# Patient Record
Sex: FEMALE | Race: WHITE | Employment: OTHER | URBAN - NONMETROPOLITAN AREA
[De-identification: names, ages, dates, MRNs, and addresses within clinical notes are randomized per-mention and may not be internally consistent; named-entity substitution may affect disease eponyms.]

---

## 2017-09-08 ENCOUNTER — HOSPITAL ENCOUNTER (OUTPATIENT)
Dept: INTERVENTIONAL RADIOLOGY/VASCULAR | Age: 82
Discharge: HOME | End: 2017-09-08
Payer: MEDICARE

## 2017-09-08 DIAGNOSIS — I73.9 PVD (PERIPHERAL VASCULAR DISEASE) (HCC): ICD-10-CM

## 2017-09-08 PROCEDURE — 93923 UPR/LXTR ART STDY 3+ LVLS: CPT

## 2017-11-06 ENCOUNTER — HOSPITAL ENCOUNTER (OUTPATIENT)
Dept: WOMENS IMAGING | Age: 82
Discharge: HOME | End: 2017-11-06
Payer: MEDICARE

## 2017-11-06 DIAGNOSIS — Z12.39 BREAST SCREENING: ICD-10-CM

## 2017-11-06 PROCEDURE — 77063 BREAST TOMOSYNTHESIS BI: CPT

## 2017-11-27 ENCOUNTER — HOSPITAL ENCOUNTER (OUTPATIENT)
Age: 82
Setting detail: OUTPATIENT SURGERY
Discharge: HOME | End: 2017-11-27
Attending: INTERNAL MEDICINE | Admitting: INTERNAL MEDICINE
Payer: MEDICARE

## 2017-11-27 ENCOUNTER — ANESTHESIA (OUTPATIENT)
Dept: ENDOSCOPY | Age: 82
End: 2017-11-27
Payer: MEDICARE

## 2017-11-27 ENCOUNTER — ANESTHESIA EVENT (OUTPATIENT)
Dept: ENDOSCOPY | Age: 82
End: 2017-11-27
Payer: MEDICARE

## 2017-11-27 VITALS
TEMPERATURE: 96.8 F | DIASTOLIC BLOOD PRESSURE: 77 MMHG | WEIGHT: 188 LBS | OXYGEN SATURATION: 94 % | SYSTOLIC BLOOD PRESSURE: 134 MMHG | BODY MASS INDEX: 32.1 KG/M2 | RESPIRATION RATE: 18 BRPM | HEIGHT: 64 IN | HEART RATE: 81 BPM

## 2017-11-27 VITALS
OXYGEN SATURATION: 93 % | DIASTOLIC BLOOD PRESSURE: 61 MMHG | SYSTOLIC BLOOD PRESSURE: 129 MMHG | RESPIRATION RATE: 15 BRPM

## 2017-11-27 PROCEDURE — 2500000003 HC RX 250 WO HCPCS: Performed by: REGISTERED NURSE

## 2017-11-27 PROCEDURE — 3609012400 HC EGD TRANSORAL BIOPSY SINGLE/MULTIPLE: Performed by: INTERNAL MEDICINE

## 2017-11-27 PROCEDURE — 3700000001 HC ADD 15 MINUTES (ANESTHESIA): Performed by: INTERNAL MEDICINE

## 2017-11-27 PROCEDURE — 3609013800 HC EGD SUBMUCOSAL/BOTOX INJECTION: Performed by: INTERNAL MEDICINE

## 2017-11-27 PROCEDURE — 7100000000 HC PACU RECOVERY - FIRST 15 MIN: Performed by: INTERNAL MEDICINE

## 2017-11-27 PROCEDURE — 7100000001 HC PACU RECOVERY - ADDTL 15 MIN: Performed by: INTERNAL MEDICINE

## 2017-11-27 PROCEDURE — 2580000003 HC RX 258: Performed by: INTERNAL MEDICINE

## 2017-11-27 PROCEDURE — 88305 TISSUE EXAM BY PATHOLOGIST: CPT

## 2017-11-27 PROCEDURE — 6360000002 HC RX W HCPCS: Performed by: REGISTERED NURSE

## 2017-11-27 PROCEDURE — 3700000000 HC ANESTHESIA ATTENDED CARE: Performed by: INTERNAL MEDICINE

## 2017-11-27 RX ORDER — 0.9 % SODIUM CHLORIDE 0.9 %
10 VIAL (ML) INJECTION PRN
Status: CANCELLED | OUTPATIENT
Start: 2017-11-27

## 2017-11-27 RX ORDER — 0.9 % SODIUM CHLORIDE 0.9 %
10 VIAL (ML) INJECTION EVERY 12 HOURS SCHEDULED
Status: CANCELLED | OUTPATIENT
Start: 2017-11-27

## 2017-11-27 RX ORDER — SODIUM CHLORIDE 450 MG/100ML
INJECTION, SOLUTION INTRAVENOUS CONTINUOUS
Status: DISCONTINUED | OUTPATIENT
Start: 2017-11-27 | End: 2017-11-27 | Stop reason: HOSPADM

## 2017-11-27 RX ORDER — LIDOCAINE HYDROCHLORIDE 20 MG/ML
INJECTION, SOLUTION INFILTRATION; PERINEURAL PRN
Status: DISCONTINUED | OUTPATIENT
Start: 2017-11-27 | End: 2017-11-27 | Stop reason: SDUPTHER

## 2017-11-27 RX ORDER — GLYCOPYRROLATE 0.2 MG/ML
INJECTION INTRAMUSCULAR; INTRAVENOUS PRN
Status: DISCONTINUED | OUTPATIENT
Start: 2017-11-27 | End: 2017-11-27 | Stop reason: SDUPTHER

## 2017-11-27 RX ORDER — FENTANYL CITRATE 50 UG/ML
INJECTION, SOLUTION INTRAMUSCULAR; INTRAVENOUS PRN
Status: DISCONTINUED | OUTPATIENT
Start: 2017-11-27 | End: 2017-11-27 | Stop reason: SDUPTHER

## 2017-11-27 RX ADMIN — LIDOCAINE HYDROCHLORIDE 100 MG: 20 INJECTION, SOLUTION EPIDURAL; INFILTRATION; INTRACAUDAL; PERINEURAL at 10:42

## 2017-11-27 RX ADMIN — FENTANYL CITRATE 100 MCG: 50 INJECTION INTRAMUSCULAR; INTRAVENOUS at 10:42

## 2017-11-27 RX ADMIN — PROPOFOL 50 MG: 10 INJECTION, EMULSION INTRAVENOUS at 10:42

## 2017-11-27 RX ADMIN — GLYCOPYRROLATE 0.2 MG: 0.2 INJECTION, SOLUTION INTRAMUSCULAR; INTRAVENOUS at 10:35

## 2017-11-27 RX ADMIN — SODIUM CHLORIDE: 4.5 INJECTION, SOLUTION INTRAVENOUS at 09:40

## 2017-11-27 RX ADMIN — PROPOFOL 20 MG: 10 INJECTION, EMULSION INTRAVENOUS at 10:47

## 2017-11-27 ASSESSMENT — PAIN - FUNCTIONAL ASSESSMENT: PAIN_FUNCTIONAL_ASSESSMENT: 0-10

## 2017-11-27 NOTE — ANESTHESIA PRE PROCEDURE
Department of Anesthesiology  Preprocedure Note       Name:  Ernestina Graves   Age:  [de-identified] y.o.  :  1935                                          MRN:  [id number]         Date:  2017      Surgeon: Luz Elena Dye): Roxana Simpson MD    Procedure: Procedure(s):  EGD / BOTOX    Medications prior to admission:   Prior to Admission medications    Medication Sig Start Date End Date Taking? Authorizing Provider   meclizine (ANTIVERT) 25 MG tablet Take 25 mg by mouth 3 times daily as needed   Yes Historical Provider, MD   Pantoprazole Sodium (PROTONIX) 40 MG PACK packet Take 1 packet by mouth 2 times daily. 14  Yes Roxana Simpson MD   acetaminophen (TYLENOL ARTHRITIS PAIN) 650 MG CR tablet Take 1,300 mg by mouth every 12 hours as needed for Pain    Yes Historical Provider, MD   atenolol (TENORMIN) 50 MG tablet Take 50 mg by mouth daily. Yes Historical Provider, MD   timolol (TIMOPTIC) 0.5 % ophthalmic solution 1 drop 2 times daily. Yes Historical Provider, MD   raloxifene (EVISTA) 60 MG tablet Take 60 mg by mouth daily. Yes Historical Provider, MD   Misc. Devices [facility]) MISC by Does not apply route. Yes Historical Provider, MD   triamterene-hydrochlorothiazide (MAXZIDE-25) 37.5-25 MG per tablet Take 1 tablet by mouth daily. Yes Historical Provider, MD       Current medications:    Current Facility-Administered Medications   Medication Dose Route Frequency Provider Last Rate Last Dose    onabotulinumtoxin A (BOTOX) injection 100 Units  100 Units Intramuscular Once Roxana Simpson MD        0.45 % sodium chloride infusion   Intravenous Continuous Roxana Simpson  mL/hr at 17 0940         Allergies:     Allergies   Allergen Reactions    Lasix [Furosemide]     Lipitor [Atorvastatin]     Neurontin [Gabapentin]     Penicillins        Problem List:    Patient Active Problem List   Diagnosis Code    Vertigo R42    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

## 2017-11-27 NOTE — OP NOTE
Sodium (PROTONIX) 40 MG PACK packet Take 1 packet by mouth 2 times daily. 9/16/14  Yes Barbara Saldaña MD   acetaminophen (TYLENOL ARTHRITIS PAIN) 650 MG CR tablet Take 1,300 mg by mouth every 12 hours as needed for Pain    Yes Historical Provider, MD   atenolol (TENORMIN) 50 MG tablet Take 50 mg by mouth daily. Yes Historical Provider, MD   timolol (TIMOPTIC) 0.5 % ophthalmic solution 1 drop 2 times daily. Yes Historical Provider, MD   raloxifene (EVISTA) 60 MG tablet Take 60 mg by mouth daily. Yes Historical Provider, MD   Misc. Devices [facility]) MISC by Does not apply route. Yes Historical Provider, MD   triamterene-hydrochlorothiazide (MAXZIDE-25) 37.5-25 MG per tablet Take 1 tablet by mouth daily. Yes Historical Provider, MD     Additional information:       PHYSICAL:   Heart:  [x]Regular rate and rhythm  []Other:    Lungs:  [x]Clear    []Other:    Abdomen: [x]Soft    []Other:    Mental Status: [x]Alert & Oriented  []Other:      VITAL SIGNS   Patient Vitals for the past 24 hrs:   BP Temp Temp src Pulse Resp SpO2 Height Weight   11/27/17 0934 (!) 105/59 98.1 °F (36.7 °C) Temporal 93 18 94 % 5' 4\" (1.626 m) 188 lb (85.3 kg)       PLANNED PROCEDURE   [x]EGD  []Colonoscopy []Flex Sigmoid  []ERCP []EUS   []Cystoscopy  [] CATH [] BRONCH   Consent: I have discussed with the patient and/or the patient representative the indication, alternatives, and the possible risks and/or complications of the planned procedure and the anesthesia methods. The patient and/or patient representative appear to understand and agree to proceed.   SEDATION  Planned agent:[x]Midazolam []Meperidine [x]Sublimaze []Morphine  []Diazepam  []Other:     ASA Classification: Class 3 - A patient with severe systemic disease that limits activity but is not incapacitating    Airway Assessment: Mallampati Class II - (soft palate, fauces & uvula are visible)    Monitoring and Safety: The patient will be placed on a cardiac monitor and vital signs, pulse oximetry and level of consciousness will be continuously evaluated throughout the procedure. The patient will be closely monitored until recovery from the medications is complete and the patient has returned to baseline status. Respiratory therapy will be on standby during the procedure. [x]Pre-procedure diagnostic studies complete and results available. Comment:    [x]Previous sedation/anesthesia experiences assessed. Comment:    [x]The patient is an appropriate candidate to undergo the planned procedure sedation and anesthesia. (Refer to nursing sedation/analgesia documentation record)  [x]Formulation and discussion of sedation/procedure plan, risks, and expectations with patient and/or responsible adult completed. [x]Patient examined immediately prior to the procedure.  (Refer to nursing sedation/analgesia documentation record)    Erica Cowden, MD   Electronically signed 11/27/2017 at 10:27 AM

## 2017-11-27 NOTE — BRIEF OP NOTE
Brief Postoperative Note  ______________________________________________________________    Patient: Stacey Germain  YOB: 1935  MRN: [id number]  Date of Procedure: 11/27/2017    Pre-Op Diagnosis: DISTAL ESOPHAGUS, TIGHT ACHALASIA    Post-Op Diagnosis: Same       Procedure(s):  EGD / BOTOX  EGD BIOPSY    Anesthesia: [unfilled]    Surgeon(s):   Nani Wiley MD    Staff:  * No surgical staff found *     Estimated Blood Loss: * No values recorded between 11/27/2017 10:31 AM and 11/27/2017 10:56 AM * None    Complications: None    Specimens:   ID Type Source Tests Collected by Time Destination   A :  Tissue Esophagus SURGICAL PATHOLOGY Nani Wiley MD 11/27/2017 1049        Implants:  * No implants in log *      Drains:      Findings: above    Nani Wiley MD  Date: 11/27/2017  Time: 10:56 AM

## 2017-11-28 NOTE — OP NOTE
[address] [place] [address]                                 OPERATIVE REPORT    PATIENT NAME: Alice Cole                     :        1935  MED REC NO:   [id number]                           ROOM:  ACCOUNT NO:   [de-identified]                           ADMIT DATE: 2017  PROVIDER:     Snehal Arredondo M.D.    Daril Comes:  2017    CC MANUALLY PUT, PLEASE VERIFY    PROCEDURE:  Upper endoscopy and Botox injection. INDICATIONS:  Achalasia. ANESTHESIA:  IV Diprivan per Anesthesia. OPERATIVE PROCEDURE:  Prior to procedure, risks, benefits, complications  (including but not limited to the following; bleeding, infection,  perforation, emergency surgery, stroke, heart attack, death, possible  anesthetic risks, and the fact that the procedure is not 100% accurate or  successful), indications, and alternatives of upper endoscopy were  explained to the patient. The patient understood and agreed to the  procedure. All questions were answered. With the patient in left lateral decubitus position, GIF-180  forward-viewing endoscope introduced without difficulty. Esophagus viewed. GE junction 39 cm. The GE junction was tight due to the achalasia. Endoscope advanced into the proximal stomach, gastritis noted. The  endoscope was advanced along the greater curvature of the stomach and the  antrum. Mild gastritis was noted. The endoscope was advanced through the pylorus to first and second portion  of the duodenum. No abnormalities were noted. The endoscope was then  traced back into the stomach and retroflexed. Cardiac and fundic portions  of stomach were evaluated. No other abnormalities were noted. Endoscope  was then straightened and stomach deflated. Endoscope was traced back to  distal esophagus. Four quadrant biopsies done at 39 cm.   The endoscope was  then traced back into the distal

## 2018-08-06 ENCOUNTER — HOSPITAL ENCOUNTER (OUTPATIENT)
Dept: GENERAL RADIOLOGY | Age: 83
Discharge: HOME | End: 2018-08-06
Payer: MEDICARE

## 2018-08-06 DIAGNOSIS — K57.30 DIVERTICULOSIS OF LARGE INTESTINE WITHOUT DIVERTICULITIS: ICD-10-CM

## 2018-08-06 DIAGNOSIS — Z53.9 PROCEDURE NOT CARRIED OUT: ICD-10-CM

## 2018-08-06 PROCEDURE — 74270 X-RAY XM COLON 1CNTRST STD: CPT

## 2018-08-07 PROBLEM — R93.3 BARIUM ENEMA ABNORMAL: Status: ACTIVE | Noted: 2018-08-07

## 2018-08-07 PROBLEM — K57.30 DIVERTICULOSIS OF LARGE INTESTINE WITHOUT HEMORRHAGE: Status: ACTIVE | Noted: 2018-08-07

## 2018-08-07 PROBLEM — K56.699 STRICTURE OF COLON DETERMINED BY ENDOSCOPY (HCC): Status: ACTIVE | Noted: 2018-08-07

## 2018-08-07 NOTE — PROGRESS NOTES
extensive colonic diverticulosis in the descending and sigmoid colon masslike effect in the junction of the sigmoid descending colon suspicious for neoplasm. no biopsies were taken at her initial attempt at colonoscopy. Has lost 6-7 lbs recently. Past Medical History  Past Medical History:   Diagnosis Date    Arthritis     BPPV (benign paroxysmal positional vertigo) 6/6/16    GERD (gastroesophageal reflux disease)     ?  esophageal stricture    HTN (hypertension)     Hyperlipidemia     Obesity     Osteopenia     PAC (premature atrial contraction)     on betablocker    Paralysis (HCC)     baby    Pedal edema     denied any hx of hypertension    PVC (premature ventricular contraction)     PVD (peripheral vascular disease) (HCC)     Tinnitus     UTI (urinary tract infection)        Past Surgical History  Past Surgical History:   Procedure Laterality Date    APPENDECTOMY      BREAST SURGERY Right 1958    lumpectomy    CHOLECYSTECTOMY      30 years ago    COLONOSCOPY  08/06/2018    Dr Jacob Erp      eye, eyelids    EYE SURGERY      cataract    HYSTERECTOMY      LARYNGOSCOPY  07/15/2016    [place] EGD TRANSORAL BIOPSY SINGLE/MULTIPLE Left 11/27/2017    EGD BIOPSY performed by Jenny Nance MD at [address] [place] [address] ENDOSCOPY N/A 11/27/2017    EGD SUBMUCOSAL/BOTOX INJECTION performed by Jenny Nance MD at [facility] End[facility]       Medications  Current Outpatient Prescriptions on File Prior to Visit   Medication Sig Dispense Refill    metoprolol succinate (TOPROL XL) 50 MG extended release tablet Take 50 mg by mouth daily      pravastatin (PRAVACHOL) 20 MG tablet Take 20 mg by mouth daily      calcium carbonate 600 MG TABS tablet Take 1 tablet by mouth daily as needed      cilostazol (PLETAL) 100 MG tablet Take 100 mg by mouth 2 times daily      meclizine (ANTIVERT) 25 MG tablet Take 25 mg by mouth 3 times daily as needed      Pantoprazole Sodium (PROTONIX) 40 MG PACK packet Take 1 packet by mouth 2 times daily. 60 each 6    acetaminophen (TYLENOL ARTHRITIS PAIN) 650 MG CR tablet Take 1,300 mg by mouth every 12 hours as needed for Pain       atenolol (TENORMIN) 50 MG tablet Take 50 mg by mouth daily.  timolol (TIMOPTIC) 0.5 % ophthalmic solution 1 drop 2 times daily.  raloxifene (EVISTA) 60 MG tablet Take 60 mg by mouth daily.  Misc. Devices [facility]) MISC by Does not apply route.  triamterene-hydrochlorothiazide (MAXZIDE-25) 37.5-25 MG per tablet Take 1 tablet by mouth daily. No current facility-administered medications on file prior to visit. Allergies  Allergies   Allergen Reactions    Lasix [Furosemide] Other (See Comments)     PATIENT DOESN'T REMEMBER    Lipitor [Atorvastatin] Other (See Comments)     LEG PAIN    Neurontin [Gabapentin] Other (See Comments)     PATIENT DOESN'T REMEMBER    Penicillins Other (See Comments)     HYPERTENSION       Family History  Family History   Problem Relation Age of Onset    Heart Disease Mother     Stroke Mother     Cancer Father         lung    Emphysema Father     Other Sister         leukemia    Heart Disease Maternal Grandmother     Dementia Maternal Grandmother     Heart Disease Maternal Grandfather        Social History  Social History     Social History    Marital status:      Spouse name: N/A    Number of children: N/A    Years of education: N/A     Occupational History    Not on file.      Social History Main Topics    Smoking status: Current Every Day Smoker     Packs/day: 0.50     Years: 60.00     Start date: 1956    Smokeless tobacco: Never Used    Alcohol use 0.6 oz/week     1 Glasses of wine per week      Comment: social    Drug use: No    Sexual activity: Not on file     Other Topics Concern    Not on file     Social History Narrative    No narrative on file       [address] Maintenance   Topic Date Due    DTaP/Tdap/Td vaccine (1 - Tdap) 08/10/1956    Shingles Vaccine (1 of 2 - 2 Dose Series) 08/10/1987    Pneumococcal low/med risk (1 of 2 - PCV13) 08/10/2002    Potassium monitoring  06/06/2017    Creatinine monitoring  06/06/2017    Flu vaccine (1) 09/01/2018    DEXA (modify frequency per FRAX score)  Completed       Review of Systems  Constitutional: Positive for appetite change, fatigue and unexpected weight change. Negative for activity change, chills, diaphoresis and fever. HENT: Negative for congestion, dental problem, drooling, ear discharge, ear pain, facial swelling, hearing loss, mouth sores, nosebleeds, postnasal drip, rhinorrhea, sinus pain, sinus pressure, sneezing, sore throat, tinnitus, trouble swallowing and voice change. Eyes: Negative for photophobia, pain, discharge, redness, itching and visual disturbance. Respiratory: Positive for shortness of breath. Negative for apnea, cough, choking, chest tightness, wheezing and stridor. Cardiovascular: Positive for leg swelling. Negative for chest pain and palpitations. Gastrointestinal: Positive for abdominal distention, abdominal pain, constipation and diarrhea. Negative for anal bleeding, blood in stool, nausea, rectal pain and vomiting. Endocrine: Negative for cold intolerance, heat intolerance, polydipsia, polyphagia and polyuria. Genitourinary: Negative for decreased urine volume, difficulty urinating, dyspareunia, dysuria, enuresis, flank pain, frequency, genital sores, hematuria, menstrual problem, pelvic pain, urgency, vaginal bleeding, vaginal discharge and vaginal pain. Musculoskeletal: Negative for arthralgias, back pain, gait problem, joint swelling, myalgias, neck pain and neck stiffness. Skin: Negative for color change, pallor, rash and wound. Allergic/Immunologic: Negative for environmental allergies, food allergies and immunocompromised state. Neurological: Positive for weakness.  Negative for dizziness, tremors, seizures, syncope, facial asymmetry, speech difficulty, light-headedness, numbness and headaches. Hematological: Negative for adenopathy. Does not bruise/bleed easily. Psychiatric/Behavioral: Negative for agitation, behavioral problems, confusion, decreased concentration, dysphoric mood, hallucinations, self-injury, sleep disturbance and suicidal ideas. The patient is not nervous/anxious and is not hyperactive    OBJECTIVE    VITALS:  height is 5' 5\" (1.651 m) and weight is 194 lb 8 oz (88.2 kg). Her tympanic temperature is 98.2 °F (36.8 °C). Her blood pressure is 118/62 and her pulse is 118. Her respiration is 18 and oxygen saturation is 95%. CONSTITUTIONAL: Alert and oriented times 3, no acute distress and cooperative to examination with proper mood and affect. SKIN: Skin color, texture, turgor normal. No rashes or lesions. LYMPH: no cervical nodes, no inguinal nodes  HEENT: Head is normocephalic, atraumatic. EOMI, PERRLA. NECK: Supple, symmetrical, trachea midline, no adenopathy, thyroid symmetric, not enlarged and no tenderness, skin normal.  CHEST/LUNGS: chest symmetric with normal A/P diameter, normal respiratory rate and rhythm, lungs clear to auscultation without wheezes, rales or rhonchi. No accessory muscle use. Scars None   CARDIOVASCULAR: Heart sounds are normal.  Regular rate and rhythm without murmur, gallop or rub. Normal S1 and S2. . Carotid and femoral pulses 2+/4 and equal bilaterally. ABDOMEN: obese large right upper quadrant, incision, laparoscopic scars hysterectomy scar(s) present. Normal bowel sounds. No bruits. Theadore Anat \"soft, nontender, nondistended, no masses or organomegaly. no evidence of hernia. Percussion: Normal without hepatosplenomegally. Tenderness: absent. RECTAL: deferred, not clinically indicated  NEUROLOGIC: There are no focalizing motor or sensory deficits. CN II-XII are grossly intact. Theadore Anat EXTREMITIES: no cyanosis, no clubbing and no edema.   Musculoskeletal exam: no joint tenderness, deformity or swelling.                  Electronically signed by Min Topete MD on 8/8/2018 at 1:47 PM

## 2018-08-08 ENCOUNTER — OFFICE VISIT (OUTPATIENT)
Dept: SURGERY | Age: 83
End: 2018-08-08
Payer: MEDICARE

## 2018-08-08 ENCOUNTER — TELEPHONE (OUTPATIENT)
Dept: SURGERY | Age: 83
End: 2018-08-08

## 2018-08-08 ENCOUNTER — HOSPITAL ENCOUNTER (OUTPATIENT)
Dept: CT IMAGING | Age: 83
Discharge: HOME | End: 2018-08-08
Payer: MEDICARE

## 2018-08-08 ENCOUNTER — HOSPITAL ENCOUNTER (OUTPATIENT)
Age: 83
Discharge: HOME | End: 2018-08-08
Payer: MEDICARE

## 2018-08-08 VITALS
HEART RATE: 118 BPM | DIASTOLIC BLOOD PRESSURE: 62 MMHG | BODY MASS INDEX: 32.4 KG/M2 | RESPIRATION RATE: 18 BRPM | SYSTOLIC BLOOD PRESSURE: 118 MMHG | TEMPERATURE: 98.2 F | WEIGHT: 194.5 LBS | HEIGHT: 65 IN | OXYGEN SATURATION: 95 %

## 2018-08-08 DIAGNOSIS — R93.3: ICD-10-CM

## 2018-08-08 DIAGNOSIS — I10 ESSENTIAL HYPERTENSION: Primary | ICD-10-CM

## 2018-08-08 DIAGNOSIS — R93.5 ABNORMAL FINDINGS ON DIAGNOSTIC IMAGING OF ABDOMEN: ICD-10-CM

## 2018-08-08 DIAGNOSIS — K56.699 STRICTURE OF COLON DETERMINED BY ENDOSCOPY (HCC): ICD-10-CM

## 2018-08-08 DIAGNOSIS — K57.30 DIVERTICULOSIS OF LARGE INTESTINE WITHOUT HEMORRHAGE: ICD-10-CM

## 2018-08-08 DIAGNOSIS — Z01.818 PRE-OP TESTING: ICD-10-CM

## 2018-08-08 LAB
CEA: 5.5 NG/ML (ref 0–5)
POC CREATININE WHOLE BLOOD: 1 MG/DL (ref 0.5–1.2)

## 2018-08-08 PROCEDURE — 6360000004 HC RX CONTRAST MEDICATION: Performed by: INTERNAL MEDICINE

## 2018-08-08 PROCEDURE — 82378 CARCINOEMBRYONIC ANTIGEN: CPT

## 2018-08-08 PROCEDURE — 4040F PNEUMOC VAC/ADMIN/RCVD: CPT | Performed by: SURGERY

## 2018-08-08 PROCEDURE — 1101F PT FALLS ASSESS-DOCD LE1/YR: CPT | Performed by: SURGERY

## 2018-08-08 PROCEDURE — G8427 DOCREV CUR MEDS BY ELIG CLIN: HCPCS | Performed by: SURGERY

## 2018-08-08 PROCEDURE — 82565 ASSAY OF CREATININE: CPT

## 2018-08-08 PROCEDURE — 1123F ACP DISCUSS/DSCN MKR DOCD: CPT | Performed by: SURGERY

## 2018-08-08 PROCEDURE — G8417 CALC BMI ABV UP PARAM F/U: HCPCS | Performed by: SURGERY

## 2018-08-08 PROCEDURE — 1090F PRES/ABSN URINE INCON ASSESS: CPT | Performed by: SURGERY

## 2018-08-08 PROCEDURE — 4004F PT TOBACCO SCREEN RCVD TLK: CPT | Performed by: SURGERY

## 2018-08-08 PROCEDURE — 74177 CT ABD & PELVIS W/CONTRAST: CPT

## 2018-08-08 PROCEDURE — 36415 COLL VENOUS BLD VENIPUNCTURE: CPT

## 2018-08-08 PROCEDURE — 99205 OFFICE O/P NEW HI 60 MIN: CPT | Performed by: SURGERY

## 2018-08-08 PROCEDURE — G8400 PT W/DXA NO RESULTS DOC: HCPCS | Performed by: SURGERY

## 2018-08-08 RX ADMIN — IOHEXOL 50 ML: 240 INJECTION, SOLUTION INTRATHECAL; INTRAVASCULAR; INTRAVENOUS; ORAL at 10:40

## 2018-08-08 RX ADMIN — IOPAMIDOL 85 ML: 755 INJECTION, SOLUTION INTRAVENOUS at 10:40

## 2018-08-08 ASSESSMENT — ENCOUNTER SYMPTOMS
SHORTNESS OF BREATH: 1
APNEA: 0
STRIDOR: 0
VOICE CHANGE: 0
COUGH: 0
RHINORRHEA: 0
DIARRHEA: 1
COLOR CHANGE: 0
PHOTOPHOBIA: 0
EYE REDNESS: 0
VOMITING: 0
ANAL BLEEDING: 0
CHOKING: 0
NAUSEA: 0
EYE ITCHING: 0
CHEST TIGHTNESS: 0
SINUS PRESSURE: 0
BACK PAIN: 0
ABDOMINAL PAIN: 1
BLOOD IN STOOL: 0
EYE DISCHARGE: 0
ABDOMINAL DISTENTION: 1
SINUS PAIN: 0
WHEEZING: 0
TROUBLE SWALLOWING: 0
RECTAL PAIN: 0
EYE PAIN: 0
FACIAL SWELLING: 0
CONSTIPATION: 1
SORE THROAT: 0

## 2018-08-08 NOTE — TELEPHONE ENCOUNTER
Scheduled Josee Schroeder for a robotic assisted low anterior colon resection poss open on Mon 8/20 at 7:30am & she will arrive at 5:45am. She will be npo after midnight & the prep instructions were given. Lab orders were also given, consent was signed. Josee Schroeder will stop the pletal 5 days prior to surgery. Dr Blanca Peterson office was notified of the surgery & their office will fax a clearance form.

## 2018-08-13 LAB
ANION GAP SERPL CALCULATED.3IONS-SCNC: 9 MMOL/L (ref 4–12)
BUN BLDV-MCNC: 18 MG/DL (ref 7–20)
CALCIUM SERPL-MCNC: 8.7 MG/DL (ref 8.8–10.5)
CHLORIDE BLD-SCNC: 100 MEQ/L (ref 101–111)
CO2: 29 MEQ/L (ref 21–32)
CREAT SERPL-MCNC: 1.11 MG/DL (ref 0.6–1.3)
CREATININE CLEARANCE: 47
GLUCOSE: 103 MG/DL (ref 70–110)
HCT VFR BLD CALC: 48.4 % (ref 35–44)
HEMOGLOBIN: 16.3 GM/DL (ref 12–15)
POTASSIUM SERPL-SCNC: 3.4 MEQ/L (ref 3.6–5)
SODIUM BLD-SCNC: 138 MEQ/L (ref 135–145)

## 2018-08-14 ENCOUNTER — TELEPHONE (OUTPATIENT)
Dept: SURGERY | Age: 83
End: 2018-08-14

## 2018-08-14 NOTE — TELEPHONE ENCOUNTER
Per Angel Garica, nurse reviewer at [facility] the inpatient surgery was approved as medically necessary. It is a DRG but they are starting with 1 day 8/20-8/21/18. The auth # is [id number]. Varinder Slim # is [id number]. CPT codes given were: 58684,  & DX given was K56.699, K57.30.

## 2018-08-15 ENCOUNTER — TELEPHONE (OUTPATIENT)
Dept: SURGERY | Age: 83
End: 2018-08-15

## 2018-08-15 NOTE — TELEPHONE ENCOUNTER
Called pt to notify. Pt stopped eating bananas due to liquid diet. Pt does have potassium pills at home will call PCP for orders.

## 2018-08-19 ENCOUNTER — ANESTHESIA EVENT (OUTPATIENT)
Dept: OPERATING ROOM | Age: 83
DRG: 331 | End: 2018-08-19
Payer: MEDICARE

## 2018-08-20 ENCOUNTER — ANESTHESIA (OUTPATIENT)
Dept: OPERATING ROOM | Age: 83
DRG: 331 | End: 2018-08-20
Payer: MEDICARE

## 2018-08-20 ENCOUNTER — HOSPITAL ENCOUNTER (INPATIENT)
Age: 83
LOS: 3 days | Discharge: HOME | DRG: 331 | End: 2018-08-23
Attending: SURGERY | Admitting: SURGERY
Payer: MEDICARE

## 2018-08-20 VITALS
TEMPERATURE: 97.5 F | DIASTOLIC BLOOD PRESSURE: 100 MMHG | OXYGEN SATURATION: 96 % | RESPIRATION RATE: 10 BRPM | SYSTOLIC BLOOD PRESSURE: 181 MMHG

## 2018-08-20 DIAGNOSIS — Z90.49 S/P LAPAROSCOPIC COLECTOMY: Primary | ICD-10-CM

## 2018-08-20 PROBLEM — K56.699 COLON STRICTURE (HCC): Status: ACTIVE | Noted: 2018-08-20

## 2018-08-20 LAB — POTASSIUM SERPL-SCNC: 3.7 MEQ/L (ref 3.5–5.2)

## 2018-08-20 PROCEDURE — 3600000019 HC SURGERY ROBOT ADDTL 15MIN: Performed by: SURGERY

## 2018-08-20 PROCEDURE — 6370000000 HC RX 637 (ALT 250 FOR IP): Performed by: ANESTHESIOLOGY

## 2018-08-20 PROCEDURE — 7100000001 HC PACU RECOVERY - ADDTL 15 MIN: Performed by: SURGERY

## 2018-08-20 PROCEDURE — 0DJD8ZZ INSPECTION OF LOWER INTESTINAL TRACT, VIA NATURAL OR ARTIFICIAL OPENING ENDOSCOPIC: ICD-10-PCS | Performed by: SURGERY

## 2018-08-20 PROCEDURE — 6370000000 HC RX 637 (ALT 250 FOR IP): Performed by: SURGERY

## 2018-08-20 PROCEDURE — 6360000002 HC RX W HCPCS: Performed by: ANESTHESIOLOGY

## 2018-08-20 PROCEDURE — 2709999900 HC NON-CHARGEABLE SUPPLY: Performed by: SURGERY

## 2018-08-20 PROCEDURE — 1200000000 HC SEMI PRIVATE

## 2018-08-20 PROCEDURE — 45331 SIGMOIDOSCOPY AND BIOPSY: CPT | Performed by: SURGERY

## 2018-08-20 PROCEDURE — 2709999900 HC NON-CHARGEABLE SUPPLY

## 2018-08-20 PROCEDURE — 6360000002 HC RX W HCPCS: Performed by: SURGERY

## 2018-08-20 PROCEDURE — 7100000000 HC PACU RECOVERY - FIRST 15 MIN: Performed by: SURGERY

## 2018-08-20 PROCEDURE — 2720000010 HC SURG SUPPLY STERILE: Performed by: SURGERY

## 2018-08-20 PROCEDURE — 94761 N-INVAS EAR/PLS OXIMETRY MLT: CPT

## 2018-08-20 PROCEDURE — 84132 ASSAY OF SERUM POTASSIUM: CPT

## 2018-08-20 PROCEDURE — 51702 INSERT TEMP BLADDER CATH: CPT

## 2018-08-20 PROCEDURE — 2500000003 HC RX 250 WO HCPCS: Performed by: SURGERY

## 2018-08-20 PROCEDURE — 2580000003 HC RX 258: Performed by: SURGERY

## 2018-08-20 PROCEDURE — 2500000003 HC RX 250 WO HCPCS: Performed by: ANESTHESIOLOGY

## 2018-08-20 PROCEDURE — 3700000000 HC ANESTHESIA ATTENDED CARE: Performed by: SURGERY

## 2018-08-20 PROCEDURE — 3600000009 HC SURGERY ROBOT BASE: Performed by: SURGERY

## 2018-08-20 PROCEDURE — 3700000001 HC ADD 15 MINUTES (ANESTHESIA): Performed by: SURGERY

## 2018-08-20 PROCEDURE — 8E0W4CZ ROBOTIC ASSISTED PROCEDURE OF TRUNK REGION, PERCUTANEOUS ENDOSCOPIC APPROACH: ICD-10-PCS | Performed by: SURGERY

## 2018-08-20 PROCEDURE — S2900 ROBOTIC SURGICAL SYSTEM: HCPCS | Performed by: SURGERY

## 2018-08-20 PROCEDURE — 2580000003 HC RX 258: Performed by: ANESTHESIOLOGY

## 2018-08-20 PROCEDURE — 94762 N-INVAS EAR/PLS OXIMTRY CONT: CPT

## 2018-08-20 PROCEDURE — 44207 L COLECTOMY/COLOPROCTOSTOMY: CPT | Performed by: SURGERY

## 2018-08-20 PROCEDURE — 94640 AIRWAY INHALATION TREATMENT: CPT

## 2018-08-20 PROCEDURE — 0D1N4ZP BYPASS SIGMOID COLON TO RECTUM, PERCUTANEOUS ENDOSCOPIC APPROACH: ICD-10-PCS | Performed by: SURGERY

## 2018-08-20 PROCEDURE — 2780000010 HC IMPLANT OTHER: Performed by: SURGERY

## 2018-08-20 PROCEDURE — 36415 COLL VENOUS BLD VENIPUNCTURE: CPT

## 2018-08-20 PROCEDURE — 88307 TISSUE EXAM BY PATHOLOGIST: CPT

## 2018-08-20 DEVICE — SEALANT HEMSTAT 5ML HUM FIBRIN THROM 2 VI APPL DEV EVICEL: Type: IMPLANTABLE DEVICE | Status: FUNCTIONAL

## 2018-08-20 DEVICE — RELOAD STPL L75MM OPN STPL H4.5MM CLS STPL H2MM WIRE: Type: IMPLANTABLE DEVICE | Status: FUNCTIONAL

## 2018-08-20 DEVICE — RELOAD STPL H41XL60MM GRN THCK B FORM NAT ARTC ECHELON: Type: IMPLANTABLE DEVICE | Status: FUNCTIONAL

## 2018-08-20 RX ORDER — TRIAMTERENE AND HYDROCHLOROTHIAZIDE 37.5; 25 MG/1; MG/1
1 TABLET ORAL DAILY
Status: DISCONTINUED | OUTPATIENT
Start: 2018-08-21 | End: 2018-08-23 | Stop reason: HOSPADM

## 2018-08-20 RX ORDER — LABETALOL HYDROCHLORIDE 5 MG/ML
5 INJECTION, SOLUTION INTRAVENOUS EVERY 10 MIN PRN
Status: DISCONTINUED | OUTPATIENT
Start: 2018-08-20 | End: 2018-08-20 | Stop reason: HOSPADM

## 2018-08-20 RX ORDER — CIPROFLOXACIN 2 MG/ML
INJECTION, SOLUTION INTRAVENOUS PRN
Status: DISCONTINUED | OUTPATIENT
Start: 2018-08-20 | End: 2018-08-20 | Stop reason: SDUPTHER

## 2018-08-20 RX ORDER — ROCURONIUM BROMIDE 10 MG/ML
INJECTION, SOLUTION INTRAVENOUS PRN
Status: DISCONTINUED | OUTPATIENT
Start: 2018-08-20 | End: 2018-08-20 | Stop reason: SDUPTHER

## 2018-08-20 RX ORDER — OXYCODONE HYDROCHLORIDE AND ACETAMINOPHEN 5; 325 MG/1; MG/1
1 TABLET ORAL EVERY 4 HOURS PRN
Status: DISCONTINUED | OUTPATIENT
Start: 2018-08-20 | End: 2018-08-23 | Stop reason: HOSPADM

## 2018-08-20 RX ORDER — SODIUM CHLORIDE, SODIUM LACTATE, POTASSIUM CHLORIDE, CALCIUM CHLORIDE 600; 310; 30; 20 MG/100ML; MG/100ML; MG/100ML; MG/100ML
INJECTION, SOLUTION INTRAVENOUS CONTINUOUS
Status: DISCONTINUED | OUTPATIENT
Start: 2018-08-20 | End: 2018-08-20

## 2018-08-20 RX ORDER — MEPERIDINE HYDROCHLORIDE 25 MG/ML
12.5 INJECTION INTRAMUSCULAR; INTRAVENOUS; SUBCUTANEOUS EVERY 5 MIN PRN
Status: DISCONTINUED | OUTPATIENT
Start: 2018-08-20 | End: 2018-08-20 | Stop reason: HOSPADM

## 2018-08-20 RX ORDER — NEOSTIGMINE METHYLSULFATE 1 MG/ML
INJECTION, SOLUTION INTRAVENOUS PRN
Status: DISCONTINUED | OUTPATIENT
Start: 2018-08-20 | End: 2018-08-20 | Stop reason: SDUPTHER

## 2018-08-20 RX ORDER — METOPROLOL SUCCINATE 50 MG/1
50 TABLET, EXTENDED RELEASE ORAL DAILY
Status: DISCONTINUED | OUTPATIENT
Start: 2018-08-21 | End: 2018-08-23 | Stop reason: HOSPADM

## 2018-08-20 RX ORDER — KETOROLAC TROMETHAMINE 30 MG/ML
INJECTION, SOLUTION INTRAMUSCULAR; INTRAVENOUS
Status: DISPENSED
Start: 2018-08-20 | End: 2018-08-20

## 2018-08-20 RX ORDER — ATENOLOL 50 MG/1
50 TABLET ORAL DAILY
Status: DISCONTINUED | OUTPATIENT
Start: 2018-08-20 | End: 2018-08-20 | Stop reason: ALTCHOICE

## 2018-08-20 RX ORDER — OXYCODONE HYDROCHLORIDE AND ACETAMINOPHEN 5; 325 MG/1; MG/1
2 TABLET ORAL EVERY 4 HOURS PRN
Status: DISCONTINUED | OUTPATIENT
Start: 2018-08-20 | End: 2018-08-23 | Stop reason: HOSPADM

## 2018-08-20 RX ORDER — ACETAMINOPHEN 325 MG/1
650 TABLET ORAL EVERY 4 HOURS PRN
Status: DISCONTINUED | OUTPATIENT
Start: 2018-08-20 | End: 2018-08-23 | Stop reason: HOSPADM

## 2018-08-20 RX ORDER — PROPOFOL 10 MG/ML
INJECTION, EMULSION INTRAVENOUS PRN
Status: DISCONTINUED | OUTPATIENT
Start: 2018-08-20 | End: 2018-08-20 | Stop reason: SDUPTHER

## 2018-08-20 RX ORDER — IPRATROPIUM BROMIDE AND ALBUTEROL SULFATE 2.5; .5 MG/3ML; MG/3ML
1 SOLUTION RESPIRATORY (INHALATION) ONCE
Status: COMPLETED | OUTPATIENT
Start: 2018-08-20 | End: 2018-08-20

## 2018-08-20 RX ORDER — LIDOCAINE HYDROCHLORIDE 20 MG/ML
INJECTION, SOLUTION INFILTRATION; PERINEURAL PRN
Status: DISCONTINUED | OUTPATIENT
Start: 2018-08-20 | End: 2018-08-20 | Stop reason: SDUPTHER

## 2018-08-20 RX ORDER — ONDANSETRON 2 MG/ML
4 INJECTION INTRAMUSCULAR; INTRAVENOUS EVERY 6 HOURS PRN
Status: DISCONTINUED | OUTPATIENT
Start: 2018-08-20 | End: 2018-08-23 | Stop reason: HOSPADM

## 2018-08-20 RX ORDER — PRAVASTATIN SODIUM 20 MG
20 TABLET ORAL DAILY
Status: DISCONTINUED | OUTPATIENT
Start: 2018-08-20 | End: 2018-08-23 | Stop reason: HOSPADM

## 2018-08-20 RX ORDER — DEXAMETHASONE SODIUM PHOSPHATE 4 MG/ML
INJECTION, SOLUTION INTRA-ARTICULAR; INTRALESIONAL; INTRAMUSCULAR; INTRAVENOUS; SOFT TISSUE PRN
Status: DISCONTINUED | OUTPATIENT
Start: 2018-08-20 | End: 2018-08-20 | Stop reason: SDUPTHER

## 2018-08-20 RX ORDER — TIMOLOL MALEATE 5 MG/ML
1 SOLUTION/ DROPS OPHTHALMIC 2 TIMES DAILY
Status: DISCONTINUED | OUTPATIENT
Start: 2018-08-20 | End: 2018-08-23 | Stop reason: HOSPADM

## 2018-08-20 RX ORDER — KETOROLAC TROMETHAMINE 30 MG/ML
15 INJECTION, SOLUTION INTRAMUSCULAR; INTRAVENOUS EVERY 6 HOURS
Status: DISPENSED | OUTPATIENT
Start: 2018-08-20 | End: 2018-08-22

## 2018-08-20 RX ORDER — CIPROFLOXACIN 2 MG/ML
400 INJECTION, SOLUTION INTRAVENOUS
Status: DISCONTINUED | OUTPATIENT
Start: 2018-08-20 | End: 2018-08-20 | Stop reason: HOSPADM

## 2018-08-20 RX ORDER — POTASSIUM CHLORIDE 750 MG/1
10 TABLET, FILM COATED, EXTENDED RELEASE ORAL 2 TIMES DAILY
Status: DISCONTINUED | OUTPATIENT
Start: 2018-08-20 | End: 2018-08-23 | Stop reason: HOSPADM

## 2018-08-20 RX ORDER — SODIUM CHLORIDE 0.9 % (FLUSH) 0.9 %
10 SYRINGE (ML) INJECTION PRN
Status: DISCONTINUED | OUTPATIENT
Start: 2018-08-20 | End: 2018-08-23 | Stop reason: HOSPADM

## 2018-08-20 RX ORDER — SODIUM CHLORIDE 0.9 % (FLUSH) 0.9 %
10 SYRINGE (ML) INJECTION EVERY 12 HOURS SCHEDULED
Status: DISCONTINUED | OUTPATIENT
Start: 2018-08-20 | End: 2018-08-23 | Stop reason: HOSPADM

## 2018-08-20 RX ORDER — SODIUM CHLORIDE, SODIUM LACTATE, POTASSIUM CHLORIDE, CALCIUM CHLORIDE 600; 310; 30; 20 MG/100ML; MG/100ML; MG/100ML; MG/100ML
INJECTION, SOLUTION INTRAVENOUS CONTINUOUS
Status: DISCONTINUED | OUTPATIENT
Start: 2018-08-20 | End: 2018-08-22

## 2018-08-20 RX ORDER — METOPROLOL TARTRATE 5 MG/5ML
INJECTION INTRAVENOUS PRN
Status: DISCONTINUED | OUTPATIENT
Start: 2018-08-20 | End: 2018-08-20 | Stop reason: SDUPTHER

## 2018-08-20 RX ORDER — GLYCOPYRROLATE 1 MG/5 ML
SYRINGE (ML) INTRAVENOUS PRN
Status: DISCONTINUED | OUTPATIENT
Start: 2018-08-20 | End: 2018-08-20 | Stop reason: SDUPTHER

## 2018-08-20 RX ORDER — FENTANYL CITRATE 50 UG/ML
25 INJECTION, SOLUTION INTRAMUSCULAR; INTRAVENOUS EVERY 5 MIN PRN
Status: DISCONTINUED | OUTPATIENT
Start: 2018-08-20 | End: 2018-08-20 | Stop reason: HOSPADM

## 2018-08-20 RX ORDER — FENTANYL CITRATE 50 UG/ML
INJECTION, SOLUTION INTRAMUSCULAR; INTRAVENOUS PRN
Status: DISCONTINUED | OUTPATIENT
Start: 2018-08-20 | End: 2018-08-20 | Stop reason: SDUPTHER

## 2018-08-20 RX ORDER — MECLIZINE HCL 12.5 MG/1
25 TABLET ORAL 3 TIMES DAILY PRN
Status: DISCONTINUED | OUTPATIENT
Start: 2018-08-20 | End: 2018-08-23 | Stop reason: HOSPADM

## 2018-08-20 RX ORDER — POTASSIUM CHLORIDE 750 MG/1
10 TABLET, EXTENDED RELEASE ORAL 2 TIMES DAILY
Status: ON HOLD | COMMUNITY
End: 2018-10-03 | Stop reason: HOSPADM

## 2018-08-20 RX ORDER — PANTOPRAZOLE SODIUM 40 MG/1
40 TABLET, DELAYED RELEASE ORAL 2 TIMES DAILY
Status: DISCONTINUED | OUTPATIENT
Start: 2018-08-20 | End: 2018-08-23 | Stop reason: HOSPADM

## 2018-08-20 RX ORDER — SODIUM CHLORIDE 9 MG/ML
INJECTION, SOLUTION INTRAVENOUS CONTINUOUS PRN
Status: DISCONTINUED | OUTPATIENT
Start: 2018-08-20 | End: 2018-08-20 | Stop reason: SDUPTHER

## 2018-08-20 RX ORDER — PROMETHAZINE HYDROCHLORIDE 25 MG/ML
INJECTION, SOLUTION INTRAMUSCULAR; INTRAVENOUS PRN
Status: DISCONTINUED | OUTPATIENT
Start: 2018-08-20 | End: 2018-08-20 | Stop reason: SDUPTHER

## 2018-08-20 RX ORDER — INDOCYANINE GREEN AND WATER 25 MG
KIT INJECTION PRN
Status: DISCONTINUED | OUTPATIENT
Start: 2018-08-20 | End: 2018-08-20 | Stop reason: SDUPTHER

## 2018-08-20 RX ORDER — SODIUM CHLORIDE 0.9 % (FLUSH) 0.9 %
10 SYRINGE (ML) INJECTION EVERY 12 HOURS SCHEDULED
Status: DISCONTINUED | OUTPATIENT
Start: 2018-08-20 | End: 2018-08-20 | Stop reason: HOSPADM

## 2018-08-20 RX ORDER — CIPROFLOXACIN 2 MG/ML
400 INJECTION, SOLUTION INTRAVENOUS EVERY 12 HOURS
Status: COMPLETED | OUTPATIENT
Start: 2018-08-20 | End: 2018-08-20

## 2018-08-20 RX ORDER — FENTANYL CITRATE 50 UG/ML
50 INJECTION, SOLUTION INTRAMUSCULAR; INTRAVENOUS EVERY 5 MIN PRN
Status: DISCONTINUED | OUTPATIENT
Start: 2018-08-20 | End: 2018-08-20 | Stop reason: HOSPADM

## 2018-08-20 RX ORDER — SODIUM CHLORIDE 0.9 % (FLUSH) 0.9 %
10 SYRINGE (ML) INJECTION PRN
Status: DISCONTINUED | OUTPATIENT
Start: 2018-08-20 | End: 2018-08-20 | Stop reason: HOSPADM

## 2018-08-20 RX ORDER — PROMETHAZINE HYDROCHLORIDE 25 MG/ML
12.5 INJECTION, SOLUTION INTRAMUSCULAR; INTRAVENOUS
Status: DISCONTINUED | OUTPATIENT
Start: 2018-08-20 | End: 2018-08-20 | Stop reason: HOSPADM

## 2018-08-20 RX ADMIN — HYDROMORPHONE HYDROCHLORIDE 0.5 MG: 1 INJECTION, SOLUTION INTRAMUSCULAR; INTRAVENOUS; SUBCUTANEOUS at 13:58

## 2018-08-20 RX ADMIN — IPRATROPIUM BROMIDE AND ALBUTEROL SULFATE 1 AMPULE: .5; 3 SOLUTION RESPIRATORY (INHALATION) at 06:51

## 2018-08-20 RX ADMIN — NEOSTIGMINE METHYLSULFATE 3 MG: 1 INJECTION, SOLUTION INTRAVENOUS at 10:47

## 2018-08-20 RX ADMIN — KETOROLAC TROMETHAMINE 15 MG: 30 INJECTION, SOLUTION INTRAMUSCULAR at 11:15

## 2018-08-20 RX ADMIN — TIMOLOL MALEATE 1 DROP: 5 SOLUTION OPHTHALMIC at 21:03

## 2018-08-20 RX ADMIN — PHENYLEPHRINE HYDROCHLORIDE 100 MCG: 10 INJECTION INTRAVENOUS at 07:50

## 2018-08-20 RX ADMIN — ROCURONIUM BROMIDE 10 MG: 10 INJECTION INTRAVENOUS at 09:33

## 2018-08-20 RX ADMIN — ROCURONIUM BROMIDE 20 MG: 10 INJECTION INTRAVENOUS at 08:41

## 2018-08-20 RX ADMIN — HYDROMORPHONE HYDROCHLORIDE 0.5 MG: 1 INJECTION, SOLUTION INTRAMUSCULAR; INTRAVENOUS; SUBCUTANEOUS at 18:14

## 2018-08-20 RX ADMIN — CIPROFLOXACIN 400 MG: 2 INJECTION, SOLUTION INTRAVENOUS at 21:05

## 2018-08-20 RX ADMIN — METOPROLOL TARTRATE 2.5 MG: 1 INJECTION, SOLUTION INTRAVENOUS at 09:00

## 2018-08-20 RX ADMIN — METOPROLOL TARTRATE 2.5 MG: 1 INJECTION, SOLUTION INTRAVENOUS at 09:14

## 2018-08-20 RX ADMIN — LABETALOL HYDROCHLORIDE 5 MG: 5 INJECTION INTRAVENOUS at 11:30

## 2018-08-20 RX ADMIN — PHENYLEPHRINE HYDROCHLORIDE 100 MCG: 10 INJECTION INTRAVENOUS at 07:40

## 2018-08-20 RX ADMIN — SODIUM CHLORIDE, POTASSIUM CHLORIDE, SODIUM LACTATE AND CALCIUM CHLORIDE: 600; 310; 30; 20 INJECTION, SOLUTION INTRAVENOUS at 06:43

## 2018-08-20 RX ADMIN — PHENYLEPHRINE HYDROCHLORIDE 100 MCG: 10 INJECTION INTRAVENOUS at 07:35

## 2018-08-20 RX ADMIN — KETOROLAC TROMETHAMINE 15 MG: 30 INJECTION, SOLUTION INTRAMUSCULAR at 18:14

## 2018-08-20 RX ADMIN — LIDOCAINE HYDROCHLORIDE 60 MG: 20 INJECTION, SOLUTION INFILTRATION; PERINEURAL at 07:34

## 2018-08-20 RX ADMIN — POTASSIUM CHLORIDE 10 MEQ: 750 TABLET, EXTENDED RELEASE ORAL at 23:58

## 2018-08-20 RX ADMIN — FENTANYL CITRATE 50 MCG: 50 INJECTION INTRAMUSCULAR; INTRAVENOUS at 09:50

## 2018-08-20 RX ADMIN — FENTANYL CITRATE 50 MCG: 50 INJECTION INTRAMUSCULAR; INTRAVENOUS at 08:15

## 2018-08-20 RX ADMIN — PANTOPRAZOLE SODIUM 40 MG: 40 TABLET, DELAYED RELEASE ORAL at 21:15

## 2018-08-20 RX ADMIN — PROPOFOL 150 MG: 10 INJECTION, EMULSION INTRAVENOUS at 07:34

## 2018-08-20 RX ADMIN — Medication 0.6 MG: at 10:47

## 2018-08-20 RX ADMIN — FENTANYL CITRATE 50 MCG: 50 INJECTION INTRAMUSCULAR; INTRAVENOUS at 07:34

## 2018-08-20 RX ADMIN — PROMETHAZINE HYDROCHLORIDE 12.5 MG: 25 INJECTION INTRAMUSCULAR; INTRAVENOUS at 08:17

## 2018-08-20 RX ADMIN — PRAVASTATIN SODIUM 20 MG: 20 TABLET ORAL at 21:03

## 2018-08-20 RX ADMIN — SODIUM CHLORIDE: 9 INJECTION, SOLUTION INTRAVENOUS at 07:30

## 2018-08-20 RX ADMIN — DEXAMETHASONE SODIUM PHOSPHATE 4 MG: 4 INJECTION, SOLUTION INTRAMUSCULAR; INTRAVENOUS at 08:17

## 2018-08-20 RX ADMIN — CIPROFLOXACIN 400 MG: 2 INJECTION, SOLUTION INTRAVENOUS at 07:57

## 2018-08-20 RX ADMIN — FENTANYL CITRATE 50 MCG: 50 INJECTION INTRAMUSCULAR; INTRAVENOUS at 08:00

## 2018-08-20 RX ADMIN — ROCURONIUM BROMIDE 40 MG: 10 INJECTION INTRAVENOUS at 07:30

## 2018-08-20 RX ADMIN — METRONIDAZOLE 500 MG: 500 INJECTION, SOLUTION INTRAVENOUS at 14:38

## 2018-08-20 RX ADMIN — INDOCYANINE GREEN AND WATER 7.5 MG: KIT at 09:42

## 2018-08-20 RX ADMIN — ROCURONIUM BROMIDE 10 MG: 10 INJECTION INTRAVENOUS at 10:18

## 2018-08-20 RX ADMIN — KETOROLAC TROMETHAMINE 15 MG: 30 INJECTION, SOLUTION INTRAMUSCULAR at 23:53

## 2018-08-20 RX ADMIN — FENTANYL CITRATE 50 MCG: 50 INJECTION INTRAMUSCULAR; INTRAVENOUS at 09:06

## 2018-08-20 RX ADMIN — METRONIDAZOLE 500 MG: 500 INJECTION, SOLUTION INTRAVENOUS at 23:51

## 2018-08-20 RX ADMIN — METRONIDAZOLE 500 MG: 500 INJECTION, SOLUTION INTRAVENOUS at 07:31

## 2018-08-20 RX ADMIN — SODIUM CHLORIDE, POTASSIUM CHLORIDE, SODIUM LACTATE AND CALCIUM CHLORIDE: 600; 310; 30; 20 INJECTION, SOLUTION INTRAVENOUS at 09:14

## 2018-08-20 ASSESSMENT — PAIN DESCRIPTION - ORIENTATION: ORIENTATION: MID;RIGHT

## 2018-08-20 ASSESSMENT — PULMONARY FUNCTION TESTS
PIF_VALUE: 20
PIF_VALUE: 32
PIF_VALUE: 31
PIF_VALUE: 31
PIF_VALUE: 24
PIF_VALUE: 24
PIF_VALUE: 31
PIF_VALUE: 22
PIF_VALUE: 31
PIF_VALUE: 31
PIF_VALUE: 3
PIF_VALUE: 24
PIF_VALUE: 24
PIF_VALUE: 30
PIF_VALUE: 29
PIF_VALUE: 31
PIF_VALUE: 34
PIF_VALUE: 31
PIF_VALUE: 25
PIF_VALUE: 33
PIF_VALUE: 32
PIF_VALUE: 32
PIF_VALUE: 31
PIF_VALUE: 21
PIF_VALUE: 30
PIF_VALUE: 21
PIF_VALUE: 1
PIF_VALUE: 21
PIF_VALUE: 31
PIF_VALUE: 41
PIF_VALUE: 20
PIF_VALUE: 35
PIF_VALUE: 31
PIF_VALUE: 23
PIF_VALUE: 34
PIF_VALUE: 31
PIF_VALUE: 21
PIF_VALUE: 34
PIF_VALUE: 22
PIF_VALUE: 31
PIF_VALUE: 33
PIF_VALUE: 31
PIF_VALUE: 32
PIF_VALUE: 22
PIF_VALUE: 34
PIF_VALUE: 31
PIF_VALUE: 31
PIF_VALUE: 30
PIF_VALUE: 31
PIF_VALUE: 34
PIF_VALUE: 31
PIF_VALUE: 31
PIF_VALUE: 30
PIF_VALUE: 21
PIF_VALUE: 34
PIF_VALUE: 23
PIF_VALUE: 31
PIF_VALUE: 34
PIF_VALUE: 31
PIF_VALUE: 32
PIF_VALUE: 32
PIF_VALUE: 30
PIF_VALUE: 23
PIF_VALUE: 31
PIF_VALUE: 25
PIF_VALUE: 31
PIF_VALUE: 3
PIF_VALUE: 31
PIF_VALUE: 32
PIF_VALUE: 21
PIF_VALUE: 43
PIF_VALUE: 33
PIF_VALUE: 31
PIF_VALUE: 24
PIF_VALUE: 33
PIF_VALUE: 31
PIF_VALUE: 24
PIF_VALUE: 33
PIF_VALUE: 10
PIF_VALUE: 31
PIF_VALUE: 33
PIF_VALUE: 32
PIF_VALUE: 30
PIF_VALUE: 32
PIF_VALUE: 32
PIF_VALUE: 31
PIF_VALUE: 36
PIF_VALUE: 31
PIF_VALUE: 19
PIF_VALUE: 31
PIF_VALUE: 32
PIF_VALUE: 30
PIF_VALUE: 32
PIF_VALUE: 31
PIF_VALUE: 33
PIF_VALUE: 31
PIF_VALUE: 28
PIF_VALUE: 34
PIF_VALUE: 22
PIF_VALUE: 23
PIF_VALUE: 21
PIF_VALUE: 29
PIF_VALUE: 35
PIF_VALUE: 29
PIF_VALUE: 31
PIF_VALUE: 24
PIF_VALUE: 30
PIF_VALUE: 23
PIF_VALUE: 30
PIF_VALUE: 30
PIF_VALUE: 32
PIF_VALUE: 32
PIF_VALUE: 30
PIF_VALUE: 28
PIF_VALUE: 32
PIF_VALUE: 31
PIF_VALUE: 30
PIF_VALUE: 33
PIF_VALUE: 21
PIF_VALUE: 28
PIF_VALUE: 31
PIF_VALUE: 32
PIF_VALUE: 24
PIF_VALUE: 21
PIF_VALUE: 31
PIF_VALUE: 31
PIF_VALUE: 30
PIF_VALUE: 32
PIF_VALUE: 34
PIF_VALUE: 31
PIF_VALUE: 33
PIF_VALUE: 1
PIF_VALUE: 30
PIF_VALUE: 24
PIF_VALUE: 24
PIF_VALUE: 26
PIF_VALUE: 32
PIF_VALUE: 1
PIF_VALUE: 31
PIF_VALUE: 33
PIF_VALUE: 0
PIF_VALUE: 1
PIF_VALUE: 31
PIF_VALUE: 33
PIF_VALUE: 31
PIF_VALUE: 23
PIF_VALUE: 3
PIF_VALUE: 21
PIF_VALUE: 31
PIF_VALUE: 31
PIF_VALUE: 32
PIF_VALUE: 31
PIF_VALUE: 32
PIF_VALUE: 2
PIF_VALUE: 20
PIF_VALUE: 31
PIF_VALUE: 21
PIF_VALUE: 30
PIF_VALUE: 3
PIF_VALUE: 0
PIF_VALUE: 33
PIF_VALUE: 24
PIF_VALUE: 31
PIF_VALUE: 2
PIF_VALUE: 40
PIF_VALUE: 30
PIF_VALUE: 32
PIF_VALUE: 31
PIF_VALUE: 21
PIF_VALUE: 24
PIF_VALUE: 32
PIF_VALUE: 18
PIF_VALUE: 21
PIF_VALUE: 32
PIF_VALUE: 18
PIF_VALUE: 31
PIF_VALUE: 32
PIF_VALUE: 32
PIF_VALUE: 31
PIF_VALUE: 31
PIF_VALUE: 38
PIF_VALUE: 32
PIF_VALUE: 0
PIF_VALUE: 34
PIF_VALUE: 31
PIF_VALUE: 32
PIF_VALUE: 31
PIF_VALUE: 32
PIF_VALUE: 21
PIF_VALUE: 26
PIF_VALUE: 32
PIF_VALUE: 21
PIF_VALUE: 19

## 2018-08-20 ASSESSMENT — PAIN DESCRIPTION - PAIN TYPE
TYPE: ACUTE PAIN
TYPE: ACUTE PAIN
TYPE: SURGICAL PAIN

## 2018-08-20 ASSESSMENT — PAIN DESCRIPTION - DESCRIPTORS
DESCRIPTORS: ACHING;PRESSURE
DESCRIPTORS: ACHING
DESCRIPTORS: ACHING

## 2018-08-20 ASSESSMENT — PAIN DESCRIPTION - LOCATION
LOCATION: BACK
LOCATION: ABDOMEN
LOCATION: BACK

## 2018-08-20 ASSESSMENT — PAIN SCALES - GENERAL
PAINLEVEL_OUTOF10: 2
PAINLEVEL_OUTOF10: 6
PAINLEVEL_OUTOF10: 4
PAINLEVEL_OUTOF10: 6
PAINLEVEL_OUTOF10: 7
PAINLEVEL_OUTOF10: 6
PAINLEVEL_OUTOF10: 2

## 2018-08-20 ASSESSMENT — LIFESTYLE VARIABLES: SMOKING_STATUS: 1

## 2018-08-20 ASSESSMENT — PAIN DESCRIPTION - FREQUENCY: FREQUENCY: CONTINUOUS

## 2018-08-20 NOTE — PROGRESS NOTES
Pt from recovery sats 94% 4 L n/c with cont. Pulse ox.  On lungs clear abd soft no bowel sounds 4 surgical sites clean dry and intact, vika to bulb suction to right lower abd with blooy drainage razo to straight drain with clear yellow urine. scd's on bed alarm on , call light in reach family at the bedside pt sleepy arouses easily

## 2018-08-20 NOTE — ANESTHESIA PRE PROCEDURE
Department of Anesthesiology  Preprocedure Note       Name:  Abigail Moss   Age:  82 y.o.  :  1935                                          MRN:  [id number]         Date:  2018      Surgeon: Kamini Myles):  Chanelle Thomas MD    Procedure: Procedure(s):  ROBOT ASSISTED COLECTOMY (LOW ANTERIOR), POSSIBLE OPEN    Medications prior to admission:   Prior to Admission medications    Medication Sig Start Date End Date Taking? Authorizing Provider   potassium chloride (KLOR-CON M) 10 MEQ extended release tablet Take 10 mEq by mouth 2 times daily   Yes Historical Provider, MD   metoprolol succinate (TOPROL XL) 50 MG extended release tablet Take 50 mg by mouth daily   Yes Historical Provider, MD   calcium carbonate 600 MG TABS tablet Take 1 tablet by mouth daily as needed   Yes Historical Provider, MD   Pantoprazole Sodium (PROTONIX) 40 MG PACK packet Take 1 packet by mouth 2 times daily. 14  Yes Claudia Bobby MD   acetaminophen (TYLENOL ARTHRITIS PAIN) 650 MG CR tablet Take 1,300 mg by mouth every 12 hours as needed for Pain    Yes Historical Provider, MD   timolol (TIMOPTIC) 0.5 % ophthalmic solution 1 drop 2 times daily. Yes Historical Provider, MD   raloxifene (EVISTA) 60 MG tablet Take 60 mg by mouth daily. Yes Historical Provider, MD   triamterene-hydrochlorothiazide (MAXZIDE-25) 37.5-25 MG per tablet Take 1 tablet by mouth daily. Yes Historical Provider, MD   aspirin 81 MG tablet Take 81 mg by mouth daily    Historical Provider, MD   pravastatin (PRAVACHOL) 20 MG tablet Take 20 mg by mouth daily    Historical Provider, MD   cilostazol (PLETAL) 100 MG tablet Take 100 mg by mouth 2 times daily    Historical Provider, MD   meclizine (ANTIVERT) 25 MG tablet Take 25 mg by mouth 3 times daily as needed    Historical Provider, MD   atenolol (TENORMIN) 50 MG tablet Take 50 mg by mouth daily. Historical Provider, MD [employer] Devices [facility]) MISC by Does not apply route.     Historical Provider, MD       Current medications:    Current Facility-Administered Medications   Medication Dose Route Frequency Provider Last Rate Last Dose    lactated ringers infusion   Intravenous Continuous Zhou Pritchard  mL/hr at 08/20/18 9379      sodium chloride flush 0.9 % injection 10 mL  10 mL Intravenous 2 times per day Zhou Pritchard MD        sodium chloride flush 0.9 % injection 10 mL  10 mL Intravenous PRN Zhou Pritchard MD        metronidazole (FLAGYL) 500 mg in NaCl 100 mL IVPB premix  500 mg Intravenous On Call to MD Albino        And    ciprofloxacin (CIPRO) IVPB 400 mg  400 mg Intravenous On Call to MD Albino           Allergies: Allergies   Allergen Reactions    Lasix [Furosemide] Other (See Comments)     PATIENT DOESN'T REMEMBER    Lipitor [Atorvastatin] Other (See Comments)     LEG PAIN    Neurontin [Gabapentin] Other (See Comments)     PATIENT DOESN'T REMEMBER    Penicillins Other (See Comments)     HYPERTENSION       Problem List:    Patient Active Problem List   Diagnosis Code    Vertigo R42    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 left ear H92.02    Tinnitus H93.19    BPPV (benign paroxysmal positional vertigo) H81.10    Gait instability R26.81    PAC (premature atrial contraction) I49.1    Pedal edema R60.0    Stricture of colon determined by endoscopy ([facility] [address] K56.699    Barium enema abnormal R93.3    Diverticulosis of large intestine without hemorrhage K57.30    Colon stricture ([facility] [address] K56.699       Past Medical History:        Diagnosis Date    Arthritis     BPPV (benign paroxysmal positional vertigo) 6/6/16    GERD (gastroesophageal reflux disease)     ?  esophageal stricture    HTN (hypertension)     Hyperlipidemia     Obesity     Osteopenia     PAC (premature atrial contraction)     on betablocker    Paralysis (HCC)     baby    Pedal edema     denied any hx of hypertension    PVC (premature

## 2018-08-21 LAB
ANION GAP SERPL CALCULATED.3IONS-SCNC: 11 MEQ/L (ref 8–16)
BASOPHILS # BLD: 0.2 %
BASOPHILS ABSOLUTE: 0 THOU/MM3 (ref 0–0.1)
BUN BLDV-MCNC: 14 MG/DL (ref 7–22)
CALCIUM SERPL-MCNC: 7.8 MG/DL (ref 8.5–10.5)
CHLORIDE BLD-SCNC: 102 MEQ/L (ref 98–111)
CO2: 23 MEQ/L (ref 23–33)
CREAT SERPL-MCNC: 1 MG/DL (ref 0.4–1.2)
EOSINOPHIL # BLD: 0.2 %
EOSINOPHILS ABSOLUTE: 0 THOU/MM3 (ref 0–0.4)
ERYTHROCYTE [DISTWIDTH] IN BLOOD BY AUTOMATED COUNT: 13.8 % (ref 11.5–14.5)
ERYTHROCYTE [DISTWIDTH] IN BLOOD BY AUTOMATED COUNT: 45.6 FL (ref 35–45)
GFR SERPL CREATININE-BSD FRML MDRD: 53 ML/MIN/1.73M2
GLUCOSE BLD-MCNC: 102 MG/DL (ref 70–108)
HCT VFR BLD CALC: 41.4 % (ref 37–47)
HEMOGLOBIN: 13.7 GM/DL (ref 12–16)
IMMATURE GRANS (ABS): 0.06 THOU/MM3 (ref 0–0.07)
IMMATURE GRANULOCYTES: 0.6 %
LYMPHOCYTES # BLD: 6.4 %
LYMPHOCYTES ABSOLUTE: 0.7 THOU/MM3 (ref 1–4.8)
MCH RBC QN AUTO: 30 PG (ref 26–33)
MCHC RBC AUTO-ENTMCNC: 33.1 GM/DL (ref 32.2–35.5)
MCV RBC AUTO: 90.8 FL (ref 81–99)
MONOCYTES # BLD: 7.4 %
MONOCYTES ABSOLUTE: 0.8 THOU/MM3 (ref 0.4–1.3)
NUCLEATED RED BLOOD CELLS: 0 /100 WBC
PLATELET # BLD: 123 THOU/MM3 (ref 130–400)
PMV BLD AUTO: 11.8 FL (ref 9.4–12.4)
POTASSIUM REFLEX MAGNESIUM: 3.9 MEQ/L (ref 3.5–5.2)
RBC # BLD: 4.56 MILL/MM3 (ref 4.2–5.4)
SEG NEUTROPHILS: 85.2 %
SEGMENTED NEUTROPHILS ABSOLUTE COUNT: 8.9 THOU/MM3 (ref 1.8–7.7)
SODIUM BLD-SCNC: 136 MEQ/L (ref 135–145)
WBC # BLD: 10.4 THOU/MM3 (ref 4.8–10.8)

## 2018-08-21 PROCEDURE — 36415 COLL VENOUS BLD VENIPUNCTURE: CPT

## 2018-08-21 PROCEDURE — 2580000003 HC RX 258: Performed by: SURGERY

## 2018-08-21 PROCEDURE — 85025 COMPLETE CBC W/AUTO DIFF WBC: CPT

## 2018-08-21 PROCEDURE — 80048 BASIC METABOLIC PNL TOTAL CA: CPT

## 2018-08-21 PROCEDURE — 6360000002 HC RX W HCPCS: Performed by: SURGERY

## 2018-08-21 PROCEDURE — 2700000000 HC OXYGEN THERAPY PER DAY

## 2018-08-21 PROCEDURE — 99024 POSTOP FOLLOW-UP VISIT: CPT | Performed by: SURGERY

## 2018-08-21 PROCEDURE — 6370000000 HC RX 637 (ALT 250 FOR IP): Performed by: SURGERY

## 2018-08-21 PROCEDURE — 1200000000 HC SEMI PRIVATE

## 2018-08-21 PROCEDURE — 94761 N-INVAS EAR/PLS OXIMETRY MLT: CPT

## 2018-08-21 PROCEDURE — 2709999900 HC NON-CHARGEABLE SUPPLY

## 2018-08-21 RX ADMIN — ENOXAPARIN SODIUM 40 MG: 40 INJECTION SUBCUTANEOUS at 09:09

## 2018-08-21 RX ADMIN — SODIUM CHLORIDE, POTASSIUM CHLORIDE, SODIUM LACTATE AND CALCIUM CHLORIDE: 600; 310; 30; 20 INJECTION, SOLUTION INTRAVENOUS at 03:31

## 2018-08-21 RX ADMIN — SODIUM CHLORIDE, POTASSIUM CHLORIDE, SODIUM LACTATE AND CALCIUM CHLORIDE: 600; 310; 30; 20 INJECTION, SOLUTION INTRAVENOUS at 23:21

## 2018-08-21 RX ADMIN — KETOROLAC TROMETHAMINE 15 MG: 30 INJECTION, SOLUTION INTRAMUSCULAR at 18:33

## 2018-08-21 RX ADMIN — HYDROMORPHONE HYDROCHLORIDE 0.5 MG: 1 INJECTION, SOLUTION INTRAMUSCULAR; INTRAVENOUS; SUBCUTANEOUS at 19:14

## 2018-08-21 RX ADMIN — HYDROMORPHONE HYDROCHLORIDE 0.5 MG: 1 INJECTION, SOLUTION INTRAMUSCULAR; INTRAVENOUS; SUBCUTANEOUS at 23:27

## 2018-08-21 RX ADMIN — PANTOPRAZOLE SODIUM 40 MG: 40 TABLET, DELAYED RELEASE ORAL at 16:52

## 2018-08-21 RX ADMIN — HYDROMORPHONE HYDROCHLORIDE 0.5 MG: 1 INJECTION, SOLUTION INTRAMUSCULAR; INTRAVENOUS; SUBCUTANEOUS at 10:25

## 2018-08-21 RX ADMIN — SODIUM CHLORIDE, POTASSIUM CHLORIDE, SODIUM LACTATE AND CALCIUM CHLORIDE: 600; 310; 30; 20 INJECTION, SOLUTION INTRAVENOUS at 12:37

## 2018-08-21 RX ADMIN — PANTOPRAZOLE SODIUM 40 MG: 40 TABLET, DELAYED RELEASE ORAL at 09:09

## 2018-08-21 RX ADMIN — PRAVASTATIN SODIUM 20 MG: 20 TABLET ORAL at 21:33

## 2018-08-21 RX ADMIN — KETOROLAC TROMETHAMINE 15 MG: 30 INJECTION, SOLUTION INTRAMUSCULAR at 11:11

## 2018-08-21 RX ADMIN — TIMOLOL MALEATE 1 DROP: 5 SOLUTION OPHTHALMIC at 21:33

## 2018-08-21 RX ADMIN — POTASSIUM CHLORIDE 10 MEQ: 750 TABLET, EXTENDED RELEASE ORAL at 21:33

## 2018-08-21 RX ADMIN — KETOROLAC TROMETHAMINE 15 MG: 30 INJECTION, SOLUTION INTRAMUSCULAR at 23:21

## 2018-08-21 ASSESSMENT — PAIN DESCRIPTION - ONSET: ONSET: GRADUAL

## 2018-08-21 ASSESSMENT — PAIN DESCRIPTION - DESCRIPTORS
DESCRIPTORS: ACHING
DESCRIPTORS: ACHING;DISCOMFORT

## 2018-08-21 ASSESSMENT — PAIN DESCRIPTION - LOCATION
LOCATION: ABDOMEN
LOCATION: ABDOMEN

## 2018-08-21 ASSESSMENT — PAIN SCALES - GENERAL
PAINLEVEL_OUTOF10: 3
PAINLEVEL_OUTOF10: 5
PAINLEVEL_OUTOF10: 5
PAINLEVEL_OUTOF10: 7
PAINLEVEL_OUTOF10: 5
PAINLEVEL_OUTOF10: 7
PAINLEVEL_OUTOF10: 4
PAINLEVEL_OUTOF10: 3

## 2018-08-21 ASSESSMENT — PAIN DESCRIPTION - FREQUENCY: FREQUENCY: INTERMITTENT

## 2018-08-21 ASSESSMENT — PAIN DESCRIPTION - PAIN TYPE
TYPE: SURGICAL PAIN
TYPE: SURGICAL PAIN

## 2018-08-21 ASSESSMENT — PAIN DESCRIPTION - ORIENTATION: ORIENTATION: MID

## 2018-08-21 ASSESSMENT — PAIN DESCRIPTION - PROGRESSION: CLINICAL_PROGRESSION: GRADUALLY IMPROVING

## 2018-08-21 NOTE — PROGRESS NOTES
Page Douglas M.D. FACS  Daily Progress Note    Pt Name: Natasha Werner Record Number: [id number]  Date of Birth 1935   Today's Date: 8/21/2018    ASSESSMENT:     1. POD # 1  2. HD # 1  Active Hospital Problems    Diagnosis Date Noted    Colon stricture [facility]) [R37.889] 08/20/2018    Colonic mass [K63.9]    3. Chief Complaint:  No chief complaint on file. PLAN:     1. Start clears  2. Doing well post op  3. Decrease IVF  4. Await am labs      SUBJECTIVE:     Josee Schroeder is doing well. Pain is well controlled. She has no nausea and no vomiting. She has not passed flatus and has had 2 liquid bowel movement. She is tolerating Diet NPO Effective Now Exceptions are: Ice Chips, Sips with Meds.  Current activity is ambulating in halls      OBJECTIVE:     Patient Vitals for the past 24 hrs:   BP Temp Temp src Pulse Resp SpO2   08/21/18 0329 111/65 97.8 °F (36.6 °C) Oral 67 18 92 %   08/21/18 0101 - - - - - 94 %   08/20/18 2340 113/64 97.6 °F (36.4 °C) Oral 64 18 95 %   08/20/18 1950 123/64 97.9 °F (36.6 °C) Oral 74 20 94 %   08/20/18 1514 114/60 96.8 °F (36 °C) Oral 72 16 94 %   08/20/18 1246 120/62 - - 70 16 96 %   08/20/18 1241 - - - - - 91 %   08/20/18 1228 (!) 126/58 98.1 °F (36.7 °C) Oral 73 16 92 %   08/20/18 1215 127/61 - - 72 11 95 %   08/20/18 1205 128/62 - - 71 11 95 %   08/20/18 1200 124/61 - - 72 10 95 %   08/20/18 1155 (!) 137/59 - - 69 20 94 %   08/20/18 1150 121/62 - - 70 16 95 %   08/20/18 1145 130/65 - - 72 14 94 %   08/20/18 1140 (!) 123/55 - - 72 19 94 %   08/20/18 1135 (!) 146/67 - - 74 13 94 %   08/20/18 1130 (!) 184/80 - - 75 16 94 %   08/20/18 1125 (!) 166/82 - - 79 20 94 %   08/20/18 1120 (!) 160/74 - - 82 19 94 %   08/20/18 1115 (!) 158/73 - - 79 15 94 %   08/20/18 1110 (!) 147/84 - - 79 22 94 %   08/20/18 1105 (!) 155/78 - - 80 15 94 %   08/20/18 1100 (!) 152/89 - - 101 17 93 %   08/20/18 1055 (!) 140/87 98.5 °F (36.9 °C) Temporal 93 14 92 %         Intake/Output Summary (Last 24 hours) at 08/21/18 0631  Last data filed at 08/20/18 2200   Gross per 24 hour   Intake             3076 ml   Output             1125 ml   Net             1951 ml       In: 3076 [P.O.:150; I.V.:2926]  Out: 1125 [Urine:925; Drains:160]    I/O last 3 completed shifts: In: 3076 [P.O.:150; I.V.:2926]  Out: 1125 [Urine:925; Drains:160; Blood:40]          Wt Readings from Last 3 Encounters:   08/20/18 189 lb (85.7 kg)   08/08/18 194 lb 8 oz (88.2 kg)   12/18/17 191 lb 9.6 oz (86.9 kg)        Body mass index is 31.45 kg/m². Diet: Diet NPO Effective Now Exceptions are: Ice Chips, Sips with Meds        MEDS:     Scheduled Meds:   metoprolol succinate  50 mg Oral Daily    potassium chloride  10 mEq Oral BID    pantoprazole  40 mg Oral BID    pravastatin  20 mg Oral Daily    timolol  1 drop Both Eyes BID    triamterene-hydrochlorothiazide  1 tablet Oral Daily    sodium chloride flush  10 mL Intravenous 2 times per day    enoxaparin  40 mg Subcutaneous Daily    ketorolac  15 mg Intravenous Q6H     Continuous Infusions:   lactated ringers 125 mL/hr at 08/21/18 0331     PRN Meds:  meclizine 25 mg TID PRN   sodium chloride flush 10 mL PRN   acetaminophen 650 mg Q4H PRN   oxyCODONE-acetaminophen 1 tablet Q4H PRN   Or     oxyCODONE-acetaminophen 2 tablet Q4H PRN   HYDROmorphone 0.5 mg Q3H PRN   ondansetron 4 mg Q6H PRN         PHYSICAL EXAM:     CONSTITUTIONAL: Alert and oriented times 3, no acute distress and cooperative to examination. ABDOMEN: soft, nontender, nondistended, no masses or organomegaly, drain serosang  WOUND/INCISION:  healing well, no drainage, no erythema  EXTREMITY: no cyanosis, clubbing or edema      LABS:     CBC: No results for input(s): WBC, RBC, HGB, HCT, MCV, MCH, MCHC, RDW, PLT, MPV in the last 72 hours.    Last 3 CMP:   Recent Labs      08/20/18   0620   K  3.7      Troponin: No results for input(s): TROPONINI in the last 72 hours. Calcium:   Lab Results   Component Value Date    CALCIUM 8.7 08/13/2018    CALCIUM 8.1 06/06/2016    CALCIUM 8.6 06/05/2016      Ionized Calcium: No results found for: IONCA   Lipids: No results for input(s): CHOL, HDL in the last 72 hours. Invalid input(s): LDLCALCU  INR: No results for input(s): INR in the last 72 hours. Lactic Acid: No results found for: LACTA     CBC with Differential:    Lab Results   Component Value Date    WBC 8.7 06/06/2016    RBC 4.62 06/06/2016    HGB 16.3 08/13/2018    HCT 48.4 08/13/2018     06/06/2016    MCV 87.7 06/06/2016    MCH 28.6 06/06/2016    MCHC 32.6 06/06/2016    RDW 14.0 06/06/2016    NRBC 0 06/06/2016    SEGSPCT 75.2 06/06/2016    MONOPCT 6.7 06/06/2016    MONOSABS 0.6 06/06/2016    LYMPHSABS 1.5 06/06/2016    EOSABS 0.1 06/06/2016    BASOSABS 0.1 06/06/2016             DVT prophylaxis: [x] Lovenox                                 [x] SCDs                                 [] SQ Heparin                                 [] Encourage ambulation, low risk for DVT, no chemical or                                      mechanical prophylaxis necessary              [] Already on Anticoagulation      RADIOLOGY:      none today      Medina Veronica M.D.  FACS  Electronically signed 8/21/2018 at 6:31 AM

## 2018-08-21 NOTE — OP NOTE
the mesentery was  cleared between hemostats and ligated with 2-0 Vicryl sutures, and then,  the end of the colon was transected and then prepared for stapler. A 29 Stealth stapler was then used. The anvil was introduced. The end was  closed with a MARGE 75 green. We then brought the end of the anvil through  the staple line, secured it with a 2-0 silk pursestring suture, and at this  point dropped that back through the wound protector, twisted it several  times, and then we re-insufflated the abdomen, visualized the rectal stump,  and then I went below and then using serial dilators was able to advance  the end of the dilators up to the staple line. The stapler was a little  difficult to get up to the staple line. I did a rigid sigmoidoscopy, could get up to just short, and it just  appeared to be there was a slight turn near the end of the stapled off  rectum. With several tries, we were able to eventually advance the stapler  up to the end and a double staple technique was used. The end of the  stapler was opened through. The stapler head was joined, closed down under  direct vision, and then fired. It was then untwisted several times and  removed. They were two good donuts that were complete. At this point, a clamp was placed proximally. The pelvis was filled with  saline, and we then placed the rigid sigmoidoscope, advanced air under  pressure, held the buttock cheeks together for better seal, and no leak  could be seen. At this point, the pelvis was aspirated of all fluid. Evicel was then placed across the staple line and a 19 Arron was placed to  the lowest robotic port and placed down into pelvis anteriorly. It was  secured to the skin with 2-0 silk alvarenga pull suture. Then, the extraction  port site was closed with #1 Novafil and 30 mL of 0.5% Marcaine plain was  injected in the incisions. The 12-mm port site was closed with 2-0 Vicryl  deep.   Skin incisions were closed with 4-0

## 2018-08-21 NOTE — CARE COORDINATION
8/21/18, 7:41 AM      Addie Armas       Admitted from: Procedure 8/20/2018/ [facility] day: 1   Location: [address] [place] [address]- Reason for admit: Colon stricture [facility]) [K56.699] Status: Inpt  Admit order signed?: yes  PMH:  has a past medical history of Arthritis; BPPV (benign paroxysmal positional vertigo); GERD (gastroesophageal reflux disease); HTN (hypertension); Hyperlipidemia; Obesity; Osteopenia; PAC (premature atrial contraction); Paralysis ([address] Pedal edema; PVC (premature ventricular contraction); PVD (peripheral vascular disease) ([address] Tinnitus; and UTI (urinary tract infection). Procedure: 8-20-18 Per Dr. Marylou Reyes (LOW ANTERIOR), on table fluorescein exam, on table rigid sigmoidoscopy  Pertinent abnormal Imaging:No  Medications:  Scheduled Meds:   metoprolol succinate  50 mg Oral Daily    potassium chloride  10 mEq Oral BID    pantoprazole  40 mg Oral BID    pravastatin  20 mg Oral Daily    timolol  1 drop Both Eyes BID    triamterene-hydrochlorothiazide  1 tablet Oral Daily    sodium chloride flush  10 mL Intravenous 2 times per day    enoxaparin  40 mg Subcutaneous Daily    ketorolac  15 mg Intravenous Q6H     Continuous Infusions:   lactated ringers 125 mL/hr at 08/21/18 0331      Pertinent Info/Orders/Treatment Plan:  VS. I&O. IVF. Monitor surgical site and drain. Resp exercise. Manage pain. Diet: Diet NPO Effective Now Exceptions are: Ice Chips, Sips with Meds   DVT Prophylaxis: Lovenox with SCD's ordered  Smoking status:  reports that she has been smoking. She started smoking about 62 years ago. She has a 30.00 pack-year smoking history.  She has never used smokeless tobacco.   Influenza Vaccination Screening Completed: n/a  Pneumonia Vaccination Screening Completed: yes  PCP: [facility]  Readmission: No  Readmission Risk Score: 6%    Discharge Planning  Current Residence:     Living Arrangements:      Support Systems:     Current Services PTA:     Potential

## 2018-08-22 LAB
BASOPHILS # BLD: 0.2 %
BASOPHILS ABSOLUTE: 0 THOU/MM3 (ref 0–0.1)
EOSINOPHIL # BLD: 0.7 %
EOSINOPHILS ABSOLUTE: 0.1 THOU/MM3 (ref 0–0.4)
ERYTHROCYTE [DISTWIDTH] IN BLOOD BY AUTOMATED COUNT: 13.7 % (ref 11.5–14.5)
ERYTHROCYTE [DISTWIDTH] IN BLOOD BY AUTOMATED COUNT: 45.3 FL (ref 35–45)
HCT VFR BLD CALC: 39.8 % (ref 37–47)
HEMOGLOBIN: 13.4 GM/DL (ref 12–16)
IMMATURE GRANS (ABS): 0.05 THOU/MM3 (ref 0–0.07)
IMMATURE GRANULOCYTES: 0.6 %
LYMPHOCYTES # BLD: 10.7 %
LYMPHOCYTES ABSOLUTE: 0.9 THOU/MM3 (ref 1–4.8)
MCH RBC QN AUTO: 30.4 PG (ref 26–33)
MCHC RBC AUTO-ENTMCNC: 33.7 GM/DL (ref 32.2–35.5)
MCV RBC AUTO: 90.2 FL (ref 81–99)
MONOCYTES # BLD: 7.9 %
MONOCYTES ABSOLUTE: 0.7 THOU/MM3 (ref 0.4–1.3)
NUCLEATED RED BLOOD CELLS: 0 /100 WBC
PLATELET # BLD: 114 THOU/MM3 (ref 130–400)
PMV BLD AUTO: 11.5 FL (ref 9.4–12.4)
RBC # BLD: 4.41 MILL/MM3 (ref 4.2–5.4)
SEG NEUTROPHILS: 79.9 %
SEGMENTED NEUTROPHILS ABSOLUTE COUNT: 6.6 THOU/MM3 (ref 1.8–7.7)
WBC # BLD: 8.3 THOU/MM3 (ref 4.8–10.8)

## 2018-08-22 PROCEDURE — 6360000002 HC RX W HCPCS: Performed by: SURGERY

## 2018-08-22 PROCEDURE — 6370000000 HC RX 637 (ALT 250 FOR IP): Performed by: SURGERY

## 2018-08-22 PROCEDURE — 2709999900 HC NON-CHARGEABLE SUPPLY

## 2018-08-22 PROCEDURE — 1200000000 HC SEMI PRIVATE

## 2018-08-22 PROCEDURE — 36415 COLL VENOUS BLD VENIPUNCTURE: CPT

## 2018-08-22 PROCEDURE — 85025 COMPLETE CBC W/AUTO DIFF WBC: CPT

## 2018-08-22 PROCEDURE — 99024 POSTOP FOLLOW-UP VISIT: CPT | Performed by: SURGERY

## 2018-08-22 PROCEDURE — 2580000003 HC RX 258: Performed by: SURGERY

## 2018-08-22 RX ADMIN — POTASSIUM CHLORIDE 10 MEQ: 750 TABLET, EXTENDED RELEASE ORAL at 08:35

## 2018-08-22 RX ADMIN — TRIAMTERENE AND HYDROCHLOROTHIAZIDE 1 TABLET: 37.5; 25 TABLET ORAL at 08:33

## 2018-08-22 RX ADMIN — TIMOLOL MALEATE 1 DROP: 5 SOLUTION OPHTHALMIC at 20:09

## 2018-08-22 RX ADMIN — PRAVASTATIN SODIUM 20 MG: 20 TABLET ORAL at 20:09

## 2018-08-22 RX ADMIN — METOPROLOL SUCCINATE 50 MG: 50 TABLET, FILM COATED, EXTENDED RELEASE ORAL at 08:33

## 2018-08-22 RX ADMIN — OXYCODONE HYDROCHLORIDE AND ACETAMINOPHEN 1 TABLET: 5; 325 TABLET ORAL at 18:21

## 2018-08-22 RX ADMIN — KETOROLAC TROMETHAMINE 15 MG: 30 INJECTION, SOLUTION INTRAMUSCULAR at 05:55

## 2018-08-22 RX ADMIN — PANTOPRAZOLE SODIUM 40 MG: 40 TABLET, DELAYED RELEASE ORAL at 16:24

## 2018-08-22 RX ADMIN — POTASSIUM CHLORIDE 10 MEQ: 750 TABLET, EXTENDED RELEASE ORAL at 20:09

## 2018-08-22 RX ADMIN — PANTOPRAZOLE SODIUM 40 MG: 40 TABLET, DELAYED RELEASE ORAL at 08:35

## 2018-08-22 RX ADMIN — OXYCODONE HYDROCHLORIDE AND ACETAMINOPHEN 1 TABLET: 5; 325 TABLET ORAL at 23:54

## 2018-08-22 RX ADMIN — ENOXAPARIN SODIUM 40 MG: 40 INJECTION SUBCUTANEOUS at 08:35

## 2018-08-22 ASSESSMENT — PAIN DESCRIPTION - LOCATION
LOCATION: ABDOMEN
LOCATION: ABDOMEN

## 2018-08-22 ASSESSMENT — PAIN DESCRIPTION - ORIENTATION: ORIENTATION: MID

## 2018-08-22 ASSESSMENT — PAIN SCALES - GENERAL
PAINLEVEL_OUTOF10: 4
PAINLEVEL_OUTOF10: 4
PAINLEVEL_OUTOF10: 3
PAINLEVEL_OUTOF10: 5
PAINLEVEL_OUTOF10: 6
PAINLEVEL_OUTOF10: 5
PAINLEVEL_OUTOF10: 4

## 2018-08-22 ASSESSMENT — PAIN DESCRIPTION - PAIN TYPE
TYPE: SURGICAL PAIN
TYPE: SURGICAL PAIN

## 2018-08-22 NOTE — PLAN OF CARE
Problem: Pain:  Goal: Pain level will decrease  Pain level will decrease   Outcome: Met This Shift  Patient is not complaining of pain at this time     Problem: Respiratory  Goal: No pulmonary complications  Outcome: Met This Shift  Patient's lungs are clear and her oxygen saturations are between 93 and 98% on room air     Problem: GI  Goal: No bowel complications  Outcome: Met This Shift  Patient is having loose stools and has active bowel sounds     Problem:   Goal: Adequate urinary output  Outcome: Met This Shift  Patient is urinating adequately,  Has put out 375 ml so far this shift     Problem: Skin Integrity/Risk  Goal: No skin breakdown during hospitalization  Outcome: Ongoing  Patient's incisions are dry and intact,  No skin breakdown is noted     Comments: Care plan reviewed with patient. .  Patient verbalize understanding of the plan of care and contribute to goal setting.

## 2018-08-22 NOTE — PROGRESS NOTES
Merrick Harris M.D. FACS  Daily Progress Note    Pt Name: Tatyana Al Record Number: [id number]  Date of Birth 1935   Today's Date: 8/22/2018    ASSESSMENT:     1. POD # 2  2. HD # 2  Active Hospital Problems    Diagnosis Date Noted    Colon stricture [facility]) [C88.403] 08/20/2018    Colonic mass [K63.9]        Chief Complaint:  No chief complaint on file. PLAN:     1. Reg diet  2. IV to INT  3. Plan likely dc in am      SUBJECTIVE:     Oscar Phelps is doing well. Pain is well controlled. She has no nausea and no vomiting. She has passed flatus and has had 2 liquid bowel movement. She is tolerating DIET CLEAR LIQUID;. Current activity is ambulating in halls      OBJECTIVE:     Patient Vitals for the past 24 hrs:   BP Temp Temp src Pulse Resp SpO2   08/21/18 2327 122/66 97.5 °F (36.4 °C) Oral 101 16 -   08/21/18 1928 118/62 98.6 °F (37 °C) Oral 107 16 94 %   08/21/18 1623 131/88 98.7 °F (37.1 °C) Oral 86 16 97 %   08/21/18 1228 (!) 103/59 96 °F (35.6 °C) Oral 79 18 92 %   08/21/18 0912 - - - - - 90 %   08/21/18 0743 (!) 108/59 98.9 °F (37.2 °C) Oral 69 16 95 %         Intake/Output Summary (Last 24 hours) at 08/22/18 0725  Last data filed at 08/21/18 1727   Gross per 24 hour   Intake             1119 ml   Output              640 ml   Net              479 ml       In: -   Out: 20 [Drains:20]    I/O last 3 completed shifts: In: 1119 [P.O.:400; I.V.:719]  Out: 640 [Urine:600; Drains:40]          Wt Readings from Last 3 Encounters:   08/20/18 189 lb (85.7 kg)   08/08/18 194 lb 8 oz (88.2 kg)   12/18/17 191 lb 9.6 oz (86.9 kg)        Body mass index is 31.45 kg/m².      Diet: DIET CLEAR LIQUID;        MEDS:     Scheduled Meds:   metoprolol succinate  50 mg Oral Daily    potassium chloride  10 mEq Oral BID    pantoprazole  40 mg Oral BID    pravastatin  20 mg Oral Daily    timolol  1 drop Both Eyes BID    triamterene-hydrochlorothiazide  1 tablet Oral

## 2018-08-23 VITALS
OXYGEN SATURATION: 93 % | WEIGHT: 189 LBS | BODY MASS INDEX: 31.49 KG/M2 | RESPIRATION RATE: 18 BRPM | HEART RATE: 107 BPM | SYSTOLIC BLOOD PRESSURE: 105 MMHG | HEIGHT: 65 IN | DIASTOLIC BLOOD PRESSURE: 56 MMHG | TEMPERATURE: 96.9 F

## 2018-08-23 LAB
BASOPHILS # BLD: 0.2 %
BASOPHILS ABSOLUTE: 0 THOU/MM3 (ref 0–0.1)
EOSINOPHIL # BLD: 1.3 %
EOSINOPHILS ABSOLUTE: 0.1 THOU/MM3 (ref 0–0.4)
ERYTHROCYTE [DISTWIDTH] IN BLOOD BY AUTOMATED COUNT: 13.6 % (ref 11.5–14.5)
ERYTHROCYTE [DISTWIDTH] IN BLOOD BY AUTOMATED COUNT: 46.3 FL (ref 35–45)
HCT VFR BLD CALC: 39.3 % (ref 37–47)
HEMOGLOBIN: 12.8 GM/DL (ref 12–16)
IMMATURE GRANS (ABS): 0.04 THOU/MM3 (ref 0–0.07)
IMMATURE GRANULOCYTES: 0.5 %
LYMPHOCYTES # BLD: 11.3 %
LYMPHOCYTES ABSOLUTE: 1 THOU/MM3 (ref 1–4.8)
MCH RBC QN AUTO: 30 PG (ref 26–33)
MCHC RBC AUTO-ENTMCNC: 32.6 GM/DL (ref 32.2–35.5)
MCV RBC AUTO: 92.3 FL (ref 81–99)
MONOCYTES # BLD: 8.7 %
MONOCYTES ABSOLUTE: 0.7 THOU/MM3 (ref 0.4–1.3)
NUCLEATED RED BLOOD CELLS: 0 /100 WBC
PLATELET # BLD: 119 THOU/MM3 (ref 130–400)
PMV BLD AUTO: 11.7 FL (ref 9.4–12.4)
RBC # BLD: 4.26 MILL/MM3 (ref 4.2–5.4)
SEG NEUTROPHILS: 78 %
SEGMENTED NEUTROPHILS ABSOLUTE COUNT: 6.6 THOU/MM3 (ref 1.8–7.7)
WBC # BLD: 8.5 THOU/MM3 (ref 4.8–10.8)

## 2018-08-23 PROCEDURE — 99024 POSTOP FOLLOW-UP VISIT: CPT | Performed by: SURGERY

## 2018-08-23 PROCEDURE — 36415 COLL VENOUS BLD VENIPUNCTURE: CPT

## 2018-08-23 PROCEDURE — 2709999900 HC NON-CHARGEABLE SUPPLY

## 2018-08-23 PROCEDURE — 85025 COMPLETE CBC W/AUTO DIFF WBC: CPT

## 2018-08-23 RX ORDER — OXYCODONE HYDROCHLORIDE AND ACETAMINOPHEN 5; 325 MG/1; MG/1
1 TABLET ORAL EVERY 6 HOURS PRN
Qty: 28 TABLET | Refills: 0 | Status: SHIPPED | OUTPATIENT
Start: 2018-08-23 | End: 2018-08-30

## 2018-08-23 ASSESSMENT — PAIN DESCRIPTION - ORIENTATION: ORIENTATION: MID

## 2018-08-23 ASSESSMENT — PAIN SCALES - GENERAL
PAINLEVEL_OUTOF10: 1
PAINLEVEL_OUTOF10: 1

## 2018-08-23 ASSESSMENT — PAIN DESCRIPTION - LOCATION: LOCATION: ABDOMEN

## 2018-08-23 ASSESSMENT — PAIN DESCRIPTION - DESCRIPTORS: DESCRIPTORS: SORE

## 2018-08-23 ASSESSMENT — PAIN DESCRIPTION - PAIN TYPE: TYPE: SURGICAL PAIN

## 2018-08-23 NOTE — DISCHARGE SUMMARY
Chilo 3 weights: Wt Readings from Last 3 Encounters:   08/20/18 189 lb (85.7 kg)   08/08/18 194 lb 8 oz (88.2 kg)   12/18/17 191 lb 9.6 oz (86.9 kg)            Allergies:  Lasix [furosemide]; Lipitor [atorvastatin]; Neurontin [gabapentin]; and Penicillins         Discharge Medications:        Medication List      START taking these medications    oxyCODONE-acetaminophen 5-325 MG per tablet  Commonly known as:  PERCOCET  Take 1 tablet by mouth every 6 hours as needed for Pain for up to 7 days. Jose Newell taking these medications    aspirin 81 MG tablet     Bra/Pant Pouch Misc     calcium carbonate 600 MG Tabs tablet     cilostazol 100 MG tablet  Commonly known as:  PLETAL     meclizine 25 MG tablet  Commonly known as:  ANTIVERT     metoprolol succinate 50 MG extended release tablet  Commonly known as:  TOPROL XL     pantoprazole sodium 40 MG Pack packet  Commonly known as:  PROTONIX  Take 1 packet by mouth 2 times daily. potassium chloride 10 MEQ extended release tablet  Commonly known as:  KLOR-CON M     pravastatin 20 MG tablet  Commonly known as:  PRAVACHOL     raloxifene 60 MG tablet  Commonly known as:  EVISTA     timolol 0.5 % ophthalmic solution  Commonly known as:  TIMOPTIC     triamterene-hydrochlorothiazide 37.5-25 MG per tablet  Commonly known as:  MAXZIDE-25     TYLENOL ARTHRITIS PAIN 650 MG extended release tablet  Generic drug:  acetaminophen        STOP taking these medications    atenolol 50 MG tablet  Commonly known as:  TENORMIN           Where to Get Your Medications      These medications were sent to [facility] [place], [place] - [address] - P [phone] - F [phone]  . [address], [place] [address]    Hours:  24-hours Phone:  [phone]   · oxyCODONE-acetaminophen 5-325 MG per tablet              Diet:             Activity:  No lift push or pull over 25 pounds           LABS:    CBC:   No results for input(s): WBC, HGB, PLT in the last 72 hours.   BMP: No results for input(s): NA, K, CL, CO2, BUN, CREATININE, GLUCOSE in the last 72 hours. Calcium:  No results for input(s): CALCIUM in the last 72 hours. Ionized Calcium:No results for input(s): IONCA in the last 72 hours. Magnesium:No results for input(s): MG in the last 72 hours. Phosphorus:No results for input(s): PHOS in the last 72 hours. BNP:No results for input(s): BNP in the last 72 hours. Glucose:No results for input(s): POCGLU in the last 72 hours. HgbA1C: No results for input(s): LABA1C in the last 72 hours. INR: No results for input(s): INR in the last 72 hours. Hepatic: No results for input(s): ALKPHOS, ALT, AST, PROT, BILITOT, BILIDIR, LABALBU in the last 72 hours. Amylase and Lipase:No results for input(s): LACTA, AMYLASE in the last 72 hours. Lactic Acid: No results for input(s): LACTA in the last 72 hours. Troponin: No results for input(s): CKTOTAL, CKMB, TROPONINI in the last 72 hours. BNP: No results for input(s): BNP in the last 72 hours. Lipids: No results for input(s): CHOL, TRIG, HDL, LDLCALC in the last 72 hours.     Invalid input(s): LDL  ABGs: No results found for: PHART, PO2ART, VFC4CPM, [id number], BEART           Problem List:   Active Hospital Problems    Diagnosis Date Noted    Colon stricture [facility]) [J40.712] 08/20/2018    Colonic mass [K63.9]          Stability:  Stable      Wound Care: Daily              Follow-up: Follow up with Shira Diane  in 14 days  Follow-up: Follow up with Alexander Lazcano in prn days      Time Spent: 20 minutes      Shira Diane MD FACS 8/29/2018 7:49 AM

## 2018-08-23 NOTE — CARE COORDINATION
8/23/18, 1:09 PM    Discharge plan discussed by  and . Discharge plan reviewed with patient/ family. Patient/ family verbalize understanding of discharge plan and are in agreement with plan. Understanding was demonstrated using the teach back method. IMM Letter  IMM Letter date given[de-identified] 08/20/18     Discharged home with family and no new services.

## 2018-08-23 NOTE — PROGRESS NOTES
William Plata M.D. FACS  Daily Progress Note    Pt Name: Alix Chan Record Number: [id number]  Date of Birth 1935   Today's Date: 8/23/2018    ASSESSMENT:     1. POD # 3  2. HD # 3  Active Hospital Problems    Diagnosis Date Noted    Colon stricture [facility]) [K10.883] 08/20/2018    Colonic mass [K63.9]        Chief Complaint:  No chief complaint on file. PLAN:     1. james Reg diet  2. discharge      SUBJECTIVE:     Wendie is doing well. Pain is well controlled. She has no nausea and no vomiting. She has passed flatus and is having bowel movement. She is tolerating DIET GENERAL;. Current activity is ambulating in halls      OBJECTIVE:     Patient Vitals for the past 24 hrs:   BP Temp Temp src Pulse Resp SpO2   08/23/18 0545 (!) 115/56 97.7 °F (36.5 °C) Oral 68 18 95 %   08/22/18 2345 (!) 124/57 97.9 °F (36.6 °C) Oral 67 18 90 %   08/22/18 2000 108/66 97.4 °F (36.3 °C) Oral 72 16 93 %   08/22/18 1540 (!) 114/57 97.7 °F (36.5 °C) Oral 77 18 94 %   08/22/18 1202 (!) 104/54 97.2 °F (36.2 °C) Oral 81 16 93 %   08/22/18 0739 120/73 96.5 °F (35.8 °C) Axillary 88 18 98 %         Intake/Output Summary (Last 24 hours) at 08/23/18 0628  Last data filed at 08/23/18 0545   Gross per 24 hour   Intake              534 ml   Output              385 ml   Net              149 ml       In: 534 [P.O.:340; I.V.:194]  Out: 385 [Urine:375; Drains:10]    I/O last 3 completed shifts: In: 8838 [P.O.:520;  I.V.:1044]  Out: 940 [Urine:925; Drains:15]       Date 08/23/18 0000 - 08/23/18 2359   Shift [id number] 0965-9442 5978-2315 24 Hour Total   I  N  T  A  K  E   P.O.  (mL/kg/hr) 120   120    Shift Total  (mL/kg) 120  (1.4)   120  (1.4)   O  U  T  P  U  T   Shift Total  (mL/kg)       Weight (kg) 85.7 85.7 85.7 85.7       Wt Readings from Last 3 Encounters:   08/20/18 189 lb (85.7 kg)   08/08/18 194 lb 8 oz (88.2 kg)   12/18/17 191 lb 9.6 oz (86.9 kg)        Body mass index is 31.45 kg/m². Diet: DIET GENERAL;        MEDS:     Scheduled Meds:   metoprolol succinate  50 mg Oral Daily    potassium chloride  10 mEq Oral BID    pantoprazole  40 mg Oral BID    pravastatin  20 mg Oral Daily    timolol  1 drop Both Eyes BID    triamterene-hydrochlorothiazide  1 tablet Oral Daily    sodium chloride flush  10 mL Intravenous 2 times per day    enoxaparin  40 mg Subcutaneous Daily     Continuous Infusions:    PRN Meds:    meclizine 25 mg TID PRN   sodium chloride flush 10 mL PRN   acetaminophen 650 mg Q4H PRN   oxyCODONE-acetaminophen 1 tablet Q4H PRN   Or     oxyCODONE-acetaminophen 2 tablet Q4H PRN   HYDROmorphone 0.5 mg Q3H PRN   ondansetron 4 mg Q6H PRN         PHYSICAL EXAM:     CONSTITUTIONAL: Alert and oriented times 3, no acute distress and cooperative to examination. ABDOMEN: soft, nontender, nondistended, no masses or organomegaly, drain serosang  WOUND/INCISION:  healing well, no drainage, no erythema  EXTREMITY: no cyanosis, clubbing or edema      LABS:     CBC:   Recent Labs      08/21/18   0655  08/22/18   0623  08/23/18   0450   WBC  10.4  8.3  8.5   RBC  4.56  4.41  4.26   HGB  13.7  13.4  12.8   HCT  41.4  39.8  39.3   MCV  90.8  90.2  92.3   MCH  30.0  30.4  30.0   MCHC  33.1  33.7  32.6   PLT  123*  114*  119*   MPV  11.8  11.5  11.7      Last 3 CMP:   Recent Labs      08/21/18   0655   NA  136   K  3.9   CL  102   CO2  23   BUN  14   CREATININE  1.0   GLUCOSE  102   CALCIUM  7.8*      Troponin: No results for input(s): TROPONINI in the last 72 hours. Calcium:   Lab Results   Component Value Date    CALCIUM 7.8 08/21/2018    CALCIUM 8.7 08/13/2018    CALCIUM 8.1 06/06/2016      Ionized Calcium: No results found for: IONCA   Lipids: No results for input(s): CHOL, HDL in the last 72 hours. Invalid input(s): LDLCALCU  INR: No results for input(s): INR in the last 72 hours.   Lactic Acid: No results found for: LACTA     CBC with Differential:    Lab Results

## 2018-08-29 ENCOUNTER — TELEPHONE (OUTPATIENT)
Dept: SURGERY | Age: 83
End: 2018-08-29

## 2018-09-04 PROBLEM — Z90.49 S/P LAPAROSCOPIC COLECTOMY: Status: ACTIVE | Noted: 2018-09-04

## 2018-09-04 NOTE — PROGRESS NOTES
King Choco ALVA FACS  General Surgery  Postprocedure Evaluation in Office  Pt Name: Boby De La Cruz  Date of Birth 1935   Today's Date: 9/5/2018  Medical Record Number: [id number]  Referring Provider: No ref. provider found  Primary Care Provider: Haley Gabriel  Chief Complaint   Patient presents with   [facility] Post-Op Check     s/p Robotic-assisted low anterior resection with coloproctostomy,On-table fluorescein exam, On-table rigid sigmoidoscopy-8/20/18     ASSESSMENT      Problem List Items Addressed This Visit     S/P laparoscopic colectomy           PLAN       Pathology reviewed with the patient who understands. All questions were answered. FINAL DIAGNOSIS:  Sigmoid colon, resection:   Acute and chronic diverticulitis. There are no Patient Instructions on file for this visit. Follow up: Return in about 4 weeks (around 10/3/2018). Orders Placed This Encounter:  No orders of the defined types were placed in this encounter. Carolina Chaudhary is seen today for post-op follow-up. She is 2 week(s) status post robotic assisted sigmoid colectomy. She is tolerating a regular diet, having regular bowel movements. Symptoms and activity have gradually improved compared to preoperative. The surgical site is clean and has no drainage. Pain is controlled without any medications. . She has compliant with postoperative instructions. Past Medical History  Past Medical History:   Diagnosis Date    Arthritis     BPPV (benign paroxysmal positional vertigo) 6/6/16    GERD (gastroesophageal reflux disease)     ?  esophageal stricture    HTN (hypertension)     Hyperlipidemia     Obesity     Osteopenia     PAC (premature atrial contraction)     on betablocker    Paralysis (HCC)     baby    Pedal edema     denied any hx of hypertension    PVC (premature ventricular contraction)     PVD (peripheral vascular disease) ([facility])     Tinnitus     UTI (urinary tract infection)      Past Surgical History  Past Surgical History:   Procedure Laterality Date    APPENDECTOMY      BREAST SURGERY Right 1958    lumpectomy    CHOLECYSTECTOMY      30 years ago    COLONOSCOPY  08/06/2018    Dr Rosario Baron      eye, eyelids    EYE SURGERY      cataract    HYSTERECTOMY      LARYNGOSCOPY  07/15/2016    [facility] E[facility] TRANSORAL BIOPSY SINGLE/MULTIPLE Left 11/27/2017    EGD BIOPSY performed by Deysi Haney MD at [address] [place] [address], PARTIAL, Chris Seymour N/A 8/20/2018    ROBOT ASSISTED COLECTOMY (LOW ANTERIOR) performed by Shira Diane MD at [facility] 11/27/2017    EGD SUBMUCOSAL/BOTOX INJECTION performed by Deysi Haney MD at [facility]     Medications  Current Outpatient Prescriptions on File Prior to Visit   Medication Sig Dispense Refill    potassium chloride (KLOR-CON M) 10 MEQ extended release tablet Take 10 mEq by mouth 2 times daily      aspirin 81 MG tablet Take 81 mg by mouth daily      metoprolol succinate (TOPROL XL) 50 MG extended release tablet Take 50 mg by mouth daily      pravastatin (PRAVACHOL) 20 MG tablet Take 20 mg by mouth daily      calcium carbonate 600 MG TABS tablet Take 1 tablet by mouth daily as needed      cilostazol (PLETAL) 100 MG tablet Take 100 mg by mouth 2 times daily      meclizine (ANTIVERT) 25 MG tablet Take 25 mg by mouth 3 times daily as needed      Pantoprazole Sodium (PROTONIX) 40 MG PACK packet Take 1 packet by mouth 2 times daily. 60 each 6    acetaminophen (TYLENOL ARTHRITIS PAIN) 650 MG CR tablet Take 1,300 mg by mouth every 12 hours as needed for Pain       timolol (TIMOPTIC) 0.5 % ophthalmic solution 1 drop 2 times daily.  raloxifene (EVISTA) 60 MG tablet Take 60 mg by mouth daily.  Misc. Devices [facility]) MISC by Does not apply route.  triamterene-hydrochlorothiazide (MAXZIDE-25) 37.5-25 MG per tablet Take 1 tablet by mouth daily.        No current examination. SKIN: Skin color, texture, turgor normal. No rashes or lesions. INCISION: wound margins intact and healing well. No signs of infection. No drainage. ABDOMEN: soft, nontender, nondistended, no masses or organomegaly. Bowel sounds normal. Laparoscopic scar present with prominent healing ridge. No sign of recurrence, hematoma or seroma. Tenderness to palpation incisional only.    NEUROLOGIC: No sensory or motor nerve irritation               Electronically signed by Chet Lugo MD, FACS on 9/5/2018 at 7:44 AM

## 2018-09-05 ENCOUNTER — APPOINTMENT (OUTPATIENT)
Dept: GENERAL RADIOLOGY | Age: 83
DRG: 271 | End: 2018-09-05
Payer: MEDICARE

## 2018-09-05 ENCOUNTER — HOSPITAL ENCOUNTER (INPATIENT)
Age: 83
LOS: 12 days | Discharge: NURSING, REHAB OR LONG-TERM CARE | DRG: 271 | End: 2018-09-17
Attending: INTERNAL MEDICINE | Admitting: INTERNAL MEDICINE
Payer: MEDICARE

## 2018-09-05 ENCOUNTER — APPOINTMENT (OUTPATIENT)
Dept: INTERVENTIONAL RADIOLOGY/VASCULAR | Age: 83
DRG: 271 | End: 2018-09-05
Payer: MEDICARE

## 2018-09-05 ENCOUNTER — ANESTHESIA (OUTPATIENT)
Dept: OPERATING ROOM | Age: 83
DRG: 271 | End: 2018-09-05
Payer: MEDICARE

## 2018-09-05 ENCOUNTER — ANESTHESIA EVENT (OUTPATIENT)
Dept: OPERATING ROOM | Age: 83
DRG: 271 | End: 2018-09-05
Payer: MEDICARE

## 2018-09-05 ENCOUNTER — OFFICE VISIT (OUTPATIENT)
Dept: SURGERY | Age: 83
End: 2018-09-05

## 2018-09-05 VITALS — OXYGEN SATURATION: 96 % | DIASTOLIC BLOOD PRESSURE: 55 MMHG | SYSTOLIC BLOOD PRESSURE: 115 MMHG | TEMPERATURE: 96.6 F

## 2018-09-05 DIAGNOSIS — I73.9 PVD (PERIPHERAL VASCULAR DISEASE) (HCC): Primary | ICD-10-CM
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
## 2018-09-05 DIAGNOSIS — Z90.49 S/P LAPAROSCOPIC COLECTOMY: ICD-10-CM

## 2018-09-05 PROBLEM — I99.8 LOWER LIMB ISCHEMIA: Status: ACTIVE | Noted: 2018-09-05

## 2018-09-05 LAB
ABO: NORMAL
ALBUMIN SERPL-MCNC: 3.1 G/DL (ref 3.5–5.1)
ALP BLD-CCNC: 101 U/L (ref 38–126)
ALT SERPL-CCNC: 11 U/L (ref 11–66)
ANION GAP SERPL CALCULATED.3IONS-SCNC: 16 MEQ/L (ref 8–16)
ANION GAP SERPL CALCULATED.3IONS-SCNC: 21 MEQ/L (ref 8–16)
ANTIBODY SCREEN: NORMAL
APTT: 28.3 SECONDS (ref 22–38)
AST SERPL-CCNC: 15 U/L (ref 5–40)
BASE EXCESS (CALCULATED): -15.6 MMOL/L (ref -2.5–2.5)
BASE EXCESS (CALCULATED): -4.8 MMOL/L (ref -2.5–2.5)
BASOPHILS # BLD: 0.6 %
BASOPHILS ABSOLUTE: 0.1 THOU/MM3 (ref 0–0.1)
BILIRUB SERPL-MCNC: 0.7 MG/DL (ref 0.3–1.2)
BUN BLDV-MCNC: 25 MG/DL (ref 7–22)
BUN BLDV-MCNC: 34 MG/DL (ref 7–22)
CALCIUM IONIZED SERUM: 0.69 MMOL/L (ref 1.12–1.32)
CALCIUM IONIZED SERUM: 0.99 MMOL/L (ref 1.12–1.32)
CALCIUM SERPL-MCNC: 7.1 MG/DL (ref 8.5–10.5)
CALCIUM SERPL-MCNC: 8.5 MG/DL (ref 8.5–10.5)
CHLORIDE BLD-SCNC: 108 MEQ/L (ref 98–111)
CHLORIDE BLD-SCNC: 98 MEQ/L (ref 98–111)
CO2: 18 MEQ/L (ref 23–33)
CO2: 19 MEQ/L (ref 23–33)
COLLECTED BY:: ABNORMAL
COLLECTED BY:: ABNORMAL
CREAT SERPL-MCNC: 1 MG/DL (ref 0.4–1.2)
CREAT SERPL-MCNC: 1.3 MG/DL (ref 0.4–1.2)
EKG ATRIAL RATE: 104 BPM
EKG P AXIS: 46 DEGREES
EKG P-R INTERVAL: 154 MS
EKG Q-T INTERVAL: 356 MS
EKG QRS DURATION: 76 MS
EKG QTC CALCULATION (BAZETT): 468 MS
EKG R AXIS: -40 DEGREES
EKG T AXIS: 51 DEGREES
EKG VENTRICULAR RATE: 104 BPM
EOSINOPHIL # BLD: 0.3 %
EOSINOPHILS ABSOLUTE: 0 THOU/MM3 (ref 0–0.4)
ERYTHROCYTE [DISTWIDTH] IN BLOOD BY AUTOMATED COUNT: 13.2 % (ref 11.5–14.5)
ERYTHROCYTE [DISTWIDTH] IN BLOOD BY AUTOMATED COUNT: 13.2 % (ref 11.5–14.5)
ERYTHROCYTE [DISTWIDTH] IN BLOOD BY AUTOMATED COUNT: 42.1 FL (ref 35–45)
ERYTHROCYTE [DISTWIDTH] IN BLOOD BY AUTOMATED COUNT: 42.5 FL (ref 35–45)
GFR SERPL CREATININE-BSD FRML MDRD: 39 ML/MIN/1.73M2
GFR SERPL CREATININE-BSD FRML MDRD: 53 ML/MIN/1.73M2
GLUCOSE BLD-MCNC: 150 MG/DL (ref 70–108)
GLUCOSE BLD-MCNC: 172 MG/DL (ref 70–108)
GLUCOSE, WHOLE BLOOD: 116 MG/DL (ref 70–108)
GLUCOSE, WHOLE BLOOD: 49 MG/DL (ref 70–108)
HCO3: 10 MMOL/L (ref 23–28)
HCO3: 21 MMOL/L (ref 23–28)
HCT VFR BLD CALC: 32.9 % (ref 37–47)
HCT VFR BLD CALC: 40.7 % (ref 37–47)
HEMOGLOBIN FINGERSTICK, POC: 10.5 G/DL (ref 12–16)
HEMOGLOBIN FINGERSTICK, POC: 5.6 G/DL (ref 12–16)
HEMOGLOBIN: 11 GM/DL (ref 12–16)
HEMOGLOBIN: 13.8 GM/DL (ref 12–16)
IMMATURE GRANS (ABS): 0.34 THOU/MM3 (ref 0–0.07)
IMMATURE GRANULOCYTES: 2.4 %
INR BLD: 1.31 (ref 0.85–1.13)
LYMPHOCYTES # BLD: 5.1 %
LYMPHOCYTES ABSOLUTE: 0.7 THOU/MM3 (ref 1–4.8)
MCH RBC QN AUTO: 29.6 PG (ref 26–33)
MCH RBC QN AUTO: 29.7 PG (ref 26–33)
MCHC RBC AUTO-ENTMCNC: 33.4 GM/DL (ref 32.2–35.5)
MCHC RBC AUTO-ENTMCNC: 33.9 GM/DL (ref 32.2–35.5)
MCV RBC AUTO: 87.7 FL (ref 81–99)
MCV RBC AUTO: 88.4 FL (ref 81–99)
MONOCYTES # BLD: 5 %
MONOCYTES ABSOLUTE: 0.7 THOU/MM3 (ref 0.4–1.3)
NUCLEATED RED BLOOD CELLS: 0 /100 WBC
O2 SATURATION: 100 %
O2 SATURATION: 99 %
OSMOLALITY CALCULATION: 287.4 MOSMOL/KG (ref 275–300)
PCO2: 19 MMHG (ref 35–45)
PCO2: 42 MMHG (ref 35–45)
PH BLOOD GAS: 7.31 (ref 7.35–7.45)
PH BLOOD GAS: 7.31 (ref 7.35–7.45)
PLATELET # BLD: 218 THOU/MM3 (ref 130–400)
PLATELET # BLD: 318 THOU/MM3 (ref 130–400)
PMV BLD AUTO: 11.1 FL (ref 9.4–12.4)
PMV BLD AUTO: 11.5 FL (ref 9.4–12.4)
PO2: 162 MMHG (ref 71–104)
PO2: 184 MMHG (ref 71–104)
POTASSIUM SERPL-SCNC: 3.8 MEQ/L (ref 3.5–5.2)
POTASSIUM SERPL-SCNC: 3.9 MEQ/L (ref 3.5–5.2)
POTASSIUM, WHOLE BLOOD: 3.5 MEQ/L (ref 3.5–4.9)
POTASSIUM, WHOLE BLOOD: < 1.8 MEQ/L (ref 3.5–4.9)
RBC # BLD: 3.72 MILL/MM3 (ref 4.2–5.4)
RBC # BLD: 4.64 MILL/MM3 (ref 4.2–5.4)
RH FACTOR: NORMAL
SEG NEUTROPHILS: 86.6 %
SEGMENTED NEUTROPHILS ABSOLUTE COUNT: 12.3 THOU/MM3 (ref 1.8–7.7)
SODIUM BLD-SCNC: 138 MEQ/L (ref 135–145)
SODIUM BLD-SCNC: 142 MEQ/L (ref 135–145)
SODIUM, WHOLE BLOOD: 142 MEQ/L (ref 138–146)
SODIUM, WHOLE BLOOD: 152 MEQ/L (ref 138–146)
TOTAL PROTEIN: 6.2 G/DL (ref 6.1–8)
WBC # BLD: 14.2 THOU/MM3 (ref 4.8–10.8)
WBC # BLD: 15.1 THOU/MM3 (ref 4.8–10.8)

## 2018-09-05 PROCEDURE — 2720000010 HC SURG SUPPLY STERILE: Performed by: THORACIC SURGERY (CARDIOTHORACIC VASCULAR SURGERY)

## 2018-09-05 PROCEDURE — 7100000001 HC PACU RECOVERY - ADDTL 15 MIN: Performed by: THORACIC SURGERY (CARDIOTHORACIC VASCULAR SURGERY)

## 2018-09-05 PROCEDURE — C1760 CLOSURE DEV, VASC: HCPCS

## 2018-09-05 PROCEDURE — 96375 TX/PRO/DX INJ NEW DRUG ADDON: CPT

## 2018-09-05 PROCEDURE — 99285 EMERGENCY DEPT VISIT HI MDM: CPT

## 2018-09-05 PROCEDURE — 37224 HC PLASTY UNI FEMPOP: CPT | Performed by: RADIOLOGY

## 2018-09-05 PROCEDURE — 86923 COMPATIBILITY TEST ELECTRIC: CPT

## 2018-09-05 PROCEDURE — 2720000010 HC SURG SUPPLY STERILE

## 2018-09-05 PROCEDURE — 6360000002 HC RX W HCPCS: Performed by: THORACIC SURGERY (CARDIOTHORACIC VASCULAR SURGERY)

## 2018-09-05 PROCEDURE — 2000000000 HC ICU R&B

## 2018-09-05 PROCEDURE — 99223 1ST HOSP IP/OBS HIGH 75: CPT | Performed by: INTERNAL MEDICINE

## 2018-09-05 PROCEDURE — 2580000003 HC RX 258: Performed by: THORACIC SURGERY (CARDIOTHORACIC VASCULAR SURGERY)

## 2018-09-05 PROCEDURE — 2709999900 HC NON-CHARGEABLE SUPPLY

## 2018-09-05 PROCEDURE — 2500000003 HC RX 250 WO HCPCS

## 2018-09-05 PROCEDURE — 86900 BLOOD TYPING SEROLOGIC ABO: CPT

## 2018-09-05 PROCEDURE — P9045 ALBUMIN (HUMAN), 5%, 250 ML: HCPCS | Performed by: NURSE ANESTHETIST, CERTIFIED REGISTERED

## 2018-09-05 PROCEDURE — 93925 LOWER EXTREMITY STUDY: CPT

## 2018-09-05 PROCEDURE — 2720000001 HC MISC SURG SUPPLY STERILE $51-500: Performed by: THORACIC SURGERY (CARDIOTHORACIC VASCULAR SURGERY)

## 2018-09-05 PROCEDURE — 7100000000 HC PACU RECOVERY - FIRST 15 MIN: Performed by: THORACIC SURGERY (CARDIOTHORACIC VASCULAR SURGERY)

## 2018-09-05 PROCEDURE — 3700000001 HC ADD 15 MINUTES (ANESTHESIA): Performed by: THORACIC SURGERY (CARDIOTHORACIC VASCULAR SURGERY)

## 2018-09-05 PROCEDURE — 86901 BLOOD TYPING SEROLOGIC RH(D): CPT

## 2018-09-05 PROCEDURE — 6360000002 HC RX W HCPCS: Performed by: RADIOLOGY

## 2018-09-05 PROCEDURE — 37223 HC PLASTY ILIAC W STENT EA ADDL: CPT

## 2018-09-05 PROCEDURE — 6360000002 HC RX W HCPCS: Performed by: NURSE PRACTITIONER

## 2018-09-05 PROCEDURE — 36415 COLL VENOUS BLD VENIPUNCTURE: CPT

## 2018-09-05 PROCEDURE — 80048 BASIC METABOLIC PNL TOTAL CA: CPT

## 2018-09-05 PROCEDURE — 85025 COMPLETE CBC W/AUTO DIFF WBC: CPT

## 2018-09-05 PROCEDURE — 96376 TX/PRO/DX INJ SAME DRUG ADON: CPT

## 2018-09-05 PROCEDURE — 85018 HEMOGLOBIN: CPT

## 2018-09-05 PROCEDURE — 87086 URINE CULTURE/COLONY COUNT: CPT

## 2018-09-05 PROCEDURE — 75625 CONTRAST EXAM ABDOMINL AORTA: CPT | Performed by: RADIOLOGY

## 2018-09-05 PROCEDURE — 2780000010 HC IMPLANT OTHER: Performed by: THORACIC SURGERY (CARDIOTHORACIC VASCULAR SURGERY)

## 2018-09-05 PROCEDURE — 6360000004 HC RX CONTRAST MEDICATION: Performed by: RADIOLOGY

## 2018-09-05 PROCEDURE — 99024 POSTOP FOLLOW-UP VISIT: CPT | Performed by: SURGERY

## 2018-09-05 PROCEDURE — C1757 CATH, THROMBECTOMY/EMBOLECT: HCPCS

## 2018-09-05 PROCEDURE — 87641 MR-STAPH DNA AMP PROBE: CPT

## 2018-09-05 PROCEDURE — 2500000003 HC RX 250 WO HCPCS: Performed by: NURSE ANESTHETIST, CERTIFIED REGISTERED

## 2018-09-05 PROCEDURE — 2580000003 HC RX 258: Performed by: ANESTHESIOLOGY

## 2018-09-05 PROCEDURE — 3600000007 HC SURGERY HYBRID BASE: Performed by: THORACIC SURGERY (CARDIOTHORACIC VASCULAR SURGERY)

## 2018-09-05 PROCEDURE — 80053 COMPREHEN METABOLIC PANEL: CPT

## 2018-09-05 PROCEDURE — 82947 ASSAY GLUCOSE BLOOD QUANT: CPT

## 2018-09-05 PROCEDURE — 72100 X-RAY EXAM L-S SPINE 2/3 VWS: CPT

## 2018-09-05 PROCEDURE — C1874 STENT, COATED/COV W/DEL SYS: HCPCS | Performed by: THORACIC SURGERY (CARDIOTHORACIC VASCULAR SURGERY)

## 2018-09-05 PROCEDURE — 84132 ASSAY OF SERUM POTASSIUM: CPT

## 2018-09-05 PROCEDURE — C1760 CLOSURE DEV, VASC: HCPCS | Performed by: THORACIC SURGERY (CARDIOTHORACIC VASCULAR SURGERY)

## 2018-09-05 PROCEDURE — 2500000003 HC RX 250 WO HCPCS: Performed by: ANESTHESIOLOGY

## 2018-09-05 PROCEDURE — C1894 INTRO/SHEATH, NON-LASER: HCPCS

## 2018-09-05 PROCEDURE — 2500000003 HC RX 250 WO HCPCS: Performed by: THORACIC SURGERY (CARDIOTHORACIC VASCULAR SURGERY)

## 2018-09-05 PROCEDURE — 37220 HC PLASTY UNI ILIAC INITIAL: CPT | Performed by: RADIOLOGY

## 2018-09-05 PROCEDURE — 37221 HC PLASTY ILIAC W STENT: CPT | Performed by: RADIOLOGY

## 2018-09-05 PROCEDURE — 82330 ASSAY OF CALCIUM: CPT

## 2018-09-05 PROCEDURE — 3600000017 HC SURGERY HYBRID ADDL 15MIN: Performed by: THORACIC SURGERY (CARDIOTHORACIC VASCULAR SURGERY)

## 2018-09-05 PROCEDURE — 85730 THROMBOPLASTIN TIME PARTIAL: CPT

## 2018-09-05 PROCEDURE — 73502 X-RAY EXAM HIP UNI 2-3 VIEWS: CPT

## 2018-09-05 PROCEDURE — 93010 ELECTROCARDIOGRAM REPORT: CPT | Performed by: NUCLEAR MEDICINE

## 2018-09-05 PROCEDURE — 37221 HC PLASTY ILIAC W STENT: CPT

## 2018-09-05 PROCEDURE — 2580000003 HC RX 258: Performed by: RADIOLOGY

## 2018-09-05 PROCEDURE — 93005 ELECTROCARDIOGRAM TRACING: CPT | Performed by: INTERNAL MEDICINE

## 2018-09-05 PROCEDURE — 047D4ZZ DILATION OF LEFT COMMON ILIAC ARTERY, PERCUTANEOUS ENDOSCOPIC APPROACH: ICD-10-PCS | Performed by: RADIOLOGY

## 2018-09-05 PROCEDURE — 6360000002 HC RX W HCPCS: Performed by: ANESTHESIOLOGY

## 2018-09-05 PROCEDURE — 82803 BLOOD GASES ANY COMBINATION: CPT

## 2018-09-05 PROCEDURE — 6370000000 HC RX 637 (ALT 250 FOR IP): Performed by: THORACIC SURGERY (CARDIOTHORACIC VASCULAR SURGERY)

## 2018-09-05 PROCEDURE — 86850 RBC ANTIBODY SCREEN: CPT

## 2018-09-05 PROCEDURE — 87081 CULTURE SCREEN ONLY: CPT

## 2018-09-05 PROCEDURE — 2580000003 HC RX 258: Performed by: INTERNAL MEDICINE

## 2018-09-05 PROCEDURE — C1725 CATH, TRANSLUMIN NON-LASER: HCPCS

## 2018-09-05 PROCEDURE — 6370000000 HC RX 637 (ALT 250 FOR IP): Performed by: INTERNAL MEDICINE

## 2018-09-05 PROCEDURE — C1874 STENT, COATED/COV W/DEL SYS: HCPCS

## 2018-09-05 PROCEDURE — C1768 GRAFT, VASCULAR: HCPCS | Performed by: THORACIC SURGERY (CARDIOTHORACIC VASCULAR SURGERY)

## 2018-09-05 PROCEDURE — 75716 ARTERY X-RAYS ARMS/LEGS: CPT | Performed by: RADIOLOGY

## 2018-09-05 PROCEDURE — C1769 GUIDE WIRE: HCPCS

## 2018-09-05 PROCEDURE — 85610 PROTHROMBIN TIME: CPT

## 2018-09-05 PROCEDURE — C1887 CATHETER, GUIDING: HCPCS

## 2018-09-05 PROCEDURE — 2709999900 HC NON-CHARGEABLE SUPPLY: Performed by: THORACIC SURGERY (CARDIOTHORACIC VASCULAR SURGERY)

## 2018-09-05 PROCEDURE — 6360000002 HC RX W HCPCS

## 2018-09-05 PROCEDURE — 6360000002 HC RX W HCPCS: Performed by: NURSE ANESTHETIST, CERTIFIED REGISTERED

## 2018-09-05 PROCEDURE — C1751 CATH, INF, PER/CENT/MIDLINE: HCPCS | Performed by: THORACIC SURGERY (CARDIOTHORACIC VASCULAR SURGERY)

## 2018-09-05 PROCEDURE — 3700000000 HC ANESTHESIA ATTENDED CARE: Performed by: THORACIC SURGERY (CARDIOTHORACIC VASCULAR SURGERY)

## 2018-09-05 PROCEDURE — 88304 TISSUE EXAM BY PATHOLOGIST: CPT

## 2018-09-05 PROCEDURE — 96374 THER/PROPH/DIAG INJ IV PUSH: CPT

## 2018-09-05 PROCEDURE — 85027 COMPLETE CBC AUTOMATED: CPT

## 2018-09-05 PROCEDURE — 84295 ASSAY OF SERUM SODIUM: CPT

## 2018-09-05 PROCEDURE — C1894 INTRO/SHEATH, NON-LASER: HCPCS | Performed by: THORACIC SURGERY (CARDIOTHORACIC VASCULAR SURGERY)

## 2018-09-05 DEVICE — IMPLANTABLE DEVICE: Type: IMPLANTABLE DEVICE | Status: FUNCTIONAL

## 2018-09-05 DEVICE — ANGIO-SEAL VIP VASCULAR CLOSURE DEVICE
Type: IMPLANTABLE DEVICE | Status: FUNCTIONAL
Brand: ANGIO-SEAL

## 2018-09-05 DEVICE — GRAFT VASC L80CM DIA6MM PTFE CBAS HEP SURF THN WALLED REM: Type: IMPLANTABLE DEVICE | Status: FUNCTIONAL

## 2018-09-05 RX ORDER — CILOSTAZOL 100 MG/1
100 TABLET ORAL 2 TIMES DAILY
Status: DISCONTINUED | OUTPATIENT
Start: 2018-09-05 | End: 2018-09-17 | Stop reason: HOSPADM

## 2018-09-05 RX ORDER — FENTANYL CITRATE 50 UG/ML
25 INJECTION, SOLUTION INTRAMUSCULAR; INTRAVENOUS ONCE
Status: COMPLETED | OUTPATIENT
Start: 2018-09-05 | End: 2018-09-05

## 2018-09-05 RX ORDER — SENNOSIDES 8.6 MG
1300 CAPSULE ORAL EVERY 12 HOURS PRN
Status: DISCONTINUED | OUTPATIENT
Start: 2018-09-05 | End: 2018-09-05 | Stop reason: SDUPTHER

## 2018-09-05 RX ORDER — PANTOPRAZOLE SODIUM 40 MG/1
40 TABLET, DELAYED RELEASE ORAL 2 TIMES DAILY
Status: DISCONTINUED | OUTPATIENT
Start: 2018-09-06 | End: 2018-09-17 | Stop reason: HOSPADM

## 2018-09-05 RX ORDER — ROCURONIUM BROMIDE 10 MG/ML
INJECTION, SOLUTION INTRAVENOUS PRN
Status: DISCONTINUED | OUTPATIENT
Start: 2018-09-05 | End: 2018-09-05 | Stop reason: SDUPTHER

## 2018-09-05 RX ORDER — SODIUM CHLORIDE 9 MG/ML
INJECTION, SOLUTION INTRAVENOUS CONTINUOUS
Status: DISCONTINUED | OUTPATIENT
Start: 2018-09-05 | End: 2018-09-05

## 2018-09-05 RX ORDER — 0.9 % SODIUM CHLORIDE 0.9 %
500 INTRAVENOUS SOLUTION INTRAVENOUS ONCE
Status: DISCONTINUED | OUTPATIENT
Start: 2018-09-05 | End: 2018-09-05

## 2018-09-05 RX ORDER — ONDANSETRON 2 MG/ML
4 INJECTION INTRAMUSCULAR; INTRAVENOUS EVERY 6 HOURS PRN
Status: DISCONTINUED | OUTPATIENT
Start: 2018-09-05 | End: 2018-09-17 | Stop reason: HOSPADM

## 2018-09-05 RX ORDER — HEPARIN SODIUM 1000 [USP'U]/ML
2000 INJECTION, SOLUTION INTRAVENOUS; SUBCUTANEOUS ONCE
Status: COMPLETED | OUTPATIENT
Start: 2018-09-05 | End: 2018-09-05

## 2018-09-05 RX ORDER — HYDROCODONE BITARTRATE AND ACETAMINOPHEN 5; 325 MG/1; MG/1
1 TABLET ORAL EVERY 6 HOURS PRN
Status: DISCONTINUED | OUTPATIENT
Start: 2018-09-05 | End: 2018-09-16

## 2018-09-05 RX ORDER — GLYCOPYRROLATE 1 MG/5 ML
SYRINGE (ML) INTRAVENOUS PRN
Status: DISCONTINUED | OUTPATIENT
Start: 2018-09-05 | End: 2018-09-05 | Stop reason: SDUPTHER

## 2018-09-05 RX ORDER — SODIUM CHLORIDE 450 MG/100ML
INJECTION, SOLUTION INTRAVENOUS CONTINUOUS
Status: DISCONTINUED | OUTPATIENT
Start: 2018-09-05 | End: 2018-09-05

## 2018-09-05 RX ORDER — SODIUM CHLORIDE 0.9 % (FLUSH) 0.9 %
10 SYRINGE (ML) INJECTION PRN
Status: DISCONTINUED | OUTPATIENT
Start: 2018-09-05 | End: 2018-09-17 | Stop reason: HOSPADM

## 2018-09-05 RX ORDER — MIDAZOLAM HYDROCHLORIDE 1 MG/ML
0.5 INJECTION INTRAMUSCULAR; INTRAVENOUS ONCE
Status: COMPLETED | OUTPATIENT
Start: 2018-09-05 | End: 2018-09-05

## 2018-09-05 RX ORDER — FAMOTIDINE 20 MG/1
20 TABLET, FILM COATED ORAL DAILY
Status: DISCONTINUED | OUTPATIENT
Start: 2018-09-05 | End: 2018-09-06 | Stop reason: ALTCHOICE

## 2018-09-05 RX ORDER — ALBUMIN, HUMAN INJ 5% 5 %
SOLUTION INTRAVENOUS PRN
Status: DISCONTINUED | OUTPATIENT
Start: 2018-09-05 | End: 2018-09-05 | Stop reason: SDUPTHER

## 2018-09-05 RX ORDER — FENTANYL CITRATE 50 UG/ML
50 INJECTION, SOLUTION INTRAMUSCULAR; INTRAVENOUS ONCE
Status: COMPLETED | OUTPATIENT
Start: 2018-09-05 | End: 2018-09-05

## 2018-09-05 RX ORDER — DOCUSATE SODIUM 100 MG/1
100 CAPSULE, LIQUID FILLED ORAL 2 TIMES DAILY
Status: DISCONTINUED | OUTPATIENT
Start: 2018-09-05 | End: 2018-09-17 | Stop reason: HOSPADM

## 2018-09-05 RX ORDER — LIDOCAINE HYDROCHLORIDE 20 MG/ML
INJECTION, SOLUTION INFILTRATION; PERINEURAL PRN
Status: DISCONTINUED | OUTPATIENT
Start: 2018-09-05 | End: 2018-09-05 | Stop reason: SDUPTHER

## 2018-09-05 RX ORDER — HEPARIN SODIUM 1000 [USP'U]/ML
INJECTION, SOLUTION INTRAVENOUS; SUBCUTANEOUS PRN
Status: DISCONTINUED | OUTPATIENT
Start: 2018-09-05 | End: 2018-09-05 | Stop reason: SDUPTHER

## 2018-09-05 RX ORDER — SODIUM CHLORIDE 0.9 % (FLUSH) 0.9 %
10 SYRINGE (ML) INJECTION EVERY 12 HOURS SCHEDULED
Status: DISCONTINUED | OUTPATIENT
Start: 2018-09-05 | End: 2018-09-05 | Stop reason: SDUPTHER

## 2018-09-05 RX ORDER — SUCCINYLCHOLINE CHLORIDE 20 MG/ML
INJECTION INTRAMUSCULAR; INTRAVENOUS PRN
Status: DISCONTINUED | OUTPATIENT
Start: 2018-09-05 | End: 2018-09-05 | Stop reason: SDUPTHER

## 2018-09-05 RX ORDER — OXYCODONE HYDROCHLORIDE 5 MG/1
5 TABLET ORAL EVERY 4 HOURS PRN
Status: DISCONTINUED | OUTPATIENT
Start: 2018-09-05 | End: 2018-09-17 | Stop reason: HOSPADM

## 2018-09-05 RX ORDER — FENTANYL CITRATE 50 UG/ML
INJECTION, SOLUTION INTRAMUSCULAR; INTRAVENOUS PRN
Status: DISCONTINUED | OUTPATIENT
Start: 2018-09-05 | End: 2018-09-05 | Stop reason: SDUPTHER

## 2018-09-05 RX ORDER — PRAVASTATIN SODIUM 20 MG
20 TABLET ORAL DAILY
Status: DISCONTINUED | OUTPATIENT
Start: 2018-09-06 | End: 2018-09-17 | Stop reason: HOSPADM

## 2018-09-05 RX ORDER — ONDANSETRON 2 MG/ML
4 INJECTION INTRAMUSCULAR; INTRAVENOUS EVERY 6 HOURS PRN
Status: DISCONTINUED | OUTPATIENT
Start: 2018-09-05 | End: 2018-09-05 | Stop reason: SDUPTHER

## 2018-09-05 RX ORDER — ACETAMINOPHEN 325 MG/1
650 TABLET ORAL EVERY 4 HOURS PRN
Status: DISCONTINUED | OUTPATIENT
Start: 2018-09-05 | End: 2018-09-17 | Stop reason: HOSPADM

## 2018-09-05 RX ORDER — MORPHINE SULFATE 2 MG/ML
2 INJECTION, SOLUTION INTRAMUSCULAR; INTRAVENOUS EVERY 4 HOURS PRN
Status: DISCONTINUED | OUTPATIENT
Start: 2018-09-05 | End: 2018-09-17 | Stop reason: HOSPADM

## 2018-09-05 RX ORDER — SODIUM CHLORIDE 450 MG/100ML
INJECTION, SOLUTION INTRAVENOUS CONTINUOUS
Status: DISCONTINUED | OUTPATIENT
Start: 2018-09-05 | End: 2018-09-09

## 2018-09-05 RX ORDER — METOPROLOL SUCCINATE 50 MG/1
50 TABLET, EXTENDED RELEASE ORAL DAILY
Status: DISCONTINUED | OUTPATIENT
Start: 2018-09-06 | End: 2018-09-09

## 2018-09-05 RX ORDER — RALOXIFENE HYDROCHLORIDE 60 MG/1
60 TABLET, FILM COATED ORAL DAILY
Status: DISCONTINUED | OUTPATIENT
Start: 2018-09-06 | End: 2018-09-11

## 2018-09-05 RX ORDER — NEOSTIGMINE METHYLSULFATE 5 MG/5 ML
SYRINGE (ML) INTRAVENOUS PRN
Status: DISCONTINUED | OUTPATIENT
Start: 2018-09-05 | End: 2018-09-05 | Stop reason: SDUPTHER

## 2018-09-05 RX ORDER — MECLIZINE HCL 12.5 MG/1
25 TABLET ORAL 3 TIMES DAILY PRN
Status: DISCONTINUED | OUTPATIENT
Start: 2018-09-05 | End: 2018-09-17 | Stop reason: HOSPADM

## 2018-09-05 RX ORDER — SODIUM CHLORIDE 0.9 % (FLUSH) 0.9 %
10 SYRINGE (ML) INJECTION EVERY 12 HOURS SCHEDULED
Status: DISCONTINUED | OUTPATIENT
Start: 2018-09-05 | End: 2018-09-17 | Stop reason: HOSPADM

## 2018-09-05 RX ORDER — SODIUM CHLORIDE 9 MG/ML
INJECTION, SOLUTION INTRAVENOUS CONTINUOUS PRN
Status: DISCONTINUED | OUTPATIENT
Start: 2018-09-05 | End: 2018-09-05 | Stop reason: SDUPTHER

## 2018-09-05 RX ORDER — SODIUM CHLORIDE 0.9 % (FLUSH) 0.9 %
10 SYRINGE (ML) INJECTION PRN
Status: DISCONTINUED | OUTPATIENT
Start: 2018-09-05 | End: 2018-09-05 | Stop reason: SDUPTHER

## 2018-09-05 RX ORDER — POTASSIUM CHLORIDE 750 MG/1
10 TABLET, FILM COATED, EXTENDED RELEASE ORAL 2 TIMES DAILY
Status: DISCONTINUED | OUTPATIENT
Start: 2018-09-05 | End: 2018-09-16

## 2018-09-05 RX ORDER — CALCIUM CARBONATE 500(1250)
500 TABLET ORAL DAILY PRN
Status: DISCONTINUED | OUTPATIENT
Start: 2018-09-05 | End: 2018-09-17 | Stop reason: HOSPADM

## 2018-09-05 RX ORDER — PROPOFOL 10 MG/ML
INJECTION, EMULSION INTRAVENOUS PRN
Status: DISCONTINUED | OUTPATIENT
Start: 2018-09-05 | End: 2018-09-05 | Stop reason: SDUPTHER

## 2018-09-05 RX ORDER — TIMOLOL MALEATE 5 MG/ML
1 SOLUTION/ DROPS OPHTHALMIC 2 TIMES DAILY
Status: DISCONTINUED | OUTPATIENT
Start: 2018-09-05 | End: 2018-09-07

## 2018-09-05 RX ORDER — OXYCODONE HYDROCHLORIDE 5 MG/1
10 TABLET ORAL EVERY 4 HOURS PRN
Status: DISCONTINUED | OUTPATIENT
Start: 2018-09-05 | End: 2018-09-17 | Stop reason: HOSPADM

## 2018-09-05 RX ORDER — DEXAMETHASONE SODIUM PHOSPHATE 4 MG/ML
INJECTION, SOLUTION INTRA-ARTICULAR; INTRALESIONAL; INTRAMUSCULAR; INTRAVENOUS; SOFT TISSUE PRN
Status: DISCONTINUED | OUTPATIENT
Start: 2018-09-05 | End: 2018-09-05 | Stop reason: SDUPTHER

## 2018-09-05 RX ADMIN — ROCURONIUM BROMIDE 10 MG: 10 INJECTION INTRAVENOUS at 16:10

## 2018-09-05 RX ADMIN — HEPARIN SODIUM 2000 UNITS: 1000 INJECTION INTRAVENOUS; SUBCUTANEOUS at 10:38

## 2018-09-05 RX ADMIN — SODIUM CHLORIDE: 4.5 INJECTION, SOLUTION INTRAVENOUS at 22:15

## 2018-09-05 RX ADMIN — ROCURONIUM BROMIDE 10 MG: 10 INJECTION INTRAVENOUS at 18:13

## 2018-09-05 RX ADMIN — FENTANYL CITRATE 100 MCG: 50 INJECTION INTRAMUSCULAR; INTRAVENOUS at 13:36

## 2018-09-05 RX ADMIN — SODIUM CHLORIDE: 9 INJECTION, SOLUTION INTRAVENOUS at 19:00

## 2018-09-05 RX ADMIN — FENTANYL CITRATE 25 MCG: 50 INJECTION INTRAMUSCULAR; INTRAVENOUS at 10:43

## 2018-09-05 RX ADMIN — SODIUM CHLORIDE: 4.5 INJECTION, SOLUTION INTRAVENOUS at 10:00

## 2018-09-05 RX ADMIN — MIDAZOLAM 0.5 MG: 1 INJECTION INTRAMUSCULAR; INTRAVENOUS at 10:28

## 2018-09-05 RX ADMIN — PHENYLEPHRINE HYDROCHLORIDE 100 MCG: 10 INJECTION INTRAVENOUS at 18:20

## 2018-09-05 RX ADMIN — Medication 10 ML: at 23:05

## 2018-09-05 RX ADMIN — ROCURONIUM BROMIDE 10 MG: 10 INJECTION INTRAVENOUS at 16:34

## 2018-09-05 RX ADMIN — FENTANYL CITRATE 25 MCG: 50 INJECTION INTRAMUSCULAR; INTRAVENOUS at 10:15

## 2018-09-05 RX ADMIN — MIDAZOLAM 0.5 MG: 1 INJECTION INTRAMUSCULAR; INTRAVENOUS at 10:43

## 2018-09-05 RX ADMIN — Medication 3 MG: at 19:20

## 2018-09-05 RX ADMIN — VANCOMYCIN HYDROCHLORIDE 1000 MG: 1 INJECTION, POWDER, LYOPHILIZED, FOR SOLUTION INTRAVENOUS at 13:15

## 2018-09-05 RX ADMIN — PHENYLEPHRINE HYDROCHLORIDE 200 MCG: 10 INJECTION INTRAVENOUS at 13:36

## 2018-09-05 RX ADMIN — ALBUMIN (HUMAN) 12.5 G: 12.5 INJECTION, SOLUTION INTRAVENOUS at 18:21

## 2018-09-05 RX ADMIN — HEPARIN SODIUM 4000 UNITS: 1000 INJECTION, SOLUTION INTRAVENOUS; SUBCUTANEOUS at 17:50

## 2018-09-05 RX ADMIN — Medication 120 MG: at 13:36

## 2018-09-05 RX ADMIN — POTASSIUM CHLORIDE 10 MEQ: 750 TABLET, EXTENDED RELEASE ORAL at 23:04

## 2018-09-05 RX ADMIN — PHENYLEPHRINE HYDROCHLORIDE 100 MCG: 10 INJECTION INTRAVENOUS at 18:46

## 2018-09-05 RX ADMIN — SODIUM CHLORIDE: 9 INJECTION, SOLUTION INTRAVENOUS at 13:46

## 2018-09-05 RX ADMIN — MIDAZOLAM 0.5 MG: 1 INJECTION INTRAMUSCULAR; INTRAVENOUS at 10:15

## 2018-09-05 RX ADMIN — PHENYLEPHRINE HYDROCHLORIDE 100 MCG: 10 INJECTION INTRAVENOUS at 18:27

## 2018-09-05 RX ADMIN — MIDAZOLAM 0.5 MG: 1 INJECTION INTRAMUSCULAR; INTRAVENOUS at 11:08

## 2018-09-05 RX ADMIN — PHENYLEPHRINE HYDROCHLORIDE 100 MCG: 10 INJECTION INTRAVENOUS at 17:50

## 2018-09-05 RX ADMIN — FENTANYL CITRATE 50 MCG: 50 INJECTION INTRAMUSCULAR; INTRAVENOUS at 14:30

## 2018-09-05 RX ADMIN — FENTANYL CITRATE 25 MCG: 50 INJECTION INTRAMUSCULAR; INTRAVENOUS at 11:08

## 2018-09-05 RX ADMIN — FENTANYL CITRATE 50 MCG: 50 INJECTION INTRAMUSCULAR; INTRAVENOUS at 07:47

## 2018-09-05 RX ADMIN — ROCURONIUM BROMIDE 50 MG: 10 INJECTION INTRAVENOUS at 14:00

## 2018-09-05 RX ADMIN — DEXAMETHASONE SODIUM PHOSPHATE 8 MG: 4 INJECTION, SOLUTION INTRAMUSCULAR; INTRAVENOUS at 13:39

## 2018-09-05 RX ADMIN — LIDOCAINE HYDROCHLORIDE 100 MG: 20 INJECTION, SOLUTION INFILTRATION; PERINEURAL at 13:36

## 2018-09-05 RX ADMIN — PHENYLEPHRINE HYDROCHLORIDE 100 MCG: 10 INJECTION INTRAVENOUS at 18:32

## 2018-09-05 RX ADMIN — FENTANYL CITRATE 25 MCG: 50 INJECTION INTRAMUSCULAR; INTRAVENOUS at 10:28

## 2018-09-05 RX ADMIN — SODIUM CHLORIDE: 9 INJECTION, SOLUTION INTRAVENOUS at 13:35

## 2018-09-05 RX ADMIN — Medication 0.4 MG: at 19:20

## 2018-09-05 RX ADMIN — PROPOFOL 150 MG: 10 INJECTION, EMULSION INTRAVENOUS at 13:36

## 2018-09-05 RX ADMIN — ALBUMIN (HUMAN) 12.5 G: 12.5 INJECTION, SOLUTION INTRAVENOUS at 18:05

## 2018-09-05 RX ADMIN — PHENYLEPHRINE HYDROCHLORIDE 100 MCG: 10 INJECTION INTRAVENOUS at 18:40

## 2018-09-05 RX ADMIN — ROCURONIUM BROMIDE 10 MG: 10 INJECTION INTRAVENOUS at 15:46

## 2018-09-05 RX ADMIN — OXYCODONE HYDROCHLORIDE 10 MG: 5 TABLET ORAL at 23:04

## 2018-09-05 RX ADMIN — HEPARIN SODIUM 4000 UNITS: 1000 INJECTION, SOLUTION INTRAVENOUS; SUBCUTANEOUS at 16:21

## 2018-09-05 RX ADMIN — HEPARIN SODIUM 8000 UNITS: 1000 INJECTION, SOLUTION INTRAVENOUS; SUBCUTANEOUS at 14:50

## 2018-09-05 RX ADMIN — IOPAMIDOL 125 ML: 612 INJECTION, SOLUTION INTRAVENOUS at 12:27

## 2018-09-05 RX ADMIN — FENTANYL CITRATE 25 MCG: 50 INJECTION INTRAMUSCULAR; INTRAVENOUS at 11:34

## 2018-09-05 RX ADMIN — Medication 2 G: at 23:30

## 2018-09-05 RX ADMIN — SODIUM CHLORIDE: 9 INJECTION, SOLUTION INTRAVENOUS at 16:10

## 2018-09-05 ASSESSMENT — PULMONARY FUNCTION TESTS
PIF_VALUE: 17
PIF_VALUE: 17
PIF_VALUE: 1
PIF_VALUE: 22
PIF_VALUE: 17
PIF_VALUE: 1
PIF_VALUE: 18
PIF_VALUE: 20
PIF_VALUE: 20
PIF_VALUE: 17
PIF_VALUE: 17
PIF_VALUE: 16
PIF_VALUE: 17
PIF_VALUE: 16
PIF_VALUE: 18
PIF_VALUE: 13
PIF_VALUE: 17
PIF_VALUE: 20
PIF_VALUE: 1
PIF_VALUE: 1
PIF_VALUE: 17
PIF_VALUE: 4
PIF_VALUE: 16
PIF_VALUE: 19
PIF_VALUE: 17
PIF_VALUE: 16
PIF_VALUE: 17
PIF_VALUE: 1
PIF_VALUE: 16
PIF_VALUE: 19
PIF_VALUE: 16
PIF_VALUE: 19
PIF_VALUE: 17
PIF_VALUE: 16
PIF_VALUE: 18
PIF_VALUE: 16
PIF_VALUE: 19
PIF_VALUE: 16
PIF_VALUE: 16
PIF_VALUE: 17
PIF_VALUE: 16
PIF_VALUE: 17
PIF_VALUE: 1
PIF_VALUE: 16
PIF_VALUE: 20
PIF_VALUE: 17
PIF_VALUE: 1
PIF_VALUE: 16
PIF_VALUE: 17
PIF_VALUE: 17
PIF_VALUE: 7
PIF_VALUE: 18
PIF_VALUE: 18
PIF_VALUE: 20
PIF_VALUE: 17
PIF_VALUE: 18
PIF_VALUE: 17
PIF_VALUE: 11
PIF_VALUE: 18
PIF_VALUE: 17
PIF_VALUE: 17
PIF_VALUE: 16
PIF_VALUE: 17
PIF_VALUE: 17
PIF_VALUE: 18
PIF_VALUE: 17
PIF_VALUE: 17
PIF_VALUE: 16
PIF_VALUE: 17
PIF_VALUE: 16
PIF_VALUE: 20
PIF_VALUE: 18
PIF_VALUE: 16
PIF_VALUE: 1
PIF_VALUE: 16
PIF_VALUE: 17
PIF_VALUE: 17
PIF_VALUE: 16
PIF_VALUE: 18
PIF_VALUE: 17
PIF_VALUE: 17
PIF_VALUE: 18
PIF_VALUE: 17
PIF_VALUE: 20
PIF_VALUE: 20
PIF_VALUE: 15
PIF_VALUE: 17
PIF_VALUE: 16
PIF_VALUE: 18
PIF_VALUE: 20
PIF_VALUE: 18
PIF_VALUE: 19
PIF_VALUE: 20
PIF_VALUE: 16
PIF_VALUE: 18
PIF_VALUE: 18
PIF_VALUE: 1
PIF_VALUE: 3
PIF_VALUE: 1
PIF_VALUE: 17
PIF_VALUE: 16
PIF_VALUE: 17
PIF_VALUE: 18
PIF_VALUE: 19
PIF_VALUE: 17
PIF_VALUE: 18
PIF_VALUE: 18
PIF_VALUE: 19
PIF_VALUE: 16
PIF_VALUE: 16
PIF_VALUE: 19
PIF_VALUE: 18
PIF_VALUE: 19
PIF_VALUE: 17
PIF_VALUE: 1
PIF_VALUE: 16
PIF_VALUE: 17
PIF_VALUE: 16
PIF_VALUE: 18
PIF_VALUE: 16
PIF_VALUE: 1
PIF_VALUE: 16
PIF_VALUE: 18
PIF_VALUE: 17
PIF_VALUE: 11
PIF_VALUE: 17
PIF_VALUE: 1
PIF_VALUE: 20
PIF_VALUE: 18
PIF_VALUE: 17
PIF_VALUE: 18
PIF_VALUE: 1
PIF_VALUE: 18
PIF_VALUE: 18
PIF_VALUE: 17
PIF_VALUE: 18
PIF_VALUE: 18
PIF_VALUE: 16
PIF_VALUE: 14
PIF_VALUE: 17
PIF_VALUE: 17
PIF_VALUE: 19
PIF_VALUE: 1
PIF_VALUE: 10
PIF_VALUE: 17
PIF_VALUE: 18
PIF_VALUE: 16
PIF_VALUE: 13
PIF_VALUE: 17
PIF_VALUE: 17
PIF_VALUE: 16
PIF_VALUE: 16
PIF_VALUE: 17
PIF_VALUE: 17
PIF_VALUE: 18
PIF_VALUE: 19
PIF_VALUE: 17
PIF_VALUE: 18
PIF_VALUE: 19
PIF_VALUE: 16
PIF_VALUE: 16
PIF_VALUE: 17
PIF_VALUE: 18
PIF_VALUE: 18
PIF_VALUE: 16
PIF_VALUE: 18
PIF_VALUE: 17
PIF_VALUE: 16
PIF_VALUE: 18
PIF_VALUE: 18
PIF_VALUE: 2
PIF_VALUE: 15
PIF_VALUE: 14
PIF_VALUE: 17
PIF_VALUE: 14
PIF_VALUE: 16
PIF_VALUE: 17
PIF_VALUE: 16
PIF_VALUE: 18
PIF_VALUE: 17
PIF_VALUE: 14
PIF_VALUE: 18
PIF_VALUE: 17
PIF_VALUE: 18
PIF_VALUE: 19
PIF_VALUE: 17
PIF_VALUE: 16
PIF_VALUE: 18
PIF_VALUE: 18
PIF_VALUE: 22
PIF_VALUE: 18
PIF_VALUE: 17
PIF_VALUE: 16
PIF_VALUE: 16
PIF_VALUE: 1
PIF_VALUE: 17
PIF_VALUE: 18
PIF_VALUE: 17
PIF_VALUE: 16
PIF_VALUE: 22
PIF_VALUE: 17
PIF_VALUE: 17
PIF_VALUE: 4
PIF_VALUE: 17
PIF_VALUE: 1
PIF_VALUE: 18
PIF_VALUE: 46
PIF_VALUE: 18
PIF_VALUE: 17
PIF_VALUE: 16
PIF_VALUE: 18
PIF_VALUE: 15
PIF_VALUE: 18
PIF_VALUE: 17
PIF_VALUE: 16
PIF_VALUE: 17
PIF_VALUE: 17
PIF_VALUE: 18
PIF_VALUE: 16
PIF_VALUE: 17
PIF_VALUE: 17
PIF_VALUE: 19
PIF_VALUE: 16
PIF_VALUE: 17
PIF_VALUE: 19
PIF_VALUE: 16
PIF_VALUE: 17
PIF_VALUE: 17
PIF_VALUE: 16
PIF_VALUE: 16
PIF_VALUE: 17
PIF_VALUE: 16
PIF_VALUE: 14
PIF_VALUE: 18
PIF_VALUE: 50
PIF_VALUE: 16
PIF_VALUE: 25
PIF_VALUE: 17
PIF_VALUE: 1
PIF_VALUE: 16
PIF_VALUE: 17
PIF_VALUE: 15
PIF_VALUE: 20
PIF_VALUE: 17
PIF_VALUE: 16
PIF_VALUE: 18
PIF_VALUE: 18
PIF_VALUE: 1
PIF_VALUE: 18
PIF_VALUE: 17
PIF_VALUE: 16
PIF_VALUE: 18
PIF_VALUE: 17
PIF_VALUE: 1
PIF_VALUE: 1
PIF_VALUE: 17
PIF_VALUE: 1
PIF_VALUE: 18
PIF_VALUE: 16
PIF_VALUE: 17
PIF_VALUE: 2
PIF_VALUE: 16
PIF_VALUE: 17
PIF_VALUE: 4
PIF_VALUE: 16
PIF_VALUE: 17
PIF_VALUE: 17
PIF_VALUE: 1
PIF_VALUE: 18
PIF_VALUE: 17
PIF_VALUE: 17
PIF_VALUE: 16
PIF_VALUE: 17
PIF_VALUE: 16
PIF_VALUE: 18
PIF_VALUE: 17
PIF_VALUE: 1
PIF_VALUE: 19
PIF_VALUE: 1
PIF_VALUE: 17
PIF_VALUE: 16
PIF_VALUE: 15
PIF_VALUE: 16
PIF_VALUE: 18
PIF_VALUE: 15
PIF_VALUE: 18
PIF_VALUE: 17
PIF_VALUE: 17
PIF_VALUE: 13
PIF_VALUE: 18
PIF_VALUE: 16
PIF_VALUE: 18
PIF_VALUE: 16
PIF_VALUE: 17
PIF_VALUE: 17
PIF_VALUE: 16
PIF_VALUE: 20
PIF_VALUE: 18
PIF_VALUE: 16
PIF_VALUE: 17
PIF_VALUE: 17
PIF_VALUE: 40
PIF_VALUE: 16
PIF_VALUE: 20
PIF_VALUE: 18
PIF_VALUE: 17
PIF_VALUE: 18
PIF_VALUE: 17
PIF_VALUE: 18
PIF_VALUE: 17
PIF_VALUE: 17
PIF_VALUE: 1
PIF_VALUE: 23
PIF_VALUE: 18
PIF_VALUE: 17
PIF_VALUE: 16
PIF_VALUE: 17
PIF_VALUE: 18
PIF_VALUE: 14
PIF_VALUE: 16
PIF_VALUE: 16
PIF_VALUE: 17
PIF_VALUE: 1
PIF_VALUE: 16
PIF_VALUE: 17
PIF_VALUE: 16
PIF_VALUE: 16
PIF_VALUE: 17
PIF_VALUE: 22
PIF_VALUE: 17
PIF_VALUE: 1
PIF_VALUE: 16
PIF_VALUE: 17
PIF_VALUE: 17
PIF_VALUE: 1
PIF_VALUE: 17
PIF_VALUE: 19
PIF_VALUE: 17
PIF_VALUE: 15
PIF_VALUE: 18
PIF_VALUE: 17
PIF_VALUE: 18
PIF_VALUE: 16

## 2018-09-05 ASSESSMENT — ENCOUNTER SYMPTOMS
ABDOMINAL PAIN: 0
WHEEZING: 0
NAUSEA: 0
VOMITING: 0
EYE DISCHARGE: 0
COUGH: 0
SHORTNESS OF BREATH: 0
SORE THROAT: 0
RHINORRHEA: 0
EYE PAIN: 0
DIARRHEA: 0
BACK PAIN: 0

## 2018-09-05 ASSESSMENT — PAIN DESCRIPTION - FREQUENCY
FREQUENCY: INTERMITTENT
FREQUENCY: CONTINUOUS
FREQUENCY: CONTINUOUS

## 2018-09-05 ASSESSMENT — PAIN SCALES - GENERAL
PAINLEVEL_OUTOF10: 6
PAINLEVEL_OUTOF10: 8
PAINLEVEL_OUTOF10: 4
PAINLEVEL_OUTOF10: 4
PAINLEVEL_OUTOF10: 8
PAINLEVEL_OUTOF10: 4
PAINLEVEL_OUTOF10: 8
PAINLEVEL_OUTOF10: 5
PAINLEVEL_OUTOF10: 4
PAINLEVEL_OUTOF10: 8
PAINLEVEL_OUTOF10: 0
PAINLEVEL_OUTOF10: 6
PAINLEVEL_OUTOF10: 3
PAINLEVEL_OUTOF10: 0

## 2018-09-05 ASSESSMENT — PAIN DESCRIPTION - PAIN TYPE
TYPE: ACUTE PAIN

## 2018-09-05 ASSESSMENT — PAIN DESCRIPTION - LOCATION
LOCATION: HIP
LOCATION: HIP

## 2018-09-05 ASSESSMENT — PAIN DESCRIPTION - ORIENTATION
ORIENTATION: RIGHT
ORIENTATION: RIGHT

## 2018-09-05 ASSESSMENT — LIFESTYLE VARIABLES: SMOKING_STATUS: 1

## 2018-09-05 ASSESSMENT — PAIN DESCRIPTION - DESCRIPTORS: DESCRIPTORS: ACHING;SORE

## 2018-09-05 NOTE — ANESTHESIA PROCEDURE NOTES
Arterial Line:    An arterial line was placed in the OR for the following indication(s): continuous blood pressure monitoring and blood sampling needed. A 20 gauge (size), 1 and 3/4 inch (length), Arrow (type) catheter was placed, Seldinger technique not used, into the left radial artery, secured by suture, Tegaderm and tape. Anesthesia type: Local  Local infiltration: Injection    Events:  patient tolerated procedure well with no complications.   9/5/2018 1:10 PM9/5/2018 1:13 PM  Anesthesiologist: Jennifer Kirby  Performed: Anesthesiologist   Preanesthetic Checklist  Completed: patient identified, site marked, surgical consent, pre-op evaluation, timeout performed, IV checked, risks and benefits discussed, monitors and equipment checked, anesthesia consent given, oxygen available and patient being monitored

## 2018-09-05 NOTE — LETTER
[address] [facility] SURGICAL [facility]  Chet Lugo MD FACS  Phone- [phone]  Fax [phone]    Pt Name: Tamika Cortez  Medical Record Number: [id number]  Date of Birth 1935   Today's Date: 9/5/2018    Gregory Chino was evaluated in the office today. My assessment and plans are listed below. Assessment:     Terra Fuentes was seen today for post-op check. Diagnoses and all orders for this visit:    S/P laparoscopic colectomy         Plan:      Pathology reviewed with the patient who understands    FINAL DIAGNOSIS:  Sigmoid colon, resection:   Acute and chronic diverticulitis. If I can provide any additional assistance or you have any concerns, please feel free to contact me. Thank you for allowing to participate in the care of your patients. Sincerely,      Chet Lugo MD FACS  [address]  [place], [facility]  Office: [phone]  Fax: [phone]

## 2018-09-05 NOTE — ANESTHESIA PRE PROCEDURE
Prescriptions   Medication Sig Dispense Refill    potassium chloride (KLOR-CON M) 10 MEQ extended release tablet Take 10 mEq by mouth 2 times daily      aspirin 81 MG tablet Take 81 mg by mouth daily      metoprolol succinate (TOPROL XL) 50 MG extended release tablet Take 50 mg by mouth daily      pravastatin (PRAVACHOL) 20 MG tablet Take 20 mg by mouth daily      calcium carbonate 600 MG TABS tablet Take 1 tablet by mouth daily as needed      cilostazol (PLETAL) 100 MG tablet Take 100 mg by mouth 2 times daily      meclizine (ANTIVERT) 25 MG tablet Take 25 mg by mouth 3 times daily as needed      Pantoprazole Sodium (PROTONIX) 40 MG PACK packet Take 1 packet by mouth 2 times daily. 60 each 6    acetaminophen (TYLENOL ARTHRITIS PAIN) 650 MG CR tablet Take 1,300 mg by mouth every 12 hours as needed for Pain       timolol (TIMOPTIC) 0.5 % ophthalmic solution 1 drop 2 times daily.  raloxifene (EVISTA) 60 MG tablet Take 60 mg by mouth daily.  Misc. Devices [facility]) MISC by Does not apply route.  triamterene-hydrochlorothiazide (MAXZIDE-25) 37.5-25 MG per tablet Take 1 tablet by mouth daily.        Facility-Administered Medications Ordered in Other Encounters   Medication Dose Route Frequency Provider Last Rate Last Dose    fentaNYL (SUBLIMAZE) injection    PRSANTIAGO Vaz MD   100 mcg at 09/05/18 1336    lidocaine 2 % injection    PRSANTIAGO Vaz MD   100 mg at 09/05/18 1336    propofol injection    PRSANTIAGO Vaz MD   150 mg at 09/05/18 1336    succinylcholine (ANECTINE) injection    PRSANTIAGO Vaz MD   120 mg at 09/05/18 1336    phenylephrine (RAMESH-SYNEPHRINE) injection    PRSANTIAGO Vaz MD   200 mcg at 09/05/18 1336    Dexamethasone Sodium Phosphate injection    PRSANTIAGO Vaz MD   8 mg at 09/05/18 1339    0.9 % sodium chloride infusion    Continuous PRSANTIAGO Vaz MD        rocuronium [facility]) injection    KAREN Vaz MD   50 mg at 09/05/18 1400    vancomycin (VANCOCIN) injection   Intravenous PRN Amber Agustin MD   1,000 mg at 09/05/18 1315       Allergies: Allergies   Allergen Reactions    Lasix [Furosemide] Other (See Comments)     PATIENT DOESN'T REMEMBER    Lipitor [Atorvastatin] Other (See Comments)     LEG PAIN    Neurontin [Gabapentin] Other (See Comments)     PATIENT DOESN'T REMEMBER    Penicillins Other (See Comments)     HYPERTENSION       Problem List:    Patient Active Problem List   Diagnosis Code    Vertigo R42    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 left ear H92.02    Tinnitus H93.19    BPPV (benign paroxysmal positional vertigo) H81.10    Gait instability R26.81    PAC (premature atrial contraction) I49.1    Pedal edema R60.0    Stricture of colon determined by endoscopy ([facility] [address] K56.699    Barium enema abnormal R93.3    Diverticulosis of large intestine without hemorrhage K57.30    Colon stricture ([address] K56.699    Colonic mass K63.9    S/P laparoscopic colectomy Z90.49    Lower limb ischemia I99.8    PVD (peripheral vascular disease) ([address] I73.9    Fall on same level from slipping, tripping, or stumbling W01. 0XXA    Acute kidney injury ([address] N17.9    Leukocytosis D72.829    Gastroesophageal reflux disease without esophagitis K21.9    Other hyperlipidemia E78.4    Osteoarthritis of multiple joints M15.9    Diverticulosis of intestine without bleeding K57.90       Past Medical History:        Diagnosis Date    Arthritis     BPPV (benign paroxysmal positional vertigo) 6/6/16    GERD (gastroesophageal reflux disease)     ?  esophageal stricture    HTN (hypertension)     Hyperlipidemia     Obesity     Osteopenia     PAC (premature atrial contraction)     on betablocker    Paralysis (HCC)     baby    Pedal edema     denied any hx of hypertension    PVC (premature ventricular contraction)     PVD (peripheral vascular disease) (HCC)     Tinnitus     UTI (urinary tract infection) Past Surgical History:        Procedure Laterality Date    APPENDECTOMY      BREAST SURGERY Right 1958    lumpectomy    CHOLECYSTECTOMY      30 years ago    COLONOSCOPY  08/06/2018    Dr Daphnie Deluca      eye, eyelids    EYE SURGERY      cataract    HYSTERECTOMY      LARYNGOSCOPY  07/15/2016    SD EGD TRANSORAL BIOPSY SINGLE/MULTIPLE Left 11/27/2017    EGD BIOPSY performed by Alexandre Osman MD at [address] [place] [address], PARTIAL, [facility] N/A 8/20/2018    ROBOT ASSISTED COLECTOMY (LOW ANTERIOR) performed by Elizabeth Crocker MD at [address] [place] 11/27/2017    EGD SUBMUCOSAL/BOTOX INJECTION performed by Alexandre Osman MD at [address] [facility]       Social History:    Social History   Substance Use Topics    Smoking status: Current Every Day Smoker     Packs/day: 0.50     Years: 60.00     Start date: 1956    Smokeless tobacco: Never Used    Alcohol use 0.6 oz/week     1 Glasses of wine per week      Comment: social                                Ready to quit: Not Answered  Counseling given: Not Answered      Vital Signs (Current):   Vitals:    09/05/18 1142 09/05/18 1147 09/05/18 1157 09/05/18 1207   BP: 137/69 139/77 137/78 125/61   Pulse: 84 84 84 75   Resp: 21 15 15 16   Temp:       TempSrc:       SpO2: 98% 96% 98% 96%   Weight:       Height:                                                  BP Readings from Last 3 Encounters:   09/05/18 125/61   09/05/18 (!) 110/58   08/23/18 (!) 105/56       NPO Status:                                                                                 BMI:   Wt Readings from Last 3 Encounters:   09/05/18 180 lb (81.6 kg)   08/20/18 189 lb (85.7 kg)   08/08/18 194 lb 8 oz (88.2 kg)     Body mass index is 29.95 kg/m².     CBC:   Lab Results   Component Value Date    WBC 14.2 09/05/2018    RBC 4.64 09/05/2018    HGB 13.8 09/05/2018    HCT 40.7 09/05/2018    MCV 87.7 09/05/2018    RDW 14.0 06/06/2016  09/05/2018       CMP:   Lab Results   Component Value Date     09/05/2018    K 3.8 09/05/2018    K 3.9 08/21/2018    CL 98 09/05/2018    CO2 19 09/05/2018    BUN 34 09/05/2018    CREATININE 1.3 09/05/2018    LABGLOM 39 09/05/2018    GLUCOSE 172 09/05/2018    GLUCOSE 103 08/13/2018    PROT 6.2 09/05/2018    CALCIUM 8.5 09/05/2018    BILITOT 0.7 09/05/2018    ALKPHOS 101 09/05/2018    AST 15 09/05/2018    ALT 11 09/05/2018       POC Tests: No results for input(s): POCGLU, POCNA, POCK, POCCL, POCBUN, POCHEMO, POCHCT in the last 72 hours. Coags:   Lab Results   Component Value Date    INR 1.31 09/05/2018    APTT 28.3 09/05/2018       HCG (If Applicable): No results found for: PREGTESTUR, PREGSERUM, HCG, HCGQUANT     ABGs: No results found for: PHART, PO2ART, RSW5FFE, FBN6KYJ, BEART, W2OMKQMR     Type & Screen (If Applicable):  No results found for: LABABO, [address]    Anesthesia Evaluation  Patient summary reviewed  Airway: Mallampati: II  TM distance: >3 FB   Neck ROM: full  Mouth opening: > = 3 FB Dental:      Comment: Poor dentition      Pulmonary: breath sounds clear to auscultation  (+) COPD:  decreased breath sounds,  current smoker          Patient did not smoke on day of surgery. Cardiovascular:    (+) hypertension:, dysrhythmias (RBBB, PACs, PVCs):, hyperlipidemia      ECG reviewed  Rhythm: regular  Rate: normal  Echocardiogram reviewed                  Neuro/Psych:   (+) TIA,             GI/Hepatic/Renal:   (+) GERD:,           Endo/Other:                     Abdominal:           Vascular:                                        Anesthesia Plan      general     ASA 4 - emergent       Induction: intravenous. PA catheter, CVP, central line and arterial line  MIPS: Postoperative opioids intended, Prophylactic antiemetics administered and Postoperative trial extubation.   Anesthetic plan and risks discussed with patient, healthcare power of  and

## 2018-09-05 NOTE — PROGRESS NOTES
Formulation and discussion of sedation / procedure plans, risks, benefits, side effects and alternatives with patient and/or responsible adult completed.     Electronically signed by Sherrill Villeda MD on 9/5/2018 at 11:54 AM

## 2018-09-05 NOTE — PROGRESS NOTES
Department of Radiology  Post Procedure Progress Note      Pre-Procedure Diagnosis:  Absent flow right leg    Procedure Performed:    1. Arteriogram with left iliac artery stenting    Anesthesia: local / versed and fentanyl    Findings:   1. Occluded RIGHT common and external iliac arteries with clot extending into right CFA. 2. Good runoff in right leg distally with the exception of a short segment popliteal artery stenosis which is reconstituted by collaterals. 3. Left external iliac artery origin stenosis. Was stented with an 8 x 6 everflex self expanding stent. Pt going to OR for fem fem bypass possibly. Immediate Complications:  None    Estimated Blood Loss: minimal    SEE DICTATED PROCEDURE NOTE FOR COMPLETE DETAILS.     Ginger Mccloud   9/5/2018 11:56 AM

## 2018-09-05 NOTE — PROGRESS NOTES
[address] [address]  Sedation/Analgesia History & Physical    Pt Name: Elvin Tai  MRN: [id number]  YOB: 1935  Provider Performing Procedure: Trey Rivers MD  Primary Care Physician: Robert Bonner    PRE-PROCEDURE   DNR-CCA/DNR-CC []Yes [x]No  Brief History/Pre-Procedure Diagnosis: Absent flow in right leg          MEDICAL HISTORY  []CAD/Valve  []Liver Disease  []Lung Disease []Diabetes  []Hypertension []Renal Disease  []Additional information:       has a past medical history of Arthritis; BPPV (benign paroxysmal positional vertigo); GERD (gastroesophageal reflux disease); HTN (hypertension); Hyperlipidemia; Obesity; Osteopenia; PAC (premature atrial contraction); Paralysis ([address] Pedal edema; PVC (premature ventricular contraction); PVD (peripheral vascular disease) ([facility] [address] Tinnitus; and UTI (urinary tract infection). SURGICAL HISTORY   has a past surgical history that includes Cholecystectomy; Breast surgery (Right, 1958); Hysterectomy; Appendectomy; Cosmetic surgery; eye surgery; pr egd transoral biopsy single/multiple (Left, 11/27/2017); Upper gastrointestinal endoscopy (N/A, 11/27/2017); Colonoscopy (08/06/2018); laryngoscopy (07/15/2016); and pr lap,surg,colectomy, partial, w/anast (N/A, 8/20/2018).   Additional information:       ALLERGIES   Allergies as of 09/05/2018 - Review Complete 09/05/2018   Allergen Reaction Noted    Lasix [furosemide] Other (See Comments) 06/06/2016    Lipitor [atorvastatin] Other (See Comments) 06/09/2014    Neurontin [gabapentin] Other (See Comments) 06/06/2016    Penicillins Other (See Comments) 06/09/2014     Additional information:       MEDICATIONS   Coumadin Use Last 5 Days [x]No []Yes  Antiplatelet drug therapy use last 5 days  []No [x]Yes  Other anticoagulant use last 5 days  []No [x]Yes    Current Facility-Administered Medications:     0.9 % sodium chloride bolus, 500 mL, Intravenous, Once, Dwayne Counts, APRN - CNP    0.9 % sodium disorders that are already life threatening. Class 5: Moribund pt with little chances of survival, for more than 24 hours. Mallampati I Airway Classification:   []1 [x]2 []3 []4    [x]Pre-procedure diagnostic studies complete and results available. Comment:    [x]Previous sedation/anesthesia experiences assessed. Comment:    [x]The patient is an appropriate candidate to undergo the planned procedure sedation and anesthesia. (Refer to nursing sedation/analgesia documentation record)  [x]Formulation and discussion of sedation/procedure plan, risks, and expectations with patient and/or responsible adult completed. [x]Patient examined immediately prior to the procedure.  (Refer to nursing sedation/analgesia documentation record)    Osiris Goodman MD  Electronically signed 9/5/2018 at 11:54 AM

## 2018-09-05 NOTE — PROGRESS NOTES
easing a bit. 1205 Report given to [facility] in pre-op area. Patients sandrita Benavidez arrived and updated. Pt belongings including purse, cell phone and 2 rings given to daughter Janak Benavidez.

## 2018-09-05 NOTE — ED PROVIDER NOTES
Skin: Negative for pallor and rash. Neurological: Negative for dizziness, syncope, weakness, light-headedness, numbness and headaches. Hematological: Negative for adenopathy. Psychiatric/Behavioral: Negative for confusion and suicidal ideas. The patient is not nervous/anxious. PAST MEDICAL HISTORY    has a past medical history of Arthritis; BPPV (benign paroxysmal positional vertigo); GERD (gastroesophageal reflux disease); HTN (hypertension); Hyperlipidemia; Obesity; Osteopenia; PAC (premature atrial contraction); Paralysis ([address] Pedal edema; PVC (premature ventricular contraction); PVD (peripheral vascular disease) ([address] Tinnitus; and UTI (urinary tract infection). SURGICAL HISTORY      has a past surgical history that includes Cholecystectomy; Breast surgery (Right, 1958); Hysterectomy; Appendectomy; Cosmetic surgery; eye surgery; pr egd transoral biopsy single/multiple (Left, 11/27/2017); Upper gastrointestinal endoscopy (N/A, 11/27/2017); Colonoscopy (08/06/2018); laryngoscopy (07/15/2016); and pr lap,surg,colectomy, partial, w/anast (N/A, 8/20/2018). CURRENT MEDICATIONS       Previous Medications    ACETAMINOPHEN (TYLENOL ARTHRITIS PAIN) 650 MG CR TABLET    Take 1,300 mg by mouth every 12 hours as needed for Pain     ASPIRIN 81 MG TABLET    Take 81 mg by mouth daily    CALCIUM CARBONATE 600 MG TABS TABLET    Take 1 tablet by mouth daily as needed    CILOSTAZOL (PLETAL) 100 MG TABLET    Take 100 mg by mouth 2 times daily    MECLIZINE (ANTIVERT) 25 MG TABLET    Take 25 mg by mouth 3 times daily as needed    METOPROLOL SUCCINATE (TOPROL XL) 50 MG EXTENDED RELEASE TABLET    Take 50 mg by mouth daily    MISC. DEVICES (BRA/PANT POUCH) MISC    by Does not apply route. PANTOPRAZOLE SODIUM (PROTONIX) 40 MG PACK PACKET    Take 1 packet by mouth 2 times daily.     POTASSIUM CHLORIDE (KLOR-CON M) 10 MEQ EXTENDED RELEASE TABLET    Take 10 mEq by mouth 2 times daily    PRAVASTATIN (PRAVACHOL) 20 MG TABLET    Take 20 mg by mouth daily    RALOXIFENE (EVISTA) 60 MG TABLET    Take 60 mg by mouth daily. TIMOLOL (TIMOPTIC) 0.5 % OPHTHALMIC SOLUTION    1 drop 2 times daily. TRIAMTERENE-HYDROCHLOROTHIAZIDE (MAXZIDE-25) 37.5-25 MG PER TABLET    Take 1 tablet by mouth daily. ALLERGIES     is allergic to lasix [furosemide]; lipitor [atorvastatin]; neurontin [gabapentin]; and penicillins. FAMILY HISTORY     indicated that her mother is . She indicated that her father is . She indicated that her sister is . She indicated that her maternal grandmother is . She indicated that her maternal grandfather is . She indicated that her paternal grandmother is . She indicated that her paternal grandfather is . family history includes Cancer in her father; Dementia in her maternal grandmother; Emphysema in her father; Heart Disease in her maternal grandfather, maternal grandmother, and mother; Other in her sister; Stroke in her mother. SOCIAL HISTORY      reports that she has been smoking. She started smoking about 62 years ago. She has a 30.00 pack-year smoking history. She has never used smokeless tobacco. She reports that she drinks about 0.6 oz of alcohol per week . She reports that she does not use drugs. PHYSICAL EXAM     INITIAL VITALS:  height is 5' 5\" (1.651 m) and weight is 180 lb (81.6 kg). Her oral temperature is 98.3 °F (36.8 °C). Her blood pressure is 125/61 and her pulse is 75. Her respiration is 16 and oxygen saturation is 96%. Physical Exam   Constitutional: She is oriented to person, place, and time. She appears well-developed and well-nourished. HENT:   Head: Normocephalic. Right Ear: External ear normal.   Left Ear: External ear normal.   Mouth/Throat: Uvula is midline and oropharynx is clear and moist.   Eyes: Pupils are equal, round, and reactive to light. Conjunctivae and EOM are normal.   Neck: Normal range of motion.  Neck supple. Cardiovascular: Normal rate, regular rhythm, S1 normal, S2 normal, normal heart sounds and intact distal pulses. Pulses:       Dorsalis pedis pulses are 1+ on the right side, and 2+ on the left side. Posterior tibial pulses are 2+ on the right side, and 2+ on the left side. Pulmonary/Chest: Effort normal and breath sounds normal. No respiratory distress. She exhibits no tenderness. Abdominal: Soft. Normal appearance and bowel sounds are normal. She exhibits no distension. There is no tenderness. Musculoskeletal: Normal range of motion. Right hip: Normal.        Right knee: Normal.        Right ankle: Normal.   The patient has full range of motion of the right leg passively. The patient refused to complete active range of motion however would assist in movement. The right leg is noted to be cool, modeling and DP 1+. The patient reports chronic problems with the right leg. Lymphadenopathy:     She has no cervical adenopathy. Neurological: She is alert and oriented to person, place, and time. Skin: Skin is warm, dry and intact. Psychiatric: She has a normal mood and affect. Her speech is normal and behavior is normal. Thought content normal.   Nursing note and vitals reviewed. DIFFERENTIAL DIAGNOSIS:   Fracture, degenerative Disk disease, muscle spasm, muscle strain, disc herniation, limb ischemia     DIAGNOSTIC RESULTS     EKG: All EKG's are interpreted by the Emergency Department Physician who either signs or Co-signs this chart in the absence of a cardiologist.    EKG read and interpreted by myself with gives impression of sinus tachycardia with premature supraventricular complexes with heart rate of 104; interval 154; QRS 76;QTc 468; axis 46 -40 51.  No STEMI     RADIOLOGY: non-plain film images(s) such as CT, Ultrasound and MRI are read by the radiologist.  Plain radiographic images are visualized and preliminarily interpreted by the emergency physician unless otherwise stated below. VL DUP LOWER EXTREMITY ARTERIES BILATERAL   Final Result   1. Absent flow within the right lower extremity arterial vasculature from the right common femoral artery to the distal runoff vessels within the right ankle. The RAMOS on the right is unobtainable due to 2 absent flow. 2. No sonographic evidence of significant flow-limiting stenosis is demonstrated within the left lower extremity enteral vasculature by ultrasound. However, there is a moderately diminished RAMOS on the left measuring 0.68. 3. These findings were discussed with the referring ER clinician and the vascular surgeon at the time of dictation. **This report has been created using voice recognition software. It may contain minor errors which are inherent in voice recognition technology. **      Final report electronically signed by Dr. Irasema Harris on 9/5/2018 10:06 AM      XR HIP RIGHT (2-3 VIEWS)   Final Result   1. Unremarkable right hip series. **This report has been created using voice recognition software. It may contain minor errors which are inherent in voice recognition technology. **      Final report electronically signed by Dr. Vivienne Pepe on 9/5/2018 6:58 AM      XR LUMBAR SPINE (2-3 VIEWS)   Final Result   Mild T11-12 and moderate L5-S1 degenerative disc disease. No fracture or subluxation. **This report has been created using voice recognition software. It may contain minor errors which are inherent in voice recognition technology. **      Final report electronically signed by Dr. Alivia Calero on 9/5/2018 6:57 AM      IR ANGIOGRAM EXTREMITY RIGHT    (Results Pending)         LABS:   Labs Reviewed   PROTIME-INR - Abnormal; Notable for the following:        Result Value    INR 1.31 (*)     All other components within normal limits   CBC WITH AUTO DIFFERENTIAL - Abnormal; Notable for the following:     WBC 14.2 (*)     Segs Absolute 12.3 (*)     Lymphocytes # 0.7 (*)     Immature Grans (Abs) 0.34 (*)     All other components within normal limits   COMPREHENSIVE METABOLIC PANEL - Abnormal; Notable for the following:     Glucose 172 (*)     CREATININE 1.3 (*)     BUN 34 (*)     CO2 19 (*)     Alb 3.1 (*)     All other components within normal limits   ANION GAP - Abnormal; Notable for the following: Anion Gap 21.0 (*)     All other components within normal limits   GLOMERULAR FILTRATION RATE, ESTIMATED - Abnormal; Notable for the following:     Est, Glom Filt Rate 39 (*)     All other components within normal limits   URINE CULTURE   APTT   OSMOLALITY   SURGICAL PATHOLOGY   TYPE AND SCREEN   PREPARE RBC (CROSSMATCH)       EMERGENCY DEPARTMENT COURSE:   Vitals:    Vitals:    09/05/18 1142 09/05/18 1147 09/05/18 1157 09/05/18 1207   BP: 137/69 139/77 137/78 125/61   Pulse: 84 84 84 75   Resp: 21 15 15 16   Temp:       TempSrc:       SpO2: 98% 96% 98% 96%   Weight:       Height:           MDM    The patient was seen and evaluated in the ED today following a fall. Within the department I observed the patient's vital signs to show normal ranges. On exam, I appreciated The patient has full range of motion of the right leg passively. The patient refused to complete active range of motion however would assist in movement, able to role over in bed. . The right leg is noted to be cool, modeling and DP was absent, bedside doppler negative. The patient reports chronic problems with the right leg. Had seen vascular surgeon at outside hospital recently who had no new plans for intervention. Xray shows no sign of fracture, patient unable to bare weight. RAMOS obtained by nursing staff, Right leg RAMOS . 61 Discussed findings with Dr. Oleg Rebollar of vascular surgery, he requested IR to perform angiogram with possible intervention, called IR they thought they could fit her in around 11am, asked to complete bilateral lower extremity dopplers . I consulted Dr. Hayden Cui who graciously agreed to admit the patient.  Within the accurately records my words and actions.     Dwayne Armstrong, CNP 09/05/18 3:03 PM    Arina Armstrong, APRN - CNP         Image Socket, APRN - CNP  09/05/18 [id number]

## 2018-09-05 NOTE — BRIEF OP NOTE
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 Distal runoff to anterior tibial artery, segmental right behind knee popliteal chronic occlusion with collaterals. Occluded tibioperoneal trunk  5 cm AAA,   s/p sigmoid resection for cancer. Post-operative Diagnosis: Same    Procedure: Emergency right leg revascularization with right femoral Fabian embolectomy, right common and external iliac artery fluoroscopically guided right common and external iliac artery Fabian thrombectomy, Intraoperative angiogram, stenting right YING 8 mm x 5.9 cm balloon expandable ICAST covered stent and right external iliac 8 mm x 5.9 cm ICAST and 8 x 3.8 cm overlap and right common iliac extension to aneurysm ICAST. All to 10 LANDEN 30 secs. Completion angiogram. Exploration of right below knee popliteal and it was small and had a chronic organized thrombus no backflow. Right anterior tibial artery was soft and admitted a 3.0 mm probe very weak backflow. A small piece of GSV was harvested thru the popliteal incision and interposed between the distal 6 mm ringed goretex from the femoral artery to the anterior tibial artery. Anesthesia: General    Surgeons/Assistants: Oriana Hammond MD/ Jasper Gonzalez PA-C    Estimated Blood Loss: 674     Complications: None    Specimens: Was Obtained: thrombus and plaque. Findings: Large amounts of thrombus occluding right common and internal iliac artery, Right internal iliac not visualized. Left internal iliac wide open. Previous left external iliac artery stent placed by Dr Lindy Cosby in preparation for possible fem-fem bypass.  After opening leg inflow, still no distal signals, because of that the below knee popliteal explored, no bleeding noted when making skin incision. The below knee vessel was chronically thrombosed, small 3 mm vessel, The Anterior tibial artery was then exposed. The vessel was soft admitted a 3.0 mm probe and had a very weak dark backflow.  The artery had excellent runoff when injected with heparinized saline and upon completion of the Fem-AT bypass had excellent pulse and doppler signal.  Dictation Job ID [id number]  Electronically signed by Vicky Link MD on 9/5/2018 at 7:32 PM

## 2018-09-05 NOTE — H&P
History & Physical        Patient:  Isaac Angeles  YOB: 1935  MRN: [id number]   PCP: Lou Downs         Acct: [de-identified]    Date of Admission: 9/5/2018  Date of Service: Patient seen and examined on 9/5/2018      Chief Complaint     Right lower extremity pain    History of Present Illness     The patient is a 82 y.o.  female who presented to the hospital on 09/05/2018 following a fall. The patient states that she slipped out of bed and onto the floor today and subsequently had a right hip and lower extremity pain which she rates at 10 over 10 and which is improving now after the administration of morphine in the emergency room. The patient currently lives independently and states that she does not require the use of a cane or walker. She otherwise denied any fevers or chills or rashes itching headache coughing and shortness of breath problems urinating changes in her appetite and nausea constipation or vomiting or lightheadedness. The patient denies head trauma during her fall. She states that she's had some diarrhea but she recently had robotic-assisted low anterior resection with coloproctostomy on 8/20/18, performed by Dr. Scarlett Benavides and she believes that the diarrhea is related to the procedure. In the emergency room the patient was noted to have a cold lower extremity was decreased dorsalis pedis pulses. An IR angiogram with bilateral arterial Dopplers of the lower extremities have been scheduled. At the moment the patient is to be admitted to the hospital as an inpatient with a right lower limb ischemia. Past Medical History         Diagnosis Date    Arthritis     BPPV (benign paroxysmal positional vertigo) 6/6/16    GERD (gastroesophageal reflux disease)     ?  esophageal stricture    HTN (hypertension)     Hyperlipidemia     Obesity     Osteopenia     PAC (premature atrial contraction)     on betablocker    Paralysis ([facility])     baby    Pedal edema     denied 94%   BMI 29.95 kg/m²     General appearance: Patient is resting comfortably on entering the room. HEENT:  Normocephalic, Oral mucosa dry without exudate. Neck: Supple, with full range of motion. Trachea midline. No thyromegaly. No lymph node swelling. Cardiovascular:  Regular rate and rhythm with normal S1/S2 without murmurs, rubs or gallops. Respiratory:  Normal respiratory effort. Clear to auscultation, bilaterally without Rales/Wheezes/Rhonchi. Abdomen: Soft, non-tender, non-distended with normal bowel sounds. Musculoskeletal: Unable to perform voluntary movement of the right lower extremity secondary to pain. Range of motion preserved at the knee. Neurologic: No other focal sensory/motor deficits. Cranial nerves: II-XII intact, grossly non-focal.  Lymphatic: No cervical, lymphadenopathy  Psychiatric:  Alert and oriented. Normal judgement. Conversational.  Vascular: Decreased bilateral lower extremity dorsalis pedis pulses. Cold left foot with cyanotic appearing toes. Left foot is warm compared to the right foot. Genitourinary: Deferred. Skin:No visible rashes or lesions. Labs and imaging     Recent Labs      09/05/18   0814   WBC  14.2*   HGB  13.8   HCT  40.7   PLT  318     Recent Labs      09/05/18 0814   NA  138   K  3.8   CL  98   CO2  19*   BUN  34*   CREATININE  1.3*   CALCIUM  8.5     Recent Labs      09/05/18   0814   AST  15   ALT  11   BILITOT  0.7   ALKPHOS  101     Recent Labs      09/05/18 0814   INR  1.31*     No results for input(s): Le Watson in the last 72 hours.     Urinalysis:      Lab Results   Component Value Date    NITRU POSITIVE 06/06/2016    WBCUA 50-75 06/06/2016    BACTERIA MANY 06/06/2016    RBCUA 0-2 06/06/2016    BLOODU NEGATIVE 06/06/2016    SPECGRAV 1.012 06/06/2016       Radiology:   EKG:  I have reviewed the EKG with the following interpretation: sinus tachycardia with PVCs      Xr Lumbar Spine (2-3 Views)    Result Date: 9/5/2018  PROCEDURE: XR LUMBAR SPINE (2-3 VIEWS) CLINICAL INFORMATION: right leg numbness after fall, , COMPARISON: No prior study. TECHNIQUE: AP and lateral views of the lumbar spine. FINDINGS: There are 5 non-rib bearing lumbar type vertebra. There is no fracture. There is no subluxation. There is moderate L5-S1 disc space narrowing consistent with degenerative disc disease. There is mild T11-12 degenerative disc disease No additional significant degenerative changes are seen. SI joints are unremarkable. There are cholecystectomy clips in the right upper quadrant. Mild T11-12 and moderate L5-S1 degenerative disc disease. No fracture or subluxation. **This report has been created using voice recognition software. It may contain minor errors which are inherent in voice recognition technology. ** Final report electronically signed by Dr. Gil Worrell on 9/5/2018 6:57 AM    Xr Hip Right (2-3 Views)    Result Date: 9/5/2018  PROCEDURE: XR HIP RIGHT (2-3 VIEWS) CLINICAL INFORMATION: Pain; injury. COMPARISON: No prior study. TECHNIQUE: AP and crosstable lateral views of the right hip performed. FINDINGS: POST OPERATIVE: None. ALIGNMENT: Anatomic. MINERALIZATION: Normal. FRACTURE: None. RIGHT HIP JOINT: Normal. RIGHT SACROILIAC JOINT: Unremarkable. SYMPHYSIS: Unremarkable. OTHER: None. 1. Unremarkable right hip series. **This report has been created using voice recognition software. It may contain minor errors which are inherent in voice recognition technology. ** Final report electronically signed by Dr. Malcom Perez on 9/5/2018 6:58 AM    Ct Abdomen Pelvis W Iv Contrast Additional Contrast? Oral    Result Date: 8/8/2018  PROCEDURE: CT ABDOMEN PELVIS W IV CONTRAST CLINICAL INFORMATION: Abnormal findings on diagnostic imaging of abdomen . COMPARISON: No prior study.  TECHNIQUE: Images of the abdomen and pelvis at 5 mm intervals after Isovue-370 IV, and oral contrast. Multiplanar reconstructions All CT scans at this facility use dose modulation, iterative reconstruction, and/or weight-based dosing when appropriate to reduce radiation dose to as low as reasonably achievable. FINDINGS: Lung bases There is a hiatal hernia. There is thickening of the distal esophagus. There appears be thickening of the gastric cardia. No infiltrates or effusions. Undersurface the heart unremarkable. Abdomen pelvis Soft tissue thickening of the gastric cardia and wall thickening of the gastric antrum pylorus region. Prior cholecystectomy. Liver is unremarkable. Spleen contains benign granulomatous calcifications. Pancreas is normal. Adrenals are normal. Kidneys enhance symmetrically and are unremarkable. Abdominal aortic aneurysm. This is below the renal arteries this measures up to 5 cm transverse dimension 4 cm AP dimension. Mural thrombus narrows the blood caring lumen to 2.4 cm. No adenopathy is identified the central mesentery or the retroperitoneum No bowel obstruction, but there does appear to be a mass involving the sigmoid colon beginning at the junction of the descending colon and sigmoid colon. This extends to approximately 12 cm in length. There are a few barium-filled diverticula in the distal sigmoid colon. Urinary bladder is unremarkable. Dextroscoliosis. No suspicious bone lesions     1. Findings are strongly suggestive of a sigmoid colon mass likely neoplasm at the junction of the descending colon sigmoid colon extending for approximately 12 cm. 2. Probable diffuse gastritis. 3. 5 cm infrarenal abdominal aortic aneurysm. **This report has been created using voice recognition software. It may contain minor errors which are inherent in voice recognition technology. ** Final report electronically signed by Dr. Percy Perez on 8/8/2018 11:39 AM    Fl Barium Enema    Result Date: 8/6/2018  PROCEDURE: FL BARIUM ENEMA CLINICAL INFORMATION: Procedure not carried out, Diverticulosis of large intestine without diverticulitis .  COMPARISON: No prior study. TECHNIQUE: 2  images of the abdomen were obtained. Barium was then introduced into the rectum under fluoroscopic control. Overhead fluoroscopy was utilized. Fluoroscopic time: 2.9 minutes Images: 13 FINDINGS: Barium filled the sigmoid colon but cannot progressed proximal to the sigmoid colon there is a long stricture identified. Proximal stricture normal caliber left colon and proximal sigmoid colon is seen. Sufficient Barium could not be administered proximal to the stricture. This irregular stricture suggests an extrinsic compressing lesion likely a colonic mass. Other causes of stricture are not excluded. Incomplete barium enema secondary to presence of the stricture of the proximal sigmoid colon likely related to an extrinsic mass. This information was called to Dr. Marline Montenegro at the time of examination. **This report has been created using voice recognition software. It may contain minor errors which are inherent in voice recognition technology. ** Final report electronically signed by Dr. Carlos Merlos on 8/6/2018 5:24 PM       Assessment/plan     Active Hospital Problems    Diagnosis Date Noted    Lower limb ischemia [I99.8] 09/05/2018       ASSESSMENT:  1. Right lower limb ischemia following fall in the setting of a history of peripheral vascular disease. 2. Fall complicated by right lower extremity pain  3. Acute kidney injury  4. Unspecified leukocytosis, likely inflammatory reaction,   5. Chronic co-morbidities  1. Peripheral vascular disease  2. GERD  3. Hyperlipidemia  4. Osteoarthritis  5. Recent s/p Robotic-assisted low anterior resection with coloproctostomy for extensive diverticular disease    PLAN:  1. Admit as inpatient  2. IR arteriogram of right lower extremity  3. Inpatient consult to Cardiovascular Surgery  4. IV fluids  5. Continue to trend WBC  6. Pain control with Morphine and Vicodin with good bowel regimen  7.  PT/OT- explained to patient that she may need rehab

## 2018-09-05 NOTE — ED NOTES
Bed: 004A  Expected date: 9/5/18  Expected time:   Means of arrival: [facility]  Comments:      Mirta Severe, RN  09/05/18 [id number]

## 2018-09-06 ENCOUNTER — ANESTHESIA EVENT (OUTPATIENT)
Dept: OPERATING ROOM | Age: 83
DRG: 271 | End: 2018-09-06
Payer: MEDICARE

## 2018-09-06 ENCOUNTER — APPOINTMENT (OUTPATIENT)
Dept: GENERAL RADIOLOGY | Age: 83
DRG: 271 | End: 2018-09-06
Payer: MEDICARE

## 2018-09-06 ENCOUNTER — ANESTHESIA (OUTPATIENT)
Dept: OPERATING ROOM | Age: 83
DRG: 271 | End: 2018-09-06
Payer: MEDICARE

## 2018-09-06 VITALS
RESPIRATION RATE: 2 BRPM | OXYGEN SATURATION: 100 % | DIASTOLIC BLOOD PRESSURE: 51 MMHG | TEMPERATURE: 97.3 F | SYSTOLIC BLOOD PRESSURE: 109 MMHG

## 2018-09-06 LAB
APTT: 50.9 SECONDS (ref 22–38)
AVERAGE GLUCOSE: 105 MG/DL (ref 70–126)
EKG ATRIAL RATE: 90 BPM
EKG P AXIS: 45 DEGREES
EKG P-R INTERVAL: 156 MS
EKG Q-T INTERVAL: 324 MS
EKG QRS DURATION: 78 MS
EKG QTC CALCULATION (BAZETT): 396 MS
EKG R AXIS: -31 DEGREES
EKG T AXIS: 65 DEGREES
EKG VENTRICULAR RATE: 90 BPM
ERYTHROCYTE [DISTWIDTH] IN BLOOD BY AUTOMATED COUNT: 13.4 % (ref 11.5–14.5)
ERYTHROCYTE [DISTWIDTH] IN BLOOD BY AUTOMATED COUNT: 44.8 FL (ref 35–45)
HBA1C MFR BLD: 5.5 % (ref 4.4–6.4)
HCT VFR BLD CALC: 24.2 % (ref 37–47)
HCT VFR BLD CALC: 26 % (ref 37–47)
HCT VFR BLD CALC: 30.7 % (ref 37–47)
HEMOGLOBIN: 10.2 GM/DL (ref 12–16)
HEMOGLOBIN: 7.9 GM/DL (ref 12–16)
HEMOGLOBIN: 8.4 GM/DL (ref 12–16)
MCH RBC QN AUTO: 29.9 PG (ref 26–33)
MCHC RBC AUTO-ENTMCNC: 33.2 GM/DL (ref 32.2–35.5)
MCV RBC AUTO: 90 FL (ref 81–99)
MRSA SCREEN RT-PCR: NEGATIVE
ORGANISM: ABNORMAL
PLATELET # BLD: 203 THOU/MM3 (ref 130–400)
PMV BLD AUTO: 11.3 FL (ref 9.4–12.4)
PROCALCITONIN: 0.8 NG/ML (ref 0.01–0.09)
RBC # BLD: 3.41 MILL/MM3 (ref 4.2–5.4)
URINE CULTURE, ROUTINE: ABNORMAL
WBC # BLD: 26.1 THOU/MM3 (ref 4.8–10.8)

## 2018-09-06 PROCEDURE — 041K0JL BYPASS RIGHT FEMORAL ARTERY TO POPLITEAL ARTERY WITH SYNTHETIC SUBSTITUTE, OPEN APPROACH: ICD-10-PCS | Performed by: THORACIC SURGERY (CARDIOTHORACIC VASCULAR SURGERY)

## 2018-09-06 PROCEDURE — 3700000001 HC ADD 15 MINUTES (ANESTHESIA): Performed by: THORACIC SURGERY (CARDIOTHORACIC VASCULAR SURGERY)

## 2018-09-06 PROCEDURE — 34709 PLMT XTN PROSTH EVASC RPR: CPT | Performed by: THORACIC SURGERY (CARDIOTHORACIC VASCULAR SURGERY)

## 2018-09-06 PROCEDURE — 6370000000 HC RX 637 (ALT 250 FOR IP): Performed by: THORACIC SURGERY (CARDIOTHORACIC VASCULAR SURGERY)

## 2018-09-06 PROCEDURE — 2580000003 HC RX 258: Performed by: INTERNAL MEDICINE

## 2018-09-06 PROCEDURE — 2000000000 HC ICU R&B

## 2018-09-06 PROCEDURE — P9045 ALBUMIN (HUMAN), 5%, 250 ML: HCPCS | Performed by: NURSE PRACTITIONER

## 2018-09-06 PROCEDURE — 7100000001 HC PACU RECOVERY - ADDTL 15 MIN: Performed by: THORACIC SURGERY (CARDIOTHORACIC VASCULAR SURGERY)

## 2018-09-06 PROCEDURE — 85018 HEMOGLOBIN: CPT

## 2018-09-06 PROCEDURE — 2700000000 HC OXYGEN THERAPY PER DAY

## 2018-09-06 PROCEDURE — 6360000002 HC RX W HCPCS: Performed by: ANESTHESIOLOGY

## 2018-09-06 PROCEDURE — 2500000003 HC RX 250 WO HCPCS: Performed by: NURSE PRACTITIONER

## 2018-09-06 PROCEDURE — C1768 GRAFT, VASCULAR: HCPCS | Performed by: THORACIC SURGERY (CARDIOTHORACIC VASCULAR SURGERY)

## 2018-09-06 PROCEDURE — 6360000002 HC RX W HCPCS: Performed by: NURSE PRACTITIONER

## 2018-09-06 PROCEDURE — 2709999900 HC NON-CHARGEABLE SUPPLY

## 2018-09-06 PROCEDURE — 84145 PROCALCITONIN (PCT): CPT

## 2018-09-06 PROCEDURE — 3600000013 HC SURGERY LEVEL 3 ADDTL 15MIN: Performed by: THORACIC SURGERY (CARDIOTHORACIC VASCULAR SURGERY)

## 2018-09-06 PROCEDURE — 36415 COLL VENOUS BLD VENIPUNCTURE: CPT

## 2018-09-06 PROCEDURE — 2709999900 HC NON-CHARGEABLE SUPPLY: Performed by: THORACIC SURGERY (CARDIOTHORACIC VASCULAR SURGERY)

## 2018-09-06 PROCEDURE — 2780000010 HC IMPLANT OTHER: Performed by: THORACIC SURGERY (CARDIOTHORACIC VASCULAR SURGERY)

## 2018-09-06 PROCEDURE — 6360000002 HC RX W HCPCS: Performed by: INTERNAL MEDICINE

## 2018-09-06 PROCEDURE — 2580000003 HC RX 258: Performed by: THORACIC SURGERY (CARDIOTHORACIC VASCULAR SURGERY)

## 2018-09-06 PROCEDURE — 88304 TISSUE EXAM BY PATHOLOGIST: CPT

## 2018-09-06 PROCEDURE — 2500000003 HC RX 250 WO HCPCS: Performed by: ANESTHESIOLOGY

## 2018-09-06 PROCEDURE — 6360000002 HC RX W HCPCS: Performed by: THORACIC SURGERY (CARDIOTHORACIC VASCULAR SURGERY)

## 2018-09-06 PROCEDURE — 2500000003 HC RX 250 WO HCPCS: Performed by: THORACIC SURGERY (CARDIOTHORACIC VASCULAR SURGERY)

## 2018-09-06 PROCEDURE — 35566 ART BYP FEM-ANT-POST TIB/PRL: CPT | Performed by: THORACIC SURGERY (CARDIOTHORACIC VASCULAR SURGERY)

## 2018-09-06 PROCEDURE — 93010 ELECTROCARDIOGRAM REPORT: CPT | Performed by: NUCLEAR MEDICINE

## 2018-09-06 PROCEDURE — 04CC0ZZ EXTIRPATION OF MATTER FROM RIGHT COMMON ILIAC ARTERY, OPEN APPROACH: ICD-10-PCS | Performed by: THORACIC SURGERY (CARDIOTHORACIC VASCULAR SURGERY)

## 2018-09-06 PROCEDURE — 71045 X-RAY EXAM CHEST 1 VIEW: CPT

## 2018-09-06 PROCEDURE — C1751 CATH, INF, PER/CENT/MIDLINE: HCPCS

## 2018-09-06 PROCEDURE — 3700000000 HC ANESTHESIA ATTENDED CARE: Performed by: THORACIC SURGERY (CARDIOTHORACIC VASCULAR SURGERY)

## 2018-09-06 PROCEDURE — 34707 EVASC RPR ILIO-ILIAC NDGFT: CPT | Performed by: THORACIC SURGERY (CARDIOTHORACIC VASCULAR SURGERY)

## 2018-09-06 PROCEDURE — 85730 THROMBOPLASTIN TIME PARTIAL: CPT

## 2018-09-06 PROCEDURE — 83036 HEMOGLOBIN GLYCOSYLATED A1C: CPT

## 2018-09-06 PROCEDURE — 7100000000 HC PACU RECOVERY - FIRST 15 MIN: Performed by: THORACIC SURGERY (CARDIOTHORACIC VASCULAR SURGERY)

## 2018-09-06 PROCEDURE — 85014 HEMATOCRIT: CPT

## 2018-09-06 PROCEDURE — 93005 ELECTROCARDIOGRAM TRACING: CPT | Performed by: NURSE PRACTITIONER

## 2018-09-06 PROCEDURE — 34713 PERQ ACCESS & CLSR FEM ART: CPT | Performed by: THORACIC SURGERY (CARDIOTHORACIC VASCULAR SURGERY)

## 2018-09-06 PROCEDURE — 35371 RECHANNELING OF ARTERY: CPT | Performed by: THORACIC SURGERY (CARDIOTHORACIC VASCULAR SURGERY)

## 2018-09-06 PROCEDURE — 27600 DECOMPRESSION OF LOWER LEG: CPT | Performed by: THORACIC SURGERY (CARDIOTHORACIC VASCULAR SURGERY)

## 2018-09-06 PROCEDURE — 94761 N-INVAS EAR/PLS OXIMETRY MLT: CPT

## 2018-09-06 PROCEDURE — 04CH0ZZ EXTIRPATION OF MATTER FROM RIGHT EXTERNAL ILIAC ARTERY, OPEN APPROACH: ICD-10-PCS | Performed by: THORACIC SURGERY (CARDIOTHORACIC VASCULAR SURGERY)

## 2018-09-06 PROCEDURE — 85027 COMPLETE CBC AUTOMATED: CPT

## 2018-09-06 PROCEDURE — 6370000000 HC RX 637 (ALT 250 FOR IP): Performed by: INTERNAL MEDICINE

## 2018-09-06 PROCEDURE — 3600000003 HC SURGERY LEVEL 3 BASE: Performed by: THORACIC SURGERY (CARDIOTHORACIC VASCULAR SURGERY)

## 2018-09-06 PROCEDURE — 6360000002 HC RX W HCPCS

## 2018-09-06 PROCEDURE — 2720000003 HC MISC SUTURE/STAPLES/RELOADS/ETC: Performed by: THORACIC SURGERY (CARDIOTHORACIC VASCULAR SURGERY)

## 2018-09-06 DEVICE — GLUE TISS 10ML PLAS STD SPRD TIP SYR PLUNG PREFIL SKIN CLSR 5PK: Type: IMPLANTABLE DEVICE | Status: FUNCTIONAL

## 2018-09-06 DEVICE — XENOSURE BIOLOGIC PATCH, 0.8CM X 8CM, EIFU
Type: IMPLANTABLE DEVICE | Status: FUNCTIONAL
Brand: XENOSURE BIOLOGIC PATCH

## 2018-09-06 RX ORDER — GLYCOPYRROLATE 1 MG/5 ML
SYRINGE (ML) INTRAVENOUS PRN
Status: DISCONTINUED | OUTPATIENT
Start: 2018-09-06 | End: 2018-09-06 | Stop reason: SDUPTHER

## 2018-09-06 RX ORDER — PROPOFOL 10 MG/ML
INJECTION, EMULSION INTRAVENOUS PRN
Status: DISCONTINUED | OUTPATIENT
Start: 2018-09-06 | End: 2018-09-06 | Stop reason: SDUPTHER

## 2018-09-06 RX ORDER — HEPARIN SODIUM 5000 [USP'U]/ML
INJECTION, SOLUTION INTRAVENOUS; SUBCUTANEOUS PRN
Status: DISCONTINUED | OUTPATIENT
Start: 2018-09-06 | End: 2018-09-06 | Stop reason: HOSPADM

## 2018-09-06 RX ORDER — ALBUMIN, HUMAN INJ 5% 5 %
SOLUTION INTRAVENOUS
Status: DISPENSED
Start: 2018-09-06 | End: 2018-09-06

## 2018-09-06 RX ORDER — HEPARIN SODIUM 10000 [USP'U]/100ML
INJECTION, SOLUTION INTRAVENOUS
Status: COMPLETED
Start: 2018-09-06 | End: 2018-09-06

## 2018-09-06 RX ORDER — ROCURONIUM BROMIDE 10 MG/ML
INJECTION, SOLUTION INTRAVENOUS PRN
Status: DISCONTINUED | OUTPATIENT
Start: 2018-09-06 | End: 2018-09-06 | Stop reason: SDUPTHER

## 2018-09-06 RX ORDER — HEPARIN SODIUM AND DEXTROSE 10000; 5 [USP'U]/100ML; G/100ML
1000 INJECTION INTRAVENOUS CONTINUOUS
Status: DISCONTINUED | OUTPATIENT
Start: 2018-09-06 | End: 2018-09-06

## 2018-09-06 RX ORDER — ALBUMIN, HUMAN INJ 5% 5 %
SOLUTION INTRAVENOUS
Status: DISPENSED
Start: 2018-09-06 | End: 2018-09-07

## 2018-09-06 RX ORDER — MORPHINE SULFATE 2 MG/ML
2 INJECTION, SOLUTION INTRAMUSCULAR; INTRAVENOUS ONCE
Status: COMPLETED | OUTPATIENT
Start: 2018-09-06 | End: 2018-09-06

## 2018-09-06 RX ORDER — ALBUMIN, HUMAN INJ 5% 5 %
50 SOLUTION INTRAVENOUS ONCE
Status: COMPLETED | OUTPATIENT
Start: 2018-09-06 | End: 2018-09-06

## 2018-09-06 RX ORDER — HEPARIN SODIUM 1000 [USP'U]/ML
INJECTION, SOLUTION INTRAVENOUS; SUBCUTANEOUS PRN
Status: DISCONTINUED | OUTPATIENT
Start: 2018-09-06 | End: 2018-09-06 | Stop reason: SDUPTHER

## 2018-09-06 RX ORDER — NEOSTIGMINE METHYLSULFATE 1 MG/ML
INJECTION, SOLUTION INTRAVENOUS PRN
Status: DISCONTINUED | OUTPATIENT
Start: 2018-09-06 | End: 2018-09-06 | Stop reason: SDUPTHER

## 2018-09-06 RX ORDER — FENTANYL CITRATE 50 UG/ML
INJECTION, SOLUTION INTRAMUSCULAR; INTRAVENOUS PRN
Status: DISCONTINUED | OUTPATIENT
Start: 2018-09-06 | End: 2018-09-06 | Stop reason: SDUPTHER

## 2018-09-06 RX ORDER — BACITRACIN 50000 [USP'U]/1
INJECTION, POWDER, LYOPHILIZED, FOR SOLUTION INTRAMUSCULAR PRN
Status: DISCONTINUED | OUTPATIENT
Start: 2018-09-06 | End: 2018-09-06 | Stop reason: HOSPADM

## 2018-09-06 RX ADMIN — TIMOLOL MALEATE 1 DROP: 5 SOLUTION OPHTHALMIC at 20:34

## 2018-09-06 RX ADMIN — PRAVASTATIN SODIUM 20 MG: 20 TABLET ORAL at 20:34

## 2018-09-06 RX ADMIN — METOPROLOL SUCCINATE 50 MG: 50 TABLET, FILM COATED, EXTENDED RELEASE ORAL at 08:37

## 2018-09-06 RX ADMIN — CILOSTAZOL 100 MG: 100 TABLET ORAL at 00:07

## 2018-09-06 RX ADMIN — HEPARIN SODIUM AND DEXTROSE 1000 UNITS/HR: 10000; 5 INJECTION INTRAVENOUS at 06:34

## 2018-09-06 RX ADMIN — DOCUSATE SODIUM 100 MG: 100 CAPSULE, LIQUID FILLED ORAL at 20:39

## 2018-09-06 RX ADMIN — PANTOPRAZOLE SODIUM 40 MG: 40 TABLET, DELAYED RELEASE ORAL at 08:36

## 2018-09-06 RX ADMIN — TIMOLOL MALEATE 1 DROP: 5 SOLUTION OPHTHALMIC at 00:07

## 2018-09-06 RX ADMIN — FENTANYL CITRATE 50 MCG: 50 INJECTION INTRAMUSCULAR; INTRAVENOUS at 05:10

## 2018-09-06 RX ADMIN — FENTANYL CITRATE 50 MCG: 50 INJECTION INTRAMUSCULAR; INTRAVENOUS at 02:53

## 2018-09-06 RX ADMIN — MORPHINE SULFATE 2 MG: 2 INJECTION, SOLUTION INTRAMUSCULAR; INTRAVENOUS at 01:03

## 2018-09-06 RX ADMIN — HEPARIN SODIUM 4000 UNITS: 1000 INJECTION, SOLUTION INTRAVENOUS; SUBCUTANEOUS at 04:20

## 2018-09-06 RX ADMIN — PROPOFOL 100 MG: 10 INJECTION, EMULSION INTRAVENOUS at 02:53

## 2018-09-06 RX ADMIN — ASPIRIN 325 MG: 325 TABLET, DELAYED RELEASE ORAL at 08:36

## 2018-09-06 RX ADMIN — POTASSIUM CHLORIDE 10 MEQ: 750 TABLET, EXTENDED RELEASE ORAL at 08:36

## 2018-09-06 RX ADMIN — MORPHINE SULFATE 2 MG: 2 INJECTION, SOLUTION INTRAMUSCULAR; INTRAVENOUS at 02:18

## 2018-09-06 RX ADMIN — DOCUSATE SODIUM 100 MG: 100 CAPSULE, LIQUID FILLED ORAL at 08:36

## 2018-09-06 RX ADMIN — FAMOTIDINE 20 MG: 20 TABLET ORAL at 00:06

## 2018-09-06 RX ADMIN — Medication 10 ML: at 20:40

## 2018-09-06 RX ADMIN — CILOSTAZOL 100 MG: 100 TABLET ORAL at 08:37

## 2018-09-06 RX ADMIN — CILOSTAZOL 100 MG: 100 TABLET ORAL at 17:00

## 2018-09-06 RX ADMIN — Medication 4 MCG/MIN: at 13:33

## 2018-09-06 RX ADMIN — Medication 2 G: at 08:35

## 2018-09-06 RX ADMIN — DOCUSATE SODIUM 100 MG: 100 CAPSULE, LIQUID FILLED ORAL at 00:06

## 2018-09-06 RX ADMIN — HYDROCODONE BITARTRATE AND ACETAMINOPHEN 1 TABLET: 5; 325 TABLET ORAL at 21:25

## 2018-09-06 RX ADMIN — ALBUMIN (HUMAN) 50 G: 12.5 SOLUTION INTRAVENOUS at 12:17

## 2018-09-06 RX ADMIN — Medication 0.4 MG: at 04:59

## 2018-09-06 RX ADMIN — MORPHINE SULFATE 2 MG: 2 INJECTION, SOLUTION INTRAMUSCULAR; INTRAVENOUS at 08:47

## 2018-09-06 RX ADMIN — MECLIZINE 25 MG: 12.5 TABLET ORAL at 08:36

## 2018-09-06 RX ADMIN — PANTOPRAZOLE SODIUM 40 MG: 40 TABLET, DELAYED RELEASE ORAL at 17:07

## 2018-09-06 RX ADMIN — ROCURONIUM BROMIDE 40 MG: 10 INJECTION INTRAVENOUS at 02:53

## 2018-09-06 RX ADMIN — HYDROCODONE BITARTRATE AND ACETAMINOPHEN 1 TABLET: 5; 325 TABLET ORAL at 08:47

## 2018-09-06 RX ADMIN — RALOXIFENE HYDROCHLORIDE 60 MG: 60 TABLET, FILM COATED ORAL at 08:37

## 2018-09-06 RX ADMIN — ENOXAPARIN SODIUM 80 MG: 80 INJECTION SUBCUTANEOUS at 14:39

## 2018-09-06 RX ADMIN — HEPARIN SODIUM 8000 UNITS: 1000 INJECTION, SOLUTION INTRAVENOUS; SUBCUTANEOUS at 03:21

## 2018-09-06 RX ADMIN — NEOSTIGMINE METHYLSULFATE 3 MG: 1 INJECTION, SOLUTION INTRAVENOUS at 04:59

## 2018-09-06 RX ADMIN — POTASSIUM CHLORIDE 10 MEQ: 750 TABLET, EXTENDED RELEASE ORAL at 20:39

## 2018-09-06 RX ADMIN — Medication 2 G: at 17:56

## 2018-09-06 ASSESSMENT — PULMONARY FUNCTION TESTS
PIF_VALUE: 23
PIF_VALUE: 22
PIF_VALUE: 22
PIF_VALUE: 20
PIF_VALUE: 18
PIF_VALUE: 19
PIF_VALUE: 20
PIF_VALUE: 19
PIF_VALUE: 15
PIF_VALUE: 20
PIF_VALUE: 19
PIF_VALUE: 20
PIF_VALUE: 20
PIF_VALUE: 3
PIF_VALUE: 19
PIF_VALUE: 20
PIF_VALUE: 21
PIF_VALUE: 23
PIF_VALUE: 20
PIF_VALUE: 18
PIF_VALUE: 17
PIF_VALUE: 17
PIF_VALUE: 20
PIF_VALUE: 19
PIF_VALUE: 20
PIF_VALUE: 19
PIF_VALUE: 18
PIF_VALUE: 19
PIF_VALUE: 14
PIF_VALUE: 19
PIF_VALUE: 5
PIF_VALUE: 20
PIF_VALUE: 18
PIF_VALUE: 20
PIF_VALUE: 20
PIF_VALUE: 1
PIF_VALUE: 20
PIF_VALUE: 19
PIF_VALUE: 20
PIF_VALUE: 21
PIF_VALUE: 3
PIF_VALUE: 17
PIF_VALUE: 17
PIF_VALUE: 19
PIF_VALUE: 20
PIF_VALUE: 20
PIF_VALUE: 22
PIF_VALUE: 20
PIF_VALUE: 20
PIF_VALUE: 18
PIF_VALUE: 21
PIF_VALUE: 21
PIF_VALUE: 22
PIF_VALUE: 20
PIF_VALUE: 21
PIF_VALUE: 21
PIF_VALUE: 20
PIF_VALUE: 21
PIF_VALUE: 20
PIF_VALUE: 0
PIF_VALUE: 20
PIF_VALUE: 18
PIF_VALUE: 20
PIF_VALUE: 20
PIF_VALUE: 21
PIF_VALUE: 22
PIF_VALUE: 2
PIF_VALUE: 2
PIF_VALUE: 15
PIF_VALUE: 20
PIF_VALUE: 20
PIF_VALUE: 22
PIF_VALUE: 2
PIF_VALUE: 19
PIF_VALUE: 20
PIF_VALUE: 17
PIF_VALUE: 5
PIF_VALUE: 20
PIF_VALUE: 24
PIF_VALUE: 20
PIF_VALUE: 20
PIF_VALUE: 6
PIF_VALUE: 22
PIF_VALUE: 1
PIF_VALUE: 19
PIF_VALUE: 17
PIF_VALUE: 22
PIF_VALUE: 15
PIF_VALUE: 20
PIF_VALUE: 20
PIF_VALUE: 18
PIF_VALUE: 21
PIF_VALUE: 18
PIF_VALUE: 21
PIF_VALUE: 20
PIF_VALUE: 22
PIF_VALUE: 17
PIF_VALUE: 2
PIF_VALUE: 20
PIF_VALUE: 21
PIF_VALUE: 17
PIF_VALUE: 19
PIF_VALUE: 18
PIF_VALUE: 21
PIF_VALUE: 21
PIF_VALUE: 18
PIF_VALUE: 20
PIF_VALUE: 3
PIF_VALUE: 21
PIF_VALUE: 20
PIF_VALUE: 22
PIF_VALUE: 19
PIF_VALUE: 17
PIF_VALUE: 20
PIF_VALUE: 0
PIF_VALUE: 8
PIF_VALUE: 17
PIF_VALUE: 18
PIF_VALUE: 20
PIF_VALUE: 21
PIF_VALUE: 19
PIF_VALUE: 20
PIF_VALUE: 19
PIF_VALUE: 20
PIF_VALUE: 19
PIF_VALUE: 18
PIF_VALUE: 3
PIF_VALUE: 21
PIF_VALUE: 3
PIF_VALUE: 19
PIF_VALUE: 20
PIF_VALUE: 19
PIF_VALUE: 19
PIF_VALUE: 1
PIF_VALUE: 21
PIF_VALUE: 2

## 2018-09-06 ASSESSMENT — PAIN DESCRIPTION - FREQUENCY: FREQUENCY: INTERMITTENT

## 2018-09-06 ASSESSMENT — PAIN SCALES - GENERAL
PAINLEVEL_OUTOF10: 10
PAINLEVEL_OUTOF10: 0
PAINLEVEL_OUTOF10: 10
PAINLEVEL_OUTOF10: 8
PAINLEVEL_OUTOF10: 0
PAINLEVEL_OUTOF10: 4
PAINLEVEL_OUTOF10: 10
PAINLEVEL_OUTOF10: 10
PAINLEVEL_OUTOF10: 7
PAINLEVEL_OUTOF10: 10
PAINLEVEL_OUTOF10: 2
PAINLEVEL_OUTOF10: 10
PAINLEVEL_OUTOF10: 0

## 2018-09-06 ASSESSMENT — PAIN DESCRIPTION - ORIENTATION
ORIENTATION: RIGHT
ORIENTATION: RIGHT

## 2018-09-06 ASSESSMENT — PAIN DESCRIPTION - DIRECTION
RADIATING_TOWARDS: HIP
RADIATING_TOWARDS: HIP

## 2018-09-06 ASSESSMENT — PAIN DESCRIPTION - PAIN TYPE
TYPE: SURGICAL PAIN
TYPE: SURGICAL PAIN

## 2018-09-06 ASSESSMENT — PAIN DESCRIPTION - DESCRIPTORS: DESCRIPTORS: ACHING

## 2018-09-06 ASSESSMENT — PAIN DESCRIPTION - LOCATION
LOCATION: LEG
LOCATION: LEG

## 2018-09-06 NOTE — CARE COORDINATION
9/6/18, 1:29 PM      Addie Armas       Admitted from: ED 9/5/2018/ [facility] day: 1   Location: [address] [place] [address]-A Reason for admit: Lower limb ischemia [I99.8]  PVD (peripheral vascular disease) ([facility] [I73.9] Status: IP  Admit order signed?: yes  PMH:  has a past medical history of Arthritis; BPPV (benign paroxysmal positional vertigo); GERD (gastroesophageal reflux disease); HTN (hypertension); Hyperlipidemia; Obesity; Osteopenia; PAC (premature atrial contraction); Paralysis ([address] Pedal edema; PVC (premature ventricular contraction); PVD (peripheral vascular disease) ([facility] [address] Tinnitus; and UTI (urinary tract infection). Procedure:   9/5 Arteriogram with left iliac artery stenting in IR  9/5 RIGHT FEMORAL EMBOLECTOMY, INTRAOPERATIVE ARTERIOGRAM, RIGHT ILIAC EMBOLECTOMY, RIGHT COMMON AND EXTERNAL ILIAC STENTING, RIGHT FEMORAL TO ANTERIOR POPLITEAL BYPASS WITH 6MM GORTEX GRAFT  9/6 Emergent declotting right Fem Tib Bypass, Right common femoral and profunda femoral, endarterectomy bovine pericardial patch angioplasty  Medications:  Scheduled Meds:   albumin human        albumin human        timolol  1 drop Both Eyes BID    raloxifene  60 mg Oral Daily    pantoprazole  40 mg Oral BID    metoprolol succinate  50 mg Oral Daily    pravastatin  20 mg Oral Daily    cilostazol  100 mg Oral BID    potassium chloride  10 mEq Oral BID    sodium chloride flush  10 mL Intravenous 2 times per day    docusate sodium  100 mg Oral BID    aspirin  325 mg Oral Daily     Continuous Infusions:   heparin (porcine) 1,000 Units/hr (09/06/18 1805)    norepinephrine      sodium chloride 75 mL/hr at 09/05/18 1248      Pertinent Info/Orders/Treatment Plan: Presented with c/o right leg pain. POD #1. After IR for iliac stenting, no pulses and went to surgery for thrombectomy and fem/pop. Early on 9/6 had occluded graft and returned to OR for endarterectomy and angioplasty.  BLE with doppler DP & PT pulses, brisk cap refill, pink; LLE is warm, RLE is cool. Cynthia. SGC. Sats 94% on 5L O2. PT/OT. Telemetry, I&O, daily weight, wound care, SCDs, razo care, up with assist. IVF, heparin drip, levo @ 4 mcg/min, asa, pletal, prn norco, prn antivert, toprol xl, prn IV morphine, prn roxicodone, protonix, K+, evista, pravastatin. Received 50g 5% albumin x1. IV ATB completed. BUN 25, Creat 1.3 - now 1, Ical 0.99, alb 3.1, WBC 14.2 - now 26.1, Hgb 10.2. Urine sent for culture - mixed growth. Diet: DIET GENERAL; No Added Salt (3-4 GM)   DVT Prophylaxis: Heparin  Smoking status:  reports that she has been smoking. She started smoking about 62 years ago. She has a 30.00 pack-year smoking history. She has never used smokeless tobacco.   Influenza Vaccination Screening Completed: n/a  Pneumonia Vaccination Screening Completed: no, primary RN Charly Darby notified  PCP: Red Mott  Readmission: yes  Patient has been readmitted within 12 days s/p IP stay 8/20-8/23 for robotic colectomy. Patient went to f/u appointment? no  If yes, was it within 7 days? n/a  Patient was able to fill prescriptions? yes  Patient is taking medications as prescribed? yes  Cause for readmission? Lower limb ischemia   Readmission Risk Score: 19%    Discharge Planning  Current Residence:     Living Arrangements:      Support Systems:     Current Services PTA:     Potential Assistance Needed:     Potential Assistance Purchasing Medications:     Does patient want to participate in local refill/ meds to beds program?     Type of Home Care Services:     Patient expects to be discharged to:     Expected Discharge date: Follow Up Appointment: Best Day/ Time:      Discharge Plan: Spoke with Josee Schroeder; states she lives at home alone in a condo and did not use any DME PTA, but has a standard walker if she would need it. She states she did not have any HH services PTA. She cares for herself independently. She states she has close family and friend support.  Josee Schroeder states she plans to return

## 2018-09-06 NOTE — ANESTHESIA PRE PROCEDURE
Department of Anesthesiology  Preprocedure Note       Name:  Ry Moody   Age:  82 y.o.  :  1935                                          MRN:  [id number]         Date:  2018      Surgeon: Jonny Frias):  Trent Alexander MD    Procedure: Procedure(s):  WOUND CARE GRAFT PROCEDURE    Medications prior to admission:   Prior to Admission medications    Medication Sig Start Date End Date Taking? Authorizing Provider   potassium chloride (KLOR-CON M) 10 MEQ extended release tablet Take 10 mEq by mouth 2 times daily    Historical Provider, MD   aspirin 81 MG tablet Take 81 mg by mouth daily    Historical Provider, MD   metoprolol succinate (TOPROL XL) 50 MG extended release tablet Take 50 mg by mouth daily    Historical Provider, MD   pravastatin (PRAVACHOL) 20 MG tablet Take 20 mg by mouth daily    Historical Provider, MD   calcium carbonate 600 MG TABS tablet Take 1 tablet by mouth daily as needed    Historical Provider, MD   cilostazol (PLETAL) 100 MG tablet Take 100 mg by mouth 2 times daily    Historical Provider, MD   meclizine (ANTIVERT) 25 MG tablet Take 25 mg by mouth 3 times daily as needed    Historical Provider, MD   Pantoprazole Sodium (PROTONIX) 40 MG PACK packet Take 1 packet by mouth 2 times daily. 14   Yemi Wagoner MD   acetaminophen (TYLENOL ARTHRITIS PAIN) 650 MG CR tablet Take 1,300 mg by mouth every 12 hours as needed for Pain     Historical Provider, MD   timolol (TIMOPTIC) 0.5 % ophthalmic solution 1 drop 2 times daily. Historical Provider, MD   raloxifene (EVISTA) 60 MG tablet Take 60 mg by mouth daily. Historical Provider, MD   Misc. Devices [facility]) MISC by Does not apply route. Historical Provider, MD   triamterene-hydrochlorothiazide (MAXZIDE-25) 37.5-25 MG per tablet Take 1 tablet by mouth daily.     Historical Provider, MD       Current medications:    Current Facility-Administered Medications   Medication Dose Route Frequency Provider Last Rate Last 60. 00     Start date: 1956    Smokeless tobacco: Never Used    Alcohol use 0.6 oz/week     1 Glasses of wine per week      Comment: social                                Ready to quit: Not Answered  Counseling given: Not Answered      Vital Signs (Current):   Vitals:    09/06/18 0003 09/06/18 0040 09/06/18 0102 09/06/18 0203   BP: 121/63  127/61 (!) 146/73   Pulse: 65  63 69   Resp: 15 16 22 28   Temp: 98.6 °F (37 °C)   98.8 °F (37.1 °C)   TempSrc: Core      SpO2: 95% 95% 96% 97%   Weight:       Height:                                                  BP Readings from Last 3 Encounters:   09/06/18 (!) 146/73   09/06/18 (!) 173/77   09/05/18 (!) 115/55       NPO Status:                                                                                 BMI:   Wt Readings from Last 3 Encounters:   09/05/18 180 lb (81.6 kg)   08/20/18 189 lb (85.7 kg)   08/08/18 194 lb 8 oz (88.2 kg)     Body mass index is 29.95 kg/m². CBC:   Lab Results   Component Value Date    WBC 15.1 09/05/2018    RBC 3.72 09/05/2018    HGB 11.0 09/05/2018    HCT 32.9 09/05/2018    MCV 88.4 09/05/2018    RDW 14.0 06/06/2016     09/05/2018       CMP:   Lab Results   Component Value Date     09/05/2018    K 3.9 09/05/2018    K 3.9 08/21/2018     09/05/2018    CO2 18 09/05/2018    BUN 25 09/05/2018    CREATININE 1.0 09/05/2018    LABGLOM 53 09/05/2018    GLUCOSE 150 09/05/2018    GLUCOSE 103 08/13/2018    PROT 6.2 09/05/2018    CALCIUM 7.1 09/05/2018    BILITOT 0.7 09/05/2018    ALKPHOS 101 09/05/2018    AST 15 09/05/2018    ALT 11 09/05/2018       POC Tests: No results for input(s): POCGLU, POCNA, POCK, POCCL, POCBUN, POCHEMO, POCHCT in the last 72 hours.     Coags:   Lab Results   Component Value Date    INR 1.31 09/05/2018    APTT 28.3 09/05/2018       HCG (If Applicable): No results found for: PREGTESTUR, PREGSERUM, HCG, HCGQUANT     ABGs: No results found for: PHART, PO2ART, GQT2KQC, ZFI7KWG, BEART, C2YAUBYE     Type & Screen (If Applicable):  Lab Results   Component Value Date    [address] POS 09/05/2018       Anesthesia Evaluation  Patient summary reviewed and Nursing notes reviewed no history of anesthetic complications:   Airway: Mallampati: II  TM distance: >3 FB   Neck ROM: full  Mouth opening: > = 3 FB Dental:          Pulmonary: breath sounds clear to auscultation                             Cardiovascular:  Exercise tolerance: good (>4 METS),   (+) hypertension:,         Rhythm: regular  Rate: normal                    Neuro/Psych:               GI/Hepatic/Renal:   (+) GERD:,           Endo/Other:                     Abdominal:       Abdomen: soft. Vascular:   + PVD, aortic or cerebral, . Anesthesia Plan      general     ASA 4 - emergent       Induction: intravenous. MIPS: Postoperative opioids intended and Prophylactic antiemetics administered. Anesthetic plan and risks discussed with patient.       Plan discussed with attending and surgical team.                  Kunal Diaz DO   9/6/2018

## 2018-09-06 NOTE — ANESTHESIA POSTPROCEDURE EVALUATION
Department of Anesthesiology  Postprocedure Note    Patient: Gifty Bui  MRN: [id number]  YOB: 1935  Date of evaluation: 9/5/2018  Time:  9:02 PM     Procedure Summary     Date:  09/05/18 Room / Location:  [address] [place]    Anesthesia Start:  1257 Anesthesia Stop:  1940    Procedure:  RIGHT FEMORAL EMBOLECTOMY, INTRAOPERATIVE ARTERIOGRAM, RIGHT ILIAC EMBOLECTOMY, RIGHT COMMON AND EXTERNAL ILIAC STENTING, RIGHT FEMORAL TO ANTERIOR POPLITEAL BYPASS WITH 6MM GORTEX GRAFT (N/A ) Diagnosis:  (LOWER LIMB ISCHEMIA)    Surgeon:  Erica Christiansen MD Responsible Provider:  Majo Kimble DO    Anesthesia Type:  general ASA Status:  4 - Emergent          Anesthesia Type: No value filed. Christiane Phase I: Christiane Score: 8    Christiane Phase II:      Last vitals: Reviewed and per EMR flowsheets.        Anesthesia Post Evaluation    Patient location during evaluation: PACU  Patient participation: complete - patient participated  Level of consciousness: awake and alert  Airway patency: patent  Nausea & Vomiting: no nausea and no vomiting  Complications: no  Cardiovascular status: hemodynamically stable  Respiratory status: acceptable  Hydration status: euvolemic

## 2018-09-06 NOTE — ANESTHESIA POSTPROCEDURE EVALUATION
Department of Anesthesiology  Postprocedure Note    Patient: Ethan Eduardo  MRN: [id number]  YOB: 1935  Date of evaluation: 9/6/2018  Time:  6:56 AM     Procedure Summary     Date:  09/06/18 Room / Location:  [facility] OR 03 / [facility]    Anesthesia Start:  0243 Anesthesia Stop:  0522    Procedure:  RE-EXPLORATION RIGHT GROIN, DECLOTTING OF FEMORAL BYPASS GRAFT (N/A ) Diagnosis:  (POST-OP EXPLORE)    Surgeon:  Rich Mukherjee MD Responsible Provider:  Brendon Kellogg DO    Anesthesia Type:  general ASA Status:  4 - Emergent          Anesthesia Type: No value filed. Christiane Phase I: Christiane Score: 9    Christiane Phase II:      Last vitals: Reviewed and per EMR flowsheets.        Anesthesia Post Evaluation    Patient location during evaluation: PACU  Patient participation: complete - patient participated  Level of consciousness: awake and alert  Airway patency: patent  Nausea & Vomiting: no nausea and no vomiting  Complications: no  Cardiovascular status: hemodynamically stable  Respiratory status: acceptable  Hydration status: stable

## 2018-09-06 NOTE — PROGRESS NOTES
mEq Oral BID    sodium chloride flush  10 mL Intravenous 2 times per day    ceFAZolin (ANCEF) IVPB  2 g Intravenous Q8H    docusate sodium  100 mg Oral BID    famotidine  20 mg Oral Daily    aspirin  325 mg Oral Daily       ROS    Exam:   General Appearance: alert ,conversing, in no acute distress  Cardiovascular: a-fib, , no murmurs, rubs, clicks, or gallops  Pulmonary/Chest: clear to auscultation bilaterally- no wheezes, rales or rhonchi, normal air movement, no respiratory distress  Abdomen: soft, non-tender, non-distended, normal bowel sounds, no bruits,   Extremities: no cyanosis, clubbing or edema. Pulses: Radial, DP, and PT pulses intact. Right Leg: DP: doppler, PT: doppler                   Left Leg: DP: absent, PT: doppler   Skin: warm and dry, no rash or erythema.   Incisions to RLE intact, no drainage  Head: normocephalic and atraumatic  Eyes: pupils equal, round, and reactive to light  Neck: supple and non-tender without mass, no thyromegaly, no JVD   Musculoskeletal: normal range of motion  Neurological: alert, oriented, normal speech, no focal findings or movement disorder noted    Assessment:     Patient Active Problem List   Diagnosis    Vertigo    Vertebrobasilar artery insufficiency    Acute pain of left ear    Tinnitus    BPPV (benign paroxysmal positional vertigo)    Gait instability    PAC (premature atrial contraction)    Pedal edema    Stricture of colon determined by endoscopy ([address]    Barium enema abnormal    Diverticulosis of large intestine without hemorrhage    Colon stricture (HCC)    Colonic mass    S/P laparoscopic colectomy    Lower limb ischemia    PVD (peripheral vascular disease) ([address]    Fall on same level from slipping, tripping, or stumbling    Acute kidney injury ([address]    Leukocytosis    Gastroesophageal reflux disease without esophagitis    Other hyperlipidemia    Osteoarthritis of multiple joints    Diverticulosis of intestine without

## 2018-09-06 NOTE — PROGRESS NOTES
2119: patient to ICU, RLE pulses present with doppler    2215: no change     0040: Dr. Brissa Carson called and updated on patient. Patient now only has R femoral pulse by doppler and doppler pulse over incision site, leg is getting increasingly mottled, and patient is having worsening pain. 0120: Dr. Brissa Carson called and given update on worsening pain in the RLE and increased mottling since the last time he was notified. He stated he was coming in to see the patient. 5: Dr. Brissa Resendiz at bedside to assess, IR and surgery called to notify of taking patient back due to graft clotting off     0205: Patient's son Shad Elias called and given update, stated he was on his way to unit     0233: patient hooked up to transport cardiac monitor & 4L NC, and transported down to I.R. Via 2 RNs.     3309: patient returned to ICU from surgery, alert & oriented, RLE pulse audible on doppler.

## 2018-09-06 NOTE — PROGRESS NOTES
Assessment and Plan:        1. Right leg ischemia, History of PVD: 9/6/2018 Emergent Right Fem-Tibial Bypass, Right Common Femoral and Profunda Femoral Endarterectomy Bovine. Pericardial Patch Angioplasty per Dr. Eunice López. Gideon in-place, Heparin gtt will be stopped and switched to Lovenox 1/kg BID-will need Eliquis-long term anti-coagulation consider starting in the next day or two.   2. Leukocytosis, will monitor for ALI, noted elevated PCT, Ancef has been started  3. Infrarenal AAA: 5 cm will need surgical intervention, A-gram with bilateral run-off prior to discharge  4. ERIKA Low Anterior Resection with Sigmoid Coloproctostomy 8/20/2018 per Dr. Marisela Jensen: Pathology chronic diverticulitis, monitor  5. ALEJANDRO: Improved since admission, monitor  6. Acute Blood Loss Anemia: monitor  7. Essential HPTN: Toprol was restarted with parameters to hold  8. Dyslipidemia: Pravachol continues  9. GERD: Protonix continues  10. Impaired glucose tolerance: HgbA1c is pending, will monitor need for ACCU and SSI      CC:  Right leg ischemia  HPI: Boby De La Cruz is a 82 y.o. female who presents to [facility] for evaluation of right hip pain which happened prior to arrival. The patient reports she was getting out of bed in order to use the restroom, when her leg felt numb and gave out on her, she slid down the edge of the bed landing on her butt. The patient reports when she landed she felt a \"crunch\" and began having right hip pain. Pain is located in entire leg. The patient was taking pletal and aspirin prior to admission. Vascular Arterial Dopplers absent flow within the right lower leg. Patient was taken to emergent surgery. ROS: GENERAL:  no fever,chills, night sweats, Positive for fatigue. SKN: No lesions or rashes.   HEAD: No headaches or recent injury  EYES: No acute changes in vision, no diplopia or blurred vision  EARS: No hearing loss, no tinnitus  NOSE/THROAT: No rhinorrhea or pharyngitis, no nasal drainage  NECK: No lumps or unusual neck stiffness  PULMONARY: No dyspnea, orthopnea or paroxysmal nocturnal dyspnea  CARDIAC: No chest pain, pressure, heaviness, palipiations or lower leg edema. GI: No history melena or hematochezia, no diarrhea or constipation, Denies any abdominal pain  PERIPHERAL VASCULAR: Positive for right leg pain  MUSCULOSKELETAL: Occasional arthralgias, myalgias  NEUROLOGICAL: Denies any headache, dizziness, paresthesia, tremor, syncope or near syncope complaints  HEMATOLOGIC:  No unusual bruising or bleeding  PSYCH: Denies any homicidal or suicidal ideations. PMH:  Per HPI  SHX:  EGD, ERIKA Colectomy, Hysterctomy, Cholecystectomy, Appendectomy, Lumpectomy  FHX: Mother-CAD, CVA, Father-Lung Ca, COPD  Allergies: Lasix, Lipitor, Neurontin, PCN  Medications:  ASA, Pletal, Colace, Lovenox, Toprol XL, Protonix, Pravachol, Evista, Timoptic  Vital Signs: T max 98.2, HR 74, RR 18, B/P 128-140/60  General:   Pleasant, pale, diaphoretic  HEENT:  normocephalic and atraumatic. No scleral icterus. Neck: supple. No JVD. Lungs: clear to auscultation. No retractions  Cardiac: RRR  Abdomen: soft. Nontender. Puncture sites total 4 intact. No redness, swelling or drainage. Extremities:  No clubbing, cyanosis, or edema x 4. Right groin incisional line ecchymotic, no drainage. Right inner incisional line intact, no redness or drainage. Bypass graft pulse per doppler. Right outer aspect incisional line intact with no redness or drainage noted. Right Dorsalis Pedis and Anterior tibial pulse is present with doppler. Left Dorsalis Pedis and Posterior Tibial pulse is present with doppler. Bilateral feet are warm, movement and sensation is present, Capillary Refill 3-5 seconds  Vasculature: capillary refill < 3 seconds. Skin:  warm and dry. Psych:  Alert and oriented x3. Affect appropriate  Lymph:  No supraclavicular adenopathy. Neurologic:  No focal deficit.     Data: (All radiographs, tracings, PFTs, and imaging are personally viewed and interpreted unless otherwise noted).  Labs reviewed.  Chest Xray reviewed   EKG reviewed    ANURAG Magaña MSN, APRN-CNP, CCRN-CMC

## 2018-09-06 NOTE — OP NOTE
[address] [place] [address]                                 OPERATIVE REPORT    PATIENT NAME: Baltazar ABURTO                     :        1935  MED REC NO:   [id number]                           ROOM:       0014  ACCOUNT NO:   [de-identified]                           ADMIT DATE: 2018  PROVIDER:     Gaston Sosa MD    DATE OF PROCEDURE:  2018    PREOPERATIVE DIAGNOSES:  1. 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 Distal runoff to anterior tibial artery via  collaterals around the knee. Secondary to segmental right behind the knee  popliteal chronic occlusion with collaterals. 2.  Occluded tibioperoneal trunk. 3.  A 5 cm abdominal aortic aneurysm. 4.  Status post sigmoid resection for cancer a few weeks ago. POSTOPERATIVE DIAGNOSES:  1. 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 Distal runoff to anterior tibial artery via  collaterals around the knee. Secondary to segmental right behind the knee  popliteal chronic occlusion with collaterals. 2.  Occluded tibioperoneal trunk. 3.  A 5 cm abdominal aortic aneurysm. 4.  Status post sigmoid resection for cancer few weeks ago. OPERATION:  Emergency right leg revascularization with right femoral  Fabian embolectomy and right common and external iliac artery  fluoroscopically guided right common and external iliac artery Fabian  thrombectomy.   Intraoperative arteriogram and stenting of the right common  iliac artery critical stenoses with an 8 mm x 5.9 cm ICAST covered stent and a right external iliac 8 mm x 5.9 cm ICAST covered stent and extension to the right common  iliac artery stent with an 8 mm x 2.8 cm stent to overlap on the right  common iliac artery stent to a residual stenosis proximal.  Post angiogram  showed zero residual stenosis. All stents were expanded to 10 atmospheres  30 seconds. Completion angiogram showed zero residual stenosis, wide open  common and external iliac artery system. The right internal iliac artery was not  visualized on pre-angiogram.  Exploration of the right knee popliteal was  done, and the only vessel available was small and with a chronic organized  thrombus and no backflow. The right anterior tibial artery was explored,  and it was soft and admitted 3 mm probe with very weak back flow. A small  piece of greater saphenous vein was harvested through the popliteal  incision, and an interposition graft was done within the distal end of a  6-mm ringed [place]-Tam graft going from the proximal superficial femoral to the knee laterally into the anterior compartment and an end-to-side to the anterior tibial  artery. Immediately, there was a good pulsatile flow on the anterior  tibial and a strong Doppler signal.    ANESTHESIA:  General.    SURGEON:  Catarino Varma MD    FIRST ASSISTANT:  Trang Chisholm PA-C    ESTIMATED BLOOD LOSS:  300 mL. COMPLICATIONS:  None were noted. SPECIMENS:  Thrombus from the iliac and some plaque from the common femoral  artery. FINDINGS:  Large amounts of organized clot and thrombus occluded in the  right common and internal iliac artery. The right internal iliac artery  was never visualized. The left internal iliac is wide open. The left  iliac system is opened after stenting of the left external common iliac  artery stent placed by Dr. Smooth Lin in preparation for possible fem-fem  bypass. After opening the right leg inflow, still no distal signals.    After completion of the groin and because of no distal signals below the  knee, the popliteal artery was explored and the artery organized chronic  clot and no bleeding was noted while making skin incision. Below knee  vessel was chronically thrombus only a 3 mm vessel. Anterior tibial  artery, however, was then exposed and the vessel was soft and admitted a 2  mm probe without any difficulty and had a very weak dark blood backflow. The  artery did have an excellent runoff, and anastomosis of the vein graft to  the artery was done, and it was flushed with heparinized saline, and she  had excellent runoff into the anterior tibial.  The femoral arterial tibial  bypass with excellent pulse and Doppler signal.    OPERATIVE NOTE:  After adequate monitoring lines were placed, the patient  supine, general endotracheal anesthesia was accomplished. The patient was  prepped and draped in a standard fashion for limb salvage revascularization surgery of the right leg. Under strict sterile technique, using 5.5x magnification and  headlight, an incision was done on the right groin after the femoral artery  was localized using arterial Duplex. 8,000 units of intravenous heparin  were given. The common superficial femoral and profunda femoral arteries  were dissected, isolated, and controlled with Silastic loops. After this  was done, then the small transverse arteriotomy was done on the right  common femoral artery. First a #4 Fabian was tried to advance proximally,  and it would not go easy; therefore, a 7-American sheath was inserted under  fluoroscopic guidance with the aid of a Glidewire. The Glidewire went easy  into the aortic aneurysm to the distal aorta. This was followed by a long  sheath through which the #4 Fabian was advanced to the beginning of the  right common iliac artery.   The balloon of the #4 Fabian was then inflated,  and under fluoroscopic guidance, a pull back was done recovering large  amounts of clots to the right groin and pulsatile as well. This was  repeated one more time with excellent flow. After this was done, then a  transverse arteriotomy was closed, and a 5-0 Prolene pursestring was placed  on the common femoral artery and another arterial puncture was done Seldinger technique thru a 5-0 prolene pursestring, and a Glidewire advanced to the distal aorta, and a 7-[place] sheath was then advanced. After this was done, then the catheter was advanced and hand injections were done disclosing the high-grade stenosis in the takeoff of the right common iliac at least 80% stenosis proximally and two additional areas of 70% to 80% identified on the external iliac. An ICAST 8 mm x 5.9  cm ICAST balloon expandable covered stents were advanced into position and deployed  under fluoroscopic control. After this was done, an injection was done  showing excellent deployment but a residual lateral proximal stenosis; therefore, a  Short ICAST  was used to extend the previous graft, another ICAST 8 mm x 3.8  cm with overlap. That was deployed as well with 10 atmospheres at 30  seconds. Angiogram showed excellent deployment zero residual stenosis. Finally, the distal external iliac was imaged, and another ICAST balloon  Expandable covered stent was advanced into position to lay in the retroperitoneal  external iliac landing just proximal to the lateral femoral circumflex  and inferior epigastric artery take off, which are underneath the inguinal  ligament. The stent was deployed with 10 atmosphere at 30 seconds as well. Completion angiogram showed wide open iliac artery system with runoff  into common femoral artery where the sheath was located. The femoral  artery developed pulsatile flow and pulsatile flow in the profunda femoral  as well, and then after this was done, because the lower leg still did not  show any signs of perfusion.   The popliteal artery was explored  medially thru a medial incision, incision in the medial upper tibial area and  the artery was soft, but small arteriotomy was done, and chronic organized  clot was found and part of it was removed and it came out like a string,  but no backflow was noted. Artery closed 7-0 prolene. Because of that, attention was then given to the anterior compartment, a longitudinal fasciotomy was done in the  anterior compartment and the anterior tibial artery was exposed in the  upper middle third and the vessel appeared to be soft and a 6 mm Hem[place]ield  vein graft was then tunneled anterolateral to the knee and anastomosed to  the common femoral artery with continuous 6-0 Rice-Tam suture. BioGlue was  applied. After completion of these anastomoses, good flow was noted  through the graft. The graft was then tunneled, and after this was done,  then a piece of greater saphenous vein that was accessible right thru the  medial incision to the popliteal fossa was then harvested, and the  anterior tibial artery was controlled with metal Bulldogs. A longitudinal  arteriotomy was done on the distal end of the reversed saphenous vein, was  anastomosed in a cobra-head fashion with 3 interrupted 7-0 polypropylene  for the distal apex, 3 interrupted 7-0 polypropylene for the proximal apex  and continuous for the sides. Upon completion of the vein graft to the  anterior tibial artery anastomosis, heparin saline was injected thru the vein graft  and it had excellent flow to the anterior tibial vessel. After this was  done the Rice-Tam graft was controlled proximally. The  vascular clamp was released. The Fabian was passed on the Rice-Tam graft,  and good flow was obtained distally. No clots. Then after this was done,  then an end-to-end anastomosis was done between the distal end of the 6 mm  Rice-Tam graft and end of the vein graft going to the anterior tibial.   This was done with 7-0 polypropylene. The vessels were deaired, and the  bypass vessel had been through the anterior tibia.

## 2018-09-07 ENCOUNTER — APPOINTMENT (OUTPATIENT)
Dept: GENERAL RADIOLOGY | Age: 83
DRG: 271 | End: 2018-09-07
Payer: MEDICARE

## 2018-09-07 LAB
ANION GAP SERPL CALCULATED.3IONS-SCNC: 11 MEQ/L (ref 8–16)
ANION GAP SERPL CALCULATED.3IONS-SCNC: NORMAL MEQ/L (ref 8–16)
BUN BLDV-MCNC: 16 MG/DL (ref 7–22)
BUN BLDV-MCNC: NORMAL MG/DL (ref 7–22)
CALCIUM IONIZED: 0.89 MMOL/L (ref 1.12–1.32)
CALCIUM IONIZED: 0.96 MMOL/L (ref 1.12–1.32)
CALCIUM SERPL-MCNC: 6.1 MG/DL (ref 8.5–10.5)
CALCIUM SERPL-MCNC: NORMAL MG/DL (ref 8.5–10.5)
CHLORIDE BLD-SCNC: 109 MEQ/L (ref 98–111)
CHLORIDE BLD-SCNC: NORMAL MEQ/L (ref 98–111)
CO2: 20 MEQ/L (ref 23–33)
CO2: NORMAL MEQ/L (ref 23–33)
CREAT SERPL-MCNC: 1.1 MG/DL (ref 0.4–1.2)
CREAT SERPL-MCNC: NORMAL MG/DL (ref 0.4–1.2)
GFR SERPL CREATININE-BSD FRML MDRD: 48 ML/MIN/1.73M2
GFR SERPL CREATININE-BSD FRML MDRD: NORMAL ML/MIN/1.73M2
GLUCOSE BLD-MCNC: 124 MG/DL (ref 70–108)
GLUCOSE BLD-MCNC: NORMAL MG/DL (ref 70–108)
HCT VFR BLD CALC: 24.7 % (ref 37–47)
HEMOGLOBIN: 8.1 GM/DL (ref 12–16)
MRSA SCREEN: NORMAL
POTASSIUM SERPL-SCNC: 3 MEQ/L (ref 3.5–5.2)
POTASSIUM SERPL-SCNC: 4 MEQ/L (ref 3.5–5.2)
POTASSIUM SERPL-SCNC: NORMAL MEQ/L (ref 3.5–5.2)
SODIUM BLD-SCNC: 140 MEQ/L (ref 135–145)
SODIUM BLD-SCNC: NORMAL MEQ/L (ref 135–145)
VRE CULTURE: NORMAL

## 2018-09-07 PROCEDURE — 2000000000 HC ICU R&B

## 2018-09-07 PROCEDURE — 97530 THERAPEUTIC ACTIVITIES: CPT

## 2018-09-07 PROCEDURE — 2580000003 HC RX 258

## 2018-09-07 PROCEDURE — 36415 COLL VENOUS BLD VENIPUNCTURE: CPT

## 2018-09-07 PROCEDURE — 2709999900 HC NON-CHARGEABLE SUPPLY

## 2018-09-07 PROCEDURE — 85018 HEMOGLOBIN: CPT

## 2018-09-07 PROCEDURE — 82330 ASSAY OF CALCIUM: CPT

## 2018-09-07 PROCEDURE — 99233 SBSQ HOSP IP/OBS HIGH 50: CPT | Performed by: INTERNAL MEDICINE

## 2018-09-07 PROCEDURE — 2580000003 HC RX 258: Performed by: INTERNAL MEDICINE

## 2018-09-07 PROCEDURE — 6370000000 HC RX 637 (ALT 250 FOR IP): Performed by: THORACIC SURGERY (CARDIOTHORACIC VASCULAR SURGERY)

## 2018-09-07 PROCEDURE — G8978 MOBILITY CURRENT STATUS: HCPCS

## 2018-09-07 PROCEDURE — 85014 HEMATOCRIT: CPT

## 2018-09-07 PROCEDURE — 97166 OT EVAL MOD COMPLEX 45 MIN: CPT

## 2018-09-07 PROCEDURE — 71045 X-RAY EXAM CHEST 1 VIEW: CPT

## 2018-09-07 PROCEDURE — 6360000002 HC RX W HCPCS: Performed by: NURSE PRACTITIONER

## 2018-09-07 PROCEDURE — 6370000000 HC RX 637 (ALT 250 FOR IP): Performed by: INTERNAL MEDICINE

## 2018-09-07 PROCEDURE — 6360000002 HC RX W HCPCS

## 2018-09-07 PROCEDURE — 6360000002 HC RX W HCPCS: Performed by: THORACIC SURGERY (CARDIOTHORACIC VASCULAR SURGERY)

## 2018-09-07 PROCEDURE — 2700000000 HC OXYGEN THERAPY PER DAY

## 2018-09-07 PROCEDURE — G8988 SELF CARE GOAL STATUS: HCPCS

## 2018-09-07 PROCEDURE — 97110 THERAPEUTIC EXERCISES: CPT

## 2018-09-07 PROCEDURE — 97163 PT EVAL HIGH COMPLEX 45 MIN: CPT

## 2018-09-07 PROCEDURE — 2580000003 HC RX 258: Performed by: NURSE PRACTITIONER

## 2018-09-07 PROCEDURE — 2580000003 HC RX 258: Performed by: THORACIC SURGERY (CARDIOTHORACIC VASCULAR SURGERY)

## 2018-09-07 PROCEDURE — G8987 SELF CARE CURRENT STATUS: HCPCS

## 2018-09-07 PROCEDURE — 84132 ASSAY OF SERUM POTASSIUM: CPT

## 2018-09-07 PROCEDURE — 80048 BASIC METABOLIC PNL TOTAL CA: CPT

## 2018-09-07 PROCEDURE — A6250 SKIN SEAL PROTECT MOISTURIZR: HCPCS

## 2018-09-07 PROCEDURE — G8979 MOBILITY GOAL STATUS: HCPCS

## 2018-09-07 RX ORDER — TIMOLOL MALEATE 5 MG/ML
1 SOLUTION/ DROPS OPHTHALMIC NIGHTLY
Status: DISCONTINUED | OUTPATIENT
Start: 2018-09-08 | End: 2018-09-17 | Stop reason: HOSPADM

## 2018-09-07 RX ORDER — POTASSIUM CHLORIDE 7.45 MG/ML
10 INJECTION INTRAVENOUS
Status: COMPLETED | OUTPATIENT
Start: 2018-09-07 | End: 2018-09-07

## 2018-09-07 RX ORDER — DOPAMINE HYDROCHLORIDE 160 MG/100ML
2.5 INJECTION, SOLUTION INTRAVENOUS CONTINUOUS
Status: DISCONTINUED | OUTPATIENT
Start: 2018-09-07 | End: 2018-09-09

## 2018-09-07 RX ORDER — MIDODRINE HYDROCHLORIDE 10 MG/1
10 TABLET ORAL
Status: DISCONTINUED | OUTPATIENT
Start: 2018-09-07 | End: 2018-09-17 | Stop reason: HOSPADM

## 2018-09-07 RX ORDER — DOPAMINE HYDROCHLORIDE 160 MG/100ML
2.5 INJECTION, SOLUTION INTRAVENOUS CONTINUOUS
Status: DISCONTINUED | OUTPATIENT
Start: 2018-09-07 | End: 2018-09-07

## 2018-09-07 RX ORDER — MIDODRINE HYDROCHLORIDE 2.5 MG/1
2.5 TABLET ORAL
Status: DISCONTINUED | OUTPATIENT
Start: 2018-09-07 | End: 2018-09-07

## 2018-09-07 RX ADMIN — MIDODRINE HYDROCHLORIDE 10 MG: 10 TABLET ORAL at 12:00

## 2018-09-07 RX ADMIN — POTASSIUM CHLORIDE 10 MEQ: 10 INJECTION, SOLUTION INTRAVENOUS at 09:19

## 2018-09-07 RX ADMIN — CILOSTAZOL 100 MG: 100 TABLET ORAL at 17:06

## 2018-09-07 RX ADMIN — Medication 2 G: at 17:15

## 2018-09-07 RX ADMIN — CALCIUM 500 MG: 500 TABLET ORAL at 09:21

## 2018-09-07 RX ADMIN — SODIUM CHLORIDE: 4.5 INJECTION, SOLUTION INTRAVENOUS at 20:02

## 2018-09-07 RX ADMIN — RALOXIFENE HYDROCHLORIDE 60 MG: 60 TABLET, FILM COATED ORAL at 09:22

## 2018-09-07 RX ADMIN — DOCUSATE SODIUM 100 MG: 100 CAPSULE, LIQUID FILLED ORAL at 09:22

## 2018-09-07 RX ADMIN — PRAVASTATIN SODIUM 20 MG: 20 TABLET ORAL at 21:36

## 2018-09-07 RX ADMIN — DOPAMINE HYDROCHLORIDE IN DEXTROSE 2.5 MCG/KG/MIN: 1.6 INJECTION, SOLUTION INTRAVENOUS at 18:45

## 2018-09-07 RX ADMIN — CILOSTAZOL 100 MG: 100 TABLET ORAL at 05:44

## 2018-09-07 RX ADMIN — ENOXAPARIN SODIUM 80 MG: 80 INJECTION SUBCUTANEOUS at 17:06

## 2018-09-07 RX ADMIN — ASPIRIN 325 MG: 325 TABLET, DELAYED RELEASE ORAL at 09:22

## 2018-09-07 RX ADMIN — POTASSIUM CHLORIDE 10 MEQ: 750 TABLET, EXTENDED RELEASE ORAL at 21:36

## 2018-09-07 RX ADMIN — Medication 10 ML: at 09:22

## 2018-09-07 RX ADMIN — ENOXAPARIN SODIUM 80 MG: 80 INJECTION SUBCUTANEOUS at 04:19

## 2018-09-07 RX ADMIN — Medication 2 G: at 09:19

## 2018-09-07 RX ADMIN — POTASSIUM CHLORIDE 10 MEQ: 10 INJECTION, SOLUTION INTRAVENOUS at 05:44

## 2018-09-07 RX ADMIN — HYDROCODONE BITARTRATE AND ACETAMINOPHEN 1 TABLET: 5; 325 TABLET ORAL at 09:40

## 2018-09-07 RX ADMIN — POTASSIUM CHLORIDE 10 MEQ: 10 INJECTION, SOLUTION INTRAVENOUS at 12:40

## 2018-09-07 RX ADMIN — POTASSIUM CHLORIDE 10 MEQ: 10 INJECTION, SOLUTION INTRAVENOUS at 10:15

## 2018-09-07 RX ADMIN — POTASSIUM CHLORIDE 10 MEQ: 750 TABLET, EXTENDED RELEASE ORAL at 12:00

## 2018-09-07 RX ADMIN — MIDODRINE HYDROCHLORIDE 10 MG: 10 TABLET ORAL at 17:08

## 2018-09-07 RX ADMIN — PANTOPRAZOLE SODIUM 40 MG: 40 TABLET, DELAYED RELEASE ORAL at 05:44

## 2018-09-07 RX ADMIN — Medication 1.5 G: at 05:33

## 2018-09-07 RX ADMIN — HYDROCODONE BITARTRATE AND ACETAMINOPHEN 1 TABLET: 5; 325 TABLET ORAL at 18:43

## 2018-09-07 RX ADMIN — Medication 1.5 G: at 17:52

## 2018-09-07 RX ADMIN — Medication 2 G: at 02:04

## 2018-09-07 RX ADMIN — POTASSIUM CHLORIDE 10 MEQ: 10 INJECTION, SOLUTION INTRAVENOUS at 11:54

## 2018-09-07 RX ADMIN — SODIUM CHLORIDE: 4.5 INJECTION, SOLUTION INTRAVENOUS at 04:17

## 2018-09-07 RX ADMIN — Medication 10 ML: at 21:37

## 2018-09-07 RX ADMIN — PANTOPRAZOLE SODIUM 40 MG: 40 TABLET, DELAYED RELEASE ORAL at 17:06

## 2018-09-07 RX ADMIN — POTASSIUM CHLORIDE 10 MEQ: 10 INJECTION, SOLUTION INTRAVENOUS at 04:20

## 2018-09-07 ASSESSMENT — PAIN SCALES - GENERAL
PAINLEVEL_OUTOF10: 6
PAINLEVEL_OUTOF10: 6
PAINLEVEL_OUTOF10: 4
PAINLEVEL_OUTOF10: 0
PAINLEVEL_OUTOF10: 0
PAINLEVEL_OUTOF10: 8
PAINLEVEL_OUTOF10: 0
PAINLEVEL_OUTOF10: 5

## 2018-09-07 ASSESSMENT — PAIN DESCRIPTION - FREQUENCY: FREQUENCY: CONTINUOUS

## 2018-09-07 ASSESSMENT — PAIN DESCRIPTION - PAIN TYPE
TYPE: SURGICAL PAIN
TYPE: SURGICAL PAIN

## 2018-09-07 ASSESSMENT — PAIN DESCRIPTION - LOCATION
LOCATION: LEG
LOCATION: LEG

## 2018-09-07 ASSESSMENT — PAIN DESCRIPTION - ORIENTATION
ORIENTATION: RIGHT
ORIENTATION: RIGHT

## 2018-09-07 ASSESSMENT — PAIN DESCRIPTION - DESCRIPTORS: DESCRIPTORS: ACHING

## 2018-09-07 NOTE — PROGRESS NOTES
Assessment and Plan:        1. Severe peripheral vascular disease: Status post revascularization procedure 9/5/18 with subsequent acute thrombosis requiring emergent right fem-tib bypass with right common femoral and profunda femoral bovine endarterectomy. Patient initially treated postoperatively with Lovenox and subsequently converted to subcutaneous Lovenox. At discharge, she will need Eliquis long-term anticoagulation therapy. 2. 5 cm AAA: This is infrarenal.  She'll need aortogram with bilateral runoff prior to discharge to identify anatomy for repair of the AAA. She should follow up with Dr. Sukhjinder Abbott 2 weeks after discharge. 3. Diverticulosis: Required to go repair with anterior resection and sigmoid coloproctostomy 8/20/18. 4. Renal failure: Continue to monitor renal function. 5. Anemia: Transfuse if symptomatic or hemoglobin drops below 7.  6. Hyperlipidemia: Pravachol  7. Hypertension: Treat if systolic blood pressure greater than 160. Currently on midodrine. CC:  Severe peripheral vascular disease  HPI: Patient is an 73-year-old white female active smoker. She has severe peripheral vascular disease, long-standing hypertension and hyperlipidemia. Patient was admitted on 9/5/18 with primarily right hip discomfort. She noted that when she tried to ambulate, her right leg felt numb in the strength gave away and she fell. She did not hit her head or lose consciousness or have seizures. Pain extended all the way down her right leg. Patient had no palpable pulses on physical exam and underwent arteriogram and was found to have severe peripheral vascular disease to the right leg. She was diagnosed with acute right leg ischemia over 8 hours duration with anesthesia to the right foot and pulseless right leg. Total occlusion to the right common and external iliac arteries extending to the right common femoral artery. Patient underwent revascularization surgery on 9/5/18 with very good response.

## 2018-09-07 NOTE — PROGRESS NOTES
AREA/<100SCM /<1ST 25 SCM N/A 9/6/2018    RE-EXPLORATION RIGHT GROIN, DECLOTTING OF FEMORAL BYPASS GRAFT performed by Castillo Gasca MD at [address] [facility] EGD TRANSORAL BIOPSY SINGLE/MULTIPLE Left 11/27/2017    EGD BIOPSY performed by Gildardo Tovar MD at [address] [place] [address], PARTIAL, W/ANAST N/A 8/20/2018    ROBOT ASSISTED COLECTOMY (LOW ANTERIOR) performed by Elodia Gomez MD at [address] [place] [address] OFFICE/OUTPT [facility] N/A 9/5/2018    RIGHT FEMORAL EMBOLECTOMY, INTRAOPERATIVE ARTERIOGRAM, RIGHT ILIAC EMBOLECTOMY, RIGHT COMMON AND EXTERNAL ILIAC STENTING, RIGHT FEMORAL TO ANTERIOR POPLITEAL BYPASS WITH 6MM GORTEX GRAFT performed by Castillo Gasca MD at [address] [facility] N/A 11/27/2017    EGD SUBMUCOSAL/BOTOX INJECTION performed by Gildardo Tovar MD at [facility]       Restrictions/Precautions:  Fall Risk           Subjective:  Chart Reviewed: Yes  Patient assessed for rehabilitation services?: Yes  Family / Caregiver Present: No  Subjective: Pt in chair, agreeable to PT and back to bed    General:  Overall Orientation Status: Within Functional Limits  Follows Commands: Within Functional Limits    Vision: Within Functional Limits    Hearing: Within functional limits         Pain:  Yes. Pain Assessment  Pain Assessment: 0-10  Pain Level: 6  Pain Type: Surgical pain  Pain Location: Leg  Pain Orientation: Right       Social/Functional History:    Lives With: Alone  Type of Home: Apartment  Home Layout: One level  Home Access: Elevator  Home Equipment: Rolling walker     Bathroom Shower/Tub: Walk-in shower  Bathroom Toilet: Standard  Bathroom Equipment: Toilet raiser  Bathroom Accessibility: Accessible  IADL Comments: Pt reporting her children will help her as needed. Pt stating she wasn't doing much cleaning for a littel while d/t not feeling well.      Receives Help From: Friend(s)  ADL Assistance: Independent  Homemaking Assistance: Independent  Ambulation Assistance: Independent  Transfer Assistance: Independent    Active : Yes     Additional Comments: Pt reporting she was able to complete her own ADL tasks at home. No Ad used prior to admission      Objective:  RLE PROM: Exceptions  RLE General PROM: DF to neutral, knee flexion 95 degrees, knee extension -10 degrees     RLE AROM: Exceptions  RLE General AROM: DF -10 degrees from neutral, knee flexion 80 degrees, knee extension -10 degrees          LLE AROM : WNL          Strength RLE: Exception  Comment: grossly 3- to 3/5 due to swelling and pain and sx     Strength LLE: WFL       Sensation  Overall Sensation Status: WNL       Balance  Sitting - Static: Good  Sitting - Dynamic: Good, -  Standing - Static: Fair  Standing - Dynamic: Fair, -    Bridging: Minimal assistance, Moderate assistance  Supine to Sit: Moderate assistance (of 1, going onto left side using bedrail to lower and PT assisted with LEs)  Scooting: Moderate assistance (in chair)    Transfers  Sit to Stand: Moderate Assistance (of 1 from chair, slow with transition, cues prior to get feet under her more)  Stand to sit: Minimal Assistance (of 1 to bed, cues on hand plavement )       Ambulation 1  Surface: level tile  Device: Rolling Walker  Assistance: Minimal assistance (of 1)  Quality of Gait: forward flexed posture, slow shuffled steps , limited knee extension on right LE with stance ,unsteady  Distance: 5 feet            Exercises:  Comments: Pt completed AAROM on right X 10 for knee flexion seated, DF and short arc quads reclined and AROM on the left for same motions X 5. Quad sets , glut sets also perfomred with CGA on right ,tactile cues on technique. Ex performed to improve ROM and strengthto imporve function. Activity Tolerance:  Activity Tolerance: Patient limited by pain; Patient limited by endurance  Activity Tolerance: Pt tolerated ROM and mobility fair with pain in right LE     Treatment Initiated: see there ex   Assessment: Body structures, Functions, Activity limitations: Decreased functional mobility , Decreased ROM, Decreased strength, Decreased balance, Decreased endurance  Assessment: Pt with pain ,swelling and limited ROM and strength in right LE due to ischemia and 2 sx's on that LE. So pt requires assist with mobility and gait. Pt needs skilled PT to retunr ROm and strength to WFL's and improve safety wit gait   Prognosis: Good    Clinical Presentation: High - Unstable with Unpredictable Characteristics: Pt with pain ,swelling and limited ROM and strength in right LE due to ischemia and 2 sx's on that LE. So pt requires assist with mobility and gait. Pt needs skilled PT to retunr ROm and strength to WFL's and improve safety wit gait:    Decision Making:  Moderate Complexitybased on patient assessment and decision making process of determining plan of care and establishing reasonable expectations for measurable functional outcomes    REQUIRES PT FOLLOW UP: Yes  Discharge Recommendations: Continue to assess pending progress, Patient would benefit from continued therapy after discharge, Subacute/Skilled Nursing Facility, IP Rehab, ECF with PT    Patient Education:  Patient Education: POC, importance of ROM and strethening and role of PT    Equipment Recommendations:  Equipment Needed: No    Safety:  Type of devices: Left in bed, Call light within reach, Gait belt, Nurse notified, All fall risk precautions in place  Restraints  Initially in place: No    Plan:  Times per week: 5-6 X O  Times per day: Daily  Current Treatment Recommendations: Strengthening, Gait Training, Balance Training, ROM, Functional Mobility Training, Endurance Training, Transfer Training, Home Exercise Program    Goals:  Patient goals : Go home   Short term goals  Time Frame for Short term goals: 2 weeks   Short term goal 1: supine to sit and return with SBA to get in and out of bed   Short term goal 2: sit to stand with SBA to get on and off

## 2018-09-07 NOTE — PLAN OF CARE
Problem: Pain:  Goal: Pain level will decrease  Pain level will decrease   Outcome: Ongoing  Improving, tolerance improved    Goal: Control of acute pain  Control of acute pain   Outcome: Ongoing  Norco PRN, refuses morphine due to \"hallucinations\" per patient report    Problem: Falls - Risk of:  Goal: Will remain free from falls  Will remain free from falls   Outcome: Met This Shift  Bedrest. RLE painful to pressure and movement. Oriented X 4, call light in reach    Goal: Absence of physical injury  Absence of physical injury   Outcome: Met This Shift      Problem: Risk for Impaired Skin Integrity  Goal: Tissue integrity - skin and mucous membranes  Structural intactness and normal physiological function of skin and  mucous membranes. Outcome: Ongoing  LRE painful to movement, turn every 2 hours and PRN. Pillow support to offset weight. Comments: careplan reviewed with patient.  Verbalize understanding plan of care and contribute to goal setting

## 2018-09-07 NOTE — PROGRESS NOTES
[address] [address] THERAPY  [facility] ICU 4D  EVALUATION    Time:  Time In: 1015  Time Out: 1055  Timed Code Treatment Minutes: 25 Minutes  Minutes: 40          Date: 2018  Patient Name: Marybeth Jaramillo,   Gender: female      MRN: [id number]  : 1935  (82 y.o.)  Referring Practitioner: NERISSA Caal CNP  Diagnosis: lower limb ischemia  Additional Pertinent Hx: 82 y.o. female who presents to the ED for evaluation of right hip pain which happened prior to arrival. The patient reports she was getting out of bed in order to use the restroom, when her leg felt numb and gave out on her, she slid down the edge of the bed landing on her butt. Pt found to have PVD s/p  RIGHT FEMORAL EMBOLECTOMY, INTRAOPERATIVE ARTERIOGRAM, RIGHT ILIAC EMBOLECTOMY, RIGHT COMMON AND EXTERNAL ILIAC STENTING, RIGHT FEMORAL TO ANTERIOR POPLITEAL BYPASS WITH 6MM GORTEX GRAFT and  Emergent declotting right Fem Tib Bypass, Right common femoral and profunda femoral, endarterectomy bovine pericardial patch angioplasty. Restrictions/Precautions:  Fall Risk                            Past Medical History:   Diagnosis Date    Arthritis     BPPV (benign paroxysmal positional vertigo) 16    GERD (gastroesophageal reflux disease)     ?  esophageal stricture    HTN (hypertension)     Hyperlipidemia     Obesity     Osteopenia     PAC (premature atrial contraction)     on betablocker    Paralysis (HCC)     baby    Pedal edema     denied any hx of hypertension    PVC (premature ventricular contraction)     PVD (peripheral vascular disease) (HCC)     Tinnitus     UTI (urinary tract infection)      Past Surgical History:   Procedure Laterality Date    APPENDECTOMY      BREAST SURGERY Right 8    lumpectomy    CHOLECYSTECTOMY      30 years ago    COLONOSCOPY  2018    Dr Patten Levels      eye, eyelids    EYE SURGERY      cataract    HYSTERECTOMY      LARYNGOSCOPY  07/15/2016    Independent  Homemaking Assistance: Independent       Ambulation Assistance: Independent  Transfer Assistance: Independent    Active : Yes     Additional Comments: Pt reporting she was able to complete her own ADL tasks at home. Objective        Cognition Comment: Pt requiring verbal and tactile cues to seqeunce steps          Sensation  Overall Sensation Status: WNL                           LUE AROM (degrees)  LUE AROM : WNL          RUE AROM (degrees)  RUE AROM : WNL       LUE Strength  LUE Strength Comment: bilateral UE general weakness througout with pt dmeo diffiuclty pushing truk upright when coming into standing                                       RUE Tone: Normotonic  LUE Tone: Normotonic                    ADL  Grooming: Stand by assistance (sitting in chair )  LE Dressing: Maximum assistance (donning socks )     Bed mobility  Supine to Sit: Maximum assistance (x1)    Transfers  Sit to stand: Moderate assistance (x2 from EOB with left UE on walker. Pt with diffiuclty transitioning left hand up onto walker during d/t decreased ability toweight bear through right LE. )    Balance  Sitting Balance: Stand by assistance (initially requiring CGA d/t reports of being \"woozy\" RN present in room)  Standing Balance: Minimal assistance (x2 initially progressing to min A x1, CGA x1)     Time: x1 min   Activity: static standing with bilateral UE support on walker. Pt with diffiuclty pushing tunk up into full standing d/t diffiuclty weight bearing on right LE. Comment: with bilateral UE support at walker      Functional Mobility  Functional - Mobility Device: Rolling Walker  Assist Level: Minimal assistance (x1 to occasionally mod A x1, )  Functional Mobility Comments: from bed to chair with frequent verbal cues for sequencing of steps including weight shifting and pushign through UEs d/t decreased ability to weight shift onto right LE.

## 2018-09-07 NOTE — PROGRESS NOTES
Nutrition Assessment    Type and Reason for Visit: Initial, Consult (Verbal consult ICU rounds from nursing that pt. has wounds)    Nutrition Recommendations:   Consider MVI as tolerated. Suggest weigh pt. Consider swallow eval to insure swallowing safety as appropriate. Pt. Declines diet texture adjustments however mentions her esophagus \"spasms\" which affect her swallowing. Send ensure high protein twice/day. Malnutrition Assessment:  · Malnutrition Status: At risk for malnutrition  1. Nutrition Diagnosis:   · Problem: Increased nutrient needs  · Etiology: related to Increased demand for energy/nutrients due to     Signs and symptoms:  as evidenced by Presence of wounds    Nutrition Assessment:  · Subjective Assessment: Pt. admitted with Lower Limb Ischemia, Essential HPTN, Dyslipidemia, GERD, Impaired Glucose, Acute Blood Loss Anemia. Pt. mentions appetite not good. She complians that her meals have been arriving late here (~ 130 for lunch,  supper at 8:30pm last night). I called nutirtion manager Aura & left message. She denies chewing difficulty. She mentions her esophagius has \"spasms\" but declines any diet texture adjustment. Pt. s/p Lower Anterior Resection with Coloproctostomy on 8/20/18. . She mentions has been eating poorly at home such as 1/2 c oatmeal with 1/2 banana for breakfast, TV dinner for 1 meal/day. Has tried a protein bar/vague. Labs (9/7) include: K+ 3, Ca IOn 0.89, Ca 6.1, (9/6) A1C 5.5. · Wound Type:  (incision right groin, incision right leg, incision abdomen)  · Current Nutrition Therapies:  · Oral Diet Orders: General, No Added Salt (3-4gm)   · Oral Diet intake: 51-75%  · Oral Nutrition Supplement (ONS) Orders: Low Calorie, High Protein  · ONS intake:  (new order)  · Anthropometric Measures:  · Ht: 5' 5\" (165.1 cm)   · Current Body Wt: 179 lb 14.3 oz (81.6 kg) (no actual wt. )  · Admission Body Wt:  (not admit wt due to no actual wt.  Recent actual wt. 189#

## 2018-09-08 LAB
ANION GAP SERPL CALCULATED.3IONS-SCNC: 13 MEQ/L (ref 8–16)
BASOPHILS # BLD: 0.3 %
BASOPHILS ABSOLUTE: 0.1 THOU/MM3 (ref 0–0.1)
BUN BLDV-MCNC: 24 MG/DL (ref 7–22)
CALCIUM SERPL-MCNC: 7.3 MG/DL (ref 8.5–10.5)
CHLORIDE BLD-SCNC: 110 MEQ/L (ref 98–111)
CO2: 18 MEQ/L (ref 23–33)
CREAT SERPL-MCNC: 1 MG/DL (ref 0.4–1.2)
EOSINOPHIL # BLD: 0.3 %
EOSINOPHILS ABSOLUTE: 0.1 THOU/MM3 (ref 0–0.4)
ERYTHROCYTE [DISTWIDTH] IN BLOOD BY AUTOMATED COUNT: 13.3 % (ref 11.5–14.5)
ERYTHROCYTE [DISTWIDTH] IN BLOOD BY AUTOMATED COUNT: 43.1 FL (ref 35–45)
GFR SERPL CREATININE-BSD FRML MDRD: 53 ML/MIN/1.73M2
GLUCOSE BLD-MCNC: 148 MG/DL (ref 70–108)
HCT VFR BLD CALC: 27.8 % (ref 37–47)
HEMOGLOBIN: 9.3 GM/DL (ref 12–16)
IMMATURE GRANS (ABS): 0.43 THOU/MM3 (ref 0–0.07)
IMMATURE GRANULOCYTES: 2.5 %
LYMPHOCYTES # BLD: 4.5 %
LYMPHOCYTES ABSOLUTE: 0.8 THOU/MM3 (ref 1–4.8)
MCH RBC QN AUTO: 29.7 PG (ref 26–33)
MCHC RBC AUTO-ENTMCNC: 33.5 GM/DL (ref 32.2–35.5)
MCV RBC AUTO: 88.8 FL (ref 81–99)
MONOCYTES # BLD: 5.2 %
MONOCYTES ABSOLUTE: 0.9 THOU/MM3 (ref 0.4–1.3)
NUCLEATED RED BLOOD CELLS: 0 /100 WBC
PLATELET # BLD: 175 THOU/MM3 (ref 130–400)
PMV BLD AUTO: 11 FL (ref 9.4–12.4)
POTASSIUM SERPL-SCNC: 3.5 MEQ/L (ref 3.5–5.2)
RBC # BLD: 3.13 MILL/MM3 (ref 4.2–5.4)
SEG NEUTROPHILS: 87.2 %
SEGMENTED NEUTROPHILS ABSOLUTE COUNT: 15.1 THOU/MM3 (ref 1.8–7.7)
SODIUM BLD-SCNC: 141 MEQ/L (ref 135–145)
WBC # BLD: 17.3 THOU/MM3 (ref 4.8–10.8)

## 2018-09-08 PROCEDURE — 85025 COMPLETE CBC W/AUTO DIFF WBC: CPT

## 2018-09-08 PROCEDURE — 2580000003 HC RX 258: Performed by: INTERNAL MEDICINE

## 2018-09-08 PROCEDURE — 36415 COLL VENOUS BLD VENIPUNCTURE: CPT

## 2018-09-08 PROCEDURE — 6370000000 HC RX 637 (ALT 250 FOR IP): Performed by: INTERNAL MEDICINE

## 2018-09-08 PROCEDURE — 6370000000 HC RX 637 (ALT 250 FOR IP): Performed by: THORACIC SURGERY (CARDIOTHORACIC VASCULAR SURGERY)

## 2018-09-08 PROCEDURE — 2000000000 HC ICU R&B

## 2018-09-08 PROCEDURE — 80048 BASIC METABOLIC PNL TOTAL CA: CPT

## 2018-09-08 PROCEDURE — 6360000002 HC RX W HCPCS: Performed by: NURSE PRACTITIONER

## 2018-09-08 PROCEDURE — 99221 1ST HOSP IP/OBS SF/LOW 40: CPT | Performed by: SURGERY

## 2018-09-08 PROCEDURE — 2709999900 HC NON-CHARGEABLE SUPPLY

## 2018-09-08 PROCEDURE — 2500000003 HC RX 250 WO HCPCS: Performed by: NURSE PRACTITIONER

## 2018-09-08 PROCEDURE — 2580000003 HC RX 258: Performed by: THORACIC SURGERY (CARDIOTHORACIC VASCULAR SURGERY)

## 2018-09-08 PROCEDURE — 99024 POSTOP FOLLOW-UP VISIT: CPT | Performed by: THORACIC SURGERY (CARDIOTHORACIC VASCULAR SURGERY)

## 2018-09-08 PROCEDURE — 6360000002 HC RX W HCPCS: Performed by: INTERNAL MEDICINE

## 2018-09-08 RX ADMIN — Medication 2 G: at 08:52

## 2018-09-08 RX ADMIN — MIDODRINE HYDROCHLORIDE 10 MG: 10 TABLET ORAL at 12:14

## 2018-09-08 RX ADMIN — ENOXAPARIN SODIUM 80 MG: 80 INJECTION SUBCUTANEOUS at 19:34

## 2018-09-08 RX ADMIN — PANTOPRAZOLE SODIUM 40 MG: 40 TABLET, DELAYED RELEASE ORAL at 16:53

## 2018-09-08 RX ADMIN — ASPIRIN 325 MG: 325 TABLET, DELAYED RELEASE ORAL at 10:46

## 2018-09-08 RX ADMIN — RALOXIFENE HYDROCHLORIDE 60 MG: 60 TABLET, FILM COATED ORAL at 10:48

## 2018-09-08 RX ADMIN — HYDROCODONE BITARTRATE AND ACETAMINOPHEN 1 TABLET: 5; 325 TABLET ORAL at 16:53

## 2018-09-08 RX ADMIN — SODIUM CHLORIDE: 4.5 INJECTION, SOLUTION INTRAVENOUS at 09:30

## 2018-09-08 RX ADMIN — Medication 2 G: at 16:54

## 2018-09-08 RX ADMIN — ENOXAPARIN SODIUM 80 MG: 80 INJECTION SUBCUTANEOUS at 04:55

## 2018-09-08 RX ADMIN — CILOSTAZOL 100 MG: 100 TABLET ORAL at 16:53

## 2018-09-08 RX ADMIN — POTASSIUM CHLORIDE 10 MEQ: 750 TABLET, EXTENDED RELEASE ORAL at 20:42

## 2018-09-08 RX ADMIN — HYDROCODONE BITARTRATE AND ACETAMINOPHEN 1 TABLET: 5; 325 TABLET ORAL at 10:42

## 2018-09-08 RX ADMIN — PANTOPRAZOLE SODIUM 40 MG: 40 TABLET, DELAYED RELEASE ORAL at 08:52

## 2018-09-08 RX ADMIN — MIDODRINE HYDROCHLORIDE 10 MG: 10 TABLET ORAL at 08:52

## 2018-09-08 RX ADMIN — Medication 14 MCG/MIN: at 04:56

## 2018-09-08 RX ADMIN — TIMOLOL MALEATE 1 DROP: 5 SOLUTION/ DROPS OPHTHALMIC at 20:42

## 2018-09-08 RX ADMIN — POTASSIUM CHLORIDE 10 MEQ: 750 TABLET, EXTENDED RELEASE ORAL at 08:52

## 2018-09-08 RX ADMIN — Medication 2 G: at 01:54

## 2018-09-08 RX ADMIN — PRAVASTATIN SODIUM 20 MG: 20 TABLET ORAL at 20:42

## 2018-09-08 RX ADMIN — MIDODRINE HYDROCHLORIDE 10 MG: 10 TABLET ORAL at 16:53

## 2018-09-08 ASSESSMENT — PAIN DESCRIPTION - PAIN TYPE: TYPE: SURGICAL PAIN

## 2018-09-08 ASSESSMENT — PAIN SCALES - GENERAL
PAINLEVEL_OUTOF10: 3
PAINLEVEL_OUTOF10: 6
PAINLEVEL_OUTOF10: 0
PAINLEVEL_OUTOF10: 3
PAINLEVEL_OUTOF10: 5
PAINLEVEL_OUTOF10: 0
PAINLEVEL_OUTOF10: 0

## 2018-09-08 ASSESSMENT — PAIN DESCRIPTION - LOCATION: LOCATION: LEG

## 2018-09-08 ASSESSMENT — PAIN DESCRIPTION - DESCRIPTORS: DESCRIPTORS: ACHING

## 2018-09-08 NOTE — CONSULTS
[address] [place] [address]                                   CONSULTATION    PATIENT NAME: Rhina Sanches                     :        1935  MED REC NO:   [id number]                           ROOM:       0014  ACCOUNT NO:   [de-identified]                           ADMIT DATE: 2018  PROVIDER:     Haily Salazar. 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 HISTORY OF PRESENT ILLNESS:  The patient is an 61-year-old white female who  on  eighteen days ago underwent a low anterior resection for  diverticular disease per Dr. Kevyn Mclaughlin. This was performed robotically. The  patient apparently did reasonably well, was at home, but then had onset of  a numb right leg, presented back to the hospital on  three days ago  and was noted to have significant clotting in her right iliac and and leg  and underwent a declotting procedure per Dr. Casandra Gardiner. It took  approximately 9 hours. The patient has been in the intensive care unit. She does have a rectocele in place but has noted an onset of what appears  to be some stool in the [place]. I was called early this morning to evaluate  the patient. The patient states her only other abdominal surgery was a  hysterectomy. PAST MEDICAL HISTORY:  Positive for reflux disease, hyperlipidemia,  hypertension, history of PACs, PVCs, tinnitus. PAST SURGICAL HISTORY:  Includes a remote appendectomy. She has had a  cholecystectomy. She has had a breast lumpectomy. The hysterectomy as  mentioned above. The low anterior resection as mentioned above. She has  had cataract surgery and has had some cosmetic surgery on her eyes. MEDICATIONS AT HOME:  Included potassium, Toprol, Pravachol, Pletal,  Antivert, Protonix, Timoptic, Evista, hydrochlorothiazide. ALLERGIES:  LASIX, LIPITOR, and NEURONTIN.     FAMILY
negative  INTEGUMENT/BREAST:  negative  HEMATOLOGIC/LYMPHATIC:  negative  ALLERGIC/IMMUNOLOGIC:  negative  ENDOCRINE:  negative  MUSCULOSKELETAL:  negative  NEUROLOGICAL:  negative  Right foot numbness, able to move foot, states can't feel foot  BEHAVIOR/PSYCH:  negative    PHYSICAL EXAM:  VITALS:  /61   Pulse 75   Temp 98.3 °F (36.8 °C) (Oral)   Resp 16   Ht 5' 5\" (1.651 m)   Wt 180 lb (81.6 kg)   SpO2 96%   BMI 29.95 kg/m²   CONSTITUTIONAL:  awake, alert, cooperative, no apparent distress, and appears stated age  EYES:  Lids and lashes normal, pupils equal, round and reactive to light, extra ocular muscles intact, sclera clear, conjunctiva normal  ENT:  Normocephalic, without obvious abnormality, atraumatic, sinuses nontender on palpation, external ears without lesions, oral pharynx with moist mucus membranes, tonsils without erythema or exudates, gums normal and good dentition. NECK:  Supple, symmetrical, trachea midline, no adenopathy, thyroid symmetric, not enlarged and no tenderness, skin normal  HEMATOLOGIC/LYMPHATICS:  no cervical lymphadenopathy  BACK:  Symmetric, no curvature, spinous processes are non-tender on palpation, paraspinous muscles are non-tender on palpation, no costal vertebral tenderness  LUNGS:  No increased work of breathing, good air exchange, clear to auscultation bilaterally, no crackles or wheezing  CARDIOVASCULAR:  Normal apical impulse, regular rate and rhythm, normal S1 and S2, no S3 or S4, and no murmur noted  ABDOMEN:  Soft depressible palpable midline AAA  MUSCULOSKELETAL:  Right lower leg cooler than left. 6 seconds capillary refill right foot. States right foot is numb  NEUROLOGIC:  Awake, alert, oriented to name, place and time. Cranial nerves II-XII are grossly intact. Motor is 5 out of 5 left leg, 2-3 out 5 right lower leg. Cerebellar  intact.    SKIN:  no jaundice    DATA:  CBC with Differential:    Lab Results   Component Value Date    WBC 14.2 09/05/2018

## 2018-09-08 NOTE — PROGRESS NOTES
POD #3 S/P Right Fem - Ant Tibial Graft with Composite PTFE  - Native Vein Graft    Subjective:     Ms. Amanda Constantino  is an 82-year-old white female active smoker. She has severe peripheral vascular disease, long-standing hypertension and hyperlipidemia. Patient was admitted on 9/5/18 with primarily right hip discomfort. She noted that when she tried to ambulate, her right leg felt numb in the strength gave away and she fell. She did not hit her head or lose consciousness or have seizures. Pain extended all the way down her right leg. Patient had no palpable pulses on physical exam and underwent arteriogram and was found to have severe peripheral vascular disease to the right leg. She was diagnosed with acute right leg ischemia over 8 hours duration with anesthesia to the right foot and pulseless right leg. Total occlusion to the right common and external iliac arteries extending to the right common femoral artery. Patient underwent revascularization surgery on 9/5/18 with very good response. Unfortunately, she developed an acute thrombus to the right lower extremity with recurrent ischemia. She required declotting a right fem-tib bypass 9/6/18. She was transferred to the intensive care unit where pulmonary was consulted to assume her care on 9/6/18. Patient is to follow-up with  Dr. Tiffani Ivan in outpatient setting for follow up of 5cm AAA.       Scheduled Meds:   calcium replacement protocol   Other RX Placeholder    potassium (CARDIAC) replacement protocol   Other RX Placeholder    timolol  1 drop Both Eyes Nightly    midodrine  10 mg Oral TID WC    enoxaparin  1 mg/kg Subcutaneous Q12H    ceFAZolin  2 g Intravenous Q8H    raloxifene  60 mg Oral Daily    pantoprazole  40 mg Oral BID    metoprolol succinate  50 mg Oral Daily    pravastatin  20 mg Oral Daily    cilostazol  100 mg Oral BID    potassium chloride  10 mEq Oral BID    sodium chloride flush  10 mL Intravenous 2 times per day    docusate sodium  100 mg Oral BID    aspirin  325 mg Oral Daily     Continuous Infusions:   DOPamine Stopped (09/07/18 2000)    norepinephrine 14 mcg/min (09/08/18 0456)    sodium chloride 75 mL/hr at 09/08/18 0500     PRN Meds:meclizine, calcium elemental, sodium chloride flush, magnesium hydroxide, ondansetron, morphine, HYDROcodone 5 mg - acetaminophen, acetaminophen, oxyCODONE **OR** oxyCODONE      Objective:      Physical Exam:   /88   Pulse 110   Temp 98.5 °F (36.9 °C) (Oral)   Resp 21   Ht 5' 5\" (1.651 m)   Wt 180 lb (81.6 kg)   SpO2 94%   BMI 29.95 kg/m²     General Appearance:  Alert, cooperative, no distress, appears stated age   Head:  Normocephalic, without obvious abnormality, atraumatic   Eyes:  PERRL, conjunctiva/corneas clear, EOM's intact, fundi benign, both eyes   Ears:  Normal TM's and external ear canals, both ears   Nose: Nares normal, septum midline,mucosa normal, no drainage or sinus tenderness   Throat: Lips, mucosa, and tongue normal; teeth and gums normal   Neck: Supple, symmetrical, trachea midline, no adenopathy;  thyroid: not enlarged, symmetric, no tenderness/mass/nodules; no carotid bruit or JVD   Back:   Symmetric, no curvature, ROM normal, no CVA tenderness   Lungs:   Clear to auscultation bilaterally, respirations unlabored   Breasts:  No masses or tenderness   Heart:  Regular rate and rhythm, S1 and S2 normal, no murmur, rub, or gallop   Abdomen:   Soft, non-tender, bowel sounds active all four quadrants,  no masses, no organomegaly   Pelvic: Deferred   Extremities: Extremities normal, atraumatic, no cyanosis or edema   Pulses: 2+ and symmetric   Skin: Skin color, texture, turgor normal, no rashes or lesions   Lymph nodes: Cervical, supraclavicular, and axillary nodes normal   Neurologic: Normal         Cardiographics  ECG: normal sinus rhythm, no blocks or conduction defects, no ischemic changes .   Echocardiogram: not done    Imaging  Chest X-Ray: normal     Lab Review   Lab Results   Component Value Date     09/08/2018    K 3.5 09/08/2018    K 3.9 08/21/2018     09/08/2018    CO2 18 09/08/2018    BUN 24 09/08/2018    CREATININE 1.0 09/08/2018    GLUCOSE 148 09/08/2018    GLUCOSE 103 08/13/2018    CALCIUM 7.3 09/08/2018     Lab Results   Component Value Date    WBC 17.3 09/08/2018    HGB 9.3 09/08/2018    HCT 27.8 09/08/2018    MCV 88.8 09/08/2018     09/08/2018       Assessment:      none  Patient Active Problem List    Diagnosis Date Noted    Vertigo 06/05/2016     Priority: High     Class: Acute    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     Priority: High     Class: Acute    Acute pain of left ear 06/05/2016     Priority: High     Class: Acute    Tinnitus 06/05/2016     Priority: High     Class: Acute    Lower limb ischemia 09/05/2018    P[facility]peripheral vascular disease) [facility]     Fall on same level from slipping, tripping, or stumbling     Acute kidney injury ([address]     Leukocytosis     Gastroesophageal reflux disease without esophagitis     Other hyperlipidemia     Osteoarthritis of multiple joints     Diverticulosis of intestine without bleeding     S/P laparoscopic colectomy 09/04/2018    Colon stricture ([facility] [facility] 08/20/2018    Colonic mass     Stricture of colon determined by endoscopy ([facility] 08/07/2018    Barium enema abnormal 08/07/2018    Diverticulosis of large intestine without hemorrhage 08/07/2018    BPPV (benign paroxysmal positional vertigo)     Gait instability     PAC (premature atrial contraction)     Pedal edema        Plan:     1. S/P Salvage Limb Femoral - Anterior Tibial Bypass Grafting - Doing well with warm right foot    2. Continue with Midodrine and wean inotropes    3.  Transition to Eliquis

## 2018-09-08 NOTE — FLOWSHEET NOTE
Patient's HR jumped to 140's with the dopamine on. Dopamine turned off and Levophed increased. Patient's HR back down to NSR.   Will continue to monitor

## 2018-09-09 ENCOUNTER — APPOINTMENT (OUTPATIENT)
Dept: GENERAL RADIOLOGY | Age: 83
DRG: 271 | End: 2018-09-09
Payer: MEDICARE

## 2018-09-09 LAB
ANION GAP SERPL CALCULATED.3IONS-SCNC: 15 MEQ/L (ref 8–16)
BACTERIA: ABNORMAL
BILIRUBIN URINE: NEGATIVE
BLOOD, URINE: ABNORMAL
BUN BLDV-MCNC: 17 MG/DL (ref 7–22)
CALCIUM IONIZED: 0.99 MMOL/L (ref 1.12–1.32)
CALCIUM IONIZED: 1.05 MMOL/L (ref 1.12–1.32)
CALCIUM SERPL-MCNC: 7.4 MG/DL (ref 8.5–10.5)
CASTS: ABNORMAL /LPF
CASTS: ABNORMAL /LPF
CHARACTER, URINE: ABNORMAL
CHLORIDE BLD-SCNC: 106 MEQ/L (ref 98–111)
CO2: 19 MEQ/L (ref 23–33)
COLOR: ABNORMAL
CREAT SERPL-MCNC: 0.9 MG/DL (ref 0.4–1.2)
CRYSTALS: ABNORMAL
EPITHELIAL CELLS, UA: ABNORMAL /HPF
ERYTHROCYTE [DISTWIDTH] IN BLOOD BY AUTOMATED COUNT: 13.6 % (ref 11.5–14.5)
ERYTHROCYTE [DISTWIDTH] IN BLOOD BY AUTOMATED COUNT: 43.7 FL (ref 35–45)
GFR SERPL CREATININE-BSD FRML MDRD: 60 ML/MIN/1.73M2
GLUCOSE BLD-MCNC: 196 MG/DL (ref 70–108)
GLUCOSE, URINE: 100 MG/DL
HCT VFR BLD CALC: 30.6 % (ref 37–47)
HEMOGLOBIN: 10.2 GM/DL (ref 12–16)
KETONES, URINE: ABNORMAL
LACTIC ACID: 1.5 MMOL/L (ref 0.5–2.2)
LEUKOCYTE EST, POC: ABNORMAL
MAGNESIUM: 1 MG/DL (ref 1.6–2.4)
MCH RBC QN AUTO: 29.5 PG (ref 26–33)
MCHC RBC AUTO-ENTMCNC: 33.3 GM/DL (ref 32.2–35.5)
MCV RBC AUTO: 88.4 FL (ref 81–99)
MISCELLANEOUS LAB TEST RESULT: ABNORMAL
NITRITE, URINE: NEGATIVE
OSMOLALITY: 293 MOSMOL/KG (ref 275–295)
PH UA: 5.5
PLATELET # BLD: 199 THOU/MM3 (ref 130–400)
PMV BLD AUTO: 12.2 FL (ref 9.4–12.4)
POTASSIUM SERPL-SCNC: 3.1 MEQ/L (ref 3.5–5.2)
POTASSIUM SERPL-SCNC: 3.9 MEQ/L (ref 3.5–5.2)
POTASSIUM SERPL-SCNC: 4 MEQ/L (ref 3.5–5.2)
PROCALCITONIN: 0.44 NG/ML (ref 0.01–0.09)
PROTEIN UA: 100 MG/DL
RBC # BLD: 3.46 MILL/MM3 (ref 4.2–5.4)
RBC URINE: ABNORMAL /HPF
RENAL EPITHELIAL, UA: ABNORMAL
SODIUM BLD-SCNC: 140 MEQ/L (ref 135–145)
SPECIFIC GRAVITY UA: 1.02 (ref 1–1.03)
UROBILINOGEN, URINE: 0.2 EU/DL
WBC # BLD: 15 THOU/MM3 (ref 4.8–10.8)
WBC UA: > 200 /HPF
YEAST: ABNORMAL

## 2018-09-09 PROCEDURE — 2580000003 HC RX 258: Performed by: INTERNAL MEDICINE

## 2018-09-09 PROCEDURE — 6370000000 HC RX 637 (ALT 250 FOR IP): Performed by: THORACIC SURGERY (CARDIOTHORACIC VASCULAR SURGERY)

## 2018-09-09 PROCEDURE — 94761 N-INVAS EAR/PLS OXIMETRY MLT: CPT

## 2018-09-09 PROCEDURE — 2700000000 HC OXYGEN THERAPY PER DAY

## 2018-09-09 PROCEDURE — 36592 COLLECT BLOOD FROM PICC: CPT

## 2018-09-09 PROCEDURE — 99291 CRITICAL CARE FIRST HOUR: CPT | Performed by: ANESTHESIOLOGY

## 2018-09-09 PROCEDURE — 87040 BLOOD CULTURE FOR BACTERIA: CPT

## 2018-09-09 PROCEDURE — 83930 ASSAY OF BLOOD OSMOLALITY: CPT

## 2018-09-09 PROCEDURE — 83605 ASSAY OF LACTIC ACID: CPT

## 2018-09-09 PROCEDURE — P9045 ALBUMIN (HUMAN), 5%, 250 ML: HCPCS | Performed by: ANESTHESIOLOGY

## 2018-09-09 PROCEDURE — 6360000002 HC RX W HCPCS: Performed by: INTERNAL MEDICINE

## 2018-09-09 PROCEDURE — 2709999900 HC NON-CHARGEABLE SUPPLY

## 2018-09-09 PROCEDURE — 81001 URINALYSIS AUTO W/SCOPE: CPT

## 2018-09-09 PROCEDURE — 6370000000 HC RX 637 (ALT 250 FOR IP): Performed by: INTERNAL MEDICINE

## 2018-09-09 PROCEDURE — 80048 BASIC METABOLIC PNL TOTAL CA: CPT

## 2018-09-09 PROCEDURE — 71045 X-RAY EXAM CHEST 1 VIEW: CPT

## 2018-09-09 PROCEDURE — 99024 POSTOP FOLLOW-UP VISIT: CPT | Performed by: THORACIC SURGERY (CARDIOTHORACIC VASCULAR SURGERY)

## 2018-09-09 PROCEDURE — 85027 COMPLETE CBC AUTOMATED: CPT

## 2018-09-09 PROCEDURE — 6360000002 HC RX W HCPCS: Performed by: NURSE PRACTITIONER

## 2018-09-09 PROCEDURE — 6360000002 HC RX W HCPCS: Performed by: ANESTHESIOLOGY

## 2018-09-09 PROCEDURE — 84145 PROCALCITONIN (PCT): CPT

## 2018-09-09 PROCEDURE — 6360000002 HC RX W HCPCS: Performed by: THORACIC SURGERY (CARDIOTHORACIC VASCULAR SURGERY)

## 2018-09-09 PROCEDURE — 2500000003 HC RX 250 WO HCPCS: Performed by: ANESTHESIOLOGY

## 2018-09-09 PROCEDURE — 83735 ASSAY OF MAGNESIUM: CPT

## 2018-09-09 PROCEDURE — 36415 COLL VENOUS BLD VENIPUNCTURE: CPT

## 2018-09-09 PROCEDURE — 82330 ASSAY OF CALCIUM: CPT

## 2018-09-09 PROCEDURE — 81003 URINALYSIS AUTO W/O SCOPE: CPT

## 2018-09-09 PROCEDURE — 84132 ASSAY OF SERUM POTASSIUM: CPT

## 2018-09-09 PROCEDURE — 2580000003 HC RX 258: Performed by: THORACIC SURGERY (CARDIOTHORACIC VASCULAR SURGERY)

## 2018-09-09 PROCEDURE — 2000000000 HC ICU R&B

## 2018-09-09 PROCEDURE — 2500000003 HC RX 250 WO HCPCS: Performed by: NURSE PRACTITIONER

## 2018-09-09 PROCEDURE — 2580000003 HC RX 258: Performed by: ANESTHESIOLOGY

## 2018-09-09 RX ORDER — ALBUMIN, HUMAN INJ 5% 5 %
25 SOLUTION INTRAVENOUS ONCE
Status: COMPLETED | OUTPATIENT
Start: 2018-09-09 | End: 2018-09-09

## 2018-09-09 RX ORDER — POTASSIUM CHLORIDE 29.8 MG/ML
20 INJECTION INTRAVENOUS
Status: COMPLETED | OUTPATIENT
Start: 2018-09-09 | End: 2018-09-09

## 2018-09-09 RX ORDER — METOPROLOL TARTRATE 5 MG/5ML
2.5 INJECTION INTRAVENOUS ONCE
Status: COMPLETED | OUTPATIENT
Start: 2018-09-09 | End: 2018-09-09

## 2018-09-09 RX ORDER — 0.9 % SODIUM CHLORIDE 0.9 %
1000 INTRAVENOUS SOLUTION INTRAVENOUS ONCE
Status: COMPLETED | OUTPATIENT
Start: 2018-09-09 | End: 2018-09-09

## 2018-09-09 RX ORDER — POTASSIUM CHLORIDE 29.8 MG/ML
20 INJECTION INTRAVENOUS ONCE
Status: COMPLETED | OUTPATIENT
Start: 2018-09-09 | End: 2018-09-09

## 2018-09-09 RX ORDER — CIPROFLOXACIN 2 MG/ML
400 INJECTION, SOLUTION INTRAVENOUS EVERY 12 HOURS
Status: DISCONTINUED | OUTPATIENT
Start: 2018-09-09 | End: 2018-09-10

## 2018-09-09 RX ORDER — MAGNESIUM SULFATE IN WATER 40 MG/ML
2 INJECTION, SOLUTION INTRAVENOUS ONCE
Status: COMPLETED | OUTPATIENT
Start: 2018-09-09 | End: 2018-09-09

## 2018-09-09 RX ORDER — NICOTINE 21 MG/24HR
1 PATCH, TRANSDERMAL 24 HOURS TRANSDERMAL DAILY
Status: DISCONTINUED | OUTPATIENT
Start: 2018-09-09 | End: 2018-09-10

## 2018-09-09 RX ORDER — MAGNESIUM SULFATE IN WATER 40 MG/ML
4 INJECTION, SOLUTION INTRAVENOUS ONCE
Status: COMPLETED | OUTPATIENT
Start: 2018-09-09 | End: 2018-09-09

## 2018-09-09 RX ADMIN — METRONIDAZOLE 500 MG: 500 INJECTION, SOLUTION INTRAVENOUS at 20:12

## 2018-09-09 RX ADMIN — Medication 1.5 G: at 11:49

## 2018-09-09 RX ADMIN — SODIUM CHLORIDE: 4.5 INJECTION, SOLUTION INTRAVENOUS at 01:04

## 2018-09-09 RX ADMIN — POTASSIUM CHLORIDE 20 MEQ: 29.8 INJECTION, SOLUTION INTRAVENOUS at 17:27

## 2018-09-09 RX ADMIN — MIDODRINE HYDROCHLORIDE 10 MG: 10 TABLET ORAL at 15:36

## 2018-09-09 RX ADMIN — METOPROLOL TARTRATE 2.5 MG: 1 INJECTION, SOLUTION INTRAVENOUS at 08:56

## 2018-09-09 RX ADMIN — POTASSIUM CHLORIDE 10 MEQ: 750 TABLET, EXTENDED RELEASE ORAL at 20:10

## 2018-09-09 RX ADMIN — ASPIRIN 325 MG: 325 TABLET, DELAYED RELEASE ORAL at 08:38

## 2018-09-09 RX ADMIN — HYDROCODONE BITARTRATE AND ACETAMINOPHEN 1 TABLET: 5; 325 TABLET ORAL at 05:48

## 2018-09-09 RX ADMIN — MAGNESIUM SULFATE IN WATER 4 G: 40 INJECTION, SOLUTION INTRAVENOUS at 12:06

## 2018-09-09 RX ADMIN — CILOSTAZOL 100 MG: 100 TABLET ORAL at 15:36

## 2018-09-09 RX ADMIN — ENOXAPARIN SODIUM 80 MG: 80 INJECTION SUBCUTANEOUS at 08:37

## 2018-09-09 RX ADMIN — Medication 10 ML: at 08:38

## 2018-09-09 RX ADMIN — HYDROCODONE BITARTRATE AND ACETAMINOPHEN 1 TABLET: 5; 325 TABLET ORAL at 20:10

## 2018-09-09 RX ADMIN — RALOXIFENE HYDROCHLORIDE 60 MG: 60 TABLET, FILM COATED ORAL at 08:38

## 2018-09-09 RX ADMIN — Medication 2 G: at 00:20

## 2018-09-09 RX ADMIN — CILOSTAZOL 100 MG: 100 TABLET ORAL at 05:47

## 2018-09-09 RX ADMIN — Medication 14 MCG/MIN: at 00:20

## 2018-09-09 RX ADMIN — Medication 2 G: at 08:33

## 2018-09-09 RX ADMIN — Medication 2 G: at 16:36

## 2018-09-09 RX ADMIN — ALBUMIN (HUMAN) 25 G: 12.5 INJECTION, SOLUTION INTRAVENOUS at 15:15

## 2018-09-09 RX ADMIN — POTASSIUM CHLORIDE 20 MEQ: 400 INJECTION, SOLUTION INTRAVENOUS at 12:06

## 2018-09-09 RX ADMIN — MIDODRINE HYDROCHLORIDE 10 MG: 10 TABLET ORAL at 08:38

## 2018-09-09 RX ADMIN — MAGNESIUM SULFATE HEPTAHYDRATE 2 G: 40 INJECTION, SOLUTION INTRAVENOUS at 09:23

## 2018-09-09 RX ADMIN — HYDROCODONE BITARTRATE AND ACETAMINOPHEN 1 TABLET: 5; 325 TABLET ORAL at 11:49

## 2018-09-09 RX ADMIN — PANTOPRAZOLE SODIUM 40 MG: 40 TABLET, DELAYED RELEASE ORAL at 15:36

## 2018-09-09 RX ADMIN — Medication 18 MCG/MIN: at 16:40

## 2018-09-09 RX ADMIN — PRAVASTATIN SODIUM 20 MG: 20 TABLET ORAL at 20:10

## 2018-09-09 RX ADMIN — POTASSIUM CHLORIDE 10 MEQ: 750 TABLET, EXTENDED RELEASE ORAL at 08:38

## 2018-09-09 RX ADMIN — Medication 10 ML: at 20:11

## 2018-09-09 RX ADMIN — POTASSIUM CHLORIDE 20 MEQ: 400 INJECTION, SOLUTION INTRAVENOUS at 12:40

## 2018-09-09 RX ADMIN — SODIUM CHLORIDE 1000 ML: 9 INJECTION, SOLUTION INTRAVENOUS at 13:03

## 2018-09-09 RX ADMIN — MIDODRINE HYDROCHLORIDE 10 MG: 10 TABLET ORAL at 11:49

## 2018-09-09 RX ADMIN — CIPROFLOXACIN 400 MG: 2 INJECTION, SOLUTION INTRAVENOUS at 19:02

## 2018-09-09 RX ADMIN — ENOXAPARIN SODIUM 80 MG: 80 INJECTION SUBCUTANEOUS at 20:11

## 2018-09-09 RX ADMIN — PANTOPRAZOLE SODIUM 40 MG: 40 TABLET, DELAYED RELEASE ORAL at 05:48

## 2018-09-09 RX ADMIN — TIMOLOL MALEATE 1 DROP: 5 SOLUTION/ DROPS OPHTHALMIC at 20:11

## 2018-09-09 ASSESSMENT — PAIN SCALES - GENERAL
PAINLEVEL_OUTOF10: 0
PAINLEVEL_OUTOF10: 4
PAINLEVEL_OUTOF10: 5
PAINLEVEL_OUTOF10: 0
PAINLEVEL_OUTOF10: 6
PAINLEVEL_OUTOF10: 10

## 2018-09-09 ASSESSMENT — PAIN DESCRIPTION - ONSET: ONSET: GRADUAL

## 2018-09-09 ASSESSMENT — PAIN DESCRIPTION - PROGRESSION: CLINICAL_PROGRESSION: GRADUALLY WORSENING

## 2018-09-09 ASSESSMENT — PAIN DESCRIPTION - ORIENTATION: ORIENTATION: RIGHT

## 2018-09-09 ASSESSMENT — PAIN DESCRIPTION - FREQUENCY: FREQUENCY: CONTINUOUS

## 2018-09-09 ASSESSMENT — PAIN DESCRIPTION - DESCRIPTORS: DESCRIPTORS: ACHING

## 2018-09-09 ASSESSMENT — PAIN DESCRIPTION - LOCATION: LOCATION: LEG

## 2018-09-09 ASSESSMENT — PAIN DESCRIPTION - PAIN TYPE: TYPE: SURGICAL PAIN

## 2018-09-09 NOTE — PROGRESS NOTES
perfused    2. 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 Ms Teresita Grounds otherwise has no others signs of SIRS or Sepsis. Abdomen is soft and Non tender     3.  Start Cipro and Flagyl and obtain blood clutures

## 2018-09-09 NOTE — PROGRESS NOTES
Intensive Care Unit  Intensivist Progress Note    Assessment and Plan:        1. S/p Right femoral-tib bypass: well perfused limb. Liberalizing bp goals 9/9 to baseline . Normally on anti-htn including metoprolol but has required midodrine and levophed here to maintain a supra-normal bp for her leg perfusion. She will need Eliquis long-term anticoagulation therapy. 2. 5 cm infrarenal AAA: aortogram with bilateral runoff prior to discharge & follow up with Dr. Kelly Hopkins 2 weeks after discharge. 3. ?Septic shock - normally runs low but levophed up trending significantly on 9/9. LA WNL. Procal 0.44 however vascular requested abx be initiated over concern of graft infection. 4. SVT - HR 170s 9/9. Have been holding her home metoprolol for supra-normal bp goals. Responded well to Metoprolol 2.5mg IV for SVT. Replacing lytes.         -2400/+1L    CC time: 38 min. Discontinuous.   Does not include procedures      Patient Active Problem List   Diagnosis    Vertigo    Vertebrobasilar artery insufficiency    Acute pain of left ear    Tinnitus    BPPV (benign paroxysmal positional vertigo)    Gait instability    PAC (premature atrial contraction)    Pedal edema    Stricture of colon determined by endoscopy ([address]    Barium enema abnormal    Diverticulosis of large intestine without hemorrhage    Colon stricture ([facility])    Colonic mass    S/P laparoscopic colectomy    Lower limb ischemia    PVD (peripheral vascular disease) ([address]    Fall on same level from slipping, tripping, or stumbling    Acute kidney injury ([address]    Leukocytosis    Gastroesophageal reflux disease without esophagitis    Other hyperlipidemia    Osteoarthritis of multiple joints    Diverticulosis of intestine without bleeding       Scheduled Meds:   magnesium sulfate  2 g Intravenous Once    nicotine  1 patch Transdermal Daily    calcium replacement protocol   Other RX Placeholder    potassium (CARDIAC) replacement protocol   Other RX Placeholder    timolol  1 drop Both Eyes Nightly    midodrine  10 mg Oral TID WC    enoxaparin  1 mg/kg Subcutaneous Q12H    ceFAZolin  2 g Intravenous Q8H    raloxifene  60 mg Oral Daily    pantoprazole  40 mg Oral BID    metoprolol succinate  50 mg Oral Daily    pravastatin  20 mg Oral Daily    cilostazol  100 mg Oral BID    potassium chloride  10 mEq Oral BID    sodium chloride flush  10 mL Intravenous 2 times per day    docusate sodium  100 mg Oral BID    aspirin  325 mg Oral Daily     Continuous Infusions:   norepinephrine 12 mcg/min (09/09/18 0855)     PRN Meds:  meclizine 25 mg TID PRN   calcium elemental 500 mg Daily PRN   sodium chloride flush 10 mL PRN   magnesium hydroxide 30 mL Daily PRN   ondansetron 4 mg Q6H PRN   morphine 2 mg Q4H PRN   HYDROcodone 5 mg - acetaminophen 1 tablet Q6H PRN   acetaminophen 650 mg Q4H PRN   oxyCODONE 5 mg Q4H PRN   Or     oxyCODONE 10 mg Q4H PRN       PARENTERAL VASOACTIVE / INOTROPIC AGENTS:    SEDATION/ANALGESIA:      ICU PROPHYLAXIS/THERAPY:   Stress ulcer: [] PPI Agent  [] H2RA  [] Sucralfate [] Other:   VTE: [] Enoxaparin     [] Warfarin  [] NOAC     [] PCD Device:Bilat LE           [] Heparin: [] Subcut / [] IV    NUTRITION SUPPORT:    SUPPORT DEVICES:    Oxygen Delivery - O2 Flow Rate (L/min): 3 L/min  VENT SETTINGS (Comprehensive)     Additional Respiratory  Assessments  Pulse: 100  Resp: 19  SpO2: 96 %  Oral Care: Teeth brushed, Mouthwash      DATA:    CBC: Recent Labs      09/06/18   1115   09/06/18   2020  09/07/18   0238  09/08/18   0450   WBC  26.1*   --    --    --   17.3*   RBC  3.41*   --    --    --   3.13*   HGB  10.2*   < >  8.4*  8.1*  9.3*   HCT  30.7*   < >  26.0*  24.7*  27.8*   MCV  90.0   --    --    --   88.8   MCH  29.9   --    --    --   29.7   MCHC  33.2   --    --    --   33.5   PLT  203   --    --    --   175   MPV  11.3   --    --    --   11.0    < > = values in this interval not displayed.       BMP/CMP:

## 2018-09-10 ENCOUNTER — APPOINTMENT (OUTPATIENT)
Dept: CT IMAGING | Age: 83
DRG: 271 | End: 2018-09-10
Payer: MEDICARE

## 2018-09-10 PROBLEM — E87.20 METABOLIC ACIDOSIS: Status: ACTIVE | Noted: 2018-09-10

## 2018-09-10 PROBLEM — I95.9 HYPOTENSION: Status: ACTIVE | Noted: 2018-09-10

## 2018-09-10 PROBLEM — N39.0 URINARY TRACT INFECTION WITHOUT HEMATURIA: Status: ACTIVE | Noted: 2018-09-10

## 2018-09-10 PROBLEM — E83.51 HYPOCALCEMIA: Status: ACTIVE | Noted: 2018-09-10

## 2018-09-10 PROBLEM — R73.9 HYPERGLYCEMIA: Status: ACTIVE | Noted: 2018-09-10

## 2018-09-10 PROBLEM — D64.9 ANEMIA: Status: ACTIVE | Noted: 2018-09-10

## 2018-09-10 LAB
ANION GAP SERPL CALCULATED.3IONS-SCNC: 9 MEQ/L (ref 8–16)
BUN BLDV-MCNC: 20 MG/DL (ref 7–22)
CALCIUM IONIZED: 1.03 MMOL/L (ref 1.12–1.32)
CALCIUM IONIZED: 1.09 MMOL/L (ref 1.12–1.32)
CALCIUM SERPL-MCNC: 7.5 MG/DL (ref 8.5–10.5)
CHLORIDE BLD-SCNC: 110 MEQ/L (ref 98–111)
CO2: 20 MEQ/L (ref 23–33)
CREAT SERPL-MCNC: 0.9 MG/DL (ref 0.4–1.2)
GFR SERPL CREATININE-BSD FRML MDRD: 60 ML/MIN/1.73M2
GLUCOSE BLD-MCNC: 139 MG/DL (ref 70–108)
GLUCOSE BLD-MCNC: 141 MG/DL (ref 70–108)
GLUCOSE BLD-MCNC: 157 MG/DL (ref 70–108)
GLUCOSE BLD-MCNC: 192 MG/DL (ref 70–108)
MAGNESIUM: 2.3 MG/DL (ref 1.6–2.4)
POTASSIUM REFLEX MAGNESIUM: 4.2 MEQ/L (ref 3.5–5.2)
SODIUM BLD-SCNC: 139 MEQ/L (ref 135–145)

## 2018-09-10 PROCEDURE — 82330 ASSAY OF CALCIUM: CPT

## 2018-09-10 PROCEDURE — 2000000000 HC ICU R&B

## 2018-09-10 PROCEDURE — 6370000000 HC RX 637 (ALT 250 FOR IP): Performed by: INTERNAL MEDICINE

## 2018-09-10 PROCEDURE — 6360000002 HC RX W HCPCS

## 2018-09-10 PROCEDURE — 6360000002 HC RX W HCPCS: Performed by: ANESTHESIOLOGY

## 2018-09-10 PROCEDURE — 99233 SBSQ HOSP IP/OBS HIGH 50: CPT | Performed by: INTERNAL MEDICINE

## 2018-09-10 PROCEDURE — 2500000003 HC RX 250 WO HCPCS: Performed by: ANESTHESIOLOGY

## 2018-09-10 PROCEDURE — 80048 BASIC METABOLIC PNL TOTAL CA: CPT

## 2018-09-10 PROCEDURE — 6370000000 HC RX 637 (ALT 250 FOR IP): Performed by: THORACIC SURGERY (CARDIOTHORACIC VASCULAR SURGERY)

## 2018-09-10 PROCEDURE — 2709999900 HC NON-CHARGEABLE SUPPLY

## 2018-09-10 PROCEDURE — 6360000002 HC RX W HCPCS: Performed by: NURSE PRACTITIONER

## 2018-09-10 PROCEDURE — 82948 REAGENT STRIP/BLOOD GLUCOSE: CPT

## 2018-09-10 PROCEDURE — 36415 COLL VENOUS BLD VENIPUNCTURE: CPT

## 2018-09-10 PROCEDURE — 74177 CT ABD & PELVIS W/CONTRAST: CPT

## 2018-09-10 PROCEDURE — 2580000003 HC RX 258: Performed by: PHYSICIAN ASSISTANT

## 2018-09-10 PROCEDURE — 2580000003 HC RX 258: Performed by: THORACIC SURGERY (CARDIOTHORACIC VASCULAR SURGERY)

## 2018-09-10 PROCEDURE — 6360000004 HC RX CONTRAST MEDICATION: Performed by: SURGERY

## 2018-09-10 PROCEDURE — 83735 ASSAY OF MAGNESIUM: CPT

## 2018-09-10 PROCEDURE — 2580000003 HC RX 258

## 2018-09-10 PROCEDURE — 6370000000 HC RX 637 (ALT 250 FOR IP): Performed by: PHYSICIAN ASSISTANT

## 2018-09-10 PROCEDURE — 6360000002 HC RX W HCPCS: Performed by: THORACIC SURGERY (CARDIOTHORACIC VASCULAR SURGERY)

## 2018-09-10 PROCEDURE — 93307 TTE W/O DOPPLER COMPLETE: CPT

## 2018-09-10 PROCEDURE — 2580000003 HC RX 258: Performed by: INTERNAL MEDICINE

## 2018-09-10 PROCEDURE — 6370000000 HC RX 637 (ALT 250 FOR IP): Performed by: SURGERY

## 2018-09-10 PROCEDURE — 6360000002 HC RX W HCPCS: Performed by: PHYSICIAN ASSISTANT

## 2018-09-10 RX ORDER — ACTIVATED CHARCOAL 208 MG/ML
25 SUSPENSION ORAL ONCE
Status: COMPLETED | OUTPATIENT
Start: 2018-09-10 | End: 2018-09-10

## 2018-09-10 RX ORDER — NICOTINE POLACRILEX 4 MG
15 LOZENGE BUCCAL PRN
Status: DISCONTINUED | OUTPATIENT
Start: 2018-09-10 | End: 2018-09-17 | Stop reason: HOSPADM

## 2018-09-10 RX ORDER — COSYNTROPIN 0.25 MG/ML
0.25 INJECTION, POWDER, FOR SOLUTION INTRAMUSCULAR; INTRAVENOUS ONCE
Status: COMPLETED | OUTPATIENT
Start: 2018-09-11 | End: 2018-09-11

## 2018-09-10 RX ORDER — DEXTROSE MONOHYDRATE 25 G/50ML
12.5 INJECTION, SOLUTION INTRAVENOUS PRN
Status: DISCONTINUED | OUTPATIENT
Start: 2018-09-10 | End: 2018-09-17 | Stop reason: HOSPADM

## 2018-09-10 RX ORDER — DEXTROSE MONOHYDRATE 50 MG/ML
100 INJECTION, SOLUTION INTRAVENOUS PRN
Status: DISCONTINUED | OUTPATIENT
Start: 2018-09-10 | End: 2018-09-17 | Stop reason: HOSPADM

## 2018-09-10 RX ADMIN — METRONIDAZOLE 500 MG: 500 INJECTION, SOLUTION INTRAVENOUS at 03:31

## 2018-09-10 RX ADMIN — ENOXAPARIN SODIUM 80 MG: 80 INJECTION SUBCUTANEOUS at 11:49

## 2018-09-10 RX ADMIN — HYDROCODONE BITARTRATE AND ACETAMINOPHEN 1 TABLET: 5; 325 TABLET ORAL at 03:31

## 2018-09-10 RX ADMIN — MIDODRINE HYDROCHLORIDE 10 MG: 10 TABLET ORAL at 17:25

## 2018-09-10 RX ADMIN — CILOSTAZOL 100 MG: 100 TABLET ORAL at 11:56

## 2018-09-10 RX ADMIN — POTASSIUM CHLORIDE 10 MEQ: 750 TABLET, EXTENDED RELEASE ORAL at 12:11

## 2018-09-10 RX ADMIN — MIDODRINE HYDROCHLORIDE 10 MG: 10 TABLET ORAL at 12:02

## 2018-09-10 RX ADMIN — ASPIRIN 325 MG: 325 TABLET, DELAYED RELEASE ORAL at 11:55

## 2018-09-10 RX ADMIN — Medication 10 ML: at 20:15

## 2018-09-10 RX ADMIN — RALOXIFENE HYDROCHLORIDE 60 MG: 60 TABLET, FILM COATED ORAL at 12:02

## 2018-09-10 RX ADMIN — METRONIDAZOLE 500 MG: 500 INJECTION, SOLUTION INTRAVENOUS at 20:15

## 2018-09-10 RX ADMIN — CEFTRIAXONE SODIUM 1 G: 1 INJECTION, POWDER, FOR SOLUTION INTRAMUSCULAR; INTRAVENOUS at 14:22

## 2018-09-10 RX ADMIN — TIMOLOL MALEATE 1 DROP: 5 SOLUTION/ DROPS OPHTHALMIC at 20:15

## 2018-09-10 RX ADMIN — Medication 10 ML: at 12:01

## 2018-09-10 RX ADMIN — IOPAMIDOL 80 ML: 755 INJECTION, SOLUTION INTRAVENOUS at 11:18

## 2018-09-10 RX ADMIN — PANTOPRAZOLE SODIUM 40 MG: 40 TABLET, DELAYED RELEASE ORAL at 17:25

## 2018-09-10 RX ADMIN — ENOXAPARIN SODIUM 80 MG: 80 INJECTION SUBCUTANEOUS at 20:32

## 2018-09-10 RX ADMIN — PRAVASTATIN SODIUM 20 MG: 20 TABLET ORAL at 20:15

## 2018-09-10 RX ADMIN — POTASSIUM CHLORIDE 10 MEQ: 750 TABLET, EXTENDED RELEASE ORAL at 20:15

## 2018-09-10 RX ADMIN — DOCUSATE SODIUM 100 MG: 100 CAPSULE, LIQUID FILLED ORAL at 11:55

## 2018-09-10 RX ADMIN — Medication 8 MCG/MIN: at 18:12

## 2018-09-10 RX ADMIN — POISON ADSORBENT 25 G: 50 SUSPENSION ORAL at 12:12

## 2018-09-10 RX ADMIN — METRONIDAZOLE 500 MG: 500 INJECTION, SOLUTION INTRAVENOUS at 12:18

## 2018-09-10 RX ADMIN — CIPROFLOXACIN 400 MG: 2 INJECTION, SOLUTION INTRAVENOUS at 08:54

## 2018-09-10 RX ADMIN — CILOSTAZOL 100 MG: 100 TABLET ORAL at 17:25

## 2018-09-10 RX ADMIN — Medication 1.5 G: at 17:27

## 2018-09-10 RX ADMIN — INSULIN LISPRO 1 UNITS: 100 INJECTION, SOLUTION INTRAVENOUS; SUBCUTANEOUS at 20:16

## 2018-09-10 RX ADMIN — Medication 1.5 G: at 05:57

## 2018-09-10 ASSESSMENT — PAIN SCALES - GENERAL
PAINLEVEL_OUTOF10: 0
PAINLEVEL_OUTOF10: 3
PAINLEVEL_OUTOF10: 5
PAINLEVEL_OUTOF10: 0

## 2018-09-10 NOTE — FLOWSHEET NOTE
[facility] THERAPY MISSED TREATMENT NOTE  [facility] ICU 4D  4D-14/014-A      Date: 9/10/2018  Patient Name: Paulina Mendoza        CSN: [id number]   : 1935  (82 y.o.)  Gender: female   Referring Practitioner: NERISSA Sawant CNP  Diagnosis: lower limb ischemia         REASON FOR MISSED TREATMENT:  Missed Treat. OT attempted, although pt had just returned from [place] and was with nursing staff. Will re attempt as able.

## 2018-09-10 NOTE — FLOWSHEET NOTE
[facility]  PHYSICAL THERAPY MISSED TREATMENT NOTE  ACUTE CARE    Date: 9/10/2018  Patient Name: Yassine Augustine        MRN: [id number]   : 1935  (82 y.o.)  Gender: female   Referring Practitioner: NERISSA Barton CNP  Referring Practitioner: Kvng FRANKS  Diagnosis: lower limb ischemia  Diagnosis: Lower limb ischemia         REASON FOR MISSED TREATMENT:  Attempted pt earlier and she had just gotten back from [place], checked again in pm pt declined nursing reported pt tired and she would try to get her up later. Will check back tomorrow.

## 2018-09-10 NOTE — PLAN OF CARE
Problem: Pain:  Goal: Pain level will decrease  Pain level will decrease   Outcome: Ongoing  expresses satisfaction with pain control   Goal: Control of acute pain  Control of acute pain   Outcome: Ongoing  Denies pain at present   Goal: Control of chronic pain  Control of chronic pain   Outcome: Ongoing  denies pain at present     Problem: Falls - Risk of:  Goal: Will remain free from falls  Will remain free from falls   Outcome: Ongoing  None at present   Goal: Absence of physical injury  Absence of physical injury   Outcome: Ongoing  None at present     Problem: Risk for Impaired Skin Integrity  Goal: Tissue integrity - skin and mucous membranes  Structural intactness and normal physiological function of skin and  mucous membranes. Outcome: Ongoing  No new red or open areas     Problem: Nutrition  Goal: Optimal nutrition therapy  Outcome: Ongoing  Appetite improving     Comments: Care plan reviewed with patient and family . Patient and family  verbalize understanding of the plan of care and contribute to goal setting.

## 2018-09-10 NOTE — PROGRESS NOTES
Department of Cardiovascular & Thoracic Surgery  Involved Strictly for right leg revascularization Progress Note      SUBJECTIVE:  Alert no distress, states her right foot feels well. Has bounding right DP pulse, Incisions excellent coaptation. OBJECTIVE    Physical  CURRENT VITALS:  BP (!) 109/49   Pulse 100   Temp 98.2 °F (36.8 °C) (Core)   Resp 22   Ht 5' 5\" (1.651 m)   Wt 180 lb (81.6 kg)   SpO2 94%   BMI 29.95 kg/m²   Bounding right DP palpablepulse, warm pink right foot spontaneous capillary refill all toes. ASSESSMENT AND PLAN    Patent right fem AT bypass. Viable right leg. Plan: Will sign off for now, continue multidisciplinary care as per Dr. Dianna Nixon, please call if any issues with right leg revascularization. Otherwise will follow up in office 2 weeks after discharge.

## 2018-09-10 NOTE — PROGRESS NOTES
Resting quietly in bed- no distress. Pt awake,alert and oriented x 3 . Follows commands readily x 4 extremities. 1100 pt to radiology per bed with trained  RT . No distress .

## 2018-09-10 NOTE — PLAN OF CARE
Problem: Pain:  Goal: Pain level will decrease  Pain level will decrease   Outcome: Ongoing    Goal: Control of acute pain  Control of acute pain   Outcome: Ongoing    Goal: Control of chronic pain  Control of chronic pain   Outcome: Ongoing      Problem: Falls - Risk of:  Goal: Will remain free from falls  Will remain free from falls   Outcome: Ongoing    Goal: Absence of physical injury  Absence of physical injury   Outcome: Ongoing      Problem: Risk for Impaired Skin Integrity  Goal: Tissue integrity - skin and mucous membranes  Structural intactness and normal physiological function of skin and  mucous membranes. Outcome: Ongoing      Problem: Nutrition  Goal: Optimal nutrition therapy  Outcome: Ongoing      Comments: Care plan reviewed with patient and family. Patient and family   verbalize understanding of the plan of care and contribute to goal setting.

## 2018-09-10 NOTE — PROGRESS NOTES
patch Transdermal Daily    ciprofloxacin  400 mg Intravenous Q12H    metroNIDAZOLE  500 mg Intravenous Q8H    calcium replacement protocol   Other RX Placeholder    potassium (CARDIAC) replacement protocol   Other RX Placeholder    timolol  1 drop Both Eyes Nightly    midodrine  10 mg Oral TID WC    enoxaparin  1 mg/kg Subcutaneous Q12H    raloxifene  60 mg Oral Daily    pantoprazole  40 mg Oral BID    pravastatin  20 mg Oral Daily    cilostazol  100 mg Oral BID    potassium chloride  10 mEq Oral BID    sodium chloride flush  10 mL Intravenous 2 times per day    docusate sodium  100 mg Oral BID    aspirin  325 mg Oral Daily     PRN Meds: meclizine, calcium elemental, sodium chloride flush, magnesium hydroxide, ondansetron, morphine, HYDROcodone 5 mg - acetaminophen, acetaminophen, oxyCODONE **OR** oxyCODONE      Intake/Output Summary (Last 24 hours) at 09/10/18 0751  Last data filed at 09/10/18 0600   Gross per 24 hour   Intake             3076 ml   Output             1600 ml   Net             1476 ml       Diet:  DIET GENERAL; No Added Salt (3-4 GM)  Dietary Nutrition Supplements: Low Calorie High Protein Supplement    Exam:  BP (!) 109/49   Pulse 100   Temp 98.2 °F (36.8 °C) (Core)   Resp 22   Ht 5' 5\" (1.651 m)   Wt 180 lb (81.6 kg)   SpO2 94%   BMI 29.95 kg/m²     General appearance: No apparent distress, elderly and cooperative. HEENT: Pupils equal, round. Conjunctivae/corneas clear. Neck: Supple, with full range of motion. No jugular venous distention. Trachea midline. Respiratory:  Normal respiratory effort. Clear to auscultation, bilaterally without Rales/Wheezes/Rhonchi. Breath sounds soft. Cardiovascular: Regular rate and rhythm with normal S1/S2 without murmurs, rubs or gallops. Abdomen: Soft, non-tender, non-distended with normal bowel sounds x4. Musculoskeletal: passive and active ROM x 4 extremities. Surgery incisions on right leg.  Both lower extremities are warm, pink and the patient had recent major abdominal surgery for partial colectomy. 5. Significant flow-limiting stenosis at the origin of the left external iliac artery. This was stented with an 8 x 6 mm Ever-Flex self-expanding stent. **This report has been created using voice recognition software. It may contain minor errors which are inherent in voice recognition technology. **      Final report electronically signed by Dr. Ольга Hoover on 9/5/2018 3:04 PM      VL DUP LOWER EXTREMITY ARTERIES BILATERAL   Final Result   1. Absent flow within the right lower extremity arterial vasculature from the right common femoral artery to the distal runoff vessels within the right ankle. The RAMOS on the right is unobtainable due to 2 absent flow. 2. No sonographic evidence of significant flow-limiting stenosis is demonstrated within the left lower extremity enteral vasculature by ultrasound. However, there is a moderately diminished RAMOS on the left measuring 0.68. 3. These findings were discussed with the referring ER clinician and the vascular surgeon at the time of dictation. **This report has been created using voice recognition software. It may contain minor errors which are inherent in voice recognition technology. **      Final report electronically signed by Dr. Ольга Hoover on 9/5/2018 10:06 AM      XR HIP RIGHT (2-3 VIEWS)   Final Result   1. Unremarkable right hip series. **This report has been created using voice recognition software. It may contain minor errors which are inherent in voice recognition technology. **      Final report electronically signed by Dr. Zoey Seth on 9/5/2018 6:58 AM      XR LUMBAR SPINE (2-3 VIEWS)   Final Result   Mild T11-12 and moderate L5-S1 degenerative disc disease. No fracture or subluxation. **This report has been created using voice recognition software. It may contain minor errors which are inherent in voice recognition technology. **      Final report electronically signed by Dr. Alisha Hutchinson on 9/5/2018 6:57 AM          Diet: DIET GENERAL; No Added Salt (3-4 GM)  Dietary Nutrition Supplements: Low Calorie High Protein Supplement    DVT prophylaxis: [x] Lovenox                                 [] SCDs                                 [] SQ Heparin                                 [] Encourage ambulation           [] Already on Anticoagulation     Disposition:    [] Home       [] TCU       [x] Rehab       [] Psych       [] SNF       [] [place]       [] Other-    Code Status: Full Code     PT/OT Eval Status: Active and ongoing     Assessment/Plan:    Anticipated Discharge in : Pending CT results     Active Hospital Problems    Diagnosis Date Noted    Septic shock ([facility] [address] [A41.9, R65.21]     SVT (supraventricular tachycardia) ([facility] [address] [I47.1]     Lower limb ischemia [I99.8] 09/05/2018    PVD (peripheral vascular disease) ([facility] [address] [I73.9]        1. Severe Peripheral Vascular Disease S/P Revascularization Procedure and S/P Femoral to Anterior Tibial Bypass Thrombosis:   - Dr. Farrah Powers signed off of the case during this hospital stay. - POD5 & POD4. Pt's right lower extremity is warm and pink. States that she has feeling in her toes  and has a burning sensation in her thigh.     - Continue Lovenox & Pletal     2. Hypotension, due to medication or adrenal insufficiency    - Pt was given 2.5 mg IV Metoprolol for SVT on 9/9. 9/9 at midnight, the pt's BP fell to 76/28 where she  required 25mcg of Levophed to sustain the SBP of 100. The Levophed was weaned down to 12  mcg  currently. Since then, SBP goal of 100 for perfusion has been met on 12mcg of Levophed. Continue to  monitor and wean down. - Cosyntropin Stimulation Test ordered for Tomorrow AM to evaluate for Adrenal Insufficiency     3.   Diverticulosis S/P Anterior Resection and Sigmoid Coloproctostomy on 8/20/18    - Per Dr. Ruth Toledo, CT scan ordered for today (9/10) to evaluate for colovesical fistula or intra  abdominal abscess. - Continue Cipro 400mg IV     4. Leukocytosis    - WBC trending down from 17.3 on 9/8 to 15.0 on 9/9.    - UTI with mixed growth could be 2/2 fistula. - No growth in blood cultures x2    - Pro calcitonin  0.44 on 9/9.    - Will continue to monitor     5. Urine Tract Infection with Mixed Growth    - Could be 2/2 to fistula    - Was on Cipro, changed to Ceftriaxone    - Will continue to monitor. 6. Hyperglycemia   - Pt's glucose has been running high between 124 - 196. Pt denies any history of diabetes. Hgb A1C is  5.5    - Blood glucose goal is under 110.    - SSI ordered to control blood sugar. 7.   5 cm infrarenal AAA:    - This is being managed by Dr. Olena Damon. Pt will need aortogram with b/l runoff prior to discharge to  identify anatomy for repair of the AAA. She will follow up with Dr. Olena Damon 2 weeks after discharge. 8. Metabolic Acidosis:   - ABG on 9/5 showed pH 7.31, pO2 162, HCO3 21.    - Will continue to monitor     9. Acute Renal Failure, resolved   - Creatinine this AM 0.9    - Will continue to monitor     10. Normocytic, Normochromic Anemia    - Pt's Hgb is trending up from 9.3 on 9/8 to 10.2 on 9/9.    - Will continue to monitor. Will transfuse if Hgb drops below 7. 11. Dyslipidemia    - Continue home medication: Pravachol     12. HTN    - Will treat if SBP is greater than 160. Currently on 12mcg of Levophed. Electronically signed by MABLE Leigh on 9/10/2018 at 7:51 AM     Patient independently evaluated. Collaboration with NP. Still requiring pressors. Wean as tolerated. Agree with plan. Electronically signed by Lexy Tidwell MD

## 2018-09-11 LAB
ANION GAP SERPL CALCULATED.3IONS-SCNC: 12 MEQ/L (ref 8–16)
BUN BLDV-MCNC: 18 MG/DL (ref 7–22)
CALCIUM IONIZED: 1.1 MMOL/L (ref 1.12–1.32)
CALCIUM IONIZED: 1.13 MMOL/L (ref 1.12–1.32)
CALCIUM SERPL-MCNC: 7.8 MG/DL (ref 8.5–10.5)
CHLORIDE BLD-SCNC: 107 MEQ/L (ref 98–111)
CLOSTRIDIUM DIFFICILE, PCR: NEGATIVE
CO2: 21 MEQ/L (ref 23–33)
CORTISOL COLLECTION INFO: NORMAL
CORTISOL COLLECTION INFO: NORMAL
CORTISOL: 15.13 UG/DL
CORTISOL: 17.86 UG/DL
CORTISOL: 31.71 UG/DL
CREAT SERPL-MCNC: 0.8 MG/DL (ref 0.4–1.2)
EKG ATRIAL RATE: 123 BPM
EKG P AXIS: 30 DEGREES
EKG P-R INTERVAL: 142 MS
EKG Q-T INTERVAL: 330 MS
EKG QRS DURATION: 66 MS
EKG QTC CALCULATION (BAZETT): 472 MS
EKG R AXIS: -30 DEGREES
EKG T AXIS: 67 DEGREES
EKG VENTRICULAR RATE: 123 BPM
ERYTHROCYTE [DISTWIDTH] IN BLOOD BY AUTOMATED COUNT: 13.7 % (ref 11.5–14.5)
ERYTHROCYTE [DISTWIDTH] IN BLOOD BY AUTOMATED COUNT: 44.1 FL (ref 35–45)
FECAL LEUKOCYTES: NORMAL
GFR SERPL CREATININE-BSD FRML MDRD: 69 ML/MIN/1.73M2
GLUCOSE BLD-MCNC: 150 MG/DL (ref 70–108)
GLUCOSE BLD-MCNC: 178 MG/DL (ref 70–108)
GLUCOSE BLD-MCNC: 201 MG/DL (ref 70–108)
GLUCOSE BLD-MCNC: 217 MG/DL (ref 70–108)
GLUCOSE BLD-MCNC: 243 MG/DL (ref 70–108)
HCT VFR BLD CALC: 27 % (ref 37–47)
HEMOGLOBIN: 8.9 GM/DL (ref 12–16)
MAGNESIUM: 1.6 MG/DL (ref 1.6–2.4)
MCH RBC QN AUTO: 29.3 PG (ref 26–33)
MCHC RBC AUTO-ENTMCNC: 33 GM/DL (ref 32.2–35.5)
MCV RBC AUTO: 88.8 FL (ref 81–99)
PLATELET # BLD: 159 THOU/MM3 (ref 130–400)
PMV BLD AUTO: 11.3 FL (ref 9.4–12.4)
POTASSIUM REFLEX MAGNESIUM: 3.9 MEQ/L (ref 3.5–5.2)
RBC # BLD: 3.04 MILL/MM3 (ref 4.2–5.4)
SODIUM BLD-SCNC: 140 MEQ/L (ref 135–145)
TROPONIN T: 0.02 NG/ML
TROPONIN T: 0.04 NG/ML
WBC # BLD: 12.8 THOU/MM3 (ref 4.8–10.8)

## 2018-09-11 PROCEDURE — 2000000000 HC ICU R&B

## 2018-09-11 PROCEDURE — 93010 ELECTROCARDIOGRAM REPORT: CPT | Performed by: INTERNAL MEDICINE

## 2018-09-11 PROCEDURE — 82533 TOTAL CORTISOL: CPT

## 2018-09-11 PROCEDURE — 80048 BASIC METABOLIC PNL TOTAL CA: CPT

## 2018-09-11 PROCEDURE — 6360000002 HC RX W HCPCS: Performed by: NURSE PRACTITIONER

## 2018-09-11 PROCEDURE — 2500000003 HC RX 250 WO HCPCS: Performed by: ANESTHESIOLOGY

## 2018-09-11 PROCEDURE — 85027 COMPLETE CBC AUTOMATED: CPT

## 2018-09-11 PROCEDURE — 82330 ASSAY OF CALCIUM: CPT

## 2018-09-11 PROCEDURE — 36415 COLL VENOUS BLD VENIPUNCTURE: CPT

## 2018-09-11 PROCEDURE — 6360000002 HC RX W HCPCS: Performed by: INTERNAL MEDICINE

## 2018-09-11 PROCEDURE — 87077 CULTURE AEROBIC IDENTIFY: CPT

## 2018-09-11 PROCEDURE — 82024 ASSAY OF ACTH: CPT

## 2018-09-11 PROCEDURE — 99232 SBSQ HOSP IP/OBS MODERATE 35: CPT | Performed by: SURGERY

## 2018-09-11 PROCEDURE — 87184 SC STD DISK METHOD PER PLATE: CPT

## 2018-09-11 PROCEDURE — 87086 URINE CULTURE/COLONY COUNT: CPT

## 2018-09-11 PROCEDURE — 82948 REAGENT STRIP/BLOOD GLUCOSE: CPT

## 2018-09-11 PROCEDURE — 6370000000 HC RX 637 (ALT 250 FOR IP): Performed by: INTERNAL MEDICINE

## 2018-09-11 PROCEDURE — 99233 SBSQ HOSP IP/OBS HIGH 50: CPT | Performed by: INTERNAL MEDICINE

## 2018-09-11 PROCEDURE — 83735 ASSAY OF MAGNESIUM: CPT

## 2018-09-11 PROCEDURE — 84484 ASSAY OF TROPONIN QUANT: CPT

## 2018-09-11 PROCEDURE — 97530 THERAPEUTIC ACTIVITIES: CPT

## 2018-09-11 PROCEDURE — 89055 LEUKOCYTE ASSESSMENT FECAL: CPT

## 2018-09-11 PROCEDURE — 87493 C DIFF AMPLIFIED PROBE: CPT

## 2018-09-11 PROCEDURE — 2709999900 HC NON-CHARGEABLE SUPPLY

## 2018-09-11 PROCEDURE — 6370000000 HC RX 637 (ALT 250 FOR IP): Performed by: THORACIC SURGERY (CARDIOTHORACIC VASCULAR SURGERY)

## 2018-09-11 PROCEDURE — 87186 SC STD MICRODIL/AGAR DIL: CPT

## 2018-09-11 PROCEDURE — 93005 ELECTROCARDIOGRAM TRACING: CPT | Performed by: INTERNAL MEDICINE

## 2018-09-11 PROCEDURE — 2500000003 HC RX 250 WO HCPCS: Performed by: INTERNAL MEDICINE

## 2018-09-11 PROCEDURE — 2580000003 HC RX 258: Performed by: INTERNAL MEDICINE

## 2018-09-11 RX ORDER — UREA 10 %
3 LOTION (ML) TOPICAL NIGHTLY PRN
Status: DISCONTINUED | OUTPATIENT
Start: 2018-09-11 | End: 2018-09-11

## 2018-09-11 RX ORDER — MAGNESIUM SULFATE IN WATER 40 MG/ML
2 INJECTION, SOLUTION INTRAVENOUS ONCE
Status: COMPLETED | OUTPATIENT
Start: 2018-09-11 | End: 2018-09-11

## 2018-09-11 RX ORDER — UREA 10 %
4 LOTION (ML) TOPICAL NIGHTLY PRN
Status: DISCONTINUED | OUTPATIENT
Start: 2018-09-11 | End: 2018-09-16 | Stop reason: DRUGHIGH

## 2018-09-11 RX ORDER — 0.9 % SODIUM CHLORIDE 0.9 %
1000 INTRAVENOUS SOLUTION INTRAVENOUS ONCE
Status: DISCONTINUED | OUTPATIENT
Start: 2018-09-11 | End: 2018-09-17 | Stop reason: HOSPADM

## 2018-09-11 RX ADMIN — AMIODARONE HYDROCHLORIDE 150 MG: 1.5 INJECTION, SOLUTION INTRAVENOUS at 09:52

## 2018-09-11 RX ADMIN — PANTOPRAZOLE SODIUM 40 MG: 40 TABLET, DELAYED RELEASE ORAL at 17:51

## 2018-09-11 RX ADMIN — CILOSTAZOL 100 MG: 100 TABLET ORAL at 17:45

## 2018-09-11 RX ADMIN — POTASSIUM CHLORIDE 10 MEQ: 750 TABLET, EXTENDED RELEASE ORAL at 08:27

## 2018-09-11 RX ADMIN — DOCUSATE SODIUM 100 MG: 100 CAPSULE, LIQUID FILLED ORAL at 08:23

## 2018-09-11 RX ADMIN — POTASSIUM CHLORIDE 10 MEQ: 750 TABLET, EXTENDED RELEASE ORAL at 21:52

## 2018-09-11 RX ADMIN — PRAVASTATIN SODIUM 20 MG: 20 TABLET ORAL at 21:50

## 2018-09-11 RX ADMIN — DOCUSATE SODIUM 100 MG: 100 CAPSULE, LIQUID FILLED ORAL at 21:50

## 2018-09-11 RX ADMIN — Medication 10 ML: at 08:48

## 2018-09-11 RX ADMIN — Medication 4 MG: at 21:50

## 2018-09-11 RX ADMIN — TIMOLOL MALEATE 1 DROP: 5 SOLUTION/ DROPS OPHTHALMIC at 21:52

## 2018-09-11 RX ADMIN — ENOXAPARIN SODIUM 80 MG: 80 INJECTION SUBCUTANEOUS at 08:24

## 2018-09-11 RX ADMIN — AMIODARONE HYDROCHLORIDE 0.5 MG/MIN: 1.8 INJECTION, SOLUTION INTRAVENOUS at 15:54

## 2018-09-11 RX ADMIN — ACETAMINOPHEN 650 MG: 325 TABLET ORAL at 08:23

## 2018-09-11 RX ADMIN — ENOXAPARIN SODIUM 80 MG: 80 INJECTION SUBCUTANEOUS at 21:50

## 2018-09-11 RX ADMIN — MIDODRINE HYDROCHLORIDE 10 MG: 10 TABLET ORAL at 17:45

## 2018-09-11 RX ADMIN — COSYNTROPIN 0.25 MG: 0.25 INJECTION, POWDER, LYOPHILIZED, FOR SOLUTION INTRAMUSCULAR; INTRAVENOUS at 08:28

## 2018-09-11 RX ADMIN — CILOSTAZOL 100 MG: 100 TABLET ORAL at 06:27

## 2018-09-11 RX ADMIN — MAGNESIUM SULFATE HEPTAHYDRATE 2 G: 40 INJECTION, SOLUTION INTRAVENOUS at 13:05

## 2018-09-11 RX ADMIN — PANTOPRAZOLE SODIUM 40 MG: 40 TABLET, DELAYED RELEASE ORAL at 06:27

## 2018-09-11 RX ADMIN — Medication 2 UNITS: at 17:45

## 2018-09-11 RX ADMIN — Medication 10 ML: at 21:51

## 2018-09-11 RX ADMIN — Medication 1 UNITS: at 08:48

## 2018-09-11 RX ADMIN — METRONIDAZOLE 500 MG: 500 INJECTION, SOLUTION INTRAVENOUS at 04:18

## 2018-09-11 RX ADMIN — RALOXIFENE HYDROCHLORIDE 60 MG: 60 TABLET, FILM COATED ORAL at 08:27

## 2018-09-11 RX ADMIN — ASPIRIN 325 MG: 325 TABLET, DELAYED RELEASE ORAL at 08:23

## 2018-09-11 RX ADMIN — MIDODRINE HYDROCHLORIDE 10 MG: 10 TABLET ORAL at 08:27

## 2018-09-11 RX ADMIN — METRONIDAZOLE 500 MG: 500 INJECTION, SOLUTION INTRAVENOUS at 11:39

## 2018-09-11 RX ADMIN — HYDROCODONE BITARTRATE AND ACETAMINOPHEN 1 TABLET: 5; 325 TABLET ORAL at 17:56

## 2018-09-11 RX ADMIN — INSULIN LISPRO 1 UNITS: 100 INJECTION, SOLUTION INTRAVENOUS; SUBCUTANEOUS at 21:51

## 2018-09-11 RX ADMIN — Medication 2 UNITS: at 13:06

## 2018-09-11 RX ADMIN — Medication 8 MCG/MIN: at 23:20

## 2018-09-11 RX ADMIN — OXYCODONE HYDROCHLORIDE 5 MG: 5 TABLET ORAL at 21:50

## 2018-09-11 RX ADMIN — MIDODRINE HYDROCHLORIDE 10 MG: 10 TABLET ORAL at 11:42

## 2018-09-11 RX ADMIN — AMIODARONE HYDROCHLORIDE 1 MG/MIN: 1.8 INJECTION, SOLUTION INTRAVENOUS at 10:15

## 2018-09-11 ASSESSMENT — PAIN SCALES - GENERAL
PAINLEVEL_OUTOF10: 6
PAINLEVEL_OUTOF10: 6
PAINLEVEL_OUTOF10: 0
PAINLEVEL_OUTOF10: 6

## 2018-09-11 ASSESSMENT — PAIN DESCRIPTION - PROGRESSION: CLINICAL_PROGRESSION: GRADUALLY WORSENING

## 2018-09-11 NOTE — PLAN OF CARE
Problem: Pain:  Goal: Pain level will decrease  Pain level will decrease   Outcome: Ongoing  Pt shows no signs of pain. Will continue to monitor and treat prn. Goal: Control of acute pain  Control of acute pain   Outcome: Ongoing  Pt shows no signs of pain. Will continue to monitor and treat prn. Goal: Control of chronic pain  Control of chronic pain   Outcome: Ongoing  Pt shows no signs of pain. Will continue to monitor and treat prn. Problem: Falls - Risk of:  Goal: Will remain free from falls  Will remain free from falls   Outcome: Met This Shift  Patient continues to be free from falls this shift. Falling star program in place, fall band on pt, bed in lowest position, wheels locked, and side rails up x4      Goal: Absence of physical injury  Absence of physical injury   Outcome: Met This Shift  Call light in reach, bed lowest position, wheels locked, and correct ID band in place. Environment remains clear of fall hazards. R      Problem: Risk for Impaired Skin Integrity  Goal: Tissue integrity - skin and mucous membranes  Structural intactness and normal physiological function of skin and  mucous membranes. Outcome: Met This Shift  No signs of new skin breakdown with each assessment. Pt turned and repositioned q 2hr and prn with pillow support. Problem: Nutrition  Goal: Optimal nutrition therapy  Outcome: Ongoing  Pt tolerating tube feedings. Will continue to increase until at goal rate. Problem: DISCHARGE BARRIERS  Goal: Patient's continuum of care needs are met  Outcome: Met This Shift  Discharge planning in process and discussed with patient/family. Social work consulted for any additional needs. Care manager aware of discharge needs. Comments: Care plan reviewed with patient's family. Patient's family verbalizes understanding of the plan of care and contribute to goal setting.

## 2018-09-11 NOTE — PROGRESS NOTES
Critical Care Progress Note    Patient:  Bereket Ortega      Unit/Bed:4D-14/014-A    YOB: 1935    MRN: [id number]       Acct: [de-identified]     PCP: Reva Larson    Date of Admission: 9/5/2018    Chief Complaint: Severe Peripheral Artery Disease     Hospital Course:  History of severe peripheral vascular disease, long-standing hypertension and hyperlipidemia. Patient was admitted on 9/5/18 with primarily right hip discomfort. She was diagnosed with acute right leg ischemia over 8 hours duration with anesthesia to the right foot and pulseless right leg. Total occlusion to the right common and external iliac arteries extending to the right common femoral artery. Patient underwent revascularization surgery on 9/5/18 with very good response. Unfortunately, she developed an acute thrombus to the right lower extremity with recurrent ischemia. She required declotting a right fem-tib bypass 9/6/18. She was transferred to the intensive care unit where pulmonary was consulted to assume her care on 9/6/18. On 9/7, Pt's HR was in 140s with Dopamine and Levophed. Dopamine was discontinued and Levophed was increased. Patient has continued to require pressors. 9/10 questions for stool in urine. CT scan did not see a colovesical fistula, but could not be excluded. Activated charcoal was given, flecks seen in razo. ATB were changed to Rocephin from Cipro 9/10. Subjective: SVT this AM HR up to 190's. Up in chair. Denies chest pain, shortness of breath, palpations or dizziness.        Medications:  Reviewed    Infusion Medications    dextrose      norepinephrine 10 mcg/min (09/11/18 0000)     Scheduled Medications    cosyntropin  0.25 mg Intravenous Once    insulin lispro  0-6 Units Subcutaneous TID     insulin lispro  0-3 Units Subcutaneous Nightly    cefTRIAXone (ROCEPHIN) IV  1 g Intravenous Q24H    metroNIDAZOLE  500 mg Intravenous Q8H    calcium replacement protocol   Other RX Placeholder    potassium (CARDIAC) replacement protocol   Other RX Placeholder    timolol  1 drop Both Eyes Nightly    midodrine  10 mg Oral TID WC    enoxaparin  1 mg/kg Subcutaneous Q12H    raloxifene  60 mg Oral Daily    pantoprazole  40 mg Oral BID    pravastatin  20 mg Oral Daily    cilostazol  100 mg Oral BID    potassium chloride  10 mEq Oral BID    sodium chloride flush  10 mL Intravenous 2 times per day    docusate sodium  100 mg Oral BID    aspirin  325 mg Oral Daily     PRN Meds: glucose, dextrose, glucagon (rDNA), dextrose, meclizine, calcium elemental, sodium chloride flush, magnesium hydroxide, ondansetron, morphine, HYDROcodone 5 mg - acetaminophen, acetaminophen, oxyCODONE **OR** oxyCODONE      Intake/Output Summary (Last 24 hours) at 09/11/18 0826  Last data filed at 09/11/18 0600   Gross per 24 hour   Intake             1948 ml   Output             1675 ml   Net              273 ml       Diet:  DIET GENERAL; No Added Salt (3-4 GM)  Dietary Nutrition Supplements: Low Calorie High Protein Supplement    Exam:  BP 96/71   Pulse 99   Temp 98.1 °F (36.7 °C)   Resp 23   Ht 5' 5\" (1.651 m)   Wt 180 lb (81.6 kg)   SpO2 95%   BMI 29.95 kg/m²     General appearance: No apparent distress, elderly and cooperative. HEENT: Pupils equal, round. Conjunctivae/corneas clear. Neck: Supple, with full range of motion. No jugular venous distention. Trachea midline. Respiratory:  Normal respiratory effort. Clear to auscultation, bilaterally without Rales/Wheezes/Rhonchi. Breath sounds soft. Cardiovascular: tachy irregular rate and rhythm with normal S1/S2 without murmurs, rubs or gallops. Abdomen: Soft, non-tender, non-distended with normal bowel sounds x4. Musculoskeletal: passive and active ROM x 4 extremities. Surgery incisions on right leg. Both lower extremities are warm, pink and palpable pulses. Skin: Warm & Dry. Multiple surgery incisions on abdomen, right leg.    Neurologic: Neurovascularly intact without any focal sensory/motor deficits. Cranial nerves: II-XII intact, grossly non-focal.  Psychiatric: Alert and oriented, thought content appropriate, normal insight  Capillary Refill: Brisk,< 3 seconds   Peripheral Pulses: +2 palpable, equal bilaterally       Labs:   Recent Labs      09/09/18   0915  09/11/18   0413   WBC  15.0*  12.8*   HGB  10.2*  8.9*   HCT  30.6*  27.0*   PLT  199  159     Recent Labs      09/09/18   0910   09/09/18   2000  09/10/18   0340  09/11/18   0413   NA  140   --    --   139  140   K  3.1*   < >  4.0  4.2  3.9   CL  106   --    --   110  107   CO2  19*   --    --   20*  21*   BUN  17   --    --   20  18   CREATININE  0.9   --    --   0.9  0.8   CALCIUM  7.4*   --    --   7.5*  7.8*    < > = values in this interval not displayed. No results for input(s): AST, ALT, BILIDIR, BILITOT, ALKPHOS in the last 72 hours. No results for input(s): INR in the last 72 hours. No results for input(s): Mitcheal Buckle in the last 72 hours. Urinalysis:      Lab Results   Component Value Date    NITRU NEGATIVE 09/09/2018    WBCUA > 200 09/09/2018    BACTERIA FEW 09/09/2018    RBCUA 50-75 09/09/2018    BLOODU LARGE 09/09/2018    SPECGRAV 1.023 09/09/2018       Radiology:  CT ABDOMEN PELVIS W IV CONTRAST Additional Contrast? Oral   Final Result   Pelvis: A Meredith catheter is present in the urinary bladder and is a relatively large amount of air in the bladder. Moderate soft tissue stranding is seen in the pelvis following recent sigmoid colon surgery. There is suggestion of some mild soft tissue    thickening of the bladder wall along the posterior superior aspect the left of midline. This may simple be due to irritation from the Meredith catheter. There is no clear separation between the bladder and the adjacent bowel loop at this site.  I cannot    definitely exclude a colovesical fistula although I do not identify any definite tracking of air through the bladder wall to suggest such. Note that there is moderate presacral soft tissue stranding following recent surgery. No definite abscess or    loculated fluid collection is seen. . Mild soft tissue swelling in the right inguinal region with a few small air bubbles, related to recent surgery. IMPRESSION:    1. Constipation. Diverticulosis coli. Moderate size hiatus hernia. Infrarenal abdominal aortic aneurysm. 2. Mild atelectasis/pneumonia both posterior gross phrenic angles and right middle lobe. 11 mm nodule right middle lobe, possibly an inflammatory nodule. Follow-up CT thorax in several weeks would be helpful for further evaluation. 3. Postsurgical soft tissue stranding in the pelvis extending into the lower abdomen and perinephric space. No definite abscess or loculated fluid collection is seen. 4. Meredith catheter in urinary bladder. Relatively large amount of air in urinary bladder. Mild focal wall thickening of the bladder lungs posterior/superior aspect the left of midline and no clear separation between the bladder and adjacent bowel loop at    this site. I do not see any definite tracking of air through the bladder wall but a colovesical fistula cannot definitely be excluded. If further evaluation desired, a barium enema or water soluble contrast enema would be helpful. **This report has been created using voice recognition software. It may contain minor errors which are inherent in voice recognition technology. **      Final report electronically signed by Dr. Nik Hodgson on 9/10/2018 12:22 PM      XR CHEST PORTABLE   Final Result   1. Borderline heart size. Right jugular line, catheter tip in SVC. Moderate degenerative changes both shoulders. 2. Minimal residual atelectasis/pneumonia right midlung and retrocardiac region left lung base. No effusion is seen. Overall appearance improved from prior. **This report has been created using voice recognition software.   It may contain minor errors

## 2018-09-11 NOTE — PLAN OF CARE
Problem: DISCHARGE BARRIERS  Goal: Patient's continuum of care needs are met  Outcome: Ongoing  Patient from home alone, in ICU, discharge plan not determined at this time, support offered to patient and daughter. See SW note.

## 2018-09-11 NOTE — PROGRESS NOTES
Body mass index is 29.95 kg/m². Diet: DIET GENERAL; No Added Salt (3-4 GM)  Dietary Nutrition Supplements: Low Calorie High Protein Supplement        MEDS:     Scheduled Meds:   cosyntropin  0.25 mg Intravenous Once    insulin lispro  0-6 Units Subcutaneous TID WC    insulin lispro  0-3 Units Subcutaneous Nightly    cefTRIAXone (ROCEPHIN) IV  1 g Intravenous Q24H    metroNIDAZOLE  500 mg Intravenous Q8H    calcium replacement protocol   Other RX Placeholder    potassium (CARDIAC) replacement protocol   Other RX Placeholder    timolol  1 drop Both Eyes Nightly    midodrine  10 mg Oral TID WC    enoxaparin  1 mg/kg Subcutaneous Q12H    raloxifene  60 mg Oral Daily    pantoprazole  40 mg Oral BID    pravastatin  20 mg Oral Daily    cilostazol  100 mg Oral BID    potassium chloride  10 mEq Oral BID    sodium chloride flush  10 mL Intravenous 2 times per day    docusate sodium  100 mg Oral BID    aspirin  325 mg Oral Daily     Continuous Infusions:   dextrose      norepinephrine 10 mcg/min (09/11/18 0000)     PRN Meds:  glucose 15 g PRN   dextrose 12.5 g PRN   glucagon (rDNA) 1 mg PRN   dextrose 100 mL/hr PRN   meclizine 25 mg TID PRN   calcium elemental 500 mg Daily PRN   sodium chloride flush 10 mL PRN   magnesium hydroxide 30 mL Daily PRN   ondansetron 4 mg Q6H PRN   morphine 2 mg Q4H PRN   HYDROcodone 5 mg - acetaminophen 1 tablet Q6H PRN   acetaminophen 650 mg Q4H PRN   oxyCODONE 5 mg Q4H PRN   Or     oxyCODONE 10 mg Q4H PRN         PHYSICAL EXAM:     CONSTITUTIONAL: Alert and oriented times 3, no acute distress and cooperative to examination.     ABDOMEN: no rebound tenderness noted  bowel sounds normal  scars from previous incisions low midline and laparoscopic, healing well, no signs of infection  WOUND/INCISION:  healing well, no drainage, no erythema  }      LABS:     CBC: Recent Labs      09/09/18   0915  09/11/18   0413   WBC  15.0*  12.8*   RBC  3.46*  3.04*   HGB  10.2*  8.9*   HCT 30.6*  27.0*   MCV  88.4  88.8   MCH  29.5  29.3   MCHC  33.3  33.0   PLT  199  159   MPV  12.2  11.3      Last 3 CMP:   Recent Labs      09/09/18   0910   09/09/18   2000  09/10/18   0340  09/11/18   0413   NA  140   --    --   139  140   K  3.1*   < >  4.0  4.2  3.9   CL  106   --    --   110  107   CO2  19*   --    --   20*  21*   BUN  17   --    --   20  18   CREATININE  0.9   --    --   0.9  0.8   GLUCOSE  196*   --    --   141*  150*   CALCIUM  7.4*   --    --   7.5*  7.8*    < > = values in this interval not displayed. Troponin: No results for input(s): TROPONINI in the last 72 hours. Calcium:   Lab Results   Component Value Date    CALCIUM 7.8 09/11/2018    CALCIUM 7.5 09/10/2018    CALCIUM 7.4 09/09/2018      Ionized Calcium: No results found for: IONCA   Lipids: No results for input(s): CHOL, HDL in the last 72 hours. Invalid input(s): LDLCALCU  INR: No results for input(s): INR in the last 72 hours. Lactic Acid:   Lab Results   Component Value Date    LACTA 1.5 09/09/2018            Urine Culture, Routine  (Abnormal) 09/05/2018  2:00  [facility]      Mixed growth.  The mixture of organisms present represents   both organisms that may cause urinary tract infections and   organisms that are not a common cause of urinary tract   infections and are possibly distal urethral lisa           DVT prophylaxis: [] Lovenox                                 [x] SCDs                                 [] SQ Heparin                                 [] Encourage ambulation, low risk for DVT, no chemical or                                      mechanical prophylaxis necessary              [] Already on Anticoagulation      RADIOLOGY:      ct scan no abscess or free air      Yonis Blake M.D.  FACS  Electronically signed 9/11/2018 at 7:32 AM

## 2018-09-11 NOTE — CARE COORDINATION
DISCHARGE BARRIERS  9/11/18, 12:06 PM    Reason for Referral: Discharge planning, from home alone  Mental Status: patient alert and oriented  Decision Making: Patient makes her own decisions with the support of her daughters. Family/Social/Home Environment: Assessment completed with patient and daughter while in ICU. Patient is from home alone and lives on 4th floor of an apartment building with access to an elevator. Patient is independent in all ADL's, does not use assistive devices to ambulate around the house and still drives. Patient states her children are available to help in the home if needed. Daughter states it is too early to discuss discharge plan at this time, sw offered support. Current Services: No current services. Current Equipment: Rolling walker, walk in shower, and toilet raiser. Payment Source: [facility]  Concerns or Barriers to Discharge: No barriers mentioned at this time. Collabrative List of ECF/HH were provided: No, family not ready to discuss discharge planning options. Teach Back Method used with patient regarding care plan and discharge planning. Patient verbalized understanding of the plan of care and contribute to goal setting. Anticipated Needs/Discharge Plan: Patient from home alone and may need additional assistance when ready for discharge. SW spoke to patient and daughter, not ready to discuss options at this time, states patient is going to be here a long time. SW offered support and will continue to monitor patients medical progress, Orestes watching.      Electronically signed by GIRISH Torres on 9/11/2018 at 12:06 PM

## 2018-09-12 LAB
ANION GAP SERPL CALCULATED.3IONS-SCNC: 8 MEQ/L (ref 8–16)
BUN BLDV-MCNC: 21 MG/DL (ref 7–22)
CALCIUM IONIZED: 1.01 MMOL/L (ref 1.12–1.32)
CALCIUM SERPL-MCNC: 7.6 MG/DL (ref 8.5–10.5)
CHLORIDE BLD-SCNC: 108 MEQ/L (ref 98–111)
CO2: 23 MEQ/L (ref 23–33)
CREAT SERPL-MCNC: 0.9 MG/DL (ref 0.4–1.2)
ERYTHROCYTE [DISTWIDTH] IN BLOOD BY AUTOMATED COUNT: 14.1 % (ref 11.5–14.5)
ERYTHROCYTE [DISTWIDTH] IN BLOOD BY AUTOMATED COUNT: 45.1 FL (ref 35–45)
GFR SERPL CREATININE-BSD FRML MDRD: 60 ML/MIN/1.73M2
GLUCOSE BLD-MCNC: 123 MG/DL (ref 70–108)
GLUCOSE BLD-MCNC: 126 MG/DL (ref 70–108)
GLUCOSE BLD-MCNC: 139 MG/DL (ref 70–108)
GLUCOSE BLD-MCNC: 146 MG/DL (ref 70–108)
GLUCOSE BLD-MCNC: 229 MG/DL (ref 70–108)
HCT VFR BLD CALC: 24.3 % (ref 37–47)
HEMOGLOBIN: 8 GM/DL (ref 12–16)
MCH RBC QN AUTO: 29.7 PG (ref 26–33)
MCHC RBC AUTO-ENTMCNC: 32.9 GM/DL (ref 32.2–35.5)
MCV RBC AUTO: 90.3 FL (ref 81–99)
PLATELET # BLD: 138 THOU/MM3 (ref 130–400)
PMV BLD AUTO: 11.6 FL (ref 9.4–12.4)
POTASSIUM REFLEX MAGNESIUM: 3.9 MEQ/L (ref 3.5–5.2)
RBC # BLD: 2.69 MILL/MM3 (ref 4.2–5.4)
SODIUM BLD-SCNC: 139 MEQ/L (ref 135–145)
WBC # BLD: 8.6 THOU/MM3 (ref 4.8–10.8)

## 2018-09-12 PROCEDURE — 99232 SBSQ HOSP IP/OBS MODERATE 35: CPT | Performed by: SURGERY

## 2018-09-12 PROCEDURE — 82948 REAGENT STRIP/BLOOD GLUCOSE: CPT

## 2018-09-12 PROCEDURE — 97530 THERAPEUTIC ACTIVITIES: CPT

## 2018-09-12 PROCEDURE — 6370000000 HC RX 637 (ALT 250 FOR IP): Performed by: INTERNAL MEDICINE

## 2018-09-12 PROCEDURE — 6360000002 HC RX W HCPCS: Performed by: THORACIC SURGERY (CARDIOTHORACIC VASCULAR SURGERY)

## 2018-09-12 PROCEDURE — 85027 COMPLETE CBC AUTOMATED: CPT

## 2018-09-12 PROCEDURE — 99233 SBSQ HOSP IP/OBS HIGH 50: CPT | Performed by: INTERNAL MEDICINE

## 2018-09-12 PROCEDURE — 97116 GAIT TRAINING THERAPY: CPT

## 2018-09-12 PROCEDURE — 2709999900 HC NON-CHARGEABLE SUPPLY

## 2018-09-12 PROCEDURE — 97110 THERAPEUTIC EXERCISES: CPT

## 2018-09-12 PROCEDURE — 2580000003 HC RX 258: Performed by: THORACIC SURGERY (CARDIOTHORACIC VASCULAR SURGERY)

## 2018-09-12 PROCEDURE — 2500000003 HC RX 250 WO HCPCS: Performed by: INTERNAL MEDICINE

## 2018-09-12 PROCEDURE — 36591 DRAW BLOOD OFF VENOUS DEVICE: CPT

## 2018-09-12 PROCEDURE — 6370000000 HC RX 637 (ALT 250 FOR IP): Performed by: PHYSICIAN ASSISTANT

## 2018-09-12 PROCEDURE — 6370000000 HC RX 637 (ALT 250 FOR IP): Performed by: THORACIC SURGERY (CARDIOTHORACIC VASCULAR SURGERY)

## 2018-09-12 PROCEDURE — 6360000002 HC RX W HCPCS: Performed by: NURSE PRACTITIONER

## 2018-09-12 PROCEDURE — 82330 ASSAY OF CALCIUM: CPT

## 2018-09-12 PROCEDURE — 2700000000 HC OXYGEN THERAPY PER DAY

## 2018-09-12 PROCEDURE — 80048 BASIC METABOLIC PNL TOTAL CA: CPT

## 2018-09-12 PROCEDURE — 2580000003 HC RX 258: Performed by: INTERNAL MEDICINE

## 2018-09-12 PROCEDURE — 2000000000 HC ICU R&B

## 2018-09-12 RX ORDER — AMIODARONE HYDROCHLORIDE 200 MG/1
200 TABLET ORAL DAILY
Status: DISCONTINUED | OUTPATIENT
Start: 2018-09-12 | End: 2018-09-17 | Stop reason: HOSPADM

## 2018-09-12 RX ADMIN — AMIODARONE HYDROCHLORIDE 200 MG: 200 TABLET ORAL at 13:15

## 2018-09-12 RX ADMIN — Medication 4 MG: at 20:00

## 2018-09-12 RX ADMIN — Medication 10 ML: at 10:16

## 2018-09-12 RX ADMIN — PRAVASTATIN SODIUM 20 MG: 20 TABLET ORAL at 20:00

## 2018-09-12 RX ADMIN — ASPIRIN 325 MG: 325 TABLET, DELAYED RELEASE ORAL at 10:15

## 2018-09-12 RX ADMIN — INSULIN LISPRO 1 UNITS: 100 INJECTION, SOLUTION INTRAVENOUS; SUBCUTANEOUS at 20:00

## 2018-09-12 RX ADMIN — DOCUSATE SODIUM 100 MG: 100 CAPSULE, LIQUID FILLED ORAL at 19:59

## 2018-09-12 RX ADMIN — PANTOPRAZOLE SODIUM 40 MG: 40 TABLET, DELAYED RELEASE ORAL at 10:15

## 2018-09-12 RX ADMIN — Medication 1.5 G: at 11:42

## 2018-09-12 RX ADMIN — POTASSIUM CHLORIDE 10 MEQ: 750 TABLET, EXTENDED RELEASE ORAL at 20:00

## 2018-09-12 RX ADMIN — TIMOLOL MALEATE 1 DROP: 5 SOLUTION/ DROPS OPHTHALMIC at 20:00

## 2018-09-12 RX ADMIN — CILOSTAZOL 100 MG: 100 TABLET ORAL at 17:59

## 2018-09-12 RX ADMIN — PANTOPRAZOLE SODIUM 40 MG: 40 TABLET, DELAYED RELEASE ORAL at 17:59

## 2018-09-12 RX ADMIN — MIDODRINE HYDROCHLORIDE 10 MG: 10 TABLET ORAL at 10:15

## 2018-09-12 RX ADMIN — OXYCODONE HYDROCHLORIDE 5 MG: 5 TABLET ORAL at 19:59

## 2018-09-12 RX ADMIN — OXYCODONE HYDROCHLORIDE 5 MG: 5 TABLET ORAL at 08:58

## 2018-09-12 RX ADMIN — AMIODARONE HYDROCHLORIDE 0.5 MG/MIN: 1.8 INJECTION, SOLUTION INTRAVENOUS at 00:18

## 2018-09-12 RX ADMIN — Medication 1 UNITS: at 13:16

## 2018-09-12 RX ADMIN — ENOXAPARIN SODIUM 80 MG: 80 INJECTION SUBCUTANEOUS at 10:15

## 2018-09-12 RX ADMIN — POTASSIUM CHLORIDE 10 MEQ: 750 TABLET, EXTENDED RELEASE ORAL at 10:15

## 2018-09-12 RX ADMIN — MIDODRINE HYDROCHLORIDE 10 MG: 10 TABLET ORAL at 13:15

## 2018-09-12 RX ADMIN — CILOSTAZOL 100 MG: 100 TABLET ORAL at 10:21

## 2018-09-12 RX ADMIN — ENOXAPARIN SODIUM 80 MG: 80 INJECTION SUBCUTANEOUS at 20:00

## 2018-09-12 RX ADMIN — MIDODRINE HYDROCHLORIDE 10 MG: 10 TABLET ORAL at 17:59

## 2018-09-12 ASSESSMENT — PAIN DESCRIPTION - ONSET: ONSET: GRADUAL

## 2018-09-12 ASSESSMENT — PAIN DESCRIPTION - LOCATION
LOCATION: LEG

## 2018-09-12 ASSESSMENT — PAIN DESCRIPTION - PROGRESSION
CLINICAL_PROGRESSION: GRADUALLY WORSENING

## 2018-09-12 ASSESSMENT — PAIN DESCRIPTION - DESCRIPTORS
DESCRIPTORS: ACHING;CONSTANT;RADIATING;SHARP
DESCRIPTORS: ACHING;DISCOMFORT;HEAVINESS
DESCRIPTORS: ACHING

## 2018-09-12 ASSESSMENT — PAIN SCALES - GENERAL
PAINLEVEL_OUTOF10: 5

## 2018-09-12 ASSESSMENT — PAIN DESCRIPTION - PAIN TYPE
TYPE: SURGICAL PAIN
TYPE: OTHER (COMMENT)
TYPE: SURGICAL PAIN

## 2018-09-12 ASSESSMENT — PAIN DESCRIPTION - FREQUENCY
FREQUENCY: CONTINUOUS

## 2018-09-12 ASSESSMENT — PAIN DESCRIPTION - ORIENTATION
ORIENTATION: RIGHT

## 2018-09-12 NOTE — PROGRESS NOTES
Training, Endurance Training, Transfer Training, Home Exercise Program    Goals:  Patient goals : Go home     Short term goals  Time Frame for Short term goals: 2 weeks   Short term goal 1: supine to sit and return with SBA to get in and out of bed   Short term goal 2: sit to stand with SBA to get on and off various surfaces  Short term goal 3: ambulate with  feet with SBA to walk safely in the home    Long term goals  Time Frame for Long term goals : NA due to short ELOS            AM-PAC Inpatient Mobility without Stair Climbing Raw Score : 14  AM-PAC Inpatient without Stair Climbing T-Scale Score : 40.85  Mobility Inpatient CMS 0-100% Score: 53.33  Mobility Inpatient without Stair CMS G-Code Modifier : CK

## 2018-09-12 NOTE — FLOWSHEET NOTE
09/12/18 1256   Encounter Summary   Services provided to: Patient and family together   Referral/Consult From: [facility];Family members   Continue Visiting Yes  (9/12/18)   Complexity of Encounter Low   Length of Encounter 15 minutes   Spiritual/Restorationism   Type Spiritual support   Assessment Approachable   Intervention Nurtured hope;Prayer   Outcome Expressed gratitude   S: The patient was Unable/ Resting to respond but the pts. Family (pt's daughter's) was actively                       present. I wanted to be with the family and ask if there were any Spiritual   needs. O:   The patient (wasnt able to respond) and the family was actively present. The patients family stated that they would like prayer. A: I offered emotional support, nurtured hope, provided words of encouragement and a prayer of healing/comfort to the pt. The patients family stated that they were grateful for the support. P:           Continued support would be beneficial to the patient and their family.

## 2018-09-12 NOTE — PROGRESS NOTES
Critical CareProgress Note    Patient:  Ethan Eduardo      Unit/Bed:4D-14/014-A    YOB: 1935    MRN: [id number]       Acct: [de-identified]     PCP: Joan Hogan    Date of Admission: 9/5/2018    Chief Complaint: Severe Peripheral Artery Disease    Hospital Course: History of severe peripheral vascular disease, long-standing hypertension and hyperlipidemia.  Patient was admitted on 9/5/18 with primarily right hip discomfort. She was diagnosed with acute right leg ischemia over 8 hours duration with anesthesia to the right foot and pulseless right leg.  Total occlusion to the right common and external iliac arteries extending to the right common femoral artery.  Patient underwent revascularization surgery on 9/5/18 with very good response.  Unfortunately, she developed an acute thrombus to the right lower extremity with recurrent ischemia.  She required declotting a right fem-tib bypass 9/6/18.  She was transferred to the intensive care unit where pulmonary was consulted to assume her care on 9/6/18. On 9/7, Pt's HR was in 140s with Dopamine and Levophed. Dopamine was discontinued and Levophed was increased. Patient has continued to require pressors. 9/10 questions for stool in urine. CT scan did not see a colovesical fistula, but could not be excluded. Activated charcoal was given, flecks seen in razo. ATB were changed to Rocephin from Cipro 9/10. On 9/11 Pt was started on Amiodarone drip and ACTH stimulation test was done. Rocephin was stopped due to no abscess on CT.      Subjective: Pt's HR in the 70-80s this AM. Pt has no complaints today. Pt states that she slept well until about 4am when the floor became loud. Pt denies CP, SOB, HA, heart palpitations.        Medications:  Reviewed    Infusion Medications    amiodarone 0.5 mg/min (09/12/18 0018)    dextrose      norepinephrine 8 mcg/min (09/11/18 9790)     Scheduled Medications    calcium gluconate IVPB  1.5 g Intravenous Once    through the bladder wall to suggest such. Note that there is moderate presacral soft tissue stranding following recent surgery. No definite abscess or    loculated fluid collection is seen. . Mild soft tissue swelling in the right inguinal region with a few small air bubbles, related to recent surgery. IMPRESSION:    1. Constipation. Diverticulosis coli. Moderate size hiatus hernia. Infrarenal abdominal aortic aneurysm. 2. Mild atelectasis/pneumonia both posterior gross phrenic angles and right middle lobe. 11 mm nodule right middle lobe, possibly an inflammatory nodule. Follow-up CT thorax in several weeks would be helpful for further evaluation. 3. Postsurgical soft tissue stranding in the pelvis extending into the lower abdomen and perinephric space. No definite abscess or loculated fluid collection is seen. 4. Meredith catheter in urinary bladder. Relatively large amount of air in urinary bladder. Mild focal wall thickening of the bladder lungs posterior/superior aspect the left of midline and no clear separation between the bladder and adjacent bowel loop at    this site. I do not see any definite tracking of air through the bladder wall but a colovesical fistula cannot definitely be excluded. If further evaluation desired, a barium enema or water soluble contrast enema would be helpful. **This report has been created using voice recognition software. It may contain minor errors which are inherent in voice recognition technology. **      Final report electronically signed by Dr. Malvin Seth on 9/10/2018 12:22 PM      XR CHEST PORTABLE   Final Result   1. Borderline heart size. Right jugular line, catheter tip in SVC. Moderate degenerative changes both shoulders. 2. Minimal residual atelectasis/pneumonia right midlung and retrocardiac region left lung base. No effusion is seen. Overall appearance improved from prior. **This report has been created using voice recognition software.   It may contain minor errors which are inherent in voice recognition technology. **      Final report electronically signed by Dr. Terra Wright on 9/9/2018 9:55 AM      XR CHEST PORTABLE   Final Result      Improved infiltrates in the right upper lobe and right base with otherwise stable interstitial infiltrates in both lungs. **This report has been created using voice recognition software. It may contain minor errors which are inherent in voice recognition technology. **      Final report electronically signed by Dr. Megan Holloway on 9/7/2018 6:09 AM      XR CHEST PORTABLE   Final Result   1. There is a right jugular central venous War[place]-Eboni catheter with the distal tip looped and overlying the region of the pulmonary trunk. This catheter should be repositioned. 2. There is a skin fold along the lateral margin of the right mid/lower chest. There are interstitial infiltrates throughout both lung fields with the differential including however are not limited to pulmonary edema and pneumonia. There is no pleural    effusion. **This report has been created using voice recognition software. It may contain minor errors which are inherent in voice recognition technology. **      Final report electronically signed by Dr. Maik Crawford on 9/6/2018 7:11 AM      IR ANGIOGRAM EXTREMITY RIGHT   Final Result      IR ANGIOGRAM EXTREMITY RIGHT   Final Result   1. 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 2. Runoff of the right lower extremity was performed revealing a short segment occlusion of the right popliteal artery above the right knee joint which is reconstituted by collaterals. There is also moderate stenosis of the distal right SFA.  In addition,    there is chronic-appearing occlusion of the tibioperoneal trunk with reconstitution of the posterior tibial artery by collaterals which is seen crossing the right ankle. 3. There is chronic occlusion of the distal right anterior tibial artery which gives off collaterals crossing the right ankle to reconstitute the dorsalis pedis artery. 4. TPA was not performed due to the fact that the patient had recent major abdominal surgery for partial colectomy. 5. Significant flow-limiting stenosis at the origin of the left external iliac artery. This was stented with an 8 x 6 mm Ever-Flex self-expanding stent. **This report has been created using voice recognition software. It may contain minor errors which are inherent in voice recognition technology. **      Final report electronically signed by Dr. Sy Rousseau on 9/5/2018 3:04 PM      VL DUP LOWER EXTREMITY ARTERIES BILATERAL   Final Result   1. Absent flow within the right lower extremity arterial vasculature from the right common femoral artery to the distal runoff vessels within the right ankle. The RAMOS on the right is unobtainable due to 2 absent flow. 2. No sonographic evidence of significant flow-limiting stenosis is demonstrated within the left lower extremity enteral vasculature by ultrasound. However, there is a moderately diminished RAMOS on the left measuring 0.68. 3. These findings were discussed with the referring ER clinician and the vascular surgeon at the time of dictation. **This report has been created using voice recognition software. It may contain minor errors which are inherent in voice recognition technology. **      Final report electronically signed by Dr. Sy Rousseau on 9/5/2018 10:06 AM      XR HIP RIGHT (2-3 VIEWS)   Final Result   1. Unremarkable right hip series. **This report has been created using voice recognition software. It may contain minor errors which are inherent in voice recognition technology. **      Final report electronically signed by Dr. Ariella Plata on 9/5/2018 6:58 AM      XR LUMBAR SPINE (2-3 VIEWS)   Final Result   Mild has been stable with SBP ranging from     4. Diverticulosis S/P Anterior Resection and Sigmoid Coloproctostomy on 8/20/2018  5. Colorectal Fistula    - CT scan was not definitive on a fistula, no abscess. There were flecks of charcoal in her urine and increased sediment in the razo. Since, the fistula is draining, no antibiotics at this time since this is not a UTI. - Per General Surgery, watchful waiting     6. Hyperglycemia    - Baseline A1C 5.5%, this is stress induced. - Continue to monitor with accuchecks and SSI     7. Leukocytosis, resolved    - WBC trending down compared to yesterday. Today 8.6, yesterday 12.8    8. ALEJANDRO, resolved    - Creatinine today was 0.9    9. AAA, 5cm infrarenal    - Pt instructed to see cardiovascular surgeon in 2 weeks after discharge to discuss plan. 10. Metabolic Acidosis   11. Acute diarrhea    - C. Diff was negative, Moderate amounts of fecal leukocytes     12. Normocytic, Normochromic Anemia    - Hgb trending down from yesterday. 8.0 today, 8.9 yesterday. 13. DLD    Continue home medication: Pravachol      14. History of HTN    - Pt's home medications have been held since BP is being supported by Levophed. 15. Pulmonary nodule, 11mm    - Will need outpatient follow up. 16. Hiatus Hernia         Electronically signed by MABLE Hobbs on 9/12/2018 at 7:43 AM     Patient evaluated by me. Collaboration with PA. Still trying to wean Levophed. On PO midodrine. Transitioned to PO amiodarone. Referral to [facility]. Electronically signed by Hermelinda Maddox MD

## 2018-09-12 NOTE — PROGRESS NOTES
68 14 96 %   09/12/18 0233 117/63 - - 74 15 97 %   09/12/18 0203 (!) 109/54 - - 68 15 96 %   09/12/18 0133 (!) 110/55 - - 71 17 96 %   09/12/18 0103 (!) 115/56 - - 71 16 96 %   09/12/18 0033 (!) 104/54 - - 73 18 95 %   09/12/18 0003 (!) 111/53 98.2 °F (36.8 °C) Oral 72 17 96 %   09/11/18 2333 (!) 99/54 - - 96 23 96 %   09/11/18 2303 (!) 96/53 - - 97 18 96 %   09/11/18 2233 (!) 95/43 - - 91 21 96 %   09/11/18 2133 105/61 - - 100 19 94 %   09/11/18 2103 (!) 109/51 - - 93 - 95 %   09/11/18 2033 111/62 - - 97 23 95 %   09/11/18 2003 (!) 70/56 98.4 °F (36.9 °C) Oral 92 21 96 %   09/11/18 1933 112/61 - - 94 22 94 %   09/11/18 1902 117/60 - - 89 22 95 %   09/11/18 1802 119/61 - - 78 24 95 %   09/11/18 1703 91/72 - - 86 27 97 %   09/11/18 1603 114/70 98.2 °F (36.8 °C) CORE 83 21 96 %   09/11/18 1502 (!) 108/57 - - 81 20 94 %   09/11/18 1402 101/66 - - 96 24 98 %   09/11/18 1302 115/71 - - 99 19 95 %   09/11/18 1202 97/63 98.4 °F (36.9 °C) CORE 133 20 95 %   09/11/18 1200 - - - - - 95 %   09/11/18 1133 (!) 90/56 - - 105 20 94 %   09/11/18 1100 - - - - - 95 %   09/11/18 1002 (!) 89/52 - - 110 19 96 %   09/11/18 0902 (!) 70/57 - - 200 21 98 %   09/11/18 0802 100/63 98.1 °F (36.7 °C) CORE 112 20 94 %   09/11/18 0800 - 98.1 °F (36.7 °C) - - - -         Intake/Output Summary (Last 24 hours) at 09/12/18 0703  Last data filed at 09/12/18 0509   Gross per 24 hour   Intake             1836 ml   Output             1100 ml   Net              736 ml       In: 991 [P.O.:340; I.V.:651]  Out: 600 [Urine:600]    I/O last 3 completed shifts:   In: 1836 [P.O.:580; I.V.:1256]  Out: 1100 [Urine:1100]       Date 09/12/18 0000 - 09/12/18 2359   Shift 8032-0599 3003-9900 4801-5521 24 Hour Total   I  N  T  A  K  E   P.O.  (mL/kg/hr) 100   100    I.V.  (mL/kg) 651  (8)   651  (8)    Shift Total  (mL/kg) 751  (9.2)   751  (9.2)   O  U  T  P  U  T   Urine  (mL/kg/hr) 600   600    Shift Total  (mL/kg) 600  (7.3)   600  (7.3)   Weight (kg) 81.6 81.6 81.6

## 2018-09-12 NOTE — PROGRESS NOTES
/<1ST 25 SCM N/A 9/6/2018    RE-EXPLORATION RIGHT GROIN, DECLOTTING OF FEMORAL BYPASS GRAFT performed by Trent Alexander MD at [address] [place] [address] EGD TRANSORAL BIOPSY SINGLE/MULTIPLE Left 11/27/2017    EGD BIOPSY performed by Yemi Wagoner MD at [address], PARTIAL, W/ANAST N/A 8/20/2018    ROBOT ASSISTED COLECTOMY (LOW ANTERIOR) performed by Lou See MD at [address] [place] [address] OFFICE/OUTPT [address] N/A 9/5/2018    RIGHT FEMORAL EMBOLECTOMY, INTRAOPERATIVE ARTERIOGRAM, RIGHT ILIAC EMBOLECTOMY, RIGHT COMMON AND EXTERNAL ILIAC STENTING, RIGHT FEMORAL TO ANTERIOR POPLITEAL BYPASS WITH 6MM GORTEX GRAFT performed by Trent Alexander MD at [address] 11/27/2017    EGD SUBMUCOSAL/BOTOX INJECTION performed by Yemi Wagoner MD at [facility]       Restrictions/Precautions:  Fall Risk                            Prior Level of Function:  ADL Assistance: Independent  Homemaking Assistance: Independent  Ambulation Assistance: Independent  Transfer Assistance: Independent  Additional Comments: Pt reporting she was able to complete her own ADL tasks at home. No Ad used prior to admission    Subjective       Subjective: Pt supine in bed upon arrival, agreeable to OT session with minimal encouragement. Daughter present throughout and encouraging. Comments: RN ok'd session and present to assist peggy. Pain:  Pain Assessment  Patient Currently in Pain: Yes  Pain Assessment: 0-10  Pain Level:  (did not quantify)  Pain Type: Surgical pain  Pain Location: Leg  Pain Orientation: Right  Pain Descriptors: Aching;Constant;Radiating; Sharp (pt c/o increased pain with slight touch to RLE (wires from telemetry/pulse ox))  Pain Frequency: Continuous  Clinical Progression: Gradually worsening (with weight bearing/increased activity)  Pain Intervention(s): Repositioned; Emotional support;Rest;Elevation (RN reported pt had received pain meds ~1 hour prior to session starting)  Pre Treatment Pain Screening  Intervention List: Patient able to continue with treatment    Objective  Overall Cognitive Status: WFL    Perception  Overall Perceptual Status: WFL                                                              ADL  LE Dressing: Dependent/Total (donning socks)  Additional Comments: Pt declined any other ADLs at this time due to pain. Bed mobility  Supine to Sit: Moderate assistance (assist at RLE, trunk)  Scooting: Moderate assistance (advancing hips forward to EOB)    Transfers  Sit to stand: Minimal assistance (X2 from EOB; completed X2 sets)  Stand to sit: Contact guard assistance (X2; to EOB X1 set; to bedside chair X1 set; cues for proper alignment, backing up to chair, and reaching back for armrests)       Balance  Sitting Balance: Stand by assistance (at EOB)  Standing Balance: Contact guard assistance (X2)     Time: X30 seconds; X2 minutes  Activity: prep for mobility/dynamic standing (weight shifting/marching)  Comment: BUE support on RW. Initially demoed forward flexed posture although with cues was able to correct to more upright position. Functional Mobility  Functional - Mobility Device: Rolling Walker  Activity: Other (EOB>bedside chair)  Assist Level: Contact guard assistance (X2)  Functional Mobility Comments: Slow pace, decreased weightbearing through RLE. Moderate cues for sequencing of steps and putting weight through BUEs to compensate for pain in RLE.    Apparatus Needs: O2 (3L) Training, Safety Education & Training    Goals:  Patient goals : go home     Short term goals  Time Frame for Short term goals: 2 weeks   Short term goal 1: Pt to complete sit to stand transfers from various surfaces including toilet/BSC with mod A x1 and no vcs for safety to increse indep with toieltign tasks   Short term goal 2: Pt to complete functional mobility to bedside chair with CGA x2 an dno vcs for sequencing of tasks in prep for mobility to/from bathroom for self care tasks   Short term goal 3: Pt to complete UB ADL tasks with SBA and LB AL tasks with mod A   Short term goal 4: pt to complete bilateral UE resistance strengthening HEP to increase UB strength for ease of upright standing during ADL tasks   Long term goals  Time Frame for Long term goals : not est d/t RADHAOS

## 2018-09-13 ENCOUNTER — APPOINTMENT (OUTPATIENT)
Dept: GENERAL RADIOLOGY | Age: 83
DRG: 271 | End: 2018-09-13
Payer: MEDICARE

## 2018-09-13 LAB
ACTH: 8 PG/ML (ref 6–58)
ANION GAP SERPL CALCULATED.3IONS-SCNC: 8 MEQ/L (ref 8–16)
BUN BLDV-MCNC: 19 MG/DL (ref 7–22)
CALCIUM IONIZED: 1.14 MMOL/L (ref 1.12–1.32)
CALCIUM SERPL-MCNC: 7.9 MG/DL (ref 8.5–10.5)
CHLORIDE BLD-SCNC: 106 MEQ/L (ref 98–111)
CO2: 24 MEQ/L (ref 23–33)
CREAT SERPL-MCNC: 0.8 MG/DL (ref 0.4–1.2)
ERYTHROCYTE [DISTWIDTH] IN BLOOD BY AUTOMATED COUNT: 14.4 % (ref 11.5–14.5)
ERYTHROCYTE [DISTWIDTH] IN BLOOD BY AUTOMATED COUNT: 47.2 FL (ref 35–45)
GFR SERPL CREATININE-BSD FRML MDRD: 69 ML/MIN/1.73M2
GLUCOSE BLD-MCNC: 109 MG/DL (ref 70–108)
GLUCOSE BLD-MCNC: 113 MG/DL (ref 70–108)
GLUCOSE BLD-MCNC: 171 MG/DL (ref 70–108)
GLUCOSE BLD-MCNC: 173 MG/DL (ref 70–108)
HCT VFR BLD CALC: 25.1 % (ref 37–47)
HEMOGLOBIN: 7.9 GM/DL (ref 12–16)
MCH RBC QN AUTO: 29.3 PG (ref 26–33)
MCHC RBC AUTO-ENTMCNC: 31.5 GM/DL (ref 32.2–35.5)
MCV RBC AUTO: 93 FL (ref 81–99)
PLATELET # BLD: 157 THOU/MM3 (ref 130–400)
PMV BLD AUTO: 11.4 FL (ref 9.4–12.4)
POTASSIUM REFLEX MAGNESIUM: 4.4 MEQ/L (ref 3.5–5.2)
RBC # BLD: 2.7 MILL/MM3 (ref 4.2–5.4)
SODIUM BLD-SCNC: 138 MEQ/L (ref 135–145)
WBC # BLD: 10.5 THOU/MM3 (ref 4.8–10.8)

## 2018-09-13 PROCEDURE — 97535 SELF CARE MNGMENT TRAINING: CPT

## 2018-09-13 PROCEDURE — 36592 COLLECT BLOOD FROM PICC: CPT

## 2018-09-13 PROCEDURE — 97530 THERAPEUTIC ACTIVITIES: CPT

## 2018-09-13 PROCEDURE — 6370000000 HC RX 637 (ALT 250 FOR IP): Performed by: INTERNAL MEDICINE

## 2018-09-13 PROCEDURE — 2000000000 HC ICU R&B

## 2018-09-13 PROCEDURE — 6360000004 HC RX CONTRAST MEDICATION: Performed by: SURGERY

## 2018-09-13 PROCEDURE — 82948 REAGENT STRIP/BLOOD GLUCOSE: CPT

## 2018-09-13 PROCEDURE — 6370000000 HC RX 637 (ALT 250 FOR IP): Performed by: THORACIC SURGERY (CARDIOTHORACIC VASCULAR SURGERY)

## 2018-09-13 PROCEDURE — 80048 BASIC METABOLIC PNL TOTAL CA: CPT

## 2018-09-13 PROCEDURE — 97110 THERAPEUTIC EXERCISES: CPT

## 2018-09-13 PROCEDURE — 2709999900 HC NON-CHARGEABLE SUPPLY

## 2018-09-13 PROCEDURE — 99232 SBSQ HOSP IP/OBS MODERATE 35: CPT | Performed by: SURGERY

## 2018-09-13 PROCEDURE — 82330 ASSAY OF CALCIUM: CPT

## 2018-09-13 PROCEDURE — A4641 RADIOPHARM DX AGENT NOC: HCPCS | Performed by: SURGERY

## 2018-09-13 PROCEDURE — 2580000003 HC RX 258: Performed by: INTERNAL MEDICINE

## 2018-09-13 PROCEDURE — 99233 SBSQ HOSP IP/OBS HIGH 50: CPT | Performed by: INTERNAL MEDICINE

## 2018-09-13 PROCEDURE — 85027 COMPLETE CBC AUTOMATED: CPT

## 2018-09-13 PROCEDURE — 6360000002 HC RX W HCPCS: Performed by: NURSE PRACTITIONER

## 2018-09-13 PROCEDURE — 74270 X-RAY XM COLON 1CNTRST STD: CPT

## 2018-09-13 PROCEDURE — 6370000000 HC RX 637 (ALT 250 FOR IP): Performed by: PHYSICIAN ASSISTANT

## 2018-09-13 RX ORDER — CIPROFLOXACIN 500 MG/1
500 TABLET, FILM COATED ORAL EVERY 12 HOURS SCHEDULED
Status: DISCONTINUED | OUTPATIENT
Start: 2018-09-13 | End: 2018-09-17 | Stop reason: HOSPADM

## 2018-09-13 RX ADMIN — INSULIN LISPRO 1 UNITS: 100 INJECTION, SOLUTION INTRAVENOUS; SUBCUTANEOUS at 20:20

## 2018-09-13 RX ADMIN — AMIODARONE HYDROCHLORIDE 200 MG: 200 TABLET ORAL at 11:34

## 2018-09-13 RX ADMIN — PRAVASTATIN SODIUM 20 MG: 20 TABLET ORAL at 20:18

## 2018-09-13 RX ADMIN — CILOSTAZOL 100 MG: 100 TABLET ORAL at 16:11

## 2018-09-13 RX ADMIN — BARIUM SULFATE 600 ML: 1.05 SUSPENSION ORAL; RECTAL at 09:34

## 2018-09-13 RX ADMIN — HYDROCODONE BITARTRATE AND ACETAMINOPHEN 1 TABLET: 5; 325 TABLET ORAL at 18:51

## 2018-09-13 RX ADMIN — CIPROFLOXACIN 500 MG: 500 TABLET, FILM COATED ORAL at 11:34

## 2018-09-13 RX ADMIN — PANTOPRAZOLE SODIUM 40 MG: 40 TABLET, DELAYED RELEASE ORAL at 11:33

## 2018-09-13 RX ADMIN — OXYCODONE HYDROCHLORIDE 5 MG: 5 TABLET ORAL at 20:18

## 2018-09-13 RX ADMIN — Medication 1 UNITS: at 13:10

## 2018-09-13 RX ADMIN — CILOSTAZOL 100 MG: 100 TABLET ORAL at 11:34

## 2018-09-13 RX ADMIN — ASPIRIN 325 MG: 325 TABLET, DELAYED RELEASE ORAL at 11:33

## 2018-09-13 RX ADMIN — TIMOLOL MALEATE 1 DROP: 5 SOLUTION/ DROPS OPHTHALMIC at 20:18

## 2018-09-13 RX ADMIN — POTASSIUM CHLORIDE 10 MEQ: 750 TABLET, EXTENDED RELEASE ORAL at 11:33

## 2018-09-13 RX ADMIN — MIDODRINE HYDROCHLORIDE 10 MG: 10 TABLET ORAL at 11:34

## 2018-09-13 RX ADMIN — HYDROCODONE BITARTRATE AND ACETAMINOPHEN 1 TABLET: 5; 325 TABLET ORAL at 12:06

## 2018-09-13 RX ADMIN — POTASSIUM CHLORIDE 10 MEQ: 750 TABLET, EXTENDED RELEASE ORAL at 20:18

## 2018-09-13 RX ADMIN — CIPROFLOXACIN 500 MG: 500 TABLET, FILM COATED ORAL at 20:17

## 2018-09-13 RX ADMIN — MIDODRINE HYDROCHLORIDE 10 MG: 10 TABLET ORAL at 16:11

## 2018-09-13 RX ADMIN — ENOXAPARIN SODIUM 80 MG: 80 INJECTION SUBCUTANEOUS at 20:18

## 2018-09-13 RX ADMIN — HYDROCODONE BITARTRATE AND ACETAMINOPHEN 1 TABLET: 5; 325 TABLET ORAL at 00:39

## 2018-09-13 RX ADMIN — ENOXAPARIN SODIUM 80 MG: 80 INJECTION SUBCUTANEOUS at 11:34

## 2018-09-13 RX ADMIN — Medication 10 ML: at 11:35

## 2018-09-13 RX ADMIN — PANTOPRAZOLE SODIUM 40 MG: 40 TABLET, DELAYED RELEASE ORAL at 16:11

## 2018-09-13 ASSESSMENT — PAIN DESCRIPTION - LOCATION
LOCATION: LEG

## 2018-09-13 ASSESSMENT — PAIN DESCRIPTION - FREQUENCY
FREQUENCY: CONTINUOUS

## 2018-09-13 ASSESSMENT — PAIN SCALES - GENERAL
PAINLEVEL_OUTOF10: 0
PAINLEVEL_OUTOF10: 5
PAINLEVEL_OUTOF10: 0
PAINLEVEL_OUTOF10: 0
PAINLEVEL_OUTOF10: 5
PAINLEVEL_OUTOF10: 0
PAINLEVEL_OUTOF10: 5
PAINLEVEL_OUTOF10: 5
PAINLEVEL_OUTOF10: 0
PAINLEVEL_OUTOF10: 5
PAINLEVEL_OUTOF10: 6
PAINLEVEL_OUTOF10: 5
PAINLEVEL_OUTOF10: 0

## 2018-09-13 ASSESSMENT — PAIN DESCRIPTION - PAIN TYPE
TYPE: SURGICAL PAIN

## 2018-09-13 ASSESSMENT — PAIN DESCRIPTION - DESCRIPTORS
DESCRIPTORS: ACHING

## 2018-09-13 ASSESSMENT — PAIN DESCRIPTION - ONSET
ONSET: ON-GOING

## 2018-09-13 ASSESSMENT — PAIN DESCRIPTION - PROGRESSION
CLINICAL_PROGRESSION: GRADUALLY WORSENING
CLINICAL_PROGRESSION: GRADUALLY WORSENING

## 2018-09-13 ASSESSMENT — PAIN DESCRIPTION - ORIENTATION
ORIENTATION: RIGHT

## 2018-09-13 NOTE — PROGRESS NOTES
Discussed eventual Laura placement with patient and the patients son. Both patient and the patients son seem to be understanding that laura would help patient rehabilitate. They seem open to the idea of Laura. However, further discussion regarding laura is still needed with patient and patients family.

## 2018-09-13 NOTE — PROGRESS NOTES
Hospitalist Progress Note    Patient:  Tracy Padilla      Unit/Bed:4D-14/014-A    YOB: 1935    MRN: [id number]       Acct: [de-identified]     PCP: Gee Ryan    Date of Admission: 9/5/2018    Chief Complaint: Severe Peripheral Artery Disease     Hospital Course:  History of severe peripheral vascular disease, long-standing hypertension and hyperlipidemia.  Patient was admitted on 9/5/18 with primarily right hip discomfort. She was diagnosed with acute right leg ischemia over 8 hours duration with anesthesia to the right foot and pulseless right leg.  Total occlusion to the right common and external iliac arteries extending to the right common femoral artery.  Patient underwent revascularization surgery on 9/5/18 with very good response.  Unfortunately, she developed an acute thrombus to the right lower extremity with recurrent ischemia.  She required declotting a right fem-tib bypass 9/6/18.  She was transferred to the intensive care unit where pulmonary was consulted to assume her care on 9/6/18. On 9/7, Pt's HR was in 140s with Dopamine and Levophed. Dopamine was discontinued and Levophed was increased. Patient has continued to require pressors. 9/10 questions for stool in urine. CT scan did not see a colovesical fistula, but could not be excluded. Activated charcoal was given, flecks seen in razo. ATB were changed to Rocephin from Cipro 9/10. On 9/11 Pt was started on Amiodarone drip and ACTH stimulation test was done. Rocephin was stopped due to no abscess on CT. On 9/13, Pt has been weaned to 2mcg/min of Levophed and General Surgery ordered a Barium Enema to evaluate the possible fistula further. Subjective: No acute events overnight. Pt states that she slept well and has less pain then yesterday.         Medications:  Reviewed    Infusion Medications    dextrose      norepinephrine 2 mcg/min (09/13/18 8671)     Scheduled Medications    ciprofloxacin  500 mg Oral 2 times per day  amiodarone  200 mg Oral Daily    sodium chloride  1,000 mL Intravenous Once    magnesium replacement protocol   Other RX Placeholder    insulin lispro  0-6 Units Subcutaneous TID WC    insulin lispro  0-3 Units Subcutaneous Nightly    calcium replacement protocol   Other RX Placeholder    potassium (CARDIAC) replacement protocol   Other RX Placeholder    timolol  1 drop Both Eyes Nightly    midodrine  10 mg Oral TID WC    enoxaparin  1 mg/kg Subcutaneous Q12H    pantoprazole  40 mg Oral BID    pravastatin  20 mg Oral Daily    cilostazol  100 mg Oral BID    potassium chloride  10 mEq Oral BID    sodium chloride flush  10 mL Intravenous 2 times per day    docusate sodium  100 mg Oral BID    aspirin  325 mg Oral Daily     PRN Meds: melatonin, glucose, dextrose, glucagon (rDNA), dextrose, meclizine, calcium elemental, sodium chloride flush, magnesium hydroxide, ondansetron, morphine, HYDROcodone 5 mg - acetaminophen, acetaminophen, oxyCODONE **OR** oxyCODONE      Intake/Output Summary (Last 24 hours) at 09/13/18 07  Last data filed at 09/13/18 0548   Gross per 24 hour   Intake           728.25 ml   Output              850 ml   Net          -121.75 ml       Diet:  DIET GENERAL; No Added Salt (3-4 GM)  Dietary Nutrition Supplements: Low Calorie High Protein Supplement    Exam:  BP (!) 97/43   Pulse 86   Temp 98 °F (36.7 °C) (Oral)   Resp 17   Ht 5' 5\" (1.651 m)   Wt 180 lb (81.6 kg)   SpO2 91%   BMI 29.95 kg/m²     General appearance: No apparent distress, elderly and cooperative. HEENT: Pupils equal, round, and reactive to light. Conjunctivae/corneas clear. Neck: Supple, with full range of motion. Trachea midline. Respiratory:  Normal respiratory effort. Clear to auscultation, bilaterally without Rales/Wheezes/Rhonchi. Cardiovascular: Regular rate and rhythm with normal S1/S2 without murmurs, rubs or gallops.   Abdomen: Soft, non-tender, non-distended with normal bowel sounds. Musculoskeletal: passive and active ROM x 4 extremities. Skin: Warm & Dry   Neurologic:  Neurovascularly intact without any focal sensory/motor deficits. Cranial nerves: II-XII intact, grossly non-focal.  Psychiatric: Alert and oriented, thought content appropriate, normal insight  Capillary Refill: Brisk,< 3 seconds   Peripheral Pulses: +2 palpable, equal bilaterally       Labs:   Recent Labs      09/11/18 0413  09/12/18   0509  09/13/18   0445   WBC  12.8*  8.6  10.5   HGB  8.9*  8.0*  7.9*   HCT  27.0*  24.3*  25.1*   PLT  159  138  157     Recent Labs      09/11/18 0413  09/12/18   0509  09/13/18   0445   NA  140  139  138   K  3.9  3.9  4.4   CL  107  108  106   CO2  21*  23  24   BUN  18  21  19   CREATININE  0.8  0.9  0.8   CALCIUM  7.8*  7.6*  7.9*     No results for input(s): AST, ALT, BILIDIR, BILITOT, ALKPHOS in the last 72 hours. No results for input(s): INR in the last 72 hours. No results for input(s): Attila Meager in the last 72 hours. Urinalysis:    Lab Results   Component Value Date    NITRU NEGATIVE 09/09/2018    WBCUA > 200 09/09/2018    BACTERIA FEW 09/09/2018    RBCUA 50-75 09/09/2018    BLOODU LARGE 09/09/2018    SPECGRAV 1.023 09/09/2018       Radiology:  CT ABDOMEN PELVIS W IV CONTRAST Additional Contrast? Oral   Final Result   Pelvis: A Meredith catheter is present in the urinary bladder and is a relatively large amount of air in the bladder. Moderate soft tissue stranding is seen in the pelvis following recent sigmoid colon surgery. There is suggestion of some mild soft tissue    thickening of the bladder wall along the posterior superior aspect the left of midline. This may simple be due to irritation from the Meredith catheter. There is no clear separation between the bladder and the adjacent bowel loop at this site. I cannot    definitely exclude a colovesical fistula although I do not identify any definite tracking of air through the bladder wall to suggest such.  Note voice recognition technology. **      Final report electronically signed by Dr. Sheryl Irving on 9/9/2018 9:55 AM      XR CHEST PORTABLE   Final Result      Improved infiltrates in the right upper lobe and right base with otherwise stable interstitial infiltrates in both lungs. **This report has been created using voice recognition software. It may contain minor errors which are inherent in voice recognition technology. **      Final report electronically signed by Dr. Vicenta Weinstein on 9/7/2018 6:09 AM      XR CHEST PORTABLE   Final Result   1. There is a right jugular central venous Hendricks-Eboni catheter with the distal tip looped and overlying the region of the pulmonary trunk. This catheter should be repositioned. 2. There is a skin fold along the lateral margin of the right mid/lower chest. There are interstitial infiltrates throughout both lung fields with the differential including however are not limited to pulmonary edema and pneumonia. There is no pleural    effusion. **This report has been created using voice recognition software. It may contain minor errors which are inherent in voice recognition technology. **      Final report electronically signed by Dr. Inge Wilcox on 9/6/2018 7:11 AM      IR ANGIOGRAM EXTREMITY RIGHT   Final Result      IR ANGIOGRAM EXTREMITY RIGHT   Final Result   1. 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 2. Runoff of the right lower extremity was performed revealing a short segment occlusion of the right popliteal artery above the right knee joint which is reconstituted by collaterals. There is also moderate stenosis of the distal right SFA. In addition,    there is chronic-appearing occlusion of the tibioperoneal trunk with reconstitution of the posterior tibial artery by collaterals which is seen crossing the right ankle.    3. There is

## 2018-09-13 NOTE — PROGRESS NOTES
/<1ST 25 SCM N/A 9/6/2018    RE-EXPLORATION RIGHT GROIN, DECLOTTING OF FEMORAL BYPASS GRAFT performed by Daniel Johnson MD at [address] EGD TRANSORAL BIOPSY SINGLE/MULTIPLE Left 11/27/2017    EGD BIOPSY performed by Roselyn Garcia MD at [address], PARTIAL, W/ANAST N/A 8/20/2018    ROBOT ASSISTED COLECTOMY (LOW ANTERIOR) performed by Shelton Glez MD at [address] OFFICE/OUTPT [address] N/A 9/5/2018    RIGHT FEMORAL EMBOLECTOMY, INTRAOPERATIVE ARTERIOGRAM, RIGHT ILIAC EMBOLECTOMY, RIGHT COMMON AND EXTERNAL ILIAC STENTING, RIGHT FEMORAL TO ANTERIOR POPLITEAL BYPASS WITH 6MM GORTEX GRAFT performed by Daniel Johnson MD at [address] [facility] 11/27/2017    EGD SUBMUCOSAL/BOTOX INJECTION performed by Roselyn Garcia MD at [facility]       Restrictions/Precautions:  Fall Risk                            Prior Level of Function:  ADL Assistance: Independent  Homemaking Assistance: Independent  Ambulation Assistance: Independent  Transfer Assistance: Independent  Additional Comments: Pt reporting she was able to complete her own ADL tasks at home. No Ad used prior to admission    Subjective       Subjective: Pt supine in bed upon arrival, slightly drowsy initially, although with encouragement was agreeable to OT session. Daughter present although remained distant throughout session and not encouraging much. Comments: RN ok'd session and present to assist throughout. Overall Orientation Status: Within Functional Limits         Pain:  Pain Assessment  Patient Currently in Pain: Yes  Pain Assessment: 0-10  Pain Level: 5  Pain Type: Surgical pain  Pain Location: Leg  Pain Orientation: Right  Pain Descriptors: Aching  Pain Frequency: Continuous  Clinical Progression: Gradually worsening (with slight touch/movement)  Pain Intervention(s): Repositioned; Ambulation/Increased activity; Elevation; Rest       Objective  Overall

## 2018-09-13 NOTE — PLAN OF CARE
Problem: Pain:  Goal: Pain level will decrease  Pain level will decrease   Outcome: Ongoing  Pt c/o of surgical pain in the right leg. Pain medication provided per orders. Will continue to monitor pain needs with hourly rounds. Problem: Falls - Risk of:  Goal: Will remain free from falls  Will remain free from falls   Outcome: Ongoing  Call light in reach, bed in lowest position, bed alarm activated. Educated on use of call light before ambulation and when in need of assistance. Patient expressed understanding. Hourly visual checks performed and charted. Toileting offered to patient. No falls during shift, at this time. Arm band & falling star in place. Continuing to monitor. Problem: Risk for Impaired Skin Integrity  Goal: Tissue integrity - skin and mucous membranes  Structural intactness and normal physiological function of skin and  mucous membranes. Outcome: Ongoing  Pt has post-op incisions. Blanchable redness to the buttocks. Pt is assisted with turning every 2 hours. Problem: Nutrition  Goal: Optimal nutrition therapy  Outcome: Ongoing  Pt tolerating diet. Will continue to monitor. Problem: DISCHARGE BARRIERS  Goal: Patient's continuum of care needs are met  Outcome: Ongoing  Laura eval in process. Comments: Care plan reviewed with patient and daughter Bella Gardiner. Patient and daughter Bella Gardiner verbalize understanding of the plan of care and contribute to goal setting.

## 2018-09-13 NOTE — PROGRESS NOTES
[address] [facility] PHYSICAL THERAPY  DAILY NOTE  [facility] ICU 4D - 4D-14/014-A    Time In: 8029  Time Out: 1402  Timed Code Treatment Minutes: 27 Minutes  Minutes: 27          Date: 2018  Patient Name: Juan Glass,  Gender:  female        MRN: [id number]  : 1935  (82 y.o.)     Referring Practitioner: Mai FRANKS  Diagnosis: Lower limb ischemia  Additional Pertinent Hx: Pt admitted . 82 y.o. female who presents to the ED for evaluation of right hip pain which happened prior to arrival. The patient reports she was getting out of bed in order to use the restroom, when her leg felt numb and gave out on her, she slid down the edge of the bed landing on her butt. Pt found to have PVD s/p  RIGHT FEMORAL EMBOLECTOMY, INTRAOPERATIVE ARTERIOGRAM, RIGHT ILIAC EMBOLECTOMY, RIGHT COMMON AND EXTERNAL ILIAC STENTING, RIGHT FEMORAL TO ANTERIOR POPLITEAL BYPASS WITH 6MM GORTEX GRAFT and  Emergent declotting right Fem Tib Bypass, Right common femoral and profunda femoral, endarterectomy bovine pericardial patch angioplasty. Past Medical History:   Diagnosis Date    Arthritis     BPPV (benign paroxysmal positional vertigo) 16    GERD (gastroesophageal reflux disease)     ?  esophageal stricture    HTN (hypertension)     Hyperlipidemia     Obesity     Osteopenia     PAC (premature atrial contraction)     on betablocker    Paralysis (HCC)     baby    Pedal edema     denied any hx of hypertension    PVC (premature ventricular contraction)     PVD (peripheral vascular disease) (HCC)     Tinnitus     UTI (urinary tract infection)      Past Surgical History:   Procedure Laterality Date    APPENDECTOMY      BREAST SURGERY Right     lumpectomy    CHOLECYSTECTOMY      30 years ago    COLONOSCOPY  2018    Dr Jaziel Walsh      eye, eyelids    EYE SURGERY      cataract    HYSTERECTOMY      LARYNGOSCOPY  07/15/2016    VA OLEGARIO SKN SUB GRFT T/A/L Static: Fair  Comments: Pt stands supported at [employer] with CGA while RN performed pericare, stands ~1 min prior to completing amb back to bed          Exercises:  Exercises  Comments: Pt performs B LE ther ex while in recliner: AROM ankle pumps, AAROM R hip abd/add and heel slides, AROM L hip abd/add and heel slides, B LAQ AROM, all x 10 reps to increase strength for improved gait. Activity Tolerance:  Activity Tolerance: Patient limited by fatigue;Patient limited by endurance    Assessment: Body structures, Functions, Activity limitations: Decreased functional mobility , Decreased ROM, Decreased strength, Decreased balance, Decreased endurance  Assessment: Pt tolerates session fair, limited by weakness and impaired endurance. Pt may need RW for home, will continue to monitor needs. PT to continue to progress as pt able.   Prognosis: Good     REQUIRES PT FOLLOW UP: Yes  Discharge Recommendations: Subacute/Skilled Nursing Facility, Continue to assess pending progress    Patient Education:  Patient Education: transfers, gait    Equipment Recommendations:  Equipment Needed: No  Other: will continue to monitor needs- may need RW    Safety:  Type of devices: Left in bed, Call light within reach, Nurse notified, All fall risk precautions in place (RN present after session)  Restraints  Initially in place: No    Plan:  Times per week: 5-6 X O  Times per day: Daily  Current Treatment Recommendations: Strengthening, Gait Training, Balance Training, ROM, Functional Mobility Training, Endurance Training, Transfer Training, Home Exercise Program    Goals:  Patient goals : Go home     Short term goals  Time Frame for Short term goals: 2 weeks   Short term goal 1: supine to sit and return with SBA to get in and out of bed   Short term goal 2: sit to stand with SBA to get on and off various surfaces  Short term goal 3: ambulate with  feet with SBA to walk safely in the home    Long term goals  Time Frame for Long term goals :  NA due to short ELOS            AM-PAC Inpatient Mobility without Stair Climbing Raw Score : 11  AM-PAC Inpatient without Stair Climbing T-Scale Score : 35.66  Mobility Inpatient CMS 0-100% Score: 67.36  Mobility Inpatient without Stair CMS G-Code Modifier : CL

## 2018-09-13 NOTE — PROGRESS NOTES
123/67 - - 78 17 94 %   09/13/18 0303 128/62 - - 83 18 96 %   09/13/18 0233 (!) 104/59 - - 75 18 95 %   09/13/18 0203 113/61 - - 71 17 94 %   09/13/18 0133 (!) 115/56 - - 72 25 95 %   09/13/18 0103 125/61 - - 67 18 96 %   09/13/18 0033 113/61 - - 71 17 95 %   09/13/18 0003 (!) 120/56 - - 78 20 97 %   09/12/18 2333 (!) 108/57 - - 76 22 96 %   09/12/18 2303 (!) 101/55 - - 75 17 96 %   09/12/18 2233 - - - - - 97 %   09/12/18 2218 (!) 91/44 - - 78 19 96 %   09/12/18 2203 93/69 - - 89 24 100 %   09/12/18 2148 (!) 94/52 - - 77 21 96 %   09/12/18 2133 (!) 83/44 - - 87 21 96 %   09/12/18 2108 (!) 86/45 - - 91 22 95 %   09/12/18 2018 (!) 112/53 - - 104 21 97 %   09/12/18 1933 (!) 104/48 97.7 °F (36.5 °C) Oral 96 23 95 %   09/12/18 1924 - - - 96 - -   09/12/18 1902 (!) 108/51 - - 101 27 97 %   09/12/18 1833 (!) 102/50 - - 97 17 97 %   09/12/18 1803 109/60 - - 93 27 95 %   09/12/18 1732 (!) 93/38 - - 95 22 95 %   09/12/18 1702 (!) 95/53 - - 94 21 95 %   09/12/18 1633 (!) 98/56 - - 86 24 96 %   09/12/18 1630 - - - 94 - -   09/12/18 1603 114/64 - - 82 21 96 %   09/12/18 1601 114/64 98.1 °F (36.7 °C) Oral 84 22 97 %   09/12/18 1502 107/61 - - 88 22 94 %   09/12/18 1436 (!) 114/47 - - 91 26 98 %   09/12/18 1403 89/77 - - 88 22 97 %   09/12/18 1332 99/64 - - 89 15 97 %   09/12/18 1302 (!) 104/51 - - 85 18 95 %   09/12/18 1233 (!) 92/56 - - 88 17 94 %   09/12/18 1210 - - - 104 - -   09/12/18 1132 (!) 104/55 - - 81 22 97 %   09/12/18 1102 (!) 89/56 - - 100 19 97 %   09/12/18 1033 (!) 108/54 - - 83 16 97 %   09/12/18 1002 (!) 106/57 - - 91 18 96 %   09/12/18 0933 (!) 104/51 - - 95 21 98 %   09/12/18 0908 - - - - 18 100 %   09/12/18 0902 119/65 - - 83 20 100 %   09/12/18 0835 103/61 - - 81 19 97 %   09/12/18 0802 (!) 93/57 97.8 °F (36.6 °C) Oral 90 16 98 %   09/12/18 0733 (!) 102/56 - - 87 23 97 %         Intake/Output Summary (Last 24 hours) at 09/13/18 0705  Last data filed at 09/13/18 0548   Gross per 24 hour   Intake Daily PRN   sodium chloride flush 10 mL PRN   magnesium hydroxide 30 mL Daily PRN   ondansetron 4 mg Q6H PRN   morphine 2 mg Q4H PRN   HYDROcodone 5 mg - acetaminophen 1 tablet Q6H PRN   acetaminophen 650 mg Q4H PRN   oxyCODONE 5 mg Q4H PRN   Or     oxyCODONE 10 mg Q4H PRN         PHYSICAL EXAM:     CONSTITUTIONAL: Alert and oriented times 3, no acute distress and cooperative to examination. ABDOMEN: no rebound tenderness noted  bowel sounds normal  scars from previous incisions low midline and laparoscopic, healing well, no signs of infection  WOUND/INCISION:  healing well, no drainage, no erythema        LABS:     CBC:   Recent Labs      09/11/18   0413  09/12/18   0509  09/13/18   0445   WBC  12.8*  8.6  10.5   RBC  3.04*  2.69*  2.70*   HGB  8.9*  8.0*  7.9*   HCT  27.0*  24.3*  25.1*   MCV  88.8  90.3  93.0   MCH  29.3  29.7  29.3   MCHC  33.0  32.9  31.5*   PLT  159  138  157   MPV  11.3  11.6  11.4      Last 3 CMP:   Recent Labs      09/11/18   0413  09/12/18   0509  09/13/18   0445   NA  140  139  138   K  3.9  3.9  4.4   CL  107  108  106   CO2  21*  23  24   BUN  18  21  19   CREATININE  0.8  0.9  0.8   GLUCOSE  150*  123*  109*   CALCIUM  7.8*  7.6*  7.9*      Troponin: No results for input(s): TROPONINI in the last 72 hours. Calcium:   Lab Results   Component Value Date    CALCIUM 7.9 09/13/2018    CALCIUM 7.6 09/12/2018    CALCIUM 7.8 09/11/2018      Ionized Calcium: No results found for: IONCA   Lipids: No results for input(s): CHOL, HDL in the last 72 hours. Invalid input(s): LDLCALCU  INR: No results for input(s): INR in the last 72 hours.   Lactic Acid:   Lab Results   Component Value Date    LACTA 1.5 09/09/2018            Urine Culture, Routine  (Abnormal) 09/05/2018  2:00  [address] [facility]      Mixed growth.  The mixture of organisms present represents   both organisms that may cause urinary tract infections and   organisms that are not a common cause of urinary tract infections and are possibly distal urethral lisa           DVT prophylaxis: [] Lovenox                                 [x] SCDs                                 [] SQ Heparin                                 [] Encourage ambulation, low risk for DVT, no chemical or                                      mechanical prophylaxis necessary              [] Already on Anticoagulation      RADIOLOGY:     Check gastrograffin enema to elucidate location and presence of fistula to bladder      Irma Martini M.D.  FACS  Electronically signed 9/13/2018 at 7:05 AM

## 2018-09-14 LAB
ANION GAP SERPL CALCULATED.3IONS-SCNC: 11 MEQ/L (ref 8–16)
BUN BLDV-MCNC: 19 MG/DL (ref 7–22)
CALCIUM IONIZED: 1.16 MMOL/L (ref 1.12–1.32)
CALCIUM SERPL-MCNC: 7.9 MG/DL (ref 8.5–10.5)
CHLORIDE BLD-SCNC: 103 MEQ/L (ref 98–111)
CO2: 23 MEQ/L (ref 23–33)
CREAT SERPL-MCNC: 0.8 MG/DL (ref 0.4–1.2)
ERYTHROCYTE [DISTWIDTH] IN BLOOD BY AUTOMATED COUNT: 14.9 % (ref 11.5–14.5)
ERYTHROCYTE [DISTWIDTH] IN BLOOD BY AUTOMATED COUNT: 46 FL (ref 35–45)
GFR SERPL CREATININE-BSD FRML MDRD: 69 ML/MIN/1.73M2
GLUCOSE BLD-MCNC: 114 MG/DL (ref 70–108)
GLUCOSE BLD-MCNC: 126 MG/DL (ref 70–108)
GLUCOSE BLD-MCNC: 136 MG/DL (ref 70–108)
GLUCOSE BLD-MCNC: 163 MG/DL (ref 70–108)
GLUCOSE BLD-MCNC: 226 MG/DL (ref 70–108)
HCT VFR BLD CALC: 23.8 % (ref 37–47)
HEMOGLOBIN: 7.8 GM/DL (ref 12–16)
MCH RBC QN AUTO: 29.9 PG (ref 26–33)
MCHC RBC AUTO-ENTMCNC: 32.8 GM/DL (ref 32.2–35.5)
MCV RBC AUTO: 91.2 FL (ref 81–99)
ORGANISM: ABNORMAL
ORGANISM: ABNORMAL
PLATELET # BLD: 137 THOU/MM3 (ref 130–400)
PMV BLD AUTO: 11 FL (ref 9.4–12.4)
POTASSIUM REFLEX MAGNESIUM: 4.3 MEQ/L (ref 3.5–5.2)
RBC # BLD: 2.61 MILL/MM3 (ref 4.2–5.4)
SODIUM BLD-SCNC: 137 MEQ/L (ref 135–145)
URINE CULTURE, ROUTINE: ABNORMAL
URINE CULTURE, ROUTINE: ABNORMAL
WBC # BLD: 7.7 THOU/MM3 (ref 4.8–10.8)

## 2018-09-14 PROCEDURE — 82948 REAGENT STRIP/BLOOD GLUCOSE: CPT

## 2018-09-14 PROCEDURE — 2580000003 HC RX 258: Performed by: INTERNAL MEDICINE

## 2018-09-14 PROCEDURE — 6370000000 HC RX 637 (ALT 250 FOR IP): Performed by: THORACIC SURGERY (CARDIOTHORACIC VASCULAR SURGERY)

## 2018-09-14 PROCEDURE — 6360000002 HC RX W HCPCS: Performed by: NURSE PRACTITIONER

## 2018-09-14 PROCEDURE — 6370000000 HC RX 637 (ALT 250 FOR IP): Performed by: INTERNAL MEDICINE

## 2018-09-14 PROCEDURE — 82330 ASSAY OF CALCIUM: CPT

## 2018-09-14 PROCEDURE — 85027 COMPLETE CBC AUTOMATED: CPT

## 2018-09-14 PROCEDURE — 97116 GAIT TRAINING THERAPY: CPT

## 2018-09-14 PROCEDURE — 99232 SBSQ HOSP IP/OBS MODERATE 35: CPT | Performed by: SURGERY

## 2018-09-14 PROCEDURE — 6370000000 HC RX 637 (ALT 250 FOR IP): Performed by: PHYSICIAN ASSISTANT

## 2018-09-14 PROCEDURE — 2709999900 HC NON-CHARGEABLE SUPPLY

## 2018-09-14 PROCEDURE — 97110 THERAPEUTIC EXERCISES: CPT

## 2018-09-14 PROCEDURE — 2720000010 HC SURG SUPPLY STERILE

## 2018-09-14 PROCEDURE — 2060000000 HC ICU INTERMEDIATE R&B

## 2018-09-14 PROCEDURE — 80048 BASIC METABOLIC PNL TOTAL CA: CPT

## 2018-09-14 RX ADMIN — ENOXAPARIN SODIUM 80 MG: 80 INJECTION SUBCUTANEOUS at 19:51

## 2018-09-14 RX ADMIN — POTASSIUM CHLORIDE 10 MEQ: 750 TABLET, EXTENDED RELEASE ORAL at 19:51

## 2018-09-14 RX ADMIN — CILOSTAZOL 100 MG: 100 TABLET ORAL at 06:11

## 2018-09-14 RX ADMIN — AMIODARONE HYDROCHLORIDE 200 MG: 200 TABLET ORAL at 09:50

## 2018-09-14 RX ADMIN — ENOXAPARIN SODIUM 80 MG: 80 INJECTION SUBCUTANEOUS at 09:47

## 2018-09-14 RX ADMIN — PANTOPRAZOLE SODIUM 40 MG: 40 TABLET, DELAYED RELEASE ORAL at 16:55

## 2018-09-14 RX ADMIN — MIDODRINE HYDROCHLORIDE 10 MG: 10 TABLET ORAL at 09:47

## 2018-09-14 RX ADMIN — OXYCODONE HYDROCHLORIDE 5 MG: 5 TABLET ORAL at 19:59

## 2018-09-14 RX ADMIN — Medication 1 UNITS: at 12:04

## 2018-09-14 RX ADMIN — MIDODRINE HYDROCHLORIDE 10 MG: 10 TABLET ORAL at 16:55

## 2018-09-14 RX ADMIN — CIPROFLOXACIN 500 MG: 500 TABLET, FILM COATED ORAL at 09:47

## 2018-09-14 RX ADMIN — CILOSTAZOL 100 MG: 100 TABLET ORAL at 16:55

## 2018-09-14 RX ADMIN — CIPROFLOXACIN 500 MG: 500 TABLET, FILM COATED ORAL at 19:53

## 2018-09-14 RX ADMIN — Medication 10 ML: at 09:48

## 2018-09-14 RX ADMIN — TIMOLOL MALEATE 1 DROP: 5 SOLUTION/ DROPS OPHTHALMIC at 19:54

## 2018-09-14 RX ADMIN — MIDODRINE HYDROCHLORIDE 10 MG: 10 TABLET ORAL at 12:03

## 2018-09-14 RX ADMIN — INSULIN LISPRO 1 UNITS: 100 INJECTION, SOLUTION INTRAVENOUS; SUBCUTANEOUS at 19:50

## 2018-09-14 RX ADMIN — PRAVASTATIN SODIUM 20 MG: 20 TABLET ORAL at 19:53

## 2018-09-14 RX ADMIN — ASPIRIN 325 MG: 325 TABLET, DELAYED RELEASE ORAL at 09:47

## 2018-09-14 RX ADMIN — Medication 4 MG: at 19:58

## 2018-09-14 RX ADMIN — DOCUSATE SODIUM 100 MG: 100 CAPSULE, LIQUID FILLED ORAL at 19:51

## 2018-09-14 RX ADMIN — PANTOPRAZOLE SODIUM 40 MG: 40 TABLET, DELAYED RELEASE ORAL at 06:11

## 2018-09-14 RX ADMIN — POTASSIUM CHLORIDE 10 MEQ: 750 TABLET, EXTENDED RELEASE ORAL at 09:48

## 2018-09-14 ASSESSMENT — PAIN SCALES - GENERAL
PAINLEVEL_OUTOF10: 4
PAINLEVEL_OUTOF10: 4
PAINLEVEL_OUTOF10: 0

## 2018-09-14 ASSESSMENT — PAIN DESCRIPTION - ORIENTATION: ORIENTATION: RIGHT

## 2018-09-14 ASSESSMENT — PAIN DESCRIPTION - LOCATION: LOCATION: LEG

## 2018-09-14 ASSESSMENT — PAIN DESCRIPTION - PAIN TYPE: TYPE: SURGICAL PAIN

## 2018-09-14 ASSESSMENT — PAIN DESCRIPTION - DESCRIPTORS: DESCRIPTORS: ACHING

## 2018-09-14 NOTE — PLAN OF CARE
Problem: Pain:  Goal: Pain level will decrease  Pain level will decrease   Outcome: Ongoing  Pt continues to have pain. Pt medicated with PRN pain medications and repositioned as needed. Goal: Control of acute pain  Control of acute pain   Outcome: Ongoing  Pt continues to have acute pain. Problem: Falls - Risk of:  Goal: Will remain free from falls  Will remain free from falls   Outcome: Ongoing  Pt remains absent of falls. Call light remains within reach, bed in lowest position and side rails up. Goal: Absence of physical injury  Absence of physical injury   Outcome: Ongoing  Pt remains absent of physical injury. Problem: Risk for Impaired Skin Integrity  Goal: Tissue integrity - skin and mucous membranes  Structural intactness and normal physiological function of skin and  mucous membranes. Outcome: Ongoing  Pts skin and mucous membranes remain intact. Problem: DISCHARGE BARRIERS  Goal: Patient's continuum of care needs are met  Outcome: Ongoing  Pts continuum of care needs have been met. Comments: Care plan reviewed with patient. Patient verbalize understanding of the plan of care and contribute to goal setting.

## 2018-09-14 NOTE — PROGRESS NOTES
Rales/Wheezes/Rhonchi. Cardiovascular: Regular rate and rhythm with normal S1/S2 without murmurs, rubs or gallops. Abdomen: Soft, non-tender, non-distended with normal bowel sounds. Musculoskeletal: passive and active ROM x 4 extremities. Skin: Skin color, texture, turgor normal.  No rashes or lesions. Neurologic:  Neurovascularly intact without any focal sensory/motor deficits. Cranial nerves: II-XII intact, grossly non-focal.  Psychiatric: Alert and oriented, thought content appropriate, normal insight  Capillary Refill: Brisk,< 3 seconds   Peripheral Pulses: +2 palpable, equal bilaterally       Labs:   Recent Labs      09/12/18   0509  09/13/18   0445  09/14/18   0321   WBC  8.6  10.5  7.7   HGB  8.0*  7.9*  7.8*   HCT  24.3*  25.1*  23.8*   PLT  138  157  137     Recent Labs      09/12/18   0509  09/13/18   0445  09/14/18   0321   NA  139  138  137   K  3.9  4.4  4.3   CL  108  106  103   CO2  23  24  23   BUN  21  19  19   CREATININE  0.9  0.8  0.8   CALCIUM  7.6*  7.9*  7.9*     No results for input(s): AST, ALT, BILIDIR, BILITOT, ALKPHOS in the last 72 hours. No results for input(s): INR in the last 72 hours. No results for input(s): Mere Codding in the last 72 hours. Urinalysis:    Lab Results   Component Value Date    NITRU NEGATIVE 09/09/2018    WBCUA > 200 09/09/2018    BACTERIA FEW 09/09/2018    RBCUA 50-75 09/09/2018    BLOODU LARGE 09/09/2018    SPECGRAV 1.023 09/09/2018       Radiology:  [facility]   Final Result      Contrast in the urinary bladder highly suspicious for colovesical fistula. Final report electronically signed by Dr. Nando Larkin on 9/13/2018 9:46 AM      CT ABDOMEN PELVIS W IV CONTRAST Additional Contrast? Oral   Final Result   Pelvis: A Meredith catheter is present in the urinary bladder and is a relatively large amount of air in the bladder. Moderate soft tissue stranding is seen in the pelvis following recent sigmoid colon surgery.  There is suggestion of PORTABLE   Final Result   1. Borderline heart size. Right jugular line, catheter tip in SVC. Moderate degenerative changes both shoulders. 2. Minimal residual atelectasis/pneumonia right midlung and retrocardiac region left lung base. No effusion is seen. Overall appearance improved from prior. **This report has been created using voice recognition software. It may contain minor errors which are inherent in voice recognition technology. **      Final report electronically signed by Dr. Gus Rivera on 9/9/2018 9:55 AM      XR CHEST PORTABLE   Final Result      Improved infiltrates in the right upper lobe and right base with otherwise stable interstitial infiltrates in both lungs. **This report has been created using voice recognition software. It may contain minor errors which are inherent in voice recognition technology. **      Final report electronically signed by Dr. Jaziel Keating on 9/7/2018 6:09 AM      XR CHEST PORTABLE   Final Result   1. There is a right jugular central venous Vaughan-Eboni catheter with the distal tip looped and overlying the region of the pulmonary trunk. This catheter should be repositioned. 2. There is a skin fold along the lateral margin of the right mid/lower chest. There are interstitial infiltrates throughout both lung fields with the differential including however are not limited to pulmonary edema and pneumonia. There is no pleural    effusion. **This report has been created using voice recognition software. It may contain minor errors which are inherent in voice recognition technology. **      Final report electronically signed by Dr. Tobin Medina on 9/6/2018 7:11 AM      IR ANGIOGRAM EXTREMITY RIGHT   Final Result      IR ANGIOGRAM EXTREMITY RIGHT   Final Result   1. 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    2. Runoff of the right lower extremity was performed revealing a short segment occlusion of the right popliteal artery above the right knee joint which is reconstituted by collaterals. There is also moderate stenosis of the distal right SFA. In addition,    there is chronic-appearing occlusion of the tibioperoneal trunk with reconstitution of the posterior tibial artery by collaterals which is seen crossing the right ankle. 3. There is chronic occlusion of the distal right anterior tibial artery which gives off collaterals crossing the right ankle to reconstitute the dorsalis pedis artery. 4. TPA was not performed due to the fact that the patient had recent major abdominal surgery for partial colectomy. 5. Significant flow-limiting stenosis at the origin of the left external iliac artery. This was stented with an 8 x 6 mm Ever-Flex self-expanding stent. **This report has been created using voice recognition software. It may contain minor errors which are inherent in voice recognition technology. **      Final report electronically signed by Dr. Raj Zavala on 9/5/2018 3:04 PM      VL DUP LOWER EXTREMITY ARTERIES BILATERAL   Final Result   1. Absent flow within the right lower extremity arterial vasculature from the right common femoral artery to the distal runoff vessels within the right ankle. The RAMOS on the right is unobtainable due to 2 absent flow. 2. No sonographic evidence of significant flow-limiting stenosis is demonstrated within the left lower extremity enteral vasculature by ultrasound. However, there is a moderately diminished RAMOS on the left measuring 0.68. 3. These findings were discussed with the referring ER clinician and the vascular surgeon at the time of dictation. **This report has been created using voice recognition software. It may contain minor errors which are inherent in voice recognition technology. **      Final report electronically signed by Dr. Raj Zavala on 9/5/2018 10:06 AM XR HIP RIGHT (2-3 VIEWS)   Final Result   1. Unremarkable right hip series. **This report has been created using voice recognition software. It may contain minor errors which are inherent in voice recognition technology. **      Final report electronically signed by Dr. Areli Montague on 9/5/2018 6:58 AM      XR LUMBAR SPINE (2-3 VIEWS)   Final Result   Mild T11-12 and moderate L5-S1 degenerative disc disease. No fracture or subluxation. **This report has been created using voice recognition software. It may contain minor errors which are inherent in voice recognition technology. **      Final report electronically signed by Dr. Diana العراقي on 9/5/2018 6:57 AM          Diet: DIET GENERAL; No Added Salt (3-4 GM)  Dietary Nutrition Supplements: Low Calorie High Protein Supplement    DVT prophylaxis: [x] Lovenox                                 [] SCDs                                 [] SQ Heparin                                 [] Encourage ambulation           [] Already on Anticoagulation     Disposition:    [] Home       [] TCU       [] Rehab       [] Psych       [] SNF       [x] [place]       [] Other-    Code Status: Full Code    PT/OT Eval Status: Active & ongoing     Assessment/Plan:    Anticipated Discharge in : 24 hours to [facility]    Diagnosis Date Noted    Hyperglycemia [R73.9] 75/11/4686    Metabolic acidosis [C07.4] 09/10/2018    Hypocalcemia [E83.51] 09/10/2018    Hypotension [I95.9] 09/10/2018    Anemia [D64.9] 09/10/2018    Urinary tract infection without hematuria [N39.0] 09/10/2018    Septic shock ([facility] [address] [A41.9, R65.21]     SVT (supraventricular tachycardia) ([facility] [address] [I47.1]     Lower limb ischemia [I99.8] 09/05/2018    PVD (peripheral vascular disease) ([facility] [address] [I73.9]     Leukocytosis [D72.829]        1. Recurrent SVT, converted NSR on 9/12              - Amio PO. Pt in NSR. HR stable and ranged from .               - Will

## 2018-09-14 NOTE — PROGRESS NOTES
(37.1 °C) Oral 99 17 93 % 190 lb 14.7 oz (86.6 kg)   09/14/18 0332 (!) 106/53 - - 91 18 92 % -   09/14/18 0302 (!) 115/58 - - 95 18 93 % -   09/14/18 0233 (!) 119/59 - - 101 19 91 % -   09/14/18 0203 (!) 119/58 - - 92 18 93 % -   09/14/18 0132 118/64 - - 92 18 94 % -   09/14/18 0102 (!) 106/48 - - 89 16 93 % -   09/14/18 0033 (!) 108/53 - - 83 16 92 % -   09/14/18 0003 (!) 108/46 98 °F (36.7 °C) Oral 87 18 92 % -   09/13/18 2302 (!) 100/50 - - 86 18 91 % -   09/13/18 2203 (!) 93/59 - - 78 19 93 % -   09/13/18 2146 (!) 97/44 - - 78 22 93 % -   09/13/18 2102 (!) 98/48 - - 79 17 92 % -   09/13/18 2005 (!) 113/53 98.8 °F (37.1 °C) Oral 101 20 96 % -   09/13/18 1900 (!) 98/53 - - 109 18 93 % -   09/13/18 1802 (!) 111/59 - - 100 20 92 % -   09/13/18 1733 (!) 111/51 - - 99 15 93 % -   09/13/18 1700 (!) 135/93 - - 96 18 93 % -   09/13/18 1600 (!) 110/52 97.4 °F (36.3 °C) Oral 98 20 94 % -   09/13/18 1500 (!) 116/58 - - 102 24 93 % -   09/13/18 1432 (!) 83/51 - - 101 20 92 % -   09/13/18 1400 (!) 100/45 - - 122 24 95 % -   09/13/18 1300 99/61 97.8 °F (36.6 °C) Oral 100 20 93 % -   09/13/18 1234 (!) 92/59 97.8 °F (36.6 °C) Oral 105 23 93 % -   09/13/18 1203 99/61 - - 110 23 97 % -   09/13/18 1133 (!) 96/53 - - 101 21 93 % -   09/13/18 1000 - - - 96 18 96 % -   09/13/18 0823 (!) 118/53 97.8 °F (36.6 °C) Oral 98 17 94 % -   09/13/18 0800 - - - 89 16 93 % -   09/13/18 0733 (!) 94/41 - - 86 16 92 % -   09/13/18 0703 (!) 109/50 - - 90 16 93 % -   09/13/18 0633 (!) 97/43 - - 86 17 91 % -         Intake/Output Summary (Last 24 hours) at 09/14/18 0620  Last data filed at 09/14/18 0425   Gross per 24 hour   Intake              187 ml   Output              310 ml   Net             -123 ml       In: 187 [I.V.:187]  Out: 310 [Urine:310]    I/O last 3 completed shifts:   In: 308 [I.V.:308]  Out: 660 [Urine:660]       Date 09/14/18 0000 - 09/14/18 2359   Shift 7922-0791 3513-9847 6443-5047 24 Hour Total   I  N  T  A  K  E   Shift Total  (mL/kg)       O  U  T  P  U  T   Urine  (mL/kg/hr) 0   0    Shift Total  (mL/kg) 0  (0)   0  (0)   Weight (kg) 86.6 86.6 86.6 86.6       Wt Readings from Last 3 Encounters:   09/14/18 190 lb 14.7 oz (86.6 kg)   08/20/18 189 lb (85.7 kg)   08/08/18 194 lb 8 oz (88.2 kg)        Body mass index is 31.77 kg/m². Diet: DIET GENERAL; No Added Salt (3-4 GM)  Dietary Nutrition Supplements: Low Calorie High Protein Supplement        MEDS:     Scheduled Meds:   ciprofloxacin  500 mg Oral 2 times per day    amiodarone  200 mg Oral Daily    sodium chloride  1,000 mL Intravenous Once    magnesium replacement protocol   Other RX Placeholder    insulin lispro  0-6 Units Subcutaneous TID WC    insulin lispro  0-3 Units Subcutaneous Nightly    calcium replacement protocol   Other RX Placeholder    potassium (CARDIAC) replacement protocol   Other RX Placeholder    timolol  1 drop Both Eyes Nightly    midodrine  10 mg Oral TID WC    enoxaparin  1 mg/kg Subcutaneous Q12H    pantoprazole  40 mg Oral BID    pravastatin  20 mg Oral Daily    cilostazol  100 mg Oral BID    potassium chloride  10 mEq Oral BID    sodium chloride flush  10 mL Intravenous 2 times per day    docusate sodium  100 mg Oral BID    aspirin  325 mg Oral Daily     Continuous Infusions:   dextrose      norepinephrine Stopped (09/14/18 0241)     PRN Meds:    melatonin 4 mg Nightly PRN   glucose 15 g PRN   dextrose 12.5 g PRN   glucagon (rDNA) 1 mg PRN   dextrose 100 mL/hr PRN   meclizine 25 mg TID PRN   calcium elemental 500 mg Daily PRN   sodium chloride flush 10 mL PRN   magnesium hydroxide 30 mL Daily PRN   ondansetron 4 mg Q6H PRN   morphine 2 mg Q4H PRN   HYDROcodone 5 mg - acetaminophen 1 tablet Q6H PRN   acetaminophen 650 mg Q4H PRN   oxyCODONE 5 mg Q4H PRN   Or     oxyCODONE 10 mg Q4H PRN         PHYSICAL EXAM:     CONSTITUTIONAL: Alert and oriented times 3, no acute distress and cooperative to examination.     ABDOMEN: no rebound tenderness noted  bowel sounds normal  scars from previous incisions low midline and laparoscopic, healing well, no signs of infection  WOUND/INCISION:  healing well, no drainage, no erythema        LABS:     CBC:   Recent Labs      09/12/18   0509  09/13/18   0445  09/14/18   0321   WBC  8.6  10.5  7.7   RBC  2.69*  2.70*  2.61*   HGB  8.0*  7.9*  7.8*   HCT  24.3*  25.1*  23.8*   MCV  90.3  93.0  91.2   MCH  29.7  29.3  29.9   MCHC  32.9  31.5*  32.8   PLT  138  157  137   MPV  11.6  11.4  11.0      Last 3 CMP:   Recent Labs      09/12/18   0509  09/13/18   0445  09/14/18   0321   NA  139  138  137   K  3.9  4.4  4.3   CL  108  106  103   CO2  23  24  23   BUN  21  19  19   CREATININE  0.9  0.8  0.8   GLUCOSE  123*  109*  114*   CALCIUM  7.6*  7.9*  7.9*      Troponin: No results for input(s): TROPONINI in the last 72 hours. Calcium:   Lab Results   Component Value Date    CALCIUM 7.9 09/14/2018    CALCIUM 7.9 09/13/2018    CALCIUM 7.6 09/12/2018      Ionized Calcium: No results found for: IONCA   Lipids: No results for input(s): CHOL, HDL in the last 72 hours. Invalid input(s): LDLCALCU  INR: No results for input(s): INR in the last 72 hours.   Lactic Acid:   Lab Results   Component Value Date    LACTA 1.5 09/09/2018            Urine Culture, Routine  (Abnormal) 09/05/2018  2:00  [facility]      Mixed growth.  The mixture of organisms present represents   both organisms that may cause urinary tract infections and   organisms that are not a common cause of urinary tract   infections and are possibly distal urethral lisa           DVT prophylaxis: [] Lovenox                                 [x] SCDs                                 [] SQ Heparin                                 [] Encourage ambulation, low risk for DVT, no chemical or                                      mechanical prophylaxis necessary              [] Already on Anticoagulation      RADIOLOGY:     Check gastrograffin enema to elucidate location and presence of fistula to bladder      Chivo Mckeon M.D.  FACS  Electronically signed 9/14/2018 at 6:20 AM

## 2018-09-14 NOTE — PROGRESS NOTES
therapy     [x]Occupational Therapy   []IV Antibiotics   [] IV meds   [] TPN     []PEG tube Feedings      []New Colostomy   [] Ileostomy care/teaching    [] Speech Therapy   []Wound Vac   []Complex Dressing Changes    []Terminal care    []Other       Has patient had Prior Skilled care in the Last 60 days:  []Yes  [x]NO   If Yes:   Skilled Facility:    How many skilled days were used?

## 2018-09-15 PROBLEM — I48.0 PAROXYSMAL ATRIAL FIBRILLATION (HCC): Status: ACTIVE | Noted: 2018-09-15

## 2018-09-15 PROBLEM — N32.1 RECTOVESICAL FISTULA: Status: ACTIVE | Noted: 2018-09-15

## 2018-09-15 LAB
ANION GAP SERPL CALCULATED.3IONS-SCNC: 14 MEQ/L (ref 8–16)
BLOOD CULTURE, ROUTINE: NORMAL
BLOOD CULTURE, ROUTINE: NORMAL
BUN BLDV-MCNC: 19 MG/DL (ref 7–22)
CALCIUM IONIZED: 1.17 MMOL/L (ref 1.12–1.32)
CALCIUM SERPL-MCNC: 8.3 MG/DL (ref 8.5–10.5)
CHLORIDE BLD-SCNC: 105 MEQ/L (ref 98–111)
CO2: 22 MEQ/L (ref 23–33)
CREAT SERPL-MCNC: 0.9 MG/DL (ref 0.4–1.2)
ERYTHROCYTE [DISTWIDTH] IN BLOOD BY AUTOMATED COUNT: 16 % (ref 11.5–14.5)
ERYTHROCYTE [DISTWIDTH] IN BLOOD BY AUTOMATED COUNT: 48.5 FL (ref 35–45)
GFR SERPL CREATININE-BSD FRML MDRD: 60 ML/MIN/1.73M2
GLUCOSE BLD-MCNC: 121 MG/DL (ref 70–108)
GLUCOSE BLD-MCNC: 128 MG/DL (ref 70–108)
GLUCOSE BLD-MCNC: 147 MG/DL (ref 70–108)
GLUCOSE BLD-MCNC: 156 MG/DL (ref 70–108)
GLUCOSE BLD-MCNC: 158 MG/DL (ref 70–108)
HCT VFR BLD CALC: 23.7 % (ref 37–47)
HEMOGLOBIN: 7.5 GM/DL (ref 12–16)
MCH RBC QN AUTO: 29.5 PG (ref 26–33)
MCHC RBC AUTO-ENTMCNC: 31.6 GM/DL (ref 32.2–35.5)
MCV RBC AUTO: 93.3 FL (ref 81–99)
PLATELET # BLD: 150 THOU/MM3 (ref 130–400)
PMV BLD AUTO: 11.5 FL (ref 9.4–12.4)
POTASSIUM REFLEX MAGNESIUM: 4.2 MEQ/L (ref 3.5–5.2)
RBC # BLD: 2.54 MILL/MM3 (ref 4.2–5.4)
SODIUM BLD-SCNC: 141 MEQ/L (ref 135–145)
WBC # BLD: 7.1 THOU/MM3 (ref 4.8–10.8)

## 2018-09-15 PROCEDURE — 6370000000 HC RX 637 (ALT 250 FOR IP): Performed by: INTERNAL MEDICINE

## 2018-09-15 PROCEDURE — 2580000003 HC RX 258: Performed by: INTERNAL MEDICINE

## 2018-09-15 PROCEDURE — 6360000002 HC RX W HCPCS: Performed by: NURSE PRACTITIONER

## 2018-09-15 PROCEDURE — 2060000000 HC ICU INTERMEDIATE R&B

## 2018-09-15 PROCEDURE — 82330 ASSAY OF CALCIUM: CPT

## 2018-09-15 PROCEDURE — 80048 BASIC METABOLIC PNL TOTAL CA: CPT

## 2018-09-15 PROCEDURE — 99233 SBSQ HOSP IP/OBS HIGH 50: CPT | Performed by: INTERNAL MEDICINE

## 2018-09-15 PROCEDURE — 6370000000 HC RX 637 (ALT 250 FOR IP): Performed by: THORACIC SURGERY (CARDIOTHORACIC VASCULAR SURGERY)

## 2018-09-15 PROCEDURE — 82948 REAGENT STRIP/BLOOD GLUCOSE: CPT

## 2018-09-15 PROCEDURE — 85027 COMPLETE CBC AUTOMATED: CPT

## 2018-09-15 PROCEDURE — 6370000000 HC RX 637 (ALT 250 FOR IP): Performed by: PHYSICIAN ASSISTANT

## 2018-09-15 PROCEDURE — 36415 COLL VENOUS BLD VENIPUNCTURE: CPT

## 2018-09-15 PROCEDURE — 2709999900 HC NON-CHARGEABLE SUPPLY

## 2018-09-15 RX ADMIN — MIDODRINE HYDROCHLORIDE 10 MG: 10 TABLET ORAL at 08:04

## 2018-09-15 RX ADMIN — Medication 10 ML: at 20:36

## 2018-09-15 RX ADMIN — Medication 10 ML: at 08:03

## 2018-09-15 RX ADMIN — AMIODARONE HYDROCHLORIDE 200 MG: 200 TABLET ORAL at 08:04

## 2018-09-15 RX ADMIN — TIMOLOL MALEATE 1 DROP: 5 SOLUTION/ DROPS OPHTHALMIC at 20:18

## 2018-09-15 RX ADMIN — PANTOPRAZOLE SODIUM 40 MG: 40 TABLET, DELAYED RELEASE ORAL at 16:50

## 2018-09-15 RX ADMIN — Medication 1 UNITS: at 12:14

## 2018-09-15 RX ADMIN — ASPIRIN 325 MG: 325 TABLET, DELAYED RELEASE ORAL at 08:04

## 2018-09-15 RX ADMIN — MIDODRINE HYDROCHLORIDE 10 MG: 10 TABLET ORAL at 12:15

## 2018-09-15 RX ADMIN — POTASSIUM CHLORIDE 10 MEQ: 750 TABLET, EXTENDED RELEASE ORAL at 20:18

## 2018-09-15 RX ADMIN — MIDODRINE HYDROCHLORIDE 10 MG: 10 TABLET ORAL at 16:50

## 2018-09-15 RX ADMIN — CILOSTAZOL 100 MG: 100 TABLET ORAL at 16:50

## 2018-09-15 RX ADMIN — Medication 4 MG: at 20:18

## 2018-09-15 RX ADMIN — PANTOPRAZOLE SODIUM 40 MG: 40 TABLET, DELAYED RELEASE ORAL at 06:09

## 2018-09-15 RX ADMIN — CIPROFLOXACIN 500 MG: 500 TABLET, FILM COATED ORAL at 20:18

## 2018-09-15 RX ADMIN — DOCUSATE SODIUM 100 MG: 100 CAPSULE, LIQUID FILLED ORAL at 20:18

## 2018-09-15 RX ADMIN — Medication 1 UNITS: at 16:53

## 2018-09-15 RX ADMIN — CILOSTAZOL 100 MG: 100 TABLET ORAL at 06:09

## 2018-09-15 RX ADMIN — CIPROFLOXACIN 500 MG: 500 TABLET, FILM COATED ORAL at 08:03

## 2018-09-15 RX ADMIN — POTASSIUM CHLORIDE 10 MEQ: 750 TABLET, EXTENDED RELEASE ORAL at 08:04

## 2018-09-15 RX ADMIN — HYDROCODONE BITARTRATE AND ACETAMINOPHEN 1 TABLET: 5; 325 TABLET ORAL at 06:18

## 2018-09-15 RX ADMIN — PRAVASTATIN SODIUM 20 MG: 20 TABLET ORAL at 20:18

## 2018-09-15 RX ADMIN — ENOXAPARIN SODIUM 80 MG: 80 INJECTION SUBCUTANEOUS at 08:04

## 2018-09-15 RX ADMIN — INSULIN LISPRO 1 UNITS: 100 INJECTION, SOLUTION INTRAVENOUS; SUBCUTANEOUS at 20:19

## 2018-09-15 ASSESSMENT — PAIN SCALES - GENERAL
PAINLEVEL_OUTOF10: 0
PAINLEVEL_OUTOF10: 4
PAINLEVEL_OUTOF10: 0
PAINLEVEL_OUTOF10: 2

## 2018-09-15 ASSESSMENT — PAIN DESCRIPTION - DESCRIPTORS: DESCRIPTORS: ACHING

## 2018-09-15 ASSESSMENT — PAIN DESCRIPTION - LOCATION: LOCATION: LEG

## 2018-09-15 ASSESSMENT — PAIN DESCRIPTION - ORIENTATION: ORIENTATION: RIGHT

## 2018-09-15 ASSESSMENT — PAIN DESCRIPTION - ONSET: ONSET: ON-GOING

## 2018-09-15 ASSESSMENT — PAIN DESCRIPTION - FREQUENCY: FREQUENCY: CONTINUOUS

## 2018-09-15 NOTE — PROGRESS NOTES
GM)  Dietary Nutrition Supplements: Low Calorie High Protein Supplement    Exam:  BP (!) 104/51   Pulse 80   Temp 97.9 °F (36.6 °C) (Oral)   Resp 17   Ht 5' 5\" (1.651 m)   Wt 192 lb 3.2 oz (87.2 kg)   SpO2 92%   BMI 31.98 kg/m²     General appearance: No apparent distress, appears stated age and cooperative. HEENT: Pupils equal, round, and reactive to light. Conjunctivae/corneas clear. Neck: Supple, with full range of motion. No jugular venous distention. Trachea midline. Respiratory:  Normal respiratory effort. Clear to auscultation, bilaterally without Rales/Wheezes/Rhonchi. Cardiovascular: Regular rate and rhythm with occasional extrasystoles  Abdomen: Soft, non-tender, non-distended with normal bowel sounds. Musculoskeletal: passive and active ROM x 4 extremities. Does not move toes right well  Skin: Skin color, texture, turgor normal.  No rashes or lesions. Neurologic:   Has sensation right foot  Psychiatric: Alert and oriented, thought content appropriate, normal insight  Capillary Refill: Brisk,< 3 seconds   Peripheral Pulses: difficult to palpate      Labs:   Recent Labs      09/13/18   0445  09/14/18   0321  09/15/18   0447   WBC  10.5  7.7  7.1   HGB  7.9*  7.8*  7.5*   HCT  25.1*  23.8*  23.7*   PLT  157  137  150     Recent Labs      09/13/18   0445  09/14/18   0321  09/15/18   0447   NA  138  137  141   K  4.4  4.3  4.2   CL  106  103  105   CO2  24  23  22*   BUN  19  19  19   CREATININE  0.8  0.8  0.9   CALCIUM  7.9*  7.9*  8.3*     No results for input(s): AST, ALT, BILIDIR, BILITOT, ALKPHOS in the last 72 hours. No results for input(s): INR in the last 72 hours. No results for input(s): Sven Bustamante in the last 72 hours.     Urinalysis:    Lab Results   Component Value Date    NITRU NEGATIVE 09/09/2018    WBCUA > 200 09/09/2018    BACTERIA FEW 09/09/2018    RBCUA 50-75 09/09/2018    BLOODU LARGE 09/09/2018    SPECGRAV 1.023 09/09/2018       Radiology:  [facility]   Final Result      Contrast in the urinary bladder highly suspicious for colovesical fistula. Final report electronically signed by Dr. Yudy Tavarez on 9/13/2018 9:46 AM      CT ABDOMEN PELVIS W IV CONTRAST Additional Contrast? Oral   Final Result   Pelvis: A Meredith catheter is present in the urinary bladder and is a relatively large amount of air in the bladder. Moderate soft tissue stranding is seen in the pelvis following recent sigmoid colon surgery. There is suggestion of some mild soft tissue    thickening of the bladder wall along the posterior superior aspect the left of midline. This may simple be due to irritation from the Meredith catheter. There is no clear separation between the bladder and the adjacent bowel loop at this site. I cannot    definitely exclude a colovesical fistula although I do not identify any definite tracking of air through the bladder wall to suggest such. Note that there is moderate presacral soft tissue stranding following recent surgery. No definite abscess or    loculated fluid collection is seen. . Mild soft tissue swelling in the right inguinal region with a few small air bubbles, related to recent surgery. IMPRESSION:    1. Constipation. Diverticulosis coli. Moderate size hiatus hernia. Infrarenal abdominal aortic aneurysm. 2. Mild atelectasis/pneumonia both posterior gross phrenic angles and right middle lobe. 11 mm nodule right middle lobe, possibly an inflammatory nodule. Follow-up CT thorax in several weeks would be helpful for further evaluation. 3. Postsurgical soft tissue stranding in the pelvis extending into the lower abdomen and perinephric space. No definite abscess or loculated fluid collection is seen. 4. Meredith catheter in urinary bladder. Relatively large amount of air in urinary bladder.  Mild focal wall thickening of the bladder lungs posterior/superior aspect the left of midline and no clear separation between the bladder and adjacent bowel loop at    this the left lower extremity enteral vasculature by ultrasound. However, there is a moderately diminished RAMOS on the left measuring 0.68. 3. These findings were discussed with the referring ER clinician and the vascular surgeon at the time of dictation. **This report has been created using voice recognition software. It may contain minor errors which are inherent in voice recognition technology. **      Final report electronically signed by Dr. Lamar Crabtree on 9/5/2018 10:06 AM      XR HIP RIGHT (2-3 VIEWS)   Final Result   1. Unremarkable right hip series. **This report has been created using voice recognition software. It may contain minor errors which are inherent in voice recognition technology. **      Final report electronically signed by Dr. Loyd Richard on 9/5/2018 6:58 AM      XR LUMBAR SPINE (2-3 VIEWS)   Final Result   Mild T11-12 and moderate L5-S1 degenerative disc disease. No fracture or subluxation. **This report has been created using voice recognition software. It may contain minor errors which are inherent in voice recognition technology. **      Final report electronically signed by Dr. Gail Jones on 9/5/2018 6:57 AM          Diet: DIET GENERAL; No Added Salt (3-4 GM)  Dietary Nutrition Supplements: Low Calorie High Protein Supplement    DVT prophylaxis: [x] Lovenox                                 [] SCDs                                 [] SQ Heparin                                 [] Encourage ambulation           [] Already on Anticoagulation     Disposition:    [] Home       [x] TCU       [] Rehab       [] Psych       [] SNF       [] [place]       [] Other-    Code Status: Full Code    PT/OT Eval Status:  active and ongoing      Assessment/Plan:    Anticipated Discharge in : ?    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

## 2018-09-16 PROBLEM — N32.1 COLOVESICAL FISTULA: Status: ACTIVE | Noted: 2018-09-16

## 2018-09-16 LAB
ANION GAP SERPL CALCULATED.3IONS-SCNC: 12 MEQ/L (ref 8–16)
BUN BLDV-MCNC: 21 MG/DL (ref 7–22)
CALCIUM IONIZED: 1.19 MMOL/L (ref 1.12–1.32)
CALCIUM SERPL-MCNC: 8.5 MG/DL (ref 8.5–10.5)
CHLORIDE BLD-SCNC: 105 MEQ/L (ref 98–111)
CO2: 23 MEQ/L (ref 23–33)
CREAT SERPL-MCNC: 0.9 MG/DL (ref 0.4–1.2)
EKG ATRIAL RATE: 91 BPM
EKG P AXIS: 14 DEGREES
EKG P-R INTERVAL: 180 MS
EKG Q-T INTERVAL: 380 MS
EKG QRS DURATION: 66 MS
EKG QTC CALCULATION (BAZETT): 467 MS
EKG R AXIS: -32 DEGREES
EKG T AXIS: 32 DEGREES
EKG VENTRICULAR RATE: 91 BPM
ERYTHROCYTE [DISTWIDTH] IN BLOOD BY AUTOMATED COUNT: 17.1 % (ref 11.5–14.5)
ERYTHROCYTE [DISTWIDTH] IN BLOOD BY AUTOMATED COUNT: 50.9 FL (ref 35–45)
GFR SERPL CREATININE-BSD FRML MDRD: 60 ML/MIN/1.73M2
GLUCOSE BLD-MCNC: 103 MG/DL (ref 70–108)
GLUCOSE BLD-MCNC: 111 MG/DL (ref 70–108)
GLUCOSE BLD-MCNC: 136 MG/DL (ref 70–108)
GLUCOSE BLD-MCNC: 147 MG/DL (ref 70–108)
GLUCOSE BLD-MCNC: 157 MG/DL (ref 70–108)
HCT VFR BLD CALC: 23.6 % (ref 37–47)
HEMOGLOBIN: 7.4 GM/DL (ref 12–16)
MCH RBC QN AUTO: 30 PG (ref 26–33)
MCHC RBC AUTO-ENTMCNC: 31.4 GM/DL (ref 32.2–35.5)
MCV RBC AUTO: 95.5 FL (ref 81–99)
PLATELET # BLD: 159 THOU/MM3 (ref 130–400)
PMV BLD AUTO: 11.5 FL (ref 9.4–12.4)
POTASSIUM REFLEX MAGNESIUM: 4.2 MEQ/L (ref 3.5–5.2)
RBC # BLD: 2.47 MILL/MM3 (ref 4.2–5.4)
SODIUM BLD-SCNC: 140 MEQ/L (ref 135–145)
WBC # BLD: 6.4 THOU/MM3 (ref 4.8–10.8)

## 2018-09-16 PROCEDURE — 6360000002 HC RX W HCPCS: Performed by: INTERNAL MEDICINE

## 2018-09-16 PROCEDURE — 82948 REAGENT STRIP/BLOOD GLUCOSE: CPT

## 2018-09-16 PROCEDURE — 2709999900 HC NON-CHARGEABLE SUPPLY

## 2018-09-16 PROCEDURE — 6370000000 HC RX 637 (ALT 250 FOR IP): Performed by: THORACIC SURGERY (CARDIOTHORACIC VASCULAR SURGERY)

## 2018-09-16 PROCEDURE — 93005 ELECTROCARDIOGRAM TRACING: CPT | Performed by: INTERNAL MEDICINE

## 2018-09-16 PROCEDURE — 6370000000 HC RX 637 (ALT 250 FOR IP): Performed by: INTERNAL MEDICINE

## 2018-09-16 PROCEDURE — 6370000000 HC RX 637 (ALT 250 FOR IP): Performed by: PHYSICIAN ASSISTANT

## 2018-09-16 PROCEDURE — 2580000003 HC RX 258: Performed by: INTERNAL MEDICINE

## 2018-09-16 PROCEDURE — 36415 COLL VENOUS BLD VENIPUNCTURE: CPT

## 2018-09-16 PROCEDURE — A6250 SKIN SEAL PROTECT MOISTURIZR: HCPCS

## 2018-09-16 PROCEDURE — 2060000000 HC ICU INTERMEDIATE R&B

## 2018-09-16 PROCEDURE — 85027 COMPLETE CBC AUTOMATED: CPT

## 2018-09-16 PROCEDURE — 80048 BASIC METABOLIC PNL TOTAL CA: CPT

## 2018-09-16 PROCEDURE — 93010 ELECTROCARDIOGRAM REPORT: CPT | Performed by: INTERNAL MEDICINE

## 2018-09-16 PROCEDURE — 82330 ASSAY OF CALCIUM: CPT

## 2018-09-16 PROCEDURE — 99232 SBSQ HOSP IP/OBS MODERATE 35: CPT | Performed by: INTERNAL MEDICINE

## 2018-09-16 RX ORDER — UREA 10 %
9 LOTION (ML) TOPICAL NIGHTLY PRN
Status: DISCONTINUED | OUTPATIENT
Start: 2018-09-16 | End: 2018-09-17 | Stop reason: HOSPADM

## 2018-09-16 RX ORDER — ASPIRIN 81 MG/1
81 TABLET, CHEWABLE ORAL DAILY
Status: DISCONTINUED | OUTPATIENT
Start: 2018-09-16 | End: 2018-09-17 | Stop reason: HOSPADM

## 2018-09-16 RX ADMIN — PANTOPRAZOLE SODIUM 40 MG: 40 TABLET, DELAYED RELEASE ORAL at 05:37

## 2018-09-16 RX ADMIN — MIDODRINE HYDROCHLORIDE 10 MG: 10 TABLET ORAL at 09:24

## 2018-09-16 RX ADMIN — AMIODARONE HYDROCHLORIDE 200 MG: 200 TABLET ORAL at 09:24

## 2018-09-16 RX ADMIN — CIPROFLOXACIN 500 MG: 500 TABLET, FILM COATED ORAL at 21:15

## 2018-09-16 RX ADMIN — MIDODRINE HYDROCHLORIDE 10 MG: 10 TABLET ORAL at 12:22

## 2018-09-16 RX ADMIN — MIDODRINE HYDROCHLORIDE 10 MG: 10 TABLET ORAL at 16:58

## 2018-09-16 RX ADMIN — INSULIN LISPRO 1 UNITS: 100 INJECTION, SOLUTION INTRAVENOUS; SUBCUTANEOUS at 21:22

## 2018-09-16 RX ADMIN — HYDROCODONE BITARTRATE AND ACETAMINOPHEN 1 TABLET: 5; 325 TABLET ORAL at 03:59

## 2018-09-16 RX ADMIN — PANTOPRAZOLE SODIUM 40 MG: 40 TABLET, DELAYED RELEASE ORAL at 16:58

## 2018-09-16 RX ADMIN — ASPIRIN 81 MG 81 MG: 81 TABLET ORAL at 09:24

## 2018-09-16 RX ADMIN — Medication 9 MG: at 21:14

## 2018-09-16 RX ADMIN — Medication 10 ML: at 09:25

## 2018-09-16 RX ADMIN — ENOXAPARIN SODIUM 40 MG: 40 INJECTION SUBCUTANEOUS at 09:24

## 2018-09-16 RX ADMIN — Medication 1 UNITS: at 17:02

## 2018-09-16 RX ADMIN — DOCUSATE SODIUM 100 MG: 100 CAPSULE, LIQUID FILLED ORAL at 21:15

## 2018-09-16 RX ADMIN — Medication 10 ML: at 21:21

## 2018-09-16 RX ADMIN — TIMOLOL MALEATE 1 DROP: 5 SOLUTION/ DROPS OPHTHALMIC at 21:15

## 2018-09-16 RX ADMIN — PRAVASTATIN SODIUM 20 MG: 20 TABLET ORAL at 21:14

## 2018-09-16 RX ADMIN — CILOSTAZOL 100 MG: 100 TABLET ORAL at 05:37

## 2018-09-16 RX ADMIN — CILOSTAZOL 100 MG: 100 TABLET ORAL at 16:58

## 2018-09-16 RX ADMIN — CIPROFLOXACIN 500 MG: 500 TABLET, FILM COATED ORAL at 09:24

## 2018-09-16 ASSESSMENT — PAIN SCALES - GENERAL
PAINLEVEL_OUTOF10: 5
PAINLEVEL_OUTOF10: 0
PAINLEVEL_OUTOF10: 0

## 2018-09-16 NOTE — PROGRESS NOTES
Hospitalist Progress Note  ACCEPTING FOR HOSPITALIST SERVICE    Patient:  Addie Armas      Unit/Bed:4K-13/013-A    YOB: 1935    MRN: [id number]       Acct: [de-identified]     PCP: Red Mott    Date of Admission: 9/5/2018    Chief Complaint: numb leg    Hospital Course:  Pt presented with ischemic leg; underwent thrombectomy. Was in icu, developed afib/rvr; was on pressors. Just out ot ICU 9.14. 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 Denies hx of heart problems     Subjective:  Weak. Irritated at disruptions at night. Still having stool in urine.   Dr Dinah Hinkle note re cipro reviewed      Medications:  Reviewed    Infusion Medications    dextrose       Scheduled Medications    enoxaparin  40 mg Subcutaneous Daily    ciprofloxacin  500 mg Oral 2 times per day    amiodarone  200 mg Oral Daily    sodium chloride  1,000 mL Intravenous Once    magnesium replacement protocol   Other RX Placeholder    insulin lispro  0-6 Units Subcutaneous TID WC    insulin lispro  0-3 Units Subcutaneous Nightly    calcium replacement protocol   Other RX Placeholder    potassium (CARDIAC) replacement protocol   Other RX Placeholder    timolol  1 drop Both Eyes Nightly    midodrine  10 mg Oral TID WC    pantoprazole  40 mg Oral BID    pravastatin  20 mg Oral Daily    cilostazol  100 mg Oral BID    sodium chloride flush  10 mL Intravenous 2 times per day    docusate sodium  100 mg Oral BID    aspirin  325 mg Oral Daily     PRN Meds: melatonin, melatonin, glucose, dextrose, glucagon (rDNA), dextrose, meclizine, calcium elemental, sodium chloride flush, magnesium hydroxide, ondansetron, morphine, HYDROcodone 5 mg - acetaminophen, acetaminophen, oxyCODONE **OR** oxyCODONE      Intake/Output Summary (Last 24 hours) at 09/16/18 0607  Last data filed at 09/16/18 0425   Gross per 24 hour   Intake             1251 ml   Output              150 ml   Net             1101 Radiology:  [facility] BARIUM ENEMA   Final Result      Contrast in the urinary bladder highly suspicious for colovesical fistula. Final report electronically signed by Dr. Talat Mesa on 9/13/2018 9:46 AM      CT ABDOMEN PELVIS W IV CONTRAST Additional Contrast? Oral   Final Result   Pelvis: A Meredith catheter is present in the urinary bladder and is a relatively large amount of air in the bladder. Moderate soft tissue stranding is seen in the pelvis following recent sigmoid colon surgery. There is suggestion of some mild soft tissue    thickening of the bladder wall along the posterior superior aspect the left of midline. This may simple be due to irritation from the Meredith catheter. There is no clear separation between the bladder and the adjacent bowel loop at this site. I cannot    definitely exclude a colovesical fistula although I do not identify any definite tracking of air through the bladder wall to suggest such. Note that there is moderate presacral soft tissue stranding following recent surgery. No definite abscess or    loculated fluid collection is seen. . Mild soft tissue swelling in the right inguinal region with a few small air bubbles, related to recent surgery. IMPRESSION:    1. Constipation. Diverticulosis coli. Moderate size hiatus hernia. Infrarenal abdominal aortic aneurysm. 2. Mild atelectasis/pneumonia both posterior gross phrenic angles and right middle lobe. 11 mm nodule right middle lobe, possibly an inflammatory nodule. Follow-up CT thorax in several weeks would be helpful for further evaluation. 3. Postsurgical soft tissue stranding in the pelvis extending into the lower abdomen and perinephric space. No definite abscess or loculated fluid collection is seen. 4. Meredith catheter in urinary bladder. Relatively large amount of air in urinary bladder.  Mild focal wall thickening of the bladder lungs posterior/superior aspect the left of midline and no clear separation between the bladder and adjacent bowel loop at    this site. I do not see any definite tracking of air through the bladder wall but a colovesical fistula cannot definitely be excluded. If further evaluation desired, a barium enema or water soluble contrast enema would be helpful. **This report has been created using voice recognition software. It may contain minor errors which are inherent in voice recognition technology. **      Final report electronically signed by Dr. Rodriguez Spangler on 9/10/2018 12:22 PM      XR CHEST PORTABLE   Final Result   1. Borderline heart size. Right jugular line, catheter tip in SVC. Moderate degenerative changes both shoulders. 2. Minimal residual atelectasis/pneumonia right midlung and retrocardiac region left lung base. No effusion is seen. Overall appearance improved from prior. **This report has been created using voice recognition software. It may contain minor errors which are inherent in voice recognition technology. **      Final report electronically signed by Dr. Rodriguez Spangler on 9/9/2018 9:55 AM      XR CHEST PORTABLE   Final Result      Improved infiltrates in the right upper lobe and right base with otherwise stable interstitial infiltrates in both lungs. **This report has been created using voice recognition software. It may contain minor errors which are inherent in voice recognition technology. **      Final report electronically signed by Dr. Nickie Hicks on 9/7/2018 6:09 AM      XR CHEST PORTABLE   Final Result   1. There is a right jugular central venous [place] catheter with the distal tip looped and overlying the region of the pulmonary trunk. This catheter should be repositioned. 2. There is a skin fold along the lateral margin of the right mid/lower chest. There are interstitial infiltrates throughout both lung fields with the differential including however are not limited to pulmonary edema and pneumonia.  There is no pleural effusion. **This report has been created using voice recognition software. It may contain minor errors which are inherent in voice recognition technology. **      Final report electronically signed by Dr. Elis Theodore on 9/6/2018 7:11 AM      IR ANGIOGRAM EXTREMITY RIGHT   Final Result      IR ANGIOGRAM EXTREMITY RIGHT   Final Result   1. 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 2. Runoff of the right lower extremity was performed revealing a short segment occlusion of the right popliteal artery above the right knee joint which is reconstituted by collaterals. There is also moderate stenosis of the distal right SFA. In addition,    there is chronic-appearing occlusion of the tibioperoneal trunk with reconstitution of the posterior tibial artery by collaterals which is seen crossing the right ankle. 3. There is chronic occlusion of the distal right anterior tibial artery which gives off collaterals crossing the right ankle to reconstitute the dorsalis pedis artery. 4. TPA was not performed due to the fact that the patient had recent major abdominal surgery for partial colectomy. 5. Significant flow-limiting stenosis at the origin of the left external iliac artery. This was stented with an 8 x 6 mm Ever-Flex self-expanding stent. **This report has been created using voice recognition software. It may contain minor errors which are inherent in voice recognition technology. **      Final report electronically signed by Dr. Isis Chavarria on 9/5/2018 3:04 PM      VL DUP LOWER EXTREMITY ARTERIES BILATERAL   Final Result   1. Absent flow within the right lower extremity arterial vasculature from the right common femoral artery to the distal runoff vessels within the right ankle. The RAMOS on the right is unobtainable due to 2 absent flow.    2. No sonographic evidence of

## 2018-09-16 NOTE — PLAN OF CARE
Problem: Pain:  Goal: Pain level will decrease  Pain level will decrease   Outcome: Met This Shift  Patient denies pain this shift. Problem: Falls - Risk of:  Goal: Will remain free from falls  Will remain free from falls   Outcome: Met This Shift  No falls this shift. Patient reminded to ask for help when wanting to get up to go to the bathroom. Patient up with walker and staff help. Problem: Risk for Impaired Skin Integrity  Goal: Tissue integrity - skin and mucous membranes  Structural intactness and normal physiological function of skin and  mucous membranes. Outcome: Met This Shift  No new skin breakdown this shift. Site care done on incisions. Incisions clean, dry, and intact. Some bruising on right groin incision. Buttocks has redness that blanches. Barrier cream put on buttocks. Problem: Nutrition  Goal: Optimal nutrition therapy  Outcome: Met This Shift  Patient eating all of meals and nutrition supplement. Problem: DISCHARGE BARRIERS  Goal: Patient's continuum of care needs are met  Outcome: Ongoing  Plan is for patient to go to TCU.  on case. Precert started. Comments: Care plan reviewed with patient. Patient verbalizes understanding of the plan of care and contribute to goal setting.

## 2018-09-17 ENCOUNTER — HOSPITAL ENCOUNTER (INPATIENT)
Age: 83
LOS: 17 days | Discharge: NURSING, REHAB OR LONG-TERM CARE | DRG: 949 | End: 2018-10-04
Attending: FAMILY MEDICINE | Admitting: FAMILY MEDICINE
Payer: MEDICARE

## 2018-09-17 VITALS
WEIGHT: 192.3 LBS | SYSTOLIC BLOOD PRESSURE: 107 MMHG | TEMPERATURE: 97.4 F | BODY MASS INDEX: 32.04 KG/M2 | OXYGEN SATURATION: 94 % | HEIGHT: 65 IN | HEART RATE: 96 BPM | DIASTOLIC BLOOD PRESSURE: 55 MMHG | RESPIRATION RATE: 16 BRPM

## 2018-09-17 DIAGNOSIS — I73.9 PVD (PERIPHERAL VASCULAR DISEASE) (HCC): Primary | ICD-10-CM

## 2018-09-17 PROBLEM — R53.81 PHYSICAL DECONDITIONING: Status: ACTIVE | Noted: 2018-09-17

## 2018-09-17 LAB
ANION GAP SERPL CALCULATED.3IONS-SCNC: 12 MEQ/L (ref 8–16)
BUN BLDV-MCNC: 19 MG/DL (ref 7–22)
CALCIUM IONIZED: 1.17 MMOL/L (ref 1.12–1.32)
CALCIUM SERPL-MCNC: 8.4 MG/DL (ref 8.5–10.5)
CHLORIDE BLD-SCNC: 105 MEQ/L (ref 98–111)
CO2: 22 MEQ/L (ref 23–33)
CREAT SERPL-MCNC: 0.8 MG/DL (ref 0.4–1.2)
ERYTHROCYTE [DISTWIDTH] IN BLOOD BY AUTOMATED COUNT: 17.6 % (ref 11.5–14.5)
ERYTHROCYTE [DISTWIDTH] IN BLOOD BY AUTOMATED COUNT: 53.4 FL (ref 35–45)
GFR SERPL CREATININE-BSD FRML MDRD: 69 ML/MIN/1.73M2
GLUCOSE BLD-MCNC: 108 MG/DL (ref 70–108)
GLUCOSE BLD-MCNC: 108 MG/DL (ref 70–108)
GLUCOSE BLD-MCNC: 115 MG/DL (ref 70–108)
GLUCOSE BLD-MCNC: 156 MG/DL (ref 70–108)
GLUCOSE BLD-MCNC: 167 MG/DL (ref 70–108)
HCT VFR BLD CALC: 24.2 % (ref 37–47)
HEMOGLOBIN: 7.7 GM/DL (ref 12–16)
MCH RBC QN AUTO: 30.2 PG (ref 26–33)
MCHC RBC AUTO-ENTMCNC: 31.8 GM/DL (ref 32.2–35.5)
MCV RBC AUTO: 94.9 FL (ref 81–99)
PLATELET # BLD: 166 THOU/MM3 (ref 130–400)
PMV BLD AUTO: 10.9 FL (ref 9.4–12.4)
POTASSIUM REFLEX MAGNESIUM: 4 MEQ/L (ref 3.5–5.2)
RBC # BLD: 2.55 MILL/MM3 (ref 4.2–5.4)
SODIUM BLD-SCNC: 139 MEQ/L (ref 135–145)
WBC # BLD: 6.7 THOU/MM3 (ref 4.8–10.8)

## 2018-09-17 PROCEDURE — 82330 ASSAY OF CALCIUM: CPT

## 2018-09-17 PROCEDURE — 82948 REAGENT STRIP/BLOOD GLUCOSE: CPT

## 2018-09-17 PROCEDURE — 6370000000 HC RX 637 (ALT 250 FOR IP): Performed by: INTERNAL MEDICINE

## 2018-09-17 PROCEDURE — 6370000000 HC RX 637 (ALT 250 FOR IP): Performed by: FAMILY MEDICINE

## 2018-09-17 PROCEDURE — 1290000000 HC SEMI PRIVATE OTHER R&B

## 2018-09-17 PROCEDURE — 80048 BASIC METABOLIC PNL TOTAL CA: CPT

## 2018-09-17 PROCEDURE — 85027 COMPLETE CBC AUTOMATED: CPT

## 2018-09-17 PROCEDURE — 97530 THERAPEUTIC ACTIVITIES: CPT

## 2018-09-17 PROCEDURE — 97110 THERAPEUTIC EXERCISES: CPT

## 2018-09-17 PROCEDURE — 2709999900 HC NON-CHARGEABLE SUPPLY

## 2018-09-17 PROCEDURE — 36415 COLL VENOUS BLD VENIPUNCTURE: CPT

## 2018-09-17 PROCEDURE — 0220000000 HC SKILLED NURSING FACILITY

## 2018-09-17 PROCEDURE — 6370000000 HC RX 637 (ALT 250 FOR IP): Performed by: THORACIC SURGERY (CARDIOTHORACIC VASCULAR SURGERY)

## 2018-09-17 PROCEDURE — 99239 HOSP IP/OBS DSCHRG MGMT >30: CPT | Performed by: FAMILY MEDICINE

## 2018-09-17 PROCEDURE — 2580000003 HC RX 258: Performed by: INTERNAL MEDICINE

## 2018-09-17 PROCEDURE — 6360000002 HC RX W HCPCS: Performed by: INTERNAL MEDICINE

## 2018-09-17 PROCEDURE — 6370000000 HC RX 637 (ALT 250 FOR IP): Performed by: PHYSICIAN ASSISTANT

## 2018-09-17 RX ORDER — TIMOLOL MALEATE 5 MG/ML
1 SOLUTION/ DROPS OPHTHALMIC NIGHTLY
Status: CANCELLED | OUTPATIENT
Start: 2018-09-17

## 2018-09-17 RX ORDER — MECLIZINE HCL 12.5 MG/1
25 TABLET ORAL 3 TIMES DAILY PRN
Status: DISCONTINUED | OUTPATIENT
Start: 2018-09-17 | End: 2018-10-04 | Stop reason: HOSPADM

## 2018-09-17 RX ORDER — DOCUSATE SODIUM 100 MG/1
100 CAPSULE, LIQUID FILLED ORAL 2 TIMES DAILY
Status: DISCONTINUED | OUTPATIENT
Start: 2018-09-17 | End: 2018-10-04 | Stop reason: HOSPADM

## 2018-09-17 RX ORDER — TIMOLOL MALEATE 5 MG/ML
1 SOLUTION/ DROPS OPHTHALMIC NIGHTLY
Status: DISCONTINUED | OUTPATIENT
Start: 2018-09-17 | End: 2018-10-04 | Stop reason: HOSPADM

## 2018-09-17 RX ORDER — PANTOPRAZOLE SODIUM 40 MG/1
40 TABLET, DELAYED RELEASE ORAL 2 TIMES DAILY
Status: DISCONTINUED | OUTPATIENT
Start: 2018-09-17 | End: 2018-10-04 | Stop reason: HOSPADM

## 2018-09-17 RX ORDER — DOCUSATE SODIUM 100 MG/1
100 CAPSULE, LIQUID FILLED ORAL 2 TIMES DAILY
Status: CANCELLED | OUTPATIENT
Start: 2018-09-17

## 2018-09-17 RX ORDER — PRAVASTATIN SODIUM 20 MG
20 TABLET ORAL DAILY
Status: CANCELLED | OUTPATIENT
Start: 2018-09-17

## 2018-09-17 RX ORDER — CIPROFLOXACIN 500 MG/1
500 TABLET, FILM COATED ORAL EVERY 12 HOURS SCHEDULED
Status: COMPLETED | OUTPATIENT
Start: 2018-09-17 | End: 2018-09-24

## 2018-09-17 RX ORDER — CIPROFLOXACIN 500 MG/1
500 TABLET, FILM COATED ORAL EVERY 12 HOURS SCHEDULED
Status: CANCELLED | OUTPATIENT
Start: 2018-09-17 | End: 2018-09-24

## 2018-09-17 RX ORDER — CLOTRIMAZOLE 1 %
CREAM (GRAM) TOPICAL 2 TIMES DAILY
Status: DISCONTINUED | OUTPATIENT
Start: 2018-09-17 | End: 2018-09-17 | Stop reason: CLARIF

## 2018-09-17 RX ORDER — UREA 10 %
9 LOTION (ML) TOPICAL NIGHTLY PRN
Status: DISCONTINUED | OUTPATIENT
Start: 2018-09-17 | End: 2018-10-04 | Stop reason: HOSPADM

## 2018-09-17 RX ORDER — AMIODARONE HYDROCHLORIDE 200 MG/1
200 TABLET ORAL DAILY
Status: CANCELLED | OUTPATIENT
Start: 2018-09-18

## 2018-09-17 RX ORDER — NICOTINE POLACRILEX 4 MG
15 LOZENGE BUCCAL PRN
Status: DISCONTINUED | OUTPATIENT
Start: 2018-09-17 | End: 2018-09-18

## 2018-09-17 RX ORDER — OXYCODONE HYDROCHLORIDE 5 MG/1
10 TABLET ORAL EVERY 4 HOURS PRN
Status: DISCONTINUED | OUTPATIENT
Start: 2018-09-17 | End: 2018-09-23

## 2018-09-17 RX ORDER — CILOSTAZOL 100 MG/1
100 TABLET ORAL 2 TIMES DAILY
Status: CANCELLED | OUTPATIENT
Start: 2018-09-17

## 2018-09-17 RX ORDER — AMIODARONE HYDROCHLORIDE 200 MG/1
200 TABLET ORAL DAILY
Status: DISCONTINUED | OUTPATIENT
Start: 2018-09-18 | End: 2018-10-04 | Stop reason: HOSPADM

## 2018-09-17 RX ORDER — NICOTINE POLACRILEX 4 MG
15 LOZENGE BUCCAL PRN
Status: CANCELLED | OUTPATIENT
Start: 2018-09-17

## 2018-09-17 RX ORDER — MIDODRINE HYDROCHLORIDE 10 MG/1
10 TABLET ORAL
Status: DISCONTINUED | OUTPATIENT
Start: 2018-09-17 | End: 2018-09-29

## 2018-09-17 RX ORDER — ACETAMINOPHEN 325 MG/1
650 TABLET ORAL EVERY 4 HOURS PRN
Status: CANCELLED | OUTPATIENT
Start: 2018-09-17

## 2018-09-17 RX ORDER — OXYCODONE HYDROCHLORIDE 5 MG/1
10 TABLET ORAL EVERY 4 HOURS PRN
Status: CANCELLED | OUTPATIENT
Start: 2018-09-17

## 2018-09-17 RX ORDER — ACETAMINOPHEN 325 MG/1
650 TABLET ORAL EVERY 4 HOURS PRN
Status: DISCONTINUED | OUTPATIENT
Start: 2018-09-17 | End: 2018-09-25

## 2018-09-17 RX ORDER — MECLIZINE HCL 12.5 MG/1
25 TABLET ORAL 3 TIMES DAILY PRN
Status: CANCELLED | OUTPATIENT
Start: 2018-09-17

## 2018-09-17 RX ORDER — OXYCODONE HYDROCHLORIDE 5 MG/1
5 TABLET ORAL EVERY 4 HOURS PRN
Status: CANCELLED | OUTPATIENT
Start: 2018-09-17

## 2018-09-17 RX ORDER — PRAVASTATIN SODIUM 20 MG
20 TABLET ORAL DAILY
Status: DISCONTINUED | OUTPATIENT
Start: 2018-09-17 | End: 2018-09-17

## 2018-09-17 RX ORDER — MIDODRINE HYDROCHLORIDE 10 MG/1
10 TABLET ORAL
Status: CANCELLED | OUTPATIENT
Start: 2018-09-17

## 2018-09-17 RX ORDER — PRAVASTATIN SODIUM 40 MG
40 TABLET ORAL DAILY
Status: DISCONTINUED | OUTPATIENT
Start: 2018-09-17 | End: 2018-10-04 | Stop reason: HOSPADM

## 2018-09-17 RX ORDER — ASPIRIN 81 MG/1
81 TABLET, CHEWABLE ORAL DAILY
Status: CANCELLED | OUTPATIENT
Start: 2018-09-18

## 2018-09-17 RX ORDER — CILOSTAZOL 100 MG/1
100 TABLET ORAL 2 TIMES DAILY
Status: DISCONTINUED | OUTPATIENT
Start: 2018-09-17 | End: 2018-10-04 | Stop reason: HOSPADM

## 2018-09-17 RX ORDER — LANOLIN ALCOHOL/MO/W.PET/CERES
9 CREAM (GRAM) TOPICAL NIGHTLY PRN
Status: CANCELLED | OUTPATIENT
Start: 2018-09-17

## 2018-09-17 RX ORDER — OXYCODONE HYDROCHLORIDE 5 MG/1
5 TABLET ORAL EVERY 4 HOURS PRN
Status: DISCONTINUED | OUTPATIENT
Start: 2018-09-17 | End: 2018-09-23

## 2018-09-17 RX ORDER — ASPIRIN 81 MG/1
81 TABLET, CHEWABLE ORAL DAILY
Status: DISCONTINUED | OUTPATIENT
Start: 2018-09-18 | End: 2018-10-04 | Stop reason: HOSPADM

## 2018-09-17 RX ORDER — PANTOPRAZOLE SODIUM 40 MG/1
40 TABLET, DELAYED RELEASE ORAL 2 TIMES DAILY
Status: CANCELLED | OUTPATIENT
Start: 2018-09-17

## 2018-09-17 RX ADMIN — MIDODRINE HYDROCHLORIDE 10 MG: 10 TABLET ORAL at 08:55

## 2018-09-17 RX ADMIN — Medication 1 UNITS: at 12:22

## 2018-09-17 RX ADMIN — CIPROFLOXACIN 500 MG: 500 TABLET, FILM COATED ORAL at 08:55

## 2018-09-17 RX ADMIN — AMIODARONE HYDROCHLORIDE 200 MG: 200 TABLET ORAL at 08:55

## 2018-09-17 RX ADMIN — ASPIRIN 81 MG 81 MG: 81 TABLET ORAL at 08:55

## 2018-09-17 RX ADMIN — PANTOPRAZOLE SODIUM 40 MG: 40 TABLET, DELAYED RELEASE ORAL at 08:55

## 2018-09-17 RX ADMIN — PRAVASTATIN SODIUM 40 MG: 40 TABLET ORAL at 20:21

## 2018-09-17 RX ADMIN — CILOSTAZOL 100 MG: 100 TABLET ORAL at 08:55

## 2018-09-17 RX ADMIN — CILOSTAZOL 100 MG: 100 TABLET ORAL at 18:26

## 2018-09-17 RX ADMIN — ENOXAPARIN SODIUM 40 MG: 40 INJECTION SUBCUTANEOUS at 08:55

## 2018-09-17 RX ADMIN — INSULIN LISPRO 1 UNITS: 100 INJECTION, SOLUTION INTRAVENOUS; SUBCUTANEOUS at 20:21

## 2018-09-17 RX ADMIN — PANTOPRAZOLE SODIUM 40 MG: 40 TABLET, DELAYED RELEASE ORAL at 18:28

## 2018-09-17 RX ADMIN — ACETAMINOPHEN 650 MG: 325 TABLET ORAL at 18:28

## 2018-09-17 RX ADMIN — Medication 10 ML: at 08:56

## 2018-09-17 RX ADMIN — CIPROFLOXACIN HYDROCHLORIDE 500 MG: 500 TABLET, FILM COATED ORAL at 22:38

## 2018-09-17 RX ADMIN — ACETAMINOPHEN 650 MG: 325 TABLET ORAL at 12:22

## 2018-09-17 RX ADMIN — TIMOLOL MALEATE 1 DROP: 5 SOLUTION OPHTHALMIC at 20:18

## 2018-09-17 RX ADMIN — DOCUSATE SODIUM 100 MG: 100 CAPSULE, LIQUID FILLED ORAL at 20:21

## 2018-09-17 RX ADMIN — ACETAMINOPHEN 650 MG: 325 TABLET ORAL at 22:39

## 2018-09-17 RX ADMIN — MIDODRINE HYDROCHLORIDE 10 MG: 10 TABLET ORAL at 12:22

## 2018-09-17 RX ADMIN — DOCUSATE SODIUM 100 MG: 100 CAPSULE, LIQUID FILLED ORAL at 08:55

## 2018-09-17 ASSESSMENT — PAIN DESCRIPTION - ORIENTATION: ORIENTATION: RIGHT

## 2018-09-17 ASSESSMENT — PAIN SCALES - GENERAL
PAINLEVEL_OUTOF10: 0
PAINLEVEL_OUTOF10: 4
PAINLEVEL_OUTOF10: 3
PAINLEVEL_OUTOF10: 5
PAINLEVEL_OUTOF10: 0
PAINLEVEL_OUTOF10: 3
PAINLEVEL_OUTOF10: 0
PAINLEVEL_OUTOF10: 6
PAINLEVEL_OUTOF10: 4

## 2018-09-17 ASSESSMENT — PAIN DESCRIPTION - PAIN TYPE: TYPE: SURGICAL PAIN

## 2018-09-17 ASSESSMENT — PAIN DESCRIPTION - FREQUENCY: FREQUENCY: CONTINUOUS

## 2018-09-17 ASSESSMENT — PAIN DESCRIPTION - LOCATION: LOCATION: LEG

## 2018-09-17 ASSESSMENT — PAIN DESCRIPTION - ONSET: ONSET: ON-GOING

## 2018-09-17 ASSESSMENT — PAIN DESCRIPTION - DESCRIPTORS: DESCRIPTORS: ACHING

## 2018-09-17 NOTE — PROGRESS NOTES
Pharmacy Medication History Note      List of current medications patient is taking is complete. Source of information: Patient and [employer]    Changes made to medication list:  Medications removed (include reason, ex. therapy complete or physician discontinued):  · None    Medications added/doses adjusted:  · Change Pravachol 20 mg daily to Pravachol 40 mg daily    Other notes (ex. Recent course of antibiotics, Coumadin dosing):  · Denies use of other OTC or herbal medications.       Allergies reviewed      Electronically signed by Zulema Yuen, [address] [place] [address] on 9/17/2018 at 11:58 AM

## 2018-09-17 NOTE — DISCHARGE SUMMARY
where pulmonary was consulted to assume her care on 9/6/18. On 9/7, Pt's HR was in 140s with Dopamine and Levophed. Dopamine was discontinued and Levophed was increased. Patient has continued to require pressors. 9/10 questions for stool in urine. CT scan did not see a colovesical fistula, but could not be excluded. Activated charcoal was given, flecks seen in razo. ATB were changed to Rocephin from Cipro 9/10. On 9/11 Pt was started on Amiodarone drip and ACTH stimulation test was done. Rocephin was stopped due to no abscess on CT. On 9/13 General Surgery ordered a Barium enema to confirm a fistula which was confirmed and gen surgery Dr. Jared Queen ordered Cipro for 10 days. \"     --additional notes:    -- with pt's tachyarrhythmia was ?if SVT vs afib --> was started on amiodarone gtt due to hypotension -> changed to oral and HR was improved and SR with PACs at time of d/c;  Trops were up to 0.044 on admission and repeat 0.023. Pt did have stress by Dr. Patricia [facility] cardiology 12/8/17 (-) ischemia; Echo 12/2018 EF 60-65%. She was also started on therapeutic lovenox 1 mg/kg with PAD and also with arrhythmia. Dr. Dustin Santiago decreased the lovenox to DVT prophylatic dose and cont aSA. ??why decreased as not documented - will cont prophylactic dose given anemia issues. Limited echo 9/10/18 EF 55%, normal fxn. CTS did not document recs for anticoagulation. She was cont on statin, ASA for PAD. Post-surgical sites were C/D/I w/o drainage or induration. REcommended pt f/u with Cardio at d/c given amiodarone started during admission and ?need Holter at d/c from TCU.    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 Jared Queen. If further fever, pt may need further surgical intervention. At risk for sepsis. -- other consultants had signed off. SHe was still quite weak thus was d/c to TCU in stable condition.     Discharge Medications:  Current Facility-Administered Medications   Medication pravastatin (PRAVACHOL) tablet 20 mg  20 mg Oral Daily Rafael Franklin MD   20 mg at 09/16/18 2114    calcium elemental (OSCAL) tablet 500 mg  500 mg Oral Daily PRN Rafael Franklin MD   500 mg at 09/07/18 8460    cilostazol (PLETAL) tablet 100 mg  100 mg Oral BID Rafael Franklin MD   100 mg at 09/17/18 0855    sodium chloride flush 0.9 % injection 10 mL  10 mL Intravenous 2 times per day Rafael Franklin MD   10 mL at 09/17/18 0856    sodium chloride flush 0.9 % injection 10 mL  10 mL Intravenous PRN Rafael Franklin MD        magnesium hydroxide (MILK OF MAGNESIA) 400 MG/5ML suspension 30 mL  30 mL Oral Daily PRN Rafael Franklin MD        ondansetron [facility]) injection 4 mg  4 mg Intravenous Q6H PRN Rafael Franklin MD        morphine injection 2 mg  2 mg Intravenous Q4H PRN Rafael Franklin MD   2 mg at 09/06/18 0847    acetaminophen (TYLENOL) tablet 650 mg  650 mg Oral Q4H PRN Lydia Rivas MD   650 mg at 09/17/18 1222    oxyCODONE (ROXICODONE) immediate release tablet 5 mg  5 mg Oral Q4H PRN Lydia Rivas MD   5 mg at 09/14/18 1959    Or    oxyCODONE (ROXICODONE) immediate release tablet 10 mg  10 mg Oral Q4H PRN Lydia Rivas MD   10 mg at 09/05/18 2304    docusate sodium (COLACE) capsule 100 mg  100 mg Oral BID Lydia Rivas MD   100 mg at 09/17/18 7948         Patient Instructions:    Discharge lab work: per TCU  Activity: activity as tolerated  Diet: DIET GENERAL; No Added Salt (3-4 GM)  Dietary Nutrition Supplements: Low Calorie High Protein Supplement    Code Status: Full Code    Follow-up visits:   -- PCP     -- Cardiology at [facility] 1-2 weeks after discharge for new SVT vs PAF     Procedures:   -- 9/5/18 by IR =   Impression   1. 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    2. Runoff of the right lower extremity was performed revealing a short segment occlusion of - clear to auscultation, no wheezes, rales or rhonchi, symmetric air entry  Heart - normal rate and regular rhythm, S1 and S2 normal, ectopy  Abdomen - soft, nontender, nondistended, no masses or organomegaly  bowel sounds normal  Obese: No; Protuberant: No   Neurological - alert, oriented, normal speech, no focal findings or movement disorder noted, cranial nerves II through XII intact  Extremities - RLE sensitive to touch with incisions on RLE without erythema or induration or drainage. Pink and warm. Skin - normal coloration and turgor, no rashes, no suspicious skin lesions noted    Significant Diagnostics:   Radiology:   [place] BARIUM ENEMA   Final Result      Contrast in the urinary bladder highly suspicious for colovesical fistula. Final report electronically signed by Dr. Ira Agustin on 9/13/2018 9:46 AM      CT ABDOMEN PELVIS W IV CONTRAST Additional Contrast? Oral   Final Result   Pelvis: A Meredith catheter is present in the urinary bladder and is a relatively large amount of air in the bladder. Moderate soft tissue stranding is seen in the pelvis following recent sigmoid colon surgery. There is suggestion of some mild soft tissue    thickening of the bladder wall along the posterior superior aspect the left of midline. This may simple be due to irritation from the Meredith catheter. There is no clear separation between the bladder and the adjacent bowel loop at this site. I cannot    definitely exclude a colovesical fistula although I do not identify any definite tracking of air through the bladder wall to suggest such. Note that there is moderate presacral soft tissue stranding following recent surgery. No definite abscess or    loculated fluid collection is seen. . Mild soft tissue swelling in the right inguinal region with a few small air bubbles, related to recent surgery. IMPRESSION:    1. Constipation. Diverticulosis coli. Moderate size hiatus hernia. Infrarenal abdominal aortic aneurysm.    2. Mild atelectasis/pneumonia both posterior gross phrenic angles and right middle lobe. 11 mm nodule right middle lobe, possibly an inflammatory nodule. Follow-up CT thorax in several weeks would be helpful for further evaluation. 3. Postsurgical soft tissue stranding in the pelvis extending into the lower abdomen and perinephric space. No definite abscess or loculated fluid collection is seen. 4. Meredith catheter in urinary bladder. Relatively large amount of air in urinary bladder. Mild focal wall thickening of the bladder lungs posterior/superior aspect the left of midline and no clear separation between the bladder and adjacent bowel loop at    this site. I do not see any definite tracking of air through the bladder wall but a colovesical fistula cannot definitely be excluded. If further evaluation desired, a barium enema or water soluble contrast enema would be helpful. **This report has been created using voice recognition software. It may contain minor errors which are inherent in voice recognition technology. **      Final report electronically signed by Dr. Humera Gomez on 9/10/2018 12:22 PM      XR CHEST PORTABLE   Final Result   1. Borderline heart size. Right jugular line, catheter tip in SVC. Moderate degenerative changes both shoulders. 2. Minimal residual atelectasis/pneumonia right midlung and retrocardiac region left lung base. No effusion is seen. Overall appearance improved from prior. **This report has been created using voice recognition software. It may contain minor errors which are inherent in voice recognition technology. **      Final report electronically signed by Dr. Humera Gomez on 9/9/2018 9:55 AM      XR CHEST PORTABLE   Final Result      Improved infiltrates in the right upper lobe and right base with otherwise stable interstitial infiltrates in both lungs. **This report has been created using voice recognition software.  It may contain minor errors with an 8 x 6 mm Ever-Flex self-expanding stent. **This report has been created using voice recognition software. It may contain minor errors which are inherent in voice recognition technology. **      Final report electronically signed by Dr. Carrie Fuentes on 9/5/2018 3:04 PM      VL DUP LOWER EXTREMITY ARTERIES BILATERAL   Final Result   1. Absent flow within the right lower extremity arterial vasculature from the right common femoral artery to the distal runoff vessels within the right ankle. The RAMOS on the right is unobtainable due to 2 absent flow. 2. No sonographic evidence of significant flow-limiting stenosis is demonstrated within the left lower extremity enteral vasculature by ultrasound. However, there is a moderately diminished RAMOS on the left measuring 0.68. 3. These findings were discussed with the referring ER clinician and the vascular surgeon at the time of dictation. **This report has been created using voice recognition software. It may contain minor errors which are inherent in voice recognition technology. **      Final report electronically signed by Dr. Carrie Fuentes on 9/5/2018 10:06 AM      XR HIP RIGHT (2-3 VIEWS)   Final Result   1. Unremarkable right hip series. **This report has been created using voice recognition software. It may contain minor errors which are inherent in voice recognition technology. **      Final report electronically signed by Dr. Saw Ellington on 9/5/2018 6:58 AM      XR LUMBAR SPINE (2-3 VIEWS)   Final Result   Mild T11-12 and moderate L5-S1 degenerative disc disease. No fracture or subluxation. **This report has been created using voice recognition software. It may contain minor errors which are inherent in voice recognition technology. **      Final report electronically signed by Dr. Alisha Hutchinson on 9/5/2018 6:57 AM          Labs:   Recent Results (from the past 72 hour(s))   POCT Glucose    Collection Time: 09/14/18  5:00 PM

## 2018-09-17 NOTE — PROGRESS NOTES
Stand: Contact guard assistance;Minimal Assistance (CGA x2 from commode and min Ax1 from EOB )  Stand to sit: Contact guard assistance (x1 to EOB with cues on proper hand placement )  Comment: Patient required verbal cues on proper hand placement for increased safety. Ambulation 1  Surface: level tile  Device: Rolling Walker  Assistance: Contact guard assistance  Quality of Gait: decreased skinny, decrease bilateral step length, decreased bilateral heelstike, no LOB   Distance: 18'x1  Comments: Patient refused further ambulation this date arnold to increased fatigue          Balance  Comments: Patient completed standing balance training at RW at CGAx1 at the conclusion of bathroom use with RN completeing pericare. Patient completed sitting EOB with the completion of therapeutic exercises. Exercises:  Exercises  Comments: Patient completed BLE seated therapeutic exercises 10x each with the performance of heel/toe raises, LAQ, glute sets, HS curls, hip abduction, iso hip adduction, and quad sets. Patient required min verbal instruction on proper technique for max contraction to facilitate increased BLE strength and ROM required for increased improvement with bed mboility and functional ambulation. Activity Tolerance:  Activity Tolerance: Patient limited by fatigue;Patient limited by endurance    Assessment: Body structures, Functions, Activity limitations: Decreased functional mobility , Decreased ROM, Decreased strength, Decreased balance, Decreased endurance  Assessment: Patient tolerated treament session fairly well. Patient required rest breaks with therapeutic exercises and had decreased ambulation distance due to increased fatigue. Patient required min A with bed mobility and CGAx2 to min Ax1 with functional transfers. Patient would benefit from continued therapy to increase strength, endurance, and functional mobility.    Prognosis: Good     REQUIRES PT FOLLOW UP: Yes  Discharge Recommendations: Subacute/Skilled Nursing Facility, Continue to assess pending progress    Patient Education:  Patient Education: transfers, ambulation, bed mobility, POC    Equipment Recommendations:  Equipment Needed: No  Other: will continue to monitor needs- may need RW    Safety:  Type of devices: Left in bed, bed alarm in place, Call light within reach, Nurse notified, All fall risk precautions in place, daughter present   Initially in place: No    Plan:  Times per week: 5-6 X O  Times per day: Daily  Current Treatment Recommendations: Strengthening, Gait Training, Balance Training, ROM, Functional Mobility Training, Endurance Training, Transfer Training, Home Exercise Program    Goals:  Patient goals : Go home     Short term goals  Time Frame for Short term goals: 2 weeks   Short term goal 1: supine to sit and return with SBA to get in and out of bed   Short term goal 2: sit to stand with SBA to get on and off various surfaces  Short term goal 3: ambulate with  feet with SBA to walk safely in the home    Long term goals  Time Frame for Long term goals : NA due to short ELOS      AM-PAC Inpatient Mobility without Stair Climbing Raw Score : 15  AM-PAC Inpatient without Stair Climbing T-Scale Score : 43.03  Mobility Inpatient CMS 0-100% Score: 47.43  Mobility Inpatient without Stair CMS G-Code Modifier : LORENA

## 2018-09-18 PROBLEM — I71.40 AAA (ABDOMINAL AORTIC ANEURYSM) WITHOUT RUPTURE (HCC): Status: ACTIVE | Noted: 2018-09-18

## 2018-09-18 PROBLEM — K44.9 HIATAL HERNIA: Status: ACTIVE | Noted: 2018-09-18

## 2018-09-18 LAB
GLUCOSE BLD-MCNC: 107 MG/DL (ref 70–108)
GLUCOSE BLD-MCNC: 109 MG/DL (ref 70–108)
GLUCOSE BLD-MCNC: 115 MG/DL (ref 70–108)

## 2018-09-18 PROCEDURE — 82948 REAGENT STRIP/BLOOD GLUCOSE: CPT

## 2018-09-18 PROCEDURE — 97530 THERAPEUTIC ACTIVITIES: CPT

## 2018-09-18 PROCEDURE — 97166 OT EVAL MOD COMPLEX 45 MIN: CPT

## 2018-09-18 PROCEDURE — 6360000002 HC RX W HCPCS: Performed by: FAMILY MEDICINE

## 2018-09-18 PROCEDURE — 1290000000 HC SEMI PRIVATE OTHER R&B

## 2018-09-18 PROCEDURE — 2709999900 HC NON-CHARGEABLE SUPPLY

## 2018-09-18 PROCEDURE — 6370000000 HC RX 637 (ALT 250 FOR IP): Performed by: FAMILY MEDICINE

## 2018-09-18 PROCEDURE — 97535 SELF CARE MNGMENT TRAINING: CPT

## 2018-09-18 PROCEDURE — 2500000003 HC RX 250 WO HCPCS: Performed by: FAMILY MEDICINE

## 2018-09-18 PROCEDURE — 97163 PT EVAL HIGH COMPLEX 45 MIN: CPT

## 2018-09-18 PROCEDURE — 97110 THERAPEUTIC EXERCISES: CPT

## 2018-09-18 PROCEDURE — 6360000004 HC RX CONTRAST MEDICATION: Performed by: THORACIC SURGERY (CARDIOTHORACIC VASCULAR SURGERY)

## 2018-09-18 RX ORDER — SENNA PLUS 8.6 MG/1
1 TABLET ORAL NIGHTLY PRN
Status: DISCONTINUED | OUTPATIENT
Start: 2018-09-18 | End: 2018-10-04 | Stop reason: HOSPADM

## 2018-09-18 RX ORDER — POLYETHYLENE GLYCOL 3350 17 G/17G
17 POWDER, FOR SOLUTION ORAL DAILY PRN
Status: DISCONTINUED | OUTPATIENT
Start: 2018-09-18 | End: 2018-10-04 | Stop reason: HOSPADM

## 2018-09-18 RX ADMIN — ACETAMINOPHEN 650 MG: 325 TABLET ORAL at 04:16

## 2018-09-18 RX ADMIN — Medication 5 UNITS: at 15:57

## 2018-09-18 RX ADMIN — CIPROFLOXACIN HYDROCHLORIDE 500 MG: 500 TABLET, FILM COATED ORAL at 21:07

## 2018-09-18 RX ADMIN — ENOXAPARIN SODIUM 40 MG: 40 INJECTION SUBCUTANEOUS at 18:14

## 2018-09-18 RX ADMIN — Medication 9 MG: at 21:08

## 2018-09-18 RX ADMIN — IOPAMIDOL 125 ML: 755 INJECTION, SOLUTION INTRAVENOUS at 13:07

## 2018-09-18 RX ADMIN — CILOSTAZOL 100 MG: 100 TABLET ORAL at 18:12

## 2018-09-18 RX ADMIN — ASPIRIN 81 MG CHEWABLE TABLET 81 MG: 81 TABLET CHEWABLE at 09:43

## 2018-09-18 RX ADMIN — CIPROFLOXACIN HYDROCHLORIDE 500 MG: 500 TABLET, FILM COATED ORAL at 09:39

## 2018-09-18 RX ADMIN — ENOXAPARIN SODIUM 40 MG: 40 INJECTION SUBCUTANEOUS at 18:49

## 2018-09-18 RX ADMIN — ACETAMINOPHEN 650 MG: 325 TABLET ORAL at 09:39

## 2018-09-18 RX ADMIN — MIDODRINE HYDROCHLORIDE 10 MG: 10 TABLET ORAL at 09:39

## 2018-09-18 RX ADMIN — MICONAZOLE NITRATE: 2 POWDER TOPICAL at 09:40

## 2018-09-18 RX ADMIN — TIMOLOL MALEATE 1 DROP: 5 SOLUTION OPHTHALMIC at 21:07

## 2018-09-18 RX ADMIN — PANTOPRAZOLE SODIUM 40 MG: 40 TABLET, DELAYED RELEASE ORAL at 18:49

## 2018-09-18 RX ADMIN — CILOSTAZOL 100 MG: 100 TABLET ORAL at 09:39

## 2018-09-18 RX ADMIN — AMIODARONE HYDROCHLORIDE 200 MG: 200 TABLET ORAL at 09:39

## 2018-09-18 RX ADMIN — PRAVASTATIN SODIUM 40 MG: 40 TABLET ORAL at 21:07

## 2018-09-18 RX ADMIN — PANTOPRAZOLE SODIUM 40 MG: 40 TABLET, DELAYED RELEASE ORAL at 04:16

## 2018-09-18 RX ADMIN — MICONAZOLE NITRATE: 2 POWDER TOPICAL at 21:07

## 2018-09-18 RX ADMIN — MIDODRINE HYDROCHLORIDE 10 MG: 10 TABLET ORAL at 18:12

## 2018-09-18 ASSESSMENT — PAIN DESCRIPTION - PAIN TYPE
TYPE: SURGICAL PAIN
TYPE: SURGICAL PAIN

## 2018-09-18 ASSESSMENT — PAIN SCALES - GENERAL
PAINLEVEL_OUTOF10: 4
PAINLEVEL_OUTOF10: 4
PAINLEVEL_OUTOF10: 0
PAINLEVEL_OUTOF10: 3
PAINLEVEL_OUTOF10: 4
PAINLEVEL_OUTOF10: 6

## 2018-09-18 ASSESSMENT — PAIN DESCRIPTION - LOCATION
LOCATION: LEG
LOCATION: LEG

## 2018-09-18 ASSESSMENT — PAIN DESCRIPTION - ORIENTATION
ORIENTATION: RIGHT
ORIENTATION: RIGHT

## 2018-09-18 NOTE — PROGRESS NOTES
certify the orders, information, and transfer of said patient is necessary for the continuing treatment of the diagnosis listed in a skilled care setting providing skilled nursing and/or skilled rehabilitative services. The patient will require on a daily basis SNF covered care.     Electronically signed by Kelsey Ivy MD on 9/18/2018 at 1:36 PM

## 2018-09-18 NOTE — PROGRESS NOTES
HYSTERECTOMY      LARYNGOSCOPY  07/15/2016    GA OLEGARIO SKN SUB GRFT T/A/L AREA/<100SCM /<1ST 25 SCM N/A 9/6/2018    RE-EXPLORATION RIGHT GROIN, DECLOTTING OF FEMORAL BYPASS GRAFT performed by Forest Flower MD at [address] [facility] EGD TRANSORAL BIOPSY SINGLE/MULTIPLE Left 11/27/2017    EGD BIOPSY performed by Ryder Mcpherson MD at [address] [place] [address], PARTIAL, Nicole Herndon N/A 8/20/2018    ROBOT ASSISTED COLECTOMY (LOW ANTERIOR) performed by Alexis Roque MD at [address] [facility] OFFICE/OUTPT [address] [facility] N/A 9/5/2018    RIGHT FEMORAL EMBOLECTOMY, INTRAOPERATIVE ARTERIOGRAM, RIGHT ILIAC EMBOLECTOMY, RIGHT COMMON AND EXTERNAL ILIAC STENTING, RIGHT FEMORAL TO ANTERIOR POPLITEAL BYPASS WITH 6MM GORTEX GRAFT performed by Forest Flower MD at [facility] [place] N/A 11/27/2017    EGD SUBMUCOSAL/BOTOX INJECTION performed by Ryder Mcpherson MD at [facility]       Restrictions/Precautions:  Fall Risk, General Precautions      Other position/activity restrictions: s/p 9/5 RIGHT FEMORAL EMBOLECTOMY, INTRAOPERATIVE ARTERIOGRAM, RIGHT ILIAC EMBOLECTOMY, RIGHT COMMON AND EXTERNAL ILIAC STENTING, RIGHT FEMORAL TO ANTERIOR POPLITEAL BYPASS WITH 6MM GORTEX GRAFT and 9/6 Emergent declotting right Fem Tib Bypass, Right common femoral and profunda femoral, endarterectomy bovine pericardial patch angioplasty. Subjective:  Chart Reviewed: Yes  Patient assessed for rehabilitation services?: Yes  Subjective: patient in bed on arrival, agrees to therapy. Anxious during mobility requiring cues for deep breathing and safety awareness    General:  Overall Orientation Status: Within Functional Limits  Follows Commands: Within Functional Limits    Vision: Within Functional Limits    Hearing: Within functional limits         Pain:  Yes.   Pain Assessment  Pain Assessment: 0-10  Pain Level: 4  Pain Type: Surgical pain  Pain Location: Leg  Pain Orientation: Right from various surfaces at SBA to rise from bed or chair  Short term goal 3: patient to ambulate 100ft with RW at [facility] for household mobility  Short term goal 4: patient to negotiate porch step with RW at [facility] for community mobility  Long term goals  Time Frame for Long term goals : 3 weeks  Long term goal 1: patient to perform bed mobility at S to get in and out of bed  Long term goal 2: patient to perform transfers to and from various surfaces at S to rise from bed or chair  Long term goal 3: patient to ambulate 150ft with least restrictive device at S for household mobility  Long term goal 4: patient to negotiate porch step with least restrictive device at S for community mobility    Evaluation Complexity: Based on the findings of patient history, examination, clinical presentation, and decision making during this evaluation, the evaluation of Joann Clamp  is of high complexity.

## 2018-09-18 NOTE — PROGRESS NOTES
(premature ventricular contraction)     PVD (peripheral vascular disease) (HCC)     Tinnitus     UTI (urinary tract infection)      Past Surgical History:   Procedure Laterality Date    APPENDECTOMY      BREAST SURGERY Right 1958    lumpectomy    CHOLECYSTECTOMY      30 years ago    COLONOSCOPY  08/06/2018    Dr Cresencio Kang      eye, eyelids    EYE SURGERY      cataract    HYSTERECTOMY      LARYNGOSCOPY  07/15/2016    NE OLEGARIO SKN SUB GRFT T/A/L AREA/<100SCM /<1ST 25 SCM N/A 9/6/2018    RE-EXPLORATION RIGHT GROIN, DECLOTTING OF FEMORAL BYPASS GRAFT performed by Willie Jade MD at [address] [facility] EGD TRANSORAL BIOPSY SINGLE/MULTIPLE Left 11/27/2017    EGD BIOPSY performed by Tom Gamboa MD at [address] [place] [address], [facility] [place] N/A 8/20/2018    ROBOT ASSISTED COLECTOMY (LOW ANTERIOR) performed by Yang Polanco MD at [address] [facility] OFFICE/OUTPT [address] [facility] N/A 9/5/2018    RIGHT FEMORAL EMBOLECTOMY, INTRAOPERATIVE ARTERIOGRAM, RIGHT ILIAC EMBOLECTOMY, RIGHT COMMON AND EXTERNAL ILIAC STENTING, RIGHT FEMORAL TO ANTERIOR POPLITEAL BYPASS WITH 6MM GORTEX GRAFT performed by Willie Jade MD at [address] [facility] 11/27/2017    EGD SUBMUCOSAL/BOTOX INJECTION performed by Tom Gamboa MD at [facility]           Subjective  Chart Reviewed: Yes  Patient assessed for rehabilitation services?: Yes  Family / Caregiver Present: No    Subjective: RN okayed OT session. Upon arrival patient was in bed sleeping. Pt was agreeable to OT session with encouragement.      General:  Overall Orientation Status: Within Functional Limits    Vision: Within Functional Limits    Hearing: Within functional limits    Pain:  Pain Assessment  Patient Currently in Pain: Yes  Pain Assessment: 0-10  Pain Level: 4  Pain Type: Surgical pain  Pain Location: Leg  Pain Orientation: Right       Social/Functional History:  Lives With: Alone  Type of Home: Apartment  Home Layout: One level  Home Access: Elevator  Home Equipment: Rolling walker     Bathroom Shower/Tub: Walk-in shower  Bathroom Toilet: Standard  Bathroom Equipment: Toilet raiser  Bathroom Accessibility: Accessible  IADL Comments: Pt reporting her children will help her as needed. Pt stating she wasn't doing much cleaning for a little while d/t not feeling well. Receives Help From: Friend(s)  ADL Assistance: Independent  Homemaking Assistance: Independent  Homemaking Responsibilities: Yes    Ambulation Assistance: Independent  Transfer Assistance: Independent    Active : Yes  Mode of Transportation: Car  Occupation: Retired  Additional Comments: Pt reporting she was able to complete her own ADL tasks at home. No Ad used prior to admission. Objective  Overall Cognitive Status: Exceptions  Arousal/Alertness: Delayed responses to stimuli  Following Commands:  Follows one step commands with repetition, Follows one step commands with increased time  Attention Span: Attends with cues to redirect  Memory: Decreased short term memory  Safety Judgement: Decreased awareness of need for safety  Problem Solving: Decreased awareness of errors, Assistance required to identify errors made, Assistance required to generate solutions, Assistance required to correct errors made, Assistance required to implement solutions  Insights: Fully aware of deficits  Initiation: Requires cues for some  Sequencing: Requires cues for some  Cognition Comment: Slow processing     Sensation  Overall Sensation Status: WNL        LUE AROM (degrees)  LUE AROM : WNL     RUE AROM (degrees)  RUE AROM : WNL     LUE Strength  L Hand Grasp: 4+/5  L Hand Release: 4+/5  LUE Strength Comment: Grossly 4/5     RUE Strength  R Hand Grasp: 4+/5  R Hand Release: 4+/5  RUE Strength Comment: Grossly 4/5    ADL  Feeding: Setup  Grooming: Stand by assistance (Sitting in chair to complete oral hygiene. )  UE Bathing: Increased time to

## 2018-09-19 LAB
GLUCOSE BLD-MCNC: 107 MG/DL (ref 70–108)
GLUCOSE BLD-MCNC: 120 MG/DL (ref 70–108)

## 2018-09-19 PROCEDURE — 6370000000 HC RX 637 (ALT 250 FOR IP): Performed by: FAMILY MEDICINE

## 2018-09-19 PROCEDURE — 97530 THERAPEUTIC ACTIVITIES: CPT

## 2018-09-19 PROCEDURE — 1290000000 HC SEMI PRIVATE OTHER R&B

## 2018-09-19 PROCEDURE — 2709999900 HC NON-CHARGEABLE SUPPLY

## 2018-09-19 PROCEDURE — 97110 THERAPEUTIC EXERCISES: CPT

## 2018-09-19 PROCEDURE — 97116 GAIT TRAINING THERAPY: CPT

## 2018-09-19 PROCEDURE — 6360000002 HC RX W HCPCS: Performed by: FAMILY MEDICINE

## 2018-09-19 PROCEDURE — 82948 REAGENT STRIP/BLOOD GLUCOSE: CPT

## 2018-09-19 PROCEDURE — 97535 SELF CARE MNGMENT TRAINING: CPT

## 2018-09-19 RX ADMIN — ACETAMINOPHEN 650 MG: 325 TABLET ORAL at 09:15

## 2018-09-19 RX ADMIN — PANTOPRAZOLE SODIUM 40 MG: 40 TABLET, DELAYED RELEASE ORAL at 17:58

## 2018-09-19 RX ADMIN — MIDODRINE HYDROCHLORIDE 10 MG: 10 TABLET ORAL at 12:50

## 2018-09-19 RX ADMIN — CIPROFLOXACIN HYDROCHLORIDE 500 MG: 500 TABLET, FILM COATED ORAL at 20:18

## 2018-09-19 RX ADMIN — MIDODRINE HYDROCHLORIDE 10 MG: 10 TABLET ORAL at 09:15

## 2018-09-19 RX ADMIN — PANTOPRAZOLE SODIUM 40 MG: 40 TABLET, DELAYED RELEASE ORAL at 05:55

## 2018-09-19 RX ADMIN — MIDODRINE HYDROCHLORIDE 10 MG: 10 TABLET ORAL at 17:58

## 2018-09-19 RX ADMIN — CILOSTAZOL 100 MG: 100 TABLET ORAL at 05:54

## 2018-09-19 RX ADMIN — CIPROFLOXACIN HYDROCHLORIDE 500 MG: 500 TABLET, FILM COATED ORAL at 09:14

## 2018-09-19 RX ADMIN — MICONAZOLE NITRATE: 2 POWDER TOPICAL at 20:18

## 2018-09-19 RX ADMIN — AMIODARONE HYDROCHLORIDE 200 MG: 200 TABLET ORAL at 09:14

## 2018-09-19 RX ADMIN — PRAVASTATIN SODIUM 40 MG: 40 TABLET ORAL at 20:18

## 2018-09-19 RX ADMIN — ACETAMINOPHEN 650 MG: 325 TABLET ORAL at 14:25

## 2018-09-19 RX ADMIN — TIMOLOL MALEATE 1 DROP: 5 SOLUTION OPHTHALMIC at 20:18

## 2018-09-19 RX ADMIN — CILOSTAZOL 100 MG: 100 TABLET ORAL at 17:58

## 2018-09-19 RX ADMIN — ASPIRIN 81 MG CHEWABLE TABLET 81 MG: 81 TABLET CHEWABLE at 09:15

## 2018-09-19 RX ADMIN — MICONAZOLE NITRATE: 2 POWDER TOPICAL at 09:15

## 2018-09-19 RX ADMIN — ENOXAPARIN SODIUM 40 MG: 40 INJECTION SUBCUTANEOUS at 18:00

## 2018-09-19 RX ADMIN — DOCUSATE SODIUM 100 MG: 100 CAPSULE, LIQUID FILLED ORAL at 09:15

## 2018-09-19 ASSESSMENT — PAIN SCALES - GENERAL
PAINLEVEL_OUTOF10: 9
PAINLEVEL_OUTOF10: 3
PAINLEVEL_OUTOF10: 4
PAINLEVEL_OUTOF10: 4
PAINLEVEL_OUTOF10: 3
PAINLEVEL_OUTOF10: 5

## 2018-09-19 ASSESSMENT — PAIN DESCRIPTION - LOCATION
LOCATION: LEG;GROIN
LOCATION: LEG
LOCATION: LEG

## 2018-09-19 ASSESSMENT — PAIN DESCRIPTION - ONSET
ONSET: ON-GOING
ONSET: ON-GOING

## 2018-09-19 ASSESSMENT — PAIN DESCRIPTION - ORIENTATION
ORIENTATION: RIGHT

## 2018-09-19 ASSESSMENT — PAIN DESCRIPTION - DESCRIPTORS
DESCRIPTORS: ACHING;SORE;CONSTANT;BURNING
DESCRIPTORS: CONSTANT;ACHING;BURNING

## 2018-09-19 ASSESSMENT — PAIN DESCRIPTION - PAIN TYPE
TYPE: SURGICAL PAIN
TYPE: SURGICAL PAIN

## 2018-09-19 ASSESSMENT — PAIN DESCRIPTION - FREQUENCY
FREQUENCY: CONTINUOUS
FREQUENCY: CONTINUOUS

## 2018-09-19 ASSESSMENT — PAIN DESCRIPTION - PROGRESSION
CLINICAL_PROGRESSION: NOT CHANGED
CLINICAL_PROGRESSION: NOT CHANGED

## 2018-09-19 NOTE — PROGRESS NOTES
CTA reviewed. Patient is a good candidate for endovascular AAA repair. 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 Aneurysm is 5.3 cm largest diameter. Native aorta 19 mm at the renals. Adequate iliofemoral access.     Plan: Cardiac clearance and elective EVAR soon

## 2018-09-19 NOTE — H&P
TCU History and Physical      Chief Complaint and Reason for TCU Admission:   Need for continued strengthening through therapies following multiple recent surgeries. History of Present Illness:  Mic Sanders  is a 82 y.o. female admitted to the transitional care unit on 9/17/2018. She had a lower colon surgery in August and her daughter states she was off the anticoagulant for the Afib, she then developed a pulseless right leg and presented to the ED. She went to the OR form there for a right leg procedure and then had an acute thrombus of the same leg the first post op day. She had trouble with tachyarrhythmias and hypotension post op and now is medically stable enough to come to the ED for more intensive time with therapy prior to the return home. Past Medical History:      Diagnosis Date    Arthritis     BPPV (benign paroxysmal positional vertigo) 6/6/16    GERD (gastroesophageal reflux disease)     ?  esophageal stricture    HTN (hypertension)     Hyperlipidemia     Obesity     Osteopenia     PAC (premature atrial contraction)     on betablocker    Paralysis (HCC)     baby    Pedal edema     denied any hx of hypertension    PVC (premature ventricular contraction)     PVD (peripheral vascular disease) (HCC)     Tinnitus     UTI (urinary tract infection)        Primary care provider: Weston Ceja     Past Surgical History:      Procedure Laterality Date    APPENDECTOMY      BREAST SURGERY Right 1958    lumpectomy    CHOLECYSTECTOMY      30 years ago    COLONOSCOPY  08/06/2018    Dr Iván Gill      eye, eyelids    EYE SURGERY      cataract    HYSTERECTOMY      LARYNGOSCOPY  07/15/2016    [place] OLEGARIO SKN SUB GRFT T/A/L AREA/<100SCM /<1ST 25 SCM N/A 9/6/2018    RE-EXPLORATION RIGHT GROIN, DECLOTTING OF FEMORAL BYPASS GRAFT performed by Zain Carpenter MD at [address] EGD TRANSORAL BIOPSY SINGLE/MULTIPLE Left 11/27/2017    EGD BIOPSY performed by Yesi Cramer MD at [facility]    GA LAP,SURG,COLECTOMY, PARTIAL, W/ANAST N/A 8/20/2018    ROBOT ASSISTED COLECTOMY (LOW ANTERIOR) performed by Jermaine Remy MD at [address] [place] [address]/OUTPT [address] [facility] N/A 9/5/2018    RIGHT FEMORAL EMBOLECTOMY, INTRAOPERATIVE ARTERIOGRAM, RIGHT ILIAC EMBOLECTOMY, RIGHT COMMON AND EXTERNAL ILIAC STENTING, RIGHT FEMORAL TO ANTERIOR POPLITEAL BYPASS WITH 6MM GORTEX GRAFT performed by Edmundo Pro MD at [facility] 11/27/2017    EGD SUBMUCOSAL/BOTOX INJECTION performed by Donald Meyers MD at [facility]       Allergies:    Lasix [furosemide]; Lipitor [atorvastatin];  Neurontin [gabapentin]; and Penicillins    Current Medications:    Current Facility-Administered Medications: senna (SENOKOT) tablet 8.6 mg, 1 tablet, Oral, Nightly PRN  polyethylene glycol (GLYCOLAX) packet 17 g, 17 g, Oral, Daily PRN  acetaminophen (TYLENOL) tablet 650 mg, 650 mg, Oral, Q4H PRN  oxyCODONE (ROXICODONE) immediate release tablet 5 mg, 5 mg, Oral, Q4H PRN **OR** oxyCODONE (ROXICODONE) immediate release tablet 10 mg, 10 mg, Oral, Q4H PRN  magnesium hydroxide (MILK OF MAGNESIA) 400 MG/5ML suspension 30 mL, 30 mL, Oral, Daily PRN  docusate sodium (COLACE) capsule 100 mg, 100 mg, Oral, BID  amiodarone (CORDARONE) tablet 200 mg, 200 mg, Oral, Daily  aspirin chewable tablet 81 mg, 81 mg, Oral, Daily  cilostazol (PLETAL) tablet 100 mg, 100 mg, Oral, BID  ciprofloxacin (CIPRO) tablet 500 mg, 500 mg, Oral, 2 times per day  enoxaparin (LOVENOX) injection 40 mg, 40 mg, Subcutaneous, Daily  meclizine (ANTIVERT) tablet 25 mg, 25 mg, Oral, TID PRN  melatonin tablet 9 mg, 9 mg, Oral, Nightly PRN  midodrine (PROAMATINE) tablet 10 mg, 10 mg, Oral, TID WC  pantoprazole (PROTONIX) tablet 40 mg, 40 mg, Oral, BID  [START ON 9/20/2018] ppd (tuberculin skin test) read, , Does not apply, Weekly  timolol (TIMOPTIC) 0.5 % ophthalmic solution 1 drop, 1 drop, Both Eyes, stiff joints, decreased range of motion, muscle weakness and bone pain  NEUROLOGICAL:  positive for coordination problems, gait problems and weakness  BEHAVIOR/PSYCH:  positive for poor appetite  10 point system review otherwise negative    Physical Exam:  BP (!) 110/54   Pulse 103   Temp 98.5 °F (36.9 °C) (Oral)   Resp 20   Ht 5' 5\" (1.651 m)   Wt 190 lb 3.2 oz (86.3 kg)   SpO2 91%   BMI 31.65 kg/m²   Well developed well nourished white female who is awake alert and cooperative laying in bed. She does have some confusion for the facts of her stay as clarified by her daughter who was present. Skin atrophic  Head normocephalic  Membranes moist  Neck without mass  Chest symmetrical  Lungs CTA  Heart S1S2 without murmur  Abd, soft, non tender, normoactive BS and no mass.  There are scattered areas of bruising and healing surgical incisions  Ext with healing surgical incisions to the lower leg and associated edema  Neuro weak  psy cooperative    Diagnostics:  Recent Results (from the past 24 hour(s))   POCT glucose    Collection Time: 09/18/18  7:39 AM   Result Value Ref Range    POC Glucose 109 (H) 70 - 108 mg/dl   POCT glucose    Collection Time: 09/18/18 12:20 PM   Result Value Ref Range    POC Glucose 107 70 - 108 mg/dl       Impression:  Active Hospital Problems    Diagnosis Date Noted    AAA (abdominal aortic aneurysm) without rupture ([facility] [address] [I71.4] 09/18/2018    Hiatal hernia [K44.9] 09/18/2018    Physical deconditioning [R53.81] 09/17/2018    Colovesical fistula [N32.1] 09/16/2018    Paroxysmal atrial fibrillation (HCC) [I48.0] 09/15/2018    SVT (supraventricular tachycardia) (HCC) [I47.1]     PVD (peripheral vascular disease) (HCC) [I73.9]     Gastroesophageal reflux disease without esophagitis [K21.9]     Osteoarthritis of multiple joints [M15.9]     Diverticulosis of large intestine without hemorrhage [K57.30] 08/07/2018   ·      Plan:   · The patient demonstrates good potential to participate

## 2018-09-19 NOTE — PROGRESS NOTES
Location: Leg  Pain Orientation: Right       Objective  Overall Cognitive Status: Exceptions  Cognition Comment: Slow processing, decreased safety and insight      ADL  Toileting: Maximum assistance; Increased time to complete (for hygiene and clothing management x2 trials)        Bed mobility  Sit to Supine: Moderate assistance (bringing BLE into bed)  Scooting: Contact guard assistance (scooting hips towards middle of bed)    Transfers  Sit to stand: Minimal assistance  Stand to sit: Contact guard assistance  Toilet Transfers  Equipment Used: Raised toilet seat with rails (x2 trials)  Toilet Transfer: Minimal assistance    Balance  Sitting Balance: Stand by assistance  Standing Balance: Contact guard assistance     Time: ~4 minutes  Activity: Pt completed dynamic standing task this date standing x4 min while requiring CGA for balance - task was graded to involve 1-2 UE release from walker. Required lengthy seated rest break after task. Completed in order to challenge dynamic standing balance and standing endurance for the completion of sinkside ADLs and various homemaking tasks. Functional Mobility  Functional - Mobility Device: Rolling Walker  Activity: To/from bathroom; Other (to/from bathroom x2 trials)  Assist Level: Contact guard assistance  Functional Mobility Comments: To/from therapy gym at slow pace. No LOB noted. Required lengthy seated rest break after each trial of mobility\       Type of ROM/Therapeutic Exercise: Free weights  Comment: Completed BUE exercises x15 reps x1 set in all joints/planes using 1# dumb bell seated in chair. Required rest breaks after each exercise. Moderate cueing provided for technique.  Completed exercises to increase strength and activity tolerance required for ADLs and toilet transfers       Activity Tolerance:  Activity Tolerance: Patient limited by fatigue;Patient limited by pain    Assessment:     Performance deficits / Impairments: Decreased functional mobility ,

## 2018-09-19 NOTE — PROGRESS NOTES
[address] [address]  INPATIENT PHYSICAL THERAPY  DAILY NOTE  [facility] TCU 8E - 8E-66/066-A    Time In: 2851  Time Out: 0912  Timed Code Treatment Minutes: 74 Minutes  Minutes: 74          Date: 2018  Patient Name: Robbin Herrera,  Gender:  female        MRN: [id number]  : 1935  (82 y.o.)  Referral Date : 18  Referring Practitioner: Ordering: ABEBA Sanchez MD. Attending: DUKE Downs MD  Diagnosis: physical deconditioning  Additional Pertinent Hx: Pt admitted 8. 82 y.o. female who presents to the ED for evaluation of right hip pain which happened prior to arrival. The patient reports she was getting out of bed in order to use the restroom, when her leg felt numb and gave out on her, she slid down the edge of the bed landing on her butt. Pt found to have PVD s/p  RIGHT FEMORAL EMBOLECTOMY, INTRAOPERATIVE ARTERIOGRAM, RIGHT ILIAC EMBOLECTOMY, RIGHT COMMON AND EXTERNAL ILIAC STENTING, RIGHT FEMORAL TO ANTERIOR POPLITEAL BYPASS WITH 6MM GORTEX GRAFT and  Emergent declotting right Fem Tib Bypass, Right common femoral and profunda femoral, endarterectomy bovine pericardial patch angioplasty. To TCU on      Past Medical History:   Diagnosis Date    Arthritis     BPPV (benign paroxysmal positional vertigo) 16    GERD (gastroesophageal reflux disease)     ?  esophageal stricture    HTN (hypertension)     Hyperlipidemia     Obesity     Osteopenia     PAC (premature atrial contraction)     on betablocker    Paralysis (HCC)     baby    Pedal edema     denied any hx of hypertension    PVC (premature ventricular contraction)     [facility])     Tinnitus     UTI (urinary tract infection)      Past Surgical History:   Procedure Laterality Date    APPENDECTOMY      BREAST SURGERY Right 8    lumpectomy    CHOLECYSTECTOMY      30 years ago    COLONOSCOPY  2018    Dr Janak Butt      eye, eyelids    EYE SURGERY      cataract    alot of time and vc's needed to complete activity    Pain:   .  Pain Assessment  Pain Level: 9  Pain Type: Surgical pain  Pain Location: Leg;Groin  Pain Orientation: Right  Pain Descriptors: Aching;Sore;Constant;Burning  Pain Frequency: Continuous  Pain Onset: On-going  Clinical Progression: Not changed       Social/Functional:  Lives With: Alone  Type of Home: Apartment  Home Layout: One level  Home Access: Elevator  Home Equipment: Rolling walker     Objective:  Rolling to Right: Stand by assistance (with rail and vcs in bed)  Supine to Sit: Minimal assistance (with trunk on bed and mod a with trunk on mat)  Sit to Supine: Moderate assistance (with raising of b le's on mat)  Scooting: Contact guard assistance (takes time)    Transfers  Sit to Stand: Contact guard assistance;Minimal Assistance (freq. vc for hand placement)  Stand to sit: Contact guard assistance (freq. vc for hand placement. Toilet transfer with rolling walker, ets with cga stand to sit, min a sit to stand, assist needed with hygiene following a BM and with clothing management)       Ambulation 1  Surface: level tile  Device: Rolling Walker  Other Apparatus: Wheelchair follow  Assistance: Contact guard assistance  Quality of Gait: decreased velocity and skinny, decreased but equal step length B, fair heel strike B, decreased step height B, significant Wb through UEs, slight forward flexed trunk  Distance: 80' x 1,40' x 1,15' x 1  Comments: cues to decrease WB through UEs and to increase step height B with minimal change in gait noted       Exercises:  Exercises  Comments: seated ankle pumps, long arc quads, hip abd, marches, and resistance red t-band ham curls x12 reps each B  very limited arom with R LE marching .  12 reps each b le supine ap's, quad/glute sets, saq(max a with R LE), heelslides a/aarom R LE and hip abd/add a/aarom with R LE with cues for performance to increase strength and improve mobility     Activity Tolerance:  Activity

## 2018-09-20 ENCOUNTER — APPOINTMENT (OUTPATIENT)
Dept: NON INVASIVE DIAGNOSTICS | Age: 83
DRG: 949 | End: 2018-09-20
Attending: FAMILY MEDICINE
Payer: MEDICARE

## 2018-09-20 PROCEDURE — 6360000002 HC RX W HCPCS

## 2018-09-20 PROCEDURE — 93017 CV STRESS TEST TRACING ONLY: CPT

## 2018-09-20 PROCEDURE — 6370000000 HC RX 637 (ALT 250 FOR IP): Performed by: FAMILY MEDICINE

## 2018-09-20 PROCEDURE — A9500 TC99M SESTAMIBI: HCPCS | Performed by: FAMILY MEDICINE

## 2018-09-20 PROCEDURE — 97110 THERAPEUTIC EXERCISES: CPT

## 2018-09-20 PROCEDURE — 6360000002 HC RX W HCPCS: Performed by: FAMILY MEDICINE

## 2018-09-20 PROCEDURE — 92523 SPEECH SOUND LANG COMPREHEN: CPT | Performed by: SPEECH-LANGUAGE PATHOLOGIST

## 2018-09-20 PROCEDURE — 97530 THERAPEUTIC ACTIVITIES: CPT

## 2018-09-20 PROCEDURE — 1290000000 HC SEMI PRIVATE OTHER R&B

## 2018-09-20 PROCEDURE — 2709999900 HC NON-CHARGEABLE SUPPLY

## 2018-09-20 PROCEDURE — 3430000000 HC RX DIAGNOSTIC RADIOPHARMACEUTICAL: Performed by: FAMILY MEDICINE

## 2018-09-20 PROCEDURE — 97535 SELF CARE MNGMENT TRAINING: CPT

## 2018-09-20 PROCEDURE — 78452 HT MUSCLE IMAGE SPECT MULT: CPT

## 2018-09-20 PROCEDURE — 97127 HC SP THER IVNTJ W/FOCUS COG FUNCJ: CPT | Performed by: SPEECH-LANGUAGE PATHOLOGIST

## 2018-09-20 PROCEDURE — 97116 GAIT TRAINING THERAPY: CPT

## 2018-09-20 RX ADMIN — ASPIRIN 81 MG CHEWABLE TABLET 81 MG: 81 TABLET CHEWABLE at 11:15

## 2018-09-20 RX ADMIN — CIPROFLOXACIN HYDROCHLORIDE 500 MG: 500 TABLET, FILM COATED ORAL at 11:14

## 2018-09-20 RX ADMIN — ACETAMINOPHEN 650 MG: 325 TABLET ORAL at 17:52

## 2018-09-20 RX ADMIN — ACETAMINOPHEN 650 MG: 325 TABLET ORAL at 22:55

## 2018-09-20 RX ADMIN — Medication 34.2 MILLICURIE: at 08:40

## 2018-09-20 RX ADMIN — MIDODRINE HYDROCHLORIDE 10 MG: 10 TABLET ORAL at 15:53

## 2018-09-20 RX ADMIN — Medication 9.2 MILLICURIE: at 07:30

## 2018-09-20 RX ADMIN — CIPROFLOXACIN HYDROCHLORIDE 500 MG: 500 TABLET, FILM COATED ORAL at 21:29

## 2018-09-20 RX ADMIN — PANTOPRAZOLE SODIUM 40 MG: 40 TABLET, DELAYED RELEASE ORAL at 06:28

## 2018-09-20 RX ADMIN — CILOSTAZOL 100 MG: 100 TABLET ORAL at 15:53

## 2018-09-20 RX ADMIN — PANTOPRAZOLE SODIUM 40 MG: 40 TABLET, DELAYED RELEASE ORAL at 15:53

## 2018-09-20 RX ADMIN — ENOXAPARIN SODIUM 40 MG: 40 INJECTION SUBCUTANEOUS at 15:53

## 2018-09-20 RX ADMIN — MICONAZOLE NITRATE: 2 POWDER TOPICAL at 11:15

## 2018-09-20 RX ADMIN — CILOSTAZOL 100 MG: 100 TABLET ORAL at 06:28

## 2018-09-20 RX ADMIN — MIDODRINE HYDROCHLORIDE 10 MG: 10 TABLET ORAL at 11:14

## 2018-09-20 RX ADMIN — MICONAZOLE NITRATE: 2 POWDER TOPICAL at 21:29

## 2018-09-20 RX ADMIN — DOCUSATE SODIUM 100 MG: 100 CAPSULE, LIQUID FILLED ORAL at 21:29

## 2018-09-20 RX ADMIN — PRAVASTATIN SODIUM 40 MG: 40 TABLET ORAL at 21:29

## 2018-09-20 RX ADMIN — AMIODARONE HYDROCHLORIDE 200 MG: 200 TABLET ORAL at 11:14

## 2018-09-20 RX ADMIN — ACETAMINOPHEN 650 MG: 325 TABLET ORAL at 11:18

## 2018-09-20 RX ADMIN — TIMOLOL MALEATE 1 DROP: 5 SOLUTION OPHTHALMIC at 22:55

## 2018-09-20 ASSESSMENT — PAIN DESCRIPTION - ORIENTATION: ORIENTATION: RIGHT

## 2018-09-20 ASSESSMENT — PAIN SCALES - GENERAL
PAINLEVEL_OUTOF10: 8
PAINLEVEL_OUTOF10: 0
PAINLEVEL_OUTOF10: 0
PAINLEVEL_OUTOF10: 8
PAINLEVEL_OUTOF10: 5
PAINLEVEL_OUTOF10: 0
PAINLEVEL_OUTOF10: 5

## 2018-09-20 ASSESSMENT — PAIN DESCRIPTION - LOCATION: LOCATION: LEG

## 2018-09-20 NOTE — PROGRESS NOTES
discharge    Patient Education:  Patient Education: safety with transfers and mobility, ADL strategies, BUE exercises  Barriers to Learning: cognition     Equipment Recommendations:  Equipment Needed: Yes  Mobility Devices: ADL Assistive Devices  ADL Assistive Devices: Shower Chair with back    Safety:  Safety Devices in place: Yes  Type of devices: Call light within reach, Chair alarm in place, Left in chair, Gait belt    Plan:  Times per week: 6x  Current Treatment Recommendations: Strengthening, Endurance Training, Patient/Caregiver Education & Training, Self-Care / ADL, Balance Training, Functional Mobility Training, Safety Education & Training, Home Management Training    Goals:  Patient goals : Go home     Short term goals  Time Frame for Short term goals: 1 week  Short term goal 1: Pt to complete sit to stand transfers from various surfaces including toilet/BSC with CGA x1 and no vcs for safety to increse indep with toileting tasks. Short term goal 2: Pt to complete functional mobility to/from BR with SBA and no vcs for sequencing of tasks to increase indep with toileting. Short term goal 3: Pt will complete LB self cares with LHAE PRN and mod A to increase indep and safety in home environment. Short term goal 4: Pt to complete bilateral UE resistance strengthening HEP with 2 vc for technique to increase UB strength for ease of upright standing during ADL tasks   Long term goals  Time Frame for Long term goals : 2-3 weeks   Long term goal 1: Pt will complete dynamic standing task x 6 minutes with 1-2 UE release and SBA to increase indep and safety with toileting hygiene and grooming tasks. Long term goal 2: Pt will complete simple IADL task while standing with Min vc for safety and SBA to increase indep and safety with simple meal prep in home environment.

## 2018-09-20 NOTE — PLAN OF CARE
Problem: Pain:  Goal: Pain level will decrease  Pain level will decrease   Outcome: Ongoing  Pt usually c/o pain in her R leg which had surgery and some slight all over pain. She takes PRN tylenol to help ease the pain. Family does not want anything stronger due to pt gets very confused    Problem: Bleeding:  Goal: Will show no signs and symptoms of excessive bleeding  Will show no signs and symptoms of excessive bleeding   Outcome: Ongoing  Pt takes ASA, Lovenox, and Platel. Excessive bleeding and bruising monitored by pt and staff    Problem: Falls - Risk of:  Goal: Will remain free from falls  Will remain free from falls    Outcome: Ongoing  We encourage and remind pt to always use her call light and for her safety not to get up by herself. Hourly rounding is done by staff. Problem: Skin Integrity:  Goal: Will show no infection signs and symptoms  Will show no infection signs and symptoms   Outcome: Ongoing  Incisions are all kept clean dry and intact. Site care done every shift.  Pt sits on a waffle cushion when up in the chair    Problem: DISCHARGE BARRIERS  Goal: Patient's continuum of care needs are met  Outcome: Ongoing  Team conference held every tuesday

## 2018-09-20 NOTE — PROGRESS NOTES
[address] [address]  INPATIENT PHYSICAL THERAPY  DAILY NOTE  [facility] T[facility] 8E - 8E-66/066-A    Time In: 0847  Time Out: 1500  Timed Code Treatment Minutes: 54 Minutes  Minutes: 55          Date: 2018  Patient Name: Lynda Arguelles,  Gender:  female        MRN: [id number]  : 1935  (82 y.o.)  Referral Date : 18  Referring Practitioner: Ordering: ABEBA Sanchez MD. Attending: DUKE Amato MD  Diagnosis: physical deconditioning  Additional Pertinent Hx: Pt admitted 88. 82 y.o. female who presents to the ED for evaluation of right hip pain which happened prior to arrival. The patient reports she was getting out of bed in order to use the restroom, when her leg felt numb and gave out on her, she slid down the edge of the bed landing on her butt. Pt found to have PVD s/p  RIGHT FEMORAL EMBOLECTOMY, INTRAOPERATIVE ARTERIOGRAM, RIGHT ILIAC EMBOLECTOMY, RIGHT COMMON AND EXTERNAL ILIAC STENTING, RIGHT FEMORAL TO ANTERIOR POPLITEAL BYPASS WITH 6MM GORTEX GRAFT and  Emergent declotting right Fem Tib Bypass, Right common femoral and profunda femoral, endarterectomy bovine pericardial patch angioplasty. To TCU on      Past Medical History:   Diagnosis Date    Arthritis     BPPV (benign paroxysmal positional vertigo) 16    GERD (gastroesophageal reflux disease)     ?  esophageal stricture    HTN (hypertension)     Hyperlipidemia     Obesity     Osteopenia     PAC (premature atrial contraction)     on betablocker    Paralysis (HCC)     baby    Pedal edema     denied any hx of hypertension    PVC (premature ventricular contraction)     PVD (peripheral vascular disease) (HCC)     Tinnitus     UTI (urinary tract infection)      Past Surgical History:   Procedure Laterality Date    APPENDECTOMY      BREAST SURGERY Right     lumpectomy    CHOLECYSTECTOMY      30 years ago    COLONOSCOPY  2018    Dr Shonda Chin      eye, eyelids    EYE SURGERY      cataract Also as pt. was entering her room end of therapy session she saw a stuffed animal and an owl ornament sitting on her window sill and remarked\"Oh good, I have some company\"  Subjective: patient sitting up in bedside chair upon arrival,pt. observed talking to herself upon therapy entering her room. pt. remained very confused throughout session. pt. hallucinating while lying supine on mat in gym area to complete ther. ex. pt. often reaching into the air toward the ceiling and saying she was unable to grab the ant and \"the thing\"    Pain:   .  Pain Assessment  Pain Level: 0 (NO PAIN REPORTED)       Social/Functional:  Lives With: Alone  Type of Home: Apartment  Home Layout: One level  Home Access: Elevator  Home Equipment: Rolling walker     Objective:  Rolling to Right:  (mat)  Supine to Sit: Moderate assistance (with raising of trunk on mat)  Sit to Supine: Moderate assistance (with b le's and body positioning on mat)  Scooting: Contact guard assistance (scoot to edge of sitting surfaces)    Transfers  Sit to Stand: Contact guard assistance (constant vc for hand placement)  Stand to sit: Contact guard assistance;Minimal Assistance ( cga-> min a .  vc for proper body/surface alignment and hand placement )    Toilet transfer with rolling walker, ets with cga-> min a .  Dependent with hygiene following a BM AND ASSIST NEEDED WITH CLOTHING MANAGEMENT       Ambulation 1  Surface: level tile  Device: Rolling Walker  Other Apparatus: Wheelchair follow  Assistance: Contact guard assistance;Minimal assistance (cga -> lt. min a  for balance and moreso for walker guidance in narrow spaces)  Quality of Gait: decreased velocity and skinny, decreased but equal step length B, fair heel strike B, decreased step height B, significant Wb through UEs, slight forward flexed , occasional path deviation  Distance: 120' x 2  Comments: cues to decrease WB through UEs and to increase step height B with minimal change in gait noted   (pt. very confused)     Exercises:  Exercises  Comments: seated ankle pumps, long arc quads, hip abd, marches, x15 reps each B  very limited arom with R LE marching . 12-15 reps each b le supine ap's, quad/glute sets, saq, heelslides a/aarom r le and hip abd/add a/aarom with r le with cues for performance to increase strength and improve mobility   Activity Tolerance:  Activity Tolerance: Patient limited by cognitive status  Activity Tolerance: pt. took alot of time with activity due to confusion/forgetfulness    Assessment:     Discharge Recommendations: Continue to assess pending progress    Patient Education:  Patient Education: POC, transfers, bed mobility, gait, energy conservation    Equipment Recommendations: Other: will continue to monitor needs- may need RW    Safety:  Type of devices:  All fall risk precautions in place, Patient at risk for falls, Call light within reach, Left in chair, Telesitter in use, Gait belt, Nurse notified, Chair alarm in place    Plan:  Times per week: 6x  Current Treatment Recommendations: Strengthening, Gait Training, Balance Training, ROM, Functional Mobility Training, Endurance Training, Transfer Training, Home Exercise Program, Equipment Evaluation, Education, & procurement, Patient/Caregiver Education & Training, Safety Education & Training, Stair training    Goals:  Patient goals : Go home, move better    Short term goals  Time Frame for Short term goals: 10 days  Short term goal 1: patient to perform bed mobility at SBA to get in and out of bed  Short term goal 2: patient to perform transfers to and from various surfaces at SBA to rise from bed or chair  Short term goal 3: patient to ambulate 100ft with RW at [facility] for household mobility  Short term goal 4: patient to negotiate porch step with RW at [facility] for community mobility    Long term goals  Time Frame for Long term goals : 3 weeks  Long term goal 1: patient to perform bed mobility at S to get in and out of bed  Long term goal 2:

## 2018-09-20 NOTE — PROGRESS NOTES
organization: Impaired  Insight: Some level of insight noted as patient would state \"I just can't seem to concentrate. \"   Attention: Poor sustained attention with fluctuations in attention and alertness over the course of the evaluations  Numerical relations: 0/5 for serial subtraction. Daughter reports that she WOULD HAVE been able to do this at baseline. Visuospatial reasoning- 3/5    SWALLOWING:  Pt tolerating a regular diet with thin liquids. Daughter reports that patient has been eating well. No concerns with dysphagia or nutrition status at this time. IMPRESSIONS: Pt presents with moderate to severe cognitive-linguistic deficits in the areas of command following, immediate/working/delayed memory, sustained and selective attention, math computation, reasoning, problem solving, thought organization and executive functioning. Pt with at least mild-moderate difficulty with auditory comprehension and understanding information in conversational speech, and mild-moderate deficits with verbal expression for communication of ideas due to poor thought/verbal organization. Pt's speech discourse is random and irrelevant at times. Pt's alertness and overall concentration fluctuated greatly during session, which is a foreseen barrier to progress and discharge to home setting. Pt would GREATLY benefit from ongoing speech therapy services to address deficits outlined above to reduce medical decline and social isolation, improve quality of life and independence with ADLs. REHAB POTENTIAL: [] Excellent [] Good [x] Fair  [] Poor    EDUCATION:   Learner: [x]Patient [] Significant other [x] Daughter [] Parent     [] Other:   Education: [x] Reviewed results and recommendations of this evaluation     [] Reviewed diet and strategies     [] Reviewed signs, symptoms and risk of aspiration     [] Demonstrated how to thick liquid appropriately.      [x] Reviewed goals and Plan of Care     [] OTHER:   Method: [x] Discussion [] Demonstration [x] Hand-out     [] OTHER:   Evaluation of Education:     [x] Verbalizes understanding [] Demonstrates with assistance     [] Demonstrates without assistance [x]Needs further instruction     [] No evidence of learning  [] Family not present    PLAN / TREATMENT RECOMMENDATIONS:  [] No further speech therapy services indicated. [] Speech Therapy evaluation to assess speech, language, cognition and/or voice  [] Recommendations pending MBS  [x] Skilled SLP intervention on IP Rehab or TCU 30 minutes per day 5 days per week. Specific interventions for next session may include: memory journal    SHORT TERM GOALS:  Short-term Goals  Timeframe for Short-term Goals: 1 week   Goal 1: Pt will complete BASIC immediate/working/delayed recall tasks with 50% accuracy, max cues with use of compensatory strategies to improve retention of new information. INTERVENTIONS: Provided verbal and written information regarding memory journal-ing to promote recall and orientation of current events. The written handout outlined research that supports the benefits in patients with sudden severe changes in cognition. Provided patient with tablet at the bedside with recommendations to write down the date and pertinent details regarding the day. Also spent oriented patient to calendar and care board in the room to assist with temporal and spatial orientation. Goal 2: Pt will complete FUNCTIONAL problem solving, and reasoning tasks with 50% accuracy, mod cues to improve safety awareness and logical thinking. Goal 3: Pt will complete sustained and selective attention task for 5 minutes with no more the 3 errors or re-directions to improve mental focus. Goal 4: Pt will complete verbal and written thought organization/sequencing tasks with 60% accuracy, mod cues to improve verbal expression and organizational skills.   Goal 5: Pt will complete basic 2 step commands or functional reading comprehension tasks with 60% accuracy, mod cues to improve understanding of verbal and written information. LONG TERM GOALS:  Long-term Goals  Timeframe for Long-term Goals: 4 weeks  Goal 1: Pt will improve cognitive-linguistic functioning to a mod assist level to improve basic communication and increase level of independence with ADLs to reduce caregiver burden. Joe Estrada.  [address] [facility], [facility] [address], [address]

## 2018-09-21 PROCEDURE — 6360000002 HC RX W HCPCS: Performed by: FAMILY MEDICINE

## 2018-09-21 PROCEDURE — 6370000000 HC RX 637 (ALT 250 FOR IP): Performed by: FAMILY MEDICINE

## 2018-09-21 PROCEDURE — 97116 GAIT TRAINING THERAPY: CPT

## 2018-09-21 PROCEDURE — 1290000000 HC SEMI PRIVATE OTHER R&B

## 2018-09-21 PROCEDURE — 97535 SELF CARE MNGMENT TRAINING: CPT

## 2018-09-21 PROCEDURE — 2709999900 HC NON-CHARGEABLE SUPPLY

## 2018-09-21 PROCEDURE — 97127 HC SP THER IVNTJ W/FOCUS COG FUNCJ: CPT | Performed by: SPEECH-LANGUAGE PATHOLOGIST

## 2018-09-21 PROCEDURE — 97110 THERAPEUTIC EXERCISES: CPT

## 2018-09-21 PROCEDURE — 97530 THERAPEUTIC ACTIVITIES: CPT

## 2018-09-21 RX ADMIN — CILOSTAZOL 100 MG: 100 TABLET ORAL at 05:30

## 2018-09-21 RX ADMIN — DOCUSATE SODIUM 100 MG: 100 CAPSULE, LIQUID FILLED ORAL at 22:37

## 2018-09-21 RX ADMIN — PANTOPRAZOLE SODIUM 40 MG: 40 TABLET, DELAYED RELEASE ORAL at 09:21

## 2018-09-21 RX ADMIN — MICONAZOLE NITRATE: 2 POWDER TOPICAL at 09:22

## 2018-09-21 RX ADMIN — PRAVASTATIN SODIUM 40 MG: 40 TABLET ORAL at 22:37

## 2018-09-21 RX ADMIN — ENOXAPARIN SODIUM 40 MG: 40 INJECTION SUBCUTANEOUS at 17:35

## 2018-09-21 RX ADMIN — MICONAZOLE NITRATE: 2 POWDER TOPICAL at 22:38

## 2018-09-21 RX ADMIN — CIPROFLOXACIN HYDROCHLORIDE 500 MG: 500 TABLET, FILM COATED ORAL at 09:21

## 2018-09-21 RX ADMIN — AMIODARONE HYDROCHLORIDE 200 MG: 200 TABLET ORAL at 09:21

## 2018-09-21 RX ADMIN — PANTOPRAZOLE SODIUM 40 MG: 40 TABLET, DELAYED RELEASE ORAL at 17:35

## 2018-09-21 RX ADMIN — DOCUSATE SODIUM 100 MG: 100 CAPSULE, LIQUID FILLED ORAL at 09:21

## 2018-09-21 RX ADMIN — MIDODRINE HYDROCHLORIDE 10 MG: 10 TABLET ORAL at 09:21

## 2018-09-21 RX ADMIN — ACETAMINOPHEN 650 MG: 325 TABLET ORAL at 11:14

## 2018-09-21 RX ADMIN — MIDODRINE HYDROCHLORIDE 10 MG: 10 TABLET ORAL at 17:35

## 2018-09-21 RX ADMIN — ASPIRIN 81 MG CHEWABLE TABLET 81 MG: 81 TABLET CHEWABLE at 09:21

## 2018-09-21 RX ADMIN — TIMOLOL MALEATE 1 DROP: 5 SOLUTION OPHTHALMIC at 22:38

## 2018-09-21 RX ADMIN — ACETAMINOPHEN 650 MG: 325 TABLET ORAL at 05:30

## 2018-09-21 RX ADMIN — MIDODRINE HYDROCHLORIDE 10 MG: 10 TABLET ORAL at 13:46

## 2018-09-21 RX ADMIN — CIPROFLOXACIN HYDROCHLORIDE 500 MG: 500 TABLET, FILM COATED ORAL at 22:37

## 2018-09-21 RX ADMIN — CILOSTAZOL 100 MG: 100 TABLET ORAL at 17:34

## 2018-09-21 ASSESSMENT — PAIN DESCRIPTION - ORIENTATION: ORIENTATION: RIGHT

## 2018-09-21 ASSESSMENT — PAIN SCALES - GENERAL
PAINLEVEL_OUTOF10: 5
PAINLEVEL_OUTOF10: 0
PAINLEVEL_OUTOF10: 0
PAINLEVEL_OUTOF10: 4
PAINLEVEL_OUTOF10: 9
PAINLEVEL_OUTOF10: 0

## 2018-09-21 ASSESSMENT — PAIN DESCRIPTION - PAIN TYPE: TYPE: SURGICAL PAIN

## 2018-09-21 ASSESSMENT — PAIN DESCRIPTION - ONSET: ONSET: ON-GOING

## 2018-09-21 ASSESSMENT — PAIN DESCRIPTION - PROGRESSION: CLINICAL_PROGRESSION: NOT CHANGED

## 2018-09-21 ASSESSMENT — PAIN DESCRIPTION - FREQUENCY: FREQUENCY: CONTINUOUS

## 2018-09-21 ASSESSMENT — PAIN DESCRIPTION - LOCATION: LOCATION: LEG

## 2018-09-21 NOTE — PROGRESS NOTES
expression and organizational skills. INTERVENTIONS: Check writing- Error noted with the date and including her signature, but patient able to correct with cues. Checkbook register organization- Mod cues needed to include check number. Independent with the date, description and amount of checks. Balancing checkbook- Multiple errors with math computation. Final balance was off by ~$100. Assisted patient in re-writing the amounts and re-working problems. Errors noted with math computation. Pt easily frustrated with task and identifying that figures were not correct. Pt would attempt to re-work problem, but still arrive at incorrect response. Will attempt a similar task next session with use of calculator. 4 unit written sequencing- 3/4 independently, 1/4 with min cues for proper ordering. SHORT TERM GOAL #5:  Goal 5: Pt will complete basic 2 step commands or functional reading comprehension tasks with 60% accuracy, mod cues to improve understanding of verbal and written information. INTERVENTIONS: Did not address due to focus on other goals.           ASSESSMENT:  Assessment: [x]Progressing towards goals          []Not Progressing towards goals       Patient Tolerance of Treatment:   [x]Tolerated well []Tolerated fair []Required rest breaks []Fatigued  Plan for Next Session: Comprehension and problem solving, memory book  Education:  Learner:  [x]Patient          []Significant Other          []Other  Education provided regarding:  [x]Goals and POC   []Diet and swallowing precautions    []Home Exercise Program  [x]Progress and/or discharge information  Method of Education:  [x]Discussion          []Demonstration          []Handout          []Other  Evaluation of Education:   [x]Verbalized understanding   []Demonstrates without assistance  []Demonstrates with assistance  [x]Needs further instruction     []No evidence of learning                  [x]No family present      Plan: [x]Continue with current

## 2018-09-22 PROCEDURE — 2709999900 HC NON-CHARGEABLE SUPPLY

## 2018-09-22 PROCEDURE — 6360000002 HC RX W HCPCS: Performed by: FAMILY MEDICINE

## 2018-09-22 PROCEDURE — 1290000000 HC SEMI PRIVATE OTHER R&B

## 2018-09-22 PROCEDURE — 6370000000 HC RX 637 (ALT 250 FOR IP): Performed by: FAMILY MEDICINE

## 2018-09-22 RX ADMIN — DOCUSATE SODIUM 100 MG: 100 CAPSULE, LIQUID FILLED ORAL at 20:14

## 2018-09-22 RX ADMIN — ASPIRIN 81 MG CHEWABLE TABLET 81 MG: 81 TABLET CHEWABLE at 08:10

## 2018-09-22 RX ADMIN — MICONAZOLE NITRATE: 2 POWDER TOPICAL at 20:14

## 2018-09-22 RX ADMIN — CILOSTAZOL 100 MG: 100 TABLET ORAL at 17:46

## 2018-09-22 RX ADMIN — CILOSTAZOL 100 MG: 100 TABLET ORAL at 08:10

## 2018-09-22 RX ADMIN — ENOXAPARIN SODIUM 40 MG: 40 INJECTION SUBCUTANEOUS at 17:25

## 2018-09-22 RX ADMIN — MICONAZOLE NITRATE: 2 POWDER TOPICAL at 08:13

## 2018-09-22 RX ADMIN — MIDODRINE HYDROCHLORIDE 10 MG: 10 TABLET ORAL at 13:33

## 2018-09-22 RX ADMIN — Medication 9 MG: at 20:14

## 2018-09-22 RX ADMIN — PRAVASTATIN SODIUM 40 MG: 40 TABLET ORAL at 20:14

## 2018-09-22 RX ADMIN — MIDODRINE HYDROCHLORIDE 10 MG: 10 TABLET ORAL at 17:24

## 2018-09-22 RX ADMIN — TIMOLOL MALEATE 1 DROP: 5 SOLUTION OPHTHALMIC at 20:14

## 2018-09-22 RX ADMIN — MIDODRINE HYDROCHLORIDE 10 MG: 10 TABLET ORAL at 08:10

## 2018-09-22 RX ADMIN — AMIODARONE HYDROCHLORIDE 200 MG: 200 TABLET ORAL at 08:10

## 2018-09-22 RX ADMIN — PANTOPRAZOLE SODIUM 40 MG: 40 TABLET, DELAYED RELEASE ORAL at 17:24

## 2018-09-22 RX ADMIN — CIPROFLOXACIN HYDROCHLORIDE 500 MG: 500 TABLET, FILM COATED ORAL at 20:14

## 2018-09-22 RX ADMIN — PANTOPRAZOLE SODIUM 40 MG: 40 TABLET, DELAYED RELEASE ORAL at 05:41

## 2018-09-22 RX ADMIN — CIPROFLOXACIN HYDROCHLORIDE 500 MG: 500 TABLET, FILM COATED ORAL at 08:10

## 2018-09-22 ASSESSMENT — PAIN SCALES - GENERAL: PAINLEVEL_OUTOF10: 0

## 2018-09-22 NOTE — PLAN OF CARE
Problem: Pain:  Goal: Pain level will decrease  Pain level will decrease   Outcome: Ongoing  Give pain medications as ordered     Problem: Bleeding:  Goal: Will show no signs and symptoms of excessive bleeding  Will show no signs and symptoms of excessive bleeding   Outcome: Ongoing  Monitor labs and vitals every shift     Problem: Falls - Risk of:  Goal: Will remain free from falls  Will remain free from falls    Outcome: Ongoing  Bed and chair alarm on   Goal: Absence of physical injury  Absence of physical injury    Outcome: Ongoing  Call light within reach     Problem: Skin Integrity:  Goal: Will show no infection signs and symptoms  Will show no infection signs and symptoms   Outcome: Ongoing  Monitor labs and vitals every shift   Goal: Absence of new skin breakdown  Absence of new skin breakdown   Outcome: Ongoing  Encourage to turn every couple hours     Problem: Risk for Impaired Skin Integrity  Goal: Tissue integrity - skin and mucous membranes  Structural intactness and normal physiological function of skin and  mucous membranes. Outcome: Ongoing  Skin checks every shift   Intervention: SKIN ASSESSMENT  Skin checks every shift       Problem:  Activity:  Goal: Able to sleep  Able to sleep     Outcome: Ongoing      Problem: Nutrition  Goal: Optimal nutrition therapy  Outcome: Ongoing  Encourage to eat at least 50% of each meal

## 2018-09-23 PROCEDURE — 97530 THERAPEUTIC ACTIVITIES: CPT

## 2018-09-23 PROCEDURE — 2709999900 HC NON-CHARGEABLE SUPPLY

## 2018-09-23 PROCEDURE — 97110 THERAPEUTIC EXERCISES: CPT

## 2018-09-23 PROCEDURE — 97116 GAIT TRAINING THERAPY: CPT

## 2018-09-23 PROCEDURE — 6360000002 HC RX W HCPCS: Performed by: FAMILY MEDICINE

## 2018-09-23 PROCEDURE — 6370000000 HC RX 637 (ALT 250 FOR IP): Performed by: FAMILY MEDICINE

## 2018-09-23 PROCEDURE — 1290000000 HC SEMI PRIVATE OTHER R&B

## 2018-09-23 PROCEDURE — 97535 SELF CARE MNGMENT TRAINING: CPT

## 2018-09-23 RX ADMIN — CIPROFLOXACIN HYDROCHLORIDE 500 MG: 500 TABLET, FILM COATED ORAL at 10:26

## 2018-09-23 RX ADMIN — PANTOPRAZOLE SODIUM 40 MG: 40 TABLET, DELAYED RELEASE ORAL at 17:40

## 2018-09-23 RX ADMIN — MIDODRINE HYDROCHLORIDE 10 MG: 10 TABLET ORAL at 10:26

## 2018-09-23 RX ADMIN — MIDODRINE HYDROCHLORIDE 10 MG: 10 TABLET ORAL at 17:39

## 2018-09-23 RX ADMIN — Medication 9 MG: at 20:44

## 2018-09-23 RX ADMIN — CILOSTAZOL 100 MG: 100 TABLET ORAL at 10:27

## 2018-09-23 RX ADMIN — ASPIRIN 81 MG CHEWABLE TABLET 81 MG: 81 TABLET CHEWABLE at 10:28

## 2018-09-23 RX ADMIN — ENOXAPARIN SODIUM 40 MG: 40 INJECTION SUBCUTANEOUS at 17:40

## 2018-09-23 RX ADMIN — PRAVASTATIN SODIUM 40 MG: 40 TABLET ORAL at 20:44

## 2018-09-23 RX ADMIN — ACETAMINOPHEN 650 MG: 325 TABLET ORAL at 17:39

## 2018-09-23 RX ADMIN — MICONAZOLE NITRATE: 2 POWDER TOPICAL at 10:35

## 2018-09-23 RX ADMIN — DOCUSATE SODIUM 100 MG: 100 CAPSULE, LIQUID FILLED ORAL at 20:45

## 2018-09-23 RX ADMIN — AMIODARONE HYDROCHLORIDE 200 MG: 200 TABLET ORAL at 10:27

## 2018-09-23 RX ADMIN — MICONAZOLE NITRATE: 2 POWDER TOPICAL at 20:44

## 2018-09-23 RX ADMIN — MIDODRINE HYDROCHLORIDE 10 MG: 10 TABLET ORAL at 14:36

## 2018-09-23 RX ADMIN — PANTOPRAZOLE SODIUM 40 MG: 40 TABLET, DELAYED RELEASE ORAL at 10:28

## 2018-09-23 RX ADMIN — ACETAMINOPHEN 650 MG: 325 TABLET ORAL at 10:27

## 2018-09-23 RX ADMIN — CILOSTAZOL 100 MG: 100 TABLET ORAL at 17:39

## 2018-09-23 RX ADMIN — CIPROFLOXACIN HYDROCHLORIDE 500 MG: 500 TABLET, FILM COATED ORAL at 20:44

## 2018-09-23 RX ADMIN — TIMOLOL MALEATE 1 DROP: 5 SOLUTION OPHTHALMIC at 20:44

## 2018-09-23 ASSESSMENT — PAIN DESCRIPTION - LOCATION
LOCATION: LEG

## 2018-09-23 ASSESSMENT — PAIN DESCRIPTION - ORIENTATION
ORIENTATION: RIGHT

## 2018-09-23 ASSESSMENT — PAIN DESCRIPTION - ONSET
ONSET: ON-GOING
ONSET: ON-GOING

## 2018-09-23 ASSESSMENT — PAIN SCALES - GENERAL
PAINLEVEL_OUTOF10: 4
PAINLEVEL_OUTOF10: 5
PAINLEVEL_OUTOF10: 4
PAINLEVEL_OUTOF10: 0

## 2018-09-23 ASSESSMENT — PAIN DESCRIPTION - PAIN TYPE
TYPE: SURGICAL PAIN

## 2018-09-23 ASSESSMENT — PAIN DESCRIPTION - FREQUENCY: FREQUENCY: CONTINUOUS

## 2018-09-23 ASSESSMENT — PAIN DESCRIPTION - DESCRIPTORS
DESCRIPTORS: ACHING
DESCRIPTORS: ACHING;BURNING;CONSTANT;SORE
DESCRIPTORS: ACHING;BURNING

## 2018-09-23 ASSESSMENT — PAIN DESCRIPTION - PROGRESSION: CLINICAL_PROGRESSION: NOT CHANGED

## 2018-09-23 NOTE — PROGRESS NOTES
[address] [address]  INPATIENT PHYSICAL THERAPY  DAILY NOTE  [facility] TCU 8E - 8E-66/066-A    Time In: 7986  Time Out: 9144  Timed Code Treatment Minutes: 62 Minutes  Minutes: 57          Date: 2018  Patient Name: Kathy Jensen,  Gender:  female        MRN: [id number]  : 1935  (82 y.o.)  Referral Date : 18  Referring Practitioner: Ordering: ABEBA Sanchez MD. Attending: DUKE Crow MD  Diagnosis: physical deconditioning  Additional Pertinent Hx: Pt admitted 88. 82 y.o. female who presents to the ED for evaluation of right hip pain which happened prior to arrival. The patient reports she was getting out of bed in order to use the restroom, when her leg felt numb and gave out on her, she slid down the edge of the bed landing on her butt. Pt found to have PVD s/p  RIGHT FEMORAL EMBOLECTOMY, INTRAOPERATIVE ARTERIOGRAM, RIGHT ILIAC EMBOLECTOMY, RIGHT COMMON AND EXTERNAL ILIAC STENTING, RIGHT FEMORAL TO ANTERIOR POPLITEAL BYPASS WITH 6MM GORTEX GRAFT and  Emergent declotting right Fem Tib Bypass, Right common femoral and profunda femoral, endarterectomy bovine pericardial patch angioplasty. To TCU on      Past Medical History:   Diagnosis Date    Arthritis     BPPV (benign paroxysmal positional vertigo) 16    GERD (gastroesophageal reflux disease)     ?  esophageal stricture    HTN (hypertension)     Hyperlipidemia     Obesity     Osteopenia     PAC (premature atrial contraction)     on betablocker    Paralysis (HCC)     baby    Pedal edema     denied any hx of hypertension    PVC (premature ventricular contraction)     PVD (peripheral vascular disease) (HCC)     Tinnitus     UTI (urinary tract infection)      Past Surgical History:   Procedure Laterality Date    APPENDECTOMY      BREAST SURGERY Right     lumpectomy    CHOLECYSTECTOMY      30 years ago    COLONOSCOPY  2018    Dr Georges Upland      eye, eyelids    EYE SURGERY      cataract    perform bed mobility at S to get in and out of bed  Long term goal 2: patient to perform transfers to and from various surfaces at S to rise from bed or chair  Long term goal 3: patient to ambulate 150ft with least restrictive device at S for household mobility  Long term goal 4: patient to negotiate porch step with least restrictive device at S for community mobility

## 2018-09-23 NOTE — PROGRESS NOTES
[address]  INPATIENT OCCUPATIONAL THERAPY  [facility] TCU 8E  DAILY NOTE    Time:  Time In: 1031  Time Out: 1116  Timed Code Treatment Minutes: 39 Minutes  Minutes: 45    Date: 2018  Patient Name: Brie Aburto,   Gender: female      Room: [facility]66/066-A  MRN: [id number]  : 1935  (82 y.o.)  Referring Practitioner: Ordering: Jose Ramon Beebe MD Attending: Dr. Amrita Farr  Diagnosis: Physical Deconditioning   Additional Pertinent Hx: 82 y.o. female who presents to the ED for evaluation of right hip pain which happened prior to arrival. The patient reports she was getting out of bed in order to use the restroom, when her leg felt numb and gave out on her, she slid down the edge of the bed landing on her butt. Pt found to have PVD s/p  RIGHT FEMORAL EMBOLECTOMY, INTRAOPERATIVE ARTERIOGRAM, RIGHT ILIAC EMBOLECTOMY, RIGHT COMMON AND EXTERNAL ILIAC STENTING, RIGHT FEMORAL TO ANTERIOR POPLITEAL BYPASS WITH 6MM GORTEX GRAFT and  Emergent declotting right Fem Tib Bypass, Right common femoral and profunda femoral, endarterectomy bovine pericardial patch angioplasty. Admitted on TCU on 18. Past Medical History:   Diagnosis Date    Arthritis     BPPV (benign paroxysmal positional vertigo) 16    GERD (gastroesophageal reflux disease)     ?  esophageal stricture    HTN (hypertension)     Hyperlipidemia     Obesity     Osteopenia     PAC (premature atrial contraction)     on betablocker    Paralysis (HCC)     baby    Pedal edema     denied any hx of hypertension    PVC (premature ventricular contraction)     PVD (peripheral vascular disease) (HCC)     Tinnitus     UTI (urinary tract infection)      Past Surgical History:   Procedure Laterality Date    APPENDECTOMY      BREAST SURGERY Right 8    lumpectomy    CHOLECYSTECTOMY      30 years ago    COLONOSCOPY  2018    Dr Cat Mari      eye, eyelids    EYE SURGERY      cataract    HYSTERECTOMY      Mobility Training, Safety Education & Training, Home Management Training    Goals:  Patient goals : Go home     Short term goals  Time Frame for Short term goals: 1 week  Short term goal 1: Pt to complete sit to stand transfers from various surfaces including toilet/BSC with CGA x1 and no vcs for safety to increse indep with toileting tasks. Short term goal 2: Pt to complete functional mobility to/from BR with SBA and no vcs for sequencing of tasks to increase indep with toileting. Short term goal 3: Pt will complete LB self cares with LHAE PRN and mod A to increase indep and safety in home environment. Short term goal 4: Pt to complete bilateral UE resistance strengthening HEP with 2 vc for technique to increase UB strength for ease of upright standing during ADL tasks   Long term goals  Time Frame for Long term goals : 2-3 weeks   Long term goal 1: Pt will complete dynamic standing task x 6 minutes with 1-2 UE release and SBA to increase indep and safety with toileting hygiene and grooming tasks. Long term goal 2: Pt will complete simple IADL task while standing with Min vc for safety and SBA to increase indep and safety with simple meal prep in home environment.

## 2018-09-24 LAB
ANION GAP SERPL CALCULATED.3IONS-SCNC: 13 MEQ/L (ref 8–16)
BASOPHILS # BLD: 0.5 %
BASOPHILS ABSOLUTE: 0 THOU/MM3 (ref 0–0.1)
BUN BLDV-MCNC: 20 MG/DL (ref 7–22)
CALCIUM SERPL-MCNC: 8.4 MG/DL (ref 8.5–10.5)
CHLORIDE BLD-SCNC: 106 MEQ/L (ref 98–111)
CO2: 24 MEQ/L (ref 23–33)
CREAT SERPL-MCNC: 0.9 MG/DL (ref 0.4–1.2)
EOSINOPHIL # BLD: 3.8 %
EOSINOPHILS ABSOLUTE: 0.2 THOU/MM3 (ref 0–0.4)
ERYTHROCYTE [DISTWIDTH] IN BLOOD BY AUTOMATED COUNT: 17.4 % (ref 11.5–14.5)
ERYTHROCYTE [DISTWIDTH] IN BLOOD BY AUTOMATED COUNT: 61.3 FL (ref 35–45)
GFR SERPL CREATININE-BSD FRML MDRD: 60 ML/MIN/1.73M2
GLUCOSE BLD-MCNC: 90 MG/DL (ref 70–108)
HCT VFR BLD CALC: 28.4 % (ref 37–47)
HEMOGLOBIN: 8.9 GM/DL (ref 12–16)
IMMATURE GRANS (ABS): 0.04 THOU/MM3 (ref 0–0.07)
IMMATURE GRANULOCYTES: 0.9 %
LYMPHOCYTES # BLD: 17.9 %
LYMPHOCYTES ABSOLUTE: 0.8 THOU/MM3 (ref 1–4.8)
MCH RBC QN AUTO: 30.2 PG (ref 26–33)
MCHC RBC AUTO-ENTMCNC: 31.3 GM/DL (ref 32.2–35.5)
MCV RBC AUTO: 96.3 FL (ref 81–99)
MONOCYTES # BLD: 11.5 %
MONOCYTES ABSOLUTE: 0.5 THOU/MM3 (ref 0.4–1.3)
NUCLEATED RED BLOOD CELLS: 0 /100 WBC
PLATELET # BLD: 174 THOU/MM3 (ref 130–400)
PMV BLD AUTO: 10.5 FL (ref 9.4–12.4)
POTASSIUM SERPL-SCNC: 3.6 MEQ/L (ref 3.5–5.2)
RBC # BLD: 2.95 MILL/MM3 (ref 4.2–5.4)
SEG NEUTROPHILS: 65.4 %
SEGMENTED NEUTROPHILS ABSOLUTE COUNT: 2.9 THOU/MM3 (ref 1.8–7.7)
SODIUM BLD-SCNC: 143 MEQ/L (ref 135–145)
WBC # BLD: 4.4 THOU/MM3 (ref 4.8–10.8)

## 2018-09-24 PROCEDURE — 36415 COLL VENOUS BLD VENIPUNCTURE: CPT

## 2018-09-24 PROCEDURE — 6370000000 HC RX 637 (ALT 250 FOR IP): Performed by: FAMILY MEDICINE

## 2018-09-24 PROCEDURE — 97127 HC SP THER IVNTJ W/FOCUS COG FUNCJ: CPT

## 2018-09-24 PROCEDURE — 1290000000 HC SEMI PRIVATE OTHER R&B

## 2018-09-24 PROCEDURE — 97110 THERAPEUTIC EXERCISES: CPT

## 2018-09-24 PROCEDURE — 0220000000 HC SKILLED NURSING FACILITY

## 2018-09-24 PROCEDURE — 97535 SELF CARE MNGMENT TRAINING: CPT

## 2018-09-24 PROCEDURE — 6360000002 HC RX W HCPCS: Performed by: FAMILY MEDICINE

## 2018-09-24 PROCEDURE — 2709999900 HC NON-CHARGEABLE SUPPLY

## 2018-09-24 PROCEDURE — 97530 THERAPEUTIC ACTIVITIES: CPT

## 2018-09-24 PROCEDURE — 85025 COMPLETE CBC W/AUTO DIFF WBC: CPT

## 2018-09-24 PROCEDURE — 80048 BASIC METABOLIC PNL TOTAL CA: CPT

## 2018-09-24 RX ADMIN — MICONAZOLE NITRATE: 2 POWDER TOPICAL at 20:41

## 2018-09-24 RX ADMIN — MIDODRINE HYDROCHLORIDE 10 MG: 10 TABLET ORAL at 09:07

## 2018-09-24 RX ADMIN — MIDODRINE HYDROCHLORIDE 10 MG: 10 TABLET ORAL at 16:48

## 2018-09-24 RX ADMIN — ACETAMINOPHEN 650 MG: 325 TABLET ORAL at 18:39

## 2018-09-24 RX ADMIN — MICONAZOLE NITRATE: 2 POWDER TOPICAL at 09:17

## 2018-09-24 RX ADMIN — DOCUSATE SODIUM 100 MG: 100 CAPSULE, LIQUID FILLED ORAL at 20:42

## 2018-09-24 RX ADMIN — MIDODRINE HYDROCHLORIDE 10 MG: 10 TABLET ORAL at 14:00

## 2018-09-24 RX ADMIN — TIMOLOL MALEATE 1 DROP: 5 SOLUTION OPHTHALMIC at 20:42

## 2018-09-24 RX ADMIN — ENOXAPARIN SODIUM 40 MG: 40 INJECTION SUBCUTANEOUS at 18:14

## 2018-09-24 RX ADMIN — CILOSTAZOL 100 MG: 100 TABLET ORAL at 05:32

## 2018-09-24 RX ADMIN — Medication 9 MG: at 20:43

## 2018-09-24 RX ADMIN — CILOSTAZOL 100 MG: 100 TABLET ORAL at 16:47

## 2018-09-24 RX ADMIN — ASPIRIN 81 MG CHEWABLE TABLET 81 MG: 81 TABLET CHEWABLE at 09:08

## 2018-09-24 RX ADMIN — ACETAMINOPHEN 650 MG: 325 TABLET ORAL at 14:15

## 2018-09-24 RX ADMIN — PANTOPRAZOLE SODIUM 40 MG: 40 TABLET, DELAYED RELEASE ORAL at 05:32

## 2018-09-24 RX ADMIN — ACETAMINOPHEN 650 MG: 325 TABLET ORAL at 09:08

## 2018-09-24 RX ADMIN — PRAVASTATIN SODIUM 40 MG: 40 TABLET ORAL at 20:43

## 2018-09-24 RX ADMIN — AMIODARONE HYDROCHLORIDE 200 MG: 200 TABLET ORAL at 09:07

## 2018-09-24 RX ADMIN — CIPROFLOXACIN HYDROCHLORIDE 500 MG: 500 TABLET, FILM COATED ORAL at 09:07

## 2018-09-24 RX ADMIN — PANTOPRAZOLE SODIUM 40 MG: 40 TABLET, DELAYED RELEASE ORAL at 16:47

## 2018-09-24 ASSESSMENT — PAIN SCALES - GENERAL
PAINLEVEL_OUTOF10: 4
PAINLEVEL_OUTOF10: 4
PAINLEVEL_OUTOF10: 3
PAINLEVEL_OUTOF10: 0
PAINLEVEL_OUTOF10: 0
PAINLEVEL_OUTOF10: 4

## 2018-09-24 ASSESSMENT — PAIN DESCRIPTION - ORIENTATION: ORIENTATION: RIGHT

## 2018-09-24 ASSESSMENT — PAIN DESCRIPTION - LOCATION: LOCATION: LEG

## 2018-09-24 NOTE — PROGRESS NOTES
Patient resting in bed with family at bedside. Denies any pain bothering her at this time. Alert and orientated x4. Speech clear. Pupils PERRL, reacted to light 4-3cm. Mucous membranes pink and moist. Upper extremities pink, warm, dry. No tingling sensation present. Skin turgor <3, Capillary refill <3, hand grasp strong and equal bilaterally, arm drift negative. Respirations unlabored. No cough. Lung sounds clear throughout. Patient on room air, stats above 90%. Bowel sounds active x4 quadrants. Abdomen soft, non distended, no pain or tenderness. Skin intact and pale over bony prominences. Lower extremities pink,warm and dry. Free movement, no edema present. Pedal push and pull strong and equal bilaterally. Pedal pulses present and strong. Incisions on right groin, upper and lower right leg open to air. No redness, warmth or pain present. No drainage present.

## 2018-09-24 NOTE — PLAN OF CARE
Problem: Pain:  Goal: Pain level will decrease  Pain level will decrease   Outcome: Ongoing  Reposition frequently to alleviate pain. Reviewed pain scale with pt to make sure we are using correct intervention of tylenol, repositioning ice, pillow support    Problem: Bleeding:  Goal: Will show no signs and symptoms of excessive bleeding  Will show no signs and symptoms of excessive bleeding   Outcome: Ongoing  Pt will have no active bleeding. Reviewed bleeding precautions with pt. Problem: Falls - Risk of:  Goal: Will remain free from falls  Will remain free from falls    Outcome: Ongoing  Pt will remain free of falls. Pt will continue use of call light, gait belt and walker for all transfers  Pt will continue to have telesitter in room. Problem: Skin Integrity:  Goal: Will show no infection signs and symptoms  Will show no infection signs and symptoms   Outcome: Ongoing  Skin assessment every shift. Continue use of chlorhexidine soap    Problem: Risk for Impaired Skin Integrity  Goal: Tissue integrity - skin and mucous membranes  Structural intactness and normal physiological function of skin and  mucous membranes. Outcome: Ongoing  Skin assessment every shift. Continue to keep pitcher of water at bedside at all times and encourage pt to drink. Problem: Activity:  Goal: Able to sleep  Able to sleep     Outcome: Ongoing  The problem of recurrent insomnia is discussed. Avoidance of caffeine sources is strongly encouraged. Sleep hygiene issues are reviewed.

## 2018-09-24 NOTE — PROGRESS NOTES
[address] [address]  INPATIENT SPEECH THERAPY  [facility] TCU 8E    TIME   SLP Individual Minutes  Time In: 1000  Time Out: 1030  Minutes: 30  Timed Code Treatment Minutes: 30 Minutes       []Daily Note  [x]Progress Note  []Discharge Note    Date: 2018  Patient Name: Robbin Herrera        MRN: [id number]    : 1935  (82 y.o.)  Gender: female   Primary Provider: Mraiaelena Lazaro MD  Admitting Diagnosis:  Physical deconditioning   Secondary Diagnosis: Cognitive deficits   Precautions: Fall risk; Stay with me  Swallowing Status/Diet: Regular with thin liquids   Swallowing Strategies: n/a  DATE of last MBS:  n/a  Pain:  Pt reports feeling \"achey\"; no numerical value given. Subjective: Pt seen in full upright position in chair. Pt alert and pleasant. Pt with initial question of \"why do I need speech therapy? \". ST provided skilled education regarding the term \"speech\", and that majority of pt's goals are focused on cognition to permit her potential return to her home environment. Functional IADL and ADL examples provided to act as comparison to ST tasks. Pt appeared very receptive, and verbalized understanding. SHORT TERM GOAL #1:  Goal 1: Pt will complete BASIC immediate/working/delayed recall tasks with 50% accuracy, max cues with use of compensatory strategies to improve retention of new information.  - GOAL MET  NEW GOAL: Pt will complete BASIC immediate/working/delayed recall tasks with 70% accuracy, mod cues with use of compensatory strategies to improve retention of new information. INTERVENTIONS: Orientation- pt with 3/3 with use of calendar indep  Temporal orientation questions: 4/6 indep, 2/6 mod cues    20 minute delayed recall regarding what ST services consist of: pt with 3/3 appropriate terms utilized- \"thinking\", \"cognition\", \"my every day sequencing\". Excellent comprehension noted from discussion held earlier in session. Adequate delayed recall noted.     PREVIOUS SESSION:  Orientation-

## 2018-09-24 NOTE — PROGRESS NOTES
[address] [address]  Transitional Care Unit  Team Conference Note    Patient Name: Brie Aburto   MRN: [id number]    : 1935  (82 y.o.)  Gender: female   Referring Practitioner: Ordering: ABEBA Sanchez MD. Attending: DUKE Farr MD  Diagnosis: physical deconditioning    Team Conference Date: 2018   Admission Date:     2:13 PM  Re-Certification Date: 3652    :  Tentative Discharge Plan and current psychosocial issues: The patient is a  who has lived alone since her spouse  4 years ago. She resides in a condominium on the 4th floor of the [address] [place] [address] in the [facility]. She was independent with self care and tasks prior to the start of her medical issues. Her support system includes her 3 daughters, 1 son, and friends and neighbors. Three of her children are in the vicinity, and another lives in [facility]. Family members work and maintain close contact with the patient. The patient was talkative and pleasant, but soon became fatigued, making it more difficult to converse. Provided overview of the TCU program, the team process, and the role of the . Directed patient to information on her whiteboard to contact this worker as needThe patient is planning to return home after the TCU stay. She is unable to state what, if any needs she feels she will have at that time.  Will monitor progress, maintain contact with patient/family regarding length of stay and recommendations, provide updates to [facility]) as requested, and assist with discharge plans, including referrals to helping services as identified.      ed.     Nursing:     Weight:  Weight: has been stable     Wounds/Incisions/Ulcers:  Incision healing well  Bowel: continent  Bladder:continent     Pain: Patient's pain is currently controlled with tylenol    Education Provided:  Diabetic: yes   Peg Tube:No    Meredith Care:  Education:No  Removal Necessary:  NA  Supplies

## 2018-09-24 NOTE — PROGRESS NOTES
LARYNGOSCOPY  07/15/2016    NE OLEGARIO SKN SUB GRFT T/A/L AREA/<100SCM /<1ST 25 SCM N/A 9/6/2018    RE-EXPLORATION RIGHT GROIN, DECLOTTING OF FEMORAL BYPASS GRAFT performed by Azeb English MD at [address] [facility] EGD TRANSORAL BIOPSY SINGLE/MULTIPLE Left 11/27/2017    EGD BIOPSY performed by Elder Lugo MD at [address] [place] [address], PARTIAL, W/ANAST N/A 8/20/2018    ROBOT ASSISTED COLECTOMY (LOW ANTERIOR) performed by Poonam Barbosa MD at [address] [facility] OFFICE/OUTPT [facility] [facility] N/A 9/5/2018    RIGHT FEMORAL EMBOLECTOMY, INTRAOPERATIVE ARTERIOGRAM, RIGHT ILIAC EMBOLECTOMY, RIGHT COMMON AND EXTERNAL ILIAC STENTING, RIGHT FEMORAL TO ANTERIOR POPLITEAL BYPASS WITH 6MM GORTEX GRAFT performed by Azeb English MD at [address] [facility] N/A 11/27/2017    EGD SUBMUCOSAL/BOTOX INJECTION performed by Elder Lugo MD at [facility]       Restrictions/Precautions:  Fall Risk, General Precautions           Other position/activity restrictions: s/p 9/5 RIGHT FEMORAL EMBOLECTOMY, INTRAOPERATIVE ARTERIOGRAM, RIGHT ILIAC EMBOLECTOMY, RIGHT COMMON AND EXTERNAL ILIAC STENTING, RIGHT FEMORAL TO ANTERIOR POPLITEAL BYPASS WITH 6MM GORTEX GRAFT and 9/6 Emergent declotting right Fem Tib Bypass, Right common femoral and profunda femoral, endarterectomy bovine pericardial patch angioplasty. Prior Level of Function:  ADL Assistance: Independent  Homemaking Assistance: Independent  Ambulation Assistance: Independent  Transfer Assistance: Independent  Additional Comments: Pt reporting she was able to complete her own ADL tasks at home. No Ad used prior to admission.      Subjective       Subjective: Pt seated in bedside chair upon arrival. Agreeable to OT session    Overall Orientation Status: Within Functional Limits         Pain:  Pain Assessment  Patient Currently in Pain: Yes  Pain Assessment: 0-10  Pain Level: 3  Pain Location: Leg  Pain Orientation: Right Objective  Overall Cognitive Status: Exceptions  Cognition Comment: Slow processing, decreased safety and insight       ADL  Feeding: Setup (lunch tray)  Grooming: Stand by assistance (standing sinkside to wash hands after toileting tasks; Setup hair care seated in chair)  Toileting: Minimal assistance; Increased time to complete (for hygiene and clothing management with extended time provided)     Instrumental ADLs: Yes  Light Housekeeping  Light Housekeeping Level: Walker (RW)  Light Housekeeping Level of Assistance: Contact guard assistance (CGA-close SBA)  Light Housekeeping: Patient completed IADL homemaking task this date of picking up linens from ground level and folded with B release from walker - patient was able to retrieve all items from ground level with use of reacher requiring CGA-close SBA and 1 VCs for safety during the retrieval and transportation of items. Patient was able to tolerate standing with 1 seated rest breaks during task and 1 lengthy seated rest break after task. Pt stood a total of 5 minutes, demo'ing CGA-close SBA balance with BUE release off of RW and no LOB noted. Transfers  Sit to stand: Contact guard assistance  Stand to sit: Contact guard assistance  Transfer Comments: Moderate verbal cueing provided for proper hand placement- minimal carry over noted  Toilet Transfers  Equipment Used: Raised toilet seat with rails  Toilet Transfer: Contact guard assistance    Balance  Sitting Balance: Stand by assistance  Standing Balance: Contact guard assistance     Time: ~3 minutes  Activity: Pt completed dynamic standing task this date standing on uneven surface for approx x3 min requiring CGA for balance, slightly unsteady - task was graded to involve B release from walker. Required lengthy seated rest break after task. Completed in order to challenge dynamic standing balance and standing endurance for the completion of sinkside ADLs and various homemaking tasks.         Functional

## 2018-09-25 PROCEDURE — 6360000002 HC RX W HCPCS: Performed by: FAMILY MEDICINE

## 2018-09-25 PROCEDURE — 97530 THERAPEUTIC ACTIVITIES: CPT

## 2018-09-25 PROCEDURE — 2500000003 HC RX 250 WO HCPCS: Performed by: FAMILY MEDICINE

## 2018-09-25 PROCEDURE — 2709999900 HC NON-CHARGEABLE SUPPLY

## 2018-09-25 PROCEDURE — 97110 THERAPEUTIC EXERCISES: CPT

## 2018-09-25 PROCEDURE — 1290000000 HC SEMI PRIVATE OTHER R&B

## 2018-09-25 PROCEDURE — A6250 SKIN SEAL PROTECT MOISTURIZR: HCPCS

## 2018-09-25 PROCEDURE — 97535 SELF CARE MNGMENT TRAINING: CPT

## 2018-09-25 PROCEDURE — 6370000000 HC RX 637 (ALT 250 FOR IP): Performed by: FAMILY MEDICINE

## 2018-09-25 PROCEDURE — 97127 HC SP THER IVNTJ W/FOCUS COG FUNCJ: CPT | Performed by: SPEECH-LANGUAGE PATHOLOGIST

## 2018-09-25 PROCEDURE — 97116 GAIT TRAINING THERAPY: CPT

## 2018-09-25 RX ORDER — ACETAMINOPHEN 325 MG/1
650 TABLET ORAL
Status: DISCONTINUED | OUTPATIENT
Start: 2018-09-25 | End: 2018-10-04 | Stop reason: HOSPADM

## 2018-09-25 RX ADMIN — MIDODRINE HYDROCHLORIDE 10 MG: 10 TABLET ORAL at 17:35

## 2018-09-25 RX ADMIN — ACETAMINOPHEN 650 MG: 325 TABLET ORAL at 05:19

## 2018-09-25 RX ADMIN — ACETAMINOPHEN 650 MG: 325 TABLET ORAL at 10:34

## 2018-09-25 RX ADMIN — Medication 9 MG: at 20:54

## 2018-09-25 RX ADMIN — PRAVASTATIN SODIUM 40 MG: 40 TABLET ORAL at 20:54

## 2018-09-25 RX ADMIN — MIDODRINE HYDROCHLORIDE 10 MG: 10 TABLET ORAL at 13:57

## 2018-09-25 RX ADMIN — ACETAMINOPHEN 650 MG: 325 TABLET ORAL at 20:53

## 2018-09-25 RX ADMIN — TIMOLOL MALEATE 1 DROP: 5 SOLUTION OPHTHALMIC at 20:55

## 2018-09-25 RX ADMIN — ASPIRIN 81 MG CHEWABLE TABLET 81 MG: 81 TABLET CHEWABLE at 10:33

## 2018-09-25 RX ADMIN — DOCUSATE SODIUM 100 MG: 100 CAPSULE, LIQUID FILLED ORAL at 20:53

## 2018-09-25 RX ADMIN — MICONAZOLE NITRATE: 2 POWDER TOPICAL at 10:33

## 2018-09-25 RX ADMIN — DOCUSATE SODIUM 100 MG: 100 CAPSULE, LIQUID FILLED ORAL at 10:32

## 2018-09-25 RX ADMIN — CILOSTAZOL 100 MG: 100 TABLET ORAL at 05:17

## 2018-09-25 RX ADMIN — CILOSTAZOL 100 MG: 100 TABLET ORAL at 17:35

## 2018-09-25 RX ADMIN — PANTOPRAZOLE SODIUM 40 MG: 40 TABLET, DELAYED RELEASE ORAL at 05:17

## 2018-09-25 RX ADMIN — MIDODRINE HYDROCHLORIDE 10 MG: 10 TABLET ORAL at 10:32

## 2018-09-25 RX ADMIN — Medication 5 UNITS: at 18:25

## 2018-09-25 RX ADMIN — MICONAZOLE NITRATE: 2 POWDER TOPICAL at 20:54

## 2018-09-25 RX ADMIN — ACETAMINOPHEN 650 MG: 325 TABLET ORAL at 17:36

## 2018-09-25 RX ADMIN — PANTOPRAZOLE SODIUM 40 MG: 40 TABLET, DELAYED RELEASE ORAL at 17:35

## 2018-09-25 RX ADMIN — AMIODARONE HYDROCHLORIDE 200 MG: 200 TABLET ORAL at 10:32

## 2018-09-25 RX ADMIN — ENOXAPARIN SODIUM 40 MG: 40 INJECTION SUBCUTANEOUS at 17:40

## 2018-09-25 ASSESSMENT — PAIN DESCRIPTION - ORIENTATION
ORIENTATION: RIGHT
ORIENTATION: RIGHT

## 2018-09-25 ASSESSMENT — PAIN SCALES - GENERAL
PAINLEVEL_OUTOF10: 5
PAINLEVEL_OUTOF10: 4
PAINLEVEL_OUTOF10: 4
PAINLEVEL_OUTOF10: 7
PAINLEVEL_OUTOF10: 4
PAINLEVEL_OUTOF10: 3
PAINLEVEL_OUTOF10: 4

## 2018-09-25 ASSESSMENT — PAIN DESCRIPTION - PAIN TYPE: TYPE: SURGICAL PAIN

## 2018-09-25 ASSESSMENT — PAIN DESCRIPTION - DESCRIPTORS: DESCRIPTORS: ACHING

## 2018-09-25 ASSESSMENT — PAIN DESCRIPTION - LOCATION
LOCATION: LEG
LOCATION: LEG

## 2018-09-25 NOTE — PROGRESS NOTES
Patient stated that pain meds were effective. Incisions continued to be dry and intact. No drainage, warmth, or redness noted. Futher assessment unchanged. Resting in bed watching tv with eyes closed. Telesitter in place. Call light in place, bed in low position. Reported off to [facility].   Juan Pablo, [place] [facility] [place],

## 2018-09-25 NOTE — PROGRESS NOTES
Recommended:     [x]Patient continues to require treatment by a licensed therapist to address functional deficits as outlined in the established plan of care. [place] Varela.  [address], [address], [address]

## 2018-09-25 NOTE — PLAN OF CARE
Jyoti Dover  [id number]    This document includes baseline careplan information as well as current active orders for this admission to Transitional Care Unit (8E). A copy was given to the patient and/or patient representative after team conference, 9/25/2018. Current Active Orders:  Orders Placed This Encounter   Procedures    CTA ABDOMINAL AORTA W BILAT RUNOFF W WO CONTRAST    Basic Metabolic Panel    CBC Auto Differential    Anion Gap    Glomerular Filtration Rate, Estimated    DIET GENERAL; No Added Salt (3-4 GM)    Ambulate patient    Encourage deep breathing and coughing every 2 hours while awake    Inspect skin per unit guidelines    Turn or assist with turn every 2 hours if patient is unable to turn self. Remind patient to turn if necessary.     Up with assistance    Incentive spirometry nursing    Place PPD    Vital signs    Waffle cushion in chair when up    Weigh patient weekly    Elevate heels off of bed at all times if patient is not able to move lower extremities    Maintain HOB at the lowest elevation consistent with medical plan of care    Full Code    Consult to Recreation Therapy    Consult to Social Work    Inpatient consult to Dietitian    Dietary Nutrition Supplements: Low Calorie High Protein Supplement    OT eval and treat    PT eval and treat    Initiate Oxygen Therapy Protocol    Nasal Cannula Oxygen    SLP eval and treat    Stress Test, Lexiscan    CARDIOLOGY REPORT    POCT Glucose       Current Medications:  Current Facility-Administered Medications   Medication Dose Route Frequency Provider Last Rate Last Dose    senna (SENOKOT) tablet 8.6 mg  1 tablet Oral Nightly PRN Nestor Pavon MD        polyethylene glycol [facility]) packet 17 g  17 g Oral Daily PRN Nestor Pavon MD        acetaminophen (TYLENOL) tablet 650 mg  650 mg Oral Q4H PRN Елена Sanchez MD   650 mg at 09/25/18 0519    magnesium hydroxide (MILK OF MAGNESIA) 400 MG/5ML suspension will improve   [x] Problem: Discharge Barriers    Goal: Patient's continuum of care needs are met   [x] Problem: Sleep Pattern Disturbance    Goal: Physical symptoms of sleep deprivation will improve

## 2018-09-25 NOTE — PROGRESS NOTES
with toileting. Short term goal 3: Pt will complete LB self cares with LHAE PRN and mod A to increase indep and safety in home environment. Short term goal 4: Pt to complete bilateral UE resistance strengthening HEP with 2 vc for technique to increase UB strength for ease of upright standing during ADL tasks   Long term goals  Time Frame for Long term goals : 2-3 weeks   Long term goal 1: Pt will complete dynamic standing task x 6 minutes with 1-2 UE release and SBA to increase indep and safety with toileting hygiene and grooming tasks. Long term goal 2: Pt will complete simple IADL task while standing with Min vc for safety and SBA to increase indep and safety with simple meal prep in home environment.

## 2018-09-25 NOTE — PROGRESS NOTES
[facility] - 8E-66/066-A    Time In: 3940  Time Out: 9338  Timed Code Treatment Minutes: 55 Minutes  Minutes: 46          Date: 2018  Patient Name: Ashley Ortega,  Gender:  female        MRN: [id number]  : 1935  (82 y.o.)  Referral Date : 18  Referring Practitioner: Ordering: ABEBA Sanchez MD. Attending: DUKE Kelly MD  Diagnosis: physical deconditioning  Additional Pertinent Hx: Pt admitted 8-8. 82 y.o. female who presents to the ED for evaluation of right hip pain which happened prior to arrival. The patient reports she was getting out of bed in order to use the restroom, when her leg felt numb and gave out on her, she slid down the edge of the bed landing on her butt. Pt found to have PVD s/p  RIGHT FEMORAL EMBOLECTOMY, INTRAOPERATIVE ARTERIOGRAM, RIGHT ILIAC EMBOLECTOMY, RIGHT COMMON AND EXTERNAL ILIAC STENTING, RIGHT FEMORAL TO ANTERIOR POPLITEAL BYPASS WITH 6MM GORTEX GRAFT and  Emergent declotting right Fem Tib Bypass, Right common femoral and profunda femoral, endarterectomy bovine pericardial patch angioplasty. To TCU on      Past Medical History:   Diagnosis Date    Arthritis     BPPV (benign paroxysmal positional vertigo) 16    GERD (gastroesophageal reflux disease)     ?  esophageal stricture    HTN (hypertension)     Hyperlipidemia     Obesity     Osteopenia     PAC (premature atrial contraction)     on betablocker    Paralysis (HCC)     baby    Pedal edema     denied any hx of hypertension    PVC (premature ventricular contraction)     PVD (peripheral vascular disease) (HCC)     Tinnitus     UTI (urinary tract infection)      Past Surgical History:   Procedure Laterality Date    APPENDECTOMY      BREAST SURGERY Right     lumpectomy    CHOLECYSTECTOMY      30 years ago    COLONOSCOPY  2018    Dr Sterling Gutierrez      eye, eyelids    EYE SURGERY      cataract    HYSTERECTOMY      LARYNGOSCOPY  07/15/2016    [place] OLEGARIO SKN SUB GRFT T/A/L AREA/<100SCM /<1ST 25 SCM N/A 9/6/2018    RE-EXPLORATION RIGHT GROIN, DECLOTTING OF FEMORAL BYPASS GRAFT performed by Dez Schwartz MD at [address] EGD TRANSORAL BIOPSY SINGLE/MULTIPLE Left 11/27/2017    EGD BIOPSY performed by Nhi Robles MD at [address] [place] [address], PARTIAL, Seun Rankinkin N/A 8/20/2018    ROBOT ASSISTED COLECTOMY (LOW ANTERIOR) performed by Theresa Loyd MD at [address] OFFICE/OUTPT [address] [facility] [address] N/A 9/5/2018    RIGHT FEMORAL EMBOLECTOMY, INTRAOPERATIVE ARTERIOGRAM, RIGHT ILIAC EMBOLECTOMY, RIGHT COMMON AND EXTERNAL ILIAC STENTING, RIGHT FEMORAL TO ANTERIOR POPLITEAL BYPASS WITH 6MM GORTEX GRAFT performed by Dez Schwartz MD at [address] [facility] [address] N/A 11/27/2017    EGD SUBMUCOSAL/BOTOX INJECTION performed by Nhi Robles MD at [address] Endoscopy       Restrictions/Precautions:  Fall Risk, General Precautions                    Other position/activity restrictions: s/p 9/5 RIGHT FEMORAL EMBOLECTOMY, INTRAOPERATIVE ARTERIOGRAM, RIGHT ILIAC EMBOLECTOMY, RIGHT COMMON AND EXTERNAL ILIAC STENTING, RIGHT FEMORAL TO ANTERIOR POPLITEAL BYPASS WITH 6MM GORTEX GRAFT and 9/6 Emergent declotting right Fem Tib Bypass, Right common femoral and profunda femoral, endarterectomy bovine pericardial patch angioplasty. Prior Level of Function:  ADL Assistance: Independent  Homemaking Assistance: Independent  Ambulation Assistance: Independent  Transfer Assistance: Independent  Additional Comments: Pt reporting she was able to complete her own ADL tasks at home. No Ad used prior to admission. Subjective:     Subjective: patient in chair on arrival, pleasant and agrees to therapy    Pain:  Yes.   Pain Assessment  Pain Assessment: 0-10  Pain Level: 3  Pain Type: Surgical pain  Pain Location: Leg  Pain Orientation: Right  Pain Descriptors: Aching

## 2018-09-25 NOTE — PROGRESS NOTES
could not recall  1. Yes, after cueing (a color)  2. Yes, no cue required       0 Able to recall bed  0. No - could not recall  1. Yes, after cueing (a piece of furniture)  2. Yes, no cue required            Patient Name: Lynda Arguelles        MRN: [id number]    : 1935  (82 y.o.)  Gender: female   Principal Problem: PVD (peripheral vascular disease) ([facility] [address]    Section D - Mood     Should Resident Mood Interview be Conducted? - Attempt to conduct interview with all residents   0. No (resident is rarely/never understood) à Skip to and complete -, Staff Assessment of Mood (PHQ 9-OV)  1. Yes à Continue to , Resident Mood Interview (PHQ-9) Enter Code    1   . Resident Mood Interview (PHQ-9)   Say to resident: [place] the last 2 weeks, have you been bothered by any of the following problems?    If symptom is present, enter 1(yes) in column 1, Symptom Presence. Then move to column 2, Symptom Frequency, and indicate symptom frequency. 1. Symptom Presence                   2. Symptom                                                                  Frequency  0. No (enter 0 in column 2)          0. Never or 1 day          1. Yes (enter 0-3 in column 2)      1. 2-6 days           9. No Response                               2.  7-11 days                                                 3.  12-14 days   1. Symptom Presence 2. Symptom  Frequency        Enter Scores in boxes below   A. Little interest or pleasure in doing things 0 0   B. Feeling down, depressed, or hopeless 1 1   C. Trouble falling or staying asleep, or sleeping too much 1 2   D. Feeling tired or having little energy 1 2   E. Poor appetite or overeating 1 1   F. Feeling bad about yourself - or that you are a failure, or have let your family down 1 1   G. Trouble concentrating on things, such as reading the newspaper or watching television 1 2   H. Moving or speaking so slowly that other people have noticed.   Or the MRN: [id number]    : 1935  (82 y.o.)  Gender: female   Principal Problem: PVD (peripheral vascular disease) ([facility] [address]    Section F - Preferences for Customary Routine Activities   . Should Interview for Daily and Activity Preferences be Conducted? - Attempt to interview all residents able to communicate. If resident is unable to complete, attempt to complete interview with family member or significant other. Enter Code    1 0. No (resident is rarely/never understood and family/significant other not available) à Skip to and complete , Staff Assessment of Daily and Activity Preferences  1. Yes à Continue to Vinay Campos for Daily Preferences   . Interview for Daily Preferences  Show resident the response options and say: While you are in this facility           Codin - Very Important  2 - Somewhat Important  3 - Not very important  4 - Not important at all  5 - Important, but cant do or no choice  6 - No response or non-responsive Enter Codes in Boxes    2 A. How important is it to you to choose what clothes to wear?    2 B. How important is it to you to take care of your personal belongings or things?    3 C. How important is it to you to choose between a tub bath, shower, bed bath or sponge bath?    3 D. How important is it to you to have snacks available between meals?    1 E. How important is it to you to choose your own bedtime?    1 F. How important is it to you to have your family or a close friend involved in discussions about your care?    1 G. How important is it to you to be able to use the phone in private?    1 H. How important is it to you to have a place to lock your things to keep them safe? .  Interview for Activity Preferences   Show resident the response options and say: While you are in this facility           Codin - Very Important  2 - Somewhat Important  3 - Not very important  4 - Not important at all  5 - Important, but cant do or no choice  6

## 2018-09-26 PROCEDURE — 97110 THERAPEUTIC EXERCISES: CPT

## 2018-09-26 PROCEDURE — 97530 THERAPEUTIC ACTIVITIES: CPT

## 2018-09-26 PROCEDURE — 6370000000 HC RX 637 (ALT 250 FOR IP): Performed by: FAMILY MEDICINE

## 2018-09-26 PROCEDURE — 97535 SELF CARE MNGMENT TRAINING: CPT

## 2018-09-26 PROCEDURE — 1290000000 HC SEMI PRIVATE OTHER R&B

## 2018-09-26 PROCEDURE — 97127 HC SP THER IVNTJ W/FOCUS COG FUNCJ: CPT | Performed by: SPEECH-LANGUAGE PATHOLOGIST

## 2018-09-26 PROCEDURE — 6360000002 HC RX W HCPCS: Performed by: FAMILY MEDICINE

## 2018-09-26 PROCEDURE — 97116 GAIT TRAINING THERAPY: CPT

## 2018-09-26 RX ADMIN — ASPIRIN 81 MG CHEWABLE TABLET 81 MG: 81 TABLET CHEWABLE at 11:02

## 2018-09-26 RX ADMIN — MIDODRINE HYDROCHLORIDE 10 MG: 10 TABLET ORAL at 11:02

## 2018-09-26 RX ADMIN — MIDODRINE HYDROCHLORIDE 10 MG: 10 TABLET ORAL at 17:32

## 2018-09-26 RX ADMIN — PRAVASTATIN SODIUM 40 MG: 40 TABLET ORAL at 19:59

## 2018-09-26 RX ADMIN — MICONAZOLE NITRATE: 2 POWDER TOPICAL at 19:59

## 2018-09-26 RX ADMIN — PANTOPRAZOLE SODIUM 40 MG: 40 TABLET, DELAYED RELEASE ORAL at 05:03

## 2018-09-26 RX ADMIN — CILOSTAZOL 100 MG: 100 TABLET ORAL at 17:32

## 2018-09-26 RX ADMIN — TIMOLOL MALEATE 1 DROP: 5 SOLUTION OPHTHALMIC at 22:07

## 2018-09-26 RX ADMIN — CILOSTAZOL 100 MG: 100 TABLET ORAL at 05:03

## 2018-09-26 RX ADMIN — PANTOPRAZOLE SODIUM 40 MG: 40 TABLET, DELAYED RELEASE ORAL at 17:33

## 2018-09-26 RX ADMIN — ACETAMINOPHEN 650 MG: 325 TABLET ORAL at 11:01

## 2018-09-26 RX ADMIN — Medication 9 MG: at 19:58

## 2018-09-26 RX ADMIN — AMIODARONE HYDROCHLORIDE 200 MG: 200 TABLET ORAL at 11:02

## 2018-09-26 RX ADMIN — MICONAZOLE NITRATE: 2 POWDER TOPICAL at 11:03

## 2018-09-26 RX ADMIN — ACETAMINOPHEN 650 MG: 325 TABLET ORAL at 17:32

## 2018-09-26 RX ADMIN — ACETAMINOPHEN 650 MG: 325 TABLET ORAL at 19:58

## 2018-09-26 RX ADMIN — ENOXAPARIN SODIUM 40 MG: 40 INJECTION SUBCUTANEOUS at 17:33

## 2018-09-26 ASSESSMENT — PAIN SCALES - GENERAL
PAINLEVEL_OUTOF10: 4

## 2018-09-26 ASSESSMENT — PAIN DESCRIPTION - LOCATION
LOCATION: GROIN;LEG
LOCATION: LEG

## 2018-09-26 ASSESSMENT — PAIN DESCRIPTION - ORIENTATION
ORIENTATION: RIGHT
ORIENTATION: RIGHT

## 2018-09-26 ASSESSMENT — PAIN DESCRIPTION - PAIN TYPE: TYPE: SURGICAL PAIN

## 2018-09-26 NOTE — PROGRESS NOTES
cares with LHAE PRN and mod A to increase indep and safety in home environment. Short term goal 4: Pt to complete bilateral UE resistance strengthening HEP with 2 vc for technique to increase UB strength for ease of upright standing during ADL tasks   Long term goals  Time Frame for Long term goals : 2-3 weeks   Long term goal 1: Pt will complete dynamic standing task x 6 minutes with 1-2 UE release and SBA to increase indep and safety with toileting hygiene and grooming tasks. Long term goal 2: Pt will complete simple IADL task while standing with Min vc for safety and SBA to increase indep and safety with simple meal prep in home environment.

## 2018-09-26 NOTE — PROGRESS NOTES
Physical Therapy   [address] [address]  INPATIENT PHYSICAL THERAPY  DAILYNOTE  [facility] TCU 8E - 8E-66/066-A    Time In: 1000  Time Out: 1100  Timed Code Treatment Minutes: 60 Minutes  Minutes: 60          Date: 2018  Patient Name: Padmini Hughes,  Gender:  female        MRN: [id number]  : 1935  (82 y.o.)  Referral Date : 18  Referring Practitioner: Ordering: ABEBA Sanchez MD. Attending: DUKE Marin MD  Diagnosis: physical deconditioning  Additional Pertinent Hx: Pt admitted 8-8. 82 y.o. female who presents to the ED for evaluation of right hip pain which happened prior to arrival. The patient reports she was getting out of bed in order to use the restroom, when her leg felt numb and gave out on her, she slid down the edge of the bed landing on her butt. Pt found to have PVD s/p  RIGHT FEMORAL EMBOLECTOMY, INTRAOPERATIVE ARTERIOGRAM, RIGHT ILIAC EMBOLECTOMY, RIGHT COMMON AND EXTERNAL ILIAC STENTING, RIGHT FEMORAL TO ANTERIOR POPLITEAL BYPASS WITH 6MM GORTEX GRAFT and  Emergent declotting right Fem Tib Bypass, Right common femoral and profunda femoral, endarterectomy bovine pericardial patch angioplasty. To TCU on      Past Medical History:   Diagnosis Date    Arthritis     BPPV (benign paroxysmal positional vertigo) 16    GERD (gastroesophageal reflux disease)     ?  esophageal stricture    HTN (hypertension)     Hyperlipidemia     Obesity     Osteopenia     PAC (premature atrial contraction)     on betablocker    Paralysis (HCC)     baby    Pedal edema     denied any hx of hypertension    PVC (premature ventricular contraction)     [facility]     Tinnitus     UTI (urinary tract infection)      Past Surgical History:   Procedure Laterality Date    APPENDECTOMY      BREAST SURGERY Right     lumpectomy    CHOLECYSTECTOMY      30 years ago    COLONOSCOPY  2018    Dr Sanya Mckenzie      eye, eyelids    EYE SURGERY

## 2018-09-27 PROCEDURE — 97530 THERAPEUTIC ACTIVITIES: CPT

## 2018-09-27 PROCEDURE — 6360000002 HC RX W HCPCS: Performed by: FAMILY MEDICINE

## 2018-09-27 PROCEDURE — 97116 GAIT TRAINING THERAPY: CPT

## 2018-09-27 PROCEDURE — 97127 HC SP THER IVNTJ W/FOCUS COG FUNCJ: CPT | Performed by: SPEECH-LANGUAGE PATHOLOGIST

## 2018-09-27 PROCEDURE — 6370000000 HC RX 637 (ALT 250 FOR IP): Performed by: FAMILY MEDICINE

## 2018-09-27 PROCEDURE — 1290000000 HC SEMI PRIVATE OTHER R&B

## 2018-09-27 PROCEDURE — 97110 THERAPEUTIC EXERCISES: CPT

## 2018-09-27 PROCEDURE — 97535 SELF CARE MNGMENT TRAINING: CPT

## 2018-09-27 RX ADMIN — CILOSTAZOL 100 MG: 100 TABLET ORAL at 07:54

## 2018-09-27 RX ADMIN — MIDODRINE HYDROCHLORIDE 10 MG: 10 TABLET ORAL at 16:58

## 2018-09-27 RX ADMIN — MIDODRINE HYDROCHLORIDE 10 MG: 10 TABLET ORAL at 12:29

## 2018-09-27 RX ADMIN — MICONAZOLE NITRATE: 2 POWDER TOPICAL at 07:54

## 2018-09-27 RX ADMIN — TIMOLOL MALEATE 1 DROP: 5 SOLUTION OPHTHALMIC at 21:01

## 2018-09-27 RX ADMIN — ACETAMINOPHEN 650 MG: 325 TABLET ORAL at 07:54

## 2018-09-27 RX ADMIN — MICONAZOLE NITRATE: 2 POWDER TOPICAL at 21:01

## 2018-09-27 RX ADMIN — CILOSTAZOL 100 MG: 100 TABLET ORAL at 16:58

## 2018-09-27 RX ADMIN — AMIODARONE HYDROCHLORIDE 200 MG: 200 TABLET ORAL at 07:54

## 2018-09-27 RX ADMIN — ACETAMINOPHEN 650 MG: 325 TABLET ORAL at 12:29

## 2018-09-27 RX ADMIN — ACETAMINOPHEN 650 MG: 325 TABLET ORAL at 16:58

## 2018-09-27 RX ADMIN — ENOXAPARIN SODIUM 40 MG: 40 INJECTION SUBCUTANEOUS at 16:58

## 2018-09-27 RX ADMIN — MIDODRINE HYDROCHLORIDE 10 MG: 10 TABLET ORAL at 07:54

## 2018-09-27 RX ADMIN — ASPIRIN 81 MG CHEWABLE TABLET 81 MG: 81 TABLET CHEWABLE at 07:54

## 2018-09-27 RX ADMIN — Medication 9 MG: at 21:01

## 2018-09-27 RX ADMIN — ACETAMINOPHEN 650 MG: 325 TABLET ORAL at 21:01

## 2018-09-27 RX ADMIN — PANTOPRAZOLE SODIUM 40 MG: 40 TABLET, DELAYED RELEASE ORAL at 07:54

## 2018-09-27 RX ADMIN — PRAVASTATIN SODIUM 40 MG: 40 TABLET ORAL at 21:01

## 2018-09-27 ASSESSMENT — PAIN SCALES - GENERAL
PAINLEVEL_OUTOF10: 4
PAINLEVEL_OUTOF10: 3
PAINLEVEL_OUTOF10: 5
PAINLEVEL_OUTOF10: 1
PAINLEVEL_OUTOF10: 4
PAINLEVEL_OUTOF10: 4

## 2018-09-27 ASSESSMENT — PAIN DESCRIPTION - ORIENTATION
ORIENTATION: RIGHT

## 2018-09-27 ASSESSMENT — PAIN DESCRIPTION - FREQUENCY
FREQUENCY: CONTINUOUS
FREQUENCY: CONTINUOUS

## 2018-09-27 ASSESSMENT — PAIN DESCRIPTION - DESCRIPTORS
DESCRIPTORS: ACHING;SORE;TIGHTNESS
DESCRIPTORS: ACHING;SORE

## 2018-09-27 ASSESSMENT — PAIN DESCRIPTION - LOCATION
LOCATION: LEG
LOCATION: INCISION;LEG

## 2018-09-27 ASSESSMENT — PAIN DESCRIPTION - PAIN TYPE
TYPE: SURGICAL PAIN

## 2018-09-27 ASSESSMENT — PAIN DESCRIPTION - PROGRESSION: CLINICAL_PROGRESSION: NOT CHANGED

## 2018-09-27 NOTE — PLAN OF CARE
Shannon James  [id number]    This document includes comprehensive careplan information as well as current active orders for this admission to Transitional Care Unit (8E). A copy was given to the patient and/or patient representative today, 9/27/2018. Current Active Orders:  Orders Placed This Encounter   Procedures    CTA ABDOMINAL AORTA W BILAT RUNOFF W WO CONTRAST    Basic Metabolic Panel    CBC Auto Differential    Anion Gap    Glomerular Filtration Rate, Estimated    DIET GENERAL; No Added Salt (3-4 GM)    Ambulate patient    Encourage deep breathing and coughing every 2 hours while awake    Inspect skin per unit guidelines    Turn or assist with turn every 2 hours if patient is unable to turn self. Remind patient to turn if necessary.     Up with assistance    Incentive spirometry nursing    Place PPD    Vital signs    Waffle cushion in chair when up    Elevate heels off of bed at all times if patient is not able to move lower extremities    Maintain HOB at the lowest elevation consistent with medical plan of care    Weigh patient weekly    Full Code    Consult to Recreation Therapy    Consult to Social Work    Inpatient consult to Dietitian    Dietary Nutrition Supplements: Low Calorie High Protein Supplement    OT eval and treat    PT eval and treat    Initiate Oxygen Therapy Protocol    Nasal Cannula Oxygen    SLP eval and treat    Stress Test, Lexiscan    POCT Glucose       Current Medications:  Current Facility-Administered Medications   Medication Dose Route Frequency Provider Last Rate Last Dose    acetaminophen (TYLENOL) tablet 650 mg  650 mg Oral Q4H [place] Reggie Keene MD   650 mg at 09/26/18 1958    senna (SENOKOT) tablet 8.6 mg  1 tablet Oral Nightly PRN Reggie Keene MD        polyethylene glycol [facility]) packet 17 g  17 g Oral Daily PRN Reggie Keene MD        magnesium hydroxide (MILK OF MAGNESIA) 400 MG/5ML suspension 30 mL  30 mL Oral Daily

## 2018-09-27 NOTE — PROGRESS NOTES
[facility]  INPATIENT SPEECH THERAPY  [facility] TCU 8E    TIME   SLP Individual Minutes  Time In: 4884  Time Out: 1599  Minutes: 25  Timed Code Treatment Minutes: 25 Minutes       [x]Daily Note  []Progress Note  []Discharge Note    Date: 2018  Patient Name: Joann Esparza        MRN: [id number]    : 1935  (82 y.o.)  Gender: female   Primary Provider: Anel Mendoza MD  Admitting Diagnosis:  Physical deconditioning   Secondary Diagnosis: Cognitive deficits   Precautions: Fall risk; Stay with me  Swallowing Status/Diet: Regular with thin liquids   Swallowing Strategies: n/a  DATE of last MBS:  n/a  Pain:  /10 leg pain    Subjective: Pt sitting upright in chair, pleasant and cooperative. No family present. SHORT TERM GOAL #1:  Goal 1:  Pt will complete BASIC immediate/working/delayed recall tasks with 70% accuracy, mod cues with use of compensatory strategies to improve retention of new information. INTERVENTIONS: Pt prompted to establish a \"to do\" list and to write the list out for future review.   -Immediate recall- /4 independently   -Recall following a 5 minute delay- 4/4 independently     SHORT TERM GOAL #2:  Goal 2:  Pt will complete FUNCTIONAL problem solving, and reasoning tasks with 80% accuracy, mod cues to improve safety awareness and logical thinking. INTERVENTIONS: Functional money related word problems- 9/10 independently, 1/10 with min cues to correct math error. *Overall good success with math reasoning and math computation. SHORT TERM GOAL #3:  Goal 3: Pt will complete sustained and selective attention task for 5 minutes with no more the 3 errors or re-directions to improve mental focus. INTERVENTIONS: Divided attention- Pt prompted to read a short article while circling target words \"the\" and \"he. \" Pt sustained attention for 1 minute 55 seconds to complete task. However, noted that patient read story first and then began scanning.  Cues needed to try to

## 2018-09-27 NOTE — PROGRESS NOTES
agreeable to therpay. Patient requested use of bathroom end of treatment session and was dependent with pericare. Pain:   .  Pain Assessment  Pain Level: 5  Pain Type: Surgical pain  Pain Location: Incision;Leg  Pain Orientation: Right  Pain Descriptors: Aching;Sore;Tightness  Pain Frequency: Continuous  Clinical Progression: Not changed       Social/Functional:  Lives With: Alone  Type of Home: Apartment  Home Layout: One level  Home Access: Elevator  Home Equipment: Rolling walker     Objective:  Scooting: Supervision (scoot to edge of sitting surfaces)    Transfers  Sit to Stand: Stand by assistance  Stand to sit: Stand by assistance     (toilet transfer with rolling walker, ets with sba-> supervision no assist with hygiene or clothing management)       Ambulation 1  Surface: level tile  Device: Rolling Walker  Assistance: Stand by assistance (occasional lt. cga)  Quality of Gait: decreased skinny, decreased B heel strike, decreased B step length, decreased stance on RLE, cues on weight shifiting over RLE , slight forward flexed posture  Distance: 180' x 2,15' x 1  Comments: cues to decrease WB through UEs and to increase step height B         Balance  Comments: Patient completed standing balance training at RW with no UE/occasional 1UE at support for ~4 minutes and 30 seconds at close SBA  to complete bean bag toss to target challenging patient  slightly outside FABIAN with forward/lateral/overhead reaching and crossing midline to facilitate increased standing balance, endurance,  Patient also completed standing balance training with the use of the bathroom for therapist to complete pericare and at sink for patient to clean hands.      Exercises:  Exercises  Comments: Patient completed BLE therapeutic exercises 15x each with the performance of heel/toe raises, LAQ, marches, hip abduction, iso hip adduction ball squeeze, glute sets, HS curls with red theraband,  and standing with b ue support: 15 reps each b le

## 2018-09-27 NOTE — PROGRESS NOTES
functional mobility , Decreased safe awareness, Decreased balance, Decreased ADL status, Decreased endurance, Decreased strength, Decreased high-level IADLs, Decreased cognition  Prognosis: Fair    Discharge Recommendations:  Discharge Recommendations: 24 hour supervision or assist, Patient would benefit from continued therapy after discharge    Patient Education:  Patient Education: safety with transfers and mobility, ADL strategies  Barriers to Learning: cognition     Equipment Recommendations:  Equipment Needed: Yes  Mobility Devices: ADL Assistive Devices  ADL Assistive Devices: Shower Chair with back, Reacher, Sock-Aid Hard  Other: Discussed options on purchasing shower chairs and reacher for discharge. Safety:  Safety Devices in place: Yes  Type of devices: Call light within reach, Chair alarm in place, Left in chair, Gait belt    Plan:  Times per week: 6x  Current Treatment Recommendations: Strengthening, Endurance Training, Patient/Caregiver Education & Training, Self-Care / ADL, Balance Training, Functional Mobility Training, Safety Education & Training, Home Management Training    Goals:  Patient goals : Go home     Short term goals  Time Frame for Short term goals: 1 week  Short term goal 1: Pt to complete sit to stand transfers from various surfaces including toilet/BSC with CGA x1 and no vcs for safety to increse indep with toileting tasks. Short term goal 2: Pt to complete functional mobility to/from BR with SBA and no vcs for sequencing of tasks to increase indep with toileting. Short term goal 3: Pt will complete LB self cares with LHAE PRN and mod A to increase indep and safety in home environment.    Short term goal 4: Pt to complete bilateral UE resistance strengthening HEP with 2 vc for technique to increase UB strength for ease of upright standing during ADL tasks   Long term goals  Time Frame for Long term goals : 2-3 weeks   Long term goal 1: Pt will complete dynamic standing task x 6

## 2018-09-27 NOTE — PROGRESS NOTES
[facility]  Recreational Therapy  Daily Note  [facility] TCU 8E    Time Spent with Patient: 25 minutes    Date:  9/27/2018       Patient Name: Lynda Arguelles      MRN: [id number]      YOB: 1935 (82 y.o.)       Gender: female  Diagnosis: Physical Deconditioning   Referring Practitioner: Ordering: Kosta Rivers MD Attending: Dr. Erik Amato    RESTRICTIONS/PRECAUTIONS:  Restrictions/Precautions: Fall Risk, General Precautions  Vision: Within Functional Limits  Hearing: Within functional limits    PAIN: 0    SUBJECTIVE:  I need to use the bathroom     OBJECTIVE:  Sit to stand from recliner with SBA-ambulated to bathroom with RW and SBA-able to doff and don underwear and pants-sit to stand from RTS with SBA-needed assistance to wipe bottom due to feeling too shaky at this time-stood to wash her hands with SBA and ambulated back to her chair with RW and SBA -comfort measures given-no leisure needs at this time-         Patient Education  New Education Provided: Importance of LeisureAnne Safety    Electronically signed by: Pastora Stewart CTRS  Date: 9/27/2018

## 2018-09-28 PROCEDURE — 1290000000 HC SEMI PRIVATE OTHER R&B

## 2018-09-28 PROCEDURE — 6360000002 HC RX W HCPCS: Performed by: FAMILY MEDICINE

## 2018-09-28 PROCEDURE — 97530 THERAPEUTIC ACTIVITIES: CPT

## 2018-09-28 PROCEDURE — 6370000000 HC RX 637 (ALT 250 FOR IP): Performed by: FAMILY MEDICINE

## 2018-09-28 PROCEDURE — 97110 THERAPEUTIC EXERCISES: CPT

## 2018-09-28 PROCEDURE — 2709999900 HC NON-CHARGEABLE SUPPLY

## 2018-09-28 PROCEDURE — 97116 GAIT TRAINING THERAPY: CPT

## 2018-09-28 PROCEDURE — 97535 SELF CARE MNGMENT TRAINING: CPT

## 2018-09-28 RX ADMIN — PANTOPRAZOLE SODIUM 40 MG: 40 TABLET, DELAYED RELEASE ORAL at 08:41

## 2018-09-28 RX ADMIN — ACETAMINOPHEN 650 MG: 325 TABLET ORAL at 17:42

## 2018-09-28 RX ADMIN — ACETAMINOPHEN 650 MG: 325 TABLET ORAL at 13:17

## 2018-09-28 RX ADMIN — Medication 9 MG: at 20:24

## 2018-09-28 RX ADMIN — MIDODRINE HYDROCHLORIDE 10 MG: 10 TABLET ORAL at 08:43

## 2018-09-28 RX ADMIN — MICONAZOLE NITRATE: 2 POWDER TOPICAL at 08:43

## 2018-09-28 RX ADMIN — AMIODARONE HYDROCHLORIDE 200 MG: 200 TABLET ORAL at 08:43

## 2018-09-28 RX ADMIN — ASPIRIN 81 MG CHEWABLE TABLET 81 MG: 81 TABLET CHEWABLE at 08:41

## 2018-09-28 RX ADMIN — CILOSTAZOL 100 MG: 100 TABLET ORAL at 17:42

## 2018-09-28 RX ADMIN — TIMOLOL MALEATE 1 DROP: 5 SOLUTION OPHTHALMIC at 20:24

## 2018-09-28 RX ADMIN — ACETAMINOPHEN 650 MG: 325 TABLET ORAL at 20:23

## 2018-09-28 RX ADMIN — ACETAMINOPHEN 650 MG: 325 TABLET ORAL at 08:42

## 2018-09-28 RX ADMIN — MICONAZOLE NITRATE: 2 POWDER TOPICAL at 20:24

## 2018-09-28 RX ADMIN — MIDODRINE HYDROCHLORIDE 10 MG: 10 TABLET ORAL at 13:17

## 2018-09-28 RX ADMIN — CILOSTAZOL 100 MG: 100 TABLET ORAL at 08:43

## 2018-09-28 RX ADMIN — PRAVASTATIN SODIUM 40 MG: 40 TABLET ORAL at 20:23

## 2018-09-28 RX ADMIN — MIDODRINE HYDROCHLORIDE 10 MG: 10 TABLET ORAL at 17:41

## 2018-09-28 RX ADMIN — ENOXAPARIN SODIUM 40 MG: 40 INJECTION SUBCUTANEOUS at 17:42

## 2018-09-28 RX ADMIN — PANTOPRAZOLE SODIUM 40 MG: 40 TABLET, DELAYED RELEASE ORAL at 17:41

## 2018-09-28 ASSESSMENT — PAIN DESCRIPTION - PROGRESSION
CLINICAL_PROGRESSION: NOT CHANGED

## 2018-09-28 ASSESSMENT — PAIN DESCRIPTION - FREQUENCY: FREQUENCY: CONTINUOUS

## 2018-09-28 ASSESSMENT — PAIN DESCRIPTION - DESCRIPTORS
DESCRIPTORS: ACHING
DESCRIPTORS: THROBBING

## 2018-09-28 ASSESSMENT — PAIN SCALES - GENERAL
PAINLEVEL_OUTOF10: 3
PAINLEVEL_OUTOF10: 4
PAINLEVEL_OUTOF10: 3
PAINLEVEL_OUTOF10: 4
PAINLEVEL_OUTOF10: 2
PAINLEVEL_OUTOF10: 2

## 2018-09-28 ASSESSMENT — PAIN DESCRIPTION - ONSET: ONSET: ON-GOING

## 2018-09-28 ASSESSMENT — PAIN DESCRIPTION - LOCATION
LOCATION: LEG
LOCATION: LEG

## 2018-09-28 ASSESSMENT — PAIN DESCRIPTION - ORIENTATION
ORIENTATION: RIGHT
ORIENTATION: RIGHT

## 2018-09-28 ASSESSMENT — PAIN DESCRIPTION - PAIN TYPE
TYPE: SURGICAL PAIN
TYPE: SURGICAL PAIN

## 2018-09-28 NOTE — PROGRESS NOTES
[facility]  INPATIENT OCCUPATIONAL THERAPY  [facility] TCU 8E  PROGRESS NOTE    Time:  Time In: 1300  Time Out: 1400  Timed Code Treatment Minutes: 60 Minutes  Minutes: 60          Date: 2018  Patient Name: Azael Stewart,   Gender: female      MRN: [id number]  : 1935  (82 y.o.)  Referring Practitioner: Ordering: Verle Opitz, MD Attending: Dr. Wing Ferguson  Diagnosis: Physical Deconditioning   Additional Pertinent Hx: 82 y.o. female who presents to the ED for evaluation of right hip pain which happened prior to arrival. The patient reports she was getting out of bed in order to use the restroom, when her leg felt numb and gave out on her, she slid down the edge of the bed landing on her butt. Pt found to have PVD s/p  RIGHT FEMORAL EMBOLECTOMY, INTRAOPERATIVE ARTERIOGRAM, RIGHT ILIAC EMBOLECTOMY, RIGHT COMMON AND EXTERNAL ILIAC STENTING, RIGHT FEMORAL TO ANTERIOR POPLITEAL BYPASS WITH 6MM GORTEX GRAFT and  Emergent declotting right Fem Tib Bypass, Right common femoral and profunda femoral, endarterectomy bovine pericardial patch angioplasty. Admitted on TCU on 18. Restrictions/Precautions:  Restrictions/Precautions: Fall Risk, General Precautions            Position Activity Restriction  Other position/activity restrictions: s/p  RIGHT FEMORAL EMBOLECTOMY, INTRAOPERATIVE ARTERIOGRAM, RIGHT ILIAC EMBOLECTOMY, RIGHT COMMON AND EXTERNAL ILIAC STENTING, RIGHT FEMORAL TO ANTERIOR POPLITEAL BYPASS WITH 6MM GORTEX GRAFT and  Emergent declotting right Fem Tib Bypass, Right common femoral and profunda femoral, endarterectomy bovine pericardial patch angioplasty. Past Medical History:   Diagnosis Date    Arthritis     BPPV (benign paroxysmal positional vertigo) 16    GERD (gastroesophageal reflux disease)     ?  esophageal stricture    HTN (hypertension)     Hyperlipidemia     Obesity     Osteopenia     PAC (premature atrial contraction)     on betablocker    Paralysis [facility])     baby    Pedal edema     denied any hx of hypertension    PVC (premature ventricular contraction)     PVD (peripheral vascular disease) (HCC)     Tinnitus     UTI (urinary tract infection)      Past Surgical History:   Procedure Laterality Date    APPENDECTOMY      BREAST SURGERY Right 1958    lumpectomy    CHOLECYSTECTOMY      30 years ago    COLONOSCOPY  08/06/2018    Dr Kera Bacon      eye, eyelids    EYE SURGERY      cataract    HYSTERECTOMY      LARYNGOSCOPY  07/15/2016    RI OLEGARIO SKN SUB GRFT T/A/L AREA/<100SCM /<1ST 25 SCM N/A 9/6/2018    RE-EXPLORATION RIGHT GROIN, DECLOTTING OF FEMORAL BYPASS GRAFT performed by Keanu Fisher MD at [address] [facility] EGD TRANSORAL BIOPSY SINGLE/MULTIPLE Left 11/27/2017    EGD BIOPSY performed by Dariel Villegas MD at [address] [place] [address], [facility], [facility] N/A 8/20/2018    ROBOT ASSISTED COLECTOMY (LOW ANTERIOR) performed by Suhail Marcum MD at [address] [facility] OFFICE/OUTPT [address] N/A 9/5/2018    RIGHT FEMORAL EMBOLECTOMY, INTRAOPERATIVE ARTERIOGRAM, RIGHT ILIAC EMBOLECTOMY, RIGHT COMMON AND EXTERNAL ILIAC STENTING, RIGHT FEMORAL TO ANTERIOR POPLITEAL BYPASS WITH 6MM GORTEX GRAFT performed by Keanu Fisher MD at [address] [facility] N/A 11/27/2017    EGD SUBMUCOSAL/BOTOX INJECTION performed by Dariel Villegas MD at [facility]           Subjective       Subjective: Pt seated in bedside chair upon arrival. Agreeable to OT session    Overall Orientation Status: Within Functional Limits         Pain:          Objective  Overall Cognitive Status: Exceptions  Cognition Comment: Slow processing, decreased safety and insight, however, improved from last week         ADL  Grooming: Stand by assistance (x 2 trials for hand hygiene)  Toileting: Increased time to complete;Stand by assistance (x 2 trials)     Light Housekeeping  Light Housekeeping Level: Lucy Bonner

## 2018-09-29 PROBLEM — N18.2 CKD (CHRONIC KIDNEY DISEASE) STAGE 2, GFR 60-89 ML/MIN: Status: ACTIVE | Noted: 2018-09-29

## 2018-09-29 LAB
BACTERIA: ABNORMAL /HPF
BACTERIA: ABNORMAL /HPF
BILIRUBIN URINE: NEGATIVE
BILIRUBIN URINE: NEGATIVE
BLOOD, URINE: ABNORMAL
BLOOD, URINE: ABNORMAL
CASTS 2: ABNORMAL /LPF
CASTS 2: ABNORMAL /LPF
CASTS UA: ABNORMAL /LPF
CASTS UA: ABNORMAL /LPF
CHARACTER, URINE: ABNORMAL
CHARACTER, URINE: ABNORMAL
COLOR: ABNORMAL
COLOR: YELLOW
CRYSTALS, UA: ABNORMAL
CRYSTALS, UA: ABNORMAL
EPITHELIAL CELLS, UA: ABNORMAL /HPF
EPITHELIAL CELLS, UA: ABNORMAL /HPF
GLUCOSE URINE: NEGATIVE MG/DL
GLUCOSE URINE: NEGATIVE MG/DL
KETONES, URINE: NEGATIVE
KETONES, URINE: NEGATIVE
LEUKOCYTE ESTERASE, URINE: ABNORMAL
LEUKOCYTE ESTERASE, URINE: ABNORMAL
MISCELLANEOUS 2: ABNORMAL
MISCELLANEOUS 2: ABNORMAL
NITRITE, URINE: NEGATIVE
NITRITE, URINE: NEGATIVE
PH UA: 6
PH UA: 8
PROTEIN UA: 30
PROTEIN UA: ABNORMAL
RBC URINE: ABNORMAL /HPF
RBC URINE: ABNORMAL /HPF
RENAL EPITHELIAL, UA: ABNORMAL
RENAL EPITHELIAL, UA: ABNORMAL
SPECIFIC GRAVITY, URINE: 1.02 (ref 1–1.03)
SPECIFIC GRAVITY, URINE: 1.02 (ref 1–1.03)
UROBILINOGEN, URINE: 0.2 EU/DL
UROBILINOGEN, URINE: 0.2 EU/DL
WBC UA: > 100 /HPF
WBC UA: > 100 /HPF
YEAST: ABNORMAL
YEAST: ABNORMAL

## 2018-09-29 PROCEDURE — 6370000000 HC RX 637 (ALT 250 FOR IP): Performed by: FAMILY MEDICINE

## 2018-09-29 PROCEDURE — 6360000002 HC RX W HCPCS: Performed by: FAMILY MEDICINE

## 2018-09-29 PROCEDURE — 87186 SC STD MICRODIL/AGAR DIL: CPT

## 2018-09-29 PROCEDURE — 87070 CULTURE OTHR SPECIMN AEROBIC: CPT

## 2018-09-29 PROCEDURE — 87077 CULTURE AEROBIC IDENTIFY: CPT

## 2018-09-29 PROCEDURE — 87086 URINE CULTURE/COLONY COUNT: CPT

## 2018-09-29 PROCEDURE — 87075 CULTR BACTERIA EXCEPT BLOOD: CPT

## 2018-09-29 PROCEDURE — 87205 SMEAR GRAM STAIN: CPT

## 2018-09-29 PROCEDURE — 81001 URINALYSIS AUTO W/SCOPE: CPT

## 2018-09-29 PROCEDURE — 1290000000 HC SEMI PRIVATE OTHER R&B

## 2018-09-29 PROCEDURE — 2709999900 HC NON-CHARGEABLE SUPPLY

## 2018-09-29 RX ORDER — MIDODRINE HYDROCHLORIDE 5 MG/1
5 TABLET ORAL
Status: DISCONTINUED | OUTPATIENT
Start: 2018-09-29 | End: 2018-10-04 | Stop reason: HOSPADM

## 2018-09-29 RX ORDER — HYDROCHLOROTHIAZIDE 25 MG/1
25 TABLET ORAL DAILY
Status: DISCONTINUED | OUTPATIENT
Start: 2018-09-29 | End: 2018-10-04 | Stop reason: HOSPADM

## 2018-09-29 RX ADMIN — PANTOPRAZOLE SODIUM 40 MG: 40 TABLET, DELAYED RELEASE ORAL at 06:22

## 2018-09-29 RX ADMIN — MIDODRINE HYDROCHLORIDE 5 MG: 5 TABLET ORAL at 12:45

## 2018-09-29 RX ADMIN — ACETAMINOPHEN 650 MG: 325 TABLET ORAL at 17:47

## 2018-09-29 RX ADMIN — ASPIRIN 81 MG CHEWABLE TABLET 81 MG: 81 TABLET CHEWABLE at 09:19

## 2018-09-29 RX ADMIN — TIMOLOL MALEATE 1 DROP: 5 SOLUTION OPHTHALMIC at 20:04

## 2018-09-29 RX ADMIN — AMIODARONE HYDROCHLORIDE 200 MG: 200 TABLET ORAL at 09:19

## 2018-09-29 RX ADMIN — ENOXAPARIN SODIUM 40 MG: 40 INJECTION SUBCUTANEOUS at 17:47

## 2018-09-29 RX ADMIN — HYDROCHLOROTHIAZIDE 25 MG: 25 TABLET ORAL at 19:11

## 2018-09-29 RX ADMIN — MICONAZOLE NITRATE: 2 POWDER TOPICAL at 22:05

## 2018-09-29 RX ADMIN — PANTOPRAZOLE SODIUM 40 MG: 40 TABLET, DELAYED RELEASE ORAL at 17:47

## 2018-09-29 RX ADMIN — MICONAZOLE NITRATE: 2 POWDER TOPICAL at 09:20

## 2018-09-29 RX ADMIN — Medication 9 MG: at 20:04

## 2018-09-29 RX ADMIN — PRAVASTATIN SODIUM 40 MG: 40 TABLET ORAL at 20:04

## 2018-09-29 RX ADMIN — ACETAMINOPHEN 650 MG: 325 TABLET ORAL at 20:04

## 2018-09-29 RX ADMIN — CILOSTAZOL 100 MG: 100 TABLET ORAL at 06:22

## 2018-09-29 RX ADMIN — ACETAMINOPHEN 650 MG: 325 TABLET ORAL at 09:20

## 2018-09-29 RX ADMIN — MIDODRINE HYDROCHLORIDE 5 MG: 5 TABLET ORAL at 17:46

## 2018-09-29 RX ADMIN — MIDODRINE HYDROCHLORIDE 10 MG: 10 TABLET ORAL at 09:19

## 2018-09-29 RX ADMIN — CILOSTAZOL 100 MG: 100 TABLET ORAL at 17:47

## 2018-09-29 RX ADMIN — ACETAMINOPHEN 650 MG: 325 TABLET ORAL at 12:45

## 2018-09-29 ASSESSMENT — PAIN SCALES - GENERAL
PAINLEVEL_OUTOF10: 4
PAINLEVEL_OUTOF10: 3
PAINLEVEL_OUTOF10: 5

## 2018-09-29 NOTE — PROGRESS NOTES
Patient: Josh Cap  Unit/Bed: 8E-66/066-A  YOB: 1935  MRN: [id number] Acct: [de-identified]   Admitting Diagnosis: Re-exploration Right Groin, Declotting of Femoral Bypass Graft  Admit Date:  9/17/2018  Hospital Day: 12    Assessment:     Principal Problem:    PVD (peripheral vascular disease) ([address]  Active Problems:    Diverticulosis of large intestine without hemorrhage    Gastroesophageal reflux disease without esophagitis    Osteoarthritis of multiple joints    SVT (supraventricular tachycardia) (HCC)    Paroxysmal atrial fibrillation (HCC)    Colovesical fistula    Physical deconditioning    Abdominal aortic aneurysm (AAA) without rupture (HCC)    Hiatal hernia    CKD (chronic kidney disease) stage 2, GFR 60-89 ml/min  Resolved Problems:    * No resolved hospital problems. *      Plan:     Check labs  Continue therapies   Reduce midodrine  Add hydrochlorothiazide for some swelling of the lower legs        Subjective:     Patient has no complaint of CP, SOB, or GI upset. Medication side effects: none    Scheduled Meds:   midodrine  5 mg Oral TID WC    acetaminophen  650 mg Oral Q4H WA    docusate sodium  100 mg Oral BID    amiodarone  200 mg Oral Daily    aspirin  81 mg Oral Daily    cilostazol  100 mg Oral BID    enoxaparin  40 mg Subcutaneous Daily    pantoprazole  40 mg Oral BID    timolol  1 drop Both Eyes Nightly    pravastatin  40 mg Oral Daily    miconazole   Topical BID     Continuous Infusions:  PRN Meds:senna, polyethylene glycol, magnesium hydroxide, meclizine, melatonin    Review of Systems  Pertinent items are noted in HPI. Objective:     No data found. I/O last 3 completed shifts: In: 120 [P.O.:120]  Out: -   No intake/output data recorded.     BP (!) 148/67   Pulse 85   Temp 96.3 °F (35.7 °C) (Oral)   Resp 16   Ht 5' 5\" (1.651 m)   Wt 192 lb 3.2 oz (87.2 kg)   SpO2 93%   BMI 31.98 kg/m²     BP (!) 148/67   Pulse 85   Temp 96.3 °F (35.7 °C) (Oral)   Resp

## 2018-09-29 NOTE — PLAN OF CARE
Problem: Pain:  Goal: Pain level will decrease      Outcome: Ongoing  Reposition frequently to alleviate pain. Problem: Bleeding:  Goal: Will show no signs and symptoms of excessive bleeding      Outcome: Ongoing  Pt will have no active bleeding. Problem: Falls - Risk of:  Goal: Will remain free from falls      Outcome: Ongoing  Pt will remain free of falls. Problem: Activity:  Goal: Appearance well-rested  Outcome: Ongoing  Pt will participate in PT/OT daily to build strength and stamina. Problem: Pressure Ulcer - Risk of  Goal: Absence of pressure ulcer  Outcome: Ongoing  Skin assessment every shift. Reposition frequently to prevent skin breakdown.

## 2018-09-30 PROBLEM — N18.30 CKD (CHRONIC KIDNEY DISEASE) STAGE 3, GFR 30-59 ML/MIN (HCC): Status: ACTIVE | Noted: 2018-09-30

## 2018-09-30 LAB
ANION GAP SERPL CALCULATED.3IONS-SCNC: 13 MEQ/L (ref 8–16)
BASOPHILS # BLD: 0.4 %
BASOPHILS ABSOLUTE: 0 THOU/MM3 (ref 0–0.1)
BUN BLDV-MCNC: 28 MG/DL (ref 7–22)
CALCIUM SERPL-MCNC: 9 MG/DL (ref 8.5–10.5)
CHLORIDE BLD-SCNC: 103 MEQ/L (ref 98–111)
CO2: 25 MEQ/L (ref 23–33)
CREAT SERPL-MCNC: 1 MG/DL (ref 0.4–1.2)
EOSINOPHIL # BLD: 1.9 %
EOSINOPHILS ABSOLUTE: 0.1 THOU/MM3 (ref 0–0.4)
ERYTHROCYTE [DISTWIDTH] IN BLOOD BY AUTOMATED COUNT: 16.1 % (ref 11.5–14.5)
ERYTHROCYTE [DISTWIDTH] IN BLOOD BY AUTOMATED COUNT: 55 FL (ref 35–45)
GFR SERPL CREATININE-BSD FRML MDRD: 53 ML/MIN/1.73M2
GLUCOSE BLD-MCNC: 88 MG/DL (ref 70–108)
HCT VFR BLD CALC: 31.5 % (ref 37–47)
HEMOGLOBIN: 10.1 GM/DL (ref 12–16)
IMMATURE GRANS (ABS): 0.03 THOU/MM3 (ref 0–0.07)
IMMATURE GRANULOCYTES: 0.6 %
LYMPHOCYTES # BLD: 18.8 %
LYMPHOCYTES ABSOLUTE: 0.9 THOU/MM3 (ref 1–4.8)
MCH RBC QN AUTO: 29.8 PG (ref 26–33)
MCHC RBC AUTO-ENTMCNC: 32.1 GM/DL (ref 32.2–35.5)
MCV RBC AUTO: 92.9 FL (ref 81–99)
MONOCYTES # BLD: 10.4 %
MONOCYTES ABSOLUTE: 0.5 THOU/MM3 (ref 0.4–1.3)
NUCLEATED RED BLOOD CELLS: 0 /100 WBC
ORGANISM: ABNORMAL
ORGANISM: ABNORMAL
PLATELET # BLD: 145 THOU/MM3 (ref 130–400)
PMV BLD AUTO: 10.2 FL (ref 9.4–12.4)
POTASSIUM SERPL-SCNC: 3.8 MEQ/L (ref 3.5–5.2)
RBC # BLD: 3.39 MILL/MM3 (ref 4.2–5.4)
SEG NEUTROPHILS: 67.9 %
SEGMENTED NEUTROPHILS ABSOLUTE COUNT: 3.2 THOU/MM3 (ref 1.8–7.7)
SODIUM BLD-SCNC: 141 MEQ/L (ref 135–145)
URINE CULTURE REFLEX: ABNORMAL
URINE CULTURE REFLEX: ABNORMAL
WBC # BLD: 4.7 THOU/MM3 (ref 4.8–10.8)

## 2018-09-30 PROCEDURE — 6370000000 HC RX 637 (ALT 250 FOR IP): Performed by: FAMILY MEDICINE

## 2018-09-30 PROCEDURE — 97535 SELF CARE MNGMENT TRAINING: CPT

## 2018-09-30 PROCEDURE — 80048 BASIC METABOLIC PNL TOTAL CA: CPT

## 2018-09-30 PROCEDURE — 6360000002 HC RX W HCPCS: Performed by: FAMILY MEDICINE

## 2018-09-30 PROCEDURE — 97110 THERAPEUTIC EXERCISES: CPT

## 2018-09-30 PROCEDURE — 36415 COLL VENOUS BLD VENIPUNCTURE: CPT

## 2018-09-30 PROCEDURE — 85025 COMPLETE CBC W/AUTO DIFF WBC: CPT

## 2018-09-30 PROCEDURE — 1290000000 HC SEMI PRIVATE OTHER R&B

## 2018-09-30 PROCEDURE — 97116 GAIT TRAINING THERAPY: CPT

## 2018-09-30 RX ADMIN — MIDODRINE HYDROCHLORIDE 5 MG: 5 TABLET ORAL at 16:57

## 2018-09-30 RX ADMIN — ACETAMINOPHEN 650 MG: 325 TABLET ORAL at 09:05

## 2018-09-30 RX ADMIN — MICONAZOLE NITRATE: 2 POWDER TOPICAL at 20:54

## 2018-09-30 RX ADMIN — HYDROCHLOROTHIAZIDE 25 MG: 25 TABLET ORAL at 10:11

## 2018-09-30 RX ADMIN — MIDODRINE HYDROCHLORIDE 5 MG: 5 TABLET ORAL at 10:11

## 2018-09-30 RX ADMIN — MICONAZOLE NITRATE: 2 POWDER TOPICAL at 10:11

## 2018-09-30 RX ADMIN — ENOXAPARIN SODIUM 40 MG: 40 INJECTION SUBCUTANEOUS at 16:57

## 2018-09-30 RX ADMIN — ASPIRIN 81 MG CHEWABLE TABLET 81 MG: 81 TABLET CHEWABLE at 10:11

## 2018-09-30 RX ADMIN — PANTOPRAZOLE SODIUM 40 MG: 40 TABLET, DELAYED RELEASE ORAL at 16:57

## 2018-09-30 RX ADMIN — MIDODRINE HYDROCHLORIDE 5 MG: 5 TABLET ORAL at 13:21

## 2018-09-30 RX ADMIN — ACETAMINOPHEN 650 MG: 325 TABLET ORAL at 20:54

## 2018-09-30 RX ADMIN — ACETAMINOPHEN 650 MG: 325 TABLET ORAL at 13:21

## 2018-09-30 RX ADMIN — AMIODARONE HYDROCHLORIDE 200 MG: 200 TABLET ORAL at 10:11

## 2018-09-30 RX ADMIN — ACETAMINOPHEN 650 MG: 325 TABLET ORAL at 16:57

## 2018-09-30 RX ADMIN — CILOSTAZOL 100 MG: 100 TABLET ORAL at 16:57

## 2018-09-30 RX ADMIN — PANTOPRAZOLE SODIUM 40 MG: 40 TABLET, DELAYED RELEASE ORAL at 05:59

## 2018-09-30 RX ADMIN — PRAVASTATIN SODIUM 40 MG: 40 TABLET ORAL at 20:54

## 2018-09-30 RX ADMIN — TIMOLOL MALEATE 1 DROP: 5 SOLUTION OPHTHALMIC at 20:54

## 2018-09-30 RX ADMIN — CILOSTAZOL 100 MG: 100 TABLET ORAL at 06:00

## 2018-09-30 ASSESSMENT — PAIN SCALES - GENERAL
PAINLEVEL_OUTOF10: 4
PAINLEVEL_OUTOF10: 3
PAINLEVEL_OUTOF10: 3
PAINLEVEL_OUTOF10: 4
PAINLEVEL_OUTOF10: 4
PAINLEVEL_OUTOF10: 3
PAINLEVEL_OUTOF10: 5

## 2018-09-30 ASSESSMENT — PAIN DESCRIPTION - FREQUENCY: FREQUENCY: INTERMITTENT

## 2018-09-30 ASSESSMENT — PAIN DESCRIPTION - LOCATION
LOCATION: LEG

## 2018-09-30 ASSESSMENT — PAIN DESCRIPTION - ORIENTATION
ORIENTATION: RIGHT

## 2018-09-30 ASSESSMENT — PAIN DESCRIPTION - DESCRIPTORS: DESCRIPTORS: ACHING

## 2018-09-30 ASSESSMENT — PAIN DESCRIPTION - PAIN TYPE
TYPE: SURGICAL PAIN
TYPE: SURGICAL PAIN
TYPE: ACUTE PAIN;SURGICAL PAIN

## 2018-09-30 ASSESSMENT — PAIN DESCRIPTION - ONSET: ONSET: ON-GOING

## 2018-09-30 NOTE — PROGRESS NOTES
[facility]  INPATIENT OCCUPATIONAL THERAPY  [facility] TCU 8E  DAILY NOTE    Time:  Time In: 0900  Time Out: 930  Timed Code Treatment Minutes: 27 Minutes  Minutes: 30          Date: 2018  Patient Name: Karina Polo,   Gender: female      Room: [facility]66/066-A  MRN: [id number]  : 1935  (82 y.o.)  Referring Practitioner: Ordering: Daisy Quick MD Attending: Dr. Bethann Scheuermann  Diagnosis: Physical Deconditioning   Additional Pertinent Hx: 82 y.o. female who presents to the ED for evaluation of right hip pain which happened prior to arrival. The patient reports she was getting out of bed in order to use the restroom, when her leg felt numb and gave out on her, she slid down the edge of the bed landing on her butt. Pt found to have PVD s/p  RIGHT FEMORAL EMBOLECTOMY, INTRAOPERATIVE ARTERIOGRAM, RIGHT ILIAC EMBOLECTOMY, RIGHT COMMON AND EXTERNAL ILIAC STENTING, RIGHT FEMORAL TO ANTERIOR POPLITEAL BYPASS WITH 6MM GORTEX GRAFT and  Emergent declotting right Fem Tib Bypass, Right common femoral and profunda femoral, endarterectomy bovine pericardial patch angioplasty. Admitted on TCU on 18. Past Medical History:   Diagnosis Date    Arthritis     BPPV (benign paroxysmal positional vertigo) 16    GERD (gastroesophageal reflux disease)     ?  esophageal stricture    HTN (hypertension)     Hyperlipidemia     Obesity     Osteopenia     PAC (premature atrial contraction)     on betablocker    Paralysis (HCC)     baby    Pedal edema     denied any hx of hypertension    PVC (premature ventricular contraction)     PVD (peripheral vascular disease) (HCC)     Tinnitus     UTI (urinary tract infection)      Past Surgical History:   Procedure Laterality Date    APPENDECTOMY      BREAST SURGERY Right 8    lumpectomy    CHOLECYSTECTOMY      30 years ago    COLONOSCOPY  2018    Dr Bradley Boas      eye, eyelids    EYE SURGERY      cataract    HYSTERECTOMY      Right       Objective  Overall Cognitive Status: Exceptions  Cognition Comment: Slow processing, decreased safety and insight      ADL  Grooming: Stand by assistance (standing sinkside to wash hands after toileting tasks)  Toileting: Increased time to complete;Stand by assistance (for hygiene and clothing management)        Bed mobility  Sit to Supine: Stand by assistance    Transfers  Sit to stand: Stand by assistance  Stand to sit: Stand by assistance  Toilet Transfers  Equipment Used: Raised toilet seat with rails  Toilet Transfer: Stand by assistance    Balance  Sitting Balance: Supervision  Standing Balance: Stand by assistance           Functional Mobility  Functional - Mobility Device: Rolling Walker  Activity: Other; To/from bathroom  Assist Level: Stand by assistance  Functional Mobility Comments: To/from therapy gym at fair pace. No LOB noted. Required seated rest break after each trial of mobility        Type of ROM/Therapeutic Exercise: Free weights  Comment: Completed BUE exercises x10 reps x1 set in all joints/planes using red theraband while seated in chair. Required rest breaks after each exercise.  Completed exercises to increase strength and activity tolerance required for ADLs and toilet transfers         Activity Tolerance:  Activity Tolerance: Patient Tolerated treatment well;Patient limited by fatigue    Assessment:     Performance deficits / Impairments: Decreased functional mobility , Decreased safe awareness, Decreased balance, Decreased ADL status, Decreased endurance, Decreased strength, Decreased high-level IADLs, Decreased cognition  Prognosis: Fair    Discharge Recommendations:  Discharge Recommendations: 24 hour supervision or assist, Patient would benefit from continued therapy after discharge    Patient Education:  Patient Education: safety with transfers and mobility, ADL strategies, BUE exercises  Barriers to Learning: cognition     Equipment Recommendations:  Equipment Needed: Yes  Mobility Devices: ADL Assistive Devices  ADL Assistive Devices: Shower Chair with back, Reacher, Sock-Aid Hard  Other: Discussed options on purchasing shower chairs and reacher for discharge. Safety:  Safety Devices in place: Yes  Type of devices: Call light within reach, Chair alarm in place, Bed alarm in place, Left in bed    Plan:  Times per week: 6x  Current Treatment Recommendations: Strengthening, Endurance Training, Patient/Caregiver Education & Training, Self-Care / ADL, Balance Training, Functional Mobility Training, Safety Education & Training, Home Management Training    Goals:  Patient goals : Go home     Short term goals  Time Frame for Short term goals: 1 week  Short term goal 1: Pt to complete sit to stand transfers from various surfaces including toilet/BSC with S and no vcs for safety to increse indep with toileting tasks. Short term goal 2: Pt to complete functional mobility to/from BR with S and no vcs for sequencing of tasks to increase indep with toileting. Short term goal 3: Pt will complete LB self cares with LHAE PRN and CGA to increase indep and safety in home environment. Short term goal 4: Pt to complete bilateral UE resistance strengthening HEP with 2 vc for technique to increase UB strength for ease of upright standing during ADL tasks   Long term goals  Time Frame for Long term goals : 2-3 weeks   Long term goal 1: Pt will complete dynamic standing task x 8 minutes with 1-2 UE release and S to increase indep and safety with toileting hygiene and grooming tasks. Long term goal 2: Pt will complete simple IADL task while standing with Min vc for safety and S to increase indep and safety with simple meal prep in home environment.

## 2018-09-30 NOTE — PROGRESS NOTES
HYSTERECTOMY      LARYNGOSCOPY  07/15/2016    MI OLEGARIO SKN SUB GRFT T/A/L AREA/<100SCM /<1ST 25 SCM N/A 9/6/2018    RE-EXPLORATION RIGHT GROIN, DECLOTTING OF FEMORAL BYPASS GRAFT performed by Kristian Riddle MD at [address] [facility] EGD TRANSORAL BIOPSY SINGLE/MULTIPLE Left 11/27/2017    EGD BIOPSY performed by Saundra Tobar MD at [address] [place] [address], [facility], [facility] N/A 8/20/2018    ROBOT ASSISTED COLECTOMY (LOW ANTERIOR) performed by Klever Hughes MD at [address] [facility] OFFICE/OUTPT [address] [facility] [address] N/A 9/5/2018    RIGHT FEMORAL EMBOLECTOMY, INTRAOPERATIVE ARTERIOGRAM, RIGHT ILIAC EMBOLECTOMY, RIGHT COMMON AND EXTERNAL ILIAC STENTING, RIGHT FEMORAL TO ANTERIOR POPLITEAL BYPASS WITH 6MM GORTEX GRAFT performed by Kristain Riddle MD at [address] N/A 11/27/2017    EGD SUBMUCOSAL/BOTOX INJECTION performed by Saundra Tobar MD at [facility]       Restrictions/Precautions:  Fall Risk, General Precautions                    Other position/activity restrictions: s/p 9/5 RIGHT FEMORAL EMBOLECTOMY, INTRAOPERATIVE ARTERIOGRAM, RIGHT ILIAC EMBOLECTOMY, RIGHT COMMON AND EXTERNAL ILIAC STENTING, RIGHT FEMORAL TO ANTERIOR POPLITEAL BYPASS WITH 6MM GORTEX GRAFT and 9/6 Emergent declotting right Fem Tib Bypass, Right common femoral and profunda femoral, endarterectomy bovine pericardial patch angioplasty. Prior Level of Function:  ADL Assistance: Independent  Homemaking Assistance: Independent  Ambulation Assistance: Independent  Transfer Assistance: Independent  Additional Comments: Pt reporting she was able to complete her own ADL tasks at home. No Ad used prior to admission. Subjective:  Chart Reviewed: Yes  Family / Caregiver Present: No  Subjective: Patient resting in bed upon arrival and agreeable to PT session. Patient reports interrupted sleep last p.m. secondary to frequents trips to restroom. Pain:  Yes.   Pain Assessment  Pain Level:

## 2018-10-01 PROCEDURE — 97116 GAIT TRAINING THERAPY: CPT

## 2018-10-01 PROCEDURE — 6360000002 HC RX W HCPCS: Performed by: FAMILY MEDICINE

## 2018-10-01 PROCEDURE — 0220000000 HC SKILLED NURSING FACILITY

## 2018-10-01 PROCEDURE — 6370000000 HC RX 637 (ALT 250 FOR IP): Performed by: FAMILY MEDICINE

## 2018-10-01 PROCEDURE — 1290000000 HC SEMI PRIVATE OTHER R&B

## 2018-10-01 PROCEDURE — 97110 THERAPEUTIC EXERCISES: CPT

## 2018-10-01 PROCEDURE — 97535 SELF CARE MNGMENT TRAINING: CPT

## 2018-10-01 PROCEDURE — 97530 THERAPEUTIC ACTIVITIES: CPT

## 2018-10-01 PROCEDURE — 92507 TX SP LANG VOICE COMM INDIV: CPT

## 2018-10-01 RX ADMIN — MIDODRINE HYDROCHLORIDE 5 MG: 5 TABLET ORAL at 12:37

## 2018-10-01 RX ADMIN — ASPIRIN 81 MG CHEWABLE TABLET 81 MG: 81 TABLET CHEWABLE at 08:50

## 2018-10-01 RX ADMIN — MICONAZOLE NITRATE: 2 POWDER TOPICAL at 08:50

## 2018-10-01 RX ADMIN — PANTOPRAZOLE SODIUM 40 MG: 40 TABLET, DELAYED RELEASE ORAL at 16:32

## 2018-10-01 RX ADMIN — MICONAZOLE NITRATE: 2 POWDER TOPICAL at 20:24

## 2018-10-01 RX ADMIN — ACETAMINOPHEN 650 MG: 325 TABLET ORAL at 20:32

## 2018-10-01 RX ADMIN — DOCUSATE SODIUM 100 MG: 100 CAPSULE, LIQUID FILLED ORAL at 08:50

## 2018-10-01 RX ADMIN — DOCUSATE SODIUM 100 MG: 100 CAPSULE, LIQUID FILLED ORAL at 20:24

## 2018-10-01 RX ADMIN — ACETAMINOPHEN 650 MG: 325 TABLET ORAL at 01:36

## 2018-10-01 RX ADMIN — ACETAMINOPHEN 650 MG: 325 TABLET ORAL at 16:32

## 2018-10-01 RX ADMIN — PRAVASTATIN SODIUM 40 MG: 40 TABLET ORAL at 20:25

## 2018-10-01 RX ADMIN — HYDROCHLOROTHIAZIDE 25 MG: 25 TABLET ORAL at 08:50

## 2018-10-01 RX ADMIN — PANTOPRAZOLE SODIUM 40 MG: 40 TABLET, DELAYED RELEASE ORAL at 06:12

## 2018-10-01 RX ADMIN — ENOXAPARIN SODIUM 40 MG: 40 INJECTION SUBCUTANEOUS at 17:36

## 2018-10-01 RX ADMIN — MIDODRINE HYDROCHLORIDE 5 MG: 5 TABLET ORAL at 08:49

## 2018-10-01 RX ADMIN — CILOSTAZOL 100 MG: 100 TABLET ORAL at 16:32

## 2018-10-01 RX ADMIN — TIMOLOL MALEATE 1 DROP: 5 SOLUTION OPHTHALMIC at 21:34

## 2018-10-01 RX ADMIN — ACETAMINOPHEN 650 MG: 325 TABLET ORAL at 08:50

## 2018-10-01 RX ADMIN — CILOSTAZOL 100 MG: 100 TABLET ORAL at 06:12

## 2018-10-01 RX ADMIN — ACETAMINOPHEN 650 MG: 325 TABLET ORAL at 12:37

## 2018-10-01 RX ADMIN — MIDODRINE HYDROCHLORIDE 5 MG: 5 TABLET ORAL at 17:35

## 2018-10-01 RX ADMIN — AMIODARONE HYDROCHLORIDE 200 MG: 200 TABLET ORAL at 08:50

## 2018-10-01 ASSESSMENT — PAIN SCALES - GENERAL
PAINLEVEL_OUTOF10: 5
PAINLEVEL_OUTOF10: 4
PAINLEVEL_OUTOF10: 3
PAINLEVEL_OUTOF10: 0
PAINLEVEL_OUTOF10: 0
PAINLEVEL_OUTOF10: 4
PAINLEVEL_OUTOF10: 5
PAINLEVEL_OUTOF10: 5
PAINLEVEL_OUTOF10: 1
PAINLEVEL_OUTOF10: 4

## 2018-10-01 ASSESSMENT — PAIN DESCRIPTION - ORIENTATION
ORIENTATION: RIGHT

## 2018-10-01 ASSESSMENT — PAIN DESCRIPTION - LOCATION
LOCATION: GROIN;LEG;KNEE
LOCATION: LEG
LOCATION: KNEE

## 2018-10-01 ASSESSMENT — PAIN DESCRIPTION - ONSET
ONSET: ON-GOING
ONSET: ON-GOING

## 2018-10-01 ASSESSMENT — PAIN DESCRIPTION - FREQUENCY
FREQUENCY: CONTINUOUS
FREQUENCY: INTERMITTENT

## 2018-10-01 ASSESSMENT — PAIN DESCRIPTION - DESCRIPTORS
DESCRIPTORS: ACHING
DESCRIPTORS: ACHING;SORE;NUMBNESS

## 2018-10-01 ASSESSMENT — PAIN DESCRIPTION - PAIN TYPE
TYPE: CHRONIC PAIN
TYPE: SURGICAL PAIN

## 2018-10-01 ASSESSMENT — PAIN DESCRIPTION - PROGRESSION: CLINICAL_PROGRESSION: NOT CHANGED

## 2018-10-01 NOTE — PROGRESS NOTES
posture with slight improvement following adjustment of walker height upward. Decreased right step length and weight shift. Distance: 150 ft x 1 , 75 ft x 1  Comments: cues to decrease WB through UEs and to increase step height B - minimal change in gait noted  Stairs  Curbs: 6\" (platform x2)  Device: Rolling walker  Assistance: Stand by assistance  Comment: proper sequencing  PT Equipment Recommendations  Other: will continue to monitor needs- may need RW    Occupational  Therapy:   ADL  Feeding: Setup (lunch tray)  Grooming: Stand by assistance (standing sinkside to wash hands after toileting tasks)  UE Bathing: Setup, Increased time to complete (seated in chair)  LE Bathing: Increased time to complete, Minimal assistance (for B feet. Pt able to wash lavern area/bottom while standing with SBA and BLE below knees seated in chair with supervision- extended time provided)  UE Dressing: Setup (to doff/don tshirt seated in chair)  LE Dressing: Minimal assistance, Increased time to complete (to thread RLE into undergarment and pants using reacher (minimal difficulty noted) and to don socks using sock aid with extended time and education provided. Patient able to thread LLE with extended time. Pulled up over hips after 1 cue provided for initiation of task)  Toileting: Increased time to complete, Stand by assistance (for hygiene and clothing management)  Additional Comments: Patient would benefit from continued education on use of LHAE to help increase independence with ADL routine         Speech Therapy:   Patient has met 2/5 STG's this therapy period, and has demonstrated increasing progress with an additional 2/5 STG's. Anticipate continued success. Pt continues to present with a moderate cognitive-linguistic impairment characterized by deficits in reading comprehension, mental flexibility, selective attention, and complex naming.  Pt benefits from positive encouragement, however, a barrier to success continues to be

## 2018-10-01 NOTE — PROGRESS NOTES
Patient in chair eating dinner, patient stated that tylenol was helping. Denies further need for anything at this time.    Lanny Bugress [facility] SN

## 2018-10-01 NOTE — PROGRESS NOTES
suicidal thoughts activity  to complete toileting tasks and at sink for washing/drying hands and to comb hair reaching overhead to mid shin levels without ue support, no unsteadiness    Exercises:  Exercises  Comments: Patient completed the following therapeutic exercises: supine ankle pumps, quad/glute sets, heel slides, short arc quads, hip abduction/adduction supine and  in hook-lying positions 15 reps each. Seated bilateral heel-toe raises,  long arc quads. marching, hip abd/add and resistance red t-band ham curls and hip abd/add x 15 reps each all for strengthening/endurance     Activity Tolerance:  Activity Tolerance: Patient Tolerated treatment well  Activity Tolerance: good . occasional reports of right knee pain and numbness and requested to go to bed end of session due to fatigue from sitting up in b/s chair \"all day\"         Discharge Recommendations:  Discharge Recommendations: Continue to assess pending progress    Patient Education:  Patient Education: POC, transfers, bed mobility, gait, energy conservation,        platform step with rolling walker, ther. ex review 10-1-18 pm    Equipment Recommendations: Other: will continue to monitor needs- may need RW    Safety:  Type of devices:  All fall risk precautions in place, Patient at risk for falls, Left in bed, Bed alarm in place, Call light within reach, Gait belt    Plan:  Times per week: 6x  Current Treatment Recommendations: Strengthening, Gait Training, Balance Training, ROM, Functional Mobility Training, Endurance Training, Transfer Training, Home Exercise Program, Equipment Evaluation, Education, & procurement, Patient/Caregiver Education & Training, Safety Education & Training, Stair training    Goals:  Patient goals : Go home, move better    Short term goals  Time Frame for Short term goals: 10 days  Short term goal 1: patient to perform bed mobility at SBA to get in and out of bed - DISCONTINUE  Short term goal 2: patient to perform transfers to and from

## 2018-10-01 NOTE — PLAN OF CARE
Problem: Pain:  Goal: Pain level will decrease      Pain rated on a scale and documented appropriately. Goal for patient tolerable pain is dicussed and striving to reach goal daily  Goal: Control of acute pain      Pain rated on a scale and documented appropriately. Goal for patient tolerable pain is dicussed and striving to reach goal daily    Problem: Bleeding:  Goal: Will show no signs and symptoms of excessive bleeding      No sign of bleeding at this time    Problem: Falls - Risk of:  Goal: Will remain free from falls      Patient remains free from falls this shift. Call light in place. Fall risk band in place. Goal: Absence of physical injury      Patient remains free from falls this shift. Call light in place. Fall risk band in place. Problem: Nutrition  Goal: Optimal nutrition therapy  Dietician evaluated patient needs. Patient continues to eat all or most of each meal    Problem: Infection - Risk of, Surgical Site:  Goal: Absence of surgical site infection  No signs or symptoms of infection antibiotic therapy continues    Comments: Care Plan reviewed with patient. Patient verbalizes understanding to educator and is willing to contribute and participate in goal planning.

## 2018-10-01 NOTE — PROGRESS NOTES
Patient resting in bed with family at bedside. Stated her pain \"is how it normally is between a 3-4\" Nurse administered routine tylenol. Alert and orientated x4. Pupils PERRL. Mucous membranes pink and moist. Upper extremities pink, warm and dry. Skin turgor <3 seconds, capillary refill <3 seconds. Hand grasp strong and equal bilaterally. Arm drift negative. Respirations unlabored. Lung sounds clear and regular bilaterally. No cough. Patient stats at 96% on room air. Bowel sounds active x4 quadrants. Abdomen round, non-distended. No pain or tenderness. Incision in right lower abdominal fold and groin area. No staples or sutures. 10.5x1.5 Scabbed over, dark to color, No odor. Yellow slough around proximity of scab and red surrounding. Not warm. Resident stated \"it is very tender. \" Open to air. Skin intact over bony prominences. Lower extremities warm pink and dry. No tingling or numbness. Incisions on right lower leg open to air, no drainage, no odor. No signs or symptoms of infection present. Patient has call light in place, bed in low position.   [facility] SURGICAL [place] [facility]

## 2018-10-02 PROCEDURE — 97116 GAIT TRAINING THERAPY: CPT

## 2018-10-02 PROCEDURE — 97110 THERAPEUTIC EXERCISES: CPT

## 2018-10-02 PROCEDURE — 6370000000 HC RX 637 (ALT 250 FOR IP): Performed by: FAMILY MEDICINE

## 2018-10-02 PROCEDURE — 6360000002 HC RX W HCPCS: Performed by: FAMILY MEDICINE

## 2018-10-02 PROCEDURE — 2500000003 HC RX 250 WO HCPCS: Performed by: THORACIC SURGERY (CARDIOTHORACIC VASCULAR SURGERY)

## 2018-10-02 PROCEDURE — 97530 THERAPEUTIC ACTIVITIES: CPT

## 2018-10-02 PROCEDURE — 2709999900 HC NON-CHARGEABLE SUPPLY

## 2018-10-02 PROCEDURE — 97127 HC SP THER IVNTJ W/FOCUS COG FUNCJ: CPT | Performed by: SPEECH-LANGUAGE PATHOLOGIST

## 2018-10-02 PROCEDURE — 1290000000 HC SEMI PRIVATE OTHER R&B

## 2018-10-02 PROCEDURE — 97535 SELF CARE MNGMENT TRAINING: CPT

## 2018-10-02 RX ADMIN — ACETAMINOPHEN 650 MG: 325 TABLET ORAL at 16:48

## 2018-10-02 RX ADMIN — ASPIRIN 81 MG CHEWABLE TABLET 81 MG: 81 TABLET CHEWABLE at 08:53

## 2018-10-02 RX ADMIN — SILVER SULFADIAZINE: 10 CREAM TOPICAL at 08:55

## 2018-10-02 RX ADMIN — ACETAMINOPHEN 650 MG: 325 TABLET ORAL at 01:45

## 2018-10-02 RX ADMIN — HYDROCHLOROTHIAZIDE 25 MG: 25 TABLET ORAL at 08:52

## 2018-10-02 RX ADMIN — MIDODRINE HYDROCHLORIDE 5 MG: 5 TABLET ORAL at 16:47

## 2018-10-02 RX ADMIN — DOCUSATE SODIUM 100 MG: 100 CAPSULE, LIQUID FILLED ORAL at 08:53

## 2018-10-02 RX ADMIN — ACETAMINOPHEN 650 MG: 325 TABLET ORAL at 21:51

## 2018-10-02 RX ADMIN — CILOSTAZOL 100 MG: 100 TABLET ORAL at 06:12

## 2018-10-02 RX ADMIN — MIDODRINE HYDROCHLORIDE 5 MG: 5 TABLET ORAL at 12:15

## 2018-10-02 RX ADMIN — AMIODARONE HYDROCHLORIDE 200 MG: 200 TABLET ORAL at 08:52

## 2018-10-02 RX ADMIN — ENOXAPARIN SODIUM 40 MG: 40 INJECTION SUBCUTANEOUS at 18:03

## 2018-10-02 RX ADMIN — PANTOPRAZOLE SODIUM 40 MG: 40 TABLET, DELAYED RELEASE ORAL at 16:47

## 2018-10-02 RX ADMIN — PRAVASTATIN SODIUM 40 MG: 40 TABLET ORAL at 21:51

## 2018-10-02 RX ADMIN — CILOSTAZOL 100 MG: 100 TABLET ORAL at 16:49

## 2018-10-02 RX ADMIN — SILVER SULFADIAZINE: 10 CREAM TOPICAL at 21:51

## 2018-10-02 RX ADMIN — SILVER SULFADIAZINE: 10 CREAM TOPICAL at 01:45

## 2018-10-02 RX ADMIN — MICONAZOLE NITRATE: 2 POWDER TOPICAL at 08:55

## 2018-10-02 RX ADMIN — TIMOLOL MALEATE 1 DROP: 5 SOLUTION OPHTHALMIC at 21:51

## 2018-10-02 RX ADMIN — DOCUSATE SODIUM 100 MG: 100 CAPSULE, LIQUID FILLED ORAL at 21:51

## 2018-10-02 RX ADMIN — PANTOPRAZOLE SODIUM 40 MG: 40 TABLET, DELAYED RELEASE ORAL at 06:12

## 2018-10-02 RX ADMIN — MIDODRINE HYDROCHLORIDE 5 MG: 5 TABLET ORAL at 08:52

## 2018-10-02 RX ADMIN — ACETAMINOPHEN 650 MG: 325 TABLET ORAL at 12:15

## 2018-10-02 RX ADMIN — MICONAZOLE NITRATE: 2 POWDER TOPICAL at 21:51

## 2018-10-02 RX ADMIN — ACETAMINOPHEN 650 MG: 325 TABLET ORAL at 08:53

## 2018-10-02 ASSESSMENT — PAIN SCALES - GENERAL
PAINLEVEL_OUTOF10: 3
PAINLEVEL_OUTOF10: 3
PAINLEVEL_OUTOF10: 5
PAINLEVEL_OUTOF10: 5
PAINLEVEL_OUTOF10: 0
PAINLEVEL_OUTOF10: 3
PAINLEVEL_OUTOF10: 4
PAINLEVEL_OUTOF10: 1
PAINLEVEL_OUTOF10: 5
PAINLEVEL_OUTOF10: 1

## 2018-10-02 ASSESSMENT — PAIN DESCRIPTION - ONSET: ONSET: ON-GOING

## 2018-10-02 ASSESSMENT — PAIN DESCRIPTION - LOCATION
LOCATION: LEG
LOCATION: LEG

## 2018-10-02 ASSESSMENT — PAIN DESCRIPTION - ORIENTATION
ORIENTATION: RIGHT
ORIENTATION: RIGHT

## 2018-10-02 ASSESSMENT — PAIN DESCRIPTION - DESCRIPTORS: DESCRIPTORS: ACHING

## 2018-10-02 ASSESSMENT — PAIN DESCRIPTION - PAIN TYPE: TYPE: SURGICAL PAIN

## 2018-10-02 ASSESSMENT — PAIN DESCRIPTION - FREQUENCY: FREQUENCY: INTERMITTENT

## 2018-10-02 NOTE — PROGRESS NOTES
Dr. Oscar Osborn returned message and said to call Dr Roney Gill since he was on call. Ta contacted, new orders received to apply silvadene to site BID. See orders.

## 2018-10-02 NOTE — PROGRESS NOTES
chair    Plan:  Times per week: 6x  Current Treatment Recommendations: Strengthening, Gait Training, Balance Training, ROM, Functional Mobility Training, Endurance Training, Transfer Training, Home Exercise Program, Equipment Evaluation, Education, & procurement, Patient/Caregiver Education & Training, Safety Education & Training, Stair training    Goals:  Patient goals : Go home, move better    Short term goals  Time Frame for Short term goals: 10 days  Short term goal 1: patient to perform bed mobility at SBA to get in and out of bed - DISCONTINUE  Short term goal 2: patient to perform transfers to and from various surfaces at SBA to rise from bed or chair - DISCONTINUE  Short term goal 3: patient to ambulate 100ft with RW at [facility] for household mobility - DISCONTINUE  Short term goal 4: patient to negotiate porch step with RW at [facility] for community mobility - DISCONTINUE    Long term goals  Time Frame for Long term goals : 3 weeks  Long term goal 1: patient to perform bed mobility at mod I to get in and out of bed  Long term goal 2: patient to perform transfers to and from various surfaces at S to rise from bed or chair  Long term goal 3: patient to ambulate 150ft with least restrictive device at S for household mobility  Long term goal 4: patient to negotiate porch step with least restrictive device at S for community mobility

## 2018-10-02 NOTE — PROGRESS NOTES
opposite-being so fidgety or restless that s/he has been moving around a lot more than usual   0   0   I. Thoughts that you would be better off dead, or of hurting yourself in some way. 0 0              Patient Name: Arpita Rice        MRN: [id number]    : 1935  (82 y.o.)  Gender: female   Principal Problem: PVD (peripheral vascular disease) ([facility]    Section J    Health Conditions    Should Pain Assessment Interview be Conducted? Attempt to conduct interview with all residents. If resident is comatose, skip to , Shortness of Breath (dyspnea)   Enter Code  1 0 - No à (resident is rarely/never understood) à Skip to [address] of Breath  1 - Yes à Continue to , Pain Presence   Pain Assessment Interview   Pain Presence   Enter Code  1 Ask resident: Agustínayne Person you had pain or hurting at any time in the last 5 days?   0 - No à Skip to , Shortness of Breath  1 - Yes  à Continue to , Pain Frequency  9 - Unable to answer à Skip to , Shortness of Breath    Pain Frequency   Enter Code          3 Ask resident: Bill Cramp much of the time have you experienced pain or hurting over the last 5 days?   1 - Almost constantly  2 - Frequently  3 - Occasionally  4 - Rarely  9 - Unable to answer    Pain Effect on Function   Enter Code      0 Ask resident: Scarlett Reall the last 5 days, has pain made it hard for you to sleep at night?   0 - No   1 - Yes   9 - Unable to answer    Enter Code      0 Ask resident: Glenne Dionne the last 5 days, have you limited your day-to-day activities because of pain?   0 - No   1 - Yes   9 - Unable to answer     Pain Intensity   Enter Code      5 A. Numeric Rating Scale (00-10)  Ask resident: Rebecca Cruz rate your worst pain over the last 5 days on a zero to ten scale, with zero being no pain and ten as the worst pain you can imagine.  (Show resident 00-10 pain scale)  Enter two-digit response. Enter 99 if unable to answer.

## 2018-10-02 NOTE — PROGRESS NOTES
Recommendations:  Discharge Recommendations: Home with Home health OT    Patient Education:  Patient Education: safety with transfers and mobility, ADL strategies, BUE exercises  Barriers to Learning: cognition     Equipment Recommendations:  Equipment Needed: Yes  Mobility Devices: ADL Assistive Devices  ADL Assistive Devices: Shower Chair with back, Reacher, Sock-Aid Hard  Other: Discussed options on purchasing shower chairs and reacher for discharge. Safety:  Safety Devices in place: Yes  Type of devices: Call light within reach, Gait belt, Left in chair, Chair alarm in place    Plan:  Times per week: 6x  Current Treatment Recommendations: Strengthening, Endurance Training, Patient/Caregiver Education & Training, Self-Care / ADL, Balance Training, Functional Mobility Training, Safety Education & Training, Home Management Training    Goals:  Patient goals : Go home     Short term goals  Time Frame for Short term goals: 1 week  Short term goal 1: Pt to complete sit to stand transfers from various surfaces including toilet/BSC with S and no vcs for safety to increse indep with toileting tasks. Short term goal 2: Pt to complete functional mobility to/from BR with S and no vcs for sequencing of tasks to increase indep with toileting. Short term goal 3: Pt will complete LB self cares with LHAE PRN and CGA to increase indep and safety in home environment. Short term goal 4: Pt to complete bilateral UE resistance strengthening HEP with 2 vc for technique to increase UB strength for ease of upright standing during ADL tasks   Long term goals  Time Frame for Long term goals : 2-3 weeks   Long term goal 1: Pt will complete dynamic standing task x 8 minutes with 1-2 UE release and S to increase indep and safety with toileting hygiene and grooming tasks.    Long term goal 2: Pt will complete simple IADL task while standing with Min vc for safety and S to increase indep and safety with simple meal prep in home

## 2018-10-03 PROCEDURE — 97530 THERAPEUTIC ACTIVITIES: CPT

## 2018-10-03 PROCEDURE — 97127 HC SP THER IVNTJ W/FOCUS COG FUNCJ: CPT

## 2018-10-03 PROCEDURE — 1290000000 HC SEMI PRIVATE OTHER R&B

## 2018-10-03 PROCEDURE — 6370000000 HC RX 637 (ALT 250 FOR IP): Performed by: FAMILY MEDICINE

## 2018-10-03 PROCEDURE — 2709999900 HC NON-CHARGEABLE SUPPLY

## 2018-10-03 PROCEDURE — 6360000002 HC RX W HCPCS: Performed by: FAMILY MEDICINE

## 2018-10-03 PROCEDURE — 97110 THERAPEUTIC EXERCISES: CPT

## 2018-10-03 PROCEDURE — 97535 SELF CARE MNGMENT TRAINING: CPT

## 2018-10-03 RX ORDER — AMIODARONE HYDROCHLORIDE 200 MG/1
200 TABLET ORAL DAILY
Qty: 15 TABLET | Refills: 0 | Status: SHIPPED | OUTPATIENT
Start: 2018-10-04 | End: 2019-06-03

## 2018-10-03 RX ORDER — MIDODRINE HYDROCHLORIDE 5 MG/1
5 TABLET ORAL
Qty: 45 TABLET | Refills: 0 | Status: SHIPPED | OUTPATIENT
Start: 2018-10-04 | End: 2019-10-28

## 2018-10-03 RX ORDER — HYDROCHLOROTHIAZIDE 25 MG/1
25 TABLET ORAL DAILY
Qty: 15 TABLET | Refills: 0 | Status: ON HOLD | OUTPATIENT
Start: 2018-10-04 | End: 2019-05-19 | Stop reason: HOSPADM

## 2018-10-03 RX ADMIN — MIDODRINE HYDROCHLORIDE 5 MG: 5 TABLET ORAL at 18:01

## 2018-10-03 RX ADMIN — Medication 9 MG: at 21:37

## 2018-10-03 RX ADMIN — ASPIRIN 81 MG CHEWABLE TABLET 81 MG: 81 TABLET CHEWABLE at 10:06

## 2018-10-03 RX ADMIN — ENOXAPARIN SODIUM 40 MG: 40 INJECTION SUBCUTANEOUS at 18:01

## 2018-10-03 RX ADMIN — ACETAMINOPHEN 650 MG: 325 TABLET ORAL at 21:36

## 2018-10-03 RX ADMIN — ACETAMINOPHEN 650 MG: 325 TABLET ORAL at 12:45

## 2018-10-03 RX ADMIN — MICONAZOLE NITRATE: 2 POWDER TOPICAL at 10:07

## 2018-10-03 RX ADMIN — ACETAMINOPHEN 650 MG: 325 TABLET ORAL at 10:07

## 2018-10-03 RX ADMIN — MIDODRINE HYDROCHLORIDE 5 MG: 5 TABLET ORAL at 10:06

## 2018-10-03 RX ADMIN — HYDROCHLOROTHIAZIDE 25 MG: 25 TABLET ORAL at 10:06

## 2018-10-03 RX ADMIN — CILOSTAZOL 100 MG: 100 TABLET ORAL at 10:06

## 2018-10-03 RX ADMIN — PRAVASTATIN SODIUM 40 MG: 40 TABLET ORAL at 21:36

## 2018-10-03 RX ADMIN — PANTOPRAZOLE SODIUM 40 MG: 40 TABLET, DELAYED RELEASE ORAL at 16:12

## 2018-10-03 RX ADMIN — PANTOPRAZOLE SODIUM 40 MG: 40 TABLET, DELAYED RELEASE ORAL at 10:07

## 2018-10-03 RX ADMIN — SILVER SULFADIAZINE: 10 CREAM TOPICAL at 21:36

## 2018-10-03 RX ADMIN — AMIODARONE HYDROCHLORIDE 200 MG: 200 TABLET ORAL at 10:06

## 2018-10-03 RX ADMIN — DOCUSATE SODIUM 100 MG: 100 CAPSULE, LIQUID FILLED ORAL at 10:06

## 2018-10-03 RX ADMIN — TIMOLOL MALEATE 1 DROP: 5 SOLUTION OPHTHALMIC at 21:36

## 2018-10-03 RX ADMIN — CILOSTAZOL 100 MG: 100 TABLET ORAL at 16:12

## 2018-10-03 RX ADMIN — ACETAMINOPHEN 650 MG: 325 TABLET ORAL at 16:12

## 2018-10-03 RX ADMIN — SILVER SULFADIAZINE: 10 CREAM TOPICAL at 10:07

## 2018-10-03 RX ADMIN — MIDODRINE HYDROCHLORIDE 5 MG: 5 TABLET ORAL at 12:45

## 2018-10-03 RX ADMIN — MICONAZOLE NITRATE: 2 POWDER TOPICAL at 21:36

## 2018-10-03 ASSESSMENT — PAIN DESCRIPTION - PAIN TYPE
TYPE: SURGICAL PAIN
TYPE: SURGICAL PAIN

## 2018-10-03 ASSESSMENT — PAIN DESCRIPTION - LOCATION
LOCATION: LEG
LOCATION: LEG

## 2018-10-03 ASSESSMENT — PAIN SCALES - GENERAL
PAINLEVEL_OUTOF10: 3
PAINLEVEL_OUTOF10: 3
PAINLEVEL_OUTOF10: 4
PAINLEVEL_OUTOF10: 3
PAINLEVEL_OUTOF10: 5
PAINLEVEL_OUTOF10: 3

## 2018-10-03 ASSESSMENT — PAIN DESCRIPTION - ORIENTATION
ORIENTATION: RIGHT
ORIENTATION: RIGHT

## 2018-10-03 ASSESSMENT — PAIN DESCRIPTION - DESCRIPTORS
DESCRIPTORS: ACHING
DESCRIPTORS: ACHING

## 2018-10-03 NOTE — PLAN OF CARE
Problem: DISCHARGE BARRIERS  Goal: Patient's continuum of care needs are met    Intervention: INVOLVE PATIENT/S.O. IN DISCHARGE PLANNING PROCESS  The patient received a denial for continued skilled days from [employer]/Medicare on 102. Patient's LCD, per insurance notice, is 10/4. The [facility] HEART letter was received, signed by the patient, and faxed back to [facility]. The patient is anxious to return home soon, and requests discharge on 10/4. The TCU team meeting was held yesterday (10/2), with good reports of therapy progress. Recommendation made for hh services, including nursing, OT, PT, & ST. It was also recommended that patient have close supervision for the first few days after returning home to insure she is safe and managing on her own. Following the meeting, the [facility] and  met with patient and provided update. The patient stated that family members will be closely involved post discharge and she will have assistance if needed. She requested that hh referral be made to [facility] for services, and this has been done. Will continue to follow and assist with discharge plans for 10/4.  Vallarie Burkitt, LSW

## 2018-10-03 NOTE — PROGRESS NOTES
Pt. will consume 75% or more at meals to aid with wound healing during LOS. · Monitoring: Meal Intake, Supplement Intake, Diet Tolerance, Skin Integrity, Wound Healing, Weight, Comparative Standards, Pertinent Labs    See Adult Nutrition Doc Flowsheet for more detail.      Electronically signed by Laurie Rayo RD, SCOTT on 10/3/18 at 4:54 PM    Contact Number: [phone]

## 2018-10-03 NOTE — PROGRESS NOTES
information. GOAL NOT MET   INTERVENTIONS:Did not address due to focus on other goals. Long-term Goals  Timeframe for Long-term Goals: 2 weeks  Goal 1: Pt will improve cognitive-linguistic functioning to a mod assist level to improve basic communication and increase level of independence with ADLs to reduce caregiver burden. GOAL MET     ASSESSMENT:  Assessment: [x]Progressing towards goals          []Not Progressing towards goals  SUMMARY: Pt has met above 3/5 STG's and 1/1 LTG's prior to discharge. Continues to present with mild high level cognitive impairments. Pt continues to struggle with completion of high level problem solving and organizational tasks (i.e. Money management, checkbook organization, etc), as well as following multi step commands and completion of reading comprehension tasks. Improvements noted within functional carryover, completion of high level deductive thinking and processing as well as functional attention. Pt with high level of fluctuations and confusion noted upon admission secondary to use of anesthetic for surgical intervention. Plans for discharge to home setting 10/4 with [facility] follow up. Pt would greatly benefit from continued ST intervention for completion of repeat assessment and short rehabilitation as needed to ensure appropriate reintegration back to independent living and return to management of functional ADL/IADL tasks.      Patient Tolerance of Treatment:   [x]Tolerated well []Tolerated fair []Required rest breaks []Fatigued  Education:  Learner:  [x]Patient          []Significant Other          []Other : Daughter April  Education provided regarding:  [x]Goals and POC   []Diet and swallowing precautions    []Home Exercise Program  [x]Progress and/or discharge information  Method of Education:  [x]Discussion          []Demonstration          []Handout          []Other  Evaluation of Education:   [x]Verbalized understanding   []Demonstrates without assistance  []Demonstrates

## 2018-10-04 VITALS
HEART RATE: 99 BPM | RESPIRATION RATE: 20 BRPM | WEIGHT: 188.4 LBS | DIASTOLIC BLOOD PRESSURE: 67 MMHG | SYSTOLIC BLOOD PRESSURE: 102 MMHG | TEMPERATURE: 98.2 F | HEIGHT: 65 IN | BODY MASS INDEX: 31.39 KG/M2 | OXYGEN SATURATION: 93 %

## 2018-10-04 PROCEDURE — 6370000000 HC RX 637 (ALT 250 FOR IP): Performed by: FAMILY MEDICINE

## 2018-10-04 RX ADMIN — MICONAZOLE NITRATE: 2 POWDER TOPICAL at 09:40

## 2018-10-04 RX ADMIN — AMIODARONE HYDROCHLORIDE 200 MG: 200 TABLET ORAL at 09:39

## 2018-10-04 RX ADMIN — SILVER SULFADIAZINE: 10 CREAM TOPICAL at 09:40

## 2018-10-04 RX ADMIN — CILOSTAZOL 100 MG: 100 TABLET ORAL at 06:23

## 2018-10-04 RX ADMIN — PANTOPRAZOLE SODIUM 40 MG: 40 TABLET, DELAYED RELEASE ORAL at 06:23

## 2018-10-04 RX ADMIN — MIDODRINE HYDROCHLORIDE 5 MG: 5 TABLET ORAL at 08:15

## 2018-10-04 RX ADMIN — HYDROCHLOROTHIAZIDE 25 MG: 25 TABLET ORAL at 09:39

## 2018-10-04 RX ADMIN — ACETAMINOPHEN 650 MG: 325 TABLET ORAL at 08:15

## 2018-10-04 RX ADMIN — ASPIRIN 81 MG CHEWABLE TABLET 81 MG: 81 TABLET CHEWABLE at 09:39

## 2018-10-04 ASSESSMENT — PAIN DESCRIPTION - LOCATION: LOCATION: LEG

## 2018-10-04 ASSESSMENT — PAIN DESCRIPTION - FREQUENCY: FREQUENCY: INTERMITTENT

## 2018-10-04 ASSESSMENT — PAIN SCALES - GENERAL: PAINLEVEL_OUTOF10: 3

## 2018-10-04 ASSESSMENT — PAIN DESCRIPTION - DIRECTION: RADIATING_TOWARDS: HIP

## 2018-10-04 ASSESSMENT — PAIN DESCRIPTION - DESCRIPTORS: DESCRIPTORS: ACHING

## 2018-10-04 ASSESSMENT — PAIN DESCRIPTION - ORIENTATION: ORIENTATION: RIGHT

## 2018-10-04 ASSESSMENT — PAIN DESCRIPTION - ONSET: ONSET: ON-GOING

## 2018-10-04 NOTE — PROGRESS NOTES
[address] [address]  Recreational Therapy  Discharge Note  Transitional Care Unit           Date:  10/4/2018       Patient Name: Josh Kumar      MRN: [id number]       YOB: 1935 [de-identified]82 y.o.)       Gender: female  Diagnosis: Physical Deconditioning   Referring Practitioner: Ordering: Jose Byrd MD Attending: Dr. Riki Pallas    Patient discharged from Recreational Therapy at this time. See recreational therapy notes for details.     Electronically signed by: TAY Dodge  Date: 10/4/2018

## 2018-10-04 NOTE — PLAN OF CARE
Problem: DISCHARGE BARRIERS  Goal: Patient's continuum of care needs are met    Intervention: INVOLVE PATIENT/S.O. IN DISCHARGE PLANNING PROCESS  The patient is being discharged from [facility] today, 10/4, and is returning home. She received an insurance denial on 10/2, with LCD of 10/4 from [employer]/Medicare. A referral has been made to [facility] for nursing visits, OT, PT, & ST. The patient states that her children/family members will supervise her once she is home and provide any needed assistance. She is hopeful that she will do fine at home and will soon be able to resume driving. No concerns expressed. Discharge goals have been met.  GIRISH Leger

## 2018-10-04 NOTE — DISCHARGE SUMMARY
NEGATIVE    Bilirubin, Urine Latest Ref Range: NEGATIVE   NEGATIVE NEGATIVE    Ketones, Urine Latest Ref Range: NEGATIVE   NEGATIVE NEGATIVE    Blood, Urine Latest Ref Range: NEGATIVE   MODERATE (A) LARGE (A)    pH, UA Latest Ref Range: 5.0 - 9.0   8.0 6.0    Protein, UA Latest Ref Range: NEGATIVE   30 (A) TRACE (A)    Urobilinogen, Urine Latest Ref Range: 0.0 - 1.0 eu/dl  0.2 0.2    Nitrite, Urine Latest Ref Range: NEGATIVE   NEGATIVE NEGATIVE    Leukocyte Esterase, Urine Latest Ref Range: NEGATIVE   LARGE (A) LARGE (A)    Casts UA Latest Ref Range: NONE SEEN /lpf  NONE SEEN NONE SEEN    CASTS 2 Latest Ref Range: NONE SEEN /lpf  NONE SEEN NONE SEEN    WBC, UA Latest Ref Range: 0-4/hpf /hpf  > 100 > 100    RBC, UA Latest Ref Range: 0-2/hpf /hpf  10-15 5-10    Epi Cells Latest Ref Range: 3-5/hpf /hpf  5-10 0-2    Renal Epithelial, Urine Latest Ref Range: NONE SEEN   NONE SEEN NONE SEEN    Bacteria, UA Latest Ref Range: FEW/NONE S /hpf  MANY MANY    Yeast, Urine Latest Ref Range: NONE SEEN   NONE SEEN FEW BUDDING    Crystals Latest Ref Range: NONE SEEN   OXALATE OXALATE    Character, Urine Latest Ref Range: CLEAR-SL C   TURBID (A) TURBID (A)    Urine Culture Reflex Unknown  Mixed growth. Th... (A) Mixed growth. Th... (A)    Specific Gravity, Urine Latest Ref Range: 1.002 - 1.03   1.023 1.021    MISCELLANEOUS 2 Unknown  NONE SEEN NONE SEEN      No results for input(s): POCGLU in the last 72 hours. Patient Instructions:  .     Follow-up visits: See after visit summary from hospitalization    Discharge Medications:   Yoandy Damon   Home Medication Instructions ZBD:[id number]    Printed on:10/03/18 3423   Medication Information                      acetaminophen (TYLENOL ARTHRITIS PAIN) 650 MG CR tablet  Take 1,300 mg by mouth every 12 hours as needed for Pain              amiodarone (CORDARONE) 200 MG tablet  Take 1 tablet by mouth daily Additional RF per her PCP             aspirin 81 MG tablet  Take 81 mg by

## 2018-10-19 ENCOUNTER — TELEPHONE (OUTPATIENT)
Dept: SURGERY | Age: 83
End: 2018-10-19

## 2018-10-23 ENCOUNTER — TELEPHONE (OUTPATIENT)
Dept: CARDIOTHORACIC SURGERY | Age: 83
End: 2018-10-23

## 2018-10-23 ENCOUNTER — TELEPHONE (OUTPATIENT)
Dept: SURGERY | Age: 83
End: 2018-10-23

## 2018-10-24 ENCOUNTER — OFFICE VISIT (OUTPATIENT)
Dept: CARDIOTHORACIC SURGERY | Age: 83
End: 2018-10-24

## 2018-10-24 VITALS
HEART RATE: 88 BPM | DIASTOLIC BLOOD PRESSURE: 73 MMHG | SYSTOLIC BLOOD PRESSURE: 116 MMHG | HEIGHT: 65 IN | BODY MASS INDEX: 31.42 KG/M2 | WEIGHT: 188.6 LBS

## 2018-10-24 DIAGNOSIS — T81.30XA WOUND DEHISCENCE: Primary | ICD-10-CM

## 2018-10-24 PROCEDURE — 99024 POSTOP FOLLOW-UP VISIT: CPT | Performed by: PHYSICIAN ASSISTANT

## 2018-10-24 RX ORDER — CIPROFLOXACIN 250 MG/1
500 TABLET, FILM COATED ORAL 2 TIMES DAILY
Qty: 14 TABLET | Refills: 0 | Status: SHIPPED | OUTPATIENT
Start: 2018-10-24 | End: 2018-10-31

## 2018-10-24 NOTE — PROGRESS NOTES
CT/CV Surgery Follow Up Office Visit      Patient's Name/Date of Birth: Lynda Arguelles / 1935 (49 y.o.)    PCP: Janis Prince      Date: October 24, 2018      We had the pleasure of seeing Lynda Arguelles in the office today, as you know this is a very pleasant 82y.o. year old female with a history of PVD and underwent Emergency right leg revascularization on 09/05/18 and Emergent declotting of the right femoral to anterior tibial bypass on 09/06/18. She has her follow up appointment with Dr. Judd Amaya next Monday but she came into the office today for a wound check (See pictures in PE section) The pt overall is doing well. Ambulating appropriately. No other issues. PastMedical History:  Sudeep Watson  has a past medical history of Arthritis; BPPV (benign paroxysmal positional vertigo); GERD (gastroesophageal reflux disease); HTN (hypertension); Hyperlipidemia; Obesity; Osteopenia; PAC (premature atrial contraction); Paralysis ([address] Pedal edema; PVC (premature ventricular contraction); PVD (peripheral vascular disease) ([address] Tinnitus; and UTI (urinary tract infection). Past Surgical History:  The patient  has a past surgical history that includes Cholecystectomy; Breast surgery (Right, 1958); Hysterectomy; Appendectomy; Cosmetic surgery; eye surgery; pr egd transoral biopsy single/multiple (Left, 11/27/2017); Upper gastrointestinal endoscopy (N/A, 11/27/2017); Colonoscopy (08/06/2018); laryngoscopy (07/15/2016); pr lap,surg,colectomy, partial, w/anast (N/A, 8/20/2018); pr office/outpt visit,procedure only (N/A, 9/5/2018); and pr preet skn sub grft t/a/l area/<100scm /<1st 25 scm (N/A, 9/6/2018). Allergies: The patient is allergic to lasix [furosemide]; lipitor [atorvastatin]; neurontin [gabapentin]; and penicillins. Medications:    Current Outpatient Prescriptions:     collagenase (SANTYL) 250 UNIT/GM ointment, Apply topically daily. , Disp: 90 g, Rfl: 3    ciprofloxacin (CIPRO) 250 MG tablet, Take 2 tablets by mouth 2 times daily for 7 days, Disp: 14 tablet, Rfl: 0    amiodarone (CORDARONE) 200 MG tablet, Take 1 tablet by mouth daily Additional RF per her PCP, Disp: 15 tablet, Rfl: 0    silver sulfADIAZINE (SILVADENE) 1 % cream, Apply topically daily to the groin twicedaily, Disp: 30 g, Rfl: 0    hydrochlorothiazide (HYDRODIURIL) 25 MG tablet, Take 1 tablet by mouth daily Additional RF per her PCP, Disp: 15 tablet, Rfl: 0    midodrine (PROAMATINE) 5 MG tablet, Take 1 tablet by mouth 3 times daily (with meals) Additional RF per her PCP, Disp: 45 tablet, Rfl: 0    aspirin 81 MG tablet, Take 81 mg by mouth daily, Disp: , Rfl:     pravastatin (PRAVACHOL) 40 MG tablet, Take 40 mg by mouth daily , Disp: , Rfl:     cilostazol (PLETAL) 100 MG tablet, Take 100 mg by mouth 2 times daily, Disp: , Rfl:     meclizine (ANTIVERT) 25 MG tablet, Take 25 mg by mouth 3 times daily as needed, Disp: , Rfl:     Pantoprazole Sodium (PROTONIX) 40 MG PACK packet, Take 1 packet by mouth 2 times daily. , Disp: 60 each, Rfl: 6    acetaminophen (TYLENOL ARTHRITIS PAIN) 650 MG CR tablet, Take 1,300 mg by mouth every 12 hours as needed for Pain , Disp: , Rfl:     timolol (TIMOPTIC) 0.5 % ophthalmic solution, 1 drop 2 times daily. , Disp: , Rfl:     Family History: This patient's family history includes Cancer in her father; Dementia in her maternal grandmother; Emphysema in her father; Heart Disease in her maternal grandfather, maternal grandmother, and mother; Other in her sister; Stroke in her mother. Social History:  Arminda Villavicencio  reports that she has been smoking. She started smoking about 62 years ago. She has a 30.00 pack-year smoking history. She has never used smokeless tobacco. She reports that she drinks about 0.6 oz of alcohol per week . She reports that she does not use drugs. ROS:   Constitutional: Negative for activity change, chills, fatigue, fever and unexpected weight change.    Respiratory: Negative for

## 2018-10-27 LAB
AEROBIC CULTURE: ABNORMAL
ANAEROBIC CULTURE: ABNORMAL
GRAM STAIN RESULT: ABNORMAL
ORGANISM: ABNORMAL

## 2018-10-29 ENCOUNTER — OFFICE VISIT (OUTPATIENT)
Dept: CARDIOTHORACIC SURGERY | Age: 83
End: 2018-10-29

## 2018-10-29 VITALS
SYSTOLIC BLOOD PRESSURE: 121 MMHG | HEART RATE: 88 BPM | BODY MASS INDEX: 31.69 KG/M2 | HEIGHT: 65 IN | DIASTOLIC BLOOD PRESSURE: 71 MMHG | WEIGHT: 190.2 LBS

## 2018-10-29 DIAGNOSIS — Z09 ENCOUNTER FOR FOLLOW-UP EXAMINATION AFTER COMPLETED TREATMENT FOR CONDITIONS OTHER THAN MALIGNANT NEOPLASM: ICD-10-CM

## 2018-10-29 DIAGNOSIS — I73.9 PVD (PERIPHERAL VASCULAR DISEASE) (HCC): ICD-10-CM

## 2018-10-29 DIAGNOSIS — I71.40 ABDOMINAL AORTIC ANEURYSM (AAA) GREATER THAN 5.0 CM IN DIAMETER IN FEMALE: ICD-10-CM

## 2018-10-29 DIAGNOSIS — I74.09 AORTOILIAC OCCLUSIVE DISEASE (HCC): Primary | ICD-10-CM

## 2018-10-29 DIAGNOSIS — T81.89XD DELAYED SURGICAL WOUND HEALING, SUBSEQUENT ENCOUNTER: ICD-10-CM

## 2018-10-29 PROCEDURE — G8427 DOCREV CUR MEDS BY ELIG CLIN: HCPCS | Performed by: THORACIC SURGERY (CARDIOTHORACIC VASCULAR SURGERY)

## 2018-10-29 PROCEDURE — 1101F PT FALLS ASSESS-DOCD LE1/YR: CPT | Performed by: THORACIC SURGERY (CARDIOTHORACIC VASCULAR SURGERY)

## 2018-10-29 PROCEDURE — 1090F PRES/ABSN URINE INCON ASSESS: CPT | Performed by: THORACIC SURGERY (CARDIOTHORACIC VASCULAR SURGERY)

## 2018-10-29 PROCEDURE — 1123F ACP DISCUSS/DSCN MKR DOCD: CPT | Performed by: THORACIC SURGERY (CARDIOTHORACIC VASCULAR SURGERY)

## 2018-10-29 PROCEDURE — G8598 ASA/ANTIPLAT THER USED: HCPCS | Performed by: THORACIC SURGERY (CARDIOTHORACIC VASCULAR SURGERY)

## 2018-10-29 PROCEDURE — 4004F PT TOBACCO SCREEN RCVD TLK: CPT | Performed by: THORACIC SURGERY (CARDIOTHORACIC VASCULAR SURGERY)

## 2018-10-29 PROCEDURE — G8484 FLU IMMUNIZE NO ADMIN: HCPCS | Performed by: THORACIC SURGERY (CARDIOTHORACIC VASCULAR SURGERY)

## 2018-10-29 PROCEDURE — G8400 PT W/DXA NO RESULTS DOC: HCPCS | Performed by: THORACIC SURGERY (CARDIOTHORACIC VASCULAR SURGERY)

## 2018-10-29 PROCEDURE — 99024 POSTOP FOLLOW-UP VISIT: CPT | Performed by: THORACIC SURGERY (CARDIOTHORACIC VASCULAR SURGERY)

## 2018-10-29 PROCEDURE — 4040F PNEUMOC VAC/ADMIN/RCVD: CPT | Performed by: THORACIC SURGERY (CARDIOTHORACIC VASCULAR SURGERY)

## 2018-10-29 PROCEDURE — G8417 CALC BMI ABV UP PARAM F/U: HCPCS | Performed by: THORACIC SURGERY (CARDIOTHORACIC VASCULAR SURGERY)

## 2018-10-29 PROCEDURE — 1111F DSCHRG MED/CURRENT MED MERGE: CPT | Performed by: THORACIC SURGERY (CARDIOTHORACIC VASCULAR SURGERY)

## 2018-10-31 ENCOUNTER — TELEPHONE (OUTPATIENT)
Dept: CARDIOLOGY CLINIC | Age: 83
End: 2018-10-31

## 2018-11-05 ENCOUNTER — OFFICE VISIT (OUTPATIENT)
Dept: CARDIOLOGY CLINIC | Age: 83
End: 2018-11-05
Payer: MEDICARE

## 2018-11-05 VITALS
HEIGHT: 65 IN | BODY MASS INDEX: 30.84 KG/M2 | DIASTOLIC BLOOD PRESSURE: 78 MMHG | SYSTOLIC BLOOD PRESSURE: 134 MMHG | WEIGHT: 185.1 LBS | HEART RATE: 82 BPM

## 2018-11-05 DIAGNOSIS — Z01.810 PREOPERATIVE CARDIOVASCULAR EXAMINATION: Primary | ICD-10-CM

## 2018-11-05 DIAGNOSIS — I71.40 ABDOMINAL AORTIC ANEURYSM (AAA) WITHOUT RUPTURE: ICD-10-CM

## 2018-11-05 PROCEDURE — G8400 PT W/DXA NO RESULTS DOC: HCPCS | Performed by: INTERNAL MEDICINE

## 2018-11-05 PROCEDURE — G8484 FLU IMMUNIZE NO ADMIN: HCPCS | Performed by: INTERNAL MEDICINE

## 2018-11-05 PROCEDURE — 1101F PT FALLS ASSESS-DOCD LE1/YR: CPT | Performed by: INTERNAL MEDICINE

## 2018-11-05 PROCEDURE — G8427 DOCREV CUR MEDS BY ELIG CLIN: HCPCS | Performed by: INTERNAL MEDICINE

## 2018-11-05 PROCEDURE — G8417 CALC BMI ABV UP PARAM F/U: HCPCS | Performed by: INTERNAL MEDICINE

## 2018-11-05 PROCEDURE — 4004F PT TOBACCO SCREEN RCVD TLK: CPT | Performed by: INTERNAL MEDICINE

## 2018-11-05 PROCEDURE — 4040F PNEUMOC VAC/ADMIN/RCVD: CPT | Performed by: INTERNAL MEDICINE

## 2018-11-05 PROCEDURE — 1090F PRES/ABSN URINE INCON ASSESS: CPT | Performed by: INTERNAL MEDICINE

## 2018-11-05 PROCEDURE — 1123F ACP DISCUSS/DSCN MKR DOCD: CPT | Performed by: INTERNAL MEDICINE

## 2018-11-05 PROCEDURE — G8598 ASA/ANTIPLAT THER USED: HCPCS | Performed by: INTERNAL MEDICINE

## 2018-11-05 PROCEDURE — 99204 OFFICE O/P NEW MOD 45 MIN: CPT | Performed by: INTERNAL MEDICINE

## 2018-11-06 NOTE — PROGRESS NOTES
Az Ar[facility]aga  CARDIOLOGY  [facility] [address]  [place] [address]  Dept: [phone]  Dept Fax: [phone]  Loc: [phone]    Visit Date: 11/5/2018    Ms. Rogelio Shelton is a 82 y.o. female  who presented for:  Chief Complaint   Patient presents with    Establish Cardiologist       HPI:   HPI   83 yo F with AAA who presents for pre-op evaluation. No CVA, MI, CHF, CKD, DM II. No chest pain, angina, PARK, orthopnea, PND, sob at rest, palpitations, LE edema, or syncope. Can get up a FOS but does get winded at times. Has had work-up at [facility] with stress testing and echo showing no significant findings. 9/2018 Lexiscan negative at [facility]. She has a hx of PAD with emergent right iliac stenting, right femoral endarterectomy, fem-tib bypass, right groin surgical scar requiring debriding. Current Outpatient Prescriptions:     amiodarone (CORDARONE) 200 MG tablet, Take 1 tablet by mouth daily Additional RF per her PCP, Disp: 15 tablet, Rfl: 0    silver sulfADIAZINE (SILVADENE) 1 % cream, Apply topically daily to the groin twicedaily, Disp: 30 g, Rfl: 0    hydrochlorothiazide (HYDRODIURIL) 25 MG tablet, Take 1 tablet by mouth daily Additional RF per her PCP, Disp: 15 tablet, Rfl: 0    midodrine (PROAMATINE) 5 MG tablet, Take 1 tablet by mouth 3 times daily (with meals) Additional RF per her PCP, Disp: 45 tablet, Rfl: 0    aspirin 81 MG tablet, Take 81 mg by mouth daily, Disp: , Rfl:     pravastatin (PRAVACHOL) 40 MG tablet, Take 40 mg by mouth daily , Disp: , Rfl:     cilostazol (PLETAL) 100 MG tablet, Take 100 mg by mouth 2 times daily, Disp: , Rfl:     meclizine (ANTIVERT) 25 MG tablet, Take 25 mg by mouth 3 times daily as needed, Disp: , Rfl:     Pantoprazole Sodium (PROTONIX) 40 MG PACK packet, Take 1 packet by mouth 2 times daily. , Disp: 60 each, Rfl: 6    acetaminophen (TYLENOL ARTHRITIS PAIN) 650 MG CR tablet, Take 1,300 mg by mouth every 12 hours as

## 2018-11-26 ENCOUNTER — OFFICE VISIT (OUTPATIENT)
Dept: CARDIOTHORACIC SURGERY | Age: 83
End: 2018-11-26

## 2018-11-26 VITALS — DIASTOLIC BLOOD PRESSURE: 83 MMHG | HEART RATE: 76 BPM | SYSTOLIC BLOOD PRESSURE: 127 MMHG

## 2018-11-26 DIAGNOSIS — I71.40 ABDOMINAL AORTIC ANEURYSM (AAA) GREATER THAN 5.0 CM IN DIAMETER IN FEMALE: Primary | ICD-10-CM

## 2018-11-26 DIAGNOSIS — Z98.890 S/P FEMORAL-TIBIAL BYPASS: ICD-10-CM

## 2018-11-26 PROCEDURE — 99024 POSTOP FOLLOW-UP VISIT: CPT | Performed by: THORACIC SURGERY (CARDIOTHORACIC VASCULAR SURGERY)

## 2018-11-26 NOTE — PROGRESS NOTES
Cardiovascular Surgery Office Note      Date: 11/26/2018    Review of Systems:  Yuly Blum is a 82 y.o. female  who presented for follow up from a emergent right iliac stenting, right femoral endarterectomy, right fem 6 mm ringed [place] Tam-saphenous hybrid graft to right AT for limb salvage. Has bounding right DP palpable pulse, re advised to quit smoking, has a 5.2 cm infra renal AAA suitable for EVAR. Right groin incision is clean granulating and was approximated with steri strips. Will get duplex surveillance of graft and RAMOS's, will see back in 2 weeks. She denies pain or SOB. She is ambulating and has no concerns or complications since surgery. Physical Exam:  /83 (Site: Right Upper Arm, Position: Standing, Cuff Size: Medium Adult)   Pulse 76   Physical Exam    Her wounds are healingwell with no erythema or drainage. Pulses Right Leg:    DP:strong palp    PT: 0                   Assessment/Plan:  Finally clean healing 5 cm long x 6 mm wide x 1 cm deep granulating right groin incission    Return in about 2 weeks (around 12/10/2018) for RAMOS, Arterial duplex follow up right fem-AT.     Electronically signed by Tami Ruiz MD on 11/26/18 at 4:34 PM

## 2018-12-12 ENCOUNTER — HOSPITAL ENCOUNTER (OUTPATIENT)
Dept: INTERVENTIONAL RADIOLOGY/VASCULAR | Age: 83
Discharge: HOME | End: 2018-12-12
Payer: MEDICARE

## 2018-12-12 DIAGNOSIS — Z98.890 S/P FEMORAL-TIBIAL BYPASS: ICD-10-CM

## 2018-12-12 DIAGNOSIS — I71.40 ABDOMINAL AORTIC ANEURYSM (AAA) GREATER THAN 5.0 CM IN DIAMETER IN FEMALE: ICD-10-CM

## 2018-12-12 PROCEDURE — 93926 LOWER EXTREMITY STUDY: CPT

## 2018-12-17 ENCOUNTER — OFFICE VISIT (OUTPATIENT)
Dept: CARDIOTHORACIC SURGERY | Age: 83
End: 2018-12-17
Payer: MEDICARE

## 2018-12-17 VITALS
DIASTOLIC BLOOD PRESSURE: 74 MMHG | HEART RATE: 88 BPM | SYSTOLIC BLOOD PRESSURE: 125 MMHG | WEIGHT: 191.4 LBS | BODY MASS INDEX: 31.89 KG/M2 | HEIGHT: 65 IN

## 2018-12-17 DIAGNOSIS — I71.40 ABDOMINAL AORTIC ANEURYSM (AAA) GREATER THAN 5.0 CM IN DIAMETER IN FEMALE: Primary | ICD-10-CM

## 2018-12-17 DIAGNOSIS — S31.109D WOUND OF RIGHT GROIN, SUBSEQUENT ENCOUNTER: ICD-10-CM

## 2018-12-17 DIAGNOSIS — I73.9 PVD (PERIPHERAL VASCULAR DISEASE) (HCC): ICD-10-CM

## 2018-12-17 PROCEDURE — 99213 OFFICE O/P EST LOW 20 MIN: CPT | Performed by: THORACIC SURGERY (CARDIOTHORACIC VASCULAR SURGERY)

## 2018-12-17 PROCEDURE — G8427 DOCREV CUR MEDS BY ELIG CLIN: HCPCS | Performed by: THORACIC SURGERY (CARDIOTHORACIC VASCULAR SURGERY)

## 2018-12-17 PROCEDURE — G8400 PT W/DXA NO RESULTS DOC: HCPCS | Performed by: THORACIC SURGERY (CARDIOTHORACIC VASCULAR SURGERY)

## 2018-12-17 PROCEDURE — 4004F PT TOBACCO SCREEN RCVD TLK: CPT | Performed by: THORACIC SURGERY (CARDIOTHORACIC VASCULAR SURGERY)

## 2018-12-17 PROCEDURE — 1101F PT FALLS ASSESS-DOCD LE1/YR: CPT | Performed by: THORACIC SURGERY (CARDIOTHORACIC VASCULAR SURGERY)

## 2018-12-17 PROCEDURE — G8598 ASA/ANTIPLAT THER USED: HCPCS | Performed by: THORACIC SURGERY (CARDIOTHORACIC VASCULAR SURGERY)

## 2018-12-17 PROCEDURE — G8417 CALC BMI ABV UP PARAM F/U: HCPCS | Performed by: THORACIC SURGERY (CARDIOTHORACIC VASCULAR SURGERY)

## 2018-12-17 PROCEDURE — G8484 FLU IMMUNIZE NO ADMIN: HCPCS | Performed by: THORACIC SURGERY (CARDIOTHORACIC VASCULAR SURGERY)

## 2018-12-17 PROCEDURE — 4040F PNEUMOC VAC/ADMIN/RCVD: CPT | Performed by: THORACIC SURGERY (CARDIOTHORACIC VASCULAR SURGERY)

## 2018-12-17 PROCEDURE — 1123F ACP DISCUSS/DSCN MKR DOCD: CPT | Performed by: THORACIC SURGERY (CARDIOTHORACIC VASCULAR SURGERY)

## 2018-12-17 PROCEDURE — 1090F PRES/ABSN URINE INCON ASSESS: CPT | Performed by: THORACIC SURGERY (CARDIOTHORACIC VASCULAR SURGERY)

## 2018-12-18 NOTE — PROGRESS NOTES
Comments)     PATIENT DOESN'T REMEMBER    Lipitor [Atorvastatin] Other (See Comments)     LEG PAIN    Neurontin [Gabapentin] Other (See Comments)     PATIENT DOESN'T REMEMBER    Oxycontin [Oxycodone Hcl]      confusion    Penicillins Other (See Comments)     HYPERTENSION       Past Medical History  Christine Phillip  has a past medical history of Arthritis; BPPV (benign paroxysmal positional vertigo); GERD (gastroesophageal reflux disease); HTN (hypertension); Hyperlipidemia; Obesity; Osteopenia; PAC (premature atrial contraction); Paralysis ([address] Pedal edema; PVC (premature ventricular contraction); PVD (peripheral vascular disease) ([address] Tinnitus; and UTI (urinary tract infection). Social History  Christine Phillip  reports that she has been smoking. She started smoking about 63 years ago. She has a 30.00 pack-year smoking history. She has never used smokeless tobacco. She reports that she drinks about 0.6 oz of alcohol per week . She reports that she does not use drugs. Family History  Christine Phillip family history includes Cancer in her father; Dementia in her maternal grandmother; Emphysema in her father; Heart Disease in her maternal grandfather, maternal grandmother, and mother; Other in her sister; Stroke in her mother. There is no family history of bicuspid aortic valve, aneurysms, heart transplant, pacemakers, defibrillators, or sudden cardiac death.       Past Surgical History   Past Surgical History:   Procedure Laterality Date    APPENDECTOMY      BREAST SURGERY Right 1958    lumpectomy    CHOLECYSTECTOMY      30 years ago    COLONOSCOPY  08/06/2018    Dr Opal Abraham      eye, eyelids    EYE SURGERY      cataract    HYSTERECTOMY      LARYNGOSCOPY  07/15/2016    VA OLEGARIO SKN SUB GRFT T/A/L AREA/<100SCM /<1ST 25 SCM N/A 9/6/2018    RE-EXPLORATION RIGHT GROIN, DECLOTTING OF FEMORAL BYPASS GRAFT performed by Jessica Ames MD at [address] EGD TRANSORAL BIOPSY SINGLE/MULTIPLE Left 11/27/2017

## 2019-01-09 ENCOUNTER — OFFICE VISIT (OUTPATIENT)
Dept: CARDIOTHORACIC SURGERY | Age: 84
End: 2019-01-09

## 2019-01-09 VITALS
HEART RATE: 108 BPM | HEIGHT: 65 IN | BODY MASS INDEX: 31.99 KG/M2 | WEIGHT: 192 LBS | DIASTOLIC BLOOD PRESSURE: 78 MMHG | SYSTOLIC BLOOD PRESSURE: 123 MMHG

## 2019-01-09 DIAGNOSIS — D72.829 LEUKOCYTOSIS, UNSPECIFIED TYPE: ICD-10-CM

## 2019-01-09 PROCEDURE — APPSS30 APP SPLIT SHARED TIME 16-30 MINUTES: Performed by: PHYSICIAN ASSISTANT

## 2019-01-09 PROCEDURE — 99024 POSTOP FOLLOW-UP VISIT: CPT | Performed by: PHYSICIAN ASSISTANT

## 2019-01-10 ENCOUNTER — TELEPHONE (OUTPATIENT)
Dept: CARDIOTHORACIC SURGERY | Age: 84
End: 2019-01-10

## 2019-01-22 ENCOUNTER — PREP FOR PROCEDURE (OUTPATIENT)
Dept: CARDIOTHORACIC SURGERY | Age: 84
End: 2019-01-22

## 2019-01-22 RX ORDER — SODIUM CHLORIDE 0.9 % (FLUSH) 0.9 %
10 SYRINGE (ML) INJECTION PRN
Status: CANCELLED | OUTPATIENT
Start: 2019-01-22

## 2019-01-22 RX ORDER — CLINDAMYCIN PHOSPHATE 900 MG/50ML
900 INJECTION INTRAVENOUS
Status: CANCELLED | OUTPATIENT
Start: 2019-01-22 | End: 2019-01-22

## 2019-01-22 RX ORDER — SODIUM CHLORIDE 0.9 % (FLUSH) 0.9 %
10 SYRINGE (ML) INJECTION EVERY 12 HOURS SCHEDULED
Status: CANCELLED | OUTPATIENT
Start: 2019-01-22

## 2019-02-04 ENCOUNTER — OFFICE VISIT (OUTPATIENT)
Dept: CARDIOTHORACIC SURGERY | Age: 84
End: 2019-02-04
Payer: MEDICARE

## 2019-02-04 ENCOUNTER — TELEPHONE (OUTPATIENT)
Dept: CARDIOTHORACIC SURGERY | Age: 84
End: 2019-02-04

## 2019-02-04 VITALS
HEIGHT: 65 IN | SYSTOLIC BLOOD PRESSURE: 110 MMHG | DIASTOLIC BLOOD PRESSURE: 61 MMHG | HEART RATE: 115 BPM | BODY MASS INDEX: 32.15 KG/M2 | WEIGHT: 193 LBS

## 2019-02-04 DIAGNOSIS — Z95.828 S/P INSERTION OF ILIAC ARTERY STENT: ICD-10-CM

## 2019-02-04 DIAGNOSIS — Z98.890 S/P FEMORAL-TIBIAL BYPASS: ICD-10-CM

## 2019-02-04 DIAGNOSIS — I71.40 ABDOMINAL AORTIC ANEURYSM (AAA) GREATER THAN 5.0 CM IN DIAMETER IN FEMALE: Primary | ICD-10-CM

## 2019-02-04 DIAGNOSIS — I70.229 ATHEROSCLEROTIC FEMORO-POPLITEAL ARTERY DISEASE WITH REST PAIN (HCC): ICD-10-CM

## 2019-02-04 DIAGNOSIS — I73.9 PVD (PERIPHERAL VASCULAR DISEASE) (HCC): ICD-10-CM

## 2019-02-04 PROCEDURE — 4004F PT TOBACCO SCREEN RCVD TLK: CPT | Performed by: THORACIC SURGERY (CARDIOTHORACIC VASCULAR SURGERY)

## 2019-02-04 PROCEDURE — G8598 ASA/ANTIPLAT THER USED: HCPCS | Performed by: THORACIC SURGERY (CARDIOTHORACIC VASCULAR SURGERY)

## 2019-02-04 PROCEDURE — 1090F PRES/ABSN URINE INCON ASSESS: CPT | Performed by: THORACIC SURGERY (CARDIOTHORACIC VASCULAR SURGERY)

## 2019-02-04 PROCEDURE — 1101F PT FALLS ASSESS-DOCD LE1/YR: CPT | Performed by: THORACIC SURGERY (CARDIOTHORACIC VASCULAR SURGERY)

## 2019-02-04 PROCEDURE — G8427 DOCREV CUR MEDS BY ELIG CLIN: HCPCS | Performed by: THORACIC SURGERY (CARDIOTHORACIC VASCULAR SURGERY)

## 2019-02-04 PROCEDURE — 99213 OFFICE O/P EST LOW 20 MIN: CPT | Performed by: THORACIC SURGERY (CARDIOTHORACIC VASCULAR SURGERY)

## 2019-02-04 PROCEDURE — G8400 PT W/DXA NO RESULTS DOC: HCPCS | Performed by: THORACIC SURGERY (CARDIOTHORACIC VASCULAR SURGERY)

## 2019-02-04 PROCEDURE — 1123F ACP DISCUSS/DSCN MKR DOCD: CPT | Performed by: THORACIC SURGERY (CARDIOTHORACIC VASCULAR SURGERY)

## 2019-02-04 PROCEDURE — G8417 CALC BMI ABV UP PARAM F/U: HCPCS | Performed by: THORACIC SURGERY (CARDIOTHORACIC VASCULAR SURGERY)

## 2019-02-04 PROCEDURE — 4040F PNEUMOC VAC/ADMIN/RCVD: CPT | Performed by: THORACIC SURGERY (CARDIOTHORACIC VASCULAR SURGERY)

## 2019-02-04 PROCEDURE — G8484 FLU IMMUNIZE NO ADMIN: HCPCS | Performed by: THORACIC SURGERY (CARDIOTHORACIC VASCULAR SURGERY)

## 2019-02-05 ENCOUNTER — TELEPHONE (OUTPATIENT)
Dept: CARDIOTHORACIC SURGERY | Age: 84
End: 2019-02-05

## 2019-02-05 ENCOUNTER — TELEPHONE (OUTPATIENT)
Dept: CARDIOLOGY CLINIC | Age: 84
End: 2019-02-05

## 2019-02-05 DIAGNOSIS — I71.40 ABDOMINAL AORTIC ANEURYSM (AAA) WITHOUT RUPTURE: Primary | ICD-10-CM

## 2019-02-06 ENCOUNTER — TELEPHONE (OUTPATIENT)
Dept: SURGERY | Age: 84
End: 2019-02-06

## 2019-02-06 ENCOUNTER — OFFICE VISIT (OUTPATIENT)
Dept: SURGERY | Age: 84
End: 2019-02-06
Payer: MEDICARE

## 2019-02-06 VITALS
SYSTOLIC BLOOD PRESSURE: 120 MMHG | TEMPERATURE: 98.5 F | WEIGHT: 193.3 LBS | HEIGHT: 65 IN | HEART RATE: 114 BPM | RESPIRATION RATE: 18 BRPM | OXYGEN SATURATION: 95 % | DIASTOLIC BLOOD PRESSURE: 78 MMHG | BODY MASS INDEX: 32.21 KG/M2

## 2019-02-06 DIAGNOSIS — K57.30 DIVERTICULOSIS OF LARGE INTESTINE WITHOUT HEMORRHAGE: ICD-10-CM

## 2019-02-06 DIAGNOSIS — I73.9 PVD (PERIPHERAL VASCULAR DISEASE) (HCC): ICD-10-CM

## 2019-02-06 DIAGNOSIS — I71.40 ABDOMINAL AORTIC ANEURYSM (AAA) WITHOUT RUPTURE: ICD-10-CM

## 2019-02-06 DIAGNOSIS — N32.1 COLOVESICAL FISTULA: Primary | ICD-10-CM

## 2019-02-06 DIAGNOSIS — N18.30 CKD (CHRONIC KIDNEY DISEASE) STAGE 3, GFR 30-59 ML/MIN (HCC): ICD-10-CM

## 2019-02-06 PROCEDURE — G8417 CALC BMI ABV UP PARAM F/U: HCPCS | Performed by: SURGERY

## 2019-02-06 PROCEDURE — 4004F PT TOBACCO SCREEN RCVD TLK: CPT | Performed by: SURGERY

## 2019-02-06 PROCEDURE — 4040F PNEUMOC VAC/ADMIN/RCVD: CPT | Performed by: SURGERY

## 2019-02-06 PROCEDURE — G8400 PT W/DXA NO RESULTS DOC: HCPCS | Performed by: SURGERY

## 2019-02-06 PROCEDURE — 99214 OFFICE O/P EST MOD 30 MIN: CPT | Performed by: SURGERY

## 2019-02-06 PROCEDURE — 1123F ACP DISCUSS/DSCN MKR DOCD: CPT | Performed by: SURGERY

## 2019-02-06 PROCEDURE — 1101F PT FALLS ASSESS-DOCD LE1/YR: CPT | Performed by: SURGERY

## 2019-02-06 PROCEDURE — G8484 FLU IMMUNIZE NO ADMIN: HCPCS | Performed by: SURGERY

## 2019-02-06 PROCEDURE — 1090F PRES/ABSN URINE INCON ASSESS: CPT | Performed by: SURGERY

## 2019-02-06 PROCEDURE — G8427 DOCREV CUR MEDS BY ELIG CLIN: HCPCS | Performed by: SURGERY

## 2019-02-06 PROCEDURE — G8598 ASA/ANTIPLAT THER USED: HCPCS | Performed by: SURGERY

## 2019-02-08 ENCOUNTER — TELEPHONE (OUTPATIENT)
Dept: CARDIOTHORACIC SURGERY | Age: 84
End: 2019-02-08

## 2019-02-11 ENCOUNTER — OFFICE VISIT (OUTPATIENT)
Dept: CARDIOLOGY CLINIC | Age: 84
End: 2019-02-11
Payer: MEDICARE

## 2019-02-11 ENCOUNTER — TELEPHONE (OUTPATIENT)
Dept: CARDIOTHORACIC SURGERY | Age: 84
End: 2019-02-11

## 2019-02-11 VITALS
BODY MASS INDEX: 32.34 KG/M2 | DIASTOLIC BLOOD PRESSURE: 82 MMHG | SYSTOLIC BLOOD PRESSURE: 136 MMHG | HEART RATE: 84 BPM | HEIGHT: 65 IN | WEIGHT: 194.1 LBS

## 2019-02-11 DIAGNOSIS — I73.9 PERIPHERAL ARTERIAL DISEASE (HCC): Primary | ICD-10-CM

## 2019-02-11 DIAGNOSIS — I71.40 ABDOMINAL AORTIC ANEURYSM (AAA) WITHOUT RUPTURE: ICD-10-CM

## 2019-02-11 PROCEDURE — 4004F PT TOBACCO SCREEN RCVD TLK: CPT | Performed by: INTERNAL MEDICINE

## 2019-02-11 PROCEDURE — 1101F PT FALLS ASSESS-DOCD LE1/YR: CPT | Performed by: INTERNAL MEDICINE

## 2019-02-11 PROCEDURE — 99213 OFFICE O/P EST LOW 20 MIN: CPT | Performed by: INTERNAL MEDICINE

## 2019-02-11 PROCEDURE — G8484 FLU IMMUNIZE NO ADMIN: HCPCS | Performed by: INTERNAL MEDICINE

## 2019-02-11 PROCEDURE — 1090F PRES/ABSN URINE INCON ASSESS: CPT | Performed by: INTERNAL MEDICINE

## 2019-02-11 PROCEDURE — 4040F PNEUMOC VAC/ADMIN/RCVD: CPT | Performed by: INTERNAL MEDICINE

## 2019-02-11 PROCEDURE — G8427 DOCREV CUR MEDS BY ELIG CLIN: HCPCS | Performed by: INTERNAL MEDICINE

## 2019-02-11 PROCEDURE — G8598 ASA/ANTIPLAT THER USED: HCPCS | Performed by: INTERNAL MEDICINE

## 2019-02-11 PROCEDURE — G8400 PT W/DXA NO RESULTS DOC: HCPCS | Performed by: INTERNAL MEDICINE

## 2019-02-11 PROCEDURE — G8417 CALC BMI ABV UP PARAM F/U: HCPCS | Performed by: INTERNAL MEDICINE

## 2019-02-11 PROCEDURE — 1123F ACP DISCUSS/DSCN MKR DOCD: CPT | Performed by: INTERNAL MEDICINE

## 2019-02-14 ENCOUNTER — ANESTHESIA EVENT (OUTPATIENT)
Dept: OPERATING ROOM | Age: 84
DRG: 268 | End: 2019-02-14
Payer: MEDICARE

## 2019-02-14 ENCOUNTER — HOSPITAL ENCOUNTER (INPATIENT)
Age: 84
LOS: 12 days | Discharge: HOME | DRG: 268 | End: 2019-02-26
Attending: THORACIC SURGERY (CARDIOTHORACIC VASCULAR SURGERY) | Admitting: THORACIC SURGERY (CARDIOTHORACIC VASCULAR SURGERY)
Payer: MEDICARE

## 2019-02-14 ENCOUNTER — PREP FOR PROCEDURE (OUTPATIENT)
Dept: CARDIOTHORACIC SURGERY | Age: 84
End: 2019-02-14

## 2019-02-14 ENCOUNTER — ANESTHESIA (OUTPATIENT)
Dept: OPERATING ROOM | Age: 84
DRG: 268 | End: 2019-02-14
Payer: MEDICARE

## 2019-02-14 ENCOUNTER — HOSPITAL ENCOUNTER (OUTPATIENT)
Dept: CT IMAGING | Age: 84
Discharge: HOME | DRG: 268 | End: 2019-02-14
Payer: MEDICARE

## 2019-02-14 ENCOUNTER — APPOINTMENT (OUTPATIENT)
Dept: CT IMAGING | Age: 84
DRG: 268 | End: 2019-02-14
Attending: THORACIC SURGERY (CARDIOTHORACIC VASCULAR SURGERY)
Payer: MEDICARE

## 2019-02-14 VITALS
TEMPERATURE: 96.8 F | OXYGEN SATURATION: 100 % | SYSTOLIC BLOOD PRESSURE: 98 MMHG | DIASTOLIC BLOOD PRESSURE: 50 MMHG | RESPIRATION RATE: 1 BRPM

## 2019-02-14 DIAGNOSIS — I70.229 ATHEROSCLEROTIC FEMORO-POPLITEAL ARTERY DISEASE WITH REST PAIN (HCC): ICD-10-CM

## 2019-02-14 DIAGNOSIS — I73.9 PVD (PERIPHERAL VASCULAR DISEASE) (HCC): ICD-10-CM

## 2019-02-14 DIAGNOSIS — Z95.828 S/P INSERTION OF ILIAC ARTERY STENT: ICD-10-CM

## 2019-02-14 DIAGNOSIS — I71.40 ABDOMINAL AORTIC ANEURYSM (AAA) WITHOUT RUPTURE: ICD-10-CM

## 2019-02-14 DIAGNOSIS — I71.40 ABDOMINAL AORTIC ANEURYSM (AAA) GREATER THAN 5.0 CM IN DIAMETER IN FEMALE: ICD-10-CM

## 2019-02-14 DIAGNOSIS — Z98.890 S/P FEMORAL-TIBIAL BYPASS: ICD-10-CM

## 2019-02-14 PROBLEM — I74.3 FEMORAL POPLITEAL ARTERY THROMBUS (HCC): Status: ACTIVE | Noted: 2019-02-14

## 2019-02-14 LAB
ABO: NORMAL
ANION GAP SERPL CALCULATED.3IONS-SCNC: 19 MEQ/L (ref 8–16)
ANTIBODY SCREEN: NORMAL
APTT: 33.9 SECONDS (ref 22–38)
BUN BLDV-MCNC: 23 MG/DL (ref 7–22)
CALCIUM SERPL-MCNC: 9.6 MG/DL (ref 8.5–10.5)
CHLORIDE BLD-SCNC: 97 MEQ/L (ref 98–111)
CO2: 22 MEQ/L (ref 23–33)
CREAT SERPL-MCNC: 1.2 MG/DL (ref 0.4–1.2)
ERYTHROCYTE [DISTWIDTH] IN BLOOD BY AUTOMATED COUNT: 15.3 % (ref 11.5–14.5)
ERYTHROCYTE [DISTWIDTH] IN BLOOD BY AUTOMATED COUNT: 46.6 FL (ref 35–45)
GFR SERPL CREATININE-BSD FRML MDRD: 43 ML/MIN/1.73M2
GLUCOSE BLD-MCNC: 135 MG/DL (ref 70–108)
HCT VFR BLD CALC: 43.6 % (ref 37–47)
HEMOGLOBIN: 13.9 GM/DL (ref 12–16)
INR BLD: 1.14 (ref 0.85–1.13)
MCH RBC QN AUTO: 26.8 PG (ref 26–33)
MCHC RBC AUTO-ENTMCNC: 31.9 GM/DL (ref 32.2–35.5)
MCV RBC AUTO: 84.2 FL (ref 81–99)
MRSA SCREEN RT-PCR: NEGATIVE
PLATELET # BLD: 220 THOU/MM3 (ref 130–400)
PMV BLD AUTO: 10.7 FL (ref 9.4–12.4)
POC CREATININE WHOLE BLOOD: 1.3 MG/DL (ref 0.5–1.2)
POTASSIUM REFLEX MAGNESIUM: 3.8 MEQ/L (ref 3.5–5.2)
RBC # BLD: 5.18 MILL/MM3 (ref 4.2–5.4)
RH FACTOR: NORMAL
SODIUM BLD-SCNC: 138 MEQ/L (ref 135–145)
VANCOMYCIN RESISTANT ENTEROCOCCUS: NEGATIVE
WBC # BLD: 10.1 THOU/MM3 (ref 4.8–10.8)

## 2019-02-14 PROCEDURE — 86900 BLOOD TYPING SEROLOGIC ABO: CPT

## 2019-02-14 PROCEDURE — 85730 THROMBOPLASTIN TIME PARTIAL: CPT

## 2019-02-14 PROCEDURE — 85610 PROTHROMBIN TIME: CPT

## 2019-02-14 PROCEDURE — 3600000004 HC SURGERY LEVEL 4 BASE: Performed by: THORACIC SURGERY (CARDIOTHORACIC VASCULAR SURGERY)

## 2019-02-14 PROCEDURE — 87500 VANOMYCIN DNA AMP PROBE: CPT

## 2019-02-14 PROCEDURE — 6360000002 HC RX W HCPCS: Performed by: NURSE ANESTHETIST, CERTIFIED REGISTERED

## 2019-02-14 PROCEDURE — 7100000000 HC PACU RECOVERY - FIRST 15 MIN: Performed by: THORACIC SURGERY (CARDIOTHORACIC VASCULAR SURGERY)

## 2019-02-14 PROCEDURE — 2500000003 HC RX 250 WO HCPCS: Performed by: NURSE ANESTHETIST, CERTIFIED REGISTERED

## 2019-02-14 PROCEDURE — 88304 TISSUE EXAM BY PATHOLOGIST: CPT

## 2019-02-14 PROCEDURE — 04CK0ZZ EXTIRPATION OF MATTER FROM RIGHT FEMORAL ARTERY, OPEN APPROACH: ICD-10-PCS | Performed by: THORACIC SURGERY (CARDIOTHORACIC VASCULAR SURGERY)

## 2019-02-14 PROCEDURE — 6360000002 HC RX W HCPCS: Performed by: ANESTHESIOLOGY

## 2019-02-14 PROCEDURE — 2720000010 HC SURG SUPPLY STERILE: Performed by: THORACIC SURGERY (CARDIOTHORACIC VASCULAR SURGERY)

## 2019-02-14 PROCEDURE — 94761 N-INVAS EAR/PLS OXIMETRY MLT: CPT

## 2019-02-14 PROCEDURE — 2060000000 HC ICU INTERMEDIATE R&B

## 2019-02-14 PROCEDURE — 6360000004 HC RX CONTRAST MEDICATION: Performed by: THORACIC SURGERY (CARDIOTHORACIC VASCULAR SURGERY)

## 2019-02-14 PROCEDURE — 36415 COLL VENOUS BLD VENIPUNCTURE: CPT

## 2019-02-14 PROCEDURE — 85027 COMPLETE CBC AUTOMATED: CPT

## 2019-02-14 PROCEDURE — C1757 CATH, THROMBECTOMY/EMBOLECT: HCPCS | Performed by: THORACIC SURGERY (CARDIOTHORACIC VASCULAR SURGERY)

## 2019-02-14 PROCEDURE — 3700000001 HC ADD 15 MINUTES (ANESTHESIA): Performed by: THORACIC SURGERY (CARDIOTHORACIC VASCULAR SURGERY)

## 2019-02-14 PROCEDURE — 2709999900 HC NON-CHARGEABLE SUPPLY: Performed by: THORACIC SURGERY (CARDIOTHORACIC VASCULAR SURGERY)

## 2019-02-14 PROCEDURE — 86923 COMPATIBILITY TEST ELECTRIC: CPT

## 2019-02-14 PROCEDURE — 6360000002 HC RX W HCPCS: Performed by: THORACIC SURGERY (CARDIOTHORACIC VASCULAR SURGERY)

## 2019-02-14 PROCEDURE — 6370000000 HC RX 637 (ALT 250 FOR IP): Performed by: THORACIC SURGERY (CARDIOTHORACIC VASCULAR SURGERY)

## 2019-02-14 PROCEDURE — 2709999900 HC NON-CHARGEABLE SUPPLY

## 2019-02-14 PROCEDURE — 7100000001 HC PACU RECOVERY - ADDTL 15 MIN: Performed by: THORACIC SURGERY (CARDIOTHORACIC VASCULAR SURGERY)

## 2019-02-14 PROCEDURE — 2580000003 HC RX 258: Performed by: PHYSICIAN ASSISTANT

## 2019-02-14 PROCEDURE — 2580000003 HC RX 258: Performed by: THORACIC SURGERY (CARDIOTHORACIC VASCULAR SURGERY)

## 2019-02-14 PROCEDURE — 80048 BASIC METABOLIC PNL TOTAL CA: CPT

## 2019-02-14 PROCEDURE — 06CP0ZZ EXTIRPATION OF MATTER FROM RIGHT SAPHENOUS VEIN, OPEN APPROACH: ICD-10-PCS | Performed by: THORACIC SURGERY (CARDIOTHORACIC VASCULAR SURGERY)

## 2019-02-14 PROCEDURE — 6360000002 HC RX W HCPCS: Performed by: PHYSICIAN ASSISTANT

## 2019-02-14 PROCEDURE — 87641 MR-STAPH DNA AMP PROBE: CPT

## 2019-02-14 PROCEDURE — 86901 BLOOD TYPING SEROLOGIC RH(D): CPT

## 2019-02-14 PROCEDURE — P9045 ALBUMIN (HUMAN), 5%, 250 ML: HCPCS | Performed by: NURSE ANESTHETIST, CERTIFIED REGISTERED

## 2019-02-14 PROCEDURE — P9016 RBC LEUKOCYTES REDUCED: HCPCS

## 2019-02-14 PROCEDURE — 2000000000 HC ICU R&B

## 2019-02-14 PROCEDURE — 3600000014 HC SURGERY LEVEL 4 ADDTL 15MIN: Performed by: THORACIC SURGERY (CARDIOTHORACIC VASCULAR SURGERY)

## 2019-02-14 PROCEDURE — 75635 CT ANGIO ABDOMINAL ARTERIES: CPT

## 2019-02-14 PROCEDURE — 3700000000 HC ANESTHESIA ATTENDED CARE: Performed by: THORACIC SURGERY (CARDIOTHORACIC VASCULAR SURGERY)

## 2019-02-14 PROCEDURE — 2580000003 HC RX 258: Performed by: NURSE ANESTHETIST, CERTIFIED REGISTERED

## 2019-02-14 PROCEDURE — 87081 CULTURE SCREEN ONLY: CPT

## 2019-02-14 PROCEDURE — 86850 RBC ANTIBODY SCREEN: CPT

## 2019-02-14 PROCEDURE — 35875 REMOVAL OF CLOT IN GRAFT: CPT | Performed by: THORACIC SURGERY (CARDIOTHORACIC VASCULAR SURGERY)

## 2019-02-14 PROCEDURE — 2700000000 HC OXYGEN THERAPY PER DAY

## 2019-02-14 PROCEDURE — 3E033GC INTRODUCTION OF OTHER THERAPEUTIC SUBSTANCE INTO PERIPHERAL VEIN, PERCUTANEOUS APPROACH: ICD-10-PCS | Performed by: THORACIC SURGERY (CARDIOTHORACIC VASCULAR SURGERY)

## 2019-02-14 PROCEDURE — 82565 ASSAY OF CREATININE: CPT

## 2019-02-14 RX ORDER — FENTANYL CITRATE 50 UG/ML
50 INJECTION, SOLUTION INTRAMUSCULAR; INTRAVENOUS EVERY 5 MIN PRN
Status: DISCONTINUED | OUTPATIENT
Start: 2019-02-14 | End: 2019-02-14 | Stop reason: HOSPADM

## 2019-02-14 RX ORDER — OXYCODONE HYDROCHLORIDE 5 MG/1
5 TABLET ORAL EVERY 4 HOURS PRN
Status: DISCONTINUED | OUTPATIENT
Start: 2019-02-14 | End: 2019-02-15 | Stop reason: SDUPTHER

## 2019-02-14 RX ORDER — PROMETHAZINE HYDROCHLORIDE 25 MG/ML
12.5 INJECTION, SOLUTION INTRAMUSCULAR; INTRAVENOUS
Status: DISCONTINUED | OUTPATIENT
Start: 2019-02-14 | End: 2019-02-14 | Stop reason: HOSPADM

## 2019-02-14 RX ORDER — SODIUM CHLORIDE 0.9 % (FLUSH) 0.9 %
10 SYRINGE (ML) INJECTION EVERY 12 HOURS SCHEDULED
Status: DISCONTINUED | OUTPATIENT
Start: 2019-02-14 | End: 2019-02-15 | Stop reason: SDUPTHER

## 2019-02-14 RX ORDER — DOCUSATE SODIUM 100 MG/1
100 CAPSULE, LIQUID FILLED ORAL 2 TIMES DAILY
Status: DISCONTINUED | OUTPATIENT
Start: 2019-02-14 | End: 2019-02-26 | Stop reason: HOSPADM

## 2019-02-14 RX ORDER — LIDOCAINE HYDROCHLORIDE 20 MG/ML
INJECTION, SOLUTION INFILTRATION; PERINEURAL PRN
Status: DISCONTINUED | OUTPATIENT
Start: 2019-02-14 | End: 2019-02-14 | Stop reason: SDUPTHER

## 2019-02-14 RX ORDER — LABETALOL HYDROCHLORIDE 5 MG/ML
5 INJECTION, SOLUTION INTRAVENOUS EVERY 10 MIN PRN
Status: DISCONTINUED | OUTPATIENT
Start: 2019-02-14 | End: 2019-02-14 | Stop reason: HOSPADM

## 2019-02-14 RX ORDER — SODIUM CHLORIDE 0.9 % (FLUSH) 0.9 %
10 SYRINGE (ML) INJECTION PRN
Status: DISCONTINUED | OUTPATIENT
Start: 2019-02-14 | End: 2019-02-14 | Stop reason: HOSPADM

## 2019-02-14 RX ORDER — OXYCODONE HYDROCHLORIDE 5 MG/1
10 TABLET ORAL EVERY 4 HOURS PRN
Status: DISCONTINUED | OUTPATIENT
Start: 2019-02-14 | End: 2019-02-26 | Stop reason: HOSPADM

## 2019-02-14 RX ORDER — DIPHENHYDRAMINE HYDROCHLORIDE 50 MG/ML
12.5 INJECTION INTRAMUSCULAR; INTRAVENOUS
Status: DISCONTINUED | OUTPATIENT
Start: 2019-02-14 | End: 2019-02-14 | Stop reason: HOSPADM

## 2019-02-14 RX ORDER — MEPERIDINE HYDROCHLORIDE 25 MG/ML
12.5 INJECTION INTRAMUSCULAR; INTRAVENOUS; SUBCUTANEOUS EVERY 5 MIN PRN
Status: DISCONTINUED | OUTPATIENT
Start: 2019-02-14 | End: 2019-02-14 | Stop reason: HOSPADM

## 2019-02-14 RX ORDER — SODIUM CHLORIDE 0.9 % (FLUSH) 0.9 %
10 SYRINGE (ML) INJECTION PRN
Status: DISCONTINUED | OUTPATIENT
Start: 2019-02-14 | End: 2019-02-15 | Stop reason: SDUPTHER

## 2019-02-14 RX ORDER — FAMOTIDINE 20 MG/1
20 TABLET, FILM COATED ORAL DAILY
Status: DISCONTINUED | OUTPATIENT
Start: 2019-02-14 | End: 2019-02-15

## 2019-02-14 RX ORDER — HEPARIN SODIUM 5000 [USP'U]/ML
INJECTION, SOLUTION INTRAVENOUS; SUBCUTANEOUS PRN
Status: DISCONTINUED | OUTPATIENT
Start: 2019-02-14 | End: 2019-02-14 | Stop reason: HOSPADM

## 2019-02-14 RX ORDER — FENTANYL CITRATE 50 UG/ML
25 INJECTION, SOLUTION INTRAMUSCULAR; INTRAVENOUS EVERY 5 MIN PRN
Status: DISCONTINUED | OUTPATIENT
Start: 2019-02-14 | End: 2019-02-14 | Stop reason: HOSPADM

## 2019-02-14 RX ORDER — FENTANYL CITRATE 50 UG/ML
INJECTION, SOLUTION INTRAMUSCULAR; INTRAVENOUS PRN
Status: DISCONTINUED | OUTPATIENT
Start: 2019-02-14 | End: 2019-02-14 | Stop reason: SDUPTHER

## 2019-02-14 RX ORDER — SODIUM CHLORIDE 0.9 % (FLUSH) 0.9 %
10 SYRINGE (ML) INJECTION EVERY 12 HOURS SCHEDULED
Status: CANCELLED | OUTPATIENT
Start: 2019-02-14

## 2019-02-14 RX ORDER — ALBUMIN, HUMAN INJ 5% 5 %
SOLUTION INTRAVENOUS PRN
Status: DISCONTINUED | OUTPATIENT
Start: 2019-02-14 | End: 2019-02-14 | Stop reason: SDUPTHER

## 2019-02-14 RX ORDER — SODIUM CHLORIDE 450 MG/100ML
INJECTION, SOLUTION INTRAVENOUS CONTINUOUS
Status: DISCONTINUED | OUTPATIENT
Start: 2019-02-14 | End: 2019-02-17

## 2019-02-14 RX ORDER — ACETAMINOPHEN 325 MG/1
650 TABLET ORAL EVERY 4 HOURS PRN
Status: DISCONTINUED | OUTPATIENT
Start: 2019-02-14 | End: 2019-02-15 | Stop reason: SDUPTHER

## 2019-02-14 RX ORDER — CLOPIDOGREL BISULFATE 75 MG/1
75 TABLET ORAL DAILY
Status: DISCONTINUED | OUTPATIENT
Start: 2019-02-15 | End: 2019-02-26 | Stop reason: HOSPADM

## 2019-02-14 RX ORDER — HEPARIN SODIUM 1000 [USP'U]/ML
INJECTION, SOLUTION INTRAVENOUS; SUBCUTANEOUS PRN
Status: DISCONTINUED | OUTPATIENT
Start: 2019-02-14 | End: 2019-02-14 | Stop reason: SDUPTHER

## 2019-02-14 RX ORDER — ONDANSETRON 2 MG/ML
INJECTION INTRAMUSCULAR; INTRAVENOUS PRN
Status: DISCONTINUED | OUTPATIENT
Start: 2019-02-14 | End: 2019-02-14 | Stop reason: SDUPTHER

## 2019-02-14 RX ORDER — FAMOTIDINE 20 MG/1
20 TABLET, FILM COATED ORAL 2 TIMES DAILY
Status: DISCONTINUED | OUTPATIENT
Start: 2019-02-14 | End: 2019-02-14 | Stop reason: DRUGHIGH

## 2019-02-14 RX ORDER — SODIUM CHLORIDE 9 MG/ML
INJECTION, SOLUTION INTRAVENOUS CONTINUOUS PRN
Status: DISCONTINUED | OUTPATIENT
Start: 2019-02-14 | End: 2019-02-14 | Stop reason: SDUPTHER

## 2019-02-14 RX ORDER — HEPARIN SODIUM 10000 [USP'U]/100ML
1000 INJECTION, SOLUTION INTRAVENOUS CONTINUOUS
Status: DISCONTINUED | OUTPATIENT
Start: 2019-02-14 | End: 2019-02-14

## 2019-02-14 RX ORDER — ONDANSETRON 2 MG/ML
4 INJECTION INTRAMUSCULAR; INTRAVENOUS EVERY 6 HOURS PRN
Status: DISCONTINUED | OUTPATIENT
Start: 2019-02-14 | End: 2019-02-26 | Stop reason: HOSPADM

## 2019-02-14 RX ORDER — SODIUM CHLORIDE 0.9 % (FLUSH) 0.9 %
10 SYRINGE (ML) INJECTION EVERY 12 HOURS SCHEDULED
Status: DISCONTINUED | OUTPATIENT
Start: 2019-02-14 | End: 2019-02-14 | Stop reason: HOSPADM

## 2019-02-14 RX ORDER — EPHEDRINE SULFATE 50 MG/ML
INJECTION INTRAVENOUS PRN
Status: DISCONTINUED | OUTPATIENT
Start: 2019-02-14 | End: 2019-02-14 | Stop reason: SDUPTHER

## 2019-02-14 RX ORDER — LABETALOL HYDROCHLORIDE 5 MG/ML
10 INJECTION, SOLUTION INTRAVENOUS
Status: DISCONTINUED | OUTPATIENT
Start: 2019-02-14 | End: 2019-02-17

## 2019-02-14 RX ORDER — METOCLOPRAMIDE HYDROCHLORIDE 5 MG/ML
10 INJECTION INTRAMUSCULAR; INTRAVENOUS
Status: DISCONTINUED | OUTPATIENT
Start: 2019-02-14 | End: 2019-02-14 | Stop reason: HOSPADM

## 2019-02-14 RX ORDER — DEXAMETHASONE SODIUM PHOSPHATE 4 MG/ML
INJECTION, SOLUTION INTRA-ARTICULAR; INTRALESIONAL; INTRAMUSCULAR; INTRAVENOUS; SOFT TISSUE PRN
Status: DISCONTINUED | OUTPATIENT
Start: 2019-02-14 | End: 2019-02-14 | Stop reason: SDUPTHER

## 2019-02-14 RX ORDER — SODIUM CHLORIDE 0.9 % (FLUSH) 0.9 %
10 SYRINGE (ML) INJECTION PRN
Status: CANCELLED | OUTPATIENT
Start: 2019-02-14

## 2019-02-14 RX ORDER — SODIUM CHLORIDE, SODIUM LACTATE, POTASSIUM CHLORIDE, CALCIUM CHLORIDE 600; 310; 30; 20 MG/100ML; MG/100ML; MG/100ML; MG/100ML
INJECTION, SOLUTION INTRAVENOUS CONTINUOUS PRN
Status: DISCONTINUED | OUTPATIENT
Start: 2019-02-14 | End: 2019-02-14 | Stop reason: SDUPTHER

## 2019-02-14 RX ORDER — SODIUM CHLORIDE 9 MG/ML
INJECTION, SOLUTION INTRAVENOUS CONTINUOUS
Status: DISCONTINUED | OUTPATIENT
Start: 2019-02-14 | End: 2019-02-14 | Stop reason: ALTCHOICE

## 2019-02-14 RX ORDER — PROPOFOL 10 MG/ML
INJECTION, EMULSION INTRAVENOUS PRN
Status: DISCONTINUED | OUTPATIENT
Start: 2019-02-14 | End: 2019-02-14 | Stop reason: SDUPTHER

## 2019-02-14 RX ORDER — ROCURONIUM BROMIDE 10 MG/ML
INJECTION, SOLUTION INTRAVENOUS PRN
Status: DISCONTINUED | OUTPATIENT
Start: 2019-02-14 | End: 2019-02-14 | Stop reason: SDUPTHER

## 2019-02-14 RX ADMIN — DEXTROSE MONOHYDRATE 2 G: 50 INJECTION, SOLUTION INTRAVENOUS at 20:43

## 2019-02-14 RX ADMIN — ROCURONIUM BROMIDE 50 MG: 10 INJECTION INTRAVENOUS at 13:30

## 2019-02-14 RX ADMIN — ALBUMIN (HUMAN) 250 ML: 12.5 SOLUTION INTRAVENOUS at 14:37

## 2019-02-14 RX ADMIN — LIDOCAINE HYDROCHLORIDE 50 MG: 20 INJECTION, SOLUTION INFILTRATION; PERINEURAL at 13:30

## 2019-02-14 RX ADMIN — FAMOTIDINE 20 MG: 20 TABLET, FILM COATED ORAL at 18:09

## 2019-02-14 RX ADMIN — PHENYLEPHRINE HYDROCHLORIDE 300 MCG: 10 INJECTION INTRAVENOUS at 14:33

## 2019-02-14 RX ADMIN — ROCURONIUM BROMIDE 20 MG: 10 INJECTION INTRAVENOUS at 14:03

## 2019-02-14 RX ADMIN — HYDROMORPHONE HYDROCHLORIDE 0.5 MG: 1 INJECTION, SOLUTION INTRAMUSCULAR; INTRAVENOUS; SUBCUTANEOUS at 23:42

## 2019-02-14 RX ADMIN — SODIUM CHLORIDE: 4.5 INJECTION, SOLUTION INTRAVENOUS at 18:08

## 2019-02-14 RX ADMIN — SODIUM CHLORIDE: 9 INJECTION, SOLUTION INTRAVENOUS at 12:00

## 2019-02-14 RX ADMIN — PHENYLEPHRINE HYDROCHLORIDE 200 MCG: 10 INJECTION INTRAVENOUS at 13:43

## 2019-02-14 RX ADMIN — FENTANYL CITRATE 100 MCG: 50 INJECTION INTRAMUSCULAR; INTRAVENOUS at 13:27

## 2019-02-14 RX ADMIN — VANCOMYCIN HYDROCHLORIDE 1.5 G: 1 INJECTION, POWDER, LYOPHILIZED, FOR SOLUTION INTRAVENOUS at 13:40

## 2019-02-14 RX ADMIN — EPHEDRINE SULFATE 5 MG: 50 INJECTION, SOLUTION INTRAVENOUS at 14:35

## 2019-02-14 RX ADMIN — HEPARIN SODIUM 10000 UNITS: 1000 INJECTION, SOLUTION INTRAVENOUS; SUBCUTANEOUS at 14:12

## 2019-02-14 RX ADMIN — IOPAMIDOL 100 ML: 755 INJECTION, SOLUTION INTRAVENOUS at 07:49

## 2019-02-14 RX ADMIN — FENTANYL CITRATE 50 MCG: 50 INJECTION, SOLUTION INTRAMUSCULAR; INTRAVENOUS at 15:50

## 2019-02-14 RX ADMIN — ONDANSETRON HYDROCHLORIDE 4 MG: 4 INJECTION, SOLUTION INTRAMUSCULAR; INTRAVENOUS at 15:01

## 2019-02-14 RX ADMIN — ACETAMINOPHEN 650 MG: 325 TABLET ORAL at 20:43

## 2019-02-14 RX ADMIN — PHENYLEPHRINE HYDROCHLORIDE 200 MCG: 10 INJECTION INTRAVENOUS at 13:36

## 2019-02-14 RX ADMIN — SODIUM CHLORIDE: 9 INJECTION, SOLUTION INTRAVENOUS at 13:30

## 2019-02-14 RX ADMIN — Medication 10 ML: at 20:48

## 2019-02-14 RX ADMIN — SODIUM CHLORIDE, POTASSIUM CHLORIDE, SODIUM LACTATE AND CALCIUM CHLORIDE: 600; 310; 30; 20 INJECTION, SOLUTION INTRAVENOUS at 13:53

## 2019-02-14 RX ADMIN — IOPAMIDOL 80 ML: 755 INJECTION, SOLUTION INTRAVENOUS at 17:32

## 2019-02-14 RX ADMIN — PROPOFOL 80 MG: 10 INJECTION, EMULSION INTRAVENOUS at 13:30

## 2019-02-14 RX ADMIN — DOCUSATE SODIUM 100 MG: 100 CAPSULE, LIQUID FILLED ORAL at 20:43

## 2019-02-14 RX ADMIN — DEXAMETHASONE SODIUM PHOSPHATE 8 MG: 4 INJECTION, SOLUTION INTRAMUSCULAR; INTRAVENOUS at 13:45

## 2019-02-14 RX ADMIN — OXYCODONE HYDROCHLORIDE 5 MG: 5 TABLET ORAL at 18:09

## 2019-02-14 RX ADMIN — EPHEDRINE SULFATE 5 MG: 50 INJECTION, SOLUTION INTRAVENOUS at 14:42

## 2019-02-14 RX ADMIN — FENTANYL CITRATE 50 MCG: 50 INJECTION, SOLUTION INTRAMUSCULAR; INTRAVENOUS at 15:40

## 2019-02-14 RX ADMIN — PHENYLEPHRINE HYDROCHLORIDE 200 MCG: 10 INJECTION INTRAVENOUS at 13:58

## 2019-02-14 ASSESSMENT — PAIN SCALES - GENERAL
PAINLEVEL_OUTOF10: 7
PAINLEVEL_OUTOF10: 4
PAINLEVEL_OUTOF10: 5
PAINLEVEL_OUTOF10: 4
PAINLEVEL_OUTOF10: 5

## 2019-02-14 ASSESSMENT — PULMONARY FUNCTION TESTS
PIF_VALUE: 19
PIF_VALUE: 5
PIF_VALUE: 17
PIF_VALUE: 19
PIF_VALUE: 18
PIF_VALUE: 18
PIF_VALUE: 1
PIF_VALUE: 19
PIF_VALUE: 0
PIF_VALUE: 19
PIF_VALUE: 19
PIF_VALUE: 0
PIF_VALUE: 0
PIF_VALUE: 19
PIF_VALUE: 1
PIF_VALUE: 19
PIF_VALUE: 18
PIF_VALUE: 19
PIF_VALUE: 19
PIF_VALUE: 15
PIF_VALUE: 19
PIF_VALUE: 1
PIF_VALUE: 18
PIF_VALUE: 19
PIF_VALUE: 19
PIF_VALUE: 18
PIF_VALUE: 0
PIF_VALUE: 18
PIF_VALUE: 0
PIF_VALUE: 17
PIF_VALUE: 18
PIF_VALUE: 19
PIF_VALUE: 0
PIF_VALUE: 19
PIF_VALUE: 0
PIF_VALUE: 18
PIF_VALUE: 18
PIF_VALUE: 1
PIF_VALUE: 18
PIF_VALUE: 19
PIF_VALUE: 18
PIF_VALUE: 17
PIF_VALUE: 19
PIF_VALUE: 18
PIF_VALUE: 19
PIF_VALUE: 18
PIF_VALUE: 19
PIF_VALUE: 18
PIF_VALUE: 23
PIF_VALUE: 18
PIF_VALUE: 18
PIF_VALUE: 19
PIF_VALUE: 18
PIF_VALUE: 19
PIF_VALUE: 19
PIF_VALUE: 18
PIF_VALUE: 19
PIF_VALUE: 18
PIF_VALUE: 17
PIF_VALUE: 19
PIF_VALUE: 17
PIF_VALUE: 18
PIF_VALUE: 17
PIF_VALUE: 0
PIF_VALUE: 19
PIF_VALUE: 18
PIF_VALUE: 0
PIF_VALUE: 21
PIF_VALUE: 18
PIF_VALUE: 19
PIF_VALUE: 18
PIF_VALUE: 19
PIF_VALUE: 19
PIF_VALUE: 2
PIF_VALUE: 18
PIF_VALUE: 18
PIF_VALUE: 17
PIF_VALUE: 19
PIF_VALUE: 19
PIF_VALUE: 18
PIF_VALUE: 19
PIF_VALUE: 18
PIF_VALUE: 19
PIF_VALUE: 1
PIF_VALUE: 19
PIF_VALUE: 1
PIF_VALUE: 24
PIF_VALUE: 19
PIF_VALUE: 0
PIF_VALUE: 15
PIF_VALUE: 19
PIF_VALUE: 17
PIF_VALUE: 18
PIF_VALUE: 18
PIF_VALUE: 0
PIF_VALUE: 18
PIF_VALUE: 19
PIF_VALUE: 15
PIF_VALUE: 19
PIF_VALUE: 18
PIF_VALUE: 2
PIF_VALUE: 19
PIF_VALUE: 19
PIF_VALUE: 18
PIF_VALUE: 17
PIF_VALUE: 19

## 2019-02-14 ASSESSMENT — PAIN DESCRIPTION - LOCATION
LOCATION: LEG
LOCATION: LEG

## 2019-02-14 ASSESSMENT — PAIN DESCRIPTION - ORIENTATION
ORIENTATION: RIGHT;MID
ORIENTATION: RIGHT;MID

## 2019-02-14 ASSESSMENT — PAIN DESCRIPTION - DESCRIPTORS
DESCRIPTORS: BURNING
DESCRIPTORS: BURNING

## 2019-02-14 ASSESSMENT — PAIN DESCRIPTION - FREQUENCY
FREQUENCY: CONTINUOUS
FREQUENCY: CONTINUOUS

## 2019-02-14 ASSESSMENT — PAIN DESCRIPTION - PAIN TYPE
TYPE: SURGICAL PAIN
TYPE: SURGICAL PAIN

## 2019-02-14 ASSESSMENT — LIFESTYLE VARIABLES: SMOKING_STATUS: 1

## 2019-02-15 ENCOUNTER — APPOINTMENT (OUTPATIENT)
Dept: INTERVENTIONAL RADIOLOGY/VASCULAR | Age: 84
DRG: 268 | End: 2019-02-15
Attending: THORACIC SURGERY (CARDIOTHORACIC VASCULAR SURGERY)
Payer: MEDICARE

## 2019-02-15 ENCOUNTER — ANESTHESIA EVENT (OUTPATIENT)
Dept: OPERATING ROOM | Age: 84
DRG: 268 | End: 2019-02-15
Payer: MEDICARE

## 2019-02-15 ENCOUNTER — ANESTHESIA (OUTPATIENT)
Dept: OPERATING ROOM | Age: 84
DRG: 268 | End: 2019-02-15
Payer: MEDICARE

## 2019-02-15 VITALS
RESPIRATION RATE: 3 BRPM | TEMPERATURE: 95.9 F | OXYGEN SATURATION: 100 % | SYSTOLIC BLOOD PRESSURE: 101 MMHG | DIASTOLIC BLOOD PRESSURE: 55 MMHG

## 2019-02-15 LAB
ANION GAP SERPL CALCULATED.3IONS-SCNC: 13 MEQ/L (ref 8–16)
APTT: 140.4 SECONDS (ref 22–38)
APTT: 51.2 SECONDS (ref 22–38)
BASE EXCESS (CALCULATED): -6.8 MMOL/L (ref -2.5–2.5)
BUN BLDV-MCNC: 17 MG/DL (ref 7–22)
CALCIUM IONIZED SERUM: 1 MMOL/L (ref 1.12–1.32)
CALCIUM SERPL-MCNC: 8.5 MG/DL (ref 8.5–10.5)
CHLORIDE BLD-SCNC: 100 MEQ/L (ref 98–111)
CO2: 23 MEQ/L (ref 23–33)
COLLECTED BY:: ABNORMAL
CREAT SERPL-MCNC: 0.9 MG/DL (ref 0.4–1.2)
DEVICE: ABNORMAL
ERYTHROCYTE [DISTWIDTH] IN BLOOD BY AUTOMATED COUNT: 14.8 % (ref 11.5–14.5)
ERYTHROCYTE [DISTWIDTH] IN BLOOD BY AUTOMATED COUNT: 14.9 % (ref 11.5–14.5)
ERYTHROCYTE [DISTWIDTH] IN BLOOD BY AUTOMATED COUNT: 45 FL (ref 35–45)
ERYTHROCYTE [DISTWIDTH] IN BLOOD BY AUTOMATED COUNT: 46 FL (ref 35–45)
GFR SERPL CREATININE-BSD FRML MDRD: 60 ML/MIN/1.73M2
GLUCOSE BLD-MCNC: 115 MG/DL (ref 70–108)
GLUCOSE, WHOLE BLOOD: 134 MG/DL (ref 70–108)
HCO3: 19 MMOL/L (ref 23–28)
HCT VFR BLD CALC: 31.7 % (ref 37–47)
HCT VFR BLD CALC: 36.3 % (ref 37–47)
HEMOGLOBIN FINGERSTICK, POC: 1.5 G/DL (ref 12–16)
HEMOGLOBIN FINGERSTICK, POC: 10.6 G/DL (ref 12–16)
HEMOGLOBIN: 10.2 GM/DL (ref 12–16)
HEMOGLOBIN: 11.7 GM/DL (ref 12–16)
LACTIC ACID: 1.1 MMOL/L (ref 0.5–2.2)
LACTIC ACID: 2 MMOL/L (ref 0.5–2.2)
MCH RBC QN AUTO: 26.9 PG (ref 26–33)
MCH RBC QN AUTO: 27.2 PG (ref 26–33)
MCHC RBC AUTO-ENTMCNC: 32.2 GM/DL (ref 32.2–35.5)
MCHC RBC AUTO-ENTMCNC: 32.2 GM/DL (ref 32.2–35.5)
MCV RBC AUTO: 83.4 FL (ref 81–99)
MCV RBC AUTO: 84.5 FL (ref 81–99)
O2 SATURATION: 97 %
PCO2: 37 MMHG (ref 35–45)
PH BLOOD GAS: 7.31 (ref 7.35–7.45)
PLATELET # BLD: 158 THOU/MM3 (ref 130–400)
PLATELET # BLD: 158 THOU/MM3 (ref 130–400)
PMV BLD AUTO: 10.6 FL (ref 9.4–12.4)
PMV BLD AUTO: 10.8 FL (ref 9.4–12.4)
PO2: 103 MMHG (ref 71–104)
POC ACTIVATED CLOTTING TIME KAOLIN: 197 SECONDS (ref 1–150)
POC ACTIVATED CLOTTING TIME KAOLIN: 213 SECONDS (ref 1–150)
POC ACTIVATED CLOTTING TIME KAOLIN: 230 SECONDS (ref 1–150)
POC ACTIVATED CLOTTING TIME KAOLIN: 235 SECONDS (ref 1–150)
POC ACTIVATED CLOTTING TIME KAOLIN: 246 SECONDS (ref 1–150)
POC ACTIVATED CLOTTING TIME KAOLIN: 323 SECONDS (ref 1–150)
POTASSIUM SERPL-SCNC: 3.6 MEQ/L (ref 3.5–5.2)
POTASSIUM, WHOLE BLOOD: 3 MEQ/L (ref 3.5–4.9)
RBC # BLD: 3.75 MILL/MM3 (ref 4.2–5.4)
RBC # BLD: 4.35 MILL/MM3 (ref 4.2–5.4)
REASON FOR REJECTION: NORMAL
REJECTED TEST: NORMAL
SODIUM BLD-SCNC: 136 MEQ/L (ref 135–145)
SODIUM, WHOLE BLOOD: 143 MEQ/L (ref 138–146)
SOURCE, BLOOD GAS: ABNORMAL
WBC # BLD: 10.5 THOU/MM3 (ref 4.8–10.8)
WBC # BLD: 7.1 THOU/MM3 (ref 4.8–10.8)

## 2019-02-15 PROCEDURE — 7100000000 HC PACU RECOVERY - FIRST 15 MIN: Performed by: THORACIC SURGERY (CARDIOTHORACIC VASCULAR SURGERY)

## 2019-02-15 PROCEDURE — C1725 CATH, TRANSLUMIN NON-LASER: HCPCS

## 2019-02-15 PROCEDURE — 047C3ZZ DILATION OF RIGHT COMMON ILIAC ARTERY, PERCUTANEOUS APPROACH: ICD-10-PCS | Performed by: THORACIC SURGERY (CARDIOTHORACIC VASCULAR SURGERY)

## 2019-02-15 PROCEDURE — 6370000000 HC RX 637 (ALT 250 FOR IP): Performed by: THORACIC SURGERY (CARDIOTHORACIC VASCULAR SURGERY)

## 2019-02-15 PROCEDURE — 85027 COMPLETE CBC AUTOMATED: CPT

## 2019-02-15 PROCEDURE — 7100000001 HC PACU RECOVERY - ADDTL 15 MIN: Performed by: THORACIC SURGERY (CARDIOTHORACIC VASCULAR SURGERY)

## 2019-02-15 PROCEDURE — 84132 ASSAY OF SERUM POTASSIUM: CPT

## 2019-02-15 PROCEDURE — 6360000002 HC RX W HCPCS: Performed by: THORACIC SURGERY (CARDIOTHORACIC VASCULAR SURGERY)

## 2019-02-15 PROCEDURE — 2700000000 HC OXYGEN THERAPY PER DAY

## 2019-02-15 PROCEDURE — 85018 HEMOGLOBIN: CPT

## 2019-02-15 PROCEDURE — 82330 ASSAY OF CALCIUM: CPT

## 2019-02-15 PROCEDURE — 047K0ZZ DILATION OF RIGHT FEMORAL ARTERY, OPEN APPROACH: ICD-10-PCS | Performed by: THORACIC SURGERY (CARDIOTHORACIC VASCULAR SURGERY)

## 2019-02-15 PROCEDURE — C1757 CATH, THROMBECTOMY/EMBOLECT: HCPCS | Performed by: THORACIC SURGERY (CARDIOTHORACIC VASCULAR SURGERY)

## 2019-02-15 PROCEDURE — 35875 REMOVAL OF CLOT IN GRAFT: CPT | Performed by: THORACIC SURGERY (CARDIOTHORACIC VASCULAR SURGERY)

## 2019-02-15 PROCEDURE — 6360000002 HC RX W HCPCS: Performed by: ANESTHESIOLOGY

## 2019-02-15 PROCEDURE — 6360000002 HC RX W HCPCS: Performed by: PHYSICIAN ASSISTANT

## 2019-02-15 PROCEDURE — 2780000010 HC IMPLANT OTHER: Performed by: THORACIC SURGERY (CARDIOTHORACIC VASCULAR SURGERY)

## 2019-02-15 PROCEDURE — 2709999900 HC NON-CHARGEABLE SUPPLY

## 2019-02-15 PROCEDURE — 36245 INS CATH ABD/L-EXT ART 1ST: CPT

## 2019-02-15 PROCEDURE — 2500000003 HC RX 250 WO HCPCS: Performed by: THORACIC SURGERY (CARDIOTHORACIC VASCULAR SURGERY)

## 2019-02-15 PROCEDURE — 3E033GC INTRODUCTION OF OTHER THERAPEUTIC SUBSTANCE INTO PERIPHERAL VEIN, PERCUTANEOUS APPROACH: ICD-10-PCS | Performed by: THORACIC SURGERY (CARDIOTHORACIC VASCULAR SURGERY)

## 2019-02-15 PROCEDURE — 83605 ASSAY OF LACTIC ACID: CPT

## 2019-02-15 PROCEDURE — 34706 EVASC RPR A-BIILIAC RPT: CPT | Performed by: PHYSICIAN ASSISTANT

## 2019-02-15 PROCEDURE — 047K3ZZ DILATION OF RIGHT FEMORAL ARTERY, PERCUTANEOUS APPROACH: ICD-10-PCS | Performed by: THORACIC SURGERY (CARDIOTHORACIC VASCULAR SURGERY)

## 2019-02-15 PROCEDURE — 75710 ARTERY X-RAYS ARM/LEG: CPT

## 2019-02-15 PROCEDURE — 36430 TRANSFUSION BLD/BLD COMPNT: CPT

## 2019-02-15 PROCEDURE — C1769 GUIDE WIRE: HCPCS

## 2019-02-15 PROCEDURE — 3700000000 HC ANESTHESIA ATTENDED CARE: Performed by: THORACIC SURGERY (CARDIOTHORACIC VASCULAR SURGERY)

## 2019-02-15 PROCEDURE — 34706 EVASC RPR A-BIILIAC RPT: CPT | Performed by: THORACIC SURGERY (CARDIOTHORACIC VASCULAR SURGERY)

## 2019-02-15 PROCEDURE — 34812 OPN FEM ART EXPOS: CPT | Performed by: PHYSICIAN ASSISTANT

## 2019-02-15 PROCEDURE — 2709999900 HC NON-CHARGEABLE SUPPLY: Performed by: THORACIC SURGERY (CARDIOTHORACIC VASCULAR SURGERY)

## 2019-02-15 PROCEDURE — 3600000005 HC SURGERY LEVEL 5 BASE: Performed by: THORACIC SURGERY (CARDIOTHORACIC VASCULAR SURGERY)

## 2019-02-15 PROCEDURE — 85730 THROMBOPLASTIN TIME PARTIAL: CPT

## 2019-02-15 PROCEDURE — 2580000003 HC RX 258: Performed by: THORACIC SURGERY (CARDIOTHORACIC VASCULAR SURGERY)

## 2019-02-15 PROCEDURE — 3600000015 HC SURGERY LEVEL 5 ADDTL 15MIN: Performed by: THORACIC SURGERY (CARDIOTHORACIC VASCULAR SURGERY)

## 2019-02-15 PROCEDURE — 2500000003 HC RX 250 WO HCPCS: Performed by: NURSE ANESTHETIST, CERTIFIED REGISTERED

## 2019-02-15 PROCEDURE — 80048 BASIC METABOLIC PNL TOTAL CA: CPT

## 2019-02-15 PROCEDURE — 04V03DZ RESTRICTION OF ABDOMINAL AORTA WITH INTRALUMINAL DEVICE, PERCUTANEOUS APPROACH: ICD-10-PCS | Performed by: THORACIC SURGERY (CARDIOTHORACIC VASCULAR SURGERY)

## 2019-02-15 PROCEDURE — 82803 BLOOD GASES ANY COMBINATION: CPT

## 2019-02-15 PROCEDURE — 84295 ASSAY OF SERUM SODIUM: CPT

## 2019-02-15 PROCEDURE — 2000000000 HC ICU R&B

## 2019-02-15 PROCEDURE — 36415 COLL VENOUS BLD VENIPUNCTURE: CPT

## 2019-02-15 PROCEDURE — 3700000001 HC ADD 15 MINUTES (ANESTHESIA): Performed by: THORACIC SURGERY (CARDIOTHORACIC VASCULAR SURGERY)

## 2019-02-15 PROCEDURE — 6360000002 HC RX W HCPCS: Performed by: NURSE ANESTHETIST, CERTIFIED REGISTERED

## 2019-02-15 PROCEDURE — 2500000003 HC RX 250 WO HCPCS

## 2019-02-15 PROCEDURE — 2580000003 HC RX 258: Performed by: NURSE ANESTHETIST, CERTIFIED REGISTERED

## 2019-02-15 PROCEDURE — 2580000003 HC RX 258: Performed by: PHYSICIAN ASSISTANT

## 2019-02-15 PROCEDURE — 34812 OPN FEM ART EXPOS: CPT | Performed by: THORACIC SURGERY (CARDIOTHORACIC VASCULAR SURGERY)

## 2019-02-15 PROCEDURE — 94760 N-INVAS EAR/PLS OXIMETRY 1: CPT

## 2019-02-15 PROCEDURE — 85347 COAGULATION TIME ACTIVATED: CPT

## 2019-02-15 PROCEDURE — 37799 UNLISTED PX VASCULAR SURGERY: CPT

## 2019-02-15 PROCEDURE — 82947 ASSAY GLUCOSE BLOOD QUANT: CPT

## 2019-02-15 PROCEDURE — C1894 INTRO/SHEATH, NON-LASER: HCPCS

## 2019-02-15 PROCEDURE — 99999 PR OFFICE/OUTPT VISIT,PROCEDURE ONLY: CPT | Performed by: PHYSICIAN ASSISTANT

## 2019-02-15 PROCEDURE — B41D1ZZ FLUOROSCOPY OF AORTA AND BILATERAL LOWER EXTREMITY ARTERIES USING LOW OSMOLAR CONTRAST: ICD-10-PCS | Performed by: THORACIC SURGERY (CARDIOTHORACIC VASCULAR SURGERY)

## 2019-02-15 PROCEDURE — 75625 CONTRAST EXAM ABDOMINL AORTA: CPT

## 2019-02-15 DEVICE — GRAFT EVAR L93MM DIA16X10MM CATH 14FR LIMB DST DSGN C DEL: Type: IMPLANTABLE DEVICE | Site: AORTA | Status: FUNCTIONAL

## 2019-02-15 DEVICE — GRAFT EVAR 14FR L124MM DIA16X10MM HI DENS MULTIFILAMENT: Type: IMPLANTABLE DEVICE | Site: AORTA | Status: FUNCTIONAL

## 2019-02-15 DEVICE — GRAFT EVAR L103MM DIA28X14MM CATH 18FR BIFUR DST DSGN C DEL: Type: IMPLANTABLE DEVICE | Site: AORTA | Status: FUNCTIONAL

## 2019-02-15 RX ORDER — DEXTROSE, SODIUM CHLORIDE, AND POTASSIUM CHLORIDE 5; .45; .15 G/100ML; G/100ML; G/100ML
1000 INJECTION INTRAVENOUS CONTINUOUS
Status: DISCONTINUED | OUTPATIENT
Start: 2019-02-15 | End: 2019-02-17

## 2019-02-15 RX ORDER — LABETALOL HYDROCHLORIDE 5 MG/ML
5 INJECTION, SOLUTION INTRAVENOUS EVERY 10 MIN PRN
Status: DISCONTINUED | OUTPATIENT
Start: 2019-02-15 | End: 2019-02-15 | Stop reason: HOSPADM

## 2019-02-15 RX ORDER — SODIUM CHLORIDE 0.9 % (FLUSH) 0.9 %
10 SYRINGE (ML) INJECTION PRN
Status: DISCONTINUED | OUTPATIENT
Start: 2019-02-15 | End: 2019-02-15 | Stop reason: HOSPADM

## 2019-02-15 RX ORDER — SODIUM CHLORIDE 0.9 % (FLUSH) 0.9 %
10 SYRINGE (ML) INJECTION EVERY 12 HOURS SCHEDULED
Status: DISCONTINUED | OUTPATIENT
Start: 2019-02-15 | End: 2019-02-15 | Stop reason: HOSPADM

## 2019-02-15 RX ORDER — HEPARIN SODIUM 10000 [USP'U]/100ML
12 INJECTION, SOLUTION INTRAVENOUS CONTINUOUS
Status: ACTIVE | OUTPATIENT
Start: 2019-02-15 | End: 2019-02-16

## 2019-02-15 RX ORDER — MEPERIDINE HYDROCHLORIDE 25 MG/ML
12.5 INJECTION INTRAMUSCULAR; INTRAVENOUS; SUBCUTANEOUS EVERY 5 MIN PRN
Status: DISCONTINUED | OUTPATIENT
Start: 2019-02-15 | End: 2019-02-15 | Stop reason: HOSPADM

## 2019-02-15 RX ORDER — MORPHINE SULFATE 2 MG/ML
2 INJECTION, SOLUTION INTRAMUSCULAR; INTRAVENOUS
Status: DISCONTINUED | OUTPATIENT
Start: 2019-02-15 | End: 2019-02-26 | Stop reason: HOSPADM

## 2019-02-15 RX ORDER — HEPARIN SODIUM 10000 [USP'U]/100ML
500 INJECTION, SOLUTION INTRAVENOUS CONTINUOUS
Status: DISCONTINUED | OUTPATIENT
Start: 2019-02-15 | End: 2019-02-15

## 2019-02-15 RX ORDER — 0.9 % SODIUM CHLORIDE 0.9 %
250 INTRAVENOUS SOLUTION INTRAVENOUS ONCE
Status: COMPLETED | OUTPATIENT
Start: 2019-02-15 | End: 2019-02-16

## 2019-02-15 RX ORDER — SODIUM CHLORIDE 9 MG/ML
INJECTION, SOLUTION INTRAVENOUS CONTINUOUS PRN
Status: DISCONTINUED | OUTPATIENT
Start: 2019-02-15 | End: 2019-02-15 | Stop reason: SDUPTHER

## 2019-02-15 RX ORDER — HEPARIN SODIUM 1000 [USP'U]/ML
INJECTION, SOLUTION INTRAVENOUS; SUBCUTANEOUS PRN
Status: DISCONTINUED | OUTPATIENT
Start: 2019-02-15 | End: 2019-02-15 | Stop reason: SDUPTHER

## 2019-02-15 RX ORDER — 0.9 % SODIUM CHLORIDE 0.9 %
250 INTRAVENOUS SOLUTION INTRAVENOUS ONCE
Status: DISCONTINUED | OUTPATIENT
Start: 2019-02-15 | End: 2019-02-26 | Stop reason: HOSPADM

## 2019-02-15 RX ORDER — LIDOCAINE HYDROCHLORIDE 20 MG/ML
INJECTION, SOLUTION INFILTRATION; PERINEURAL PRN
Status: DISCONTINUED | OUTPATIENT
Start: 2019-02-15 | End: 2019-02-15 | Stop reason: SDUPTHER

## 2019-02-15 RX ORDER — SODIUM CHLORIDE 0.9 % (FLUSH) 0.9 %
10 SYRINGE (ML) INJECTION EVERY 12 HOURS SCHEDULED
Status: DISCONTINUED | OUTPATIENT
Start: 2019-02-15 | End: 2019-02-24 | Stop reason: SDUPTHER

## 2019-02-15 RX ORDER — PROPOFOL 10 MG/ML
INJECTION, EMULSION INTRAVENOUS PRN
Status: DISCONTINUED | OUTPATIENT
Start: 2019-02-15 | End: 2019-02-15 | Stop reason: SDUPTHER

## 2019-02-15 RX ORDER — ROCURONIUM BROMIDE 10 MG/ML
INJECTION, SOLUTION INTRAVENOUS PRN
Status: DISCONTINUED | OUTPATIENT
Start: 2019-02-15 | End: 2019-02-15 | Stop reason: SDUPTHER

## 2019-02-15 RX ORDER — POTASSIUM CHLORIDE 7.45 MG/ML
10 INJECTION INTRAVENOUS PRN
Status: DISCONTINUED | OUTPATIENT
Start: 2019-02-15 | End: 2019-02-26 | Stop reason: HOSPADM

## 2019-02-15 RX ORDER — ONDANSETRON 2 MG/ML
4 INJECTION INTRAMUSCULAR; INTRAVENOUS
Status: DISCONTINUED | OUTPATIENT
Start: 2019-02-15 | End: 2019-02-15 | Stop reason: HOSPADM

## 2019-02-15 RX ORDER — DEXAMETHASONE SODIUM PHOSPHATE 4 MG/ML
INJECTION, SOLUTION INTRA-ARTICULAR; INTRALESIONAL; INTRAMUSCULAR; INTRAVENOUS; SOFT TISSUE PRN
Status: DISCONTINUED | OUTPATIENT
Start: 2019-02-15 | End: 2019-02-15 | Stop reason: SDUPTHER

## 2019-02-15 RX ORDER — OXYCODONE HYDROCHLORIDE AND ACETAMINOPHEN 5; 325 MG/1; MG/1
2 TABLET ORAL EVERY 4 HOURS PRN
Status: DISCONTINUED | OUTPATIENT
Start: 2019-02-15 | End: 2019-02-26 | Stop reason: HOSPADM

## 2019-02-15 RX ORDER — DOCUSATE SODIUM 100 MG/1
100 CAPSULE, LIQUID FILLED ORAL 2 TIMES DAILY PRN
Status: DISCONTINUED | OUTPATIENT
Start: 2019-02-15 | End: 2019-02-26 | Stop reason: HOSPADM

## 2019-02-15 RX ORDER — ACETAMINOPHEN 325 MG/1
650 TABLET ORAL EVERY 4 HOURS PRN
Status: DISCONTINUED | OUTPATIENT
Start: 2019-02-15 | End: 2019-02-25 | Stop reason: SDUPTHER

## 2019-02-15 RX ORDER — HYDRALAZINE HYDROCHLORIDE 20 MG/ML
10 INJECTION INTRAMUSCULAR; INTRAVENOUS EVERY 6 HOURS PRN
Status: DISCONTINUED | OUTPATIENT
Start: 2019-02-15 | End: 2019-02-15 | Stop reason: ALTCHOICE

## 2019-02-15 RX ORDER — NEOSTIGMINE METHYLSULFATE 1 MG/ML
INJECTION, SOLUTION INTRAVENOUS PRN
Status: DISCONTINUED | OUTPATIENT
Start: 2019-02-15 | End: 2019-02-15 | Stop reason: SDUPTHER

## 2019-02-15 RX ORDER — GLYCOPYRROLATE 1 MG/5 ML
SYRINGE (ML) INTRAVENOUS PRN
Status: DISCONTINUED | OUTPATIENT
Start: 2019-02-15 | End: 2019-02-15 | Stop reason: SDUPTHER

## 2019-02-15 RX ORDER — ATORVASTATIN CALCIUM 20 MG/1
20 TABLET, FILM COATED ORAL NIGHTLY
Status: DISCONTINUED | OUTPATIENT
Start: 2019-02-16 | End: 2019-02-22 | Stop reason: SDUPTHER

## 2019-02-15 RX ORDER — SODIUM CHLORIDE 0.9 % (FLUSH) 0.9 %
10 SYRINGE (ML) INJECTION PRN
Status: DISCONTINUED | OUTPATIENT
Start: 2019-02-15 | End: 2019-02-24 | Stop reason: SDUPTHER

## 2019-02-15 RX ORDER — FENTANYL CITRATE 50 UG/ML
50 INJECTION, SOLUTION INTRAMUSCULAR; INTRAVENOUS EVERY 5 MIN PRN
Status: DISCONTINUED | OUTPATIENT
Start: 2019-02-15 | End: 2019-02-15 | Stop reason: HOSPADM

## 2019-02-15 RX ORDER — FENTANYL CITRATE 50 UG/ML
INJECTION, SOLUTION INTRAMUSCULAR; INTRAVENOUS PRN
Status: DISCONTINUED | OUTPATIENT
Start: 2019-02-15 | End: 2019-02-15 | Stop reason: SDUPTHER

## 2019-02-15 RX ORDER — PROTAMINE SULFATE 10 MG/ML
INJECTION, SOLUTION INTRAVENOUS PRN
Status: DISCONTINUED | OUTPATIENT
Start: 2019-02-15 | End: 2019-02-15 | Stop reason: SDUPTHER

## 2019-02-15 RX ORDER — BISACODYL 10 MG
10 SUPPOSITORY, RECTAL RECTAL DAILY PRN
Status: DISCONTINUED | OUTPATIENT
Start: 2019-02-15 | End: 2019-02-26 | Stop reason: HOSPADM

## 2019-02-15 RX ADMIN — HEPARIN SODIUM 1000 UNITS: 1000 INJECTION, SOLUTION INTRAVENOUS; SUBCUTANEOUS at 12:45

## 2019-02-15 RX ADMIN — PHENYLEPHRINE HYDROCHLORIDE 100 MCG: 10 INJECTION INTRAVENOUS at 14:34

## 2019-02-15 RX ADMIN — PHENYLEPHRINE HYDROCHLORIDE 100 MCG: 10 INJECTION INTRAVENOUS at 14:38

## 2019-02-15 RX ADMIN — PROPOFOL 50 MG: 10 INJECTION, EMULSION INTRAVENOUS at 14:00

## 2019-02-15 RX ADMIN — PHENYLEPHRINE HYDROCHLORIDE 100 MCG: 10 INJECTION INTRAVENOUS at 12:12

## 2019-02-15 RX ADMIN — SODIUM CHLORIDE: 9 INJECTION, SOLUTION INTRAVENOUS at 15:15

## 2019-02-15 RX ADMIN — SODIUM CHLORIDE: 9 INJECTION, SOLUTION INTRAVENOUS at 10:34

## 2019-02-15 RX ADMIN — HEPARIN SODIUM 5000 UNITS: 1000 INJECTION, SOLUTION INTRAVENOUS; SUBCUTANEOUS at 14:29

## 2019-02-15 RX ADMIN — ACETAMINOPHEN 650 MG: 325 TABLET ORAL at 18:35

## 2019-02-15 RX ADMIN — NEOSTIGMINE METHYLSULFATE 4 MG: 1 INJECTION, SOLUTION INTRAVENOUS at 13:37

## 2019-02-15 RX ADMIN — ROCURONIUM BROMIDE 50 MG: 10 INJECTION INTRAVENOUS at 10:36

## 2019-02-15 RX ADMIN — Medication 10 ML: at 22:42

## 2019-02-15 RX ADMIN — PHENYLEPHRINE HYDROCHLORIDE 200 MCG: 10 INJECTION INTRAVENOUS at 15:20

## 2019-02-15 RX ADMIN — PHENYLEPHRINE HYDROCHLORIDE 200 MCG: 10 INJECTION INTRAVENOUS at 14:55

## 2019-02-15 RX ADMIN — LABETALOL 20 MG/4 ML (5 MG/ML) INTRAVENOUS SYRINGE 10 MG: at 01:56

## 2019-02-15 RX ADMIN — PROPOFOL 80 MG: 10 INJECTION, EMULSION INTRAVENOUS at 10:36

## 2019-02-15 RX ADMIN — HEPARIN SODIUM AND DEXTROSE 500 UNITS/HR: 10000; 5 INJECTION INTRAVENOUS at 17:34

## 2019-02-15 RX ADMIN — Medication 2 MCG/MIN: at 20:50

## 2019-02-15 RX ADMIN — LABETALOL 20 MG/4 ML (5 MG/ML) INTRAVENOUS SYRINGE 10 MG: at 05:43

## 2019-02-15 RX ADMIN — POTASSIUM CHLORIDE, DEXTROSE MONOHYDRATE AND SODIUM CHLORIDE 1000 ML: 150; 5; 450 INJECTION, SOLUTION INTRAVENOUS at 19:06

## 2019-02-15 RX ADMIN — ROCURONIUM BROMIDE 20 MG: 10 INJECTION INTRAVENOUS at 11:19

## 2019-02-15 RX ADMIN — PHENYLEPHRINE HYDROCHLORIDE 100 MCG: 10 INJECTION INTRAVENOUS at 11:05

## 2019-02-15 RX ADMIN — PHENYLEPHRINE HYDROCHLORIDE 200 MCG: 10 INJECTION INTRAVENOUS at 12:00

## 2019-02-15 RX ADMIN — PROTAMINE SULFATE 10 MG: 10 INJECTION, SOLUTION INTRAVENOUS at 13:27

## 2019-02-15 RX ADMIN — PHENYLEPHRINE HYDROCHLORIDE 200 MCG: 10 INJECTION INTRAVENOUS at 15:15

## 2019-02-15 RX ADMIN — FENTANYL CITRATE 100 MCG: 50 INJECTION INTRAMUSCULAR; INTRAVENOUS at 10:36

## 2019-02-15 RX ADMIN — SODIUM CHLORIDE 250 ML: 9 INJECTION, SOLUTION INTRAVENOUS at 21:56

## 2019-02-15 RX ADMIN — PHENYLEPHRINE HYDROCHLORIDE 200 MCG: 10 INJECTION INTRAVENOUS at 15:45

## 2019-02-15 RX ADMIN — FENTANYL CITRATE 50 MCG: 50 INJECTION INTRAMUSCULAR; INTRAVENOUS at 11:22

## 2019-02-15 RX ADMIN — HEPARIN SODIUM 5000 UNITS: 1000 INJECTION, SOLUTION INTRAVENOUS; SUBCUTANEOUS at 15:04

## 2019-02-15 RX ADMIN — Medication 0.6 MG: at 13:37

## 2019-02-15 RX ADMIN — HEPARIN SODIUM AND DEXTROSE 500.46 UNITS/HR: 10000; 5 INJECTION INTRAVENOUS at 18:54

## 2019-02-15 RX ADMIN — ROCURONIUM BROMIDE 20 MG: 10 INJECTION INTRAVENOUS at 12:55

## 2019-02-15 RX ADMIN — ROCURONIUM BROMIDE 10 MG: 10 INJECTION INTRAVENOUS at 12:33

## 2019-02-15 RX ADMIN — PHENYLEPHRINE HYDROCHLORIDE 200 MCG: 10 INJECTION INTRAVENOUS at 12:30

## 2019-02-15 RX ADMIN — ROCURONIUM BROMIDE 20 MG: 10 INJECTION INTRAVENOUS at 11:52

## 2019-02-15 RX ADMIN — DOCUSATE SODIUM 100 MG: 100 CAPSULE, LIQUID FILLED ORAL at 22:43

## 2019-02-15 RX ADMIN — Medication 10 ML: at 08:32

## 2019-02-15 RX ADMIN — HEPARIN SODIUM 8000 UNITS: 1000 INJECTION, SOLUTION INTRAVENOUS; SUBCUTANEOUS at 12:16

## 2019-02-15 RX ADMIN — DEXTROSE MONOHYDRATE 2 G: 50 INJECTION, SOLUTION INTRAVENOUS at 04:26

## 2019-02-15 RX ADMIN — CEFUROXIME SODIUM 1.5 G: 1.5 INJECTION, POWDER, FOR SOLUTION INTRAVENOUS at 22:43

## 2019-02-15 RX ADMIN — LABETALOL 20 MG/4 ML (5 MG/ML) INTRAVENOUS SYRINGE 10 MG: at 08:33

## 2019-02-15 RX ADMIN — LIDOCAINE HYDROCHLORIDE 60 MG: 20 INJECTION, SOLUTION INFILTRATION; PERINEURAL at 10:36

## 2019-02-15 RX ADMIN — PHENYLEPHRINE HYDROCHLORIDE 100 MCG: 10 INJECTION INTRAVENOUS at 14:27

## 2019-02-15 RX ADMIN — FENTANYL CITRATE 50 MCG: 50 INJECTION, SOLUTION INTRAMUSCULAR; INTRAVENOUS at 16:34

## 2019-02-15 RX ADMIN — ROCURONIUM BROMIDE 40 MG: 10 INJECTION INTRAVENOUS at 14:00

## 2019-02-15 RX ADMIN — SODIUM CHLORIDE: 9 INJECTION, SOLUTION INTRAVENOUS at 12:03

## 2019-02-15 RX ADMIN — DEXAMETHASONE SODIUM PHOSPHATE 8 MG: 4 INJECTION, SOLUTION INTRAMUSCULAR; INTRAVENOUS at 11:04

## 2019-02-15 RX ADMIN — PHENYLEPHRINE HYDROCHLORIDE 100 MCG: 10 INJECTION INTRAVENOUS at 15:35

## 2019-02-15 RX ADMIN — SODIUM CHLORIDE: 9 INJECTION, SOLUTION INTRAVENOUS at 10:40

## 2019-02-15 RX ADMIN — HEPARIN SODIUM 2000 UNITS: 1000 INJECTION, SOLUTION INTRAVENOUS; SUBCUTANEOUS at 12:32

## 2019-02-15 RX ADMIN — FENTANYL CITRATE 50 MCG: 50 INJECTION INTRAMUSCULAR; INTRAVENOUS at 11:41

## 2019-02-15 RX ADMIN — VANCOMYCIN HYDROCHLORIDE 1.5 G: 1 INJECTION, POWDER, LYOPHILIZED, FOR SOLUTION INTRAVENOUS at 11:11

## 2019-02-15 RX ADMIN — SUGAMMADEX 180 MG: 100 INJECTION, SOLUTION INTRAVENOUS at 16:04

## 2019-02-15 RX ADMIN — FAMOTIDINE 20 MG: 10 INJECTION, SOLUTION INTRAVENOUS at 22:42

## 2019-02-15 RX ADMIN — PHENYLEPHRINE HYDROCHLORIDE 100 MCG: 10 INJECTION INTRAVENOUS at 14:28

## 2019-02-15 RX ADMIN — PHENYLEPHRINE HYDROCHLORIDE 100 MCG: 10 INJECTION INTRAVENOUS at 11:31

## 2019-02-15 ASSESSMENT — PULMONARY FUNCTION TESTS
PIF_VALUE: 19
PIF_VALUE: 20
PIF_VALUE: 20
PIF_VALUE: 18
PIF_VALUE: 18
PIF_VALUE: 20
PIF_VALUE: 18
PIF_VALUE: 3
PIF_VALUE: 17
PIF_VALUE: 19
PIF_VALUE: 20
PIF_VALUE: 18
PIF_VALUE: 17
PIF_VALUE: 18
PIF_VALUE: 17
PIF_VALUE: 20
PIF_VALUE: 20
PIF_VALUE: 19
PIF_VALUE: 20
PIF_VALUE: 17
PIF_VALUE: 19
PIF_VALUE: 20
PIF_VALUE: 18
PIF_VALUE: 20
PIF_VALUE: 1
PIF_VALUE: 20
PIF_VALUE: 2
PIF_VALUE: 18
PIF_VALUE: 20
PIF_VALUE: 18
PIF_VALUE: 20
PIF_VALUE: 20
PIF_VALUE: 19
PIF_VALUE: 20
PIF_VALUE: 20
PIF_VALUE: 19
PIF_VALUE: 17
PIF_VALUE: 20
PIF_VALUE: 17
PIF_VALUE: 20
PIF_VALUE: 17
PIF_VALUE: 19
PIF_VALUE: 19
PIF_VALUE: 20
PIF_VALUE: 21
PIF_VALUE: 19
PIF_VALUE: 17
PIF_VALUE: 19
PIF_VALUE: 18
PIF_VALUE: 18
PIF_VALUE: 20
PIF_VALUE: 20
PIF_VALUE: 18
PIF_VALUE: 17
PIF_VALUE: 18
PIF_VALUE: 2
PIF_VALUE: 20
PIF_VALUE: 19
PIF_VALUE: 20
PIF_VALUE: 20
PIF_VALUE: 19
PIF_VALUE: 18
PIF_VALUE: 17
PIF_VALUE: 18
PIF_VALUE: 1
PIF_VALUE: 21
PIF_VALUE: 19
PIF_VALUE: 20
PIF_VALUE: 17
PIF_VALUE: 17
PIF_VALUE: 1
PIF_VALUE: 19
PIF_VALUE: 17
PIF_VALUE: 17
PIF_VALUE: 21
PIF_VALUE: 20
PIF_VALUE: 20
PIF_VALUE: 19
PIF_VALUE: 20
PIF_VALUE: 20
PIF_VALUE: 19
PIF_VALUE: 20
PIF_VALUE: 4
PIF_VALUE: 20
PIF_VALUE: 17
PIF_VALUE: 19
PIF_VALUE: 19
PIF_VALUE: 20
PIF_VALUE: 2
PIF_VALUE: 20
PIF_VALUE: 20
PIF_VALUE: 18
PIF_VALUE: 19
PIF_VALUE: 18
PIF_VALUE: 20
PIF_VALUE: 25
PIF_VALUE: 20
PIF_VALUE: 19
PIF_VALUE: 17
PIF_VALUE: 19
PIF_VALUE: 21
PIF_VALUE: 20
PIF_VALUE: 18
PIF_VALUE: 19
PIF_VALUE: 17
PIF_VALUE: 19
PIF_VALUE: 19
PIF_VALUE: 20
PIF_VALUE: 19
PIF_VALUE: 18
PIF_VALUE: 19
PIF_VALUE: 20
PIF_VALUE: 19
PIF_VALUE: 20
PIF_VALUE: 17
PIF_VALUE: 20
PIF_VALUE: 19
PIF_VALUE: 17
PIF_VALUE: 20
PIF_VALUE: 20
PIF_VALUE: 17
PIF_VALUE: 20
PIF_VALUE: 20
PIF_VALUE: 19
PIF_VALUE: 18
PIF_VALUE: 19
PIF_VALUE: 20
PIF_VALUE: 17
PIF_VALUE: 19
PIF_VALUE: 20
PIF_VALUE: 2
PIF_VALUE: 18
PIF_VALUE: 20
PIF_VALUE: 19
PIF_VALUE: 19
PIF_VALUE: 17
PIF_VALUE: 18
PIF_VALUE: 19
PIF_VALUE: 19
PIF_VALUE: 20
PIF_VALUE: 17
PIF_VALUE: 19
PIF_VALUE: 18
PIF_VALUE: 17
PIF_VALUE: 20
PIF_VALUE: 17
PIF_VALUE: 19
PIF_VALUE: 20
PIF_VALUE: 17
PIF_VALUE: 18
PIF_VALUE: 20
PIF_VALUE: 20
PIF_VALUE: 19
PIF_VALUE: 20
PIF_VALUE: 19
PIF_VALUE: 8
PIF_VALUE: 20
PIF_VALUE: 20
PIF_VALUE: 17
PIF_VALUE: 20
PIF_VALUE: 18
PIF_VALUE: 20
PIF_VALUE: 19
PIF_VALUE: 19
PIF_VALUE: 23
PIF_VALUE: 20
PIF_VALUE: 19
PIF_VALUE: 18
PIF_VALUE: 17
PIF_VALUE: 21
PIF_VALUE: 20
PIF_VALUE: 20
PIF_VALUE: 17
PIF_VALUE: 18
PIF_VALUE: 20
PIF_VALUE: 19
PIF_VALUE: 19
PIF_VALUE: 20
PIF_VALUE: 19
PIF_VALUE: 17
PIF_VALUE: 17
PIF_VALUE: 6
PIF_VALUE: 19
PIF_VALUE: 20
PIF_VALUE: 19
PIF_VALUE: 20
PIF_VALUE: 17
PIF_VALUE: 17
PIF_VALUE: 20
PIF_VALUE: 2
PIF_VALUE: 19
PIF_VALUE: 20
PIF_VALUE: 20
PIF_VALUE: 19
PIF_VALUE: 20
PIF_VALUE: 19
PIF_VALUE: 20
PIF_VALUE: 19
PIF_VALUE: 17
PIF_VALUE: 18
PIF_VALUE: 21
PIF_VALUE: 19
PIF_VALUE: 19
PIF_VALUE: 20
PIF_VALUE: 19
PIF_VALUE: 19
PIF_VALUE: 17
PIF_VALUE: 3
PIF_VALUE: 20
PIF_VALUE: 17
PIF_VALUE: 17
PIF_VALUE: 23
PIF_VALUE: 19
PIF_VALUE: 0
PIF_VALUE: 19
PIF_VALUE: 18
PIF_VALUE: 17
PIF_VALUE: 18
PIF_VALUE: 20
PIF_VALUE: 18
PIF_VALUE: 19
PIF_VALUE: 20
PIF_VALUE: 19
PIF_VALUE: 19
PIF_VALUE: 17
PIF_VALUE: 19
PIF_VALUE: 0
PIF_VALUE: 17
PIF_VALUE: 18
PIF_VALUE: 17
PIF_VALUE: 20
PIF_VALUE: 19
PIF_VALUE: 20
PIF_VALUE: 19
PIF_VALUE: 20
PIF_VALUE: 19
PIF_VALUE: 19
PIF_VALUE: 18
PIF_VALUE: 17
PIF_VALUE: 19
PIF_VALUE: 17
PIF_VALUE: 20
PIF_VALUE: 19
PIF_VALUE: 21
PIF_VALUE: 19
PIF_VALUE: 20
PIF_VALUE: 17
PIF_VALUE: 18
PIF_VALUE: 19
PIF_VALUE: 19
PIF_VALUE: 20
PIF_VALUE: 17
PIF_VALUE: 0
PIF_VALUE: 21
PIF_VALUE: 2
PIF_VALUE: 17
PIF_VALUE: 18
PIF_VALUE: 17
PIF_VALUE: 19
PIF_VALUE: 20
PIF_VALUE: 19
PIF_VALUE: 20
PIF_VALUE: 18
PIF_VALUE: 17
PIF_VALUE: 16
PIF_VALUE: 19
PIF_VALUE: 19
PIF_VALUE: 17
PIF_VALUE: 20
PIF_VALUE: 20
PIF_VALUE: 18
PIF_VALUE: 18
PIF_VALUE: 19
PIF_VALUE: 19
PIF_VALUE: 20
PIF_VALUE: 17
PIF_VALUE: 19
PIF_VALUE: 20
PIF_VALUE: 17
PIF_VALUE: 20
PIF_VALUE: 20
PIF_VALUE: 19
PIF_VALUE: 1
PIF_VALUE: 18
PIF_VALUE: 19
PIF_VALUE: 19
PIF_VALUE: 17
PIF_VALUE: 17
PIF_VALUE: 20
PIF_VALUE: 20
PIF_VALUE: 18
PIF_VALUE: 18
PIF_VALUE: 19
PIF_VALUE: 17
PIF_VALUE: 17
PIF_VALUE: 20
PIF_VALUE: 20
PIF_VALUE: 17
PIF_VALUE: 19
PIF_VALUE: 19
PIF_VALUE: 20
PIF_VALUE: 23
PIF_VALUE: 19
PIF_VALUE: 17
PIF_VALUE: 20
PIF_VALUE: 20
PIF_VALUE: 17
PIF_VALUE: 20
PIF_VALUE: 19
PIF_VALUE: 19

## 2019-02-15 ASSESSMENT — PAIN SCALES - GENERAL
PAINLEVEL_OUTOF10: 4
PAINLEVEL_OUTOF10: 5
PAINLEVEL_OUTOF10: 5
PAINLEVEL_OUTOF10: 0
PAINLEVEL_OUTOF10: 7

## 2019-02-15 ASSESSMENT — LIFESTYLE VARIABLES: SMOKING_STATUS: 1

## 2019-02-16 ENCOUNTER — APPOINTMENT (OUTPATIENT)
Dept: GENERAL RADIOLOGY | Age: 84
DRG: 268 | End: 2019-02-16
Attending: THORACIC SURGERY (CARDIOTHORACIC VASCULAR SURGERY)
Payer: MEDICARE

## 2019-02-16 LAB
ALLEN TEST: ABNORMAL
ANION GAP SERPL CALCULATED.3IONS-SCNC: 14 MEQ/L (ref 8–16)
APTT: 25.4 SECONDS (ref 22–38)
APTT: 31 SECONDS (ref 22–38)
BASE EXCESS (CALCULATED): -1.8 MMOL/L (ref -2.5–2.5)
BASE EXCESS (CALCULATED): -3.7 MMOL/L (ref -2.5–2.5)
BASOPHILS # BLD: 0.2 %
BASOPHILS ABSOLUTE: 0 THOU/MM3 (ref 0–0.1)
BUN BLDV-MCNC: 15 MG/DL (ref 7–22)
CALCIUM SERPL-MCNC: 7.4 MG/DL (ref 8.5–10.5)
CHLORIDE BLD-SCNC: 107 MEQ/L (ref 98–111)
CO2: 20 MEQ/L (ref 23–33)
COLLECTED BY:: ABNORMAL
COLLECTED BY:: ABNORMAL
CREAT SERPL-MCNC: 0.9 MG/DL (ref 0.4–1.2)
DEVICE: ABNORMAL
EOSINOPHIL # BLD: 0.1 %
EOSINOPHILS ABSOLUTE: 0 THOU/MM3 (ref 0–0.4)
ERYTHROCYTE [DISTWIDTH] IN BLOOD BY AUTOMATED COUNT: 15 % (ref 11.5–14.5)
ERYTHROCYTE [DISTWIDTH] IN BLOOD BY AUTOMATED COUNT: 46.5 FL (ref 35–45)
GFR SERPL CREATININE-BSD FRML MDRD: 60 ML/MIN/1.73M2
GLUCOSE BLD-MCNC: 133 MG/DL (ref 70–108)
HCO3: 20 MMOL/L (ref 23–28)
HCO3: 22 MMOL/L (ref 23–28)
HCT VFR BLD CALC: 35 % (ref 37–47)
HEMOGLOBIN: 11.4 GM/DL (ref 12–16)
IFIO2: 2
IMMATURE GRANS (ABS): 0.06 THOU/MM3 (ref 0–0.07)
IMMATURE GRANULOCYTES: 0.6 %
LYMPHOCYTES # BLD: 9.1 %
LYMPHOCYTES ABSOLUTE: 0.9 THOU/MM3 (ref 1–4.8)
MCH RBC QN AUTO: 27.9 PG (ref 26–33)
MCHC RBC AUTO-ENTMCNC: 32.6 GM/DL (ref 32.2–35.5)
MCV RBC AUTO: 85.8 FL (ref 81–99)
MONOCYTES # BLD: 11.5 %
MONOCYTES ABSOLUTE: 1.2 THOU/MM3 (ref 0.4–1.3)
MRSA SCREEN: NORMAL
NUCLEATED RED BLOOD CELLS: 0 /100 WBC
O2 SATURATION: 96 %
O2 SATURATION: 97 %
PCO2: 31 MMHG (ref 35–45)
PCO2: 34 MMHG (ref 35–45)
PH BLOOD GAS: 7.42 (ref 7.35–7.45)
PH BLOOD GAS: 7.42 (ref 7.35–7.45)
PLATELET # BLD: 142 THOU/MM3 (ref 130–400)
PMV BLD AUTO: 10.7 FL (ref 9.4–12.4)
PO2: 77 MMHG (ref 71–104)
PO2: 83 MMHG (ref 71–104)
POTASSIUM SERPL-SCNC: 3.7 MEQ/L (ref 3.5–5.2)
RBC # BLD: 4.08 MILL/MM3 (ref 4.2–5.4)
SEG NEUTROPHILS: 78.5 %
SEGMENTED NEUTROPHILS ABSOLUTE COUNT: 8 THOU/MM3 (ref 1.8–7.7)
SODIUM BLD-SCNC: 141 MEQ/L (ref 135–145)
SOURCE, BLOOD GAS: ABNORMAL
SOURCE, BLOOD GAS: ABNORMAL
WBC # BLD: 10.2 THOU/MM3 (ref 4.8–10.8)

## 2019-02-16 PROCEDURE — 2700000000 HC OXYGEN THERAPY PER DAY

## 2019-02-16 PROCEDURE — 6360000002 HC RX W HCPCS: Performed by: THORACIC SURGERY (CARDIOTHORACIC VASCULAR SURGERY)

## 2019-02-16 PROCEDURE — 2000000000 HC ICU R&B

## 2019-02-16 PROCEDURE — 99024 POSTOP FOLLOW-UP VISIT: CPT | Performed by: THORACIC SURGERY (CARDIOTHORACIC VASCULAR SURGERY)

## 2019-02-16 PROCEDURE — 80048 BASIC METABOLIC PNL TOTAL CA: CPT

## 2019-02-16 PROCEDURE — 2500000003 HC RX 250 WO HCPCS: Performed by: THORACIC SURGERY (CARDIOTHORACIC VASCULAR SURGERY)

## 2019-02-16 PROCEDURE — 6370000000 HC RX 637 (ALT 250 FOR IP): Performed by: THORACIC SURGERY (CARDIOTHORACIC VASCULAR SURGERY)

## 2019-02-16 PROCEDURE — 85025 COMPLETE CBC W/AUTO DIFF WBC: CPT

## 2019-02-16 PROCEDURE — 71045 X-RAY EXAM CHEST 1 VIEW: CPT

## 2019-02-16 PROCEDURE — 74018 RADEX ABDOMEN 1 VIEW: CPT

## 2019-02-16 PROCEDURE — 85730 THROMBOPLASTIN TIME PARTIAL: CPT

## 2019-02-16 PROCEDURE — 37799 UNLISTED PX VASCULAR SURGERY: CPT

## 2019-02-16 PROCEDURE — 2580000003 HC RX 258: Performed by: THORACIC SURGERY (CARDIOTHORACIC VASCULAR SURGERY)

## 2019-02-16 PROCEDURE — 36415 COLL VENOUS BLD VENIPUNCTURE: CPT

## 2019-02-16 PROCEDURE — 82803 BLOOD GASES ANY COMBINATION: CPT

## 2019-02-16 PROCEDURE — 2709999900 HC NON-CHARGEABLE SUPPLY

## 2019-02-16 RX ORDER — LEVOFLOXACIN 500 MG/1
500 TABLET, FILM COATED ORAL DAILY
Status: DISCONTINUED | OUTPATIENT
Start: 2019-02-16 | End: 2019-02-26 | Stop reason: HOSPADM

## 2019-02-16 RX ADMIN — APIXABAN 10 MG: 5 TABLET, FILM COATED ORAL at 20:24

## 2019-02-16 RX ADMIN — ACETAMINOPHEN 650 MG: 325 TABLET ORAL at 08:21

## 2019-02-16 RX ADMIN — CEFUROXIME SODIUM 1.5 G: 1.5 INJECTION, POWDER, FOR SOLUTION INTRAVENOUS at 06:59

## 2019-02-16 RX ADMIN — APIXABAN 10 MG: 5 TABLET, FILM COATED ORAL at 09:47

## 2019-02-16 RX ADMIN — FAMOTIDINE 20 MG: 10 INJECTION, SOLUTION INTRAVENOUS at 08:21

## 2019-02-16 RX ADMIN — DOCUSATE SODIUM 100 MG: 100 CAPSULE, LIQUID FILLED ORAL at 20:25

## 2019-02-16 RX ADMIN — Medication 10 ML: at 20:25

## 2019-02-16 RX ADMIN — Medication 10 ML: at 08:21

## 2019-02-16 RX ADMIN — FAMOTIDINE 20 MG: 10 INJECTION, SOLUTION INTRAVENOUS at 20:25

## 2019-02-16 RX ADMIN — ATORVASTATIN CALCIUM 20 MG: 20 TABLET, FILM COATED ORAL at 20:24

## 2019-02-16 RX ADMIN — ASPIRIN 325 MG: 325 TABLET, DELAYED RELEASE ORAL at 08:21

## 2019-02-16 RX ADMIN — DOCUSATE SODIUM 100 MG: 100 CAPSULE, LIQUID FILLED ORAL at 08:22

## 2019-02-16 RX ADMIN — LEVOFLOXACIN 500 MG: 500 TABLET, FILM COATED ORAL at 12:55

## 2019-02-16 RX ADMIN — CLOPIDOGREL 75 MG: 75 TABLET, FILM COATED ORAL at 08:21

## 2019-02-16 ASSESSMENT — PAIN SCALES - GENERAL
PAINLEVEL_OUTOF10: 5
PAINLEVEL_OUTOF10: 3

## 2019-02-16 ASSESSMENT — PAIN DESCRIPTION - DESCRIPTORS: DESCRIPTORS: BURNING

## 2019-02-16 ASSESSMENT — PAIN DESCRIPTION - PAIN TYPE: TYPE: SURGICAL PAIN

## 2019-02-17 LAB
ANION GAP SERPL CALCULATED.3IONS-SCNC: 12 MEQ/L (ref 8–16)
BASOPHILS # BLD: 0.2 %
BASOPHILS ABSOLUTE: 0 THOU/MM3 (ref 0–0.1)
BUN BLDV-MCNC: 20 MG/DL (ref 7–22)
CALCIUM SERPL-MCNC: 7.8 MG/DL (ref 8.5–10.5)
CHLORIDE BLD-SCNC: 103 MEQ/L (ref 98–111)
CO2: 21 MEQ/L (ref 23–33)
CREAT SERPL-MCNC: 1.2 MG/DL (ref 0.4–1.2)
EOSINOPHIL # BLD: 0.4 %
EOSINOPHILS ABSOLUTE: 0 THOU/MM3 (ref 0–0.4)
ERYTHROCYTE [DISTWIDTH] IN BLOOD BY AUTOMATED COUNT: 15.1 % (ref 11.5–14.5)
ERYTHROCYTE [DISTWIDTH] IN BLOOD BY AUTOMATED COUNT: 48.7 FL (ref 35–45)
GFR SERPL CREATININE-BSD FRML MDRD: 43 ML/MIN/1.73M2
GLUCOSE BLD-MCNC: 143 MG/DL (ref 70–108)
HCT VFR BLD CALC: 28 % (ref 37–47)
HEMOGLOBIN: 9 GM/DL (ref 12–16)
IMMATURE GRANS (ABS): 0.06 THOU/MM3 (ref 0–0.07)
IMMATURE GRANULOCYTES: 0.6 %
LYMPHOCYTES # BLD: 5.8 %
LYMPHOCYTES ABSOLUTE: 0.6 THOU/MM3 (ref 1–4.8)
MCH RBC QN AUTO: 28.1 PG (ref 26–33)
MCHC RBC AUTO-ENTMCNC: 32.1 GM/DL (ref 32.2–35.5)
MCV RBC AUTO: 87.5 FL (ref 81–99)
MONOCYTES # BLD: 7.8 %
MONOCYTES ABSOLUTE: 0.8 THOU/MM3 (ref 0.4–1.3)
NUCLEATED RED BLOOD CELLS: 0 /100 WBC
PLATELET # BLD: 107 THOU/MM3 (ref 130–400)
PMV BLD AUTO: 11.3 FL (ref 9.4–12.4)
POTASSIUM SERPL-SCNC: 3.3 MEQ/L (ref 3.5–5.2)
RBC # BLD: 3.2 MILL/MM3 (ref 4.2–5.4)
SEG NEUTROPHILS: 85.2 %
SEGMENTED NEUTROPHILS ABSOLUTE COUNT: 8.3 THOU/MM3 (ref 1.8–7.7)
SODIUM BLD-SCNC: 136 MEQ/L (ref 135–145)
WBC # BLD: 9.8 THOU/MM3 (ref 4.8–10.8)

## 2019-02-17 PROCEDURE — 2709999900 HC NON-CHARGEABLE SUPPLY

## 2019-02-17 PROCEDURE — 80048 BASIC METABOLIC PNL TOTAL CA: CPT

## 2019-02-17 PROCEDURE — 6370000000 HC RX 637 (ALT 250 FOR IP): Performed by: THORACIC SURGERY (CARDIOTHORACIC VASCULAR SURGERY)

## 2019-02-17 PROCEDURE — 2500000003 HC RX 250 WO HCPCS: Performed by: THORACIC SURGERY (CARDIOTHORACIC VASCULAR SURGERY)

## 2019-02-17 PROCEDURE — 85025 COMPLETE CBC W/AUTO DIFF WBC: CPT

## 2019-02-17 PROCEDURE — 51798 US URINE CAPACITY MEASURE: CPT

## 2019-02-17 PROCEDURE — 2580000003 HC RX 258: Performed by: THORACIC SURGERY (CARDIOTHORACIC VASCULAR SURGERY)

## 2019-02-17 PROCEDURE — 99024 POSTOP FOLLOW-UP VISIT: CPT | Performed by: THORACIC SURGERY (CARDIOTHORACIC VASCULAR SURGERY)

## 2019-02-17 PROCEDURE — 2060000000 HC ICU INTERMEDIATE R&B

## 2019-02-17 PROCEDURE — 36415 COLL VENOUS BLD VENIPUNCTURE: CPT

## 2019-02-17 RX ORDER — POTASSIUM CHLORIDE 7.45 MG/ML
10 INJECTION INTRAVENOUS
Status: DISCONTINUED | OUTPATIENT
Start: 2019-02-17 | End: 2019-02-17 | Stop reason: ALTCHOICE

## 2019-02-17 RX ORDER — POTASSIUM CHLORIDE 20 MEQ/1
40 TABLET, EXTENDED RELEASE ORAL ONCE
Status: COMPLETED | OUTPATIENT
Start: 2019-02-17 | End: 2019-02-17

## 2019-02-17 RX ORDER — MIDODRINE HYDROCHLORIDE 10 MG/1
10 TABLET ORAL
Status: DISCONTINUED | OUTPATIENT
Start: 2019-02-17 | End: 2019-02-26 | Stop reason: HOSPADM

## 2019-02-17 RX ORDER — AMIODARONE HYDROCHLORIDE 200 MG/1
200 TABLET ORAL DAILY
Status: DISCONTINUED | OUTPATIENT
Start: 2019-02-17 | End: 2019-02-22

## 2019-02-17 RX ADMIN — MIDODRINE HYDROCHLORIDE 10 MG: 10 TABLET ORAL at 11:21

## 2019-02-17 RX ADMIN — CLOPIDOGREL 75 MG: 75 TABLET, FILM COATED ORAL at 07:49

## 2019-02-17 RX ADMIN — ACETAMINOPHEN 650 MG: 325 TABLET ORAL at 11:21

## 2019-02-17 RX ADMIN — FAMOTIDINE 20 MG: 10 INJECTION, SOLUTION INTRAVENOUS at 21:02

## 2019-02-17 RX ADMIN — Medication 10 ML: at 07:50

## 2019-02-17 RX ADMIN — ATORVASTATIN CALCIUM 20 MG: 20 TABLET, FILM COATED ORAL at 21:02

## 2019-02-17 RX ADMIN — AMIODARONE HYDROCHLORIDE 150 MG: 1.5 INJECTION, SOLUTION INTRAVENOUS at 11:22

## 2019-02-17 RX ADMIN — POTASSIUM CHLORIDE 40 MEQ: 1500 TABLET, EXTENDED RELEASE ORAL at 16:38

## 2019-02-17 RX ADMIN — LEVOFLOXACIN 500 MG: 500 TABLET, FILM COATED ORAL at 07:49

## 2019-02-17 RX ADMIN — DOCUSATE SODIUM 100 MG: 100 CAPSULE, LIQUID FILLED ORAL at 07:49

## 2019-02-17 RX ADMIN — AMIODARONE HYDROCHLORIDE 200 MG: 200 TABLET ORAL at 11:21

## 2019-02-17 RX ADMIN — ACETAMINOPHEN 650 MG: 325 TABLET ORAL at 21:02

## 2019-02-17 RX ADMIN — APIXABAN 10 MG: 5 TABLET, FILM COATED ORAL at 07:50

## 2019-02-17 RX ADMIN — MIDODRINE HYDROCHLORIDE 10 MG: 10 TABLET ORAL at 16:38

## 2019-02-17 RX ADMIN — DOCUSATE SODIUM 100 MG: 100 CAPSULE, LIQUID FILLED ORAL at 21:02

## 2019-02-17 RX ADMIN — ASPIRIN 325 MG: 325 TABLET, DELAYED RELEASE ORAL at 07:49

## 2019-02-17 RX ADMIN — Medication 10 ML: at 21:03

## 2019-02-17 RX ADMIN — FAMOTIDINE 20 MG: 10 INJECTION, SOLUTION INTRAVENOUS at 07:49

## 2019-02-17 RX ADMIN — APIXABAN 10 MG: 5 TABLET, FILM COATED ORAL at 21:03

## 2019-02-17 RX ADMIN — POTASSIUM CHLORIDE, DEXTROSE MONOHYDRATE AND SODIUM CHLORIDE 1000 ML: 150; 5; 450 INJECTION, SOLUTION INTRAVENOUS at 07:48

## 2019-02-17 RX ADMIN — ACETAMINOPHEN 650 MG: 325 TABLET ORAL at 06:30

## 2019-02-17 ASSESSMENT — PAIN DESCRIPTION - ORIENTATION: ORIENTATION: RIGHT

## 2019-02-17 ASSESSMENT — PAIN DESCRIPTION - LOCATION: LOCATION: LEG

## 2019-02-17 ASSESSMENT — PAIN DESCRIPTION - FREQUENCY: FREQUENCY: CONTINUOUS

## 2019-02-17 ASSESSMENT — PAIN SCALES - GENERAL
PAINLEVEL_OUTOF10: 3
PAINLEVEL_OUTOF10: 7
PAINLEVEL_OUTOF10: 5

## 2019-02-17 ASSESSMENT — PAIN DESCRIPTION - PAIN TYPE: TYPE: SURGICAL PAIN

## 2019-02-17 ASSESSMENT — PAIN DESCRIPTION - DESCRIPTORS: DESCRIPTORS: ACHING

## 2019-02-18 LAB
ANION GAP SERPL CALCULATED.3IONS-SCNC: 10 MEQ/L (ref 8–16)
APTT: 35 SECONDS (ref 22–38)
APTT: 54.3 SECONDS (ref 22–38)
APTT: 56.6 SECONDS (ref 22–38)
BUN BLDV-MCNC: 20 MG/DL (ref 7–22)
CALCIUM SERPL-MCNC: 7.8 MG/DL (ref 8.5–10.5)
CHLORIDE BLD-SCNC: 105 MEQ/L (ref 98–111)
CO2: 20 MEQ/L (ref 23–33)
CREAT SERPL-MCNC: 1.1 MG/DL (ref 0.4–1.2)
ERYTHROCYTE [DISTWIDTH] IN BLOOD BY AUTOMATED COUNT: 15.3 % (ref 11.5–14.5)
ERYTHROCYTE [DISTWIDTH] IN BLOOD BY AUTOMATED COUNT: 15.4 % (ref 11.5–14.5)
ERYTHROCYTE [DISTWIDTH] IN BLOOD BY AUTOMATED COUNT: 49.1 FL (ref 35–45)
ERYTHROCYTE [DISTWIDTH] IN BLOOD BY AUTOMATED COUNT: 49.6 FL (ref 35–45)
GFR SERPL CREATININE-BSD FRML MDRD: 48 ML/MIN/1.73M2
GLUCOSE BLD-MCNC: 112 MG/DL (ref 70–108)
HCT VFR BLD CALC: 25.2 % (ref 37–47)
HCT VFR BLD CALC: 26.6 % (ref 37–47)
HEMOGLOBIN: 8 GM/DL (ref 12–16)
HEMOGLOBIN: 8.3 GM/DL (ref 12–16)
MAGNESIUM: 1.6 MG/DL (ref 1.6–2.4)
MCH RBC QN AUTO: 27.7 PG (ref 26–33)
MCH RBC QN AUTO: 27.9 PG (ref 26–33)
MCHC RBC AUTO-ENTMCNC: 31.2 GM/DL (ref 32.2–35.5)
MCHC RBC AUTO-ENTMCNC: 31.7 GM/DL (ref 32.2–35.5)
MCV RBC AUTO: 87.8 FL (ref 81–99)
MCV RBC AUTO: 88.7 FL (ref 81–99)
PHOSPHORUS: 1.4 MG/DL (ref 2.4–4.7)
PLATELET # BLD: 103 THOU/MM3 (ref 130–400)
PLATELET # BLD: 109 THOU/MM3 (ref 130–400)
PMV BLD AUTO: 10.6 FL (ref 9.4–12.4)
PMV BLD AUTO: 11.1 FL (ref 9.4–12.4)
POTASSIUM SERPL-SCNC: 3.8 MEQ/L (ref 3.5–5.2)
POTASSIUM SERPL-SCNC: 4 MEQ/L (ref 3.5–5.2)
RBC # BLD: 2.87 MILL/MM3 (ref 4.2–5.4)
RBC # BLD: 3 MILL/MM3 (ref 4.2–5.4)
SODIUM BLD-SCNC: 135 MEQ/L (ref 135–145)
WBC # BLD: 8.9 THOU/MM3 (ref 4.8–10.8)
WBC # BLD: 9.7 THOU/MM3 (ref 4.8–10.8)

## 2019-02-18 PROCEDURE — 6370000000 HC RX 637 (ALT 250 FOR IP): Performed by: THORACIC SURGERY (CARDIOTHORACIC VASCULAR SURGERY)

## 2019-02-18 PROCEDURE — 85730 THROMBOPLASTIN TIME PARTIAL: CPT

## 2019-02-18 PROCEDURE — 2580000003 HC RX 258: Performed by: THORACIC SURGERY (CARDIOTHORACIC VASCULAR SURGERY)

## 2019-02-18 PROCEDURE — 80048 BASIC METABOLIC PNL TOTAL CA: CPT

## 2019-02-18 PROCEDURE — 84100 ASSAY OF PHOSPHORUS: CPT

## 2019-02-18 PROCEDURE — 6360000002 HC RX W HCPCS: Performed by: PHYSICIAN ASSISTANT

## 2019-02-18 PROCEDURE — 2709999900 HC NON-CHARGEABLE SUPPLY

## 2019-02-18 PROCEDURE — APPSS30 APP SPLIT SHARED TIME 16-30 MINUTES: Performed by: PHYSICIAN ASSISTANT

## 2019-02-18 PROCEDURE — 2500000003 HC RX 250 WO HCPCS: Performed by: THORACIC SURGERY (CARDIOTHORACIC VASCULAR SURGERY)

## 2019-02-18 PROCEDURE — 6370000000 HC RX 637 (ALT 250 FOR IP): Performed by: PHYSICIAN ASSISTANT

## 2019-02-18 PROCEDURE — 36415 COLL VENOUS BLD VENIPUNCTURE: CPT

## 2019-02-18 PROCEDURE — 84132 ASSAY OF SERUM POTASSIUM: CPT

## 2019-02-18 PROCEDURE — 85027 COMPLETE CBC AUTOMATED: CPT

## 2019-02-18 PROCEDURE — 2060000000 HC ICU INTERMEDIATE R&B

## 2019-02-18 PROCEDURE — 83735 ASSAY OF MAGNESIUM: CPT

## 2019-02-18 RX ORDER — HEPARIN SODIUM 10000 [USP'U]/100ML
11.4 INJECTION, SOLUTION INTRAVENOUS CONTINUOUS
Status: DISCONTINUED | OUTPATIENT
Start: 2019-02-18 | End: 2019-02-21

## 2019-02-18 RX ORDER — POTASSIUM CHLORIDE 20 MEQ/1
20 TABLET, EXTENDED RELEASE ORAL ONCE
Status: COMPLETED | OUTPATIENT
Start: 2019-02-18 | End: 2019-02-18

## 2019-02-18 RX ORDER — HEPARIN SODIUM 1000 [USP'U]/ML
4000 INJECTION, SOLUTION INTRAVENOUS; SUBCUTANEOUS PRN
Status: DISCONTINUED | OUTPATIENT
Start: 2019-02-18 | End: 2019-02-21 | Stop reason: ALTCHOICE

## 2019-02-18 RX ORDER — ASPIRIN 81 MG/1
81 TABLET, CHEWABLE ORAL DAILY
Status: DISCONTINUED | OUTPATIENT
Start: 2019-02-18 | End: 2019-02-18

## 2019-02-18 RX ORDER — HEPARIN SODIUM 1000 [USP'U]/ML
2000 INJECTION, SOLUTION INTRAVENOUS; SUBCUTANEOUS PRN
Status: DISCONTINUED | OUTPATIENT
Start: 2019-02-18 | End: 2019-02-21 | Stop reason: ALTCHOICE

## 2019-02-18 RX ORDER — HEPARIN SODIUM 1000 [USP'U]/ML
4000 INJECTION, SOLUTION INTRAVENOUS; SUBCUTANEOUS ONCE
Status: COMPLETED | OUTPATIENT
Start: 2019-02-18 | End: 2019-02-18

## 2019-02-18 RX ADMIN — HEPARIN SODIUM 4000 UNITS: 1000 INJECTION INTRAVENOUS; SUBCUTANEOUS at 09:38

## 2019-02-18 RX ADMIN — MIDODRINE HYDROCHLORIDE 10 MG: 10 TABLET ORAL at 09:36

## 2019-02-18 RX ADMIN — LEVOFLOXACIN 500 MG: 500 TABLET, FILM COATED ORAL at 09:36

## 2019-02-18 RX ADMIN — CLOPIDOGREL 75 MG: 75 TABLET, FILM COATED ORAL at 09:36

## 2019-02-18 RX ADMIN — ACETAMINOPHEN 650 MG: 325 TABLET ORAL at 16:48

## 2019-02-18 RX ADMIN — Medication 10 ML: at 20:20

## 2019-02-18 RX ADMIN — MIDODRINE HYDROCHLORIDE 10 MG: 10 TABLET ORAL at 16:48

## 2019-02-18 RX ADMIN — HEPARIN SODIUM AND DEXTROSE 12 UNITS/KG/HR: 10000; 5 INJECTION INTRAVENOUS at 09:38

## 2019-02-18 RX ADMIN — ACETAMINOPHEN 650 MG: 325 TABLET ORAL at 20:20

## 2019-02-18 RX ADMIN — FAMOTIDINE 20 MG: 10 INJECTION, SOLUTION INTRAVENOUS at 20:19

## 2019-02-18 RX ADMIN — FAMOTIDINE 20 MG: 10 INJECTION, SOLUTION INTRAVENOUS at 09:36

## 2019-02-18 RX ADMIN — DOCUSATE SODIUM 100 MG: 100 CAPSULE, LIQUID FILLED ORAL at 09:36

## 2019-02-18 RX ADMIN — ASPIRIN 325 MG: 325 TABLET, DELAYED RELEASE ORAL at 09:36

## 2019-02-18 RX ADMIN — ACETAMINOPHEN 650 MG: 325 TABLET ORAL at 08:28

## 2019-02-18 RX ADMIN — ACETAMINOPHEN 650 MG: 325 TABLET ORAL at 12:35

## 2019-02-18 RX ADMIN — POTASSIUM CHLORIDE 20 MEQ: 1500 TABLET, EXTENDED RELEASE ORAL at 06:25

## 2019-02-18 RX ADMIN — DOCUSATE SODIUM 100 MG: 100 CAPSULE, LIQUID FILLED ORAL at 20:19

## 2019-02-18 RX ADMIN — ATORVASTATIN CALCIUM 20 MG: 20 TABLET, FILM COATED ORAL at 20:19

## 2019-02-18 RX ADMIN — Medication 10 ML: at 09:44

## 2019-02-18 RX ADMIN — MIDODRINE HYDROCHLORIDE 10 MG: 10 TABLET ORAL at 12:35

## 2019-02-18 RX ADMIN — AMIODARONE HYDROCHLORIDE 200 MG: 200 TABLET ORAL at 09:36

## 2019-02-18 ASSESSMENT — PAIN DESCRIPTION - DESCRIPTORS
DESCRIPTORS: ACHING
DESCRIPTORS: ACHING
DESCRIPTORS: ACHING;SORE

## 2019-02-18 ASSESSMENT — PAIN DESCRIPTION - FREQUENCY
FREQUENCY: CONTINUOUS

## 2019-02-18 ASSESSMENT — PAIN DESCRIPTION - PAIN TYPE
TYPE: SURGICAL PAIN

## 2019-02-18 ASSESSMENT — PAIN DESCRIPTION - ORIENTATION
ORIENTATION: RIGHT;LEFT

## 2019-02-18 ASSESSMENT — PAIN DESCRIPTION - PROGRESSION
CLINICAL_PROGRESSION: NOT CHANGED

## 2019-02-18 ASSESSMENT — PAIN SCALES - GENERAL
PAINLEVEL_OUTOF10: 3
PAINLEVEL_OUTOF10: 5
PAINLEVEL_OUTOF10: 0
PAINLEVEL_OUTOF10: 4
PAINLEVEL_OUTOF10: 5
PAINLEVEL_OUTOF10: 3

## 2019-02-18 ASSESSMENT — PAIN DESCRIPTION - LOCATION
LOCATION: GROIN

## 2019-02-18 ASSESSMENT — PAIN DESCRIPTION - ONSET
ONSET: ON-GOING

## 2019-02-18 ASSESSMENT — PAIN - FUNCTIONAL ASSESSMENT
PAIN_FUNCTIONAL_ASSESSMENT: ACTIVITIES ARE NOT PREVENTED

## 2019-02-19 LAB
ANION GAP SERPL CALCULATED.3IONS-SCNC: 14 MEQ/L (ref 8–16)
APTT: 64.8 SECONDS (ref 22–38)
APTT: 67.5 SECONDS (ref 22–38)
APTT: 69.9 SECONDS (ref 22–38)
BUN BLDV-MCNC: 20 MG/DL (ref 7–22)
CALCIUM SERPL-MCNC: 8.3 MG/DL (ref 8.5–10.5)
CHLORIDE BLD-SCNC: 104 MEQ/L (ref 98–111)
CO2: 20 MEQ/L (ref 23–33)
CREAT SERPL-MCNC: 1 MG/DL (ref 0.4–1.2)
ERYTHROCYTE [DISTWIDTH] IN BLOOD BY AUTOMATED COUNT: 15.4 % (ref 11.5–14.5)
ERYTHROCYTE [DISTWIDTH] IN BLOOD BY AUTOMATED COUNT: 49.1 FL (ref 35–45)
GFR SERPL CREATININE-BSD FRML MDRD: 53 ML/MIN/1.73M2
GLUCOSE BLD-MCNC: 102 MG/DL (ref 70–108)
GLUCOSE BLD-MCNC: 133 MG/DL (ref 70–108)
HCT VFR BLD CALC: 25.6 % (ref 37–47)
HEMOGLOBIN: 8.1 GM/DL (ref 12–16)
MCH RBC QN AUTO: 27.6 PG (ref 26–33)
MCHC RBC AUTO-ENTMCNC: 31.6 GM/DL (ref 32.2–35.5)
MCV RBC AUTO: 87.4 FL (ref 81–99)
PLATELET # BLD: 141 THOU/MM3 (ref 130–400)
PMV BLD AUTO: 11.2 FL (ref 9.4–12.4)
POTASSIUM SERPL-SCNC: 3.6 MEQ/L (ref 3.5–5.2)
RBC # BLD: 2.93 MILL/MM3 (ref 4.2–5.4)
SODIUM BLD-SCNC: 138 MEQ/L (ref 135–145)
WBC # BLD: 9 THOU/MM3 (ref 4.8–10.8)

## 2019-02-19 PROCEDURE — 6370000000 HC RX 637 (ALT 250 FOR IP): Performed by: THORACIC SURGERY (CARDIOTHORACIC VASCULAR SURGERY)

## 2019-02-19 PROCEDURE — 6370000000 HC RX 637 (ALT 250 FOR IP): Performed by: PHYSICIAN ASSISTANT

## 2019-02-19 PROCEDURE — 6360000002 HC RX W HCPCS: Performed by: INTERNAL MEDICINE

## 2019-02-19 PROCEDURE — 2500000003 HC RX 250 WO HCPCS: Performed by: THORACIC SURGERY (CARDIOTHORACIC VASCULAR SURGERY)

## 2019-02-19 PROCEDURE — APPSS30 APP SPLIT SHARED TIME 16-30 MINUTES: Performed by: PHYSICIAN ASSISTANT

## 2019-02-19 PROCEDURE — 80048 BASIC METABOLIC PNL TOTAL CA: CPT

## 2019-02-19 PROCEDURE — 85027 COMPLETE CBC AUTOMATED: CPT

## 2019-02-19 PROCEDURE — 2709999900 HC NON-CHARGEABLE SUPPLY

## 2019-02-19 PROCEDURE — 82948 REAGENT STRIP/BLOOD GLUCOSE: CPT

## 2019-02-19 PROCEDURE — 2580000003 HC RX 258: Performed by: THORACIC SURGERY (CARDIOTHORACIC VASCULAR SURGERY)

## 2019-02-19 PROCEDURE — 6360000002 HC RX W HCPCS: Performed by: PHYSICIAN ASSISTANT

## 2019-02-19 PROCEDURE — 85730 THROMBOPLASTIN TIME PARTIAL: CPT

## 2019-02-19 PROCEDURE — 2060000000 HC ICU INTERMEDIATE R&B

## 2019-02-19 PROCEDURE — 2500000003 HC RX 250 WO HCPCS: Performed by: PHYSICIAN ASSISTANT

## 2019-02-19 PROCEDURE — 36415 COLL VENOUS BLD VENIPUNCTURE: CPT

## 2019-02-19 RX ORDER — BUMETANIDE 0.25 MG/ML
1 INJECTION, SOLUTION INTRAMUSCULAR; INTRAVENOUS 2 TIMES DAILY
Status: DISCONTINUED | OUTPATIENT
Start: 2019-02-19 | End: 2019-02-20

## 2019-02-19 RX ORDER — POTASSIUM CHLORIDE 750 MG/1
20 TABLET, FILM COATED, EXTENDED RELEASE ORAL ONCE
Status: COMPLETED | OUTPATIENT
Start: 2019-02-19 | End: 2019-02-19

## 2019-02-19 RX ADMIN — AMIODARONE HYDROCHLORIDE 200 MG: 200 TABLET ORAL at 09:14

## 2019-02-19 RX ADMIN — HEPARIN SODIUM AND DEXTROSE 16 UNITS/KG/HR: 10000; 5 INJECTION INTRAVENOUS at 22:33

## 2019-02-19 RX ADMIN — ACETAMINOPHEN 650 MG: 325 TABLET ORAL at 22:18

## 2019-02-19 RX ADMIN — MIDODRINE HYDROCHLORIDE 10 MG: 10 TABLET ORAL at 16:44

## 2019-02-19 RX ADMIN — ACETAMINOPHEN 650 MG: 325 TABLET ORAL at 17:54

## 2019-02-19 RX ADMIN — MIDODRINE HYDROCHLORIDE 10 MG: 10 TABLET ORAL at 12:26

## 2019-02-19 RX ADMIN — ACETAMINOPHEN 650 MG: 325 TABLET ORAL at 12:30

## 2019-02-19 RX ADMIN — HEPARIN SODIUM AND DEXTROSE 16 UNITS/KG/HR: 10000; 5 INJECTION INTRAVENOUS at 04:56

## 2019-02-19 RX ADMIN — MIDODRINE HYDROCHLORIDE 10 MG: 10 TABLET ORAL at 09:13

## 2019-02-19 RX ADMIN — ATORVASTATIN CALCIUM 20 MG: 20 TABLET, FILM COATED ORAL at 22:18

## 2019-02-19 RX ADMIN — DOCUSATE SODIUM 100 MG: 100 CAPSULE, LIQUID FILLED ORAL at 09:14

## 2019-02-19 RX ADMIN — POTASSIUM CHLORIDE 20 MEQ: 750 TABLET, EXTENDED RELEASE ORAL at 12:26

## 2019-02-19 RX ADMIN — CLOPIDOGREL 75 MG: 75 TABLET, FILM COATED ORAL at 09:14

## 2019-02-19 RX ADMIN — BUMETANIDE 1 MG: 0.25 INJECTION INTRAMUSCULAR; INTRAVENOUS at 09:13

## 2019-02-19 RX ADMIN — FAMOTIDINE 20 MG: 10 INJECTION, SOLUTION INTRAVENOUS at 09:13

## 2019-02-19 RX ADMIN — BUMETANIDE 1 MG: 0.25 INJECTION INTRAMUSCULAR; INTRAVENOUS at 17:54

## 2019-02-19 RX ADMIN — Medication 10 ML: at 22:19

## 2019-02-19 RX ADMIN — LEVOFLOXACIN 500 MG: 500 TABLET, FILM COATED ORAL at 09:14

## 2019-02-19 RX ADMIN — MAGNESIUM HYDROXIDE 30 ML: 400 SUSPENSION ORAL at 05:04

## 2019-02-19 RX ADMIN — Medication 10 ML: at 09:14

## 2019-02-19 RX ADMIN — ACETAMINOPHEN 650 MG: 325 TABLET ORAL at 03:12

## 2019-02-19 RX ADMIN — FAMOTIDINE 20 MG: 10 INJECTION, SOLUTION INTRAVENOUS at 22:18

## 2019-02-19 RX ADMIN — ASPIRIN 325 MG: 325 TABLET, DELAYED RELEASE ORAL at 09:14

## 2019-02-19 ASSESSMENT — PAIN DESCRIPTION - DESCRIPTORS
DESCRIPTORS: ACHING;SORE
DESCRIPTORS: ACHING
DESCRIPTORS: ACHING;DISCOMFORT
DESCRIPTORS: ACHING;DISCOMFORT

## 2019-02-19 ASSESSMENT — PAIN DESCRIPTION - PROGRESSION
CLINICAL_PROGRESSION: NOT CHANGED
CLINICAL_PROGRESSION: NOT CHANGED
CLINICAL_PROGRESSION: GRADUALLY IMPROVING

## 2019-02-19 ASSESSMENT — PAIN SCALES - GENERAL
PAINLEVEL_OUTOF10: 6
PAINLEVEL_OUTOF10: 3
PAINLEVEL_OUTOF10: 3
PAINLEVEL_OUTOF10: 5
PAINLEVEL_OUTOF10: 5
PAINLEVEL_OUTOF10: 4
PAINLEVEL_OUTOF10: 3

## 2019-02-19 ASSESSMENT — PAIN DESCRIPTION - FREQUENCY
FREQUENCY: CONTINUOUS

## 2019-02-19 ASSESSMENT — PAIN DESCRIPTION - PAIN TYPE
TYPE: SURGICAL PAIN

## 2019-02-19 ASSESSMENT — PAIN DESCRIPTION - ORIENTATION
ORIENTATION: RIGHT;LEFT

## 2019-02-19 ASSESSMENT — PAIN DESCRIPTION - ONSET
ONSET: ON-GOING

## 2019-02-19 ASSESSMENT — PAIN DESCRIPTION - LOCATION
LOCATION: GROIN

## 2019-02-19 ASSESSMENT — PAIN - FUNCTIONAL ASSESSMENT
PAIN_FUNCTIONAL_ASSESSMENT: ACTIVITIES ARE NOT PREVENTED

## 2019-02-20 LAB
ANION GAP SERPL CALCULATED.3IONS-SCNC: 15 MEQ/L (ref 8–16)
APTT: 69.2 SECONDS (ref 22–38)
APTT: 77.5 SECONDS (ref 22–38)
BUN BLDV-MCNC: 22 MG/DL (ref 7–22)
CALCIUM SERPL-MCNC: 8.4 MG/DL (ref 8.5–10.5)
CHLORIDE BLD-SCNC: 103 MEQ/L (ref 98–111)
CO2: 23 MEQ/L (ref 23–33)
CREAT SERPL-MCNC: 1.3 MG/DL (ref 0.4–1.2)
ERYTHROCYTE [DISTWIDTH] IN BLOOD BY AUTOMATED COUNT: 15.5 % (ref 11.5–14.5)
ERYTHROCYTE [DISTWIDTH] IN BLOOD BY AUTOMATED COUNT: 51 FL (ref 35–45)
GFR SERPL CREATININE-BSD FRML MDRD: 39 ML/MIN/1.73M2
GLUCOSE BLD-MCNC: 104 MG/DL (ref 70–108)
HCT VFR BLD CALC: 25.7 % (ref 37–47)
HEMOGLOBIN: 8.1 GM/DL (ref 12–16)
MCH RBC QN AUTO: 28.1 PG (ref 26–33)
MCHC RBC AUTO-ENTMCNC: 31.5 GM/DL (ref 32.2–35.5)
MCV RBC AUTO: 89.2 FL (ref 81–99)
PLATELET # BLD: 147 THOU/MM3 (ref 130–400)
PLATELET # BLD: 173 THOU/MM3 (ref 130–400)
PMV BLD AUTO: 11.1 FL (ref 9.4–12.4)
POTASSIUM SERPL-SCNC: 3.8 MEQ/L (ref 3.5–5.2)
RBC # BLD: 2.88 MILL/MM3 (ref 4.2–5.4)
SODIUM BLD-SCNC: 141 MEQ/L (ref 135–145)
WBC # BLD: 6.9 THOU/MM3 (ref 4.8–10.8)

## 2019-02-20 PROCEDURE — 97116 GAIT TRAINING THERAPY: CPT

## 2019-02-20 PROCEDURE — 36415 COLL VENOUS BLD VENIPUNCTURE: CPT

## 2019-02-20 PROCEDURE — 2060000000 HC ICU INTERMEDIATE R&B

## 2019-02-20 PROCEDURE — 6370000000 HC RX 637 (ALT 250 FOR IP): Performed by: THORACIC SURGERY (CARDIOTHORACIC VASCULAR SURGERY)

## 2019-02-20 PROCEDURE — 85730 THROMBOPLASTIN TIME PARTIAL: CPT

## 2019-02-20 PROCEDURE — 2580000003 HC RX 258: Performed by: THORACIC SURGERY (CARDIOTHORACIC VASCULAR SURGERY)

## 2019-02-20 PROCEDURE — 80048 BASIC METABOLIC PNL TOTAL CA: CPT

## 2019-02-20 PROCEDURE — 85027 COMPLETE CBC AUTOMATED: CPT

## 2019-02-20 PROCEDURE — 97110 THERAPEUTIC EXERCISES: CPT

## 2019-02-20 PROCEDURE — 85049 AUTOMATED PLATELET COUNT: CPT

## 2019-02-20 PROCEDURE — APPSS30 APP SPLIT SHARED TIME 16-30 MINUTES: Performed by: PHYSICIAN ASSISTANT

## 2019-02-20 PROCEDURE — 2500000003 HC RX 250 WO HCPCS: Performed by: THORACIC SURGERY (CARDIOTHORACIC VASCULAR SURGERY)

## 2019-02-20 PROCEDURE — 6370000000 HC RX 637 (ALT 250 FOR IP): Performed by: PHYSICIAN ASSISTANT

## 2019-02-20 PROCEDURE — 97162 PT EVAL MOD COMPLEX 30 MIN: CPT

## 2019-02-20 PROCEDURE — 6360000002 HC RX W HCPCS: Performed by: INTERNAL MEDICINE

## 2019-02-20 PROCEDURE — 2709999900 HC NON-CHARGEABLE SUPPLY

## 2019-02-20 RX ORDER — POTASSIUM CHLORIDE 750 MG/1
20 TABLET, FILM COATED, EXTENDED RELEASE ORAL ONCE
Status: COMPLETED | OUTPATIENT
Start: 2019-02-20 | End: 2019-02-20

## 2019-02-20 RX ADMIN — ASPIRIN 325 MG: 325 TABLET, DELAYED RELEASE ORAL at 09:34

## 2019-02-20 RX ADMIN — ACETAMINOPHEN 650 MG: 325 TABLET ORAL at 22:43

## 2019-02-20 RX ADMIN — POTASSIUM CHLORIDE 20 MEQ: 750 TABLET, EXTENDED RELEASE ORAL at 12:42

## 2019-02-20 RX ADMIN — MIDODRINE HYDROCHLORIDE 10 MG: 10 TABLET ORAL at 16:36

## 2019-02-20 RX ADMIN — Medication 10 ML: at 09:35

## 2019-02-20 RX ADMIN — Medication 10 ML: at 21:08

## 2019-02-20 RX ADMIN — ACETAMINOPHEN 650 MG: 325 TABLET ORAL at 09:34

## 2019-02-20 RX ADMIN — ACETAMINOPHEN 650 MG: 325 TABLET ORAL at 16:36

## 2019-02-20 RX ADMIN — MIDODRINE HYDROCHLORIDE 10 MG: 10 TABLET ORAL at 12:41

## 2019-02-20 RX ADMIN — DOCUSATE SODIUM 100 MG: 100 CAPSULE, LIQUID FILLED ORAL at 21:02

## 2019-02-20 RX ADMIN — FAMOTIDINE 20 MG: 10 INJECTION, SOLUTION INTRAVENOUS at 09:35

## 2019-02-20 RX ADMIN — FAMOTIDINE 20 MG: 10 INJECTION, SOLUTION INTRAVENOUS at 21:02

## 2019-02-20 RX ADMIN — HEPARIN SODIUM AND DEXTROSE 16 UNITS/KG/HR: 10000; 5 INJECTION INTRAVENOUS at 16:47

## 2019-02-20 RX ADMIN — MIDODRINE HYDROCHLORIDE 10 MG: 10 TABLET ORAL at 09:34

## 2019-02-20 RX ADMIN — CLOPIDOGREL 75 MG: 75 TABLET, FILM COATED ORAL at 09:34

## 2019-02-20 RX ADMIN — ATORVASTATIN CALCIUM 20 MG: 20 TABLET, FILM COATED ORAL at 21:02

## 2019-02-20 RX ADMIN — ACETAMINOPHEN 650 MG: 325 TABLET ORAL at 03:11

## 2019-02-20 RX ADMIN — DOCUSATE SODIUM 100 MG: 100 CAPSULE, LIQUID FILLED ORAL at 09:34

## 2019-02-20 RX ADMIN — LEVOFLOXACIN 500 MG: 500 TABLET, FILM COATED ORAL at 09:34

## 2019-02-20 RX ADMIN — AMIODARONE HYDROCHLORIDE 200 MG: 200 TABLET ORAL at 09:34

## 2019-02-20 ASSESSMENT — PAIN SCALES - GENERAL
PAINLEVEL_OUTOF10: 5
PAINLEVEL_OUTOF10: 3
PAINLEVEL_OUTOF10: 5
PAINLEVEL_OUTOF10: 6
PAINLEVEL_OUTOF10: 5

## 2019-02-20 ASSESSMENT — PAIN - FUNCTIONAL ASSESSMENT: PAIN_FUNCTIONAL_ASSESSMENT: ACTIVITIES ARE NOT PREVENTED

## 2019-02-20 ASSESSMENT — PAIN DESCRIPTION - PAIN TYPE
TYPE: SURGICAL PAIN

## 2019-02-20 ASSESSMENT — PAIN DESCRIPTION - LOCATION
LOCATION: LEG;GROIN
LOCATION: GROIN;LEG
LOCATION: GROIN
LOCATION: GROIN;LEG

## 2019-02-20 ASSESSMENT — PAIN DESCRIPTION - DESCRIPTORS: DESCRIPTORS: ACHING;DISCOMFORT

## 2019-02-20 ASSESSMENT — PAIN DESCRIPTION - ORIENTATION
ORIENTATION: RIGHT;LEFT
ORIENTATION: RIGHT;LEFT

## 2019-02-20 ASSESSMENT — PAIN DESCRIPTION - FREQUENCY: FREQUENCY: CONTINUOUS

## 2019-02-20 ASSESSMENT — PAIN DESCRIPTION - PROGRESSION: CLINICAL_PROGRESSION: NOT CHANGED

## 2019-02-20 ASSESSMENT — PAIN DESCRIPTION - ONSET: ONSET: ON-GOING

## 2019-02-21 LAB
ABO: NORMAL
ANION GAP SERPL CALCULATED.3IONS-SCNC: 14 MEQ/L (ref 8–16)
ANTIBODY SCREEN: NORMAL
APTT: 80.6 SECONDS (ref 22–38)
BUN BLDV-MCNC: 26 MG/DL (ref 7–22)
CALCIUM SERPL-MCNC: 8.4 MG/DL (ref 8.5–10.5)
CHLORIDE BLD-SCNC: 103 MEQ/L (ref 98–111)
CO2: 21 MEQ/L (ref 23–33)
CREAT SERPL-MCNC: 1.2 MG/DL (ref 0.4–1.2)
ERYTHROCYTE [DISTWIDTH] IN BLOOD BY AUTOMATED COUNT: 15.2 % (ref 11.5–14.5)
ERYTHROCYTE [DISTWIDTH] IN BLOOD BY AUTOMATED COUNT: 49.8 FL (ref 35–45)
GFR SERPL CREATININE-BSD FRML MDRD: 43 ML/MIN/1.73M2
GLUCOSE BLD-MCNC: 94 MG/DL (ref 70–108)
HCT VFR BLD CALC: 22.6 % (ref 37–47)
HCT VFR BLD CALC: 22.7 % (ref 37–47)
HCT VFR BLD CALC: 23 % (ref 37–47)
HEMOGLOBIN: 6.9 GM/DL (ref 12–16)
HEMOGLOBIN: 7.1 GM/DL (ref 12–16)
HEMOGLOBIN: 7.8 GM/DL (ref 12–16)
MCH RBC QN AUTO: 27.5 PG (ref 26–33)
MCHC RBC AUTO-ENTMCNC: 30.4 GM/DL (ref 32.2–35.5)
MCV RBC AUTO: 90.4 FL (ref 81–99)
PLATELET # BLD: 158 THOU/MM3 (ref 130–400)
PMV BLD AUTO: 10.9 FL (ref 9.4–12.4)
POTASSIUM SERPL-SCNC: 4 MEQ/L (ref 3.5–5.2)
RBC # BLD: 2.51 MILL/MM3 (ref 4.2–5.4)
RH FACTOR: NORMAL
SCAN OF BLOOD SMEAR: NORMAL
SODIUM BLD-SCNC: 138 MEQ/L (ref 135–145)
WBC # BLD: 6.1 THOU/MM3 (ref 4.8–10.8)

## 2019-02-21 PROCEDURE — 86900 BLOOD TYPING SEROLOGIC ABO: CPT

## 2019-02-21 PROCEDURE — 86901 BLOOD TYPING SEROLOGIC RH(D): CPT

## 2019-02-21 PROCEDURE — 99223 1ST HOSP IP/OBS HIGH 75: CPT | Performed by: INTERNAL MEDICINE

## 2019-02-21 PROCEDURE — C9113 INJ PANTOPRAZOLE SODIUM, VIA: HCPCS | Performed by: INTERNAL MEDICINE

## 2019-02-21 PROCEDURE — 6360000002 HC RX W HCPCS: Performed by: INTERNAL MEDICINE

## 2019-02-21 PROCEDURE — 2580000003 HC RX 258: Performed by: THORACIC SURGERY (CARDIOTHORACIC VASCULAR SURGERY)

## 2019-02-21 PROCEDURE — 36415 COLL VENOUS BLD VENIPUNCTURE: CPT

## 2019-02-21 PROCEDURE — 97116 GAIT TRAINING THERAPY: CPT

## 2019-02-21 PROCEDURE — 2709999900 HC NON-CHARGEABLE SUPPLY

## 2019-02-21 PROCEDURE — 6370000000 HC RX 637 (ALT 250 FOR IP): Performed by: THORACIC SURGERY (CARDIOTHORACIC VASCULAR SURGERY)

## 2019-02-21 PROCEDURE — 80048 BASIC METABOLIC PNL TOTAL CA: CPT

## 2019-02-21 PROCEDURE — 85027 COMPLETE CBC AUTOMATED: CPT

## 2019-02-21 PROCEDURE — 2060000000 HC ICU INTERMEDIATE R&B

## 2019-02-21 PROCEDURE — 86850 RBC ANTIBODY SCREEN: CPT

## 2019-02-21 PROCEDURE — 85730 THROMBOPLASTIN TIME PARTIAL: CPT

## 2019-02-21 PROCEDURE — 85014 HEMATOCRIT: CPT

## 2019-02-21 PROCEDURE — 85018 HEMOGLOBIN: CPT

## 2019-02-21 PROCEDURE — 2500000003 HC RX 250 WO HCPCS: Performed by: THORACIC SURGERY (CARDIOTHORACIC VASCULAR SURGERY)

## 2019-02-21 PROCEDURE — APPSS30 APP SPLIT SHARED TIME 16-30 MINUTES: Performed by: PHYSICIAN ASSISTANT

## 2019-02-21 PROCEDURE — 6370000000 HC RX 637 (ALT 250 FOR IP): Performed by: PHYSICIAN ASSISTANT

## 2019-02-21 RX ORDER — LANOLIN ALCOHOL/MO/W.PET/CERES
6 CREAM (GRAM) TOPICAL NIGHTLY PRN
Status: DISCONTINUED | OUTPATIENT
Start: 2019-02-21 | End: 2019-02-26 | Stop reason: HOSPADM

## 2019-02-21 RX ORDER — PANTOPRAZOLE SODIUM 40 MG/10ML
40 INJECTION, POWDER, LYOPHILIZED, FOR SOLUTION INTRAVENOUS 2 TIMES DAILY
Status: DISCONTINUED | OUTPATIENT
Start: 2019-02-21 | End: 2019-02-26 | Stop reason: HOSPADM

## 2019-02-21 RX ADMIN — CLOPIDOGREL 75 MG: 75 TABLET, FILM COATED ORAL at 09:31

## 2019-02-21 RX ADMIN — MIDODRINE HYDROCHLORIDE 10 MG: 10 TABLET ORAL at 09:31

## 2019-02-21 RX ADMIN — Medication 10 ML: at 20:40

## 2019-02-21 RX ADMIN — ASPIRIN 325 MG: 325 TABLET, DELAYED RELEASE ORAL at 09:31

## 2019-02-21 RX ADMIN — MIDODRINE HYDROCHLORIDE 10 MG: 10 TABLET ORAL at 12:01

## 2019-02-21 RX ADMIN — ATORVASTATIN CALCIUM 20 MG: 20 TABLET, FILM COATED ORAL at 20:40

## 2019-02-21 RX ADMIN — FAMOTIDINE 20 MG: 10 INJECTION, SOLUTION INTRAVENOUS at 09:32

## 2019-02-21 RX ADMIN — Medication 6 MG: at 22:11

## 2019-02-21 RX ADMIN — DOCUSATE SODIUM 100 MG: 100 CAPSULE, LIQUID FILLED ORAL at 09:31

## 2019-02-21 RX ADMIN — ACETAMINOPHEN 650 MG: 325 TABLET ORAL at 20:48

## 2019-02-21 RX ADMIN — ACETAMINOPHEN 650 MG: 325 TABLET ORAL at 14:20

## 2019-02-21 RX ADMIN — AMIODARONE HYDROCHLORIDE 200 MG: 200 TABLET ORAL at 09:31

## 2019-02-21 RX ADMIN — PANTOPRAZOLE SODIUM 40 MG: 40 INJECTION, POWDER, FOR SOLUTION INTRAVENOUS at 20:39

## 2019-02-21 RX ADMIN — ACETAMINOPHEN 650 MG: 325 TABLET ORAL at 03:59

## 2019-02-21 RX ADMIN — MIDODRINE HYDROCHLORIDE 10 MG: 10 TABLET ORAL at 16:51

## 2019-02-21 RX ADMIN — HEPARIN SODIUM AND DEXTROSE 16 UNITS/KG/HR: 10000; 5 INJECTION INTRAVENOUS at 10:46

## 2019-02-21 RX ADMIN — ACETAMINOPHEN 650 MG: 325 TABLET ORAL at 10:07

## 2019-02-21 RX ADMIN — LEVOFLOXACIN 500 MG: 500 TABLET, FILM COATED ORAL at 09:32

## 2019-02-21 ASSESSMENT — PAIN DESCRIPTION - ONSET
ONSET: ON-GOING

## 2019-02-21 ASSESSMENT — PAIN DESCRIPTION - ORIENTATION
ORIENTATION: RIGHT;LEFT

## 2019-02-21 ASSESSMENT — PAIN DESCRIPTION - LOCATION
LOCATION: LEG;GROIN

## 2019-02-21 ASSESSMENT — PAIN DESCRIPTION - FREQUENCY
FREQUENCY: CONTINUOUS

## 2019-02-21 ASSESSMENT — PAIN DESCRIPTION - DESCRIPTORS
DESCRIPTORS: ACHING;DISCOMFORT

## 2019-02-21 ASSESSMENT — PAIN DESCRIPTION - PAIN TYPE
TYPE: SURGICAL PAIN

## 2019-02-21 ASSESSMENT — PAIN DESCRIPTION - PROGRESSION
CLINICAL_PROGRESSION: NOT CHANGED

## 2019-02-21 ASSESSMENT — PAIN SCALES - GENERAL
PAINLEVEL_OUTOF10: 4

## 2019-02-22 ENCOUNTER — ANESTHESIA EVENT (OUTPATIENT)
Dept: ENDOSCOPY | Age: 84
DRG: 268 | End: 2019-02-22
Payer: MEDICARE

## 2019-02-22 ENCOUNTER — ANESTHESIA (OUTPATIENT)
Dept: ENDOSCOPY | Age: 84
DRG: 268 | End: 2019-02-22
Payer: MEDICARE

## 2019-02-22 VITALS
RESPIRATION RATE: 27 BRPM | OXYGEN SATURATION: 93 % | DIASTOLIC BLOOD PRESSURE: 47 MMHG | SYSTOLIC BLOOD PRESSURE: 86 MMHG

## 2019-02-22 LAB
ANION GAP SERPL CALCULATED.3IONS-SCNC: 17 MEQ/L (ref 8–16)
BUN BLDV-MCNC: 27 MG/DL (ref 7–22)
CALCIUM SERPL-MCNC: 8.9 MG/DL (ref 8.5–10.5)
CHLORIDE BLD-SCNC: 104 MEQ/L (ref 98–111)
CO2: 19 MEQ/L (ref 23–33)
CREAT SERPL-MCNC: 1.3 MG/DL (ref 0.4–1.2)
ERYTHROCYTE [DISTWIDTH] IN BLOOD BY AUTOMATED COUNT: 15.3 % (ref 11.5–14.5)
ERYTHROCYTE [DISTWIDTH] IN BLOOD BY AUTOMATED COUNT: 51.1 FL (ref 35–45)
GFR SERPL CREATININE-BSD FRML MDRD: 39 ML/MIN/1.73M2
GLUCOSE BLD-MCNC: 104 MG/DL (ref 70–108)
HCT VFR BLD CALC: 24.6 % (ref 37–47)
HEMOGLOBIN: 7.6 GM/DL (ref 12–16)
MCH RBC QN AUTO: 28.5 PG (ref 26–33)
MCHC RBC AUTO-ENTMCNC: 30.9 GM/DL (ref 32.2–35.5)
MCV RBC AUTO: 92.1 FL (ref 81–99)
PLATELET # BLD: 169 THOU/MM3 (ref 130–400)
PMV BLD AUTO: 10.9 FL (ref 9.4–12.4)
POTASSIUM SERPL-SCNC: 4 MEQ/L (ref 3.5–5.2)
RBC # BLD: 2.67 MILL/MM3 (ref 4.2–5.4)
SODIUM BLD-SCNC: 140 MEQ/L (ref 135–145)
WBC # BLD: 6.6 THOU/MM3 (ref 4.8–10.8)

## 2019-02-22 PROCEDURE — 7100000001 HC PACU RECOVERY - ADDTL 15 MIN: Performed by: INTERNAL MEDICINE

## 2019-02-22 PROCEDURE — 6370000000 HC RX 637 (ALT 250 FOR IP): Performed by: PHYSICIAN ASSISTANT

## 2019-02-22 PROCEDURE — 2709999900 HC NON-CHARGEABLE SUPPLY: Performed by: INTERNAL MEDICINE

## 2019-02-22 PROCEDURE — 0DB38ZX EXCISION OF LOWER ESOPHAGUS, VIA NATURAL OR ARTIFICIAL OPENING ENDOSCOPIC, DIAGNOSTIC: ICD-10-PCS | Performed by: INTERNAL MEDICINE

## 2019-02-22 PROCEDURE — 2580000003 HC RX 258: Performed by: THORACIC SURGERY (CARDIOTHORACIC VASCULAR SURGERY)

## 2019-02-22 PROCEDURE — 6360000002 HC RX W HCPCS: Performed by: REGISTERED NURSE

## 2019-02-22 PROCEDURE — 0DB68ZX EXCISION OF STOMACH, VIA NATURAL OR ARTIFICIAL OPENING ENDOSCOPIC, DIAGNOSTIC: ICD-10-PCS | Performed by: INTERNAL MEDICINE

## 2019-02-22 PROCEDURE — 2709999900 HC NON-CHARGEABLE SUPPLY

## 2019-02-22 PROCEDURE — 0DJD8ZZ INSPECTION OF LOWER INTESTINAL TRACT, VIA NATURAL OR ARTIFICIAL OPENING ENDOSCOPIC: ICD-10-PCS | Performed by: INTERNAL MEDICINE

## 2019-02-22 PROCEDURE — 88305 TISSUE EXAM BY PATHOLOGIST: CPT

## 2019-02-22 PROCEDURE — 6370000000 HC RX 637 (ALT 250 FOR IP): Performed by: THORACIC SURGERY (CARDIOTHORACIC VASCULAR SURGERY)

## 2019-02-22 PROCEDURE — 6370000000 HC RX 637 (ALT 250 FOR IP): Performed by: INTERNAL MEDICINE

## 2019-02-22 PROCEDURE — 6360000002 HC RX W HCPCS: Performed by: INTERNAL MEDICINE

## 2019-02-22 PROCEDURE — 2060000000 HC ICU INTERMEDIATE R&B

## 2019-02-22 PROCEDURE — 7100000000 HC PACU RECOVERY - FIRST 15 MIN: Performed by: INTERNAL MEDICINE

## 2019-02-22 PROCEDURE — 3609009500 HC COLONOSCOPY DIAGNOSTIC OR SCREENING: Performed by: INTERNAL MEDICINE

## 2019-02-22 PROCEDURE — 3609012400 HC EGD TRANSORAL BIOPSY SINGLE/MULTIPLE: Performed by: INTERNAL MEDICINE

## 2019-02-22 PROCEDURE — 80048 BASIC METABOLIC PNL TOTAL CA: CPT

## 2019-02-22 PROCEDURE — 85027 COMPLETE CBC AUTOMATED: CPT

## 2019-02-22 PROCEDURE — 3700000000 HC ANESTHESIA ATTENDED CARE: Performed by: INTERNAL MEDICINE

## 2019-02-22 PROCEDURE — 3700000001 HC ADD 15 MINUTES (ANESTHESIA): Performed by: INTERNAL MEDICINE

## 2019-02-22 PROCEDURE — C9113 INJ PANTOPRAZOLE SODIUM, VIA: HCPCS | Performed by: INTERNAL MEDICINE

## 2019-02-22 PROCEDURE — 2580000003 HC RX 258: Performed by: INTERNAL MEDICINE

## 2019-02-22 PROCEDURE — 36415 COLL VENOUS BLD VENIPUNCTURE: CPT

## 2019-02-22 PROCEDURE — APPSS30 APP SPLIT SHARED TIME 16-30 MINUTES: Performed by: PHYSICIAN ASSISTANT

## 2019-02-22 PROCEDURE — 97530 THERAPEUTIC ACTIVITIES: CPT

## 2019-02-22 RX ORDER — PROPOFOL 10 MG/ML
INJECTION, EMULSION INTRAVENOUS PRN
Status: DISCONTINUED | OUTPATIENT
Start: 2019-02-22 | End: 2019-02-22 | Stop reason: SDUPTHER

## 2019-02-22 RX ORDER — SODIUM CHLORIDE 450 MG/100ML
INJECTION, SOLUTION INTRAVENOUS CONTINUOUS
Status: DISCONTINUED | OUTPATIENT
Start: 2019-02-22 | End: 2019-02-26 | Stop reason: HOSPADM

## 2019-02-22 RX ORDER — ATORVASTATIN CALCIUM 40 MG/1
40 TABLET, FILM COATED ORAL NIGHTLY
Status: DISCONTINUED | OUTPATIENT
Start: 2019-02-22 | End: 2019-02-26 | Stop reason: HOSPADM

## 2019-02-22 RX ADMIN — MIDODRINE HYDROCHLORIDE 10 MG: 10 TABLET ORAL at 08:20

## 2019-02-22 RX ADMIN — AMIODARONE HYDROCHLORIDE 200 MG: 200 TABLET ORAL at 08:20

## 2019-02-22 RX ADMIN — PROPOFOL 250 MG: 10 INJECTION, EMULSION INTRAVENOUS at 12:27

## 2019-02-22 RX ADMIN — ACETAMINOPHEN 650 MG: 325 TABLET ORAL at 04:09

## 2019-02-22 RX ADMIN — Medication 10 ML: at 08:20

## 2019-02-22 RX ADMIN — ACETAMINOPHEN 650 MG: 325 TABLET ORAL at 23:18

## 2019-02-22 RX ADMIN — SODIUM CHLORIDE: 4.5 INJECTION, SOLUTION INTRAVENOUS at 12:03

## 2019-02-22 RX ADMIN — DOCUSATE SODIUM 100 MG: 100 CAPSULE, LIQUID FILLED ORAL at 08:19

## 2019-02-22 RX ADMIN — PANTOPRAZOLE SODIUM 40 MG: 40 INJECTION, POWDER, FOR SOLUTION INTRAVENOUS at 08:20

## 2019-02-22 RX ADMIN — Medication 10 ML: at 20:41

## 2019-02-22 RX ADMIN — ASPIRIN 325 MG: 325 TABLET, DELAYED RELEASE ORAL at 08:19

## 2019-02-22 RX ADMIN — ACETAMINOPHEN 650 MG: 325 TABLET ORAL at 18:38

## 2019-02-22 RX ADMIN — ATORVASTATIN CALCIUM 40 MG: 40 TABLET, FILM COATED ORAL at 20:40

## 2019-02-22 RX ADMIN — LEVOFLOXACIN 500 MG: 500 TABLET, FILM COATED ORAL at 08:20

## 2019-02-22 RX ADMIN — ACETAMINOPHEN 650 MG: 325 TABLET ORAL at 08:19

## 2019-02-22 RX ADMIN — PANTOPRAZOLE SODIUM 40 MG: 40 INJECTION, POWDER, FOR SOLUTION INTRAVENOUS at 20:40

## 2019-02-22 ASSESSMENT — PAIN SCALES - GENERAL
PAINLEVEL_OUTOF10: 2
PAINLEVEL_OUTOF10: 0
PAINLEVEL_OUTOF10: 4
PAINLEVEL_OUTOF10: 4
PAINLEVEL_OUTOF10: 3
PAINLEVEL_OUTOF10: 4

## 2019-02-22 ASSESSMENT — LIFESTYLE VARIABLES: SMOKING_STATUS: 1

## 2019-02-22 ASSESSMENT — PAIN - FUNCTIONAL ASSESSMENT: PAIN_FUNCTIONAL_ASSESSMENT: 0-10

## 2019-02-23 LAB
ANION GAP SERPL CALCULATED.3IONS-SCNC: 15 MEQ/L (ref 8–16)
BUN BLDV-MCNC: 23 MG/DL (ref 7–22)
CALCIUM SERPL-MCNC: 8.9 MG/DL (ref 8.5–10.5)
CHLORIDE BLD-SCNC: 104 MEQ/L (ref 98–111)
CO2: 19 MEQ/L (ref 23–33)
CREAT SERPL-MCNC: 1.1 MG/DL (ref 0.4–1.2)
ERYTHROCYTE [DISTWIDTH] IN BLOOD BY AUTOMATED COUNT: 15.3 % (ref 11.5–14.5)
ERYTHROCYTE [DISTWIDTH] IN BLOOD BY AUTOMATED COUNT: 51.5 FL (ref 35–45)
GFR SERPL CREATININE-BSD FRML MDRD: 48 ML/MIN/1.73M2
GLUCOSE BLD-MCNC: 93 MG/DL (ref 70–108)
HCT VFR BLD CALC: 23.1 % (ref 37–47)
HEMOGLOBIN: 7 GM/DL (ref 12–16)
MCH RBC QN AUTO: 28.3 PG (ref 26–33)
MCHC RBC AUTO-ENTMCNC: 30.3 GM/DL (ref 32.2–35.5)
MCV RBC AUTO: 93.5 FL (ref 81–99)
PLATELET # BLD: 146 THOU/MM3 (ref 130–400)
PMV BLD AUTO: 10.8 FL (ref 9.4–12.4)
POTASSIUM SERPL-SCNC: 3.8 MEQ/L (ref 3.5–5.2)
RBC # BLD: 2.47 MILL/MM3 (ref 4.2–5.4)
SODIUM BLD-SCNC: 138 MEQ/L (ref 135–145)
WBC # BLD: 5.2 THOU/MM3 (ref 4.8–10.8)

## 2019-02-23 PROCEDURE — 6370000000 HC RX 637 (ALT 250 FOR IP): Performed by: THORACIC SURGERY (CARDIOTHORACIC VASCULAR SURGERY)

## 2019-02-23 PROCEDURE — 2060000000 HC ICU INTERMEDIATE R&B

## 2019-02-23 PROCEDURE — 6370000000 HC RX 637 (ALT 250 FOR IP): Performed by: INTERNAL MEDICINE

## 2019-02-23 PROCEDURE — C9113 INJ PANTOPRAZOLE SODIUM, VIA: HCPCS | Performed by: INTERNAL MEDICINE

## 2019-02-23 PROCEDURE — 2580000003 HC RX 258: Performed by: THORACIC SURGERY (CARDIOTHORACIC VASCULAR SURGERY)

## 2019-02-23 PROCEDURE — 6360000002 HC RX W HCPCS: Performed by: INTERNAL MEDICINE

## 2019-02-23 PROCEDURE — 36415 COLL VENOUS BLD VENIPUNCTURE: CPT

## 2019-02-23 PROCEDURE — 6370000000 HC RX 637 (ALT 250 FOR IP): Performed by: PHYSICIAN ASSISTANT

## 2019-02-23 PROCEDURE — 80048 BASIC METABOLIC PNL TOTAL CA: CPT

## 2019-02-23 PROCEDURE — 85027 COMPLETE CBC AUTOMATED: CPT

## 2019-02-23 RX ADMIN — DOCUSATE SODIUM 100 MG: 100 CAPSULE, LIQUID FILLED ORAL at 21:26

## 2019-02-23 RX ADMIN — ACETAMINOPHEN 650 MG: 325 TABLET ORAL at 03:57

## 2019-02-23 RX ADMIN — MIDODRINE HYDROCHLORIDE 10 MG: 10 TABLET ORAL at 08:49

## 2019-02-23 RX ADMIN — ACETAMINOPHEN 650 MG: 325 TABLET ORAL at 17:27

## 2019-02-23 RX ADMIN — ATORVASTATIN CALCIUM 40 MG: 40 TABLET, FILM COATED ORAL at 21:26

## 2019-02-23 RX ADMIN — DOCUSATE SODIUM 100 MG: 100 CAPSULE, LIQUID FILLED ORAL at 08:49

## 2019-02-23 RX ADMIN — Medication 10 ML: at 08:49

## 2019-02-23 RX ADMIN — CLOPIDOGREL 75 MG: 75 TABLET, FILM COATED ORAL at 08:49

## 2019-02-23 RX ADMIN — LEVOFLOXACIN 500 MG: 500 TABLET, FILM COATED ORAL at 08:49

## 2019-02-23 RX ADMIN — ACETAMINOPHEN 650 MG: 325 TABLET ORAL at 21:27

## 2019-02-23 RX ADMIN — MIDODRINE HYDROCHLORIDE 10 MG: 10 TABLET ORAL at 17:27

## 2019-02-23 RX ADMIN — ASPIRIN 325 MG: 325 TABLET, DELAYED RELEASE ORAL at 08:50

## 2019-02-23 RX ADMIN — ACETAMINOPHEN 650 MG: 325 TABLET ORAL at 13:24

## 2019-02-23 RX ADMIN — PANTOPRAZOLE SODIUM 40 MG: 40 INJECTION, POWDER, FOR SOLUTION INTRAVENOUS at 08:49

## 2019-02-23 RX ADMIN — MIDODRINE HYDROCHLORIDE 10 MG: 10 TABLET ORAL at 13:25

## 2019-02-23 RX ADMIN — PANTOPRAZOLE SODIUM 40 MG: 40 INJECTION, POWDER, FOR SOLUTION INTRAVENOUS at 21:26

## 2019-02-23 RX ADMIN — Medication 10 ML: at 21:26

## 2019-02-23 ASSESSMENT — PAIN SCALES - GENERAL
PAINLEVEL_OUTOF10: 1
PAINLEVEL_OUTOF10: 4
PAINLEVEL_OUTOF10: 3
PAINLEVEL_OUTOF10: 4
PAINLEVEL_OUTOF10: 3

## 2019-02-24 LAB
ABO: NORMAL
ANION GAP SERPL CALCULATED.3IONS-SCNC: 15 MEQ/L (ref 8–16)
ANTIBODY SCREEN: NORMAL
BACTERIA: ABNORMAL
BILIRUBIN URINE: NEGATIVE
BLOOD, URINE: ABNORMAL
BUN BLDV-MCNC: 26 MG/DL (ref 7–22)
CALCIUM SERPL-MCNC: 9 MG/DL (ref 8.5–10.5)
CASTS: ABNORMAL /LPF
CASTS: ABNORMAL /LPF
CHARACTER, URINE: ABNORMAL
CHLORIDE BLD-SCNC: 102 MEQ/L (ref 98–111)
CO2: 24 MEQ/L (ref 23–33)
COLOR: YELLOW
CREAT SERPL-MCNC: 1.4 MG/DL (ref 0.4–1.2)
CRYSTALS: ABNORMAL
EPITHELIAL CELLS, UA: ABNORMAL /HPF
ERYTHROCYTE [DISTWIDTH] IN BLOOD BY AUTOMATED COUNT: 15.9 % (ref 11.5–14.5)
ERYTHROCYTE [DISTWIDTH] IN BLOOD BY AUTOMATED COUNT: 49.3 FL (ref 35–45)
GFR SERPL CREATININE-BSD FRML MDRD: 36 ML/MIN/1.73M2
GLUCOSE BLD-MCNC: 99 MG/DL (ref 70–108)
GLUCOSE, URINE: NEGATIVE MG/DL
HCT VFR BLD CALC: 24.4 % (ref 37–47)
HEMOGLOBIN: 7.4 GM/DL (ref 12–16)
KETONES, URINE: NEGATIVE
LEUKOCYTE EST, POC: ABNORMAL
MAGNESIUM: 1.3 MG/DL (ref 1.6–2.4)
MCH RBC QN AUTO: 27.2 PG (ref 26–33)
MCHC RBC AUTO-ENTMCNC: 30.3 GM/DL (ref 32.2–35.5)
MCV RBC AUTO: 89.7 FL (ref 81–99)
MISCELLANEOUS LAB TEST RESULT: ABNORMAL
NITRITE, URINE: NEGATIVE
PH UA: 5.5
PLATELET # BLD: 179 THOU/MM3 (ref 130–400)
PMV BLD AUTO: 10.8 FL (ref 9.4–12.4)
POTASSIUM SERPL-SCNC: 4 MEQ/L (ref 3.5–5.2)
PROTEIN UA: NEGATIVE MG/DL
RBC # BLD: 2.72 MILL/MM3 (ref 4.2–5.4)
RBC URINE: ABNORMAL /HPF
RENAL EPITHELIAL, UA: ABNORMAL
RH FACTOR: NORMAL
SODIUM BLD-SCNC: 141 MEQ/L (ref 135–145)
SPECIFIC GRAVITY UA: 1.01 (ref 1–1.03)
UROBILINOGEN, URINE: 0.2 EU/DL
WBC # BLD: 6.1 THOU/MM3 (ref 4.8–10.8)
WBC UA: > 100 /HPF
YEAST: ABNORMAL

## 2019-02-24 PROCEDURE — 86850 RBC ANTIBODY SCREEN: CPT

## 2019-02-24 PROCEDURE — 6360000002 HC RX W HCPCS: Performed by: INTERNAL MEDICINE

## 2019-02-24 PROCEDURE — 2580000003 HC RX 258: Performed by: THORACIC SURGERY (CARDIOTHORACIC VASCULAR SURGERY)

## 2019-02-24 PROCEDURE — 2060000000 HC ICU INTERMEDIATE R&B

## 2019-02-24 PROCEDURE — 6370000000 HC RX 637 (ALT 250 FOR IP): Performed by: THORACIC SURGERY (CARDIOTHORACIC VASCULAR SURGERY)

## 2019-02-24 PROCEDURE — 2709999900 HC NON-CHARGEABLE SUPPLY

## 2019-02-24 PROCEDURE — 2580000003 HC RX 258: Performed by: NURSE PRACTITIONER

## 2019-02-24 PROCEDURE — 85027 COMPLETE CBC AUTOMATED: CPT

## 2019-02-24 PROCEDURE — 36415 COLL VENOUS BLD VENIPUNCTURE: CPT

## 2019-02-24 PROCEDURE — 86901 BLOOD TYPING SEROLOGIC RH(D): CPT

## 2019-02-24 PROCEDURE — 83735 ASSAY OF MAGNESIUM: CPT

## 2019-02-24 PROCEDURE — 6370000000 HC RX 637 (ALT 250 FOR IP): Performed by: PHYSICIAN ASSISTANT

## 2019-02-24 PROCEDURE — 80048 BASIC METABOLIC PNL TOTAL CA: CPT

## 2019-02-24 PROCEDURE — 81001 URINALYSIS AUTO W/SCOPE: CPT

## 2019-02-24 PROCEDURE — 86900 BLOOD TYPING SEROLOGIC ABO: CPT

## 2019-02-24 PROCEDURE — P9016 RBC LEUKOCYTES REDUCED: HCPCS

## 2019-02-24 PROCEDURE — C9113 INJ PANTOPRAZOLE SODIUM, VIA: HCPCS | Performed by: INTERNAL MEDICINE

## 2019-02-24 PROCEDURE — 6370000000 HC RX 637 (ALT 250 FOR IP): Performed by: INTERNAL MEDICINE

## 2019-02-24 PROCEDURE — 6360000002 HC RX W HCPCS: Performed by: THORACIC SURGERY (CARDIOTHORACIC VASCULAR SURGERY)

## 2019-02-24 PROCEDURE — 36430 TRANSFUSION BLD/BLD COMPNT: CPT

## 2019-02-24 PROCEDURE — 86923 COMPATIBILITY TEST ELECTRIC: CPT

## 2019-02-24 RX ORDER — DIPHENHYDRAMINE HYDROCHLORIDE 50 MG/ML
50 INJECTION INTRAMUSCULAR; INTRAVENOUS ONCE
Status: DISCONTINUED | OUTPATIENT
Start: 2019-02-24 | End: 2019-02-26 | Stop reason: HOSPADM

## 2019-02-24 RX ORDER — SODIUM CHLORIDE 0.9 % (FLUSH) 0.9 %
10 SYRINGE (ML) INJECTION EVERY 12 HOURS SCHEDULED
Status: DISCONTINUED | OUTPATIENT
Start: 2019-02-24 | End: 2019-02-25 | Stop reason: SDUPTHER

## 2019-02-24 RX ORDER — MAGNESIUM SULFATE IN WATER 40 MG/ML
2 INJECTION, SOLUTION INTRAVENOUS ONCE
Status: COMPLETED | OUTPATIENT
Start: 2019-02-24 | End: 2019-02-24

## 2019-02-24 RX ORDER — SODIUM CHLORIDE 0.9 % (FLUSH) 0.9 %
10 SYRINGE (ML) INJECTION PRN
Status: DISCONTINUED | OUTPATIENT
Start: 2019-02-24 | End: 2019-02-25 | Stop reason: SDUPTHER

## 2019-02-24 RX ORDER — 0.9 % SODIUM CHLORIDE 0.9 %
250 INTRAVENOUS SOLUTION INTRAVENOUS ONCE
Status: COMPLETED | OUTPATIENT
Start: 2019-02-24 | End: 2019-02-25

## 2019-02-24 RX ORDER — SODIUM CHLORIDE 9 MG/ML
INJECTION, SOLUTION INTRAVENOUS CONTINUOUS
Status: DISCONTINUED | OUTPATIENT
Start: 2019-02-25 | End: 2019-02-24

## 2019-02-24 RX ADMIN — ACETAMINOPHEN 650 MG: 325 TABLET ORAL at 06:37

## 2019-02-24 RX ADMIN — MIDODRINE HYDROCHLORIDE 10 MG: 10 TABLET ORAL at 17:33

## 2019-02-24 RX ADMIN — ACETAMINOPHEN 650 MG: 325 TABLET ORAL at 20:03

## 2019-02-24 RX ADMIN — MAGNESIUM SULFATE HEPTAHYDRATE 2 G: 40 INJECTION, SOLUTION INTRAVENOUS at 06:31

## 2019-02-24 RX ADMIN — ATORVASTATIN CALCIUM 40 MG: 40 TABLET, FILM COATED ORAL at 19:58

## 2019-02-24 RX ADMIN — ASPIRIN 325 MG: 325 TABLET, DELAYED RELEASE ORAL at 08:59

## 2019-02-24 RX ADMIN — DOCUSATE SODIUM 100 MG: 100 CAPSULE, LIQUID FILLED ORAL at 19:59

## 2019-02-24 RX ADMIN — CLOPIDOGREL 75 MG: 75 TABLET, FILM COATED ORAL at 08:59

## 2019-02-24 RX ADMIN — Medication 10 ML: at 09:00

## 2019-02-24 RX ADMIN — SODIUM CHLORIDE 250 ML: 9 INJECTION, SOLUTION INTRAVENOUS at 14:30

## 2019-02-24 RX ADMIN — Medication 10 ML: at 19:59

## 2019-02-24 RX ADMIN — LEVOFLOXACIN 500 MG: 500 TABLET, FILM COATED ORAL at 08:59

## 2019-02-24 RX ADMIN — PANTOPRAZOLE SODIUM 40 MG: 40 INJECTION, POWDER, FOR SOLUTION INTRAVENOUS at 19:59

## 2019-02-24 RX ADMIN — DOCUSATE SODIUM 100 MG: 100 CAPSULE, LIQUID FILLED ORAL at 08:59

## 2019-02-24 RX ADMIN — ACETAMINOPHEN 650 MG: 325 TABLET ORAL at 12:03

## 2019-02-24 RX ADMIN — MIDODRINE HYDROCHLORIDE 10 MG: 10 TABLET ORAL at 08:59

## 2019-02-24 RX ADMIN — ACETAMINOPHEN 650 MG: 325 TABLET ORAL at 16:02

## 2019-02-24 RX ADMIN — ACETAMINOPHEN 650 MG: 325 TABLET ORAL at 02:33

## 2019-02-24 RX ADMIN — PANTOPRAZOLE SODIUM 40 MG: 40 INJECTION, POWDER, FOR SOLUTION INTRAVENOUS at 08:59

## 2019-02-24 ASSESSMENT — PAIN SCALES - GENERAL
PAINLEVEL_OUTOF10: 4
PAINLEVEL_OUTOF10: 1
PAINLEVEL_OUTOF10: 0
PAINLEVEL_OUTOF10: 4
PAINLEVEL_OUTOF10: 4
PAINLEVEL_OUTOF10: 1
PAINLEVEL_OUTOF10: 3

## 2019-02-25 ENCOUNTER — APPOINTMENT (OUTPATIENT)
Dept: INTERVENTIONAL RADIOLOGY/VASCULAR | Age: 84
DRG: 268 | End: 2019-02-25
Attending: THORACIC SURGERY (CARDIOTHORACIC VASCULAR SURGERY)
Payer: MEDICARE

## 2019-02-25 LAB
ACTIVATED CLOTTING TIME: 208 SECONDS (ref 1–150)
ANION GAP SERPL CALCULATED.3IONS-SCNC: 15 MEQ/L (ref 8–16)
APTT: 30 SECONDS (ref 22–38)
BUN BLDV-MCNC: 30 MG/DL (ref 7–22)
CALCIUM SERPL-MCNC: 8.9 MG/DL (ref 8.5–10.5)
CHLORIDE BLD-SCNC: 104 MEQ/L (ref 98–111)
CO2: 23 MEQ/L (ref 23–33)
CREAT SERPL-MCNC: 1.2 MG/DL (ref 0.4–1.2)
EKG ATRIAL RATE: 99 BPM
EKG P AXIS: 12 DEGREES
EKG P-R INTERVAL: 188 MS
EKG Q-T INTERVAL: 370 MS
EKG QRS DURATION: 74 MS
EKG QTC CALCULATION (BAZETT): 474 MS
EKG R AXIS: -36 DEGREES
EKG T AXIS: 21 DEGREES
EKG VENTRICULAR RATE: 99 BPM
ERYTHROCYTE [DISTWIDTH] IN BLOOD BY AUTOMATED COUNT: 16.3 % (ref 11.5–14.5)
ERYTHROCYTE [DISTWIDTH] IN BLOOD BY AUTOMATED COUNT: 51.5 FL (ref 35–45)
GFR SERPL CREATININE-BSD FRML MDRD: 43 ML/MIN/1.73M2
GLUCOSE BLD-MCNC: 101 MG/DL (ref 70–108)
HCT VFR BLD CALC: 26 % (ref 37–47)
HEMOGLOBIN: 8.1 GM/DL (ref 12–16)
INR BLD: 1.2 (ref 0.85–1.13)
MCH RBC QN AUTO: 28.5 PG (ref 26–33)
MCHC RBC AUTO-ENTMCNC: 31.2 GM/DL (ref 32.2–35.5)
MCV RBC AUTO: 91.5 FL (ref 81–99)
PLATELET # BLD: 177 THOU/MM3 (ref 130–400)
PMV BLD AUTO: 10.8 FL (ref 9.4–12.4)
POTASSIUM REFLEX MAGNESIUM: 3.8 MEQ/L (ref 3.5–5.2)
RBC # BLD: 2.84 MILL/MM3 (ref 4.2–5.4)
SODIUM BLD-SCNC: 142 MEQ/L (ref 135–145)
WBC # BLD: 6.5 THOU/MM3 (ref 4.8–10.8)

## 2019-02-25 PROCEDURE — 2709999900 HC NON-CHARGEABLE SUPPLY

## 2019-02-25 PROCEDURE — 6370000000 HC RX 637 (ALT 250 FOR IP): Performed by: THORACIC SURGERY (CARDIOTHORACIC VASCULAR SURGERY)

## 2019-02-25 PROCEDURE — 6370000000 HC RX 637 (ALT 250 FOR IP): Performed by: PHYSICIAN ASSISTANT

## 2019-02-25 PROCEDURE — 6370000000 HC RX 637 (ALT 250 FOR IP): Performed by: INTERNAL MEDICINE

## 2019-02-25 PROCEDURE — 2580000003 HC RX 258: Performed by: NURSE PRACTITIONER

## 2019-02-25 PROCEDURE — 2140000000 HC CCU INTERMEDIATE R&B

## 2019-02-25 PROCEDURE — 6360000002 HC RX W HCPCS: Performed by: INTERNAL MEDICINE

## 2019-02-25 PROCEDURE — APPSS30 APP SPLIT SHARED TIME 16-30 MINUTES: Performed by: PHYSICIAN ASSISTANT

## 2019-02-25 PROCEDURE — 6360000004 HC RX CONTRAST MEDICATION: Performed by: INTERNAL MEDICINE

## 2019-02-25 PROCEDURE — 36415 COLL VENOUS BLD VENIPUNCTURE: CPT

## 2019-02-25 PROCEDURE — 80048 BASIC METABOLIC PNL TOTAL CA: CPT

## 2019-02-25 PROCEDURE — 85730 THROMBOPLASTIN TIME PARTIAL: CPT

## 2019-02-25 PROCEDURE — 2580000003 HC RX 258: Performed by: INTERNAL MEDICINE

## 2019-02-25 PROCEDURE — 85610 PROTHROMBIN TIME: CPT

## 2019-02-25 PROCEDURE — C1894 INTRO/SHEATH, NON-LASER: HCPCS

## 2019-02-25 PROCEDURE — 85027 COMPLETE CBC AUTOMATED: CPT

## 2019-02-25 PROCEDURE — 2500000003 HC RX 250 WO HCPCS

## 2019-02-25 PROCEDURE — 93005 ELECTROCARDIOGRAM TRACING: CPT | Performed by: NURSE PRACTITIONER

## 2019-02-25 PROCEDURE — 6370000000 HC RX 637 (ALT 250 FOR IP)

## 2019-02-25 PROCEDURE — 6360000002 HC RX W HCPCS

## 2019-02-25 PROCEDURE — 85347 COAGULATION TIME ACTIVATED: CPT

## 2019-02-25 PROCEDURE — C1769 GUIDE WIRE: HCPCS

## 2019-02-25 PROCEDURE — C1725 CATH, TRANSLUMIN NON-LASER: HCPCS

## 2019-02-25 PROCEDURE — C9113 INJ PANTOPRAZOLE SODIUM, VIA: HCPCS | Performed by: INTERNAL MEDICINE

## 2019-02-25 PROCEDURE — C1876 STENT, NON-COA/NON-COV W/DEL: HCPCS

## 2019-02-25 PROCEDURE — 37226 PR REVSC OPN/PRQ FEM/POP W/STNT/ANGIOP SM VSL: CPT | Performed by: INTERNAL MEDICINE

## 2019-02-25 PROCEDURE — C1760 CLOSURE DEV, VASC: HCPCS

## 2019-02-25 PROCEDURE — 93010 ELECTROCARDIOGRAM REPORT: CPT | Performed by: NUCLEAR MEDICINE

## 2019-02-25 PROCEDURE — 37230 HC TIB/PER PLASTY & STENT: CPT

## 2019-02-25 PROCEDURE — B40F1ZZ PLAIN RADIOGRAPHY OF RIGHT LOWER EXTREMITY ARTERIES USING LOW OSMOLAR CONTRAST: ICD-10-PCS | Performed by: INTERNAL MEDICINE

## 2019-02-25 RX ORDER — FENTANYL CITRATE 50 UG/ML
50 INJECTION, SOLUTION INTRAMUSCULAR; INTRAVENOUS ONCE
Status: COMPLETED | OUTPATIENT
Start: 2019-02-25 | End: 2019-02-25

## 2019-02-25 RX ORDER — MIDAZOLAM HYDROCHLORIDE 1 MG/ML
1 INJECTION INTRAMUSCULAR; INTRAVENOUS ONCE
Status: COMPLETED | OUTPATIENT
Start: 2019-02-25 | End: 2019-02-25

## 2019-02-25 RX ORDER — HEPARIN SODIUM 1000 [USP'U]/ML
6000 INJECTION, SOLUTION INTRAVENOUS; SUBCUTANEOUS ONCE
Status: COMPLETED | OUTPATIENT
Start: 2019-02-25 | End: 2019-02-25

## 2019-02-25 RX ORDER — CLOPIDOGREL 300 MG/1
600 TABLET, FILM COATED ORAL ONCE
Status: COMPLETED | OUTPATIENT
Start: 2019-02-25 | End: 2019-02-25

## 2019-02-25 RX ORDER — SODIUM CHLORIDE 9 MG/ML
INJECTION, SOLUTION INTRAVENOUS CONTINUOUS
Status: DISCONTINUED | OUTPATIENT
Start: 2019-02-25 | End: 2019-02-26 | Stop reason: HOSPADM

## 2019-02-25 RX ORDER — ATROPINE SULFATE 0.4 MG/ML
0.5 AMPUL (ML) INJECTION
Status: ACTIVE | OUTPATIENT
Start: 2019-02-25 | End: 2019-02-25

## 2019-02-25 RX ORDER — SODIUM CHLORIDE 0.9 % (FLUSH) 0.9 %
10 SYRINGE (ML) INJECTION PRN
Status: DISCONTINUED | OUTPATIENT
Start: 2019-02-25 | End: 2019-02-26 | Stop reason: HOSPADM

## 2019-02-25 RX ORDER — SODIUM CHLORIDE 0.9 % (FLUSH) 0.9 %
10 SYRINGE (ML) INJECTION EVERY 12 HOURS SCHEDULED
Status: DISCONTINUED | OUTPATIENT
Start: 2019-02-25 | End: 2019-02-26 | Stop reason: HOSPADM

## 2019-02-25 RX ORDER — ACETAMINOPHEN 325 MG/1
650 TABLET ORAL EVERY 4 HOURS PRN
Status: DISCONTINUED | OUTPATIENT
Start: 2019-02-25 | End: 2019-02-26 | Stop reason: HOSPADM

## 2019-02-25 RX ADMIN — ACETAMINOPHEN 650 MG: 325 TABLET ORAL at 14:28

## 2019-02-25 RX ADMIN — FENTANYL CITRATE 50 MCG: 50 INJECTION, SOLUTION INTRAMUSCULAR; INTRAVENOUS at 09:01

## 2019-02-25 RX ADMIN — ATORVASTATIN CALCIUM 40 MG: 40 TABLET, FILM COATED ORAL at 21:43

## 2019-02-25 RX ADMIN — FENTANYL CITRATE 50 MCG: 50 INJECTION, SOLUTION INTRAMUSCULAR; INTRAVENOUS at 09:15

## 2019-02-25 RX ADMIN — IOPAMIDOL 80 ML: 612 INJECTION, SOLUTION INTRAVENOUS at 10:15

## 2019-02-25 RX ADMIN — PANTOPRAZOLE SODIUM 40 MG: 40 INJECTION, POWDER, FOR SOLUTION INTRAVENOUS at 21:43

## 2019-02-25 RX ADMIN — CLOPIDOGREL 75 MG: 75 TABLET, FILM COATED ORAL at 07:48

## 2019-02-25 RX ADMIN — HEPARIN SODIUM 6000 UNITS: 1000 INJECTION INTRAVENOUS; SUBCUTANEOUS at 09:25

## 2019-02-25 RX ADMIN — Medication 10 ML: at 07:48

## 2019-02-25 RX ADMIN — MIDODRINE HYDROCHLORIDE 10 MG: 10 TABLET ORAL at 17:38

## 2019-02-25 RX ADMIN — DOCUSATE SODIUM 100 MG: 100 CAPSULE, LIQUID FILLED ORAL at 21:44

## 2019-02-25 RX ADMIN — SODIUM CHLORIDE: 9 INJECTION, SOLUTION INTRAVENOUS at 11:59

## 2019-02-25 RX ADMIN — ACETAMINOPHEN 650 MG: 325 TABLET ORAL at 04:05

## 2019-02-25 RX ADMIN — ACETAMINOPHEN 650 MG: 325 TABLET ORAL at 18:48

## 2019-02-25 RX ADMIN — Medication 10 ML: at 21:43

## 2019-02-25 RX ADMIN — Medication 6 MG: at 23:29

## 2019-02-25 RX ADMIN — MIDODRINE HYDROCHLORIDE 10 MG: 10 TABLET ORAL at 07:48

## 2019-02-25 RX ADMIN — LEVOFLOXACIN 500 MG: 500 TABLET, FILM COATED ORAL at 07:47

## 2019-02-25 RX ADMIN — ACETAMINOPHEN 650 MG: 325 TABLET ORAL at 23:29

## 2019-02-25 RX ADMIN — MIDODRINE HYDROCHLORIDE 10 MG: 10 TABLET ORAL at 11:57

## 2019-02-25 RX ADMIN — CLOPIDOGREL BISULFATE 600 MG: 300 TABLET, FILM COATED ORAL at 09:58

## 2019-02-25 RX ADMIN — MIDAZOLAM HYDROCHLORIDE 1 MG: 2 INJECTION, SOLUTION INTRAMUSCULAR; INTRAVENOUS at 09:02

## 2019-02-25 RX ADMIN — DOCUSATE SODIUM 100 MG: 100 CAPSULE, LIQUID FILLED ORAL at 07:48

## 2019-02-25 RX ADMIN — ASPIRIN 325 MG: 325 TABLET, DELAYED RELEASE ORAL at 07:48

## 2019-02-25 RX ADMIN — ACETAMINOPHEN 650 MG: 325 TABLET ORAL at 00:04

## 2019-02-25 RX ADMIN — MIDAZOLAM HYDROCHLORIDE 1 MG: 2 INJECTION, SOLUTION INTRAMUSCULAR; INTRAVENOUS at 09:14

## 2019-02-25 ASSESSMENT — PAIN SCALES - GENERAL
PAINLEVEL_OUTOF10: 4
PAINLEVEL_OUTOF10: 3
PAINLEVEL_OUTOF10: 4
PAINLEVEL_OUTOF10: 3
PAINLEVEL_OUTOF10: 7
PAINLEVEL_OUTOF10: 4
PAINLEVEL_OUTOF10: 3
PAINLEVEL_OUTOF10: 4

## 2019-02-25 ASSESSMENT — PAIN DESCRIPTION - PAIN TYPE: TYPE: CHRONIC PAIN

## 2019-02-25 ASSESSMENT — PAIN DESCRIPTION - LOCATION: LOCATION: LEG;GROIN

## 2019-02-25 ASSESSMENT — PAIN DESCRIPTION - ONSET: ONSET: ON-GOING

## 2019-02-25 ASSESSMENT — PAIN DESCRIPTION - PROGRESSION: CLINICAL_PROGRESSION: NOT CHANGED

## 2019-02-25 ASSESSMENT — PAIN DESCRIPTION - FREQUENCY: FREQUENCY: CONTINUOUS

## 2019-02-25 ASSESSMENT — PAIN DESCRIPTION - ORIENTATION: ORIENTATION: RIGHT;LEFT

## 2019-02-25 ASSESSMENT — PAIN DESCRIPTION - DESCRIPTORS: DESCRIPTORS: ACHING;DISCOMFORT

## 2019-02-26 VITALS
WEIGHT: 198.2 LBS | HEIGHT: 65 IN | BODY MASS INDEX: 33.02 KG/M2 | DIASTOLIC BLOOD PRESSURE: 58 MMHG | HEART RATE: 105 BPM | OXYGEN SATURATION: 98 % | TEMPERATURE: 97.4 F | RESPIRATION RATE: 18 BRPM | SYSTOLIC BLOOD PRESSURE: 114 MMHG

## 2019-02-26 LAB
ANION GAP SERPL CALCULATED.3IONS-SCNC: 15 MEQ/L (ref 8–16)
BUN BLDV-MCNC: 30 MG/DL (ref 7–22)
CALCIUM SERPL-MCNC: 8.7 MG/DL (ref 8.5–10.5)
CHLORIDE BLD-SCNC: 102 MEQ/L (ref 98–111)
CO2: 23 MEQ/L (ref 23–33)
CREAT SERPL-MCNC: 1.2 MG/DL (ref 0.4–1.2)
ERYTHROCYTE [DISTWIDTH] IN BLOOD BY AUTOMATED COUNT: 16.8 % (ref 11.5–14.5)
ERYTHROCYTE [DISTWIDTH] IN BLOOD BY AUTOMATED COUNT: 53 FL (ref 35–45)
GFR SERPL CREATININE-BSD FRML MDRD: 43 ML/MIN/1.73M2
GLUCOSE BLD-MCNC: 104 MG/DL (ref 70–108)
HCT VFR BLD CALC: 27.5 % (ref 37–47)
HEMOGLOBIN: 8.6 GM/DL (ref 12–16)
MCH RBC QN AUTO: 28.6 PG (ref 26–33)
MCHC RBC AUTO-ENTMCNC: 31.3 GM/DL (ref 32.2–35.5)
MCV RBC AUTO: 91.4 FL (ref 81–99)
PLATELET # BLD: 211 THOU/MM3 (ref 130–400)
PMV BLD AUTO: 10.3 FL (ref 9.4–12.4)
POTASSIUM SERPL-SCNC: 4.1 MEQ/L (ref 3.5–5.2)
RBC # BLD: 3.01 MILL/MM3 (ref 4.2–5.4)
SODIUM BLD-SCNC: 140 MEQ/L (ref 135–145)
WBC # BLD: 9 THOU/MM3 (ref 4.8–10.8)

## 2019-02-26 PROCEDURE — 6370000000 HC RX 637 (ALT 250 FOR IP): Performed by: PHYSICIAN ASSISTANT

## 2019-02-26 PROCEDURE — 6360000002 HC RX W HCPCS: Performed by: INTERNAL MEDICINE

## 2019-02-26 PROCEDURE — 6370000000 HC RX 637 (ALT 250 FOR IP): Performed by: INTERNAL MEDICINE

## 2019-02-26 PROCEDURE — C9113 INJ PANTOPRAZOLE SODIUM, VIA: HCPCS | Performed by: INTERNAL MEDICINE

## 2019-02-26 PROCEDURE — 6370000000 HC RX 637 (ALT 250 FOR IP): Performed by: THORACIC SURGERY (CARDIOTHORACIC VASCULAR SURGERY)

## 2019-02-26 PROCEDURE — 36415 COLL VENOUS BLD VENIPUNCTURE: CPT

## 2019-02-26 PROCEDURE — 85027 COMPLETE CBC AUTOMATED: CPT

## 2019-02-26 PROCEDURE — 2709999900 HC NON-CHARGEABLE SUPPLY

## 2019-02-26 PROCEDURE — 80048 BASIC METABOLIC PNL TOTAL CA: CPT

## 2019-02-26 PROCEDURE — APPSS60 APP SPLIT SHARED TIME 46-60 MINUTES: Performed by: PHYSICIAN ASSISTANT

## 2019-02-26 PROCEDURE — 2580000003 HC RX 258: Performed by: INTERNAL MEDICINE

## 2019-02-26 RX ORDER — LANOLIN ALCOHOL/MO/W.PET/CERES
6 CREAM (GRAM) TOPICAL NIGHTLY PRN
Qty: 60 TABLET | Refills: 3 | Status: SHIPPED | OUTPATIENT
Start: 2019-02-26

## 2019-02-26 RX ORDER — CLOPIDOGREL BISULFATE 75 MG/1
75 TABLET ORAL DAILY
Qty: 30 TABLET | Refills: 3 | Status: SHIPPED | OUTPATIENT
Start: 2019-02-26 | End: 2019-03-25 | Stop reason: SDUPTHER

## 2019-02-26 RX ADMIN — PANTOPRAZOLE SODIUM 40 MG: 40 INJECTION, POWDER, FOR SOLUTION INTRAVENOUS at 09:23

## 2019-02-26 RX ADMIN — ACETAMINOPHEN 650 MG: 325 TABLET ORAL at 04:24

## 2019-02-26 RX ADMIN — LEVOFLOXACIN 500 MG: 500 TABLET, FILM COATED ORAL at 09:24

## 2019-02-26 RX ADMIN — MIDODRINE HYDROCHLORIDE 10 MG: 10 TABLET ORAL at 09:23

## 2019-02-26 RX ADMIN — ACETAMINOPHEN 650 MG: 325 TABLET ORAL at 09:23

## 2019-02-26 RX ADMIN — CLOPIDOGREL 75 MG: 75 TABLET, FILM COATED ORAL at 09:23

## 2019-02-26 RX ADMIN — ASPIRIN 325 MG: 325 TABLET, DELAYED RELEASE ORAL at 09:23

## 2019-02-26 RX ADMIN — DOCUSATE SODIUM 100 MG: 100 CAPSULE, LIQUID FILLED ORAL at 09:23

## 2019-02-26 RX ADMIN — MIDODRINE HYDROCHLORIDE 10 MG: 10 TABLET ORAL at 11:43

## 2019-02-26 RX ADMIN — Medication 10 ML: at 09:25

## 2019-02-26 ASSESSMENT — PAIN DESCRIPTION - ORIENTATION
ORIENTATION: RIGHT
ORIENTATION: RIGHT

## 2019-02-26 ASSESSMENT — PAIN SCALES - GENERAL
PAINLEVEL_OUTOF10: 4
PAINLEVEL_OUTOF10: 4
PAINLEVEL_OUTOF10: 5

## 2019-02-26 ASSESSMENT — PAIN DESCRIPTION - DESCRIPTORS: DESCRIPTORS: BURNING;NUMBNESS

## 2019-02-26 ASSESSMENT — PAIN DESCRIPTION - LOCATION
LOCATION: GROIN;LEG
LOCATION: GROIN;LEG

## 2019-02-26 ASSESSMENT — PAIN DESCRIPTION - PAIN TYPE: TYPE: ACUTE PAIN

## 2019-02-26 ASSESSMENT — PAIN DESCRIPTION - FREQUENCY: FREQUENCY: CONTINUOUS

## 2019-03-06 ENCOUNTER — OFFICE VISIT (OUTPATIENT)
Dept: CARDIOTHORACIC SURGERY | Age: 84
End: 2019-03-06

## 2019-03-06 VITALS
BODY MASS INDEX: 31.46 KG/M2 | DIASTOLIC BLOOD PRESSURE: 74 MMHG | SYSTOLIC BLOOD PRESSURE: 131 MMHG | WEIGHT: 188.8 LBS | HEIGHT: 65 IN | HEART RATE: 96 BPM

## 2019-03-06 DIAGNOSIS — I71.9 AORTIC ANEURYSM WITHOUT RUPTURE, UNSPECIFIED PORTION OF AORTA (HCC): Primary | ICD-10-CM

## 2019-03-06 PROCEDURE — APPSS30 APP SPLIT SHARED TIME 16-30 MINUTES: Performed by: PHYSICIAN ASSISTANT

## 2019-03-06 PROCEDURE — 99024 POSTOP FOLLOW-UP VISIT: CPT | Performed by: PHYSICIAN ASSISTANT

## 2019-03-27 RX ORDER — CLOPIDOGREL BISULFATE 75 MG/1
TABLET ORAL
Qty: 90 TABLET | Refills: 2 | Status: SHIPPED | OUTPATIENT
Start: 2019-03-27 | End: 2019-07-15

## 2019-04-23 RX ORDER — APIXABAN 5 MG/1
TABLET, FILM COATED ORAL
Qty: 60 TABLET | Refills: 5 | Status: SHIPPED | OUTPATIENT
Start: 2019-04-23 | End: 2019-04-24 | Stop reason: SDUPTHER

## 2019-05-02 ENCOUNTER — TELEPHONE (OUTPATIENT)
Dept: SURGERY | Age: 84
End: 2019-05-02

## 2019-05-17 ENCOUNTER — APPOINTMENT (OUTPATIENT)
Dept: GENERAL RADIOLOGY | Age: 84
DRG: 189 | End: 2019-05-17
Payer: MEDICARE

## 2019-05-17 ENCOUNTER — HOSPITAL ENCOUNTER (INPATIENT)
Age: 84
LOS: 2 days | Discharge: HOME | DRG: 189 | End: 2019-05-19
Attending: EMERGENCY MEDICINE | Admitting: INTERNAL MEDICINE
Payer: MEDICARE

## 2019-05-17 DIAGNOSIS — E87.6 HYPOKALEMIA: ICD-10-CM

## 2019-05-17 DIAGNOSIS — I50.33 ACUTE ON CHRONIC DIASTOLIC (CONGESTIVE) HEART FAILURE (HCC): ICD-10-CM

## 2019-05-17 DIAGNOSIS — R09.02 HYPOXIA: ICD-10-CM

## 2019-05-17 DIAGNOSIS — E83.42 HYPOMAGNESEMIA: ICD-10-CM

## 2019-05-17 DIAGNOSIS — J81.0 ACUTE PULMONARY EDEMA (HCC): Primary | ICD-10-CM

## 2019-05-17 DIAGNOSIS — J96.01 ACUTE RESPIRATORY FAILURE WITH HYPOXIA (HCC): ICD-10-CM

## 2019-05-17 DIAGNOSIS — E87.20: ICD-10-CM

## 2019-05-17 LAB
ALBUMIN SERPL-MCNC: 3.2 G/DL (ref 3.5–5.1)
ALLEN TEST: POSITIVE
ALP BLD-CCNC: 149 U/L (ref 38–126)
ALT SERPL-CCNC: 43 U/L (ref 11–66)
ANION GAP SERPL CALCULATED.3IONS-SCNC: 21 MEQ/L (ref 8–16)
AST SERPL-CCNC: 103 U/L (ref 5–40)
AVERAGE GLUCOSE: 102 MG/DL (ref 70–126)
BACTERIA: ABNORMAL /HPF
BASE EXCESS (CALCULATED): -3.5 MMOL/L (ref -2.5–2.5)
BASE EXCESS MIXED: 2.6 MMOL/L (ref -2–3)
BASOPHILS # BLD: 0.5 %
BASOPHILS ABSOLUTE: 0.1 THOU/MM3 (ref 0–0.1)
BILIRUB SERPL-MCNC: 0.4 MG/DL (ref 0.3–1.2)
BILIRUBIN DIRECT: < 0.2 MG/DL (ref 0–0.3)
BILIRUBIN URINE: NEGATIVE
BLOOD, URINE: NEGATIVE
BUN BLDV-MCNC: 22 MG/DL (ref 7–22)
CALCIUM IONIZED SERUM: 0.97 MMOL/L (ref 1.12–1.32)
CALCIUM SERPL-MCNC: 7.4 MG/DL (ref 8.5–10.5)
CASTS 2: ABNORMAL /LPF
CASTS UA: ABNORMAL /LPF
CHARACTER, URINE: ABNORMAL
CHLORIDE BLD-SCNC: 97 MEQ/L (ref 98–111)
CHLORIDE, WHOLE BLOOD: 102 MEQ/L (ref 98–109)
CO2: 20 MEQ/L (ref 23–33)
COLLECTED BY:: ABNORMAL
COLLECTED BY:: NORMAL
COLOR: YELLOW
CREAT SERPL-MCNC: 1.3 MG/DL (ref 0.4–1.2)
CRYSTALS, UA: ABNORMAL
DEVICE: ABNORMAL
DEVICE: NORMAL
EKG ATRIAL RATE: 108 BPM
EKG P AXIS: 54 DEGREES
EKG P-R INTERVAL: 170 MS
EKG Q-T INTERVAL: 350 MS
EKG QRS DURATION: 74 MS
EKG QTC CALCULATION (BAZETT): 469 MS
EKG R AXIS: -34 DEGREES
EKG T AXIS: 102 DEGREES
EKG VENTRICULAR RATE: 108 BPM
EOSINOPHIL # BLD: 1.2 %
EOSINOPHILS ABSOLUTE: 0.2 THOU/MM3 (ref 0–0.4)
EPITHELIAL CELLS, UA: ABNORMAL /HPF
ERYTHROCYTE [DISTWIDTH] IN BLOOD BY AUTOMATED COUNT: 15.9 % (ref 11.5–14.5)
ERYTHROCYTE [DISTWIDTH] IN BLOOD BY AUTOMATED COUNT: 47 FL (ref 35–45)
FIO2, MIXED VENOUS: 50
GFR SERPL CREATININE-BSD FRML MDRD: 39 ML/MIN/1.73M2
GLUCOSE BLD-MCNC: 158 MG/DL (ref 70–108)
GLUCOSE BLD-MCNC: 164 MG/DL (ref 70–108)
GLUCOSE BLD-MCNC: 232 MG/DL (ref 70–108)
GLUCOSE BLD-MCNC: 469 MG/DL (ref 70–108)
GLUCOSE BLD-MCNC: 96 MG/DL (ref 70–108)
GLUCOSE URINE: 250 MG/DL
GLUCOSE, WHOLE BLOOD: 483 MG/DL (ref 70–108)
HBA1C MFR BLD: 5.4 % (ref 4.4–6.4)
HCO3, MIXED: 28 MMOL/L (ref 23–28)
HCO3: 23 MMOL/L (ref 23–28)
HCT VFR BLD CALC: 35.3 % (ref 37–47)
HEMOGLOBIN: 10.5 GM/DL (ref 12–16)
IFIO2: 100
IMMATURE GRANS (ABS): 0.11 THOU/MM3 (ref 0–0.07)
IMMATURE GRANULOCYTES: 0.8 %
INR BLD: 1.7 (ref 0.85–1.13)
KETONES, URINE: NEGATIVE
LACTIC ACID: 0.9 MMOL/L (ref 0.5–2.2)
LACTIC ACID: 2.5 MMOL/L (ref 0.5–2.2)
LEUKOCYTE ESTERASE, URINE: NEGATIVE
LIPASE: 50.3 U/L (ref 5.6–51.3)
LYMPHOCYTES # BLD: 18.6 %
LYMPHOCYTES ABSOLUTE: 2.6 THOU/MM3 (ref 1–4.8)
MAGNESIUM: 1.2 MG/DL (ref 1.6–2.4)
MCH RBC QN AUTO: 24 PG (ref 26–33)
MCHC RBC AUTO-ENTMCNC: 29.7 GM/DL (ref 32.2–35.5)
MCV RBC AUTO: 80.8 FL (ref 81–99)
MISCELLANEOUS 2: ABNORMAL
MODE: ABNORMAL
MODE: NORMAL
MONOCYTES # BLD: 7.8 %
MONOCYTES ABSOLUTE: 1.1 THOU/MM3 (ref 0.4–1.3)
NITRITE, URINE: NEGATIVE
NUCLEATED RED BLOOD CELLS: 0 /100 WBC
O2 SAT, MIXED: 69 %
O2 SATURATION: 100 %
OSMOLALITY CALCULATION: 299.6 MOSMOL/KG (ref 275–300)
PCO2, MIXED VENOUS: 44 MMHG (ref 41–51)
PCO2: 49 MMHG (ref 35–45)
PH BLOOD GAS: 7.29 (ref 7.35–7.45)
PH UA: 6 (ref 5–9)
PH, MIXED: 7.41 (ref 7.31–7.41)
PLATELET # BLD: 343 THOU/MM3 (ref 130–400)
PMV BLD AUTO: 12.3 FL (ref 9.4–12.4)
PO2 MIXED: 36 MMHG (ref 25–40)
PO2: 209 MMHG (ref 71–104)
POC CREATININE WHOLE BLOOD: 1.2 MG/DL (ref 0.5–1.2)
POC LACTIC ACID: 4 MMOL/L (ref 0.5–1.9)
POTASSIUM SERPL-SCNC: 3.6 MEQ/L (ref 3.5–5.2)
POTASSIUM, WHOLE BLOOD: 3.3 MEQ/L (ref 3.5–4.9)
PRO-BNP: 5051 PG/ML (ref 0–1800)
PROCALCITONIN: 0.06 NG/ML (ref 0.01–0.09)
PROTEIN UA: 30
RBC # BLD: 4.37 MILL/MM3 (ref 4.2–5.4)
RBC URINE: ABNORMAL /HPF
RENAL EPITHELIAL, UA: ABNORMAL
SEG NEUTROPHILS: 71.1 %
SEGMENTED NEUTROPHILS ABSOLUTE COUNT: 9.8 THOU/MM3 (ref 1.8–7.7)
SET PEEP: 6 MMHG
SET PEEP: 6 MMHG
SET PRESS SUPP: 10 CMH2O
SET PRESS SUPP: 10 CMH2O
SITE: NORMAL
SODIUM BLD-SCNC: 138 MEQ/L (ref 135–145)
SODIUM, WHOLE BLOOD: 136 MEQ/L (ref 138–146)
SOURCE, BLOOD GAS: ABNORMAL
SPECIFIC GRAVITY, URINE: 1.01 (ref 1–1.03)
T4 FREE: 1.11 NG/DL (ref 0.93–1.76)
TOTAL PROTEIN: 6.3 G/DL (ref 6.1–8)
TROPONIN T: 0.02 NG/ML
TSH SERPL DL<=0.05 MIU/L-ACNC: 0.95 UIU/ML (ref 0.4–4.2)
TSH SERPL DL<=0.05 MIU/L-ACNC: 3.39 UIU/ML (ref 0.4–4.2)
UROBILINOGEN, URINE: 0.2 EU/DL (ref 0–1)
WBC # BLD: 13.8 THOU/MM3 (ref 4.8–10.8)
WBC UA: ABNORMAL /HPF
YEAST: ABNORMAL

## 2019-05-17 PROCEDURE — 6360000002 HC RX W HCPCS: Performed by: NURSE PRACTITIONER

## 2019-05-17 PROCEDURE — 2580000003 HC RX 258: Performed by: NURSE PRACTITIONER

## 2019-05-17 PROCEDURE — 2500000003 HC RX 250 WO HCPCS: Performed by: NURSE PRACTITIONER

## 2019-05-17 PROCEDURE — 6370000000 HC RX 637 (ALT 250 FOR IP): Performed by: PHYSICIAN ASSISTANT

## 2019-05-17 PROCEDURE — 93005 ELECTROCARDIOGRAM TRACING: CPT | Performed by: NURSE PRACTITIONER

## 2019-05-17 PROCEDURE — 82435 ASSAY OF BLOOD CHLORIDE: CPT

## 2019-05-17 PROCEDURE — 83690 ASSAY OF LIPASE: CPT

## 2019-05-17 PROCEDURE — 96367 TX/PROPH/DG ADDL SEQ IV INF: CPT

## 2019-05-17 PROCEDURE — 82330 ASSAY OF CALCIUM: CPT

## 2019-05-17 PROCEDURE — 51702 INSERT TEMP BLADDER CATH: CPT

## 2019-05-17 PROCEDURE — 87077 CULTURE AEROBIC IDENTIFY: CPT

## 2019-05-17 PROCEDURE — 85025 COMPLETE CBC W/AUTO DIFF WBC: CPT

## 2019-05-17 PROCEDURE — 36415 COLL VENOUS BLD VENIPUNCTURE: CPT

## 2019-05-17 PROCEDURE — 94761 N-INVAS EAR/PLS OXIMETRY MLT: CPT

## 2019-05-17 PROCEDURE — 83605 ASSAY OF LACTIC ACID: CPT

## 2019-05-17 PROCEDURE — 6370000000 HC RX 637 (ALT 250 FOR IP): Performed by: NURSE PRACTITIONER

## 2019-05-17 PROCEDURE — 84145 PROCALCITONIN (PCT): CPT

## 2019-05-17 PROCEDURE — 36600 WITHDRAWAL OF ARTERIAL BLOOD: CPT

## 2019-05-17 PROCEDURE — 85610 PROTHROMBIN TIME: CPT

## 2019-05-17 PROCEDURE — 87186 SC STD MICRODIL/AGAR DIL: CPT

## 2019-05-17 PROCEDURE — 84484 ASSAY OF TROPONIN QUANT: CPT

## 2019-05-17 PROCEDURE — 82565 ASSAY OF CREATININE: CPT

## 2019-05-17 PROCEDURE — 96366 THER/PROPH/DIAG IV INF ADDON: CPT

## 2019-05-17 PROCEDURE — 96375 TX/PRO/DX INJ NEW DRUG ADDON: CPT

## 2019-05-17 PROCEDURE — 83880 ASSAY OF NATRIURETIC PEPTIDE: CPT

## 2019-05-17 PROCEDURE — 71045 X-RAY EXAM CHEST 1 VIEW: CPT

## 2019-05-17 PROCEDURE — 99223 1ST HOSP IP/OBS HIGH 75: CPT | Performed by: NURSE PRACTITIONER

## 2019-05-17 PROCEDURE — 80076 HEPATIC FUNCTION PANEL: CPT

## 2019-05-17 PROCEDURE — 82948 REAGENT STRIP/BLOOD GLUCOSE: CPT

## 2019-05-17 PROCEDURE — 2700000000 HC OXYGEN THERAPY PER DAY

## 2019-05-17 PROCEDURE — 84439 ASSAY OF FREE THYROXINE: CPT

## 2019-05-17 PROCEDURE — 2709999900 HC NON-CHARGEABLE SUPPLY

## 2019-05-17 PROCEDURE — 87040 BLOOD CULTURE FOR BACTERIA: CPT

## 2019-05-17 PROCEDURE — 87086 URINE CULTURE/COLONY COUNT: CPT

## 2019-05-17 PROCEDURE — 83036 HEMOGLOBIN GLYCOSYLATED A1C: CPT

## 2019-05-17 PROCEDURE — 81001 URINALYSIS AUTO W/SCOPE: CPT

## 2019-05-17 PROCEDURE — 84132 ASSAY OF SERUM POTASSIUM: CPT

## 2019-05-17 PROCEDURE — 83735 ASSAY OF MAGNESIUM: CPT

## 2019-05-17 PROCEDURE — 94640 AIRWAY INHALATION TREATMENT: CPT

## 2019-05-17 PROCEDURE — 99285 EMERGENCY DEPT VISIT HI MDM: CPT

## 2019-05-17 PROCEDURE — 80048 BASIC METABOLIC PNL TOTAL CA: CPT

## 2019-05-17 PROCEDURE — 96365 THER/PROPH/DIAG IV INF INIT: CPT

## 2019-05-17 PROCEDURE — 87184 SC STD DISK METHOD PER PLATE: CPT

## 2019-05-17 PROCEDURE — 84443 ASSAY THYROID STIM HORMONE: CPT

## 2019-05-17 PROCEDURE — 94660 CPAP INITIATION&MGMT: CPT

## 2019-05-17 PROCEDURE — 84295 ASSAY OF SERUM SODIUM: CPT

## 2019-05-17 PROCEDURE — 93010 ELECTROCARDIOGRAM REPORT: CPT | Performed by: INTERNAL MEDICINE

## 2019-05-17 PROCEDURE — 82947 ASSAY GLUCOSE BLOOD QUANT: CPT

## 2019-05-17 PROCEDURE — 2060000000 HC ICU INTERMEDIATE R&B

## 2019-05-17 PROCEDURE — 82803 BLOOD GASES ANY COMBINATION: CPT

## 2019-05-17 RX ORDER — MIDODRINE HYDROCHLORIDE 5 MG/1
5 TABLET ORAL
Status: DISCONTINUED | OUTPATIENT
Start: 2019-05-17 | End: 2019-05-19 | Stop reason: HOSPADM

## 2019-05-17 RX ORDER — NITROGLYCERIN 20 MG/100ML
INJECTION INTRAVENOUS
Status: DISCONTINUED
Start: 2019-05-17 | End: 2019-05-17

## 2019-05-17 RX ORDER — POTASSIUM CHLORIDE 7.45 MG/ML
10 INJECTION INTRAVENOUS PRN
Status: DISCONTINUED | OUTPATIENT
Start: 2019-05-17 | End: 2019-05-19 | Stop reason: HOSPADM

## 2019-05-17 RX ORDER — SODIUM CHLORIDE 0.9 % (FLUSH) 0.9 %
10 SYRINGE (ML) INJECTION EVERY 12 HOURS SCHEDULED
Status: DISCONTINUED | OUTPATIENT
Start: 2019-05-17 | End: 2019-05-19 | Stop reason: HOSPADM

## 2019-05-17 RX ORDER — LANOLIN ALCOHOL/MO/W.PET/CERES
6 CREAM (GRAM) TOPICAL NIGHTLY PRN
Status: DISCONTINUED | OUTPATIENT
Start: 2019-05-17 | End: 2019-05-19 | Stop reason: HOSPADM

## 2019-05-17 RX ORDER — IPRATROPIUM BROMIDE AND ALBUTEROL SULFATE 2.5; .5 MG/3ML; MG/3ML
1 SOLUTION RESPIRATORY (INHALATION)
Status: DISCONTINUED | OUTPATIENT
Start: 2019-05-17 | End: 2019-05-19 | Stop reason: HOSPADM

## 2019-05-17 RX ORDER — TIMOLOL MALEATE 5 MG/ML
1 SOLUTION/ DROPS OPHTHALMIC NIGHTLY
Status: DISCONTINUED | OUTPATIENT
Start: 2019-05-17 | End: 2019-05-19 | Stop reason: HOSPADM

## 2019-05-17 RX ORDER — PRAVASTATIN SODIUM 40 MG
40 TABLET ORAL NIGHTLY
Status: DISCONTINUED | OUTPATIENT
Start: 2019-05-17 | End: 2019-05-19 | Stop reason: HOSPADM

## 2019-05-17 RX ORDER — BUMETANIDE 0.25 MG/ML
1 INJECTION, SOLUTION INTRAMUSCULAR; INTRAVENOUS EVERY 8 HOURS
Status: DISCONTINUED | OUTPATIENT
Start: 2019-05-17 | End: 2019-05-18

## 2019-05-17 RX ORDER — SODIUM CHLORIDE 0.9 % (FLUSH) 0.9 %
10 SYRINGE (ML) INJECTION PRN
Status: DISCONTINUED | OUTPATIENT
Start: 2019-05-17 | End: 2019-05-19 | Stop reason: HOSPADM

## 2019-05-17 RX ORDER — ONDANSETRON 2 MG/ML
4 INJECTION INTRAMUSCULAR; INTRAVENOUS EVERY 6 HOURS PRN
Status: DISCONTINUED | OUTPATIENT
Start: 2019-05-17 | End: 2019-05-19 | Stop reason: HOSPADM

## 2019-05-17 RX ORDER — NITROGLYCERIN 20 MG/100ML
5 INJECTION INTRAVENOUS CONTINUOUS
Status: DISCONTINUED | OUTPATIENT
Start: 2019-05-17 | End: 2019-05-17 | Stop reason: SDUPTHER

## 2019-05-17 RX ORDER — ALBUTEROL SULFATE 2.5 MG/3ML
SOLUTION RESPIRATORY (INHALATION)
Status: DISCONTINUED
Start: 2019-05-17 | End: 2019-05-17

## 2019-05-17 RX ORDER — NICOTINE POLACRILEX 4 MG
15 LOZENGE BUCCAL PRN
Status: DISCONTINUED | OUTPATIENT
Start: 2019-05-17 | End: 2019-05-19 | Stop reason: HOSPADM

## 2019-05-17 RX ORDER — BUMETANIDE 0.25 MG/ML
1 INJECTION, SOLUTION INTRAMUSCULAR; INTRAVENOUS ONCE
Status: COMPLETED | OUTPATIENT
Start: 2019-05-17 | End: 2019-05-17

## 2019-05-17 RX ORDER — DEXTROSE MONOHYDRATE 50 MG/ML
100 INJECTION, SOLUTION INTRAVENOUS PRN
Status: DISCONTINUED | OUTPATIENT
Start: 2019-05-17 | End: 2019-05-19 | Stop reason: HOSPADM

## 2019-05-17 RX ORDER — AMIODARONE HYDROCHLORIDE 200 MG/1
200 TABLET ORAL DAILY
Status: DISCONTINUED | OUTPATIENT
Start: 2019-05-17 | End: 2019-05-19 | Stop reason: HOSPADM

## 2019-05-17 RX ORDER — POTASSIUM CHLORIDE 20 MEQ/1
40 TABLET, EXTENDED RELEASE ORAL PRN
Status: DISCONTINUED | OUTPATIENT
Start: 2019-05-17 | End: 2019-05-19 | Stop reason: HOSPADM

## 2019-05-17 RX ORDER — SODIUM CHLORIDE 9 MG/ML
INJECTION, SOLUTION INTRAVENOUS CONTINUOUS
Status: DISCONTINUED | OUTPATIENT
Start: 2019-05-17 | End: 2019-05-17

## 2019-05-17 RX ORDER — CLOPIDOGREL BISULFATE 75 MG/1
75 TABLET ORAL DAILY
Status: DISCONTINUED | OUTPATIENT
Start: 2019-05-17 | End: 2019-05-19 | Stop reason: HOSPADM

## 2019-05-17 RX ORDER — DEXTROSE MONOHYDRATE 25 G/50ML
12.5 INJECTION, SOLUTION INTRAVENOUS PRN
Status: DISCONTINUED | OUTPATIENT
Start: 2019-05-17 | End: 2019-05-19 | Stop reason: HOSPADM

## 2019-05-17 RX ORDER — SENNA PLUS 8.6 MG/1
1 TABLET ORAL DAILY PRN
Status: DISCONTINUED | OUTPATIENT
Start: 2019-05-17 | End: 2019-05-19 | Stop reason: HOSPADM

## 2019-05-17 RX ORDER — POTASSIUM CHLORIDE 750 MG/1
40 TABLET, FILM COATED, EXTENDED RELEASE ORAL ONCE
Status: COMPLETED | OUTPATIENT
Start: 2019-05-17 | End: 2019-05-17

## 2019-05-17 RX ORDER — PANTOPRAZOLE SODIUM 40 MG/1
40 TABLET, DELAYED RELEASE ORAL 2 TIMES DAILY
Status: DISCONTINUED | OUTPATIENT
Start: 2019-05-17 | End: 2019-05-19 | Stop reason: HOSPADM

## 2019-05-17 RX ORDER — MAGNESIUM SULFATE IN WATER 40 MG/ML
2 INJECTION, SOLUTION INTRAVENOUS ONCE
Status: COMPLETED | OUTPATIENT
Start: 2019-05-17 | End: 2019-05-17

## 2019-05-17 RX ORDER — NITROGLYCERIN 20 MG/100ML
5 INJECTION INTRAVENOUS CONTINUOUS
Status: DISCONTINUED | OUTPATIENT
Start: 2019-05-17 | End: 2019-05-17

## 2019-05-17 RX ORDER — METHYLPREDNISOLONE SODIUM SUCCINATE 125 MG/2ML
INJECTION, POWDER, LYOPHILIZED, FOR SOLUTION INTRAMUSCULAR; INTRAVENOUS
Status: DISCONTINUED
Start: 2019-05-17 | End: 2019-05-17

## 2019-05-17 RX ADMIN — BUMETANIDE 1 MG: 0.25 INJECTION INTRAMUSCULAR; INTRAVENOUS at 03:28

## 2019-05-17 RX ADMIN — MAGNESIUM SULFATE HEPTAHYDRATE 2 G: 40 INJECTION, SOLUTION INTRAVENOUS at 05:16

## 2019-05-17 RX ADMIN — APIXABAN 5 MG: 5 TABLET, FILM COATED ORAL at 20:46

## 2019-05-17 RX ADMIN — Medication 5 UNITS: at 05:16

## 2019-05-17 RX ADMIN — INSULIN LISPRO 2 UNITS: 100 INJECTION, SOLUTION INTRAVENOUS; SUBCUTANEOUS at 20:46

## 2019-05-17 RX ADMIN — CLOPIDOGREL BISULFATE 75 MG: 75 TABLET ORAL at 10:53

## 2019-05-17 RX ADMIN — IPRATROPIUM BROMIDE AND ALBUTEROL SULFATE 1 AMPULE: .5; 3 SOLUTION RESPIRATORY (INHALATION) at 09:47

## 2019-05-17 RX ADMIN — Medication 10 ML: at 20:46

## 2019-05-17 RX ADMIN — Medication 10 ML: at 11:10

## 2019-05-17 RX ADMIN — TIMOLOL MALEATE 1 DROP: 5 SOLUTION OPHTHALMIC at 20:57

## 2019-05-17 RX ADMIN — IPRATROPIUM BROMIDE AND ALBUTEROL SULFATE 1 AMPULE: .5; 3 SOLUTION RESPIRATORY (INHALATION) at 16:22

## 2019-05-17 RX ADMIN — PANTOPRAZOLE SODIUM 40 MG: 40 TABLET, DELAYED RELEASE ORAL at 20:46

## 2019-05-17 RX ADMIN — IPRATROPIUM BROMIDE AND ALBUTEROL SULFATE 1 AMPULE: .5; 3 SOLUTION RESPIRATORY (INHALATION) at 20:35

## 2019-05-17 RX ADMIN — MIDODRINE HYDROCHLORIDE 5 MG: 5 TABLET ORAL at 17:05

## 2019-05-17 RX ADMIN — Medication 6 MG: at 23:54

## 2019-05-17 RX ADMIN — BUMETANIDE 1 MG: 0.25 INJECTION INTRAMUSCULAR; INTRAVENOUS at 10:54

## 2019-05-17 RX ADMIN — PRAVASTATIN SODIUM 40 MG: 40 TABLET ORAL at 20:46

## 2019-05-17 RX ADMIN — BUMETANIDE 1 MG: 0.25 INJECTION INTRAMUSCULAR; INTRAVENOUS at 18:40

## 2019-05-17 RX ADMIN — APIXABAN 5 MG: 5 TABLET, FILM COATED ORAL at 10:53

## 2019-05-17 RX ADMIN — INSULIN LISPRO 2 UNITS: 100 INJECTION, SOLUTION INTRAVENOUS; SUBCUTANEOUS at 18:40

## 2019-05-17 RX ADMIN — AMIODARONE HYDROCHLORIDE 200 MG: 200 TABLET ORAL at 10:52

## 2019-05-17 RX ADMIN — INSULIN LISPRO 2 UNITS: 100 INJECTION, SOLUTION INTRAVENOUS; SUBCUTANEOUS at 11:58

## 2019-05-17 RX ADMIN — PANTOPRAZOLE SODIUM 40 MG: 40 TABLET, DELAYED RELEASE ORAL at 10:52

## 2019-05-17 RX ADMIN — NITROGLYCERIN 10 MCG/MIN: 20 INJECTION INTRAVENOUS at 03:16

## 2019-05-17 RX ADMIN — IPRATROPIUM BROMIDE AND ALBUTEROL SULFATE 1 AMPULE: .5; 3 SOLUTION RESPIRATORY (INHALATION) at 13:11

## 2019-05-17 RX ADMIN — MIDODRINE HYDROCHLORIDE 5 MG: 5 TABLET ORAL at 10:52

## 2019-05-17 RX ADMIN — SODIUM CHLORIDE: 9 INJECTION, SOLUTION INTRAVENOUS at 05:27

## 2019-05-17 RX ADMIN — POTASSIUM CHLORIDE 40 MEQ: 750 TABLET, EXTENDED RELEASE ORAL at 05:21

## 2019-05-17 ASSESSMENT — PAIN SCALES - GENERAL
PAINLEVEL_OUTOF10: 0
PAINLEVEL_OUTOF10: 0

## 2019-05-17 NOTE — ED NOTES
Pt. Had 250 ml out of razo catheter after initial placement. Pt. Is resting in bed with even and unlabored respirations at this time. Pt. Is no longer diaphoretic but still pale. Blood pressures have dropped but are stable at this time. Pt. requesting bipap mask be changed due to discomfort. RT notified. Pt. Updated about care. No further needs at this time. Call light in reach.       Joellen Block RN  05/17/19 [id number]

## 2019-05-17 NOTE — ED NOTES
Pt resting on cot with eyes closed. Respirations easy and unlabored. Call light within reach.       Carine Ceja RN  05/17/19 [id number]

## 2019-05-17 NOTE — ED NOTES
Pt. Resting in bed with even and unlabored respirations. Pt. States denies pain at this time. Pt. Repositioned and boosted in bed. Pillow placed under right side for comfort. Pt. Updated about plan of care and treatment. Family at bedside. Pt. Has no further concerns, questions or needs at this time. Call light within reach.         Negrita Macario RN  05/17/19 [id number]

## 2019-05-17 NOTE — PROGRESS NOTES
[facility] [facility] sent message to Sun Vincent CNP: I poked pt 2 times for ABGs. I was only able to get venous. results: pH 7.41, CO2: 44, PO2 36, HCO3: 27.6, BE: 2.6, SaO2 69% on venous gas. Do you want me to poke again? Or are you okay with VBG?  Also can we make BiPAP PRN?    0705 Sun Vincnet CNP messaged back: I made changes thanks

## 2019-05-17 NOTE — ED PROVIDER NOTES
(SOLU-MEDROL) 125 MG injection     Guillermo Tariq: cabinet override    ipratropium (ATROVENT) 0.02 % nebulizer solution     Guillermo Tariq: cabinet override    DISCONTD: nitroGLYCERIN 200-5 MCG/ML-% infusion     Jo-Ann Velasquez: cabinet override    DISCONTD: nitroGLYCERIN 200-5 MCG/ML-% infusion     Jo-Ann Velasquez: cabinet override    nitroGLYCERIN 50 mg in dextrose 5% 250 mL infusion    bumetanide (BUMEX) injection 1 mg    potassium chloride (KLOR-CON) extended release tablet 40 mEq    insulin regular (HUMULIN R;NOVOLIN R) injection 5 Units    magnesium sulfate 2 g in 50 mL IVPB premix    0.9 % sodium chloride infusion       FINAL IMPRESSION      1. Acute pulmonary edema (HCC)    2. Hypoxia    3. Acute respiratory failure with hypoxia (HCC)    4. Compensated acidosis        I saw this patient with  Arpita Carson CNP; I agree with their assessment and plan.       Dr. Nilo Browning M.D 5/17/19 5:54 AM        Nilo Browning MD  05/17/19 [id number]

## 2019-05-17 NOTE — PLAN OF CARE
Problem: RESPIRATORY  Goal: Clear lung sounds  Outcome: Ongoing  Note:   Continue with aerosols to aid in bronchodilation. Problem: RESPIRATORY  Goal: Effective breathing pattern  Description  HS BiPAP   Outcome: Ongoing  Note:   ABGs compensated on BiPAP. Pt now on 3L nc. Problem: RESPIRATORY  Goal: Absence of new skin breakdown  Outcome: Ongoing  Note:   Pt off BiPAP on my shift.

## 2019-05-17 NOTE — ED NOTES
Pt transported to [address] [facility] EMT-P  05/17/19 0076

## 2019-05-17 NOTE — PROGRESS NOTES
Admission completed. Home medications reviewed but unknown to patient. Patient states she is unaware of what medications she take and the names of said medications. Information documented accordingly.

## 2019-05-17 NOTE — PROGRESS NOTES
Nutrition Education    Type and Reason for Visit: Initial, Consult(CHF Diet Education)    Patient stated \"I've been through this before. \"    · Verbally reviewed following information with Patient: CHF Diet Principles  · Written educational materials provided. · Contact name and number provided. · Refer to Patient Education activity for more details.     Electronically signed by Nicki Arellano RD LD [address]  on 5/17/19 at 1:45 PM  Contact Number: [phone]

## 2019-05-17 NOTE — PLAN OF CARE
Problem: Falls - Risk of:  Goal: Will remain free from falls  Description  Will remain free from falls  Outcome: Met This Shift  Note:   Pt remains free of falls this shift     Problem: Falls - Risk of:  Goal: Absence of physical injury  Description  Absence of physical injury  Outcome: Met This Shift  Note:   No physical injury this shift     Problem: Discharge Planning:  Goal: Participates in care planning  Description  Participates in care planning  Outcome: Met This Shift  Note:   Pt participates in care planning      Problem: Airway Clearance - Ineffective:  Goal: Ability to maintain a clear airway will improve  Description  Ability to maintain a clear airway will improve  Outcome: Met This Shift  Note:   Maintaining clear airway without difficulty     Problem: Cardiac Output - Decreased:  Goal: Hemodynamic stability will improve  Description  Hemodynamic stability will improve  Outcome: Met This Shift  Note:   Hemodynamically stable this shift     Problem: Fluid Volume - Imbalance:  Goal: Absence of imbalanced fluid volume signs and symptoms  Description  Absence of imbalanced fluid volume signs and symptoms  Outcome: Met This Shift  Note:   Pt diuresing well with scheduled bumex     Problem: Gas Exchange - Impaired:  Goal: Levels of oxygenation will improve  Description  Levels of oxygenation will improve  Outcome: Met This Shift  Note:   Level of oxygenation improving     Problem: Discharge Planning:  Goal: Discharged to appropriate level of care  Description  Discharged to appropriate level of care  Outcome: Not Met This Shift  Note:   Remains in stepdown, stable   Care plan reviewed with patient and family. Patient and family verbalize understanding of the plan of care and contribute to goal setting.

## 2019-05-17 NOTE — ED NOTES
Pt. Is tolerating bipap well. Pt. Respirations have decreased in rate, are less labored and accessory muscles are no longer being used. Pt. Is still pale but less diaphoretic. Pt. Oxygen saturation remains at 94%. Pt. Is alert and oriented at this time. Pt. Blood pressure stable and about 513 systolic at this time. Pt. Updated about care. Call light in reach.      Keo Matthew RN  05/17/19 [id number]

## 2019-05-17 NOTE — H&P
History & Physical        Patient:  Matt Landon  YOB: 1935    MRN: [id number]     Acct: [de-identified]    PCP: Fahad Caceres    Date of Admission: 5/17/2019    Date of Service: Pt seen/examined on 05/17/19  and Admitted to Inpatient with expected LOS greater than two midnights due to medical therapy. Chief Complaint:  Shortness of breath      History Of Present Illness:    82 y.o. female who presented to [facility] with shortness of breath; pt tells me that last PM she had nausea; she states about midnight she dev extreme shortness of breath after she went to the bathroom; she denies any pain; 02 sat was 82%; ABG's revealed pH 7.29 and PC02 49; she was placed on a Bi-pap; CXR revealed pulmonary and she was given Bumex 1 mg IVP; a Meredith cath was placed in the ER; she was found to have electrolyte abnormalities; her glucose was 483; she was hypotensive but has improved; she has a hx of severe PVD S/P right leg revascularization on 09/05/18 and emergent declotting of the right femoral to anterior tibial bypass on 09/06/18; on 2/14/2019 she had Fabian declotting thromboembolectomy of the right femoral anterior tibial bypass; on 2/15/2019 she had AAA repair and dev acute thrombosis of the right fem-popliteal bypass graft after endovascular repair of the abdominal aortic aneurysm; she is on Eliquis and Plavix; she is on Cordarone 200 mg PO daily and not sure why she is on this~after reviewing the chart I saw a note from Dr Katlin Torres on 2/21/2019 that \"no indication for amiodarone, d/c\"; I see on discharge summary from Peter Childers on 2/26/2019 that amio was continued on discharge; she currently states she is breathing much better; she is being admitted to the Hospitalist Service for further care and evaluation. Past Medical History:          Diagnosis Date    Arthritis     BPPV (benign paroxysmal positional vertigo) 6/6/16    GERD (gastroesophageal reflux disease)     ? SUBMUCOSAL/BOTOX INJECTION performed by Laverne Douglas MD at [address] [facility] N/A 2/22/2019    EGD BIOPSY performed by Doreen Moe MD at [facility]       Medications Prior to Admission:      Prior to Admission medications    Medication Sig Start Date End Date Taking? Authorizing Provider   apixaban (ELIQUIS) 5 MG TABS tablet TAKE ONE TABLET BY MOUTH TWICE DAILY 4/24/19  Yes Rody Bowman PA-C   clopidogrel (PLAVIX) 75 MG tablet TAKE 1 TABLET BY MOUTH EVERY DAY 3/27/19   Rody Bowman PA-C   Handicap Placard MISC by Does not apply route 1) Juliaetta Blizzard  2) Applying for placard  3)Disability expected to last 2 years and pt must follow up with PCP 3/6/19   Rody Bowman PA-C   melatonin 3 MG TABS tablet Take 2 tablets by mouth nightly as needed (sleep) 2/26/19   Rody Bowman PA-C   amiodarone (CORDARONE) 200 MG tablet Take 1 tablet by mouth daily Additional RF per her PCP 10/4/18   Manda Mares MD   silver sulfADIAZINE (SILVADENE) 1 % cream Apply topically daily to the groin twicedaily 10/4/18   Manda Mares MD   hydrochlorothiazide (HYDRODIURIL) 25 MG tablet Take 1 tablet by mouth daily Additional RF per her PCP  Patient taking differently: Take 25 mg by mouth daily Additional RF per her PCP-takes every other day 10/4/18   Manda Mares MD   midodrine (PROAMATINE) 5 MG tablet Take 1 tablet by mouth 3 times daily (with meals) Additional RF per her PCP 10/4/18   Manda Mares MD   pravastatin (PRAVACHOL) 40 MG tablet Take 40 mg by mouth daily     Historical Provider, MD   meclizine (ANTIVERT) 25 MG tablet Take 25 mg by mouth 3 times daily as needed    Historical Provider, MD   Pantoprazole Sodium (PROTONIX) 40 MG PACK packet Take 1 packet by mouth 2 times daily.  9/16/14   Laverne Douglas MD   acetaminophen (TYLENOL ARTHRITIS PAIN) 650 MG CR tablet Take 1,300 mg by mouth every 12 hours as needed for Pain     Historical Provider, MD   timolol (TIMOPTIC) 0.5 % ophthalmic solution 1 drop 2 times daily. Historical Provider, MD       Allergies:  Lasix [furosemide]; Lipitor [atorvastatin]; Neurontin [gabapentin]; Oxycontin [oxycodone hcl]; and Penicillins    Social History:      The patient currently lives alone. TOBACCO:   reports that she has been smoking cigarettes. She started smoking about 63 years ago. She has a 15.00 pack-year smoking history. She has never used smokeless tobacco.  ETOH:   reports that she drinks about 0.6 oz of alcohol per week.       Family History:      Positive as follows:        Problem Relation Age of Onset    Heart Disease Mother     Stroke Mother     Cancer Father         lung    Emphysema Father     Other Sister         leukemia    Heart Disease Maternal Grandmother     Dementia Maternal Grandmother     Heart Disease Maternal Grandfather        REVIEW OF SYSTEMS:     Constitutional: ROS: negative for - chills or fever  Head: no headache, no head injury, no migraine   Eyes ROS: denies blurred/double vision  Ears ROS: no hearing difficulty, no tinnitus  Mouth and Throat ROS: no ulceration, dysphagia, dental caries  Psychological ROS: no depression, no anxiety, no panic attacks, denies suicide/homicide ideation  Endocrine ROS: denies polyuria, polydypsia, no heat or cold intolerance  Respiratory ROS: positive for - cough, shortness of breath and wheezing  Cardiovascular ROS: positive for - dyspnea on exertion and shortness of breath  Gastrointestinal ROS: no abdominal pain, change in bowel habits, or black or bloody stools  Genito-Urinary ROS: denies dysuria, frequency, urgency; denies hematuria  Musculoskeletal ROS: negative  Neurological ROS: no syncope, no seizures, no numbness or tingling of hands, no numbness or tingling of feet, no paresis  Dermatology: no skin rash, no eczema  Endocrine: no polyuria, polydypsia, no heat/cold intolerance  Hematology: denies bruising easily, denies bleeding problems, denies clotting disorders    PHYSICAL EXAM:    /83   Pulse 87   Temp 97.5 °F (36.4 °C) (Axillary)   Resp 26   Ht 5' 5\" (1.651 m)   Wt 188 lb (85.3 kg)   SpO2 97%   BMI 31.28 kg/m²     General appearance: In mild distress, appears stated age and cooperative. HEENT:  Normal cephalic, atraumatic without obvious deformity. Pupils equal, round, and reactive to light. Conjunctivae/corneas clear. Neck: Supple, with full range of motion. No jugular venous distention. Trachea midline. Respiratory:  Normal respiratory effort. (+) crackles to auscultation; .  Cardiovascular:  Regular rate and rhythm with normal S1/S2 without murmurs, rubs or gallops. Abdomen: Soft, non-tender, non-distended with normal bowel sounds. Musculoskeletal:  trace edema bilateral lower legs; healing wound to RLE; Full range of motion without deformity. Skin: Skin color, texture, turgor normal.   Neurologic:  Neurovascularly intact without any focal sensory/motor deficits. Cranial nerves: II-XII intact, grossly non-focal.  Psychiatric:  Alert and oriented, thought content appropriate  Capillary Refill: Brisk,< 3 seconds   Peripheral Pulses: +2 palpable, equal bilaterally       Labs:     Recent Labs     05/17/19 0313   WBC 13.8*   HGB 10.5*   HCT 35.3*        Recent Labs     05/17/19 0313 05/17/19  0323    136*   K 3.6 3.3*   CL 97*  --    CO2 20*  --    BUN 22  --    CREATININE 1.3*  --    CALCIUM 7.4*  --      Recent Labs     05/17/19 0313   *   ALT 43   BILIDIR <0.2   BILITOT 0.4   ALKPHOS 149*     Recent Labs     05/17/19 0313   INR 1.70*     No results for input(s): CKTOTAL, TROPONINI in the last 72 hours.     Urinalysis:      Lab Results   Component Value Date    NITRU NEGATIVE 05/17/2019    WBCUA 0-2 05/17/2019    BACTERIA MANY 05/17/2019    RBCUA 0-2 05/17/2019    BLOODU NEGATIVE 05/17/2019    SPECGRAV 1.013 02/24/2019    GLUCOSEU 250 05/17/2019       Radiology:     CXR:     Lungs/pleura:  There are diffuse bilateral interstitial infiltrates likely representing pulmonary edema and CHF although cannot exclude an atypical viral-type pneumonia. There is no consolidation. No pleural effusion. No pneumothorax. Heart: There is mild cardiomegaly. Mediastinum/noris: No obvious mass or adenopathy. There is a calcified left hilar lymph node. Skeleton: There is bilateral glenohumeral joint DJD. Soft tissues: There is right carotid artery calcified plaque.      Impression     Bilateral interstitial infiltrates probably representing pulmonary edema and CHF, less likely an atypical viral-type pneumonia. Cardiomegaly. EKG:  I have reviewed the EKG with the following interpretation:         DVT prophylaxis: [] Lovenox                                 [] SCDs                                 [] SQ Heparin                                 [] Encourage ambulation           [x] Already on Anticoagulation~Eliquis    Code Status: Prior      Disposition:    [x] Home       [] TCU       [] Rehab       [] Psych       [] SNF       [] [place]       [] Other-    PROBLEM LIST:    C/C[address] Problems    Diagnosis Date Noted    Acute on chronic diastolic (congestive) heart failure ([facility] [address] [I50.33] 05/17/2019       ASSESSMENT/PLAN:    1. Acute Hypercapneic/Hypoxic Respiratory failure--on Bi-pap at this time; monitor closely; repeat ABG's  2. Acute Pulmonary Edema--given Bumex 1 mg in ED; maintain Bi-pap; stop IVF that were started in ED  3. Acute on Chronic Diastolic HF--BNP at 7332; watch I/O's; daily weights; monitor; takes HCTZ at home but no BB  4. Acute Hyperglycemia--add SSI #2; check AIC  5. Lactic Acidosis--improved; recheck at 0900  6. Possible UTI (POA)--asymptomatic; monitor cx  7. Hypomagnesia--replace per protocol; monitor  8. Hypocalcemia--replace per protocol; monitor  9. CKD Stage 3  10. Chronic hypotension--pt is on midodrine; monitor  11. Chronic troponin elevation  12.  Successful right fem-tib angioplasty and stenting with a 6.5 x 80 Supera stent of the [place]am to venous interposition graft stenosis on 2/26/2019  13. Hx AAA repair on 2/15/2019  14. Obesity with BMI 31        Thank you Bishop Dacosta for the opportunity to be involved in this patient's care.     Electronically signed by ROMAN Pham CNP on 5/17/2019 at 7:24 AM

## 2019-05-17 NOTE — PROGRESS NOTES
Educated patient regarding heart failure management by explaining the importance of obtaining daily weights at the same time every day with approximately the same amount of clothing, how to recognize symptoms, low sodium diet and taking prescribed medications as ordered. Patient was given our heart failure booklet, a weight chart and a low sodium diet sheet. Patient was receptive to the education given and verbalized understanding.

## 2019-05-18 ENCOUNTER — APPOINTMENT (OUTPATIENT)
Dept: GENERAL RADIOLOGY | Age: 84
DRG: 189 | End: 2019-05-18
Payer: MEDICARE

## 2019-05-18 LAB
ANION GAP SERPL CALCULATED.3IONS-SCNC: 15 MEQ/L (ref 8–16)
BUN BLDV-MCNC: 30 MG/DL (ref 7–22)
CALCIUM IONIZED: 0.97 MMOL/L (ref 1.12–1.32)
CALCIUM SERPL-MCNC: 8 MG/DL (ref 8.5–10.5)
CHLORIDE BLD-SCNC: 99 MEQ/L (ref 98–111)
CHOLESTEROL, TOTAL: 105 MG/DL (ref 100–199)
CO2: 27 MEQ/L (ref 23–33)
CREAT SERPL-MCNC: 1.2 MG/DL (ref 0.4–1.2)
ERYTHROCYTE [DISTWIDTH] IN BLOOD BY AUTOMATED COUNT: 15.6 % (ref 11.5–14.5)
ERYTHROCYTE [DISTWIDTH] IN BLOOD BY AUTOMATED COUNT: 44.2 FL (ref 35–45)
GFR SERPL CREATININE-BSD FRML MDRD: 43 ML/MIN/1.73M2
GLUCOSE BLD-MCNC: 117 MG/DL (ref 70–108)
GLUCOSE BLD-MCNC: 134 MG/DL (ref 70–108)
GLUCOSE BLD-MCNC: 144 MG/DL (ref 70–108)
GLUCOSE BLD-MCNC: 219 MG/DL (ref 70–108)
GLUCOSE BLD-MCNC: 99 MG/DL (ref 70–108)
HCT VFR BLD CALC: 29.2 % (ref 37–47)
HDLC SERPL-MCNC: 34 MG/DL
HEMOGLOBIN: 8.8 GM/DL (ref 12–16)
LDL CHOLESTEROL CALCULATED: 47 MG/DL
MAGNESIUM: 1.4 MG/DL (ref 1.6–2.4)
MCH RBC QN AUTO: 23.5 PG (ref 26–33)
MCHC RBC AUTO-ENTMCNC: 30.1 GM/DL (ref 32.2–35.5)
MCV RBC AUTO: 78.1 FL (ref 81–99)
PLATELET # BLD: 219 THOU/MM3 (ref 130–400)
PMV BLD AUTO: 12.2 FL (ref 9.4–12.4)
POTASSIUM SERPL-SCNC: 2.9 MEQ/L (ref 3.5–5.2)
POTASSIUM SERPL-SCNC: 3.6 MEQ/L (ref 3.5–5.2)
RBC # BLD: 3.74 MILL/MM3 (ref 4.2–5.4)
SODIUM BLD-SCNC: 141 MEQ/L (ref 135–145)
TRIGL SERPL-MCNC: 121 MG/DL (ref 0–199)
WBC # BLD: 9.1 THOU/MM3 (ref 4.8–10.8)

## 2019-05-18 PROCEDURE — 2580000003 HC RX 258: Performed by: FAMILY MEDICINE

## 2019-05-18 PROCEDURE — 80061 LIPID PANEL: CPT

## 2019-05-18 PROCEDURE — 6370000000 HC RX 637 (ALT 250 FOR IP): Performed by: NURSE PRACTITIONER

## 2019-05-18 PROCEDURE — 82948 REAGENT STRIP/BLOOD GLUCOSE: CPT

## 2019-05-18 PROCEDURE — 85027 COMPLETE CBC AUTOMATED: CPT

## 2019-05-18 PROCEDURE — 80048 BASIC METABOLIC PNL TOTAL CA: CPT

## 2019-05-18 PROCEDURE — 36415 COLL VENOUS BLD VENIPUNCTURE: CPT

## 2019-05-18 PROCEDURE — 2500000003 HC RX 250 WO HCPCS: Performed by: NURSE PRACTITIONER

## 2019-05-18 PROCEDURE — 94660 CPAP INITIATION&MGMT: CPT

## 2019-05-18 PROCEDURE — 94761 N-INVAS EAR/PLS OXIMETRY MLT: CPT

## 2019-05-18 PROCEDURE — 1200000003 HC TELEMETRY R&B

## 2019-05-18 PROCEDURE — 6370000000 HC RX 637 (ALT 250 FOR IP): Performed by: FAMILY MEDICINE

## 2019-05-18 PROCEDURE — 99233 SBSQ HOSP IP/OBS HIGH 50: CPT | Performed by: FAMILY MEDICINE

## 2019-05-18 PROCEDURE — 94640 AIRWAY INHALATION TREATMENT: CPT

## 2019-05-18 PROCEDURE — 6360000002 HC RX W HCPCS: Performed by: FAMILY MEDICINE

## 2019-05-18 PROCEDURE — 6360000002 HC RX W HCPCS: Performed by: NURSE PRACTITIONER

## 2019-05-18 PROCEDURE — 71046 X-RAY EXAM CHEST 2 VIEWS: CPT

## 2019-05-18 PROCEDURE — 83735 ASSAY OF MAGNESIUM: CPT

## 2019-05-18 PROCEDURE — 2709999900 HC NON-CHARGEABLE SUPPLY

## 2019-05-18 PROCEDURE — 84132 ASSAY OF SERUM POTASSIUM: CPT

## 2019-05-18 PROCEDURE — 2700000000 HC OXYGEN THERAPY PER DAY

## 2019-05-18 PROCEDURE — 82330 ASSAY OF CALCIUM: CPT

## 2019-05-18 RX ORDER — POTASSIUM CHLORIDE 7.45 MG/ML
10 INJECTION INTRAVENOUS
Status: COMPLETED | OUTPATIENT
Start: 2019-05-18 | End: 2019-05-18

## 2019-05-18 RX ORDER — BUMETANIDE 1 MG/1
0.5 TABLET ORAL 2 TIMES DAILY
Status: DISCONTINUED | OUTPATIENT
Start: 2019-05-18 | End: 2019-05-19

## 2019-05-18 RX ORDER — MAGNESIUM SULFATE IN WATER 40 MG/ML
2 INJECTION, SOLUTION INTRAVENOUS ONCE
Status: COMPLETED | OUTPATIENT
Start: 2019-05-18 | End: 2019-05-18

## 2019-05-18 RX ADMIN — POTASSIUM CHLORIDE 10 MEQ: 7.46 INJECTION, SOLUTION INTRAVENOUS at 10:30

## 2019-05-18 RX ADMIN — MIDODRINE HYDROCHLORIDE 5 MG: 5 TABLET ORAL at 12:01

## 2019-05-18 RX ADMIN — IPRATROPIUM BROMIDE AND ALBUTEROL SULFATE 1 AMPULE: .5; 3 SOLUTION RESPIRATORY (INHALATION) at 08:16

## 2019-05-18 RX ADMIN — CLOPIDOGREL BISULFATE 75 MG: 75 TABLET ORAL at 08:28

## 2019-05-18 RX ADMIN — PANTOPRAZOLE SODIUM 40 MG: 40 TABLET, DELAYED RELEASE ORAL at 08:28

## 2019-05-18 RX ADMIN — IPRATROPIUM BROMIDE AND ALBUTEROL SULFATE 1 AMPULE: .5; 3 SOLUTION RESPIRATORY (INHALATION) at 22:40

## 2019-05-18 RX ADMIN — POTASSIUM CHLORIDE 10 MEQ: 7.46 INJECTION, SOLUTION INTRAVENOUS at 11:59

## 2019-05-18 RX ADMIN — PANTOPRAZOLE SODIUM 40 MG: 40 TABLET, DELAYED RELEASE ORAL at 20:43

## 2019-05-18 RX ADMIN — AMIODARONE HYDROCHLORIDE 200 MG: 200 TABLET ORAL at 09:36

## 2019-05-18 RX ADMIN — CALCIUM GLUCONATE 1.5 G: 98 INJECTION, SOLUTION INTRAVENOUS at 16:41

## 2019-05-18 RX ADMIN — TIMOLOL MALEATE 1 DROP: 5 SOLUTION OPHTHALMIC at 20:43

## 2019-05-18 RX ADMIN — POTASSIUM CHLORIDE 10 MEQ: 7.46 INJECTION, SOLUTION INTRAVENOUS at 06:38

## 2019-05-18 RX ADMIN — PRAVASTATIN SODIUM 40 MG: 40 TABLET ORAL at 20:45

## 2019-05-18 RX ADMIN — MIDODRINE HYDROCHLORIDE 5 MG: 5 TABLET ORAL at 16:40

## 2019-05-18 RX ADMIN — POTASSIUM CHLORIDE 10 MEQ: 7.46 INJECTION, SOLUTION INTRAVENOUS at 15:02

## 2019-05-18 RX ADMIN — APIXABAN 5 MG: 5 TABLET, FILM COATED ORAL at 20:43

## 2019-05-18 RX ADMIN — MAGNESIUM SULFATE HEPTAHYDRATE 2 G: 40 INJECTION, SOLUTION INTRAVENOUS at 17:40

## 2019-05-18 RX ADMIN — POTASSIUM CHLORIDE 10 MEQ: 7.46 INJECTION, SOLUTION INTRAVENOUS at 13:32

## 2019-05-18 RX ADMIN — BUMETANIDE 0.5 MG: 1 TABLET ORAL at 20:43

## 2019-05-18 RX ADMIN — POTASSIUM CHLORIDE 10 MEQ: 7.46 INJECTION, SOLUTION INTRAVENOUS at 08:30

## 2019-05-18 RX ADMIN — APIXABAN 5 MG: 5 TABLET, FILM COATED ORAL at 09:36

## 2019-05-18 RX ADMIN — BUMETANIDE 1 MG: 0.25 INJECTION INTRAMUSCULAR; INTRAVENOUS at 04:11

## 2019-05-18 RX ADMIN — INSULIN LISPRO 4 UNITS: 100 INJECTION, SOLUTION INTRAVENOUS; SUBCUTANEOUS at 12:05

## 2019-05-18 RX ADMIN — IPRATROPIUM BROMIDE AND ALBUTEROL SULFATE 1 AMPULE: .5; 3 SOLUTION RESPIRATORY (INHALATION) at 12:19

## 2019-05-18 RX ADMIN — INSULIN LISPRO 2 UNITS: 100 INJECTION, SOLUTION INTRAVENOUS; SUBCUTANEOUS at 17:46

## 2019-05-18 RX ADMIN — MIDODRINE HYDROCHLORIDE 5 MG: 5 TABLET ORAL at 09:52

## 2019-05-18 ASSESSMENT — PAIN SCALES - GENERAL
PAINLEVEL_OUTOF10: 0

## 2019-05-18 ASSESSMENT — ENCOUNTER SYMPTOMS: SHORTNESS OF BREATH: 1

## 2019-05-18 NOTE — ED PROVIDER NOTES
[address]  Pt Name: Zayra Barboza  MRN: [id number]  [place] 1935  Date of evaluation: 5/17/2019  Provider: ROMAN Pascual CNP    CHIEF COMPLAINT       Chief Complaint   Patient presents with    Shortness of Breath       Nurses Notes reviewed and I agree except as noted in the HPI. HISTORY OF PRESENT ILLNESS    Zayra Barboza is a 82 y.o. female whopresents to the emergency department from home with severe sudden onset SOB. She called 911 with reports she was having an MI.  SPO2 was 85% on NRB on arrival.  She had very labored breathing. She wasn't able to give us much history or answer questions 2/2 her acute illness. Triage notes and Nursing notes were reviewed by myself. Any discrepancies are addressed above. REVIEW OF SYSTEMS     Review of Systems   Unable to perform ROS: Acuity of condition   Respiratory: Positive for shortness of breath. All pertinent systems were reviewed and are negative unless indicated in the HPI. PAST MEDICAL HISTORY    has a past medical history of Arthritis, BPPV (benign paroxysmal positional vertigo), GERD (gastroesophageal reflux disease), HTN (hypertension), Hx of blood clots, Hyperlipidemia, Obesity, Osteopenia, PAC (premature atrial contraction), Paralysis ([address] Pedal edema, PVC (premature ventricular contraction), PVD (peripheral vascular disease) ([address] Tinnitus, and UTI (urinary tract infection). SURGICAL HISTORY      has a past surgical history that includes Cholecystectomy; Breast surgery (Right, 1958); Hysterectomy; Appendectomy; Cosmetic surgery; eye surgery; pr egd transoral biopsy single/multiple (Left, 11/27/2017); Upper gastrointestinal endoscopy (N/A, 11/27/2017);  Colonoscopy (08/06/2018); laryngoscopy (07/15/2016); pr lap,surg,colectomy, partial, w/anast (N/A, 8/20/2018); pr office/outpt visit,procedure only (N/A, 9/5/2018); pr preet skn sub grft t/a/l area/<100scm /<1st 25 scm (N/A, 9/6/2018); drop 2 times daily. ALLERGIES     is allergic to lasix [furosemide]; lipitor [atorvastatin]; neurontin [gabapentin]; oxycontin [oxycodone hcl]; and penicillins. FAMILY HISTORY     indicated that her mother is . She indicated that her father is . She indicated that her sister is . She indicated that her maternal grandmother is . She indicated that her maternal grandfather is . She indicated that her paternal grandmother is . She indicated that her paternal grandfather is . family history includes Cancer in her father; Dementia in her maternal grandmother; Emphysema in her father; Heart Disease in her maternal grandfather, maternal grandmother, and mother; Other in her sister; Stroke in her mother. SOCIAL HISTORY      reports that she has been smoking cigarettes. She started smoking about 63 years ago. She has a 15.00 pack-year smoking history. She has never used smokeless tobacco. She reports that she drinks about 0.6 oz of alcohol per week. She reports that she does not use drugs. PHYSICAL EXAM     INITIAL VITALS:  height is 5' 5\" (1.651 m) and weight is 185 lb 9.6 oz (84.2 kg). Her bladder temperature is 99 °F (37.2 °C). Her blood pressure is 102/66 and her pulse is 101. Her respiration is 21 and oxygen saturation is 97%. Physical Exam   Constitutional: She is oriented to person, place, and time. She appears well-developed and well-nourished. She appears distressed. HENT:   Head: Normocephalic and atraumatic. Eyes: Conjunctivae and EOM are normal. Right eye exhibits no discharge. Left eye exhibits no discharge. Neck: Normal range of motion. No tracheal deviation present. Cardiovascular: Regular rhythm and intact distal pulses. Tachycardia present. Exam reveals no gallop. Murmur heard. Systolic murmur is present with a grade of 2/6. Normal capillary refill   Pulmonary/Chest: Accessory muscle usage present. No stridor. Tachypnea noted. She is in respiratory distress. She has wheezes. She has rales. Abdominal: Soft. Bowel sounds are normal.   Musculoskeletal: Normal range of motion. She exhibits no edema, tenderness or deformity. Neurological: She is alert and oriented to person, place, and time. No cranial nerve deficit. Skin: Skin is warm. Capillary refill takes 2 to 3 seconds. No rash noted. She is diaphoretic. No erythema. No pallor. Psychiatric: She has a normal mood and affect. Her behavior is normal.   Nursing note and vitals reviewed.         DIFFERENTIAL DIAGNOSIS:   Including but not limited to pulmonary edema, PE, CHF, EKG    DIAGNOSTIC RESULTS     EKG: AllEKG's are interpreted by the Emergency Department Physician who either signs or Co-signs this chart in the absence of a cardiologist.       EKG 12 Lead (Edited Result - FINAL)    Component (Lab Inquiry)   Collection Time Result Time Ventricular Rate Atrial Rate P-R Interval QRS Duration Q-T Interval QTc Calculation (Bazett) P Axis R Axis T Axis   05/17/19 03:04:58 05/17/19 03:04:58 108 108 170 74 350 469 54 -34 102   Previous Results   02/25/19 07:28:50 02/25/19 09:57:59 99 99 188 74 370 474 12 -36 21   09/16/18 15:52:57 09/16/18 20:03:27 91 91 180 66 380 467 14 -32 32   09/11/18 09:47:15 09/11/18 23:26:41 123 123 142 66 330 472 30 -30 67   09/06/18 13:21:51 09/06/18 14:44:14 90 90 156 78 324 396 45 -31 65   09/05/18 06:02:48 09/05/18 06:02:48 104 104 154 76 356 468 46 -40 51         Edited Result - FINAL                Narrative:     Poor data quality, interpretation may be adversely affected  Sinus tachycardia with occasional Premature ventricular complexes with junctional escape complexes  Left axis deviation  Septal infarct , age undetermined  Abnormal ECG  When compared with ECG of 25-FEB-2019 07:28,  Significant changes have occurred  Confirmed by MICHELE SNOWDEN ([id number]) on 5/17/2019 9:49:49 PM                RADIOLOGY: non-plain film images(s) such as CT, Ultrasound and MRI are read by the radiologist.  Plain radiographic images are visualized and preliminarily interpreted by the emergencyphysician unless otherwise stated below. XR CHEST PORTABLE   Final Result      Bilateral interstitial infiltrates probably representing pulmonary edema and CHF, less likely an atypical viral-type pneumonia. Cardiomegaly. **This report has been created using voice recognition software. It may contain minor errors which are inherent in voice recognition technology. **      Final report electronically signed by Dr. Mary Mclaughlin on 5/17/2019 3:36 AM            LABS:   Labs Reviewed   BASIC METABOLIC PANEL - Abnormal; Notable for the following components:       Result Value    Chloride 97 (*)     CO2 20 (*)     Glucose 469 (*)     CREATININE 1.3 (*)     Calcium 7.4 (*)     All other components within normal limits   BRAIN NATRIURETIC PEPTIDE - Abnormal; Notable for the following components:    Pro-BNP 5051.0 (*)     All other components within normal limits   CBC WITH AUTO DIFFERENTIAL - Abnormal; Notable for the following components:    WBC 13.8 (*)     Hemoglobin 10.5 (*)     Hematocrit 35.3 (*)     MCV 80.8 (*)     MCH 24.0 (*)     MCHC 29.7 (*)     RDW-CV 15.9 (*)     RDW-SD 47.0 (*)     Segs Absolute 9.8 (*)     Immature Grans (Abs) 0.11 (*)     All other components within normal limits   HEPATIC FUNCTION PANEL - Abnormal; Notable for the following components:    Alb 3.2 (*)     Alkaline Phosphatase 149 (*)      (*)     All other components within normal limits   MAGNESIUM - Abnormal; Notable for the following components:    Magnesium 1.2 (*)     All other components within normal limits   PROTIME-INR - Abnormal; Notable for the following components:    INR 1.70 (*)     All other components within normal limits   TROPONIN - Abnormal; Notable for the following components:    Troponin T 0.023 (*)     All other components within normal limits   BLOOD GAS, ARTERIAL - Abnormal; Notable for the following components:    pH, Blood Gas 7.29 (*)     PCO2 49 (*)     PO2 209 (*)     Base Excess (Calculated) -3.5 (*)     All other components within normal limits   LACTIC ACID, PLASMA - Abnormal; Notable for the following components:    Lactic Acid 2.5 (*)     All other components within normal limits   POTASSIUM, WHOLE BLOOD - Abnormal; Notable for the following components:    Potassium, Whole Blood 3.3 (*)     All other components within normal limits   CALCIUM IONIZED SERUM - Abnormal; Notable for the following components:    Calcium,Ionized 0.97 (*)     All other components within normal limits   POC LACTIC ACID - Abnormal; Notable for the following components:    POC Lactic Acid 4.0 (*)     All other components within normal limits   SODIUM, WHOLE BLOOD - Abnormal; Notable for the following components:    Sodium, Whole Blood 136 (*)     All other components within normal limits   GLUCOSE, WHOLE BLOOD - Abnormal; Notable for the following components:    Glucose, Whole Blood 483 (*)     All other components within normal limits   ANION GAP - Abnormal; Notable for the following components:    Anion Gap 21.0 (*)     All other components within normal limits   GLOMERULAR FILTRATION RATE, ESTIMATED - Abnormal; Notable for the following components:    Est, Glom Filt Rate 39 (*)     All other components within normal limits   URINE WITH REFLEXED MICRO - Abnormal; Notable for the following components:    Glucose, Ur 250 (*)     Protein, UA 30 (*)     All other components within normal limits   TROPONIN - Abnormal; Notable for the following components:    Troponin T 0.023 (*)     All other components within normal limits   TROPONIN - Abnormal; Notable for the following components:    Troponin T 0.018 (*)     All other components within normal limits   POCT GLUCOSE - Abnormal; Notable for the following components:    POC Glucose 158 (*)     All other components within normal limits   CULTURE precautions and follow up instructions were discussed with the patient with which the patient agrees     Physical assessment findings, diagnostic testing(s) if applicable, and vital signs reviewed with patient/patient representative. Questions answered. Medications asdirected, including OTC medications for supportive care. Education provided on medications. Differential diagnosis(s) discussed with patient/patient representative. Home care/self care instructions reviewed withpatient/patient representative. Patient is to follow-up with family care provider in 2-3 days if no improvement. Patient is to go to the emergency department if symptoms worsen. Patient/patient representative isaware of care plan, questions answered, verbalizes understanding and is in agreement.      ED Medications administered this visit:   Medications   amiodarone (CORDARONE) tablet 200 mg (200 mg Oral Given 5/17/19 1052)   apixaban (ELIQUIS) tablet 5 mg (5 mg Oral Given 5/17/19 2046)   clopidogrel (PLAVIX) tablet 75 mg (75 mg Oral Given 5/17/19 1053)   melatonin tablet 6 mg (6 mg Oral Given 5/17/19 2354)   midodrine (PROAMATINE) tablet 5 mg (5 mg Oral Given 5/17/19 1705)   pantoprazole (PROTONIX) tablet 40 mg (40 mg Oral Given 5/17/19 2046)   pravastatin (PRAVACHOL) tablet 40 mg (40 mg Oral Given 5/17/19 2046)   sodium chloride flush 0.9 % injection 10 mL (10 mLs Intravenous Given 5/17/19 2046)   sodium chloride flush 0.9 % injection 10 mL (has no administration in time range)   ondansetron (ZOFRAN) injection 4 mg (has no administration in time range)   potassium chloride (KLOR-CON M) extended release tablet 40 mEq (has no administration in time range)     Or   potassium bicarb-citric acid (EFFER-K) effervescent tablet 40 mEq (has no administration in time range)     Or   potassium chloride 10 mEq/100 mL IVPB (Peripheral Line) (has no administration in time range)   magnesium sulfate 1 g in dextrose 5 % 100 mL IVPB (has no administration in time range)   senna (SENOKOT) tablet 8.6 mg (has no administration in time range)   bumetanide (BUMEX) injection 1 mg (1 mg Intravenous Given 5/17/19 1840)   insulin lispro (HUMALOG) injection vial 0-12 Units (2 Units Subcutaneous Given 5/17/19 1840)   insulin lispro (HUMALOG) injection vial 0-6 Units (2 Units Subcutaneous Given 5/17/19 2046)   glucose (GLUTOSE) 40 % oral gel 15 g (has no administration in time range)   dextrose 50 % solution 12.5 g (has no administration in time range)   glucagon (rDNA) injection 1 mg (has no administration in time range)   dextrose 5 % solution (has no administration in time range)   ipratropium-albuterol (DUONEB) nebulizer solution 1 ampule (1 ampule Inhalation Given 5/17/19 2035)   timolol (TIMOPTIC) 0.5 % ophthalmic solution 1 drop (1 drop Both Eyes Given 5/17/19 2057)   bumetanide (BUMEX) injection 1 mg (1 mg Intravenous Given 5/17/19 0328)   potassium chloride (KLOR-CON) extended release tablet 40 mEq (40 mEq Oral Given 5/17/19 0521)   insulin regular (HUMULIN R;NOVOLIN R) injection 5 Units (5 Units Intravenous Given 5/17/19 0516)   magnesium sulfate 2 g in 50 mL IVPB premix (0 g Intravenous Stopped 5/17/19 0817)           I have given the patient strict written and verbal instructions about care at home,follow-up, and signs and symptoms of worsening of condition and they did verbalize understanding. Patient was seen independently by myself. The Patient's final impression and disposition and plan was determined by myself. CRITICAL CARE:   There was a high probability of clinically significant/life threatening deterioration in this patient's condition which required my urgent intervention. Total critical care time was 30 minutes. This excludes any time for separately reportable procedures. CONSULTS:  None    PROCEDURES:  None    FINAL IMPRESSION      1. Acute pulmonary edema (HCC)    2. Hypoxia    3.  Acute respiratory failure with hypoxia ([facility] [address]    4.  Compensated acidosis          DISPOSITION/PLAN   DISPOSITION Admitted 05/17/2019 06:22:30 AM        PATIENT REFERRED TO:  Damien Chau 68  [facility]  [phone]            DISCHARGE MEDICATIONS:  Current Discharge Medication List          (Please note that portions of this note were completed with a voice recognition program.  Efforts were made to edit thedictations but occasionally words are mis-transcribed.)    ROMAN Morrison CNP, APRN - CNP  05/18/19 [id number]

## 2019-05-18 NOTE — PLAN OF CARE
Problem: RESPIRATORY  Goal: Clear lung sounds  5/17/2019 2037 by Belgica Dave RCP  Outcome: Ongoing   Continue with Duoneb to achieve clear lung sounds.

## 2019-05-18 NOTE — PROGRESS NOTES
Hospitalist Progress Note      Patient:  Keyonna Barajas      Unit/Bed:4B-06/006-A    YOB: 1935    MRN: [id number]       Acct: [de-identified]     PCP: Eder Brito    Date of Admission: 5/17/2019    Assessment/Plan:    1. Acute Hypercapneic and Hypoxic Respiratory failure -- due to CHF exac and ??COPD -- change bipap to nightly and prn  -> weaned to 2 L 5/18 am with diuresis, duonebs -- cont wean -- repeated CXR 5/18 improving - no edema or infiltrates  2. Acute Pulmonary Edema--given Bumex 1 mg in ED and continued on bumex 1 mg IV q 8 hrs --> change to bumex 0.5 mg po bid 5/18 --> repeated CXR 5/18 with improving edema and NO infiltrates and PCT normal 0.06 on 5/17  3. Acute on Chronic Diastolic HF--BNP at 7637; watch I/O's; daily weights --  takes HCTZ at home and stopped on admission and given bumex 1 mg IV q 8 hrs on admission --> change to bumex 0.5 mg po bid 5/18 as CXR improving 5/18  -- no BB as BP borderline (not on at home either)  -- limited echo 9/2018 EF 55%, LV fxn normal - echo 38/6822 with +diastolic dysfxn --> check full echo  -- follows with DR. Darling  -- stress 9/2018 (-) ischemia  4. Elevated troponin -- chronic component -- similar to 9/2018 - 0.023 -> 0.023 -> 0.018 -- follows with Dr. Micki Mendez -- cont plavix, eliquis -- NO BB due to low BP -- last stress here 9/2018 (-) ischemia   5. Arrhythmia -- frequent PAC, PVC -- ??why on amiodarone as cannot take BB due to low BP -- cont monitor tele -- f/u cardiology -- cont monitor lytes  6. Hypokalemia -- due to diuresis - down to 2.9 on 5/18 --> replace aggressively, decrease diuresis 5/18 and monitor -- Mg low too 5/18 -> cont replace - ?add supplement daily  7. Lactic Acidosis--improved; recheck at 0900 on 5/17 normal -asymptomatic -- blood cx (-) to date -- no atbx at this time -- urine cx with GNB but asx -- ?due to tissue hypoxia  8. Hypomagnesia--replace aggressively and monitor  9.  Hypocalcemia--replace per protocol; monitor  10. Leukocytosis -- 5/17 -- improved to WNL on 5/18 - No urinary sx with GNB in urine -- CXR improving 5/18 with no infiltrates and PCT (-) 5/17 -- cont monitor off atbx  11. Asymptomatic bacteriuria -- urine cx 5/17 with GNB  - however pt ASX and no fever, u/a with only bacteria -- hx of colorectal fistula -- NO atbx at this time  12. CKD Stage 3 -- stable at 1.2 on 5/18 -- baseline 1.2 - 1.3 -- cont monitor with diuresis   13. Chronic hypotension-- cont midodrine and monitor - ?increase  14. PAD - Successful right fem-tib angioplasty and stenting with a 6.5 x 80 Supera stent of the [place] to venous interposition graft stenosis on 2/26/2019 by Dr. Elvi Matta -- cont eliquis, plavix -- asx  15. Hx AAA repair on 2/15/2019 -- cont eliquis, plavix -- follows with Dr. Lamar Cortez  16. Hx colovesical fistula -- ?contrib to bacteriuria -- was following with surgery -- f/u outpt  17. Chronic microcytic anemia -- down to 8.8 on 5/18 from 10.5 on admission --> baseline in 2/2019 was 8's -- cont monitor  18. HLD -- cont statin  19. GERD   20. Tobacco abuse  21. Obesity Body mass index is 30.55 kg/m². Dispo   -- 5/18 --> improving - change bumex to 0.5 mg bid as CXR checked this am improving -- cont O2 and wean, replace lytes, remove razo, increase activity. Monitor lytes, renal fxn, resp status, BP, h/h.       Chief Complaint: shortness of breath    Hospital Course: Kathryn Cuellar is a 82 y.o. female with    Per H&P by DEL Valdez 5/17/19 notes \"pt tells me that last PM she had nausea; she states about midnight she dev extreme shortness of breath after she went to the bathroom; she denies any pain; 02 sat was 82%; ABG's revealed pH 7.29 and PC02 49; she was placed on a Bi-pap; CXR revealed pulmonary and she was given Bumex 1 mg IVP; a Razo cath was placed in the ER; she was found to have electrolyte abnormalities; her glucose was 483; she was hypotensive but has improved; she has a hx of severe PVD S/P right leg revascularization on 09/05/18 and emergent declotting of the right femoral to anterior tibial bypass on 09/06/18; on 2/14/2019 she had Fabian declotting thromboembolectomy of the right femoral anterior tibial bypass; on 2/15/2019 she had AAA repair and dev acute thrombosis of the right fem-popliteal bypass graft after endovascular repair of the abdominal aortic aneurysm; she is on Eliquis and Plavix; she is on Cordarone 200 mg PO daily and not sure why she is on this~after reviewing the chart I saw a note from Dr Farrah Perez on 2/21/2019 that \"no indication for amiodarone, d/c\"; I see on discharge summary from Emi Lemos on 2/26/2019 that amio was continued on discharge; she currently states she is breathing much better; she is being admitted to the Hospitalist Service for further care. \"  -- CXR showed 'Bilateral interstitial infiltrates probably representing pulmonary edema and CHF, less likely an atypical viral-type pneumonia. \"  She was placed on bipap and admitted to stepdown bed. She was started on IV bumex 1 mg q 8 hrs on admission with improving resp status. Subjective:   -- 5/18 --> pt feeling little better - down to 2L O2 from bipap. No cp. SOB improving. Slight cough - non-productive,. Denies abd pain, n/v.  Denies f/c.  Wili po. Low grade temp - Tmax 99.7 last 24 hrs. Not moving much yet. Last BM - none since admission.       Medications:  Reviewed    Infusion Medications    dextrose       Scheduled Medications    magnesium sulfate  2 g Intravenous Once    potassium chloride  10 mEq Intravenous Q1H    magnesium replacement protocol   Other RX Placeholder    calcium replacement protocol   Other RX Placeholder    amiodarone  200 mg Oral Daily    apixaban  5 mg Oral BID    clopidogrel  75 mg Oral Daily    midodrine  5 mg Oral TID WC    pantoprazole  40 mg Oral BID    pravastatin  40 mg Oral Nightly    sodium chloride flush  10 mL Intravenous 2 times per day    bumetanide  1 mg Intravenous Q8H    insulin lispro  0-12 Units Subcutaneous TID WC    insulin lispro  0-6 Units Subcutaneous Nightly    ipratropium-albuterol  1 ampule Inhalation Q4H WA    timolol  1 drop Both Eyes Nightly     PRN Meds: melatonin, sodium chloride flush, ondansetron, potassium chloride **OR** potassium alternative oral replacement **OR** potassium chloride, magnesium sulfate, senna, glucose, dextrose, glucagon (rDNA), dextrose      Intake/Output Summary (Last 24 hours) at 5/18/2019 0807  Last data filed at 5/18/2019 0700  Gross per 24 hour   Intake 1021 ml   Output 1900 ml   Net -879 ml       Diet:  DIET LOW SODIUM 2 GM; Exam:  /75   Pulse 98   Temp 98 °F (36.7 °C) (Oral)   Resp 20   Ht 5' 5\" (1.651 m)   Wt 193 lb 1.6 oz (87.6 kg)   SpO2 94%   BMI 32.13 kg/m²     General appearance: No apparent distress, appears older than stated age and cooperative. HEENT: Pupils equal, round, and reactive to light. Conjunctivae/corneas clear. Neck: Supple, with full range of motion. No jugular venous distention. Trachea midline. Respiratory:  Normal respiratory effort. Rhonchi entire right side with few on left, no wheezes, no rales. No respiratory distress or accessory muscle use. Cardiovascular: Regular rate and rhythm with ectopy with normal S1/S2 without murmurs, rubs or gallops. Abdomen: Soft, non-tender, non-distended with normal bowel sounds. No rebound or guarding  Musculoskeletal: No clubbing, cyanosis or edema bilaterally. Full range of motion without deformity. No calf tenderness palpation  Skin: Skin color, texture, turgor normal.  No rashes or lesions. Neurologic:  Neurovascularly intact without any focal sensory/motor deficits.  Cranial nerves: II-XII intact, grossly non-focal.  Psychiatric: Alert and oriented, thought content appropriate, normal insight  Capillary Refill: Brisk,< 3 seconds   Peripheral Pulses: +2 palpable, equal bilaterally       Labs:   Recent Labs     05/17/19  0313 05/18/19  0406 WBC 13.8* 9.1   HGB 10.5* 8.8*   HCT 35.3* 29.2*    219     Recent Labs     05/17/19  0313 05/17/19  0323 05/18/19  0406    136* 141   K 3.6 3.3* 2.9*   CL 97*  --  99   CO2 20*  --  27   BUN 22  --  30*   CREATININE 1.3*  --  1.2   CALCIUM 7.4*  --  8.0*     Recent Labs     05/17/19  0313   *   ALT 43   BILIDIR <0.2   BILITOT 0.4   ALKPHOS 149*     Recent Labs     05/17/19  0313   INR 1.70*     No results for input(s): Rubin Gal in the last 72 hours. Urinalysis:      Lab Results   Component Value Date    NITRU NEGATIVE 05/17/2019    WBCUA 0-2 05/17/2019    BACTERIA MANY 05/17/2019    RBCUA 0-2 05/17/2019    BLOODU NEGATIVE 05/17/2019    SPECGRAV 1.013 02/24/2019    GLUCOSEU 250 05/17/2019       Radiology:  XR CHEST STANDARD (2 VW)   Final Result   1. Mild cardiomegaly. Moderate degenerative changes both shoulders. . Moderately tortuous descending thoracic aorta. Fibroemphysematous lungs with flattened hemidiaphragms. 2. No acute infiltrates or effusions are seen at this time. Overall appearance of chest improved from prior. **This report has been created using voice recognition software. It may contain minor errors which are inherent in voice recognition technology. **      Final report electronically signed by Dr. Bright Singleton on 5/18/2019 7:50 AM      XR CHEST PORTABLE   Final Result      Bilateral interstitial infiltrates probably representing pulmonary edema and CHF, less likely an atypical viral-type pneumonia. Cardiomegaly. **This report has been created using voice recognition software. It may contain minor errors which are inherent in voice recognition technology. **      Final report electronically signed by Dr. Mary Mclaughlin on 5/17/2019 3:36 AM          Diet: DIET LOW SODIUM 2 GM;     Meredith: yes    Microbiology:  Blood cx 5/17 (-) to date    Urine cx 5/17 = GNB    Tele:  SR/ST with frequent PVC/PACs    DVT prophylaxis: [] Lovenox [] SCDs                                 [] SQ Heparin                                 [] Encourage ambulation           [x] Already on Anticoagulation -- eliquis     Disposition:    [x] Home       [] TCU       [] Rehab       [] Psych       [] SNF       [] [facility]       [] Other-    Code Status: Full Code    PT/OT Eval Status: following          Electronically signed by Lianne Youssef MD on 5/18/2019 at 8:07 AM when pt evaluated - final note filed late

## 2019-05-18 NOTE — PLAN OF CARE
Problem: RESPIRATORY  Goal: Clear lung sounds  5/18/2019 0820 by Gillian Wiley RCP  Outcome: Ongoing     Problem: Falls - Risk of:  Goal: Will remain free from falls  Description  Will remain free from falls  Outcome: Ongoing  Note:   Patient has remained free of falls this shift. Bed and chair alarm on. Call light within reach. Patient uses call light appropriately. Hourly rounding completed. Problem: Discharge Planning:  Goal: Participates in care planning  Description  Participates in care planning  Outcome: Ongoing  Note:   Patient is able to participate in discharge planning. Problem: Discharge Planning:  Goal: Discharged to appropriate level of care  Description  Discharged to appropriate level of care  Outcome: Ongoing  Note:   Patient is from home. Plans are to return home once medically stable. Problem: Airway Clearance - Ineffective:  Goal: Ability to maintain a clear airway will improve  Description  Ability to maintain a clear airway will improve  Outcome: Ongoing  Note:   Patient is able to clear airway appropriately. Problem: Cardiac Output - Decreased:  Goal: Hemodynamic stability will improve  Description  Hemodynamic stability will improve  Outcome: Ongoing  Note:   Patient's vital signs have remained stable. On Midodrine. Continue to monitor every 4 hours and as needed. Problem: Fluid Volume - Imbalance:  Goal: Absence of imbalanced fluid volume signs and symptoms  Description  Absence of imbalanced fluid volume signs and symptoms  Outcome: Ongoing  Note:   Patient is a strict intake and output monitoring. Problem: Gas Exchange - Impaired:  Goal: Levels of oxygenation will improve  Description  Levels of oxygenation will improve  Outcome: Ongoing  Note:   Patient is not on home oxygen. Currently on 2 liters of oxygen per nasal cannula. Was placed on 1 liter and tolerating well. Oxygen saturation remains greater than 90% on 1 liter. Continue to wean appropriately. Problem: Risk for Impaired Skin Integrity  Goal: Tissue integrity - skin and mucous membranes  Description  Structural intactness and normal physiological function of skin and  mucous membranes. Outcome: Ongoing  Note:   Patient is able to turn self appropriately. Redness noted to buttocks, area is blanchable. Pre-existing right lower leg has a non-healing wound. Open to air. Care plan reviewed with patient. Patient is able to verbalize understanding of the plan of care and contribute to goal setting.

## 2019-05-18 NOTE — PROGRESS NOTES
Pt refusing bipap at this time. Educated pt on importance of bipap. Continues to refused at this time.

## 2019-05-18 NOTE — PROGRESS NOTES
0024- Pt agreeable to bipap at this time. 0681- Pt pulls off bipap. States, \"I'm done with this\". Placed back on 3L NC, sats 96%.

## 2019-05-19 VITALS
HEIGHT: 65 IN | SYSTOLIC BLOOD PRESSURE: 98 MMHG | TEMPERATURE: 97.7 F | OXYGEN SATURATION: 94 % | WEIGHT: 183.6 LBS | DIASTOLIC BLOOD PRESSURE: 65 MMHG | HEART RATE: 99 BPM | RESPIRATION RATE: 16 BRPM | BODY MASS INDEX: 30.59 KG/M2

## 2019-05-19 LAB
ANION GAP SERPL CALCULATED.3IONS-SCNC: 15 MEQ/L (ref 8–16)
BASOPHILS # BLD: 0.4 %
BASOPHILS ABSOLUTE: 0 THOU/MM3 (ref 0–0.1)
BUN BLDV-MCNC: 31 MG/DL (ref 7–22)
CALCIUM IONIZED: 1.09 MMOL/L (ref 1.12–1.32)
CALCIUM SERPL-MCNC: 9 MG/DL (ref 8.5–10.5)
CHLORIDE BLD-SCNC: 103 MEQ/L (ref 98–111)
CO2: 28 MEQ/L (ref 23–33)
CREAT SERPL-MCNC: 1.4 MG/DL (ref 0.4–1.2)
EOSINOPHIL # BLD: 0.7 %
EOSINOPHILS ABSOLUTE: 0.1 THOU/MM3 (ref 0–0.4)
ERYTHROCYTE [DISTWIDTH] IN BLOOD BY AUTOMATED COUNT: 15.7 % (ref 11.5–14.5)
ERYTHROCYTE [DISTWIDTH] IN BLOOD BY AUTOMATED COUNT: 45.1 FL (ref 35–45)
GFR SERPL CREATININE-BSD FRML MDRD: 36 ML/MIN/1.73M2
GLUCOSE BLD-MCNC: 109 MG/DL (ref 70–108)
GLUCOSE BLD-MCNC: 121 MG/DL (ref 70–108)
GLUCOSE BLD-MCNC: 149 MG/DL (ref 70–108)
HCT VFR BLD CALC: 32.5 % (ref 37–47)
HEMOGLOBIN: 9.7 GM/DL (ref 12–16)
IMMATURE GRANS (ABS): 0.03 THOU/MM3 (ref 0–0.07)
IMMATURE GRANULOCYTES: 0.4 %
LYMPHOCYTES # BLD: 16.2 %
LYMPHOCYTES ABSOLUTE: 1.2 THOU/MM3 (ref 1–4.8)
MAGNESIUM: 1.7 MG/DL (ref 1.6–2.4)
MCH RBC QN AUTO: 23.8 PG (ref 26–33)
MCHC RBC AUTO-ENTMCNC: 29.8 GM/DL (ref 32.2–35.5)
MCV RBC AUTO: 79.9 FL (ref 81–99)
MONOCYTES # BLD: 7.2 %
MONOCYTES ABSOLUTE: 0.5 THOU/MM3 (ref 0.4–1.3)
NUCLEATED RED BLOOD CELLS: 0 /100 WBC
ORGANISM: ABNORMAL
PLATELET # BLD: 226 THOU/MM3 (ref 130–400)
PMV BLD AUTO: 12.2 FL (ref 9.4–12.4)
POTASSIUM SERPL-SCNC: 3.3 MEQ/L (ref 3.5–5.2)
RBC # BLD: 4.07 MILL/MM3 (ref 4.2–5.4)
SEG NEUTROPHILS: 75.1 %
SEGMENTED NEUTROPHILS ABSOLUTE COUNT: 5.6 THOU/MM3 (ref 1.8–7.7)
SODIUM BLD-SCNC: 146 MEQ/L (ref 135–145)
URINE CULTURE REFLEX: ABNORMAL
WBC # BLD: 7.5 THOU/MM3 (ref 4.8–10.8)

## 2019-05-19 PROCEDURE — 6370000000 HC RX 637 (ALT 250 FOR IP): Performed by: FAMILY MEDICINE

## 2019-05-19 PROCEDURE — 80048 BASIC METABOLIC PNL TOTAL CA: CPT

## 2019-05-19 PROCEDURE — 82330 ASSAY OF CALCIUM: CPT

## 2019-05-19 PROCEDURE — 36415 COLL VENOUS BLD VENIPUNCTURE: CPT

## 2019-05-19 PROCEDURE — 6370000000 HC RX 637 (ALT 250 FOR IP): Performed by: NURSE PRACTITIONER

## 2019-05-19 PROCEDURE — 83735 ASSAY OF MAGNESIUM: CPT

## 2019-05-19 PROCEDURE — 99239 HOSP IP/OBS DSCHRG MGMT >30: CPT | Performed by: FAMILY MEDICINE

## 2019-05-19 PROCEDURE — 82948 REAGENT STRIP/BLOOD GLUCOSE: CPT

## 2019-05-19 PROCEDURE — 85025 COMPLETE CBC W/AUTO DIFF WBC: CPT

## 2019-05-19 PROCEDURE — 94640 AIRWAY INHALATION TREATMENT: CPT

## 2019-05-19 PROCEDURE — 2580000003 HC RX 258: Performed by: NURSE PRACTITIONER

## 2019-05-19 PROCEDURE — 94761 N-INVAS EAR/PLS OXIMETRY MLT: CPT

## 2019-05-19 RX ORDER — BUMETANIDE 1 MG/1
0.5 TABLET ORAL DAILY
Status: DISCONTINUED | OUTPATIENT
Start: 2019-05-20 | End: 2019-05-19 | Stop reason: HOSPADM

## 2019-05-19 RX ORDER — POTASSIUM CHLORIDE 750 MG/1
40 TABLET, FILM COATED, EXTENDED RELEASE ORAL ONCE
Status: DISCONTINUED | OUTPATIENT
Start: 2019-05-19 | End: 2019-05-19 | Stop reason: HOSPADM

## 2019-05-19 RX ORDER — ALBUTEROL SULFATE 90 UG/1
2 AEROSOL, METERED RESPIRATORY (INHALATION) EVERY 6 HOURS PRN
Qty: 1 INHALER | Refills: 0 | Status: ON HOLD | OUTPATIENT
Start: 2019-05-19 | End: 2019-12-31 | Stop reason: SDUPTHER

## 2019-05-19 RX ORDER — BUMETANIDE 0.5 MG/1
0.5 TABLET ORAL DAILY
Qty: 30 TABLET | Refills: 0 | Status: SHIPPED | OUTPATIENT
Start: 2019-05-20 | End: 2019-06-03

## 2019-05-19 RX ORDER — POTASSIUM CHLORIDE 1500 MG/1
40 TABLET, FILM COATED, EXTENDED RELEASE ORAL DAILY
Qty: 6 TABLET | Refills: 0 | Status: SHIPPED | OUTPATIENT
Start: 2019-05-19 | End: 2019-06-03

## 2019-05-19 RX ADMIN — IPRATROPIUM BROMIDE AND ALBUTEROL SULFATE 1 AMPULE: .5; 3 SOLUTION RESPIRATORY (INHALATION) at 13:04

## 2019-05-19 RX ADMIN — IPRATROPIUM BROMIDE AND ALBUTEROL SULFATE 1 AMPULE: .5; 3 SOLUTION RESPIRATORY (INHALATION) at 07:44

## 2019-05-19 RX ADMIN — BUMETANIDE 0.5 MG: 1 TABLET ORAL at 11:17

## 2019-05-19 RX ADMIN — MIDODRINE HYDROCHLORIDE 5 MG: 5 TABLET ORAL at 11:17

## 2019-05-19 RX ADMIN — Medication 6 MG: at 01:23

## 2019-05-19 RX ADMIN — CLOPIDOGREL BISULFATE 75 MG: 75 TABLET ORAL at 11:17

## 2019-05-19 RX ADMIN — APIXABAN 5 MG: 5 TABLET, FILM COATED ORAL at 11:17

## 2019-05-19 RX ADMIN — PANTOPRAZOLE SODIUM 40 MG: 40 TABLET, DELAYED RELEASE ORAL at 08:46

## 2019-05-19 RX ADMIN — AMIODARONE HYDROCHLORIDE 200 MG: 200 TABLET ORAL at 11:17

## 2019-05-19 RX ADMIN — Medication 10 ML: at 08:46

## 2019-05-19 ASSESSMENT — PAIN SCALES - GENERAL
PAINLEVEL_OUTOF10: 0
PAINLEVEL_OUTOF10: 0

## 2019-05-19 NOTE — DISCHARGE SUMMARY
Hospitalist Discharge Summary     Patient Identification:  Sunil Melchor  : 1935  MRN: [id number]   Account: [de-identified]     Admit date: 2019  Discharge date: 2019   Attending provider: Efren Hightower MD        Primary care provider: Theresa Gil     Discharge Diagnoses:   1. Acute Hypercapneic and Hypoxic Respiratory failure -- POA  2. Acute Pulmonary Edema due to #3  3. Acute on Chronic Diastolic HF - POA  4. Elevated troponin -- chronic component   5. Arrhythmia -- frequent PAC, PVC  6. Hypokalemia  7. Lactic Acidosis  8. Hypomagnesia  9. Hypocalcemia  10. hypernatremia  11. Leukocytosis  12. Asymptomatic bacteriuria  13. CKD Stage 3  14. Chronic hypotension  15. PAD - Successful right fem-tib angioplasty and stenting with a 6.5 x 80 Supera stent of the [place]-Tam to venous interposition graft stenosis on 2019 by Dr. Last Caldera   16. Hx AAA repair on 2/15/2019   17. Hx colovesical fistula   18. Chronic microcytic anemia   19. HLD   20. GERD   21. Tobacco abuse  22. Obesity Body mass index is 30.55 kg/m². Hospital Course:   Sunil Melchor is a 82 y.o. female admitted to [address] [place] [address] on 2019 for sob. See H&P by DEL Valdez on 19 for admitting details. 23. Acute Hypercapneic and Hypoxic Respiratory failure -- due to CHF exac and ??COPD -- change bipap to nightly and prn  -> weaned to 2 L  am with diuresis, duonebs -- repeated CXR  improving - no edema or infiltrates  -- weaned to RA prior to d/c  24. Acute Pulmonary Edema--given Bumex 1 mg in ED and continued on bumex 1 mg IV q 8 hrs --> change to bumex 0.5 mg po bid  --> repeated CXR  with improving edema and NO infiltrates and PCT normal 0.06 on    -- home with bumex 0.5 mg daily and supplemental KcL and f/u BMP 3 days  25.  Acute on Chronic Diastolic HF--BNP at 3244; watch I/O's; daily weights --  takes HCTZ at home and stopped on admission and NOT resumed at d/c --> given bumex 1 mg IV q 8 hrs on admission --> changed to bumex 0.5 mg po bid 5/18 as CXR improving 5/18 -- decreased to 0.5 mg daily at d/c -- f/u Dr. Mary Melendez and PCP ASAP  -- no BB as BP borderline (not on at home either)  -- limited echo 9/2018 EF 55%, LV fxn normal - echo 45/2337 with +diastolic dysfxn   -- follows with DR. Darling  -- stress 9/2018 (-) ischemia  26. Elevated troponin -- chronic component -- similar to 9/2018 - 0.023 -> 0.023 -> 0.018 -- follows with Dr. Mary Melendez -- cont plavix, eliquis -- NO BB due to low BP -- last stress here 9/2018 (-) ischemia -- asx at d/c and f/u Dr. Mary Melendez  27. Arrhythmia -- frequent PAC, PVC -- suspect why pt is on amiodarone as cannot take BB due to low BP -- f/u cardiology -- lytes replaced  28. Hypokalemia -- due to diuresis - down to 2.9 on 5/18 --> replaced aggressively, decrease diuresis 5/18 and improved 5/18 pm but down to 3.3 on 5/19--> home with KCl 40 mEq daily x 3 days and have repeat BMP and f/u PCP   -- Mg low too 5/18 and replaced - WNL 5/19  29. Lactic Acidosis--improved; recheck at 0900 on 5/17 normal -asymptomatic -- blood cx (-) to date -- no atbx at this time -- urine cx with GNB but asx  30. Hypomagnesia-- replaced  31. Hypocalcemia--replaced  32. Leukocytosis -- 5/17 -- improved to WNL on 5/18 and again 5/19 - No urinary sx with Bee Ridgebrandie Ramirez in urine -- CXR improving 5/18 with no infiltrates and PCT (-) 5/17 -- cont monitor off atbx  33. Asymptomatic bacteriuria -- urine cx 5/17 with Klebsiella -->  however pt ASX and no fever, u/a with only bacteria -- hx of colorectal fistula -- NO atbx at this time  34. CKD Stage 3 -- stable at 1.2 on 5/18 -- baseline 1.2 - 1.3 -- cont monitor with diuresis   35. Chronic hypotension-- cont midodrine and monitor - ?increase  36. PAD - Successful right fem-tib angioplasty and stenting with a 6.5 x 80 Supera stent of the [place]-Tam to venous interposition graft stenosis on 2/26/2019 by Dr. Javier Guillen -- cont eliquis, plavix -- asx  40.  Hx AAA repair on 2/15/2019 -- cont eliquis, plavix -- follows with Dr. Vega Acevedo  38. Hx colovesical fistula -- ?contrib to bacteriuria -- was following with surgery -- f/u outpt  39. Chronic microcytic anemia -- down to 8.8 on 5/18 from 10.5 on admission --> baseline in 2/2019 was 8's -- cont monitor  40. HLD -- cont statin  41. GERD -- cont PPI  42. Tobacco abuse  43. Obesity Body mass index is 30.55 kg/m². Haven Saleh was HD stable. Her resp status was much improved. Her K was slightly low - this was replaced prior to d/c and was given 40 mEq po daily at d/c x 3 days and then to repeat BMP and f/u with PCP. CHF RN also saw pt and recommended f/u. She was afebrile, james po, ambulating w/o difficulty thus was d/c home in stable condition. She refused HH at d/c also.       Discharge Medications:   Chaim Kent   Home Medication Instructions [id number]    Printed on:05/19/19 1240   Medication Information                      acetaminophen (TYLENOL ARTHRITIS PAIN) 650 MG CR tablet  Take 1,300 mg by mouth every 12 hours as needed for Pain              albuterol sulfate HFA (PROVENTIL HFA) 108 (90 Base) MCG/ACT inhaler  Inhale 2 puffs into the lungs every 6 hours as needed for Wheezing             amiodarone (CORDARONE) 200 MG tablet  Take 1 tablet by mouth daily Additional RF per her PCP             apixaban (ELIQUIS) 5 MG TABS tablet  TAKE ONE TABLET BY MOUTH TWICE DAILY             bumetanide (BUMEX) 0.5 MG tablet  Take 1 tablet by mouth daily             clopidogrel (PLAVIX) 75 MG tablet  TAKE 1 TABLET BY MOUTH EVERY DAY             meclizine (ANTIVERT) 25 MG tablet  Take 25 mg by mouth 3 times daily as needed             melatonin 3 MG TABS tablet  Take 2 tablets by mouth nightly as needed (sleep)             midodrine (PROAMATINE) 5 MG tablet  Take 1 tablet by mouth 3 times daily (with meals) Additional RF per her PCP             Pantoprazole Sodium (PROTONIX) 40 MG PACK packet  Take 1 packet by mouth 2 times daily.             potassium chloride (KLOR-CON M) 20 MEQ TBCR extended release tablet  Take 2 tablets by mouth daily for 3 days             pravastatin (PRAVACHOL) 40 MG tablet  Take 40 mg by mouth daily              silver sulfADIAZINE (SILVADENE) 1 % cream  Apply topically daily to the groin twicedaily             timolol (TIMOPTIC) 0.5 % ophthalmic solution  1 drop 2 times daily. Patient Instructions:    Discharge lab work:  BMP 3 days  Activity: activity as tolerated  Diet: DIET LOW SODIUM 2 GM;    Code Status: Full Code    Follow-up visits:   Radha Lin  [address] [facility]  [address] [facility]  [phone]  - 3-5 days    Dr Beto Elias ASA       Procedures: none    Consults:   IP CONSULT TO HEART FAILURE NURSE/COORDINATOR  IP CONSULT TO DIETITIAN  IP CONSULT TO SOCIAL WORK      Examination:  Vitals:  Vitals:    05/19/19 0351 05/19/19 0744 05/19/19 0835 05/19/19 1122   BP: 120/82  101/68 98/65   Pulse: 96  75 99   Resp: 16 16 18 16   Temp: 98 °F (36.7 °C)  97.5 °F (36.4 °C) 97.7 °F (36.5 °C)   TempSrc: Oral  Oral Oral   SpO2: 92% 91% 92% 94%   Weight: 183 lb 9.6 oz (83.3 kg)      Height:         Weight: Weight: 183 lb 9.6 oz (83.3 kg)     24 hour intake/output:    Intake/Output Summary (Last 24 hours) at 5/19/2019 1249  Last data filed at 5/19/2019 0351  Gross per 24 hour   Intake 1964.13 ml   Output 0 ml   Net 1964.13 ml       General appearance - alert, well appearing, and in no distress and chronically ill appearing  Chest - no tachypnea, retractions or cyanosis, rhonchi noted right mid lung, no wheezes, no rales, no distress or accessory muscle use  Heart - normal rate and regular rhythm, S1 and S2 normal, +ectopy  Abdomen - soft, nontender, nondistended, no masses or organomegaly  Obese: No; Protuberant: No   Neurological - alert, oriented, normal speech, no focal findings or movement disorder noted, cranial nerves II through XII intact  Extremities - incision right leg mid with black scabbing, no induration or drainage. Moves all extremities. No calf TTP,  Diminished pulses bilaterally, feet warm. Significant Diagnostics:   Radiology:   XR CHEST STANDARD (2 VW)   Final Result   1. Mild cardiomegaly. Moderate degenerative changes both shoulders. . Moderately tortuous descending thoracic aorta. Fibroemphysematous lungs with flattened hemidiaphragms. 2. No acute infiltrates or effusions are seen at this time. Overall appearance of chest improved from prior. **This report has been created using voice recognition software. It may contain minor errors which are inherent in voice recognition technology. **      Final report electronically signed by Dr. Delfina Hidalgo on 5/18/2019 7:50 AM      XR CHEST PORTABLE   Final Result      Bilateral interstitial infiltrates probably representing pulmonary edema and CHF, less likely an atypical viral-type pneumonia. Cardiomegaly. **This report has been created using voice recognition software. It may contain minor errors which are inherent in voice recognition technology. **      Final report electronically signed by Dr. Last Baird on 5/17/2019 3:36 AM          Labs:   Recent Results (from the past 72 hour(s))   EKG 12 Lead    Collection Time: 05/17/19  3:04 AM   Result Value Ref Range    Ventricular Rate 108 BPM    Atrial Rate 108 BPM    P-R Interval 170 ms    QRS Duration 74 ms    Q-T Interval 350 ms    QTc Calculation (Bazett) 469 ms    P Axis 54 degrees    R Axis -34 degrees    T Axis 102 degrees   Basic Metabolic Panel    Collection Time: 05/17/19  3:13 AM   Result Value Ref Range    Sodium 138 135 - 145 meq/L    Potassium 3.6 3.5 - 5.2 meq/L    Chloride 97 (L) 98 - 111 meq/L    CO2 20 (L) 23 - 33 meq/L    Glucose 469 (H) 70 - 108 mg/dL    BUN 22 7 - 22 mg/dL    CREATININE 1.3 (H) 0.4 - 1.2 mg/dL    Calcium 7.4 (L) 8.5 - 10.5 mg/dL   Brain Natriuretic Peptide    Collection Time: 05/17/19  3:13 AM   Result Value Ref Range Pro-BNP 5051.0 (H) 0.0 - 1800.0 pg/mL   CBC Auto Differential    Collection Time: 05/17/19  3:13 AM   Result Value Ref Range    WBC 13.8 (H) 4.8 - 10.8 thou/mm3    RBC 4.37 4.20 - 5.40 mill/mm3    Hemoglobin 10.5 (L) 12.0 - 16.0 gm/dl    Hematocrit 35.3 (L) 37.0 - 47.0 %    MCV 80.8 (L) 81.0 - 99.0 fL    MCH 24.0 (L) 26.0 - 33.0 pg    MCHC 29.7 (L) 32.2 - 35.5 gm/dl    RDW-CV 15.9 (H) 11.5 - 14.5 %    RDW-SD 47.0 (H) 35.0 - 45.0 fL    Platelets 993 815 - 177 thou/mm3    MPV 12.3 9.4 - 12.4 fL    Seg Neutrophils 71.1 %    Lymphocytes 18.6 %    Monocytes 7.8 %    Eosinophils 1.2 %    Basophils 0.5 %    Immature Granulocytes 0.8 %    Segs Absolute 9.8 (H) 1.8 - 7.7 thou/mm3    Lymphocytes # 2.6 1.0 - 4.8 thou/mm3    Monocytes # 1.1 0.4 - 1.3 thou/mm3    Eosinophils # 0.2 0.0 - 0.4 thou/mm3    Basophils # 0.1 0.0 - 0.1 thou/mm3    Immature Grans (Abs) 0.11 (H) 0.00 - 0.07 thou/mm3    nRBC 0 /100 wbc   Hepatic Function Panel    Collection Time: 05/17/19  3:13 AM   Result Value Ref Range    Alb 3.2 (L) 3.5 - 5.1 g/dL    Total Bilirubin 0.4 0.3 - 1.2 mg/dL    Bilirubin, Direct <0.2 0.0 - 0.3 mg/dL    Alkaline Phosphatase 149 (H) 38 - 126 U/L     (H) 5 - 40 U/L    ALT 43 11 - 66 U/L    Total Protein 6.3 6.1 - 8.0 g/dL   Lipase    Collection Time: 05/17/19  3:13 AM   Result Value Ref Range    Lipase 50.3 5.6 - 51.3 U/L   Magnesium    Collection Time: 05/17/19  3:13 AM   Result Value Ref Range    Magnesium 1.2 (L) 1.6 - 2.4 mg/dL   Protime-INR    Collection Time: 05/17/19  3:13 AM   Result Value Ref Range    INR 1.70 (H) 0.85 - 1.13   Troponin    Collection Time: 05/17/19  3:13 AM   Result Value Ref Range    Troponin T 0.023 (A) ng/ml   TSH with Reflex    Collection Time: 05/17/19  3:13 AM   Result Value Ref Range    TSH 3.390 0.400 - 4.20 uIU/mL   Procalcitonin    Collection Time: 05/17/19  3:13 AM   Result Value Ref Range    Procalcitonin 0.06 0.01 - 0.09 ng/mL   Anion Gap    Collection Time: 05/17/19  3:13 AM Result Value Ref Range    Anion Gap 21.0 (H) 8.0 - 16.0 meq/L   Osmolality    Collection Time: 05/17/19  3:13 AM   Result Value Ref Range    Osmolality Calc 299.6 275.0 - 300 mOsmol/kg   Glomerular Filtration Rate, Estimated    Collection Time: 05/17/19  3:13 AM   Result Value Ref Range    Est, Glom Filt Rate 39 (A) ml/min/1.73m2   Blood Gas, Arterial    Collection Time: 05/17/19  3:23 AM   Result Value Ref Range    pH, Blood Gas 7.29 (L) 7.35 - 7.45    PCO2 49 (H) 35 - 45 mmhg    PO2 209 (H) 71 - 104 mmhg    HCO3 23 23 - 28 mmol/l    Base Excess (Calculated) -3.5 (L) -2.5 - 2.5 mmol/l    O2 Sat 100 %    IFIO2 100     DEVICE BiPAP     Mode NIV     SET PEEP 6.0 mmhg    SET PRESS SUPP 10 cmH2O    Nikhil Test Positive     Source: R Radial     COLLECTED BY: [id number]    Potassium, Whole Blood    Collection Time: 05/17/19  3:23 AM   Result Value Ref Range    Potassium, Whole Blood 3.3 (L) 3.5 - 4.9 meq/l   Chloride, Whole Blood    Collection Time: 05/17/19  3:23 AM   Result Value Ref Range    Chloride, Whole Blood 102 98 - 109 meq/l   Calcium Ionized Serum    Collection Time: 05/17/19  3:23 AM   Result Value Ref Range    Calcium,Ionized 0.97 (L) 1.12 - 1.32 mmol/L   POC Lactic Acid, Venous    Collection Time: 05/17/19  3:23 AM   Result Value Ref Range    POC Lactic Acid 4.0 (H) 0.5 - 1.9 mmol/L   Sodium, Whole Blood    Collection Time: 05/17/19  3:23 AM   Result Value Ref Range    Sodium, Whole Blood 136 (L) 138 - 146 meq/l   Glucose, Whole Blood    Collection Time: 05/17/19  3:23 AM   Result Value Ref Range    Glucose, Whole Blood 483 (H) 70 - 108 mg/dl   POCT Creatinine    Collection Time: 05/17/19  3:23 AM   Result Value Ref Range    POC CREATININE WHOLE BLOOD 1.2 0.5 - 1.2 mg/dl   Lactic acid, plasma    Collection Time: 05/17/19  3:49 AM   Result Value Ref Range    Lactic Acid 2.5 (H) 0.5 - 2.2 mmol/L   Culture blood 1    Collection Time: 05/17/19  4:10 AM   Result Value Ref Range    Blood Culture, Routine No COLLECTED BY: [id number]     DEVICE BiPAP     SET PEEP 6.0 mmhg    SET PRESS SUPP 10 cmH2O    Site R Radial     Mode CPAP/PS    Lactic Acid, Plasma    Collection Time: 05/17/19 10:06 AM   Result Value Ref Range    Lactic Acid 0.9 0.5 - 2.2 mmol/L   Hemoglobin A1C    Collection Time: 05/17/19 10:06 AM   Result Value Ref Range    Hemoglobin A1C 5.4 4.4 - 6.4 %    AVERAGE GLUCOSE 102 70 - 126 mg/dL   TSH    Collection Time: 05/17/19 10:06 AM   Result Value Ref Range    TSH 0.951 0.400 - 4.20 uIU/mL   T4, free    Collection Time: 05/17/19 10:06 AM   Result Value Ref Range    T4 Free 1.11 0.93 - 1.76 ng/dL   Troponin    Collection Time: 05/17/19 10:06 AM   Result Value Ref Range    Troponin T 0.023 (A) ng/ml   POCT Glucose    Collection Time: 05/17/19 10:50 AM   Result Value Ref Range    POC Glucose 158 (H) 70 - 108 mg/dl   Troponin    Collection Time: 05/17/19  2:47 PM   Result Value Ref Range    Troponin T 0.018 (A) ng/ml   Basic Metabolic Panel    Collection Time: 05/18/19  4:06 AM   Result Value Ref Range    Sodium 141 135 - 145 meq/L    Potassium 2.9 (L) 3.5 - 5.2 meq/L    Chloride 99 98 - 111 meq/L    CO2 27 23 - 33 meq/L    Glucose 99 70 - 108 mg/dL    BUN 30 (H) 7 - 22 mg/dL    CREATININE 1.2 0.4 - 1.2 mg/dL    Calcium 8.0 (L) 8.5 - 10.5 mg/dL   Magnesium    Collection Time: 05/18/19  4:06 AM   Result Value Ref Range    Magnesium 1.4 (L) 1.6 - 2.4 mg/dL   Lipid panel - fasting    Collection Time: 05/18/19  4:06 AM   Result Value Ref Range    Cholesterol, Total 105 100 - 199 mg/dL    Triglycerides 121 0 - 199 mg/dL    HDL 34 mg/dL    LDL Calculated 47 mg/dL   CBC    Collection Time: 05/18/19  4:06 AM   Result Value Ref Range    WBC 9.1 4.8 - 10.8 thou/mm3    RBC 3.74 (L) 4.20 - 5.40 mill/mm3    Hemoglobin 8.8 (L) 12.0 - 16.0 gm/dl    Hematocrit 29.2 (L) 37.0 - 47.0 %    MCV 78.1 (L) 81.0 - 99.0 fL    MCH 23.5 (L) 26.0 - 33.0 pg    MCHC 30.1 (L) 32.2 - 35.5 gm/dl    RDW-CV 15.6 (H) 11.5 - 14.5 %    RDW-SD 44.2 35.0 - 45.0 fL    Platelets 695 715 - 238 thou/mm3    MPV 12.2 9.4 - 12.4 fL   Calcium, Ionized    Collection Time: 05/18/19  4:06 AM   Result Value Ref Range    Calcium, Ion 0.97 (L) 1.12 - 1.32 mmol/L   Anion Gap    Collection Time: 05/18/19  4:06 AM   Result Value Ref Range    Anion Gap 15.0 8.0 - 16.0 meq/L   Glomerular Filtration Rate, Estimated    Collection Time: 05/18/19  4:06 AM   Result Value Ref Range    Est, Glom Filt Rate 43 (A) ml/min/1.73m2   POCT Glucose    Collection Time: 05/18/19  7:59 PM   Result Value Ref Range    POC Glucose 134 (H) 70 - 108 mg/dl   Potassium    Collection Time: 05/18/19  8:11 PM   Result Value Ref Range    Potassium 3.6 3.5 - 5.2 meq/L   Basic Metabolic Panel    Collection Time: 05/19/19  6:16 AM   Result Value Ref Range    Sodium 146 (H) 135 - 145 meq/L    Potassium 3.3 (L) 3.5 - 5.2 meq/L    Chloride 103 98 - 111 meq/L    CO2 28 23 - 33 meq/L    Glucose 109 (H) 70 - 108 mg/dL    BUN 31 (H) 7 - 22 mg/dL    CREATININE 1.4 (H) 0.4 - 1.2 mg/dL    Calcium 9.0 8.5 - 10.5 mg/dL   Magnesium    Collection Time: 05/19/19  6:16 AM   Result Value Ref Range    Magnesium 1.7 1.6 - 2.4 mg/dL   CBC Auto Differential    Collection Time: 05/19/19  6:16 AM   Result Value Ref Range    WBC 7.5 4.8 - 10.8 thou/mm3    RBC 4.07 (L) 4.20 - 5.40 mill/mm3    Hemoglobin 9.7 (L) 12.0 - 16.0 gm/dl    Hematocrit 32.5 (L) 37.0 - 47.0 %    MCV 79.9 (L) 81.0 - 99.0 fL    MCH 23.8 (L) 26.0 - 33.0 pg    MCHC 29.8 (L) 32.2 - 35.5 gm/dl    RDW-CV 15.7 (H) 11.5 - 14.5 %    RDW-SD 45.1 (H) 35.0 - 45.0 fL    Platelets 446 919 - 803 thou/mm3    MPV 12.2 9.4 - 12.4 fL    Seg Neutrophils 75.1 %    Lymphocytes 16.2 %    Monocytes 7.2 %    Eosinophils 0.7 %    Basophils 0.4 %    Immature Granulocytes 0.4 %    Segs Absolute 5.6 1.8 - 7.7 thou/mm3    Lymphocytes # 1.2 1.0 - 4.8 thou/mm3    Monocytes # 0.5 0.4 - 1.3 thou/mm3    Eosinophils # 0.1 0.0 - 0.4 thou/mm3    Basophils # 0.0 0.0 - 0.1 thou/mm3    Immature Grans (Abs) 0.03 0.00 - 0.07 thou/mm3    nRBC 0 /100 wbc   Calcium, Ionized    Collection Time: 05/19/19  6:16 AM   Result Value Ref Range    Calcium, Ion 1.09 (L) 1.12 - 1.32 mmol/L   Anion Gap    Collection Time: 05/19/19  6:16 AM   Result Value Ref Range    Anion Gap 15.0 8.0 - 16.0 meq/L   Glomerular Filtration Rate, Estimated    Collection Time: 05/19/19  6:16 AM   Result Value Ref Range    Est, Glom Filt Rate 36 (A) ml/min/1.73m2   POCT Glucose    Collection Time: 05/19/19  6:29 AM   Result Value Ref Range    POC Glucose 121 (H) 70 - 108 mg/dl   POCT Glucose    Collection Time: 05/19/19 11:04 AM   Result Value Ref Range    POC Glucose 149 (H) 70 - 108 mg/dl       Discharge condition: stable  Disposition: Home with [facility]  Time spent on discharge: 40 min    Electronically signed by Luis F Rader MD on 5/19/2019 at 12:49 PM

## 2019-05-19 NOTE — PLAN OF CARE
Problem: Falls - Risk of:  Goal: Will remain free from falls  Description  Will remain free from falls  Outcome: Ongoing  Note:   No falls noted this shift. Continue falling star program. Bed alarm on, bed in low position. Call light and personal belongings in reach. Patient uses call light appropriately. Problem: Discharge Planning:  Goal: Participates in care planning  Description  Participates in care planning  Outcome: Ongoing  Note:   Care plan reviewed with patient. Patient verbalizes understanding of plan of care and contributes to goal setting. Problem: Discharge Planning:  Goal: Discharged to appropriate level of care  Description  Discharged to appropriate level of care  Outcome: Ongoing  Note:   Pt plans to return home at discharge. Will continue to assess discharge needs. Problem: Airway Clearance - Ineffective:  Goal: Ability to maintain a clear airway will improve  Description  Ability to maintain a clear airway will improve  Outcome: Ongoing  Note:   Airway clear this shift. Will monitor. Problem: Cardiac Output - Decreased:  Goal: Hemodynamic stability will improve  Description  Hemodynamic stability will improve  Outcome: Ongoing  Note:   Vitals:    05/18/19 2348   BP: 127/74   Pulse: 101   Resp: 16   Temp: 98.2 °F (36.8 °C)   SpO2: 90%          Problem: Gas Exchange - Impaired:  Goal: Levels of oxygenation will improve  Description  Levels of oxygenation will improve  Outcome: Ongoing  Note:   O2 >90 on RA this shift. Will monitor. Problem: Risk for Impaired Skin Integrity  Goal: Tissue integrity - skin and mucous membranes  Description  Structural intactness and normal physiological function of skin and  mucous membranes. Outcome: Ongoing  Note:   No skin break down noted at this time. Encouraged patient to reposition self in bed. Care plan reviewed with patient. Patient verbalizes understanding of plan of care and contributes to goal setting.

## 2019-05-22 LAB
BLOOD CULTURE, ROUTINE: NORMAL
BLOOD CULTURE, ROUTINE: NORMAL

## 2019-05-25 PROBLEM — I95.89 CHRONIC HYPOTENSION: Status: ACTIVE | Noted: 2018-09-10

## 2019-05-31 ENCOUNTER — TELEPHONE (OUTPATIENT)
Dept: CARDIOLOGY CLINIC | Age: 84
End: 2019-05-31

## 2019-05-31 ENCOUNTER — HOSPITAL ENCOUNTER (OUTPATIENT)
Dept: NURSING | Age: 84
Discharge: HOME | End: 2019-05-31
Payer: MEDICARE

## 2019-05-31 VITALS
BODY MASS INDEX: 29.16 KG/M2 | RESPIRATION RATE: 18 BRPM | OXYGEN SATURATION: 97 % | DIASTOLIC BLOOD PRESSURE: 64 MMHG | TEMPERATURE: 97.1 F | SYSTOLIC BLOOD PRESSURE: 110 MMHG | HEART RATE: 78 BPM | HEIGHT: 65 IN | WEIGHT: 175 LBS

## 2019-05-31 PROCEDURE — 2709999900 HC NON-CHARGEABLE SUPPLY

## 2019-05-31 PROCEDURE — 96366 THER/PROPH/DIAG IV INF ADDON: CPT

## 2019-05-31 PROCEDURE — 96365 THER/PROPH/DIAG IV INF INIT: CPT

## 2019-05-31 PROCEDURE — 6360000002 HC RX W HCPCS: Performed by: NURSE PRACTITIONER

## 2019-05-31 RX ORDER — POTASSIUM CHLORIDE 750 MG/1
20 TABLET, EXTENDED RELEASE ORAL DAILY
Qty: 180 TABLET | Refills: 2 | Status: SHIPPED | OUTPATIENT
Start: 2019-05-31 | End: 2019-06-10

## 2019-05-31 RX ORDER — MULTIVITAMIN/IRON/FOLIC ACID 18MG-0.4MG
500 TABLET ORAL 2 TIMES DAILY
Qty: 60 TABLET | Refills: 2 | Status: SHIPPED | OUTPATIENT
Start: 2019-05-31 | End: 2019-09-11 | Stop reason: SDUPTHER

## 2019-05-31 RX ORDER — POTASSIUM CHLORIDE 750 MG/1
20 TABLET, EXTENDED RELEASE ORAL DAILY
Qty: 60 TABLET | Refills: 2 | Status: SHIPPED | OUTPATIENT
Start: 2019-05-31 | End: 2019-05-31 | Stop reason: SDUPTHER

## 2019-05-31 RX ORDER — MAGNESIUM SULFATE IN WATER 40 MG/ML
4 INJECTION, SOLUTION INTRAVENOUS ONCE
Status: COMPLETED | OUTPATIENT
Start: 2019-05-31 | End: 2019-05-31

## 2019-05-31 RX ADMIN — MAGNESIUM SULFATE HEPTAHYDRATE 4 G: 40 INJECTION, SOLUTION INTRAVENOUS at 09:41

## 2019-05-31 ASSESSMENT — PAIN - FUNCTIONAL ASSESSMENT: PAIN_FUNCTIONAL_ASSESSMENT: 0-10

## 2019-05-31 NOTE — TELEPHONE ENCOUNTER
Emmanuel Márquez notified of Mg level 1.2  She was advised to either go to ER or come to OP nursing for Mg Sulfate 4 gm IV over 2 hours   She is coming for IV to OP nursing  Then Mg Oxide 500 mg bid (400 mg tabs were not covered by her insurance so 250 mg tabs were prescribed for now)  K+ was 3.5 - she was advised to take an additional Potassium 40 mEq today in addition to the 40 mEq daily  She has an appt with me on Monday where we will recheck labs  She denies any symptoms at this time

## 2019-05-31 NOTE — PROGRESS NOTES
5031 Patient arrived to OPN with use of walker for magnesium infusion  PT RIGHTS AND RESPONSIBILITIES OFFERED TO PT.  1028 Patient denies any needs at this time  1100 Patient assisted up to bathroom gait steady with use of walker  1148 discharge instructions reviewed with patient verbalized understanding  1209 Patient discharged to home in stable condition with walker used as aid    _m___ Safety:       (Environmental)  Lynnette Other to environment   Ensure ID band is correct and in place/ allergy band as needed   Assess for fall risk   Initiate fall precautions as applicable (fall band, side rails, etc.)   Call light within reach   Bed in low position/ wheels locked    _m___ Pain:        Assess pain level and characteristics   Administer analgesics as ordered   Assess effectiveness of pain management and report to MD as needed    m____ Knowledge Deficit:   Assess baseline knowledge   Provide teaching at level of understanding   Provide teaching via preferred learning method   Evaluate teaching effectiveness    _m___ Hemodynamic/Respiratory Status:       (Pre and Post Procedure Monitoring)   Assess/Monitor vital signs and LOC   Assess Baseline SpO2 prior to any sedation   Obtain weight/height   Assess vital signs/ LOC until patient meets discharge criteria   Monitor procedure site and notify MD of any issues

## 2019-05-31 NOTE — TELEPHONE ENCOUNTER
Spoke with patient   She is not currently taking potassium  Previous Rx for potassium 40 mEq was only for 3 days and has not taken any since then  How do you want her to proceed with potassium?

## 2019-06-03 ENCOUNTER — OFFICE VISIT (OUTPATIENT)
Dept: CARDIOLOGY CLINIC | Age: 84
End: 2019-06-03
Payer: MEDICARE

## 2019-06-03 ENCOUNTER — TELEPHONE (OUTPATIENT)
Dept: CARDIOLOGY CLINIC | Age: 84
End: 2019-06-03

## 2019-06-03 VITALS
WEIGHT: 177.4 LBS | HEART RATE: 96 BPM | BODY MASS INDEX: 29.56 KG/M2 | DIASTOLIC BLOOD PRESSURE: 55 MMHG | SYSTOLIC BLOOD PRESSURE: 90 MMHG | OXYGEN SATURATION: 93 % | HEIGHT: 65 IN

## 2019-06-03 DIAGNOSIS — I50.32 CHF (CONGESTIVE HEART FAILURE), NYHA CLASS II, CHRONIC, DIASTOLIC (HCC): Primary | ICD-10-CM

## 2019-06-03 DIAGNOSIS — Z72.0 TOBACCO ABUSE: ICD-10-CM

## 2019-06-03 DIAGNOSIS — E83.42 HYPOMAGNESEMIA: ICD-10-CM

## 2019-06-03 LAB
ANION GAP SERPL CALCULATED.3IONS-SCNC: 13 MEQ/L (ref 8–16)
BUN BLDV-MCNC: 28 MG/DL (ref 7–22)
CALCIUM SERPL-MCNC: 9.6 MG/DL (ref 8.5–10.5)
CHLORIDE BLD-SCNC: 100 MEQ/L (ref 98–111)
CO2: 29 MEQ/L (ref 23–33)
CREAT SERPL-MCNC: 1.3 MG/DL (ref 0.4–1.2)
GFR SERPL CREATININE-BSD FRML MDRD: 39 ML/MIN/1.73M2
GLUCOSE BLD-MCNC: 170 MG/DL (ref 70–108)
MAGNESIUM: 1.7 MG/DL (ref 1.6–2.4)
POTASSIUM SERPL-SCNC: 4.1 MEQ/L (ref 3.5–5.2)
PRO-BNP: 6212 PG/ML (ref 0–1800)
SODIUM BLD-SCNC: 142 MEQ/L (ref 135–145)

## 2019-06-03 PROCEDURE — G8427 DOCREV CUR MEDS BY ELIG CLIN: HCPCS | Performed by: NURSE PRACTITIONER

## 2019-06-03 PROCEDURE — 1090F PRES/ABSN URINE INCON ASSESS: CPT | Performed by: NURSE PRACTITIONER

## 2019-06-03 PROCEDURE — 1111F DSCHRG MED/CURRENT MED MERGE: CPT | Performed by: NURSE PRACTITIONER

## 2019-06-03 PROCEDURE — 36415 COLL VENOUS BLD VENIPUNCTURE: CPT | Performed by: NURSE PRACTITIONER

## 2019-06-03 PROCEDURE — 99214 OFFICE O/P EST MOD 30 MIN: CPT | Performed by: NURSE PRACTITIONER

## 2019-06-03 PROCEDURE — G8598 ASA/ANTIPLAT THER USED: HCPCS | Performed by: NURSE PRACTITIONER

## 2019-06-03 PROCEDURE — 4004F PT TOBACCO SCREEN RCVD TLK: CPT | Performed by: NURSE PRACTITIONER

## 2019-06-03 PROCEDURE — 4040F PNEUMOC VAC/ADMIN/RCVD: CPT | Performed by: NURSE PRACTITIONER

## 2019-06-03 PROCEDURE — 1123F ACP DISCUSS/DSCN MKR DOCD: CPT | Performed by: NURSE PRACTITIONER

## 2019-06-03 PROCEDURE — G8400 PT W/DXA NO RESULTS DOC: HCPCS | Performed by: NURSE PRACTITIONER

## 2019-06-03 PROCEDURE — G8417 CALC BMI ABV UP PARAM F/U: HCPCS | Performed by: NURSE PRACTITIONER

## 2019-06-03 RX ORDER — CLOPIDOGREL BISULFATE 75 MG/1
75 TABLET ORAL DAILY
Qty: 90 TABLET | Refills: 3 | Status: SHIPPED | OUTPATIENT
Start: 2019-06-03 | End: 2020-06-08 | Stop reason: SDUPTHER

## 2019-06-03 RX ORDER — BUMETANIDE 0.5 MG/1
1 TABLET ORAL DAILY
Qty: 60 TABLET | Refills: 3 | Status: SHIPPED | OUTPATIENT
Start: 2019-06-03 | End: 2019-06-10

## 2019-06-03 RX ORDER — BUMETANIDE 0.5 MG/1
0.5 TABLET ORAL DAILY
Qty: 30 TABLET | Refills: 0 | Status: SHIPPED | OUTPATIENT
Start: 2019-06-03 | End: 2019-06-03

## 2019-06-03 ASSESSMENT — ENCOUNTER SYMPTOMS
COLOR CHANGE: 0
NAUSEA: 0
CHEST TIGHTNESS: 0
SHORTNESS OF BREATH: 1
ABDOMINAL PAIN: 0
COUGH: 1
APNEA: 0
WHEEZING: 0
ABDOMINAL DISTENTION: 0

## 2019-06-03 NOTE — PATIENT INSTRUCTIONS
Continue:  · Continue current medications  · Daily weights and record  · Fluid restriction of 2 Liters per day  · Limit sodium in diet to around 4424-9578 mg/day  · Monitor BP  · Activity as tolerated     Call the [facility] for any of the following symptoms: [phone]  Weight gain of 3 pounds in 1 day or 5 pounds in 1 week  Increased shortness of breath  Shortness of breath while laying down  Cough  Chest pain  Swelling in feet, ankles or legs  Tenderness or bloating in the abdomen  Fatigue   Decreased appetite or nausea   Confusion     Take (2) 0.5 mg of Bumex x 3 days then back to 1 0.5 mg daily  Call Thurs with an update on how your breathing is  Start taking your Plavix (Clopidogrel) 75 mg daily

## 2019-06-03 NOTE — PROGRESS NOTES
[facility]       Visit Date: 6/3/2019  Cardiologist:  Dr. Vinnie Aguirre   Primary Care Physician: Dr. Tylene Gowers is a 82 y.o. female who presents today for:  Chief Complaint   Patient presents with    Congestive Heart Failure     new patient        HPI:   Lanny Singh is a 82 y.o. female who presents to the office for a new patient visit in the heart failure clinic. Hx PAD stents 2/2019, current smoker (about a half a pack per day) ? COPD, CKD, AAA repair (EVAR) 2/2019. Negative stress test 9/2018. She was admitted for CHF and ? COPD exacerbation. Her K+ and Mg+ were both low and replaced. She was discharged on Bumex 0.5 mg daily. Repeat BMP and Mg were completed on Fri 5/30/19 and her Mg+ was 1.2 and K+ was 3.5. I called the patient and orders were placed and she was given Mg 4 gm IV in OP nursing and I also started her on Potassium. BP are lower, on Midodrine. [facility] with [facility] for \"stomach and esophogeal issues\". She complains of orthopnea, dry, nonproductive cough when lying down. She has had to prop her head. She uses a walker. She can perform basic ADLs without SOB or fatigue. She does feel SOB and fatigues with exertion. She denies swelling. She has not taken her Plavix since she was discharged as she \"thought they took her off it. \" It was listed on her discharge AVS from the hospital on 5/19/19.      Patient has:  Last hospital admission related to Heart Failure:  May 2019  Chest Pain: no  Worsening SOB/orthopnea/PND: yes  Edema: no  Any extra diuretic use: no  Weight gain: no  Compliant checking home weight: yes  Fatigue: sometimes  Abdominal bloating: sometimes  Appetite: fair to good  Difficulty sleeping: yes  Cough: when lying down   Compliant checking blood pressure: yes  Any refills on CHF medications needed at this time: no    Past Medical History:   Diagnosis Date    Arthritis     BPPV (benign paroxysmal positional vertigo) 6/6/16    GERD (gastroesophageal reflux disease)     ?  esophageal stricture    HTN (hypertension)     Hx of blood clots 2018    R leg    Hyperlipidemia     Obesity     Osteopenia     PAC (premature atrial contraction)     on betablocker    Paralysis (HCC)     baby    Pedal edema     denied any hx of hypertension    PVC (premature ventricular contraction)     PVD (peripheral vascular disease) (HCC)     Tinnitus     UTI (urinary tract infection)      Past Surgical History:   Procedure Laterality Date    ABDOMINAL AORTIC ANEURYSM REPAIR, ENDOVASCULAR N/A 2/15/2019    ABDOMINAL AORTIC ANEURYSM REPAIR ENDOVASCULAR, DECLOTTING RIGHT FEM TIB BYPASS GRAFT performed by Enedina Germain MD at [address]    lumpectomy    CHOLECYSTECTOMY      30 years ago    COLONOSCOPY  08/06/2018    Dr Da Gonzalez N/A 2/22/2019    COLONOSCOPY DIAGNOSTIC performed by Malgorzata Logan MD at [address] [facility]      eye, eyelids    EYE SURGERY      cataract    HYSTERECTOMY      LARYNGOSCOPY  07/15/2016    CA OLEGARIO SKN SUB GRFT T/A/L AREA/<100SCM /<1ST 25 SCM N/A 9/6/2018    RE-EXPLORATION RIGHT GROIN, DECLOTTING OF FEMORAL BYPASS GRAFT performed by Beverley Johnson MD at [address] [place] [address] EGD TRANSORAL BIOPSY SINGLE/MULTIPLE Left 11/27/2017    EGD BIOPSY performed by Rosie Yuen MD at [address] [place] [address], [facility], [facility] Erin N/A 8/20/2018    ROBOT ASSISTED COLECTOMY (LOW ANTERIOR) performed by Can Solis MD at [address] [place] [address] OFFICE/OUTPT [facility] N/A 9/5/2018    RIGHT FEMORAL EMBOLECTOMY, INTRAOPERATIVE ARTERIOGRAM, RIGHT ILIAC EMBOLECTOMY, RIGHT COMMON AND EXTERNAL ILIAC STENTING, RIGHT FEMORAL TO ANTERIOR POPLITEAL BYPASS WITH 6MM GORTEX GRAFT performed by Beverley Johnson MD at [address] / [address] Right 2/14/2019    FEMORAL EMBOLECTOMY THROMBECTOMY, RIGHT LEG performed by Beverley Johnson MD at [place] N[facility] [place]  UPPER GASTROINTESTINAL ENDOSCOPY N/A 11/27/2017    EGD SUBMUCOSAL/BOTOX INJECTION performed by Seamus Solitario MD at [address] N/A 2/22/2019    EGD BIOPSY performed by Doron Beckman MD at [facility]     Family History   Problem Relation Age of Onset    Heart Disease Mother     Stroke Mother     Cancer Father         lung    Emphysema Father     Other Sister         leukemia    Heart Disease Maternal Grandmother     Dementia Maternal Grandmother     Heart Disease Maternal Grandfather      Social History     Tobacco Use    Smoking status: Current Every Day Smoker     Packs/day: 0.25     Years: 60.00     Pack years: 15.00     Types: Cigarettes     Start date: 1956    Smokeless tobacco: Never Used    Tobacco comment: 1 cigarette every other day   Substance Use Topics    Alcohol use: Yes     Alcohol/week: 0.6 oz     Types: 1 Glasses of wine per week     Comment: social     Current Outpatient Medications   Medication Sig Dispense Refill    bumetanide (BUMEX) 0.5 MG tablet Take 2 tablets by mouth daily 60 tablet 3    Magnesium Oxide 250 MG TABS Take 2 tablets by mouth 2 times daily 60 tablet 2    potassium chloride (KLOR-CON M) 10 MEQ extended release tablet TAKE 2 TABLETS BY MOUTH DAILY 180 tablet 2    albuterol sulfate HFA (PROVENTIL HFA) 108 (90 Base) MCG/ACT inhaler Inhale 2 puffs into the lungs every 6 hours as needed for Wheezing 1 Inhaler 0    apixaban (ELIQUIS) 5 MG TABS tablet TAKE ONE TABLET BY MOUTH TWICE DAILY 60 tablet 5    melatonin 3 MG TABS tablet Take 2 tablets by mouth nightly as needed (sleep) 60 tablet 3    midodrine (PROAMATINE) 5 MG tablet Take 1 tablet by mouth 3 times daily (with meals) Additional RF per her PCP 45 tablet 0    pravastatin (PRAVACHOL) 40 MG tablet Take 40 mg by mouth daily       Pantoprazole Sodium (PROTONIX) 40 MG PACK packet Take 1 packet by mouth 2 times daily.  60 each 6    acetaminophen (TYLENOL ARTHRITIS Pupils are equal, round, and reactive to light. Neck: Normal range of motion. No JVD present. Cardiovascular: Normal rate and normal heart sounds. No murmur heard. Pulmonary/Chest: Effort normal. No respiratory distress. She has no rales. Abdominal: Soft. She exhibits no distension. There is no tenderness. Musculoskeletal: She exhibits no edema or tenderness. Neurological: She is alert and oriented to person, place, and time. Skin: Skin is warm and dry. Capillary refill takes less than 2 seconds. Psychiatric: She has a normal mood and affect. Her behavior is normal.   Vitals reviewed. Wt Readings from Last 3 Encounters:   06/03/19 177 lb 6.4 oz (80.5 kg)   05/31/19 175 lb (79.4 kg)   05/19/19 183 lb 9.6 oz (83.3 kg)     BP Readings from Last 3 Encounters:   06/03/19 (!) 90/55   05/31/19 110/64   05/19/19 98/65     Pulse Readings from Last 3 Encounters:   06/03/19 96   05/31/19 78   05/19/19 99     Body mass index is 29.52 kg/m².     ECHO:   9/10/18   Summary   limited echo   Ejection fraction is visually estimated at 55%.   Overall left ventricular function is normal.   No evidence of any pericardial effusion.      Signature      ----------------------------------------------------------------   Electronically signed by Lata Peck MD (Interpreting   physician) on 09/10/2018 at 04:57 PM   ----------------------------------------------------------------      Findings      Aortic Valve   The aortic valve leaflets were not well visualized.   Aortic valve appears tricuspid.      Tricuspid Valve   Tricuspid valve is structurally normal.      Pulmonic Valve   The pulmonic valve was not well visualized .      Left Atrium   Normal size left atrium.      Left Ventricle   Ejection fraction is visually estimated at 55%.   Overall left ventricular function is normal.      Right Atrium   The right atrium is of normal size.      Pericardial Effusion   No evidence of any pericardial from BB if BP would tolerate. On Amiodarone, Plavix and Eliquis. HF Zones reviewed. Keep appt with Dr Mariel Villalta on Wed. Repeat BMP in 2 weeks. She will be notified via phone of the above changes. · Daily weights  · Fluid restriction of 2 Liters per day  · Limit sodium in diet to around 4104-8074 mg/day  · Monitor BP  · Activity as tolerated     Patient was instructed to call the [address] for changes in the following symptoms:   Weight gain of 3 pounds in 1 day or 5 pounds in 1 week  Increased shortness of breath  Shortness of breath while laying down  Cough  Chest pain  Swelling in feet, ankles or legs  Tenderness or bloating in the abdomen  Fatigue   Decreased appetite or nausea   Confusion      Return in about 2 months (around 8/3/2019). or sooner if needed     Patient given educational materials - see patient instructions. New HF Pamphlet given and ZONE sheet reviewed    Greater then 45 minutes was spent reviewing the chart and educating the patient about HF, medications, diet, exercise, and discussing the plan of care. We discussed the importance of weighing oneself and recording daily. We also discussed the importance of a lowsodium diet, higher sodium foods to avoid and better low sodium food options. Discussed use, benefit, and side effects of prescribed medications. All patient questions answered. Patient verbalizes understanding of plan of care using teach back method, and is agreeable to the treatment plan.        Electronicallysigned by ROMAN Dixon CNP on 6/3/2019 at 10:16 AM

## 2019-06-03 NOTE — TELEPHONE ENCOUNTER
Please notify Bienvenido Doan that her Mg level is up to 1.7 which is improving. We will keep the Mg Oxide 500 mg bid. BNP was elevated, given her orthopnea and cough we will have her take Bumex 2 mg (4 - 0.5 mg pills)x 2 days today and tomorrow then increase the daily dose to Bumex 1 mg daily. I sent a new prescription in for her. Also I would like her to take an extra Potassium 20 mEq x 2 days (in addition to the 20 mEq daily) today and tomorrow then resume the 20 mEq daily. Please make sure she reads back the order to you as she was not clear on some of her medications at her clinic visit.   Repeat BMP in 2 weeks

## 2019-06-05 ENCOUNTER — OFFICE VISIT (OUTPATIENT)
Dept: CARDIOLOGY CLINIC | Age: 84
End: 2019-06-05
Payer: MEDICARE

## 2019-06-05 VITALS
HEART RATE: 90 BPM | SYSTOLIC BLOOD PRESSURE: 138 MMHG | HEIGHT: 65 IN | BODY MASS INDEX: 28.99 KG/M2 | WEIGHT: 174 LBS | DIASTOLIC BLOOD PRESSURE: 78 MMHG

## 2019-06-05 DIAGNOSIS — I73.9 PERIPHERAL ARTERIAL DISEASE (HCC): Primary | ICD-10-CM

## 2019-06-05 DIAGNOSIS — I50.32 CHRONIC DIASTOLIC CHF (CONGESTIVE HEART FAILURE), NYHA CLASS 2 (HCC): ICD-10-CM

## 2019-06-05 PROCEDURE — 1111F DSCHRG MED/CURRENT MED MERGE: CPT | Performed by: INTERNAL MEDICINE

## 2019-06-05 PROCEDURE — 4004F PT TOBACCO SCREEN RCVD TLK: CPT | Performed by: INTERNAL MEDICINE

## 2019-06-05 PROCEDURE — G8598 ASA/ANTIPLAT THER USED: HCPCS | Performed by: INTERNAL MEDICINE

## 2019-06-05 PROCEDURE — G8400 PT W/DXA NO RESULTS DOC: HCPCS | Performed by: INTERNAL MEDICINE

## 2019-06-05 PROCEDURE — 4040F PNEUMOC VAC/ADMIN/RCVD: CPT | Performed by: INTERNAL MEDICINE

## 2019-06-05 PROCEDURE — 1123F ACP DISCUSS/DSCN MKR DOCD: CPT | Performed by: INTERNAL MEDICINE

## 2019-06-05 PROCEDURE — G8427 DOCREV CUR MEDS BY ELIG CLIN: HCPCS | Performed by: INTERNAL MEDICINE

## 2019-06-05 PROCEDURE — 99213 OFFICE O/P EST LOW 20 MIN: CPT | Performed by: INTERNAL MEDICINE

## 2019-06-05 PROCEDURE — 1090F PRES/ABSN URINE INCON ASSESS: CPT | Performed by: INTERNAL MEDICINE

## 2019-06-05 PROCEDURE — G8417 CALC BMI ABV UP PARAM F/U: HCPCS | Performed by: INTERNAL MEDICINE

## 2019-06-05 NOTE — PROGRESS NOTES
[facility]  [address]  [address] [place] [address] Road [address]  Dept: [phone]  Dept Fax: [phone]  Loc: [phone]    Visit Date: 6/5/2019    Ms. Gilbert Cabello is a 82 y.o. female  who presented for:  Chief Complaint   Patient presents with    [facility] from [facility] Other     pt stating she is having trouble sleeping    Dizziness    Swelling    Fatigue       HPI:   HPI   83 yo F hx of AAA repair 2/15/19, successful right fem-tib PTA and stenting with 6.5 Supera 2/26/19 who presents for follow-up. She was recently admitted with acute hypercapneic and hypoxic resp failure. She was given Bumex IV. EF preserved. She is on Eliquis/Plavix for graft occlusion/thrombosis. /78. She is fatigued and tired. No major LE symptoms. Smoking 5-6 cigarettes per day. Hx of esophageal spasm.         Current Outpatient Medications:     bumetanide (BUMEX) 0.5 MG tablet, Take 2 tablets by mouth daily, Disp: 60 tablet, Rfl: 3    clopidogrel (PLAVIX) 75 MG tablet, Take 1 tablet by mouth daily, Disp: 90 tablet, Rfl: 3    Magnesium Oxide 250 MG TABS, Take 2 tablets by mouth 2 times daily, Disp: 60 tablet, Rfl: 2    potassium chloride (KLOR-CON M) 10 MEQ extended release tablet, TAKE 2 TABLETS BY MOUTH DAILY, Disp: 180 tablet, Rfl: 2    albuterol sulfate HFA (PROVENTIL HFA) 108 (90 Base) MCG/ACT inhaler, Inhale 2 puffs into the lungs every 6 hours as needed for Wheezing, Disp: 1 Inhaler, Rfl: 0    apixaban (ELIQUIS) 5 MG TABS tablet, TAKE ONE TABLET BY MOUTH TWICE DAILY, Disp: 60 tablet, Rfl: 5    clopidogrel (PLAVIX) 75 MG tablet, TAKE 1 TABLET BY MOUTH EVERY DAY, Disp: 90 tablet, Rfl: 2    melatonin 3 MG TABS tablet, Take 2 tablets by mouth nightly as needed (sleep), Disp: 60 tablet, Rfl: 3    silver sulfADIAZINE (SILVADENE) 1 % cream, Apply topically daily to the groin twicedaily, Disp: 30 g, Rfl: 0    midodrine (PROAMATINE) 5 MG tablet, Take 1 tablet polydipsia. Genitourinary: Negative for dysuria, enuresis, flank pain and hematuria. Musculoskeletal: Negative for arthralgias, joint swelling and neck pain. Neurological: Negative for numbness and headaches. Psychiatric/Behavioral: Negative for agitation, confusion, decreased concentration and dysphoric mood. Objective:     /78   Pulse 90 Comment: irregular  Ht 5' 5\" (1.651 m)   Wt 174 lb (78.9 kg)   BMI 28.96 kg/m²     Wt Readings from Last 3 Encounters:   06/05/19 174 lb (78.9 kg)   06/03/19 177 lb 6.4 oz (80.5 kg)   05/31/19 175 lb (79.4 kg)     BP Readings from Last 3 Encounters:   06/05/19 138/78   06/03/19 (!) 90/55   05/31/19 110/64       Nursing note and vitals reviewed. Physical Exam   Constitutional: Oriented to person, place, and time. Appears well-developed and well-nourished. HENT:   Head: Normocephalic and atraumatic. Eyes: EOM are normal. Pupils are equal, round, and reactive to light. Neck: Normal range of motion. Neck supple. No JVD present. Cardiovascular: Normal rate, regular rhythm, normal heart sounds and intact distal pulses. No murmur heard. Pulmonary/Chest: Effort normal and breath sounds normal. No respiratory distress. No wheezes. No rales. Abdominal: Soft. Bowel sounds are normal. No distension. There is no tenderness. Musculoskeletal: Normal range of motion. No edema. Neurological: Alert and oriented to person, place, and time. No cranial nerve deficit. Coordination normal.   Skin: Skin is warm and dry. Psychiatric: Normal mood and affect.        No results found for: CKTOTAL, CKMB, CKMBINDEX    Lab Results   Component Value Date    WBC 10.7 05/30/2019    RBC 4.70 05/30/2019    HGB 10.7 05/30/2019    HCT 34.5 05/30/2019    MCV 73.4 05/30/2019    MCH 22.9 05/30/2019    MCHC 31.2 05/30/2019    RDW 17.5 05/30/2019     05/30/2019    MPV 12.2 05/19/2019       Lab Results   Component Value Date     06/03/2019    K 4.1 06/03/2019    K 3.8

## 2019-06-10 ENCOUNTER — TELEPHONE (OUTPATIENT)
Dept: CARDIOLOGY CLINIC | Age: 84
End: 2019-06-10

## 2019-06-10 LAB
ANION GAP SERPL CALCULATED.3IONS-SCNC: 8 MMOL/L (ref 4–12)
BUN BLDV-MCNC: 32 MG/DL (ref 7–20)
CALCIUM SERPL-MCNC: 8.9 MG/DL (ref 8.8–10.5)
CHLORIDE BLD-SCNC: 99 MEQ/L (ref 101–111)
CO2: 30 MEQ/L (ref 21–32)
CREAT SERPL-MCNC: 1.63 MG/DL (ref 0.6–1.3)
CREATININE CLEARANCE: 30
GLUCOSE: 175 MG/DL (ref 70–110)
POTASSIUM SERPL-SCNC: 4 MEQ/L (ref 3.6–5)
SODIUM BLD-SCNC: 137 MEQ/L (ref 135–145)

## 2019-06-10 RX ORDER — POTASSIUM CHLORIDE 750 MG/1
10 TABLET, EXTENDED RELEASE ORAL DAILY
Qty: 180 TABLET | Refills: 2
Start: 2019-06-10 | End: 2020-06-08 | Stop reason: SDUPTHER

## 2019-06-10 RX ORDER — BUMETANIDE 0.5 MG/1
0.5 TABLET ORAL DAILY
Qty: 60 TABLET | Refills: 3 | Status: ON HOLD
Start: 2019-06-10 | End: 2019-12-31 | Stop reason: HOSPADM

## 2019-06-10 NOTE — TELEPHONE ENCOUNTER
Called pt with lab results and medication changes. She will call with any changes in symptoms or questions.

## 2019-06-27 ENCOUNTER — TELEPHONE (OUTPATIENT)
Dept: CARDIOLOGY CLINIC | Age: 84
End: 2019-06-27

## 2019-07-15 ENCOUNTER — OFFICE VISIT (OUTPATIENT)
Dept: CARDIOLOGY CLINIC | Age: 84
End: 2019-07-15
Payer: MEDICARE

## 2019-07-15 VITALS
DIASTOLIC BLOOD PRESSURE: 62 MMHG | HEART RATE: 100 BPM | BODY MASS INDEX: 28.29 KG/M2 | SYSTOLIC BLOOD PRESSURE: 104 MMHG | WEIGHT: 169.8 LBS | OXYGEN SATURATION: 96 % | HEIGHT: 65 IN

## 2019-07-15 DIAGNOSIS — I50.32 CHF (CONGESTIVE HEART FAILURE), NYHA CLASS II, CHRONIC, DIASTOLIC (HCC): Primary | ICD-10-CM

## 2019-07-15 LAB
ANION GAP SERPL CALCULATED.3IONS-SCNC: 11 MEQ/L (ref 8–16)
BUN BLDV-MCNC: 34 MG/DL (ref 7–22)
CALCIUM SERPL-MCNC: 9.5 MG/DL (ref 8.5–10.5)
CHLORIDE BLD-SCNC: 100 MEQ/L (ref 98–111)
CO2: 28 MEQ/L (ref 23–33)
CREAT SERPL-MCNC: 1.2 MG/DL (ref 0.4–1.2)
GFR SERPL CREATININE-BSD FRML MDRD: 43 ML/MIN/1.73M2
GLUCOSE BLD-MCNC: 109 MG/DL (ref 70–108)
MAGNESIUM: 1.9 MG/DL (ref 1.6–2.4)
POTASSIUM SERPL-SCNC: 4.4 MEQ/L (ref 3.5–5.2)
SODIUM BLD-SCNC: 139 MEQ/L (ref 135–145)

## 2019-07-15 PROCEDURE — 4004F PT TOBACCO SCREEN RCVD TLK: CPT | Performed by: NURSE PRACTITIONER

## 2019-07-15 PROCEDURE — G8598 ASA/ANTIPLAT THER USED: HCPCS | Performed by: NURSE PRACTITIONER

## 2019-07-15 PROCEDURE — G8427 DOCREV CUR MEDS BY ELIG CLIN: HCPCS | Performed by: NURSE PRACTITIONER

## 2019-07-15 PROCEDURE — 36415 COLL VENOUS BLD VENIPUNCTURE: CPT | Performed by: NURSE PRACTITIONER

## 2019-07-15 PROCEDURE — G8417 CALC BMI ABV UP PARAM F/U: HCPCS | Performed by: NURSE PRACTITIONER

## 2019-07-15 PROCEDURE — 1090F PRES/ABSN URINE INCON ASSESS: CPT | Performed by: NURSE PRACTITIONER

## 2019-07-15 PROCEDURE — 4040F PNEUMOC VAC/ADMIN/RCVD: CPT | Performed by: NURSE PRACTITIONER

## 2019-07-15 PROCEDURE — 99213 OFFICE O/P EST LOW 20 MIN: CPT | Performed by: NURSE PRACTITIONER

## 2019-07-15 PROCEDURE — G8400 PT W/DXA NO RESULTS DOC: HCPCS | Performed by: NURSE PRACTITIONER

## 2019-07-15 PROCEDURE — 1123F ACP DISCUSS/DSCN MKR DOCD: CPT | Performed by: NURSE PRACTITIONER

## 2019-07-15 RX ORDER — PRAVASTATIN SODIUM 40 MG
40 TABLET ORAL DAILY
Qty: 90 TABLET | Refills: 3 | Status: SHIPPED | OUTPATIENT
Start: 2019-07-15 | End: 2020-07-14

## 2019-07-15 RX ORDER — FERROUS SULFATE 325(65) MG
325 TABLET ORAL
Status: ON HOLD | COMMUNITY
End: 2020-02-21

## 2019-07-15 ASSESSMENT — ENCOUNTER SYMPTOMS
NAUSEA: 0
ABDOMINAL DISTENTION: 0
COUGH: 0
WHEEZING: 0
COLOR CHANGE: 0
SHORTNESS OF BREATH: 1
CHEST TIGHTNESS: 0
APNEA: 0
ABDOMINAL PAIN: 0

## 2019-07-15 NOTE — PROGRESS NOTES
Venipuncture obtained from right AC. Patient tolerated procedure without complications or complaints.

## 2019-09-06 ENCOUNTER — HOSPITAL ENCOUNTER (OUTPATIENT)
Dept: CT IMAGING | Age: 84
Discharge: HOME | End: 2019-09-06
Payer: MEDICARE

## 2019-09-06 DIAGNOSIS — I71.9 AORTIC ANEURYSM WITHOUT RUPTURE, UNSPECIFIED PORTION OF AORTA (HCC): ICD-10-CM

## 2019-09-06 LAB — POC CREATININE WHOLE BLOOD: 1.2 MG/DL (ref 0.5–1.2)

## 2019-09-06 PROCEDURE — 82565 ASSAY OF CREATININE: CPT

## 2019-09-06 PROCEDURE — 6360000004 HC RX CONTRAST MEDICATION: Performed by: PHYSICIAN ASSISTANT

## 2019-09-06 PROCEDURE — 75635 CT ANGIO ABDOMINAL ARTERIES: CPT

## 2019-09-06 RX ADMIN — IOPAMIDOL 100 ML: 755 INJECTION, SOLUTION INTRAVENOUS at 10:55

## 2019-09-09 ENCOUNTER — OFFICE VISIT (OUTPATIENT)
Dept: CARDIOTHORACIC SURGERY | Age: 84
End: 2019-09-09
Payer: MEDICARE

## 2019-09-09 VITALS
HEIGHT: 65 IN | WEIGHT: 161 LBS | BODY MASS INDEX: 26.82 KG/M2 | DIASTOLIC BLOOD PRESSURE: 80 MMHG | HEART RATE: 53 BPM | SYSTOLIC BLOOD PRESSURE: 107 MMHG

## 2019-09-09 DIAGNOSIS — Z95.828 S/P INSERTION OF ILIAC ARTERY STENT: ICD-10-CM

## 2019-09-09 DIAGNOSIS — I71.40 ABDOMINAL AORTIC ANEURYSM (AAA) GREATER THAN 5.0 CM IN DIAMETER IN FEMALE: ICD-10-CM

## 2019-09-09 DIAGNOSIS — I71.40 ABDOMINAL AORTIC ANEURYSM (AAA) WITHOUT RUPTURE: Primary | ICD-10-CM

## 2019-09-09 DIAGNOSIS — Z98.890 S/P FEMORAL-TIBIAL BYPASS: Primary | ICD-10-CM

## 2019-09-09 PROCEDURE — 99214 OFFICE O/P EST MOD 30 MIN: CPT | Performed by: PHYSICIAN ASSISTANT

## 2019-09-09 PROCEDURE — G8400 PT W/DXA NO RESULTS DOC: HCPCS | Performed by: PHYSICIAN ASSISTANT

## 2019-09-09 PROCEDURE — 1090F PRES/ABSN URINE INCON ASSESS: CPT | Performed by: PHYSICIAN ASSISTANT

## 2019-09-09 PROCEDURE — G8417 CALC BMI ABV UP PARAM F/U: HCPCS | Performed by: PHYSICIAN ASSISTANT

## 2019-09-09 PROCEDURE — APPSS30 APP SPLIT SHARED TIME 16-30 MINUTES: Performed by: PHYSICIAN ASSISTANT

## 2019-09-09 PROCEDURE — G8427 DOCREV CUR MEDS BY ELIG CLIN: HCPCS | Performed by: PHYSICIAN ASSISTANT

## 2019-09-09 PROCEDURE — 4040F PNEUMOC VAC/ADMIN/RCVD: CPT | Performed by: PHYSICIAN ASSISTANT

## 2019-09-09 PROCEDURE — G8598 ASA/ANTIPLAT THER USED: HCPCS | Performed by: PHYSICIAN ASSISTANT

## 2019-09-09 PROCEDURE — 1123F ACP DISCUSS/DSCN MKR DOCD: CPT | Performed by: PHYSICIAN ASSISTANT

## 2019-09-09 PROCEDURE — 4004F PT TOBACCO SCREEN RCVD TLK: CPT | Performed by: PHYSICIAN ASSISTANT

## 2019-09-11 RX ORDER — MULTIVITAMIN WITH IRON
TABLET ORAL
Qty: 120 TABLET | Refills: 6 | Status: SHIPPED | OUTPATIENT
Start: 2019-09-11 | End: 2020-04-06

## 2019-10-25 ENCOUNTER — TELEPHONE (OUTPATIENT)
Dept: CARDIOTHORACIC SURGERY | Age: 84
End: 2019-10-25

## 2019-10-28 ENCOUNTER — OFFICE VISIT (OUTPATIENT)
Dept: CARDIOLOGY CLINIC | Age: 84
End: 2019-10-28
Payer: MEDICARE

## 2019-10-28 VITALS
BODY MASS INDEX: 26.46 KG/M2 | HEART RATE: 102 BPM | DIASTOLIC BLOOD PRESSURE: 60 MMHG | SYSTOLIC BLOOD PRESSURE: 100 MMHG | WEIGHT: 158.8 LBS | HEIGHT: 65 IN | OXYGEN SATURATION: 98 %

## 2019-10-28 DIAGNOSIS — I71.40 ABDOMINAL AORTIC ANEURYSM (AAA) WITHOUT RUPTURE: ICD-10-CM

## 2019-10-28 DIAGNOSIS — I50.32 CHF (CONGESTIVE HEART FAILURE), NYHA CLASS II, CHRONIC, DIASTOLIC (HCC): Primary | ICD-10-CM

## 2019-10-28 PROCEDURE — G8400 PT W/DXA NO RESULTS DOC: HCPCS | Performed by: NURSE PRACTITIONER

## 2019-10-28 PROCEDURE — 4004F PT TOBACCO SCREEN RCVD TLK: CPT | Performed by: NURSE PRACTITIONER

## 2019-10-28 PROCEDURE — 99213 OFFICE O/P EST LOW 20 MIN: CPT | Performed by: NURSE PRACTITIONER

## 2019-10-28 PROCEDURE — 1090F PRES/ABSN URINE INCON ASSESS: CPT | Performed by: NURSE PRACTITIONER

## 2019-10-28 PROCEDURE — 4040F PNEUMOC VAC/ADMIN/RCVD: CPT | Performed by: NURSE PRACTITIONER

## 2019-10-28 PROCEDURE — G8482 FLU IMMUNIZE ORDER/ADMIN: HCPCS | Performed by: NURSE PRACTITIONER

## 2019-10-28 PROCEDURE — G8598 ASA/ANTIPLAT THER USED: HCPCS | Performed by: NURSE PRACTITIONER

## 2019-10-28 PROCEDURE — G8417 CALC BMI ABV UP PARAM F/U: HCPCS | Performed by: NURSE PRACTITIONER

## 2019-10-28 PROCEDURE — 1123F ACP DISCUSS/DSCN MKR DOCD: CPT | Performed by: NURSE PRACTITIONER

## 2019-10-28 PROCEDURE — G8427 DOCREV CUR MEDS BY ELIG CLIN: HCPCS | Performed by: NURSE PRACTITIONER

## 2019-10-28 ASSESSMENT — ENCOUNTER SYMPTOMS
CHEST TIGHTNESS: 0
WHEEZING: 0
SHORTNESS OF BREATH: 1
NAUSEA: 0
ABDOMINAL DISTENTION: 0
APNEA: 0
COLOR CHANGE: 0
COUGH: 0
ABDOMINAL PAIN: 0

## 2019-11-08 ENCOUNTER — OFFICE VISIT (OUTPATIENT)
Dept: CARDIOLOGY CLINIC | Age: 84
End: 2019-11-08
Payer: MEDICARE

## 2019-11-08 VITALS
HEIGHT: 65 IN | BODY MASS INDEX: 26.19 KG/M2 | DIASTOLIC BLOOD PRESSURE: 60 MMHG | SYSTOLIC BLOOD PRESSURE: 104 MMHG | WEIGHT: 157.2 LBS | HEART RATE: 64 BPM

## 2019-11-08 DIAGNOSIS — I73.9 PERIPHERAL ARTERIAL DISEASE (HCC): Primary | ICD-10-CM

## 2019-11-08 DIAGNOSIS — Z86.79 S/P AAA REPAIR: ICD-10-CM

## 2019-11-08 DIAGNOSIS — Z98.890 S/P AAA REPAIR: ICD-10-CM

## 2019-11-08 DIAGNOSIS — I50.32 CHF (CONGESTIVE HEART FAILURE), NYHA CLASS I, CHRONIC, DIASTOLIC (HCC): ICD-10-CM

## 2019-11-08 PROCEDURE — 1123F ACP DISCUSS/DSCN MKR DOCD: CPT | Performed by: INTERNAL MEDICINE

## 2019-11-08 PROCEDURE — 4004F PT TOBACCO SCREEN RCVD TLK: CPT | Performed by: INTERNAL MEDICINE

## 2019-11-08 PROCEDURE — G8598 ASA/ANTIPLAT THER USED: HCPCS | Performed by: INTERNAL MEDICINE

## 2019-11-08 PROCEDURE — G8400 PT W/DXA NO RESULTS DOC: HCPCS | Performed by: INTERNAL MEDICINE

## 2019-11-08 PROCEDURE — G8482 FLU IMMUNIZE ORDER/ADMIN: HCPCS | Performed by: INTERNAL MEDICINE

## 2019-11-08 PROCEDURE — 99212 OFFICE O/P EST SF 10 MIN: CPT | Performed by: INTERNAL MEDICINE

## 2019-11-08 PROCEDURE — 1090F PRES/ABSN URINE INCON ASSESS: CPT | Performed by: INTERNAL MEDICINE

## 2019-11-08 PROCEDURE — G8427 DOCREV CUR MEDS BY ELIG CLIN: HCPCS | Performed by: INTERNAL MEDICINE

## 2019-11-08 PROCEDURE — G8417 CALC BMI ABV UP PARAM F/U: HCPCS | Performed by: INTERNAL MEDICINE

## 2019-11-08 PROCEDURE — 4040F PNEUMOC VAC/ADMIN/RCVD: CPT | Performed by: INTERNAL MEDICINE

## 2019-12-28 ENCOUNTER — APPOINTMENT (OUTPATIENT)
Dept: GENERAL RADIOLOGY | Age: 84
DRG: 871 | End: 2019-12-28
Payer: MEDICARE

## 2019-12-28 ENCOUNTER — APPOINTMENT (OUTPATIENT)
Dept: ULTRASOUND IMAGING | Age: 84
DRG: 871 | End: 2019-12-28
Payer: MEDICARE

## 2019-12-28 ENCOUNTER — HOSPITAL ENCOUNTER (INPATIENT)
Age: 84
LOS: 3 days | Discharge: HOME | DRG: 871 | End: 2019-12-31
Attending: FAMILY MEDICINE | Admitting: FAMILY MEDICINE
Payer: MEDICARE

## 2019-12-28 ENCOUNTER — APPOINTMENT (OUTPATIENT)
Dept: CT IMAGING | Age: 84
DRG: 871 | End: 2019-12-28
Payer: MEDICARE

## 2019-12-28 PROBLEM — J96.90 RESPIRATORY FAILURE (HCC): Status: ACTIVE | Noted: 2019-12-28

## 2019-12-28 LAB
ALBUMIN SERPL-MCNC: 3.7 G/DL (ref 3.5–5.1)
ALLEN TEST: POSITIVE
ALLEN TEST: POSITIVE
ALP BLD-CCNC: 155 U/L (ref 38–126)
ALT SERPL-CCNC: 60 U/L (ref 11–66)
ANION GAP SERPL CALCULATED.3IONS-SCNC: 15 MEQ/L (ref 8–16)
AST SERPL-CCNC: 121 U/L (ref 5–40)
AVERAGE GLUCOSE: 105 MG/DL (ref 70–126)
BASE EXCESS (CALCULATED): -4.4 MMOL/L (ref -2.5–2.5)
BASE EXCESS (CALCULATED): 1.7 MMOL/L (ref -2.5–2.5)
BASOPHILS # BLD: 0.6 %
BASOPHILS ABSOLUTE: 0.1 THOU/MM3 (ref 0–0.1)
BILIRUB SERPL-MCNC: 0.4 MG/DL (ref 0.3–1.2)
BILIRUBIN DIRECT: < 0.2 MG/DL (ref 0–0.3)
BUN BLDV-MCNC: 30 MG/DL (ref 7–22)
CALCIUM SERPL-MCNC: 9.1 MG/DL (ref 8.5–10.5)
CHLORIDE BLD-SCNC: 102 MEQ/L (ref 98–111)
CO2: 22 MEQ/L (ref 23–33)
COLLECTED BY:: ABNORMAL
COLLECTED BY:: ABNORMAL
CREAT SERPL-MCNC: 1.2 MG/DL (ref 0.4–1.2)
DEVICE: ABNORMAL
DEVICE: ABNORMAL
EKG ATRIAL RATE: 110 BPM
EKG P AXIS: 35 DEGREES
EKG P-R INTERVAL: 164 MS
EKG Q-T INTERVAL: 344 MS
EKG QRS DURATION: 86 MS
EKG QTC CALCULATION (BAZETT): 465 MS
EKG R AXIS: -57 DEGREES
EKG T AXIS: 91 DEGREES
EKG VENTRICULAR RATE: 110 BPM
ELLIPTOCYTES: ABNORMAL
EOSINOPHIL # BLD: 1.5 %
EOSINOPHILS ABSOLUTE: 0.2 THOU/MM3 (ref 0–0.4)
ERYTHROCYTE [DISTWIDTH] IN BLOOD BY AUTOMATED COUNT: 17.6 % (ref 11.5–14.5)
ERYTHROCYTE [DISTWIDTH] IN BLOOD BY AUTOMATED COUNT: 49.3 FL (ref 35–45)
FLU A ANTIGEN: NEGATIVE
FLU B ANTIGEN: NEGATIVE
GFR SERPL CREATININE-BSD FRML MDRD: 43 ML/MIN/1.73M2
GLUCOSE BLD-MCNC: 338 MG/DL (ref 70–108)
GLUCOSE, WHOLE BLOOD: 366 MG/DL (ref 70–108)
HAV IGM SER IA-ACNC: NEGATIVE
HBA1C MFR BLD: 5.5 % (ref 4.4–6.4)
HCO3: 23 MMOL/L (ref 23–28)
HCO3: 26 MMOL/L (ref 23–28)
HCT VFR BLD CALC: 45.2 % (ref 37–47)
HEMOGLOBIN: 12.8 GM/DL (ref 12–16)
HEPATITIS B CORE IGM ANTIBODY: NEGATIVE
HEPATITIS B SURFACE ANTIGEN: NEGATIVE
HEPATITIS C ANTIBODY: NEGATIVE
HYPOCHROMIA: PRESENT
IFIO2: 100
IFIO2: 50
IMMATURE GRANS (ABS): 0.16 THOU/MM3 (ref 0–0.07)
IMMATURE GRANULOCYTES: 1.2 %
LACTIC ACID: 1.7 MMOL/L (ref 0.5–2.2)
LIPASE: 29.3 U/L (ref 5.6–51.3)
LV EF: 13 %
LVEF MODALITY: NORMAL
LYMPHOCYTES # BLD: 11.9 %
LYMPHOCYTES ABSOLUTE: 1.6 THOU/MM3 (ref 1–4.8)
MAGNESIUM: 2.1 MG/DL (ref 1.6–2.4)
MCH RBC QN AUTO: 23 PG (ref 26–33)
MCHC RBC AUTO-ENTMCNC: 28.3 GM/DL (ref 32.2–35.5)
MCV RBC AUTO: 81.1 FL (ref 81–99)
MODE: ABNORMAL
MONOCYTES # BLD: 6.3 %
MONOCYTES ABSOLUTE: 0.9 THOU/MM3 (ref 0.4–1.3)
NUCLEATED RED BLOOD CELLS: 0 /100 WBC
O2 SATURATION: 92 %
O2 SATURATION: 99 %
OSMOLALITY CALCULATION: 297 MOSMOL/KG (ref 275–300)
PCO2: 40 MMHG (ref 35–45)
PCO2: 50 MMHG (ref 35–45)
PH BLOOD GAS: 7.27 (ref 7.35–7.45)
PH BLOOD GAS: 7.43 (ref 7.35–7.45)
PLATELET # BLD: 282 THOU/MM3 (ref 130–400)
PLATELET ESTIMATE: ADEQUATE
PMV BLD AUTO: 11.5 FL (ref 9.4–12.4)
PO2: 147 MMHG (ref 71–104)
PO2: 72 MMHG (ref 71–104)
POTASSIUM SERPL-SCNC: 4.9 MEQ/L (ref 3.5–5.2)
PRO-BNP: 5903 PG/ML (ref 0–1800)
PROCALCITONIN: 0.29 NG/ML (ref 0.01–0.09)
RBC # BLD: 5.57 MILL/MM3 (ref 4.2–5.4)
SCAN OF BLOOD SMEAR: NORMAL
SEG NEUTROPHILS: 78.5 %
SEGMENTED NEUTROPHILS ABSOLUTE COUNT: 10.7 THOU/MM3 (ref 1.8–7.7)
SET PEEP: 8 MMHG
SET PRESS SUPP: 10 CMH2O
SODIUM BLD-SCNC: 139 MEQ/L (ref 135–145)
SOURCE, BLOOD GAS: ABNORMAL
SOURCE, BLOOD GAS: ABNORMAL
TOTAL PROTEIN: 6.4 G/DL (ref 6.1–8)
TROPONIN T: 0.01 NG/ML
TROPONIN T: 0.02 NG/ML
TSH SERPL DL<=0.05 MIU/L-ACNC: 2.16 UIU/ML (ref 0.4–4.2)
WBC # BLD: 13.6 THOU/MM3 (ref 4.8–10.8)

## 2019-12-28 PROCEDURE — 6360000002 HC RX W HCPCS: Performed by: FAMILY MEDICINE

## 2019-12-28 PROCEDURE — 83605 ASSAY OF LACTIC ACID: CPT

## 2019-12-28 PROCEDURE — 6360000002 HC RX W HCPCS: Performed by: NURSE PRACTITIONER

## 2019-12-28 PROCEDURE — 2500000003 HC RX 250 WO HCPCS: Performed by: NURSE PRACTITIONER

## 2019-12-28 PROCEDURE — 36415 COLL VENOUS BLD VENIPUNCTURE: CPT

## 2019-12-28 PROCEDURE — 2709999900 HC NON-CHARGEABLE SUPPLY

## 2019-12-28 PROCEDURE — 93306 TTE W/DOPPLER COMPLETE: CPT

## 2019-12-28 PROCEDURE — 71260 CT THORAX DX C+: CPT

## 2019-12-28 PROCEDURE — 94761 N-INVAS EAR/PLS OXIMETRY MLT: CPT

## 2019-12-28 PROCEDURE — 94660 CPAP INITIATION&MGMT: CPT

## 2019-12-28 PROCEDURE — 99285 EMERGENCY DEPT VISIT HI MDM: CPT

## 2019-12-28 PROCEDURE — 82803 BLOOD GASES ANY COMBINATION: CPT

## 2019-12-28 PROCEDURE — 6370000000 HC RX 637 (ALT 250 FOR IP): Performed by: INTERNAL MEDICINE

## 2019-12-28 PROCEDURE — C9113 INJ PANTOPRAZOLE SODIUM, VIA: HCPCS | Performed by: INTERNAL MEDICINE

## 2019-12-28 PROCEDURE — 99221 1ST HOSP IP/OBS SF/LOW 40: CPT | Performed by: INTERNAL MEDICINE

## 2019-12-28 PROCEDURE — 2140000000 HC CCU INTERMEDIATE R&B

## 2019-12-28 PROCEDURE — 71045 X-RAY EXAM CHEST 1 VIEW: CPT

## 2019-12-28 PROCEDURE — 76705 ECHO EXAM OF ABDOMEN: CPT

## 2019-12-28 PROCEDURE — 96375 TX/PRO/DX INJ NEW DRUG ADDON: CPT

## 2019-12-28 PROCEDURE — 2580000003 HC RX 258: Performed by: FAMILY MEDICINE

## 2019-12-28 PROCEDURE — 2580000003 HC RX 258: Performed by: NURSE PRACTITIONER

## 2019-12-28 PROCEDURE — 80053 COMPREHEN METABOLIC PANEL: CPT

## 2019-12-28 PROCEDURE — 96365 THER/PROPH/DIAG IV INF INIT: CPT

## 2019-12-28 PROCEDURE — 87899 AGENT NOS ASSAY W/OPTIC: CPT

## 2019-12-28 PROCEDURE — 6360000002 HC RX W HCPCS: Performed by: INTERNAL MEDICINE

## 2019-12-28 PROCEDURE — 36600 WITHDRAWAL OF ARTERIAL BLOOD: CPT

## 2019-12-28 PROCEDURE — 82248 BILIRUBIN DIRECT: CPT

## 2019-12-28 PROCEDURE — 87040 BLOOD CULTURE FOR BACTERIA: CPT

## 2019-12-28 PROCEDURE — 93005 ELECTROCARDIOGRAM TRACING: CPT | Performed by: INTERNAL MEDICINE

## 2019-12-28 PROCEDURE — 83735 ASSAY OF MAGNESIUM: CPT

## 2019-12-28 PROCEDURE — 84145 PROCALCITONIN (PCT): CPT

## 2019-12-28 PROCEDURE — 83036 HEMOGLOBIN GLYCOSYLATED A1C: CPT

## 2019-12-28 PROCEDURE — 87449 NOS EACH ORGANISM AG IA: CPT

## 2019-12-28 PROCEDURE — 6370000000 HC RX 637 (ALT 250 FOR IP): Performed by: FAMILY MEDICINE

## 2019-12-28 PROCEDURE — 83690 ASSAY OF LIPASE: CPT

## 2019-12-28 PROCEDURE — 6370000000 HC RX 637 (ALT 250 FOR IP): Performed by: NURSE PRACTITIONER

## 2019-12-28 PROCEDURE — 85025 COMPLETE CBC W/AUTO DIFF WBC: CPT

## 2019-12-28 PROCEDURE — 84443 ASSAY THYROID STIM HORMONE: CPT

## 2019-12-28 PROCEDURE — 99223 1ST HOSP IP/OBS HIGH 75: CPT | Performed by: FAMILY MEDICINE

## 2019-12-28 PROCEDURE — 87804 INFLUENZA ASSAY W/OPTIC: CPT

## 2019-12-28 PROCEDURE — 6360000004 HC RX CONTRAST MEDICATION: Performed by: INTERNAL MEDICINE

## 2019-12-28 PROCEDURE — 83880 ASSAY OF NATRIURETIC PEPTIDE: CPT

## 2019-12-28 PROCEDURE — 2700000000 HC OXYGEN THERAPY PER DAY

## 2019-12-28 PROCEDURE — 82947 ASSAY GLUCOSE BLOOD QUANT: CPT

## 2019-12-28 PROCEDURE — 80074 ACUTE HEPATITIS PANEL: CPT

## 2019-12-28 PROCEDURE — 84484 ASSAY OF TROPONIN QUANT: CPT

## 2019-12-28 PROCEDURE — 94640 AIRWAY INHALATION TREATMENT: CPT

## 2019-12-28 RX ORDER — CLOPIDOGREL BISULFATE 75 MG/1
75 TABLET ORAL DAILY
Status: DISCONTINUED | OUTPATIENT
Start: 2019-12-30 | End: 2019-12-29

## 2019-12-28 RX ORDER — BUMETANIDE 0.25 MG/ML
1 INJECTION, SOLUTION INTRAMUSCULAR; INTRAVENOUS ONCE
Status: COMPLETED | OUTPATIENT
Start: 2019-12-28 | End: 2019-12-28

## 2019-12-28 RX ORDER — IPRATROPIUM BROMIDE AND ALBUTEROL SULFATE 2.5; .5 MG/3ML; MG/3ML
1 SOLUTION RESPIRATORY (INHALATION) ONCE
Status: COMPLETED | OUTPATIENT
Start: 2019-12-28 | End: 2019-12-28

## 2019-12-28 RX ORDER — ACETAMINOPHEN 325 MG/1
650 TABLET ORAL EVERY 4 HOURS PRN
Status: DISCONTINUED | OUTPATIENT
Start: 2019-12-28 | End: 2019-12-30 | Stop reason: SDUPTHER

## 2019-12-28 RX ORDER — CLOPIDOGREL BISULFATE 75 MG/1
75 TABLET ORAL DAILY
Status: DISCONTINUED | OUTPATIENT
Start: 2019-12-30 | End: 2019-12-28

## 2019-12-28 RX ORDER — CLOPIDOGREL BISULFATE 75 MG/1
75 TABLET ORAL DAILY
Status: DISCONTINUED | OUTPATIENT
Start: 2019-12-29 | End: 2019-12-28

## 2019-12-28 RX ORDER — SODIUM CHLORIDE 0.9 % (FLUSH) 0.9 %
10 SYRINGE (ML) INJECTION EVERY 12 HOURS SCHEDULED
Status: DISCONTINUED | OUTPATIENT
Start: 2019-12-28 | End: 2019-12-28 | Stop reason: SDUPTHER

## 2019-12-28 RX ORDER — PRAVASTATIN SODIUM 40 MG
40 TABLET ORAL DAILY
Status: DISCONTINUED | OUTPATIENT
Start: 2019-12-28 | End: 2019-12-31 | Stop reason: HOSPADM

## 2019-12-28 RX ORDER — FOLIC ACID 1 MG/1
1 TABLET ORAL DAILY
Status: DISCONTINUED | OUTPATIENT
Start: 2019-12-28 | End: 2019-12-31 | Stop reason: HOSPADM

## 2019-12-28 RX ORDER — FERROUS SULFATE 325(65) MG
325 TABLET ORAL
Status: DISCONTINUED | OUTPATIENT
Start: 2019-12-28 | End: 2019-12-31 | Stop reason: HOSPADM

## 2019-12-28 RX ORDER — SODIUM CHLORIDE 0.9 % (FLUSH) 0.9 %
10 SYRINGE (ML) INJECTION EVERY 12 HOURS SCHEDULED
Status: DISCONTINUED | OUTPATIENT
Start: 2019-12-28 | End: 2019-12-30 | Stop reason: SDUPTHER

## 2019-12-28 RX ORDER — 0.9 % SODIUM CHLORIDE 0.9 %
500 INTRAVENOUS SOLUTION INTRAVENOUS ONCE
Status: COMPLETED | OUTPATIENT
Start: 2019-12-28 | End: 2019-12-28

## 2019-12-28 RX ORDER — SODIUM CHLORIDE 0.9 % (FLUSH) 0.9 %
10 SYRINGE (ML) INJECTION PRN
Status: DISCONTINUED | OUTPATIENT
Start: 2019-12-28 | End: 2019-12-28 | Stop reason: SDUPTHER

## 2019-12-28 RX ORDER — BUMETANIDE 0.25 MG/ML
1 INJECTION, SOLUTION INTRAMUSCULAR; INTRAVENOUS 2 TIMES DAILY
Status: DISCONTINUED | OUTPATIENT
Start: 2019-12-28 | End: 2019-12-30

## 2019-12-28 RX ORDER — CLOPIDOGREL BISULFATE 75 MG/1
75 TABLET ORAL DAILY
Status: DISCONTINUED | OUTPATIENT
Start: 2019-12-28 | End: 2019-12-28

## 2019-12-28 RX ORDER — PANTOPRAZOLE SODIUM 40 MG/1
40 TABLET, DELAYED RELEASE ORAL
Status: DISCONTINUED | OUTPATIENT
Start: 2019-12-28 | End: 2019-12-28

## 2019-12-28 RX ORDER — ASPIRIN 81 MG/1
324 TABLET, CHEWABLE ORAL ONCE
Status: DISCONTINUED | OUTPATIENT
Start: 2019-12-28 | End: 2019-12-28

## 2019-12-28 RX ORDER — PANTOPRAZOLE SODIUM 40 MG/10ML
40 INJECTION, POWDER, LYOPHILIZED, FOR SOLUTION INTRAVENOUS 2 TIMES DAILY
Status: DISCONTINUED | OUTPATIENT
Start: 2019-12-28 | End: 2019-12-30

## 2019-12-28 RX ORDER — THIAMINE MONONITRATE (VIT B1) 100 MG
100 TABLET ORAL DAILY
Status: DISCONTINUED | OUTPATIENT
Start: 2019-12-28 | End: 2019-12-31 | Stop reason: HOSPADM

## 2019-12-28 RX ORDER — ONDANSETRON 2 MG/ML
4 INJECTION INTRAMUSCULAR; INTRAVENOUS EVERY 6 HOURS PRN
Status: DISCONTINUED | OUTPATIENT
Start: 2019-12-28 | End: 2019-12-31 | Stop reason: HOSPADM

## 2019-12-28 RX ORDER — SODIUM CHLORIDE 0.9 % (FLUSH) 0.9 %
10 SYRINGE (ML) INJECTION PRN
Status: DISCONTINUED | OUTPATIENT
Start: 2019-12-28 | End: 2019-12-30 | Stop reason: SDUPTHER

## 2019-12-28 RX ORDER — NITROGLYCERIN 20 MG/100ML
5 INJECTION INTRAVENOUS CONTINUOUS
Status: DISCONTINUED | OUTPATIENT
Start: 2019-12-28 | End: 2019-12-28

## 2019-12-28 RX ADMIN — SODIUM CHLORIDE, PRESERVATIVE FREE 10 ML: 5 INJECTION INTRAVENOUS at 10:52

## 2019-12-28 RX ADMIN — FOLIC ACID 1 MG: 1 TABLET ORAL at 22:29

## 2019-12-28 RX ADMIN — PANTOPRAZOLE SODIUM 40 MG: 40 INJECTION, POWDER, FOR SOLUTION INTRAVENOUS at 22:29

## 2019-12-28 RX ADMIN — Medication 100 MG: at 22:29

## 2019-12-28 RX ADMIN — FERROUS SULFATE TAB 325 MG (65 MG ELEMENTAL FE) 325 MG: 325 (65 FE) TAB at 10:28

## 2019-12-28 RX ADMIN — CLOPIDOGREL BISULFATE 75 MG: 75 TABLET ORAL at 10:28

## 2019-12-28 RX ADMIN — SODIUM CHLORIDE 500 ML: 9 INJECTION, SOLUTION INTRAVENOUS at 02:43

## 2019-12-28 RX ADMIN — IPRATROPIUM BROMIDE AND ALBUTEROL SULFATE 1 AMPULE: .5; 3 SOLUTION RESPIRATORY (INHALATION) at 01:20

## 2019-12-28 RX ADMIN — SODIUM CHLORIDE, PRESERVATIVE FREE 10 ML: 5 INJECTION INTRAVENOUS at 22:29

## 2019-12-28 RX ADMIN — APIXABAN 5 MG: 5 TABLET, FILM COATED ORAL at 10:28

## 2019-12-28 RX ADMIN — IPRATROPIUM BROMIDE 0.5 MG: 0.5 SOLUTION RESPIRATORY (INHALATION) at 13:37

## 2019-12-28 RX ADMIN — AZITHROMYCIN MONOHYDRATE 500 MG: 500 INJECTION, POWDER, LYOPHILIZED, FOR SOLUTION INTRAVENOUS at 06:21

## 2019-12-28 RX ADMIN — CEFTRIAXONE SODIUM 1 G: 1 INJECTION, POWDER, FOR SOLUTION INTRAMUSCULAR; INTRAVENOUS at 04:15

## 2019-12-28 RX ADMIN — PRAVASTATIN SODIUM 40 MG: 40 TABLET ORAL at 22:29

## 2019-12-28 RX ADMIN — BUMETANIDE 1 MG: 0.25 INJECTION INTRAMUSCULAR; INTRAVENOUS at 01:15

## 2019-12-28 RX ADMIN — PANTOPRAZOLE SODIUM 40 MG: 40 TABLET, DELAYED RELEASE ORAL at 06:21

## 2019-12-28 RX ADMIN — IPRATROPIUM BROMIDE 0.5 MG: 0.5 SOLUTION RESPIRATORY (INHALATION) at 17:46

## 2019-12-28 RX ADMIN — IOPAMIDOL 80 ML: 755 INJECTION, SOLUTION INTRAVENOUS at 09:05

## 2019-12-28 RX ADMIN — NITROGLYCERIN 5 MCG/MIN: 20 INJECTION INTRAVENOUS at 01:16

## 2019-12-28 RX ADMIN — IPRATROPIUM BROMIDE 0.5 MG: 0.5 SOLUTION RESPIRATORY (INHALATION) at 08:24

## 2019-12-28 NOTE — PROGRESS NOTES
ABG redrawn at this time and it is good. Pt placed on 6L NC per order Luis Enrique Cruz NP. Will monitor.  Bipap changed to PRN

## 2019-12-28 NOTE — ED NOTES
Pt resting in cot breathing easy and unlabored. Labs and cultures to be drawn from pt. ATB started on pt. Pt can go to floor upon completion.       Caterina Berg RN  12/28/19 [id number]

## 2019-12-28 NOTE — PLAN OF CARE
Hospitalist Update Note    Please see full H&P from this am for all details. A/P  Acute Hypoxic and Hypercapneic Respiratory Failure  Troponin elevation - no CP, ekg  Distended esophagus - with food and fluid  Elevated AST and ALK P - reports no EtOH for 1 year. Check reese. US and hepatitis panel, trend  Chronic Hypotension - worse after diuresis with IV bumex. Will hold. Hx of PVD - s/p fem-pop bypass 2018, with clot requiring stent and PTA 2/2019. On Eliquis and Plaivx  AAA s/p EVAR  Elevated glucose - normal A1C - will monitor POC glucose  Dysphagia and Achalasia s/p botox injections - follows Dr. Vivian BANGURA  Hyperinflated lungs / Fibrosis noted on imaging - no prior dx of COPD  Former tobacco abuse    It is unclear what caused the patient's acute SOB with acute hypoxia and hypercapnea. She does have elevated procal and ? Pneumonia on CXR so will continue current antibiotics. Alternatively, given lack of cough and fever, her elevated procal could be from hypoxia. Her O2 status has quickly improved after diuresis, and currently she appears mostly euvolemic. There are no rales on her exam, although she has coarse breath sounds. There is no LE edema. She is hypotensive, so will hold off additional diuresis for now. Cardiology to eval for elevated troponin and additional diuresis (no chest pain). Echo ordered. Her O2 has been quickly weaned from NRB to bipap to NC now 2 liters in the course of 10 hours. There was no e/o PE on CT with contrast. No suspicion at this time for COPD exacerbation (no cough, sputum production worse than baseline, no wheezing). Will monitor on continuous pulse ox. Check bedside spirometry as there may be airway compression from her large amount of fluid/food in esophagus -> assess for extra or intrathoracic airway obstruction. She also has weak pulses in RLE (unable to doppler) dopplerable on left DP.   Will seek Cardio recommendations regarding if need for re-eval of

## 2019-12-28 NOTE — PROGRESS NOTES
[facility] THERAPY MISSED TREATMENT NOTE  [facility] CCU-STEPDOWN 3B      Date: 2019  Patient Name: Edgar Alfonso        MRN: [id number]    : 1935  (82 y.o.)    REASON FOR MISSED TREATMENT:  Attempted to see pt at 1111 to assess swallow function via BSE. RN [facility] reporting pt needs to maintain NPO diet level given potential need for EGD. Will plan to f/u on subsequent date pending GI clearance as appropriate.       Aliza Doan M.A., [address] [place] [address]

## 2019-12-28 NOTE — ED NOTES
ED to inpatient nurses report    Chief Complaint   Patient presents with    Shortness of Breath      Present to ED from home  LOC: alert and orientated to name, place, date  Vital signs   Vitals:    12/28/19 0256 12/28/19 0306 12/28/19 0317 12/28/19 0410   BP:  (!) 72/48 (!) 93/42 98/67   Pulse:    84   Resp:  20  21   Temp:       TempSrc:       SpO2: 95% 93%  97%   Weight:          Oxygen Baseline RA (pt has nebulizer at home)    Current needs required 6 L NC   LDAs:   Peripheral IV 12/28/19 Right Antecubital (Active)   Site Assessment Clean;Dry; Intact 12/28/2019  3:09 AM   Line Status Normal saline locked; Infusing 12/28/2019  3:09 AM   Dressing Status Clean;Dry; Intact 12/28/2019  3:09 AM       Peripheral IV 12/28/19 Left Forearm (Active)   Site Assessment Clean;Dry; Intact 12/28/2019  4:37 AM   Line Status Brisk blood return;Normal saline locked; Flushed;Specimen collected 12/28/2019  4:37 AM   Dressing Status Clean;Dry; Intact 12/28/2019  4:37 AM   Dressing Intervention New 12/28/2019  4:37 AM     Mobility: Requires assistance * 1  Pending ED orders: Rocephin completion and fluid bolus completion. Present condition: pt alert and oriented. Pt breathing easy and unlabored on 6L NC. Pt denies pain.      Electronically signed by Stella Rowland RN on 12/28/2019 at 4:44 AM       Stella Rowland RN  12/28/19 0510

## 2019-12-28 NOTE — H&P
History & Physical       Patient: Priti Escalante  YOB: 1935    MRN: [id number]     Acct: [de-identified]    PCP: Desire Downey    Date of Admission: 12/28/2019    Date of Service: Patient seen / examined on 12/28/19 and admitted to inpatient with expected LOS greater than two midnights due to medical therapy. ASSESSMENT / PLAN:    Acute hypoxic and hypercapnic respiratory failure 2/2 decompensated diastolic CHF +/- CAP  S/p IV diuresis and short course of BiPAP/nitro gtt in the ED with improvement of sx, resolution of respiratory acidosis and decreasing O2 requirement (currently on 5L NC). Patient is not supplemental O2 dependent at baseline. S/p CTX x 1 in the ED. Admit to stepdown. Supplemental O2 as needed (keep SpO2 >92%) / wean as able. Continue IV diuresis. Monitor I/O, daily weights. Repeat 2D ECHO this morning. Consider cardiology consult pending ECHO results and ongoing clinical course. Follow BCx, SCx, gram stain, UAg and procal to further evaluate for PNA. Will continue CTX for now / add AZT > deescalate as indicated. Bronchodilators prn. Pulm hygiene. Hyperinflated lungs on CXR  Consistent with (undiagnosed) COPD. Patient with hx former tobacco abuse. Low suspicion for current exacerbation (no wheezes on exam). Patient would benefit from outpatient PFTs if not already completed. Atrovent prn (tachycardia on presentation). ? Soft tissue opacity in R paratracheal region on CT imaging  Per radiology read, may represent a pulmonary vs mediastinal mass or adenopathy. Patient with hx former tobacco abuse. CT chest with IV contrast ordered. Patient may benefit from pulmonology consult pending CT results and ongoing clinical course. Leukocytosis  Mild. Reactive vs 2/2 PNA. Management per above. Will trend with daily CBC. Elevated troponin  Mild / in the setting of decompensated CHF. Patient is asymptomatic of CP.  I believe this represents a stress response and not true ACS. Check delta troponin. Monitor on telemetry. Hyperglycemia  No known hx DM (A1C 6.1 in 6/2019). No recent steroid use. Could be representative of a stress response. Repeat A1C, POCT BG later this morning / add SSI/increase frequency of checks as needed (target -180). PVD (s/p R fem-tib angioplasty and stenting 2/2019 / on plavix, eliquis)  Stable. Resume plavix, eliquis. CKD stage 3  Stable / at baseline. Avoid nephrotoxins. Renally dose medications. Monitor daily BMP. HLD  Resume statin. GERD  Stable. Resume PPI. BPPV  Historical. No current outpatient therapies listed. OA  Tylenol, heating pad prn. Former tobacco abuse  Noted. Encourage ongoing abstention. Elevated AST / AlkP  No known hx alcohol use. Will trend. Consider additional OP follow up as needed. Chief Complaint: shortness of breath    History of Present Illness:  81 y/o F PMHx chronic diastolic CHF (EF 47-49% per ECHO 11/2018), PVD (s/p R fem-tib angioplasty and stenting 2/2019 / on plavix, eliquis), CKD3, HLD, GERD, BPPV, OA and former tobacco abuse -- presents to [address] [place] [address] with a chief complaint of shortness of breath. Hx obtained from the patient. Patient reports acute-onset shortness of breath which started at home earlier this evening. Sx occur at rest and are worsened by exertion. Patient endorses some BLE edema, but denies associated cough, fevers/chills, headache, lightheadedness/dizziness, chest pain, palpitations, n/v/d, abdominal pain, weakness, paresthesias or syncope. Has difficulty lying flat. She is a former smoker but denies known hx COPD. She believes she might have been diagnosed with CHF in the past, but states she is unsure. Endorses strict compliance with home medications. Denies recent illness. No reported sick contacts or recent travel. No additional questions / concerns at this time.     ED course: afebrile, initially hypertensive (150s/130s), tachycardic (low 100s) and tachypneic (low (TYLENOL ARTHRITIS PAIN) 650 MG CR tablet Take 1,300 mg by mouth every 12 hours as needed for Pain     timolol (TIMOPTIC) 0.5 % ophthalmic solution 1 drop 2 times daily. Allergies:   Lasix [furosemide]; Lipitor [atorvastatin]; Neurontin [gabapentin]; Oxycontin [oxycodone hcl]; and Penicillins    Social History:   Socioeconomic History    Marital status:    Tobacco Use    Smoking status: Former Smoker     Packs/day: 0.25     Years: 60.00     Pack years: 15.00     Types: Cigarettes     Start date:      Last attempt to quit: 10/18/2019     Years since quittin.1    Smokeless tobacco: Never Used    Tobacco comment: 1 cigarette every other day   Substance and Sexual Activity    Alcohol use: Yes     Alcohol/week: 1.0 standard drinks     Types: 1 Glasses of wine per week     Comment: social    Drug use: No    Sexual activity: Not on file     Family History:   Problem Relation Age of Onset    Heart Disease Mother     Stroke Mother     Cancer Father         lung    Emphysema Father     Other Sister         leukemia    Heart Disease Maternal Grandmother     Dementia Maternal Grandmother     Heart Disease Maternal Grandfather      REVIEW OF SYSTEMS:  A 14-point ROS was obtained and negative, with the exception of pertinent positives as stated in the HPI. PHYSICAL EXAM:  Vitals:    19 0242 19 0256 19 0306 19 0317   BP: 80/60  (!) 72/48 (!) 93/42   Pulse:       Resp:   20    Temp:       TempSrc:       SpO2:  95% 93%    Weight:         General appearance: Alert / well-appearing female. Cooperative. NAD. HEENT:  Normocephalic / atraumatic. PERRL. EOMI. Conjunctivae appear normal. NC in place. Neck: Supple. No JVD. Respiratory: Breathing appears unlabored on 5L NC. Lung sounds CTA with moderate air movement on my exam. No obvious rales. No wheezes / rhonchi. No conversational dyspnea or accessory muscle use. Cardiovascular: RRR. Normal S1/S2.  No obvious murmurs / rubs / gallops. Abdomen: Soft / non-tender / non-distended. BS present. Musculoskeletal: No cyanosis. 1+ pitting edema to BLE. Skin: Warm / dry. Normal turgor. No pallor or diaphoresis. Neurologic: A/O x 3. No obvious focal neurologic deficits. Psychiatric: Thought content / judgment / insight appear appropriate. Capillary refill: Brisk bilaterally. Peripheral pulses: +2 bilaterally.     Labs:   Results for orders placed or performed during the hospital encounter of 12/28/19   CBC Auto Differential   Result Value Ref Range    WBC 13.6 (H) 4.8 - 10.8 thou/mm3    RBC 5.57 (H) 4.20 - 5.40 mill/mm3    Hemoglobin 12.8 12.0 - 16.0 gm/dl    Hematocrit 45.2 37.0 - 47.0 %    MCV 81.1 81.0 - 99.0 fL    MCH 23.0 (L) 26.0 - 33.0 pg    MCHC 28.3 (L) 32.2 - 35.5 gm/dl    RDW-CV 17.6 (H) 11.5 - 14.5 %    RDW-SD 49.3 (H) 35.0 - 45.0 fL    Platelets 153 502 - 989 thou/mm3    MPV 11.5 9.4 - 12.4 fL    Seg Neutrophils 78.5 %    Lymphocytes 11.9 %    Monocytes 6.3 %    Eosinophils 1.5 %    Basophils 0.6 %    Immature Granulocytes 1.2 %    Platelet Estimate ADEQUATE Adequate    Segs Absolute 10.7 (H) 1.8 - 7.7 thou/mm3    Lymphocytes Absolute 1.6 1.0 - 4.8 thou/mm3    Monocytes Absolute 0.9 0.4 - 1.3 thou/mm3    Eosinophils Absolute 0.2 0.0 - 0.4 thou/mm3    Basophils Absolute 0.1 0.0 - 0.1 thou/mm3    Immature Grans (Abs) 0.16 (H) 0.00 - 0.07 thou/mm3    nRBC 0 /100 wbc    Elliptocytes 1+ Absent    Hypochromia PRESENT Absent   Basic Metabolic Panel   Result Value Ref Range    Sodium 139 135 - 145 meq/L    Potassium 4.9 3.5 - 5.2 meq/L    Chloride 102 98 - 111 meq/L    CO2 22 (L) 23 - 33 meq/L    Glucose 338 (H) 70 - 108 mg/dL    BUN 30 (H) 7 - 22 mg/dL    CREATININE 1.2 0.4 - 1.2 mg/dL    Calcium 9.1 8.5 - 10.5 mg/dL   Troponin   Result Value Ref Range    Troponin T 0.010 (A) ng/ml   Brain Natriuretic Peptide   Result Value Ref Range    Pro-BNP 5903.0 (H) 0.0 - 1800.0 pg/mL   Lipase   Result Value Ref Range    Lipase 29.3 5.6 - 51.3

## 2019-12-28 NOTE — ED NOTES
Bed: 001A  Expected date: 12/28/19  Expected time: 12:59 AM  Means of arrival: [facility]  Comments:     Jamra Reyes RN  12/28/19 3999

## 2019-12-29 ENCOUNTER — PREP FOR PROCEDURE (OUTPATIENT)
Dept: CARDIOLOGY | Age: 84
End: 2019-12-29

## 2019-12-29 LAB
ALBUMIN SERPL-MCNC: 3.7 G/DL (ref 3.5–5.1)
ALP BLD-CCNC: 129 U/L (ref 38–126)
ALT SERPL-CCNC: 38 U/L (ref 11–66)
ANION GAP SERPL CALCULATED.3IONS-SCNC: 15 MEQ/L (ref 8–16)
AST SERPL-CCNC: 28 U/L (ref 5–40)
BASOPHILS # BLD: 0.4 %
BASOPHILS ABSOLUTE: 0 THOU/MM3 (ref 0–0.1)
BILIRUB SERPL-MCNC: 0.3 MG/DL (ref 0.3–1.2)
BILIRUBIN DIRECT: < 0.2 MG/DL (ref 0–0.3)
BUN BLDV-MCNC: 24 MG/DL (ref 7–22)
CALCIUM SERPL-MCNC: 9.1 MG/DL (ref 8.5–10.5)
CHLORIDE BLD-SCNC: 101 MEQ/L (ref 98–111)
CO2: 25 MEQ/L (ref 23–33)
CREAT SERPL-MCNC: 1.2 MG/DL (ref 0.4–1.2)
EOSINOPHIL # BLD: 1.8 %
EOSINOPHILS ABSOLUTE: 0.1 THOU/MM3 (ref 0–0.4)
ERYTHROCYTE [DISTWIDTH] IN BLOOD BY AUTOMATED COUNT: 17.3 % (ref 11.5–14.5)
ERYTHROCYTE [DISTWIDTH] IN BLOOD BY AUTOMATED COUNT: 48.1 FL (ref 35–45)
GFR SERPL CREATININE-BSD FRML MDRD: 43 ML/MIN/1.73M2
GLUCOSE BLD-MCNC: 106 MG/DL (ref 70–108)
GLUCOSE BLD-MCNC: 115 MG/DL (ref 70–108)
GLUCOSE BLD-MCNC: 124 MG/DL (ref 70–108)
GLUCOSE BLD-MCNC: 88 MG/DL (ref 70–108)
GLUCOSE BLD-MCNC: 93 MG/DL (ref 70–108)
GLUCOSE BLD-MCNC: 96 MG/DL (ref 70–108)
HCT VFR BLD CALC: 39.1 % (ref 37–47)
HEMOGLOBIN: 11.5 GM/DL (ref 12–16)
IMMATURE GRANS (ABS): 0.03 THOU/MM3 (ref 0–0.07)
IMMATURE GRANULOCYTES: 0.4 %
LYMPHOCYTES # BLD: 12.4 %
LYMPHOCYTES ABSOLUTE: 0.9 THOU/MM3 (ref 1–4.8)
MAGNESIUM: 1.9 MG/DL (ref 1.6–2.4)
MCH RBC QN AUTO: 23 PG (ref 26–33)
MCHC RBC AUTO-ENTMCNC: 29.4 GM/DL (ref 32.2–35.5)
MCV RBC AUTO: 78.2 FL (ref 81–99)
MONOCYTES # BLD: 8.8 %
MONOCYTES ABSOLUTE: 0.6 THOU/MM3 (ref 0.4–1.3)
NUCLEATED RED BLOOD CELLS: 0 /100 WBC
PLATELET # BLD: 191 THOU/MM3 (ref 130–400)
PMV BLD AUTO: 11.9 FL (ref 9.4–12.4)
POTASSIUM SERPL-SCNC: 3.7 MEQ/L (ref 3.5–5.2)
RBC # BLD: 5 MILL/MM3 (ref 4.2–5.4)
SEDIMENTATION RATE, ERYTHROCYTE: 14 MM/HR (ref 0–20)
SEG NEUTROPHILS: 76.2 %
SEGMENTED NEUTROPHILS ABSOLUTE COUNT: 5.3 THOU/MM3 (ref 1.8–7.7)
SODIUM BLD-SCNC: 141 MEQ/L (ref 135–145)
TOTAL PROTEIN: 6.4 G/DL (ref 6.1–8)
WBC # BLD: 7 THOU/MM3 (ref 4.8–10.8)

## 2019-12-29 PROCEDURE — 85025 COMPLETE CBC W/AUTO DIFF WBC: CPT

## 2019-12-29 PROCEDURE — 6370000000 HC RX 637 (ALT 250 FOR IP): Performed by: FAMILY MEDICINE

## 2019-12-29 PROCEDURE — 86038 ANTINUCLEAR ANTIBODIES: CPT

## 2019-12-29 PROCEDURE — 85651 RBC SED RATE NONAUTOMATED: CPT

## 2019-12-29 PROCEDURE — 87389 HIV-1 AG W/HIV-1&-2 AB AG IA: CPT

## 2019-12-29 PROCEDURE — 86039 ANTINUCLEAR ANTIBODIES (ANA): CPT

## 2019-12-29 PROCEDURE — 6370000000 HC RX 637 (ALT 250 FOR IP): Performed by: INTERNAL MEDICINE

## 2019-12-29 PROCEDURE — 99233 SBSQ HOSP IP/OBS HIGH 50: CPT | Performed by: INTERNAL MEDICINE

## 2019-12-29 PROCEDURE — 83735 ASSAY OF MAGNESIUM: CPT

## 2019-12-29 PROCEDURE — 99232 SBSQ HOSP IP/OBS MODERATE 35: CPT | Performed by: NURSE PRACTITIONER

## 2019-12-29 PROCEDURE — 82248 BILIRUBIN DIRECT: CPT

## 2019-12-29 PROCEDURE — 2700000000 HC OXYGEN THERAPY PER DAY

## 2019-12-29 PROCEDURE — 2500000003 HC RX 250 WO HCPCS: Performed by: FAMILY MEDICINE

## 2019-12-29 PROCEDURE — 94761 N-INVAS EAR/PLS OXIMETRY MLT: CPT

## 2019-12-29 PROCEDURE — 6360000002 HC RX W HCPCS: Performed by: FAMILY MEDICINE

## 2019-12-29 PROCEDURE — 94640 AIRWAY INHALATION TREATMENT: CPT

## 2019-12-29 PROCEDURE — 36415 COLL VENOUS BLD VENIPUNCTURE: CPT

## 2019-12-29 PROCEDURE — C9113 INJ PANTOPRAZOLE SODIUM, VIA: HCPCS | Performed by: INTERNAL MEDICINE

## 2019-12-29 PROCEDURE — 2709999900 HC NON-CHARGEABLE SUPPLY

## 2019-12-29 PROCEDURE — 82948 REAGENT STRIP/BLOOD GLUCOSE: CPT

## 2019-12-29 PROCEDURE — 84425 ASSAY OF VITAMIN B-1: CPT

## 2019-12-29 PROCEDURE — 6360000002 HC RX W HCPCS: Performed by: INTERNAL MEDICINE

## 2019-12-29 PROCEDURE — 2140000000 HC CCU INTERMEDIATE R&B

## 2019-12-29 PROCEDURE — 80053 COMPREHEN METABOLIC PANEL: CPT

## 2019-12-29 PROCEDURE — 2580000003 HC RX 258: Performed by: FAMILY MEDICINE

## 2019-12-29 PROCEDURE — 86658 ENTEROVIRUS ANTIBODY: CPT

## 2019-12-29 RX ORDER — DIPHENHYDRAMINE HYDROCHLORIDE 50 MG/ML
50 INJECTION INTRAMUSCULAR; INTRAVENOUS ONCE
Status: CANCELLED | OUTPATIENT
Start: 2019-12-29 | End: 2019-12-29

## 2019-12-29 RX ORDER — AMINOPHYLLINE DIHYDRATE 25 MG/ML
50 INJECTION, SOLUTION INTRAVENOUS PRN
Status: CANCELLED | OUTPATIENT
Start: 2019-12-29 | End: 2019-12-29

## 2019-12-29 RX ORDER — SODIUM CHLORIDE 0.9 % (FLUSH) 0.9 %
10 SYRINGE (ML) INJECTION PRN
Status: CANCELLED | OUTPATIENT
Start: 2019-12-29 | End: 2019-12-29

## 2019-12-29 RX ORDER — CLOPIDOGREL BISULFATE 75 MG/1
75 TABLET ORAL DAILY
Status: DISCONTINUED | OUTPATIENT
Start: 2019-12-29 | End: 2019-12-31 | Stop reason: HOSPADM

## 2019-12-29 RX ORDER — TIMOLOL MALEATE 5 MG/ML
1 SOLUTION/ DROPS OPHTHALMIC NIGHTLY
Status: DISCONTINUED | OUTPATIENT
Start: 2019-12-29 | End: 2019-12-31 | Stop reason: HOSPADM

## 2019-12-29 RX ORDER — SODIUM CHLORIDE 0.9 % (FLUSH) 0.9 %
10 SYRINGE (ML) INJECTION PRN
Status: CANCELLED | OUTPATIENT
Start: 2019-12-29

## 2019-12-29 RX ORDER — METOPROLOL TARTRATE 5 MG/5ML
5 INJECTION INTRAVENOUS EVERY 5 MIN PRN
Status: CANCELLED | OUTPATIENT
Start: 2019-12-29 | End: 2019-12-29

## 2019-12-29 RX ORDER — NITROGLYCERIN 0.4 MG/1
0.4 TABLET SUBLINGUAL EVERY 5 MIN PRN
Status: CANCELLED | OUTPATIENT
Start: 2019-12-29

## 2019-12-29 RX ORDER — SODIUM CHLORIDE 0.9 % (FLUSH) 0.9 %
10 SYRINGE (ML) INJECTION EVERY 12 HOURS SCHEDULED
Status: CANCELLED | OUTPATIENT
Start: 2019-12-29

## 2019-12-29 RX ORDER — ATROPINE SULFATE 0.1 MG/ML
0.5 INJECTION INTRAVENOUS EVERY 5 MIN PRN
Status: CANCELLED | OUTPATIENT
Start: 2019-12-29 | End: 2019-12-29

## 2019-12-29 RX ORDER — ALBUTEROL SULFATE 90 UG/1
2 AEROSOL, METERED RESPIRATORY (INHALATION) PRN
Status: CANCELLED | OUTPATIENT
Start: 2019-12-29 | End: 2019-12-29

## 2019-12-29 RX ORDER — NITROGLYCERIN 0.4 MG/1
0.4 TABLET SUBLINGUAL EVERY 5 MIN PRN
Status: CANCELLED | OUTPATIENT
Start: 2019-12-29 | End: 2019-12-29

## 2019-12-29 RX ORDER — SODIUM CHLORIDE 9 MG/ML
INJECTION, SOLUTION INTRAVENOUS CONTINUOUS
Status: CANCELLED | OUTPATIENT
Start: 2019-12-29

## 2019-12-29 RX ORDER — SODIUM CHLORIDE 9 MG/ML
500 INJECTION, SOLUTION INTRAVENOUS CONTINUOUS PRN
Status: CANCELLED | OUTPATIENT
Start: 2019-12-29 | End: 2019-12-29

## 2019-12-29 RX ADMIN — FOLIC ACID 1 MG: 1 TABLET ORAL at 09:09

## 2019-12-29 RX ADMIN — BUMETANIDE 1 MG: 0.25 INJECTION INTRAMUSCULAR; INTRAVENOUS at 11:40

## 2019-12-29 RX ADMIN — CEFTRIAXONE SODIUM 1 G: 1 INJECTION, POWDER, FOR SOLUTION INTRAMUSCULAR; INTRAVENOUS at 03:57

## 2019-12-29 RX ADMIN — ACETAMINOPHEN 650 MG: 325 TABLET ORAL at 05:30

## 2019-12-29 RX ADMIN — FERROUS SULFATE TAB 325 MG (65 MG ELEMENTAL FE) 325 MG: 325 (65 FE) TAB at 09:09

## 2019-12-29 RX ADMIN — BUMETANIDE 1 MG: 0.25 INJECTION INTRAMUSCULAR; INTRAVENOUS at 21:09

## 2019-12-29 RX ADMIN — BUMETANIDE 1 MG: 0.25 INJECTION INTRAMUSCULAR; INTRAVENOUS at 00:01

## 2019-12-29 RX ADMIN — SODIUM CHLORIDE, PRESERVATIVE FREE 10 ML: 5 INJECTION INTRAVENOUS at 09:11

## 2019-12-29 RX ADMIN — CLOPIDOGREL BISULFATE 75 MG: 75 TABLET ORAL at 09:09

## 2019-12-29 RX ADMIN — IPRATROPIUM BROMIDE 0.5 MG: 0.5 SOLUTION RESPIRATORY (INHALATION) at 15:28

## 2019-12-29 RX ADMIN — PANTOPRAZOLE SODIUM 40 MG: 40 INJECTION, POWDER, FOR SOLUTION INTRAVENOUS at 20:38

## 2019-12-29 RX ADMIN — SODIUM CHLORIDE, PRESERVATIVE FREE 10 ML: 5 INJECTION INTRAVENOUS at 20:38

## 2019-12-29 RX ADMIN — PANTOPRAZOLE SODIUM 40 MG: 40 INJECTION, POWDER, FOR SOLUTION INTRAVENOUS at 09:09

## 2019-12-29 RX ADMIN — IPRATROPIUM BROMIDE 0.5 MG: 0.5 SOLUTION RESPIRATORY (INHALATION) at 00:52

## 2019-12-29 RX ADMIN — Medication 100 MG: at 09:09

## 2019-12-29 RX ADMIN — PRAVASTATIN SODIUM 40 MG: 40 TABLET ORAL at 20:38

## 2019-12-29 RX ADMIN — IPRATROPIUM BROMIDE 0.5 MG: 0.5 SOLUTION RESPIRATORY (INHALATION) at 07:25

## 2019-12-29 RX ADMIN — AZITHROMYCIN MONOHYDRATE 500 MG: 500 INJECTION, POWDER, LYOPHILIZED, FOR SOLUTION INTRAVENOUS at 04:31

## 2019-12-29 RX ADMIN — TIMOLOL MALEATE 1 DROP: 5 SOLUTION/ DROPS OPHTHALMIC at 23:35

## 2019-12-29 ASSESSMENT — PAIN DESCRIPTION - ONSET: ONSET: ON-GOING

## 2019-12-29 ASSESSMENT — PAIN DESCRIPTION - FREQUENCY: FREQUENCY: CONTINUOUS

## 2019-12-29 ASSESSMENT — PAIN DESCRIPTION - DIRECTION: RADIATING_TOWARDS: BACK

## 2019-12-29 ASSESSMENT — PAIN DESCRIPTION - LOCATION: LOCATION: SHOULDER

## 2019-12-29 ASSESSMENT — PAIN DESCRIPTION - PROGRESSION: CLINICAL_PROGRESSION: NOT CHANGED

## 2019-12-29 ASSESSMENT — PAIN - FUNCTIONAL ASSESSMENT: PAIN_FUNCTIONAL_ASSESSMENT: ACTIVITIES ARE NOT PREVENTED

## 2019-12-29 ASSESSMENT — PAIN SCALES - GENERAL
PAINLEVEL_OUTOF10: 0
PAINLEVEL_OUTOF10: 3

## 2019-12-29 ASSESSMENT — PAIN DESCRIPTION - DESCRIPTORS: DESCRIPTORS: ACHING;DISCOMFORT

## 2019-12-29 ASSESSMENT — PAIN DESCRIPTION - PAIN TYPE: TYPE: CHRONIC PAIN

## 2019-12-29 ASSESSMENT — PAIN DESCRIPTION - ORIENTATION: ORIENTATION: RIGHT

## 2019-12-29 NOTE — PROGRESS NOTES
pain. Reports chronic intermittent nausea and epigastric discomfort. Discussed cardiac cath to evaluate ischemic CAD as possible cause for new low EF as well as risks, benefits, and alternatives. Agreeable to cardiac cath.         Objective:   BP (!) 83/47   Pulse 105   Temp 97.3 °F (36.3 °C) (Oral)   Resp 18   Ht 5' 5\" (1.651 m)   Wt 146 lb 6.4 oz (66.4 kg)   SpO2 90%   BMI 24.36 kg/m²        TELEMETRY: SR    Physical Exam:  General Appearance: alert and oriented to person, place and time, in no acute distress, appears somewhat frail and chronically ill  Cardiovascular: normal rate, regular rhythm, normal S1 and S2, no murmurs, rubs, clicks, or gallops, distal pulses intact, no carotid bruits, no JVD  Pulmonary/Chest: clear to auscultation bilaterally- no wheezes, rales or rhonchi, normal air movement, no respiratory distress  Abdomen: soft, non-tender, non-distended, normal bowel sounds, no masses   Extremities: no cyanosis, no clubbing, chronic RLE edema, pulses weak in left, doppler right  Skin: warm and dry, no rash or erythema, RLE hyperpigmentation/scar  Head: normocephalic and atraumatic  Eyes: pupils equal, round, and reactive to light  Neck: supple and non-tender without mass, no thyromegaly   Musculoskeletal: normal range of motion, no joint swelling, deformity or tenderness  Neurological: alert, oriented, normal speech, no focal findings or movement disorder noted    Medications:    clopidogrel  75 mg Oral Daily    bumetanide  1 mg Intravenous BID    ferrous sulfate  325 mg Oral Daily with breakfast    pravastatin  40 mg Oral Daily    sodium chloride flush  10 mL Intravenous 2 times per day    cefTRIAXone (ROCEPHIN) IV  1 g Intravenous Q24H    azithromycin  500 mg Intravenous Q24H    ipratropium  0.5 mg Nebulization 4x daily    pantoprazole  40 mg Intravenous BID    [START ON 12/30/2019] apixaban  5 mg Oral BID    thiamine  100 mg Oral Daily    folic acid  1 mg Oral Daily       sodium chloride flush, 10 mL, PRN  magnesium hydroxide, 30 mL, Daily PRN  ondansetron, 4 mg, Q6H PRN  acetaminophen, 650 mg, Q4H PRN  ipratropium, 0.5 mg, Q6H PRN        Diagnostics:  EK19  Sinus tachycardia with occasional Premature ventricular complexes  Left axis deviation  LVH with repolarization abnormality  Cannot rule out Septal infarct (cited on or before 17-MAY-2019)  Abnormal ECG  When compared with ECG of 17-MAY-2019 03:04,  Sinus rhythm is no longer with junctional escape complexes  Echo: 19  Summary   Normal left ventricular wall thickness. Left Ventricular size is Moderately increased . There was severe global hypokinesis of the left ventricle. Ejection fraction is visually estimated in the range of 10% to 15%. Features were consistent with a pseudonormal left ventricular filling   pattern, with concomitant abnormal relaxation and increased filling   pressure (grade 2 diastolic dysfunction). Structurally normal mitral valve. Mild to Moderate mitral regurgitation is present. Tricuspid valve is structurally normal.   Mild tricuspid regurgitation. Right ventricular systolic pressure measures 35-40mmHg. Ascites Vs pleural effusion noted. The aorta is within normal limits. The IVC is dilated . Estimated right atrial pressure is 10-15mmHg . Signature      ----------------------------------------------------------------   Electronically signed by Miguel Gotti MD    Stress: 18  Summary   Lexiscan EKG stress test is not suggestive for ischemia. This Nuclear Medicine study was negative for ischemia . Signatures      ----------------------------------------------------------------   Electronically signed by Ivor Ahumada MD     PERIPHERAL ANGIOGRAM/INTERVENTION: 19     INDICATION FOR PROCEDURE:  Acute-on-chronic limb ischemia related to  post lower extremity bypass distal anastomotic stenosis with recent  graft thrombosis.   RIGHT LOWER EXTREMITY BYPASS [facility] 47 05/18/2019       TSH:    Lab Results   Component Value Date    TSH 2.160 12/28/2019         Assessment:  · Acute hypoxic/hypercapneic resp failure: improved, on room air: primary following  · LLL CAP: POA, on ATB-primary following  · Acute systolic/diastolic CHF   H/o chronic diastolic CHF  · Severe dilated CMP,new reduced EF 10-15%, G2DD, increased LV size, severe global hypokinesis per echo 12/28/19-? Underlying ischemic CAD   Echo 12/8/17 EF 60-65, evidence of diastolic  dysfunction  · Mildly elevated troponin: denies chest pain, ? 2/2 CHF, resp failure, cannot exclude ischemic CAD  · No known CAD, stress test negative for ischemia 9/2018  · PAFB: currently SR, on eliquis  · Acute on chronic hypotension: asymptomatic  · Distended esophagus:H/o achalasia with prior botox injection-GI consulted, EGD on hold, will re-eval after cardiac cath and stabilization of acute events  · CKD  · H/o PVD: s/p fem-pop bypass 2018, PTA/stent 2019  · AAA s/p EVAR 2/15/19  · ?  Underlying COPD      Plan:  · NPO after 12MN, Plan LHC tomorrow  · Eliquis on hold for cath  · Continue plavix, BB, statin  · Diuresis  · Monitor BP  Daily weight, I&O  · 2 g sodium, 2 L fluid restriction  · Keep K+ > 4, magnesium > 2--Replace prn  · Will need lifevest           Electronically signed by ROMAN Hawkins CNP on 12/29/2019 at 9:22 AM

## 2019-12-29 NOTE — FLOWSHEET NOTE
12/28/19 1955   Provider Notification   Reason for Communication Review case  (recent echo results)   Provider Name Dr. Madelyn Mcneill   Provider Notification Physician   Method of Communication Secure Message   Response Waiting for response   Notification Time Chad Winkler 54: Patient admitted with acute respiratory failure, had the elevated troponins, planning on EGD in the morning per Dr. Mario Huddleston, just had the echo result showing EF: 10-15%, just wondering if you think she will be okay for EGD tomorrow? We have plavix and eliquis on hold and bumex on hold at this time. Received phone call from Dr. Madelyn Mcneill stating he wants to diurese patient prior to EGD. He stated to restart bumex and restart plavix tomorrow morning. He stated to update GI but thinking possibly EGD Monday but that he would reassess after diuresis.

## 2019-12-29 NOTE — PROGRESS NOTES
intestine without bleeding    Septic shock (HCC)    SVT (supraventricular tachycardia) (HCC)    Hyperglycemia    Metabolic acidosis    Hypocalcemia    Chronic hypotension    Chronic anemia    Urinary tract infection without hematuria    Paroxysmal atrial fibrillation (HCC)    Colovesical fistula    Physical deconditioning    Abdominal aortic aneurysm (AAA) without rupture (HCC)    Hiatal hernia    Elevated procalcitonin    Hypomagnesemia    Pulmonary nodule    CKD (chronic kidney disease) stage 2, GFR 60-89 ml/min    Chronic kidney disease (CKD), stage III (moderate) (HCC)    Femoral popliteal artery thrombus (HCC)    PAD (peripheral artery disease) (HCC)    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 9)    Respiratory failure (HCC)    Respiratory distress    Acute on chronic congestive heart failure (HCC)    Elevated troponin     A 1. Achalasia  2. Low cardiac output  3. Atrial fib    Plan:  1. Achalasia with dilated esophagus. This has been chronic and we have been unable to safely repeat EGD and Botox due to need for chronic anti coagulation. We will sign off for now, plan follow up in office. (She will continue to do the best she can with mostly liquid diet). Tx!  2. Low cardiac output: discussed with Cardiology;  Planning further workup.   Anticoagulation not able to be stopped    [facility]

## 2019-12-29 NOTE — PROGRESS NOTES
Hospitalist Progress Note    Patient:  Quintin Scheuermann      Unit/Bed:3B-35/035-A    YOB: 1935    MRN: [id number]       Acct: [de-identified]     PCP: Bandar Mcgowan    Date of Admission: 12/28/2019      Assessment/Plan:  Active Hospital Problems    Diagnosis Date Noted    Respiratory failure [facility]) [J96.90] 12/28/2019    Respiratory distress [R06.03]     Acute on chronic congestive heart failure (HCC) [I50.9]     Elevated troponin [R79.89]      Acute Hypoxic and Hypercapneic Respiratory Failure - unclear etiology, but most likely due to flash edema in setting of HTN (noted on arrival) and new severe cardiomyopathy and ? Pneumonia. She has no formal diagnosis of COPD but does smoke. There was no PE on CTA chest.   - ABG showed hypercapnea and hypoxia on arrival  - Initially placed on NRB -> bipap -> NC   - weaned off 12/28   - needed at night however  - see below for HF  - Will need PFT and PSG as outpatient  - Home O2 eval prior to DC    Acute Decompensated Systolic/Diastolic HF / Severe Dilated Cardiomyopathy - with EF 10-15% (with G2DD, increased LV size, severe global hypokinesis LV, mild-mod MR, RVSP 35-40mmHg), previously was 55% in 2018 echo. No known CAD (does have PAD, patient does not appear to have had LHC yet). Denies significant alcohol use. No illicit drugs. - Ischemic vs infectious vs inflammatory  - check coxsackie panel, HIV, thiamine, VIDA.   - ESR is wnl, making infectious/inflammatory less likely  - Cardiology consulted   - continue on Bumex 1mg BID as tolerated   - there was dilated IVC and RAP was 10-15   - plan for LHC in am  - patient will have tenuous volume status with diastolic and systolic HF  - low Na and fluid restrict diet, daily weights  - strict I/O    Hypotension (Acute on Chronic) - with HTN on arrival. Patient remains asymptomatic  - Normal cortisol am. Suspect due to DCMP. - consideration for inotropic support?  Defer to cardiology    Troponin elevation - no CP. Suspect this is due to decompensated HF, but cannot rule out ischemia.   - Continue on Plavix, statin  - add on BB low dose, if tolerates lopressor, change to Toprol    LLL CAP a/w hypoxia - POA. Continue CTX and azithro. Strep and legionella negative. Blood no growth. Flu negative. Leukocytosis - POA 2/2 Pneumonia    Distended esophagus / Hx of Achalasia with prior botox injection - filled with with food and fluid. GI has seen, but will place procedure on hold for now due to cardiac issues. Elevated AST and ALK P - reports no EtOH for 1 year. Check reese. US and hepatitis panel, trend. Normal hep panel, reese US with steatosis, LFTs normalized. ? Due to HF/congestion. Hx of PVD - s/p fem-pop bypass 2018, with clot requiring stent and PTA 2/2019. On Eliquis and Plavix. AAA s/p EVAR    Elevated glucose - normal A1C - will monitor POC glucose    GERD    Hyperinflated lungs / Fibrosis noted on imaging - no prior dx of COPD. PFTs as OP. Former tobacco abuse      Expected discharge date:  >2 days    Disposition: pendnig         [] Home       [] TCU       [] Rehab       [] Psych       [] SNF       [] [place]       [] Other-    --------------------------------------------    Chief Complaint: Hypoxic respiratory failure    Hospital Course: See above. Subjective (past 24 hours):  Patient seen at bedside. Mood seems flat today. She states her breathing is back to her normal baseline, and denies any other issues at this time - specifically no CP, cough, fevers, diarrhea/constipation, worsening LE edema, orthopnea. She does admit to continued nausea, which is chronic. Discussed newly noted severe cardiomyopathy. Patient seems slightly indifferent at this time.        Medications:  Reviewed    Infusion Medications   Scheduled Medications    clopidogrel  75 mg Oral Daily    bumetanide  1 mg Intravenous BID    ferrous sulfate  325 mg Oral Daily with breakfast  pravastatin  40 mg Oral Daily    sodium chloride flush  10 mL Intravenous 2 times per day    cefTRIAXone (ROCEPHIN) IV  1 g Intravenous Q24H    azithromycin  500 mg Intravenous Q24H    ipratropium  0.5 mg Nebulization 4x daily    pantoprazole  40 mg Intravenous BID    [START ON 12/30/2019] apixaban  5 mg Oral BID    thiamine  100 mg Oral Daily    folic acid  1 mg Oral Daily     PRN Meds: sodium chloride flush, magnesium hydroxide, ondansetron, acetaminophen, ipratropium      Intake/Output Summary (Last 24 hours) at 12/29/2019 0936  Last data filed at 12/29/2019 0346  Gross per 24 hour   Intake 370 ml   Output 2225 ml   Net -1855 ml     Weight change: -8 lb 9.6 oz (-3.901 kg)      Exam:  BP (!) 83/47   Pulse 105   Temp 97.3 °F (36.3 °C) (Oral)   Resp 18   Ht 5' 5\" (1.651 m)   Wt 146 lb 6.4 oz (66.4 kg)   SpO2 90%   BMI 24.36 kg/m²     General appearance: Elderly, frail appearing chronically ill  Eyes:  Pupils equal, round, and reactive to light. Conjunctivae/corneas clear. HENT: Head normal in appearance. External nares normal.  Oral mucosa moist without lesions. Hearing grossly intact. Neck: Supple, with full range of motion. Mild jugular venous distention. Trachea midline. Respiratory:  Normal respiratory effort. Clear to auscultation, bilaterally without rales or wheezes or Rhonchi. Cardiovascular: Normal rate, regular rhythm with normal S1/S2 without murmurs. Trace RLE edema (chronic from prior surgery per patient)  Abdomen: Soft, non-tender, non-distended with normal bowel sounds. Musculoskeletal: Normal range of motion in extremities. There is no joint swelling or tenderness. Skin: Skin color, texture, turgor normal.  Hyperpigmentation of right foot/lower leg. Scar on RLE from prior bypass. Neurologic:  Neurovascularly intact without any focal sensory/motor deficits in the upper and lower extremities.  Cranial nerves:  grossly non-focal.  Psychiatric: Alert and oriented, affect flat  Capillary Refill: slow,< 3 seconds. Peripheral Pulses: weak in left foot, very difficult to doppler in right foot. Labs:   Recent Labs     12/28/19  0112 12/29/19  0456   WBC 13.6* 7.0   HGB 12.8 11.5*   HCT 45.2 39.1    191     Recent Labs     12/28/19  0112 12/29/19  0448    141   K 4.9 3.7    101   CO2 22* 25   BUN 30* 24*   CREATININE 1.2 1.2   CALCIUM 9.1 9.1     Recent Labs     12/28/19  0112 12/29/19  0448   * 28   ALT 60 38   BILIDIR <0.2 <0.2   BILITOT 0.4 0.3   ALKPHOS 155* 129*     No results for input(s): INR in the last 72 hours. No results for input(s): [facility]i in the last 72 hours. Microbiology:      Urinalysis:      Lab Results   Component Value Date    NITRU NEGATIVE 05/17/2019    WBCUA 0-2 05/17/2019    BACTERIA MANY 05/17/2019    RBCUA 0-2 05/17/2019    BLOODU NEGATIVE 05/17/2019    SPECGRAV 1.013 02/24/2019    GLUCOSEU 250 05/17/2019       Radiology:  US LIVER   Final Result   Echogenic liver consistent with hepatic steatosis versus nonspecific hepatocellular disease. No liver mass or intrahepatic biliary dilatation. Antegrade flow in the hepatic vasculature. Cholecystectomy. **This report has been created using voice recognition software. It may contain minor errors which are inherent in voice recognition technology. **      Final report electronically signed by Dr. Elvira Polo on 12/28/2019 10:55 PM      CT CHEST W CONTRAST   Final Result       1. Markedly distended esophagus which contains fluid as well as recently ingested food products. This correlates with the recently seen soft tissue opacity in the right paratracheal region on the prior x-ray. 2. Small bilateral pleural effusions with adjacent atelectasis. 3. Cardiomegaly. **This report has been created using voice recognition software. It may contain minor errors which are inherent in voice recognition technology. **      Final report electronically signed by Dr Kailey Yadav on 12/28/2019 9:26 AM      XR CHEST PORTABLE   Final Result      Probable mild interstitial pulmonary edema or atypical pneumonia superimposed on underlying pulmonary fibrosis. Medial left lower lobe pneumonia or atelectasis. Tiny right pleural effusion. New soft tissue opacity in the right paratracheal region of the upper chest which may represent a pulmonary or mediastinal mass or adenopathy. Recommend chest CT with IV contrast.   Stable mild cardiomegaly. **This report has been created using voice recognition software. It may contain minor errors which are inherent in voice recognition technology. **      Final report electronically signed by Dr. Livier Rothman on 12/28/2019 2:57 AM          DVT prophylaxis: [] Lovenox                                  [] SCDs                                 [] SQ Heparin                                 [] Encourage ambulation           [x] Already on Anticoagulation     Code Status: Full Code    PT/OT Eval Status:     Diet:   DIET CARDIAC; Low Sodium (2 GM);  Daily Fluid Restriction: 1800 ml    Fluids: no    Tele:   [x] yes             [] no      Electronically signed by Jen Fine DO on 12/29/2019 at 9:36 AM

## 2019-12-29 NOTE — CONSULTS
revealed distended esophagus, small  bilateral pleural effusions, cardiomegaly. Magnesium 2.1. Hemoglobin  A1c 5.5. Sodium 139, creatinine 1.2. Initial troponin was 0.01. White  blood cell count 3.6, hemoglobin 12.8, platelet count 618. IMPRESSION:  This is a pleasant 72-year-old female patient with  extensive past medical history that includes known history of congestive  heart failure with preserved ejection fraction, tobacco abuse, possible  underlying COPD, AAA repair, successful right femoral-tibial PTA with  stenting. The patient has known prior history of graft occlusions and  thrombosis for which she was kept on Eliquis and Plavix. The patient  also has known history of chronic dysphagia. She has known history of  esophageal spasm. EGD had been planned in the past.    1.  Mildly elevated troponin. 2.  Acute hypoxic respiratory failure. 3.  Congestive heart failure with preserved ejection fraction, acute on  chronic. 4.  Possible pneumonia. 5.  Leukocytosis. 6.  Peripheral vascular disease. 7.  Chronic kidney disease. RECOMMENDATIONS:  1. The patient's presentation is consistent with hypoxic respiratory  failure, probably multifactorial with possible component of pneumonia. 2.  The patient was started on antibiotics by primary team.  3.  Agree with IV diuresis at this time. 4.  Continue Bumex 1 mg IV b.i.d.  5.  Daily weight. 6.  Daily intake and output. 7.  Daily basic metabolic panel. 8.  The patient has extensive history of peripheral arterial disease  with prior graft thrombosis. 9.  Continue on Eliquis at this time as well as Plavix. 10.  The patient was seen by her primary cardiologist recently for  evaluation for possible need for EGD for chronic dysphagia. Apparently,  she was cleared to hold Eliquis for the procedure should EGD be planned. 11.  Denies having chest pain at this time. 12.  Obtain serial cardiac enzymes.   13.  If the troponin remains low with no chest pain, may consider  ischemic workup with a nuclear stress test on Monday.         Lawrence Gonsalez MD    D: 12/28/2019 14:42:10       T: 12/28/2019 16:16:00     [email or web address]  Job#: [id number]     Doc#: [id number]    CC:

## 2019-12-29 NOTE — FLOWSHEET NOTE
19 0064   Provider Notification   Reason for Communication Review case  (more frequent PVC's and run of Vtach)   Provider Name Dr. Hines Hodgkin   Provider Notification Physician   Method of Communication Secure Message   Response Waiting for response   Notification Time 3663   Just wanted to update, patient with the 10-15% EF has been having more frequent PVC's and did have two runs of Vtach in the past few hours. I did give the Bumex around midnight after her blood pressure improved. She does have a BMP and Mg in for the morning and most recent K: 4.9 and M.1.

## 2019-12-29 NOTE — CONSULTS
[address] [place] [address]                                  CONSULTATION    PATIENT NAME: Gibson Goodman                     :        1935  MED REC NO:   [id number]                           ROOM:       8461  ACCOUNT NO:   [de-identified]                           ADMIT DATE: 2019  PROVIDER:     Candy Duff M.D.      Brown Garcia:  2019    REASON FOR VISIT:  Dysphagia and fluid in the esophagus. HISTORY OF PRESENT ILLNESS:  The patient is an 82-year-old female, who  was seen in the past for achalasia. She has had EGD and Botox done in  the past.  However, the last time this was done was in the last year and  we have been unable to do it because of anticoagulation. She was seen  in the office last several months and we discussed that we could do an  EGD, but we could not do further Botox requiring injection because of  double-dose anticoagulation and the risk of bleeding or hematoma. Also,  discussed being seen at [facility] such as [facility]. The patient's last EGD was done by Dr. Obed Mtz, in 2019 shows some  inflammation because of some pills in the distal esophagus. She is  mainly on Protonix. The patient is now admitted with shortness of breath, hypoxia and  hypercapnic respiratory failure. This is due to the decompensated  diastolic CHF. The scan done suggested a dilated esophagus and possibly fluid leak in  the esophagus. This was seen on CT chest.  We thus will plan EGD  tomorrow. Of note, she has not been off Eliquis and Plavix and we will  hold these. Thus, we will not be able to do Botox injection. In addition, the  patient has had food today, so we would not be able to do EGD today. ALLERGIES:  To LASIX, LIPITOR, NEURONTIN, OXYCONTIN and PENICILLIN. SOCIAL HISTORY:  She did smoke quarter pack over 60 years. Occasional  alcohol.     FAMILY HISTORY:  No history of colon

## 2019-12-29 NOTE — PLAN OF CARE
then returned back to room air with SpO2 greater than 90%. Care plan reviewed with patient. Patient verbalizes understanding of the care plan and contributed to goal setting.

## 2019-12-30 ENCOUNTER — APPOINTMENT (OUTPATIENT)
Dept: CARDIAC CATH/INVASIVE PROCEDURES | Age: 84
DRG: 871 | End: 2019-12-30
Payer: MEDICARE

## 2019-12-30 LAB
ABO: NORMAL
ANION GAP SERPL CALCULATED.3IONS-SCNC: 15 MEQ/L (ref 8–16)
ANTIBODY SCREEN: NORMAL
APTT: 38 SECONDS (ref 22–38)
BUN BLDV-MCNC: 25 MG/DL (ref 7–22)
CALCIUM SERPL-MCNC: 9.2 MG/DL (ref 8.5–10.5)
CHLORIDE BLD-SCNC: 100 MEQ/L (ref 98–111)
CO2: 25 MEQ/L (ref 23–33)
CREAT SERPL-MCNC: 1.2 MG/DL (ref 0.4–1.2)
EKG ATRIAL RATE: 72 BPM
EKG P AXIS: 47 DEGREES
EKG P-R INTERVAL: 158 MS
EKG Q-T INTERVAL: 392 MS
EKG QRS DURATION: 84 MS
EKG QTC CALCULATION (BAZETT): 452 MS
EKG R AXIS: -60 DEGREES
EKG T AXIS: 100 DEGREES
EKG VENTRICULAR RATE: 80 BPM
ERYTHROCYTE [DISTWIDTH] IN BLOOD BY AUTOMATED COUNT: 17.2 % (ref 11.5–14.5)
ERYTHROCYTE [DISTWIDTH] IN BLOOD BY AUTOMATED COUNT: 48 FL (ref 35–45)
GFR SERPL CREATININE-BSD FRML MDRD: 43 ML/MIN/1.73M2
GLUCOSE BLD-MCNC: 100 MG/DL (ref 70–108)
GLUCOSE BLD-MCNC: 106 MG/DL (ref 70–108)
GLUCOSE BLD-MCNC: 110 MG/DL (ref 70–108)
GLUCOSE BLD-MCNC: 126 MG/DL (ref 70–108)
GLUCOSE BLD-MCNC: 137 MG/DL (ref 70–108)
HCT VFR BLD CALC: 41.5 % (ref 37–47)
HEMOGLOBIN: 12.3 GM/DL (ref 12–16)
INR BLD: 1.37 (ref 0.85–1.13)
MAGNESIUM: 1.8 MG/DL (ref 1.6–2.4)
MCH RBC QN AUTO: 23.3 PG (ref 26–33)
MCHC RBC AUTO-ENTMCNC: 29.6 GM/DL (ref 32.2–35.5)
MCV RBC AUTO: 78.4 FL (ref 81–99)
PLATELET # BLD: 201 THOU/MM3 (ref 130–400)
PMV BLD AUTO: 11.8 FL (ref 9.4–12.4)
POTASSIUM SERPL-SCNC: 3.6 MEQ/L (ref 3.5–5.2)
RBC # BLD: 5.29 MILL/MM3 (ref 4.2–5.4)
RH FACTOR: NORMAL
SODIUM BLD-SCNC: 140 MEQ/L (ref 135–145)
WBC # BLD: 7.9 THOU/MM3 (ref 4.8–10.8)

## 2019-12-30 PROCEDURE — 6370000000 HC RX 637 (ALT 250 FOR IP): Performed by: INTERNAL MEDICINE

## 2019-12-30 PROCEDURE — 6360000004 HC RX CONTRAST MEDICATION: Performed by: INTERNAL MEDICINE

## 2019-12-30 PROCEDURE — 2500000003 HC RX 250 WO HCPCS

## 2019-12-30 PROCEDURE — 94761 N-INVAS EAR/PLS OXIMETRY MLT: CPT

## 2019-12-30 PROCEDURE — 2580000003 HC RX 258: Performed by: FAMILY MEDICINE

## 2019-12-30 PROCEDURE — 2580000003 HC RX 258: Performed by: INTERNAL MEDICINE

## 2019-12-30 PROCEDURE — 94010 BREATHING CAPACITY TEST: CPT

## 2019-12-30 PROCEDURE — 86850 RBC ANTIBODY SCREEN: CPT

## 2019-12-30 PROCEDURE — 82948 REAGENT STRIP/BLOOD GLUCOSE: CPT

## 2019-12-30 PROCEDURE — 85610 PROTHROMBIN TIME: CPT

## 2019-12-30 PROCEDURE — 6360000002 HC RX W HCPCS

## 2019-12-30 PROCEDURE — 99233 SBSQ HOSP IP/OBS HIGH 50: CPT | Performed by: INTERNAL MEDICINE

## 2019-12-30 PROCEDURE — 94640 AIRWAY INHALATION TREATMENT: CPT

## 2019-12-30 PROCEDURE — 2709999900 HC NON-CHARGEABLE SUPPLY

## 2019-12-30 PROCEDURE — 36415 COLL VENOUS BLD VENIPUNCTURE: CPT

## 2019-12-30 PROCEDURE — 93005 ELECTROCARDIOGRAM TRACING: CPT | Performed by: NURSE PRACTITIONER

## 2019-12-30 PROCEDURE — 93458 L HRT ARTERY/VENTRICLE ANGIO: CPT | Performed by: INTERNAL MEDICINE

## 2019-12-30 PROCEDURE — 80048 BASIC METABOLIC PNL TOTAL CA: CPT

## 2019-12-30 PROCEDURE — 4A023N7 MEASUREMENT OF CARDIAC SAMPLING AND PRESSURE, LEFT HEART, PERCUTANEOUS APPROACH: ICD-10-PCS | Performed by: INTERNAL MEDICINE

## 2019-12-30 PROCEDURE — 83735 ASSAY OF MAGNESIUM: CPT

## 2019-12-30 PROCEDURE — 6360000002 HC RX W HCPCS: Performed by: FAMILY MEDICINE

## 2019-12-30 PROCEDURE — 85027 COMPLETE CBC AUTOMATED: CPT

## 2019-12-30 PROCEDURE — C1894 INTRO/SHEATH, NON-LASER: HCPCS

## 2019-12-30 PROCEDURE — 85730 THROMBOPLASTIN TIME PARTIAL: CPT

## 2019-12-30 PROCEDURE — B2111ZZ FLUOROSCOPY OF MULTIPLE CORONARY ARTERIES USING LOW OSMOLAR CONTRAST: ICD-10-PCS | Performed by: INTERNAL MEDICINE

## 2019-12-30 PROCEDURE — 99232 SBSQ HOSP IP/OBS MODERATE 35: CPT | Performed by: NURSE PRACTITIONER

## 2019-12-30 PROCEDURE — 6370000000 HC RX 637 (ALT 250 FOR IP): Performed by: NURSE PRACTITIONER

## 2019-12-30 PROCEDURE — 92610 EVALUATE SWALLOWING FUNCTION: CPT

## 2019-12-30 PROCEDURE — 6370000000 HC RX 637 (ALT 250 FOR IP): Performed by: FAMILY MEDICINE

## 2019-12-30 PROCEDURE — 86900 BLOOD TYPING SEROLOGIC ABO: CPT

## 2019-12-30 PROCEDURE — 2580000003 HC RX 258: Performed by: NURSE PRACTITIONER

## 2019-12-30 PROCEDURE — 2140000000 HC CCU INTERMEDIATE R&B

## 2019-12-30 PROCEDURE — C1769 GUIDE WIRE: HCPCS

## 2019-12-30 PROCEDURE — B2151ZZ FLUOROSCOPY OF LEFT HEART USING LOW OSMOLAR CONTRAST: ICD-10-PCS | Performed by: INTERNAL MEDICINE

## 2019-12-30 PROCEDURE — 86901 BLOOD TYPING SEROLOGIC RH(D): CPT

## 2019-12-30 RX ORDER — MORPHINE SULFATE 2 MG/ML
2 INJECTION, SOLUTION INTRAMUSCULAR; INTRAVENOUS
Status: ACTIVE | OUTPATIENT
Start: 2019-12-30 | End: 2019-12-30

## 2019-12-30 RX ORDER — SODIUM CHLORIDE 0.9 % (FLUSH) 0.9 %
10 SYRINGE (ML) INJECTION EVERY 12 HOURS SCHEDULED
Status: DISCONTINUED | OUTPATIENT
Start: 2019-12-30 | End: 2019-12-31 | Stop reason: HOSPADM

## 2019-12-30 RX ORDER — NITROGLYCERIN 0.4 MG/1
0.4 TABLET SUBLINGUAL EVERY 5 MIN PRN
Status: DISCONTINUED | OUTPATIENT
Start: 2019-12-30 | End: 2019-12-31 | Stop reason: HOSPADM

## 2019-12-30 RX ORDER — DIPHENHYDRAMINE HYDROCHLORIDE 50 MG/ML
50 INJECTION INTRAMUSCULAR; INTRAVENOUS ONCE
Status: DISCONTINUED | OUTPATIENT
Start: 2019-12-30 | End: 2019-12-31 | Stop reason: HOSPADM

## 2019-12-30 RX ORDER — BUMETANIDE 1 MG/1
1 TABLET ORAL DAILY
Status: DISCONTINUED | OUTPATIENT
Start: 2019-12-30 | End: 2019-12-31 | Stop reason: HOSPADM

## 2019-12-30 RX ORDER — ACETAMINOPHEN 325 MG/1
650 TABLET ORAL EVERY 4 HOURS PRN
Status: DISCONTINUED | OUTPATIENT
Start: 2019-12-30 | End: 2019-12-31 | Stop reason: HOSPADM

## 2019-12-30 RX ORDER — SODIUM CHLORIDE 9 MG/ML
75 INJECTION, SOLUTION INTRAVENOUS CONTINUOUS
Status: DISCONTINUED | OUTPATIENT
Start: 2019-12-30 | End: 2019-12-30

## 2019-12-30 RX ORDER — SODIUM CHLORIDE 0.9 % (FLUSH) 0.9 %
10 SYRINGE (ML) INJECTION PRN
Status: DISCONTINUED | OUTPATIENT
Start: 2019-12-30 | End: 2019-12-30 | Stop reason: SDUPTHER

## 2019-12-30 RX ORDER — SODIUM CHLORIDE 9 MG/ML
INJECTION, SOLUTION INTRAVENOUS CONTINUOUS
Status: DISCONTINUED | OUTPATIENT
Start: 2019-12-30 | End: 2019-12-30 | Stop reason: SDUPTHER

## 2019-12-30 RX ORDER — ATROPINE SULFATE 0.4 MG/ML
0.5 AMPUL (ML) INJECTION
Status: ACTIVE | OUTPATIENT
Start: 2019-12-30 | End: 2019-12-30

## 2019-12-30 RX ORDER — ONDANSETRON 2 MG/ML
4 INJECTION INTRAMUSCULAR; INTRAVENOUS EVERY 6 HOURS PRN
Status: DISCONTINUED | OUTPATIENT
Start: 2019-12-30 | End: 2019-12-30 | Stop reason: SDUPTHER

## 2019-12-30 RX ORDER — METOPROLOL SUCCINATE 25 MG/1
25 TABLET, EXTENDED RELEASE ORAL NIGHTLY
Status: DISCONTINUED | OUTPATIENT
Start: 2019-12-30 | End: 2019-12-31 | Stop reason: HOSPADM

## 2019-12-30 RX ORDER — SODIUM CHLORIDE 0.9 % (FLUSH) 0.9 %
10 SYRINGE (ML) INJECTION PRN
Status: DISCONTINUED | OUTPATIENT
Start: 2019-12-30 | End: 2019-12-31 | Stop reason: HOSPADM

## 2019-12-30 RX ORDER — SODIUM CHLORIDE 0.9 % (FLUSH) 0.9 %
10 SYRINGE (ML) INJECTION EVERY 12 HOURS SCHEDULED
Status: DISCONTINUED | OUTPATIENT
Start: 2019-12-30 | End: 2019-12-30 | Stop reason: SDUPTHER

## 2019-12-30 RX ORDER — PANTOPRAZOLE SODIUM 40 MG/1
40 TABLET, DELAYED RELEASE ORAL
Status: DISCONTINUED | OUTPATIENT
Start: 2019-12-30 | End: 2019-12-31 | Stop reason: HOSPADM

## 2019-12-30 RX ADMIN — CEFTRIAXONE SODIUM 1 G: 1 INJECTION, POWDER, FOR SOLUTION INTRAMUSCULAR; INTRAVENOUS at 04:26

## 2019-12-30 RX ADMIN — APIXABAN 5 MG: 5 TABLET, FILM COATED ORAL at 12:16

## 2019-12-30 RX ADMIN — TIMOLOL MALEATE 1 DROP: 5 SOLUTION/ DROPS OPHTHALMIC at 21:27

## 2019-12-30 RX ADMIN — Medication 100 MG: at 12:16

## 2019-12-30 RX ADMIN — IOPAMIDOL 75 ML: 755 INJECTION, SOLUTION INTRAVENOUS at 09:23

## 2019-12-30 RX ADMIN — METOPROLOL TARTRATE 12.5 MG: 25 TABLET ORAL at 08:29

## 2019-12-30 RX ADMIN — AZITHROMYCIN MONOHYDRATE 500 MG: 500 INJECTION, POWDER, LYOPHILIZED, FOR SOLUTION INTRAVENOUS at 05:33

## 2019-12-30 RX ADMIN — FOLIC ACID 1 MG: 1 TABLET ORAL at 12:16

## 2019-12-30 RX ADMIN — SODIUM CHLORIDE: 9 INJECTION, SOLUTION INTRAVENOUS at 08:25

## 2019-12-30 RX ADMIN — CLOPIDOGREL BISULFATE 75 MG: 75 TABLET ORAL at 08:23

## 2019-12-30 RX ADMIN — Medication 10 ML: at 08:23

## 2019-12-30 RX ADMIN — Medication 10 ML: at 21:28

## 2019-12-30 RX ADMIN — PANTOPRAZOLE SODIUM 40 MG: 40 TABLET, DELAYED RELEASE ORAL at 15:44

## 2019-12-30 RX ADMIN — PRAVASTATIN SODIUM 40 MG: 40 TABLET ORAL at 21:27

## 2019-12-30 RX ADMIN — IPRATROPIUM BROMIDE 0.5 MG: 0.5 SOLUTION RESPIRATORY (INHALATION) at 15:57

## 2019-12-30 RX ADMIN — IPRATROPIUM BROMIDE 0.5 MG: 0.5 SOLUTION RESPIRATORY (INHALATION) at 07:26

## 2019-12-30 RX ADMIN — SACUBITRIL AND VALSARTAN 1 TABLET: 24; 26 TABLET, FILM COATED ORAL at 13:51

## 2019-12-30 RX ADMIN — METOPROLOL SUCCINATE 25 MG: 25 TABLET, FILM COATED, EXTENDED RELEASE ORAL at 21:27

## 2019-12-30 RX ADMIN — Medication 10 ML: at 12:17

## 2019-12-30 ASSESSMENT — PAIN SCALES - GENERAL
PAINLEVEL_OUTOF10: 0

## 2019-12-30 NOTE — PROGRESS NOTES
[address] [facility] THERAPY MISSED TREATMENT NOTE  [facility] CCU-STEPDOWN 3B      Date: 2019  Patient Name: Priti Escalante        MRN: [id number]    : 1935  (82 y.o.)    REASON FOR MISSED TREATMENT:  Attempted to see pt at 1004 for completion of BSE. GUERRERO Bello reporting pt with recent return to unit following completion of a procedure with needs to maintain supine positioning; not appropriate for swallow evaluation at this time. Will f/u later at date, schedule permitting, or on  to complete assessment as indicated.     Hayde Kyle, M.A., [address] [place] [address]

## 2019-12-30 NOTE — PROGRESS NOTES
Observation:  Alert and Oriented    Oral Mechanism Evaluation:      Facial / Labial WFL    Lingual WFL    Dentition WFL    Velum Not Tested    Vocal Quality WFL    [facility]    Cough WFL      Patient Evaluated Using: Thin H20 via straw, hard/coarse solid    Oral Phase:  WFL    Pharyngeal Phase: Impaired:  Decreased Hyolaryngeal Elevation and Audible Swallow    Signs and Symptoms of Laryngeal Penetration/Aspiration: No signs/symptoms of aspiration evident in this evaluation, but cannot rule out silent aspiration. Impresssions: Pt presents with oral phase of swallow function that is essentially WFL, mild pharyngeal dysphagia based on findings outlined above. Oral phase relatively unremarkable with pt demonstrating effective textural breakdown, control, manipulation, and coordination for AP transit. Swallow initiation with slight delay with diminished hyolaryngeal elevation upon tactile palpitation. Thin H20 via straw x4 trials consumed without overt s/s aspiration. However, concern at this time for potential premature spillage/pharyngeal entry given presence of audible swallow. Certainly not able to r/o silent aspiration at bedside alone. Recommend regular diet with thin liquids at this time with ongoing pulmonary monitoring to ensure no adverse changes r/t potential aspiration events. RECOMMENDATIONS/ASSESSMENT:   Modified Barium Swallow:  MBS is not indicated at this time.   Will recommend as appropriate  Diet Recommendations:  Regular with thin liquids  Strategies:  Full Upright Position, Small Bite/Sip, Pulmonary Monitoring and Alternate Solids and Liquids   Rehabilitation Potential: good    EDUCATION:  Learner: Patient  Education:  Reviewed results and recommendations of this evaluation, Reviewed diet and strategies and Reviewed ST goals and Plan of Care  Evaluation of Education: Verbalizes understanding, Needs further instruction and Family not present    PLAN:  Skilled SLP intervention on acute care 3-5 x per week or until goals met and/or pt plateaus in function. Specific interventions for next session may include: dietary analysis, pulmonary monitoring. PATIENT GOAL:    Did not state. Will further assess during treatment. SHORT TERM GOALS:  Short-term Goals  Timeframe for Short-term Goals: 2 weeks  Goal 1: Pt will safely consume regular diet with thin liquids without overt s/s aspiration to ensure adequate nutrition/hydration measures. Goal 2: Monitor pulmonary status closely; complete formal instrumentation should adverse changes be documented.        LONG TERM GOALS:  No LTG established given short CHARLES Peterson M.A., [address] [place] [address]

## 2019-12-30 NOTE — CARE COORDINATION
12/30/19, 1:36 PM  DISCHARGE PLANNING EVALUATION:    Dalia Waterman       Admitted from: ED 12/28/2019/ [facility] day: 2   Location: [address] [place] [address]- Reason for admit: Respiratory failure ([address] [J96.90] Status: inpt  Admit order signed?: yes  PMH:  has a past medical history of Arthritis, BPPV (benign paroxysmal positional vertigo), GERD (gastroesophageal reflux disease), History of blood transfusion, HTN (hypertension), Hx of blood clots, Hyperlipidemia, Obesity, Osteopenia, PAC (premature atrial contraction), Paralysis ([address] Pedal edema, PVC (premature ventricular contraction), PVD (peripheral vascular disease) ([address] Tinnitus, and UTI (urinary tract infection). Medications:  Scheduled Meds:   diphenhydrAMINE  50 mg Intravenous Once    famotidine  20 mg Intravenous Once    hydrocortisone sodium succinate PF  200 mg Intravenous Once    pantoprazole  40 mg Oral BID AC    sodium chloride flush  10 mL Intravenous 2 times per day    sacubitril-valsartan  1 tablet Oral BID    bumetanide  1 mg Oral Daily    metoprolol succinate  25 mg Oral Nightly    clopidogrel  75 mg Oral Daily    timolol  1 drop Both Eyes Nightly    ferrous sulfate  325 mg Oral Daily with breakfast    pravastatin  40 mg Oral Daily    cefTRIAXone (ROCEPHIN) IV  1 g Intravenous Q24H    ipratropium  0.5 mg Nebulization 4x daily    apixaban  5 mg Oral BID    thiamine  100 mg Oral Daily    folic acid  1 mg Oral Daily     Continuous Infusions:   Pertinent Info/Orders/Treatment Plan:  Pt admitted with SOB, elevated troponin. LLL pneumonia and systolic CHF documented. S/P cardiac cath today, NICDMP and EF 10-15% documented. Lifevest ordered. Diet: Diet NPO Time Specified  Dietary Nutrition Supplements: Standard High Calorie Oral Supplement  DIET CARDIAC; Low Sodium (2 GM); Daily Fluid Restriction: 1800 ml   Smoking status:  reports that she quit smoking about 2 months ago. Her smoking use included cigarettes.  She started smoking about 66

## 2019-12-30 NOTE — PLAN OF CARE
Altered:  Goal: Circulatory function within specified parameters  Description  Circulatory function within specified parameters  Outcome: Ongoing  Note:   BLE pulses found by doppler. Problem: Pain:  Goal: Pain level will decrease  Description  Pain level will decrease  Outcome: Ongoing  Note:   No complaints of pain thus far this shift. Will continue to monitor. Problem: Pain:  Goal: Control of acute pain  Description  Control of acute pain  Outcome: Ongoing     Problem: Pain:  Goal: Control of chronic pain  Description  Control of chronic pain  Outcome: Ongoing     Care plan reviewed with patient. Patient verbalize understanding of the plan of care and contribute to goal setting.

## 2019-12-30 NOTE — PROCEDURES
[address] [place] [address]                            CARDIAC CATHETERIZATION    PATIENT NAME: Maryann Conley                     :        1935  MED REC NO:   [id number]                           ROOM:       4101  ACCOUNT NO:   [de-identified]                           ADMIT DATE: 2019  PROVIDER:     Maryana Patrick MD    DATE OF PROCEDURE:  2019    PROCEDURE PERFORMED:  Left heart catheterization, LV gram.    INDICATION FOR STUDY:  LV dysfunction, elevated troponins, dyspnea on  exertion. DESCRIPTION OF PROCEDURE:  After written informed consent was obtained,  the patient was brought to the cardiac catheterization laboratory in a  fasting, nonsedated, in no acute distress. A preprocedure timeout was  performed. 2% lidocaine was used to anesthetize the subcutaneous tissue  overlying the left femoral artery. Using a modified Seldinger technique  along with fluoroscopy and ultrasound guidance due to extensive  peripheral arterial disease status post bypass and stenting, we started  to use the left femoral access as the access point. We were able to  place the 5-Dominican arterial sheath in the right common femoral artery. Once the sheath was in place, femoral angiogram was performed which  showed good common femoral artery stick with no obvious dissections or  perforations. EQUIPMENTS USED:  Standard 5-Dominican 3DRC, JL4, pigtail catheters. MEDICATIONS:  Please see EMR. ESTIMATED BLOOD LOSS:  Less than 10 mL. CORONARY ANATOMY:  1. Right coronary artery is a dominant vessel. Proximally, it is  patent. In the mid segment, there is a focal 30 to 40% stenosis; beyond  that, there is mild luminal irregularities in the distal RCA. Distal  artery gives rise to a large caliber PL and PDA branches, both of which  are patent. 2.  Left main is patent and gives rise to LAD and left circumflex  artery.   3.  Left circumflex artery is patent in the proximal portion. In mid  segment, there is another focal 30 to 40% stenosis; beyond that, there  are mild luminal irregularities. 4. LAD is patent in the proximal portion. Mid and distal portions are  patent with mild luminal irregularities. Distal part of the LAD tapers  down to a small caliber vessel. There is a large diagonal branch that  is patent. 5.  LVEDP was noted to be 11 mmHg. There is no obvious gradient across  the aortic valve and LV systolic function was roughly estimated at 40%. The patient tolerated the procedure well without any significant issues  or complications. 5-Khmer sheath was left in place to be pulled with  manual pressure. She was transferred back to her room in stable condition. SUMMARY:  Nonobstructive coronary artery disease. RECOMMENDATIONS:  1. Aggressive risk factor modification. 2.  Optimize heart failure therapy. 3.  Monitor left femoral artery access site for bleeding. All procedure details and management plans were discussed in detail with  the patient, and she was in agreement with plan.         Pham Bhatt MD    D: 12/30/2019 9:33:13       T: 12/30/2019 11:09:52     [email or web address]  Job#: [id number]     Doc#: [id number]    CC:

## 2019-12-30 NOTE — PROGRESS NOTES
Sedation/Analgesia History & Physical      Pt Name: Dalia Waterman  MRN: [id number]  YOB: 1935  Provider Performing Procedure: Alyssa Barbosa MD, Alivia Medel,  Primary Care Physician: León Arevalo      PRE-PROCEDURE   DNR-CCA/DNR-CC []Yes [x]No      PLANNED PROCEDURE     [x]Cath  [x]PCI                []Pacemaker/AICD  []AYNDEL             []Cardioversion []Peripheral angiography/PTA  []Other:        Consent:   The indication, risks and benefits of the procedure and possible therapeutic consequences and alternatives were discussed with the patient. The patient was given the opportunity to ask questions and to have them answered to his/her satisfaction. Risks of the procedure include but are not limited to the following: Bleeding, hematoma including retroperitoneal hematoma, infection, pain and discomfort, injury to the aorta and other blood vessels, rhythm disturbance, low blood pressure, myocardial infarction, stroke, kidney damage/failure, myocardial perforation, allergic reactions to sedatives/contrast material, loss of pulse/vascular compromise requiring surgery, aneurysm/pseudoaneurysm formation, possible loss of a limb/hand/leg, death. Alternatives to and omission of the suggested procedure were discussed. The patient had no further questions and wished to proceed; the consent form was signed. Indications for the Procedure:     CAD Presentation: Elevated trops    Anginal Classification within 2 weeks:  CCS III - Symptoms with everyday living activities, i.e., moderate limitation    NYHA Heart Failure Class within 2 weeks: Class III - Symptoms of HF on less-than-ordinary exertion, Newly Diagnosed?  No, Heart Failure Type: Systolic      Anti- Anginal Meds within 2 weeks:   ANTI-ANGINAL MEDS: Yes: Beta Blockers, Aspirin and Statin (Any)    Stress or Imaging Studies Performed:  None    CABG:no            MEDICAL HISTORY        Past Medical History:   Diagnosis Date    Arthritis     BPPV (benign paroxysmal positional vertigo) 6/6/16    GERD (gastroesophageal reflux disease)     ?  esophageal stricture    History of blood transfusion     HTN (hypertension)     Hx of blood clots 2018    R leg    Hyperlipidemia     Obesity     Osteopenia     PAC (premature atrial contraction)     on betablocker    Paralysis (HCC)     baby    Pedal edema     denied any hx of hypertension    PVC (premature ventricular contraction)     PVD (peripheral vascular disease) (HCC)     Tinnitus     UTI (urinary tract infection)          Past Surgical History:   Procedure Laterality Date    ABDOMINAL AORTIC ANEURYSM REPAIR, ENDOVASCULAR N/A 2/15/2019    ABDOMINAL AORTIC ANEURYSM REPAIR ENDOVASCULAR, DECLOTTING RIGHT FEM TIB BYPASS GRAFT performed by Jolie Raymond MD at [address]    lumpectomy    CHOLECYSTECTOMY      30 years ago    COLONOSCOPY  08/06/2018    Dr Thomas Montenegro N/A 2/22/2019    COLONOSCOPY DIAGNOSTIC performed by Luo Montes De Oca MD at [address] [facility]      eye, eyelids    EYE SURGERY      cataract    HYSTERECTOMY      LARYNGOSCOPY  07/15/2016    WI OLEGARIO SKN SUB GRFT T/A/L AREA/<100SCM /<1ST 25 SCM N/A 9/6/2018    RE-EXPLORATION RIGHT GROIN, DECLOTTING OF FEMORAL BYPASS GRAFT performed by Janine Acuña MD at [address] [place] [address] EGD TRANSORAL BIOPSY SINGLE/MULTIPLE Left 11/27/2017    EGD BIOPSY performed by Rhiannon Banerjee MD at [address] [place] [address], [facility] N/A 8/20/2018    ROBOT ASSISTED COLECTOMY (LOW ANTERIOR) performed by Kristin Hernandez MD at [address] [facility] OFFICE/OUTPT [facility] N/A 9/5/2018    RIGHT FEMORAL EMBOLECTOMY, INTRAOPERATIVE ARTERIOGRAM, RIGHT ILIAC EMBOLECTOMY, RIGHT COMMON AND EXTERNAL ILIAC STENTING, RIGHT FEMORAL TO ANTERIOR POPLITEAL BYPASS WITH 6MM GORTEX GRAFT performed by Janine Acuña MD at [address] / [address] Right 2/14/2019 40 mg Oral Daily Kimberly Xiong MD   40 mg at 12/29/19 2038    magnesium hydroxide (MILK OF MAGNESIA) 400 MG/5ML suspension 30 mL  30 mL Oral Daily PRN Kimberly Xiong MD        ondansetron (ZOFRAN) injection 4 mg  4 mg Intravenous Q6H PRN Kimberly Xiong MD        acetaminophen (TYLENOL) tablet 650 mg  650 mg Oral Q4H PRN Kimberly Xiong MD   650 mg at 12/29/19 0530    cefTRIAXone (ROCEPHIN) 1 g IVPB in 50 mL D5W minibag  1 g Intravenous Q24H Kimberly Xiong MD   Stopped at 12/30/19 0446    ipratropium (ATROVENT) 0.02 % nebulizer solution 0.5 mg  0.5 mg Nebulization 4x daily Anjana Bella MD   0.5 mg at 12/30/19 0726    ipratropium (ATROVENT) 0.02 % nebulizer solution 0.5 mg  0.5 mg Nebulization Q6H PRN Kimberly Xiong MD   0.5 mg at 12/29/19 0052    apixaban (ELIQUIS) tablet 5 mg  5 mg Oral BID Jeb Waterman MD        vitamin B-1 (THIAMINE) tablet 100 mg  100 mg Oral Daily Cozette Ree, DO   100 mg at 51/75/17 7439    folic acid (FOLVITE) tablet 1 mg  1 mg Oral Daily Cozette Ree, DO   1 mg at 12/29/19 0909             PHYSICAL:     BP (!) 118/51   Pulse 87   Temp 97.5 °F (36.4 °C) (Oral)   Resp 18   Ht 5' 5\" (1.651 m)   Wt 152 lb 8 oz (69.2 kg)   SpO2 92%   BMI 25.38 kg/m²     Heart:  [x]Regular rate and rhythm  []Other:    Lungs:  [x]Clear    []Other:    Abdomen: [x]Soft    []Other:    Mental Status: [x]Alert & Oriented  []Other:   Ext:                [x]No edema       []Other:         No results for input(s): CKTOTAL, CKMB, CKMBINDEX, TROPONINI in the last 72 hours.     Lab Results   Component Value Date    WBC 7.9 12/30/2019    RBC 5.29 12/30/2019    HGB 12.3 12/30/2019    HCT 41.5 12/30/2019    MCV 78.4 12/30/2019    MCH 23.3 12/30/2019    MCHC 29.6 12/30/2019    RDW 17.5 05/30/2019     12/30/2019    MPV 11.8 12/30/2019       Lab Results   Component Value Date     12/30/2019    K 3.6 12/30/2019    K 3.8 02/25/2019     12/30/2019    CO2

## 2019-12-30 NOTE — FLOWSHEET NOTE
12/30/19 1039   Encounter Summary   Services provided to: Patient and family together   Referral/Consult From: [facility] Family members   Continue Visiting Yes  (12/30/19 )   Complexity of Encounter Moderate   Length of Encounter 15 minutes   Routine   Type Initial   Assessment Calm; Approachable   Intervention Nurtured hope   Outcome Comfort;Expressed gratitude   Spiritual/Sikhism   Type Spiritual support   S- During my contact with the 82 yr old patient and the family, I wanted to assess what their       spiritual and emotional needs were. The pt was coping with congestive heart failure. O-  The pt was in bed and the family was supportively present. A- The pt was receptive when I offered emotional support. I verified that the pt has a Adv Dir on file. P-  Continued support would be helpful in order to meet the future Spiritual needs of the         patient.

## 2019-12-30 NOTE — PROCEDURES
Bedside Spirometry done at this time with pt sitting at side of bed. Pt tolerated well. Results put in chart and nurse notified that test completed.

## 2019-12-30 NOTE — PROGRESS NOTES
on Plavix, toprol, and statin. Hypotension (Acute on Chronic) - with HTN on arrival. Patient remains asymptomatic  - Normal cortisol am. Suspect due to DCMP. - monitor closely while on Entresto    Troponin elevation - no CP. Suspect this is due to decompensated HF, but cannot rule out ischemia.   - Continue on Plavix, statin  - add on BB low dose, tolerating lopressor, changed to Toprol. LLL CAP a/w hypoxia - POA. Continue CTX and azithro. Strep and legionella negative. Blood no growth. Flu negative. Leukocytosis - POA 2/2 Pneumonia    Distended esophagus / Hx of Achalasia with prior botox injection - filled with with food and fluid. GI has seen, but will place procedure on hold for now due to cardiac issues. Can likely follow up as OP. Elevated AST and ALK P - reports no EtOH for 1 year. Normal hep panel, reese US with steatosis, LFTs normalized. ? Due to HF/congestion. Hx of PVD - s/p fem-pop bypass 2018, with clot requiring stent and PTA 2/2019. On Eliquis and Plavix. AAA s/p EVAR    Elevated glucose - normal A1C - will monitor POC glucose    GERD    Hyperinflated lungs / Fibrosis noted on imaging - no prior dx of COPD. Bedside spirom shows decreased FEV1 and FVC, ratio >0.70. Will PFTs as OP to eval DLCO. Consider PRN albuterol on DC. Former tobacco abuse      Expected discharge date:  1d  Day. Monitor tolerance of entresto. Disposition: pending         [] Home       [] TCU       [] Rehab       [] Psych       [] SNF       [] [facility]       [] Other-    --------------------------------------------    Chief Complaint: Hypoxic respiratory failure    Hospital Course: See above. Subjective (past 24 hours):  Patient seen at bedside. Just returned from Cath. No new issues today. Continued chronic nausea, but no vomiting. No fevers, chills. Some cough, but no worse. No SOB at rest reported.        Medications:  Reviewed    Infusion Medications   Scheduled Medications    diphenhydrAMINE  50 mg Intravenous Once    famotidine  20 mg Intravenous Once    hydrocortisone sodium succinate PF  200 mg Intravenous Once    pantoprazole  40 mg Oral BID AC    sodium chloride flush  10 mL Intravenous 2 times per day    sacubitril-valsartan  1 tablet Oral BID    bumetanide  1 mg Oral Daily    metoprolol succinate  25 mg Oral Nightly    clopidogrel  75 mg Oral Daily    timolol  1 drop Both Eyes Nightly    ferrous sulfate  325 mg Oral Daily with breakfast    pravastatin  40 mg Oral Daily    cefTRIAXone (ROCEPHIN) IV  1 g Intravenous Q24H    ipratropium  0.5 mg Nebulization 4x daily    apixaban  5 mg Oral BID    thiamine  100 mg Oral Daily    folic acid  1 mg Oral Daily     PRN Meds: nitroGLYCERIN, sodium chloride flush, acetaminophen, atropine, morphine, magnesium hydroxide, magnesium hydroxide, ondansetron, ipratropium      Intake/Output Summary (Last 24 hours) at 12/30/2019 1732  Last data filed at 12/30/2019 1354  Gross per 24 hour   Intake 375 ml   Output 2075 ml   Net -1700 ml     Weight change: 6 lb 1.6 oz (2.767 kg)      Exam:  BP (!) 104/59   Pulse 71   Temp 97.6 °F (36.4 °C) (Oral)   Resp 18   Ht 5' 5\" (1.651 m)   Wt 152 lb 8 oz (69.2 kg)   SpO2 92%   BMI 25.38 kg/m²     General appearance: Elderly, frail appearing chronically ill  Eyes:  Pupils equal, round, and reactive to light. Conjunctivae/corneas clear. HENT: Head normal in appearance. External nares normal.  Oral mucosa moist without lesions. Hearing grossly intact. Neck: Supple, with full range of motion. No jugular venous distention. Trachea midline. Respiratory:  Normal respiratory effort. Clear to auscultation, bilaterally without rales or wheezes or Rhonchi. Cardiovascular: Normal rate, regular rhythm with normal S1/S2 without murmurs. Trace RLE edema (chronic from prior surgery per patient)  Abdomen: Soft, non-tender, non-distended with normal bowel sounds.   Musculoskeletal: Normal

## 2019-12-30 NOTE — PLAN OF CARE
Problem: Falls - Risk of:  Goal: Will remain free from falls  Description  Will remain free from falls  12/30/2019 1247 by Evie Mckeon RN  Outcome: Ongoing  Note:   Patient free of falls. Call light within reach. Bed in lowest position. Nonskid socks on. Bed alarm on. Patient able to ambulate with the assist of one. Hourly rounding continues. Problem: Risk for Impaired Skin Integrity  Goal: Tissue integrity - skin and mucous membranes  Description  Structural intactness and normal physiological function of skin and  mucous membranes. 12/30/2019 1247 by Evie Mckeon RN  Outcome: Ongoing  Note:   Patient has no skin breakdown. Buttocks is red. Mepilex placed on bottom. Patient able to turn self and does so frequently. Will continue to monitor. Problem: Discharge Planning:  Goal: Discharged to appropriate level of care  Description  Discharged to appropriate level of care  12/30/2019 1247 by Evie Mckeon RN  Outcome: Ongoing  Note:   Patient plans to be discharged home alone when stable. Patient will need a lifevest at discharge. Appropriate for her level of care. Problem: Cardiac Output - Decreased:  Goal: Hemodynamic stability will improve  Description  Hemodynamic stability will improve  12/30/2019 1247 by Evie Mckeon RN  Outcome: Ongoing  Note:   Patient's vitals stable. Patient remains in normal sinus with frequent PACs. EF low at 10-15%. Patient to have lifevest fitted. Will continue to monitor with cardiac telemetry. Problem: Gas Exchange - Impaired:  Goal: Levels of oxygenation will improve  Description  Levels of oxygenation will improve  12/30/2019 1247 by Evie Mckeon RN  Outcome: Ongoing  Note:   Patient's oxygen remains above 90% on 2L nasal cannula. Patient does not wear any oxygen ar home. Will continue to monitor and wean oxygen as appropriate.      Problem: Tissue Perfusion, Altered:  Goal: Circulatory function within specified parameters  Description  Circulatory function within specified parameters  12/30/2019 1247 by Alisha Thibodeaux RN  Outcome: Ongoing  Note:   Patient has palpable pulses radially. Pulses tibially and dorsally present with doppler. Numbness and tingling in bilateral lower extremities. Will continue to monitor. Problem: Pain:  Goal: Pain level will decrease  Description  Pain level will decrease  12/30/2019 1247 by Alisha Thibodeaux RN  Outcome: Ongoing  Note:   Patient denies any pain. Pain goal is to keep comfortable. Will continue to monitor and treat pain as appropriate. Care plan reviewed with patient. Patient verbalized understanding of the plan of care and contribute to goal setting.

## 2019-12-31 VITALS
RESPIRATION RATE: 16 BRPM | OXYGEN SATURATION: 92 % | BODY MASS INDEX: 25.72 KG/M2 | SYSTOLIC BLOOD PRESSURE: 106 MMHG | HEIGHT: 65 IN | WEIGHT: 154.4 LBS | HEART RATE: 72 BPM | DIASTOLIC BLOOD PRESSURE: 57 MMHG | TEMPERATURE: 97.2 F

## 2019-12-31 PROBLEM — E44.0 MODERATE MALNUTRITION (HCC): Chronic | Status: ACTIVE | Noted: 2019-12-31

## 2019-12-31 LAB
ANION GAP SERPL CALCULATED.3IONS-SCNC: 13 MEQ/L (ref 8–16)
BUN BLDV-MCNC: 26 MG/DL (ref 7–22)
CALCIUM SERPL-MCNC: 8.7 MG/DL (ref 8.5–10.5)
CHLORIDE BLD-SCNC: 99 MEQ/L (ref 98–111)
CO2: 26 MEQ/L (ref 23–33)
CREAT SERPL-MCNC: 1.2 MG/DL (ref 0.4–1.2)
GFR SERPL CREATININE-BSD FRML MDRD: 43 ML/MIN/1.73M2
GLUCOSE BLD-MCNC: 102 MG/DL (ref 70–108)
GLUCOSE BLD-MCNC: 107 MG/DL (ref 70–108)
GLUCOSE BLD-MCNC: 108 MG/DL (ref 70–108)
HIV-2 AB: NEGATIVE
LEGIONELLA PNEUMOPHILIA AG, URINE: NORMAL
MAGNESIUM: 1.9 MG/DL (ref 1.6–2.4)
POTASSIUM SERPL-SCNC: 3.8 MEQ/L (ref 3.5–5.2)
SODIUM BLD-SCNC: 138 MEQ/L (ref 135–145)
STREP PNEUMO AG, UR: NORMAL

## 2019-12-31 PROCEDURE — 6370000000 HC RX 637 (ALT 250 FOR IP): Performed by: INTERNAL MEDICINE

## 2019-12-31 PROCEDURE — 2580000003 HC RX 258: Performed by: FAMILY MEDICINE

## 2019-12-31 PROCEDURE — 2580000003 HC RX 258: Performed by: INTERNAL MEDICINE

## 2019-12-31 PROCEDURE — 36415 COLL VENOUS BLD VENIPUNCTURE: CPT

## 2019-12-31 PROCEDURE — 80048 BASIC METABOLIC PNL TOTAL CA: CPT

## 2019-12-31 PROCEDURE — 6360000002 HC RX W HCPCS: Performed by: FAMILY MEDICINE

## 2019-12-31 PROCEDURE — 2709999900 HC NON-CHARGEABLE SUPPLY

## 2019-12-31 PROCEDURE — 94760 N-INVAS EAR/PLS OXIMETRY 1: CPT

## 2019-12-31 PROCEDURE — 99239 HOSP IP/OBS DSCHRG MGMT >30: CPT | Performed by: INTERNAL MEDICINE

## 2019-12-31 PROCEDURE — 82948 REAGENT STRIP/BLOOD GLUCOSE: CPT

## 2019-12-31 PROCEDURE — 6370000000 HC RX 637 (ALT 250 FOR IP): Performed by: NURSE PRACTITIONER

## 2019-12-31 PROCEDURE — 83735 ASSAY OF MAGNESIUM: CPT

## 2019-12-31 RX ORDER — 0.9 % SODIUM CHLORIDE 0.9 %
500 INTRAVENOUS SOLUTION INTRAVENOUS ONCE
Status: COMPLETED | OUTPATIENT
Start: 2019-12-31 | End: 2019-12-31

## 2019-12-31 RX ORDER — LOSARTAN POTASSIUM 25 MG/1
12.5 TABLET ORAL DAILY
Qty: 30 TABLET | Refills: 1 | Status: SHIPPED | OUTPATIENT
Start: 2020-01-04 | End: 2020-01-21

## 2019-12-31 RX ORDER — BLOOD PRESSURE TEST KIT
KIT MISCELLANEOUS
Qty: 1 KIT | Refills: 0 | Status: SHIPPED | OUTPATIENT
Start: 2019-12-31

## 2019-12-31 RX ORDER — BUMETANIDE 1 MG/1
1 TABLET ORAL DAILY
Qty: 30 TABLET | Refills: 3 | Status: ON HOLD | OUTPATIENT
Start: 2020-01-01 | End: 2020-03-01 | Stop reason: HOSPADM

## 2019-12-31 RX ORDER — ALBUTEROL SULFATE 90 UG/1
2 AEROSOL, METERED RESPIRATORY (INHALATION) EVERY 6 HOURS PRN
Qty: 1 INHALER | Refills: 0 | Status: SHIPPED | OUTPATIENT
Start: 2019-12-31

## 2019-12-31 RX ORDER — METOPROLOL SUCCINATE 25 MG/1
25 TABLET, EXTENDED RELEASE ORAL DAILY
Qty: 30 TABLET | Refills: 3 | Status: ON HOLD | OUTPATIENT
Start: 2020-01-01 | End: 2020-03-01 | Stop reason: HOSPADM

## 2019-12-31 RX ORDER — CEFDINIR 300 MG/1
300 CAPSULE ORAL 2 TIMES DAILY
Qty: 6 CAPSULE | Refills: 0 | Status: SHIPPED | OUTPATIENT
Start: 2020-01-01 | End: 2020-01-04

## 2019-12-31 RX ADMIN — FOLIC ACID 1 MG: 1 TABLET ORAL at 08:55

## 2019-12-31 RX ADMIN — APIXABAN 5 MG: 5 TABLET, FILM COATED ORAL at 00:19

## 2019-12-31 RX ADMIN — SACUBITRIL AND VALSARTAN 1 TABLET: 24; 26 TABLET, FILM COATED ORAL at 00:19

## 2019-12-31 RX ADMIN — APIXABAN 5 MG: 5 TABLET, FILM COATED ORAL at 08:54

## 2019-12-31 RX ADMIN — CEFTRIAXONE SODIUM 1 G: 1 INJECTION, POWDER, FOR SOLUTION INTRAMUSCULAR; INTRAVENOUS at 03:18

## 2019-12-31 RX ADMIN — CLOPIDOGREL BISULFATE 75 MG: 75 TABLET ORAL at 08:54

## 2019-12-31 RX ADMIN — BUMETANIDE 1 MG: 1 TABLET ORAL at 08:54

## 2019-12-31 RX ADMIN — SACUBITRIL AND VALSARTAN 1 TABLET: 24; 26 TABLET, FILM COATED ORAL at 08:55

## 2019-12-31 RX ADMIN — Medication 10 ML: at 08:55

## 2019-12-31 RX ADMIN — SODIUM CHLORIDE 500 ML: 9 INJECTION, SOLUTION INTRAVENOUS at 13:54

## 2019-12-31 RX ADMIN — Medication 100 MG: at 08:55

## 2019-12-31 RX ADMIN — PANTOPRAZOLE SODIUM 40 MG: 40 TABLET, DELAYED RELEASE ORAL at 06:32

## 2019-12-31 ASSESSMENT — PAIN SCALES - GENERAL
PAINLEVEL_OUTOF10: 0

## 2019-12-31 NOTE — PROGRESS NOTES
Nutrition Assessment    Type and Reason for Visit: Initial, Positive Nutrition Screen, Consult(; Diet education, Swallowing difficulty w/ food, Weight loss, Poor intake, Malnutrition  pt has esophagus history, stated has gone done 2 sizes over a year from poor eating, poor appetite; Hx of achalasia, swallowing issues, malnutrition)    Nutrition Recommendations: Recommend diet as per SLP/GI. Will send Ensure Enlive and Magic Cups TID as pt. Agreeable. Consider MVI. Nutrition Assessment:   Pt. moderately malnourished AEB criteria as listed below. At risk for further nutrition compromise r/t achlasia, cardiac issues and underlying medical condition (GERD, PVD). Nutrition recommendations/interventions as per above. Malnutrition Assessment:  · Malnutrition Status: Meets the criteria for moderate malnutrition  · Context: Chronic illness  · Findings of the 6 clinical characteristics of malnutrition (Minimum of 2 out of 6 clinical characteristics is required to make the diagnosis of moderate or severe Protein Calorie Malnutrition based on AND/ASPEN Guidelines):  1. Weight Loss-20% loss or greater(-22%), in 1 year(10.5 months)  2. Fat Loss-Moderate subcutaneous fat loss, Orbital  3. Muscle Loss-Moderate muscle mass loss, Temples (temporalis muscle)    Nutrition Risk Level: High    Nutrient Needs:  · Estimated Daily Total Kcal: 7826-2093 kcals (22-25 kcals/kgm wt of 70 kgm)  · Estimated Daily Protein (g): 74+ grams/day (1.4+ grams/kgm IBW of 57 kgm)    Nutrition Diagnosis:   · Problem: Moderate malnutrition  · Etiology: related to Difficulty swallowing     Signs and symptoms:  as evidenced by Diet history of poor intake, Weight loss, Moderate loss of subcutaneous fat, Moderate muscle loss    Objective Information:  · Nutrition-Focused Physical Findings: achlasia - tolerates liquids, very soft foods ok; u/a safely repeat EGD/botox d/t cardiac issues;  Rx incldues Bumex, Folvite, Thiamine; drinks a shake made

## 2019-12-31 NOTE — PROGRESS NOTES
Dr. Whitlock Sabine in to see patient. He stated to stop the fluids, pressures were better. He stated he would send her home and not order the entresto. He stated he would order valsartan to start Jan 4th.

## 2019-12-31 NOTE — PROGRESS NOTES
[place] [facility] [place] Denies the need for additional discharge needs.

## 2019-12-31 NOTE — PROGRESS NOTES
Educated patient regarding heart failure management by explaining the importance of obtaining daily weights at the same time every day with approximately the same amount of clothing, how to recognize symptoms, low sodium diet and taking prescribed medications as ordered. Patient was given our heart failure booklet, a weight chart and a business card with the appointment time and date. Patient was receptive to the education given and verbalized understanding. Daughter at bedside.

## 2019-12-31 NOTE — PROGRESS NOTES
Patient and daughter given discharge instructions via teachback method. They verbalize understanding of new medications and side effects. They verbalize understanding of importance of taking blood pressure every morning before taking pills. They verbalize understanding of calling to get into her family doctor next week since the family doctor was closed today for the holiday. They verbalize understanding of calling to schedule a sleep study. Patient taken to the discharge lobby in stable condition with belongings.

## 2019-12-31 NOTE — PROGRESS NOTES
[address] [place] [address]  SPEECH THERAPY MISSED TREATMENT NOTE  [facility] CCU-STEPDOWN 3B      Date: 2019  Patient Name: Gilles Cantu        MRN: [id number]    : 1935  (82 y.o.)    REASON FOR MISSED TREATMENT:  Attempted to see pt at  for completion of dysphagia interventions. Despite extensive alternatives offered, deferral for PO intake communicated in attempts to complete dietary analysis d/t pt \"just eaten lunch\". GUERRERO Melchor reports stable pulmonary status, no adverse changes. Will plan to resume per established POC on subsequent tx date.     Carol Ann Garner M.A., [address] [place] [address]

## 2020-01-01 LAB
ANA SCREEN: DETECTED
VITAMIN B1 WHOLE BLOOD: 126 NMOL/L (ref 70–180)

## 2020-01-01 ASSESSMENT — ENCOUNTER SYMPTOMS
SHORTNESS OF BREATH: 1
COUGH: 1

## 2020-01-01 NOTE — ED PROVIDER NOTES
w/anast (N/A, 2018); pr office/outpt visit,procedure only (N/A, 2018); pr preet skn sub grft t/a/l area/<100scm /<1st 25 scm (N/A, 2018); THROMBECTOMY / EMBOLECTOMY FEMORAL (Right, 2019); AAA repair, endovascular (N/A, 2/15/2019); Upper gastrointestinal endoscopy (N/A, 2019); and Colonoscopy (N/A, 2019). CURRENT MEDICATIONS       Discharge Medication List as of 2019  3:46 PM      CONTINUE these medications which have NOT CHANGED    Details   apixaban (ELIQUIS) 5 MG TABS tablet TAKE ONE TABLET BY MOUTH TWICE DAILY, Disp-60 tablet, R-5Normal      magnesium (MAGNESIUM-OXIDE) 250 MG TABS tablet TAKE 2 TABLETS BY MOUTH TWICE DAILY, Disp-120 tablet, R-6Normal      ferrous sulfate 325 (65 Fe) MG tablet Take 325 mg by mouth daily (with breakfast)Historical Med      pravastatin (PRAVACHOL) 40 MG tablet Take 1 tablet by mouth daily, Disp-90 tablet, R-3Normal      potassium chloride (KLOR-CON M) 10 MEQ extended release tablet Take 1 tablet by mouth daily, Disp-180 tablet, R-2**Patient requests 90 days supply**NO PRINT      clopidogrel (PLAVIX) 75 MG tablet Take 1 tablet by mouth daily, Disp-90 tablet, R-3Normal      melatonin 3 MG TABS tablet Take 2 tablets by mouth nightly as needed (sleep), Disp-60 tablet, R-3Normal      Pantoprazole Sodium (PROTONIX) 40 MG PACK packet Take 1 packet by mouth 2 times daily. , Disp-60 each, R-6      acetaminophen (TYLENOL ARTHRITIS PAIN) 650 MG CR tablet Take 1,300 mg by mouth every 12 hours as needed for Pain       timolol (TIMOPTIC) 0.5 % ophthalmic solution 1 drop 2 times daily. ALLERGIES     is allergic to aspirin; lasix [furosemide]; lipitor [atorvastatin]; neurontin [gabapentin]; oxycontin [oxycodone hcl]; and penicillins. FAMILY HISTORY     She indicated that her mother is . She indicated that her father is . She indicated that her sister is . She indicated that her maternal grandmother is .  She indicated that her maternal grandfather is . She indicated that her paternal grandmother is . She indicated that her paternal grandfather is . family history includes Cancer in her father; Dementia in her maternal grandmother; Emphysema in her father; Heart Disease in her maternal grandfather, maternal grandmother, and mother; Other in her sister; Stroke in her mother. SOCIAL HISTORY      reports that she quit smoking about 2 months ago. Her smoking use included cigarettes. She started smoking about 64 years ago. She has a 15.00 pack-year smoking history. She has never used smokeless tobacco. She reports current alcohol use of about 1.0 standard drinks of alcohol per week. She reports that she does not use drugs. PHYSICAL EXAM     INITIAL VITALS:  height is 5' 5\" (1.651 m) and weight is 154 lb 6.4 oz (70 kg). Her axillary temperature is 97.2 °F (36.2 °C). Her blood pressure is 106/57 (abnormal) and her pulse is 72. Her respiration is 16 and oxygen saturation is 92%. Physical Exam  Vitals signs and nursing note reviewed. Constitutional:       General: She is not in acute distress. Appearance: She is well-developed. She is ill-appearing. She is not diaphoretic. HENT:      Head: Normocephalic and atraumatic. Eyes:      General:         Right eye: No discharge. Left eye: No discharge. Conjunctiva/sclera: Conjunctivae normal.   Neck:      Musculoskeletal: Normal range of motion. Trachea: No tracheal deviation. Cardiovascular:      Rate and Rhythm: Normal rate and regular rhythm. Pulses: Normal pulses. Heart sounds: Murmur present. No gallop. Comments: Normal capillary refill  Pulmonary:      Effort: Respiratory distress present. Breath sounds: No stridor. Rales present. Abdominal:      General: Bowel sounds are normal.      Palpations: Abdomen is soft. Musculoskeletal: Normal range of motion. General: No tenderness or deformity. steatosis versus nonspecific hepatocellular disease. No liver mass or intrahepatic biliary dilatation. Antegrade flow in the hepatic vasculature. Cholecystectomy. **This report has been created using voice recognition software. It may contain minor errors which are inherent in voice recognition technology. **      Final report electronically signed by Dr. Trinh Pond on 12/28/2019 10:55 PM      CT CHEST W CONTRAST   Final Result       1. Markedly distended esophagus which contains fluid as well as recently ingested food products. This correlates with the recently seen soft tissue opacity in the right paratracheal region on the prior x-ray. 2. Small bilateral pleural effusions with adjacent atelectasis. 3. Cardiomegaly. **This report has been created using voice recognition software. It may contain minor errors which are inherent in voice recognition technology. **      Final report electronically signed by Dr Camryn Horvath on 12/28/2019 9:26 AM      XR CHEST PORTABLE   Final Result      Probable mild interstitial pulmonary edema or atypical pneumonia superimposed on underlying pulmonary fibrosis. Medial left lower lobe pneumonia or atelectasis. Tiny right pleural effusion. New soft tissue opacity in the right paratracheal region of the upper chest which may represent a pulmonary or mediastinal mass or adenopathy. Recommend chest CT with IV contrast.   Stable mild cardiomegaly. **This report has been created using voice recognition software. It may contain minor errors which are inherent in voice recognition technology. **      Final report electronically signed by Dr. Trinh Pond on 12/28/2019 2:57 AM            LABS:   Labs Reviewed   CBC WITH AUTO DIFFERENTIAL - Abnormal; Notable for the following components:       Result Value    WBC 13.6 (*)     RBC 5.57 (*)     MCH 23.0 (*)     MCHC 28.3 (*)     RDW-CV 17.6 (*)     RDW-SD 49.3 (*)     Segs Absolute 10.7 (*) Immature Grans (Abs) 0.16 (*)     All other components within normal limits   BASIC METABOLIC PANEL - Abnormal; Notable for the following components:    CO2 22 (*)     Glucose 338 (*)     BUN 30 (*)     All other components within normal limits   TROPONIN - Abnormal; Notable for the following components:    Troponin T 0.010 (*)     All other components within normal limits   BRAIN NATRIURETIC PEPTIDE - Abnormal; Notable for the following components:    Pro-BNP 5903.0 (*)     All other components within normal limits   HEPATIC FUNCTION PANEL - Abnormal; Notable for the following components:    Alkaline Phosphatase 155 (*)      (*)     All other components within normal limits   BLOOD GAS, ARTERIAL - Abnormal; Notable for the following components:    pH, Blood Gas 7.27 (*)     PCO2 50 (*)     Base Excess (Calculated) -4.4 (*)     All other components within normal limits   GLUCOSE, WHOLE BLOOD - Abnormal; Notable for the following components:    Glucose, Whole Blood 366 (*)     All other components within normal limits   GLOMERULAR FILTRATION RATE, ESTIMATED - Abnormal; Notable for the following components:    Est, Glom Filt Rate 43 (*)     All other components within normal limits   BLOOD GAS, ARTERIAL - Abnormal; Notable for the following components:    PO2 147 (*)     All other components within normal limits   TROPONIN - Abnormal; Notable for the following components:    Troponin T 0.021 (*)     All other components within normal limits   PROCALCITONIN - Abnormal; Notable for the following components:    Procalcitonin 0.29 (*)     All other components within normal limits   BASIC METABOLIC PANEL - Abnormal; Notable for the following components:    BUN 24 (*)     All other components within normal limits   CBC WITH AUTO DIFFERENTIAL - Abnormal; Notable for the following components:    Hemoglobin 11.5 (*)     MCV 78.2 (*)     MCH 23.0 (*)     MCHC 29.4 (*)     RDW-CV 17.3 (*)     RDW-SD 48.1 (*) Lymphocytes Absolute 0.9 (*)     All other components within normal limits   VIDA SCREEN WITH REFLEX - Abnormal; Notable for the following components:    VIDA SCREEN Detected (*)     All other components within normal limits   GLOMERULAR FILTRATION RATE, ESTIMATED - Abnormal; Notable for the following components:    Est, Glom Filt Rate 43 (*)     All other components within normal limits   HEPATIC FUNCTION PANEL - Abnormal; Notable for the following components:    Alkaline Phosphatase 129 (*)     All other components within normal limits   BASIC METABOLIC PANEL - Abnormal; Notable for the following components:    Glucose 110 (*)     BUN 25 (*)     All other components within normal limits   CBC - Abnormal; Notable for the following components:    MCV 78.4 (*)     MCH 23.3 (*)     MCHC 29.6 (*)     RDW-CV 17.2 (*)     RDW-SD 48.0 (*)     All other components within normal limits   PROTIME-INR - Abnormal; Notable for the following components:    INR 1.37 (*)     All other components within normal limits   GLOMERULAR FILTRATION RATE, ESTIMATED - Abnormal; Notable for the following components:    Est, Glom Filt Rate 43 (*)     All other components within normal limits   BASIC METABOLIC PANEL - Abnormal; Notable for the following components:    BUN 26 (*)     All other components within normal limits   GLOMERULAR FILTRATION RATE, ESTIMATED - Abnormal; Notable for the following components:    Est, Glom Filt Rate 43 (*)     All other components within normal limits   POCT GLUCOSE - Abnormal; Notable for the following components:    POC Glucose 115 (*)     All other components within normal limits   POCT GLUCOSE - Abnormal; Notable for the following components:    POC Glucose 124 (*)     All other components within normal limits   POCT GLUCOSE - Abnormal; Notable for the following components:    POC Glucose 137 (*)     All other components within normal limits   POCT GLUCOSE - Abnormal; Notable for the following components: POC Glucose 126 (*)     All other components within normal limits   CULTURE BLOOD #1    Narrative:     Source: blood-Adult-suboptimal <5.5oz./set volume       Site: Peripheral Vein(single bottle)            Current Antibiotics: not stated   CULTURE BLOOD #1    Narrative:     Source: blood-Adult-suboptimal <5.5oz./set volume       Site: IV start draw:            Current Antibiotics: not stated   CULTURE BLOOD #1    Narrative:     Source: blood-Adult-suboptimal <5.5oz./set volume       Site: Peripheral Vein(single bottle)            Current Antibiotics: not stated   STREP PNEUMONIAE ANTIGEN   LEGIONELLA ANTIGEN, URINE   RAPID INFLUENZA A/B ANTIGENS   LIPASE   ANION GAP   OSMOLALITY   SCAN OF BLOOD SMEAR   LACTIC ACID, PLASMA   MAGNESIUM   TSH WITHOUT REFLEX   HEMOGLOBIN A1C   HEPATITIS PANEL, ACUTE   MAGNESIUM   HIV SCREEN   SEDIMENTATION RATE   ANION GAP   MAGNESIUM   APTT   ANION GAP   MAGNESIUM   ANION GAP   VITAMIN B1   COXSACKIE A PANEL   COXSACKIE B VIRUS ABS   POCT GLUCOSE   POCT GLUCOSE   POCT GLUCOSE   POCT GLUCOSE   POCT GLUCOSE   POCT GLUCOSE   POCT GLUCOSE   POCT GLUCOSE   POCT GLUCOSE   TYPE AND SCREEN         EMERGENCYDEPARTMENT COURSE AND MEDICAL DECISION MAKING:   Vitals:    Vitals:    12/31/19 1315 12/31/19 1345 12/31/19 1430 12/31/19 1457   BP: (!) 88/54 (!) 91/55 (!) 97/55 (!) 106/57   Pulse:       Resp:       Temp:       TempSrc:       SpO2:       Weight:       Height:             Pertinent Labs & Imaging studies reviewed. (See chart for details)           Controlled Substances Monitoring:     No flowsheet data found. The patient was seen and evaluated in atimely manner for in  acute respiratory distress. Patient came in  severe distress. Patient was immediately started on high flow oxygen. She was converted to BiPAP as soon as possible by respiratory. Lungs are full of fluid. Chest x-ray shows pulmonary edema with a possible atypical pneumonia.   Patient was started on Rocephin after blood cultures. She is given 1 mg of Bumex as she has an allergy to Lasix. Nitroglycerin drip was started to help increase as well as her cardiac output and reduce the fluid. Patient responded quite well to the treatments. Her acidosis resolved quickly however her blood pressure trended down. We did give her a small bolus and that improved somewhat however her blood pressure remained low. Patient tolerated it well thankfully. At that point the decision was made to change her to high flow humidified oxygen. This would give the patient back some of her preload. This brought the patient's blood pressure up to an acceptable level. Patient's work of breathing was greatly improved by this time. I called and discussed the case with Dr. Jah Melendrez. She graciously agreed to admit the patient for further evaluation. Pulse, blood  Case was staffed with attending physician, Davidson Goodwin    Strict return precautions and follow up instructions were discussed with the patient with which the patient agrees     Physical assessment findings, diagnostic testing(s) if applicable, and vital signs reviewed with patient/patient representative. Questions answered. Medications asdirected, including OTC medications for supportive care. Education provided on medications. Differential diagnosis(s) discussed with patient/patient representative. Home care/self care instructions reviewed withpatient/patient representative. Patient is to follow-up with family care provider in 2-3 days if no improvement. Patient is to go to the emergency department if symptoms worsen. Patient/patient representative isaware of care plan, questions answered, verbalizes understanding and is in agreement.      ED Medications administered this visit:   Medications   atropine injection 0.5 mg (has no administration in time range)   morphine (PF) injection 2 mg (has no administration in time range)   ipratropium-albuterol (DUONEB) nebulizer solution 1 ampule (1 ampule Inhalation Given 12/28/19 0120)   bumetanide (BUMEX) injection 1 mg (1 mg Intravenous Given 12/28/19 0115)   0.9 % sodium chloride bolus (0 mLs Intravenous Stopped 12/28/19 0536)   cefTRIAXone (ROCEPHIN) 1 g IVPB in 50 mL D5W minibag (0 g Intravenous Stopped 12/28/19 0500)   iopamidol (ISOVUE-370) 76 % injection 80 mL (80 mLs Intravenous Given 12/28/19 0905)   iopamidol (ISOVUE-370) 76 % injection 75 mL (75 mLs Intravenous Given 12/30/19 0923)   0.9 % sodium chloride bolus (500 mLs Intravenous New Bag 12/31/19 1354)           I have given the patient strict written and verbal instructions about care at home,follow-up, and signs and symptoms of worsening of condition and they did verbalize understanding. Patient was seen independently by myself. The Patient's final impression and disposition and plan was determined by myself. CRITICAL CARE:   There was a high probability of clinically significant/life threatening deterioration in this patient's condition which required my urgent intervention. Total critical care time was  30 minutes. This excludes any time for separately reportable procedures. CONSULTS:  None    PROCEDURES:  None    FINAL IMPRESSION      1. Respiratory distress    2. Acute pulmonary edema (HCC)    3. Acute on chronic congestive heart failure, unspecified heart failure type ([facility] [address]    4. Acute respiratory failure with hypoxia [facility])          DISPOSITION/PLAN   DISPOSITION Admitted 12/28/2019 03:50:43 AM        PATIENT REFERRED TO:  Bandar Chau 68  [facility] [address] [facility] [address]  [phone]    Schedule an appointment as soon as possible for a visit in 1 week  office was closed on new years francisca, thaddeus sorto to make an appointment to be seen in one week    ROMAN Muñoz - CNP  [address]    Go on 1/21/2020 1/21/2020 at 1:30, chf clinic [address] at [address] [facility].

## 2020-01-02 LAB
BLOOD CULTURE, ROUTINE: NORMAL
COXSACKIE A PANEL: NORMAL

## 2020-01-02 NOTE — DISCHARGE SUMMARY
Hospital Medicine Discharge Summary      Patient Identification:   Nazanin Arizmendi   : 1935  MRN: [id number]   Account: [de-identified]      Patient's PCP: Julia Sloan Date: 2019     Discharge Date: 2019      Admitting Physician: Jyoti Jones MD     Discharge Physician: Dot Mcintyre DO       Hospital Course:   Nazanin Arizmendi is a 82 y.o. female with PMHx of PVD s/p fem-pop bypass complicated by thrombosis requiring stent placement, former tobacco abuse, AAA s/p EVAR, hx of achalasia with prior botox injections, non-obstructive CAD who was admitted to [address] [place] [address] on 2019 for sob and acute hypoxic respiratory failure. In the ED she was placed on NRB then transition to [facility] and able to be weaned to [facility]. She had elevated troponin and BNP and was started on IV diuresis, she had no chest pain. Cardiology was consulted and an echo was obtained. This revealed severe cardiomyopathy, with EF estimated at 10-15% with G2DD (previous echo in 2018 with 55% EF). A LHC was pursued and showed non-obstructive CAD. It is unclear what led to her unexplained drop in EF, consideration for viral carditis given her prodrome of SOB. Denies significant alcohol use, illicit drug use. ESR was wnl. HIV, B1, and coxsackie panel are pending at the time of discharge. The patient was quickly able to be weaned to room air, which indicated likely flash pulm edema in setting of severe cardiomyopathy. Her BP was quite tenuous as could be expected with low EF, and she did not tolerate Entresto trial.  She was started on Bumex and Toprol, and was told to start Losartan 12.5mg daily on  (to allow washout of her Entresto). She was prescribed a BP cuff and told to monitor closely for signs of hypotension. She was fit with a lifevest prior to discharge, and HF clinic as well.      She was also septic on arrival with elevated procal,  leukocytosis and tachycardia with evidence of infiltrates on CXR, so was started on Rocephin and Azithromycin. She will complete 3 additional days of Cefdinir and completed 3 days of 500mg AZT. She had evidence of hyperinflation and fibrosis noted on CXR. She had bedside spirometry which revealed no obstruction but did show decreased total FVC and FEV1. She will be discharged on PRN albuterol and PFTs were ordered to assess for diffusion defects. Consider HRCT in future. Discussed HF precautions, including daily weights, limiting sodium and fluid intake. The patient was stable for discharge - all consultants were contacted and in agreement with plan for discharge. Appropriate follow up appointment was arranged prior to discharge. Please see below or view chart for more details from hospital course. Discharge Diagnoses:    · Acute Hypercapneic and Hypoxic Respiratory Failure  · Acute Decompensated Systolic/Diastolic HF  · Severe Dilated Cardiomyopathy - EF 10-15%  · Hypotension - Cardiogenic  · Troponin elevation - non-MI, Type II NSTEMI due to decompensated HF  · Sepsis 2/2 LLL CAP - with leukocytosis and tachycardia  · Distended Esophagus / Hx of Achalasia - food and fluid noted in esophagus on CT chest -> this will be followed OP as she has known history. GI was consulted. · Hx of PVD s/p fem-pop bypass 2018, with clot requiring stent and PCTA 2/2019 on [address] [place] [address]. Follow up with Cardio as previously scheduled. · AAA s/p EVAR 2/2019  · Hyperglycemia without DM - normal A1C  · Former Tobacco abuse  · Hyperinflated lungs / Fibrosis on imaging - PFTs as OP      The patient was seen and examined on day of discharge and this discharge summary is in conjunction with any daily progress note from day of discharge.         Exam:     Vitals:  Vitals:    12/31/19 1315 12/31/19 1345 12/31/19 1430 12/31/19 1457   BP: (!) 88/54 (!) 91/55 (!) 97/55 (!) 106/57   Pulse:       Resp:       Temp:       TempSrc:       SpO2:       Weight:       Height: Other-    Condition at Discharge: Stable    Code Status:  Prior Full    Patient Instructions:    Discharge lab work: -  Activity: activity as tolerated  Diet: No diet orders on file  HF diet    Follow-up visits:   India Chau 68  [address]  [phone]    Schedule an appointment as soon as possible for a visit in 1 week  office was closed on new years francisca, call thurs to make an appointment to be seen in one week    ROMAN Jiang - CNP  [address] [facility]  [address] [facility]    Go on 1/21/2020 1/21/2020 at 1:30, chf clinic [address] at [address] [place] [address]. Luciana Ariza MD  . [place] [address]  [address] [place] [address]    Go on 1/16/2020  @ 1:00    ROMAN Retana - CNP  [address] [facility] [address]  [phone]    Go on 2/6/2020  @ 1:15    [facility]  [address] [place] [address] Curtis Russell    CALL TO SCHEDULE, OFFICE WAS CLOSED FOR THE HOLIDAY. Discharge Medications:        Medication List      START taking these medications    Blood Pressure Kit  Check blood pressure daily, and if symptoms of lightheadedness. Call physician if BP <80/40. cefdinir 300 MG capsule  Commonly known as:  OMNICEF  Take 1 capsule by mouth 2 times daily for 3 days  Notes to patient:  Antibiotic for pneumonia     losartan 25 MG tablet  Commonly known as:  COZAAR  Take 0.5 tablets by mouth daily Starting on 1/4/20  Start taking on:  January 4, 2020  Notes to patient: To help remodel the pump of the heart     metoprolol succinate 25 MG extended release tablet  Commonly known as:  TOPROL XL  Take 1 tablet by mouth daily  Notes to patient:   To help remodel the pump of the heart        CHANGE how you take these medications    albuterol sulfate  (90 Base) MCG/ACT inhaler  Commonly known as:  PROVENTIL HFA  Inhale 2 puffs into the lungs every 6 hours as needed for Wheezing or Shortness of Breath  What changed:  reasons to take this     bumetanide 1 MG tablet  Commonly known as:  BUMEX  Take 1 tablet by mouth daily  What changed:    · medication strength  · how much to take  Notes to patient:  Water pill, gets rid of extra fluid        CONTINUE taking these medications    apixaban 5 MG Tabs tablet  Commonly known as:  ELIQUIS  TAKE ONE TABLET BY MOUTH TWICE DAILY  Notes to patient:  Prevent blood clot     clopidogrel 75 MG tablet  Commonly known as:  PLAVIX  Take 1 tablet by mouth daily  Notes to patient:  Antiplatelet      ferrous sulfate 325 (65 Fe) MG tablet  Notes to patient:  Iron supplement     magnesium 250 MG Tabs tablet  Commonly known as:  MAGNESIUM-OXIDE  TAKE 2 TABLETS BY MOUTH TWICE DAILY  Notes to patient:  Supplement      melatonin 3 MG Tabs tablet  Take 2 tablets by mouth nightly as needed (sleep)     pantoprazole sodium 40 MG Pack packet  Commonly known as:  PROTONIX  Take 1 packet by mouth 2 times daily.   Notes to patient:  Decreases acid in stomach, protects stomach     potassium chloride 10 MEQ extended release tablet  Commonly known as:  KLOR-CON M  Take 1 tablet by mouth daily  Notes to patient:  Potassium supplement     pravastatin 40 MG tablet  Commonly known as:  PRAVACHOL  Take 1 tablet by mouth daily  Notes to patient:  Decreases cholesterol     timolol 0.5 % ophthalmic solution  Commonly known as:  TIMOPTIC     TYLENOL ARTHRITIS PAIN 650 MG extended release tablet  Generic drug:  acetaminophen           Where to Get Your Medications      You can get these medications from any pharmacy    Bring a paper prescription for each of these medications  · albuterol sulfate  (90 Base) MCG/ACT inhaler  · Blood Pressure Kit  · bumetanide 1 MG tablet  · cefdinir 300 MG capsule  · losartan 25 MG tablet  · metoprolol succinate 25 MG extended release tablet           Time Spent on discharge is roughly 40 minutes in the examination, evaluation, counseling and review of medications and discharge plan. Thank you Jhon Barron for the opportunity to be involved in this patient's care.     Signed:    Electronically signed by Rodney Nobles DO on 1/1/2020 at 7:43 PM

## 2020-01-03 LAB
ANA PATTERN: ABNORMAL
ANA TITER: ABNORMAL
ANTINUCLEAR AB INTERPRETIVE COMMENT: ABNORMAL
ANTINUCLEAR ANTIBODY, HEP-2, IGG: DETECTED
CYTOPLASMIC PATTERN TITER: ABNORMAL

## 2020-01-06 LAB — COXSACKIE B PANEL: NORMAL

## 2020-01-08 ENCOUNTER — HOSPITAL ENCOUNTER (OUTPATIENT)
Dept: PULMONOLOGY | Age: 85
Discharge: HOME | End: 2020-01-08
Payer: MEDICARE

## 2020-01-08 PROCEDURE — 94060 EVALUATION OF WHEEZING: CPT

## 2020-01-08 PROCEDURE — 94729 DIFFUSING CAPACITY: CPT

## 2020-01-08 PROCEDURE — 94726 PLETHYSMOGRAPHY LUNG VOLUMES: CPT

## 2020-01-14 LAB
ANION GAP SERPL CALCULATED.3IONS-SCNC: 9 MMOL/L (ref 4–12)
BUN BLDV-MCNC: 30 MG/DL (ref 7–20)
CALCIUM SERPL-MCNC: 9.3 MG/DL (ref 8.8–10.5)
CHLORIDE BLD-SCNC: 104 MEQ/L (ref 101–111)
CO2: 30 MEQ/L (ref 21–32)
CREAT SERPL-MCNC: 1.26 MG/DL (ref 0.6–1.3)
CREATININE CLEARANCE: 41
GLUCOSE: 110 MG/DL (ref 70–110)
MAGNESIUM: 2.2 MG/DL (ref 1.8–2.5)
POTASSIUM SERPL-SCNC: 4.3 MEQ/L (ref 3.6–5)
SODIUM BLD-SCNC: 143 MEQ/L (ref 135–145)

## 2020-01-21 ENCOUNTER — OFFICE VISIT (OUTPATIENT)
Dept: CARDIOLOGY CLINIC | Age: 85
End: 2020-01-21
Payer: MEDICARE

## 2020-01-21 VITALS
DIASTOLIC BLOOD PRESSURE: 60 MMHG | WEIGHT: 158 LBS | OXYGEN SATURATION: 90 % | SYSTOLIC BLOOD PRESSURE: 118 MMHG | BODY MASS INDEX: 26.29 KG/M2 | HEART RATE: 68 BPM

## 2020-01-21 PROCEDURE — G8400 PT W/DXA NO RESULTS DOC: HCPCS | Performed by: NURSE PRACTITIONER

## 2020-01-21 PROCEDURE — 1036F TOBACCO NON-USER: CPT | Performed by: NURSE PRACTITIONER

## 2020-01-21 PROCEDURE — 1090F PRES/ABSN URINE INCON ASSESS: CPT | Performed by: NURSE PRACTITIONER

## 2020-01-21 PROCEDURE — 4040F PNEUMOC VAC/ADMIN/RCVD: CPT | Performed by: NURSE PRACTITIONER

## 2020-01-21 PROCEDURE — 1123F ACP DISCUSS/DSCN MKR DOCD: CPT | Performed by: NURSE PRACTITIONER

## 2020-01-21 PROCEDURE — G8417 CALC BMI ABV UP PARAM F/U: HCPCS | Performed by: NURSE PRACTITIONER

## 2020-01-21 PROCEDURE — 1111F DSCHRG MED/CURRENT MED MERGE: CPT | Performed by: NURSE PRACTITIONER

## 2020-01-21 PROCEDURE — G8427 DOCREV CUR MEDS BY ELIG CLIN: HCPCS | Performed by: NURSE PRACTITIONER

## 2020-01-21 PROCEDURE — 99213 OFFICE O/P EST LOW 20 MIN: CPT | Performed by: NURSE PRACTITIONER

## 2020-01-21 PROCEDURE — G8482 FLU IMMUNIZE ORDER/ADMIN: HCPCS | Performed by: NURSE PRACTITIONER

## 2020-01-21 RX ORDER — LOSARTAN POTASSIUM 25 MG/1
25 TABLET ORAL DAILY
Qty: 30 TABLET | Refills: 3 | Status: ON HOLD | OUTPATIENT
Start: 2020-01-21 | End: 2020-03-01 | Stop reason: HOSPADM

## 2020-01-21 ASSESSMENT — ENCOUNTER SYMPTOMS
COUGH: 0
APNEA: 0
WHEEZING: 0
COLOR CHANGE: 0
ABDOMINAL DISTENTION: 0
ABDOMINAL PAIN: 0
SHORTNESS OF BREATH: 1
NAUSEA: 0
CHEST TIGHTNESS: 0

## 2020-01-21 NOTE — PROGRESS NOTES
Lilian       Visit Date: 1/21/2020  Cardiologist:  Dr. Jean Gresham   Primary Care Physician: Dr. Pedro Luna is a 82 y.o. female who presents today for:  Chief Complaint   Patient presents with    Congestive Heart Failure       HPI: Obtained from patient and chart    Sylvia Velez is a 82 y.o. female who presents to the office for a follow up visit in the heart failure clinic. Hx PAD stents 2/2019, current smoker (about a half a pack per day) ?COPD previous smoker (she has Quit!), CKD, AAA repair (EVAR) 2/2019, right fem-tib PTA with stent 2/2019, . Negative stress test 9/2018. She was admitted for CHF and possible COPD exacerbation. Her K+ and Mg+ were both low and replaced. Repeat BMP and Mg were done on 5/30/19 and her Mg+ was 1.2 and K+ was 3.5. I called the patient and orders were placed and she was given Mg 4 gm IV in OP nursing and I also started her on Potassium. Admitted in Dec 2019 for pneumonia with sepsis, flash pulmonary edema. EF was down from 55% to 10-15% grade 2 DD (Dec 2019) now wearing a LifeVest. She becomes fatigued with ADLs and light house work, denies SOB. She lives alone. Sleeps in a bed. She lives alone. She is frustrated with her health over the past year. She denies swelling. Accompanied by: self  Last hospital admission related to Heart Failure:  Dec 2019  Chest Pain: no  Worsening SOB: no  Worsening Orthopnea/PND: no  Edema: no  Any extra diuretic use: no  Weight gain: no  Fatigue: sometimes   Abdominal bloating: sometimes  Appetite: fair  KORINA: no  Cough: no  Compliant checking home weight: yes  Compliant checking blood pressure: yes      Past Medical History:   Diagnosis Date    Arthritis     BPPV (benign paroxysmal positional vertigo) 6/6/16    GERD (gastroesophageal reflux disease)     ?  esophageal stricture    History of blood transfusion     HTN (hypertension)     Hx of blood clots 2018    R leg    Pantoprazole Sodium (PROTONIX) 40 MG PACK packet Take 1 packet by mouth 2 times daily. 60 each 6    acetaminophen (TYLENOL ARTHRITIS PAIN) 650 MG CR tablet Take 1,300 mg by mouth every 12 hours as needed for Pain       timolol (TIMOPTIC) 0.5 % ophthalmic solution 1 drop 2 times daily.  Blood Pressure KIT Check blood pressure daily, and if symptoms of lightheadedness. Call physician if BP <80/40. 1 kit 0    ferrous sulfate 325 (65 Fe) MG tablet Take 325 mg by mouth daily (with breakfast)       No current facility-administered medications for this visit. Allergies   Allergen Reactions    Aspirin Other (See Comments)     Unknown reaction, stated Dr. Javid Hobbs didn't want her to take it anymore    Lasix [Furosemide] Other (See Comments)     PATIENT DOESN'T REMEMBER    Lipitor [Atorvastatin] Other (See Comments)     LEG PAIN    Neurontin [Gabapentin] Other (See Comments)     PATIENT DOESN'T REMEMBER    Oxycontin [Oxycodone Hcl]      confusion    Penicillins Other (See Comments)     HYPERTENSION       SUBJECTIVE:   Review of Systems   Constitutional: Positive for fatigue. Negative for activity change, appetite change and fever. HENT: Negative for congestion. Respiratory: Positive for shortness of breath (with exertion ). Negative for apnea, cough, chest tightness and wheezing. Cardiovascular: Negative for chest pain, palpitations and leg swelling. Gastrointestinal: Negative for abdominal distention, abdominal pain and nausea. Genitourinary: Negative for difficulty urinating and dysuria. Musculoskeletal: Positive for gait problem (walker). Negative for arthralgias. Skin: Negative for color change. Neurological: Negative for dizziness, numbness and headaches. Psychiatric/Behavioral: Negative for agitation, confusion and sleep disturbance. The patient is not nervous/anxious.         OBJECTIVE:   Today's Vitals:  /60   Pulse 68   Wt 158 lb (71.7 kg)   SpO2 90%   BMI 26.29 kg/m² Ventricle   The right ventricular size was normal with normal systolic function and   wall thickness. Pericardial Effusion   The pericardium was normal in appearance with no evidence of a pericardial   effusion. Pleural Effusion   Ascites Vs pleural effusion noted. Aorta / Great Vessels   The aorta is within normal limits. The IVC is dilated . Estimated right atrial pressure is 10-15mmHg . [facility] 12/30/19  CORONARY ANATOMY:  1. Right coronary artery is a dominant vessel. Proximally, it is  patent. In the mid segment, there is a focal 30 to 40% stenosis; beyond  that, there is mild luminal irregularities in the distal RCA. Distal  artery gives rise to a large caliber PL and PDA branches, both of which  are patent. 2.  Left main is patent and gives rise to LAD and left circumflex  artery. 3.  Left circumflex artery is patent in the proximal portion. In mid  segment, there is another focal 30 to 40% stenosis; beyond that, there  are mild luminal irregularities. 4. LAD is patent in the proximal portion. Mid and distal portions are  patent with mild luminal irregularities. Distal part of the LAD tapers  down to a small caliber vessel. There is a large diagonal branch that  is patent. 5.  LVEDP was noted to be 11 mmHg. There is no obvious gradient across  the aortic valve and LV systolic function was roughly estimated at 40%.    The patient tolerated the procedure well without any significant issues  or complications. 5-Romanian sheath was left in place to be pulled with  manual pressure.     She was transferred back to her room in stable condition.     SUMMARY:  Nonobstructive coronary artery disease.     RECOMMENDATIONS:  1. Aggressive risk factor modification. 2.  Optimize heart failure therapy.   3.  Monitor left femoral artery access site for bleeding.     All procedure details and management plans were discussed in detail with  the patient, and she was in agreement with of hypotension so we will have to monitor. · HF Zones reviewed. · Daily weights  · Fluid restriction of 2 Liters per day  · Limit sodium in diet to around 0290-9662 mg/day  · Monitor BP  · Activity as tolerated     Patient was instructed to call the [facility] for changes in the following symptoms:    Weight gain of 3 pounds in 1 day or 5 pounds in 1 week   Increased shortness of breath   Shortness of breath while laying down   Cough   Chest pain   Swelling in feet, ankles or legs   Tenderness or bloating in the abdomen   Fatigue    Decreased appetite or nausea    Confusion      Return in about 3 months (around 4/21/2020). or sooner if needed     Patient given educational materials - see patient instructions. We discussed the importance of weighing oneself and recording daily. We also discussed the importance of a low sodium diet, higher sodium foods to avoid and appropriate low sodium food choices. Discussed use, benefit, and side effects of prescribed medications. All patient questions answered. Patient verbalizes understanding of plan of care using teach back method, and is agreeable to the treatment plan.        Electronicallysigned by ROMAN Jean CNP on 1/21/2020 at 3:55 PM

## 2020-01-21 NOTE — PATIENT INSTRUCTIONS
You may receive a survey regarding the care you received during your visit. Your input is valuable to us. We encourage you to complete and return your survey. We hope you will choose us in the future for your healthcare needs.     Continue:  · Take medications as prescribed   · Daily weights and record  · Fluid restriction of 2 Liters per day  · Limit sodium in diet to around 4140-7189 mg/day  · Monitor BP  · Activity as tolerated     Call the Heart Failure Clinic for any of the following symptoms: [phone]   Weight gain of 3 pounds in 1 day or 5 pounds in 1 week   Increased shortness of breath   Shortness of breath while laying down   Cough   Chest pain   Swelling in feet, ankles or legs   Tenderness or bloating in the abdomen   Fatigue    Decreased appetite or nausea    Confusion     PLEASE bring all medications in original bottles with you to your next appointment

## 2020-01-28 ENCOUNTER — TELEPHONE (OUTPATIENT)
Dept: CARDIOLOGY CLINIC | Age: 85
End: 2020-01-28

## 2020-01-28 LAB
ANION GAP SERPL CALCULATED.3IONS-SCNC: 5 MMOL/L (ref 4–12)
BUN BLDV-MCNC: 32 MG/DL (ref 7–20)
CALCIUM SERPL-MCNC: 9 MG/DL (ref 8.8–10.5)
CHLORIDE BLD-SCNC: 103 MEQ/L (ref 101–111)
CO2: 30 MEQ/L (ref 21–32)
CREAT SERPL-MCNC: 1.34 MG/DL (ref 0.6–1.3)
CREATININE CLEARANCE: 38
GLUCOSE: 101 MG/DL (ref 70–110)
POTASSIUM SERPL-SCNC: 4.2 MEQ/L (ref 3.6–5)
SODIUM BLD-SCNC: 138 MEQ/L (ref 135–145)

## 2020-01-28 NOTE — TELEPHONE ENCOUNTER
Lab results given to pt, she states she feels like her BPs have been good     109/65  107/62  108/62  104/71  116/66    She feels good other than being treated for a UTI.

## 2020-01-28 NOTE — TELEPHONE ENCOUNTER
BMP reviewed  Kidney function near her baseline    Please see how her BPs are since we increased the Losartan    If ok we will continue meds as is for now  Keep appt with me next month

## 2020-02-13 ENCOUNTER — OFFICE VISIT (OUTPATIENT)
Dept: CARDIOLOGY CLINIC | Age: 85
End: 2020-02-13
Payer: MEDICARE

## 2020-02-13 VITALS
HEART RATE: 56 BPM | SYSTOLIC BLOOD PRESSURE: 100 MMHG | OXYGEN SATURATION: 97 % | BODY MASS INDEX: 26.13 KG/M2 | DIASTOLIC BLOOD PRESSURE: 60 MMHG | WEIGHT: 157 LBS

## 2020-02-13 PROCEDURE — G8400 PT W/DXA NO RESULTS DOC: HCPCS | Performed by: NURSE PRACTITIONER

## 2020-02-13 PROCEDURE — 99213 OFFICE O/P EST LOW 20 MIN: CPT | Performed by: NURSE PRACTITIONER

## 2020-02-13 PROCEDURE — 4040F PNEUMOC VAC/ADMIN/RCVD: CPT | Performed by: NURSE PRACTITIONER

## 2020-02-13 PROCEDURE — 1036F TOBACCO NON-USER: CPT | Performed by: NURSE PRACTITIONER

## 2020-02-13 PROCEDURE — G8427 DOCREV CUR MEDS BY ELIG CLIN: HCPCS | Performed by: NURSE PRACTITIONER

## 2020-02-13 PROCEDURE — 1090F PRES/ABSN URINE INCON ASSESS: CPT | Performed by: NURSE PRACTITIONER

## 2020-02-13 PROCEDURE — G8417 CALC BMI ABV UP PARAM F/U: HCPCS | Performed by: NURSE PRACTITIONER

## 2020-02-13 PROCEDURE — 1123F ACP DISCUSS/DSCN MKR DOCD: CPT | Performed by: NURSE PRACTITIONER

## 2020-02-13 PROCEDURE — G8482 FLU IMMUNIZE ORDER/ADMIN: HCPCS | Performed by: NURSE PRACTITIONER

## 2020-02-13 RX ORDER — NICOTINE 14MG/24HR
PATCH, TRANSDERMAL 24 HOURS TRANSDERMAL
COMMUNITY
Start: 2020-01-17 | End: 2020-07-22

## 2020-02-13 ASSESSMENT — ENCOUNTER SYMPTOMS
SHORTNESS OF BREATH: 1
NAUSEA: 0
ABDOMINAL PAIN: 0
ABDOMINAL DISTENTION: 0
COUGH: 0
COLOR CHANGE: 0
CHEST TIGHTNESS: 0
WHEEZING: 0
APNEA: 0

## 2020-02-13 NOTE — PROGRESS NOTES
History of blood transfusion     HTN (hypertension)     Hx of blood clots 2018    R leg    Hyperlipidemia     Obesity     Osteopenia     PAC (premature atrial contraction)     on betablocker    Paralysis (HCC)     baby    Pedal edema     denied any hx of hypertension    PVC (premature ventricular contraction)     PVD (peripheral vascular disease) (HCC)     Tinnitus     UTI (urinary tract infection)      Past Surgical History:   Procedure Laterality Date    ABDOMINAL AORTIC ANEURYSM REPAIR, ENDOVASCULAR N/A 2/15/2019    ABDOMINAL AORTIC ANEURYSM REPAIR ENDOVASCULAR, DECLOTTING RIGHT FEM TIB BYPASS GRAFT performed by Destiny Beal MD at [address]    lumpectomy    CHOLECYSTECTOMY      30 years ago    COLONOSCOPY  08/06/2018    Dr Guerrero Stover N/A 2/22/2019    COLONOSCOPY DIAGNOSTIC performed by Soy Cardona MD at [address] [facility]      eye, eyelids    EYE SURGERY      cataract    HYSTERECTOMY      LARYNGOSCOPY  07/15/2016    SC OLEGARIO SKN SUB GRFT T/A/L AREA/<100SCM /<1ST 25 SCM N/A 9/6/2018    RE-EXPLORATION RIGHT GROIN, DECLOTTING OF FEMORAL BYPASS GRAFT performed by Atilio Whittaker MD at [address] EGD TRANSORAL BIOPSY SINGLE/MULTIPLE Left 11/27/2017    EGD BIOPSY performed by Juanis Marin MD at [address] [place] [address], PARTIAL, [facility] N/A 8/20/2018    ROBOT ASSISTED COLECTOMY (LOW ANTERIOR) performed by Cristian Bui MD at [address] OFFICE/OUTPT [address] [facility] N/A 9/5/2018    RIGHT FEMORAL EMBOLECTOMY, INTRAOPERATIVE ARTERIOGRAM, RIGHT ILIAC EMBOLECTOMY, RIGHT COMMON AND EXTERNAL ILIAC STENTING, RIGHT FEMORAL TO ANTERIOR POPLITEAL BYPASS WITH 6MM GORTEX GRAFT performed by Atilio Whittaker MD at [address] / [address] Right 2/14/2019    FEMORAL EMBOLECTOMY THROMBECTOMY, RIGHT LEG performed by Atilio Whittaker MD at [address] breakfast)      pravastatin (PRAVACHOL) 40 MG tablet Take 1 tablet by mouth daily 90 tablet 3    potassium chloride (KLOR-CON M) 10 MEQ extended release tablet Take 1 tablet by mouth daily 180 tablet 2    clopidogrel (PLAVIX) 75 MG tablet Take 1 tablet by mouth daily 90 tablet 3    melatonin 3 MG TABS tablet Take 2 tablets by mouth nightly as needed (sleep) 60 tablet 3    Pantoprazole Sodium (PROTONIX) 40 MG PACK packet Take 1 packet by mouth 2 times daily. 60 each 6    acetaminophen (TYLENOL ARTHRITIS PAIN) 650 MG CR tablet Take 1,300 mg by mouth every 12 hours as needed for Pain       timolol (TIMOPTIC) 0.5 % ophthalmic solution 1 drop 2 times daily. No current facility-administered medications for this visit. Allergies   Allergen Reactions    Aspirin Other (See Comments)     Unknown reaction, stated Dr. Minh Chaves didn't want her to take it anymore    Lasix [Furosemide] Other (See Comments)     PATIENT DOESN'T REMEMBER    Lipitor [Atorvastatin] Other (See Comments)     LEG PAIN    Neurontin [Gabapentin] Other (See Comments)     PATIENT DOESN'T REMEMBER    Oxycontin [Oxycodone Hcl]      confusion    Penicillins Other (See Comments)     HYPERTENSION       SUBJECTIVE:   Review of Systems   Constitutional: Positive for fatigue. Negative for activity change, appetite change and fever. HENT: Negative for congestion. Respiratory: Positive for shortness of breath (with exertion ). Negative for apnea, cough, chest tightness and wheezing. Cardiovascular: Negative for chest pain, palpitations and leg swelling. Gastrointestinal: Negative for abdominal distention, abdominal pain and nausea. Genitourinary: Negative for difficulty urinating and dysuria. Musculoskeletal: Positive for gait problem (walker - has been using less). Negative for arthralgias. Skin: Negative for color change. Neurological: Negative for dizziness, numbness and headaches.    Psychiatric/Behavioral: Negative for pseudonormal left ventricular filling   pattern, with concomitant abnormal relaxation and increased filling   pressure (grade 2 diastolic dysfunction). Structurally normal mitral valve. Mild to Moderate mitral regurgitation is present. Tricuspid valve is structurally normal.   Mild tricuspid regurgitation. Right ventricular systolic pressure measures 35-40mmHg. Ascites Vs pleural effusion noted. The aorta is within normal limits. The IVC is dilated . Estimated right atrial pressure is 10-15mmHg . Signature      ----------------------------------------------------------------   Electronically signed by Alexis Roberts MD (Interpreting   physician) on 12/28/2019 at 06:31 PM   ----------------------------------------------------------------      Findings      Mitral Valve   Structurally normal mitral valve. Mild to Moderate mitral regurgitation is present. Aortic Valve   The aortic valve was trileaflet with normal thickness and cuspal   separation. DOPPLER: Transaortic velocity was within the normal range with   no evidence of aortic stenosis. There was no evidence of aortic   regurgitation. Tricuspid Valve   Tricuspid valve is structurally normal.   Mild tricuspid regurgitation. Right ventricular systolic pressure measures 35-40mmHg. Pulmonic Valve   The pulmonic valve leaflets exhibited normal thickness, no calcification,   and normal cuspal separation. DOPPLER: The transpulmonic velocity was   within the normal range with no evidence for regurgitation. Left Atrium   Mildly dilated left atrium. Left Ventricle   Normal left ventricular wall thickness. Left Ventricular size is Moderately increased . There was severe global hypokinesis of the left ventricle. Ejection fraction is visually estimated in the range of 10% to 15%.    Features were consistent with a pseudonormal left ventricular filling   pattern, with concomitant abnormal relaxation and increased filling

## 2020-02-20 ENCOUNTER — HOSPITAL ENCOUNTER (INPATIENT)
Age: 85
LOS: 10 days | Discharge: HOME | DRG: 329 | End: 2020-03-01
Attending: EMERGENCY MEDICINE | Admitting: FAMILY MEDICINE
Payer: MEDICARE

## 2020-02-20 PROBLEM — K62.5 RECTAL BLEED: Status: ACTIVE | Noted: 2020-02-20

## 2020-02-20 LAB
ALBUMIN SERPL-MCNC: 3.5 G/DL (ref 3.5–5.1)
ALP BLD-CCNC: 96 U/L (ref 38–126)
ALT SERPL-CCNC: 6 U/L (ref 11–66)
ANION GAP SERPL CALCULATED.3IONS-SCNC: 15 MEQ/L (ref 8–16)
APTT: 41.7 SECONDS (ref 22–38)
AST SERPL-CCNC: 14 U/L (ref 5–40)
BASOPHILS # BLD: 0.4 %
BASOPHILS ABSOLUTE: 0 THOU/MM3 (ref 0–0.1)
BILIRUB SERPL-MCNC: 0.3 MG/DL (ref 0.3–1.2)
BILIRUBIN DIRECT: < 0.2 MG/DL (ref 0–0.3)
BUN BLDV-MCNC: 39 MG/DL (ref 7–22)
CALCIUM SERPL-MCNC: 9 MG/DL (ref 8.5–10.5)
CHLORIDE BLD-SCNC: 102 MEQ/L (ref 98–111)
CO2: 22 MEQ/L (ref 23–33)
CREAT SERPL-MCNC: 1.4 MG/DL (ref 0.4–1.2)
EKG ATRIAL RATE: 89 BPM
EKG P AXIS: 25 DEGREES
EKG P-R INTERVAL: 160 MS
EKG Q-T INTERVAL: 376 MS
EKG QRS DURATION: 84 MS
EKG QTC CALCULATION (BAZETT): 457 MS
EKG R AXIS: -58 DEGREES
EKG T AXIS: 102 DEGREES
EKG VENTRICULAR RATE: 89 BPM
EOSINOPHIL # BLD: 1.1 %
EOSINOPHILS ABSOLUTE: 0.1 THOU/MM3 (ref 0–0.4)
ERYTHROCYTE [DISTWIDTH] IN BLOOD BY AUTOMATED COUNT: 17.4 % (ref 11.5–14.5)
ERYTHROCYTE [DISTWIDTH] IN BLOOD BY AUTOMATED COUNT: 49.1 FL (ref 35–45)
GFR SERPL CREATININE-BSD FRML MDRD: 36 ML/MIN/1.73M2
GLUCOSE BLD-MCNC: 161 MG/DL (ref 70–108)
HCT VFR BLD CALC: 37.3 % (ref 37–47)
HEMOGLOBIN: 11.2 GM/DL (ref 12–16)
IMMATURE GRANS (ABS): 0.02 THOU/MM3 (ref 0–0.07)
IMMATURE GRANULOCYTES: 0.3 %
INR BLD: 1.8 (ref 0.85–1.13)
LIPASE: 35.4 U/L (ref 5.6–51.3)
LYMPHOCYTES # BLD: 13.1 %
LYMPHOCYTES ABSOLUTE: 0.9 THOU/MM3 (ref 1–4.8)
MCH RBC QN AUTO: 23.6 PG (ref 26–33)
MCHC RBC AUTO-ENTMCNC: 30 GM/DL (ref 32.2–35.5)
MCV RBC AUTO: 78.7 FL (ref 81–99)
MONOCYTES # BLD: 8 %
MONOCYTES ABSOLUTE: 0.6 THOU/MM3 (ref 0.4–1.3)
NUCLEATED RED BLOOD CELLS: 0 /100 WBC
OSMOLALITY CALCULATION: 290.4 MOSMOL/KG (ref 275–300)
PLATELET # BLD: 205 THOU/MM3 (ref 130–400)
PMV BLD AUTO: 11.2 FL (ref 9.4–12.4)
POTASSIUM SERPL-SCNC: 4.4 MEQ/L (ref 3.5–5.2)
RBC # BLD: 4.74 MILL/MM3 (ref 4.2–5.4)
SEG NEUTROPHILS: 77.1 %
SEGMENTED NEUTROPHILS ABSOLUTE COUNT: 5.4 THOU/MM3 (ref 1.8–7.7)
SODIUM BLD-SCNC: 139 MEQ/L (ref 135–145)
TOTAL PROTEIN: 6.2 G/DL (ref 6.1–8)
TROPONIN T: < 0.01 NG/ML
WBC # BLD: 7 THOU/MM3 (ref 4.8–10.8)

## 2020-02-20 PROCEDURE — 2709999900 HC NON-CHARGEABLE SUPPLY

## 2020-02-20 PROCEDURE — 96365 THER/PROPH/DIAG IV INF INIT: CPT

## 2020-02-20 PROCEDURE — C9113 INJ PANTOPRAZOLE SODIUM, VIA: HCPCS | Performed by: EMERGENCY MEDICINE

## 2020-02-20 PROCEDURE — 93005 ELECTROCARDIOGRAM TRACING: CPT | Performed by: EMERGENCY MEDICINE

## 2020-02-20 PROCEDURE — 2060000000 HC ICU INTERMEDIATE R&B

## 2020-02-20 PROCEDURE — 82248 BILIRUBIN DIRECT: CPT

## 2020-02-20 PROCEDURE — 6360000002 HC RX W HCPCS: Performed by: EMERGENCY MEDICINE

## 2020-02-20 PROCEDURE — 87500 VANOMYCIN DNA AMP PROBE: CPT

## 2020-02-20 PROCEDURE — 84484 ASSAY OF TROPONIN QUANT: CPT

## 2020-02-20 PROCEDURE — 83690 ASSAY OF LIPASE: CPT

## 2020-02-20 PROCEDURE — 99223 1ST HOSP IP/OBS HIGH 75: CPT | Performed by: PHYSICIAN ASSISTANT

## 2020-02-20 PROCEDURE — 85730 THROMBOPLASTIN TIME PARTIAL: CPT

## 2020-02-20 PROCEDURE — 2580000003 HC RX 258: Performed by: PHYSICIAN ASSISTANT

## 2020-02-20 PROCEDURE — 36415 COLL VENOUS BLD VENIPUNCTURE: CPT

## 2020-02-20 PROCEDURE — 87641 MR-STAPH DNA AMP PROBE: CPT

## 2020-02-20 PROCEDURE — 85025 COMPLETE CBC W/AUTO DIFF WBC: CPT

## 2020-02-20 PROCEDURE — 80053 COMPREHEN METABOLIC PANEL: CPT

## 2020-02-20 PROCEDURE — 85610 PROTHROMBIN TIME: CPT

## 2020-02-20 PROCEDURE — 2580000003 HC RX 258: Performed by: EMERGENCY MEDICINE

## 2020-02-20 PROCEDURE — 87081 CULTURE SCREEN ONLY: CPT

## 2020-02-20 PROCEDURE — 99285 EMERGENCY DEPT VISIT HI MDM: CPT

## 2020-02-20 RX ORDER — PANTOPRAZOLE SODIUM 40 MG/10ML
40 INJECTION, POWDER, LYOPHILIZED, FOR SOLUTION INTRAVENOUS DAILY
Status: DISCONTINUED | OUTPATIENT
Start: 2020-02-23 | End: 2020-02-24

## 2020-02-20 RX ORDER — CLOPIDOGREL BISULFATE 75 MG/1
75 TABLET ORAL DAILY
Status: DISCONTINUED | OUTPATIENT
Start: 2020-02-21 | End: 2020-03-01 | Stop reason: HOSPADM

## 2020-02-20 RX ORDER — METOPROLOL SUCCINATE 25 MG/1
25 TABLET, EXTENDED RELEASE ORAL DAILY
Status: DISCONTINUED | OUTPATIENT
Start: 2020-02-21 | End: 2020-03-01 | Stop reason: HOSPADM

## 2020-02-20 RX ORDER — LANOLIN ALCOHOL/MO/W.PET/CERES
6 CREAM (GRAM) TOPICAL NIGHTLY PRN
Status: DISCONTINUED | OUTPATIENT
Start: 2020-02-20 | End: 2020-03-01 | Stop reason: HOSPADM

## 2020-02-20 RX ORDER — SODIUM CHLORIDE 0.9 % (FLUSH) 0.9 %
10 SYRINGE (ML) INJECTION EVERY 12 HOURS SCHEDULED
Status: DISCONTINUED | OUTPATIENT
Start: 2020-02-20 | End: 2020-03-01 | Stop reason: HOSPADM

## 2020-02-20 RX ORDER — BUMETANIDE 1 MG/1
1 TABLET ORAL DAILY
Status: DISCONTINUED | OUTPATIENT
Start: 2020-02-21 | End: 2020-03-01 | Stop reason: HOSPADM

## 2020-02-20 RX ORDER — PROMETHAZINE HYDROCHLORIDE 25 MG/1
12.5 TABLET ORAL EVERY 6 HOURS PRN
Status: DISCONTINUED | OUTPATIENT
Start: 2020-02-20 | End: 2020-03-01 | Stop reason: HOSPADM

## 2020-02-20 RX ORDER — PRAVASTATIN SODIUM 40 MG
40 TABLET ORAL DAILY
Status: DISCONTINUED | OUTPATIENT
Start: 2020-02-21 | End: 2020-03-01 | Stop reason: HOSPADM

## 2020-02-20 RX ORDER — ONDANSETRON 2 MG/ML
4 INJECTION INTRAMUSCULAR; INTRAVENOUS EVERY 6 HOURS PRN
Status: DISCONTINUED | OUTPATIENT
Start: 2020-02-20 | End: 2020-03-01 | Stop reason: HOSPADM

## 2020-02-20 RX ORDER — ACETAMINOPHEN 325 MG/1
650 TABLET ORAL EVERY 4 HOURS PRN
Status: DISCONTINUED | OUTPATIENT
Start: 2020-02-20 | End: 2020-03-01 | Stop reason: HOSPADM

## 2020-02-20 RX ORDER — SODIUM CHLORIDE 0.9 % (FLUSH) 0.9 %
10 SYRINGE (ML) INJECTION PRN
Status: DISCONTINUED | OUTPATIENT
Start: 2020-02-20 | End: 2020-03-01 | Stop reason: HOSPADM

## 2020-02-20 RX ORDER — SODIUM CHLORIDE 9 MG/ML
10 INJECTION INTRAVENOUS DAILY
Status: DISCONTINUED | OUTPATIENT
Start: 2020-02-23 | End: 2020-02-24

## 2020-02-20 RX ORDER — 0.9 % SODIUM CHLORIDE 0.9 %
500 INTRAVENOUS SOLUTION INTRAVENOUS ONCE
Status: COMPLETED | OUTPATIENT
Start: 2020-02-20 | End: 2020-02-20

## 2020-02-20 RX ORDER — 0.9 % SODIUM CHLORIDE 0.9 %
500 INTRAVENOUS SOLUTION INTRAVENOUS ONCE
Status: COMPLETED | OUTPATIENT
Start: 2020-02-20 | End: 2020-02-21

## 2020-02-20 RX ORDER — LOSARTAN POTASSIUM 25 MG/1
25 TABLET ORAL DAILY
Status: DISCONTINUED | OUTPATIENT
Start: 2020-02-21 | End: 2020-02-23

## 2020-02-20 RX ADMIN — SODIUM CHLORIDE 500 ML: 9 INJECTION, SOLUTION INTRAVENOUS at 23:50

## 2020-02-20 RX ADMIN — SODIUM CHLORIDE 80 MG: 9 INJECTION, SOLUTION INTRAVENOUS at 22:01

## 2020-02-20 RX ADMIN — SODIUM CHLORIDE 500 ML: 9 INJECTION, SOLUTION INTRAVENOUS at 21:16

## 2020-02-20 RX ADMIN — SODIUM CHLORIDE 8 MG/HR: 9 INJECTION, SOLUTION INTRAVENOUS at 22:35

## 2020-02-20 ASSESSMENT — ENCOUNTER SYMPTOMS
PHOTOPHOBIA: 0
SORE THROAT: 0
BLOOD IN STOOL: 1
CONSTIPATION: 0
ABDOMINAL PAIN: 0
ABDOMINAL DISTENTION: 0
EYE REDNESS: 0
DIARRHEA: 0
EYE ITCHING: 0
VOMITING: 0
WHEEZING: 0
CHEST TIGHTNESS: 0
EYE PAIN: 0
SHORTNESS OF BREATH: 0
COUGH: 0
BACK PAIN: 0
ANAL BLEEDING: 1
EYE DISCHARGE: 0
STRIDOR: 0
RHINORRHEA: 0
NAUSEA: 0

## 2020-02-20 ASSESSMENT — PAIN SCALES - GENERAL: PAINLEVEL_OUTOF10: 0

## 2020-02-21 LAB
ANION GAP SERPL CALCULATED.3IONS-SCNC: 12 MEQ/L (ref 8–16)
APTT: 36.3 SECONDS (ref 22–38)
BASOPHILS # BLD: 0.5 %
BASOPHILS ABSOLUTE: 0 THOU/MM3 (ref 0–0.1)
BUN BLDV-MCNC: 29 MG/DL (ref 7–22)
CALCIUM SERPL-MCNC: 8.7 MG/DL (ref 8.5–10.5)
CHLORIDE BLD-SCNC: 108 MEQ/L (ref 98–111)
CO2: 23 MEQ/L (ref 23–33)
CREAT SERPL-MCNC: 1.1 MG/DL (ref 0.4–1.2)
EOSINOPHIL # BLD: 1.6 %
EOSINOPHILS ABSOLUTE: 0.1 THOU/MM3 (ref 0–0.4)
ERYTHROCYTE [DISTWIDTH] IN BLOOD BY AUTOMATED COUNT: 17.3 % (ref 11.5–14.5)
ERYTHROCYTE [DISTWIDTH] IN BLOOD BY AUTOMATED COUNT: 51 FL (ref 35–45)
GFR SERPL CREATININE-BSD FRML MDRD: 47 ML/MIN/1.73M2
GLUCOSE BLD-MCNC: 98 MG/DL (ref 70–108)
HCT VFR BLD CALC: 31.9 % (ref 37–47)
HCT VFR BLD CALC: 32.1 % (ref 37–47)
HCT VFR BLD CALC: 33.1 % (ref 37–47)
HCT VFR BLD CALC: 34.7 % (ref 37–47)
HEMOGLOBIN: 10.1 GM/DL (ref 12–16)
HEMOGLOBIN: 9.3 GM/DL (ref 12–16)
HEMOGLOBIN: 9.5 GM/DL (ref 12–16)
HEMOGLOBIN: 9.8 GM/DL (ref 12–16)
IMMATURE GRANS (ABS): 0.02 THOU/MM3 (ref 0–0.07)
IMMATURE GRANULOCYTES: 0.4 %
INR BLD: 1.43 (ref 0.85–1.13)
LYMPHOCYTES # BLD: 18.7 %
LYMPHOCYTES ABSOLUTE: 1 THOU/MM3 (ref 1–4.8)
MCH RBC QN AUTO: 23.8 PG (ref 26–33)
MCHC RBC AUTO-ENTMCNC: 29.6 GM/DL (ref 32.2–35.5)
MCV RBC AUTO: 80.3 FL (ref 81–99)
MONOCYTES # BLD: 7.2 %
MONOCYTES ABSOLUTE: 0.4 THOU/MM3 (ref 0.4–1.3)
MRSA SCREEN RT-PCR: NEGATIVE
NUCLEATED RED BLOOD CELLS: 0 /100 WBC
PLATELET # BLD: 170 THOU/MM3 (ref 130–400)
PMV BLD AUTO: 12.1 FL (ref 9.4–12.4)
POTASSIUM SERPL-SCNC: 4.3 MEQ/L (ref 3.5–5.2)
RBC # BLD: 4 MILL/MM3 (ref 4.2–5.4)
REASON FOR REJECTION: NORMAL
REJECTED TEST: NORMAL
SEG NEUTROPHILS: 71.6 %
SEGMENTED NEUTROPHILS ABSOLUTE COUNT: 4 THOU/MM3 (ref 1.8–7.7)
SODIUM BLD-SCNC: 143 MEQ/L (ref 135–145)
VANCOMYCIN RESISTANT ENTEROCOCCUS: NEGATIVE
WBC # BLD: 5.6 THOU/MM3 (ref 4.8–10.8)

## 2020-02-21 PROCEDURE — 86901 BLOOD TYPING SEROLOGIC RH(D): CPT

## 2020-02-21 PROCEDURE — 85610 PROTHROMBIN TIME: CPT

## 2020-02-21 PROCEDURE — 94760 N-INVAS EAR/PLS OXIMETRY 1: CPT

## 2020-02-21 PROCEDURE — 36415 COLL VENOUS BLD VENIPUNCTURE: CPT

## 2020-02-21 PROCEDURE — 6370000000 HC RX 637 (ALT 250 FOR IP): Performed by: STUDENT IN AN ORGANIZED HEALTH CARE EDUCATION/TRAINING PROGRAM

## 2020-02-21 PROCEDURE — 2060000000 HC ICU INTERMEDIATE R&B

## 2020-02-21 PROCEDURE — 85014 HEMATOCRIT: CPT

## 2020-02-21 PROCEDURE — 85730 THROMBOPLASTIN TIME PARTIAL: CPT

## 2020-02-21 PROCEDURE — C9113 INJ PANTOPRAZOLE SODIUM, VIA: HCPCS | Performed by: EMERGENCY MEDICINE

## 2020-02-21 PROCEDURE — 80048 BASIC METABOLIC PNL TOTAL CA: CPT

## 2020-02-21 PROCEDURE — 85018 HEMOGLOBIN: CPT

## 2020-02-21 PROCEDURE — 2580000003 HC RX 258: Performed by: EMERGENCY MEDICINE

## 2020-02-21 PROCEDURE — 99232 SBSQ HOSP IP/OBS MODERATE 35: CPT | Performed by: PHYSICIAN ASSISTANT

## 2020-02-21 PROCEDURE — 86850 RBC ANTIBODY SCREEN: CPT

## 2020-02-21 PROCEDURE — 86900 BLOOD TYPING SEROLOGIC ABO: CPT

## 2020-02-21 PROCEDURE — 2709999900 HC NON-CHARGEABLE SUPPLY

## 2020-02-21 PROCEDURE — 6360000002 HC RX W HCPCS: Performed by: EMERGENCY MEDICINE

## 2020-02-21 PROCEDURE — 85025 COMPLETE CBC W/AUTO DIFF WBC: CPT

## 2020-02-21 PROCEDURE — 6370000000 HC RX 637 (ALT 250 FOR IP): Performed by: PHYSICIAN ASSISTANT

## 2020-02-21 PROCEDURE — 99223 1ST HOSP IP/OBS HIGH 75: CPT | Performed by: NUCLEAR MEDICINE

## 2020-02-21 PROCEDURE — 2580000003 HC RX 258: Performed by: PHYSICIAN ASSISTANT

## 2020-02-21 RX ORDER — SODIUM CHLORIDE 0.9 % (FLUSH) 0.9 %
10 SYRINGE (ML) INJECTION EVERY 12 HOURS SCHEDULED
Status: DISCONTINUED | OUTPATIENT
Start: 2020-02-21 | End: 2020-02-23

## 2020-02-21 RX ORDER — SODIUM CHLORIDE 9 MG/ML
INJECTION, SOLUTION INTRAVENOUS CONTINUOUS
Status: DISCONTINUED | OUTPATIENT
Start: 2020-02-21 | End: 2020-02-24

## 2020-02-21 RX ORDER — PANTOPRAZOLE SODIUM 40 MG/1
40 TABLET, DELAYED RELEASE ORAL
COMMUNITY

## 2020-02-21 RX ORDER — SODIUM CHLORIDE 0.9 % (FLUSH) 0.9 %
10 SYRINGE (ML) INJECTION PRN
Status: DISCONTINUED | OUTPATIENT
Start: 2020-02-21 | End: 2020-03-01 | Stop reason: HOSPADM

## 2020-02-21 RX ORDER — SENNA PLUS 8.6 MG/1
15 TABLET ORAL ONCE
Status: COMPLETED | OUTPATIENT
Start: 2020-02-21 | End: 2020-02-21

## 2020-02-21 RX ORDER — POLYETHYLENE GLYCOL 3350 17 G/17G
238 POWDER, FOR SOLUTION ORAL ONCE
Status: COMPLETED | OUTPATIENT
Start: 2020-02-21 | End: 2020-02-21

## 2020-02-21 RX ADMIN — POLYETHYLENE GLYCOL 3350 238 G: 17 POWDER, FOR SOLUTION ORAL at 12:22

## 2020-02-21 RX ADMIN — SODIUM CHLORIDE: 9 INJECTION, SOLUTION INTRAVENOUS at 05:19

## 2020-02-21 RX ADMIN — SENNOSIDES 129 MG: 8.6 TABLET, FILM COATED ORAL at 12:20

## 2020-02-21 RX ADMIN — PRAVASTATIN SODIUM 40 MG: 40 TABLET ORAL at 07:42

## 2020-02-21 RX ADMIN — SODIUM CHLORIDE 8 MG/HR: 9 INJECTION, SOLUTION INTRAVENOUS at 07:18

## 2020-02-21 ASSESSMENT — PAIN SCALES - GENERAL
PAINLEVEL_OUTOF10: 0

## 2020-02-21 ASSESSMENT — ENCOUNTER SYMPTOMS
NAUSEA: 0
COUGH: 0
SHORTNESS OF BREATH: 1
BLOOD IN STOOL: 1
EYES NEGATIVE: 1
ABDOMINAL PAIN: 0
ABDOMINAL DISTENTION: 0
DIARRHEA: 0
ALLERGIC/IMMUNOLOGIC NEGATIVE: 1

## 2020-02-21 NOTE — PROCEDURES
[facility]  Colonoscopy    Patient: Ayan Barros  : 1935  Acct#: [de-identified]      PHYSICIAN:  Betty Calhoun MD    PREPROCEDURE DIAGNOSIS:  This is a 82 y.o., female, with history of bright red blood per rectum. She had partial colon resection with Dr. India Malloy at [facility] in 2018. CHF currently on an external defibrillator, Life Vest, chronic atrial fibrillation on Eliquis, history of ruptured AAA s/p repair at [facility] in 2019, history of PVD with right fem-tib angioplasty and stenting in 2019 with with Dr. Milady Taylor currently on Plavix prior to arrival.  Has achalasia. MEDICATIONS: Conscious Sedation, versed 2 mg and fentanyl 50 mcg IV. IVCS start time:  805  IVCS stop time:  827  Transfer to recovery: 850          Airway: was adequate. DESCRIPTION OF PROCEDURE: The risks of bleeding, trauma, infection, perforation, and medication-related cardiac and respiratory complications were discussed and written informed consent was obtained. After obtaining informed consent, a rectal exam was done. The patient was continuously monitored to ensure adequate sedation and patient safety. Subsequently, the colonoscope was placed in the rectum and advanced to the ileocecal valve and cecum. The scope was  removed slowly while carefully examining color, mucosa, texture and anatomy of the colon were carefully examined with the scope. Retroflexion was performed in the rectum to evaluate for hemorrhoids and anorectal pathology. Findings and maneuvers are listed in impression below. The scope was removed. The patient was moved to the recovery area. Complications: The patient tolerated the procedure well. There were no immediate complications. Difficulty of procedure: None    Quality of colon prep: Adequate    Withdrawal time: N/A missing most of sigmoid colon. EBL : < 10 mL    IMPRESSION:   1. Cecal Mass Lesion - 4 cm size, opposite wall of the ileocecal valve.    2. Two

## 2020-02-21 NOTE — PROGRESS NOTES
Pt arrived in 4K02 from ED. Complaints: GI Bleed. IV Protonix infusing into the wrist left, condition patent and no redness at a rate of 10 mls/ hour with about 50 mls remaining in the bag.     The patient is interested in [facility]. [facility] meds to [facility] program?:  No

## 2020-02-21 NOTE — ED PROVIDER NOTES
Shakir [address] COMPLAINT       Chief Complaint   Patient presents with    Rectal Bleeding       Nurses Notes reviewed and I agreeexcept as noted in the HPI. HISTORY OF PRESENT ILLNESS    Ayan Barros is a 82 y.o. female who presents to Emergency Department with Rectal Bleeding    Patient presents to ED because of bright red blood per rectum in the last 2 hours. Patient had two episodes of pure bloody bowel movements. This never happened before. Her past medical history remarkable for COPD, tobacco abuse, CHF currently on an external defibrillator, chronic atrial fibrillation on Eliquis, history of ruptured AAA s/p repair at [facility] in 02/2019, history of PVD with right fem-tib angioplasty and stenting in 02/2019 with with Dr. Milady Taylor currently on Plavix. Last colonoscopy was in 11/2017 first done by GI Dr. Frandy Lazo with multiple polys identified, then patient was referred to see GS and later had partial colon resection with Dr. India Malloy at [facility] in 08/2018. She has mild dizziness. She denies headache. No chest pain, no shortness of breath. She has no nausea vomiting, no hematemesis. No abdominal pain. No anal pain. REVIEW OF SYSTEMS     Review of Systems   Constitutional: Negative for activity change, appetite change, chills, fatigue, fever and unexpected weight change. HENT: Negative for congestion, ear discharge, ear pain, hearing loss, nosebleeds, rhinorrhea and sore throat. Eyes: Negative for photophobia, pain, discharge, redness and itching. Respiratory: Negative for cough, chest tightness, shortness of breath, wheezing and stridor. Cardiovascular: Negative for chest pain, palpitations and leg swelling. Gastrointestinal: Positive for anal bleeding and blood in stool. Negative for abdominal distention, abdominal pain, constipation, diarrhea, nausea and vomiting.    Endocrine: Negative for cold intolerance, heat intolerance, polydipsia and polyphagia. Genitourinary: Negative for dysuria, flank pain, frequency and hematuria. Musculoskeletal: Negative for arthralgias, back pain, gait problem, myalgias, neck pain and neck stiffness. Skin: Negative for pallor, rash and wound. Allergic/Immunologic: Negative for environmental allergies and food allergies. Neurological: Positive for dizziness. Negative for tremors, syncope, weakness and headaches. Psychiatric/Behavioral: Negative for agitation, behavioral problems, confusion, self-injury, sleep disturbance and suicidal ideas. PAST MEDICAL HISTORY    has a past medical history of Arthritis, Blood circulation, collateral, BPPV (benign paroxysmal positional vertigo), CHF (congestive heart failure) ([facility] [address] Chronic kidney disease, GERD (gastroesophageal reflux disease), History of blood transfusion, HTN (hypertension), Hx of blood clots, Hyperlipidemia, Neuromuscular disorder ([address] Obesity, Osteopenia, PAC (premature atrial contraction), Paralysis ([address] Pedal edema, Pneumonia, PVC (premature ventricular contraction), PVD (peripheral vascular disease) ([facility] [address] Tinnitus, and UTI (urinary tract infection). SURGICAL HISTORY      has a past surgical history that includes Cholecystectomy; Breast surgery (Right, 1958); Hysterectomy; Appendectomy; Cosmetic surgery; eye surgery; pr egd transoral biopsy single/multiple (Left, 11/27/2017); Upper gastrointestinal endoscopy (N/A, 11/27/2017); Colonoscopy (08/06/2018); laryngoscopy (07/15/2016); pr lap,surg,colectomy, partial, w/anast (N/A, 8/20/2018); pr office/outpt visit,procedure only (N/A, 9/5/2018); pr preet skn sub grft t/a/l area/<100scm /<1st 25 scm (N/A, 9/6/2018); THROMBECTOMY / EMBOLECTOMY FEMORAL (Right, 2/14/2019); AAA repair, endovascular (N/A, 2/15/2019); Upper gastrointestinal endoscopy (N/A, 2/22/2019); and Colonoscopy (N/A, 2/22/2019).     CURRENT MEDICATIONS       Current Discharge Medication List CONTINUE these medications which have NOT CHANGED    Details   Saccharomyces boulardii (PROBIOTIC) 250 MG CAPS TK 1 C PO BID      losartan (COZAAR) 25 MG tablet Take 1 tablet by mouth daily  Qty: 30 tablet, Refills: 3      bumetanide (BUMEX) 1 MG tablet Take 1 tablet by mouth daily  Qty: 30 tablet, Refills: 3      albuterol sulfate HFA (PROVENTIL HFA) 108 (90 Base) MCG/ACT inhaler Inhale 2 puffs into the lungs every 6 hours as needed for Wheezing or Shortness of Breath  Qty: 1 Inhaler, Refills: 0      metoprolol succinate (TOPROL XL) 25 MG extended release tablet Take 1 tablet by mouth daily  Qty: 30 tablet, Refills: 3      apixaban (ELIQUIS) 5 MG TABS tablet TAKE ONE TABLET BY MOUTH TWICE DAILY  Qty: 60 tablet, Refills: 5      magnesium (MAGNESIUM-OXIDE) 250 MG TABS tablet TAKE 2 TABLETS BY MOUTH TWICE DAILY  Qty: 120 tablet, Refills: 6      ferrous sulfate 325 (65 Fe) MG tablet Take 325 mg by mouth daily (with breakfast)      pravastatin (PRAVACHOL) 40 MG tablet Take 1 tablet by mouth daily  Qty: 90 tablet, Refills: 3      potassium chloride (KLOR-CON M) 10 MEQ extended release tablet Take 1 tablet by mouth daily  Qty: 180 tablet, Refills: 2    Comments: **Patient requests 90 days supply**      clopidogrel (PLAVIX) 75 MG tablet Take 1 tablet by mouth daily  Qty: 90 tablet, Refills: 3      melatonin 3 MG TABS tablet Take 2 tablets by mouth nightly as needed (sleep)  Qty: 60 tablet, Refills: 3      Pantoprazole Sodium (PROTONIX) 40 MG PACK packet Take 1 packet by mouth 2 times daily. Qty: 60 each, Refills: 6      timolol (TIMOPTIC) 0.5 % ophthalmic solution Place 1 drop into both eyes nightly       Blood Pressure KIT Check blood pressure daily, and if symptoms of lightheadedness. Call physician if BP <80/40.   Qty: 1 kit, Refills: 0      acetaminophen (TYLENOL ARTHRITIS PAIN) 650 MG CR tablet Take 1,300 mg by mouth every 12 hours as needed for Pain              ALLERGIES     is allergic to aspirin; lasix [furosemide]; lipitor [atorvastatin]; neurontin [gabapentin]; oxycontin [oxycodone hcl]; and penicillins. FAMILY HISTORY     She indicated that her mother is . She indicated that her father is . She indicated that her sister is . She indicated that her maternal grandmother is . She indicated that her maternal grandfather is . She indicated that her paternal grandmother is . She indicated that her paternal grandfather is . family history includes Cancer in her father; Dementia in her maternal grandmother; Emphysema in her father; Heart Disease in her maternal grandfather, maternal grandmother, and mother; Other in her sister; Stroke in her mother. SOCIAL HISTORY      reports that she quit smoking about 4 months ago. Her smoking use included cigarettes. She started smoking about 64 years ago. She has a 15.00 pack-year smoking history. She has never used smokeless tobacco. She reports current alcohol use of about 1.0 standard drinks of alcohol per week. She reports that she does not use drugs. PHYSICAL EXAM     INITIAL VITALS:  height is 5' 5\" (1.651 m) and weight is 156 lb 14.4 oz (71.2 kg). Her oral temperature is 97.9 °F (36.6 °C). Her blood pressure is 107/53 (abnormal) and her pulse is 73. Her respiration is 16 and oxygen saturation is 93%. Physical Exam  Vitals signs and nursing note reviewed. Constitutional:       Appearance: She is well-developed. She is not diaphoretic. HENT:      Head: Normocephalic and atraumatic. Nose: Nose normal.   Eyes:      General: No scleral icterus. Right eye: No discharge. Left eye: No discharge. Conjunctiva/sclera: Conjunctivae normal.      Pupils: Pupils are equal, round, and reactive to light. Neck:      Musculoskeletal: Normal range of motion and neck supple. Vascular: No JVD. Trachea: No tracheal deviation. Cardiovascular:      Rate and Rhythm: Normal rate.  Rhythm images(s) such as CT, Ultrasound and MRI are read by the radiologist.    No orders to display       []Visualized and interpreted by me   [] Radiologist's Wet Read Report Reviewed   [] Discussed with Radiologist.    Torri Alexander:   Results for orders placed or performed during the hospital encounter of 02/20/20   CBC auto differential   Result Value Ref Range    WBC 7.0 4.8 - 10.8 thou/mm3    RBC 4.74 4.20 - 5.40 mill/mm3    Hemoglobin 11.2 (L) 12.0 - 16.0 gm/dl    Hematocrit 37.3 37.0 - 47.0 %    MCV 78.7 (L) 81.0 - 99.0 fL    MCH 23.6 (L) 26.0 - 33.0 pg    MCHC 30.0 (L) 32.2 - 35.5 gm/dl    RDW-CV 17.4 (H) 11.5 - 14.5 %    RDW-SD 49.1 (H) 35.0 - 45.0 fL    Platelets 696 464 - 636 thou/mm3    MPV 11.2 9.4 - 12.4 fL    Seg Neutrophils 77.1 %    Lymphocytes 13.1 %    Monocytes 8.0 %    Eosinophils 1.1 %    Basophils 0.4 %    Immature Granulocytes 0.3 %    Segs Absolute 5.4 1.8 - 7.7 thou/mm3    Lymphocytes Absolute 0.9 (L) 1.0 - 4.8 thou/mm3    Monocytes Absolute 0.6 0.4 - 1.3 thou/mm3    Eosinophils Absolute 0.1 0.0 - 0.4 thou/mm3    Basophils Absolute 0.0 0.0 - 0.1 thou/mm3    Immature Grans (Abs) 0.02 0.00 - 0.07 thou/mm3    nRBC 0 /100 wbc   Basic Metabolic Panel   Result Value Ref Range    Sodium 139 135 - 145 meq/L    Potassium 4.4 3.5 - 5.2 meq/L    Chloride 102 98 - 111 meq/L    CO2 22 (L) 23 - 33 meq/L    Glucose 161 (H) 70 - 108 mg/dL    BUN 39 (H) 7 - 22 mg/dL    CREATININE 1.4 (H) 0.4 - 1.2 mg/dL    Calcium 9.0 8.5 - 10.5 mg/dL   Lipase   Result Value Ref Range    Lipase 35.4 5.6 - 51.3 U/L   Hepatic function panel   Result Value Ref Range    Alb 3.5 3.5 - 5.1 g/dL    Total Bilirubin 0.3 0.3 - 1.2 mg/dL    Bilirubin, Direct <0.2 0.0 - 0.3 mg/dL    Alkaline Phosphatase 96 38 - 126 U/L    AST 14 5 - 40 U/L    ALT 6 (L) 11 - 66 U/L    Total Protein 6.2 6.1 - 8.0 g/dL   Troponin   Result Value Ref Range    Troponin T < 0.010 ng/ml   Protime-INR   Result Value Ref Range    INR 1.80 (H) 0.85 - 1.13   APTT   Result Value Ref Range    aPTT 41.7 (H) 22.0 - 38.0 seconds   Anion Gap   Result Value Ref Range    Anion Gap 15.0 8.0 - 16.0 meq/L   Glomerular Filtration Rate, Estimated   Result Value Ref Range    Est, Glom Filt Rate 36 (A) ml/min/1.73m2   Osmolality   Result Value Ref Range    Osmolality Calc 290.4 275.0 - 300 mOsmol/kg   EKG Abd Pain   Result Value Ref Range    Ventricular Rate 89 BPM    Atrial Rate 89 BPM    P-R Interval 160 ms    QRS Duration 84 ms    Q-T Interval 376 ms    QTc Calculation (Bazett) 457 ms    P Axis 25 degrees    R Axis -58 degrees    T Axis 102 degrees       EMERGENCY DEPARTMENT COURSE:   Vitals:    Vitals:    02/20/20 2057 02/20/20 2117 02/20/20 2207   BP: 80/60 (!) 91/51 (!) 90/52   Pulse: 90  70   Resp: 17  20   Temp: 98 °F (36.7 °C)     SpO2: 97%  97%   Weight: 157 lb (71.2 kg)     Height: 5' 5\" (1.651 m)         21:20 PM    Patient is seen and evaluated in a timely fashion. Action:     Large-bore IV x2, general NS bolus 500 cc given her history of CHF (patient states her blood pressure usually runs in the 90/60), EKG, labs, type and screen. Protonix drip. MedicalDecision Making    Reassessment:     Patient feels better with following ED medications. Medications   pantoprazole (PROTONIX) 80 mg in sodium chloride 0.9 % 50 mL bolus (80 mg Intravenous New Bag 2/20/20 2201)   pantoprazole (PROTONIX) 80 mg in sodium chloride 0.9 % 100 mL infusion (has no administration in time range)   pantoprazole (PROTONIX) injection 40 mg (has no administration in time range)     And   sodium chloride (PF) 0.9 % injection 10 mL (has no administration in time range)   0.9 % sodium chloride bolus (500 mLs Intravenous New Bag 2/20/20 2116)       BP 91/55, and patient states that this is her baseline blood pressure. Rectal exam shows bright red blood per rectum, although she has external hemorrhoids, there is no signs of bleeding from hemorrhoids.      Patient is given normal saline 500 cc general bolus due to her history of CHF. Protonix drip was started    Hemoglobin 11.2, baseline hemoglobin 12.3. Given patient's GI bleeding happened within the last 2 hours, it is possible hemodilution has not happened and her HB does not correlate well with her blood loss. Admission is warranted because patient is on Eliquis and Plavix. Discussed and admitted by hospitalist service. CRITICAL CARE:   None    CONSULTS:  Dr. Bienvenido Read:  None    FINAL IMPRESSION      1. Bright red blood per rectum    2. Chronic atrial fibrillation    3. Current use of long term anticoagulation    4. Antiplatelet or antithrombotic long-term use    5.  Chronic congestive heart failure, unspecified heart failure type [facility])          DISPOSITION/PLAN   Admit    PATIENT REFERRED TO:  Corazon Schwartz[address]  [phone]            DISCHARGE MEDICATIONS:  Current Discharge Medication List          (Please note that portions of this note were completed with a voice recognition program.  Efforts were made to edit the dictations but occasionally words aremis-transcribed.)    MD Joyce Samayoa MD  02/21/20 [id number]

## 2020-02-21 NOTE — PROGRESS NOTES
flush  10 mL Intravenous 2 times per day    [Held by provider] apixaban  5 mg Oral BID    [Held by provider] bumetanide  1 mg Oral Daily    [Held by provider] clopidogrel  75 mg Oral Daily    [Held by provider] losartan  25 mg Oral Daily    [Held by provider] metoprolol succinate  25 mg Oral Daily    pravastatin  40 mg Oral Daily     PRN Meds: sodium chloride flush, sodium chloride flush, acetaminophen, promethazine, ondansetron, melatonin      Intake/Output Summary (Last 24 hours) at 2/21/2020 1621  Last data filed at 2/21/2020 1403  Gross per 24 hour   Intake 1657.16 ml   Output 450 ml   Net 1207.16 ml       Diet:  DIET CLEAR LIQUID;  Diet NPO, After Midnight    Exam:  BP (!) 109/56   Pulse 89   Temp 97.4 °F (36.3 °C) (Oral)   Resp 18   Ht 5' 5\" (1.651 m)   Wt 156 lb 14.4 oz (71.2 kg)   SpO2 94%   BMI 26.11 kg/m²   General appearance: No apparent distress, appears stated age and cooperative. HEENT: Pupils equal, round, and reactive to light. Conjunctivae/corneas clear. Neck: Supple, with full range of motion. No jugular venous distention. Trachea midline. Respiratory:  Normal respiratory effort. Clear to auscultation, bilaterally without Rales/Wheezes/Rhonchi. Cardiovascular: Regular rate and rhythm with normal S1/S2 without murmurs, rubs or gallops. Lifevest present. Abdomen: Soft, non-tender, non-distended with normal bowel sounds. Musculoskeletal: passive and active ROM x 4 extremities. Skin: RLE erythematous with mild edema. Delayed capillary refill. Surgical scar on right anterior lower leg. Neurologic:  Neurovascularly intact without any focal sensory/motor deficits. Cranial nerves: II-XII intact, grossly non-focal.  Psychiatric: Alert and oriented, thought content appropriate, normal insight  Capillary Refill: Brisk,< 3 seconds   Peripheral Pulses: Unable to palpate right DP/PT pulses. 2+ LLE DP/PT.       Labs:   Recent Labs     02/20/20  2100 02/21/20  0330 02/21/20  0840 orders to display     No results found.     Electronically signed by Sherrell Melendrez PA-C on 2/21/2020 at 4:21 PM

## 2020-02-21 NOTE — ED NOTES
ED to inpatient nurses report    Chief Complaint   Patient presents with    Rectal Bleeding      Present to ED from home  LOC: alert and orientated to name, place, date  Vital signs   Vitals:    02/20/20 2057 02/20/20 2117 02/20/20 2207 02/20/20 2219   BP: 80/60 (!) 91/51 (!) 90/52 (!) 104/47   Pulse: 90  70 71   Resp: 17 20 19   Temp: 98 °F (36.7 °C)      SpO2: 97%  97% 97%   Weight: 157 lb (71.2 kg)      Height: 5' 5\" (1.651 m)         Oxygen Baseline 97% on room air     Current needs required Monitoring of H&H Bipap/Cpap No  LDAs:   Peripheral IV 02/20/20 Left Forearm (Active)   Site Assessment Clean;Dry; Intact 2/20/2020  9:06 PM   Line Status Flushed;Normal saline locked;Specimen collected 2/20/2020  9:06 PM   Dressing Status Clean; Intact;Dry 2/20/2020  9:06 PM     Mobility: Requires assistance * 1  Pending ED orders: Protonix infusing at this time per order  Present condition: Pt is able to use bedpan when needed. Pt does have large amounts of bright red clots present. Pt has protonix infusing at this time per order.     Electronically signed by Dave Schaeffer RN on 2/20/2020 at 10:32 PM     Saurabh Remy RN  02/20/20 3796

## 2020-02-21 NOTE — ED NOTES
Bed: 004A  Expected date: 2/20/20  Expected time: 8:45 PM  Means of arrival: [facility]  Comments:     Ana Moran RN  02/20/20 2051

## 2020-02-21 NOTE — CONSULTS
GI - Resident Consult History & Physical      Patient:  Angeline Diez  YOB: 1935  MRN: [id number]     Acct: [de-identified]    Chief Complaint:    Chief Complaint   Patient presents with    Rectal Bleeding       Date of Service: Pt seen/examined in consultation on 2/21/2020    ASSESSMENT:  Hematochezia - without abdominal pain - d/t diverticulosis vs side effect of eliquis/plavix  Afib - chronic, eliquis currently on hold - in NSR  HFrEF - LVEF 10-15% per echo 12/28/2019 with life vest currently  PAD - s/p right femoral-tibial angioplasty and stenting 2/2019 by Dr. Mahogany Reed currently on plavix  AAA - s/p repair and stenting 2/15/2019  GERD - currently on protonix gtt for lower GI bleed  ALEJANDRO on CKD III - On IVF at this time    PLAN:    1. Continue to hold eliquis and plavix at this time  2. Bowel prep today  3. Colonoscopy at 0800 tomorrow, 2/22/2020  4. Clear liquid diet effective now  5. NPO after midnight  6. Continue IVF       Case reviewed and impression/plan reviewed in collaboration with Dr. Stephania Beltrán      History Of Present Illness:      82 y.o. female with PMHx Colonoscopy 11/2017 revealing multiple colon polyps s/p 10 cm colon resection by Dr. Paul Roca, cyclic achalasia gets botox treatments, Afib on eliquis, AAA repair 2/2019, HFrEF LVEF 10-15% per echo 12/28/2019 with life vest currently, former smoker, PAD s/p right femoral-tibial angioplasty and stenting 2/2019 by Dr. Mahogany Reed currently on plavix, GERD, CKD stage III. We are asked to see/evaluate the patient by Radha Austin PA-C in consultation for hematochezia that started on 12/20/2020 at around 1800. Pt states she thought she was going to have diarrhea but after wiping she found a large amount of blood on the tissue. She initially expected it to resolve on its own but had 2 more episodes later in the evening the same as the first. She then asked her family to take her to the ED.  She has a history of Afib and Pad with stents in the right tablet Take 40 mg by mouth 2 times daily (before meals)   Yes Historical Provider, MD   Saccharomyces boulardii (PROBIOTIC) 250 MG CAPS TK 1 C PO BID 1/17/20  Yes Historical Provider, MD   losartan (COZAAR) 25 MG tablet Take 1 tablet by mouth daily 1/21/20  Yes ROMAN Espinosa CNP   bumetanide (BUMEX) 1 MG tablet Take 1 tablet by mouth daily 1/1/20  Yes Rodney Nobles,    albuterol sulfate HFA (PROVENTIL HFA) 108 (90 Base) MCG/ACT inhaler Inhale 2 puffs into the lungs every 6 hours as needed for Wheezing or Shortness of Breath 12/31/19  Yes Rodney Nobles DO   metoprolol succinate (TOPROL XL) 25 MG extended release tablet Take 1 tablet by mouth daily 1/1/20  Yes Rodney Nobles DO   apixaban (ELIQUIS) 5 MG TABS tablet TAKE ONE TABLET BY MOUTH TWICE DAILY 10/28/19  Yes ROMAN Espinosa CNP   magnesium (MAGNESIUM-OXIDE) 250 MG TABS tablet TAKE 2 TABLETS BY MOUTH TWICE DAILY 9/11/19  Yes ROMAN Espinosa CNP   pravastatin (PRAVACHOL) 40 MG tablet Take 1 tablet by mouth daily 7/15/19  Yes ROMAN Espinosa CNP   potassium chloride (KLOR-CON M) 10 MEQ extended release tablet Take 1 tablet by mouth daily 6/10/19  Yes ROMAN Espinosa CNP   clopidogrel (PLAVIX) 75 MG tablet Take 1 tablet by mouth daily 6/3/19  Yes ROMAN Espinosa CNP   melatonin 3 MG TABS tablet Take 2 tablets by mouth nightly as needed (sleep) 2/26/19  Yes Antoni Martin PA-C   timolol (TIMOPTIC) 0.5 % ophthalmic solution Place 1 drop into both eyes nightly    Yes Historical Provider, MD   Blood Pressure KIT Check blood pressure daily, and if symptoms of lightheadedness.   Call physician if BP <80/40. 12/31/19   Rodney Nobles DO   acetaminophen (TYLENOL ARTHRITIS PAIN) 650 MG CR tablet Take 1,300 mg by mouth every 12 hours as needed for Pain     Historical Provider, MD       Surgical History:  Past Surgical History:   Procedure Laterality Date    ABDOMINAL AORTIC ANEURYSM REPAIR, ENDOVASCULAR N/A 2/15/2019 Musculoskeletal: No clubbing, cyanosis or edema bilaterally. Skin: Pink, warm, dry. No rashes or lesions. Psychiatric: Alert and oriented, thought content appropriate, normal insight    Labs:   Recent Labs     02/21/20  0330 02/21/20  0840   WBC 5.6  --    HGB 9.5* 10.1*   HCT 32.1* 34.7*     --      Recent Labs     02/20/20  2100      K 4.4      CO2 22*   BUN 39*   CREATININE 1.4*   CALCIUM 9.0     Recent Labs     02/20/20  2100   AST 14   ALT 6*   BILIDIR <0.2   BILITOT 0.3   ALKPHOS 96     Recent Labs     02/21/20  0330   INR 1.43*       Radiology:   CT abdomen/pelvis 9/10/2019: Constipation. Diverticulosis coli. Moderate size hiatus hernia. Infrarenal abdominal aortic aneurysm. Code Status: Full Code    Electronically signed by Frederic Moya MD on 2/21/2020 at 10:24 AM    Thank you for the consultation.

## 2020-02-21 NOTE — ED TRIAGE NOTES
Pt presents to the ED via EMS with c/o rectal bleeding that started two hours ago. Pt reports the bleeding is bright red. Pt is hypotensive on arrival 80/60. Pt denies dizziness, weakness, or fatigue. EKG complete. IV access established. Pt is alert and oriented x4.  Pt denies abdominal pain

## 2020-02-21 NOTE — H&P
[address] [address]  Sedation/Analgesia History & Physical    Patient: Girma How: 1935  [facility] Rec#: [id number] Acc#: [id number]   Provider Performing Procedure: Martha Conley MD  Primary Care Physician: Jayla Lara    PRE-PROCEDURE   Brief History/Pre-Procedure Diagnosis:The patient is a 82 y.o.,  female with significant past medical history of bright red blood per rectum. She had partial colon resection with Dr. Hansel Bustamante at [facility] in 08/2018. CHF currently on an external defibrillator, chronic atrial fibrillation on Eliquis, history of ruptured AAA s/p repair at [facility] in 02/2019, history of PVD with right fem-tib angioplasty and stenting in 02/2019 with with Dr. Alondra Whitney currently on Plavix prior to arrival.         MEDICAL HISTORY  [x]Additional information:       has a past medical history of Arthritis, Blood circulation, collateral, BPPV (benign paroxysmal positional vertigo), CHF (congestive heart failure) ([address] Chronic kidney disease, GERD (gastroesophageal reflux disease), History of blood transfusion, HTN (hypertension), Hx of blood clots, Hyperlipidemia, Neuromuscular disorder ([address] Obesity, Osteopenia, PAC (premature atrial contraction), Paralysis ([address] Pedal edema, Pneumonia, PVC (premature ventricular contraction), PVD (peripheral vascular disease) ([address] Tinnitus, and UTI (urinary tract infection). SURGICAL HISTORY   has a past surgical history that includes Cholecystectomy; Breast surgery (Right, 1958); Hysterectomy; Appendectomy; Cosmetic surgery; eye surgery; pr egd transoral biopsy single/multiple (Left, 11/27/2017); Upper gastrointestinal endoscopy (N/A, 11/27/2017); Colonoscopy (08/06/2018); laryngoscopy (07/15/2016); pr lap,surg,colectomy, partial, w/anast (N/A, 8/20/2018); pr office/outpt visit,procedure only (N/A, 9/5/2018); pr preet skn sub grft t/a/l area/<100scm /<1st 25 scm (N/A, 9/6/2018);  THROMBECTOMY / EMBOLECTOMY FEMORAL (Right, 2/14/2019); AAA repair, endovascular (N/A, 2/15/2019); Upper gastrointestinal endoscopy (N/A, 2/22/2019); and Colonoscopy (N/A, 2/22/2019).   Additional information:       ALLERGIES   Allergies as of 02/20/2020 - Review Complete 02/20/2020   Allergen Reaction Noted    Aspirin Other (See Comments) 12/28/2019    Lasix [furosemide] Other (See Comments) 06/06/2016    Lipitor [atorvastatin] Other (See Comments) 06/09/2014    Neurontin [gabapentin] Other (See Comments) 06/06/2016    Oxycontin [oxycodone hcl]  11/05/2018    Penicillins Other (See Comments) 06/09/2014     Additional information:       MEDICATIONS       Current Facility-Administered Medications:     0.9 % sodium chloride infusion, , Intravenous, Continuous, [place], [place], Last Rate: 50 mL/hr at 02/21/20 0519    sodium chloride flush 0.9 % injection 10 mL, 10 mL, Intravenous, 2 times per day, Niyah Hendricks MD    sodium chloride flush 0.9 % injection 10 mL, 10 mL, Intravenous, PRN, Niyah Hendricks MD  [facility]  [START ON 2/23/2020] pantoprazole (PROTONIX) injection 40 mg, 40 mg, Intravenous, Daily **AND** [START ON 2/23/2020] sodium chloride (PF) 0.9 % injection 10 mL, 10 mL, Intravenous, Daily, Driscilla Lesch, MD    sodium chloride flush 0.9 % injection 10 mL, 10 mL, Intravenous, 2 times per day, [place], [place]    sodium chloride flush 0.9 % injection 10 mL, 10 mL, Intravenous, PRN, [place], [place]    acetaminophen (TYLENOL) tablet 650 mg, 650 mg, Oral, Q4H PRN, [place], [place]    promethazine (PHENERGAN) tablet 12.5 mg, 12.5 mg, Oral, Q6H PRN, [place], [place]    ondansetron (ZOFRAN) injection 4 mg, 4 mg, Intravenous, Q6H PRN, [place], [place]    [Held by provider] apixaban (ELIQUIS) tablet 5 mg, 5 mg, Oral, BID, [facility], [place]    [Held by provider] bumetanide (BUMEX) tablet 1 mg, 1 mg, Oral, Daily, [facility], [place]    [Held by provider] clopidogrel (PLAVIX) tablet 75 mg, 75 mg, Oral, Daily, [facility], [place]    [Held by provider] losartan (COZAAR) Sigmoid  []ERCP []EUS   []Cystoscopy  [] CATH [] BRONCH   Consent: I have discussed with the patient and/or the patient representative the indication, alternatives, and the possible risks and/or complications of the planned procedure and the anesthesia methods. The patient and/or patient representative appear to understand and agree to proceed. SEDATION ( TIVA ANESTHESIA, SEE ANESTHESIA NOTE FOR DETAILS)    Planned agent:[x]Midazolam []Meperidine [x]Sublimaze []Morphine  []Diazepam  []Other:     ASA Classification: Class 3 - A patient with severe systemic disease that limits activity but is not incapacitating    Airway Assessment: normal    Monitoring and Safety: The patient will be placed on a cardiac monitor and vital signs, pulse oximetry and level of consciousness will be continuously evaluated throughout the procedure. The patient will be closely monitored until recovery from the medications is complete and the patient has returned to baseline status. Respiratory therapy will be on standby during the procedure. [x]Pre-procedure diagnostic studies complete and results available. Comment:    [x]Previous sedation/anesthesia experiences assessed. Comment:    [x]The patient is an appropriate candidate to undergo the planned procedure sedation and anesthesia. (Refer to nursing sedation/analgesia documentation record)  [x]Formulation and discussion of sedation/procedure plan, risks, and expectations with patient and/or responsible adult completed. [x]Patient examined immediately prior to the procedure.  (Refer to nursing sedation/analgesia documentation record)    Dee Blanchard MD   Electronically signed 2/22/2020 at 8:02 AM

## 2020-02-21 NOTE — ED NOTES
Pt calling son at this time to bring in  for her [facility]  02/20/20 8425

## 2020-02-21 NOTE — H&P
[facility] Hospitalist History & Physical   2/20/2020  8:51 PM   Assessment and Plan:        1. Acute Hematochezia: likely 2/2 lower GI bleed  a. 4 large bright red bloody BM since this evening. No abdominal pain. Abdominal exam benign. Last colonoscopy in 2017 showed many colon polyps s/p colon resection with Dr. Willy Cerda in 2018.  b. Rectal exam by ED provider showed no bleeding or thrombosed hemorrhoids  c. Received 500 mL IVF bolus in the ED  d. Continue PPI drip, additional 500mL bolus. Telemetry. Consult GI for probable need for colonoscopy. Consult Cardiology for recommendations reguarding eliquis/plavix. Hold morning dose until able to evaluate. 2. ALEJANDRO on CKD stage 3a (baseline Cr 1.2): likely prerenal with BUN:Cr >20 d/t GI bleed with hypotension in setting autonomic dysfunction with ARB use. Received 500 mL IV fluid in ED. Additional 500mL bolus and maintenance IVF. Monitor with BMP. 3. Acute on Chronic microcytic Anemia likely 2/2 GI bleed. a. Hgb 11.2 (baseline 12. 3) MCV 78.8.   b. Hold home PO Fe supplements for possible colonoscopy h&h q6 hours. Transfuse for Hgb <7. Daily INR/PTT  4. HFrEF 10-15% with lifevest in place, Compensated:   a. Denies orthopnea, inc SOB, able to walk short distances without becoming SOB. No LE edema. Follows with Dr. Angelito Trotter OP.  b. Patient is calling son to bring in batteries/charging for life vest early tomorrow morning  c. Echo 12/28/19 showed EF 10-15%, with global hypokinesis of the LV, Grade 2 diastolic dysfunction, Mild to moderate TR, elevated right atrial pressure  d. Hold home bumex/BB/ARB for hypotension. Lifevest in place  5. Paroxysmal Atrial fibrillation, NSR currently: likely 2/2 CHF  a. Currently on Eliquis. Consult cardiology for recommendations on Eliquis with GI bleed. Consider watchman if candidate. 6. AAA s/p EVAR (02/2019):   7. PAD s/p Rt Fem-tib angioplasty  a. No LE wounds.  Unable to find RLE pulses, patient states this is her baseline. b. Successful right fem-tib angioplasty and stenting with a 6.5 x 80 Supera stent of the [place] to venous interposition graft stenosis. By Dr. Scottie Moreland Cardiology first thing in the morning for recommendations reguarding plavix/eliquis with GI bleed. Will defer for LE arterial imaging. 8. GERD / Dysphagia with Esophageal spasms: Follows OP with GI, plans for EGD/botox injections. Continue PPI`  9. Hyperlipidemia: Continue home statin        CC:  Rectal Bleeding  HPI: Casandra Quintero is an 17-year-old white female former smoker with PMH PAD right fem-tib angioplasty and stenting in 02/2019 with with Dr. Kiera Oconnor currently on Plavix, HFrEF 10-15% with life-vest on, Paroxysmal A.fib, AAA s/p repair 02/2019, Colonoscopy in 11/2017 showing multiple colon polyps 10cm colon resection with Dr. Nadine Sims 08/2018 who presents to [facility] ED on 2/2020 c/o hematochezia that started today. She has had 3 large BM of bright red blood. This has never happened in the past. She takes Plavix and eliquis. She did take her normal 2 doses of eliquis today. Denies any abdominal pain, rectal pain, Lightheadedness, dizziness, CP, inc SOB from baseline, N/V/D. In the ED, vitals show HR 90, BP 80/60, RR 17, 97% on RA, and afebrile. Labs show Cr 1.4, BUN 39, negative troponin, unremarkable LFTs, WBC 7.0, hgb 11.2, MCV 78.7. INR 1.8, PTT 41.7. EKG shows NSR with premature supraventricular beats. Rectal exam by ED provider showed no bleeding or thrombosed hemorrhoids. Protonix drip started. 500ml NS IVF bolus given. Review of Systems   Constitutional: Negative. HENT: Negative. Eyes: Negative. Respiratory: Positive for shortness of breath (at baseline). Negative for cough. Cardiovascular: Negative. Gastrointestinal: Positive for blood in stool. Negative for abdominal distention, abdominal pain, diarrhea and nausea. Endocrine: Negative. Genitourinary: Negative. Musculoskeletal: Negative. Skin: Negative. Allergic/Immunologic: Negative. Neurological: Negative. Hematological: Negative. Psychiatric/Behavioral: Negative. PMH:  Per HPI  SHX:  quit smoking about 4 months ago. Her smoking use included cigarettes. She started smoking about 64 years ago. She has a 15.00 pack-year smoking history. She has never used smokeless tobacco. She reports current alcohol use of about 1.0 standard drinks of alcohol per week. She reports that she does not use drugs. FHX: Cancer in her father; Dementia in her maternal grandmother; Emphysema in her father; Heart Disease in her maternal grandfather, maternal grandmother, and mother; Other in her sister; Stroke in her mother. Allergies: aspirin; lasix [furosemide]; lipitor [atorvastatin]; neurontin [gabapentin]; oxycontin [oxycodone hcl]; and penicillins. Medications:     pantoprozole (PROTONIX) infusion        [START ON 2/23/2020] pantoprazole  40 mg Intravenous Daily    And    [START ON 2/23/2020] sodium chloride (PF)  10 mL Intravenous Daily    sodium chloride  500 mL Intravenous Once       Vital Signs:   BP (!) 104/47   Pulse 71   Temp 98 °F (36.7 °C)   Resp 19   Ht 5' 5\" (1.651 m)   Wt 157 lb (71.2 kg)   SpO2 97%   BMI 26.13 kg/m²    No intake or output data in the 24 hours ending 02/20/20 2232     General:  White female well groomed, well-nourished, well-developed who appears stated age, in no acute distress lying in bed. Head: Normocephalic and atraumatic. EENT: No exophthalmos noted. No scleral or conjunctiva icterus, injection or pallor noted. Neck: Supple. Trachea midline. No thyromegaly. Thorax/Lungs: Thorax is symmetrical with good expansion. Breath sounds CTA and equal b/l without rales, wheezing, or rhonchi. No retractions or use of abdominal muscles. Cardiac: S1, S2, RRR without murmur, rub, or gallop. No JVD  Abdomen: Abdomen flat, soft, nontender to palpation, without guarding or rigidity. Normoactive BS. Peripheral Vasculature: Extremities cool, dry without edema, no varicosities or stasis changes, DP pulses found via in left leg. Unable to find PD/PT in right, but popliteal pulses palpated. Right lower extremeity ruborous and mild edema (but patient states this is baseline) with delayed cap refill ~5second. Skin:  Skin pink and dry turgor with delayed recoil. No lesions, rash. Psych:  Alert and oriented x3. Affect appropriate  Lymph:  No supraclavicular or cervical adenopathy. Neurologic: No focal deficits. No Seizures.      Data:   Labs:   Results for orders placed or performed during the hospital encounter of 02/20/20   CBC auto differential   Result Value Ref Range    WBC 7.0 4.8 - 10.8 thou/mm3    RBC 4.74 4.20 - 5.40 mill/mm3    Hemoglobin 11.2 (L) 12.0 - 16.0 gm/dl    Hematocrit 37.3 37.0 - 47.0 %    MCV 78.7 (L) 81.0 - 99.0 fL    MCH 23.6 (L) 26.0 - 33.0 pg    MCHC 30.0 (L) 32.2 - 35.5 gm/dl    RDW-CV 17.4 (H) 11.5 - 14.5 %    RDW-SD 49.1 (H) 35.0 - 45.0 fL    Platelets 587 570 - 558 thou/mm3    MPV 11.2 9.4 - 12.4 fL    Seg Neutrophils 77.1 %    Lymphocytes 13.1 %    Monocytes 8.0 %    Eosinophils 1.1 %    Basophils 0.4 %    Immature Granulocytes 0.3 %    Segs Absolute 5.4 1.8 - 7.7 thou/mm3    Lymphocytes Absolute 0.9 (L) 1.0 - 4.8 thou/mm3    Monocytes Absolute 0.6 0.4 - 1.3 thou/mm3    Eosinophils Absolute 0.1 0.0 - 0.4 thou/mm3    Basophils Absolute 0.0 0.0 - 0.1 thou/mm3    Immature Grans (Abs) 0.02 0.00 - 0.07 thou/mm3    nRBC 0 /100 wbc   Basic Metabolic Panel   Result Value Ref Range    Sodium 139 135 - 145 meq/L    Potassium 4.4 3.5 - 5.2 meq/L    Chloride 102 98 - 111 meq/L    CO2 22 (L) 23 - 33 meq/L    Glucose 161 (H) 70 - 108 mg/dL    BUN 39 (H) 7 - 22 mg/dL    CREATININE 1.4 (H) 0.4 - 1.2 mg/dL    Calcium 9.0 8.5 - 10.5 mg/dL   Lipase   Result Value Ref Range    Lipase 35.4 5.6 - 51.3 U/L   Hepatic function panel   Result Value Ref Range    Alb 3.5 3.5 - 5.1 g/dL    Total Bilirubin 0.3

## 2020-02-22 LAB
ABO: NORMAL
ANION GAP SERPL CALCULATED.3IONS-SCNC: 10 MEQ/L (ref 8–16)
ANTIBODY SCREEN: NORMAL
APTT: 35.2 SECONDS (ref 22–38)
BUN BLDV-MCNC: 22 MG/DL (ref 7–22)
CALCIUM SERPL-MCNC: 8.2 MG/DL (ref 8.5–10.5)
CHLORIDE BLD-SCNC: 109 MEQ/L (ref 98–111)
CO2: 20 MEQ/L (ref 23–33)
CREAT SERPL-MCNC: 1.1 MG/DL (ref 0.4–1.2)
ERYTHROCYTE [DISTWIDTH] IN BLOOD BY AUTOMATED COUNT: 17.4 % (ref 11.5–14.5)
ERYTHROCYTE [DISTWIDTH] IN BLOOD BY AUTOMATED COUNT: 51.3 FL (ref 35–45)
GFR SERPL CREATININE-BSD FRML MDRD: 47 ML/MIN/1.73M2
GLUCOSE BLD-MCNC: 88 MG/DL (ref 70–108)
HCT VFR BLD CALC: 31.1 % (ref 37–47)
HCT VFR BLD CALC: 32.7 % (ref 37–47)
HCT VFR BLD CALC: 33 % (ref 37–47)
HCT VFR BLD CALC: 33.5 % (ref 37–47)
HCT VFR BLD CALC: 34.1 % (ref 37–47)
HEMOGLOBIN: 10 GM/DL (ref 12–16)
HEMOGLOBIN: 9.2 GM/DL (ref 12–16)
HEMOGLOBIN: 9.7 GM/DL (ref 12–16)
HEMOGLOBIN: 9.8 GM/DL (ref 12–16)
HEMOGLOBIN: 9.8 GM/DL (ref 12–16)
MCH RBC QN AUTO: 24.3 PG (ref 26–33)
MCHC RBC AUTO-ENTMCNC: 29.7 GM/DL (ref 32.2–35.5)
MCV RBC AUTO: 81.8 FL (ref 81–99)
PLATELET # BLD: 150 THOU/MM3 (ref 130–400)
PMV BLD AUTO: 11.6 FL (ref 9.4–12.4)
POTASSIUM SERPL-SCNC: 4 MEQ/L (ref 3.5–5.2)
RBC # BLD: 4 MILL/MM3 (ref 4.2–5.4)
RH FACTOR: NORMAL
SODIUM BLD-SCNC: 139 MEQ/L (ref 135–145)
WBC # BLD: 4.7 THOU/MM3 (ref 4.8–10.8)

## 2020-02-22 PROCEDURE — 2060000000 HC ICU INTERMEDIATE R&B

## 2020-02-22 PROCEDURE — 6370000000 HC RX 637 (ALT 250 FOR IP): Performed by: PHYSICIAN ASSISTANT

## 2020-02-22 PROCEDURE — 3609009900 HC COLONOSCOPY W/CONTROL BLEEDING ANY METHOD: Performed by: INTERNAL MEDICINE

## 2020-02-22 PROCEDURE — 36415 COLL VENOUS BLD VENIPUNCTURE: CPT

## 2020-02-22 PROCEDURE — 99223 1ST HOSP IP/OBS HIGH 75: CPT | Performed by: SURGERY

## 2020-02-22 PROCEDURE — 85730 THROMBOPLASTIN TIME PARTIAL: CPT

## 2020-02-22 PROCEDURE — 2709999900 HC NON-CHARGEABLE SUPPLY: Performed by: INTERNAL MEDICINE

## 2020-02-22 PROCEDURE — 2580000003 HC RX 258: Performed by: STUDENT IN AN ORGANIZED HEALTH CARE EDUCATION/TRAINING PROGRAM

## 2020-02-22 PROCEDURE — 85027 COMPLETE CBC AUTOMATED: CPT

## 2020-02-22 PROCEDURE — 99153 MOD SED SAME PHYS/QHP EA: CPT | Performed by: INTERNAL MEDICINE

## 2020-02-22 PROCEDURE — 85014 HEMATOCRIT: CPT

## 2020-02-22 PROCEDURE — 6360000002 HC RX W HCPCS: Performed by: INTERNAL MEDICINE

## 2020-02-22 PROCEDURE — 80048 BASIC METABOLIC PNL TOTAL CA: CPT

## 2020-02-22 PROCEDURE — 2720000010 HC SURG SUPPLY STERILE: Performed by: INTERNAL MEDICINE

## 2020-02-22 PROCEDURE — 85018 HEMOGLOBIN: CPT

## 2020-02-22 PROCEDURE — 0W3P8ZZ CONTROL BLEEDING IN GASTROINTESTINAL TRACT, VIA NATURAL OR ARTIFICIAL OPENING ENDOSCOPIC: ICD-10-PCS | Performed by: INTERNAL MEDICINE

## 2020-02-22 PROCEDURE — 99232 SBSQ HOSP IP/OBS MODERATE 35: CPT | Performed by: PHYSICIAN ASSISTANT

## 2020-02-22 PROCEDURE — 99152 MOD SED SAME PHYS/QHP 5/>YRS: CPT | Performed by: INTERNAL MEDICINE

## 2020-02-22 RX ORDER — MIDAZOLAM HYDROCHLORIDE 1 MG/ML
INJECTION INTRAMUSCULAR; INTRAVENOUS PRN
Status: DISCONTINUED | OUTPATIENT
Start: 2020-02-22 | End: 2020-02-22 | Stop reason: ALTCHOICE

## 2020-02-22 RX ORDER — FENTANYL CITRATE 50 UG/ML
INJECTION, SOLUTION INTRAMUSCULAR; INTRAVENOUS PRN
Status: DISCONTINUED | OUTPATIENT
Start: 2020-02-22 | End: 2020-02-22 | Stop reason: ALTCHOICE

## 2020-02-22 RX ADMIN — METOPROLOL SUCCINATE 25 MG: 25 TABLET, FILM COATED, EXTENDED RELEASE ORAL at 15:52

## 2020-02-22 RX ADMIN — PRAVASTATIN SODIUM 40 MG: 40 TABLET ORAL at 15:51

## 2020-02-22 RX ADMIN — LOSARTAN POTASSIUM 25 MG: 25 TABLET, FILM COATED ORAL at 15:52

## 2020-02-22 RX ADMIN — Medication 10 ML: at 10:00

## 2020-02-22 RX ADMIN — BUMETANIDE 1 MG: 1 TABLET ORAL at 15:52

## 2020-02-22 ASSESSMENT — PAIN SCALES - GENERAL
PAINLEVEL_OUTOF10: 0

## 2020-02-22 ASSESSMENT — PAIN - FUNCTIONAL ASSESSMENT: PAIN_FUNCTIONAL_ASSESSMENT: 0-10

## 2020-02-22 NOTE — CONSULTS
[address] Surgery Consultation - Barry Chavarria      Pt Name: Jo Ann Jones  MRN: [id number]  YOB: 1935  Date of evaluation: 2/22/2020  Primary Care Physician: Lacey Vega  Patient evaluated at the request of  Dr. Kavita Cohen  Reason for evaluation: cecal lesion  IMPRESSIONS:   1. Lower GI bleed secondary to diverticular bleed  2. 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  No biopsies done. 3. Congestive heart failure with cardiomyopathy  4. Coronary artery disease with angioplasty 1 year ago. 5. Atrial fibrillation  6. 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 Alyssa Bahena. 7. Achalasia esophageal spasms  8. Peripheral arterial disease fem-tib angioplasty. 9. Abdominal  aortic aneurysm status post aortobiiliac endograft  10.  has a past medical history of Arthritis, Blood circulation, collateral, BPPV (benign paroxysmal positional vertigo), CHF (congestive heart failure) ([address] Chronic kidney disease, GERD (gastroesophageal reflux disease), History of blood transfusion, HTN (hypertension), Hx of blood clots, Hyperlipidemia, Neuromuscular disorder ([address] Obesity, Osteopenia, PAC (premature atrial contraction), Paralysis ([address] Pedal edema, Pneumonia, PVC (premature ventricular contraction), PVD (peripheral vascular disease) ([address] Tinnitus, and UTI (urinary tract infection). RECOMMENDATIONS:   1. Hold blood thinners. Eliquis and Plavix  2. Clear liquid diet  3. Cardiology evaluation to assess surgical risk for open colectomy. Patient cardiac ejection fraction estimated 10 to 15% and has a LifeVest.  She obviously is high risk for complications and even death with surgical intervention also risk off anticoagulants with peripheral artery disease as well as her coronary artery disease and significant cardiomyopathy as well as atrial fibrillation. 4. Monitor serial hemoglobins.   2 clips applied to bleeding diverticulum. 5. Discussed with Dr. Aleksandr Ford:   History of Chief Complaint:    Kristina Brand is a 82 y. o.female who presents with rectal bleeding. She presented to the emergency department with 4 large bright bloody red bowel movements on February 20. Last prior colonoscopy in 2017 but did not get all the way around to the right colon. She had a low anterior resection for colovesical fistula and diverticular stricture in 2018 by Dr. Bishop Coronado. Hemoglobin currently stable around 10. Colonoscopy performed today by Dr. Norma Villanueva revealed a bleeding diverticulum in which 2 clips were placed. She also has a cecal mass not amenable to endoscopic removal.  Also no biopsies done. Queried the patient she says she wants surgery done as soon as possible. We need more of a cardiac risk assessment did discuss with her and family briefly she would require an open right colon resection. We first need to see if this diverticular bleed is going to stop as well. Given her medical comorbidities she is at very high risk for postoperative complications and even death. I am not sure she really understands this at this point. The cecal mass was not the source of bleeding at endoscopy. Past Medical History   has a past medical history of Arthritis, Blood circulation, collateral, BPPV (benign paroxysmal positional vertigo), CHF (congestive heart failure) ([address] Chronic kidney disease, GERD (gastroesophageal reflux disease), History of blood transfusion, HTN (hypertension), Hx of blood clots, Hyperlipidemia, Neuromuscular disorder ([address] Obesity, Osteopenia, PAC (premature atrial contraction), Paralysis ([address] Pedal edema, Pneumonia, PVC (premature ventricular contraction), PVD (peripheral vascular disease) ([address] Tinnitus, and UTI (urinary tract infection). Past Surgical History   has a past surgical history that includes Cholecystectomy; Breast surgery (Right, 1958); Hysterectomy; Appendectomy;  Cosmetic surgery; eye

## 2020-02-22 NOTE — PROGRESS NOTES
Hospitalist Progress Note      Patient:  Daryl Couch    Unit/Bed:4K-02/002-A  YOB: 1935  MRN: [id number]   Acct: [de-identified]   PCP: Jani Joyner  Date of Admission: 2/20/2020    Assessment/Plan:    1. Lower GI Bleed secondary to diverticulosis: Resolved. Eliquis and Plavix on hold. Hemoglobin stable. Colonoscopy completed 2/22/2020 revealed diverticulosis with active bleeding requiring 2 hemoclips with successful hemostasis. Continue to monitor hemoglobin. Transfuse for hemoglobin less than 8.  2. Cecal mass: 4 cm cecal mass noted on colonoscopy but not biopsied secondary to active bleeding and anticoagulation/antiplatelets. .  General surgery consulted for possible open colectomy. Cardiology consulted for cardiac clearance secondary to patient's significant risk factors including heart failure, CAD, PVD. 3. ALEJANDRO on CKD Stage III: ALEJANDRO  resolved s/p fluid resuscitation. Secondary to fluid depletion secondary to acute blood loss. Monitor I/O. Recheck BMP in the morning. 4. Acute on Chronic Microcytic Anemia: Improving. Secondary to diverticular bleed. Holding Plavix and Eliqiuis prior to possible open colectomy. . Transfuse for Hgb <8.   5. Chronic HFrEF without acute exacerbation: EF 10-15% on echo 12/2019. Resume Bumex/beta-blocker/ARB with hypotension parameters. Lifevest in place. 6. Paroxysmal Atrial Fibrillation: Currently NSR. Eliquis held secondary to GIB. Resume beta-blocker. 7. History of AAA: S/p repair in 2/2019. Plavix outpatient, currently held due GI bleed. 8. PAD s/p Right Fem-Tib angioplasty with stent: Plavix currently held. Cardiology consulted for further recommendations regarding antiplatelet and anticoagulant therapy for PAD in the setting of GIB and possible open colectomy. .   9. History of GERD secondary to esophageal spasm: Follows with GI outpatient.  Previously received botox injections but hasn't for the Recent Labs     02/20/20  2100 02/21/20  1602 02/22/20  0528    143 139   K 4.4 4.3 4.0    108 109   CO2 22* 23 20*   BUN 39* 29* 22   CREATININE 1.4* 1.1 1.1   CALCIUM 9.0 8.7 8.2*     Recent Labs     02/20/20  2100   AST 14   ALT 6*   BILIDIR <0.2   BILITOT 0.3   ALKPHOS 96     Recent Labs     02/20/20  2100 02/21/20  0330   INR 1.80* 1.43*     No results for input(s): Annette Richey in the last 72 hours. Microbiology:    Blood culture #1:   Lab Results   Component Value Date    BC No growth-preliminary No growth  12/28/2019       Blood culture #2:No results found for: Raymona Blind    Organism:  Lab Results   Component Value Date    ORG Klebsiella pneumoniae 05/17/2019         Lab Results   Component Value Date    LABGRAM  10/24/2018     Few segmented neutrophils observed. No epithelial cells observed. Moderate gram positive cocci occurring singly and in pairs. MRSA culture only:No results found for: [facility]    Urine culture:   Lab Results   Component Value Date    LABURIN [place] count: 10,000-50,000 CFU/mL 09/11/2018    LABURIN [place] count: 10,000-50,000 CFU/mL 09/11/2018       Respiratory culture: No results found for: CULTRESP    Aerobic and Anaerobic :  Lab Results   Component Value Date    LABAERO light growth 10/24/2018     Lab Results   Component Value Date    LABANAE  10/24/2018     Culture yielded moderate mixed growth consisting of anaerobic  gram negative bacilli and anaerobic gram positive cocci. If a  true mixed aerobic and anaerobic infection is suspected, then  broad spectrum empiric antibiotic therapy is indicated and  should include coverage for anaerobic organisms.          Urinalysis:      Lab Results   Component Value Date    NITRU NEGATIVE 05/17/2019    WBCUA 0-2 05/17/2019    BACTERIA MANY 05/17/2019    RBCUA 0-2 05/17/2019    BLOODU NEGATIVE 05/17/2019    SPECGRAV 1.013 02/24/2019    GLUCOSEU 250 05/17/2019       Radiology:  No orders to display     No results

## 2020-02-22 NOTE — PLAN OF CARE
Problem: Falls - Risk of:  Goal: Will remain free from falls  Description  Will remain free from falls  2/21/2020 2045 by Jillian Treviño RN  Outcome: Ongoing  Note:   Patient free from falls this shift.  2/21/2020 1015 by Abimael Andrade RN  Outcome: Ongoing  Note:   On fall precautions. Alert and orient and uses call light appropriately. Problem: Pain Control  Goal: Maintain pain level at or below patient's acceptable level (or 5 if patient is unable to determine acceptable level)  2/21/2020 2045 by Jillian Treviño RN  Outcome: Ongoing  Flowsheets (Taken 2/21/2020 1900)  Patient's Stated Pain Goal: No pain  Note:   Pain Assessment: 0-10  Pain Level: 0   Patient's Stated Pain Goal: No pain   Is pain goal met at this time? Yes          2/21/2020 1015 by Abimael Andrade RN  Outcome: Ongoing  Note:   Pain Assessment: 0-10  Pain Level: 0   Patient's Stated Pain Goal: No pain   Is pain goal met at this time? Yes               Problem: Cardiovascular  Goal: No DVT, peripheral vascular complications  6/20/9003 2045 by Jillian Treviño RN  Outcome: Ongoing  Note:   No signs or symptoms of DVT.  2/21/2020 1015 by Abimael Andrade RN  Outcome: Ongoing  Note:   Right leg chronically a little larger. Denies any pain. SCDs on.   Goal: Hemodynamic stability  2/21/2020 2045 by Jillian Treviño RN  Outcome: Ongoing  2/21/2020 1015 by Abimael Andrade RN  Outcome: Met This Shift     Problem: Nutrition  Goal: Optimal nutrition therapy  2/21/2020 2045 by Jillian Treviño RN  Outcome: Ongoing  Note:   Patient NPO at midnight for colonoscopy scheduled at 0800.  2/21/2020 1015 by Abimael Andrade RN  Outcome: Ongoing  Note:   NPO except for ice chips     Problem: Skin Integrity/Risk  Goal: No skin breakdown during hospitalization  2/21/2020 2045 by Jillian Treviño RN  Outcome: Ongoing  Note:   No new skin breakdown this shift.  2/21/2020 1015 by Abimael Andrade RN  Outcome: Ongoing  Note:   No new skin breakdown. Turns frequently by herself. Problem: Discharge Planning:  Goal: Patients continuum of care needs are met  Description  Patients continuum of care needs are met  2/21/2020 2045 by Evin Gutiérrez RN  Outcome: Ongoing  Note:   No discharge plans this shift.  2/21/2020 1015 by Garett Menjivar RN  Outcome: Ongoing  Note:   Plans to return home when ready for discharge. GI consult today for rectal bleed. Problem: Bowel Function - Altered:  Goal: Bowel elimination is within specified parameters  Description  Bowel elimination is within specified parameters  2/21/2020 2045 by Evin Gutiérrez RN  Outcome: Ongoing  Note:   Patient has loose stools for GI prep for colonoscopy scheduled for tomorrow morning at 0800  2/21/2020 1015 by Garett Menjivar RN  Outcome: Ongoing  Note:   Had bloody stools prior to admission, no stools yet this shift. NPO     Care plan reviewed with patient. Patient verbalize understanding of the plan of care and contribute to goal setting.

## 2020-02-22 NOTE — CONSULTS
75 a  day, losartan 25 a day, Toprol 25 a day, Protonix 40 a day. SOCIAL HISTORY:  History of smoking. No obvious alcohol or drug abuse. FAMILY HISTORY:  Noncontributory. PHYSICAL EXAMINATION:  VITAL SIGNS:  Showed blood pressure of 130/80, heart rate of 70. GENERAL APPEARANCE:  Pleasant lady, in no obvious acute distress. HEENT:  Eyes and ears, no discharge. NECK:  No JVD, no bruits, no masses. LUNGS:  Decreased air entry. No crackles, no wheezes. HEART:  Normal S1, S2. Systolic murmur, grade 2/6. ABDOMEN:  Soft, nontender. Positive bowel sounds. No organomegaly. EXTREMITIES:  Decreased peripheral pulses with no significant edema. NEUROLOGIC:  Grossly intact. Awake and alert. No focal deficits. PSYCHIATRIC:  No evidence of active psychosis. SKIN:  No rashes. LABORATORY DATA:  Showed sodium 139, potassium 4.4, BUN 39, creatinine  1.4. White count 9.6, hemoglobin 9.5, hematocrit 32.1, platelets 918. IMPRESSION:  This is a patient who comes in with above presentation. She is on multiple anticoagulation and there is a concern about GI  bleed. RECOMMENDATIONS:  My recommendation is as follows:  1. The patient should be able to hold the Eliquis at least for the time  being until she follow up with Dr. Benard Schilder. 2.  I would like to keep Plavix if possible, especially if she does not  show ongoing bleeding. Otherwise, it can be held for a short period of  time; however, due to her significant cardiovascular disease, she is a  very high risk for cardiac and/or embolic events. The patient  understands the situation. We will continue to closely monitor and  monitor her progress. Thank you for allowing me to participate in the care of this patient.         Kaya Quinonez M.D.    D: 02/21/2020 18:16:52       T: 02/21/2020 20:54:51     [email or web address]  Job#: [id number]     Doc#: [id number]    CC:

## 2020-02-22 NOTE — PROGRESS NOTES
0988  In PACU. Dr. Hector Coreas speaking with patient's family. Patient awakens to name, but drowsy. Vitals stable. 3684  Patient awake and talking to staff. Vitals stable. Family at bedside. 2999  Report called to floor RN at this time. Patient passing gas. 1234  Out of PACU at this time to return to inpatient unit.

## 2020-02-23 LAB
ANION GAP SERPL CALCULATED.3IONS-SCNC: 10 MEQ/L (ref 8–16)
APTT: 32.4 SECONDS (ref 22–38)
BUN BLDV-MCNC: 13 MG/DL (ref 7–22)
CALCIUM SERPL-MCNC: 8.1 MG/DL (ref 8.5–10.5)
CHLORIDE BLD-SCNC: 103 MEQ/L (ref 98–111)
CO2: 24 MEQ/L (ref 23–33)
CREAT SERPL-MCNC: 1.1 MG/DL (ref 0.4–1.2)
ERYTHROCYTE [DISTWIDTH] IN BLOOD BY AUTOMATED COUNT: 16.8 % (ref 11.5–14.5)
ERYTHROCYTE [DISTWIDTH] IN BLOOD BY AUTOMATED COUNT: 47.7 FL (ref 35–45)
GFR SERPL CREATININE-BSD FRML MDRD: 47 ML/MIN/1.73M2
GLUCOSE BLD-MCNC: 90 MG/DL (ref 70–108)
HCT VFR BLD CALC: 32 % (ref 37–47)
HCT VFR BLD CALC: 32.4 % (ref 37–47)
HCT VFR BLD CALC: 32.6 % (ref 37–47)
HCT VFR BLD CALC: 34.5 % (ref 37–47)
HEMOGLOBIN: 10.3 GM/DL (ref 12–16)
HEMOGLOBIN: 9.4 GM/DL (ref 12–16)
HEMOGLOBIN: 9.5 GM/DL (ref 12–16)
HEMOGLOBIN: 9.6 GM/DL (ref 12–16)
MAGNESIUM: 1.7 MG/DL (ref 1.6–2.4)
MCH RBC QN AUTO: 23 PG (ref 26–33)
MCHC RBC AUTO-ENTMCNC: 28.8 GM/DL (ref 32.2–35.5)
MCV RBC AUTO: 79.9 FL (ref 81–99)
MRSA SCREEN: NORMAL
PLATELET # BLD: 164 THOU/MM3 (ref 130–400)
PMV BLD AUTO: 11.9 FL (ref 9.4–12.4)
POTASSIUM REFLEX MAGNESIUM: 3.7 MEQ/L (ref 3.5–5.2)
RBC # BLD: 4.08 MILL/MM3 (ref 4.2–5.4)
SODIUM BLD-SCNC: 137 MEQ/L (ref 135–145)
WBC # BLD: 5.3 THOU/MM3 (ref 4.8–10.8)

## 2020-02-23 PROCEDURE — 85027 COMPLETE CBC AUTOMATED: CPT

## 2020-02-23 PROCEDURE — 99233 SBSQ HOSP IP/OBS HIGH 50: CPT | Performed by: SURGERY

## 2020-02-23 PROCEDURE — 2060000000 HC ICU INTERMEDIATE R&B

## 2020-02-23 PROCEDURE — 85018 HEMOGLOBIN: CPT

## 2020-02-23 PROCEDURE — 6360000002 HC RX W HCPCS: Performed by: PHYSICIAN ASSISTANT

## 2020-02-23 PROCEDURE — 2580000003 HC RX 258: Performed by: EMERGENCY MEDICINE

## 2020-02-23 PROCEDURE — 80048 BASIC METABOLIC PNL TOTAL CA: CPT

## 2020-02-23 PROCEDURE — 36415 COLL VENOUS BLD VENIPUNCTURE: CPT

## 2020-02-23 PROCEDURE — 6360000002 HC RX W HCPCS: Performed by: EMERGENCY MEDICINE

## 2020-02-23 PROCEDURE — 99232 SBSQ HOSP IP/OBS MODERATE 35: CPT | Performed by: PHYSICIAN ASSISTANT

## 2020-02-23 PROCEDURE — 2580000003 HC RX 258: Performed by: PHYSICIAN ASSISTANT

## 2020-02-23 PROCEDURE — 83735 ASSAY OF MAGNESIUM: CPT

## 2020-02-23 PROCEDURE — 6370000000 HC RX 637 (ALT 250 FOR IP): Performed by: PHYSICIAN ASSISTANT

## 2020-02-23 PROCEDURE — 85014 HEMATOCRIT: CPT

## 2020-02-23 PROCEDURE — 85730 THROMBOPLASTIN TIME PARTIAL: CPT

## 2020-02-23 PROCEDURE — C9113 INJ PANTOPRAZOLE SODIUM, VIA: HCPCS | Performed by: EMERGENCY MEDICINE

## 2020-02-23 RX ORDER — HEPARIN SODIUM 5000 [USP'U]/ML
5000 INJECTION, SOLUTION INTRAVENOUS; SUBCUTANEOUS EVERY 8 HOURS SCHEDULED
Status: DISCONTINUED | OUTPATIENT
Start: 2020-02-23 | End: 2020-02-25

## 2020-02-23 RX ADMIN — PRAVASTATIN SODIUM 40 MG: 40 TABLET ORAL at 08:49

## 2020-02-23 RX ADMIN — PANTOPRAZOLE SODIUM 40 MG: 40 INJECTION, POWDER, FOR SOLUTION INTRAVENOUS at 21:26

## 2020-02-23 RX ADMIN — Medication 10 ML: at 08:48

## 2020-02-23 RX ADMIN — HEPARIN SODIUM 5000 UNITS: 5000 INJECTION INTRAVENOUS; SUBCUTANEOUS at 21:30

## 2020-02-23 RX ADMIN — Medication 10 ML: at 21:26

## 2020-02-23 ASSESSMENT — PAIN SCALES - GENERAL
PAINLEVEL_OUTOF10: 0

## 2020-02-23 NOTE — PROGRESS NOTES
and reactive to light  Neck: supple and non-tender without mass, no thyromegaly   Musculoskeletal: normal range of motion, no joint swelling, deformity or tenderness  Neurological: alert, oriented, normal speech, no focal findings or movement disorder noted    Medications:    pantoprazole  40 mg Intravenous Daily    And    sodium chloride (PF)  10 mL Intravenous Daily    sodium chloride flush  10 mL Intravenous 2 times per day    [Held by provider] apixaban  5 mg Oral BID    bumetanide  1 mg Oral Daily    [Held by provider] clopidogrel  75 mg Oral Daily    losartan  25 mg Oral Daily    metoprolol succinate  25 mg Oral Daily    pravastatin  40 mg Oral Daily      sodium chloride Stopped (02/22/20 1000)     sodium chloride flush, 10 mL, PRN  sodium chloride flush, 10 mL, PRN  acetaminophen, 650 mg, Q4H PRN  promethazine, 12.5 mg, Q6H PRN  ondansetron, 4 mg, Q6H PRN  melatonin, 6 mg, Nightly PRN        Lab Data:    Cardiac Enzymes:  No results for input(s): CKTOTAL, CKMB, CKMBINDEX, TROPONINI in the last 72 hours.     CBC:   Lab Results   Component Value Date    WBC 5.3 02/23/2020    RBC 4.08 02/23/2020    HGB 10.3 02/23/2020    HCT 34.5 02/23/2020     02/23/2020       CMP:    Lab Results   Component Value Date     02/23/2020    K 3.7 02/23/2020     02/23/2020    CO2 24 02/23/2020    BUN 13 02/23/2020    CREATININE 1.1 02/23/2020    LABGLOM 47 02/23/2020    GLUCOSE 90 02/23/2020    GLUCOSE 101 01/28/2020    CALCIUM 8.1 02/23/2020       Hepatic Function Panel:    Lab Results   Component Value Date    ALKPHOS 96 02/20/2020    ALT 6 02/20/2020    AST 14 02/20/2020    PROT 6.2 02/20/2020    BILITOT 0.3 02/20/2020    BILIDIR <0.2 02/20/2020    LABALBU 3.5 02/20/2020       Magnesium:    Lab Results   Component Value Date    MG 2.2 01/14/2020       PT/INR:    Lab Results   Component Value Date    INR 1.43 02/21/2020       HgBA1c:    Lab Results   Component Value Date    LABA1C 5.5 12/28/2019 FLP:    Lab Results   Component Value Date    TRIG 121 05/18/2019    HDL 34 05/18/2019    LDLCALC 47 05/18/2019       TSH:    Lab Results   Component Value Date    TSH 2.160 12/28/2019         Assessment:    Pre-op risk assessment  Acute GIB  Cecal mass  NICMP - ef 10-15 with grade 2 DDfx per echo 12/28/19 - wearing lifevest  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

## 2020-02-23 NOTE — PROGRESS NOTES
Hospitalist Progress Note      Patient:  Ava [facility], [place]    Unit/Bed:4K-02/002-A  YOB: 1935  MRN: [id number]   Acct: [de-identified]   PCP: Jayla Lara  Date of Admission: 2/20/2020    Assessment/Plan:    1. Lower GI Bleed secondary to diverticulosis: Resolved. Hemoglobin stable. Colonoscopy completed 2/22/2020 revealed diverticulosis with active bleeding requiring 2 hemoclips with successful hemostasis. Eliquis and Plavix on hold prior to possible open colectomy 2/25/20. Will initiate subcutaneous heparin. Continue to monitor hemoglobin. Transfuse for hemoglobin less than 8.  2. Cecal mass: 4 cm cecal mass noted on colonoscopy but not biopsied secondary to active bleeding and anticoagulation/antiplatelets. Eliquis/Plavix on hold. Open colectomy tentatively 2/25/20. Cardiology consulted for cardiac clearance secondary to patient's significant risk factors including heart failure, CAD, PVD. 3. ALEJANDRO on CKD Stage III: ALEJANDRO resolved s/p fluid resuscitation. Secondary to fluid depletion secondary to acute blood loss. Monitor I/O. Recheck BMP in the morning. 4. Acute on Chronic Microcytic Anemia: Stable  Secondary to diverticular bleed s/p hemoclip. Holding Plavix and Eliqiuis prior to possible open colectomy. . Transfuse for Hgb <8.   5. Chronic HFrEF without acute exacerbation: EF 10-15% on echo 12/2019. Continue Bumex/beta-blocker with hypotension parameters. ARB discontinued secondary to hypotension. Lifevest in place. 6. Paroxysmal Atrial Fibrillation: Currently NSR. Eliquis held secondary to GIB and in preparation for possible open colectomy. 7. History of AAA: S/p repair in 2/2019. Plavix outpatient, currently held due GI bleed and in preparation for possible open colectomy. 8. PAD s/p Right Fem-Tib angioplasty with stent: Plavix currently held. Cardiology recommends resuming ASA/Plavix when general surgery is agreeable.     9. History of GERD secondary to esophageal spasm: Follows with GI outpatient. Previously received botox injections but hasn't for the past year. Continue Protonix. 10. History of Hyperlipidemia: Continue Statin    Chief Complaint: Rectal bleeding     Initial H and P:-    Gilles Cantu is an 82-year-old white female former smoker with PMH PAD right fem-tib angioplasty and stenting in 02/2019 with with Dr. Vergara currently on Plavix, HFrEF 10-15% with life-vest on, Paroxysmal A.fib, AAA s/p repair 02/2019, Colonoscopy in 11/2017 showing multiple colon polyps 10cm colon resection with Dr. Portia Adkins 08/2018 who presents to [facility] ED on 2/2020 c/o hematochezia that started today. She has had 3 large BM of bright red blood. This has never happened in the past. She takes Plavix and eliquis. She did take her normal 2 doses of eliquis today.  Denies any abdominal pain, rectal pain, Lightheadedness, dizziness, CP, inc SOB from baseline, N/V/D.      In the ED, vitals show HR 90, BP 80/60, RR 17, 97% on RA, and afebrile. Labs show Cr 1.4, BUN 39, negative troponin, unremarkable LFTs, WBC 7.0, hgb 11.2, MCV 78.7. INR 1.8, PTT 41.7. EKG shows NSR with premature supraventricular beats. Rectal exam by ED provider showed no bleeding or thrombosed hemorrhoids. Protonix drip started. 500ml NS IVF bolus given.     Subjective (past 24 hours):   Patient resting comfortably this morning upon evaluation. She denies any chest pain or shortness of breath at this time. Patient denies abdominal pain, nausea, vomiting, or diarrhea. No further episodes of hematochezia. She states that she has chronic RLE pain that is not worse than baseline. Patient understands and is agreeable with current plan of care. No further questions or concerns at this time. Past medical history, family history, social history and allergies reviewed again and is unchanged since admission. ROS (12 point review of systems completed. Pertinent positives noted.  Otherwise ROS is and  should include coverage for anaerobic organisms. Urinalysis:      Lab Results   Component Value Date    NITRU NEGATIVE 05/17/2019    WBCUA 0-2 05/17/2019    BACTERIA MANY 05/17/2019    RBCUA 0-2 05/17/2019    BLOODU NEGATIVE 05/17/2019    SPECGRAV 1.013 02/24/2019    GLUCOSEU 250 05/17/2019       Radiology:  No orders to display     No results found.     Electronically signed by Raquel Machado PA-C on 2/23/2020 at 2:17 PM

## 2020-02-23 NOTE — PROGRESS NOTES
Dona Gonzalez MD  Daily Progress Note  Pt Name: Dalia Waterman  Medical Record Number: [id number]  Date of Birth 1935   Today's Date: 2/23/2020  Chief complaint: I need to get this surgery done  ASSESSMENT:   1. Hospital day # 3   2. Lower GI bleed secondary to bleeding diverticulum left colon. No current evidence of active bleeding  3. Cecal mass. Not bleeding on endoscopy. Not amenable to biopsy with patient on antiplatelet agents. Patient wishes to proceed with resection  4. Congestive heart failure  5. Chronic A. fib, paroxysmal current normal sinus rhythm  6. AAA repair with endo-stent  7. Peripheral artery disease with right fem-tib stent. No evidence of ischemia  8. Achalasia esophageal spasm. Last Botox injection a year ago  9.  has a past medical history of Arthritis, Blood circulation, collateral, BPPV (benign paroxysmal positional vertigo), CHF (congestive heart failure) ([address] Chronic kidney disease, GERD (gastroesophageal reflux disease), History of blood transfusion, HTN (hypertension), Hx of blood clots, Hyperlipidemia, Neuromuscular disorder ([address] Obesity, Osteopenia, PAC (premature atrial contraction), Paralysis ([address] Pedal edema, Pneumonia, PVC (premature ventricular contraction), PVD (peripheral vascular disease) ([address] Tinnitus, and UTI (urinary tract infection). RECOMMENDATIONS:   1. Pt wants to proceed with open right colon resection. Tentative Tuesday when she will have been off Plavix for 5 days. Patient aware she is very high risk for perioperative cardiac complications and even death. Await cardiology input  2. Await cardiology input  3. Ok for subcutaneous heparin from my standpoint  4. Full liquid diet  SUBJECTIVE:   Sharif Gutting is stable. No further evidence of GI bleeding. Hemoglobin appears stable. She wishes to proceed with surgery. She is leery of being off her anticoagulants.   She wants to proceed with surgical intervention she is aware of increased risk and even death. Await cardiology input. She is agreeable to surgery Tuesday pending scheduling and cardiology input. Allow full liquids today increase caloric intake. Due to her achalasia she does not really eat solid foods at home. MEDICATIONS   Scheduled Meds:   sodium chloride flush  10 mL Intravenous 2 times per day    pantoprazole  40 mg Intravenous Daily    And    sodium chloride (PF)  10 mL Intravenous Daily    sodium chloride flush  10 mL Intravenous 2 times per day    [Held by provider] apixaban  5 mg Oral BID    bumetanide  1 mg Oral Daily    [Held by provider] clopidogrel  75 mg Oral Daily    losartan  25 mg Oral Daily    metoprolol succinate  25 mg Oral Daily    pravastatin  40 mg Oral Daily     Continuous Infusions:   sodium chloride Stopped (20 1000)     PRN Meds:.sodium chloride flush, sodium chloride flush, acetaminophen, promethazine, ondansetron, melatonin  OBJECTIVE   CURRENT VITALS:  height is 5' 5\" (1.651 m) and weight is 157 lb 9.6 oz (71.5 kg). Her oral temperature is 97.9 °F (36.6 °C). Her blood pressure is 98/55 (abnormal) and her pulse is 64. Her respiration is 16 and oxygen saturation is 94%.    Temperature Range (24h):Temp: 97.9 °F (36.6 °C) Temp  Av.8 °F (36.6 °C)  Min: 97.5 °F (36.4 °C)  Max: 98.1 °F (36.7 °C)  BP Range (79U): Systolic (69XUI), XMU:114 , Min:82 , HVC:613     Diastolic (67NEL), DEI:96, Min:45, Max:63    Pulse Range (24h): Pulse  Av.5  Min: 64  Max: 97  Respiration Range (24h): Resp  Av.3  Min: 8  Max: 24  Current Pulse Ox (24h):  SpO2: 94 %  Pulse Ox Range (24h):  SpO2  Av.7 %  Min: 93 %  Max: 99 %  Oxygen Amount and Delivery:    Incentive Spirometry Tx:            GENERAL: alert, no distress  LUNGS: clear to ausculation, without wheezes, rales or rhonci  HEART: normal rate and regular rhythm  ABDOMEN: soft, non-tender, non-distended, soft, incisional tenderness, bowel sounds present in all 4 quadrants and no guarding or peritoneal signs  EXTREMITY: no cyanosis, clubbing or edema  In: 360 [P.O.:360]  Out: 1400 [Urine:1400]     Date 02/23/20 0000 - 02/23/20 2359   Shift 2096-6400 3619-5286 7522-2316 24 Hour Total   INTAKE   P.O.(mL/kg/hr) 120   120   I. V.(mL/kg) 0(0)   0(0)   Shift Total(mL/kg) 120(1.7)   120(1.7)   OUTPUT   Urine(mL/kg/hr) 900   900   Shift Total(mL/kg) 900(12.6)   900(12.6)   Weight (kg) 71.5 71.5 71.5 71.5     LABS     Recent Labs     02/21/20  0330  02/21/20  1602  02/22/20  0528  02/22/20  1530 02/22/20  2102 02/23/20  0342   WBC 5.6  --   --   --  4.7*  --   --   --  5.3   HGB 9.5*   < >  --    < > 9.7*   < > 9.8* 9.8* 9.4*   HCT 32.1*   < >  --    < > 32.7*   < > 33.5* 33.0* 32.6*     --   --   --  150  --   --   --  164   NA  --   --  143  --  139  --   --   --  137   K  --   --  4.3  --  4.0  --   --   --  3.7   CL  --   --  108  --  109  --   --   --  103   CO2  --   --  23  --  20*  --   --   --  24   BUN  --   --  29*  --  22  --   --   --  13   CREATININE  --   --  1.1  --  1.1  --   --   --  1.1   CALCIUM  --   --  8.7  --  8.2*  --   --   --  8.1*    < > = values in this interval not displayed.       Recent Labs     02/20/20  2100 02/21/20  0330   INR 1.80* 1.43*     Recent Labs     02/20/20  2100   AST 14   ALT 6*   BILITOT 0.3   BILIDIR <0.2   LIPASE 35.4     Recent Labs     02/20/20  2100   TROPONINT < 0.010         RADIOLOGY     No orders to display       Electronically signed by Rosendo Hinkle MD on 2/23/2020 at 7:29 AM

## 2020-02-24 LAB
ABO: NORMAL
ANION GAP SERPL CALCULATED.3IONS-SCNC: 12 MEQ/L (ref 8–16)
ANTIBODY SCREEN: NORMAL
APTT: 33.3 SECONDS (ref 22–38)
BUN BLDV-MCNC: 12 MG/DL (ref 7–22)
CALCIUM SERPL-MCNC: 8.6 MG/DL (ref 8.5–10.5)
CHLORIDE BLD-SCNC: 105 MEQ/L (ref 98–111)
CO2: 25 MEQ/L (ref 23–33)
CREAT SERPL-MCNC: 1.2 MG/DL (ref 0.4–1.2)
ERYTHROCYTE [DISTWIDTH] IN BLOOD BY AUTOMATED COUNT: 16.9 % (ref 11.5–14.5)
ERYTHROCYTE [DISTWIDTH] IN BLOOD BY AUTOMATED COUNT: 48.9 FL (ref 35–45)
GFR SERPL CREATININE-BSD FRML MDRD: 43 ML/MIN/1.73M2
GLUCOSE BLD-MCNC: 97 MG/DL (ref 70–108)
HCT VFR BLD CALC: 32.7 % (ref 37–47)
HCT VFR BLD CALC: 35.6 % (ref 37–47)
HCT VFR BLD CALC: 36.8 % (ref 37–47)
HEMOGLOBIN: 10.6 GM/DL (ref 12–16)
HEMOGLOBIN: 10.8 GM/DL (ref 12–16)
HEMOGLOBIN: 9.7 GM/DL (ref 12–16)
MCH RBC QN AUTO: 23.8 PG (ref 26–33)
MCHC RBC AUTO-ENTMCNC: 29.7 GM/DL (ref 32.2–35.5)
MCV RBC AUTO: 80.3 FL (ref 81–99)
PLATELET # BLD: 152 THOU/MM3 (ref 130–400)
PMV BLD AUTO: 11.4 FL (ref 9.4–12.4)
POTASSIUM REFLEX MAGNESIUM: 3.9 MEQ/L (ref 3.5–5.2)
RBC # BLD: 4.07 MILL/MM3 (ref 4.2–5.4)
RH FACTOR: NORMAL
SODIUM BLD-SCNC: 142 MEQ/L (ref 135–145)
WBC # BLD: 4.7 THOU/MM3 (ref 4.8–10.8)

## 2020-02-24 PROCEDURE — 6370000000 HC RX 637 (ALT 250 FOR IP): Performed by: SURGERY

## 2020-02-24 PROCEDURE — 6360000002 HC RX W HCPCS: Performed by: PHYSICIAN ASSISTANT

## 2020-02-24 PROCEDURE — 80048 BASIC METABOLIC PNL TOTAL CA: CPT

## 2020-02-24 PROCEDURE — 86850 RBC ANTIBODY SCREEN: CPT

## 2020-02-24 PROCEDURE — 2060000000 HC ICU INTERMEDIATE R&B

## 2020-02-24 PROCEDURE — 99233 SBSQ HOSP IP/OBS HIGH 50: CPT | Performed by: SURGERY

## 2020-02-24 PROCEDURE — 85730 THROMBOPLASTIN TIME PARTIAL: CPT

## 2020-02-24 PROCEDURE — 99232 SBSQ HOSP IP/OBS MODERATE 35: CPT | Performed by: PHYSICIAN ASSISTANT

## 2020-02-24 PROCEDURE — 6370000000 HC RX 637 (ALT 250 FOR IP): Performed by: PHYSICIAN ASSISTANT

## 2020-02-24 PROCEDURE — 2709999900 HC NON-CHARGEABLE SUPPLY

## 2020-02-24 PROCEDURE — 2580000003 HC RX 258: Performed by: PHYSICIAN ASSISTANT

## 2020-02-24 PROCEDURE — 36415 COLL VENOUS BLD VENIPUNCTURE: CPT

## 2020-02-24 PROCEDURE — 86900 BLOOD TYPING SEROLOGIC ABO: CPT

## 2020-02-24 PROCEDURE — 97166 OT EVAL MOD COMPLEX 45 MIN: CPT

## 2020-02-24 PROCEDURE — 2580000003 HC RX 258: Performed by: SURGERY

## 2020-02-24 PROCEDURE — 97162 PT EVAL MOD COMPLEX 30 MIN: CPT

## 2020-02-24 PROCEDURE — 97110 THERAPEUTIC EXERCISES: CPT

## 2020-02-24 PROCEDURE — 85027 COMPLETE CBC AUTOMATED: CPT

## 2020-02-24 PROCEDURE — 86901 BLOOD TYPING SEROLOGIC RH(D): CPT

## 2020-02-24 PROCEDURE — 85018 HEMOGLOBIN: CPT

## 2020-02-24 PROCEDURE — 85014 HEMATOCRIT: CPT

## 2020-02-24 PROCEDURE — 94760 N-INVAS EAR/PLS OXIMETRY 1: CPT

## 2020-02-24 PROCEDURE — 97535 SELF CARE MNGMENT TRAINING: CPT

## 2020-02-24 RX ORDER — PANTOPRAZOLE SODIUM 40 MG/1
40 TABLET, DELAYED RELEASE ORAL
Status: DISCONTINUED | OUTPATIENT
Start: 2020-02-24 | End: 2020-02-25

## 2020-02-24 RX ORDER — SODIUM CHLORIDE 9 MG/ML
INJECTION, SOLUTION INTRAVENOUS CONTINUOUS
Status: DISCONTINUED | OUTPATIENT
Start: 2020-02-24 | End: 2020-02-25

## 2020-02-24 RX ADMIN — PRAVASTATIN SODIUM 40 MG: 40 TABLET ORAL at 09:05

## 2020-02-24 RX ADMIN — SODIUM CHLORIDE: 9 INJECTION, SOLUTION INTRAVENOUS at 23:52

## 2020-02-24 RX ADMIN — HEPARIN SODIUM 5000 UNITS: 5000 INJECTION INTRAVENOUS; SUBCUTANEOUS at 21:16

## 2020-02-24 RX ADMIN — MAGNESIUM CITRATE 296 ML: 1.75 LIQUID ORAL at 13:11

## 2020-02-24 RX ADMIN — Medication 10 ML: at 21:16

## 2020-02-24 RX ADMIN — BUMETANIDE 1 MG: 1 TABLET ORAL at 09:05

## 2020-02-24 RX ADMIN — HEPARIN SODIUM 5000 UNITS: 5000 INJECTION INTRAVENOUS; SUBCUTANEOUS at 05:56

## 2020-02-24 RX ADMIN — PANTOPRAZOLE SODIUM 40 MG: 40 TABLET, DELAYED RELEASE ORAL at 09:07

## 2020-02-24 RX ADMIN — HEPARIN SODIUM 5000 UNITS: 5000 INJECTION INTRAVENOUS; SUBCUTANEOUS at 13:09

## 2020-02-24 RX ADMIN — METOPROLOL SUCCINATE 25 MG: 25 TABLET, FILM COATED, EXTENDED RELEASE ORAL at 09:05

## 2020-02-24 ASSESSMENT — PAIN SCALES - GENERAL
PAINLEVEL_OUTOF10: 0

## 2020-02-24 NOTE — PROGRESS NOTES
Hospitalist Progress Note      Patient:  Priti Escalante    Unit/Bed:4K-02/002-A  YOB: 1935  MRN: [id number]   Acct: [de-identified]   PCP: Desire Downey  Date of Admission: 2/20/2020    Assessment/Plan:    1. Lower GI Bleed secondary to diverticulosis: Resolved. Hemoglobin stable.  Colonoscopy completed 2/22/2020 revealed diverticulosis with active bleeding requiring 2 hemoclips with successful hemostasis. Eliquis and Plavix on hold prior to possible open colectomy 2/25/20. Continue Heparin. Continue to monitor hemoglobin.  Transfuse for hemoglobin less than 8.  2. Cecal mass: 4 cm cecal mass noted on colonoscopy but not biopsied secondary to active bleeding and anticoagulation/antiplatelets. Eliquis/Plavix on hold. Open colectomy tentatively 2/25/20. Cardiology consulted for cardiac clearance secondary to patient's significant risk factors including heart failure, CAD, PVD. Patient understands and wishes to proceed with procedure. 3. ALEJANDRO on CKD Stage III: ALEJANDRO resolved s/p fluid resuscitation.  Secondary to fluid depletion secondary to acute blood loss. Monitor I/O. Trend BMP daily. Replace electrolytes prn.   4. Acute on Chronic Microcytic Anemia: Improving  Secondary to diverticular bleed s/p hemoclip. Chronically secondary to CKD. Holding Plavix and Eliqiuis prior to possible open colectomy. Continue Heparin. Transfuse for Hgb <8.   5. Chronic HFrEF without acute exacerbation: EF 10-15% on echo 12/2019.  Continue Bumex/beta-blocker with hypotension parameters. ARB discontinued secondary to hypotension. Lifevest in place. 6. Paroxysmal Atrial Fibrillation: Currently sinus arrhythmia. Eliquis held secondary to GIB and in preparation for possible open colectomy. Continue BB and heparin. 7. History of AAA: S/p repair in 2/2019. Plavix outpatient, currently held due GI bleed and in preparation for possible open colectomy. Continue heparin. non-tender, non-distended with normal bowel sounds. Musculoskeletal: passive and active ROM x 4 extremities. Skin: RLE erythematous with mild edema. Delayed capillary refill. Surgical scar on right anterior lower leg.   Neurologic:  Neurovascularly intact without any focal sensory/motor deficits. Cranial nerves: II-XII intact, grossly non-focal.  Psychiatric: Alert and oriented, thought content appropriate, normal insight  Capillary Refill: Brisk,< 3 seconds   Peripheral Pulses: Unable to palpate right DP/PT pulses. 2+ LLE DP/PT.     Labs:   Recent Labs     02/22/20 0528 02/23/20 0342 02/23/20 2059 02/24/20  0351 02/24/20  0921   WBC 4.7*  --  5.3  --   --  4.7*  --    HGB 9.7*   < > 9.4*   < > 9.6* 9.7* 10.6*   HCT 32.7*   < > 32.6*   < > 32.0* 32.7* 35.6*     --  164  --   --  152  --     < > = values in this interval not displayed. Recent Labs     02/22/20 0528 02/23/20 0342 02/24/20  0726    137 142   K 4.0 3.7 3.9    103 105   CO2 20* 24 25   BUN 22 13 12   CREATININE 1.1 1.1 1.2   CALCIUM 8.2* 8.1* 8.6     No results for input(s): AST, ALT, BILIDIR, BILITOT, ALKPHOS in the last 72 hours. No results for input(s): INR in the last 72 hours. No results for input(s): Gunter Deacon in the last 72 hours. Microbiology:    Blood culture #1:   Lab Results   Component Value Date    BC No growth-preliminary No growth  12/28/2019       Blood culture #2:No results found for: Gabby Connor    Organism:  Lab Results   Component Value Date    ORG Klebsiella pneumoniae 05/17/2019         Lab Results   Component Value Date    LABGRAM  10/24/2018     Few segmented neutrophils observed. No epithelial cells observed. Moderate gram positive cocci occurring singly and in pairs.          MRSA culture only:No results found for: [address] [place] [address]    Urine culture:   Lab Results   Component Value Date    LABURIN [place] count: 10,000-50,000 CFU/mL 09/11/2018    LABURIN [place] count: 10,000-50,000 CFU/mL 09/11/2018       Respiratory culture: No results found for: CULTRESP    Aerobic and Anaerobic :  Lab Results   Component Value Date    LABAERO light growth 10/24/2018     Lab Results   Component Value Date    LABANAE  10/24/2018     Culture yielded moderate mixed growth consisting of anaerobic  gram negative bacilli and anaerobic gram positive cocci. If a  true mixed aerobic and anaerobic infection is suspected, then  broad spectrum empiric antibiotic therapy is indicated and  should include coverage for anaerobic organisms. Urinalysis:      Lab Results   Component Value Date    NITRU NEGATIVE 05/17/2019    WBCUA 0-2 05/17/2019    BACTERIA MANY 05/17/2019    RBCUA 0-2 05/17/2019    BLOODU NEGATIVE 05/17/2019    SPECGRAV 1.013 02/24/2019    GLUCOSEU 250 05/17/2019       Radiology:  No orders to display     No results found.     Electronically signed by Coral Butler PA-C on 2/24/2020 at 11:04 AM

## 2020-02-24 NOTE — PLAN OF CARE
Problem: Falls - Risk of:  Goal: Will remain free from falls  Description  Will remain free from falls  2/23/2020 2224 by Gustavo Hinkle RN  Outcome: Ongoing  Note:   Patient free from falls this shift. Problem: Cardiovascular  Goal: No DVT, peripheral vascular complications  8/03/1695 2224 by Gustavo Hinkle RN  Outcome: Ongoing  Note:   Patient on Heparin SubQ and Scd's in place. Problem: Cardiovascular  Goal: Hemodynamic stability  2/23/2020 2224 by Gustavo Hinkle RN  Outcome: Ongoing     Problem: Nutrition  Goal: Optimal nutrition therapy  2/23/2020 2224 by Gustavo Hinkle RN  Outcome: Ongoing  Note:   Patient has been advanced to a Full liquid diet today. Problem: Skin Integrity/Risk  Goal: No skin breakdown during hospitalization  2/23/2020 2224 by Gustavo Hinkle RN  Outcome: Ongoing  Note:   Patient has some redness on elbows she said from leaning on her life vest battery pack. EPC cream applied. Problem: Discharge Planning:  Goal: Patients continuum of care needs are met  Description  Patients continuum of care needs are met  2/23/2020 2224 by Gustavo Hinkle RN  Outcome: Ongoing  Note:   No discharge plans this shift. Problem: Bowel Function - Altered:  Goal: Bowel elimination is within specified parameters  Description  Bowel elimination is within specified parameters  2/23/2020 2224 by Gustavo Hinkle RN  Outcome: Ongoing  Note:   No bowel complications this shift. Care plan reviewed with patient. Patient verbalize understanding of the plan of care and contribute to goal setting.

## 2020-02-24 NOTE — PROGRESS NOTES
today.  2. Await cardiology input  3. Ok for subcutaneous heparin from my standpoint. Stop in a.m.  T  4. Clear liquid diet. N.p.o. after midnight. We will give magnesium citrate later today. SUBJECTIVE:   Kadeem Morejon is stable. No further evidence of GI bleeding. Hemoglobin appears stable. She wishes to proceed with surgery. She is leery of being off her anticoagulants. She wants to proceed with surgical intervention she is aware of increased risk and even death. Very high risk per cardiology. T.  She wants to proceed with surgery. No surgery was given as an alternative option with risks of continued growth and bleeding of large mass in cecum. MEDICATIONS   Scheduled Meds:   pantoprazole  40 mg Oral QAM AC    heparin (porcine)  5,000 Units Subcutaneous 3 times per day    sodium chloride flush  10 mL Intravenous 2 times per day    [Held by provider] apixaban  5 mg Oral BID    bumetanide  1 mg Oral Daily    [Held by provider] clopidogrel  75 mg Oral Daily    metoprolol succinate  25 mg Oral Daily    pravastatin  40 mg Oral Daily     Continuous Infusions:    PRN Meds:.sodium chloride flush, sodium chloride flush, acetaminophen, promethazine, ondansetron, melatonin  OBJECTIVE   CURRENT VITALS:  height is 5' 5\" (1.651 m) and weight is 157 lb 8 oz (71.4 kg). Her oral temperature is 98.3 °F (36.8 °C). Her blood pressure is 123/46 (abnormal) and her pulse is 70. Her respiration is 16 and oxygen saturation is 95%.    Temperature Range (24h):Temp: 98.3 °F (36.8 °C) Temp  Av.3 °F (36.8 °C)  Min: 97.7 °F (36.5 °C)  Max: 98.6 °F (37 °C)  BP Range (93Z): Systolic (72EYX), TAD:319 , Min:98 , GWN:210     Diastolic (13LQF), WJA:10, Min:46, Max:58    Pulse Range (24h): Pulse  Av.4  Min: 70  Max: 95  Respiration Range (24h): Resp  Avg: 15.6  Min: 14  Max: 16  Current Pulse Ox (24h):  SpO2: 95 %  Pulse Ox Range (24h):  SpO2  Av %  Min: 94 %  Max: 96 %  Oxygen Amount and Delivery:    Incentive Spirometry Tx: GENERAL: alert, no distress  LUNGS: clear to ausculation, without wheezes, rales or rhonci  HEART: normal rate and regular rhythm  ABDOMEN: soft, non-tender, non-distended, soft, incisional tenderness, bowel sounds present in all 4 quadrants and no guarding or peritoneal signs  EXTREMITY: no cyanosis, clubbing or edema  In: 600 [P.O.:600]  Out: 900 [Urine:900]     Date 02/24/20 0000 - 02/24/20 2359   Shift 0258-7372 3300-2150 2848-0332 24 Hour Total   INTAKE   P.O.(mL/kg/hr) 120(0.2)   120   I. V.(mL/kg) 0(0)   0(0)   Shift Total(mL/kg) 120(1.7)   120(1.7)   OUTPUT   Urine(mL/kg/hr) 200(0.3)   200   Shift Total(mL/kg) 200(2.8)   200(2.8)   Weight (kg) 71.4 71.4 71.4 71.4     LABS     Recent Labs     02/22/20  0528  02/23/20  0342  02/23/20  2059 02/24/20  0351 02/24/20  0726 02/24/20  0921   WBC 4.7*  --  5.3  --   --  4.7*  --   --    HGB 9.7*   < > 9.4*   < > 9.6* 9.7*  --  10.6*   HCT 32.7*   < > 32.6*   < > 32.0* 32.7*  --  35.6*     --  164  --   --  152  --   --      --  137  --   --   --  142  --    K 4.0  --  3.7  --   --   --  3.9  --      --  103  --   --   --  105  --    CO2 20*  --  24  --   --   --  25  --    BUN 22  --  13  --   --   --  12  --    CREATININE 1.1  --  1.1  --   --   --  1.2  --    MG  --   --  1.7  --   --   --   --   --    CALCIUM 8.2*  --  8.1*  --   --   --  8.6  --     < > = values in this interval not displayed. No results for input(s): PTT, INR in the last 72 hours. Invalid input(s): PT  No results for input(s): AST, ALT, BILITOT, BILIDIR, AMYLASE, LIPASE, LDH, LACTA in the last 72 hours. No results for input(s): TROPONINT in the last 72 hours.       RADIOLOGY     No orders to display       Electronically signed by Mejia Mendez MD on 2/24/2020 at 12:13 PM

## 2020-02-24 NOTE — PROGRESS NOTES
Nutrition Assessment    Type and Reason for Visit: Initial, Positive Nutrition Screen(Poor intake, Poor appetite, Swallowing difficulty w/ food)    Nutrition Recommendations: Recommend advance diet as tolerated. Will send Ensure Enlive TID as pt. Agreeable. Recommend MVI. Nutrition Assessment:   Pt. moderately malnourished AEB criteria as listed below. At risk for further nutrition compromise r/t achlasia, upcoming surgery (colon resection 2/25) and underlying medical condition (hx CKD, CHF). Nutrition recommendations/interventions as per above. Malnutrition Assessment:  · Malnutrition Status: Meets the criteria for moderate malnutrition  · Context: Chronic illness  · Findings of the 6 clinical characteristics of malnutrition (Minimum of 2 out of 6 clinical characteristics is required to make the diagnosis of moderate or severe Protein Calorie Malnutrition based on AND/ASPEN Guidelines):  1. Energy Intake-Greater than 75% of estimated energy requirement,      2. Weight Loss-20% loss or greater(-20.5%), in 1 year  3. Fat Loss-Mild subcutaneous fat loss, Orbital  4. Muscle Loss-Mild muscle mass loss, Temples (temporalis muscle)  5. Fluid Accumulation-Unable to assess,    6.  Strength-Not measured    Nutrition Risk Level: Moderate    Nutrient Needs:  · Estimated Daily Total Kcal: 8263-5939 kcals (20-25 kcals/kgm wt of 71 kgm)  · Estimated Daily Protein (g): 74+ grams/day (1.3 gm/kgm IBW) - as renal function allows    Nutrition Diagnosis:   · Problem:  Moderate malnutrition  · Etiology: related to Alteration in GI function, Difficulty swallowing     Signs and symptoms:  as evidenced by Weight loss, Mild loss of subcutaneous fat, Mild muscle loss    Objective Information:  · Nutrition-Focused Physical Findings: chronic achalsia- tolerates liquids and some soft solid foods (earlier in the day better for solids), drinks a homemade shake daily (1 can of Ensure Plus, 3 scoops of ice cream, 1 scoop peanut butter with 1 banana daily); also 1 scoop whey protein powder mixed with milk every morning; can tolerate cereal and scrabled eggs in the am, light lunch (soups, grilled cheese at times) and not much dinner; Rx includes Bumex, Zofran; BUN 13, Cr 1.1  · Wound Type: None  · Current Nutrition Therapies:  · Oral Diet Orders: Clear Liquid   · Oral Diet intake: 51-75%, %  · Oral Nutrition Supplement (ONS) Orders: Standard High Calorie Oral Supplement(TID once FL diet)  · Anthropometric Measures:  · Ht: 5' 5\" (165.1 cm)   · Current Body Wt: 157 lb 8 oz (71.4 kg)(2/24, trace edema)  · Admission Body Wt: 156 lb 14.4 oz (71.2 kg)(2/20, trace edema)  · Usual Body Wt: (per pt in the 150's for a few months; admits to weight loss over past year and a half; 2/14/19: 198# 3.2 oz, 11/8/19: 157# 3.2 oz)  · Ideal Body Wt: 125 lb (56.7 kg),   · BMI Classification: BMI 25.0 - 29.9 Overweight    Nutrition Interventions:   Start ONS(Will send Ensure Enlive TID once FL diet or greater.)  Continued Inpatient Monitoring, Education Initiated, Coordination of Care(2/24 Encouraged continued compliance with ONS, protein shakes, soft foods as tolerated.)    Nutrition Evaluation:   · Evaluation: Goals set   · Goals: Pt. will receive adequate nutrition (FL diet or greater) in next 1-4 days.     · Monitoring: Nutrition Progression, Meal Intake, Supplement Intake, Diet Tolerance, Skin Integrity, Weight, Pertinent Labs, Chewing/Swallowing, Patient/Family Education, Monitor Bowel Function      Electronically signed by Norma Sher RD, LD on 2/24/20 at 9:58 AM    Contact Number: [phone]

## 2020-02-24 NOTE — PROGRESS NOTES
Decreased safe awareness  Prognosis: Good  REQUIRES OT FOLLOW UP: Yes    Treatment Initiated: Treatment and education initiated within context of evaluation. Evaluation time included review of current medical information, gathering information related to past medical, social and functional history, completion of standardized testing, formal and informal observation of tasks, assessment of data and development of plan of care and goals. Treatment time included skilled education and facilitation of tasks to increase safety and independence with ADL's for improved functional independence and quality of life. Discharge Recommendations:  Continue to assess pending progress    Patient Education:  OT Education: Transfer Training, ADL Adaptive Strategies, Plan of Care, OT Role, Precautions, Energy Conservation  Patient Education: importance of activity/therapy, safety    Equipment Recommendations:  Equipment Needed: No    Plan:  Times per week: 5x  Current Treatment Recommendations: Strengthening, Balance Training, Functional Mobility Training, Endurance Training, Equipment Evaluation, Education, & procurement, Home Management Training, Self-Care / ADL, Patient/Caregiver Education & Training, Safety Education & Training. See long-term goal time frame for expected duration of plan of care. If no long-term goals established, a short length of stay is anticipated. Goals:  Patient goals :  To be strong enough to go home  Short term goals  Time Frame for Short term goals: 2 weeks  Short term goal 1: Pt to demo safe HH mobility with consistent SBA for increased independence navigating her home environmnet  Short term goal 2: pt to demo standing >6 mins with 1-2 UE release at SUP in prep for basic IADL`s  Short term goal 3: pt to tolerate 15 reps of BUE strengthening HEP for increasing endurance required for bathing tasks  Short term goal 4: pt to demo improved activity tolerance as evidenced by BADL routine with 0 cues

## 2020-02-24 NOTE — PROGRESS NOTES
[facility]  INPATIENT PHYSICAL THERAPY  EVALUATION  [facility] ICU STEPDOWN TELEMETRY 4K - 4K-02/002-A    Time In:   Time Out: 1443  Timed Code Treatment Minutes: 10 Minutes  Minutes: 23          Date: 2020  Patient Name: Liborio Essex,  Gender:  female        MRN: [id number]  : 1935  (82 y.o.)      Referring Practitioner: Keo Ivey PA-C  Diagnosis: rectal bleed  Additional Pertinent Hx: Patient presents to ED because of bright red blood per rectum in the last 2 hours. Patient had two episodes of pure bloody bowel movements. This never happened before. Her past medical history remarkable for COPD, tobacco abuse, CHF currently on an external defibrillator, chronic atrial fibrillation on Eliquis, history of ruptured AAA s/p repair at [facility] in 2019, history of PVD with right fem-tib angioplasty and stenting in 2019 with with Dr. Brian Taylor currently on Plavix. Last colonoscopy was in 2017 first done by GI Dr. Ad Payne with multiple polys identified, then patient was referred to see GS and later had partial colon resection with Dr. Rina Hickey at [facility] in 2018. She has mild dizziness. She denies headache. No chest pain, no shortness of breath. She has no nausea vomiting, no hematemesis. No abdominal pain. No anal pain. Restrictions/Precautions:  Restrictions/Precautions: General Precautions, Fall Risk  Position Activity Restriction  Other position/activity restrictions: lifevest    Subjective:  Chart Reviewed: Yes  Patient assessed for rehabilitation services?: Yes  Subjective: RN approved eval. pt in chair on arrival, agrees to therapy session    General:       Vision: Within Functional Limits    Hearing: Within functional limits         Pain:  Denies.           Social/Functional History:    Lives With: Alone  Type of Home: Apartment  Home Layout: One level  Home Access: Elevator(4th floor)  Home Equipment: Rolling walker     Bathroom Shower/Tub: Tub/Shower unit, Walk-in shower  Bathroom Toilet: Standard  Bathroom Equipment: Grab bars in shower  IADL Comments: pt's son does her grocery shopping    Receives Help From: Family  ADL Assistance: [address]: Independent  Transfer Assistance: Independent    Active : Yes     Additional Comments: Pt was only using RW out of home, was furniture walking within apartment    OBJECTIVE:  Range of Motion:  Bilateral Lower Extremity: WFL    Strength:  Bilateral Lower Extremity: Impaired - grossly 4-/5    Balance:  Static Sitting Balance:  Stand By Assistance  Dynamic Standing Balance: Contact Guard Assistance    Bed Mobility:  Sit to Supine: Stand By Assistance   Scooting: Stand By Assistance    Transfers:  Sit to Stand: [address], with increased time for completion  Stand to [facility], with increased time for completion    Ambulation:  [address], with verbal cues , with increased time for completion  Distance: 140ft  Surface: Level Tile  Device:No Device  Gait Deviations: Forward Flexed Posture, Slow Pretty, Decreased Step Length Bilaterally, Decreased Gait Speed and Mild Path Deviations    Exercise:  Patient was guided in 1 set(s) 12 reps of exercise to both lower extremities. Ankle pumps, Glut sets, Quad sets, Heelslides, Hip abduction/adduction and Straight leg raises. Exercises were completed for increased independence with functional mobility. Functional Outcome Measures: Not completed  AM-PAC Inpatient Mobility without Stair Climbing Raw Score : 15  AM-PAC Inpatient without Stair Climbing T-Scale Score : 43.03    ASSESSMENT:  Activity Tolerance:  Patient tolerance of  treatment: fair. Fatigues easily      Treatment Initiated: Treatment and education initiated within context of evaluation.   Evaluation time included review of current medical information, gathering information related to past medical, social and functional

## 2020-02-24 NOTE — CARE COORDINATION
2/24/20, 2:54 PM    DISCHARGE ON GOING EVALUATION    Rita Ramirez day: 4  Location: -02/002-A Reason for admit: Rectal bleed [K62.5]   Procedure:   s/p LVEF 10-15% per echo 12/28/2019 with life vest currently  s/p PAD/right femoral-tibial angioplasty and stenting 2/2019  s/p AAA repair an stenting 2/15/2019    2/22 Colonoscopy: cecal mass and polyps x2  2/25 Colon resection planned  Treatment Plan of Care: SGY/Cardiology/GI following  Barriers to Discharge: SGY plans procedure above 2/25  PCP: Tuan Maria  Readmission Risk Score: 21%  Patient Goals/Plan/Treatment Preferences: plans home alone (refused HH/ECF), has Lifevest, current with [facility]F Clinic, has walker

## 2020-02-24 NOTE — PLAN OF CARE
Problem: Nutrition  Goal: Optimal nutrition therapy  2/24/2020 0957 by Sterling Gar RD, LD  Outcome: Ongoing  Nutrition Problem: Moderate malnutrition  Intervention: Food and/or Nutrient Delivery: Start ONS(Will send Ensure Enlive TID once FL diet or greater.)  Nutritional Goals: Pt. will receive adequate nutrition (FL diet or greater) in next 1-4 days.

## 2020-02-25 ENCOUNTER — ANESTHESIA (OUTPATIENT)
Dept: OPERATING ROOM | Age: 85
DRG: 329 | End: 2020-02-25
Payer: MEDICARE

## 2020-02-25 ENCOUNTER — ANESTHESIA EVENT (OUTPATIENT)
Dept: OPERATING ROOM | Age: 85
DRG: 329 | End: 2020-02-25
Payer: MEDICARE

## 2020-02-25 VITALS
DIASTOLIC BLOOD PRESSURE: 58 MMHG | OXYGEN SATURATION: 100 % | TEMPERATURE: 97.7 F | SYSTOLIC BLOOD PRESSURE: 123 MMHG | RESPIRATION RATE: 2 BRPM

## 2020-02-25 LAB
ANION GAP SERPL CALCULATED.3IONS-SCNC: 13 MEQ/L (ref 8–16)
APTT: 34 SECONDS (ref 22–38)
BUN BLDV-MCNC: 10 MG/DL (ref 7–22)
CALCIUM SERPL-MCNC: 8.4 MG/DL (ref 8.5–10.5)
CHLORIDE BLD-SCNC: 104 MEQ/L (ref 98–111)
CO2: 25 MEQ/L (ref 23–33)
CREAT SERPL-MCNC: 1.3 MG/DL (ref 0.4–1.2)
ERYTHROCYTE [DISTWIDTH] IN BLOOD BY AUTOMATED COUNT: 17.2 % (ref 11.5–14.5)
ERYTHROCYTE [DISTWIDTH] IN BLOOD BY AUTOMATED COUNT: 49.4 FL (ref 35–45)
GFR SERPL CREATININE-BSD FRML MDRD: 39 ML/MIN/1.73M2
GLUCOSE BLD-MCNC: 101 MG/DL (ref 70–108)
HCT VFR BLD CALC: 32.7 % (ref 37–47)
HEMOGLOBIN: 9.5 GM/DL (ref 12–16)
MAGNESIUM: 1.8 MG/DL (ref 1.6–2.4)
MCH RBC QN AUTO: 23.5 PG (ref 26–33)
MCHC RBC AUTO-ENTMCNC: 29.1 GM/DL (ref 32.2–35.5)
MCV RBC AUTO: 80.7 FL (ref 81–99)
PLATELET # BLD: 155 THOU/MM3 (ref 130–400)
PMV BLD AUTO: 12 FL (ref 9.4–12.4)
POTASSIUM REFLEX MAGNESIUM: 3.3 MEQ/L (ref 3.5–5.2)
RBC # BLD: 4.05 MILL/MM3 (ref 4.2–5.4)
SODIUM BLD-SCNC: 142 MEQ/L (ref 135–145)
WBC # BLD: 4.4 THOU/MM3 (ref 4.8–10.8)

## 2020-02-25 PROCEDURE — 88307 TISSUE EXAM BY PATHOLOGIST: CPT

## 2020-02-25 PROCEDURE — 87086 URINE CULTURE/COLONY COUNT: CPT

## 2020-02-25 PROCEDURE — 6360000002 HC RX W HCPCS: Performed by: SURGERY

## 2020-02-25 PROCEDURE — 97110 THERAPEUTIC EXERCISES: CPT

## 2020-02-25 PROCEDURE — 2709999900 HC NON-CHARGEABLE SUPPLY

## 2020-02-25 PROCEDURE — 7100000001 HC PACU RECOVERY - ADDTL 15 MIN: Performed by: SURGERY

## 2020-02-25 PROCEDURE — 99232 SBSQ HOSP IP/OBS MODERATE 35: CPT | Performed by: PHYSICIAN ASSISTANT

## 2020-02-25 PROCEDURE — 3600000014 HC SURGERY LEVEL 4 ADDTL 15MIN: Performed by: SURGERY

## 2020-02-25 PROCEDURE — 2700000000 HC OXYGEN THERAPY PER DAY

## 2020-02-25 PROCEDURE — 85027 COMPLETE CBC AUTOMATED: CPT

## 2020-02-25 PROCEDURE — 0DTF0ZZ RESECTION OF RIGHT LARGE INTESTINE, OPEN APPROACH: ICD-10-PCS | Performed by: SURGERY

## 2020-02-25 PROCEDURE — 36415 COLL VENOUS BLD VENIPUNCTURE: CPT

## 2020-02-25 PROCEDURE — 6370000000 HC RX 637 (ALT 250 FOR IP): Performed by: SURGERY

## 2020-02-25 PROCEDURE — C9113 INJ PANTOPRAZOLE SODIUM, VIA: HCPCS | Performed by: NURSE PRACTITIONER

## 2020-02-25 PROCEDURE — 3700000001 HC ADD 15 MINUTES (ANESTHESIA): Performed by: SURGERY

## 2020-02-25 PROCEDURE — APPSS45 APP SPLIT SHARED TIME 31-45 MINUTES: Performed by: NURSE PRACTITIONER

## 2020-02-25 PROCEDURE — 6360000002 HC RX W HCPCS: Performed by: NURSE ANESTHETIST, CERTIFIED REGISTERED

## 2020-02-25 PROCEDURE — 94761 N-INVAS EAR/PLS OXIMETRY MLT: CPT

## 2020-02-25 PROCEDURE — 97116 GAIT TRAINING THERAPY: CPT

## 2020-02-25 PROCEDURE — 2500000003 HC RX 250 WO HCPCS: Performed by: NURSE ANESTHETIST, CERTIFIED REGISTERED

## 2020-02-25 PROCEDURE — 2580000003 HC RX 258: Performed by: SURGERY

## 2020-02-25 PROCEDURE — 2000000000 HC ICU R&B

## 2020-02-25 PROCEDURE — 83735 ASSAY OF MAGNESIUM: CPT

## 2020-02-25 PROCEDURE — 3700000000 HC ANESTHESIA ATTENDED CARE: Performed by: SURGERY

## 2020-02-25 PROCEDURE — 2580000003 HC RX 258: Performed by: PHYSICIAN ASSISTANT

## 2020-02-25 PROCEDURE — 2709999900 HC NON-CHARGEABLE SUPPLY: Performed by: SURGERY

## 2020-02-25 PROCEDURE — 85730 THROMBOPLASTIN TIME PARTIAL: CPT

## 2020-02-25 PROCEDURE — 44160 REMOVAL OF COLON: CPT | Performed by: SURGERY

## 2020-02-25 PROCEDURE — 7100000000 HC PACU RECOVERY - FIRST 15 MIN: Performed by: SURGERY

## 2020-02-25 PROCEDURE — 80048 BASIC METABOLIC PNL TOTAL CA: CPT

## 2020-02-25 PROCEDURE — 99223 1ST HOSP IP/OBS HIGH 75: CPT | Performed by: INTERNAL MEDICINE

## 2020-02-25 PROCEDURE — 3600000004 HC SURGERY LEVEL 4 BASE: Performed by: SURGERY

## 2020-02-25 PROCEDURE — 6360000002 HC RX W HCPCS: Performed by: NURSE PRACTITIONER

## 2020-02-25 PROCEDURE — 2500000003 HC RX 250 WO HCPCS: Performed by: SURGERY

## 2020-02-25 PROCEDURE — 6360000002 HC RX W HCPCS: Performed by: PHYSICIAN ASSISTANT

## 2020-02-25 RX ORDER — ROCURONIUM BROMIDE 10 MG/ML
INJECTION, SOLUTION INTRAVENOUS PRN
Status: DISCONTINUED | OUTPATIENT
Start: 2020-02-25 | End: 2020-02-25 | Stop reason: SDUPTHER

## 2020-02-25 RX ORDER — FENTANYL CITRATE 50 UG/ML
INJECTION, SOLUTION INTRAMUSCULAR; INTRAVENOUS PRN
Status: DISCONTINUED | OUTPATIENT
Start: 2020-02-25 | End: 2020-02-25 | Stop reason: SDUPTHER

## 2020-02-25 RX ORDER — FENTANYL CITRATE 50 UG/ML
25 INJECTION, SOLUTION INTRAMUSCULAR; INTRAVENOUS EVERY 5 MIN PRN
Status: DISCONTINUED | OUTPATIENT
Start: 2020-02-25 | End: 2020-02-25 | Stop reason: HOSPADM

## 2020-02-25 RX ORDER — FENTANYL CITRATE 50 UG/ML
50 INJECTION, SOLUTION INTRAMUSCULAR; INTRAVENOUS EVERY 5 MIN PRN
Status: DISCONTINUED | OUTPATIENT
Start: 2020-02-25 | End: 2020-02-25 | Stop reason: HOSPADM

## 2020-02-25 RX ORDER — LIDOCAINE HCL/PF 100 MG/5ML
SYRINGE (ML) INJECTION PRN
Status: DISCONTINUED | OUTPATIENT
Start: 2020-02-25 | End: 2020-02-25 | Stop reason: SDUPTHER

## 2020-02-25 RX ORDER — PROMETHAZINE HYDROCHLORIDE 25 MG/ML
12.5 INJECTION, SOLUTION INTRAMUSCULAR; INTRAVENOUS
Status: DISCONTINUED | OUTPATIENT
Start: 2020-02-25 | End: 2020-02-25 | Stop reason: HOSPADM

## 2020-02-25 RX ORDER — GLYCOPYRROLATE 1 MG/5 ML
SYRINGE (ML) INTRAVENOUS PRN
Status: DISCONTINUED | OUTPATIENT
Start: 2020-02-25 | End: 2020-02-25 | Stop reason: SDUPTHER

## 2020-02-25 RX ORDER — ONDANSETRON 2 MG/ML
INJECTION INTRAMUSCULAR; INTRAVENOUS PRN
Status: DISCONTINUED | OUTPATIENT
Start: 2020-02-25 | End: 2020-02-25 | Stop reason: SDUPTHER

## 2020-02-25 RX ORDER — POTASSIUM CHLORIDE 7.45 MG/ML
10 INJECTION INTRAVENOUS ONCE
Status: COMPLETED | OUTPATIENT
Start: 2020-02-25 | End: 2020-02-25

## 2020-02-25 RX ORDER — NEOSTIGMINE METHYLSULFATE 5 MG/5 ML
SYRINGE (ML) INTRAVENOUS PRN
Status: DISCONTINUED | OUTPATIENT
Start: 2020-02-25 | End: 2020-02-25 | Stop reason: SDUPTHER

## 2020-02-25 RX ORDER — MORPHINE SULFATE 4 MG/ML
4 INJECTION, SOLUTION INTRAMUSCULAR; INTRAVENOUS
Status: DISCONTINUED | OUTPATIENT
Start: 2020-02-25 | End: 2020-03-01 | Stop reason: HOSPADM

## 2020-02-25 RX ORDER — LABETALOL 20 MG/4 ML (5 MG/ML) INTRAVENOUS SYRINGE
10 EVERY 10 MIN PRN
Status: DISCONTINUED | OUTPATIENT
Start: 2020-02-25 | End: 2020-02-25 | Stop reason: HOSPADM

## 2020-02-25 RX ORDER — PROPOFOL 10 MG/ML
INJECTION, EMULSION INTRAVENOUS PRN
Status: DISCONTINUED | OUTPATIENT
Start: 2020-02-25 | End: 2020-02-25 | Stop reason: SDUPTHER

## 2020-02-25 RX ORDER — BUPIVACAINE HYDROCHLORIDE 5 MG/ML
INJECTION, SOLUTION EPIDURAL; INTRACAUDAL PRN
Status: DISCONTINUED | OUTPATIENT
Start: 2020-02-25 | End: 2020-02-25 | Stop reason: HOSPADM

## 2020-02-25 RX ORDER — PANTOPRAZOLE SODIUM 40 MG/10ML
40 INJECTION, POWDER, LYOPHILIZED, FOR SOLUTION INTRAVENOUS DAILY
Status: DISCONTINUED | OUTPATIENT
Start: 2020-02-25 | End: 2020-02-28 | Stop reason: SDUPTHER

## 2020-02-25 RX ORDER — MORPHINE SULFATE 2 MG/ML
2 INJECTION, SOLUTION INTRAMUSCULAR; INTRAVENOUS
Status: DISCONTINUED | OUTPATIENT
Start: 2020-02-25 | End: 2020-03-01 | Stop reason: HOSPADM

## 2020-02-25 RX ORDER — SUCCINYLCHOLINE/SOD CL,ISO/PF 200MG/10ML
SYRINGE (ML) INTRAVENOUS PRN
Status: DISCONTINUED | OUTPATIENT
Start: 2020-02-25 | End: 2020-02-25 | Stop reason: SDUPTHER

## 2020-02-25 RX ORDER — DEXAMETHASONE SODIUM PHOSPHATE 4 MG/ML
INJECTION, SOLUTION INTRA-ARTICULAR; INTRALESIONAL; INTRAMUSCULAR; INTRAVENOUS; SOFT TISSUE PRN
Status: DISCONTINUED | OUTPATIENT
Start: 2020-02-25 | End: 2020-02-25 | Stop reason: SDUPTHER

## 2020-02-25 RX ORDER — PHENYLEPHRINE HYDROCHLORIDE 10 MG/ML
INJECTION INTRAVENOUS PRN
Status: DISCONTINUED | OUTPATIENT
Start: 2020-02-25 | End: 2020-02-25 | Stop reason: SDUPTHER

## 2020-02-25 RX ORDER — ALVIMOPAN 12 MG/1
12 CAPSULE ORAL 2 TIMES DAILY
Status: DISCONTINUED | OUTPATIENT
Start: 2020-02-25 | End: 2020-03-01 | Stop reason: HOSPADM

## 2020-02-25 RX ORDER — SODIUM CHLORIDE 450 MG/100ML
INJECTION, SOLUTION INTRAVENOUS CONTINUOUS
Status: DISCONTINUED | OUTPATIENT
Start: 2020-02-25 | End: 2020-02-27

## 2020-02-25 RX ADMIN — DEXAMETHASONE SODIUM PHOSPHATE 8 MG: 4 INJECTION, SOLUTION INTRAMUSCULAR; INTRAVENOUS at 13:47

## 2020-02-25 RX ADMIN — FENTANYL CITRATE 50 MCG: 50 INJECTION, SOLUTION INTRAMUSCULAR; INTRAVENOUS at 13:13

## 2020-02-25 RX ADMIN — FENTANYL CITRATE 50 MCG: 50 INJECTION, SOLUTION INTRAMUSCULAR; INTRAVENOUS at 14:36

## 2020-02-25 RX ADMIN — MAGNESIUM SULFATE HEPTAHYDRATE 1 G: 500 INJECTION, SOLUTION INTRAMUSCULAR; INTRAVENOUS at 10:18

## 2020-02-25 RX ADMIN — Medication 100 MG: at 13:22

## 2020-02-25 RX ADMIN — ONDANSETRON HYDROCHLORIDE 4 MG: 4 INJECTION, SOLUTION INTRAMUSCULAR; INTRAVENOUS at 13:47

## 2020-02-25 RX ADMIN — MORPHINE SULFATE 2 MG: 2 INJECTION, SOLUTION INTRAMUSCULAR; INTRAVENOUS at 16:25

## 2020-02-25 RX ADMIN — PROPOFOL 100 MG: 10 INJECTION, EMULSION INTRAVENOUS at 13:22

## 2020-02-25 RX ADMIN — PHENYLEPHRINE HYDROCHLORIDE 100 MCG: 10 INJECTION INTRAVENOUS at 13:22

## 2020-02-25 RX ADMIN — POTASSIUM CHLORIDE 10 MEQ: 7.46 INJECTION, SOLUTION INTRAVENOUS at 09:10

## 2020-02-25 RX ADMIN — Medication 0.4 MG: at 14:26

## 2020-02-25 RX ADMIN — ROCURONIUM BROMIDE 20 MG: 10 INJECTION INTRAVENOUS at 13:45

## 2020-02-25 RX ADMIN — PHENYLEPHRINE HYDROCHLORIDE 100 MCG: 10 INJECTION INTRAVENOUS at 13:36

## 2020-02-25 RX ADMIN — ALVIMOPAN 12 MG: 12 CAPSULE ORAL at 20:07

## 2020-02-25 RX ADMIN — Medication 3 MG: at 14:26

## 2020-02-25 RX ADMIN — PANTOPRAZOLE SODIUM 40 MG: 40 INJECTION, POWDER, FOR SOLUTION INTRAVENOUS at 17:31

## 2020-02-25 RX ADMIN — CEFOXITIN SODIUM 2 G: 2 POWDER, FOR SOLUTION INTRAVENOUS at 13:26

## 2020-02-25 RX ADMIN — SODIUM CHLORIDE: 4.5 INJECTION, SOLUTION INTRAVENOUS at 16:14

## 2020-02-25 RX ADMIN — ROCURONIUM BROMIDE 30 MG: 10 INJECTION INTRAVENOUS at 13:28

## 2020-02-25 RX ADMIN — FENTANYL CITRATE 50 MCG: 50 INJECTION, SOLUTION INTRAMUSCULAR; INTRAVENOUS at 13:33

## 2020-02-25 RX ADMIN — CEFOXITIN SODIUM 1 G: 1 POWDER, FOR SOLUTION INTRAVENOUS at 20:07

## 2020-02-25 RX ADMIN — PHENYLEPHRINE HYDROCHLORIDE 100 MCG: 10 INJECTION INTRAVENOUS at 13:46

## 2020-02-25 RX ADMIN — ACETAMINOPHEN 650 MG: 325 TABLET ORAL at 20:06

## 2020-02-25 RX ADMIN — SODIUM CHLORIDE: 9 INJECTION, SOLUTION INTRAVENOUS at 11:51

## 2020-02-25 ASSESSMENT — PULMONARY FUNCTION TESTS
PIF_VALUE: 17
PIF_VALUE: 17
PIF_VALUE: 15
PIF_VALUE: 16
PIF_VALUE: 16
PIF_VALUE: 1
PIF_VALUE: 5
PIF_VALUE: 2
PIF_VALUE: 12
PIF_VALUE: 16
PIF_VALUE: 17
PIF_VALUE: 15
PIF_VALUE: 15
PIF_VALUE: 17
PIF_VALUE: 17
PIF_VALUE: 0
PIF_VALUE: 15
PIF_VALUE: 17
PIF_VALUE: 15
PIF_VALUE: 15
PIF_VALUE: 17
PIF_VALUE: 14
PIF_VALUE: 16
PIF_VALUE: 11
PIF_VALUE: 17
PIF_VALUE: 17
PIF_VALUE: 15
PIF_VALUE: 16
PIF_VALUE: 16
PIF_VALUE: 10
PIF_VALUE: 17
PIF_VALUE: 16
PIF_VALUE: 17
PIF_VALUE: 18
PIF_VALUE: 2
PIF_VALUE: 16
PIF_VALUE: 11
PIF_VALUE: 10
PIF_VALUE: 17
PIF_VALUE: 17
PIF_VALUE: 15
PIF_VALUE: 14
PIF_VALUE: 18
PIF_VALUE: 0
PIF_VALUE: 16
PIF_VALUE: 17
PIF_VALUE: 10
PIF_VALUE: 5
PIF_VALUE: 16
PIF_VALUE: 15
PIF_VALUE: 3
PIF_VALUE: 15
PIF_VALUE: 11
PIF_VALUE: 15
PIF_VALUE: 2
PIF_VALUE: 17
PIF_VALUE: 17
PIF_VALUE: 16
PIF_VALUE: 12
PIF_VALUE: 17
PIF_VALUE: 10
PIF_VALUE: 1
PIF_VALUE: 17
PIF_VALUE: 16
PIF_VALUE: 15
PIF_VALUE: 16
PIF_VALUE: 17
PIF_VALUE: 17
PIF_VALUE: 10
PIF_VALUE: 17
PIF_VALUE: 1
PIF_VALUE: 17
PIF_VALUE: 15
PIF_VALUE: 16
PIF_VALUE: 18
PIF_VALUE: 17
PIF_VALUE: 15
PIF_VALUE: 12
PIF_VALUE: 17
PIF_VALUE: 17
PIF_VALUE: 18
PIF_VALUE: 12
PIF_VALUE: 2
PIF_VALUE: 17
PIF_VALUE: 15
PIF_VALUE: 12
PIF_VALUE: 17
PIF_VALUE: 5
PIF_VALUE: 18
PIF_VALUE: 15
PIF_VALUE: 14
PIF_VALUE: 16
PIF_VALUE: 18
PIF_VALUE: 17

## 2020-02-25 ASSESSMENT — PAIN SCALES - GENERAL
PAINLEVEL_OUTOF10: 9
PAINLEVEL_OUTOF10: 0
PAINLEVEL_OUTOF10: 8
PAINLEVEL_OUTOF10: 9
PAINLEVEL_OUTOF10: 0

## 2020-02-25 ASSESSMENT — PAIN DESCRIPTION - LOCATION: LOCATION: ABDOMEN

## 2020-02-25 NOTE — PROGRESS NOTES
1454  Pt. Unresponsive on adm. To pacu. Abdominal site with surgical glue intact and no drainage noted. Ice applied. 1459  Pt. Awake and oriented. Pt. Denies pain at this time. 1510  Pt. Complains of abdominal pain. Pt. Refusing pain medication. Pt. Wanting to take Tylenol only. 1530  o2 decreased to 2 liters. 1535  Report called to 4D.  1545  pacu criteria met. Transfer to 17.

## 2020-02-25 NOTE — CARE COORDINATION
DISCHARGE ON GOING EVALUATION    [facility] Room day: 5  Location: [facility]02/002-A Reason for admit: Rectal bleed [K62.5]   Procedure:   s/p LVEF 10-15% per echo 12/28/2019 with life vest currently  s/p PAD/right femoral-tibial angioplasty and stenting 2/2019  s/p AAA repair an stenting 2/15/2019  2/22 Colonoscopy: cecal mass and polyps x2  2/25 right colon resection planned  Treatment Plan of Care: SGY/Cardiology/GI following  Barriers to Discharge: SGY plans procedure above today  PCP: Corazon Carmona  Readmission Risk Score: 26%  Patient Goals/Plan/Treatment Preferences: plans home alone (refused HH/ECF), has Lifevest, current with CHF Clinic, has walker

## 2020-02-25 NOTE — ANESTHESIA POSTPROCEDURE EVALUATION
Department of Anesthesiology  Postprocedure Note    Patient: Payton James  MRN: [id number]  YOB: 1935  Date of evaluation: 2/25/2020  Time:  3:35 PM     Procedure Summary     Date:  02/25/20 Room / Location:  [address] [place] [address]    Anesthesia Start:  1311 Anesthesia Stop:  1458    Procedure:  OPEN RIGHT COLON RESECTION (N/A Abdomen) Diagnosis:  (CECAL MASS)    Surgeon:  Yolanda Augustin MD Responsible Provider:  Cecil Madrid DO    Anesthesia Type:  general ASA Status:  4          Anesthesia Type: general    Christiane Phase I: Christiane Score: 4    Christiane Phase II: Christiane Score: 10    Last vitals: Reviewed and per EMR flowsheets. Anesthesia Post Evaluation   [facility]  POST-ANESTHESIA NOTE       Name:  Payton James                                         Age:  82 y.o.   MRN:  [id number]      Last Vitals:  /60   Pulse 76   Temp 97.9 °F (36.6 °C) (Temporal)   Resp 24   Ht 5' 5\" (1.651 m)   Wt 155 lb 12.8 oz (70.7 kg)   SpO2 93%   BMI 25.93 kg/m²   Patient Vitals for the past 4 hrs:   BP Temp Temp src Pulse Resp SpO2   02/25/20 1454 136/60 97.9 °F (36.6 °C) Temporal 76 24 93 %   02/25/20 1152 108/72 97.9 °F (36.6 °C) Oral 78 16 96 %       Level of Consciousness:  Awake    Respiratory:  Stable    Oxygen Saturation:  Stable    Cardiovascular:  Stable    Hydration:  Adequate    PONV:  Stable    Post-op Pain:  Adequate analgesia    Post-op Assessment:  No apparent anesthetic complications    Additional Follow-Up / Treatment / Comment:  None    Lucinda Spann MD  February 25, 2020   3:35 PM

## 2020-02-25 NOTE — PROGRESS NOTES
Hospitalist Progress Note      Patient:  Rusty Velasco    Unit/Bed:4K-02/002-A  YOB: 1935  MRN: [id number]   Acct: [de-identified]   PCP: Kieran Ambriz  Date of Admission: 2/20/2020    Assessment/Plan:    1. Lower GI Bleed secondary to diverticulosis: Resolved. Hemoglobin stable.  Colonoscopy completed 2/22/2020 revealed diverticulosis with active bleeding requiring 2 hemoclips with successful hemostasis. Eliquis and Plavix on hold prior to open colectomy 2/25/20. Heparin held this morning prior to surgery. Transfuse for hemoglobin less than 8. Trend with repeat CBC in the morning. 2. Cecal mass: 4 cm cecal mass noted on colonoscopy but not biopsied secondary to active bleeding and anticoagulation/antiplatelets at that time. Eliquis/Plavix on hold. Open right colectomy to be completed by Dr. Laurel Douglas 2/25/20. Cardiology consulted for cardiac clearance secondary to patient's significant risk factors including heart failure, CAD, PVD. Patient understands and wishes to proceed with procedure. 3. ALEJANDRO on CKD Stage III: ALEJANDRO resolved s/p fluid resuscitation.  Secondary to fluid depletion secondary to acute blood loss. Monitor I/O. Trend BMP daily. Replace electrolytes prn.   4. Acute on Chronic Microcytic Anemia: Improving  Secondary to diverticular bleed s/p hemoclip. Chronically secondary to CKD. Holding Plavix and Eliqiuis prior to possible open colectomy. Continue Heparin. Transfuse for Hgb <8.   5. Chronic HFrEF without acute exacerbation: EF 10-15% on echo 12/2019.  Continue Bumex/beta-blocker with hypotension parameters. ARB discontinued secondary to hypotension. Lifevest in place. 6. Hypokalemia: Likely secondary to diuresis. Mg 1.8 this morning, administered 1g replacement. Administered 20mEq KCl IV secondary to patient being NPO pre-op.  Continue to monitor with repeat BMP in the morning and replace prn.   7. Paroxysmal Atrial Fibrillation: Currently sinus arrhythmia. Eliquis held secondary to GIB and in preparation for possible open colectomy. Continue BB. Resume anticoagulation/antiplatelet post-operatively. 8. History of AAA: S/p repair in 2/2019. Plavix outpatient, currently held due GI bleed and in preparation for possible open colectomy. Resume post-operatively. 9. PAD s/p Right Fem-Tib angioplasty with stent: Plavix currently held. Cardiology recommends resuming ASA/Plavix when general surgery is agreeable, likely post-op. 10. History of GERD secondary to esophageal spasm: Follows with GI outpatient. Previously received botox injections but hasn't for the past year. Continue PO protonix. NPO pre-op. 11. History of Hyperlipidemia: Continue Statin  12. Moderate Protein Calorie Malnutrition: Evident by temporalis muscle wasting, poor PO intake secondary to dysphagia/achalasia, subcutaneous fat loss. Supplement with Ensure per dietician. Chief Complaint:  Rectal Bleeding    Initial H and P:-    Lainey Gonsales is an 82-year-old white female former smoker with PMH PAD right fem-tib angioplasty and stenting in 02/2019 with with Dr. Vergara currently on Plavix, HFrEF 10-15% with life-vest on, Paroxysmal A.fib, AAA s/p repair 02/2019, Colonoscopy in 11/2017 showing multiple colon polyps 10cm colon resection with Dr. Lenin Marinelli 08/2018 who presents to [facility] ED on 2/2020 c/o hematochezia that started today. She has had 3 large BM of bright red blood. This has never happened in the past. She takes Plavix and eliquis. She did take her normal 2 doses of eliquis today.  Denies any abdominal pain, rectal pain, Lightheadedness, dizziness, CP, inc SOB from baseline, N/V/D.      In the ED, vitals show HR 90, BP 80/60, RR 17, 97% on RA, and afebrile. Labs show Cr 1.4, BUN 39, negative troponin, unremarkable LFTs, WBC 7.0, hgb 11.2, MCV 78.7. INR 1.8, PTT 41.7. EKG shows NSR with premature supraventricular beats.  Rectal exam by ED provider showed no bleeding

## 2020-02-25 NOTE — PLAN OF CARE
Problem: Falls - Risk of:  Goal: Will remain free from falls  Description  Will remain free from falls  2/25/2020 2923 by Severo Siddiqi RN  Outcome: Ongoing  Note:   On fall precautions. Alert and orient and uses call light appropriately. Up to chair with SBA, gait steady. Problem: Pain Control  Goal: Maintain pain level at or below patient's acceptable level (or 5 if patient is unable to determine acceptable level)  2/25/2020 0938 by Severo Siddiqi RN  Outcome: Ongoing  Note:   Pain Assessment: 0-10  Pain Level: 0   Patient's Stated Pain Goal: No pain   Is pain goal met at this time? Yes            Problem: Cardiovascular  Goal: No DVT, peripheral vascular complications  0/29/0086 4214 by Severo Siddiqi RN  Outcome: Ongoing  Note:   Right leg chronically a little larger and pulses per doppler. . Denies any pain. SQ Heparin held this AM for surgery today     Problem: Cardiovascular  Goal: Hemodynamic stability  2/25/2020 0938 by Severo Siddiqi RN  Outcome: Ongoing  Note:   SR with occ PAC. Has life vest on. Problem: Nutrition  Goal: Optimal nutrition therapy  2/25/2020 6180 by Severo Siddiqi RN  Outcome: Ongoing  Note:   NPO for surgery     Problem: Skin Integrity/Risk  Goal: No skin breakdown during hospitalization  2/25/2020 9834 by Severo Siddiqi RN  Outcome: Ongoing  Note:   No new skin breakdown. Turns frequently by herself and up to chair        Problem: Discharge Planning:  Goal: Patients continuum of care needs are met  Description  Patients continuum of care needs are met  2/25/2020 4195 by Severo Siddiqi RN  Outcome: Ongoing  Note:   Lived at home alone prior to discharge. Having a right colon resection today. SS helping with discharge needs. Plans to go to ICU after surgery this afternoon. Problem:  Bowel Function - Altered:  Goal: Bowel elimination is within specified parameters  Description  Bowel elimination is within specified

## 2020-02-25 NOTE — ANESTHESIA PRE PROCEDURE
Department of Anesthesiology  Preprocedure Note       Name:  Eduarda Roca   Age:  82 y.o.  :  1935                                          MRN:  [id number]         Date:  2020      Surgeon: Yazmin Crespo):  Mejia Mendez MD    Procedure: OPEN RIGHT COLON RESECTION (N/A Abdomen)    Medications prior to admission:   Prior to Admission medications    Medication Sig Start Date End Date Taking?  Authorizing Provider   pantoprazole (PROTONIX) 40 MG tablet Take 40 mg by mouth 2 times daily (before meals)   Yes Historical Provider, MD   Saccharomyces boulardii (PROBIOTIC) 250 MG CAPS TK 1 C PO BID 20  Yes Historical Provider, MD   losartan (COZAAR) 25 MG tablet Take 1 tablet by mouth daily 20  Yes ROMAN Espinosa CNP   bumetanide (BUMEX) 1 MG tablet Take 1 tablet by mouth daily 20  Yes Will DO Sai   albuterol sulfate HFA (PROVENTIL HFA) 108 (90 Base) MCG/ACT inhaler Inhale 2 puffs into the lungs every 6 hours as needed for Wheezing or Shortness of Breath 19  Yes Will DO Sai   metoprolol succinate (TOPROL XL) 25 MG extended release tablet Take 1 tablet by mouth daily 20  Yes Will DO Sai   apixaban (ELIQUIS) 5 MG TABS tablet TAKE ONE TABLET BY MOUTH TWICE DAILY 10/28/19  Yes ROMAN Espinosa CNP   magnesium (MAGNESIUM-OXIDE) 250 MG TABS tablet TAKE 2 TABLETS BY MOUTH TWICE DAILY 19  Yes ROMAN Espinosa CNP   pravastatin (PRAVACHOL) 40 MG tablet Take 1 tablet by mouth daily 7/15/19  Yes ROMAN Espinosa CNP   potassium chloride (KLOR-CON M) 10 MEQ extended release tablet Take 1 tablet by mouth daily 6/10/19  Yes ROMAN Espinosa CNP   clopidogrel (PLAVIX) 75 MG tablet Take 1 tablet by mouth daily 6/3/19  Yes ROMAN Espinosa CNP   melatonin 3 MG TABS tablet Take 2 tablets by mouth nightly as needed (sleep) 19  Yes Elmira Sanchez PA-C   timolol (TIMOPTIC) 0.5 % ophthalmic solution Place 1 drop into both eyes nightly    Yes (AAA) without rupture ([facility] [address] I71.4    Hiatal hernia K44.9    Elevated procalcitonin R79.89    Hypomagnesemia E83.42    Pulmonary nodule R91.1    CKD (chronic kidney disease) stage 2, GFR 60-89 ml/min N18.2    Stage 3 chronic kidney disease (HCC) N18.3    Femoral popliteal artery thrombus (HCC) I74.3    PAD (peripheral artery disease) ([facility]) I73.9    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 9) E66.9    Respiratory failure ([facility]) J96.90    Respiratory distress R06.03    Acute on chronic congestive heart failure (HCC) I50.9    Acute on chronic combined systolic and diastolic CHF (congestive heart failure) ([facility]) I50.43    Dilated cardiomyopathy ([facility]) I42.0    Moderate malnutrition ([facility]) E44.0    Rectal bleed K62.5    Bright red blood per rectum K62.5    Antiplatelet or antithrombotic long-term use Z79.02    Acute blood loss anemia D62    Chronic systolic heart failure (HCC) I50.22       Past Medical History:        Diagnosis Date    Arthritis     Blood circulation, collateral     BPPV (benign paroxysmal positional vertigo) 6/6/16    CHF (congestive heart failure) ([facility])     Chronic kidney disease     GERD (gastroesophageal reflux disease)     ?  esophageal stricture    History of blood transfusion     HTN (hypertension)     Hx of blood clots 2018    R leg    Hyperlipidemia     Neuromuscular disorder (HCC)     Obesity     Osteopenia     PAC (premature atrial contraction)     on betablocker    Paralysis ([facility])     baby    Pedal edema     denied any hx of hypertension    Pneumonia 2019    EGD BIOPSY performed by Papa Archuleta MD at [facility]       Social History:    Social History     Tobacco Use    Smoking status: Former Smoker     Packs/day: 0.25     Years: 60.00     Pack years: 15.00     Types: Cigarettes     Start date:      Last attempt to quit: 10/18/2019     Years since quittin.3    Smokeless tobacco: Never Used    Tobacco comment: 1 cigarette every other day   Substance Use Topics    Alcohol use: Yes     Alcohol/week: 1.0 standard drinks     Types: 1 Glasses of wine per week     Comment: social                                Counseling given: Not Answered  Comment: 1 cigarette every other day      Vital Signs (Current):   Vitals:    20 2345 20 0423 20 0815 20 1152   BP: (!) 112/51 (!) 102/51 (!) 100/58 108/72   Pulse: 93 70 65 78   Resp: 20  16 16   Temp: 97.8 °F (36.6 °C) 97.4 °F (36.3 °C) 97.4 °F (36.3 °C) 97.9 °F (36.6 °C)   TempSrc: Oral Oral Oral Oral   SpO2: 95% 96% 96% 96%   Weight:  155 lb 12.8 oz (70.7 kg)     Height:                                                  BP Readings from Last 3 Encounters:   20 108/72   20 100/60   20 118/60       NPO Status: Time of last liquid consumption: 2300                        Time of last solid consumption: 2300                        Date of last liquid consumption: 20                        Date of last solid food consumption: 20    BMI:   Wt Readings from Last 3 Encounters:   20 155 lb 12.8 oz (70.7 kg)   20 157 lb (71.2 kg)   20 158 lb (71.7 kg)     Body mass index is 25.93 kg/m².     CBC:   Lab Results   Component Value Date    WBC 4.4 2020    RBC 4.05 2020    HGB 9.5 2020    HCT 32.7 2020    MCV 80.7 2020    RDW 17.5 2019     2020       CMP:   Lab Results   Component Value Date     2020    K 3.3 2020     2020    CO2 25 2020    BUN 10 2020

## 2020-02-25 NOTE — PROGRESS NOTES
[address] [facility] [address] ICU STEPDOWN TELEMETRY 4K  Occupational Therapy  Daily Note  Time:   Time In:   Time Out: 930  Timed Code Treatment Minutes: 12 Minutes  Minutes: 12          Date: 2020  Patient Name: Pao Johnson,   Gender: female      Room: [facility]  MRN: [id number]  : 1935  (82 y.o.)  Referring Practitioner: Emmanuel Burris PA-C  Diagnosis: Rectal bleed  Additional Pertinent Hx: Patient presents to ED because of bright red blood per rectum in the last 2 hours. Patient had two episodes of pure bloody bowel movements. This never happened before. Her past medical history remarkable for COPD, tobacco abuse, CHF currently on an external defibrillator, chronic atrial fibrillation on Eliquis, history of ruptured AAA s/p repair at [facility] in 2019, history of PVD with right fem-tib angioplasty and stenting in 2019 with with Dr. Ela Ríos currently on Plavix. Last colonoscopy was in 2017 first done by GI Dr. Lidya Mcpherson with multiple polys identified, then patient was referred to see GS and later had partial colon resection with Dr. Angelique Tamayo at [facility] in 2018. She has mild dizziness. She denies headache. No chest pain, no shortness of breath. She has no nausea vomiting, no hematemesis. No abdominal pain. No anal pain. Restrictions/Precautions:  Restrictions/Precautions: General Precautions, Fall Risk  Position Activity Restriction  Other position/activity restrictions: lifevest    SUBJECTIVE: Pt seated in bedside chair upon arrival, required max encouragement to participate in OT session and declines transfer training or mobility stating, \"I already had therapy this morning. \" RN okayed therapy session. PAIN: Denies. COGNITION: WFL    ADL:   No ADL's completed this session. Pt declined. BALANCE:  Sitting Balance:  Modified Independent. Within bedside chair. TRANSFERS & FUNCTIONAL MOBILITY:  Pt declined after max encouragement.      ADDITIONAL ACTIVITIES:  Completed RUE exercises x10 reps x2 sets using min resistance band in all joints/planes to increase strength and endurance required for ADLs. Pt required rest break between each exercise and min v/c for proper technique. Pt declined ther ex with LUE d/t pain from IV potassium currently running. Pt educated on benefits of [place] and DME to increase independence with LB care after surgery. Therapist provided verbal education for reacher, sock aid, long handled shoe horn/sponge, RTS, and shower chair. Pt states she currently has all needed DME and is comfortable with items for daily use. ASSESSMENT:     Activity Tolerance:  Patient tolerance of  treatment: fair. Pt limited by fatigue/pain.       Discharge Recommendations: Continue to assess pending progress  Equipment Recommendations: Equipment Needed: No  Plan: Times per week: 5x  Current Treatment Recommendations: Strengthening, Balance Training, Functional Mobility Training, Endurance Training, Equipment Evaluation, Education, & procurement, Home Management Training, Self-Care / ADL, Patient/Caregiver Education & Training, Safety Education & Training    Patient Education  Patient Education: Role of OT, Plan of Care, Home Exercise Program, Equipment Education, Reviewed Prior Education and Importance of Increasing Activity    Goals  Short term goals  Time Frame for Short term goals: 2 weeks  Short term goal 1: Pt to demo safe HH mobility with consistent SBA for increased independence navigating her home environmnet  Short term goal 2: pt to demo standing >6 mins with 1-2 UE release at SUP in prep for basic IADL`s  Short term goal 3: pt to tolerate 15 reps of BUE strengthening HEP for increasing endurance required for bathing tasks  Short term goal 4: pt to demo improved activity tolerance as evidenced by BADL routine with 0 cues for safety/ECT for increased indep with self cares  Long term goals  Time Frame for Long term goals : NA d/t ELOS    Following session, patient left in safe position with all fall risk precautions in place.

## 2020-02-25 NOTE — PROGRESS NOTES
apartment    Restrictions/Precautions:  Restrictions/Precautions: General Precautions, Fall Risk  Position Activity Restriction  Other position/activity restrictions: lifevest    SUBJECTIVE: nursing ok'd therapy and reported that she was scheduled for sx later today, pt resting in bed she did require encouragement to get up for therapy, we discussed importance of mobility kim since she has sx later today we also discussed we will await for her activity orders following sx to resume her therapy     PAIN: 0/10: she did state she feels stiff due to her OA and not being able to take her meds right now     OBJECTIVE:  Bed Mobility:  Supine to Sit: Modified Independent  Sit to Supine: Modified Independent     Transfers:  Sit to Stand: Supervision pt demonstrated very good understanding of her IV line and her cardiac life vest,   Stand to Sit:Supervision    Ambulation:  Supervision  Distance: 10x2, 250x1  Surface: Level Tile  Device:No Device  Gait Deviations:  Fairly slow skinny with decrease in arm swing but no loss of balance, pt c/o of min fatigue but denied any SOB or dizziness when up     Balance:  standing balance in bathroom reaching just slighlty out of base w/ no UE at support and supervision     Exercise:  Patient was guided in 1 set(s) 10 reps of exercise to both lower extremities. Ankle pumps, Long arc quads, Seated hip flexion and discussed with pt to cont with ex following sx . Exercises were completed for increased independence with functional mobility. Functional Outcome Measures: Not completed     AM-PAC Inpatient Mobility without Stair Climbing Raw Score : 15  AM-PAC Inpatient without Stair Climbing T-Scale Score : 43.03    ASSESSMENT:  Assessment: Patient progressing toward established goals. and pt schedule for sx later today, will await for activity orders following sx to resume therapy   Activity Tolerance:  Patient tolerance of  treatment: good.         Equipment Recommendations:Equipment

## 2020-02-25 NOTE — CONSULTS
Intensivist Consult Note        Patient:  Sathya Jones  YOB: 1935  Date of Service: 2/25/2020  MRN: [id number]   Acct:  [de-identified]   Primary Care Physician: Elo Galvez    Chief Complaint: rectal bleeding  Reason for consult  Heart failure management post-operateively    Date of Service: Pt seen/examined in consultation on 2/25/2020     History Of Present Illness:      [age] y.o. female who we are asked to see/evaluate by Juan Jose Savage MD for medical management of heart failure and volume status postoperatively following colectomy. Patient PMH includes former smoker, PAD, HFrEF, PAF, AAA repair, colon polyps. Pateint originally admitted to [facility] after presenting to ED on 2/20/2020 with hematochezia. Admitted to ICU 2/25/2020 postoperatively following right open coloectomy per Dr. Mason Wills. Upon evaluation patient awake and alert stating surgical site pain is \"moderate but tolerable. \"  Denies chest pain, dyspnea, nausea/vomitting. Is not passing flautus. Original HPI:  (from chart review) Sathya Jones is an 82-year-old white female former smoker with PMH PAD right fem-tib angioplasty and stenting in 02/2019 with with Dr. Vergara currently on Plavix, HFrEF 10-15% with life-vest on, Paroxysmal A.fib, AAA s/p repair 02/2019, Colonoscopy in 11/2017 showing multiple colon polyps 10cm colon resection with Dr. Lanny Soto 08/2018 who presents to [facility] ED on 2/2020 c/o hematochezia that started today. She has had 3 large BM of bright red blood. This has never happened in the past. She takes Plavix and eliquis. She did take her normal 2 doses of eliquis today. Denies any abdominal pain, rectal pain, Lightheadedness, dizziness, CP, inc SOB from baseline, N/V/D.      In the ED, vitals show HR 90, BP 80/60, RR 17, 97% on RA, and afebrile. Labs show Cr 1.4, BUN 39, negative troponin, unremarkable LFTs, WBC 7.0, hgb 11.2, MCV 78.7. INR 1.8, PTT 41.7.  EKG shows NSR with premature 10.8/Hct36.8. Transfuse for Hgb <8.0, related to cardiac history. Trend CBC. 7. Chronic HFrEF without Acute Exacerbation:  EF 10-15% on ECHO  12/28/2019.  2125 mL urine output last 24 hrs. Weight  160 # from 155-dry appears appx 157? Continue Bumex/beta-blocker with hypotension parameters. ARB discontinued secondary to hypotension. LifeVest outpatient off due to surgical incision. Continue telemetry. Continue strict I & O and daily weights. 8. Electrolyte Disturbances:  (Ongoing) Likely secondary to diuresis. Mg 1.8 this morning, S/P 1 g IV Magnesium  replacement. Trend BMP & replace per protocol with ordered rechecks. 9. Paroxysmal Atrial Fibrillation: Currently sinus rythym with PVC's. Eliquis held secondary to GIB and in preparation for open colectomy. Continue BB. Resume anticoagulation/antiplatelet post-operatively when approved by General Surgery. 10. History of AAA:   S/P repair in 2/15/2019. On  Plavix outpatient-currently held due GI bleed  for open colectomy. Resume post-operatively when ok with General Surgery. 11. PAD S/P Right Fem-Tib Angioplasty with Stent:  (2/2019)  Plavix currently held as above. Cardiology recommends resuming ASA/Plavix when approved per General Surgery. 12. History of GERD:  (secondary to esophageal spasm)  Follows with GI outpatient. Previously received botox injections but hasn't for the past year. Continue PPI-change to IV. 13. History of Hyperlipidemia: Continue statin. 14. Moderate Protein Calorie Malnutrition:  (per Dietician Notes) Evident by temporalis muscle wasting, poor PO intake secondary to dysphagia/achalasia, subcutaneous fat loss. Supplement with Ensure per Dietician when diet resumed. 15. Physical Deconditioning:  Continue PT/OT.       Past Medical History:        Diagnosis Date    Arthritis     Blood circulation, collateral     BPPV (benign paroxysmal positional vertigo) 6/6/16    CH[facility]congestive heart failure) ([facility])     Chronic kidney disease     GERD (gastroesophageal reflux disease)     ?  esophageal stricture    History of blood transfusion     HTN (hypertension)     Hx of blood clots 2018    R leg    Hyperlipidemia     Neuromuscular disorder (HCC)     Obesity     Osteopenia     PAC (premature atrial contraction)     on betablocker    Paralysis (HCC)     baby    Pedal edema     denied any hx of hypertension    Pneumonia     PVC (premature ventricular contraction)     PVD (peripheral vascular disease) (HCC)     Tinnitus     UTI (urinary tract infection)        Past Surgical History:        Procedure Laterality Date    ABDOMINAL AORTIC ANEURYSM REPAIR, ENDOVASCULAR N/A 2/15/2019    ABDOMINAL AORTIC ANEURYSM REPAIR ENDOVASCULAR, DECLOTTING RIGHT FEM TIB BYPASS GRAFT performed by Imelda Moreau MD at [address]    lumpectomy    CHOLECYSTECTOMY      30 years ago    COLONOSCOPY  08/06/2018    Dr Cade [place] N/A 2/22/2019    COLONOSCOPY DIAGNOSTIC performed by Efrain Marte MD at [facility]    COLONOSCOPY N/A 2/22/2020    COLONOSCOPY CONTROL HEMORRHAGE performed by Sara Johnson MD at [address] [facility]      eye, eyelids    EYE SURGERY      cataract    HYSTERECTOMY      LARYNGOSCOPY  07/15/2016    ID OLEGARIO SKN SUB GRFT T/A/L AREA/<100SCM /<1ST 25 SCM N/A 9/6/2018    RE-EXPLORATION RIGHT GROIN, DECLOTTING OF FEMORAL BYPASS GRAFT performed by Paul Mejia MD at [address] EGD TRANSORAL BIOPSY SINGLE/MULTIPLE Left 11/27/2017    EGD BIOPSY performed by Kim Catherine MD at [facility]    ID LAP,SURG,COLECTOMY, PARTIAL, Gear[place] Tamara N/A 8/20/2018    ROBOT ASSISTED COLECTOMY (LOW ANTERIOR) performed by Roxana Herzog MD at [address] OFFICE/OUTPT [address] [facility] N/A 9/5/2018    RIGHT FEMORAL EMBOLECTOMY, INTRAOPERATIVE ARTERIOGRAM, RIGHT ILIAC EMBOLECTOMY, RIGHT COMMON AND EXTERNAL ILIAC STENTING, RIGHT FEMORAL TO ANTERIOR POPLITEAL BYPASS WITH 6MM GORTEX GRAFT performed by Paul Mejia MD at [address] / [address] Right 2/14/2019    FEMORAL EMBOLECTOMY THROMBECTOMY, RIGHT LEG performed by Paul Mejia MD at [facility] 11/27/2017    EGD SUBMUCOSAL/BOTOX INJECTION performed by Kim Catherine MD at [facility] N/A 2/22/2019    EGD BIOPSY performed by Efrain Marte MD at [facility]       Home Medications:   No current facility-administered medications on file prior to encounter.       Current Outpatient Medications on File Prior to Encounter   Medication Sig Dispense Refill    pantoprazole (PROTONIX) 40 MG tablet Take 40 mg by mouth 2 times daily (before meals)      Saccharomyces boulardii (PROBIOTIC) 250 MG CAPS TK 1 C PO BID      losartan (COZAAR) 25 MG tablet Take 1 tablet by mouth daily 30 tablet 3    bumetanide (BUMEX) 1 MG tablet Take 1 tablet by mouth daily 30 tablet 3    albuterol sulfate HFA (PROVENTIL HFA) 108 (90 Base) MCG/ACT inhaler Inhale 2 puffs into the lungs every 6 hours as needed for Wheezing or Shortness of Breath 1 Inhaler 0    metoprolol succinate (TOPROL XL) 25 MG extended release tablet Take 1 tablet by mouth daily 30 tablet 3    apixaban (ELIQUIS) 5 MG TABS tablet TAKE ONE TABLET BY MOUTH TWICE DAILY 60 tablet 5    magnesium (MAGNESIUM-OXIDE) 250 MG TABS tablet TAKE 2 TABLETS BY MOUTH TWICE DAILY 120 tablet 6    pravastatin (PRAVACHOL) 40 MG tablet Take 1 tablet by mouth daily 90 tablet 3    potassium chloride (KLOR-CON M) 10 MEQ extended release tablet Take 1 tablet by mouth daily 180 tablet 2    clopidogrel (PLAVIX) 75 MG tablet Take 1 tablet by mouth daily 90 tablet 3    melatonin 3 MG TABS tablet Take 2 tablets by mouth nightly as needed (sleep) 60 tablet 3    timolol (TIMOPTIC) 0.5 % ophthalmic solution Place 1 drop into both eyes nightly       Blood Pressure KIT Check blood pressure daily, and if symptoms of lightheadedness. Call physician if BP <80/40. 1 kit 0    acetaminophen (TYLENOL ARTHRITIS PAIN) 650 MG CR tablet Take 1,300 mg by mouth every 12 hours as needed for Pain          Allergies:    Aspirin; Lasix [furosemide]; Lipitor [atorvastatin]; Neurontin [gabapentin]; Oxycontin [oxycodone hcl]; and Penicillins    Social History:   Reports that she quit smoking about 4 months ago. Her smoking use included cigarettes. She started smoking about 64 years ago. She has a 15.00 pack-year smoking history. She has never used smokeless tobacco. She reports current alcohol use of about 1.0 standard drinks of alcohol per week. She reports that she does not use drugs. Family History:       Problem Relation Age of Onset    Heart Disease Mother     Stroke Mother     Cancer Father         lung    Emphysema Father     Other Sister         leukemia    Heart Disease Maternal Grandmother     Dementia Maternal Grandmother     Heart Disease Maternal Grandfather        Diet:  No diet orders on file    Review of systems:   Pertinent positives as noted in the HPI. All other systems reviewed and negative.     PHYSICAL EXAMINATION:  Patient Vitals for the past 8 hrs:   BP Temp Temp src Pulse Resp SpO2   02/25/20 1601 -- -- -- -- 17 96 %   02/25/20 1558 117/67 -- -- 76 16 95 %   02/25/20 1555 -- -- -- -- -- 98 %   02/25/20 1545 -- -- -- 71 26 96 %   02/25/20 1540 (!) 127/58 -- -- 72 19 96 %   02/25/20 1535 (!) 114/56 -- -- 67 20 97 %   02/25/20 1530 129/65 -- -- 76 16 98 %   02/25/20 1525 132/61 -- -- 75 21 95 %   02/25/20 1520 (!) 139/59 -- -- 70 21 95 %   02/25/20 1515 (!) 153/61 -- -- 72 20 96 %   02/25/20 1510 (!) 150/65 -- -- 72 25 95 %   02/25/20 1505 138/65 -- -- 73 19 94 %   02/25/20 1500 (!) 136/59 -- -- 71 24 (!) 88 %   02/25/20 1455 -- -- -- 83 24 94 %   02/25/20 1454 136/60 97.9 °F (36.6 °C) Temporal 76 24 93 %   02/25/20 1152 108/72 97.9 °F (36.6 °C) Oral 78 16 96 %     I/O last 3 completed shifts: In: 1286.8 [P.O.:240; I.V.:1046.8]  Out: 7165 [Urine:1595]   Wt Readings from Last 3 Encounters:   02/25/20 155 lb 12.8 oz (70.7 kg)   02/13/20 157 lb (71.2 kg)   01/21/20 158 lb (71.7 kg)      Body mass index is 25.93 kg/m². CAM-ICU:   N/A  General:   Alert cacetheic elderly female in no acute distress. Pleasant and cooperative  HEENT:  normocephalic and atraumatic. No scleral icterus. PERR. Neck: supple. No thyromegaly. Lungs: clear to auscultation bilaterally without rales/wheezes/rhonchi. No retractions. Respirations regular depth & rate, unlabored. Cardiac: Irregular RR. No JVD. Abdomen: soft, slightly distended, tinkling bowel sounds, non-tender. Surgical site without erythema, edema, or drainage. Extremities:  No clubbing, cyanosis. BLE edema trace/1+. Vasculature: capillary refill < 3 seconds. Palpable dorsalis pedis pulses, +1. Skin:  Sallow, warm, and dry. Psych:  Alert and oriented x3. Affect appropriate  Lymph:  No supraclavicular adenopathy. Neurologic:  No focal deficit. No seizures. Data: (All radiographs, tracings, PFTs, and imaging are personally viewed and interpreted unless otherwise noted). Telemetry: SR 88 with PVC's.   CURRENT PARENTERAL VASOACTIVE / INOTROPIC AGENTS:  [x] None Vasopressors:  [] Norepinephrine  [] Dopamine  [] Phenylephrine  [] Vasopressin  [] Epinephrine  [] Other: Sedation:  [] No Sedation  [] Propofol gtt-    [] BZD gtt -    [] Opiate gtt  -    [] Dexmedetomidine  [] Paralytics Antihypertensives gtt  [] Ca Channel Antagonist:  [] Beta-blocker:  [] Nitroglycerin  [] Nitroprusside     SUPPORT DEVICES:  [] Additional Respiratory  Assessments  Pulse: 76  Resp: 17  SpO2: 96 %  End Tidal CO2: 0 (%)  Oral Care: Teeth brushed  Oxygen Delivery - O2 Flow Rate (L/min): 2 L/min  ABGs:   Lab Results   Component Value Date    PH 7.43 12/28/2019    PCO2 40 12/28/2019    PO2 147 12/28/2019    HCO3 26 12/28/2019    O2SAT 99 12/28/2019     Lab Results   Component Value Date    IFIO2 50 12/28/2019    MODE CPAP/PS 12/28/2019    SETPEEP 8.0 12/28/2019       NUTRITION:  [] Gastric Tube [] OG / [] NG  [] TUBE FEEDS  [] TPN +NPO    CENTRAL LINES/CHEMOPORT/TUNNEL CATH:-    [x] No   [] Yes   (Date )           If yes -    [] Right IJ   [] Left IJ [] Right Femoral [] Left Femoral     [] Right Subclavian [] Left Subclavian  [x] Peripheral IV access  [] Arterial Line (Specify Site)    MOBLEY CATHETER:-   [] No  [x] Yes  (Date:  2/25/2020 )     ICU PROPHYLAXIS/THERAPY:   Stress ulcer:  [x] PPI Agent  [] H2RA [] Sucralfate [] Other:   VTE:     [] Enoxaparin    [] Warfarin [] NOAC [x] PCD Device:Bilat LE   [] Heparin: [] Subcut / [] IV     LABS/RADIOLOGY:-  Recent Labs     02/23/20  0342  02/24/20  0351 02/24/20  0921 02/24/20  1437 02/25/20  0529   WBC 5.3  --  4.7*  --   --  4.4*   HGB 9.4*   < > 9.7* 10.6* 10.8* 9.5*   HCT 32.6*   < > 32.7* 35.6* 36.8* 32.7*     --  152  --   --  155    < > = values in this interval not displayed. Recent Labs     02/23/20  0342 02/24/20  0726 02/25/20  0733    142 142   K 3.7 3.9 3.3*    105 104   CO2 24 25 25   BUN 13 12 10   CREATININE 1.1 1.2 1.3*   CALCIUM 8.1* 8.6 8.4*     No results for input(s): AST, ALT, BILIDIR, BILITOT, ALKPHOS in the last 72 hours. No results for input(s): INR in the last 72 hours. No results for input(s): Florene Jaime in the last 72 hours. No results for input(s): PROCAL in the last 72 hours. No results for input(s): LACTA in the last 72 hours. Microbiology:    Blood culture #1:   Lab Results   Component Value Date    BC No growth-preliminary No growth  12/28/2019     Blood culture #2:No results found for: Johnathan Chew  Organism:  Lab Results   Component Value Date    ORG Klebsiella pneumoniae 05/17/2019         Lab Results   Component Value Date    LABGRAM  10/24/2018     Few segmented neutrophils observed. No epithelial cells observed.   Moderate gram positive cocci occurring singly and in pairs. MRSA culture only:No results found for: [facility]  Urine culture:   Lab Results   Component Value Date    LABURIN [place] count: 10,000-50,000 CFU/mL 09/11/2018    LABURIN [place] count: 10,000-50,000 CFU/mL 09/11/2018     Respiratory culture: No results found for: CULTRESP  Aerobic and Anaerobic :  Lab Results   Component Value Date    LABAERO light growth 10/24/2018     Lab Results   Component Value Date    LABANAE  10/24/2018     Culture yielded moderate mixed growth consisting of anaerobic  gram negative bacilli and anaerobic gram positive cocci. If a  true mixed aerobic and anaerobic infection is suspected, then  broad spectrum empiric antibiotic therapy is indicated and  should include coverage for anaerobic organisms. Urinalysis:      Lab Results   Component Value Date    NITRU NEGATIVE 05/17/2019    WBCUA 0-2 05/17/2019    BACTERIA MANY 05/17/2019    RBCUA 0-2 05/17/2019    BLOODU NEGATIVE 05/17/2019    SPECGRAV 1.013 02/24/2019    GLUCOSEU 250 05/17/2019       Radiology:(All radiographs, tracings, PFTs, and imaging are personally viewed and interpreted unless otherwise noted). No orders to display     No results found.     CONSULTS:-  [x] Cardiology  [] Nephrology  [] Hemo onco   [x] GI   [] ID  [] Endocrine  [] Pulmo      [] Neuro    [] Psych   [] Urology  [] ENT   [x] Melvin Hamburger   []Ortho    []CV surg        [] Palliative  [] Hospice [] Pain management   []    []TCU   [] PT/OT  OTHERS:-    CODE STATUS:-  [x] Full resuscitation [] DNR-Comfort Care-Arrest  [] DNR-Comfort Care   [] Limited Resuscitation   [] No ET intubation   [] No CPR   [] No shock for non-perfusing rhythm      Seen with multidisciplinary ICU team .  Meets Continued ICU Level Care Criteria:    [x] Yes   [] No - Transfer Planned to listed location:  [] HOSPITALIST CONTACTED-  (Name)    Total critical care time caring for this patient with life threatening, unstable organ failure,

## 2020-02-26 LAB
ANION GAP SERPL CALCULATED.3IONS-SCNC: 12 MEQ/L (ref 8–16)
APTT: 31.9 SECONDS (ref 22–38)
BUN BLDV-MCNC: 12 MG/DL (ref 7–22)
CALCIUM IONIZED: 1.1 MMOL/L (ref 1.12–1.32)
CALCIUM IONIZED: 1.17 MMOL/L (ref 1.12–1.32)
CALCIUM SERPL-MCNC: 8.4 MG/DL (ref 8.5–10.5)
CHLORIDE BLD-SCNC: 104 MEQ/L (ref 98–111)
CO2: 23 MEQ/L (ref 23–33)
CREAT SERPL-MCNC: 1.4 MG/DL (ref 0.4–1.2)
ERYTHROCYTE [DISTWIDTH] IN BLOOD BY AUTOMATED COUNT: 17.1 % (ref 11.5–14.5)
ERYTHROCYTE [DISTWIDTH] IN BLOOD BY AUTOMATED COUNT: 50.5 FL (ref 35–45)
GFR SERPL CREATININE-BSD FRML MDRD: 36 ML/MIN/1.73M2
GLUCOSE BLD-MCNC: 131 MG/DL (ref 70–108)
HCT VFR BLD CALC: 33.8 % (ref 37–47)
HEMOGLOBIN: 9.9 GM/DL (ref 12–16)
MAGNESIUM: 2 MG/DL (ref 1.6–2.4)
MAGNESIUM: 2.1 MG/DL (ref 1.6–2.4)
MCH RBC QN AUTO: 24 PG (ref 26–33)
MCHC RBC AUTO-ENTMCNC: 29.3 GM/DL (ref 32.2–35.5)
MCV RBC AUTO: 81.8 FL (ref 81–99)
PLATELET # BLD: 168 THOU/MM3 (ref 130–400)
PMV BLD AUTO: 11.2 FL (ref 9.4–12.4)
POTASSIUM SERPL-SCNC: 4.9 MEQ/L (ref 3.5–5.2)
RBC # BLD: 4.13 MILL/MM3 (ref 4.2–5.4)
SODIUM BLD-SCNC: 139 MEQ/L (ref 135–145)
WBC # BLD: 8.7 THOU/MM3 (ref 4.8–10.8)

## 2020-02-26 PROCEDURE — 6360000002 HC RX W HCPCS: Performed by: SURGERY

## 2020-02-26 PROCEDURE — 83735 ASSAY OF MAGNESIUM: CPT

## 2020-02-26 PROCEDURE — C9113 INJ PANTOPRAZOLE SODIUM, VIA: HCPCS | Performed by: NURSE PRACTITIONER

## 2020-02-26 PROCEDURE — APPSS30 APP SPLIT SHARED TIME 16-30 MINUTES: Performed by: NURSE PRACTITIONER

## 2020-02-26 PROCEDURE — 2709999900 HC NON-CHARGEABLE SUPPLY

## 2020-02-26 PROCEDURE — 82330 ASSAY OF CALCIUM: CPT

## 2020-02-26 PROCEDURE — 6370000000 HC RX 637 (ALT 250 FOR IP): Performed by: SURGERY

## 2020-02-26 PROCEDURE — 80048 BASIC METABOLIC PNL TOTAL CA: CPT

## 2020-02-26 PROCEDURE — 36415 COLL VENOUS BLD VENIPUNCTURE: CPT

## 2020-02-26 PROCEDURE — 2580000003 HC RX 258: Performed by: SURGERY

## 2020-02-26 PROCEDURE — 6360000002 HC RX W HCPCS: Performed by: NURSE PRACTITIONER

## 2020-02-26 PROCEDURE — 99233 SBSQ HOSP IP/OBS HIGH 50: CPT | Performed by: INTERNAL MEDICINE

## 2020-02-26 PROCEDURE — 99024 POSTOP FOLLOW-UP VISIT: CPT | Performed by: SURGERY

## 2020-02-26 PROCEDURE — 2060000000 HC ICU INTERMEDIATE R&B

## 2020-02-26 PROCEDURE — 97530 THERAPEUTIC ACTIVITIES: CPT

## 2020-02-26 PROCEDURE — 85730 THROMBOPLASTIN TIME PARTIAL: CPT

## 2020-02-26 PROCEDURE — 85027 COMPLETE CBC AUTOMATED: CPT

## 2020-02-26 RX ORDER — HEPARIN SODIUM 5000 [USP'U]/ML
5000 INJECTION, SOLUTION INTRAVENOUS; SUBCUTANEOUS EVERY 8 HOURS SCHEDULED
Status: DISCONTINUED | OUTPATIENT
Start: 2020-02-26 | End: 2020-02-27

## 2020-02-26 RX ADMIN — PRAVASTATIN SODIUM 40 MG: 40 TABLET ORAL at 08:35

## 2020-02-26 RX ADMIN — ACETAMINOPHEN 650 MG: 325 TABLET ORAL at 08:38

## 2020-02-26 RX ADMIN — CALCIUM GLUCONATE 1 G: 94 INJECTION, SOLUTION INTRAVENOUS at 08:36

## 2020-02-26 RX ADMIN — METOPROLOL SUCCINATE 25 MG: 25 TABLET, FILM COATED, EXTENDED RELEASE ORAL at 08:35

## 2020-02-26 RX ADMIN — ACETAMINOPHEN 650 MG: 325 TABLET ORAL at 13:06

## 2020-02-26 RX ADMIN — ACETAMINOPHEN 650 MG: 325 TABLET ORAL at 04:48

## 2020-02-26 RX ADMIN — ACETAMINOPHEN 650 MG: 325 TABLET ORAL at 22:44

## 2020-02-26 RX ADMIN — SODIUM CHLORIDE: 4.5 INJECTION, SOLUTION INTRAVENOUS at 03:08

## 2020-02-26 RX ADMIN — ALVIMOPAN 12 MG: 12 CAPSULE ORAL at 09:21

## 2020-02-26 RX ADMIN — HEPARIN SODIUM 5000 UNITS: 5000 INJECTION INTRAVENOUS; SUBCUTANEOUS at 22:44

## 2020-02-26 RX ADMIN — CEFOXITIN SODIUM 1 G: 1 POWDER, FOR SOLUTION INTRAVENOUS at 03:13

## 2020-02-26 RX ADMIN — ACETAMINOPHEN 650 MG: 325 TABLET ORAL at 00:20

## 2020-02-26 RX ADMIN — PANTOPRAZOLE SODIUM 40 MG: 40 INJECTION, POWDER, FOR SOLUTION INTRAVENOUS at 09:21

## 2020-02-26 RX ADMIN — BUMETANIDE 1 MG: 1 TABLET ORAL at 08:35

## 2020-02-26 RX ADMIN — HEPARIN SODIUM 5000 UNITS: 5000 INJECTION INTRAVENOUS; SUBCUTANEOUS at 13:53

## 2020-02-26 RX ADMIN — SODIUM CHLORIDE: 4.5 INJECTION, SOLUTION INTRAVENOUS at 13:17

## 2020-02-26 RX ADMIN — SODIUM CHLORIDE: 4.5 INJECTION, SOLUTION INTRAVENOUS at 22:45

## 2020-02-26 RX ADMIN — ACETAMINOPHEN 650 MG: 325 TABLET ORAL at 18:32

## 2020-02-26 ASSESSMENT — PAIN DESCRIPTION - PAIN TYPE
TYPE: SURGICAL PAIN

## 2020-02-26 ASSESSMENT — PAIN DESCRIPTION - ORIENTATION: ORIENTATION: OTHER (COMMENT)

## 2020-02-26 ASSESSMENT — PAIN DESCRIPTION - LOCATION
LOCATION: ABDOMEN

## 2020-02-26 ASSESSMENT — PAIN SCALES - GENERAL
PAINLEVEL_OUTOF10: 7
PAINLEVEL_OUTOF10: 8
PAINLEVEL_OUTOF10: 7
PAINLEVEL_OUTOF10: 2
PAINLEVEL_OUTOF10: 7
PAINLEVEL_OUTOF10: 6
PAINLEVEL_OUTOF10: 8
PAINLEVEL_OUTOF10: 7
PAINLEVEL_OUTOF10: 7
PAINLEVEL_OUTOF10: 4
PAINLEVEL_OUTOF10: 7

## 2020-02-26 ASSESSMENT — PAIN DESCRIPTION - DESCRIPTORS
DESCRIPTORS: ACHING;DULL

## 2020-02-26 ASSESSMENT — PAIN DESCRIPTION - FREQUENCY
FREQUENCY: INTERMITTENT
FREQUENCY: INTERMITTENT

## 2020-02-26 ASSESSMENT — PAIN - FUNCTIONAL ASSESSMENT: PAIN_FUNCTIONAL_ASSESSMENT: PREVENTS OR INTERFERES SOME ACTIVE ACTIVITIES AND ADLS

## 2020-02-26 NOTE — PROGRESS NOTES
CRITICAL CARE PROGRESS NOTE      Patient:  Dalia Waterman    Unit/Bed:4B-06/006-A  YOB: 1935  MRN: [id number]   Acct: [de-identified]   PCP: León Arevalo  Date of Admission: 2/20/2020  Chief Complaint:- GI bleed    Assessment and Plan:    1. Lower GI Bleed secondary to Diverticulosis:  (Resolved)  Colonoscopy completed 2/22/2020 revealed diverticulosis with active bleeding requiring 2 hemoclips with successful hemostasis. Eliquis and Plavix on hold prior to open colectomy on 2/25/20.   2. Cecal Mass: S/P Right Colon Resection on 2/25/2020 per Dr. Kay Boogie. (noted mass & pelvic adhesions removed)   ml.  4 cm cecal mass noted on colonoscopy but not biopsied secondary to active bleeding and anticoagulation/antiplatelets at that time. Surgical biopsy pending-concern for malignancy. Eliquis/Plavix on hold-possible restart 2/27 per GS. Cardiology consulted for cardiac clearance secondary to patient's significant risk factors including heart failure, CAD, PVD-noted to be high-risk. Continue Entereg, cefoxitin, & pain control per General Surgeay. Diet and activity per GS. 3. Acute Post-Operative Pulmonary Insufficiency:  (Resolved)  On 2 L nasal cannula related to effects from anesthesia-weaned to room air this am.  Continue Incentive spirometer. Increase ambulation. 4. Mild Leukopenia:  (Resolved) unclear etiology. No evidence of infectious process. WBC today 8.7 from 4.4 on 2/25-likely post-operative reaction. Trend CBC. 5. Acute Kidney Injury on Chronic Kidney Stage III:  (Worsening-trending up) originally secondary to volume depletion related to acute blood loss. ALEJANDRO had imporved after fluid resuscitation. BUN 12/Creatinine 1.4 from BUN 10/creatinine 1.3 on 2/25/2020. Baseline creatinine appears to be 1.2. Continue to hold Cozaar. Continue IVF post-operatively-gentle hydration. pateint remains NPO. Continue to monitor I/O. Trend BMP daily. Montior electrolytes.     6. Acute 1.8, PTT 41.7. EKG shows NSR with premature supraventricular beats. Rectal exam by ED provider showed no bleeding or thrombosed hemorrhoids. Protonix drip started. 500ml NS IVF bolus given.     Subjective:- (Last 24 hours)    No acute events overnight. Patient reports abdominal pain is \"controlled. \"  Not passing flautus. Deneis fever/chills, nausea/vomiting, chest pain, dyspnea. Using incentive spirometer. Tolerating ice chips without difficulty. Past medical history, family history, social history and allergies reviewed again and is unchanged since admission. ROS (12 point review of systems completed. Pertinent positives noted.  Otherwise ROS is negative)      Scheduled Meds:   calcium gluconate IVPB  1 g Intravenous Once    heparin (porcine)  5,000 Units Subcutaneous 3 times per day    calcium replacement protocol   Other RX Placeholder    alvimopan  12 mg Oral BID    pantoprazole  40 mg Intravenous Daily    sodium chloride flush  10 mL Intravenous 2 times per day    [Held by provider] apixaban  5 mg Oral BID    bumetanide  1 mg Oral Daily    [Held by provider] clopidogrel  75 mg Oral Daily    metoprolol succinate  25 mg Oral Daily    pravastatin  40 mg Oral Daily     Continuous Infusions:   sodium chloride 100 mL/hr at 02/26/20 0308     PRN Meds:.morphine **OR** morphine, sodium chloride flush, sodium chloride flush, acetaminophen, promethazine, ondansetron, melatonin     PHYSICAL EXAMINATION:  Patient Vitals for the past 8 hrs:   BP Temp Temp src Pulse Resp SpO2   02/26/20 0602 (!) 114/49 -- -- 65 17 95 %   02/26/20 0522 (!) 114/55 -- -- 65 23 94 %   02/26/20 0400 (!) 120/51 97.7 °F (36.5 °C) Axillary 67 17 99 %   02/26/20 0301 (!) 111/57 -- -- 72 24 97 %   02/26/20 0232 (!) 121/57 -- -- 71 19 96 %   02/26/20 0202 121/69 -- -- 74 17 99 %   02/26/20 0132 (!) 122/54 -- -- 67 17 97 %   02/26/20 0102 (!) 124/59 -- -- 75 16 98 %   02/26/20 0032 (!) 121/57 -- -- 71 18 97 %   02/26/20 0001 131/63 surgery very well. No dysrhythmia or hypotension post procedure. Hemoglobin stable. Transition to medical floor. Electronically signed by Aracelis Fuchs MD.

## 2020-02-26 NOTE — PLAN OF CARE
hemodynamic instability d/t recent abdominal surgery. Vital signs remains stable this shift. Continuing to assess vital signs per orders this shift. Problem: OXYGENATION/RESPIRATORY FUNCTION  Goal: Patient will achieve/maintain normal respiratory rate/effort  Outcome: Ongoing  Note:   Patient at risk for impaired oxygenation/respiratory function d/t recent abdominal surgery. Patient remains on 2L/min supplemental O2 per nasal cannula post-operatively. Patient educated and performed incentive spirometry this shift. Lung sounds remains clear upon auscultation in bilateral upper and lower lung lobes. Plan of care reviewed with patient. Patient verbalizes understanding and participates in goal setting.

## 2020-02-26 NOTE — OP NOTE
midline incision where she had previously had a sigmoid  colon resection for diverticular stricture. The abdominal cavity was  entered. There was no free fluid. Bookwalter retractor was inserted. There was a mass palpable within the cecum. It did not appear to be  full thickness. She had no gross enlarged lymph nodes within the  mesentery of the colon or small bowel. She did have some small bowel  loops which were adherent down in the pelvis. These were taken down,  they were to the backside of the bladder. Once this was completed, this  allowed the terminal ileum to be brought up. The ileocolic vessels were  divided, line of Toldt was divided out laterally. The ascending colon  was divided just below the hepatic flexure. Mesentery was serially  clamped, divided, and ligated. The terminal ileum was divided as well. The bowel, both ends had been divided with TLC55 stapling device. The  specimen was passed off the operative field. Right ureter was  visualized and protected throughout the dissection. An isoperistaltic  anastomosis between the neoterminal ileum and the proximal ascending  colon was performed. The bowel was approximated in a side-to-side  fashion with interrupted Vicryl suture. Enterotomies were then made on  each side and another load of the TLC55 stapling device was passed and  fired completing the anastomosis. Enterotomy was closed with TX60  stapling device. Defect in the mesentery was reapproximated with  interrupted Vicryl suture. The abdomen was irrigated and all packs  removed as well as the Bookwalter retractor was removed. Abdomen was  irrigated with Irrisept. This was allowed to dwell for a few minutes  and then suctioned from the abdominal cavity. Sponge, sharp, and  instrument counts were correct. Midline fascia was closed with running  looped PDS suture x2 and tied below the umbilicus.   Dermis was closed  with interrupted 3-0 Vicryl suture, and skin was closed

## 2020-02-26 NOTE — PROGRESS NOTES
[facility] [address]  Daily Progress Note  Pt Name: Casandra Quintero  Medical Record Number: [id number]  Date of Birth 1935   Today's Date: 2/26/2020  POD# 1open right colon resection  ASSESSMENT:   1. Hospital day # 6   2. 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  has a past medical history of Arthritis, Blood circulation, collateral, BPPV (benign paroxysmal positional vertigo), CHF (congestive heart failure) ([address] Chronic kidney disease, GERD (gastroesophageal reflux disease), History of blood transfusion, HTN (hypertension), Hx of blood clots, Hyperlipidemia, Neuromuscular disorder ([address] Obesity, Osteopenia, PAC (premature atrial contraction), Paralysis ([address] Pedal edema, Pneumonia, PVC (premature ventricular contraction), PVD (peripheral vascular disease) ([address] Tinnitus, and UTI (urinary tract infection). RECOMMENDATIONS:   1. IV hydration, restart bumex  2. Analgesics and antiemetics as needed  3. Ice chips only today  4. SCD's and subcutaneous heparin for DVT prophylaxis  5. Increase activity. OK to transfer out to  from surgery standpoint  6. Keep razo today monitor uo  SUBJECTIVE:   Georgette Kawasaki is doing well. She denies nausea or vomiting,denies chest pain or shortness of breath. Pain well controlled. Occasional PVC.   MEDICATIONS   Scheduled Meds:   calcium gluconate IVPB  1 g Intravenous Once    calcium replacement protocol   Other RX Placeholder    alvimopan  12 mg Oral BID    pantoprazole  40 mg Intravenous Daily    sodium chloride flush  10 mL Intravenous 2 times per day    [Held by provider] apixaban  5 mg Oral BID    bumetanide  1 mg Oral Daily    [Held by provider] clopidogrel  75 mg Oral Daily    metoprolol succinate  25 mg Oral Daily    pravastatin  40 mg Oral Daily     Continuous Infusions:   sodium chloride 100 mL/hr at 02/26/20 0308     PRN Meds:.morphine **OR** morphine, sodium chloride flush, sodium chloride 104 104   CO2  --  25  --   --   --  25 23   BUN  --  12  --   --   --  10 12   CREATININE  --  1.2  --   --   --  1.3* 1.4*   MG  --   --   --   --   --  1.8 2.1   CALCIUM  --  8.6  --   --   --  8.4* 8.4*    < > = values in this interval not displayed. No results for input(s): PTT, INR in the last 72 hours. Invalid input(s): PT  No results for input(s): AST, ALT, BILITOT, BILIDIR, AMYLASE, LIPASE, LDH, LACTA in the last 72 hours. No results for input(s): TROPONINT in the last 72 hours.       RADIOLOGY     No orders to display       Electronically signed by Neva Shah MD on 2/26/2020 at 7:10 AM

## 2020-02-26 NOTE — CARE COORDINATION
Update: client moved to 4B; updated Miguel Forbes, 4B CM  Electronically signed by Darrius Lim RN on 2/26/2020 at 7:19 AM

## 2020-02-27 LAB
ANION GAP SERPL CALCULATED.3IONS-SCNC: 11 MEQ/L (ref 8–16)
BUN BLDV-MCNC: 13 MG/DL (ref 7–22)
CALCIUM IONIZED: 1.09 MMOL/L (ref 1.12–1.32)
CALCIUM IONIZED: 1.11 MMOL/L (ref 1.12–1.32)
CALCIUM SERPL-MCNC: 8.3 MG/DL (ref 8.5–10.5)
CHLORIDE BLD-SCNC: 105 MEQ/L (ref 98–111)
CO2: 24 MEQ/L (ref 23–33)
CREAT SERPL-MCNC: 1.3 MG/DL (ref 0.4–1.2)
ERYTHROCYTE [DISTWIDTH] IN BLOOD BY AUTOMATED COUNT: 17.2 % (ref 11.5–14.5)
ERYTHROCYTE [DISTWIDTH] IN BLOOD BY AUTOMATED COUNT: 50.3 FL (ref 35–45)
GFR SERPL CREATININE-BSD FRML MDRD: 39 ML/MIN/1.73M2
GLUCOSE BLD-MCNC: 79 MG/DL (ref 70–108)
HCT VFR BLD CALC: 32.7 % (ref 37–47)
HEMOGLOBIN: 9.7 GM/DL (ref 12–16)
MAGNESIUM: 1.7 MG/DL (ref 1.6–2.4)
MAGNESIUM: 1.8 MG/DL (ref 1.6–2.4)
MCH RBC QN AUTO: 24.1 PG (ref 26–33)
MCHC RBC AUTO-ENTMCNC: 29.7 GM/DL (ref 32.2–35.5)
MCV RBC AUTO: 81.3 FL (ref 81–99)
PLATELET # BLD: 175 THOU/MM3 (ref 130–400)
PMV BLD AUTO: 11.4 FL (ref 9.4–12.4)
POTASSIUM SERPL-SCNC: 3.7 MEQ/L (ref 3.5–5.2)
RBC # BLD: 4.02 MILL/MM3 (ref 4.2–5.4)
SODIUM BLD-SCNC: 140 MEQ/L (ref 135–145)
URINE CULTURE, ROUTINE: NORMAL
WBC # BLD: 6.3 THOU/MM3 (ref 4.8–10.8)

## 2020-02-27 PROCEDURE — 2580000003 HC RX 258: Performed by: SURGERY

## 2020-02-27 PROCEDURE — 6370000000 HC RX 637 (ALT 250 FOR IP): Performed by: SURGERY

## 2020-02-27 PROCEDURE — 1200000003 HC TELEMETRY R&B

## 2020-02-27 PROCEDURE — 99024 POSTOP FOLLOW-UP VISIT: CPT | Performed by: SURGERY

## 2020-02-27 PROCEDURE — 83735 ASSAY OF MAGNESIUM: CPT

## 2020-02-27 PROCEDURE — 6360000002 HC RX W HCPCS: Performed by: NURSE PRACTITIONER

## 2020-02-27 PROCEDURE — 85027 COMPLETE CBC AUTOMATED: CPT

## 2020-02-27 PROCEDURE — 6360000002 HC RX W HCPCS: Performed by: SURGERY

## 2020-02-27 PROCEDURE — 36415 COLL VENOUS BLD VENIPUNCTURE: CPT

## 2020-02-27 PROCEDURE — 2709999900 HC NON-CHARGEABLE SUPPLY

## 2020-02-27 PROCEDURE — 80048 BASIC METABOLIC PNL TOTAL CA: CPT

## 2020-02-27 PROCEDURE — 99232 SBSQ HOSP IP/OBS MODERATE 35: CPT | Performed by: NURSE PRACTITIONER

## 2020-02-27 PROCEDURE — C9113 INJ PANTOPRAZOLE SODIUM, VIA: HCPCS | Performed by: NURSE PRACTITIONER

## 2020-02-27 PROCEDURE — 94760 N-INVAS EAR/PLS OXIMETRY 1: CPT

## 2020-02-27 PROCEDURE — 82330 ASSAY OF CALCIUM: CPT

## 2020-02-27 RX ADMIN — APIXABAN 5 MG: 5 TABLET, FILM COATED ORAL at 23:08

## 2020-02-27 RX ADMIN — ALVIMOPAN 12 MG: 12 CAPSULE ORAL at 00:55

## 2020-02-27 RX ADMIN — Medication 10 ML: at 22:16

## 2020-02-27 RX ADMIN — Medication 10 ML: at 09:17

## 2020-02-27 RX ADMIN — ALVIMOPAN 12 MG: 12 CAPSULE ORAL at 23:08

## 2020-02-27 RX ADMIN — ACETAMINOPHEN 650 MG: 325 TABLET ORAL at 09:09

## 2020-02-27 RX ADMIN — HEPARIN SODIUM 5000 UNITS: 5000 INJECTION INTRAVENOUS; SUBCUTANEOUS at 06:12

## 2020-02-27 RX ADMIN — ACETAMINOPHEN 650 MG: 325 TABLET ORAL at 22:15

## 2020-02-27 RX ADMIN — ACETAMINOPHEN 650 MG: 325 TABLET ORAL at 03:21

## 2020-02-27 RX ADMIN — PRAVASTATIN SODIUM 40 MG: 40 TABLET ORAL at 09:09

## 2020-02-27 RX ADMIN — CLOPIDOGREL BISULFATE 75 MG: 75 TABLET ORAL at 09:09

## 2020-02-27 RX ADMIN — BUMETANIDE 1 MG: 1 TABLET ORAL at 09:09

## 2020-02-27 RX ADMIN — APIXABAN 5 MG: 5 TABLET, FILM COATED ORAL at 09:16

## 2020-02-27 RX ADMIN — ALVIMOPAN 12 MG: 12 CAPSULE ORAL at 09:09

## 2020-02-27 RX ADMIN — ACETAMINOPHEN 650 MG: 325 TABLET ORAL at 15:41

## 2020-02-27 RX ADMIN — PANTOPRAZOLE SODIUM 40 MG: 40 INJECTION, POWDER, FOR SOLUTION INTRAVENOUS at 09:09

## 2020-02-27 RX ADMIN — METOPROLOL SUCCINATE 25 MG: 25 TABLET, FILM COATED, EXTENDED RELEASE ORAL at 09:09

## 2020-02-27 ASSESSMENT — PAIN SCALES - GENERAL
PAINLEVEL_OUTOF10: 0
PAINLEVEL_OUTOF10: 7
PAINLEVEL_OUTOF10: 7
PAINLEVEL_OUTOF10: 1
PAINLEVEL_OUTOF10: 3
PAINLEVEL_OUTOF10: 3
PAINLEVEL_OUTOF10: 0
PAINLEVEL_OUTOF10: 2
PAINLEVEL_OUTOF10: 6

## 2020-02-27 ASSESSMENT — PAIN DESCRIPTION - FREQUENCY
FREQUENCY: INTERMITTENT
FREQUENCY: INTERMITTENT

## 2020-02-27 ASSESSMENT — PAIN DESCRIPTION - PROGRESSION: CLINICAL_PROGRESSION: GRADUALLY WORSENING

## 2020-02-27 ASSESSMENT — PAIN DESCRIPTION - PAIN TYPE
TYPE: SURGICAL PAIN
TYPE: SURGICAL PAIN

## 2020-02-27 ASSESSMENT — PAIN DESCRIPTION - ORIENTATION
ORIENTATION: MID
ORIENTATION: MID

## 2020-02-27 ASSESSMENT — PAIN DESCRIPTION - LOCATION
LOCATION: ABDOMEN
LOCATION: ABDOMEN

## 2020-02-27 ASSESSMENT — PAIN DESCRIPTION - DESCRIPTORS
DESCRIPTORS: ACHING
DESCRIPTORS: ACHING

## 2020-02-27 ASSESSMENT — PAIN DESCRIPTION - ONSET: ONSET: ON-GOING

## 2020-02-27 ASSESSMENT — PAIN - FUNCTIONAL ASSESSMENT: PAIN_FUNCTIONAL_ASSESSMENT: PREVENTS OR INTERFERES SOME ACTIVE ACTIVITIES AND ADLS

## 2020-02-27 NOTE — PROGRESS NOTES
[address]  OCCUPATIONAL THERAPY MISSED TREATMENT NOTE  [facility]  4B-06/006-A      Date: 2020  Patient Name: Cherylene Marinas        CSN: [id number]   : 1935  (82 y.o.)  Gender: female   Referring Practitioner: Buffy Aparicio PA-C  Diagnosis: Rectal bleed         REASON FOR MISSED TREATMENT: Patient Refused patient stated \"I told the doctor I didn't need any therapy\"

## 2020-02-27 NOTE — PROGRESS NOTES
[address] [address]  [facility]  Occupational Therapy  Daily Note  Time:   Time In: 9491  Time Out: 0  Timed Code Treatment Minutes: 23 Minutes  Minutes: 23          Date: 2020  Patient Name: Darrall Lanes,   Gender: female      Room: [facility]06/006-A  MRN: [id number]  : 1935  (82 y.o.)  Referring Practitioner: Roberta Osman PA-C  Diagnosis: Rectal bleed  Additional Pertinent Hx: Patient presents to ED because of bright red blood per rectum in the last 2 hours. Patient had two episodes of pure bloody bowel movements. This never happened before. Her past medical history remarkable for COPD, tobacco abuse, CHF currently on an external defibrillator, chronic atrial fibrillation on Eliquis, history of ruptured AAA s/p repair at [facility] in 2019, history of PVD with right fem-tib angioplasty and stenting in 2019 with with Dr. Neldon Sever currently on Plavix. Last colonoscopy was in 2017 first done by GI Dr. Farzana Garcia with multiple polys identified, then patient was referred to see GS and later had partial colon resection with Dr. Todd Tavares at [facility] in 2018. She has mild dizziness. She denies headache. No chest pain, no shortness of breath. She has no nausea vomiting, no hematemesis. No abdominal pain. No anal pain. Restrictions/Precautions:  Restrictions/Precautions: General Precautions, Fall Risk  Position Activity Restriction  Other position/activity restrictions: lifevest    SUBJECTIVE: Pleasant and cooperative  Pt has been sitting in the chair. She reported moving her legs often while she has her legs lowered. She agreed to go for a walk only at this time. PAIN: Faces. Pt indicates moderate pain while first standing up. She stated it was less as she was walking in the hallway. COGNITION: WFL    ADL:   No ADL's completed this session. More Connelly BALANCE:  Sitting Balance:  Supervision. preparing to stand or scooting back in the chair  Standing Balance: Stand By Assistance.  preparing to walk in the hallway    TRANSFERS:  Sit to Stand:  [employer]. from the recliner chair  Stand to Sit: Stand By Assistance. to the chair    FUNCTIONAL MOBILITY:  Assistive Device: Rolling Walker  Assist Level:  Stand By Assistance. Distance: 120 ft in the hallway  Pt had some favoring of her LLE. Pt kept a folded up blanket under the gait belt at her belly. ASSESSMENT:     Activity Tolerance:  Patient tolerance of  treatment: fair. Pt stood at the chair for 40 seconds after the walk while the blanket and pillows were straightened. She remained in the chair following session.         Discharge Recommendations: Continue to assess pending progress  Equipment Recommendations: Equipment Needed: No  Plan: Times per week: 5x  Current Treatment Recommendations: Strengthening, Balance Training, Functional Mobility Training, Endurance Training, Equipment Evaluation, Education, & procurement, Home Management Training, Self-Care / ADL, Patient/Caregiver Education & Training, Safety Education & Training    Patient Education  Patient Education: Plan of Care, Importance of Increasing Activity and benefit of keeping her mind busy by readingn or doing crossword puzzles if she enjoys doing them    Goals  Short term goals  Time Frame for Short term goals: 2 weeks  Short term goal 1: Pt to demo safe HH mobility with consistent SBA for increased independence navigating her home environmnet  Short term goal 2: pt to demo standing >6 mins with 1-2 UE release at SUP in prep for basic IADL`s  Short term goal 3: pt to tolerate 15 reps of BUE strengthening HEP for increasing endurance required for bathing tasks  Short term goal 4: pt to demo improved activity tolerance as evidenced by BADL routine with 0 cues for safety/ECT for increased indep with self cares  Long term goals  Time Frame for Long term goals : NA d/t ELOS    Following session, patient left in safe position with all fall risk precautions in place.

## 2020-02-27 NOTE — FLOWSHEET NOTE
Pt declined visit and prayer harshly      02/27/20 4009   Encounter Summary   Services provided to: Patient   Referral/Consult From: Rounding   Continue Visiting No  (2/27 )   Complexity of Encounter Low   Length of Encounter 15 minutes   Routine   Type Follow up   Outcome Refused/declined

## 2020-02-27 NOTE — PROGRESS NOTES
--  32.7*     --   --  168  --  175   NA  --   --  142 139  --  140   K  --   --  3.3* 4.9  --  3.7   CL  --   --  104 104  --  105   CO2  --   --  25 23  --  24   BUN  --   --  10 12  --  13   CREATININE  --   --  1.3* 1.4*  --  1.3*   MG  --    < > 1.8 2.1 2.0 1.8   CALCIUM  --   --  8.4* 8.4*  --  8.3*    < > = values in this interval not displayed. No results for input(s): PTT, INR in the last 72 hours. Invalid input(s): PT  No results for input(s): AST, ALT, BILITOT, BILIDIR, AMYLASE, LIPASE, LDH, LACTA in the last 72 hours. No results for input(s): TROPONINT in the last 72 hours.       RADIOLOGY     No orders to display       Electronically signed by Art Toney MD on 2/27/2020 at 7:54 AM

## 2020-02-27 NOTE — PROGRESS NOTES
Anemia: Stable.  Secondary to diverticular bleed s/p hemoclip. Continue Eliquis and Plavix. Current H&H 9.7/32.7 with an MCV of 81. 3.     7. Chronic HFrEF without Acute Exacerbation: Echo performed on 12/28/2019 revealed an EF 10-15%.  Patient had 2900 mL urine output over the past 24 hours. Current weight 160 pounds. Continue Bumex and metoprolol with hypotension parameters. LifeVest outpatient, off due to surgical incision.  Monitor fluid balance closely-may require extra dose diuretics. Continue telemetry, strict I&O, and daily weights.     8. Electrolyte Disturbances: Resolved. Secondary to diuresis.      9. Paroxysmal Atrial Fibrillation:  Stable. Currently in normal sinus rhythm. Continue Eliquis, Plavix and metoprolol. 10. History of AAA:  S/P repair on 2/15/2019.  Continue Plavix.   11. PAD S/P Right Fem-Tib Angioplasty with Stent: Performed in February 2019, continue Plavix and aspirin.    12. History of GERD: S/p esophageal spasm, patient follows with GI outpatient. Previously received botox injections but hasn't for the past year, GI deferring currently. Continue Protonix.   13. History of Hyperlipidemia: Continue pravastatin. 14. Moderate Protein Calorie Malnutrition: BMI 26.7. Evident by temporalis muscle wasting, poor PO intake secondary to dysphagia/achalasia, subcutaneous fat loss. Supplement with Ensure per Dietician when diet resumed. 15. Physical Deconditioning: PT/OT on board, appreciate assistance. Chief Complaint: GI bleed    Initial H and P:-    King Young is an 43-year-old  female, reformed smoker, with a medical history of arthritis, benign paroxysmal positional vertigo, congestive heart failure, chronic kidney disease, GERD, history of blood transfusions, hypertension, hyperlipidemia, obesity, osteopenia, peripheral vascular disease, tinnitus, and a history of urinary tract infections.   Patient presented to [facility] ER with complaints of hematochezia pharyngitis, no nasal drainage. NECK: No lumps or unusual neck stiffness. PULMONARY: Respirations easy and non-labored, no acute distress. CARDIAC: No chest pain, pressure, Negative for lower leg edema. GI: Abdomen is soft and non-tender, non-distended. PERIPHERAL VASCULAR: No intermittent claudication or unusual leg cramps. MUSCULOSKELETAL: Occasional arthralgias, myalgias. NEUROLOGICAL: Denies any headache, near syncope, seizures or syncope. HEMATOLOGIC:  No unusual bruising or bleeding. PSYCH: Denies any homicidal or suicidial ideations. Medications:  Reviewed    Infusion Medications   Scheduled Medications    calcium replacement protocol   Other RX Placeholder    alvimopan  12 mg Oral BID    pantoprazole  40 mg Intravenous Daily    sodium chloride flush  10 mL Intravenous 2 times per day    apixaban  5 mg Oral BID    bumetanide  1 mg Oral Daily    clopidogrel  75 mg Oral Daily    metoprolol succinate  25 mg Oral Daily    pravastatin  40 mg Oral Daily     PRN Meds: morphine **OR** morphine, sodium chloride flush, sodium chloride flush, acetaminophen, promethazine, ondansetron, melatonin    Intake/Output Summary (Last 24 hours) at 2/27/2020 1652  Last data filed at 2/27/2020 1354  Gross per 24 hour   Intake 3082.18 ml   Output 2250 ml   Net 832.18 ml       Diet:  DIET CLEAR LIQUID;    Exam:  /71   Pulse 89   Temp 98.8 °F (37.1 °C) (Oral)   Resp 15   Ht 5' 5\" (1.651 m)   Wt 160 lb 4.4 oz (72.7 kg)   SpO2 98%   BMI 26.67 kg/m²     General appearance: Alert and appropriate, pleasant . No apparent distress, appears stated age and cooperative. HEENT: Pupils equal, round, and reactive to light. Conjunctivae/corneas clear. Neck: Supple, with full range of motion. No jugular venous distention. Trachea midline. Respiratory:  Normal respiratory effort. Clear to auscultation, bilaterally without Rales/Wheezes/Rhonchi.   Cardiovascular: Regular rate and rhythm with normal S1/S2 without murmurs, rubs or gallops. Abdomen: Soft, non-tender, non-distended with normal bowel sounds. Musculoskeletal: Passive and active ROM x 4 extremities. Skin: Skin color, texture, turgor normal.  No rashes or lesions. Neurologic:  Neurovascularly intact without any focal sensory/motor deficits. Cranial nerves: II-XII intact, grossly non-focal.  Psychiatric: Alert and oriented to person, place, time, and situation. Thought content appropriate, normal insight  Capillary Refill: Brisk,< 3 seconds   Peripheral Pulses: +2 palpable, equal bilaterally     Labs:   Recent Labs     02/25/20  0529 02/26/20  0331 02/27/20  0325   WBC 4.4* 8.7 6.3   HGB 9.5* 9.9* 9.7*   HCT 32.7* 33.8* 32.7*    168 175     Recent Labs     02/25/20  0733 02/26/20  0331 02/27/20  0325    139 140   K 3.3* 4.9 3.7    104 105   CO2 25 23 24   BUN 10 12 13   CREATININE 1.3* 1.4* 1.3*   CALCIUM 8.4* 8.4* 8.3*     No results for input(s): AST, ALT, BILIDIR, BILITOT, ALKPHOS in the last 72 hours. No results for input(s): INR in the last 72 hours. No results for input(s): Aguilar Squire in the last 72 hours. Microbiology:    Blood culture #1:   Lab Results   Component Value Date    BC No growth-preliminary No growth  12/28/2019       Blood culture #2:No results found for: Chinedu Odor    Organism:  Lab Results   Component Value Date    ORG Klebsiella pneumoniae 05/17/2019         Lab Results   Component Value Date    LABGRAM  10/24/2018     Few segmented neutrophils observed. No epithelial cells observed. Moderate gram positive cocci occurring singly and in pairs.          MRSA culture only:No results found for: [address]     Urine culture:   Lab Results   Component Value Date    LABURIN No growth-preliminary No growth  02/25/2020       Respiratory culture: No results found for: CULTRESP    Aerobic and Anaerobic :  Lab Results   Component Value Date    LABAERO light growth 10/24/2018     Lab Results   Component Value Date    [facility]

## 2020-02-27 NOTE — CARE COORDINATION
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 Barriers to Discharge: medical stability  PCP: Sidney Rowell  Readmission Risk Score: 26%  Patient Goals/Plan/Treatment Preferences: Plans to return home alone. Denies needs. Will continue to monitor for possible needs.

## 2020-02-28 LAB
ANION GAP SERPL CALCULATED.3IONS-SCNC: 9 MEQ/L (ref 8–16)
BUN BLDV-MCNC: 17 MG/DL (ref 7–22)
CALCIUM IONIZED: 1.1 MMOL/L (ref 1.12–1.32)
CALCIUM IONIZED: 1.15 MMOL/L (ref 1.12–1.32)
CALCIUM SERPL-MCNC: 8.5 MG/DL (ref 8.5–10.5)
CHLORIDE BLD-SCNC: 103 MEQ/L (ref 98–111)
CO2: 26 MEQ/L (ref 23–33)
CREAT SERPL-MCNC: 1.2 MG/DL (ref 0.4–1.2)
ERYTHROCYTE [DISTWIDTH] IN BLOOD BY AUTOMATED COUNT: 17.3 % (ref 11.5–14.5)
ERYTHROCYTE [DISTWIDTH] IN BLOOD BY AUTOMATED COUNT: 50.9 FL (ref 35–45)
GFR SERPL CREATININE-BSD FRML MDRD: 43 ML/MIN/1.73M2
GLUCOSE BLD-MCNC: 87 MG/DL (ref 70–108)
HCT VFR BLD CALC: 34.5 % (ref 37–47)
HEMOGLOBIN: 10.3 GM/DL (ref 12–16)
MAGNESIUM: 1.7 MG/DL (ref 1.6–2.4)
MAGNESIUM: 1.7 MG/DL (ref 1.6–2.4)
MCH RBC QN AUTO: 24.2 PG (ref 26–33)
MCHC RBC AUTO-ENTMCNC: 29.9 GM/DL (ref 32.2–35.5)
MCV RBC AUTO: 81.2 FL (ref 81–99)
PLATELET # BLD: 223 THOU/MM3 (ref 130–400)
PMV BLD AUTO: 11.6 FL (ref 9.4–12.4)
POTASSIUM SERPL-SCNC: 3.8 MEQ/L (ref 3.5–5.2)
RBC # BLD: 4.25 MILL/MM3 (ref 4.2–5.4)
SODIUM BLD-SCNC: 138 MEQ/L (ref 135–145)
WBC # BLD: 6.2 THOU/MM3 (ref 4.8–10.8)

## 2020-02-28 PROCEDURE — 1200000003 HC TELEMETRY R&B

## 2020-02-28 PROCEDURE — 6370000000 HC RX 637 (ALT 250 FOR IP): Performed by: SURGERY

## 2020-02-28 PROCEDURE — 99232 SBSQ HOSP IP/OBS MODERATE 35: CPT | Performed by: NURSE PRACTITIONER

## 2020-02-28 PROCEDURE — 80048 BASIC METABOLIC PNL TOTAL CA: CPT

## 2020-02-28 PROCEDURE — 6360000002 HC RX W HCPCS: Performed by: NURSE PRACTITIONER

## 2020-02-28 PROCEDURE — 82330 ASSAY OF CALCIUM: CPT

## 2020-02-28 PROCEDURE — 36415 COLL VENOUS BLD VENIPUNCTURE: CPT

## 2020-02-28 PROCEDURE — C9113 INJ PANTOPRAZOLE SODIUM, VIA: HCPCS | Performed by: NURSE PRACTITIONER

## 2020-02-28 PROCEDURE — 99024 POSTOP FOLLOW-UP VISIT: CPT | Performed by: SURGERY

## 2020-02-28 PROCEDURE — 85027 COMPLETE CBC AUTOMATED: CPT

## 2020-02-28 PROCEDURE — 83735 ASSAY OF MAGNESIUM: CPT

## 2020-02-28 PROCEDURE — 6370000000 HC RX 637 (ALT 250 FOR IP): Performed by: PHYSICIAN ASSISTANT

## 2020-02-28 PROCEDURE — 2580000003 HC RX 258: Performed by: SURGERY

## 2020-02-28 RX ORDER — PANTOPRAZOLE SODIUM 40 MG/1
40 TABLET, DELAYED RELEASE ORAL
Status: DISCONTINUED | OUTPATIENT
Start: 2020-02-29 | End: 2020-03-01 | Stop reason: HOSPADM

## 2020-02-28 RX ORDER — TIMOLOL MALEATE 5 MG/ML
1 SOLUTION/ DROPS OPHTHALMIC NIGHTLY
Status: DISCONTINUED | OUTPATIENT
Start: 2020-02-28 | End: 2020-03-01 | Stop reason: HOSPADM

## 2020-02-28 RX ADMIN — APIXABAN 5 MG: 5 TABLET, FILM COATED ORAL at 08:57

## 2020-02-28 RX ADMIN — ALVIMOPAN 12 MG: 12 CAPSULE ORAL at 20:36

## 2020-02-28 RX ADMIN — Medication 10 ML: at 20:39

## 2020-02-28 RX ADMIN — ACETAMINOPHEN 650 MG: 325 TABLET ORAL at 20:36

## 2020-02-28 RX ADMIN — PRAVASTATIN SODIUM 40 MG: 40 TABLET ORAL at 08:57

## 2020-02-28 RX ADMIN — ALVIMOPAN 12 MG: 12 CAPSULE ORAL at 08:57

## 2020-02-28 RX ADMIN — ACETAMINOPHEN 650 MG: 325 TABLET ORAL at 15:51

## 2020-02-28 RX ADMIN — APIXABAN 5 MG: 5 TABLET, FILM COATED ORAL at 20:36

## 2020-02-28 RX ADMIN — Medication 10 ML: at 08:57

## 2020-02-28 RX ADMIN — BUMETANIDE 1 MG: 1 TABLET ORAL at 08:57

## 2020-02-28 RX ADMIN — TIMOLOL MALEATE 1 DROP: 5 SOLUTION/ DROPS OPHTHALMIC at 21:59

## 2020-02-28 RX ADMIN — PANTOPRAZOLE SODIUM 40 MG: 40 INJECTION, POWDER, FOR SOLUTION INTRAVENOUS at 08:57

## 2020-02-28 RX ADMIN — CLOPIDOGREL BISULFATE 75 MG: 75 TABLET ORAL at 08:57

## 2020-02-28 RX ADMIN — ACETAMINOPHEN 650 MG: 325 TABLET ORAL at 08:51

## 2020-02-28 RX ADMIN — ACETAMINOPHEN 650 MG: 325 TABLET ORAL at 03:22

## 2020-02-28 ASSESSMENT — PAIN SCALES - GENERAL
PAINLEVEL_OUTOF10: 7
PAINLEVEL_OUTOF10: 0
PAINLEVEL_OUTOF10: 0
PAINLEVEL_OUTOF10: 9
PAINLEVEL_OUTOF10: 7
PAINLEVEL_OUTOF10: 0
PAINLEVEL_OUTOF10: 3

## 2020-02-28 ASSESSMENT — PAIN DESCRIPTION - PAIN TYPE
TYPE: SURGICAL PAIN
TYPE: SURGICAL PAIN

## 2020-02-28 ASSESSMENT — PAIN DESCRIPTION - FREQUENCY
FREQUENCY: INTERMITTENT
FREQUENCY: INTERMITTENT

## 2020-02-28 ASSESSMENT — PAIN - FUNCTIONAL ASSESSMENT
PAIN_FUNCTIONAL_ASSESSMENT: PREVENTS OR INTERFERES SOME ACTIVE ACTIVITIES AND ADLS
PAIN_FUNCTIONAL_ASSESSMENT: PREVENTS OR INTERFERES SOME ACTIVE ACTIVITIES AND ADLS

## 2020-02-28 ASSESSMENT — PAIN DESCRIPTION - LOCATION
LOCATION: ABDOMEN
LOCATION: ABDOMEN

## 2020-02-28 ASSESSMENT — PAIN DESCRIPTION - ONSET
ONSET: ON-GOING
ONSET: ON-GOING

## 2020-02-28 ASSESSMENT — PAIN DESCRIPTION - PROGRESSION
CLINICAL_PROGRESSION: GRADUALLY WORSENING
CLINICAL_PROGRESSION: RAPIDLY WORSENING

## 2020-02-28 ASSESSMENT — PAIN DESCRIPTION - DESCRIPTORS
DESCRIPTORS: ACHING
DESCRIPTORS: ACHING

## 2020-02-28 ASSESSMENT — PAIN DESCRIPTION - ORIENTATION
ORIENTATION: MID
ORIENTATION: MID

## 2020-02-28 NOTE — PLAN OF CARE
Problem: Falls - Risk of:  Goal: Will remain free from falls  Description  Will remain free from falls  Outcome: Ongoing  Note:   Patient free of falls this shift. Patient on falling star program. Fall band intact. RN visually checks on patient with hourly rounds. Patient tolerates ambulation each shift. Problem: Pain Control  Goal: Maintain pain level at or below patient's acceptable level (or 5 if patient is unable to determine acceptable level)  Outcome: Ongoing  Note:   Patient voiced pain this shift at 7/10. Patient's pain goal is 0/10. RN continues to deliver PRN medication and attempts noninvasive methods such as repositioning, ice packs, massage. Pain is re-assessed frequently. Bed alarm set after pain meds given. Problem: Cardiovascular  Goal: No DVT, peripheral vascular complications  Outcome: Ongoing  Note:   No s/s of DVT or peripheral vascular complications noted this shift     Problem: Cardiovascular  Goal: Hemodynamic stability  Outcome: Ongoing  Note:   Blood pressure WNL. Vitals monitored and recorded. Patient hemodynamically stable. Cardiac monitor in place with strips being read every four hours. Problem: Nutrition  Goal: Optimal nutrition therapy  Outcome: Ongoing  Note:   Patient encouraged to consume % of meals     Problem: Skin Integrity/Risk  Goal: No skin breakdown during hospitalization  Outcome: Ongoing  Note:   Skin is assessed every shift, no new skin breakdown noted this shift     Problem: Discharge Planning:  Goal: Patients continuum of care needs are met  Description  Patients continuum of care needs are met  Outcome: Ongoing  Note:   Discharge needs are assessed daily     Problem: HEMODYNAMIC STATUS  Goal: Patient has stable vital signs and fluid balance  Outcome: Ongoing  Note:   Blood pressure WNL. Vitals monitored and recorded. Patient hemodynamically stable. Cardiac monitor in place with strips being read every four hours.        Problem: OXYGENATION/RESPIRATORY FUNCTION  Goal: Patient will achieve/maintain normal respiratory rate/effort  Outcome: Ongoing  Note:   Patient's respiration rate WNL this shift     Problem: Pain:  Goal: Pain level will decrease  Description  Pain level will decrease  Outcome: Ongoing  Note:   Patient voiced pain this shift at 7/10. Patient's pain goal is 0/10. RN continues to deliver PRN medication and attempts noninvasive methods such as repositioning, ice packs, massage. Pain is re-assessed frequently. Bed alarm set after pain meds given. Care plan reviewed with patient. Patient verbalizes understanding of the plan of care and contribute to goal setting.

## 2020-02-28 NOTE — PROGRESS NOTES
[address]  Daily Progress Note  Pt Name: Cleo Mckeon  Medical Record Number: [id number]  Date of Birth 1935   Today's Date: 2/28/2020  POD# 3 open right colon resection cecal neoplasm  ASSESSMENT:   1. Hospital day # 8   2. 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   has a past medical history of Arthritis, Blood circulation, collateral, BPPV (benign paroxysmal positional vertigo), CHF (congestive heart failure) ([address] Chronic kidney disease, GERD (gastroesophageal reflux disease), History of blood transfusion, HTN (hypertension), Hx of blood clots, Hyperlipidemia, Neuromuscular disorder ([address] Obesity, Osteopenia, PAC (premature atrial contraction), Paralysis ([address] Pedal edema, Pneumonia, PVC (premature ventricular contraction), PVD (peripheral vascular disease) ([address] Tinnitus, and UTI (urinary tract infection). RECOMMENDATIONS:   1. Advance diet full liquid  2. Analgesics and antiemetics as needed. 3. Pathology pending  4. Home medications , antiplatelet med and NOAC ordered  5. Pt plans home at discharge  SUBJECTIVE:   Bartolo Savage is doing well. She denies nausea or vomiting,denies chest pain or shortness of breath. Pain well controlled. She is ambulating. Bowel sounds hypoactive. Abdomen benign. Passing flatus and tolerating clear liquids.   MEDICATIONS   Scheduled Meds:   calcium replacement protocol   Other RX Placeholder    alvimopan  12 mg Oral BID    pantoprazole  40 mg Intravenous Daily    sodium chloride flush  10 mL Intravenous 2 times per day    apixaban  5 mg Oral BID    bumetanide  1 mg Oral Daily    clopidogrel  75 mg Oral Daily    metoprolol succinate  25 mg Oral Daily    pravastatin  40 mg Oral Daily     Continuous Infusions:    PRN Meds:.morphine **OR** morphine, sodium chloride flush, sodium chloride flush, acetaminophen, promethazine, ondansetron, melatonin  OBJECTIVE   CURRENT VITALS:  height is 5' 5\" 1.3*  --  1.2   MG 2.1   < > 1.8 1.7 1.7   CALCIUM 8.4*  --  8.3*  --  8.5    < > = values in this interval not displayed. No results for input(s): PTT, INR in the last 72 hours. Invalid input(s): PT  No results for input(s): AST, ALT, BILITOT, BILIDIR, AMYLASE, LIPASE, LDH, LACTA in the last 72 hours. No results for input(s): TROPONINT in the last 72 hours.       RADIOLOGY     No orders to display       Electronically signed by Baljit Blunt MD on 2/28/2020 at 9:16 AM

## 2020-02-28 NOTE — FLOWSHEET NOTE
[address]  PHYSICAL THERAPY MISSED TREATMENT NOTE  ACUTE CARE    Date: 2020  Patient Name: Jo Ann Jones        MRN: [id number]   : 1935  (82 y.o.)  Gender: female   Referring Practitioner: Derrick Hill PA-C  Referring Practitioner: Derrick Hill PA-C  Diagnosis: Rectal bleed  Diagnosis: rectal bleed         REASON FOR MISSED TREATMENT:  Patient politely declines to participate in therapy upon attempt at 1035 due to pain. She reports she has been getting up and ambulating, and does not want to get up and do anything right now. She reports she may discharge home this weekend, and that she does not have any concerns with her mobility or completing ADLs at home. Mariaelena Yates, PT, DPT

## 2020-02-28 NOTE — PROGRESS NOTES
Hospitalist Progress Note      Patient:  Rusty Velasco    Unit/Bed:8A-07/007-A  YOB: 1935  MRN: [id number]   Acct: [de-identified]   PCP: Kieran Ambriz  Date of Admission: 2/20/2020    Assessment/Plan:    1. Lower GI Bleed secondary to Diverticulosis: Resolved. Colonoscopy performed on 2/22/2020 demonstrated diverticulosis with active bleeding requiring 2 hemoclips with successful hemostasis. Continue Eliquis and Plavix.   2. Cecal Mass: S/p right colon resection on 2/25/2020 per Dr. Jessica Castillo mass & pelvic adhesions removed),  ml.  4 cm cecal mass noted on colonoscopy, biopsy was deferred secondary to active bleeding and anticoagulation/antiplatelets at that time.  Surgical biopsy pending-concern for malignancy. Eliquis/Plavix resumed, GI deferring Botox injections at this time.  Cardiology consulted for cardiac clearance secondary to patient's significant risk factors including heart failure, CAD, PVD-noted to be high-risk. Continue pain control per surgery recommendations. Advance diet to full liquid. 3. Acute Post-Operative Pulmonary Insufficiency:  Resolved.   Currently on room air with O2 saturations at 96%.  Continue Incentive spirometer and increase ambulation.    4. Mild Leukopenia: Resolved. Possibly secondary to post-operative reaction. No evidence of infectious process at this time. WBC currently 6.2, up from 4.4 on 2/25.    5. Acute Kidney Injury on Chronic Kidney Stage III: Resolved. Secondary to volume depletion related to acute blood loss.  ALEJANDRO had imporved after fluid resuscitation. Current creatinine 1.2, appears to be at baseline.  Continue to hold Cozaar. Patient taking oral fluids well.  Continue to monitor I&O, repeat chemistry in the morning. 6. Acute Blood Loss Anemia on Chronic Microcytic Anemia: Stable.  Secondary to diverticular bleed s/p hemoclip. Continue Eliquis and Plavix.  Current H&H 10.3/34.5 with an MCV of 81. 2.    7. Chronic HFrEF without Acute Exacerbation: Echo performed on 12/28/2019 revealed an EF 10-15%.  Patient had 2900 mL urine output over the past 24 hours. Current weight 160 pounds. Continue Bumex and metoprolol with hypotension parameters. Life[facility]est outpatient, off due to surgical incision.  Monitor fluid balance closely-may require extra dose diuretics.  Continue telemetry, strict I&O, and daily weights.     8. Electrolyte Disturbances: Resolved. Secondary to diuresis.      9. Paroxysmal Atrial Fibrillation:  Stable. Currently in normal sinus rhythm. Continue Eliquis, Plavix and metoprolol. 10. History of AAA:  S/P repair on 2/15/2019.  Continue Plavix.   11. PAD S/P Right Fem-Tib Angioplasty with Stent: Performed in February 2019, continue Plavix and aspirin.    12. History of GERD: S/p esophageal spasm, patient follows with GI outpatient. Previously received botox injections but hasn't for the past year, GI deferring currently. Continue Protonix.   13. History of Hyperlipidemia: Continue pravastatin. 14. Moderate Protein Calorie Malnutrition: BMI 26.4. Evident by temporalis muscle wasting, poor PO intake secondary to dysphagia/achalasia, subcutaneous fat loss. Supplement with Ensure per Dietician when diet resumed. 15. Physical Deconditioning: PT/OT on board, appreciate assistance. Chief Complaint: GI bleed    Initial H and P:-    Chris Barbosa is an 79-year-old  female, reformed smoker, with a medical history of arthritis, benign paroxysmal positional vertigo, congestive heart failure, chronic kidney disease, GERD, history of blood transfusions, hypertension, hyperlipidemia, obesity, osteopenia, peripheral vascular disease, tinnitus, and a history of urinary tract infections.   Patient presented to [facility] ER with complaints of hematochezia that started the day of her arrival.  She reported having 3 large bowel movements with bright red blood and reported that this has never happened before in the past.  Patient takes Plavix and Eliquis at home and did report taking her normal 2 doses of Eliquis prior to arrival.  While in the ER, the patient was found to be hypotensive with a blood pressure of 80/60 and lab work revealed an acute kidney injury with a creatinine of 1.4 and a BUN of 39. EKG reported normal sinus rhythm and a rectal exam performed by the ED provider showed no bleeding or thrombosed hemorrhoids. A Protonix drip was initiated and normal saline resuscitation was also started. The patient was taken to surgery and admitted to intensive care on 2/25/2020 postoperatively following an open colectomy per Dr. Carleen Steele. After stabilization, the patient was subsequently transferred to ICU stepdown and the hospitalist service was contacted for further evaluation, treatment, and management. Subjective (past 24 hours):   Patient sitting up in chair at the time of the interview. States that she is feeling better today than she did yesterday but does still endorse abdominal pain at the surgical site. She reports that she is splinting as she was instructed and it is helping with some of her discomfort. Patient denies any other physical complaints at this time, was updated on the current plan of care, has no other needs or questions. Past medical history, family history, social history and allergies reviewed again and is unchanged since admission. ROS (14 point review of systems completed. Pertinent positives noted. Otherwise ROS is negative) :  GENERAL: No fever,chills, or night sweats. SKIN: No lesions or rashes. HEAD: No headaches or recent injury. EYES: No acute changes in vision, no diplopia or blurred vision. EARS: No hearing loss, no tinnitus. NOSE/THROAT: No rhinorrhea or pharyngitis, no nasal drainage. NECK: No lumps or unusual neck stiffness. PULMONARY: Respirations easy and non-labored, no acute distress.   CARDIAC: No chest pain, pressure,

## 2020-02-29 LAB
ANION GAP SERPL CALCULATED.3IONS-SCNC: 10 MEQ/L (ref 8–16)
BUN BLDV-MCNC: 24 MG/DL (ref 7–22)
CALCIUM SERPL-MCNC: 8.6 MG/DL (ref 8.5–10.5)
CHLORIDE BLD-SCNC: 101 MEQ/L (ref 98–111)
CO2: 25 MEQ/L (ref 23–33)
CREAT SERPL-MCNC: 1.1 MG/DL (ref 0.4–1.2)
ERYTHROCYTE [DISTWIDTH] IN BLOOD BY AUTOMATED COUNT: 17.2 % (ref 11.5–14.5)
ERYTHROCYTE [DISTWIDTH] IN BLOOD BY AUTOMATED COUNT: 49.1 FL (ref 35–45)
GFR SERPL CREATININE-BSD FRML MDRD: 47 ML/MIN/1.73M2
GLUCOSE BLD-MCNC: 114 MG/DL (ref 70–108)
HCT VFR BLD CALC: 31.5 % (ref 37–47)
HEMOGLOBIN: 9.4 GM/DL (ref 12–16)
MCH RBC QN AUTO: 23.7 PG (ref 26–33)
MCHC RBC AUTO-ENTMCNC: 29.8 GM/DL (ref 32.2–35.5)
MCV RBC AUTO: 79.5 FL (ref 81–99)
PLATELET # BLD: 197 THOU/MM3 (ref 130–400)
PMV BLD AUTO: 11.5 FL (ref 9.4–12.4)
POTASSIUM SERPL-SCNC: 3.7 MEQ/L (ref 3.5–5.2)
RBC # BLD: 3.96 MILL/MM3 (ref 4.2–5.4)
SODIUM BLD-SCNC: 136 MEQ/L (ref 135–145)
WBC # BLD: 5.7 THOU/MM3 (ref 4.8–10.8)

## 2020-02-29 PROCEDURE — 94760 N-INVAS EAR/PLS OXIMETRY 1: CPT

## 2020-02-29 PROCEDURE — 85027 COMPLETE CBC AUTOMATED: CPT

## 2020-02-29 PROCEDURE — 6370000000 HC RX 637 (ALT 250 FOR IP): Performed by: SURGERY

## 2020-02-29 PROCEDURE — 6370000000 HC RX 637 (ALT 250 FOR IP): Performed by: NURSE PRACTITIONER

## 2020-02-29 PROCEDURE — 1200000003 HC TELEMETRY R&B

## 2020-02-29 PROCEDURE — 99024 POSTOP FOLLOW-UP VISIT: CPT | Performed by: SURGERY

## 2020-02-29 PROCEDURE — 80048 BASIC METABOLIC PNL TOTAL CA: CPT

## 2020-02-29 PROCEDURE — 99232 SBSQ HOSP IP/OBS MODERATE 35: CPT | Performed by: NURSE PRACTITIONER

## 2020-02-29 PROCEDURE — 2580000003 HC RX 258: Performed by: SURGERY

## 2020-02-29 PROCEDURE — 36415 COLL VENOUS BLD VENIPUNCTURE: CPT

## 2020-02-29 RX ADMIN — APIXABAN 5 MG: 5 TABLET, FILM COATED ORAL at 09:08

## 2020-02-29 RX ADMIN — ACETAMINOPHEN 650 MG: 325 TABLET ORAL at 09:08

## 2020-02-29 RX ADMIN — ALVIMOPAN 12 MG: 12 CAPSULE ORAL at 23:07

## 2020-02-29 RX ADMIN — APIXABAN 5 MG: 5 TABLET, FILM COATED ORAL at 23:06

## 2020-02-29 RX ADMIN — ACETAMINOPHEN 650 MG: 325 TABLET ORAL at 23:06

## 2020-02-29 RX ADMIN — ALVIMOPAN 12 MG: 12 CAPSULE ORAL at 09:08

## 2020-02-29 RX ADMIN — TIMOLOL MALEATE 1 DROP: 5 SOLUTION/ DROPS OPHTHALMIC at 23:07

## 2020-02-29 RX ADMIN — BUMETANIDE 1 MG: 1 TABLET ORAL at 09:08

## 2020-02-29 RX ADMIN — PANTOPRAZOLE SODIUM 40 MG: 40 TABLET, DELAYED RELEASE ORAL at 06:31

## 2020-02-29 RX ADMIN — CLOPIDOGREL BISULFATE 75 MG: 75 TABLET ORAL at 09:08

## 2020-02-29 RX ADMIN — Medication 10 ML: at 23:07

## 2020-02-29 RX ADMIN — PRAVASTATIN SODIUM 40 MG: 40 TABLET ORAL at 09:08

## 2020-02-29 RX ADMIN — ACETAMINOPHEN 650 MG: 325 TABLET ORAL at 13:30

## 2020-02-29 ASSESSMENT — PAIN DESCRIPTION - LOCATION
LOCATION: ABDOMEN
LOCATION: ABDOMEN

## 2020-02-29 ASSESSMENT — PAIN SCALES - GENERAL
PAINLEVEL_OUTOF10: 6
PAINLEVEL_OUTOF10: 6
PAINLEVEL_OUTOF10: 7
PAINLEVEL_OUTOF10: 0
PAINLEVEL_OUTOF10: 7
PAINLEVEL_OUTOF10: 7

## 2020-02-29 ASSESSMENT — PAIN DESCRIPTION - DESCRIPTORS: DESCRIPTORS: ACHING

## 2020-02-29 ASSESSMENT — PAIN DESCRIPTION - PAIN TYPE
TYPE: SURGICAL PAIN
TYPE: SURGICAL PAIN

## 2020-02-29 ASSESSMENT — PAIN DESCRIPTION - FREQUENCY: FREQUENCY: INTERMITTENT

## 2020-02-29 NOTE — PROGRESS NOTES
self in bed. Pillow support provided and patient encouraged to turn Q2H and PRN. Surgical incision to abdomen - open to air. Will continue to monitor skin integrity and encourage repositioning Q2H and PRN. Problem: Discharge Planning:  Goal: Patients continuum of care needs are met  Description  Patients continuum of care needs are met  Outcome: Ongoing  Note:   Patient from home, plans to return home at discharge. Tolerating full liquid diet. Possible advance diet tomorrow. Possible discharge 2-3 days. Will continue to monitor for discharge needs.        Patient participated in plan of care and contributed to goal setting.

## 2020-02-29 NOTE — PROGRESS NOTES
10-15%.  Patient had 2900 mL urine output over the past 24 hours.  Current weight 160 pounds.  Continue Bumex and metoprolol with hypotension parameters. LifeVest outpatient, off due to surgical incision.  Monitor fluid balance closely-may require extra dose diuretics.  Continue telemetry, strict I&O, and daily weights.     8. Electrolyte Disturbances: Resolved. Secondary to diuresis.      9. Paroxysmal Atrial Fibrillation:  Stable.  Currently in normal sinus rhythm.  Continue Eliquis, Plavix and metoprolol. 10. History of AAA:  S/P repair on 2/15/2019.  Continue Plavix.   11. PAD S/P Right Fem-Tib Angioplasty with Stent: Performed in February 2019, continue Plavix and aspirin.    12. History of GERD: S/p esophageal spasm, patient follows with GI outpatient. Previously received botox injections but hasn't for the past year, GI deferring currently. Continue Protonix.   13. History of Hyperlipidemia: Continue pravastatin. 14. Moderate Protein Calorie Malnutrition: BMI 26.4.  Evident by temporalis muscle wasting, poor PO intake secondary to dysphagia/achalasia, subcutaneous fat loss. Supplement with Ensure per Dietician when diet resumed. 15. Physical Deconditioning: PT/OT on board, appreciate assistance. Chief Complaint: GI bleed    Initial H and P:-    Tuan Madrigal is an 53-year-old  female, reformed smoker, with a medical history of arthritis, benign paroxysmal positional vertigo, congestive heart failure, chronic kidney disease, GERD, history of blood transfusions, hypertension, hyperlipidemia, obesity, osteopenia, peripheral vascular disease, tinnitus, and a history of urinary tract infections.   Patient presented to [facility] ER with complaints of hematochezia that started the day of her arrival. Miguelmi Friday reported having 3 large bowel movements with bright red blood and reported that this has never happened before in the past.  Patient takes Plavix and Eliquis at home and did report taking her normal 2 doses of Eliquis prior to arrival.  While in the ER, the patient was found to be hypotensive with a blood pressure of 80/60 and lab work revealed an acute kidney injury with a creatinine of 1.4 and a BUN of 39.  EKG reported normal sinus rhythm and a rectal exam performed by the ED provider showed no bleeding or thrombosed hemorrhoids.  A Protonix drip was initiated and normal saline resuscitation was also started.  The patient was taken to surgery and admitted to intensive care on 2/25/2020 postoperatively following an open colectomy per Dr. Kaylyn Deluna stabilization, the patient was subsequently transferred to ICU stepdown and the hospitalist service was contacted for further evaluation, treatment, and management. Subjective (past 24 hours):   Patient resting in bed at the time of the interview. States that she had a little bit of a rough night last night due to some diarrhea that she was not able to make it all the way to the bathroom. The patient was happy however that her bowels are moving and that she is still able to take clear liquids and some food down. Currently denies any chest pain, shortness of breath, nausea, vomiting, urinary complaints, lightheadedness, dizziness, headaches, change in vision, fever, or chills. She does endorse abdominal pain that is tender in nature at the surgical site as well as diarrhea but states that overall she feels as if she is improving. She was updated on current plan of care and has no other needs or questions at this time. Past medical history, family history, social history and allergies reviewed again and is unchanged since admission. ROS (14 point review of systems completed. Pertinent positives noted. Otherwise ROS is negative) :  GENERAL: No fever,chills, or night sweats. SKIN: No lesions or rashes. HEAD: No headaches or recent injury. EYES: No acute changes in vision, no diplopia or blurred vision.   EARS: No hearing loss, no tinnitus. NOSE/THROAT: No rhinorrhea or pharyngitis, no nasal drainage. NECK: No lumps or unusual neck stiffness. PULMONARY: Respirations easy and non-labored, no acute distress. CARDIAC: No chest pain, pressure, Negative for lower leg edema. GI: Abdomen is soft and non-tender, non-distended. PERIPHERAL VASCULAR: No intermittent claudication or unusual leg cramps. MUSCULOSKELETAL: Occasional arthralgias, myalgias. NEUROLOGICAL: Denies any headache, near syncope, seizures or syncope. HEMATOLOGIC:  No unusual bruising or bleeding. PSYCH: Denies any homicidal or suicidial ideations. Medications:  Reviewed    Infusion Medications   Scheduled Medications    pantoprazole  40 mg Oral QAM AC    timolol  1 drop Both Eyes Nightly    calcium replacement protocol   Other RX Placeholder    alvimopan  12 mg Oral BID    sodium chloride flush  10 mL Intravenous 2 times per day    apixaban  5 mg Oral BID    bumetanide  1 mg Oral Daily    clopidogrel  75 mg Oral Daily    metoprolol succinate  25 mg Oral Daily    pravastatin  40 mg Oral Daily     PRN Meds: morphine **OR** morphine, sodium chloride flush, sodium chloride flush, acetaminophen, promethazine, ondansetron, melatonin      Intake/Output Summary (Last 24 hours) at 2/29/2020 0741  Last data filed at 2/29/2020 0516  Gross per 24 hour   Intake 510 ml   Output --   Net 510 ml       Diet:  DIET FULL LIQUID; Low Sodium (2 GM)  Dietary Nutrition Supplements: Standard High Calorie Oral Supplement    Exam:  /72   Pulse 85   Temp 97.6 °F (36.4 °C) (Oral)   Resp 18   Ht 5' 5\" (1.651 m)   Wt 158 lb 1.6 oz (71.7 kg)   SpO2 95%   BMI 26.31 kg/m²     General appearance: Alert and appropriate, pleasant geriatric female. No apparent distress, appears stated age and cooperative. HEENT: Pupils equal, round, and reactive to light. Conjunctivae/corneas clear. Neck: Supple, with full range of motion. No jugular venous distention.  Trachea midline. Respiratory:  Normal respiratory effort. Clear to auscultation, bilaterally without Rales/Wheezes/Rhonchi. Cardiovascular: Regular rate and rhythm with normal S1/S2 without murmurs, rubs or gallops. Abdomen: Soft, tender at the surgical site, non-distended with normal bowel sounds. Musculoskeletal: Passive and active ROM x 4 extremities. Skin: Skin color, texture, turgor normal.  No rashes or lesions. Neurologic:  Neurovascularly intact without any focal sensory/motor deficits. Cranial nerves: II-XII intact, grossly non-focal.  Psychiatric: Alert and oriented to person, place, time, and situation. Thought content appropriate, normal insight  Capillary Refill: Brisk,< 3 seconds   Peripheral Pulses: +2 palpable, equal bilaterally     Labs:   Recent Labs     02/27/20  0325 02/28/20  1135 02/29/20  0509   WBC 6.3 6.2 5.7   HGB 9.7* 10.3* 9.4*   HCT 32.7* 34.5* 31.5*    223 197     Recent Labs     02/27/20  0325 02/28/20  0225 02/29/20  0509    138 136   K 3.7 3.8 3.7    103 101   CO2 24 26 25   BUN 13 17 24*   CREATININE 1.3* 1.2 1.1   CALCIUM 8.3* 8.5 8.6     No results for input(s): AST, ALT, BILIDIR, BILITOT, ALKPHOS in the last 72 hours. No results for input(s): INR in the last 72 hours. No results for input(s): Erven Sell in the last 72 hours. Microbiology:    Blood culture #1:   Lab Results   Component Value Date    BC No growth-preliminary No growth  12/28/2019       Blood culture #2:No results found for: Galina Lui    Organism:  Lab Results   Component Value Date    ORG Klebsiella pneumoniae 05/17/2019         Lab Results   Component Value Date    LABGRAM  10/24/2018     Few segmented neutrophils observed. No epithelial cells observed. Moderate gram positive cocci occurring singly and in pairs.          MRSA culture only:No results found for: [facility]    Urine culture:   Lab Results   Component Value Date    LABURIN No growth-preliminary No growth  02/25/2020 Respiratory culture: No results found for: CULTRESP    Aerobic and Anaerobic :  Lab Results   Component Value Date    LABAERO light growth 10/24/2018     Lab Results   Component Value Date    LABANAE  10/24/2018     Culture yielded moderate mixed growth consisting of anaerobic  gram negative bacilli and anaerobic gram positive cocci. If a  true mixed aerobic and anaerobic infection is suspected, then  broad spectrum empiric antibiotic therapy is indicated and  should include coverage for anaerobic organisms. Urinalysis:      Lab Results   Component Value Date    NITRU NEGATIVE 05/17/2019    WBCUA 0-2 05/17/2019    BACTERIA MANY 05/17/2019    RBCUA 0-2 05/17/2019    BLOODU NEGATIVE 05/17/2019    SPECGRAV 1.013 02/24/2019    GLUCOSEU 250 05/17/2019       Radiology:  No orders to display     No results found.     Electronically signed by ROMAN Porter CNP on 2/29/2020 at 7:41 AM

## 2020-02-29 NOTE — PROGRESS NOTES
[facility]  Covering for Dr. Kelsea Hill  Daily Progress Note  Pt Name: Hiren Cho Record Number: [id number]  Date of Birth 1935   Today's Date: 2/29/2020  POD# 3 open right colon resection cecal neoplasm  ASSESSMENT:   1. Hospital day # 9   2. 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, path pending   has a past medical history of Arthritis, Blood circulation, collateral, BPPV (benign paroxysmal positional vertigo), CHF (congestive heart failure) ([address] Chronic kidney disease, GERD (gastroesophageal reflux disease), History of blood transfusion, HTN (hypertension), Hx of blood clots, Hyperlipidemia, Neuromuscular disorder ([address] Obesity, Osteopenia, PAC (premature atrial contraction), Paralysis ([address] Pedal edema, Pneumonia, PVC (premature ventricular contraction), PVD (peripheral vascular disease) ([address] Tinnitus, and UTI (urinary tract infection). RECOMMENDATIONS:   1. Advance diet  2. Analgesics and antiemetics as needed. 3. Pathology pending  4. Home medications , antiplatelet med and NOAC ordered  5. Increase activity  6. Pt plans home at discharge  SUBJECTIVE:   Tammy Crenshaw is doing well. She denies nausea or vomiting, denies chest pain or shortness of breath. Pain well controlled. She is ambulating. Bowel sounds active. Abdomen benign. Passing flatus and tolerating full liquids. + bowel movements.   MEDICATIONS   Scheduled Meds:   pantoprazole  40 mg Oral QAM AC    timolol  1 drop Both Eyes Nightly    calcium replacement protocol   Other RX Placeholder    alvimopan  12 mg Oral BID    sodium chloride flush  10 mL Intravenous 2 times per day    apixaban  5 mg Oral BID    bumetanide  1 mg Oral Daily    clopidogrel  75 mg Oral Daily    metoprolol succinate  25 mg Oral Daily    pravastatin  40 mg Oral Daily     Continuous Infusions:    PRN Meds:.morphine **OR** morphine, sodium chloride flush, sodium chloride

## 2020-02-29 NOTE — PLAN OF CARE
Problem: Falls - Risk of:  Goal: Will remain free from falls  Description  Will remain free from falls  Outcome: Met This Shift  Note:   Patient remained free from falls this shift. Used call light appropriately. Wore nonskid socks when ambulating with assistance. Bed alarm on. Call light in reach. Problem: Pain Control  Goal: Maintain pain level at or below patient's acceptable level (or 5 if patient is unable to determine acceptable level)  Outcome: Met This Shift  Flowsheets (Taken 2/29/2020 0053)  Patient's Stated Pain Goal: No pain  Note:   Pain goal: no pain. Tylenol for pain per MAR. Non-pharmacological pain mgmt rest, reposition, distraction, elevation, emotional support, etc provided. Problem: Cardiovascular  Goal: No DVT, peripheral vascular complications  Outcome: Met This Shift  Note:   No DVT noted this shift. Will continue to monitor for DVT. Problem: Cardiovascular  Goal: Hemodynamic stability  Outcome: Met This Shift  Note:   VSS this shift. Continuous telemetry. Will continue to monitor vitals      Problem: Nutrition  Goal: Optimal nutrition therapy  2/29/2020 0105 by Stephanie Lopez RN  Outcome: Met This Shift  Note:   Patient tolerating full liquid diet. Drank 100% ensure. Bowel sounds active, passing gas, stool this shift. Problem: Bowel Function - Altered:  Goal: Bowel elimination is within specified parameters  Description  Bowel elimination is within specified parameters  Outcome: Met This Shift  Note:   Patient with BM this shift. Problem: Infection - Surgical Site:  Goal: Will show no infection signs and symptoms  Description  Will show no infection signs and symptoms  Outcome: Met This Shift  Note:   Afebrile WBC WNL. Abdominal incision free from redness/drainage. Will continue to monitor for s/s of infection. Problem: Skin Integrity/Risk  Goal: No skin breakdown during hospitalization  Outcome: Ongoing  Note:   Redness to buttocks noted.  Patient turns self in bed. Pillow support provided and patient encouraged to turn Q2H and PRN. Surgical incision to abdomen - open to air. Will continue to monitor skin integrity and encourage repositioning Q2H and PRN. Problem: Discharge Planning:  Goal: Patients continuum of care needs are met  Description  Patients continuum of care needs are met  Outcome: Ongoing  Note:   Patient from home, plans to return home at discharge. Tolerating full liquid diet. Possible advance diet tomorrow. Possible discharge 2-3 days. Will continue to monitor for discharge needs. Patient participated in plan of care and contributed to goal setting.

## 2020-03-01 VITALS
RESPIRATION RATE: 16 BRPM | TEMPERATURE: 98.6 F | HEART RATE: 83 BPM | HEIGHT: 65 IN | DIASTOLIC BLOOD PRESSURE: 51 MMHG | SYSTOLIC BLOOD PRESSURE: 96 MMHG | BODY MASS INDEX: 26.34 KG/M2 | OXYGEN SATURATION: 97 % | WEIGHT: 158.1 LBS

## 2020-03-01 LAB
ANION GAP SERPL CALCULATED.3IONS-SCNC: 13 MEQ/L (ref 8–16)
BASOPHILS # BLD: 0.5 %
BASOPHILS ABSOLUTE: 0 THOU/MM3 (ref 0–0.1)
BUN BLDV-MCNC: 28 MG/DL (ref 7–22)
CALCIUM SERPL-MCNC: 8.9 MG/DL (ref 8.5–10.5)
CHLORIDE BLD-SCNC: 102 MEQ/L (ref 98–111)
CO2: 26 MEQ/L (ref 23–33)
CREAT SERPL-MCNC: 1.1 MG/DL (ref 0.4–1.2)
EOSINOPHIL # BLD: 3.8 %
EOSINOPHILS ABSOLUTE: 0.2 THOU/MM3 (ref 0–0.4)
ERYTHROCYTE [DISTWIDTH] IN BLOOD BY AUTOMATED COUNT: 17.4 % (ref 11.5–14.5)
ERYTHROCYTE [DISTWIDTH] IN BLOOD BY AUTOMATED COUNT: 50.3 FL (ref 35–45)
GFR SERPL CREATININE-BSD FRML MDRD: 47 ML/MIN/1.73M2
GLUCOSE BLD-MCNC: 112 MG/DL (ref 70–108)
HCT VFR BLD CALC: 31.4 % (ref 37–47)
HEMOGLOBIN: 9.4 GM/DL (ref 12–16)
IMMATURE GRANS (ABS): 0.02 THOU/MM3 (ref 0–0.07)
IMMATURE GRANULOCYTES: 0.3 %
LYMPHOCYTES # BLD: 13.5 %
LYMPHOCYTES ABSOLUTE: 0.8 THOU/MM3 (ref 1–4.8)
MCH RBC QN AUTO: 23.9 PG (ref 26–33)
MCHC RBC AUTO-ENTMCNC: 29.9 GM/DL (ref 32.2–35.5)
MCV RBC AUTO: 79.9 FL (ref 81–99)
MONOCYTES # BLD: 7.7 %
MONOCYTES ABSOLUTE: 0.4 THOU/MM3 (ref 0.4–1.3)
NUCLEATED RED BLOOD CELLS: 0 /100 WBC
PLATELET # BLD: 230 THOU/MM3 (ref 130–400)
PMV BLD AUTO: 11.6 FL (ref 9.4–12.4)
POTASSIUM SERPL-SCNC: 3.8 MEQ/L (ref 3.5–5.2)
RBC # BLD: 3.93 MILL/MM3 (ref 4.2–5.4)
SEG NEUTROPHILS: 74.2 %
SEGMENTED NEUTROPHILS ABSOLUTE COUNT: 4.3 THOU/MM3 (ref 1.8–7.7)
SODIUM BLD-SCNC: 141 MEQ/L (ref 135–145)
WBC # BLD: 5.8 THOU/MM3 (ref 4.8–10.8)

## 2020-03-01 PROCEDURE — 6370000000 HC RX 637 (ALT 250 FOR IP): Performed by: SURGERY

## 2020-03-01 PROCEDURE — 99024 POSTOP FOLLOW-UP VISIT: CPT | Performed by: SURGERY

## 2020-03-01 PROCEDURE — 99239 HOSP IP/OBS DSCHRG MGMT >30: CPT | Performed by: FAMILY MEDICINE

## 2020-03-01 PROCEDURE — 80048 BASIC METABOLIC PNL TOTAL CA: CPT

## 2020-03-01 PROCEDURE — 6370000000 HC RX 637 (ALT 250 FOR IP): Performed by: NURSE PRACTITIONER

## 2020-03-01 PROCEDURE — 36415 COLL VENOUS BLD VENIPUNCTURE: CPT

## 2020-03-01 PROCEDURE — 85025 COMPLETE CBC W/AUTO DIFF WBC: CPT

## 2020-03-01 PROCEDURE — APPSS30 APP SPLIT SHARED TIME 16-30 MINUTES: Performed by: NURSE PRACTITIONER

## 2020-03-01 RX ADMIN — CLOPIDOGREL BISULFATE 75 MG: 75 TABLET ORAL at 10:00

## 2020-03-01 RX ADMIN — PRAVASTATIN SODIUM 40 MG: 40 TABLET ORAL at 10:00

## 2020-03-01 RX ADMIN — BUMETANIDE 1 MG: 1 TABLET ORAL at 10:01

## 2020-03-01 RX ADMIN — ACETAMINOPHEN 650 MG: 325 TABLET ORAL at 10:01

## 2020-03-01 RX ADMIN — ALVIMOPAN 12 MG: 12 CAPSULE ORAL at 10:01

## 2020-03-01 RX ADMIN — APIXABAN 5 MG: 5 TABLET, FILM COATED ORAL at 10:01

## 2020-03-01 RX ADMIN — PANTOPRAZOLE SODIUM 40 MG: 40 TABLET, DELAYED RELEASE ORAL at 05:08

## 2020-03-01 RX ADMIN — ACETAMINOPHEN 650 MG: 325 TABLET ORAL at 05:08

## 2020-03-01 ASSESSMENT — PAIN SCALES - GENERAL
PAINLEVEL_OUTOF10: 0
PAINLEVEL_OUTOF10: 6
PAINLEVEL_OUTOF10: 0

## 2020-03-01 NOTE — PROGRESS NOTES
02/27/20  1428 02/28/20  0225 02/28/20  1135 02/28/20  1514 02/29/20  0509 03/01/20  0919   WBC  --   --  6.2  --  5.7 5.8   HGB  --   --  10.3*  --  9.4* 9.4*   HCT  --   --  34.5*  --  31.5* 31.4*   PLT  --   --  223  --  197 230   NA  --  138  --   --  136 141   K  --  3.8  --   --  3.7 3.8   CL  --  103  --   --  101 102   CO2  --  26  --   --  25 26   BUN  --  17  --   --  24* 28*   CREATININE  --  1.2  --   --  1.1 1.1   MG 1.7 1.7  --  1.7  --   --    CALCIUM  --  8.5  --   --  8.6 8.9      PATHOLOGY REPORT    FINAL DIAGNOSIS:  Ileocecal lesion, right hemicolectomy:   Cecal lesion: Tubulovillous adenoma.   Tubular adenoma.   Margins- free of dysplasia or malignancy. Specimen:  COLON RESECTION, ILEOCOLIC    Gross Examination:  The container is labeled [facility], ileocolic resection.  Received  in formalin is a segmental resection of bowel including terminal ileum,  cecum, and proximal right colon.  The specimen measures about 10 cm in  length including 5 cm of terminal ileum.  There is no appendix.  _ The  specimen is opened longitudinally revealing a tan mucosal surface.   There is a fungating mass in the cecum measuring 4 x 2.5 cm.  The  lesion is 5 cm from the proximal resection line and 8.5 cm from the  mesenteric margin.  The mesenteric margin is inked yellow and the  serosal surface is inked blue. Boris Antes is a second smaller mucosal polyp  identified in the proximal right colon measuring 0.6 cm.  No additional  lesions are identified.  Sections through the larger cecal mass reveal  no gross involvement of the muscularis propria.  The cut surface of the  lesion is fatty.  Representative sections are submitted:  Cassette #1 -  proximal and distal resection line; cassette #2 - radial margin;  cassettes #3 through #6 - cecal lesion; and cassette #7 - smaller  proximal right colon polyp.  ss.  EKM/DKR:[id number]    Microscopic Examination:  Procedure  Right hemicolectomy    Tumor Site  Cecum    Tumor Size  Greatest dimension (centimeters): 4 x 2.5 cm    The tumor shows areas of flat/ villous and tubulovillous architecture  and is distorted by a lipoma. No high grade dysplasia is seen. Deeper levels are performed on block A6 and A5 for a few small rounded  nests of cells in the splayed muscularis mucosae, do no appear to  invade into the muscularis. 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 RADIOLOGY     No orders to display       Electronically signed by ROMAN Vasquez CNP on 3/1/2020 at 12:46 PM     Patient seen and examined independently by me early this AM. Above discussed and I agree with Chloe Pichardo CNP. See my additional comments below for updated orders and plan. Labs, cultures, and radiographs where available were reviewed. I discussed patient concerns with the patient's nurse and instructions were given. Please see our orders for the updated patient care plan. -Bowel function returned. Imodium as needed. Incisional/wound care. Abdomen benign. Tolerating diet. Life vest at discharge. Okay for discharge from a surgical standpoint.   Follow-up in office with Dr. Leopoldo Ann    Electronically signed by Bin Moreau MD on 3/1/2020 at 1:25 PM

## 2020-03-01 NOTE — PLAN OF CARE
Problem: Falls - Risk of:  Goal: Will remain free from falls  Description  Will remain free from falls  Outcome: Ongoing  Note:   Patient free of falls this shift. Patient on falling star program. Fall band intact. RN visually checks on patient with hourly rounds. Patient tolerates ambulation each shift.        Problem: Pain Control  Goal: Maintain pain level at or below patient's acceptable level (or 5 if patient is unable to determine acceptable level)  Outcome: Ongoing  Note:   Patient voiced pain this shift at 6/10. Patient's pain goal is 4/10. RN continues to deliver PRN medication and attempts noninvasive methods such as repositioning, ice packs, massage. Pain is re-assessed frequently. Bed alarm set after pain meds given.         Problem: Cardiovascular  Goal: No DVT, peripheral vascular complications  Outcome: Ongoing  Note:   No s/s of DVT or peripheral vascular complications noted this shift     Problem: Cardiovascular  Goal: Hemodynamic stability  Outcome: Ongoing  Note:   Blood pressure WNL. Vitals monitored and recorded. Patient hemodynamically stable. Cardiac monitor in place with strips being read every four hours.        Problem: Nutrition  Goal: Optimal nutrition therapy  Outcome: Ongoing  Note:   Patient encouraged to consume % of meals     Problem: Skin Integrity/Risk  Goal: No skin breakdown during hospitalization  Outcome: Ongoing  Note:   Skin is assessed every shift, no new skin breakdown noted this shift     Problem: Discharge Planning:  Goal: Patients continuum of care needs are met  Description  Patients continuum of care needs are met  Outcome: Ongoing  Note:   Discharge needs are assessed daily     Problem: HEMODYNAMIC STATUS  Goal: Patient has stable vital signs and fluid balance  Outcome: Ongoing  Note:   Blood pressure WNL. Vitals monitored and recorded. Patient hemodynamically stable.  Cardiac monitor in place with strips being read every four hours.        Problem: OXYGENATION/RESPIRATORY FUNCTION  Goal: Patient will achieve/maintain normal respiratory rate/effort  Outcome: Ongoing  Note:   Patient's respiration rate WNL this shift     Problem: Pain:  Goal: Pain level will decrease  Description  Pain level will decrease  Outcome: Ongoing  Note:   Patient voiced pain this shift at 6/10. Patient's pain goal is 4/10. RN continues to deliver PRN medication and attempts noninvasive methods such as repositioning, ice packs, massage. Pain is re-assessed frequently. Bed alarm set after pain meds given.       Care plan reviewed with patient. Patient verbalizes understanding of the plan of care and contribute to goal setting.

## 2020-03-01 NOTE — DISCHARGE SUMMARY
admitted to intensive care on 2/25/2020 postoperatively following an open colectomy per Dr. Otis Young stabilization, the patient was subsequently transferred to ICU stepdown and the hospitalist service was contacted for further evaluation, treatment, and management. Hospital problem list with assessment and plan updates:-     1. Lower GI Bleed secondary to Diverticulosis: Resolved. Colonoscopy performed on 2/22/2020 demonstrated diverticulosis with active bleeding requiring 2 hemoclips with successful hemostasis.  Continue Eliquis and Plavix.   2. Cecal Mass: S/p right colon resection on 2/25/2020 per Dr. Sim Peaks mass & pelvic adhesions removed),  ml.  4 cm cecal mass noted on colonoscopy, biopsy was deferred secondary to active bleeding and anticoagulation/antiplatelets at that time.  Surgical biopsy pending-concern for malignancy.  Continue Eliquis/Plavix, GI deferring Botox injections at this time. Continue pain control per surgery recommendations.  Advance diet per surgery recommendations.   3. Acute Post-Operative Pulmonary Insufficiency:  Resolved.   Currently on room air with O2 saturations at 95%.  Continue Incentive spirometer and increase ambulation.    4. Mild Leukopenia: Resolved.  Possibly secondary to post-operative reaction. No evidence of infectious process at this time. WBC currently 5.7, up from 4.4 on 2/25.    5. Acute Kidney Injury on Chronic Ignacio Minor III: Resolved. Secondary to volume depletion related to acute blood loss.  ALEJANDRO had imporved after fluid resuscitation.  Current creatinine 1.1, appears to be at baseline.  Continue to hold Cozaar due to borderline hypotension.  Patient taking oral fluids well.  Continue to monitor I&O, repeat chemistry in the morning. 6. Acute Blood Loss Anemia on Chronic Microcytic Anemia: Stable.  Secondary to diverticular bleed s/p hemoclip. Continue Eliquis and Plavix. Current H&H  9.4/31.5 with an MCV of 79. 5.    7.  Chronic HFrEF without Acute Exacerbation: Echo performed on 12/28/2019 revealed an EF 10-15%.  Patient had 2900 mL urine output over the past 24 hours.  Current weight 160 pounds.  Continue Bumex and metoprolol with hypotension parameters. [facility] outpatient, off due to surgical incision.  Monitor fluid balance closely-may require extra dose diuretics.  Continue telemetry, strict I&O, and daily weights.     8. Electrolyte Disturbances: Resolved. Secondary to diuresis.      9. Paroxysmal Atrial Fibrillation:  Stable.  Currently in normal sinus rhythm.  Continue Eliquis, Plavix and metoprolol. 10. History of AAA:  S/P repair on 2/15/2019.  Continue Plavix.   11. PAD S/P Right Fem-Tib Angioplasty with Stent: Performed in February 2019, continue Plavix and aspirin.    12. History of GERD: S/p esophageal spasm, patient follows with GI outpatient. Previously received botox injections but hasn't for the past year, GI deferring currently. Continue Protonix.   13. History of Hyperlipidemia: Continue pravastatin. 14. Moderate Protein Calorie Malnutrition: BMI 26.4.  Evident by temporalis muscle wasting, poor PO intake secondary to dysphagia/achalasia, subcutaneous fat loss. Supplement with Ensure per Dietician when diet resumed. 15. Physical Deconditioning: PT/OT on board, appreciate assistance    Additional discharge final recommendations as below:-  At the day of discharge patient improved with no concerns, son was at the bedside and asked with the patient to go home. The son mentioned he will take care of his mom when she goes home. No need for home health care or nursing facility at this time as they mentioned. Noticed that patient been having hypertension during the course and her Cozaar been held in addition to metoprolol and Bumex.   For that we discussed with the patient to hold antihypertensive medications including Bumex, Cozaar, metoprolol, and to monitor her blood pressure closely and log her readings and presented to her primary care physician in 1 week. Also we instructed her to follow-up with surgery team, GI team, cardiology as an outpatient as scheduled. Also we ordered CBC repeat in a few days to follow-up with her primary care physician, here to mention that patient hemoglobin level in the past 72 hours was stable. Also to mention that patient has high risk of readmission because of above-mentioned comorbidities and declining going to nursing facility to regain her strength. All appointments obtained for the patient at the day of discharge with other specialities including PCP in one week. All questions and concerns addressed. Patient verbalized understandings and was agreeable with discharge planning. Physical Exam:-  Vitals:   Patient Vitals for the past 24 hrs:   BP Temp Temp src Pulse Resp SpO2   03/01/20 0827 (!) 96/51 98.6 °F (37 °C) Oral 83 16 97 %   03/01/20 0327 92/62 98.2 °F (36.8 °C) Oral 98 18 98 %   02/29/20 2230 120/66 98.2 °F (36.8 °C) Oral 92 18 95 %   02/29/20 1520 116/64 98.4 °F (36.9 °C) Oral 110 16 97 %     Weight:   Weight: 158 lb 1.6 oz (71.7 kg)   24 hour intake/output:     Intake/Output Summary (Last 24 hours) at 3/1/2020 1221  Last data filed at 3/1/2020 1049  Gross per 24 hour   Intake 445 ml   Output --   Net 445 ml       1. General appearance: No apparent distress, appears stated age and cooperative. 2. HEENT: Normal cephalic, atraumatic without obvious deformity. Pupils equal, round, and reactive to light. Extra ocular muscles intact. Conjunctivae/corneas clear. 3. Neck: Supple, with full range of motion. No jugular venous distention. Trachea midline. 4. Respiratory:  Normal respiratory effort. Clear to auscultation, bilaterally without Rales/Wheezes/Rhonchi. 5. Cardiovascular: Regular rate and rhythm with normal S1/S2 without murmurs, rubs or gallops. 6. Abdomen: Soft, non-tender, non-distended with normal bowel sounds. Abdomen covered at the site of surgery.   7. Musculoskeletal: post-op    Collection Time: 02/27/20  2:28 PM   Result Value Ref Range    Calcium, Ion 1.11 (L) 1.12 - 1.32 mmol/L   Magnesium post-op    Collection Time: 02/27/20  2:28 PM   Result Value Ref Range    Magnesium 1.7 1.6 - 2.4 mg/dL   Calcium, Ionized post-op    Collection Time: 02/28/20  2:25 AM   Result Value Ref Range    Calcium, Ion 1.15 1.12 - 1.32 mmol/L   Magnesium post-op    Collection Time: 02/28/20  2:25 AM   Result Value Ref Range    Magnesium 1.7 1.6 - 2.4 mg/dL   Basic Metabolic Panel    Collection Time: 02/28/20  2:25 AM   Result Value Ref Range    Sodium 138 135 - 145 meq/L    Potassium 3.8 3.5 - 5.2 meq/L    Chloride 103 98 - 111 meq/L    CO2 26 23 - 33 meq/L    Glucose 87 70 - 108 mg/dL    BUN 17 7 - 22 mg/dL    CREATININE 1.2 0.4 - 1.2 mg/dL    Calcium 8.5 8.5 - 10.5 mg/dL   Anion Gap    Collection Time: 02/28/20  2:25 AM   Result Value Ref Range    Anion Gap 9.0 8.0 - 16.0 meq/L   Glomerular Filtration Rate, Estimated    Collection Time: 02/28/20  2:25 AM   Result Value Ref Range    Est, Glom Filt Rate 43 (A) ml/min/1.73m2   CBC    Collection Time: 02/28/20 11:35 AM   Result Value Ref Range    WBC 6.2 4.8 - 10.8 thou/mm3    RBC 4.25 4.20 - 5.40 mill/mm3    Hemoglobin 10.3 (L) 12.0 - 16.0 gm/dl    Hematocrit 34.5 (L) 37.0 - 47.0 %    MCV 81.2 81.0 - 99.0 fL    MCH 24.2 (L) 26.0 - 33.0 pg    MCHC 29.9 (L) 32.2 - 35.5 gm/dl    RDW-CV 17.3 (H) 11.5 - 14.5 %    RDW-SD 50.9 (H) 35.0 - 45.0 fL    Platelets 219 093 - 110 thou/mm3    MPV 11.6 9.4 - 12.4 fL   Calcium, Ionized post-op    Collection Time: 02/28/20  3:14 PM   Result Value Ref Range    Calcium, Ion 1.10 (L) 1.12 - 1.32 mmol/L   Magnesium post-op    Collection Time: 02/28/20  3:14 PM   Result Value Ref Range    Magnesium 1.7 1.6 - 2.4 mg/dL   CBC    Collection Time: 02/29/20  5:09 AM   Result Value Ref Range    WBC 5.7 4.8 - 10.8 thou/mm3    RBC 3.96 (L) 4.20 - 5.40 mill/mm3    Hemoglobin 9.4 (L) 12.0 - 16.0 gm/dl    Hematocrit 31.5 (L) 37.0 - 47.0 %    MCV 79.5 (L) 81.0 - 99.0 fL    MCH 23.7 (L) 26.0 - 33.0 pg    MCHC 29.8 (L) 32.2 - 35.5 gm/dl    RDW-CV 17.2 (H) 11.5 - 14.5 %    RDW-SD 49.1 (H) 35.0 - 45.0 fL    Platelets 489 996 - 191 thou/mm3    MPV 11.5 9.4 - 12.4 fL   Basic Metabolic Panel    Collection Time: 02/29/20  5:09 AM   Result Value Ref Range    Sodium 136 135 - 145 meq/L    Potassium 3.7 3.5 - 5.2 meq/L    Chloride 101 98 - 111 meq/L    CO2 25 23 - 33 meq/L    Glucose 114 (H) 70 - 108 mg/dL    BUN 24 (H) 7 - 22 mg/dL    CREATININE 1.1 0.4 - 1.2 mg/dL    Calcium 8.6 8.5 - 10.5 mg/dL   Anion Gap    Collection Time: 02/29/20  5:09 AM   Result Value Ref Range    Anion Gap 10.0 8.0 - 16.0 meq/L   Glomerular Filtration Rate, Estimated    Collection Time: 02/29/20  5:09 AM   Result Value Ref Range    Est, Glom Filt Rate 47 (A) ml/min/1.73m2   CBC Auto Differential    Collection Time: 03/01/20  9:19 AM   Result Value Ref Range    WBC 5.8 4.8 - 10.8 thou/mm3    RBC 3.93 (L) 4.20 - 5.40 mill/mm3    Hemoglobin 9.4 (L) 12.0 - 16.0 gm/dl    Hematocrit 31.4 (L) 37.0 - 47.0 %    MCV 79.9 (L) 81.0 - 99.0 fL    MCH 23.9 (L) 26.0 - 33.0 pg    MCHC 29.9 (L) 32.2 - 35.5 gm/dl    RDW-CV 17.4 (H) 11.5 - 14.5 %    RDW-SD 50.3 (H) 35.0 - 45.0 fL    Platelets 478 191 - 847 thou/mm3    MPV 11.6 9.4 - 12.4 fL    Seg Neutrophils 74.2 %    Lymphocytes 13.5 %    Monocytes 7.7 %    Eosinophils 3.8 %    Basophils 0.5 %    Immature Granulocytes 0.3 %    Segs Absolute 4.3 1.8 - 7.7 thou/mm3    Lymphocytes Absolute 0.8 (L) 1.0 - 4.8 thou/mm3    Monocytes Absolute 0.4 0.4 - 1.3 thou/mm3    Eosinophils Absolute 0.2 0.0 - 0.4 thou/mm3    Basophils Absolute 0.0 0.0 - 0.1 thou/mm3    Immature Grans (Abs) 0.02 0.00 - 0.07 thou/mm3    nRBC 0 /100 wbc   Basic Metabolic Panel    Collection Time: 03/01/20  9:19 AM   Result Value Ref Range    Sodium 141 135 - 145 meq/L    Potassium 3.8 3.5 - 5.2 meq/L    Chloride 102 98 - 111 meq/L    CO2 26 23 - 33 meq/L    Glucose 112 (H) 70 - 108 mg/dL    BUN 28 (H) 7 - 22 mg/dL    CREATININE 1.1 0.4 - 1.2 mg/dL    Calcium 8.9 8.5 - 10.5 mg/dL   Anion Gap    Collection Time: 03/01/20  9:19 AM   Result Value Ref Range    Anion Gap 13.0 8.0 - 16.0 meq/L   Glomerular Filtration Rate, Estimated    Collection Time: 03/01/20  9:19 AM   Result Value Ref Range    Est, Glom Filt Rate 47 (A) ml/min/1.73m2        Microbiology:    Blood culture #1:   Lab Results   Component Value Date    BC No growth-preliminary No growth  12/28/2019       Blood culture #2:No results found for: BLOODCULT2    Organism:    Lab Results   Component Value Date    LABGRAM  10/24/2018     Few segmented neutrophils observed. No epithelial cells observed. Moderate gram positive cocci occurring singly and in pairs. MRSA culture only:No results found for: [facility]    Urine culture:   Lab Results   Component Value Date    LABURIN No growth-preliminary No growth  02/25/2020     Lab Results   Component Value Date    ORG Klebsiella pneumoniae 05/17/2019        Respiratory culture: No results found for: CULTRESP    Aerobic and Anaerobic :  Lab Results   Component Value Date    LABAERO light growth 10/24/2018     Lab Results   Component Value Date    LABANAE  10/24/2018     Culture yielded moderate mixed growth consisting of anaerobic  gram negative bacilli and anaerobic gram positive cocci. If a  true mixed aerobic and anaerobic infection is suspected, then  broad spectrum empiric antibiotic therapy is indicated and  should include coverage for anaerobic organisms. Urinalysis:      Lab Results   Component Value Date    NITRU NEGATIVE 05/17/2019    WBCUA 0-2 05/17/2019    BACTERIA MANY 05/17/2019    RBCUA 0-2 05/17/2019    BLOODU NEGATIVE 05/17/2019    SPECGRAV 1.013 02/24/2019    GLUCOSEU 250 05/17/2019       Radiology:-  No results found.      Follow-up scheduled after discharge :-    today with Ruma Dudley  in the next few days     Consultations during this hospital stay:-  [] NONE [] Cardiology  [] Nephrology  [] Hemo onco   [] GI   [] ID  [] Endocrine  [] Pulm    [] Neuro    [] Psych   [] Urology  [] ENT   [] G SURGERY   []Ortho    []CV surg    [] Palliative  [] Hospice [] Pain management   []    []TCU   [] PT/OT  OTHERS:-    Disposition: home  Condition at Discharge: guardid    Discharge Medications:      Jonatan Moreno   [place] Medication Instructions NRS:[id number]    Printed on:03/01/20 1221   Medication Information                      acetaminophen (TYLENOL ARTHRITIS PAIN) 650 MG CR tablet  Take 1,300 mg by mouth every 12 hours as needed for Pain              albuterol sulfate HFA (PROVENTIL HFA) 108 (90 Base) MCG/ACT inhaler  Inhale 2 puffs into the lungs every 6 hours as needed for Wheezing or Shortness of Breath             apixaban (ELIQUIS) 5 MG TABS tablet  TAKE ONE TABLET BY MOUTH TWICE DAILY             Blood Pressure KIT  Check blood pressure daily, and if symptoms of lightheadedness. Call physician if BP <80/40.              clopidogrel (PLAVIX) 75 MG tablet  Take 1 tablet by mouth daily             magnesium (MAGNESIUM-OXIDE) 250 MG TABS tablet  TAKE 2 TABLETS BY MOUTH TWICE DAILY             melatonin 3 MG TABS tablet  Take 2 tablets by mouth nightly as needed (sleep)             pantoprazole (PROTONIX) 40 MG tablet  Take 40 mg by mouth 2 times daily (before meals)             potassium chloride (KLOR-CON M) 10 MEQ extended release tablet  Take 1 tablet by mouth daily             pravastatin (PRAVACHOL) 40 MG tablet  Take 1 tablet by mouth daily             Saccharomyces boulardii (PROBIOTIC) 250 MG CAPS  TK 1 C PO BID             timolol (TIMOPTIC) 0.5 % ophthalmic solution  Place 1 drop into both eyes nightly                      Time Spent:- 45 minutes    Electronically signed by Josie Mattson MD on 3/1/2020 at 12:21 PM  Discharging Hospitalist

## 2020-03-16 ENCOUNTER — OFFICE VISIT (OUTPATIENT)
Dept: SURGERY | Age: 85
End: 2020-03-16

## 2020-03-16 VITALS
WEIGHT: 154.5 LBS | DIASTOLIC BLOOD PRESSURE: 62 MMHG | TEMPERATURE: 97.8 F | SYSTOLIC BLOOD PRESSURE: 120 MMHG | OXYGEN SATURATION: 98 % | BODY MASS INDEX: 25.74 KG/M2 | HEIGHT: 65 IN | HEART RATE: 106 BPM | RESPIRATION RATE: 18 BRPM

## 2020-03-16 PROCEDURE — 99024 POSTOP FOLLOW-UP VISIT: CPT | Performed by: SURGERY

## 2020-03-17 NOTE — PROGRESS NOTES
Michela Mosely MD   General Surgery  Postprocedure Evaluation in Office  Pt Name: Jo Ann Jones  Date of Birth 1935   Today's Date: 3/16/2020  Medical Record Number: [id number]  Primary Care Provider: Lacey Vega  Chief Complaint   Patient presents with   Pineda Plunk Post-Op Check     s/p Ileocolic resection with removal of terminal ileum, primary ileocolic anastomosis 7/54/62     ASSESSMENT      1. Cecum mass    2. Postop check         PLAN       1. Patient surgical wound is well-healed. No wound drainage. 2.  Follow-up with GI regarding her achalasia as well as colon polyps. 3.  Follow-up surgical clinic as needed. 4.  Pathology benign. Gregoria Pardo is seen today for post-op follow-up. She is 2 weeks status post open right colon resection. She has some intermittent and alternating constipation and diarrhea. She is eating well. She is on all her blood thinning medications. She denies any fever or chills. Medications    Current Outpatient Medications:     pantoprazole (PROTONIX) 40 MG tablet, Take 40 mg by mouth 2 times daily (before meals), Disp: , Rfl:     Saccharomyces boulardii (PROBIOTIC) 250 MG CAPS, TK 1 C PO BID, Disp: , Rfl:     albuterol sulfate HFA (PROVENTIL HFA) 108 (90 Base) MCG/ACT inhaler, Inhale 2 puffs into the lungs every 6 hours as needed for Wheezing or Shortness of Breath, Disp: 1 Inhaler, Rfl: 0    Blood Pressure KIT, Check blood pressure daily, and if symptoms of lightheadedness.   Call physician if BP <80/40., Disp: 1 kit, Rfl: 0    apixaban (ELIQUIS) 5 MG TABS tablet, TAKE ONE TABLET BY MOUTH TWICE DAILY, Disp: 60 tablet, Rfl: 5    magnesium (MAGNESIUM-OXIDE) 250 MG TABS tablet, TAKE 2 TABLETS BY MOUTH TWICE DAILY, Disp: 120 tablet, Rfl: 6    pravastatin (PRAVACHOL) 40 MG tablet, Take 1 tablet by mouth daily, Disp: 90 tablet, Rfl: 3    potassium chloride (KLOR-CON M) 10 MEQ extended release tablet, Take 1 tablet by mouth daily, Disp: 180 tablet, Rfl: 2   clopidogrel (PLAVIX) 75 MG tablet, Take 1 tablet by mouth daily, Disp: 90 tablet, Rfl: 3    melatonin 3 MG TABS tablet, Take 2 tablets by mouth nightly as needed (sleep), Disp: 60 tablet, Rfl: 3    acetaminophen (TYLENOL ARTHRITIS PAIN) 650 MG CR tablet, Take 1,300 mg by mouth every 12 hours as needed for Pain , Disp: , Rfl:     timolol (TIMOPTIC) 0.5 % ophthalmic solution, Place 1 drop into both eyes nightly , Disp: , Rfl:     Allergies  Allergies   Allergen Reactions    Aspirin Other (See Comments)     Unknown reaction, stated Dr. Milady Taylor didn't want her to take it anymore    Lasix [Furosemide] Other (See Comments)     PATIENT DOESN'T REMEMBER    Lipitor [Atorvastatin] Other (See Comments)     LEG PAIN    Neurontin [Gabapentin] Other (See Comments)     PATIENT DOESN'T REMEMBER    Oxycontin [Oxycodone Hcl]      confusion    Penicillins Other (See Comments)     HYPERTENSION       Review of Systems  History obtained from the patient. Constitutional: Denies any fever, chills, fatigue. Wound: Denies any rash, skin color changes or wound problems. Resp: Denies any cough, shortness of breath. CV: Denies any chest pain, orthopnea or syncope. GI: Positive for incisional discomfort only. Denies any nausea, vomiting, blood in the stool, constipation or diarrhea. : Denies any hematuria, hesitancy or dysuria. OBJECTIVE     VITALS: /62 (Site: Left Upper Arm, Position: Sitting, Cuff Size: Medium Adult)   Pulse 106   Temp 97.8 °F (36.6 °C) (Oral)   Resp 18   Ht 5' 5\" (1.651 m)   Wt 154 lb 8 oz (70.1 kg)   SpO2 98%   BMI 25.71 kg/m²     CONSTITUTIONAL: Alert and oriented times 3, no acute distress and cooperative to examination. SKIN: Chronically pale  INCISION: wound margins intact and healing well. No signs of infection. No drainage.   LUNGS: Lungs Clear  CARDIOVASCULAR: Normal Rate  ABDOMEN: soft, nontender, nondistended, no masses      Performed by: [facility]      NADIR DOMINGUEZ                   [id number]  Assoc.                                              Page 1 of 1  [address], [place] [address]                                                      PROC: 2020  [facility]                                    RECV: 2020  [address]                                    RPTD: 2020  [address], [place] [address]                      MRN:  [id number]     LOC: 8A                      ACCT: [de-identified]  SEX: F                      : 1935  AGE: 82 Y                         PATHOLOGY REPORT                      ATTN: KAYLEY ABERNATHY                      REQ: Asif Jones OLGISELE      Copies To:   ROSLAVA HYLTON; Min Marks      Clinical Information: CECAL MASS    FINAL DIAGNOSIS:  Ileocecal lesion, right hemicolectomy:   Cecal lesion: Tubulovillous adenoma.   Tubular adenoma.   Margins- free of dysplasia or malignancy. Specimen:  COLON RESECTION, ILEOCOLIC      Gross Examination:  The container is labeled [facility], ileocolic resection.  Received  in formalin is a segmental resection of bowel including terminal ileum,  cecum, and proximal right colon.  The specimen measures about 10 cm in  length including 5 cm of terminal ileum.  There is no appendix.  _ The  specimen is opened longitudinally revealing a tan mucosal surface.   There is a fungating mass in the cecum measuring 4 x 2.5 cm.  The  lesion is 5 cm from the proximal resection line and 8.5 cm from the  mesenteric margin.  The mesenteric margin is inked yellow and the  serosal surface is inked blue. Shefali Shirley is a second smaller mucosal polyp  identified in the proximal right colon measuring 0.6 cm.  No additional  lesions are identified.  Sections through the larger cecal mass reveal  no gross involvement of the muscularis propria.  The cut surface of the  lesion is fatty.  Representative sections are submitted:  Cassette #1 -  proximal and distal resection line; cassette #2 - radial margin;  cassettes #3 through #6 - cecal lesion; and cassette #7 - smaller  proximal right colon polyp.  ss.  EKM/DKR:[id number]    Microscopic Examination:  Procedure  Right hemicolectomy    Tumor Site  Cecum    Tumor Size  Greatest dimension (centimeters): 4 x 2.5 cm    The tumor shows areas of flat/ villous and tubulovillous architecture  and is distorted by a lipoma. No high grade dysplasia is seen. Deeper levels are performed on block A6 and A5 for a few small rounded  nests of cells in the splayed muscularis mucosae, do no appear to  invade into the muscularis. 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 [id number]                                                        <Sign Out Dr. Johan Milan M.D., F.C.A.P.       [facility]/ [facility]  Printed on:  2/29/2020  [address]aren Tamayo [address]  [place] [address] [address], [address]  Original print date: 02/29/2020

## 2020-04-06 RX ORDER — MULTIVITAMIN WITH IRON
TABLET ORAL
Qty: 120 TABLET | Refills: 6 | Status: SHIPPED | OUTPATIENT
Start: 2020-04-06 | End: 2020-12-08 | Stop reason: SDUPTHER

## 2020-05-05 ENCOUNTER — TELEPHONE (OUTPATIENT)
Dept: CARDIOLOGY CLINIC | Age: 85
End: 2020-05-05

## 2020-06-08 RX ORDER — POTASSIUM CHLORIDE 750 MG/1
10 TABLET, EXTENDED RELEASE ORAL DAILY
Qty: 180 TABLET | Refills: 2 | Status: SHIPPED | OUTPATIENT
Start: 2020-06-08 | End: 2021-07-12 | Stop reason: SDUPTHER

## 2020-06-08 RX ORDER — CLOPIDOGREL BISULFATE 75 MG/1
75 TABLET ORAL DAILY
Qty: 90 TABLET | Refills: 3 | Status: SHIPPED | OUTPATIENT
Start: 2020-06-08 | End: 2021-07-08 | Stop reason: SDUPTHER

## 2020-06-09 ENCOUNTER — TELEPHONE (OUTPATIENT)
Dept: CARDIOLOGY CLINIC | Age: 85
End: 2020-06-09

## 2020-06-09 ENCOUNTER — TELEPHONE (OUTPATIENT)
Dept: SURGERY | Age: 85
End: 2020-06-09

## 2020-06-09 NOTE — TELEPHONE ENCOUNTER
She probably has a hemorrhoid if there is no blood in the stool itself and only on the toilet paper. We will see on Thursday. I would tell her to contact her primary care provider whether they want her to continue both those medications at this point stop both or stop 1.

## 2020-06-11 ENCOUNTER — OFFICE VISIT (OUTPATIENT)
Dept: SURGERY | Age: 85
End: 2020-06-11
Payer: MEDICARE

## 2020-06-11 ENCOUNTER — HOSPITAL ENCOUNTER (OUTPATIENT)
Age: 85
Discharge: HOME | End: 2020-06-11
Payer: MEDICARE

## 2020-06-11 VITALS
TEMPERATURE: 96.8 F | OXYGEN SATURATION: 99 % | HEIGHT: 65 IN | BODY MASS INDEX: 27.63 KG/M2 | SYSTOLIC BLOOD PRESSURE: 110 MMHG | WEIGHT: 165.8 LBS | HEART RATE: 68 BPM | DIASTOLIC BLOOD PRESSURE: 68 MMHG | RESPIRATION RATE: 18 BRPM

## 2020-06-11 LAB
HCT VFR BLD CALC: 35.7 % (ref 37–47)
HEMOGLOBIN: 9.8 GM/DL (ref 12–16)

## 2020-06-11 PROCEDURE — G8400 PT W/DXA NO RESULTS DOC: HCPCS | Performed by: SURGERY

## 2020-06-11 PROCEDURE — 1090F PRES/ABSN URINE INCON ASSESS: CPT | Performed by: SURGERY

## 2020-06-11 PROCEDURE — G8427 DOCREV CUR MEDS BY ELIG CLIN: HCPCS | Performed by: SURGERY

## 2020-06-11 PROCEDURE — 99213 OFFICE O/P EST LOW 20 MIN: CPT | Performed by: SURGERY

## 2020-06-11 PROCEDURE — 85014 HEMATOCRIT: CPT

## 2020-06-11 PROCEDURE — 36415 COLL VENOUS BLD VENIPUNCTURE: CPT

## 2020-06-11 PROCEDURE — 1123F ACP DISCUSS/DSCN MKR DOCD: CPT | Performed by: SURGERY

## 2020-06-11 PROCEDURE — 4040F PNEUMOC VAC/ADMIN/RCVD: CPT | Performed by: SURGERY

## 2020-06-11 PROCEDURE — G8417 CALC BMI ABV UP PARAM F/U: HCPCS | Performed by: SURGERY

## 2020-06-11 PROCEDURE — 1036F TOBACCO NON-USER: CPT | Performed by: SURGERY

## 2020-06-11 PROCEDURE — 85018 HEMOGLOBIN: CPT

## 2020-06-13 ASSESSMENT — ENCOUNTER SYMPTOMS
SORE THROAT: 0
ABDOMINAL PAIN: 0
NAUSEA: 0
COLOR CHANGE: 0
BLOOD IN STOOL: 1
WHEEZING: 0
ANAL BLEEDING: 1
TROUBLE SWALLOWING: 0
COUGH: 0
SHORTNESS OF BREATH: 1
VOICE CHANGE: 0
VOMITING: 0

## 2020-06-13 NOTE — PROGRESS NOTES
Faustino Chaves MD   General Surgery  New Patient Problem Evaluation in Office  Pt Name: Jonn De Dios  Date of Birth 1935   Today's Date: 6/11/2020  Medical Record Number: [id number]  Referring Provider: No ref. provider found  Primary Care Provider: Christine Green  Chief Complaint:  Chief Complaint   Patient presents with   [facility] Surgical Consult     est pt- last seen 3/2020-pt states blood in toilet/ toilet paper after having bowel movement       ASSESSMENT      1. Diverticular hemorrhage    2. Antiplatelet or antithrombotic long-term use    3. Dilated cardiomyopathy ([address]    4. Rectal bleed    5. Internal hemorrhoids     PLANS   1. Rectal examination without evidence of active bleeding. She has mild to moderate internal hemorrhoids and also history of diverticular bleeding in the past.  Either 1 of these etiologies could explain her bleeding. 2.  Patient with significant cardiac and vascular disease. Patient off antiplatelet antithrombotic medications for 5 days. She is to resume tomorrow. Cardiology. 3.  Check hemoglobin and hematocrit. 4.  Patient had endoscopy earlier this year I do not feel it is indicated at this time. 5.  Follow-up for recurrent bleeding. Glenna Echavarria is a 82y.o. year old female who is presenting today in the office for evaluation of a new problem. She called the office complaining of rectal bleeding with fairly large amount of bright red blood in the toilet. She denies any rectal pain or change in bowel movements. Emelyn Panchal had a right colon resection for villous adenoma in early 2020 as it was not amenable to endoscopic removal.  She tolerated surgery well. She has significant cardiac and vascular disease. High risk for any intervention. She had a colonoscopy prior to her surgery. She has a history of diverticular bleed from diver reticulosis on the left side of her colon as well.   She had contacted Dr. Deyanira Galeano with the bleeding earlier this week and her pravastatin (PRAVACHOL) 40 MG tablet Take 1 tablet by mouth daily 90 tablet 3    melatonin 3 MG TABS tablet Take 2 tablets by mouth nightly as needed (sleep) 60 tablet 3    acetaminophen (TYLENOL ARTHRITIS PAIN) 650 MG CR tablet Take 1,300 mg by mouth every 12 hours as needed for Pain       timolol (TIMOPTIC) 0.5 % ophthalmic solution Place 1 drop into both eyes nightly       apixaban (ELIQUIS) 5 MG TABS tablet TAKE 1 TABLET BY MOUTH TWICE DAILY (Patient not taking: Reported on 6/11/2020) 60 tablet 3    clopidogrel (PLAVIX) 75 MG tablet Take 1 tablet by mouth daily (Patient not taking: Reported on 6/11/2020) 90 tablet 3     No current facility-administered medications for this visit. Allergies     Allergies   Allergen Reactions    Aspirin Other (See Comments)     Unknown reaction, stated Dr. Barb Ruelas didn't want her to take it anymore    Lasix [Furosemide] Other (See Comments)     PATIENT DOESN'T REMEMBER    Lipitor [Atorvastatin] Other (See Comments)     LEG PAIN    Neurontin [Gabapentin] Other (See Comments)     PATIENT DOESN'T REMEMBER    Oxycontin [Oxycodone Hcl]      confusion    Penicillins Other (See Comments)     HYPERTENSION       Family History  Family History   Problem Relation Age of Onset    Heart Disease Mother     Stroke Mother     Cancer Father         lung    Emphysema Father     Other Sister         leukemia    Heart Disease Maternal Grandmother     Dementia Maternal Grandmother     Heart Disease Maternal Grandfather        SocialHistory  Social History     Socioeconomic History    Marital status:       Spouse name: Not on file    Number of children: Not on file    Years of education: Not on file    Highest education level: Not on file   Occupational History    Not on file   Social Needs    Financial resource strain: Not on file    Food insecurity     Worry: Not on file     Inability: Not on file    Transportation needs     Medical: Not on file     Non-medical: Not on file   Tobacco Use    Smoking status: Former Smoker     Packs/day: 0.25     Years: 60.00     Pack years: 15.00     Types: Cigarettes     Start date:      Last attempt to quit: 10/18/2019     Years since quittin.6    Smokeless tobacco: Never Used    Tobacco comment: 1 cigarette every other day   Substance and Sexual Activity    Alcohol use: Yes     Alcohol/week: 1.0 standard drinks     Types: 1 Glasses of wine per week     Comment: social    Drug use: No    Sexual activity: Not on file   Lifestyle    Physical activity     Days per week: Not on file     Minutes per session: Not on file    Stress: Not on file   Relationships    Social connections     Talks on phone: Not on file     Gets together: Not on file     Attends [employer] service: Not on file     Active member of club or organization: Not on file     Attends meetings of clubs or organizations: Not on file     Relationship status: Not on file    Intimate partner violence     Fear of current or ex partner: Not on file     Emotionally abused: Not on file     Physically abused: Not on file     Forced sexual activity: Not on file   Other Topics Concern    Not on file   Social History Narrative    Not on file           Review of Systems  Review of Systems   Constitutional: Negative for chills, fatigue, fever and unexpected weight change. HENT: Negative for sore throat, trouble swallowing and voice change. Eyes: Negative for visual disturbance. Respiratory: Positive for shortness of breath. Negative for cough and wheezing. Cardiovascular: Negative for chest pain and palpitations. Gastrointestinal: Positive for anal bleeding and blood in stool. Negative for abdominal pain, nausea and vomiting. Endocrine: Negative for cold intolerance, heat intolerance and polydipsia. Genitourinary: Negative for dysuria, flank pain and hematuria. Musculoskeletal: Negative for gait problem, joint swelling and myalgias.    Skin: Negative for color change and rash. Allergic/Immunologic: Negative for immunocompromised state. Neurological: Negative for dizziness, tremors, seizures and speech difficulty. Hematological: Does not bruise/bleed easily. Psychiatric/Behavioral: Negative for behavioral problems, confusion and suicidal ideas. OBJECTIVE     /68 (Site: Right Upper Arm, Position: Sitting, Cuff Size: Medium Adult)   Pulse 68   Temp 96.8 °F (36 °C) (Temporal)   Resp 18   Ht 5' 5\" (1.651 m)   Wt 165 lb 12.8 oz (75.2 kg)   SpO2 99%   BMI 27.59 kg/m²      Physical Exam  Constitutional:       General: She is not in acute distress. Appearance: She is not ill-appearing or diaphoretic. HENT:      Right Ear: External ear normal.      Left Ear: External ear normal.      Nose: No congestion or rhinorrhea. Mouth/Throat:      Mouth: Mucous membranes are moist.   Eyes:      General: No scleral icterus. Extraocular Movements: Extraocular movements intact. Cardiovascular:      Rate and Rhythm: Normal rate. Heart sounds: Murmur present. Pulmonary:      Effort: Pulmonary effort is normal.      Breath sounds: Normal breath sounds. Abdominal:      General: Abdomen is flat. There is no distension. Palpations: Abdomen is soft. There is no mass. Hernia: No hernia is present. Genitourinary:     Rectum: Normal.      Comments: Small palpable internal hemorrhoids. No blood. Musculoskeletal:         General: No swelling, tenderness or deformity. Skin:     Coloration: Skin is not jaundiced or pale. Findings: Bruising present. Neurological:      General: No focal deficit present. Mental Status: She is alert and oriented to person, place, and time. Mental status is at baseline. Psychiatric:         Mood and Affect: Mood normal.         Behavior: Behavior normal.         Thought Content:  Thought content normal.         Lab Results   Component Value Date    WBC 5.8 03/01/2020    HGB 9.8 (L) 06/11/2020 HCT 35.7 (L) 06/11/2020     03/01/2020    CHOL 105 05/18/2019    TRIG 121 05/18/2019    HDL 34 05/18/2019    ALT 6 (L) 02/20/2020    AST 14 02/20/2020     03/01/2020    K 3.8 03/01/2020     03/01/2020    CREATININE 1.1 03/01/2020    BUN 28 (H) 03/01/2020    CO2 26 03/01/2020    TSH 2.160 12/28/2019    INR 1.43 (H) 02/21/2020    GLUF 124 (H) 06/14/2019    LABA1C 5.5 12/28/2019

## 2020-06-15 ENCOUNTER — TELEPHONE (OUTPATIENT)
Dept: SURGERY | Age: 85
End: 2020-06-15

## 2020-06-26 ENCOUNTER — HOSPITAL ENCOUNTER (OUTPATIENT)
Dept: NON INVASIVE DIAGNOSTICS | Age: 85
Discharge: HOME | DRG: 813 | End: 2020-06-26
Payer: MEDICARE

## 2020-06-26 PROCEDURE — 93307 TTE W/O DOPPLER COMPLETE: CPT

## 2020-06-29 ENCOUNTER — HOSPITAL ENCOUNTER (INPATIENT)
Age: 85
LOS: 3 days | Discharge: HOME | DRG: 813 | End: 2020-07-02
Attending: INTERNAL MEDICINE | Admitting: INTERNAL MEDICINE
Payer: MEDICARE

## 2020-06-29 ENCOUNTER — APPOINTMENT (OUTPATIENT)
Dept: NUCLEAR MEDICINE | Age: 85
DRG: 813 | End: 2020-06-29
Payer: MEDICARE

## 2020-06-29 PROBLEM — K92.2 GIB (GASTROINTESTINAL BLEEDING): Status: ACTIVE | Noted: 2020-06-29

## 2020-06-29 LAB
ABO: NORMAL
ALBUMIN SERPL-MCNC: 3.5 G/DL (ref 3.5–5.1)
ALP BLD-CCNC: 79 U/L (ref 38–126)
ALT SERPL-CCNC: 9 U/L (ref 11–66)
ANION GAP SERPL CALCULATED.3IONS-SCNC: 11 MEQ/L (ref 8–16)
ANTIBODY SCREEN: NORMAL
APTT: 28.7 SECONDS (ref 22–38)
AST SERPL-CCNC: 20 U/L (ref 5–40)
BASOPHILS # BLD: 0.3 %
BASOPHILS ABSOLUTE: 0 THOU/MM3 (ref 0–0.1)
BILIRUB SERPL-MCNC: 0.3 MG/DL (ref 0.3–1.2)
BUN BLDV-MCNC: 32 MG/DL (ref 7–22)
CALCIUM SERPL-MCNC: 8.6 MG/DL (ref 8.5–10.5)
CHLORIDE BLD-SCNC: 107 MEQ/L (ref 98–111)
CO2: 23 MEQ/L (ref 23–33)
CREAT SERPL-MCNC: 1.1 MG/DL (ref 0.4–1.2)
EKG ATRIAL RATE: 98 BPM
EKG P AXIS: 15 DEGREES
EKG P-R INTERVAL: 154 MS
EKG Q-T INTERVAL: 364 MS
EKG QRS DURATION: 78 MS
EKG QTC CALCULATION (BAZETT): 464 MS
EKG R AXIS: -60 DEGREES
EKG T AXIS: 102 DEGREES
EKG VENTRICULAR RATE: 98 BPM
ELLIPTOCYTES: ABNORMAL
EOSINOPHIL # BLD: 0.5 %
EOSINOPHILS ABSOLUTE: 0 THOU/MM3 (ref 0–0.4)
ERYTHROCYTE [DISTWIDTH] IN BLOOD BY AUTOMATED COUNT: 18.6 % (ref 11.5–14.5)
ERYTHROCYTE [DISTWIDTH] IN BLOOD BY AUTOMATED COUNT: 47.8 FL (ref 35–45)
GFR SERPL CREATININE-BSD FRML MDRD: 47 ML/MIN/1.73M2
GLUCOSE BLD-MCNC: 123 MG/DL (ref 70–108)
HCT VFR BLD CALC: 25.1 % (ref 37–47)
HCT VFR BLD CALC: 25.4 % (ref 37–47)
HEMOGLOBIN: 6.8 GM/DL (ref 12–16)
HEMOGLOBIN: 7.2 GM/DL (ref 12–16)
HYPOCHROMIA: PRESENT
IMMATURE GRANS (ABS): 0.02 THOU/MM3 (ref 0–0.07)
IMMATURE GRANULOCYTES: 0.3 %
INR BLD: 1.4 (ref 0.85–1.13)
LIPASE: 28.3 U/L (ref 5.6–51.3)
LYMPHOCYTES # BLD: 8.8 %
LYMPHOCYTES ABSOLUTE: 0.7 THOU/MM3 (ref 1–4.8)
MCH RBC QN AUTO: 19.3 PG (ref 26–33)
MCHC RBC AUTO-ENTMCNC: 27.1 GM/DL (ref 32.2–35.5)
MCV RBC AUTO: 71.3 FL (ref 81–99)
MONOCYTES # BLD: 7 %
MONOCYTES ABSOLUTE: 0.5 THOU/MM3 (ref 0.4–1.3)
NUCLEATED RED BLOOD CELLS: 0 /100 WBC
OSMOLALITY CALCULATION: 289.5 MOSMOL/KG (ref 275–300)
PATHOLOGIST REVIEW: ABNORMAL
PLATELET # BLD: 219 THOU/MM3 (ref 130–400)
PMV BLD AUTO: 11.8 FL (ref 9.4–12.4)
POTASSIUM REFLEX MAGNESIUM: 4.6 MEQ/L (ref 3.5–5.2)
PRO-BNP: 1837 PG/ML (ref 0–1800)
RBC # BLD: 3.52 MILL/MM3 (ref 4.2–5.4)
RH FACTOR: NORMAL
SCAN OF BLOOD SMEAR: NORMAL
SEG NEUTROPHILS: 83.1 %
SEGMENTED NEUTROPHILS ABSOLUTE COUNT: 6.3 THOU/MM3 (ref 1.8–7.7)
SODIUM BLD-SCNC: 141 MEQ/L (ref 135–145)
TOTAL PROTEIN: 5.6 G/DL (ref 6.1–8)
WBC # BLD: 7.6 THOU/MM3 (ref 4.8–10.8)

## 2020-06-29 PROCEDURE — 99223 1ST HOSP IP/OBS HIGH 75: CPT | Performed by: INTERNAL MEDICINE

## 2020-06-29 PROCEDURE — 83690 ASSAY OF LIPASE: CPT

## 2020-06-29 PROCEDURE — 2580000003 HC RX 258: Performed by: INTERNAL MEDICINE

## 2020-06-29 PROCEDURE — 80053 COMPREHEN METABOLIC PANEL: CPT

## 2020-06-29 PROCEDURE — 85018 HEMOGLOBIN: CPT

## 2020-06-29 PROCEDURE — 94761 N-INVAS EAR/PLS OXIMETRY MLT: CPT

## 2020-06-29 PROCEDURE — 6370000000 HC RX 637 (ALT 250 FOR IP): Performed by: INTERNAL MEDICINE

## 2020-06-29 PROCEDURE — 86923 COMPATIBILITY TEST ELECTRIC: CPT

## 2020-06-29 PROCEDURE — 2580000003 HC RX 258: Performed by: NURSE PRACTITIONER

## 2020-06-29 PROCEDURE — 86900 BLOOD TYPING SEROLOGIC ABO: CPT

## 2020-06-29 PROCEDURE — A9560 TC99M LABELED RBC: HCPCS | Performed by: NURSE PRACTITIONER

## 2020-06-29 PROCEDURE — 36430 TRANSFUSION BLD/BLD COMPNT: CPT

## 2020-06-29 PROCEDURE — 99284 EMERGENCY DEPT VISIT MOD MDM: CPT

## 2020-06-29 PROCEDURE — 36415 COLL VENOUS BLD VENIPUNCTURE: CPT

## 2020-06-29 PROCEDURE — 86850 RBC ANTIBODY SCREEN: CPT

## 2020-06-29 PROCEDURE — 3430000000 HC RX DIAGNOSTIC RADIOPHARMACEUTICAL: Performed by: NURSE PRACTITIONER

## 2020-06-29 PROCEDURE — 86901 BLOOD TYPING SEROLOGIC RH(D): CPT

## 2020-06-29 PROCEDURE — 85014 HEMATOCRIT: CPT

## 2020-06-29 PROCEDURE — 1200000000 HC SEMI PRIVATE

## 2020-06-29 PROCEDURE — 93010 ELECTROCARDIOGRAM REPORT: CPT | Performed by: NUCLEAR MEDICINE

## 2020-06-29 PROCEDURE — 85610 PROTHROMBIN TIME: CPT

## 2020-06-29 PROCEDURE — 93005 ELECTROCARDIOGRAM TRACING: CPT | Performed by: EMERGENCY MEDICINE

## 2020-06-29 PROCEDURE — 85730 THROMBOPLASTIN TIME PARTIAL: CPT

## 2020-06-29 PROCEDURE — 85025 COMPLETE CBC W/AUTO DIFF WBC: CPT

## 2020-06-29 PROCEDURE — 6370000000 HC RX 637 (ALT 250 FOR IP): Performed by: NURSE PRACTITIONER

## 2020-06-29 PROCEDURE — P9016 RBC LEUKOCYTES REDUCED: HCPCS

## 2020-06-29 PROCEDURE — 6360000002 HC RX W HCPCS: Performed by: NURSE PRACTITIONER

## 2020-06-29 PROCEDURE — 78278 ACUTE GI BLOOD LOSS IMAGING: CPT

## 2020-06-29 PROCEDURE — C9113 INJ PANTOPRAZOLE SODIUM, VIA: HCPCS | Performed by: NURSE PRACTITIONER

## 2020-06-29 PROCEDURE — 83880 ASSAY OF NATRIURETIC PEPTIDE: CPT

## 2020-06-29 RX ORDER — ACETAMINOPHEN 650 MG/1
650 SUPPOSITORY RECTAL EVERY 6 HOURS PRN
Status: DISCONTINUED | OUTPATIENT
Start: 2020-06-29 | End: 2020-07-02 | Stop reason: HOSPADM

## 2020-06-29 RX ORDER — ACETAMINOPHEN 325 MG/1
650 TABLET ORAL EVERY 6 HOURS PRN
Status: DISCONTINUED | OUTPATIENT
Start: 2020-06-29 | End: 2020-07-02 | Stop reason: HOSPADM

## 2020-06-29 RX ORDER — PANTOPRAZOLE SODIUM 40 MG/1
40 TABLET, DELAYED RELEASE ORAL
Status: DISCONTINUED | OUTPATIENT
Start: 2020-06-29 | End: 2020-06-29 | Stop reason: SDUPTHER

## 2020-06-29 RX ORDER — PRAVASTATIN SODIUM 40 MG
40 TABLET ORAL DAILY
Status: DISCONTINUED | OUTPATIENT
Start: 2020-06-29 | End: 2020-07-02 | Stop reason: HOSPADM

## 2020-06-29 RX ORDER — SODIUM CHLORIDE 0.9 % (FLUSH) 0.9 %
10 SYRINGE (ML) INJECTION PRN
Status: DISCONTINUED | OUTPATIENT
Start: 2020-06-29 | End: 2020-07-02 | Stop reason: HOSPADM

## 2020-06-29 RX ORDER — TIMOLOL MALEATE 5 MG/ML
1 SOLUTION/ DROPS OPHTHALMIC NIGHTLY
Status: DISCONTINUED | OUTPATIENT
Start: 2020-06-29 | End: 2020-07-02 | Stop reason: HOSPADM

## 2020-06-29 RX ORDER — PROMETHAZINE HYDROCHLORIDE 25 MG/1
12.5 TABLET ORAL EVERY 6 HOURS PRN
Status: DISCONTINUED | OUTPATIENT
Start: 2020-06-29 | End: 2020-07-02 | Stop reason: HOSPADM

## 2020-06-29 RX ORDER — PANTOPRAZOLE SODIUM 40 MG/10ML
40 INJECTION, POWDER, LYOPHILIZED, FOR SOLUTION INTRAVENOUS 2 TIMES DAILY
Status: DISCONTINUED | OUTPATIENT
Start: 2020-06-29 | End: 2020-07-01

## 2020-06-29 RX ORDER — HYDROCORTISONE ACETATE 25 MG/1
25 SUPPOSITORY RECTAL 2 TIMES DAILY
Status: DISCONTINUED | OUTPATIENT
Start: 2020-06-29 | End: 2020-07-02 | Stop reason: HOSPADM

## 2020-06-29 RX ORDER — LANOLIN ALCOHOL/MO/W.PET/CERES
6 CREAM (GRAM) TOPICAL NIGHTLY PRN
Status: DISCONTINUED | OUTPATIENT
Start: 2020-06-29 | End: 2020-07-02 | Stop reason: HOSPADM

## 2020-06-29 RX ORDER — SODIUM CHLORIDE 0.9 % (FLUSH) 0.9 %
10 SYRINGE (ML) INJECTION EVERY 12 HOURS SCHEDULED
Status: DISCONTINUED | OUTPATIENT
Start: 2020-06-29 | End: 2020-07-02 | Stop reason: HOSPADM

## 2020-06-29 RX ORDER — ONDANSETRON 2 MG/ML
4 INJECTION INTRAMUSCULAR; INTRAVENOUS EVERY 6 HOURS PRN
Status: DISCONTINUED | OUTPATIENT
Start: 2020-06-29 | End: 2020-07-02 | Stop reason: HOSPADM

## 2020-06-29 RX ORDER — SODIUM CHLORIDE 9 MG/ML
INJECTION, SOLUTION INTRAVENOUS CONTINUOUS
Status: DISCONTINUED | OUTPATIENT
Start: 2020-06-29 | End: 2020-07-01

## 2020-06-29 RX ADMIN — PANTOPRAZOLE SODIUM 40 MG: 40 INJECTION, POWDER, FOR SOLUTION INTRAVENOUS at 20:21

## 2020-06-29 RX ADMIN — Medication 10 ML: at 20:21

## 2020-06-29 RX ADMIN — SODIUM CHLORIDE: 9 INJECTION, SOLUTION INTRAVENOUS at 16:30

## 2020-06-29 RX ADMIN — Medication 34.8 MILLICURIE: at 14:30

## 2020-06-29 RX ADMIN — PANTOPRAZOLE SODIUM 40 MG: 40 INJECTION, POWDER, FOR SOLUTION INTRAVENOUS at 16:30

## 2020-06-29 RX ADMIN — HYDROCORTISONE ACETATE 25 MG: 25 SUPPOSITORY RECTAL at 20:22

## 2020-06-29 RX ADMIN — TIMOLOL MALEATE 1 DROP: 5 SOLUTION/ DROPS OPHTHALMIC at 20:21

## 2020-06-29 RX ADMIN — PRAVASTATIN SODIUM 40 MG: 40 TABLET ORAL at 16:31

## 2020-06-29 RX ADMIN — HYDROCORTISONE ACETATE 25 MG: 25 SUPPOSITORY RECTAL at 16:31

## 2020-06-29 ASSESSMENT — ENCOUNTER SYMPTOMS
BLOOD IN STOOL: 1
RECTAL PAIN: 0
ABDOMINAL PAIN: 0
CONSTIPATION: 0
DIARRHEA: 0
VOMITING: 0
SHORTNESS OF BREATH: 0
COUGH: 0
NAUSEA: 0
COLOR CHANGE: 0
ANAL BLEEDING: 1
ABDOMINAL DISTENTION: 0

## 2020-06-29 ASSESSMENT — PAIN SCALES - GENERAL
PAINLEVEL_OUTOF10: 0

## 2020-06-29 NOTE — CONSULTS
[facility])     Chronic kidney disease     sees Dr Markus Reyna    Gastrointestinal hemorrhage     GERD (gastroesophageal reflux disease)     ? esophageal stricture    History of blood transfusion     HTN (hypertension)     Hx of blood clots 2018    R leg-sees ABEBA Hargrove    Hyperlipidemia     Neuromuscular disorder ([facility]     Obesity     Osteopenia     PAC (premature atrial contraction)     on betablocker    Paralysis (HCC)     baby    Pedal edema     denied any hx of hypertension    Pneumonia     PVC (premature ventricular contraction)     sees Dr Kym Burgess PVD (peripheral vascular disease) ([facility]     Tinnitus     UTI (urinary tract infection)        Home Medications:  Prior to Admission medications    Medication Sig Start Date End Date Taking?  Authorizing Provider   apixaban (ELIQUIS) 5 MG TABS tablet TAKE 1 TABLET BY MOUTH TWICE DAILY 6/8/20  Yes ROMAN Espinosa CNP   clopidogrel (PLAVIX) 75 MG tablet Take 1 tablet by mouth daily 6/8/20  Yes ROMAN Espinosa CNP   potassium chloride (KLOR-CON M) 10 MEQ extended release tablet Take 1 tablet by mouth daily 6/8/20  Yes ROMAN Espinosa CNP   magnesium (MAGNESIUM-OXIDE) 250 MG TABS tablet TAKE 2 TABLETS BY MOUTH TWICE DAILY 4/6/20  Yes ROMAN Espinosa CNP   pantoprazole (PROTONIX) 40 MG tablet Take 40 mg by mouth 2 times daily (before meals)   Yes Historical Provider, MD   Saccharomyces boulardii (PROBIOTIC) 250 MG CAPS TK 1 C PO BID 1/17/20  Yes Historical Provider, MD   albuterol sulfate HFA (PROVENTIL HFA) 108 (90 Base) MCG/ACT inhaler Inhale 2 puffs into the lungs every 6 hours as needed for Wheezing or Shortness of Breath 12/31/19  Yes Jesica Rojo DO   pravastatin (PRAVACHOL) 40 MG tablet Take 1 tablet by mouth daily 7/15/19  Yes ROMAN Espinosa CNP   melatonin 3 MG TABS tablet Take 2 tablets by mouth nightly as needed (sleep) 2/26/19  Yes Janette Arrington PA-C   acetaminophen (TYLENOL ARTHRITIS PAIN) 650 MG CR tablet Take 1,300 mg by mouth every 12 hours as needed for Pain    Yes Historical Provider, MD   timolol (TIMOPTIC) 0.5 % ophthalmic solution Place 1 drop into both eyes nightly    Yes Historical Provider, MD   Blood Pressure KIT Check blood pressure daily, and if symptoms of lightheadedness.   Call physician if BP <80/40. 12/31/19   Turner Aldana DO       Surgical History:  Past Surgical History:   Procedure Laterality Date    ABDOMINAL AORTIC ANEURYSM REPAIR, ENDOVASCULAR N/A 2/15/2019    ABDOMINAL AORTIC ANEURYSM REPAIR ENDOVASCULAR, DECLOTTING RIGHT FEM TIB BYPASS GRAFT performed by Lydia Rodrigues MD at [address]    lumpectomy    CHOLECYSTECTOMY      30 years ago    COLONOSCOPY  08/06/2018    Dr Sana Prater 2/22/2019    COLONOSCOPY DIAGNOSTIC performed by Richard Bertrand MD at [facility]    COLONOSCOPY N/A 2/22/2020    COLONOSCOPY CONTROL HEMORRHAGE performed by Rocael Moore MD at [address] [facility]      eye, eyelids    EYE SURGERY      cataract    HEMICOLECTOMY N/A 2/25/2020    OPEN RIGHT COLON RESECTION performed by Wendy Brooks MD at [address]  07/15/2016    TX OLEGARIO SKN SUB GRFT T/A/L AREA/<100SCM /<1ST 25 SCM N/A 9/6/2018    RE-EXPLORATION RIGHT GROIN, DECLOTTING OF FEMORAL BYPASS GRAFT performed by Urmila Nixon MD at [address] EGD TRANSORAL BIOPSY SINGLE/MULTIPLE Left 11/27/2017    EGD BIOPSY performed by Irasema Noland MD at [address] [place] [address], PARTIAL, Mayra Campa N/A 8/20/2018    ROBOT ASSISTED COLECTOMY (LOW ANTERIOR) performed by Amanda Ovalle MD at [address] OFFICE/OUTPT [address] [facility] N/A 9/5/2018    RIGHT FEMORAL EMBOLECTOMY, INTRAOPERATIVE ARTERIOGRAM, RIGHT ILIAC EMBOLECTOMY, RIGHT COMMON AND EXTERNAL ILIAC STENTING, RIGHT FEMORAL TO ANTERIOR POPLITEAL BYPASS WITH 6MM GORTEX GRAFT performed by Urmila Nixon MD at [address] THROMBECTOMY / EMBOLECTOMY FEMORAL Right 2/14/2019    FEMORAL EMBOLECTOMY THROMBECTOMY, RIGHT LEG performed by Amirah Wharton MD at [address] N/A 11/27/2017    EGD SUBMUCOSAL/BOTOX INJECTION performed by Mishel Baez MD at [address] ENDOSCOPY N/A 2/22/2019    EGD BIOPSY performed by Delbert Barkley MD at [address] Endoscopy       Family History:  Family History   Problem Relation Age of Onset    Heart Disease Mother     Stroke Mother     Cancer Father         lung    Emphysema Father     Other Sister         leukemia    Heart Disease Maternal Grandmother     Dementia Maternal Grandmother     Heart Disease Maternal Grandfather        Past GI History:  Achalasia, GERD, esophageal dilation, colon resection x 2, colon polyps, diverticulosis, internal hemorrhoids, rectal bleeding, EGD, colonoscopy  Dr. Fidelia Nance patient    Allergies:  Aspirin; Lasix [furosemide]; Lipitor [atorvastatin]; Neurontin [gabapentin]; Oxycontin [oxycodone hcl]; and Penicillins    Social History:   TOBACCO:   reports that she quit smoking about 8 months ago. Her smoking use included cigarettes. She started smoking about 64 years ago. She has a 15.00 pack-year smoking history. She has never used smokeless tobacco.  ETOH:   reports current alcohol use of about 1.0 standard drinks of alcohol per week. Review Of Systems  GENERAL: No fever, chills or weight loss. EYES:  No  blurred vision, double vision   CARDIOVASCULAR: No chest pain or palpitations. RESPIRATORY:  No dyspnea or cough. GI:  See HPI  MUSCULOSKELETAL: No new painful or swollen joints or myalgias. :   No dysuria or hematuria. SKIN:  No rashes or jaundice. NEUROLOGIC:  No headaches or seizures, numbness or tingling of arms, or legs. PSYCH:  No anxiety or depression. ENDOCRINE:  No polyuria or polydipsia.     BLOOD:  +anemia +blood transfusion    PHYSICAL EXAM:  BP (!) 89/51   Pulse 85 clinical course   Supportive care per primary team  Will follow       Case reviewed and impression/plan reviewed in collaboration with Dr. Ada Brown  Electronically signed by Launie Sicard, APRN - CNP on 6/29/2020 at 12:08 PM    [facility]  Thank you for the consultation.

## 2020-06-29 NOTE — ED NOTES
Called [facility]K and informed Doris Cole that the patient is on their way to the unit. Patient transported via bed in a stable condition.      Elizabeth Adams  06/29/20 0727

## 2020-06-29 NOTE — ED NOTES
Bed: 008A  Expected date:   Expected time:   Means of arrival: [facility]  Comments:     Keely Valentin RN  06/29/20 1977

## 2020-06-29 NOTE — ED NOTES
Patient to the ED via [facility]. Patient states she has been having bloody stool for the past two days. Patient states that this typically subsides within a day or two but this has not cleared. Patient report dizziness upon standing. Patient has stents below her heart and in her leg. Patient is currently on Plavix and Eliquius which she has been holding due to having the bloody stool. Patient states she has hemorrhoids and the blood is bright red, upon inspection of the anus, it show a couple large hemorrhoids and redness around the buttocks. . Patient describes the stool as \"bloody mush\". Patient has a history of colon surgery where part of her colon was removed last year. Paeint is alert and oriented x4 and call light is within reach.       Corrie Ryan RN  06/29/20 16 Gregory Darling RN  06/29/20 [id number]

## 2020-06-29 NOTE — PROGRESS NOTES
Pt admitted to  6K2 from ED. Complaints: GI Bleed. IV site free of s/s of infection or infiltration. Vital signs obtained. Assessment and data collection initiated. Two nurse skin assessment performed by Kianna MASTERS and Jose Quintana. Oriented to room. Policies and procedures for  explained. Kianna MASTERS discussed hourly rounding with patient addressing 5 P's. Fall prevention and safety brochure discussed with patient. Bed alarm on. Call light in reach. The best day to schedule a follow up Dr appointment is: Wednesday a.m. Explained patients right to have family, representative or physician notified of their admission. Patient has N/A for physician to be notified. Patient has N/A for family/representative to be notified. All questions answered with no further questions at this time.

## 2020-06-29 NOTE — ED PROVIDER NOTES
(congestive heart failure) ([facility] [address] Chronic kidney disease, Gastrointestinal hemorrhage, GERD (gastroesophageal reflux disease), History of blood transfusion, HTN (hypertension), Hx of blood clots, Hyperlipidemia, Neuromuscular disorder ([facility] [address] Obesity, Osteopenia, PAC (premature atrial contraction), Paralysis ([place] [address] Pedal edema, Pneumonia, PVC (premature ventricular contraction), PVD (peripheral vascular disease) ([facility] [address] Tinnitus, and UTI (urinary tract infection). SURGICAL HISTORY      has a past surgical history that includes Cholecystectomy; Breast surgery (Right, 1958); Hysterectomy; Appendectomy; Cosmetic surgery; eye surgery; pr egd transoral biopsy single/multiple (Left, 11/27/2017); Upper gastrointestinal endoscopy (N/A, 11/27/2017); Colonoscopy (08/06/2018); laryngoscopy (07/15/2016); pr lap,surg,colectomy, partial, w/anast (N/A, 8/20/2018); pr office/outpt visit,procedure only (N/A, 9/5/2018); pr preet skn sub grft t/a/l area/<100scm /<1st 25 scm (N/A, 9/6/2018); THROMBECTOMY / EMBOLECTOMY FEMORAL (Right, 2/14/2019); AAA repair, endovascular (N/A, 2/15/2019); Upper gastrointestinal endoscopy (N/A, 2/22/2019); Colonoscopy (N/A, 2/22/2019); Colonoscopy (N/A, 2/22/2020); and hemicolectomy (N/A, 2/25/2020). CURRENT MEDICATIONS       Previous Medications    ACETAMINOPHEN (TYLENOL ARTHRITIS PAIN) 650 MG CR TABLET    Take 1,300 mg by mouth every 12 hours as needed for Pain     ALBUTEROL SULFATE HFA (PROVENTIL HFA) 108 (90 BASE) MCG/ACT INHALER    Inhale 2 puffs into the lungs every 6 hours as needed for Wheezing or Shortness of Breath    APIXABAN (ELIQUIS) 5 MG TABS TABLET    TAKE 1 TABLET BY MOUTH TWICE DAILY    BLOOD PRESSURE KIT    Check blood pressure daily, and if symptoms of lightheadedness. Call physician if BP <80/40.     CLOPIDOGREL (PLAVIX) 75 MG TABLET    Take 1 tablet by mouth daily    MAGNESIUM (MAGNESIUM-OXIDE) 250 MG TABS TABLET    TAKE 2 TABLETS BY MOUTH TWICE DAILY    MELATONIN 3 MG TABS TABLET Take 2 tablets by mouth nightly as needed (sleep)    PANTOPRAZOLE (PROTONIX) 40 MG TABLET    Take 40 mg by mouth 2 times daily (before meals)    POTASSIUM CHLORIDE (KLOR-CON M) 10 MEQ EXTENDED RELEASE TABLET    Take 1 tablet by mouth daily    PRAVASTATIN (PRAVACHOL) 40 MG TABLET    Take 1 tablet by mouth daily    SACCHAROMYCES BOULARDII (PROBIOTIC) 250 MG CAPS    TK 1 C PO BID    TIMOLOL (TIMOPTIC) 0.5 % OPHTHALMIC SOLUTION    Place 1 drop into both eyes nightly        ALLERGIES     is allergic to aspirin; lasix [furosemide]; lipitor [atorvastatin]; neurontin [gabapentin]; oxycontin [oxycodone hcl]; and penicillins. FAMILY HISTORY     She indicated that her mother is . She indicated that her father is . She indicated that her sister is . She indicated that her maternal grandmother is . She indicated that her maternal grandfather is . She indicated that her paternal grandmother is . She indicated that her paternal grandfather is . family history includes Cancer in her father; Dementia in her maternal grandmother; Emphysema in her father; Heart Disease in her maternal grandfather, maternal grandmother, and mother; Other in her sister; Stroke in her mother. SOCIAL HISTORY      reports that she quit smoking about 8 months ago. Her smoking use included cigarettes. She started smoking about 64 years ago. She has a 15.00 pack-year smoking history. She has never used smokeless tobacco. She reports current alcohol use of about 1.0 standard drinks of alcohol per week. She reports that she does not use drugs. PHYSICAL EXAM     INITIAL VITALS:  height is 5' 5\" (1.651 m) and weight is 140 lb (63.5 kg). Her oral temperature is 97.6 °F (36.4 °C). Her blood pressure is 109/72 and her pulse is 83. Her respiration is 22 and oxygen saturation is 96%. Physical Exam  Constitutional:       General: She is not in acute distress. Appearance: She is normal weight. She is not ill-appearing. HENT:      Mouth/Throat:      Mouth: Mucous membranes are moist.   Eyes:      Pupils: Pupils are equal, round, and reactive to light. Cardiovascular:      Rate and Rhythm: Normal rate and regular rhythm. Pulses: Normal pulses. Heart sounds: Normal heart sounds. No murmur. Pulmonary:      Effort: Pulmonary effort is normal. No respiratory distress. Breath sounds: Normal breath sounds. Abdominal:      General: Abdomen is flat. Bowel sounds are normal. There is no distension. Tenderness: There is no abdominal tenderness. There is no guarding. Genitourinary:     Rectum: External hemorrhoid present. Comments: Non inflamed external hemorrhoid noted. Dried red blood noted around anus. Skin:     General: Skin is warm and dry. Capillary Refill: Capillary refill takes less than 2 seconds. Coloration: Skin is pale. Neurological:      General: No focal deficit present. Mental Status: She is alert. Psychiatric:         Mood and Affect: Mood normal.         Behavior: Behavior normal.          DIFFERENTIAL DIAGNOSIS:   Lower GI bleeding, anemia, hemorrhoidal bleeding    DIAGNOSTIC RESULTS     EKG: All EKG's are interpreted by the Emergency Department Physician who either signs or Co-signs this chart in the absence of a cardiologist.    Sinus rhythm ventricular rate 98 bpm with frequent PVCs.   Inverted T waves noted in lateral leads    DE interval 154, QRS duration 78, corrected  ms    RADIOLOGY: non-plainfilm images(s) such as CT, Ultrasound and MRI are read by the radiologist.    No orders to display       LABS:     Labs Reviewed   CBC WITH AUTO DIFFERENTIAL - Abnormal; Notable for the following components:       Result Value    RBC 3.52 (*)     Hemoglobin 6.8 (*)     Hematocrit 25.1 (*)     MCV 71.3 (*)     MCH 19.3 (*)     MCHC 27.1 (*)     RDW-CV 18.6 (*)     RDW-SD 47.8 (*)     All other components within normal limits   COMPREHENSIVE METABOLIC PANEL W/ REFLEX TO MG FOR LOW K - Abnormal; Notable for the following components:    Glucose 123 (*)     BUN 32 (*)     Total Protein 5.6 (*)     ALT 9 (*)     All other components within normal limits   BRAIN NATRIURETIC PEPTIDE - Abnormal; Notable for the following components:    Pro-BNP 1837.0 (*)     All other components within normal limits   PROTIME-INR - Abnormal; Notable for the following components:    INR 1.40 (*)     All other components within normal limits   GLOMERULAR FILTRATION RATE, ESTIMATED - Abnormal; Notable for the following components:    Est, Glom Filt Rate 47 (*)     All other components within normal limits   LIPASE   APTT   ANION GAP   OSMOLALITY   SCAN OF BLOOD SMEAR   TYPE AND SCREEN   PREPARE RBC (CROSSMATCH)       EMERGENCY DEPARTMENT COURSE:   Vitals:    Vitals:    06/29/20 0733 06/29/20 0902   BP: (!) 111/58 109/72   Pulse: (!) 46 83   Resp: 20 22   Temp: 97.6 °F (36.4 °C)    TempSrc: Oral    SpO2: 95% 96%   Weight: 140 lb (63.5 kg)    Height: 5' 5\" (1.651 m)        7:36 AM EDT: The patient was seen and evaluated. Patient's vital signs are within normal limits during evaluation. Appropriate labs are ordered. Evidence of dried red blood noted around anus. External hemorrhoids noted without inflammation or bleeding. 0800 patient's hemoglobin is 6.8, hematocrit 25.1. Other labs pending. Transfuse 1 unit packed red cells ordered. Discussed case with attending physician, Dr. Luda Linton.    0820 I contacted patient's gastroenterologist on call, Darlene Tsang CNP who is covering for the group. Notified of the patient and advised of anticoagulated state, current hemoglobin, bright red bleeding per rectum. She advised keep the patient n.p.o. for likely colonoscopy and agreed with 1 unit transfusion packed red blood cells    Electrolytes are reassuring. Patient does have elevation in BUN, 32.  Her creatinine is 1.1.   Liver enzymes essentially normal.      MDM:  Patient currently has bright red bleeding per rectum. She is anticoagulated. Patient will require 1 unit packed red blood cell transfusion. She will require admission for further evaluation and treatment of rectal bleeding. I contacted the hospitalist, Rey Underwood, who graciously accepted the admission. CRITICAL CARE:   None    CONSULTS:  Dr José Luis Makr, ED attending  Lisandro Ludwig, CNP gastroenterology  Dr. Max Stephenson, hospitalist    PROCEDURES:  none    FINAL IMPRESSION      1. BRBPR (bright red blood per rectum)    2. Anemia, unspecified type    3. Anticoagulated          DISPOSITION/PLAN   Admit    PATIENT REFERRED TO:  No follow-up provider specified. DISCHARGE MEDICATIONS:  New Prescriptions    No medications on file       (Please note that portions of this note were completed with a voice recognition program.  Efforts were made to edit the dictations but occasionally words are mis-transcribed.)    The patient was given an opportunity to see the Emergency Attending. The patient voiced understanding that I was a Mid-LevelProvider and was in agreement with being seen independently by myself.           Dayo Light, ROMAN - CNP  06/29/20 7113

## 2020-06-29 NOTE — ED NOTES
Patient up in bed resting. Patient alert and oriented x4. Patient VSS. Patient call light within reach.       Cassandra Harrison RN  06/29/20 0667

## 2020-06-29 NOTE — ED NOTES
ED to inpatient nurses report    Chief Complaint   Patient presents with    Rectal Bleeding      Present to ED from home  LOC: alert and orientated to name, place, date  Vital signs   Vitals:    06/29/20 0733 06/29/20 0902   BP: (!) 111/58 109/72   Pulse: (!) 46 83   Resp: 20 22   Temp: 97.6 °F (36.4 °C)    TempSrc: Oral    SpO2: 95% 96%   Weight: 140 lb (63.5 kg)    Height: 5' 5\" (1.651 m)       Oxygen Baseline Room Air    Current needs required None Bipap/Cpap No  LDAs:   Peripheral IV 06/29/20 Right Antecubital (Active)   Site Assessment Clean;Dry; Intact 6/29/2020  8:13 AM   Line Status Normal saline locked 6/29/2020  8:13 AM   Dressing Status Dry; Intact; Clean 6/29/2020  8:13 AM     Mobility: Requires assistance * 1  Pending ED orders: Patient still needs 1 unit PRBCs, type and screen not resulted at this time.   Present condition: Stable    Electronically signed by Gilbert Varma RN on 6/29/2020 at 9:35 AM     Gilbert Varma RN  06/29/20 5620

## 2020-06-30 ENCOUNTER — APPOINTMENT (OUTPATIENT)
Dept: GENERAL RADIOLOGY | Age: 85
DRG: 813 | End: 2020-06-30
Payer: MEDICARE

## 2020-06-30 LAB
ANION GAP SERPL CALCULATED.3IONS-SCNC: 10 MEQ/L (ref 8–16)
BUN BLDV-MCNC: 22 MG/DL (ref 7–22)
CALCIUM SERPL-MCNC: 8.2 MG/DL (ref 8.5–10.5)
CHLORIDE BLD-SCNC: 110 MEQ/L (ref 98–111)
CO2: 23 MEQ/L (ref 23–33)
CREAT SERPL-MCNC: 0.9 MG/DL (ref 0.4–1.2)
ERYTHROCYTE [DISTWIDTH] IN BLOOD BY AUTOMATED COUNT: 19 % (ref 11.5–14.5)
ERYTHROCYTE [DISTWIDTH] IN BLOOD BY AUTOMATED COUNT: 51.8 FL (ref 35–45)
GFR SERPL CREATININE-BSD FRML MDRD: 60 ML/MIN/1.73M2
GLUCOSE BLD-MCNC: 91 MG/DL (ref 70–108)
HCT VFR BLD CALC: 25.5 % (ref 37–47)
HCT VFR BLD CALC: 26 % (ref 37–47)
HCT VFR BLD CALC: 28.4 % (ref 37–47)
HCT VFR BLD CALC: 29.5 % (ref 37–47)
HEMOGLOBIN: 7 GM/DL (ref 12–16)
HEMOGLOBIN: 7.1 GM/DL (ref 12–16)
HEMOGLOBIN: 7.9 GM/DL (ref 12–16)
HEMOGLOBIN: 8.3 GM/DL (ref 12–16)
IRON: 18 UG/DL (ref 50–170)
MCH RBC QN AUTO: 20.1 PG (ref 26–33)
MCHC RBC AUTO-ENTMCNC: 26.9 GM/DL (ref 32.2–35.5)
MCV RBC AUTO: 74.7 FL (ref 81–99)
PLATELET # BLD: 170 THOU/MM3 (ref 130–400)
PMV BLD AUTO: 11.5 FL (ref 9.4–12.4)
POTASSIUM REFLEX MAGNESIUM: 3.9 MEQ/L (ref 3.5–5.2)
RBC # BLD: 3.48 MILL/MM3 (ref 4.2–5.4)
SODIUM BLD-SCNC: 143 MEQ/L (ref 135–145)
TOTAL IRON BINDING CAPACITY: 292 UG/DL (ref 171–450)
TROPONIN T: < 0.01 NG/ML
WBC # BLD: 5 THOU/MM3 (ref 4.8–10.8)

## 2020-06-30 PROCEDURE — 1200000000 HC SEMI PRIVATE

## 2020-06-30 PROCEDURE — 85027 COMPLETE CBC AUTOMATED: CPT

## 2020-06-30 PROCEDURE — 83540 ASSAY OF IRON: CPT

## 2020-06-30 PROCEDURE — 2580000003 HC RX 258: Performed by: INTERNAL MEDICINE

## 2020-06-30 PROCEDURE — 85018 HEMOGLOBIN: CPT

## 2020-06-30 PROCEDURE — 84484 ASSAY OF TROPONIN QUANT: CPT

## 2020-06-30 PROCEDURE — 99223 1ST HOSP IP/OBS HIGH 75: CPT | Performed by: INTERNAL MEDICINE

## 2020-06-30 PROCEDURE — 83550 IRON BINDING TEST: CPT

## 2020-06-30 PROCEDURE — 94760 N-INVAS EAR/PLS OXIMETRY 1: CPT

## 2020-06-30 PROCEDURE — 6370000000 HC RX 637 (ALT 250 FOR IP): Performed by: INTERNAL MEDICINE

## 2020-06-30 PROCEDURE — C9113 INJ PANTOPRAZOLE SODIUM, VIA: HCPCS | Performed by: NURSE PRACTITIONER

## 2020-06-30 PROCEDURE — 6360000002 HC RX W HCPCS: Performed by: NURSE PRACTITIONER

## 2020-06-30 PROCEDURE — P9016 RBC LEUKOCYTES REDUCED: HCPCS

## 2020-06-30 PROCEDURE — 80048 BASIC METABOLIC PNL TOTAL CA: CPT

## 2020-06-30 PROCEDURE — 71046 X-RAY EXAM CHEST 2 VIEWS: CPT

## 2020-06-30 PROCEDURE — 36430 TRANSFUSION BLD/BLD COMPNT: CPT

## 2020-06-30 PROCEDURE — 99233 SBSQ HOSP IP/OBS HIGH 50: CPT | Performed by: INTERNAL MEDICINE

## 2020-06-30 PROCEDURE — 85014 HEMATOCRIT: CPT

## 2020-06-30 PROCEDURE — 6370000000 HC RX 637 (ALT 250 FOR IP): Performed by: NURSE PRACTITIONER

## 2020-06-30 PROCEDURE — 36415 COLL VENOUS BLD VENIPUNCTURE: CPT

## 2020-06-30 RX ORDER — METOPROLOL SUCCINATE 25 MG/1
12.5 TABLET, EXTENDED RELEASE ORAL DAILY
Status: DISCONTINUED | OUTPATIENT
Start: 2020-06-30 | End: 2020-07-02 | Stop reason: HOSPADM

## 2020-06-30 RX ORDER — 0.9 % SODIUM CHLORIDE 0.9 %
20 INTRAVENOUS SOLUTION INTRAVENOUS ONCE
Status: COMPLETED | OUTPATIENT
Start: 2020-06-30 | End: 2020-06-30

## 2020-06-30 RX ADMIN — SODIUM CHLORIDE: 9 INJECTION, SOLUTION INTRAVENOUS at 18:45

## 2020-06-30 RX ADMIN — SODIUM CHLORIDE: 9 INJECTION, SOLUTION INTRAVENOUS at 20:35

## 2020-06-30 RX ADMIN — PANTOPRAZOLE SODIUM 40 MG: 40 INJECTION, POWDER, FOR SOLUTION INTRAVENOUS at 08:40

## 2020-06-30 RX ADMIN — PANTOPRAZOLE SODIUM 40 MG: 40 INJECTION, POWDER, FOR SOLUTION INTRAVENOUS at 20:38

## 2020-06-30 RX ADMIN — PRAVASTATIN SODIUM 40 MG: 40 TABLET ORAL at 08:40

## 2020-06-30 RX ADMIN — Medication 10 ML: at 20:38

## 2020-06-30 RX ADMIN — TIMOLOL MALEATE 1 DROP: 5 SOLUTION/ DROPS OPHTHALMIC at 20:38

## 2020-06-30 RX ADMIN — HYDROCORTISONE ACETATE 25 MG: 25 SUPPOSITORY RECTAL at 20:38

## 2020-06-30 RX ADMIN — SODIUM CHLORIDE 20 ML: 9 INJECTION, SOLUTION INTRAVENOUS at 10:02

## 2020-06-30 RX ADMIN — METOPROLOL SUCCINATE 12.5 MG: 25 TABLET, FILM COATED, EXTENDED RELEASE ORAL at 20:38

## 2020-06-30 RX ADMIN — HYDROCORTISONE ACETATE 25 MG: 25 SUPPOSITORY RECTAL at 08:40

## 2020-06-30 ASSESSMENT — PAIN SCALES - GENERAL
PAINLEVEL_OUTOF10: 0

## 2020-06-30 NOTE — PLAN OF CARE
Problem: Falls - Risk of:  Goal: Will remain free from falls  Description: Will remain free from falls  Outcome: Ongoing  Note: Patient ambulates stand by assist. Call light within reach. Side rails up x2. Bed alarm on. Non skid slippers available. Problem: Pain:  Goal: Patient's pain/discomfort is manageable  Description: Patient's pain/discomfort is manageable  Outcome: Ongoing  Note: Patient free from pain this shift. Pain rated on 0-10 pain rating scale. Will continue to reassess. Problem: Skin Integrity:  Goal: Skin integrity will stabilize  Description: Skin integrity will stabilize  Outcome: Ongoing  Note: No new skin issues, will continue to monitor. Problem: Discharge Planning:  Goal: Patients continuum of care needs are met  Description: Patients continuum of care needs are met  Outcome: Ongoing  Note: Patient plans to be discharged home alone when medically stable. Problem: Bowel Function - Altered:  Goal: Bowel elimination is within specified parameters  Description: Bowel elimination is within specified parameters  Outcome: Ongoing  Note: No BM's this admission, bowel sounds active. Received 1 unit of PRBC's this day, Hgb increased to 7.9, will continue to monitor. Care plan reviewed with patient. Patient verbalize understanding of the plan of care and contribute to goal setting.

## 2020-06-30 NOTE — PROGRESS NOTES
Diagnostic Studies:   NM GI blood loss scan 06/29/20      Impression       No scintigraphic evidence of active gastrointestinal bleeding. Current Meds:  Scheduled Meds:   sodium chloride  20 mL Intravenous Once    pantoprazole  40 mg Intravenous BID    hydrocortisone  25 mg Rectal BID    pravastatin  40 mg Oral Daily    timolol  1 drop Both Eyes Nightly    sodium chloride flush  10 mL Intravenous 2 times per day     Continuous Infusions:   sodium chloride 50 mL/hr at 06/30/20 1006       Assessment:  83 yo F admitted 06/29/20 for rectal bleedings. Denies abdominal & rectal pain. She is no Eliquis & Plavix, which she held 2 days PTA. Hgb noted to be 6.8, received 1 unit PRBC. Last colonoscopy was 02/22/20, for rectal bleeding, demonstrated a cecal mass lesions, 2 polyps, large grade 2 internal hemorrhoids, extensive diverticulosis involving the sigmoid colon, & active bleeding near the anastomosis of the sigmoid resection in the rectum from a diverticulum. Hemostasis achieved after 2 Hemoclips placed, polyps not removed. 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 She had an open right colon resection 02/25/20, by Dr. Lamar Jimenez, for the cecal mass. Last EGD was 05/03/19 demonstrated a dilated esophagus with fluid and a small amount of food, gastritis, and healed gastric erosions. NM GI blood loss scan negative. 1. Painless rectal bleeding- diverticular vs hemorrhoidal vs anastomotic  2. Anemia s/p 2 units PRBC  3. Afib- on Eliquis, held the past 2 days  4. GERD  5. CHF  6. CKD  7. HTN  8. H/O extensive diverticulosis and diverticular bleed  9. H/O large internal hemorrhoids  10. H/O cecal mass s/p open right colon resection, mass benign  11. 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 HLD    13.  H/O PAD s/p right fem-tib angioplasty with stent- on Plavix    Plan:    · Monitor H & H, transfuse prn  · Nursing to monitor stool output  · IVP PPI BID  · IVF  · Clear liquid diet, nothing red  · Anusol suppository BID  · Will hold off on endoscopic intervention given recent colonoscopy 02/22/20, no rectal bleeding since admission, if Hgb continues to trend down and/or multiplet transfusions needed and/or rectal bleeding continues will consider endoscopic intervention  · Cardiology consulted by primary  · Case discussed with Dr. Janice Amador, cardiology, who will review to see if patient is a candidate for Watchman procedure  · Supportive care per primary team  Will follow    Case reviewed and impression/plan reviewed in collaboration with Dr. Luis Pinzon  Electronically signed by ROMAN Frazier CNP on 6/30/2020 at 11:03 AM    [facility]

## 2020-06-30 NOTE — CONSULTS
[facility] [address] [place] [address]  Cardiology Consult      Patient:  Jerry Fletcher  YOB: 1935    MRN: [id number]   Acct: [de-identified]     Primary Care Physician: [address] CONSULT:   \" on [address] and plavix with recurrent GI bleed. history of dilated cardiomyopahty with low EF \"    CHIEF COMPLAINT:    Rectal bleeding    HISTORY OF PRESENT ILLNESS:    Jerry Fletcher is a pleasant 82year old female patient with past medical history that includes:    Past Medical History:   Diagnosis Date    Arthritis     Blood circulation, collateral     BPPV (benign paroxysmal positional vertigo) 6/6/16    CHF (congestive heart failure) (HCC)     Chronic kidney disease     sees Dr Nas Truong    Gastrointestinal hemorrhage     GERD (gastroesophageal reflux disease)     ? esophageal stricture    History of blood transfusion     HTN (hypertension)     Hx of blood clots 2018    R leg-sees ABEBA Hargrove    Hyperlipidemia     [facility] [address]     Obesity     Osteopenia     PAC (premature atrial contraction)     on betablocker    [facility] ([facility])     baby    Pedal edema     denied any hx of hypertension    Pneumonia     PVC (premature ventricular contraction)     sees Dr Connie Grant PVD (peripheral vascular disease) ([facility] [address]     Tinnitus     UTI (urinary tract infection)    The patient was admitted to the hospital on 6/29/2020 with recurrent GI bleeding. She states that she has prior h/o GI bleeding. Few weeks ago, her eliquis was held for a surgical procedure then was resumed afterwards. For the past few days, she had recurrent rectal bleeding. She has extensive history of PAD and a previous EVAR. In 02/2019, vascular did an emergent declotting of left fem pop bypass, EVAR/emergent thrombectomy of right fem tib bypass, and peripheral angiogram/intervention. She has been maintained on anticoagulation since then in effort to prevent re-clotting.  In [address] [place] [address] ER, her Hgb was found to be low at 6.8. The patient was admitted to the hospital Plavix and eliquis were held due to severe GI bleeding and acute blood loss anemia. GI was consulted. Patient denies chest pain, shortness of breath, dyspnea on exertion, orthopnea, paroxysmal nocturnal dyspnea, palpitations, dizziness, syncope, recent weight gain or leg swelling. She described having generalized fatigue and weakness. All labs, EKG's, diagnostic testing and images as well as cardiac cath, stress testing   were reviewed during this encounter    CARDIAC TESTING    Ekg:   EKG Interpretation:  normal sinus rhythm, PAC's noted, ST depression in lateral leads, septal infarct. Echo:   ECHO:   Results for orders placed during the hospital encounter of 12/28/19   ECHO Complete 2D W Doppler W Color    Narrative Transthoracic Echocardiography Report (TTE)     Demographics      Patient Name   Ev Gaona  Gender               Female                  A      MR #           [id number]     Race                                                    Ethnicity      Account #      [de-identified]     Room Number          2311      Accession      [id number]     Date of Study        12/28/2019   Number      Date of Birth  1935    Referring Physician  Orlin Arevalo MD      Age            82 year(s)    Osvaldo Jeffries MD                                Physician     Procedure    Type of Study      TTE procedure:ECHOCARDIOGRAM COMPLETE 2D W DOPPLER W COLOR. Procedure Date  Date: 12/28/2019 Start: 10:32 AM    Study Location: Bedside  Technical Quality: Adequate visualization    Indications:Congestive heart failure. Additional Medical History: Former smoker, respiratory failure, PAC's, pedal  edema, peripheral vascular disease, GERD, hyperlipidemia, SVT, anemia,  atrial fibrillation, AAA, chronic kidney disease, pulmonary edema    Patient Status: Routine    Height: 65 inches Weight: 155 pounds BSA: 1.78 m^2 BMI: 25.79 kg/m^2    BP: 107/56 mmHg     Conclusions      Summary   Normal left ventricular wall thickness. Left Ventricular size is Moderately increased . There was severe global hypokinesis of the left ventricle. Ejection fraction is visually estimated in the range of 10% to 15%. Features were consistent with a pseudonormal left ventricular filling   pattern, with concomitant abnormal relaxation and increased filling   pressure (grade 2 diastolic dysfunction). Structurally normal mitral valve. Mild to Moderate mitral regurgitation is present. Tricuspid valve is structurally normal.   Mild tricuspid regurgitation. Right ventricular systolic pressure measures 35-40mmHg. Ascites Vs pleural effusion noted. The aorta is within normal limits. The IVC is dilated . Estimated right atrial pressure is 10-15mmHg . Signature      ----------------------------------------------------------------   Electronically signed by Jose Zhao MD (Interpreting   physician) on 12/28/2019 at 06:31 PM   ----------------------------------------------------------------      Findings      Mitral Valve   Structurally normal mitral valve. Mild to Moderate mitral regurgitation is present. Aortic Valve   The aortic valve was trileaflet with normal thickness and cuspal   separation. DOPPLER: Transaortic velocity was within the normal range with   no evidence of aortic stenosis. There was no evidence of aortic   regurgitation. Tricuspid Valve   Tricuspid valve is structurally normal.   Mild tricuspid regurgitation. Right ventricular systolic pressure measures 35-40mmHg. Pulmonic Valve   The pulmonic valve leaflets exhibited normal thickness, no calcification,   and normal cuspal separation.  DOPPLER: The transpulmonic velocity was   within the normal range with no evidence for regurgitation. Left Atrium   Mildly dilated left atrium. Left Ventricle   Normal left ventricular wall thickness. Left Ventricular size is Moderately increased . There was severe global hypokinesis of the left ventricle. Ejection fraction is visually estimated in the range of 10% to 15%. Features were consistent with a pseudonormal left ventricular filling   pattern, with concomitant abnormal relaxation and increased filling   pressure (grade 2 diastolic dysfunction). Right Atrium   Right atrial size was normal.      Right Ventricle   The right ventricular size was normal with normal systolic function and   wall thickness. Pericardial Effusion   The pericardium was normal in appearance with no evidence of a pericardial   effusion. Pleural Effusion   Ascites Vs pleural effusion noted. Aorta / Great Vessels   The aorta is within normal limits. The IVC is dilated . Estimated right atrial pressure is 10-15mmHg .      M-Mode/2D Measurements & Calculations      LV Diastolic   LV Systolic Dimension: 6  AV Cusp Separation: 1.8 cmLA   Dimension: 6.3 cm                        Dimension: 4.9 cmAO Root   cm             LV Volume Diastolic:      Dimension: 3.3 cmLA Area: 29.3   LV FS:4.8 %    164.6 ml                  cm^2   LV PW          LV Volume Systolic: 740.8   Diastolic: 1.1 ml   cm             LV EDV/LV EDV Index:   Septum         164.6 ml/92 m^2LV ESV/LV  RV Diastolic Dimension: 2.6 cm   Diastolic: 1.1 ESV Index: 800.4 ml/78   cm             m^2                       LA/Aorta: 1.48                  EF Calculated: 15.7 %     Ascending Aorta: 3.5 cm                                            LA volume/Index: 106 ml /60m^2                  LV Length: 9 cm   LV Area   Diastolic:   11.3 cm^2   LV Area   Systolic: 20.9   cm^2     Doppler Measurements & Calculations      MV Peak E-Wave: 91.7 AV Peak Velocity: 118 LVOT Peak Velocity: 54.8 cm/s   cm/s                 cm/s LVOT Mean Velocity: 43.5 cm/s   MV Peak A-Wave: 81.8 AV Peak Gradient:     LVOT Peak Gradient: 1 mmHgLVOT   cm/s                 5.57 mmHg             Mean Gradient: 1 mmHg   MV E/A Ratio: 1.12   AV Mean Velocity:   MV Peak Gradient:    84.2 cm/s   3.36 mmHg            AV Mean Gradient: 3                        mmHg                  TR Velocity:201 cm/s   MV Deceleration      AV VTI: 21.7 cm       TR Gradient:16.16 mmHg   Time: 197 msec                             PV Peak Velocity: 68.1 cm/s   MV P1/2t: 58 msec                          PV Peak Gradient: 1.86 mmHg   MVA by PHT:3.79 cm^2 LVOT VTI: 11.1 cm                        IVRT: 88 msec   MV E' Septal   Velocity: 2.7 cm/s   MV A' Septal         AV DVI (VTI): 0.51AV   Velocity: 3.8 cm/s   DVI (Vmax):0.46   MV E' Lateral   Velocity: 7.4 cm/s   MV A' Lateral   Velocity: 4.4 cm/s   E/E' septal: 33.96   E/E' lateral: 12.39   MR Velocity: 434   cm/s     [email or web address] DocKey=[id number]%[id number]        Cath:12/2019    CORONARY ANATOMY:  1. Right coronary artery is a dominant vessel. Proximally, it is  patent. In the mid segment, there is a focal 30 to 40% stenosis; beyond  that, there is mild luminal irregularities in the distal RCA. Distal  artery gives rise to a large caliber PL and PDA branches, both of which  are patent. 2.  Left main is patent and gives rise to LAD and left circumflex  artery. 3.  Left circumflex artery is patent in the proximal portion. In mid  segment, there is another focal 30 to 40% stenosis; beyond that, there  are mild luminal irregularities. 4. LAD is patent in the proximal portion. Mid and distal portions are  patent with mild luminal irregularities. Distal part of the LAD tapers  down to a small caliber vessel. There is a large diagonal branch that  is patent. 5.  LVEDP was noted to be 11 mmHg.   There is no obvious gradient across  the aortic valve and LV systolic function was roughly estimated at 40%.    The patient tolerated the procedure well without any significant issues  or complications. 5-Gambian sheath was left in place to be pulled with  manual pressure.     She was transferred back to her room in stable condition.     SUMMARY:  Nonobstructive coronary artery disease. Past Medical History:    Past Medical History:   Diagnosis Date    Arthritis     Blood circulation, collateral     BPPV (benign paroxysmal positional vertigo) 6/6/16    [facility]     Chronic kidney disease     sees Dr Tyrese Almeida    Gastrointestinal hemorrhage     GERD (gastroesophageal reflux disease)     ? esophageal stricture    History of blood transfusion     HTN (hypertension)     Hx of blood clots 2018    R leg-sees ABEBA Hargrove    Hyperlipidemia     Neuromuscular disorder ([facility] [address]     Obesity     Osteopenia     PAC (premature atrial contraction)     on betablocker    Paralysis ([facility])     baby    Pedal edema     denied any hx of hypertension    Pneumonia     PVC (premature ventricular contraction)     sees Dr Gypsy Jordan PVD (peripheral vascular disease) ([facility] [address]     Tinnitus     UTI (urinary tract infection)        Past Surgical History:    Past Surgical History:   Procedure Laterality Date    ABDOMINAL AORTIC ANEURYSM REPAIR, ENDOVASCULAR N/A 2/15/2019    ABDOMINAL AORTIC ANEURYSM REPAIR ENDOVASCULAR, DECLOTTING RIGHT FEM TIB BYPASS GRAFT performed by Alicia Howe MD at [address]    lumpectomy    CHOLECYSTECTOMY      30 years ago    COLONOSCOPY  08/06/2018    Dr Maribel Leslie 2/22/2019    COLONOSCOPY DIAGNOSTIC performed by Harry Jade MD at [facility]    COLONOSCOPY N/A 2/22/2020    COLONOSCOPY CONTROL HEMORRHAGE performed by Raya Saldaña MD at [address] [facility]      eye, eyelids    EYE SURGERY      cataract    needed for Wheezing or Shortness of Breath  pravastatin (PRAVACHOL) 40 MG tablet, Take 1 tablet by mouth daily  melatonin 3 MG TABS tablet, Take 2 tablets by mouth nightly as needed (sleep)  acetaminophen (TYLENOL ARTHRITIS PAIN) 650 MG CR tablet, Take 1,300 mg by mouth every 12 hours as needed for Pain   timolol (TIMOPTIC) 0.5 % ophthalmic solution, Place 1 drop into both eyes nightly   Blood Pressure KIT, Check blood pressure daily, and if symptoms of lightheadedness. Call physician if BP <80/40. Allergies:    Aspirin; Lasix [furosemide]; Lipitor [atorvastatin]; Neurontin [gabapentin]; Oxycontin [oxycodone hcl]; and Penicillins    Social History:    reports that she quit smoking about 8 months ago. Her smoking use included cigarettes. She started smoking about 64 years ago. She has a 15.00 pack-year smoking history. She has never used smokeless tobacco. She reports current alcohol use of about 1.0 standard drinks of alcohol per week. She reports that she does not use drugs. Family History:   family history includes Cancer in her father; Dementia in her maternal grandmother; Emphysema in her father; Heart Disease in her maternal grandfather, maternal grandmother, and mother; Other in her sister; Stroke in her mother. REVIEW OF SYSTEMS:  Constitutional: negative for anorexia, chills and fevers,weight change  Skin: negative for new skin rash per patient  HEENT: negative for head trauma or new visual changes  Respiratory: negative for cough, dyspnea on exertion, hemoptysis, shortness of breath and wheezing  Cardiovascular: negative for  orthopnea, palpitations and syncope. Gastrointestinal: negative for abdominal pain,nausea , vomiting, constipation, diarrhea.   Hematologic/lymphatic: negative for bruising  Musculoskeletal:negative for muscle weakness, myalgias,wasting  Neurological: negative for coordination problems, dizziness, gait problems and vertigo  Behavioral/Psych:negative for mood/sleep disturbance      PHYSICAL EXAM:   Vitals:  Patient Vitals for the past 24 hrs:   BP Temp Temp src Pulse Resp SpO2 Weight   06/30/20 0830 (!) 114/56 97.8 °F (36.6 °C) Oral 80 16 94 % --   06/30/20 0319 (!) 130/45 97.4 °F (36.3 °C) Oral 86 16 98 % 147 lb 5 oz (66.8 kg)   06/29/20 2313 (!) 108/47 97.9 °F (36.6 °C) Oral 88 16 98 % --   06/29/20 2019 106/73 98 °F (36.7 °C) Oral 89 16 94 % --   06/29/20 1730 (!) 108/50 97.8 °F (36.6 °C) Oral 81 16 97 % --   06/29/20 1609 -- -- -- -- -- 98 % --   06/29/20 1220 (!) 107/56 97.7 °F (36.5 °C) Oral 89 18 95 % --   06/29/20 1158 (!) 89/51 97.9 °F (36.6 °C) Oral 85 24 96 % --       Last 3 weights: Wt Readings from Last 3 Encounters:   06/30/20 147 lb 5 oz (66.8 kg)   06/11/20 165 lb 12.8 oz (75.2 kg)   03/16/20 154 lb 8 oz (70.1 kg)     24 hour intake/output:    Intake/Output Summary (Last 24 hours) at 6/30/2020 0957  Last data filed at 6/30/2020 0319  Gross per 24 hour   Intake 1338.73 ml   Output 0 ml   Net 1338.73 ml     BMI:Body mass index is 24.51 kg/m². General Appearance: alert and oriented to person, place and time, well developed and well- nourished, in no acute distress  Skin: warm and dry, no rash or erythema  Eyes: pupils equal, round, and reactive to light, extraocular eye movements intact, conjunctivae normal  Neck: supple and non-tender without mass, no thyromegaly or thyroid nodules, no cervical lymphadenopathy  Pulmonary/Chest: clear to auscultation bilaterally- no wheezes, rales or rhonchi, normal air movement, no respiratory distress  Cardiovascular: normal rate, regular rhythm, normal S1 and S2, murmur. No rubs, clicks, or gallops, distal pulses intact, no carotid bruits, Negative JVD  Radial Pulses: intact 2+  Abdomen: soft, non-tender, non-distended, normal bowel sounds, no masses or organomegaly  Extremities: no cyanosis, clubbing .  no Edema  Musculoskeletal: normal range of motion, no joint swelling, deformity or tenderness      RADIOLOGY   Nm Gi Blood Loss    Result Date: 6/29/2020  PROCEDURE: NM GI BLOOD LOSS CLINICAL INFORMATION: Rectal bleeding TECHNIQUE: A sample of the patient's blood was removed and the red blood cells were labeled with 34.8 mCi technetium 99m pertechnetate using the UltraTag kit and reinjected back into the patient. Immediate images of the abdomen were obtained. Sequential 1 minute acquisitions over the abdomen were obtained for 60 minutes. COMPARISON: None FINDINGS: There are no foci of abnormal radiotracer accumulation to suggest active gastrointestinal bleeding. Physiologic activity is present in the liver, spleen, urinary bladder and blood vessels. No scintigraphic evidence of active gastrointestinal bleeding. Final report electronically signed by Dr. Villagomez Or on 6/29/2020 3:56 PM      LABS:  No results for input(s): CKTOTAL, CKMB, CKMBINDEX, TROPONINT in the last 72 hours.   CBC:   Lab Results   Component Value Date    WBC 5.0 06/30/2020    RBC 3.48 06/30/2020    HGB 7.0 06/30/2020    HCT 26.0 06/30/2020    MCV 74.7 06/30/2020    MCH 20.1 06/30/2020    MCHC 26.9 06/30/2020    RDW 17.5 05/30/2019     06/30/2020    MPV 11.5 06/30/2020     BMP:    Lab Results   Component Value Date     06/30/2020    K 3.9 06/30/2020     06/30/2020    CO2 23 06/30/2020    BUN 22 06/30/2020    LABALBU 3.5 06/29/2020    CREATININE 0.9 06/30/2020    CALCIUM 8.2 06/30/2020    LABGLOM 60 06/30/2020    GLUCOSE 91 06/30/2020    GLUCOSE 101 01/28/2020     Hepatic Function Panel:    Lab Results   Component Value Date    ALKPHOS 79 06/29/2020    ALT 9 06/29/2020    AST 20 06/29/2020    PROT 5.6 06/29/2020    BILITOT 0.3 06/29/2020    BILIDIR <0.2 02/20/2020    LABALBU 3.5 06/29/2020     Magnesium:    Lab Results   Component Value Date    MG 1.7 02/28/2020     Warfarin PT/INR:  No components found for: PTPATWAR, PTINRWAR  HgBA1c:    Lab Results   Component Value Date    LABA1C 5.5 12/28/2019     FLP:    Lab Results   Component Value Date    TRIG 121 05/18/2019    HDL 34 05/18/2019    LDLCALC 47 05/18/2019     TSH:    Lab Results   Component Value Date    TSH 2.160 12/28/2019     BNP: No results found for: BNP      ASSESSMENT:  Violet Alegre is a pleasant 82year old female patient with past medical history that includes:    Past Medical History:   Diagnosis Date    Arthritis     Blood circulation, collateral     BPPV (benign paroxysmal positional vertigo) 6/6/16    [facility]     Chronic kidney disease     sees Dr Ej Álvarez    Gastrointestinal hemorrhage     GERD (gastroesophageal reflux disease)     ? esophageal stricture    History of blood transfusion     HTN (hypertension)     Hx of blood clots 2018    R leg-sees ABEBA Hargrove    Hyperlipidemia     Neurom[facility] disorder ([facility] [address]     Obesity     Osteopenia     PAC (premature atrial contraction)     on betablocker    Paralysis ([facility]     baby    Pedal edema     denied any hx of hypertension    Pneumonia     PVC (premature ventricular contraction)     sees Dr Trell Messer PVD (peripheral vascular disease) ([facility] [address]     Tinnitus     UTI (urinary tract infection)    The patient was admitted to the hospital on 6/29/2020 with recurrent GI bleeding. She states that she has prior h/o GI bleeding. Few weeks ago, her eliquis was held for a surgical procedure then was resumed afterwards. For the past few days, she had recurrent rectal bleeding. She has extensive history of PAD and a previous EVAR. In 02/2019, vascular did an emergent declotting of left fem pop bypass, EVAR/emergent thrombectomy of right fem tib bypass, and peripheral angiogram/intervention. She has been maintained on anticoagulation since then in effort to prevent re-clotting. In [facility] ER, her Hgb was found to be low at 6.8. The patient was admitted to the hospital Plavix and eliquis were held due to severe GI bleeding and acute blood loss anemia. GI was consulted.  Patient denies chest pain, shortness of breath, dyspnea on exertion, orthopnea, paroxysmal nocturnal dyspnea, palpitations, dizziness, syncope, recent weight gain or leg swelling. She described having generalized fatigue and weakness. 1. Severe non-ischemic CMP  2. Chronic HFrEF  3. Nonobstructive CAD (Recent Cath, 12/2019)  4. PAD  5. H/o EVAR  6. Prior PTA  7. H/o EVAR / Fem-po bypass thrombosis 02/2019, required thrombectomy by vascular surgery   8. Acute blood loss anemia  9. Recurrent rectal bleeding   10. Dyslipidemia   11. HTN  12. CKD    RECOMMENDATIONS:   Monitor h/h   Keep Hgb > 8   GI was consulted    Agree with holding plavix and eliquis at this time due to life threatening bleeding, until cleared by GI, bleeding stops and Hgb stabilizes   Due to recurrent GI bleeding, may consider stopping plavix and attempt Rx with eliquis to prevent future episodes, unless bleeding source is clearly identified and treated   CHF seems well compensated   Daily weights   Fluid restriction of 2 Liters per day   Limit sodium in diet to around 2000 mg/day   Patient was on toprol and losartan per CHF clinic noted   Resume toprol XL 12.5 mg po daily    Monitor BP   Repeat recent echo revealed persistent severe CMP. She will need outpatient evaluation for AICD in the future, follow with primary cardiologist and CHF clinic    Above findings and plan of care were discussed with patient, questions were answered, agreeable to plan. Thank you for allowing me to participate in the care of this patient. Please let me know if I can be of any further assistance.     Michelle Ford MD, [place]   9:57 AM  6/30/2020

## 2020-06-30 NOTE — PROGRESS NOTES
Hospitalist Progress Note    Patient:  Teri Campuzano      Unit/Bed:6K-02/002-A    YOB: 1935    MRN: [id number]       Acct: [de-identified]     PCP: Gama Rivas    Date of Admission: 6/29/2020    Active Hospital Problems    Diagnosis Date Noted    GIB (gastrointestinal bleeding) [K92.2] 06/29/2020     Assessment/Plan:    1. Painless Rectal Bleeding exacerbated by Eliquis and Plavix: pt has EXTENSIVE GI history (Hx diverticular bleeds, known large internal hemorrhoids, Hx cecal mass s/p open right colon resection, and hx anterior bowel resection for colovesical fistula, and EGD showing gastric erosions) so differentials is broad. Colonoscopy 02/22/20 showing a cecal mass lesions, 2 polyps, large grade 2 internal hemorrhoids, extensive diverticulosis involving the sigmoid colon, & active bleeding near the anastomosis of the sigmoid resection in the rectum from a diverticulum. Hemostasis achieved after 2 Hemoclips placed. EGD on 05/03/19 demonstrated a dilated esophagus with fluid and a small amount of food, gastritis, and healed gastric erosions. On Eliquis and Plavix. Hb down to 6.8. Given 1U Blood on 6/29, getting another unit of blood on 6/30. Bleeding Scan on 6/29 no evidence of active bleeding. Cont to hold blood thinners. Monitor H&H. Cont Anusol suppository. Cont IV PPI BID. Appreciate GI assistance. Will need to discuss with Cardiology regarding blood thinners. 2. Acute Blood Loss Anemia: plan as #1. 3. Paroxysmal Atrial Fibrillation: Hold Eliquis. Currently in normal sinus rhythm. Monitor on telemetry. 4. PAD S/P Right Fem-Tib Angioplasty with Stent: Performed in February 2019 on Elqius and Plavix. Holding both. Will need Cardiology input regarding blood thinners. Resume Statin     5. Chronic HFrEF: 2D Echo 6/2020 EF 25-30% with severe reduced systolic function. 2D Echo prior to that in 2019 EF 10%. [facility] 12/2019 non obstructive CAD.  Pt refusing LifeVest. Not on ACEi/ARB or

## 2020-06-30 NOTE — CARE COORDINATION
6/30/20, 7:14 AM EDT  DISCHARGE PLANNING EVALUATION:    Violet Alegre       Admitted from: ER 6/29/2020/ [address] [facility] day: 1   Location: -02/002-A Reason for admit: GIB (gastrointestinal bleeding) [K92.2] Status: IP   Admit order signed?: yes  PMH:  has a past medical history of Arthritis, Blood circulation, collateral, BPPV (benign paroxysmal positional vertigo), CHF (congestive heart failure) ([address] Chronic kidney disease, Gastrointestinal hemorrhage, GERD (gastroesophageal reflux disease), History of blood transfusion, HTN (hypertension), Hx of blood clots, Hyperlipidemia, Neuromuscular disorder ([address] Obesity, Osteopenia, PAC (premature atrial contraction), Paralysis ([address] Pedal edema, Pneumonia, PVC (premature ventricular contraction), PVD (peripheral vascular disease) ([facility] [address] Tinnitus, and UTI (urinary tract infection). Medications:  Scheduled Meds:   pantoprazole  40 mg Intravenous BID    hydrocortisone  25 mg Rectal BID    pravastatin  40 mg Oral Daily    timolol  1 drop Both Eyes Nightly    sodium chloride flush  10 mL Intravenous 2 times per day     Continuous Infusions:   sodium chloride 100 mL/hr at 06/29/20 1630      Pertinent Info/Orders/Treatment Plan: Hgb 7.0, INR 1.40. IVF, IV protonix bid, PRBC transfusion. GI following, bleeding scan ordered (neg), no plans for endoscopy. Diet: Diet NPO Effective Now Exceptions are: Ice Chips   Smoking status:  reports that she quit smoking about 8 months ago. Her smoking use included cigarettes. She started smoking about 64 years ago. She has a 15.00 pack-year smoking history.  She has never used smokeless tobacco.   PCP: Lavonne Minors  Readmission 30 days or less: no  Readmission Risk Score: 23%    Discharge Planning Evaluation  Current Residence:  Private Residence  Living Arrangements:  Alone   Support Systems:  Children, Family Members  Current Services PTA:     Potential Assistance Needed:  N/A  Potential Assistance Purchasing Medications: No  Does patient want to participate in local refill/ meds to beds program?  Yes  Type of Home Care Services:  None  Patient expects to be discharged to:  home  Expected Discharge date:  07/02/20  Follow Up Appointment: Best Day/ Time: Friday AM    Patient Goals/Plan/Treatment Preferences: Spoke with Daniella Andersenillo, she is from home alone. She does have family support when needed. She denies need for DME, HH, or ECF. Transportation/Food Security/Housekeeping Addressed:  No issues identified.     Evaluation: no

## 2020-06-30 NOTE — H&P
Assessment and Plan:        Rectal bleed: Etiology unclear so far. Her abdomen is nontender so do not suspect diverticulitis. Possibly hemorrhoids? .  -GI consulted  -Hold anticoagulation for now. Given her history of DVT and PVD will need to be judicious when we restart. -IV PPI  -Monitor H&H goal transfuse keep hemoglobin above 7    Acute blood loss anemia: Due to above manage as above    History of CKD: Creatinine appears to be at baseline due to monitor    History of hyperlipidemia and peripheral vascular disease: Continue statin. Will resume aspirin Plavix once bleeding is more controlled. CC: Rectal bleeding  HPI: (12 point review of systems completed. Pertinent positives noted. Otherwise ROS is negative) : Patient is a 68-year-old female with a history of peripheral vascular disease, DVTs, CKD, CHF. Patient takes Eliquis at home and for the past several weeks she has been having rectal bleeding. She would self stop her Eliquis and the bleeding would stop. For the past 3 days she has had persistent bleeding from her rectum. She describes it as bright red blood. She attempted to hold her Eliquis however the bleeding would not stop. She presented to the ED and was found to have a hemoglobin of 6.8. She was given a unit of blood and GI was consulted. She otherwise denies dizziness or lightheadedness. She does endorse fatigue  PMH:  Per HPI  SHX:    Social History     Tobacco Use    Smoking status: Former Smoker     Packs/day: 0.25     Years: 60.00     Pack years: 15.00     Types: Cigarettes     Start date:      Last attempt to quit: 10/18/2019     Years since quittin.6    Smokeless tobacco: Never Used    Tobacco comment: 1 cigarette every other day   Substance Use Topics    Alcohol use:  Yes     Alcohol/week: 1.0 standard drinks     Types: 1 Glasses of wine per week     Comment: social    Drug use: No     FHX:   Family History   Problem Relation Age of Onset    Heart Disease Mother  Stroke Mother     Cancer Father         lung    Emphysema Father     Other Sister         leukemia    Heart Disease Maternal Grandmother     Dementia Maternal Grandmother     Heart Disease Maternal Grandfather      Allergies: Allergies   Allergen Reactions    Aspirin Other (See Comments)     Unknown reaction, stated Dr. Isabell Cheung didn't want her to take it anymore    Lasix [Furosemide] Other (See Comments)     PATIENT DOESN'T REMEMBER    Lipitor [Atorvastatin] Other (See Comments)     LEG PAIN    Neurontin [Gabapentin] Other (See Comments)     PATIENT DOESN'T REMEMBER    Oxycontin [Oxycodone Hcl]      confusion    Penicillins Other (See Comments)     HYPERTENSION     Medications:     sodium chloride 100 mL/hr at 06/29/20 1630      pantoprazole  40 mg Intravenous BID    hydrocortisone  25 mg Rectal BID    pravastatin  40 mg Oral Daily    timolol  1 drop Both Eyes Nightly    sodium chloride flush  10 mL Intravenous 2 times per day       Vital Signs:   BP (!) 108/50   Pulse 81   Temp 97.8 °F (36.6 °C) (Oral)   Resp 16   Ht 5' 5\" (1.651 m)   Wt 142 lb 10.2 oz (64.7 kg)   SpO2 97%   BMI 23.74 kg/m²      Intake/Output Summary (Last 24 hours) at 6/29/2020 2019  Last data filed at 6/29/2020 1158  Gross per 24 hour   Intake 278 ml   Output --   Net 278 ml        General:   Resting in bed  HEENT:  normocephalic and atraumatic. No scleral icterus. PERR. Neck: supple. No JVD. No thyromegaly. Lungs: clear to auscultation. No retractions  Cardiac: RRR without murmur. Abdomen: soft. Nontender. Bowel sounds positive. Extremities:  No clubbing, cyanosis, or edema x 4. Vasculature: capillary refill < 3 seconds. Palpable LE pulses bilaterally. Skin:  warm and dry. Psych:  Alert and oriented x3. Affect appropriate  Lymph:  No supraclavicular adenopathy. Neurologic:  No focal deficit. No seizures.     Data: (All radiographs, tracings, PFTs, and imaging are personally viewed and interpreted unless otherwise noted).    Recent Results (from the past 24 hour(s))   EKG 12 Lead    Collection Time: 06/29/20  7:37 AM   Result Value Ref Range    Ventricular Rate 98 BPM    Atrial Rate 98 BPM    P-R Interval 154 ms    QRS Duration 78 ms    Q-T Interval 364 ms    QTc Calculation (Bazett) 464 ms    P Axis 15 degrees    R Axis -60 degrees    T Axis 102 degrees   CBC auto differential    Collection Time: 06/29/20  8:00 AM   Result Value Ref Range    WBC 7.6 4.8 - 10.8 thou/mm3    RBC 3.52 (L) 4.20 - 5.40 mill/mm3    Hemoglobin 6.8 (LL) 12.0 - 16.0 gm/dl    Hematocrit 25.1 (L) 37.0 - 47.0 %    MCV 71.3 (L) 81.0 - 99.0 fL    MCH 19.3 (L) 26.0 - 33.0 pg    MCHC 27.1 (L) 32.2 - 35.5 gm/dl    RDW-CV 18.6 (H) 11.5 - 14.5 %    RDW-SD 47.8 (H) 35.0 - 45.0 fL    Platelets 920 304 - 348 thou/mm3    MPV 11.8 9.4 - 12.4 fL    Pathologist Review EKM     Seg Neutrophils 83.1 %    Lymphocytes 8.8 %    Monocytes 7.0 %    Eosinophils 0.5 %    Basophils 0.3 %    Immature Granulocytes 0.3 %    Segs Absolute 6.3 1.8 - 7.7 thou/mm3    Lymphocytes Absolute 0.7 (L) 1.0 - 4.8 thou/mm3    Monocytes Absolute 0.5 0.4 - 1.3 thou/mm3    Eosinophils Absolute 0.0 0.0 - 0.4 thou/mm3    Basophils Absolute 0.0 0.0 - 0.1 thou/mm3    Immature Grans (Abs) 0.02 0.00 - 0.07 thou/mm3    nRBC 0 /100 wbc    Elliptocytes 1+ Absent    Hypochromia PRESENT Absent   Comprehensive Metabolic Panel w/ Reflex to MG    Collection Time: 06/29/20  8:00 AM   Result Value Ref Range    Glucose 123 (H) 70 - 108 mg/dL    CREATININE 1.1 0.4 - 1.2 mg/dL    BUN 32 (H) 7 - 22 mg/dL    Sodium 141 135 - 145 meq/L    Potassium reflex Magnesium 4.6 3.5 - 5.2 meq/L    Chloride 107 98 - 111 meq/L    CO2 23 23 - 33 meq/L    Calcium 8.6 8.5 - 10.5 mg/dL    AST 20 5 - 40 U/L    Alkaline Phosphatase 79 38 - 126 U/L    Total Protein 5.6 (L) 6.1 - 8.0 g/dL    Alb 3.5 3.5 - 5.1 g/dL    Total Bilirubin 0.3 0.3 - 1.2 mg/dL    ALT 9 (L) 11 - 66 U/L   Lipase    Collection Time: 06/29/20  8:00 AM Result Value Ref Range    Lipase 28.3 5.6 - 51.3 U/L   Brain Natriuretic Peptide    Collection Time: 06/29/20  8:00 AM   Result Value Ref Range    Pro-BNP 1837.0 (H) 0.0 - 1800.0 pg/mL   Anion Gap    Collection Time: 06/29/20  8:00 AM   Result Value Ref Range    Anion Gap 11.0 8.0 - 16.0 meq/L   Glomerular Filtration Rate, Estimated    Collection Time: 06/29/20  8:00 AM   Result Value Ref Range    Est, Glom Filt Rate 47 (A) ml/min/1.73m2   Osmolality    Collection Time: 06/29/20  8:00 AM   Result Value Ref Range    Osmolality Calc 289.5 275.0 - 300 mOsmol/kg   Scan of Blood Smear    Collection Time: 06/29/20  8:00 AM   Result Value Ref Range    SCAN OF BLOOD SMEAR see below    TYPE AND SCREEN    Collection Time: 06/29/20  8:16 AM   Result Value Ref Range    ABO O     Rh Factor POS     Antibody Screen NEG    APTT    Collection Time: 06/29/20  8:16 AM   Result Value Ref Range    aPTT 28.7 22.0 - 38.0 seconds   Protime-INR    Collection Time: 06/29/20  8:16 AM   Result Value Ref Range    INR 1.40 (H) 0.85 - 1.13   Hemoglobin and Hematocrit, Blood    Collection Time: 06/29/20  5:53 PM   Result Value Ref Range    Hemoglobin 7.2 (L) 12.0 - 16.0 gm/dl    Hematocrit 25.4 (L) 37.0 - 47.0 %         NM GI BLOOD LOSS   Final Result      No scintigraphic evidence of active gastrointestinal bleeding.       Final report electronically signed by Dr. Luis Fong on 6/29/2020 3:56 PM             Electronically signed by Nita Jara MD on 6/29/2020 at 8:19 PM

## 2020-07-01 LAB
ANION GAP SERPL CALCULATED.3IONS-SCNC: 8 MEQ/L (ref 8–16)
BUN BLDV-MCNC: 15 MG/DL (ref 7–22)
CALCIUM SERPL-MCNC: 8.2 MG/DL (ref 8.5–10.5)
CHLORIDE BLD-SCNC: 108 MEQ/L (ref 98–111)
CO2: 24 MEQ/L (ref 23–33)
CREAT SERPL-MCNC: 1 MG/DL (ref 0.4–1.2)
ERYTHROCYTE [DISTWIDTH] IN BLOOD BY AUTOMATED COUNT: 19.9 % (ref 11.5–14.5)
ERYTHROCYTE [DISTWIDTH] IN BLOOD BY AUTOMATED COUNT: 53.7 FL (ref 35–45)
GFR SERPL CREATININE-BSD FRML MDRD: 53 ML/MIN/1.73M2
GLUCOSE BLD-MCNC: 88 MG/DL (ref 70–108)
HCT VFR BLD CALC: 28.5 % (ref 37–47)
HCT VFR BLD CALC: 30.1 % (ref 37–47)
HEMOGLOBIN: 8 GM/DL (ref 12–16)
HEMOGLOBIN: 8.5 GM/DL (ref 12–16)
MCH RBC QN AUTO: 21.3 PG (ref 26–33)
MCHC RBC AUTO-ENTMCNC: 28.1 GM/DL (ref 32.2–35.5)
MCV RBC AUTO: 75.8 FL (ref 81–99)
PLATELET # BLD: 157 THOU/MM3 (ref 130–400)
PMV BLD AUTO: 12.9 FL (ref 9.4–12.4)
POTASSIUM REFLEX MAGNESIUM: 4.6 MEQ/L (ref 3.5–5.2)
RBC # BLD: 3.76 MILL/MM3 (ref 4.2–5.4)
SODIUM BLD-SCNC: 140 MEQ/L (ref 135–145)
WBC # BLD: 4.5 THOU/MM3 (ref 4.8–10.8)

## 2020-07-01 PROCEDURE — 80048 BASIC METABOLIC PNL TOTAL CA: CPT

## 2020-07-01 PROCEDURE — 6360000002 HC RX W HCPCS: Performed by: NURSE PRACTITIONER

## 2020-07-01 PROCEDURE — 2580000003 HC RX 258: Performed by: INTERNAL MEDICINE

## 2020-07-01 PROCEDURE — 6370000000 HC RX 637 (ALT 250 FOR IP): Performed by: INTERNAL MEDICINE

## 2020-07-01 PROCEDURE — 85018 HEMOGLOBIN: CPT

## 2020-07-01 PROCEDURE — 36415 COLL VENOUS BLD VENIPUNCTURE: CPT

## 2020-07-01 PROCEDURE — 85027 COMPLETE CBC AUTOMATED: CPT

## 2020-07-01 PROCEDURE — 94760 N-INVAS EAR/PLS OXIMETRY 1: CPT

## 2020-07-01 PROCEDURE — 6370000000 HC RX 637 (ALT 250 FOR IP): Performed by: NURSE PRACTITIONER

## 2020-07-01 PROCEDURE — 1200000000 HC SEMI PRIVATE

## 2020-07-01 PROCEDURE — 85014 HEMATOCRIT: CPT

## 2020-07-01 PROCEDURE — 99232 SBSQ HOSP IP/OBS MODERATE 35: CPT | Performed by: INTERNAL MEDICINE

## 2020-07-01 PROCEDURE — C9113 INJ PANTOPRAZOLE SODIUM, VIA: HCPCS | Performed by: NURSE PRACTITIONER

## 2020-07-01 RX ORDER — PANTOPRAZOLE SODIUM 40 MG/1
40 TABLET, DELAYED RELEASE ORAL
Status: DISCONTINUED | OUTPATIENT
Start: 2020-07-01 | End: 2020-07-02 | Stop reason: HOSPADM

## 2020-07-01 RX ORDER — PANTOPRAZOLE SODIUM 40 MG/1
40 TABLET, DELAYED RELEASE ORAL
Status: DISCONTINUED | OUTPATIENT
Start: 2020-07-01 | End: 2020-07-01

## 2020-07-01 RX ADMIN — APIXABAN 5 MG: 5 TABLET, FILM COATED ORAL at 12:47

## 2020-07-01 RX ADMIN — APIXABAN 5 MG: 5 TABLET, FILM COATED ORAL at 20:06

## 2020-07-01 RX ADMIN — Medication 10 ML: at 20:06

## 2020-07-01 RX ADMIN — PRAVASTATIN SODIUM 40 MG: 40 TABLET ORAL at 08:04

## 2020-07-01 RX ADMIN — Medication 10 ML: at 08:04

## 2020-07-01 RX ADMIN — METOPROLOL SUCCINATE 12.5 MG: 25 TABLET, FILM COATED, EXTENDED RELEASE ORAL at 08:04

## 2020-07-01 RX ADMIN — PANTOPRAZOLE SODIUM 40 MG: 40 TABLET, DELAYED RELEASE ORAL at 17:11

## 2020-07-01 RX ADMIN — PANTOPRAZOLE SODIUM 40 MG: 40 INJECTION, POWDER, FOR SOLUTION INTRAVENOUS at 08:04

## 2020-07-01 RX ADMIN — TIMOLOL MALEATE 1 DROP: 5 SOLUTION/ DROPS OPHTHALMIC at 20:05

## 2020-07-01 RX ADMIN — HYDROCORTISONE ACETATE 25 MG: 25 SUPPOSITORY RECTAL at 08:04

## 2020-07-01 RX ADMIN — HYDROCORTISONE ACETATE 25 MG: 25 SUPPOSITORY RECTAL at 20:06

## 2020-07-01 ASSESSMENT — PAIN SCALES - GENERAL
PAINLEVEL_OUTOF10: 0
PAINLEVEL_OUTOF10: 0

## 2020-07-01 NOTE — CARE COORDINATION
7/1/20, 7:59 AM EDT    DISCHARGE ON GOING EVALUATION    Anu Reyes day: 2  Location: -02/002-A Reason for admit: GIB (gastrointestinal bleeding) [K92.2]   Procedure:   06/20 - Echo EF 25-30% (was 10% refuses LV)  Treatment Plan of Care: cardiology saw, Plavix/Elaquis was on hold, fluid restrictions continue, resume Toprol daily/cardiology, Hgb 8.0. Barriers to Discharge: medical readiness    PCP: Jazmín Padgett  Readmission Risk Score: 24%  Patient Goals/Plan/Treatment Preferences: from home alone: denied needs previously.

## 2020-07-01 NOTE — PROGRESS NOTES
Hospitalist Progress Note    Patient:  Jennifer St      Unit/Bed:6K-02/002-A    YOB: 1935    MRN: [id number]       Acct: [de-identified]     PCP: Irasema Cordero    Date of Admission: 6/29/2020    Active Hospital Problems    Diagnosis Date Noted    GIB (gastrointestinal bleeding) [K92.2] 06/29/2020     Assessment/Plan:     1. Painless Rectal Bleeding exacerbated by Eliquis and Plavix: pt has EXTENSIVE GI history (Hx diverticular bleeds, known large internal hemorrhoids, Hx cecal mass s/p open right colon resection, and hx anterior bowel resection for colovesical fistula, and EGD showing gastric erosions) so differentials is broad. Colonoscopy 02/22/20 showing a cecal mass lesions, 2 polyps, large grade 2 internal hemorrhoids, extensive diverticulosis involving the sigmoid colon, & active bleeding near the anastomosis of the sigmoid resection in the rectum from a diverticulum. Hemostasis achieved after 2 Hemoclips placed. EGD on 05/03/19 demonstrated a dilated esophagus with fluid and a small amount of food, gastritis, and healed gastric erosions. On Eliquis and Plavix. Hb down to 6.8. Given 1U Blood on 6/29, getting another unit of blood on 6/30. Bleeding Scan on 6/29 no evidence of active bleeding. Cont to hold blood thinners. Monitor H&H. Cont Anusol suppository. Cont IV PPI BID. Appreciate GI assistance. Will need to discuss with Cardiology regarding blood thinners. --7/1: Hb stable. Not having any further bleeding episodes. Cardiology recommending stop Plavix and resume Eliquis. Eliquis resumed. Will monitor for any further bleeding. No need for endoscopic intervention at this time. Will need to discuss Watchmen as outpatient. 2. Acute Blood Loss Anemia: plan as #1.      3. Paroxysmal Atrial Fibrillation: Hold Eliquis. Currently in normal sinus rhythm. Monitor on telemetry.      4. PAD S/P Right Fem-Tib Angioplasty with Stent: Performed in February 2019 on Elqius and Plavix. Holding both. Will need Cardiology input regarding blood thinners. Resume Statin      5. Chronic HFrEF: 2D Echo 6/2020 EF 25-30% with severe reduced systolic function. 2D Echo prior to that in 2019 EF 10%. [facility] 12/2019 non obstructive CAD. Pt refusing LifeVest. Not on ACEi/ARB or aldactone. Currently doesn't appear to be in fluid overload. Consult Cardiology.      6. Tobacco Abuse: pt still smoking cigarettes. Educate and  on smoking cessation.        Chief Complaint: rectal bleeding      Hospital Course: Patient is a 82-year-old female with a history of peripheral vascular disease, DVTs, CKD, CHF.  Patient takes Eliquis at home and for the past several weeks she has been having rectal bleeding.  She would self stop her Eliquis and the bleeding would stop.  For the past 3 days she has had persistent bleeding from her rectum.  She describes it as bright red blood.  She attempted to hold her Eliquis however the bleeding would not stop.  She presented to the ED and was found to have a hemoglobin of 6.8.  She was given a unit of blood and GI was consulted. Red Puls otherwise denies dizziness or lightheadedness.  She does endorse fatigue        Subjective: no acute events overnight. Pt reports not having any further rectal bleeding. No fevers or chills. No chest pain or sob. No nausea or vomiting. No abdominal pain.         Medications:  Reviewed    Infusion Medications   Scheduled Medications    pantoprazole  40 mg Oral BID AC    apixaban  5 mg Oral BID    metoprolol succinate  12.5 mg Oral Daily    hydrocortisone  25 mg Rectal BID    pravastatin  40 mg Oral Daily    timolol  1 drop Both Eyes Nightly    sodium chloride flush  10 mL Intravenous 2 times per day     PRN Meds: melatonin, sodium chloride flush, acetaminophen **OR** acetaminophen, promethazine **OR** ondansetron      Intake/Output Summary (Last 24 hours) at 7/1/2020 1217  Last data filed at 7/1/2020 0255  Gross per 24 hour   Intake 3130.86 ml   Output 0 ml   Net 3130.86 ml       Diet:  DIET FULL LIQUID; Low Sodium (2 GM)    Exam:  /74 Comment: manual  Pulse 76   Temp 98.8 °F (37.1 °C) (Oral)   Resp 20   Ht 5' 5\" (1.651 m)   Wt 151 lb 4.8 oz (68.6 kg)   SpO2 97% Comment: No oxygen needed at this time  BMI 25.18 kg/m²     General appearance: No apparent distress, appears stated age and cooperative. HEENT: Pupils equal, round, and reactive to light. Conjunctivae/corneas clear. Neck: Supple, with full range of motion. No jugular venous distention. Trachea midline. Respiratory:  Normal respiratory effort. Clear to auscultation, bilaterally without Rales/Wheezes/Rhonchi. Cardiovascular: Regular rate and rhythm with normal S1/S2 without murmurs, rubs or gallops. Abdomen: Soft, non-tender, non-distended with normal bowel sounds. Musculoskeletal: passive and active ROM x 4 extremities. Skin: Skin color, texture, turgor normal.  No rashes or lesions. Neurologic:  Neurovascularly intact without any focal sensory/motor deficits. Cranial nerves: II-XII intact, grossly non-focal.  Psychiatric: Alert and oriented, thought content appropriate, normal insight  Capillary Refill: Brisk,< 3 seconds   Peripheral Pulses: +2 palpable, equal bilaterally       Labs:   Recent Labs     07/01/20  0551   WBC 4.5*   HGB 8.0*   HCT 28.5*        Recent Labs     07/01/20  0551      K 4.6      CO2 24   BUN 15   CREATININE 1.0   CALCIUM 8.2*     Recent Labs     06/29/20  0800   AST 20   ALT 9*   BILITOT 0.3   ALKPHOS 79     Recent Labs     06/29/20  0816   INR 1.40*     No results for input(s): CKTOTAL, TROPONINI in the last 72 hours. Urinalysis:      Lab Results   Component Value Date    NITRU NEGATIVE 05/17/2019    WBCUA 0-2 05/17/2019    BACTERIA MANY 05/17/2019    RBCUA 0-2 05/17/2019    BLOODU NEGATIVE 05/17/2019    SPECGRAV 1.013 02/24/2019    GLUCOSEU 250 05/17/2019       Radiology:   All imaging reviewed     Diet: DIET FULL LIQUID; Low Sodium (2 GM)      Code Status: Full Code            Electronically signed by Flavia Baird MD on 7/1/2020 at 12:17 PM

## 2020-07-01 NOTE — PROGRESS NOTES
Gastroenterology Progress Note:     Patient Name:  Mor Hammonds   MRN: [id number]  [id number]  YOB: 1935  Admit Date: 6/29/2020  7:32 AM  Primary Care Physician: Lamar Crisostomo   6K-02/002-A     Patient seen and examined. 24 hours events and chart reviewed. Subjective: Patient resting in bed. Denies abdominal & rectal pain. Denies nausea & vomiting. No rectal bleeding overnight or this morning. Hgb 8.0    Objective:  /74 Comment: manual  Pulse 76   Temp 98.8 °F (37.1 °C) (Oral)   Resp 20   Ht 5' 5\" (1.651 m)   Wt 151 lb 4.8 oz (68.6 kg)   SpO2 97% Comment: No oxygen needed at this time  BMI 25.18 kg/m²     Physical Exam:    General:  Nourished in no distress  HEENT: Atraumatic, normocephalic. Moist oral mucous membranes. Neck: Supple without adenopathy, JVD, thyromegaly or masses. Trachea midline. CV: Heart RRR, no murmurs, rubs, gallops. Resp: Even, easy without cough or accessory use. Lungs clear to ascultation bilaterally. Abd: Round, soft, nontender. No hepatosplenomegaly or mass present. Active bowel sounds heard. No distention noted. Ext:  Without cyanosis, clubbing, edema. Skin: Pink, warm, dry  Neuro:  Alert, oriented x 3 with no obvious deficits.        Rectal: deferred    Labs:   CBC:   Lab Results   Component Value Date    WBC 4.5 07/01/2020    HGB 8.0 07/01/2020    HCT 28.5 07/01/2020    MCV 75.8 07/01/2020     07/01/2020     BMP:   Lab Results   Component Value Date     07/01/2020    K 4.6 07/01/2020     07/01/2020    CO2 24 07/01/2020    PHOS 1.4 02/18/2019    BUN 15 07/01/2020    CREATININE 1.0 07/01/2020    CALCIUM 8.2 07/01/2020     PT/INR:   Lab Results   Component Value Date    INR 1.40 06/29/2020     Lipids:   Lab Results   Component Value Date    ALKPHOS 79 06/29/2020    ALT 9 06/29/2020    AST 20 06/29/2020    BILITOT 0.3 06/29/2020    BILIDIR <0.2 02/20/2020    LABALBU 3.5 06/29/2020    LIPASE 28.3 06/29/2020     Current Meds:  Scheduled Meds:   pantoprazole  40 mg Oral QAM AC    metoprolol succinate  12.5 mg Oral Daily    hydrocortisone  25 mg Rectal BID    pravastatin  40 mg Oral Daily    timolol  1 drop Both Eyes Nightly    sodium chloride flush  10 mL Intravenous 2 times per day     Continuous Infusions:   sodium chloride 50 mL/hr at 06/30/20 2035       Assessment:  83 yo F admitted 06/29/20 for rectal bleedings. Denies abdominal & rectal pain. She is no Eliquis & Plavix, which she held 2 days PTA. Hgb noted to be 6.8, received 1 unit PRBC. Last colonoscopy was 02/22/20, for rectal bleeding, demonstrated a cecal mass lesions, 2 polyps, large grade 2 internal hemorrhoids, extensive diverticulosis involving the sigmoid colon, & active bleeding near the anastomosis of the sigmoid resection in the rectum from a diverticulum. Hemostasis achieved after 2 Hemoclips placed, polyps not removed. 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 She had an open right colon resection 02/25/20, by Dr. Lashon Henry, for the cecal mass. Last EGD was 05/03/19 demonstrated a dilated esophagus with fluid and a small amount of food, gastritis, and healed gastric erosions. NM GI blood loss scan negative. 1. Painless rectal bleeding- diverticular vs hemorrhoidal vs anastomotic- resolved  2. Anemia s/p 2 units PRBC  3. Afib- on Eliquis, held the past 2 days  4. GERD  5. CHF  6. CKD  7. HTN  8. H/O extensive diverticulosis and diverticular bleed  9. H/O large internal hemorrhoids  10. H/O cecal mass s/p open right colon resection, mass benign  11. 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 HLD    13.  H/O PAD s/p right fem-tib angioplasty with stent- on Plavix     Plan:    · Monitor H & H, transfuse prn  · Nursing to monitor stool output  · PO PPI BID  · Full liquid diet, advance as tolerated  · Anusol suppository BID  · Will hold off on endoscopic intervention given recent colonoscopy 02/22/20, no rectal bleeding since admission, if Hgb continues to trend down and/or multiplet transfusions needed and/or rectal bleeding continues will consider endoscopic intervention  · Okay to resume Eliquis, check with cardiology if stopping Plavix indefinitely  · Cardiology on board, considering stopping Plavix indefinitely  · Case discussed with Dr. Yeny Jones, cardiology, who will review to see if patient is a candidate for Watchman procedure  · Discussed hemorrhoidal banding outpatient  · Supportive care per primary team  Will follow    Case reviewed and impression/plan reviewed in collaboration with Dr. Giselle Maciel  Electronically signed by ROMAN Morales CNP on 7/1/2020 at 9:46 AM    [facility]

## 2020-07-01 NOTE — PROGRESS NOTES
Interventional Cardiology Progress Note    Reviewed the case. Her CVA risk in the setting of Afib is high; she takes Eliquis for this and PAD/thrombosis issues. Could consider low dose Xarelto 2.5 mg BID/Plavix, and avoid full dose OAC for Afib by using WATCHMAN. We will set her up as outpatient to undergo work-up for WATCHMAN and discussion of [address] [place] [address] regarding the R/B/A to the procedure. Please call with questions.     Jerry Ibanez MD  Interventional Cardiology

## 2020-07-01 NOTE — PLAN OF CARE
Problem: Falls - Risk of:  Goal: Will remain free from falls  Description: Will remain free from falls  Outcome: Ongoing  Note: No falls this shift. Bed alarm on, Falling star program in place. Call light within reach. Problem: Pain:  Goal: Patient's pain/discomfort is manageable  Description: Patient's pain/discomfort is manageable  Outcome: Ongoing  Note: Pt denied pain this shift. Pain goal: no pain. Problem: Discharge Planning:  Goal: Patients continuum of care needs are met  Description: Patients continuum of care needs are met  Outcome: Ongoing  Note: Pt prefers to discharge home alone. Problem: Bowel Function - Altered:  Goal: Bowel elimination is within specified parameters  Description: Bowel elimination is within specified parameters  Outcome: Ongoing  Note: Hgb stable at 8.0 this morning. Eliquis was restarted and will continue to monitor H/H. Care plan reviewed with patient. Patient verbalize understanding of the plan of care and contribute to goal setting.

## 2020-07-01 NOTE — PLAN OF CARE
Problem: Falls - Risk of:  Goal: Will remain free from falls  Description: Will remain free from falls  6/30/2020 2237 by Mal Kemp RN  Outcome: Ongoing  Note: Falling star program. Bed alarm on zone 2. Call light within reach. Side rails up x2. Encouraged to use call system. Pathway clear. Belongings in reach. Problem: Falls - Risk of:  Goal: Absence of physical injury  Description: Absence of physical injury  Outcome: Ongoing  Note: No harm to patient at this time. Problem: Pain:  Goal: Patient's pain/discomfort is manageable  Description: Patient's pain/discomfort is manageable  6/30/2020 2237 by Mal Kemp RN  Outcome: Ongoing  Note: Pt pain free at this time. PRN medication available for pain management. No pain since visit. No Pain Goal.      Problem: Skin Integrity:  Goal: Skin integrity will stabilize  Description: Skin integrity will stabilize  6/30/2020 2237 by Mal Kemp RN  Outcome: Ongoing  Note: No new problem areas noted to skin. Repositions self throughout the night. Problem: Discharge Planning:  Goal: Patients continuum of care needs are met  Description: Patients continuum of care needs are met  6/30/2020 2237 by Mal Kemp RN  Outcome: Ongoing  Note: Continue to assess discharge needs as they arise. No concerns stated. Patient plans to return home where she lives alone. Problem: Bowel Function - Altered:  Goal: Bowel elimination is within specified parameters  Description: Bowel elimination is within specified parameters  6/30/2020 2237 by Mal Kemp RN  Outcome: Ongoing  Note: Bowel sounds active. No bowel movement yet today. No rectal bleeding noted. Monitoring H&H. Care plan reviewed with patient. Will review and discuss care plan with family when available.

## 2020-07-02 ENCOUNTER — TELEPHONE (OUTPATIENT)
Dept: CARDIOLOGY CLINIC | Age: 85
End: 2020-07-02

## 2020-07-02 VITALS
SYSTOLIC BLOOD PRESSURE: 106 MMHG | WEIGHT: 149.9 LBS | HEIGHT: 65 IN | HEART RATE: 62 BPM | OXYGEN SATURATION: 94 % | TEMPERATURE: 98 F | DIASTOLIC BLOOD PRESSURE: 50 MMHG | RESPIRATION RATE: 18 BRPM | BODY MASS INDEX: 24.97 KG/M2

## 2020-07-02 LAB
ANION GAP SERPL CALCULATED.3IONS-SCNC: 7 MEQ/L (ref 8–16)
BUN BLDV-MCNC: 13 MG/DL (ref 7–22)
CALCIUM SERPL-MCNC: 8.8 MG/DL (ref 8.5–10.5)
CHLORIDE BLD-SCNC: 107 MEQ/L (ref 98–111)
CO2: 26 MEQ/L (ref 23–33)
CREAT SERPL-MCNC: 1.1 MG/DL (ref 0.4–1.2)
GFR SERPL CREATININE-BSD FRML MDRD: 47 ML/MIN/1.73M2
GLUCOSE BLD-MCNC: 94 MG/DL (ref 70–108)
HCT VFR BLD CALC: 29.6 % (ref 37–47)
HEMOGLOBIN: 8.4 GM/DL (ref 12–16)
POTASSIUM REFLEX MAGNESIUM: 4.2 MEQ/L (ref 3.5–5.2)
SODIUM BLD-SCNC: 140 MEQ/L (ref 135–145)

## 2020-07-02 PROCEDURE — 80048 BASIC METABOLIC PNL TOTAL CA: CPT

## 2020-07-02 PROCEDURE — 6370000000 HC RX 637 (ALT 250 FOR IP): Performed by: INTERNAL MEDICINE

## 2020-07-02 PROCEDURE — 85014 HEMATOCRIT: CPT

## 2020-07-02 PROCEDURE — 85018 HEMOGLOBIN: CPT

## 2020-07-02 PROCEDURE — 99239 HOSP IP/OBS DSCHRG MGMT >30: CPT | Performed by: INTERNAL MEDICINE

## 2020-07-02 PROCEDURE — 36415 COLL VENOUS BLD VENIPUNCTURE: CPT

## 2020-07-02 PROCEDURE — 6370000000 HC RX 637 (ALT 250 FOR IP): Performed by: NURSE PRACTITIONER

## 2020-07-02 PROCEDURE — 2580000003 HC RX 258: Performed by: INTERNAL MEDICINE

## 2020-07-02 RX ORDER — CLOPIDOGREL BISULFATE 75 MG/1
75 TABLET ORAL DAILY
Status: DISCONTINUED | OUTPATIENT
Start: 2020-07-02 | End: 2020-07-02 | Stop reason: HOSPADM

## 2020-07-02 RX ORDER — HYDROCORTISONE ACETATE 25 MG/1
25 SUPPOSITORY RECTAL 2 TIMES DAILY
Qty: 30 SUPPOSITORY | Refills: 0 | Status: SHIPPED | OUTPATIENT
Start: 2020-07-02 | End: 2020-07-02

## 2020-07-02 RX ORDER — HYDROCORTISONE ACETATE 25 MG/1
25 SUPPOSITORY RECTAL 2 TIMES DAILY PRN
Qty: 30 SUPPOSITORY | Refills: 1 | Status: SHIPPED | OUTPATIENT
Start: 2020-07-02 | End: 2020-12-10 | Stop reason: ALTCHOICE

## 2020-07-02 RX ORDER — METOPROLOL SUCCINATE 25 MG/1
12.5 TABLET, EXTENDED RELEASE ORAL DAILY
Qty: 30 TABLET | Refills: 3 | Status: SHIPPED | OUTPATIENT
Start: 2020-07-02 | End: 2020-07-22

## 2020-07-02 RX ADMIN — CLOPIDOGREL BISULFATE 75 MG: 75 TABLET ORAL at 10:29

## 2020-07-02 RX ADMIN — PRAVASTATIN SODIUM 40 MG: 40 TABLET ORAL at 10:27

## 2020-07-02 RX ADMIN — Medication 10 ML: at 10:29

## 2020-07-02 RX ADMIN — APIXABAN 2.5 MG: 5 TABLET, FILM COATED ORAL at 10:26

## 2020-07-02 RX ADMIN — HYDROCORTISONE ACETATE 25 MG: 25 SUPPOSITORY RECTAL at 10:25

## 2020-07-02 RX ADMIN — PANTOPRAZOLE SODIUM 40 MG: 40 TABLET, DELAYED RELEASE ORAL at 05:13

## 2020-07-02 NOTE — PROGRESS NOTES
Discharge teaching and instructions for diagnosis/procedure of GI bleed completed with patient using teachback method. AVS reviewed. Printed prescriptions given to patient. Patient voiced understanding regarding prescriptions, follow up appointments, and care of self at home. Discharged in a wheelchair to  home with support per family. Patient left in stable condition with all belongings.

## 2020-07-02 NOTE — TELEPHONE ENCOUNTER
[facility] f/u for watch man work up per Dr. Dylan Healy d/c 7-2 home dx;GI bleed 19%.   Per nurse no time was given by Dr. Dylan Healy for f/u

## 2020-07-02 NOTE — PROGRESS NOTES
CLINICAL PHARMACY: [facility] [place] [facility] Patient?: No  Total # of Interventions Recommended: 1 -   - Updated Order #: 1   -   Total # Interventions Accepted: 1  Intervention Severity:   - Level 1 Intervention Present?: No   - Level 2 #: 0   - Level 3 #: 1   Time Spent (min): 30    Additional Documentation:    Okay with Dr. Jenny Pate to apply hold parameters for metoprolol succinate     Radha Thompson PharmD 7/2/2020 11:15 AM

## 2020-07-02 NOTE — PROGRESS NOTES
Gastroenterology Progress Note:     Patient Name:  Amie Bacon   MRN: [id number]  [id number]  YOB: 1935  Admit Date: 6/29/2020  7:32 AM  Primary Care Physician: Jennifer Olivo   [id number]     Patient seen and examined. 24 hours events and chart reviewed. Subjective: Patient resting in bed. Denies abdominal pain, nausea, & vomiting. RN present. No rectal bleeding overnight or today. Hgb 8.4    Objective:  BP (!) 99/51   Pulse 80   Temp 98.4 °F (36.9 °C) (Oral)   Resp 20   Ht 5' 5\" (1.651 m)   Wt 149 lb 14.4 oz (68 kg)   SpO2 98%   BMI 24.94 kg/m²     Physical Exam:    General:  Nourished in no distress  HEENT: Atraumatic, normocephalic. Moist oral mucous membranes. Neck: Supple without adenopathy, JVD, thyromegaly or masses. Trachea midline. CV: Heart RRR, no murmurs, rubs, gallops. Resp: Even, easy without cough or accessory use. Lungs clear to ascultation bilaterally. Abd: Round, soft, nontender. No hepatosplenomegaly or mass present. Active bowel sounds heard. No distention noted. Ext:  Without cyanosis, clubbing, edema. Skin: Pink, warm, dry  Neuro:  Alert, oriented x 3 with no obvious deficits.        Rectal: deferred    Labs:   CBC:   Lab Results   Component Value Date    WBC 4.5 07/01/2020    HGB 8.4 07/02/2020    HCT 29.6 07/02/2020    MCV 75.8 07/01/2020     07/01/2020     BMP:   Lab Results   Component Value Date     07/02/2020    K 4.2 07/02/2020     07/02/2020    CO2 26 07/02/2020    PHOS 1.4 02/18/2019    BUN 13 07/02/2020    CREATININE 1.1 07/02/2020    CALCIUM 8.8 07/02/2020     PT/INR:   Lab Results   Component Value Date    INR 1.40 06/29/2020     Lipids:   Lab Results   Component Value Date    ALKPHOS 79 06/29/2020    ALT 9 06/29/2020    AST 20 06/29/2020    BILITOT 0.3 06/29/2020    BILIDIR <0.2 02/20/2020    LABALBU 3.5 06/29/2020    LIPASE 28.3 06/29/2020     Current Meds:  Scheduled Meds:   apixaban  2.5 mg Oral BID    clopidogrel  75 mg Oral Daily    pantoprazole  40 mg Oral BID AC    metoprolol succinate  12.5 mg Oral Daily    hydrocortisone  25 mg Rectal BID    pravastatin  40 mg Oral Daily    timolol  1 drop Both Eyes Nightly    sodium chloride flush  10 mL Intravenous 2 times per day     Assessment:  81 yo F admitted 06/29/20 for rectal bleedings. Denies abdominal & rectal pain. She is no Eliquis & Plavix, which she held 2 days PTA. Hgb noted to be 6.8, received 1 unit PRBC. Last colonoscopy was 02/22/20, for rectal bleeding, demonstrated a cecal mass lesions, 2 polyps, large grade 2 internal hemorrhoids, extensive diverticulosis involving the sigmoid colon, & active bleeding near the anastomosis of the sigmoid resection in the rectum from a diverticulum. Hemostasis achieved after 2 Hemoclips placed, polyps not removed. 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 She had an open right colon resection 02/25/20, by Dr. Katy Chance, for the cecal mass. Last EGD was 05/03/19 demonstrated a dilated esophagus with fluid and a small amount of food, gastritis, and healed gastric erosions. NM GI blood loss scan negative.   1. Painless rectal bleeding- diverticular vs hemorrhoidal vs anastomotic- resolved  2. Anemia s/p 2 units PRBC  3. Afib- on Eliquis, held the past 2 days  4. GERD  5. CHF  6. CKD  7. HTN  8. H/O extensive diverticulosis and diverticular bleed  9. H/O large internal hemorrhoids  10. H/O cecal mass s/p open right colon resection, mass benign  11. 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 HLD    13.  H/O PAD s/p right fem-tib angioplasty with stent- on Plavix      Plan:    · Monitor H & H, transfuse prn  · Nursing to monitor stool output  · PO PPI BID  · Full liquid diet, advance as tolerated  · Anusol suppository BID prn, script sent  · Plavix & Eliquis resumed per cardiology  · Cardiology on board  · Case discussed with Dr. Mala Wild, cardiology, who will do an outpatient work-up for the Watchman procedure  · Discussed hemorrhoidal banding outpatient  · Supportive care per primary team  · Okay to discharge from GI standpoint when cleared by primary team  · Will need a 3 week follow-up with Noe FRANKS  GI signing off       Case reviewed and impression/plan reviewed in collaboration with Dr. Chrystal Travis  Electronically signed by ROMAN Crespo CNP on 7/2/2020 at 10:08 AM    [facility]

## 2020-07-02 NOTE — CARE COORDINATION
7/2/20, 9:58 AM EDT    Eliquis resumed. OP work-up for Watchman procedure. Anticipate discharge today. Nuvia Wilson plans return home alone, and denies all needs. Patient goals/plan/ treatment preferences discussed by  and . Patient goals/plan/ treatment preferences reviewed with patient/ family. Patient/ family verbalize understanding of discharge plan and are in agreement with goal/plan/treatment preferences. Understanding was demonstrated using the teach back method. AVS provided by RN at time of discharge, which includes all necessary medical information pertaining to the patients current course of illness, treatment, post-discharge goals of care, and treatment preferences.         IMM Letter  IMM Letter given to Patient/Family/Significant other/Guardian/POA/by[de-identified] Iris Bejarano CM  IMM Letter date given[de-identified] 07/02/20  IMM Letter time given[de-identified] [id number]

## 2020-07-02 NOTE — DISCHARGE SUMMARY
Hospital Medicine Discharge Summary      Patient Identification:   Karma Silver   : 1935  MRN: [id number]   Account: [de-identified]      Patient's PCP: Jazmín Padgett    Admit Date: 2020     Discharge Date:   20    Admitting Physician: Dwayne Biggs MD     Discharge Physician: Arsalan Lam MD     Discharge Diagnoses: Painless Recurrent Rectal Bleeding exacerbated by Eliquis and Plavix, Acute Blood Loss Anemia, Paroxysmal Atrial Fibrillation, PAD S/P Right Fem-Tib Angioplasty with Stent     Active Hospital Problems    Diagnosis Date Noted    GIB (gastrointestinal bleeding) [K92.2] 2020       The patient was seen and examined on day of discharge and this discharge summary is in conjunction with any daily progress note from day of discharge. Hospital Course:   Karma Silver is a 82 y.o. female admitted to [facility] on 2020 for rectal bleeding. Has a past medical history of peripheral vascular disease, DVTs, CKD, CHF. Patient takes Eliquis and Plavix at home and for the past several weeks she has been having rectal bleeding. She would self stop her Eliquis and the bleeding would stop. For the past 3 days she has had persistent bleeding from her rectum. She describes it as bright red blood. She attempted to hold her Eliquis however the bleeding would not stop. She presented to the ED and was found to have a hemoglobin of 6.8. Pt has EXTENSIVE GI history (Hx diverticular bleeds, known large internal hemorrhoids, Hx cecal mass s/p open right colon resection, and hx anterior bowel resection for colovesical fistula, and EGD showing gastric erosions) so differentials is broad. Colonoscopy 20 showing a cecal mass lesions, 2 polyps, large grade 2 internal hemorrhoids, extensive diverticulosis involving the sigmoid colon, & active bleeding near the anastomosis of the sigmoid resection in the rectum from a diverticulum.  Hemostasis achieved after 2 Hemoclips placed. EGD on 05/03/19 demonstrated a dilated esophagus with fluid and a small amount of food, gastritis, and healed gastric erosions. GI and Cardiology consulted. Pt started on Anusol suppository, received 2U blood. Held both Eliquis and Plavix. Bleeding stopped, Hb stable. Cardiology recommending to decrease Eliquis from 5 to 2.5 BID and to continue Plavix. Plan for outpatient Watchmen Procedure. Resumed Eliquis and Plavix with no recurrent bleeding or drop in Hb. Pt will need close PCP, GI, and Cardiology follow-up. Will order outpatient CBC. Exam:     Vitals:  Vitals:    07/01/20 1956 07/02/20 0022 07/02/20 0311 07/02/20 0854   BP: (!) 113/58 124/60 (!) 124/55 (!) 99/51   Pulse: 74 60 63 80   Resp: 16 20 22 20   Temp: 98.1 °F (36.7 °C) 98 °F (36.7 °C) 98.6 °F (37 °C) 98.4 °F (36.9 °C)   TempSrc: Oral Oral Oral Oral   SpO2: 99% 94% 95% 98%   Weight:   149 lb 14.4 oz (68 kg)    Height:         Weight: Weight: 149 lb 14.4 oz (68 kg)     24 hour intake/output:    Intake/Output Summary (Last 24 hours) at 7/2/2020 0957  Last data filed at 7/2/2020 0854  Gross per 24 hour   Intake 1734.9 ml   Output 850 ml   Net 884.9 ml         Labs: For convenience and continuity at follow-up the following most recent labs are provided:      CBC:    Lab Results   Component Value Date    WBC 4.5 07/01/2020    HGB 8.4 07/02/2020    HCT 29.6 07/02/2020     07/01/2020       Renal:    Lab Results   Component Value Date     07/02/2020    K 4.2 07/02/2020     07/02/2020    CO2 26 07/02/2020    BUN 13 07/02/2020    CREATININE 1.1 07/02/2020    CALCIUM 8.8 07/02/2020    PHOS 1.4 02/18/2019         Significant Diagnostic Studies    Radiology:   RADIOLOGY REPORT   Final Result      XR CHEST STANDARD (2 VW)   Final Result   Markedly dilated esophagus with large air-fluid level. **This report has been created using voice recognition software.  It may contain minor errors which are inherent in voice recognition technology. **      Final report electronically signed by Dr. Moustapha Arenas on 6/30/2020 11:44 AM      NM GI BLOOD LOSS   Final Result      No scintigraphic evidence of active gastrointestinal bleeding. Final report electronically signed by Dr. Shaji Patel on 6/29/2020 3:56 PM             Consults:     [address]    Disposition:    [x] Home       [] TCU       [] Rehab       [] Psych       [] SNF       [] [place]       [] Other-    Condition at Discharge: Stable    Code Status:  Full Code     Patient Instructions:    Discharge lab work: CBC  Activity: activity as tolerated  Diet: DIET GENERAL; Low Sodium (2 GM)      Follow-up visits:   Jennifer Chau 68  [facility] [address] [facility] [address]  [facility], [facility] 12  [place] [address]  [phone]      For hospital follow-up; Watchman workup         Discharge Medications:      Gina Delarosa   Home Medication Instructions P:[id number]    Printed on:07/02/20 1002   Medication Information                      acetaminophen (TYLENOL ARTHRITIS PAIN) 650 MG CR tablet  Take 1,300 mg by mouth every 12 hours as needed for Pain              albuterol sulfate HFA (PROVENTIL HFA) 108 (90 Base) MCG/ACT inhaler  Inhale 2 puffs into the lungs every 6 hours as needed for Wheezing or Shortness of Breath             apixaban (ELIQUIS) 2.5 MG TABS tablet  TAKE 1 TABLET BY MOUTH TWICE DAILY             Blood Pressure KIT  Check blood pressure daily, and if symptoms of lightheadedness. Call physician if BP <80/40.              clopidogrel (PLAVIX) 75 MG tablet  Take 1 tablet by mouth daily             hydrocortisone (ANUSOL-HC) 25 MG suppository  Place 1 suppository rectally 2 times daily             magnesium (MAGNESIUM-OXIDE) 250 MG TABS tablet  TAKE 2 TABLETS BY MOUTH TWICE DAILY             melatonin 3 MG TABS tablet  Take 2 tablets by mouth nightly as needed (sleep)             metoprolol succinate (TOPROL XL) 25 MG extended release tablet  Take 0.5 tablets by mouth daily             pantoprazole (PROTONIX) 40 MG tablet  Take 40 mg by mouth 2 times daily (before meals)             potassium chloride (KLOR-CON M) 10 MEQ extended release tablet  Take 1 tablet by mouth daily             pravastatin (PRAVACHOL) 40 MG tablet  Take 1 tablet by mouth daily             Saccharomyces boulardii (PROBIOTIC) 250 MG CAPS  TK 1 C PO BID             timolol (TIMOPTIC) 0.5 % ophthalmic solution  Place 1 drop into both eyes nightly                  Time Spent on discharge is more than 30 minutes in the examination, evaluation, counseling and review of medications and discharge plan. Signed: Thank you Pete Bustamante for the opportunity to be involved in this patient's care.     Electronically signed by Rachelle Garcia MD on 7/2/2020 at 9:57 AM

## 2020-07-08 ENCOUNTER — OFFICE VISIT (OUTPATIENT)
Dept: CARDIOLOGY CLINIC | Age: 85
End: 2020-07-08
Payer: MEDICARE

## 2020-07-08 ENCOUNTER — TELEPHONE (OUTPATIENT)
Dept: CARDIOLOGY CLINIC | Age: 85
End: 2020-07-08

## 2020-07-08 VITALS
HEART RATE: 74 BPM | HEIGHT: 65 IN | BODY MASS INDEX: 24.16 KG/M2 | SYSTOLIC BLOOD PRESSURE: 120 MMHG | WEIGHT: 145 LBS | DIASTOLIC BLOOD PRESSURE: 60 MMHG

## 2020-07-08 PROCEDURE — 1111F DSCHRG MED/CURRENT MED MERGE: CPT | Performed by: INTERNAL MEDICINE

## 2020-07-08 PROCEDURE — 1036F TOBACCO NON-USER: CPT | Performed by: INTERNAL MEDICINE

## 2020-07-08 PROCEDURE — 1123F ACP DISCUSS/DSCN MKR DOCD: CPT | Performed by: INTERNAL MEDICINE

## 2020-07-08 PROCEDURE — 99213 OFFICE O/P EST LOW 20 MIN: CPT | Performed by: INTERNAL MEDICINE

## 2020-07-08 PROCEDURE — G8427 DOCREV CUR MEDS BY ELIG CLIN: HCPCS | Performed by: INTERNAL MEDICINE

## 2020-07-08 PROCEDURE — G8420 CALC BMI NORM PARAMETERS: HCPCS | Performed by: INTERNAL MEDICINE

## 2020-07-08 PROCEDURE — 1090F PRES/ABSN URINE INCON ASSESS: CPT | Performed by: INTERNAL MEDICINE

## 2020-07-08 PROCEDURE — 4040F PNEUMOC VAC/ADMIN/RCVD: CPT | Performed by: INTERNAL MEDICINE

## 2020-07-08 PROCEDURE — G8400 PT W/DXA NO RESULTS DOC: HCPCS | Performed by: INTERNAL MEDICINE

## 2020-07-08 NOTE — PROGRESS NOTES
[facility],[place] [address] [address]  [facility] [address]  Dept: [phone]  Dept Fax: [phone]  Loc: [phone]    Visit Date: 7/8/2020    Ms. Gemma Pedro is a 82 y.o. female  who presented for:  Chief Complaint   Patient presents with    Follow-up       HPI:   81 yo F w/PMH of HTN, PVD, PAD and R LE stent placement, left heart catheterization on 12/30/2019 for nonobstructive coronary artery disease with elevated troponin, came in for evaluation for a watchman procedure for chronic a fib. Pt reports unprovoked episodes of palpitations and SOB. She has a history of GI bleeds from her polyps and hemorrhoids while on her anticoagulation regimen. Her hemoglobin level has trended between (7.1-8.5) without any significant improvement. Recent GI bleed, required transfusions and was stabilized and put on low dose Eliquis for CVA prophylaxis. Peripheral angiogram/intervention was done on 02/14/2019 for acute-on-chronic limb ischemia related to post lower extremity bypass distal anastomotic stenosis with recent  graft thrombosis with a successful right fem-tib angioplasty and stenting. Echo on 09/05/2018 \"Ejection fraction is visually estimated at 55%. Overall left ventricular function is normal. No evidence of any pericardial effusion. \"     Before her left heart catheterization, echo on 12/28/2019 showed \"Normal left ventricular wall thickness. Left Ventricular size is Moderately increased . There was severe global hypokinesis of the left ventricle. Ejection fraction is visually estimated in the range of 10% to 15%. Features were consistent with a pseudonormal left ventricular filling   pattern, with concomitant abnormal relaxation and increased filling   pressure (grade 2 diastolic dysfunction). Structurally normal mitral valve. Mild to Moderate mitral regurgitation is present.    Tricuspid valve is structurally normal.   Mild tricuspid regurgitation. Right ventricular systolic pressure measures 35-40mmHg. Ascites Vs pleural effusion noted. The aorta is within normal limits. The IVC is dilated . Estimated right atrial pressure is 10-15mmHg . \"    ECHO on 06/26/2020 : \"Normal left ventricular size and severely reduced systolic function. There was severe global hypokinesis. Hypertrophic basal septum. Ejection fraction was estimated at 25-30%. Left atrial size was severely dilated. The mitral valve structure demonstrated posterior leaflet calcification. DOPPLER: The transmitral velocity was within the normal range with no   evidence for mitral stenosis. There was trace mitral regurgitation. IVC size is within normal limits with normal respiratory phasic changes. \"    Current Outpatient Medications:     apixaban (ELIQUIS) 2.5 MG TABS tablet, TAKE 1 TABLET BY MOUTH TWICE DAILY, Disp: 60 tablet, Rfl: 0    metoprolol succinate (TOPROL XL) 25 MG extended release tablet, Take 0.5 tablets by mouth daily, Disp: 30 tablet, Rfl: 3    hydrocortisone (ANUSOL-HC) 25 MG suppository, Place 1 suppository rectally 2 times daily as needed for Hemorrhoids, Disp: 30 suppository, Rfl: 1    clopidogrel (PLAVIX) 75 MG tablet, Take 1 tablet by mouth daily, Disp: 90 tablet, Rfl: 3    potassium chloride (KLOR-CON M) 10 MEQ extended release tablet, Take 1 tablet by mouth daily, Disp: 180 tablet, Rfl: 2    magnesium (MAGNESIUM-OXIDE) 250 MG TABS tablet, TAKE 2 TABLETS BY MOUTH TWICE DAILY, Disp: 120 tablet, Rfl: 6    pantoprazole (PROTONIX) 40 MG tablet, Take 40 mg by mouth 2 times daily (before meals), Disp: , Rfl:     Saccharomyces boulardii (PROBIOTIC) 250 MG CAPS, TK 1 C PO BID, Disp: , Rfl:     albuterol sulfate HFA (PROVENTIL HFA) 108 (90 Base) MCG/ACT inhaler, Inhale 2 puffs into the lungs every 6 hours as needed for Wheezing or Shortness of Breath, Disp: 1 Inhaler, Rfl: 0    Blood Pressure KIT, Check blood pressure daily, and if symptoms performed by Vernon Tanner MD at [address] [place] [address] N/A 2/22/2019    EGD BIOPSY performed by Richardo Hatchet, MD at [facility]       Review of Systems   Constitutional: Negative for chills and fever  HENT: Negative for congestion, sinus pressure, sneezing and sore throat. Eyes: Negative for pain, discharge, redness and itching. Respiratory: Negative for apnea, cough  Gastrointestinal: Negative for blood in stool, constipation, diarrhea   Endocrine: Negative for cold intolerance, heat intolerance, polydipsia. Genitourinary: Negative for dysuria, enuresis, flank pain and hematuria. Musculoskeletal: Negative for arthralgias, joint swelling and neck pain. Neurological: Negative for numbness and headaches. Psychiatric/Behavioral: Negative for agitation, confusion, decreased concentration and dysphoric mood. Objective:     /60   Pulse 74 Comment: Irregular  Ht 5' 5\" (1.651 m)   Wt 145 lb (65.8 kg)   BMI 24.13 kg/m²     Wt Readings from Last 3 Encounters:   07/08/20 145 lb (65.8 kg)   07/02/20 149 lb 14.4 oz (68 kg)   06/11/20 165 lb 12.8 oz (75.2 kg)     BP Readings from Last 3 Encounters:   07/08/20 120/60   07/02/20 (!) 106/50   06/11/20 110/68       Nursing note and vitals reviewed. Physical Exam   Constitutional: Oriented to person, place, and time. Appears well-developed and well-nourished. HENT:   Head: Normocephalic and atraumatic. Eyes: EOM are normal. Pupils are equal, round, and reactive to light. Neck: Normal range of motion. Neck supple. No JVD present. Cardiovascular: Normal rate, regular rhythm, normal heart sounds and intact distal pulses. No murmur heard. Pulmonary/Chest: Effort normal and breath sounds normal. No respiratory distress. No wheezes. No rales. Abdominal: Soft. Bowel sounds are normal. No distension. There is no tenderness. Musculoskeletal: Normal range of motion. No edema.    Neurological: Alert and oriented to person, place, and time. No cranial nerve deficit. Coordination normal.   Skin: Skin is warm and dry. Psychiatric: Normal mood and affect.        No results found for: CKTOTAL, CKMB, CKMBINDEX    Lab Results   Component Value Date    WBC 4.5 07/01/2020    RBC 3.76 07/01/2020    HGB 8.4 07/02/2020    HCT 29.6 07/02/2020    MCV 75.8 07/01/2020    MCH 21.3 07/01/2020    MCHC 28.1 07/01/2020    RDW 17.5 05/30/2019     07/01/2020    MPV 12.9 07/01/2020       Lab Results   Component Value Date     07/02/2020    K 4.2 07/02/2020     07/02/2020    CO2 26 07/02/2020    BUN 13 07/02/2020    LABALBU 3.5 06/29/2020    CREATININE 1.1 07/02/2020    CALCIUM 8.8 07/02/2020    LABGLOM 47 07/02/2020    GLUCOSE 94 07/02/2020    GLUCOSE 101 01/28/2020       Lab Results   Component Value Date    ALKPHOS 79 06/29/2020    ALT 9 06/29/2020    AST 20 06/29/2020    PROT 5.6 06/29/2020    BILITOT 0.3 06/29/2020    BILIDIR <0.2 02/20/2020    LABALBU 3.5 06/29/2020       Lab Results   Component Value Date    MG 1.7 02/28/2020       Lab Results   Component Value Date    INR 1.40 (H) 06/29/2020    INR 1.43 (H) 02/21/2020    INR 1.80 (H) 02/20/2020         Lab Results   Component Value Date    LABA1C 5.5 12/28/2019       Lab Results   Component Value Date    TRIG 121 05/18/2019    HDL 34 05/18/2019    LDLCALC 47 05/18/2019       Lab Results   Component Value Date    TSH 2.160 12/28/2019         Testing Reviewed:      I have individually reviewed the cardiac test below:    ECHO:   Results for orders placed during the hospital encounter of 12/28/19   ECHO Complete 2D W Doppler W Color    Narrative Transthoracic Echocardiography Report (TTE)     Demographics      Patient Name   Katie Kevin  Gender               Female                  A      MR #           [id number]     Race                                                    Ethnicity      Account #      [de-identified]     Room Number          0576      Accession on 12/28/2019 at 06:31 PM   ----------------------------------------------------------------      Findings      Mitral Valve   Structurally normal mitral valve. Mild to Moderate mitral regurgitation is present. Aortic Valve   The aortic valve was trileaflet with normal thickness and cuspal   separation. DOPPLER: Transaortic velocity was within the normal range with   no evidence of aortic stenosis. There was no evidence of aortic   regurgitation. Tricuspid Valve   Tricuspid valve is structurally normal.   Mild tricuspid regurgitation. Right ventricular systolic pressure measures 35-40mmHg. Pulmonic Valve   The pulmonic valve leaflets exhibited normal thickness, no calcification,   and normal cuspal separation. DOPPLER: The transpulmonic velocity was   within the normal range with no evidence for regurgitation. Left Atrium   Mildly dilated left atrium. Left Ventricle   Normal left ventricular wall thickness. Left Ventricular size is Moderately increased . There was severe global hypokinesis of the left ventricle. Ejection fraction is visually estimated in the range of 10% to 15%. Features were consistent with a pseudonormal left ventricular filling   pattern, with concomitant abnormal relaxation and increased filling   pressure (grade 2 diastolic dysfunction). Right Atrium   Right atrial size was normal.      Right Ventricle   The right ventricular size was normal with normal systolic function and   wall thickness. Pericardial Effusion   The pericardium was normal in appearance with no evidence of a pericardial   effusion. Pleural Effusion   Ascites Vs pleural effusion noted. Aorta / Great Vessels   The aorta is within normal limits. The IVC is dilated . Estimated right atrial pressure is 10-15mmHg .      M-Mode/2D Measurements & Calculations      LV Diastolic   LV Systolic Dimension: 6  AV Cusp Separation: 1.8 cmLA   Dimension: 6.3 cm Dimension: 4.9 cmAO Root   cm             LV Volume Diastolic:      Dimension: 3.3 cmLA Area: 29.3   LV FS:4.8 %    164.6 ml                  cm^2   LV PW          LV Volume Systolic: 841.8   Diastolic: 1.1 ml   cm             LV EDV/LV EDV Index:   Septum         164.6 ml/92 m^2LV ESV/LV  RV Diastolic Dimension: 2.6 cm   Diastolic: 1.1 ESV Index: 762.8 ml/78   cm             m^2                       LA/Aorta: 1.48                  EF Calculated: 15.7 %     Ascending Aorta: 3.5 cm                                            LA volume/Index: 106 ml /60m^2                  LV Length: 9 cm   LV Area   Diastolic:   61.0 cm^2   LV Area   Systolic: 84.6   cm^2     Doppler Measurements & Calculations      MV Peak E-Wave: 91.7 AV Peak Velocity: 118 LVOT Peak Velocity: 54.8 cm/s   cm/s                 cm/s                  LVOT Mean Velocity: 43.5 cm/s   MV Peak A-Wave: 81.8 AV Peak Gradient:     LVOT Peak Gradient: 1 mmHgLVOT   cm/s                 5.57 mmHg             Mean Gradient: 1 mmHg   MV E/A Ratio: 1.12   AV Mean Velocity:   MV Peak Gradient:    84.2 cm/s   3.36 mmHg            AV Mean Gradient: 3                        mmHg                  TR Velocity:201 cm/s   MV Deceleration      AV VTI: 21.7 cm       TR Gradient:16.16 mmHg   Time: 197 msec                             PV Peak Velocity: 68.1 cm/s   MV P1/2t: 58 msec                          PV Peak Gradient: 1.86 mmHg   MVA by PHT:3.79 cm^2 LVOT VTI: 11.1 cm                        IVRT: 88 msec   MV E' Septal   Velocity: 2.7 cm/s   MV A' Septal         AV DVI (VTI): 0.51AV   Velocity: 3.8 cm/s   DVI (Vmax):0.46   MV E' Lateral   Velocity: 7.4 cm/s   MV A' Lateral   Velocity: 4.4 cm/s   E/E' septal: 33.96   E/E' lateral: 12.39   MR Velocity: 434   cm/s     [email or web address] [id number]%3d%3d        Assessment/Plan   Atrial Fibrillation, [id number] = 6, HASBLED = 3    We had a long discussion with the patient and myself regarding the risks/benefits of stroke prophylaxis and anticoagulation using ACC guidelines and risk calculators. Shared decision making has been completed with her primary physician (see EMR). We discussed the options including no prophylaxis, aspirin only, DOACs, Warfarin, and mechanical occlusion of HERMELINDA (Watchman). The patient was able to comprehend their overall risk of stroke versus bleeding. The patient agreed that the patient was not a candidate for long-term full-dose anticoagulation due to the above issues. The patient is clinically a candidate for left atrial appendage occlusion using the Watchman device. Patient and family were informed of the risk/benefits of the procedure, the need for further imaging pre-procedure (YANDEL/CTA) and post-procedure (YANDEL) at specified intervals. Further, the patient understands that they will require anticoagulation before the procedure and after the procedure in the short-term. The patient is clinically a candidate for short-term anticoagulation. Further, discussion regarding the possibility of CVA related to other etiologies other than afib were also discussed including intracranial disease, carotid disease, cerebral vascular malformation, aortic atheroma, non-HERMELINDA cardioembolic source, etc.  Patient understood that the WATCHMAN device is designed to reduce the risk of CVA in the setting of afib only and does not reduce the risk of CVA related to any other cause of stroke. Additionally, she has quite severe PAD and has surgeries and LE endovascular interventions, she understands that after the WATCHMAN, she will be placed on low dose Xarelto (2.5 mg BID) in an attempt to prevent LE PAD major adverse effects, however this dose is not the CVA prophylaxis dose for Afib.       All parties also understand that if post-procedure the patient develops another reason for full dose anti-coagulation that this is a separate issue from the stroke prophylaxis for atrial fibrillation and that the device does not negate need for anticoagulation for other reasons. At this time, the only reason for anticoagulation is atrial fibrillation. All of the patient's/family's questions were answered to their satisfaction and they have decided to move forward with further work-up for the Watchman device. We will follow-up with the patient and primary cardiologist/caregivers throughout the process.       Plan:  1) Pre-procedure imaging YANDEL or CTA based on renal function  2) Structural Heart Coordinator to follow-up to ensure work-up completed  3) Watchman information/brochure given  4) Continue all anticoagulation as Rx'ed till instructed otherwise by the Structural Heart Team    Disposition:  WATCHMAN work-up       Electronically signed by Colleen Yanez MD    Electronically signed by Shania Urbina MD     7/8/2020 at 2:32 PM EDT

## 2020-07-14 RX ORDER — PRAVASTATIN SODIUM 40 MG
40 TABLET ORAL DAILY
Qty: 90 TABLET | Refills: 3 | Status: SHIPPED | OUTPATIENT
Start: 2020-07-14 | End: 2021-07-19

## 2020-07-22 ENCOUNTER — OFFICE VISIT (OUTPATIENT)
Dept: CARDIOLOGY CLINIC | Age: 85
End: 2020-07-22
Payer: MEDICARE

## 2020-07-22 VITALS
WEIGHT: 145.8 LBS | SYSTOLIC BLOOD PRESSURE: 96 MMHG | BODY MASS INDEX: 24.29 KG/M2 | DIASTOLIC BLOOD PRESSURE: 58 MMHG | HEART RATE: 73 BPM | OXYGEN SATURATION: 98 % | HEIGHT: 65 IN

## 2020-07-22 PROCEDURE — G8400 PT W/DXA NO RESULTS DOC: HCPCS | Performed by: NURSE PRACTITIONER

## 2020-07-22 PROCEDURE — 1123F ACP DISCUSS/DSCN MKR DOCD: CPT | Performed by: NURSE PRACTITIONER

## 2020-07-22 PROCEDURE — 1111F DSCHRG MED/CURRENT MED MERGE: CPT | Performed by: NURSE PRACTITIONER

## 2020-07-22 PROCEDURE — 1090F PRES/ABSN URINE INCON ASSESS: CPT | Performed by: NURSE PRACTITIONER

## 2020-07-22 PROCEDURE — 4040F PNEUMOC VAC/ADMIN/RCVD: CPT | Performed by: NURSE PRACTITIONER

## 2020-07-22 PROCEDURE — G8427 DOCREV CUR MEDS BY ELIG CLIN: HCPCS | Performed by: NURSE PRACTITIONER

## 2020-07-22 PROCEDURE — 1036F TOBACCO NON-USER: CPT | Performed by: NURSE PRACTITIONER

## 2020-07-22 PROCEDURE — G8420 CALC BMI NORM PARAMETERS: HCPCS | Performed by: NURSE PRACTITIONER

## 2020-07-22 PROCEDURE — 99214 OFFICE O/P EST MOD 30 MIN: CPT | Performed by: NURSE PRACTITIONER

## 2020-07-22 RX ORDER — METOPROLOL SUCCINATE 25 MG/1
25 TABLET, EXTENDED RELEASE ORAL DAILY
COMMUNITY
End: 2020-09-21 | Stop reason: SDUPTHER

## 2020-07-22 RX ORDER — LOSARTAN POTASSIUM 25 MG/1
25 TABLET ORAL DAILY
COMMUNITY
End: 2020-07-27 | Stop reason: SDUPTHER

## 2020-07-22 ASSESSMENT — ENCOUNTER SYMPTOMS
ABDOMINAL PAIN: 0
CHEST TIGHTNESS: 0
COUGH: 0
WHEEZING: 0
SHORTNESS OF BREATH: 1
NAUSEA: 0
ABDOMINAL DISTENTION: 0
COLOR CHANGE: 0
APNEA: 0

## 2020-07-22 NOTE — PROGRESS NOTES
Lilian       Visit Date: 7/22/2020  Cardiologist:  Dr. Golden Cruz   Primary Care Physician: Dr. Mar Nguyen is a 82 y.o. female who presents today for:  Chief Complaint   Patient presents with    Congestive Heart Failure       HPI: Obtained from patient and chart    Tasneem Schaeffer is a 82 y.o. female who presents to the office for a follow up visit in the heart failure clinic. Hx PAD stents 2/2019, ?COPD previous smoker (she has Quit!), CKD, AAA repair (EVAR) 2/2019, right fem-tib PTA with stent 2/2019, HTN. Negative stress test 9/2018. She was admitted for CHF and possible COPD exacerbation. Her K+ and Mg+ were both low and replaced. Repeat BMP and Mg were done on 5/30/19 and her Mg+ was 1.2 and K+ was 3.5. I called the patient and orders were placed and she was given Mg 4 gm IV in OP nursing and I also started her on Potassium.  Admitted in [facility] for pneumonia with sepsis, flash pulmonary edema. EF was 55% (2018) down to 10-15% grade 2 DD (Dec 2019) wore a LifeVest but sent it back in May. ? Viral CMP. EF did improve to 25-30% June 2020. Recent admit for rectal bleeding (June 2020) with blood transfusions. Weight is down 12 pounds from her last OV. She is unsure of the home meds she is taking. Her main complaint is the esophagus  and her hemmaroids of which she is going this week. No swelling. Does not use a walker in her apartment. She can perform ADLs without SOB or fatigue.        Accompanied by: self  Last hospital admission related to Heart Failure:  Dec 2019  Chest Pain: no  Worsening SOB: no  Worsening Orthopnea/PND: no  Edema: no  Any extra diuretic use: no  Weight gain: no  Fatigue: some  Abdominal bloating: no  Appetite: fair to good  KORINA: no  Cough: occasional   Compliant checking home weight: not daily 142-145 lbs  Compliant checking blood pressure: yes 120's      Past Medical History:   Diagnosis Date    Arthritis     Blood circulation, collateral     BPPV (benign paroxysmal positional vertigo) 6/6/16    CHF (congestive heart failure) (HCC)     Chronic kidney disease     sees Dr Varun Sims    Gastrointestinal hemorrhage     GERD (gastroesophageal reflux disease)     ? esophageal stricture    History of blood transfusion     HTN (hypertension)     Hx of blood clots 2018    R leg-sees ABEBA Hargrove    Hyperlipidemia     [facility]     Obesity     Osteopenia     PAC (premature atrial contraction)     on betablocker    Paralysis (HCC)     baby    Pedal edema     denied any hx of hypertension    Pneumonia     PVC (premature ventricular contraction)     sees Dr Herbert Erazo PVD (peripheral vascular disease) ([facility]     Tinnitus     UTI (urinary tract infection)      Past Surgical History:   Procedure Laterality Date    ABDOMINAL AORTIC ANEURYSM REPAIR, ENDOVASCULAR N/A 2/15/2019    ABDOMINAL AORTIC ANEURYSM REPAIR ENDOVASCULAR, DECLOTTING RIGHT FEM TIB BYPASS GRAFT performed by Carmen Glover MD at [address]    lumpectomy   [address]      30 years ago    COLONOSCOPY  08/06/2018    Dr Pietro Cornelius N/A 2/22/2019    COLONOSCOPY DIAGNOSTIC performed by Dereje Skinner MD at [facility]    COLONOSCOPY N/A 2/22/2020    COLONOSCOPY CONTROL HEMORRHAGE performed by Nasrin Jimenez MD at [address] [facility]      eye, eyelids    EYE SURGERY      cataract    HEMICOLECTOMY N/A 2/25/2020    OPEN RIGHT COLON RESECTION performed by Joaquim Stern MD at [address] [facility]  07/15/2016    MT OLEGARIO SKN SUB GRFT T/A/L AREA/<100SCM /<1ST 25 SCM N/A 9/6/2018    RE-EXPLORATION RIGHT GROIN, DECLOTTING OF FEMORAL BYPASS GRAFT performed by Kamila Pardo MD at [address] [facility] EGD TRANSORAL BIOPSY SINGLE/MULTIPLE Left 11/27/2017    EGD BIOPSY performed by Dickson Schirmer, MD at [address] [place] [address], Narcisa Pavon N/A 2018    ROBOT ASSISTED COLECTOMY (LOW ANTERIOR) performed by Jerri Nieto MD at [address] OFFICE/OUTPT [address] [facility] [address] N/A 2018    RIGHT FEMORAL EMBOLECTOMY, INTRAOPERATIVE ARTERIOGRAM, RIGHT ILIAC EMBOLECTOMY, RIGHT COMMON AND EXTERNAL ILIAC STENTING, RIGHT FEMORAL TO ANTERIOR POPLITEAL BYPASS WITH 6MM GORTEX GRAFT performed by Maisha Xavier MD at [address] / [address] Right 2019    FEMORAL EMBOLECTOMY THROMBECTOMY, RIGHT LEG performed by Maisha Xavier MD at [address] [facility] N/A 2017    EGD SUBMUCOSAL/BOTOX INJECTION performed by Lior Monique MD at [address] [facility] ENDOSCOPY N/A 2019    EGD BIOPSY performed by Jean Boucher MD at [facility]     Family History   Problem Relation Age of Onset    Heart Disease Mother     Stroke Mother     Cancer Father         lung    Emphysema Father     Other Sister         leukemia    Heart Disease Maternal Grandmother     Dementia Maternal Grandmother     Heart Disease Maternal Grandfather      Social History     Tobacco Use    Smoking status: Former Smoker     Packs/day: 0.25     Years: 60.00     Pack years: 15.00     Types: Cigarettes     Start date:      Last attempt to quit: 10/18/2019     Years since quittin.7    Smokeless tobacco: Never Used    Tobacco comment: 1 cigarette every other day   Substance Use Topics    Alcohol use:  Yes     Alcohol/week: 1.0 standard drinks     Types: 1 Glasses of wine per week     Comment: social     Current Outpatient Medications   Medication Sig Dispense Refill    losartan (COZAAR) 25 MG tablet Take 25 mg by mouth daily      metoprolol succinate (TOPROL XL) 25 MG extended release tablet Take 25 mg by mouth daily      pravastatin (PRAVACHOL) 40 MG tablet TAKE 1 TABLET BY MOUTH DAILY 90 tablet 3    apixaban (ELIQUIS) 2.5 MG TABS tablet TAKE 1 TABLET BY MOUTH TWICE DAILY 60 tablet 0    hydrocortisone (ANUSOL-HC) 25 MG suppository Place 1 suppository rectally 2 times daily as needed for Hemorrhoids 30 suppository 1    clopidogrel (PLAVIX) 75 MG tablet Take 1 tablet by mouth daily 90 tablet 3    potassium chloride (KLOR-CON M) 10 MEQ extended release tablet Take 1 tablet by mouth daily 180 tablet 2    magnesium (MAGNESIUM-OXIDE) 250 MG TABS tablet TAKE 2 TABLETS BY MOUTH TWICE DAILY 120 tablet 6    pantoprazole (PROTONIX) 40 MG tablet Take 40 mg by mouth 2 times daily (before meals)      albuterol sulfate HFA (PROVENTIL HFA) 108 (90 Base) MCG/ACT inhaler Inhale 2 puffs into the lungs every 6 hours as needed for Wheezing or Shortness of Breath 1 Inhaler 0    melatonin 3 MG TABS tablet Take 2 tablets by mouth nightly as needed (sleep) 60 tablet 3    timolol (TIMOPTIC) 0.5 % ophthalmic solution Place 1 drop into both eyes nightly       Blood Pressure KIT Check blood pressure daily, and if symptoms of lightheadedness. Call physician if BP <80/40. 1 kit 0    acetaminophen (TYLENOL ARTHRITIS PAIN) 650 MG CR tablet Take 1,300 mg by mouth every 12 hours as needed for Pain        No current facility-administered medications for this visit. Allergies   Allergen Reactions    Aspirin Other (See Comments)     Unknown reaction, stated Dr. Farhana Fang didn't want her to take it anymore    Lasix [Furosemide] Other (See Comments)     PATIENT DOESN'T REMEMBER    Lipitor [Atorvastatin] Other (See Comments)     LEG PAIN    Neurontin [Gabapentin] Other (See Comments)     PATIENT DOESN'T REMEMBER    Oxycontin [Oxycodone Hcl]      confusion    Penicillins Other (See Comments)     HYPERTENSION       SUBJECTIVE:   Review of Systems   Constitutional: Positive for fatigue. Negative for activity change, appetite change and fever. HENT: Negative for congestion. Respiratory: Positive for shortness of breath (with exertion ). Negative for apnea, cough, chest tightness and wheezing.     Cardiovascular: Negative for chest pain, palpitations and leg swelling. Gastrointestinal: Negative for abdominal distention, abdominal pain and nausea. Genitourinary: Negative for difficulty urinating and dysuria. Musculoskeletal: Negative for arthralgias and gait problem. Skin: Negative for color change. Neurological: Negative for dizziness, numbness and headaches. Psychiatric/Behavioral: Negative for agitation, confusion and sleep disturbance. The patient is not nervous/anxious. OBJECTIVE:   Today's Vitals:  BP (!) 96/58   Pulse 73   Ht 5' 5\" (1.651 m)   Wt 145 lb 12.8 oz (66.1 kg)   SpO2 98%   BMI 24.26 kg/m²     Physical Exam  Vitals signs reviewed. Constitutional:       Appearance: She is well-developed. HENT:      Head: Normocephalic and atraumatic. Eyes:      Pupils: Pupils are equal, round, and reactive to light. Neck:      Musculoskeletal: Normal range of motion. Vascular: No JVD. Cardiovascular:      Rate and Rhythm: Normal rate. Rhythm irregular. Heart sounds: Normal heart sounds. No murmur. Pulmonary:      Effort: Pulmonary effort is normal. No respiratory distress. Breath sounds: No rales. Abdominal:      General: There is no distension. Palpations: Abdomen is soft. Tenderness: There is no abdominal tenderness. Musculoskeletal:         General: No tenderness. Right lower leg: No edema. Left lower leg: No edema. Skin:     General: Skin is warm and dry. Capillary Refill: Capillary refill takes less than 2 seconds. Neurological:      Mental Status: She is alert and oriented to person, place, and time.    Psychiatric:         Mood and Affect: Mood normal.         Behavior: Behavior normal.         Wt Readings from Last 3 Encounters:   07/22/20 145 lb 12.8 oz (66.1 kg)   07/08/20 145 lb (65.8 kg)   07/02/20 149 lb 14.4 oz (68 kg)     BP Readings from Last 3 Encounters:   07/22/20 (!) 96/58   07/08/20 120/60   07/02/20 (!) 106/50     Pulse Readings from Last 3 Encounters:   07/22/20 73   07/08/20 74   07/02/20 62     Body mass index is 24.26 kg/m². ECHO:   6/26/20      Summary   Limited examination. Normal left ventricular size and severely reduced systolic function. There was severe global hypokinesis. Hypertrophic basal septum. Ejection fraction was estimated at 25-30%. Left atrial size was severely dilated. The mitral valve structure demonstrated posterior leaflet calcification. DOPPLER: The transmitral velocity was within the normal range with no   evidence for mitral stenosis. There was trace mitral regurgitation. IVC size is within normal limits with normal respiratory phasic changes. Signature      ----------------------------------------------------------------   Electronically signed by Natalie Churchill MD (Interpreting   physician) on 06/26/2020 at 02:29 PM   ----------------------------------------------------------------      Findings      Mitral Valve   The mitral valve structure demonstrated posterior leaflet calcification. DOPPLER: The transmitral velocity was within the normal range with no   evidence for mitral stenosis. There was trace mitral regurgitation. Aortic Valve   The aortic valve was trileaflet with normal thickness and cuspal   separation. Tricuspid Valve   The tricuspid valve structure was normal with normal leaflet separation. Pulmonic Valve   The pulmonic valve leaflets were not well seen. Left Atrium   Left atrial size was severely dilated. Left Ventricle   Normal left ventricular size and severely reduced systolic function. There was severe global hypokinesis. Hypertrophic basal septum. Ejection fraction was estimated at 25-30%. Right Atrium   Right atrial size was normal.      Right Ventricle   The right ventricular size was normal with normal systolic function and   wall thickness.       Pericardial Effusion   The pericardium was normal in appearance with no evidence of a pericardial   effusion. Pleural Effusion   No evidence of pleural effusion. Aorta / Great Vessels   IVC size is within normal limits with normal respiratory phasic changes. Results reviewed:  BNP:   Lab Results   Component Value Date    PROBNP 1837.0 (H) 06/29/2020     CBC:   Lab Results   Component Value Date    WBC 4.5 07/01/2020    RBC 3.76 07/01/2020    HGB 8.4 07/02/2020    HCT 29.6 07/02/2020     07/01/2020     CMP:    Lab Results   Component Value Date     07/02/2020    K 4.2 07/02/2020     07/02/2020    CO2 26 07/02/2020    BUN 13 07/02/2020    CREATININE 1.1 07/02/2020    LABGLOM 47 07/02/2020    GLUCOSE 94 07/02/2020    GLUCOSE 101 01/28/2020    CALCIUM 8.8 07/02/2020     Hepatic Function Panel:    Lab Results   Component Value Date    ALKPHOS 79 06/29/2020    ALT 9 06/29/2020    AST 20 06/29/2020    PROT 5.6 06/29/2020    BILITOT 0.3 06/29/2020    BILIDIR <0.2 02/20/2020    LABALBU 3.5 06/29/2020     Magnesium:    Lab Results   Component Value Date    MG 1.7 02/28/2020     PT/INR:    Lab Results   Component Value Date    INR 1.40 06/29/2020     Lipids:    Lab Results   Component Value Date    TRIG 121 05/18/2019    HDL 34 05/18/2019    LDLCALC 47 05/18/2019       ASSESSMENT AND PLAN:   The patient's condition/symptoms are Stable      Diagnosis Orders   1. CHF (congestive heart failure), NYHA class II, chronic, combined (HCC)  Basic Metabolic Panel    Magnesium   2. Atrial fibrillation, unspecified type ([facility]     3. Peripheral arterial disease ([facility]     4. CMP ? Viral induced EF was 10-15% now 25-30%    Plan:  · GDMT   · ACE/ARB/ARNi: Losartan 25 mg daily  · Beta Blocker: Toprol 25 mg daily  · Aldosterone antagonist: no  · Diuretic/Potassium: Potassium 10 mEq daily (not taking Bumex?)  · Hydralazine/Nitrate: no  · Other: Eliquis, Plavix, Pravachol, Mag ox  · She is not wearing a LifeVest, sent it back she dos not like how it feels.  We discussed ICD as EF is still low - she wants to think about it. She understands the risk of SCD and is agreeable. She called back with an update med list and she is taking the Losartan. No Bumex. BMP and Mg soon. · HF Zones reviewed. · Daily weights  · Fluid restriction of 2 Liters per day (64 oz)   · Limit sodium in diet to around 5333-2485 mg/day  · Monitor BP  · Activity as tolerated     Patient was instructed to call the [facility] for changes in the following symptoms:    Weight gain of 3 pounds in 1 day or 5 pounds in 1 week   Increased shortness of breath   Shortness of breath while laying down   Cough   Chest pain   Swelling in feet, ankles or legs   Tenderness or bloating in the abdomen   Fatigue    Decreased appetite or feeling \"full\"   Nausea    Confusion      Return in about 3 months (around 10/22/2020). or sooner if needed     Patient given educational materials - see patient instructions. We discussed the importance of weighing oneself and recording daily. We also discussed the importance of a low sodium diet, higher sodium foods to avoid and appropriate low sodium food choices. Discussed use, benefit, and side effects of prescribed medications. All patient questions answered. Patient verbalizes understanding of plan of care using teach back method, and is agreeable to the treatment plan. Greater then 30 minutes of time was spent reviewing the chart and educating the patient about HF, medications, diet, exercise, and discussing the plan of care. I personally spent more then 50% of the appt time face to face with the patient counseling/coordinating patient's care.       Electronicallysigned by ROMAN Garner CNP on 7/22/2020 at 12:22 PM

## 2020-07-27 ENCOUNTER — TELEPHONE (OUTPATIENT)
Dept: CARDIOLOGY CLINIC | Age: 85
End: 2020-07-27

## 2020-07-27 LAB
ANION GAP SERPL CALCULATED.3IONS-SCNC: 5 MMOL/L (ref 4–12)
BUN BLDV-MCNC: 25 MG/DL (ref 7–20)
CALCIUM SERPL-MCNC: 8.9 MG/DL (ref 8.8–10.5)
CHLORIDE BLD-SCNC: 107 MEQ/L (ref 101–111)
CO2: 28 MEQ/L (ref 21–32)
CREAT SERPL-MCNC: 1.14 MG/DL (ref 0.6–1.3)
CREATININE CLEARANCE: 46
GLUCOSE: 100 MG/DL (ref 70–110)
MAGNESIUM: 2.2 MG/DL (ref 1.8–2.5)
POTASSIUM SERPL-SCNC: 4.3 MEQ/L (ref 3.6–5)
SODIUM BLD-SCNC: 140 MEQ/L (ref 135–145)

## 2020-07-27 RX ORDER — LOSARTAN POTASSIUM 25 MG/1
25 TABLET ORAL DAILY
Qty: 30 TABLET | Refills: 5 | Status: SHIPPED | OUTPATIENT
Start: 2020-07-27 | End: 2021-03-17

## 2020-07-27 NOTE — TELEPHONE ENCOUNTER
BMP and MG reviewed  All WNL except BUN a little elevated  She is not taking diuretic but is taking K+  K+ 4.3  No changes

## 2020-07-27 NOTE — TELEPHONE ENCOUNTER
Spoke to patient voiced understanding. Pt will call back at the end of the week to give update. Pt also asked for refill of losartan , rx pended in another encounter.

## 2020-07-31 NOTE — TELEPHONE ENCOUNTER
Dr Lucian Pacheco   EF 25-30%   She is not wearing a LifeVest  I had a discussion about ICD at her recent OV with me  Will need order from you for [facility] EP referral if you agree  I am not sure about the Botox?

## 2020-07-31 NOTE — TELEPHONE ENCOUNTER
Patient called in she is ready to get \"things started\"  Wondering about watchman, ICD, and the need for botox with esophagus with Dr. Gomez Maradiaga and if anyone has been in contact with him? [phone]

## 2020-08-02 NOTE — TELEPHONE ENCOUNTER
[facility] for ICD  She is to f/u for WATCHMAN  GI should f/u for Botox - she can proceed based on their guidance

## 2020-08-11 ENCOUNTER — TELEPHONE (OUTPATIENT)
Dept: CARDIOTHORACIC SURGERY | Age: 85
End: 2020-08-11

## 2020-08-12 NOTE — TELEPHONE ENCOUNTER
Due to Cr trending up 1.6 Please have her decrease the Bumex to 0.5 mg daily (from 1 mg) and also decrease the Potassium to 10 mEq daily (from 20 mEq).   We will recheck labs at her appt with me July 15   Thanks [FreeTextEntry1] : right clavicle fx. \par \par This was discussed at length with parent and patient and xrays reviewed. She can resume light activity. No trampoline, slides, bouncy houses for an additional 2 weeks. She may bike and swim. Remodeling discussed again. She will f/u on a PRN basis. All questions answered. Parent and patient in agreement with the plan.\par \par I, Georgette Acosta, MPAS, PAC have acted as scribe and documented the above for Dr. Kaufman. \par \par The above documentation completed by the PA is an accurate record of both my words and actions. Satish Kaufman MD.\par \par

## 2020-08-13 ENCOUNTER — HOSPITAL ENCOUNTER (OUTPATIENT)
Age: 85
Discharge: HOME | End: 2020-08-13
Payer: MEDICARE

## 2020-08-13 PROCEDURE — U0003 INFECTIOUS AGENT DETECTION BY NUCLEIC ACID (DNA OR RNA); SEVERE ACUTE RESPIRATORY SYNDROME CORONAVIRUS 2 (SARS-COV-2) (CORONAVIRUS DISEASE [COVID-19]), AMPLIFIED PROBE TECHNIQUE, MAKING USE OF HIGH THROUGHPUT TECHNOLOGIES AS DESCRIBED BY CMS-2020-01-R: HCPCS

## 2020-08-16 LAB — SARS-COV-2: NOT DETECTED

## 2020-08-20 ENCOUNTER — HOSPITAL ENCOUNTER (OUTPATIENT)
Dept: INPATIENT UNIT | Age: 85
Discharge: HOME | End: 2020-08-20
Attending: INTERNAL MEDICINE | Admitting: INTERNAL MEDICINE
Payer: MEDICARE

## 2020-08-20 ENCOUNTER — APPOINTMENT (OUTPATIENT)
Dept: GENERAL RADIOLOGY | Age: 85
End: 2020-08-20
Attending: INTERNAL MEDICINE
Payer: MEDICARE

## 2020-08-20 VITALS
RESPIRATION RATE: 19 BRPM | OXYGEN SATURATION: 100 % | WEIGHT: 144 LBS | TEMPERATURE: 97.6 F | DIASTOLIC BLOOD PRESSURE: 56 MMHG | BODY MASS INDEX: 23.99 KG/M2 | HEIGHT: 65 IN | HEART RATE: 61 BPM | SYSTOLIC BLOOD PRESSURE: 114 MMHG

## 2020-08-20 PROBLEM — I50.9 CHF (CONGESTIVE HEART FAILURE) (HCC): Status: ACTIVE | Noted: 2020-08-20

## 2020-08-20 LAB
ANION GAP SERPL CALCULATED.3IONS-SCNC: 10 MEQ/L (ref 8–16)
APTT: 32 SECONDS (ref 22–38)
BUN BLDV-MCNC: 25 MG/DL (ref 7–22)
CALCIUM SERPL-MCNC: 9.3 MG/DL (ref 8.5–10.5)
CHLORIDE BLD-SCNC: 107 MEQ/L (ref 98–111)
CO2: 26 MEQ/L (ref 23–33)
CREAT SERPL-MCNC: 1.1 MG/DL (ref 0.4–1.2)
ERYTHROCYTE [DISTWIDTH] IN BLOOD BY AUTOMATED COUNT: 22.1 % (ref 11.5–14.5)
ERYTHROCYTE [DISTWIDTH] IN BLOOD BY AUTOMATED COUNT: 60.9 FL (ref 35–45)
GFR SERPL CREATININE-BSD FRML MDRD: 47 ML/MIN/1.73M2
GLUCOSE BLD-MCNC: 99 MG/DL (ref 70–108)
HCT VFR BLD CALC: 42.9 % (ref 37–47)
HEMOGLOBIN: 12 GM/DL (ref 12–16)
INR BLD: 1.19 (ref 0.85–1.13)
MCH RBC QN AUTO: 22.1 PG (ref 26–33)
MCHC RBC AUTO-ENTMCNC: 28 GM/DL (ref 32.2–35.5)
MCV RBC AUTO: 79 FL (ref 81–99)
PLATELET # BLD: 220 THOU/MM3 (ref 130–400)
PMV BLD AUTO: ABNORMAL FL (ref 9.4–12.4)
POTASSIUM REFLEX MAGNESIUM: 4.5 MEQ/L (ref 3.5–5.2)
RBC # BLD: 5.43 MILL/MM3 (ref 4.2–5.4)
SODIUM BLD-SCNC: 143 MEQ/L (ref 135–145)
WBC # BLD: 6.4 THOU/MM3 (ref 4.8–10.8)

## 2020-08-20 PROCEDURE — 85610 PROTHROMBIN TIME: CPT

## 2020-08-20 PROCEDURE — 2580000003 HC RX 258: Performed by: INTERNAL MEDICINE

## 2020-08-20 PROCEDURE — C1898 LEAD, PMKR, OTHER THAN TRANS: HCPCS

## 2020-08-20 PROCEDURE — 93005 ELECTROCARDIOGRAM TRACING: CPT | Performed by: INTERNAL MEDICINE

## 2020-08-20 PROCEDURE — C1777 LEAD, AICD, ENDO SINGLE COIL: HCPCS

## 2020-08-20 PROCEDURE — 85027 COMPLETE CBC AUTOMATED: CPT

## 2020-08-20 PROCEDURE — 2500000003 HC RX 250 WO HCPCS

## 2020-08-20 PROCEDURE — 71045 X-RAY EXAM CHEST 1 VIEW: CPT

## 2020-08-20 PROCEDURE — 6360000002 HC RX W HCPCS

## 2020-08-20 PROCEDURE — 2709999900 HC NON-CHARGEABLE SUPPLY

## 2020-08-20 PROCEDURE — 33249 INSJ/RPLCMT DEFIB W/LEAD(S): CPT | Performed by: INTERNAL MEDICINE

## 2020-08-20 PROCEDURE — 85730 THROMBOPLASTIN TIME PARTIAL: CPT

## 2020-08-20 PROCEDURE — C1894 INTRO/SHEATH, NON-LASER: HCPCS

## 2020-08-20 PROCEDURE — 6360000002 HC RX W HCPCS: Performed by: INTERNAL MEDICINE

## 2020-08-20 PROCEDURE — C1721 AICD, DUAL CHAMBER: HCPCS

## 2020-08-20 PROCEDURE — 36415 COLL VENOUS BLD VENIPUNCTURE: CPT

## 2020-08-20 PROCEDURE — 80048 BASIC METABOLIC PNL TOTAL CA: CPT

## 2020-08-20 RX ORDER — ONDANSETRON 2 MG/ML
4 INJECTION INTRAMUSCULAR; INTRAVENOUS EVERY 6 HOURS PRN
Status: DISCONTINUED | OUTPATIENT
Start: 2020-08-20 | End: 2020-08-20 | Stop reason: HOSPADM

## 2020-08-20 RX ORDER — SODIUM CHLORIDE 0.9 % (FLUSH) 0.9 %
10 SYRINGE (ML) INJECTION EVERY 12 HOURS SCHEDULED
Status: DISCONTINUED | OUTPATIENT
Start: 2020-08-20 | End: 2020-08-20 | Stop reason: HOSPADM

## 2020-08-20 RX ORDER — SODIUM CHLORIDE 0.9 % (FLUSH) 0.9 %
10 SYRINGE (ML) INJECTION PRN
Status: DISCONTINUED | OUTPATIENT
Start: 2020-08-20 | End: 2020-08-20 | Stop reason: HOSPADM

## 2020-08-20 RX ORDER — SODIUM CHLORIDE 9 MG/ML
INJECTION, SOLUTION INTRAVENOUS CONTINUOUS
Status: DISCONTINUED | OUTPATIENT
Start: 2020-08-20 | End: 2020-08-20 | Stop reason: HOSPADM

## 2020-08-20 RX ORDER — ACETAMINOPHEN 325 MG/1
650 TABLET ORAL EVERY 4 HOURS PRN
Status: DISCONTINUED | OUTPATIENT
Start: 2020-08-20 | End: 2020-08-20 | Stop reason: HOSPADM

## 2020-08-20 RX ADMIN — SODIUM CHLORIDE: 9 INJECTION, SOLUTION INTRAVENOUS at 12:14

## 2020-08-20 RX ADMIN — CEFAZOLIN 2 G: 10 INJECTION, POWDER, FOR SOLUTION INTRAVENOUS at 12:30

## 2020-08-20 NOTE — POST SEDATION
Sedation Post Procedure Note    Patient Name: Everardo Muñoz   YOB: 1935  Room/Bed: [facility]/016-A  Medical Record Number: [id number]  Date: 8/20/2020   Time: 2:20 PM         Physicians/Assistants: Kari Mosquera MD    Procedure Performed:  DDD/ICD Implant    Post-Sedation Vital Signs:  Vitals:    08/20/20 1214   BP: 110/61   Pulse:    Resp:    Temp:    SpO2:       Vital signs were reviewed and were stable after the procedure (see flow sheet for vitals)            Post-Sedation Exam: Lungs: clear to auscultation bilaterally and Cardiovascular: regular rate and rhythm           Complications: none    Electronically signed by Kari Mosquera MD on 8/20/2020 at 2:20 PM

## 2020-08-20 NOTE — FLOWSHEET NOTE
Pt admitted to  2E16 per w/c for new icd implantation. Pt NPO. Patient accompanied by family. Vital signs obtained. Assessment and data collection initiated. Oriented to room. Policies and procedures for 2E explained   All questions answered with no further questions at this time. Fall prevention and safety precautions discussed with patient.

## 2020-08-20 NOTE — PROCEDURES
[address] [id number]                            CARDIAC CATHETERIZATION    PATIENT NAME: Amanda Kolb                     :        1935  MED REC NO:   [id number]                           ROOM:       0016  ACCOUNT NO:   [de-identified]                           ADMIT DATE: 2020  PROVIDER:     Rey Saravia. Antionette Martinez M.D.    Andrei Mary:  2020    REFERRING PROVIDER:  Da Sandoval MD    CLINICAL HISTORY:  An 82-year-old woman with a history of peripheral  vascular disease, paroxysmal atrial fibrillation, nonischemic  cardiomyopathy with New York Heart Association class II, chronic  systolic heart failure and ejection fraction of 30%, on optimized  pharmacologic therapy. She presents today for elective primary  prevention ICD implant. Because of her history of atrial arrhythmias,  we elected to place a dual-chamber system for atrial fibrillation  surveillance. We discussed ICD therapy in detail and mutually agreed to  proceed with the implant. DESCRIPTION OF PROCEDURE:  After informed consent was obtained, the  patient was brought to the electrophysiology laboratory at [address] [place] [address] in the fasting state. She was given intravenous Versed  and fentanyl for conscious sedation. A total of 30 mL of 1% lidocaine  was infiltrated locally at the anticipated left pectoral incision site  and pocket location. A 2-inch incision was created below her left  clavicle with subsequent cautery and blunt dissection to reach the  pectoralis fascial layer. Using a Raytec gauze, a pocket was easily  created with blunt dissection. We then obtained vascular access using  an axillary vein approach under fluoroscopy. Two separate guidewires  were placed for subsequent lead deployment. The first guidewire was  used to introduce a 9-Chinese peel-away hemostatic sheath.   The dilator  and guidewire were removed and the sheath flushed with normal saline. We introduced the first lead for right ventricular pacing and ICD  therapy. Right ventricular lead was a Medtronic model [id number], serial  [id number]. Lead was positioned into the right ventricular apex where  it was secured by activation of the distal helix. Right ventricular  lead demonstrated sensed electrograms of 9.6 millivolts with a capture  threshold of 0.5 volts at 0.4 milliseconds with a pacing impedance of  494 ohms and a shocking impedance of 70 ohms. The 9-Ivorian peel-away  sheath was removed from the insertion site. We then used second  guidewire to introduce a 7-Ivorian peel-away hemostatic sheath. The  inner dilator and guidewire were then removed and the sheath flushed  with normal saline. We used the 7-Ivorian sheath to introduce a new  Medtronic right atrial lead, model [id number], serial [id number]. The  right atrial lead was positioned to a couple of sites in the right  atrial appendage region. At the final site, she demonstrated sensed  electrograms of 2.1 millivolts with a pacing threshold of 1 volt at 0.4  milliseconds and a pacing impedance of 551 ohms. The 7-Ivorian peel-away  sheath was then removed from the insertion site. We advanced the suture  sleeves to the puncture sites on both leads and secured the leads with  soft silk 2-0 suture. There were two ligatures applied to each suture  tie down which was then sutured to the pectoralis muscle as well. A new  Medtronic Evera MRI XT model [id number], serial [id number] dual-chamber  pacemaker ICD generator was then introduced. Each lead pin was placed  in its respective port of the pulse generator header and secured with a  header set screw. A gentle tug test confirmed that each pin was well  secured within the header. We then coiled lead slack behind the pulse  generator and placed it into the previously fashioned left pectoral  pocket.   The header was secured with a single soft silk 2-0 suture to  the pectoralis muscle. The pocket was irrigated with saline solution  and closed with sequential layers of Vicryl 2-0, Vicryl 2-0, and Vicryl  4-0 ligatures. A final layer of Steri-Strips was applied using benzoin  adhesive. Estimated blood loss was 20 mL. There was no apparent  complication. CONCLUSIONS:  1. Successful dual-chamber pacemaker/ICD implant. 2.  Excellent bradycardia sensing/capture thresholds observed. 3.  Normal dual-chamber pacemaker/ICD function postoperatively. RECOMMENDATIONS:  1. Routine postoperative wound care. 2.  Chest x-ray examination. 3.  Discharge home later today. 4.  Resume anticoagulation therapy this evening. 5.  Follow up with Dr. Yuko Murphy and the [address] [place] [address] on a  routine basis.         Panfilo Earl M.D.    D: 08/20/2020 14:42:30       T: 08/20/2020 14:51:36     [email or web address]  Job#: [id number]     Doc#: [id number]    CC:

## 2020-08-20 NOTE — H&P
[facility]  Sedation/Analgesia History & Physical    Pt Name: Neal Hoang  Account number: [de-identified]  MRN: [id number]  YOB: 1935  Provider Performing Procedure: Susan Eastman MD  Referring Provider: Terra Mcconnell MD   Primary Care Physician: Corrie Barroso  Date: 8/20/2020    PRE-PROCEDURE    Code Status: FULL CODE  Brief History/Pre-Procedure Diagnosis: 81yo woman with a history of chronic non-ischemic cardiomyopathy, hypertension, PVCs, and PAF who has had ongoing optimization of systolic heart failure therapy referred for primary prevention ICD implant. The EF remains severely impaired at 30%. She has NYHA class II heart failure symptoms. She has had additional medical problems including GIB and PAD. We plan DDD PPM/ICD due to atrial fibrillation and chronic systolic heart failure with ventricular arrhythmia and EF 30%. The purpose of the device was explained and we are in agreement to proceed. Consent: : I have discussed with the patient risks, benefits, and alternatives (and relevant risks, benefits, and side effects related to alternatives or not receiving care), and likelihood of the success. The patient and/or representative appear to understand and agree to proceed. The discussion encompasses risks, benefits, and side effects related to the alternatives and the risks related to not receiving the proposed care, treatment, and services. The indication, risks and benefits of the procedure and possible therapeutic consequences and alternatives were discussed with the patient. The patient was given the opportunity to ask questions and to have them answered to his/her satisfaction.  Risks of the procedure include but are not limited to the following: Bleeding, hematoma including retroperitoneal hemmorhage, infection, pain and discomfort, injury to the aorta and other blood vessels, rhythm disturbance, low blood pressure, myocardial infarction, stroke, kidney damage/failure, myocardial perforation, allergic reactions to sedatives/contrast material, loss of pulse/vascular compromise requiring surgery, aneurysm/pseudoaneurysm formation, possible loss of a limb/hand/leg, needing blood transfusion, requiring emergent open heart surgery or emergent coronary intervention, the need for intubation/respiratory support, the requirement for defibrillation/cardioversion, and death. Alternatives to and omission of the suggested procedure were discussed. The patient had no further questions and wished to proceed; the consent form was signed. MEDICAL HISTORY  []ASHD/ANGINA/MI/CHF   [x]Hypertension  []Diabetes  [x]Hyperlipidemia  []Smoking  [x]Family Hx of ASHD  [x]Additional information:       has a past medical history of Arthritis, Blood circulation, collateral, BPPV (benign paroxysmal positional vertigo), CHF (congestive heart failure) ([address] Chronic kidney disease, Gastrointestinal hemorrhage, GERD (gastroesophageal reflux disease), History of blood transfusion, HTN (hypertension), Hx of blood clots, Hyperlipidemia, Neuromuscular disorder ([address] Obesity, Osteopenia, PAC (premature atrial contraction), Paralysis ([address] Pedal edema, Pneumonia, PVC (premature ventricular contraction), PVD (peripheral vascular disease) ([address] Tinnitus, and UTI (urinary tract infection). SURGICAL HISTORY   has a past surgical history that includes Cholecystectomy; Breast surgery (Right, 1958); Hysterectomy; Appendectomy; Cosmetic surgery; eye surgery; pr egd transoral biopsy single/multiple (Left, 11/27/2017); Upper gastrointestinal endoscopy (N/A, 11/27/2017); Colonoscopy (08/06/2018); laryngoscopy (07/15/2016); pr lap,surg,colectomy, partial, w/anast (N/A, 8/20/2018); pr office/outpt visit,procedure only (N/A, 9/5/2018); pr preet skn sub grft t/a/l area/<100scm /<1st 25 scm (N/A, 9/6/2018);  THROMBECTOMY / EMBOLECTOMY FEMORAL (Right, 2/14/2019); AAA repair, endovascular (N/A, 2/15/2019); Upper gastrointestinal endoscopy (N/A, 2/22/2019); Colonoscopy (N/A, 2/22/2019); Colonoscopy (N/A, 2/22/2020); and hemicolectomy (N/A, 2/25/2020). Additional information:       ALLERGIES   Allergies as of 08/20/2020 - Review Complete 08/20/2020   Allergen Reaction Noted    Aspirin Other (See Comments) 12/28/2019    Lasix [furosemide] Other (See Comments) 06/06/2016    Lipitor [atorvastatin] Other (See Comments) 06/09/2014    Neurontin [gabapentin] Other (See Comments) 06/06/2016    Oxycontin [oxycodone hcl]  11/05/2018    Penicillins Other (See Comments) 06/09/2014     Additional information:       MEDICATIONS   Aspirin  [] 81 mg  [] 325 mg  [x] None  Antiplatelet drug therapy use last 5 days  []No [x]Yes  Coumadin Use Last 5 Days [x]No []Yes  Other anticoagulant use last 5 days  []No [x]Yes    Current Facility-Administered Medications:     0.9 % sodium chloride infusion, , Intravenous, Continuous, Kacey Sood MD, Last Rate: 25 mL/hr at 08/20/20 1214    ceFAZolin (ANCEF) 2 g in dextrose 5 % 50 mL IVPB, 2 g, Intravenous, On Call to Jarod Munguia MD  Prior to Admission medications    Medication Sig Start Date End Date Taking?  Authorizing Provider   losartan (COZAAR) 25 MG tablet Take 1 tablet by mouth daily 7/27/20  Yes ROMAN Espinosa CNP   metoprolol succinate (TOPROL XL) 25 MG extended release tablet Take 25 mg by mouth daily   Yes Historical Provider, MD   pravastatin (PRAVACHOL) 40 MG tablet TAKE 1 TABLET BY MOUTH DAILY 7/14/20  Yes ROMAN Hightower CNP   hydrocortisone (ANUSOL-HC) 25 MG suppository Place 1 suppository rectally 2 times daily as needed for Hemorrhoids 7/2/20  Yes ROMAN Lopes CNP   clopidogrel (PLAVIX) 75 MG tablet Take 1 tablet by mouth daily 6/8/20  Yes ROMAN Espinosa CNP   potassium chloride (KLOR-CON M) 10 MEQ extended release tablet Take 1 tablet by mouth daily 6/8/20  Yes ROMAN Espinosa CNP   magnesium (MAGNESIUM-OXIDE) 250 MG TABS tablet TAKE 2 TABLETS BY MOUTH TWICE DAILY 4/6/20  Yes ROMAN Espinosa - CNP   pantoprazole (PROTONIX) 40 MG tablet Take 40 mg by mouth 2 times daily (before meals)   Yes Historical Provider, MD   melatonin 3 MG TABS tablet Take 2 tablets by mouth nightly as needed (sleep) 2/26/19  Yes Ananda Kirby PA-C   acetaminophen (TYLENOL ARTHRITIS PAIN) 650 MG CR tablet Take 1,300 mg by mouth every 12 hours as needed for Pain    Yes Historical Provider, MD   timolol (TIMOPTIC) 0.5 % ophthalmic solution Place 1 drop into both eyes nightly    Yes Historical Provider, MD   apixaban (ELIQUIS) 2.5 MG TABS tablet TAKE 1 TABLET BY MOUTH TWICE DAILY 7/2/20   Telma Kilgore MD   albuterol sulfate HFA (PROVENTIL HFA) 108 (90 Base) MCG/ACT inhaler Inhale 2 puffs into the lungs every 6 hours as needed for Wheezing or Shortness of Breath 12/31/19   Florian Pedersen, DO   Blood Pressure KIT Check blood pressure daily, and if symptoms of lightheadedness.   Call physician if BP <80/40. 12/31/19   Florian Pedersen, DO     Additional information:       VITAL SIGNS   Vitals:    08/20/20 1214   BP: 110/61   Pulse:    Resp:    Temp:    SpO2:        PHYSICAL:   General: No acute distress  HEENT:  Unremarkable for age  Neck: without increased JVD, carotid pulses 2+ bilaterally without bruits  Heart: RRR, S1 & S2 WNL, S4 gallop, without murmurs or rubs   NYHA: II   Lungs: Clear to auscultation    Abdomen: BS present, without HSM, masses, or tenderness    Extremities: without C,C,E.  Pulses 2+ bilaterally  Mental Status: Alert & Oriented        PLANNED PROCEDURE   []Cath  []PCI                [x]Pacemaker/AICD  []YANDEL             []Cardioversion []Peripheral angiography/PTA  []Other:      SEDATION  Planned agent:[x]Midazolam []Meperidine [x]Sublimaze []Morphine  []Diazepam  []Other:     ASA Classification:  []1 []2 [x]3 []4 []5  Class 1: A normal healthy patient  Class 2: Pt with mild to moderate systemic disease  Class 3: Severe systemic disease or disturbance  Class 4: Severe systemic disorders that are already life threatening. Class 5: Moribund pt with little chances of survival, for more than 24 hours. Mallampati I Airway Classification:   []1 [x]2 []3 []4    [x]Pre-procedure diagnostic studies complete and results available. Comment:    [x]Previous sedation/anesthesia experiences assessed. Comment:    [x]The patient is an appropriate candidate to undergo the planned procedure sedation and anesthesia. (Refer to nursing sedation/analgesia documentation record)  [x]Formulation and discussion of sedation/procedure plan, risks, and expectations with patient and/or responsible adult completed. [x]Patient examined immediately prior to the procedure.  (Refer to nursing sedation/analgesia documentation record)    Mario Potter MD   Electronically signed 8/20/2020 at 1:40 PM

## 2020-08-22 LAB
EKG ATRIAL RATE: 69 BPM
EKG P AXIS: 13 DEGREES
EKG P-R INTERVAL: 174 MS
EKG Q-T INTERVAL: 446 MS
EKG QRS DURATION: 84 MS
EKG QTC CALCULATION (BAZETT): 477 MS
EKG R AXIS: -62 DEGREES
EKG T AXIS: 119 DEGREES
EKG VENTRICULAR RATE: 69 BPM

## 2020-08-25 NOTE — TELEPHONE ENCOUNTER
Patient returned call to office. Scheduled with Kandy Villalta on 9/14/20. Reminder letter sent via mail.

## 2020-08-26 ENCOUNTER — NURSE ONLY (OUTPATIENT)
Dept: CARDIOLOGY CLINIC | Age: 85
End: 2020-08-26
Payer: MEDICARE

## 2020-08-26 PROBLEM — Z95.810 ICD (IMPLANTABLE CARDIOVERTER-DEFIBRILLATOR) IN PLACE: Status: ACTIVE | Noted: 2020-08-26

## 2020-08-26 PROCEDURE — 93283 PRGRMG EVAL IMPLANTABLE DFB: CPT | Performed by: INTERNAL MEDICINE

## 2020-08-26 NOTE — PROGRESS NOTES
medtronic dual ICD initial check     . Zan Metz Battery longevity:  10.3 years on device   Presenting rhythm  AS VS     Atrial impedance 437  RV impedance 456  Shock 51    P wave sensing 2.4  R wave sensing 8.6    24.2 % atrial paced  0 % RV paced     Atrial threshold 0.75 V  at 0.4ms  RV threshold 0.5 V at 0.4ms  Mode switches   0     steri strips intact, minor puffiness, shadow of bruising, no drainage, tender to touch.     If, at any point, there is any increased redness, swelling, increased discomfort, fever, or the incision opens up, the patient is aware to go to the emergency room

## 2020-09-01 ENCOUNTER — HOSPITAL ENCOUNTER (OUTPATIENT)
Dept: CT IMAGING | Age: 85
Discharge: HOME | End: 2020-09-01
Payer: MEDICARE

## 2020-09-01 PROCEDURE — 75635 CT ANGIO ABDOMINAL ARTERIES: CPT

## 2020-09-01 PROCEDURE — 6360000004 HC RX CONTRAST MEDICATION: Performed by: PHYSICIAN ASSISTANT

## 2020-09-01 RX ADMIN — IOPAMIDOL 125 ML: 755 INJECTION, SOLUTION INTRAVENOUS at 09:35

## 2020-09-08 ENCOUNTER — OFFICE VISIT (OUTPATIENT)
Dept: CARDIOLOGY CLINIC | Age: 85
End: 2020-09-08
Payer: MEDICARE

## 2020-09-08 VITALS
DIASTOLIC BLOOD PRESSURE: 70 MMHG | HEART RATE: 59 BPM | SYSTOLIC BLOOD PRESSURE: 118 MMHG | OXYGEN SATURATION: 98 % | WEIGHT: 145.8 LBS | BODY MASS INDEX: 24.29 KG/M2 | HEIGHT: 65 IN

## 2020-09-08 PROCEDURE — 4040F PNEUMOC VAC/ADMIN/RCVD: CPT | Performed by: NURSE PRACTITIONER

## 2020-09-08 PROCEDURE — G8420 CALC BMI NORM PARAMETERS: HCPCS | Performed by: NURSE PRACTITIONER

## 2020-09-08 PROCEDURE — G8427 DOCREV CUR MEDS BY ELIG CLIN: HCPCS | Performed by: NURSE PRACTITIONER

## 2020-09-08 PROCEDURE — 99214 OFFICE O/P EST MOD 30 MIN: CPT | Performed by: NURSE PRACTITIONER

## 2020-09-08 PROCEDURE — 1036F TOBACCO NON-USER: CPT | Performed by: NURSE PRACTITIONER

## 2020-09-08 PROCEDURE — 1123F ACP DISCUSS/DSCN MKR DOCD: CPT | Performed by: NURSE PRACTITIONER

## 2020-09-08 PROCEDURE — 1090F PRES/ABSN URINE INCON ASSESS: CPT | Performed by: NURSE PRACTITIONER

## 2020-09-08 PROCEDURE — G8400 PT W/DXA NO RESULTS DOC: HCPCS | Performed by: NURSE PRACTITIONER

## 2020-09-08 ASSESSMENT — ENCOUNTER SYMPTOMS
COUGH: 0
APNEA: 0
ABDOMINAL PAIN: 0
CHEST TIGHTNESS: 0
NAUSEA: 0
WHEEZING: 0
SHORTNESS OF BREATH: 1
ABDOMINAL DISTENTION: 0
COLOR CHANGE: 0

## 2020-09-08 NOTE — PROGRESS NOTES
Lilian       Visit Date: 9/8/2020  Cardiologist:  Dr. Christi Zuluaga   Primary Care Physician: Dr. Roxana Bonilla is a 82 y.o. female who presents today for:  Chief Complaint   Patient presents with    Congestive Heart Failure       HPI: Obtained from patient and chart    Loren Moe is a 82 y.o. female who presents to the office for a follow up visit in the heart failure clinic. Hx PAD stents 2/2019, ?COPD,  smoker (she had Quit but started smoking a 1/2 PPD), CKD, AAA repair (EVAR) 2/2019, right fem-tib PTA with stent 2/2019, HTN. Negative stress test 9/2018. She was admitted for CHF and possible COPD exacerbation. Her K+ and Mg+ were both low and replaced. Repeat BMP and Mg were done on 5/30/19 and her Mg+ was 1.2 and K+ was 3.5. I called the patient and orders were placed and she was given Mg 4 gm IV in OP nursing and I also started her on Potassium.  Admitted in [place] [address] for pneumonia with sepsis, flash pulmonary edema. EF was 55% (2018) down to 10-15% grade 2 DD (Dec 2019) wore a LifeVest but sent it back in May. ? Viral CMP. EF did improve slightly to 25-30% June 2020. Recent admit for rectal bleeding (June 2020) with blood transfusions. She had refused all invasive testing/procedures but called in July was was \"ready to get things started. \" She had an ICD placed by Dr Jennie Philip Aug 20, 2020. Possible Watchman.       Accompanied by: self  Last hospital admission related to Heart Failure:  Dec 2019  Chest Pain: no  Worsening SOB: no  Worsening Orthopnea/PND: no  Edema: no  Any extra diuretic use: no  Weight gain: no  Fatigue: sometimes  Abdominal bloating: no  Appetite: good  KORINA: no  Cough: occasional   Compliant checking home weight: yes 143 lb  Compliant checking blood pressure: yes 115-120      Past Medical History:   Diagnosis Date    Arthritis     Blood circulation, collateral     BPPV (benign paroxysmal positional vertigo) 6/6/16    CHF (congestive heart failure) (HCC)     Chronic kidney disease     sees Dr Markus Reyna    Gastrointestinal hemorrhage     GERD (gastroesophageal reflux disease)     ? esophageal stricture    History of blood transfusion     HTN (hypertension)     Hx of blood clots 2018    R leg-sees ABEBA Hargrove    Hyperlipidemia     Medtronic dual ICD  8/26/2020    [facility]rom[facility]     Obesity     Osteopenia     PAC (premature atrial contraction)     on betablocker    Paralysis (HCC)     baby    Pedal edema     denied any hx of hypertension    Pneumonia     PVC (premature ventricular contraction)     sees Dr Kym Burgess PVD (peripheral vascular disease) ([facility]     Tinnitus     UTI (urinary tract infection)      Past Surgical History:   Procedure Laterality Date    ABDOMINAL AORTIC ANEURYSM REPAIR, ENDOVASCULAR N/A 2/15/2019    ABDOMINAL AORTIC ANEURYSM REPAIR ENDOVASCULAR, DECLOTTING RIGHT FEM TIB BYPASS GRAFT performed by Figueroa Dias MD at [address]    lumpectomy   [address]      30 years ago    COLONOSCOPY  08/06/2018    Dr Araceli Iglesias N/A 2/22/2019    COLONOSCOPY DIAGNOSTIC performed by Duane Slate, MD at [facility]    COLONOSCOPY N/A 2/22/2020    COLONOSCOPY CONTROL HEMORRHAGE performed by Naren Owens MD at [address] [facility]      eye, eyelids    EYE SURGERY      cataract    HEMICOLECTOMY N/A 2/25/2020    OPEN RIGHT COLON RESECTION performed by Axel Lazaro MD at [address] [facility]  07/15/2016    ME OLEGARIO SKN SUB GRFT T/A/L AREA/<100SCM /<1ST 25 SCM N/A 9/6/2018    RE-EXPLORATION RIGHT GROIN, DECLOTTING OF FEMORAL BYPASS GRAFT performed by Cortez Sales MD at [address] EGD TRANSORAL BIOPSY SINGLE/MULTIPLE Left 11/27/2017    EGD BIOPSY performed by Sade Holman MD at [address] [facility] N/A 8/20/2018    ROBOT ASSISTED COLECTOMY (LOW ANTERIOR) performed by Kwame Cunningham MD at [address] [facility] OFFICE/OUTPT [address] [facility] N/A 2018    RIGHT FEMORAL EMBOLECTOMY, INTRAOPERATIVE ARTERIOGRAM, RIGHT ILIAC EMBOLECTOMY, RIGHT COMMON AND EXTERNAL ILIAC STENTING, RIGHT FEMORAL TO ANTERIOR POPLITEAL BYPASS WITH 6MM GORTEX GRAFT performed by Carolyn Morrow MD at [address] / [address] Right 2019    FEMORAL EMBOLECTOMY THROMBECTOMY, RIGHT LEG performed by Carolyn Morrow MD at [facility] N/A 2017    EGD SUBMUCOSAL/BOTOX INJECTION performed by Katherine Menard MD at [address] [facility] ENDOSCOPY N/A 2019    EGD BIOPSY performed by Meliza Teague MD at [address] [facility]     Family History   Problem Relation Age of Onset    Heart Disease Mother     Stroke Mother     Cancer Father         lung    Emphysema Father     Other Sister         leukemia    Heart Disease Maternal Grandmother     Dementia Maternal Grandmother     Heart Disease Maternal Grandfather      Social History     Tobacco Use    Smoking status: Former Smoker     Packs/day: 0.25     Years: 60.00     Pack years: 15.00     Types: Cigarettes     Start date:      Last attempt to quit: 10/18/2019     Years since quittin.8    Smokeless tobacco: Never Used    Tobacco comment: 1 cigarette every other day   Substance Use Topics    Alcohol use:  Yes     Alcohol/week: 1.0 standard drinks     Types: 1 Glasses of wine per week     Comment: social     Current Outpatient Medications   Medication Sig Dispense Refill    apixaban (ELIQUIS) 2.5 MG TABS tablet TAKE 1 TABLET BY MOUTH TWICE DAILY 60 tablet 5    losartan (COZAAR) 25 MG tablet Take 1 tablet by mouth daily 30 tablet 5    metoprolol succinate (TOPROL XL) 25 MG extended release tablet Take 25 mg by mouth daily      pravastatin (PRAVACHOL) 40 MG tablet TAKE 1 TABLET BY MOUTH DAILY 90 tablet 3    hydrocortisone (ANUSOL-HC) 25 MG suppository Place 1 suppository rectally 2 times daily as needed for Hemorrhoids 30 suppository 1    clopidogrel (PLAVIX) 75 MG tablet Take 1 tablet by mouth daily 90 tablet 3    potassium chloride (KLOR-CON M) 10 MEQ extended release tablet Take 1 tablet by mouth daily 180 tablet 2    magnesium (MAGNESIUM-OXIDE) 250 MG TABS tablet TAKE 2 TABLETS BY MOUTH TWICE DAILY 120 tablet 6    pantoprazole (PROTONIX) 40 MG tablet Take 40 mg by mouth 2 times daily (before meals)      albuterol sulfate HFA (PROVENTIL HFA) 108 (90 Base) MCG/ACT inhaler Inhale 2 puffs into the lungs every 6 hours as needed for Wheezing or Shortness of Breath 1 Inhaler 0    Blood Pressure KIT Check blood pressure daily, and if symptoms of lightheadedness. Call physician if BP <80/40. 1 kit 0    melatonin 3 MG TABS tablet Take 2 tablets by mouth nightly as needed (sleep) 60 tablet 3    acetaminophen (TYLENOL ARTHRITIS PAIN) 650 MG CR tablet Take 1,300 mg by mouth every 12 hours as needed for Pain       timolol (TIMOPTIC) 0.5 % ophthalmic solution Place 1 drop into both eyes nightly        No current facility-administered medications for this visit. Allergies   Allergen Reactions    Aspirin Other (See Comments)     Unknown reaction, stated Dr. Velasquez Doing didn't want her to take it anymore    Lasix [Furosemide] Other (See Comments)     PATIENT DOESN'T REMEMBER    Lipitor [Atorvastatin] Other (See Comments)     LEG PAIN    Neurontin [Gabapentin] Other (See Comments)     PATIENT DOESN'T REMEMBER    Oxycontin [Oxycodone Hcl]      confusion    Penicillins Other (See Comments)     HYPERTENSION       SUBJECTIVE:   Review of Systems   Constitutional: Negative for activity change, appetite change, fatigue and fever. HENT: Negative for congestion. Respiratory: Positive for shortness of breath (with exertion ). Negative for apnea, cough, chest tightness and wheezing. Cardiovascular: Negative for chest pain, palpitations and leg swelling. Gastrointestinal: Negative for abdominal distention, abdominal pain and nausea. Genitourinary: Negative for difficulty urinating and dysuria. Musculoskeletal: Negative for arthralgias and gait problem. Skin: Negative for color change. Neurological: Negative for dizziness, numbness and headaches. Psychiatric/Behavioral: Negative for agitation, confusion and sleep disturbance. The patient is not nervous/anxious. OBJECTIVE:   Today's Vitals:  /70   Pulse 59   Ht 5' 5\" (1.651 m)   Wt 145 lb 12.8 oz (66.1 kg)   SpO2 98%   BMI 24.26 kg/m²     Physical Exam  Vitals signs reviewed. Constitutional:       Appearance: She is well-developed. HENT:      Head: Normocephalic and atraumatic. Eyes:      Pupils: Pupils are equal, round, and reactive to light. Neck:      Musculoskeletal: Normal range of motion. Vascular: No JVD. Cardiovascular:      Rate and Rhythm: Normal rate and regular rhythm. Heart sounds: Normal heart sounds. No murmur. Comments: ICD  Pulmonary:      Effort: Pulmonary effort is normal. No respiratory distress. Breath sounds: No rales. Abdominal:      General: There is no distension. Palpations: Abdomen is soft. Tenderness: There is no abdominal tenderness. Musculoskeletal:         General: No tenderness. Right lower leg: No edema. Left lower leg: No edema. Skin:     General: Skin is warm and dry. Capillary Refill: Capillary refill takes less than 2 seconds. Neurological:      Mental Status: She is alert and oriented to person, place, and time.    Psychiatric:         Mood and Affect: Mood normal.         Behavior: Behavior normal.         Wt Readings from Last 3 Encounters:   09/08/20 145 lb 12.8 oz (66.1 kg)   08/20/20 144 lb (65.3 kg)   07/22/20 145 lb 12.8 oz (66.1 kg)     BP Readings from Last 3 Encounters:   09/08/20 118/70   08/20/20 (!) 114/56   07/22/20 (!) 96/58     Pulse Readings from Last 3 Encounters: 09/08/20 59   08/20/20 61   07/22/20 73     Body mass index is 24.26 kg/m². ECHO:   6/26/20   Summary   Limited examination. Normal left ventricular size and severely reduced systolic function. There was severe global hypokinesis. Hypertrophic basal septum. Ejection fraction was estimated at 25-30%. Left atrial size was severely dilated. The mitral valve structure demonstrated posterior leaflet calcification. DOPPLER: The transmitral velocity was within the normal range with no   evidence for mitral stenosis. There was trace mitral regurgitation. IVC size is within normal limits with normal respiratory phasic changes. Signature      ----------------------------------------------------------------   Electronically signed by José Manuel Arceo MD (Interpreting   physician) on 06/26/2020 at 02:29 PM   ----------------------------------------------------------------      Findings      Mitral Valve   The mitral valve structure demonstrated posterior leaflet calcification. DOPPLER: The transmitral velocity was within the normal range with no   evidence for mitral stenosis. There was trace mitral regurgitation. Aortic Valve   The aortic valve was trileaflet with normal thickness and cuspal   separation. Tricuspid Valve   The tricuspid valve structure was normal with normal leaflet separation. Pulmonic Valve   The pulmonic valve leaflets were not well seen. Left Atrium   Left atrial size was severely dilated. Left Ventricle   Normal left ventricular size and severely reduced systolic function. There was severe global hypokinesis. Hypertrophic basal septum. Ejection fraction was estimated at 25-30%. Right Atrium   Right atrial size was normal.      Right Ventricle   The right ventricular size was normal with normal systolic function and   wall thickness.       Pericardial Effusion   The pericardium was normal in appearance with no evidence of a pericardial effusion. Pleural Effusion   No evidence of pleural effusion. Aorta / Great Vessels   IVC size is within normal limits with normal respiratory phasic changes. Results reviewed:  BNP:   Lab Results   Component Value Date    PROBNP 1837.0 (H) 06/29/2020     CBC:   Lab Results   Component Value Date    WBC 6.4 08/20/2020    RBC 5.43 08/20/2020    HGB 12.0 08/20/2020    HCT 42.9 08/20/2020     08/20/2020     CMP:    Lab Results   Component Value Date     08/20/2020    K 4.5 08/20/2020     08/20/2020    CO2 26 08/20/2020    BUN 25 08/20/2020    CREATININE 1.1 08/20/2020    LABGLOM 47 08/20/2020    GLUCOSE 99 08/20/2020    GLUCOSE 100 07/27/2020    CALCIUM 9.3 08/20/2020     Hepatic Function Panel:    Lab Results   Component Value Date    ALKPHOS 79 06/29/2020    ALT 9 06/29/2020    AST 20 06/29/2020    PROT 5.6 06/29/2020    BILITOT 0.3 06/29/2020    BILIDIR <0.2 02/20/2020    LABALBU 3.5 06/29/2020     Magnesium:    Lab Results   Component Value Date    MG 2.2 07/27/2020     PT/INR:    Lab Results   Component Value Date    INR 1.19 08/20/2020     Lipids:    Lab Results   Component Value Date    TRIG 121 05/18/2019    HDL 34 05/18/2019    LDLCALC 47 05/18/2019       ASSESSMENT AND PLAN:   The patient's condition/symptoms are Stable      Diagnosis Orders   1. CHF (congestive heart failure), NYHA class II, chronic, combined ([facility]     2. Atrial fibrillation, unspecified type ([facility] [facility]     3. Tobacco abuse     4. CMP EF 25-30% ? Viral     Plan:  · GDMT   · ACE/ARB/ARNi: Losartan 25 mg daily  · Beta Blocker: Toprol 25 mg daily  · Aldosterone antagonist: no  · Diuretic/Potassium: Potassium 10 mEq daily - not on diuretics   · Hydralazine/Nitrate: no  · Other: Eliquis, Plavix, Pravastatin   · No signs of fluid overload. BMP 8/20/20 reviewed and at her baseline. Continue current meds. Smoking cessation x 3-5 minutes. She is not interested in stopping at this time. · HF Zones reviewed.   · Daily weights and record  · Fluid restriction of 2 Liters per day (64 oz)   · Limit sodium in diet to 2615-3021 mg/day  · Healthier food options were discussed   · Monitor BP daily 1 hour after meds are taken  · Increase activity as tolerated     Patient was instructed to call the [facility] [place] [facility] for changes in the following symptoms:    Weight gain of 3 pounds in 1 day or 5 pounds in 1 week   Increased shortness of breath   Shortness of breath while laying down   Cough   Chest pain   Swelling in feet, ankles or legs   Tenderness or bloating in the abdomen   Fatigue    Decreased appetite or feeling \"full\"   Nausea    Confusion      Return in about 6 months (around 3/8/2021). or sooner if needed     Patient given educational materials - see patient instructions. We discussed the importance of weighing oneself and recording daily. We also discussed the importance of a low sodium diet, higher sodium foods to avoid and appropriate low sodium food choices. Discussed use, benefit, and side effects of prescribed medications. All patient questions answered. Patient verbalizes understanding of plan of care using teach back method, and is agreeable to the treatment plan. Greater then 30 minutes of time was spent reviewing the chart and educating the patient about HF, medications, diet, exercise, and discussing the plan of care. I personally spent more then 50% of the appt time face to face with the patient counseling/coordinating patient's care.       Electronicallysigned by ROMAN Childers CNP on 9/8/2020 at 3:04 PM

## 2020-09-08 NOTE — PATIENT INSTRUCTIONS
You may receive a survey regarding the care you received during your visit. Your input is valuable to us. We encourage you to complete and return your survey. We hope you will choose us in the future for your healthcare needs. Continue:  · Take all medications as prescribed   · Daily weights and record - please try to weigh yourself upon awakening before eating or drinking   · Fluid restriction of 2 Liters per day (64 oz)  · Limit sodium in diet to around 4833-6259 mg/day  · Monitor BP - take BP 1 hour after meds  · Increase activity as tolerated     Call the Heart Failure Clinic for any of the following symptoms: [phone]   Weight gain of 3 pounds in 1 day or 5 pounds in 1 week   Increased shortness of breath   Shortness of breath while laying down   Cough   Chest pain   Swelling in feet, ankles or legs   Tenderness or bloating in the abdomen   Fatigue    Decreased appetite or feeling \"full\"    Nausea    Confusion     **PLEASE bring all medications in original bottles with you to your next appointment**    Educational websites:    [email or web address] org (American Heart Association)    [employer]. com ([employer])    [employer].com ([employer])    Text LEARN to 6-027-Njmjp-34 (2-872.860.4434)  -- to learn more about [employer]   By enrolling in this education series you are giving permission to [employer] to contact you via SMS (text message). You can opt out of receiving this information by replying STOP at any time in the SMS correspondence that you  receive. To ensure your privacy, [employer] and its subcontractors will not sell or share your personal information  with any third parties except as required by law. [address] [place], [facility], Tel: 1 [phone]  [employer]. com  Rx Only  Brief Summary: Prior to using these devices, please review the Instructions for Use for a complete listing of  indications, contraindications, warnings,

## 2020-09-14 ENCOUNTER — OFFICE VISIT (OUTPATIENT)
Dept: CARDIOTHORACIC SURGERY | Age: 85
End: 2020-09-14
Payer: MEDICARE

## 2020-09-14 VITALS
HEART RATE: 80 BPM | WEIGHT: 144.8 LBS | BODY MASS INDEX: 24.12 KG/M2 | HEIGHT: 65 IN | OXYGEN SATURATION: 97 % | SYSTOLIC BLOOD PRESSURE: 112 MMHG | TEMPERATURE: 96.9 F | DIASTOLIC BLOOD PRESSURE: 66 MMHG

## 2020-09-14 PROCEDURE — 1090F PRES/ABSN URINE INCON ASSESS: CPT | Performed by: THORACIC SURGERY (CARDIOTHORACIC VASCULAR SURGERY)

## 2020-09-14 PROCEDURE — 99213 OFFICE O/P EST LOW 20 MIN: CPT | Performed by: THORACIC SURGERY (CARDIOTHORACIC VASCULAR SURGERY)

## 2020-09-14 PROCEDURE — G8420 CALC BMI NORM PARAMETERS: HCPCS | Performed by: THORACIC SURGERY (CARDIOTHORACIC VASCULAR SURGERY)

## 2020-09-14 PROCEDURE — G8400 PT W/DXA NO RESULTS DOC: HCPCS | Performed by: THORACIC SURGERY (CARDIOTHORACIC VASCULAR SURGERY)

## 2020-09-14 PROCEDURE — G8427 DOCREV CUR MEDS BY ELIG CLIN: HCPCS | Performed by: THORACIC SURGERY (CARDIOTHORACIC VASCULAR SURGERY)

## 2020-09-14 PROCEDURE — 4040F PNEUMOC VAC/ADMIN/RCVD: CPT | Performed by: THORACIC SURGERY (CARDIOTHORACIC VASCULAR SURGERY)

## 2020-09-14 PROCEDURE — 1036F TOBACCO NON-USER: CPT | Performed by: THORACIC SURGERY (CARDIOTHORACIC VASCULAR SURGERY)

## 2020-09-14 PROCEDURE — 1123F ACP DISCUSS/DSCN MKR DOCD: CPT | Performed by: THORACIC SURGERY (CARDIOTHORACIC VASCULAR SURGERY)

## 2020-09-14 NOTE — PROGRESS NOTES
sub grft t/a/l area/<100scm /<1st 25 scm (N/A, 9/6/2018); THROMBECTOMY / EMBOLECTOMY FEMORAL (Right, 2/14/2019); AAA repair, endovascular (N/A, 2/15/2019); Upper gastrointestinal endoscopy (N/A, 2/22/2019); Colonoscopy (N/A, 2/22/2019); Colonoscopy (N/A, 2/22/2020); and hemicolectomy (N/A, 2/25/2020). Allergies: The patient is allergic to aspirin; lasix [furosemide]; lipitor [atorvastatin]; neurontin [gabapentin]; oxycontin [oxycodone hcl]; and penicillins. Medications:    Current Outpatient Medications:     apixaban (ELIQUIS) 2.5 MG TABS tablet, TAKE 1 TABLET BY MOUTH TWICE DAILY, Disp: 60 tablet, Rfl: 5    losartan (COZAAR) 25 MG tablet, Take 1 tablet by mouth daily, Disp: 30 tablet, Rfl: 5    metoprolol succinate (TOPROL XL) 25 MG extended release tablet, Take 25 mg by mouth daily, Disp: , Rfl:     pravastatin (PRAVACHOL) 40 MG tablet, TAKE 1 TABLET BY MOUTH DAILY, Disp: 90 tablet, Rfl: 3    hydrocortisone (ANUSOL-HC) 25 MG suppository, Place 1 suppository rectally 2 times daily as needed for Hemorrhoids, Disp: 30 suppository, Rfl: 1    clopidogrel (PLAVIX) 75 MG tablet, Take 1 tablet by mouth daily, Disp: 90 tablet, Rfl: 3    potassium chloride (KLOR-CON M) 10 MEQ extended release tablet, Take 1 tablet by mouth daily, Disp: 180 tablet, Rfl: 2    magnesium (MAGNESIUM-OXIDE) 250 MG TABS tablet, TAKE 2 TABLETS BY MOUTH TWICE DAILY, Disp: 120 tablet, Rfl: 6    pantoprazole (PROTONIX) 40 MG tablet, Take 40 mg by mouth 2 times daily (before meals), Disp: , Rfl:     albuterol sulfate HFA (PROVENTIL HFA) 108 (90 Base) MCG/ACT inhaler, Inhale 2 puffs into the lungs every 6 hours as needed for Wheezing or Shortness of Breath, Disp: 1 Inhaler, Rfl: 0    Blood Pressure KIT, Check blood pressure daily, and if symptoms of lightheadedness.   Call physician if BP <80/40., Disp: 1 kit, Rfl: 0    melatonin 3 MG TABS tablet, Take 2 tablets by mouth nightly as needed (sleep), Disp: 60 tablet, Rfl: 3   discoloration of skin in right lower leg. Skin: Skin color, texture, turgor normal.  No rashes or lesions. Neurologic:  Neurovascularly intact without any focal sensory/motor deficits. Psychiatric:  Alert and oriented, thought content appropriate, normal insight. Capillary Refill: Brisk,< 3 seconds   Peripheral Pulses: Palpable femoral arteries bilaterally. Right groin scars are well-healed. No palpable pedal pulses. Labs:    CBC:  Lab Results   Component Value Date    WBC 6.4 08/20/2020    HGB 12.0 08/20/2020    HCT 42.9 08/20/2020    MCV 79.0 08/20/2020     08/20/2020    INR 1.19 08/20/2020     BMP:   Lab Results   Component Value Date     08/20/2020    K 4.5 08/20/2020     08/20/2020    CO2 26 08/20/2020    PHOS 1.4 02/18/2019    BUN 25 08/20/2020    CREATININE 1.1 08/20/2020    MG 2.2 07/27/2020       Imaging  I have reviewed CTA of abdomen and pelvis.     Problem List:  Patient Active Problem List   Diagnosis    Vertigo    Vertebrobasilar artery insufficiency    Acute pain of left ear    Tinnitus    BPPV (benign paroxysmal positional vertigo)    Gait instability    PAC (premature atrial contraction)    Pedal edema    Stricture of colon determined by endoscopy ([address]    Barium enema abnormal    Diverticulosis of large intestine without hemorrhage    Colon stricture (HCC)    Colonic mass    S/P laparoscopic colectomy    Lower limb ischemia    PVD (peripheral vascular disease) ([address]    Fall on same level from slipping, tripping, or stumbling    Leukocytosis    Gastroesophageal reflux disease without esophagitis    Other hyperlipidemia    Osteoarthritis of multiple joints    Diverticulosis of intestine without bleeding    Septic shock (HCC)    SVT (supraventricular tachycardia) (HCC)    Hyperglycemia    Metabolic acidosis    Hypocalcemia    Chronic hypotension    Chronic anemia    Urinary tract infection without hematuria    Paroxysmal atrial fibrillation ([address]    Colovesical fistula    Physical deconditioning    Abdominal aortic aneurysm (AAA) without rupture (HCC)    Hiatal hernia    Elevated procalcitonin    Hypomagnesemia    Pulmonary nodule    CKD (chronic kidney disease) stage 2, GFR 60-89 ml/min    Stage 3 chronic kidney disease (HCC)    Femoral popliteal artery thrombus (HCC)    PAD (peripheral artery disease) (HCC)    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 9)    Respiratory failure (HCC)    Respiratory distress    Acute on chronic congestive heart failure (HCC)    Acute on chronic combined systolic and diastolic CHF (congestive heart failure) (HCC)    Dilated cardiomyopathy (HCC)    Moderate malnutrition (HCC)    Rectal bleed    Bright red blood per rectum    Antiplatelet or antithrombotic long-term use    Acute blood loss anemia    Chronic systolic heart failure (HCC)    GIB (gastrointestinal bleeding)    CHF (congestive heart failure) (HCC)    Medtronic dual ICD        Assessment: Stable status post endoluminal graft repair of abdominal aortic aneurysm. No evidence of endoleak. Plan 9/14/20:  1) follow-up CTA of abdomen and pelvis in 1 year. Thank you for allowing us to be involved in the patient's care.     Electronically by Jamey North MD  on 9/14/2020 at 12:13 PM

## 2020-09-21 RX ORDER — METOPROLOL SUCCINATE 25 MG/1
25 TABLET, EXTENDED RELEASE ORAL DAILY
Qty: 90 TABLET | Refills: 3 | Status: SHIPPED | OUTPATIENT
Start: 2020-09-21 | End: 2021-09-27

## 2020-10-02 ENCOUNTER — APPOINTMENT (OUTPATIENT)
Dept: GENERAL RADIOLOGY | Age: 85
DRG: 389 | End: 2020-10-02
Payer: MEDICARE

## 2020-10-02 ENCOUNTER — HOSPITAL ENCOUNTER (INPATIENT)
Age: 85
LOS: 4 days | Discharge: ANOTHER HOSPITAL | DRG: 389 | End: 2020-10-06
Attending: SURGERY | Admitting: SURGERY
Payer: MEDICARE

## 2020-10-02 ENCOUNTER — APPOINTMENT (OUTPATIENT)
Dept: CT IMAGING | Age: 85
DRG: 389 | End: 2020-10-02
Payer: MEDICARE

## 2020-10-02 PROBLEM — K56.609 SBO (SMALL BOWEL OBSTRUCTION) (HCC): Status: ACTIVE | Noted: 2020-10-02

## 2020-10-02 LAB
ALBUMIN SERPL-MCNC: 3.8 G/DL (ref 3.5–5.1)
ALP BLD-CCNC: 128 U/L (ref 38–126)
ALT SERPL-CCNC: 13 U/L (ref 11–66)
ANION GAP SERPL CALCULATED.3IONS-SCNC: 11 MEQ/L (ref 8–16)
AST SERPL-CCNC: 20 U/L (ref 5–40)
BACTERIA: ABNORMAL /HPF
BASOPHILS # BLD: 0.5 %
BASOPHILS ABSOLUTE: 0.1 THOU/MM3 (ref 0–0.1)
BILIRUB SERPL-MCNC: 0.4 MG/DL (ref 0.3–1.2)
BILIRUBIN DIRECT: < 0.2 MG/DL (ref 0–0.3)
BILIRUBIN URINE: NEGATIVE
BLOOD, URINE: NEGATIVE
BUN BLDV-MCNC: 26 MG/DL (ref 7–22)
CALCIUM SERPL-MCNC: 9.5 MG/DL (ref 8.5–10.5)
CASTS 2: ABNORMAL /LPF
CASTS UA: ABNORMAL /LPF
CHARACTER, URINE: ABNORMAL
CHLORIDE BLD-SCNC: 105 MEQ/L (ref 98–111)
CO2: 24 MEQ/L (ref 23–33)
COLOR: YELLOW
CREAT SERPL-MCNC: 1.1 MG/DL (ref 0.4–1.2)
CRYSTALS, UA: ABNORMAL
EKG ATRIAL RATE: 78 BPM
EKG P AXIS: 24 DEGREES
EKG P-R INTERVAL: 154 MS
EKG Q-T INTERVAL: 430 MS
EKG QRS DURATION: 82 MS
EKG QTC CALCULATION (BAZETT): 490 MS
EKG R AXIS: -65 DEGREES
EKG T AXIS: 78 DEGREES
EKG VENTRICULAR RATE: 78 BPM
EOSINOPHIL # BLD: 0.4 %
EOSINOPHILS ABSOLUTE: 0.1 THOU/MM3 (ref 0–0.4)
EPITHELIAL CELLS, UA: ABNORMAL /HPF
ERYTHROCYTE [DISTWIDTH] IN BLOOD BY AUTOMATED COUNT: 21.7 % (ref 11.5–14.5)
ERYTHROCYTE [DISTWIDTH] IN BLOOD BY AUTOMATED COUNT: 59 FL (ref 35–45)
FERRITIN: 23 NG/ML (ref 10–291)
GFR SERPL CREATININE-BSD FRML MDRD: 47 ML/MIN/1.73M2
GLUCOSE BLD-MCNC: 210 MG/DL (ref 70–108)
GLUCOSE URINE: NEGATIVE MG/DL
HCT VFR BLD CALC: 55.3 % (ref 37–47)
HEMOGLOBIN: 16.7 GM/DL (ref 12–16)
IMMATURE GRANS (ABS): 0.04 THOU/MM3 (ref 0–0.07)
IMMATURE GRANULOCYTES: 0.3 %
IRON SATURATION: 13 % (ref 20–50)
IRON: 43 UG/DL (ref 50–170)
KETONES, URINE: NEGATIVE
LACTIC ACID, SEPSIS: 1.2 MMOL/L (ref 0.5–1.9)
LEUKOCYTE ESTERASE, URINE: NEGATIVE
LIPASE: 21.7 U/L (ref 5.6–51.3)
LYMPHOCYTES # BLD: 6.9 %
LYMPHOCYTES ABSOLUTE: 0.9 THOU/MM3 (ref 1–4.8)
MAGNESIUM: 2.2 MG/DL (ref 1.6–2.4)
MCH RBC QN AUTO: 24.2 PG (ref 26–33)
MCHC RBC AUTO-ENTMCNC: 30.2 GM/DL (ref 32.2–35.5)
MCV RBC AUTO: 80 FL (ref 81–99)
MISCELLANEOUS 2: ABNORMAL
MONOCYTES # BLD: 3.7 %
MONOCYTES ABSOLUTE: 0.5 THOU/MM3 (ref 0.4–1.3)
NITRITE, URINE: POSITIVE
NUCLEATED RED BLOOD CELLS: 0 /100 WBC
OSMOLALITY CALCULATION: 290.4 MOSMOL/KG (ref 275–300)
PH UA: 7.5 (ref 5–9)
PLATELET # BLD: 199 THOU/MM3 (ref 130–400)
PMV BLD AUTO: ABNORMAL FL (ref 9.4–12.4)
POTASSIUM SERPL-SCNC: 4.8 MEQ/L (ref 3.5–5.2)
PROTEIN UA: ABNORMAL
RBC # BLD: 6.91 MILL/MM3 (ref 4.2–5.4)
RBC URINE: ABNORMAL /HPF
RENAL EPITHELIAL, UA: ABNORMAL
SEG NEUTROPHILS: 88.2 %
SEGMENTED NEUTROPHILS ABSOLUTE COUNT: 11.3 THOU/MM3 (ref 1.8–7.7)
SODIUM BLD-SCNC: 140 MEQ/L (ref 135–145)
SPECIFIC GRAVITY, URINE: 1.02 (ref 1–1.03)
TOTAL IRON BINDING CAPACITY: 343 UG/DL (ref 171–450)
TOTAL PROTEIN: 6.7 G/DL (ref 6.1–8)
TROPONIN T: < 0.01 NG/ML
UROBILINOGEN, URINE: 0.2 EU/DL (ref 0–1)
WBC # BLD: 12.8 THOU/MM3 (ref 4.8–10.8)
WBC UA: ABNORMAL /HPF
YEAST: ABNORMAL

## 2020-10-02 PROCEDURE — 2580000003 HC RX 258: Performed by: PHYSICIAN ASSISTANT

## 2020-10-02 PROCEDURE — 96375 TX/PRO/DX INJ NEW DRUG ADDON: CPT

## 2020-10-02 PROCEDURE — APPSS60 APP SPLIT SHARED TIME 46-60 MINUTES: Performed by: NURSE PRACTITIONER

## 2020-10-02 PROCEDURE — 71045 X-RAY EXAM CHEST 1 VIEW: CPT

## 2020-10-02 PROCEDURE — 83735 ASSAY OF MAGNESIUM: CPT

## 2020-10-02 PROCEDURE — 82248 BILIRUBIN DIRECT: CPT

## 2020-10-02 PROCEDURE — 99285 EMERGENCY DEPT VISIT HI MDM: CPT

## 2020-10-02 PROCEDURE — 6360000002 HC RX W HCPCS: Performed by: PHYSICIAN ASSISTANT

## 2020-10-02 PROCEDURE — 2580000003 HC RX 258: Performed by: NURSE PRACTITIONER

## 2020-10-02 PROCEDURE — 99223 1ST HOSP IP/OBS HIGH 75: CPT | Performed by: PHYSICIAN ASSISTANT

## 2020-10-02 PROCEDURE — 96374 THER/PROPH/DIAG INJ IV PUSH: CPT

## 2020-10-02 PROCEDURE — C9113 INJ PANTOPRAZOLE SODIUM, VIA: HCPCS | Performed by: NURSE PRACTITIONER

## 2020-10-02 PROCEDURE — 83540 ASSAY OF IRON: CPT

## 2020-10-02 PROCEDURE — 93005 ELECTROCARDIOGRAM TRACING: CPT | Performed by: PHYSICIAN ASSISTANT

## 2020-10-02 PROCEDURE — 6360000004 HC RX CONTRAST MEDICATION: Performed by: PHYSICIAN ASSISTANT

## 2020-10-02 PROCEDURE — 83550 IRON BINDING TEST: CPT

## 2020-10-02 PROCEDURE — 84484 ASSAY OF TROPONIN QUANT: CPT

## 2020-10-02 PROCEDURE — 36415 COLL VENOUS BLD VENIPUNCTURE: CPT

## 2020-10-02 PROCEDURE — 83690 ASSAY OF LIPASE: CPT

## 2020-10-02 PROCEDURE — 1200000003 HC TELEMETRY R&B

## 2020-10-02 PROCEDURE — 6360000002 HC RX W HCPCS: Performed by: NURSE PRACTITIONER

## 2020-10-02 PROCEDURE — 99222 1ST HOSP IP/OBS MODERATE 55: CPT | Performed by: SURGERY

## 2020-10-02 PROCEDURE — 82728 ASSAY OF FERRITIN: CPT

## 2020-10-02 PROCEDURE — 81001 URINALYSIS AUTO W/SCOPE: CPT

## 2020-10-02 PROCEDURE — 6370000000 HC RX 637 (ALT 250 FOR IP): Performed by: PHYSICIAN ASSISTANT

## 2020-10-02 PROCEDURE — 99222 1ST HOSP IP/OBS MODERATE 55: CPT | Performed by: THORACIC SURGERY (CARDIOTHORACIC VASCULAR SURGERY)

## 2020-10-02 PROCEDURE — 85025 COMPLETE CBC W/AUTO DIFF WBC: CPT

## 2020-10-02 PROCEDURE — 83605 ASSAY OF LACTIC ACID: CPT

## 2020-10-02 PROCEDURE — 74177 CT ABD & PELVIS W/CONTRAST: CPT

## 2020-10-02 PROCEDURE — 6370000000 HC RX 637 (ALT 250 FOR IP): Performed by: NURSE PRACTITIONER

## 2020-10-02 PROCEDURE — 80053 COMPREHEN METABOLIC PANEL: CPT

## 2020-10-02 RX ORDER — PANTOPRAZOLE SODIUM 40 MG/1
40 TABLET, DELAYED RELEASE ORAL
Status: DISCONTINUED | OUTPATIENT
Start: 2020-10-02 | End: 2020-10-02 | Stop reason: SDUPTHER

## 2020-10-02 RX ORDER — 0.9 % SODIUM CHLORIDE 0.9 %
1000 INTRAVENOUS SOLUTION INTRAVENOUS ONCE
Status: DISCONTINUED | OUTPATIENT
Start: 2020-10-02 | End: 2020-10-02

## 2020-10-02 RX ORDER — ONDANSETRON 2 MG/ML
4 INJECTION INTRAMUSCULAR; INTRAVENOUS EVERY 6 HOURS PRN
Status: DISCONTINUED | OUTPATIENT
Start: 2020-10-02 | End: 2020-10-06 | Stop reason: HOSPADM

## 2020-10-02 RX ORDER — ALBUTEROL SULFATE 90 UG/1
2 AEROSOL, METERED RESPIRATORY (INHALATION) EVERY 6 HOURS PRN
Status: DISCONTINUED | OUTPATIENT
Start: 2020-10-02 | End: 2020-10-06 | Stop reason: HOSPADM

## 2020-10-02 RX ORDER — METOPROLOL SUCCINATE 25 MG/1
25 TABLET, EXTENDED RELEASE ORAL DAILY
Status: DISCONTINUED | OUTPATIENT
Start: 2020-10-02 | End: 2020-10-06 | Stop reason: HOSPADM

## 2020-10-02 RX ORDER — HYDROCORTISONE ACETATE 25 MG/1
25 SUPPOSITORY RECTAL 2 TIMES DAILY PRN
Status: DISCONTINUED | OUTPATIENT
Start: 2020-10-02 | End: 2020-10-06 | Stop reason: HOSPADM

## 2020-10-02 RX ORDER — SODIUM CHLORIDE 9 MG/ML
INJECTION, SOLUTION INTRAVENOUS CONTINUOUS
Status: DISCONTINUED | OUTPATIENT
Start: 2020-10-02 | End: 2020-10-06 | Stop reason: HOSPADM

## 2020-10-02 RX ORDER — PRAVASTATIN SODIUM 40 MG
40 TABLET ORAL DAILY
Status: DISCONTINUED | OUTPATIENT
Start: 2020-10-02 | End: 2020-10-06 | Stop reason: HOSPADM

## 2020-10-02 RX ORDER — PANTOPRAZOLE SODIUM 40 MG/10ML
40 INJECTION, POWDER, LYOPHILIZED, FOR SOLUTION INTRAVENOUS 2 TIMES DAILY
Status: DISCONTINUED | OUTPATIENT
Start: 2020-10-02 | End: 2020-10-06 | Stop reason: HOSPADM

## 2020-10-02 RX ORDER — CLOPIDOGREL BISULFATE 75 MG/1
75 TABLET ORAL DAILY
Status: DISCONTINUED | OUTPATIENT
Start: 2020-10-03 | End: 2020-10-05

## 2020-10-02 RX ORDER — SODIUM CHLORIDE 0.9 % (FLUSH) 0.9 %
10 SYRINGE (ML) INJECTION PRN
Status: DISCONTINUED | OUTPATIENT
Start: 2020-10-02 | End: 2020-10-06 | Stop reason: HOSPADM

## 2020-10-02 RX ORDER — ONDANSETRON 2 MG/ML
4 INJECTION INTRAMUSCULAR; INTRAVENOUS ONCE
Status: COMPLETED | OUTPATIENT
Start: 2020-10-02 | End: 2020-10-02

## 2020-10-02 RX ORDER — POTASSIUM CHLORIDE 750 MG/1
10 TABLET, FILM COATED, EXTENDED RELEASE ORAL DAILY
Status: DISCONTINUED | OUTPATIENT
Start: 2020-10-02 | End: 2020-10-06 | Stop reason: HOSPADM

## 2020-10-02 RX ORDER — ACETAMINOPHEN 500 MG
1000 TABLET ORAL ONCE
Status: COMPLETED | OUTPATIENT
Start: 2020-10-02 | End: 2020-10-02

## 2020-10-02 RX ORDER — LANOLIN ALCOHOL/MO/W.PET/CERES
6 CREAM (GRAM) TOPICAL NIGHTLY PRN
Status: DISCONTINUED | OUTPATIENT
Start: 2020-10-02 | End: 2020-10-06 | Stop reason: HOSPADM

## 2020-10-02 RX ORDER — TIMOLOL MALEATE 5 MG/ML
1 SOLUTION/ DROPS OPHTHALMIC NIGHTLY
Status: DISCONTINUED | OUTPATIENT
Start: 2020-10-02 | End: 2020-10-06 | Stop reason: HOSPADM

## 2020-10-02 RX ORDER — ACETAMINOPHEN 325 MG/1
650 TABLET ORAL EVERY 4 HOURS PRN
Status: DISCONTINUED | OUTPATIENT
Start: 2020-10-02 | End: 2020-10-06 | Stop reason: HOSPADM

## 2020-10-02 RX ORDER — SODIUM CHLORIDE 0.9 % (FLUSH) 0.9 %
10 SYRINGE (ML) INJECTION EVERY 12 HOURS SCHEDULED
Status: DISCONTINUED | OUTPATIENT
Start: 2020-10-02 | End: 2020-10-06 | Stop reason: HOSPADM

## 2020-10-02 RX ORDER — LOSARTAN POTASSIUM 25 MG/1
25 TABLET ORAL DAILY
Status: DISCONTINUED | OUTPATIENT
Start: 2020-10-02 | End: 2020-10-06 | Stop reason: HOSPADM

## 2020-10-02 RX ORDER — ONDANSETRON 4 MG/1
4 TABLET, ORALLY DISINTEGRATING ORAL EVERY 8 HOURS PRN
Status: DISCONTINUED | OUTPATIENT
Start: 2020-10-02 | End: 2020-10-06 | Stop reason: HOSPADM

## 2020-10-02 RX ADMIN — ONDANSETRON 4 MG: 2 INJECTION INTRAMUSCULAR; INTRAVENOUS at 07:09

## 2020-10-02 RX ADMIN — PANTOPRAZOLE SODIUM 40 MG: 40 INJECTION, POWDER, FOR SOLUTION INTRAVENOUS at 15:54

## 2020-10-02 RX ADMIN — PRAVASTATIN SODIUM 40 MG: 40 TABLET ORAL at 15:54

## 2020-10-02 RX ADMIN — ACETAMINOPHEN 1000 MG: 500 TABLET ORAL at 07:08

## 2020-10-02 RX ADMIN — ACETAMINOPHEN 650 MG: 325 TABLET ORAL at 15:54

## 2020-10-02 RX ADMIN — Medication 6 MG: at 23:10

## 2020-10-02 RX ADMIN — SODIUM CHLORIDE: 9 INJECTION, SOLUTION INTRAVENOUS at 07:08

## 2020-10-02 RX ADMIN — PANTOPRAZOLE SODIUM 40 MG: 40 INJECTION, POWDER, FOR SOLUTION INTRAVENOUS at 20:44

## 2020-10-02 RX ADMIN — POTASSIUM CHLORIDE 10 MEQ: 750 TABLET, FILM COATED, EXTENDED RELEASE ORAL at 15:54

## 2020-10-02 RX ADMIN — Medication 10 ML: at 20:44

## 2020-10-02 RX ADMIN — ACETAMINOPHEN 650 MG: 325 TABLET ORAL at 23:10

## 2020-10-02 RX ADMIN — CEFTRIAXONE SODIUM 1 G: 1 INJECTION, POWDER, FOR SOLUTION INTRAMUSCULAR; INTRAVENOUS at 09:57

## 2020-10-02 RX ADMIN — METOPROLOL SUCCINATE 25 MG: 25 TABLET, FILM COATED, EXTENDED RELEASE ORAL at 15:54

## 2020-10-02 RX ADMIN — TIMOLOL MALEATE 1 DROP: 5 SOLUTION/ DROPS OPHTHALMIC at 20:44

## 2020-10-02 RX ADMIN — LOSARTAN POTASSIUM 25 MG: 25 TABLET, FILM COATED ORAL at 15:54

## 2020-10-02 RX ADMIN — IOPAMIDOL 80 ML: 755 INJECTION, SOLUTION INTRAVENOUS at 08:26

## 2020-10-02 ASSESSMENT — PAIN SCALES - GENERAL
PAINLEVEL_OUTOF10: 8
PAINLEVEL_OUTOF10: 7
PAINLEVEL_OUTOF10: 7
PAINLEVEL_OUTOF10: 8

## 2020-10-02 ASSESSMENT — PAIN DESCRIPTION - ORIENTATION: ORIENTATION: LOWER;RIGHT;LEFT;MID

## 2020-10-02 ASSESSMENT — ENCOUNTER SYMPTOMS
COLOR CHANGE: 0
ABDOMINAL PAIN: 1
NAUSEA: 1
VOMITING: 0
EYE PAIN: 0
SHORTNESS OF BREATH: 0
EYE ITCHING: 0
BACK PAIN: 0
WHEEZING: 0
EYE DISCHARGE: 0
SORE THROAT: 0
RHINORRHEA: 0
COUGH: 0
DIARRHEA: 0

## 2020-10-02 ASSESSMENT — PAIN DESCRIPTION - PAIN TYPE
TYPE: ACUTE PAIN

## 2020-10-02 ASSESSMENT — PAIN - FUNCTIONAL ASSESSMENT: PAIN_FUNCTIONAL_ASSESSMENT: PREVENTS OR INTERFERES SOME ACTIVE ACTIVITIES AND ADLS

## 2020-10-02 ASSESSMENT — PAIN DESCRIPTION - ONSET
ONSET: ON-GOING
ONSET: SUDDEN

## 2020-10-02 ASSESSMENT — PAIN DESCRIPTION - FREQUENCY
FREQUENCY: INTERMITTENT

## 2020-10-02 ASSESSMENT — PAIN DESCRIPTION - PROGRESSION: CLINICAL_PROGRESSION: NOT CHANGED

## 2020-10-02 ASSESSMENT — PAIN DESCRIPTION - LOCATION
LOCATION: ABDOMEN

## 2020-10-02 ASSESSMENT — PAIN DESCRIPTION - DESCRIPTORS
DESCRIPTORS: CRAMPING;ACHING
DESCRIPTORS: CRAMPING;ACHING
DESCRIPTORS: CRAMPING
DESCRIPTORS: CRAMPING;ACHING

## 2020-10-02 NOTE — CONSULTS
Consult History & Physical      Patient:  Jaylene Mcdonnell  YOB: 1935  MRN: [id number]     Acct: [de-identified]    Chief Complaint:    Chief Complaint   Patient presents with    Abdominal Pain       Date of Service: Pt seen/examined in consultation on 10/2/2020    History Of Present Illness:      82 y.o. female who we are asked to see/evaluate by Russell Fuentes MD for medical management of thickening of GE junction noted on imaging. She came to the ED early this morning for abdominal pain and nausea, no vomiting. CT A/P demonstrated dilation of the distal esophageal & thickening at the GE junction, cannot exclude neoplasm; multiple dilated loops of jejunal small bowel, ileus vs early or partial SBO; prior aortobiliac endograft stenting, graft appears patent, aneurysm sac measures 4 x 3.8 cm which is unchanged from prior; subtle increased attenuation involving a small portion of the rightward aneurysm sac cannot exclude a focal area of endoleak which was not previously seen, CTA abdomen/pelvis recommended. She has a history of AAA repair 02/2019. An NG tube was inserted with cloudy, tan/milky drainage noted. She denies diarrhea, constipation, melena, and hematochezia. She is on Plavix and Eliquis for a history of blood clots and afib. 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 She had an open right colon resection 02/25/20 for a cecal mass. Last EGD was 05/03/19 demonstrated a dilated esophagus with fluid and a small amount of food, gastritis, and healed gastric erosions. Last colonoscopy was 02/22/20, for rectal bleeding, demonstrated a cecal mass lesions, 2 polyps, large grade 2 internal hemorrhoids, extensive diverticulosis involving the sigmoid colon, & active bleeding near the anastomosis of the sigmoid resection in the rectum from a diverticulum. Hemostasis achieved after 2 Hemoclips placed, polyps not removed.  She has a history of achalasia with Botox injections. She follows with Dr. Terry Gomes. At her last office visit, it was discussed that she should establish at a tertiary care center, [facility] recommended, for multiple GI concerns and very high risk for endoscopies due to heart disease, chronic anticoagulation, and multiple comorbidities. Past Medical History:    Past Medical History:   Diagnosis Date    Arthritis     Blood circulation, collateral     BPPV (benign paroxysmal positional vertigo) 6/6/16    CHF (congestive heart failure) ([facility]     Chronic kidney disease     sees Dr Melita Oconnor    Gastrointestinal hemorrhage     GERD (gastroesophageal reflux disease)     ? esophageal stricture    History of blood transfusion     HTN (hypertension)     Hx of blood clots 2018    R leg-sees ABEBA Hargrove    Hyperlipidemia     [employer] dual ICD  8/26/2020    [facility]romuscular disorder ([facility] [address]     Obesity     Osteopenia     PAC (premature atrial contraction)     on betablocker    Paralysis ([facility])     baby    Pedal edema     denied any hx of hypertension    Pneumonia     PVC (premature ventricular contraction)     sees Dr Chelsie Winkler PVD (peripheral vascular disease) ([facility] [address]     Tinnitus     UTI (urinary tract infection)        Home Medications:  Prior to Admission medications    Medication Sig Start Date End Date Taking?  Authorizing Provider   metoprolol succinate (TOPROL XL) 25 MG extended release tablet Take 1 tablet by mouth daily 9/21/20   Deirdre Nicholson APRN - CNP   apixaban (ELIQUIS) 2.5 MG TABS tablet TAKE 1 TABLET BY MOUTH TWICE DAILY 8/31/20   ROMAN Espinosa - CNP   losartan (COZAAR) 25 MG tablet Take 1 tablet by mouth daily 7/27/20   ROMAN Espinosa CNP   pravastatin (PRAVACHOL) 40 MG tablet TAKE 1 TABLET BY MOUTH DAILY 7/14/20   ROMAN Sherman - CNP   hydrocortisone (ANUSOL-HC) 25 MG suppository Place 1 suppository rectally 2 times daily as needed for Hemorrhoids 7/2/20   ROMAN Degroot CNP clopidogrel (PLAVIX) 75 MG tablet Take 1 tablet by mouth daily 6/8/20   Sylvia Lemosttfring, APRN - CNP   potassium chloride (KLOR-CON M) 10 MEQ extended release tablet Take 1 tablet by mouth daily 6/8/20   Sylvia Lemosttfring, APRN - CNP   magnesium (MAGNESIUM-OXIDE) 250 MG TABS tablet TAKE 2 TABLETS BY MOUTH TWICE DAILY 4/6/20   Sylvia C Amintattfring, APRN - CNP   pantoprazole (PROTONIX) 40 MG tablet Take 40 mg by mouth 2 times daily (before meals)    Historical Provider, MD   albuterol sulfate HFA (PROVENTIL HFA) 108 (90 Base) MCG/ACT inhaler Inhale 2 puffs into the lungs every 6 hours as needed for Wheezing or Shortness of Breath 12/31/19   Lou Odonnell, DO   Blood Pressure KIT Check blood pressure daily, and if symptoms of lightheadedness.   Call physician if BP <80/40. 12/31/19   Lou Odonnell, DO   melatonin 3 MG TABS tablet Take 2 tablets by mouth nightly as needed (sleep) 2/26/19   Brayan Reis PA-C   acetaminophen (TYLENOL ARTHRITIS PAIN) 650 MG CR tablet Take 1,300 mg by mouth every 12 hours as needed for Pain     Historical Provider, MD   timolol (TIMOPTIC) 0.5 % ophthalmic solution Place 1 drop into both eyes nightly     Historical Provider, MD       Surgical History:  Past Surgical History:   Procedure Laterality Date    ABDOMINAL AORTIC ANEURYSM REPAIR, ENDOVASCULAR N/A 2/15/2019    ABDOMINAL AORTIC ANEURYSM REPAIR ENDOVASCULAR, DECLOTTING RIGHT FEM TIB BYPASS GRAFT performed by Jaida Irizarry MD at [id number]    lumpectomy    CHOLECYSTECTOMY      30 years ago    COLONOSCOPY  08/06/2018    Dr Niraj Leger 2/22/2019    COLONOSCOPY DIAGNOSTIC performed by Cam Shepard MD at [facility]    COLONOSCOPY N/A 2/22/2020    COLONOSCOPY CONTROL HEMORRHAGE performed by Glenda Bolden MD at [address]      eye, eyelids    EYE SURGERY      cataract    HEMICOLECTOMY N/A 2/25/2020    OPEN RIGHT COLON RESECTION performed by Edin Howe smoking use included cigarettes. She started smoking about 64 years ago. She has a 15.00 pack-year smoking history. She has never used smokeless tobacco.  ETOH:   reports current alcohol use of about 1.0 standard drinks of alcohol per week. Review Of Systems  GENERAL: No fever, chills or weight loss. EYES:  No  blurred vision, double vision   CARDIOVASCULAR: No chest pain or palpitations. RESPIRATORY:  No dyspnea or cough. GI:  See HPI  MUSCULOSKELETAL: No new painful or swollen joints or myalgias. :   No dysuria or hematuria. SKIN:  No rashes or jaundice. NEUROLOGIC:  No headaches or seizures, numbness or tingling of arms, or legs. PSYCH:  No anxiety or depression. ENDOCRINE:  No polyuria or polydipsia. BLOOD:  No anemia, bleeding disorder, blood or blood product transfusion. PHYSICAL EXAM:  /88   Pulse 76   Temp 96.2 °F (35.7 °C) (Axillary)   Resp 16   Ht 5' 5\" (1.651 m)   Wt 145 lb 11.2 oz (66.1 kg)   SpO2 97%   BMI 24.25 kg/m²     General appearance: No apparent distress, appears stated age and cooperative. HEENT: Normal cephalic, atraumatic without obvious deformity. Pupils equal, round, and reactive to light. NG tube intact & patent. Neck: Supple, with full range of motion. No jugular venous distention. Trachea midline. Respiratory:  Normal respiratory effort. Clear to auscultation, bilaterally without Rales/Wheezes/Rhonchi. Cardiovascular: Regular rate and rhythm without murmurs, rubs or gallops. Abdomen: Soft, tender throughout with palpation, softly distended with diminished bowel sounds. Musculoskeletal: No clubbing, cyanosis or edema bilaterally. Skin: Pink, warm, dry. No rashes or lesions.   Psychiatric: Alert and oriented, thought content appropriate, normal insight    Labs:   Recent Labs     10/02/20  0644   WBC 12.8*   HGB 16.7*   HCT 55.3*        Recent Labs     10/02/20  0644      K 4.8      CO2 24   BUN 26*   CREATININE 1.1 CALCIUM 9.5     Recent Labs     10/02/20  0644   AST 20   ALT 13   BILIDIR <0.2   BILITOT 0.4   ALKPHOS 128*     Radiology:   CT abdomen/pelvis 10/02/20       Impression    1. There is dilation of the distal esophagus. There is thickening at the GE junction. Cannot exclude a neoplastic process.         2. Multiple dilated loops of jejunal small bowel are demonstrated. The transition point appears to be located within the anterior mid abdomen on axial image 59. This may be related to ileus versus early or partial small bowel obstruction.         3. Multiple dilated loops of jejunal small bowel are demonstrated. The transition point appears to be located within the anterior mid abdomen on axial image 59. This may be related to ileus versus early or partial small bowel obstruction.         4. 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 The stent graft appears patent. 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         5. There is subtle increased attenuation involving a small portion of the rightward aneurysm sac on the axial image 52. Cannot exclude a focal area of endoleak which was not seen on the prior CT dated 9/1/2020. Further evaluation could be obtained with    routine CT angiography of the abdomen and pelvis with and without contrast     CXR 10/02/20  Impression    1. There is an enteric tube present which is coiled within the upper mediastinum. This appears to be situated within a patulous redundant portion of a dilated esophagus possibly. Recommend repositioning. Code Status: Full Code    ASSESSMENT:  1. SBO  2. Abdominal pain secondary to #1  3. Nausea & vomiting secondary to #1  4. Thickening at the GE junction noted on imaging  5. H/O achalasia with esophageal dilation  6. S/P open right colon resection 02/2020  7. S/P endoluminal graft repair of AAA 02/2019  8.  S/P low anterior resection for colovesical fistula & diverticular stricture in 2018  9. GERD  10. CKD  11. CHF  12. Afib- on Eliquis & Plavix, being worked-up OP for possible ICD vs Watchman procedure  13. HTN  14. H/O blood clots  15. PVD  16. H/O diverticulosis & colon polyps  17. H/O internal hemorrhoids    PLAN:     Monitor H & H, transfuse prn   NPO   NG to LIWS   Pain & nausea control per primary   IVP PPI BID   No plan for emergent endoscopy given patient is very high risk given cardiac disease, chronic anticoagulation, and comorbidities. Recommend EGD evaluation inpatient vs outpatient, timing to be determined by clinical course. However, EGD will need to be done at a tertiary care center  [place] & hospitalist consulted per primary   Supportive care per primary team   Will need a 5 week follow-up with Artis FRANKS  GI signing off       Case reviewed and impression/plan reviewed in collaboration with Dr. Antwon Pena  Electronically signed by ROMAN Medrano CNP on 10/2/2020 at 12:32 PM    [facility]  Thank you for the consultation.

## 2020-10-02 NOTE — CONSULTS
Hospitalist Consult Note        Patient:  Johan Linares  YOB: 1935  Date of Service: 10/2/2020  MRN: [id number]   Acct:  [de-identified]   Primary Care Physician: Rao Hill    Chief Complaint:  SBO   Reason for consult  Medical Management     Date of Service: Pt seen/examined in consultation on 10/2/2020     History Of Present Illness:      Khadra Ra[age] y.o. female who we are asked to see/evaluate by Irma Mason MD for medical management of GERD, Essential HTN, HLD, s/p ICD, BPPV, CKD, CHF, AAA s/p EVAR, hx achalasia, and severe cardiomyopathy who presents to [facility] ED with complaint of abd pain that began earlier this am. Pt was noted to have SBO on CT A/P along with associated thickening of GE junction and a possible acute endoleak noted on aneurysm sac. CTS consulted along with GI. Pt reports diffuse, achy abdominal pain, rated as 8/10 in severity. Pt refused to take anything except Tylenol for pain control. She reports associated nausea with emesis. Has not had a BM yet today, last one yesterday. Denies fever/chills. Reports dry cough associated with acid reflux. No SOB. NO CP/palpitations. Denies dysuria. Assessment and Plan:-  1. SBO: General surgery primary and managing. NG tube noted to be misplaced on imaging, discussed with RN for respositioning. NPO, IVFs (be mindful of CHF hx). 2. AAA s/p EVAR / possible acute endoleak: noted prior aorticbiliiac endograft stenting, appears patent. 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 However, subtle increased attenuation involving a small portion of the rightward aneurysm sac. Recent outpt visit with CTS 9/14 -> consulted, defer starting anticoagulation until their evaluation. 3. Thickening of GE junction: noted on CT A/P, cannot exclude neoplasm GI consulted and determined pt high risk for EGD.  Can be done as outpatient vs inpatient, however recommend being done at tertiary center. Will need a 5 week follow-up with William FRANKS. GI signed off.   4. Afib on [address]: pt takes Eliquis, being worked up as outpatient for possible ICD vs Watchman. Will hold [address] [place] [address] Road until CTS evaluates for #2. Rate control with BB. 5. Asymptomatic bacteria: pt denies any symptoms including increased frequency, urgency or dysuria. Will opt to not treat with abx at this time. S/p Rocephin in ED x1 dose. Afebrile, mild leukocytosis. Monitor. 6. Chronic systolic/diastolic HF / severe cardiomyopathy s/p ICD: pt recently had LifeVest. Daily weights, strict I/Os. Low sodium diet. Fluid restriction 2L per day, will be difficult given #1. Careful monitoring. Not on diuretic therapy. 7. CAD s/p stenting: hold [address] [place] [address] for now pending CTS eval, continue with BB and statin  8. Right fem-tib PTA with stent 2/2019: aware, hold Plavix as above. continue with statin   9. COPD without acute exacerbation: no noted wheezing on exam. Resume home albuterol inhaler PRN. 10. Essential HTN: resume antihypertensives with hold parameters  11. Hx internal hemorrhoids: resume Anusol   12. Hx achalasia s/p esophageal dilation: aware, PPI       Past Medical History:        Diagnosis Date    Arthritis     Blood circulation, collateral     BPPV (benign paroxysmal positional vertigo) 6/6/16    CHF (congestive heart failure) (HCC)     Chronic kidney disease     sees Dr Hiren Marshall    Gastrointestinal hemorrhage     GERD (gastroesophageal reflux disease)     ? esophageal stricture    History of blood transfusion     HTN (hypertension)     Hx of blood clots 2018    R leg-sees ABEBA Hargrove    Hyperlipidemia     Medtronic dual ICD  8/26/2020    Neuromuscular disorder ([facility] [address]     Obesity     Osteopenia     PAC (premature atrial contraction)     on betablocker    Paralysis (HCC)     baby    Pedal edema     denied any hx of hypertension    Pneumonia     PVC (premature ventricular contraction)     sees Dr Malcom Fan PVD (peripheral vascular disease) ([address]     Tinnitus     UTI (urinary tract infection)        Past Surgical History:        Procedure Laterality Date    ABDOMINAL AORTIC ANEURYSM REPAIR, ENDOVASCULAR N/A 2/15/2019    ABDOMINAL AORTIC ANEURYSM REPAIR ENDOVASCULAR, DECLOTTING RIGHT FEM TIB BYPASS GRAFT performed by Nick Hopkins MD at [address]    lumpectomy    CHOLECYSTECTOMY      30 years ago    COLONOSCOPY  08/06/2018    Dr Unknown Hammans 2/22/2019    COLONOSCOPY DIAGNOSTIC performed by Candelario Apley, MD at [address] [facility]    COLONOSCOPY N/A 2/22/2020    COLONOSCOPY CONTROL HEMORRHAGE performed by Jeanette Rey MD at [address] [facility]      eye, eyelids    EYE SURGERY      cataract    HEMICOLECTOMY N/A 2/25/2020    OPEN RIGHT COLON RESECTION performed by Soledad Brooks MD at [address]  07/15/2016    MS OLEGARIO SKN SUB GRFT T/A/L AREA/<100SCM /<1ST 25 SCM N/A 9/6/2018    RE-EXPLORATION RIGHT GROIN, DECLOTTING OF FEMORAL BYPASS GRAFT performed by Berenice Morris MD at [address] EGD TRANSORAL BIOPSY SINGLE/MULTIPLE Left 11/27/2017    EGD BIOPSY performed by Sweetie Cohen MD at [address] [place] [address] N/A 8/20/2018    ROBOT ASSISTED COLECTOMY (LOW ANTERIOR) performed by Shamika Lyle MD at [facility] OFFICE/OUTPT [facility] N/A 9/5/2018    RIGHT FEMORAL EMBOLECTOMY, INTRAOPERATIVE ARTERIOGRAM, RIGHT ILIAC EMBOLECTOMY, RIGHT COMMON AND EXTERNAL ILIAC STENTING, RIGHT FEMORAL TO ANTERIOR POPLITEAL BYPASS WITH 6MM GORTEX GRAFT performed by Berenice Morris MD at [address] Right 2/14/2019    FEMORAL EMBOLECTOMY THROMBECTOMY, RIGHT LEG performed by Berenice Morris MD at [facility] 11/27/2017    EGD SUBMUCOSAL/BOTOX INJECTION performed by Jolene Weathers MD at [facility] N/A 2/22/2019    EGD BIOPSY performed by Gideon Alva MD at [facility]       Home Medications:   No current facility-administered medications on file prior to encounter. Current Outpatient Medications on File Prior to Encounter   Medication Sig Dispense Refill    metoprolol succinate (TOPROL XL) 25 MG extended release tablet Take 1 tablet by mouth daily 90 tablet 3    apixaban (ELIQUIS) 2.5 MG TABS tablet TAKE 1 TABLET BY MOUTH TWICE DAILY 60 tablet 5    losartan (COZAAR) 25 MG tablet Take 1 tablet by mouth daily 30 tablet 5    pravastatin (PRAVACHOL) 40 MG tablet TAKE 1 TABLET BY MOUTH DAILY 90 tablet 3    hydrocortisone (ANUSOL-HC) 25 MG suppository Place 1 suppository rectally 2 times daily as needed for Hemorrhoids 30 suppository 1    clopidogrel (PLAVIX) 75 MG tablet Take 1 tablet by mouth daily 90 tablet 3    potassium chloride (KLOR-CON M) 10 MEQ extended release tablet Take 1 tablet by mouth daily 180 tablet 2    magnesium (MAGNESIUM-OXIDE) 250 MG TABS tablet TAKE 2 TABLETS BY MOUTH TWICE DAILY 120 tablet 6    pantoprazole (PROTONIX) 40 MG tablet Take 40 mg by mouth 2 times daily (before meals)      albuterol sulfate HFA (PROVENTIL HFA) 108 (90 Base) MCG/ACT inhaler Inhale 2 puffs into the lungs every 6 hours as needed for Wheezing or Shortness of Breath 1 Inhaler 0    Blood Pressure KIT Check blood pressure daily, and if symptoms of lightheadedness. Call physician if BP <80/40. 1 kit 0    melatonin 3 MG TABS tablet Take 2 tablets by mouth nightly as needed (sleep) 60 tablet 3    acetaminophen (TYLENOL ARTHRITIS PAIN) 650 MG CR tablet Take 1,300 mg by mouth every 12 hours as needed for Pain       timolol (TIMOPTIC) 0.5 % ophthalmic solution Place 1 drop into both eyes nightly          Allergies:    Aspirin; Lasix [furosemide]; Lipitor [atorvastatin]; Neurontin [gabapentin];  Oxycontin [oxycodone hcl]; and Penicillins    Social History:    reports that she quit smoking about a year ago. Her smoking use included cigarettes. She started smoking about 64 years ago. She has a 15.00 pack-year smoking history. She has never used smokeless tobacco. She reports current alcohol use of about 1.0 standard drinks of alcohol per week. She reports that she does not use drugs. Family History:       Problem Relation Age of Onset    Heart Disease Mother     Stroke Mother     Cancer Father         lung    Emphysema Father     Other Sister         leukemia    Heart Disease Maternal Grandmother     Dementia Maternal Grandmother     Heart Disease Maternal Grandfather        Diet:  Diet NPO Effective Now    Review of systems:   Pertinent positives as noted in the HPI. All other systems reviewed and negative. PHYSICAL EXAM:  /88   Pulse 76   Temp 96.2 °F (35.7 °C) (Axillary)   Resp 16   Ht 5' 5\" (1.651 m)   Wt 145 lb 11.2 oz (66.1 kg)   SpO2 97%   BMI 24.25 kg/m²   General appearance: ill-appearing, no apparent distress, appears stated age and cooperative. HEENT: Normal cephalic, atraumatic without obvious deformity. Pupils equal, round, and reactive to light. Extra ocular muscles intact. Conjunctivae/corneas clear. Neck: Supple, with full range of motion. No jugular venous distention. Trachea midline. Respiratory:  Normal respiratory effort. Clear to auscultation, bilaterally without Rales/Wheezes/Rhonchi. Cardiovascular: Regular rate and rhythm with normal S1/S2 without murmurs, rubs or gallops. Abdomen: Soft, diffusely tender, non-distended (per pt) with hypoactive bowel sounds. No rebound/guarding. Musculoskeletal:  No clubbing, cyanosis or edema bilaterally. Skin: Skin color, texture, turgor normal.  No rashes or lesions. Neurologic:  Neurovascularly intact without any focal sensory/motor deficits.  Cranial nerves: II-XII intact, grossly non-focal.  Psychiatric: Alert and oriented, thought content appropriate, normal insight  Capillary Refill: Brisk,< 3 seconds   Peripheral Pulses: +2 palpable, equal bilaterally     Labs:   Recent Labs     10/02/20  0644   WBC 12.8*   HGB 16.7*   HCT 55.3*        Recent Labs     10/02/20  0644      K 4.8      CO2 24   BUN 26*   CREATININE 1.1   CALCIUM 9.5     Recent Labs     10/02/20  0644   AST 20   ALT 13   BILIDIR <0.2   BILITOT 0.4   ALKPHOS 128*     No results for input(s): INR in the last 72 hours. No results for input(s): Penny Lager in the last 72 hours. Urinalysis:    Lab Results   Component Value Date    NITRU POSITIVE 10/02/2020    WBCUA 2-4 10/02/2020    BACTERIA MANY 10/02/2020    RBCUA 0-2 10/02/2020    BLOODU NEGATIVE 10/02/2020    SPECGRAV 1.013 02/24/2019    GLUCOSEU NEGATIVE 10/02/2020       Radiology:   XR CHEST PORTABLE   Final Result   1. There is an enteric tube present which is coiled within the upper mediastinum. This appears to be situated within a patulous redundant portion of a dilated esophagus possibly. Recommend repositioning. **This report has been created using voice recognition software. It may contain minor errors which are inherent in voice recognition technology. **      Final report electronically signed by Dr. Alessio Barbosa on 10/2/2020 10:34 AM      CT ABDOMEN PELVIS W IV CONTRAST Additional Contrast? None   Final Result   1. There is dilation of the distal esophagus. There is thickening at the GE junction. Cannot exclude a neoplastic process. 2. Multiple dilated loops of jejunal small bowel are demonstrated. The transition point appears to be located within the anterior mid abdomen on axial image 59. This may be related to ileus versus early or partial small bowel obstruction. 3. Multiple dilated loops of jejunal small bowel are demonstrated. The transition point appears to be located within the anterior mid abdomen on axial image 59.  This may be related to ileus versus early or partial small bowel obstruction. 4. 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 The stent graft appears patent. 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      5. There is subtle increased attenuation involving a small portion of the rightward aneurysm sac on the axial image 52. Cannot exclude a focal area of endoleak which was not seen on the prior CT dated 9/1/2020. Further evaluation could be obtained with    routine CT angiography of the abdomen and pelvis with and without contrast.      **This report has been created using voice recognition software. It may contain minor errors which are inherent in voice recognition technology. **      Final report electronically signed by Dr. Cecilia Benites on 10/2/2020 8:54 AM        Ct Abdomen Pelvis W Iv Contrast Additional Contrast? None    Result Date: 10/2/2020  PROCEDURE: CT ABDOMEN PELVIS W IV CONTRAST CLINICAL INFORMATION: Abdominal pain COMPARISON: 9/1/2020 TECHNIQUE:  Axial CT images were obtained through the abdomen and pelvis following the intravenous administration of 80 mL Isovue 370 contrast. Coronal and sagittal reformatted images were rendered. All CT scans at this facility use dose modulation, iterative reconstruction, and/or weight-based dosing when appropriate to reduce radiation dose to as low as reasonably achievable. FINDINGS: Limited evaluation of the left lung base demonstrates mild dependent left basilar scarring versus atelectasis. There is dilation of the distal esophagus. There is thickening at the GE junction. Cannot exclude a neoplastic process. There is mild ascites within the pelvis and perisplenic and perihepatic regions. There is evidence of prior cholecystectomy. The pancreas, spleen and adrenal glands appear normal. The graft there are areas of renal cortical scarring demonstrated.  There is a wedge-shaped area of decreased enhancement of increased attenuation involving a small portion of the rightward aneurysm sac on the axial image 52. Cannot exclude a focal area of endoleak which was not seen on the prior CT dated 9/1/2020. Further evaluation could be obtained with routine CT angiography of the abdomen and pelvis with and without contrast. **This report has been created using voice recognition software. It may contain minor errors which are inherent in voice recognition technology. ** Final report electronically signed by Dr. Cristina Lenz on 10/2/2020 8:54 AM    Xr Chest Portable    Result Date: 10/2/2020  PROCEDURE: XR CHEST PORTABLE CLINICAL INFORMATION: verify NG tube placement COMPARISON: 8/20/2020 TECHNIQUE:  AP mobile chest single view  FINDINGS: There is an enteric tube present which is coiled within the upper mediastinum. This appears to be situated within a patulous redundant portion of a dilated esophagus possibly. Recommend repositioning which was noted on prior chest CT from December 2019. The heart size is normal. No pneumothorax is seen. There is a left-sided AICD. No focal consolidation is demonstrated. No acute osseous findings are seen. 1. There is an enteric tube present which is coiled within the upper mediastinum. This appears to be situated within a patulous redundant portion of a dilated esophagus possibly. Recommend repositioning. **This report has been created using voice recognition software. It may contain minor errors which are inherent in voice recognition technology. ** Final report electronically signed by Dr. Cristina Lenz on 10/2/2020 10:34 AM        EKG: sinus rhythm with PVCs, left axis deviation      [address]    Electronically signed by Trudy Caputo PA-C on 10/2/2020 at 1:02 PM

## 2020-10-02 NOTE — ED NOTES
ED to inpatient nurses report    Chief Complaint   Patient presents with    Abdominal Pain      Present to ED from home  LOC: alert and orientated to name, place, date  Vital signs   Vitals:    10/02/20 0638 10/02/20 0710 10/02/20 0829 10/02/20 1008   BP: (!) 140/87 131/84 139/89 (!) 140/98   Pulse: 75 72 76 98   Resp: 17 15 16 16   Temp: 97.8 °F (36.6 °C)      TempSrc: Oral      SpO2: 97% 93% 97% 94%   Weight: 144 lb (65.3 kg)      Height: 5' 5\" (1.651 m)         Oxygen Baseline 2L NC    Current needs required none   Bipap/Cpap No  LDAs:   Peripheral IV 08/20/20 Left Hand (Active)       Peripheral IV 10/02/20 Left Forearm (Active)   Site Assessment Clean;Dry; Intact 10/02/20 0828   Line Status Normal saline locked; Infusing 10/02/20 0828   Dressing Status Clean;Dry; Intact 10/02/20 0828   Dressing Intervention New 10/02/20 0707     Mobility: Requires assistance * 1  Pending ED orders: none    Present condition: stable    Electronically signed by Clarita Castillo RN on 10/2/2020 at [facility]  10/02/20 1014

## 2020-10-02 NOTE — PROGRESS NOTES
Pt admitted to  [facility] via cart/stretcher. Complaints: Abdominal pain. IV normal saline infusing. IV site free of s/s of infection or infiltration. Vital signs obtained. Assessment and data collection initiated. Two nurse skin assessment performed by Jose Melendrez and Servando Rojo. Oriented to room. Policies and procedures for [facility]K explained. Lethaniel Husbands RN discussed hourly rounding with patient addressing 5 P's. Fall prevention and safety brochure discussed with patient. Bed alarm on. Call light in reach. The best day to schedule a follow up Dr appointment is:  Tuesday p.m. Explained patients right to have family, representative or physician notified of their admission. Patient has Declined for physician to be notified. Patient has Requested for family/representative to be notified. All questions answered with no further questions at this time. Which family member is the designated  Steven Cruz(SON).   Do you want them contacted on admission and updated every shift, no.

## 2020-10-02 NOTE — ED NOTES
Patient straight cathed for urine specimen and tolerated well. Urine specimen obtained and sent to lab. Updated on POC. Will continue to monitor.       Yodit Frey RN  10/02/20 3292

## 2020-10-02 NOTE — ED PROVIDER NOTES
[facility] [address] COMPLAINT       Chief Complaint   Patient presents with    Abdominal Pain       Nurses Notes reviewed and I agree except as notedin the HPI. HISTORY OF PRESENT ILLNESS    Abelardo De La Paz is a 82 y.o. female who presents with abdominal pain is been on since 2 AM last night. The patient states that she is had numerous abdominal surgeries in the past.  The patient's been nauseated and vomit has not of diarrhea. Her normal bowel movement yesterday. Had no urinary symptoms. Has had no fever or chills. Had no further episodes. Location/Symptom: Abdominal pain  Timing/Onset: 0200 am  Context/Setting: Home  Quality: ache  Duration: constant  Modifying Factors: none  Severity: 8    REVIEW OF SYSTEMS     Review of Systems   Constitutional: Negative for activity change, appetite change, chills and fever. HENT: Negative for congestion, ear pain, rhinorrhea and sore throat. Eyes: Negative for pain, discharge and itching. Respiratory: Negative for cough, shortness of breath and wheezing. Cardiovascular: Negative for chest pain. Gastrointestinal: Positive for abdominal pain and nausea. Negative for diarrhea and vomiting. Genitourinary: Negative for difficulty urinating and dysuria. Musculoskeletal: Negative for arthralgias, back pain and myalgias. Skin: Negative for color change and rash. Neurological: Negative for dizziness, seizures, light-headedness and headaches. Psychiatric/Behavioral: Negative for agitation, confusion, self-injury and suicidal ideas. All other systems reviewed and are negative.        PAST MEDICAL HISTORY    has a past medical history of Arthritis, Blood circulation, collateral, BPPV (benign paroxysmal positional vertigo), CHF (congestive heart failure) ([facility] [address] Chronic kidney disease, Gastrointestinal hemorrhage, GERD (gastroesophageal reflux disease), History of blood transfusion, HTN (hypertension), Hx of blood clots, Hyperlipidemia, [employer] dual ICD , Neuromuscular disorder ([facility] [address] Obesity, Osteopenia, PAC (premature atrial contraction), Paralysis ([address] Pedal edema, Pneumonia, PVC (premature ventricular contraction), PVD (peripheral vascular disease) ([address] Tinnitus, and UTI (urinary tract infection). SURGICAL HISTORY      has a past surgical history that includes Cholecystectomy; Breast surgery (Right, 1958); Hysterectomy; Appendectomy; Cosmetic surgery; eye surgery; pr egd transoral biopsy single/multiple (Left, 11/27/2017); Upper gastrointestinal endoscopy (N/A, 11/27/2017); Colonoscopy (08/06/2018); laryngoscopy (07/15/2016); pr lap,surg,colectomy, partial, w/anast (N/A, 8/20/2018); pr office/outpt visit,procedure only (N/A, 9/5/2018); pr preet skn sub grft t/a/l area/<100scm /<1st 25 scm (N/A, 9/6/2018); THROMBECTOMY / EMBOLECTOMY FEMORAL (Right, 2/14/2019); AAA repair, endovascular (N/A, 2/15/2019); Upper gastrointestinal endoscopy (N/A, 2/22/2019); Colonoscopy (N/A, 2/22/2019); Colonoscopy (N/A, 2/22/2020); and hemicolectomy (N/A, 2/25/2020).     CURRENT MEDICATIONS       Current Discharge Medication List      CONTINUE these medications which have NOT CHANGED    Details   metoprolol succinate (TOPROL XL) 25 MG extended release tablet Take 1 tablet by mouth daily  Qty: 90 tablet, Refills: 3      apixaban (ELIQUIS) 2.5 MG TABS tablet TAKE 1 TABLET BY MOUTH TWICE DAILY  Qty: 60 tablet, Refills: 5      losartan (COZAAR) 25 MG tablet Take 1 tablet by mouth daily  Qty: 30 tablet, Refills: 5      pravastatin (PRAVACHOL) 40 MG tablet TAKE 1 TABLET BY MOUTH DAILY  Qty: 90 tablet, Refills: 3      hydrocortisone (ANUSOL-HC) 25 MG suppository Place 1 suppository rectally 2 times daily as needed for Hemorrhoids  Qty: 30 suppository, Refills: 1      clopidogrel (PLAVIX) 75 MG tablet Take 1 tablet by mouth daily  Qty: 90 tablet, Refills: 3      potassium chloride (KLOR-CON M) 10 MEQ extended release tablet Take 1 tablet by mouth daily  Qty: 180 tablet, Refills: 2    Comments: **Patient requests 90 days supply**      magnesium (MAGNESIUM-OXIDE) 250 MG TABS tablet TAKE 2 TABLETS BY MOUTH TWICE DAILY  Qty: 120 tablet, Refills: 6      pantoprazole (PROTONIX) 40 MG tablet Take 40 mg by mouth 2 times daily (before meals)      albuterol sulfate HFA (PROVENTIL HFA) 108 (90 Base) MCG/ACT inhaler Inhale 2 puffs into the lungs every 6 hours as needed for Wheezing or Shortness of Breath  Qty: 1 Inhaler, Refills: 0      Blood Pressure KIT Check blood pressure daily, and if symptoms of lightheadedness. Call physician if BP <80/40. Qty: 1 kit, Refills: 0      melatonin 3 MG TABS tablet Take 2 tablets by mouth nightly as needed (sleep)  Qty: 60 tablet, Refills: 3      acetaminophen (TYLENOL ARTHRITIS PAIN) 650 MG CR tablet Take 1,300 mg by mouth every 12 hours as needed for Pain       timolol (TIMOPTIC) 0.5 % ophthalmic solution Place 1 drop into both eyes nightly              ALLERGIES     is allergic to aspirin; lasix [furosemide]; lipitor [atorvastatin]; neurontin [gabapentin]; oxycontin [oxycodone hcl]; and penicillins. HISTORY     She indicated that her mother is . She indicated that her father is . She indicated that her sister is . She indicated that her brother is alive. She indicated that her maternal grandmother is . She indicated that her maternal grandfather is . She indicated that her paternal grandmother is . She indicated that her paternal grandfather is . family history includes Cancer in her father; Dementia in her maternal grandmother; Emphysema in her father; Heart Disease in her maternal grandfather, maternal grandmother, and mother; Other in her sister; Stroke in her mother. SOCIALHISTORY      reports that she quit smoking about a year ago. Her smoking use included cigarettes. She started smoking about 64 years ago.  She has a 15.00 pack-year smoking history. She has never used smokeless tobacco. She reports current alcohol use of about 1.0 standard drinks of alcohol per week. She reports that she does not use drugs. PHYSICAL EXAM     INITIAL VITALS:  height is 5' 5\" (1.651 m) and weight is 145 lb 11.2 oz (66.1 kg). Her axillary temperature is 96.2 °F (35.7 °C). Her blood pressure is 123/88 and her pulse is 76. Her respiration is 16 and oxygen saturation is 97%. Physical Exam  Vitals signs and nursing note reviewed. Constitutional:       Appearance: She is well-developed. HENT:      Head: Normocephalic and atraumatic. Eyes:      Pupils: Pupils are equal, round, and reactive to light. Neck:      Musculoskeletal: Normal range of motion and neck supple. Cardiovascular:      Rate and Rhythm: Normal rate. Pulmonary:      Effort: Pulmonary effort is normal. No respiratory distress. Abdominal:      General: There is no distension. Palpations: Abdomen is soft. Tenderness: There is abdominal tenderness. Comments: Hypoactive bowel sounds. Diffuse abdominal tenderness. Musculoskeletal: Normal range of motion. Skin:     General: Skin is warm and dry. Neurological:      Mental Status: She is alert and oriented to person, place, and time. Psychiatric:         Behavior: Behavior normal.         DIFFERENTIAL DIAGNOSIS:   Hypoactive bowel sounds. Diffuse abdominal tenderness possible bowel obstruction possible ileus. DIAGNOSTIC RESULTS     EKG: All EKG's are interpreted by the Emergency Department Physician who either signs or Co-signs this chart in the absence of a cardiologist.      RADIOLOGY: non-plain film images(s) such as CT, Ultrasound and MRI are read by the radiologist.  CT abdomen pelvis with IV contrast with radiology       XR CHEST PORTABLE (Final result)   Result time 10/02/20 10:34:08   Final result by Ata Lange MD (10/02/20 10:34:08)                 Impression:     1.  There is an enteric tube present which is coiled within the upper mediastinum. This appears to be situated within a patulous redundant portion of a dilated esophagus possibly. Recommend repositioning. **This report has been created using voice recognition software.  It may contain minor errors which are inherent in voice recognition technology. **     Final report electronically signed by Dr. Helga Wallace on 10/2/2020 10:34 AM             Narrative:     PROCEDURE: XR CHEST PORTABLE     CLINICAL INFORMATION: verify NG tube placement     COMPARISON: 8/20/2020     TECHNIQUE:  AP mobile chest single view       FINDINGS:     There is an enteric tube present which is coiled within the upper mediastinum. This appears to be situated within a patulous redundant portion of a dilated esophagus possibly. Recommend repositioning which was noted on prior chest CT from December 2019. The heart size is normal. No pneumothorax is seen. There is a left-sided AICD. No focal consolidation is demonstrated. No acute osseous findings are seen.                       CT ABDOMEN PELVIS W IV CONTRAST Additional Contrast? None (Final result)   Result time 10/02/20 08:54:18   Final result by Chandrakant Nick MD (10/02/20 08:54:18)                 Impression:     1. There is dilation of the distal esophagus. There is thickening at the GE junction. Cannot exclude a neoplastic process. 2. Multiple dilated loops of jejunal small bowel are demonstrated. The transition point appears to be located within the anterior mid abdomen on axial image 59. This may be related to ileus versus early or partial small bowel obstruction. 3. Multiple dilated loops of jejunal small bowel are demonstrated. The transition point appears to be located within the anterior mid abdomen on axial image 59. This may be related to ileus versus early or partial small bowel obstruction. 4. 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 The stent graft appears patent.  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     5. There is subtle increased attenuation involving a small portion of the rightward aneurysm sac on the axial image 52. Cannot exclude a focal area of endoleak which was not seen on the prior CT dated 9/1/2020. Further evaluation could be obtained with   routine CT angiography of the abdomen and pelvis with and without contrast.     **This report has been created using voice recognition software.  It may contain minor errors which are inherent in voice recognition technology. **     Final report electronically signed by Dr. Renee De León on 10/2/2020 8:54 AM             Narrative:     PROCEDURE: CT ABDOMEN PELVIS W IV CONTRAST     CLINICAL INFORMATION: Abdominal pain     COMPARISON: 9/1/2020     TECHNIQUE:  Axial CT images were obtained through the abdomen and pelvis following the intravenous administration of 80 mL Isovue 370 contrast. Coronal and sagittal reformatted images were rendered. All CT scans at this facility use dose modulation,   iterative reconstruction, and/or weight-based dosing when appropriate to reduce radiation dose to as low as reasonably achievable. FINDINGS:     Limited evaluation of the left lung base demonstrates mild dependent left basilar scarring versus atelectasis. There is dilation of the distal esophagus. There is thickening at the GE junction. Cannot exclude a neoplastic process. There is mild ascites within the pelvis and perisplenic and perihepatic regions. There is evidence of prior cholecystectomy. The pancreas, spleen and adrenal glands appear normal. The graft there are areas of renal cortical scarring demonstrated. There is a wedge-shaped area of decreased enhancement of the anterior mid left kidney which is concerning for age-indeterminate   renal infarct. There is sigmoid diverticulosis without evidence of diverticulitis. Multiple dilated loops of jejunal small bowel are demonstrated. The transition point appears to be located within the anterior mid abdomen on axial image 59. This may be related to ileus versus early or partial small bowel obstruction. 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 The stent graft appears patent. 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 There is subtle increased attenuation involving the aneurysm sac on the right on axial image 52. Cannot exclude a focal area of endoleak which was not seen on the prior CT dated 9/1/2020. Further evaluation could be obtained with routine CT angiography   of the abdomen and pelvis with and without contrast.     No lymphadenopathy is seen. There is no free air. No acute osseous findings are demonstrated. There is lower lumbar facet arthrosis. Bilateral SI joint arthrosis is seen.                  LABS:   Labs Reviewed   CBC WITH AUTO DIFFERENTIAL - Abnormal; Notable for the following components:       Result Value    WBC 12.8 (*)     RBC 6.91 (*)     Hemoglobin 16.7 (*)     Hematocrit 55.3 (*)     MCV 80.0 (*)     MCH 24.2 (*)     MCHC 30.2 (*)     RDW-CV 21.7 (*)     RDW-SD 59.0 (*)     Segs Absolute 11.3 (*)     Lymphocytes Absolute 0.9 (*)     All other components within normal limits   BASIC METABOLIC PANEL - Abnormal; Notable for the following components:    Glucose 210 (*)     BUN 26 (*)     All other components within normal limits   HEPATIC FUNCTION PANEL - Abnormal; Notable for the following components:    Alkaline Phosphatase 128 (*)     All other components within normal limits   GLOMERULAR FILTRATION RATE, ESTIMATED - Abnormal; Notable for the following components:    Est, Glom Filt Rate 47 (*)     All other components within normal limits   URINE WITH REFLEXED MICRO - Abnormal; Notable for the following components:    Protein, UA TRACE (*)     Nitrite, Urine POSITIVE (*)     All other components within normal limits   LIPASE   MAGNESIUM   TROPONIN   LACTATE, SEPSIS   ANION GAP   OSMOLALITY       EMERGENCY DEPARTMENT COURSE:   :    Vitals:    10/02/20 0710 10/02/20 0829 10/02/20 1008 10/02/20 1034   BP: 131/84 139/89 (!) 140/98 123/88   Pulse: 72 76 98 76   Resp: 15 16 16 16   Temp:    96.2 °F (35.7 °C)   TempSrc:    Axillary   SpO2: 93% 97% 94% 97%   Weight:    145 lb 11.2 oz (66.1 kg)   Height:    5' 5\" (1.651 m)     Patient was seen history physical exam was performed. Patient given Tylenol and Zofran. Patient did have an NG placed and we did get approximately 500 800 cc of gastric contents back. Patient will be admitted. I did discuss the case with vascular surgery as well as general surgery. Vascular surgery does not feel anything needs to be done with his endoleak at this point. The patient also will be admitted to general surgery. Read on the KUB did come back after the patient went to the floor and the NG tube is not the correct place. I did call these results to Nacho Alexander CNP who states she would follow-up on this. We were getting gastric contents back however. See disposition below. Case was staffed with Dr. Johny Morales:  None    CONSULTS:  Dr. HADDAD [facility] and Dr. Amanda Kumar:  None    FINAL IMPRESSION      1. Abdominal pain, unspecified abdominal location    2. Urinary tract infection without hematuria, site unspecified    3. SBO (small bowel obstruction) ([facility])          DISPOSITION/PLAN   Admit      PATIENT REFERRED TO:  No follow-up provider specified.     DISCHARGE MEDICATIONS:  Current Discharge Medication List          (Please note that portions of this note were completed with a voice recognitionprogram.  Efforts were made to edit the dictations but occasionally words are mis-transcribed.)    MABLE Mcintosh PA  10/02/20 1430

## 2020-10-02 NOTE — PROGRESS NOTES
Pharmacy Medication History Note      List of current medications patient is taking is complete. Source of information: [facility] on [address] [facility]. Changes made to medication list:  Medications removed (include reason, ex. therapy complete or physician discontinued):  none      Medications added/doses adjusted:  none      Other notes (ex. Recent course of antibiotics, Coumadin dosing):  Med list from [facility] had alendronate 70 mg daily for 30 days written on 9/17/20. Alendronate 70 mg should be taken once weekly not daily. There is no dispense record on [facility] though. Denies use of other OTC or herbal medications.       Allergies reviewed      Electronically signed by Sola Davis, [facility] on 10/2/2020 at 12:51 PM

## 2020-10-02 NOTE — PROGRESS NOTES
Notified Dr. Anton Prescott, [facility] PA, and Dr. John Paul Santos of consults. Added to lists.

## 2020-10-02 NOTE — H&P
[address] [address]  General Surgery History and Physical  DrSanta Luevano    Pt Name: Sudha Hull  MRN: [id number]  YOB: 1935  Date of evaluation: 10/2/2020  Primary Care Physician: Lisa Duarte  Patient evaluated at the request of  MABLE Harris  Reason for evaluation: SBO  IMPRESSIONS:   1. SBO  2. Thickening at the GE junction - cannot rule out neoplastic process   3. Achalasia   4. Multiple GI bleeds  5. Status post ileocolonic resection Feb 2020 with Dr. Pillo Soler  6. Status post robotic anterior resection secondary to diverticular stricture 2018 with Dr. Jorge Herrera   7. Status post endoluminal graft repair of abdominal aortic aneurysm Feb 2019  8. CAD with stents  9. COPD  10. UTI  11. Atrial fibrillation   does not have any pertinent problems on file. PLANS:   1. Admit type: Inpatient  2. It is expected this patient's LOS will be: Greater than 2 midnights  3. Anticipated Disposition Upon Discharge: Home  4. NPO with NG decompression   5. Analgesics and antiemetics on a prn basis  6. IV hydration  7. Consult cardiothoracic surgery and GI. Appreciate recommendations. 8. DVT prophylaxis with SCD's and Lovenox. Hold [facility] for now. 9. Urine (+) nitrates but asymptomatic  10. Medical management per hospitalist   11. Serial abdominal exams   12. Labs and follow up KUB in am   13. Patient is extremely high risk for any surgical intervention. At this time abdomen is fairly benign without signs of peritonitis. Continue non-operative management right now. If surgical intervention would need to take place patient would likely be transferred to a tertiary center. Discussed case with GI and they will not do any further scopes or intervention - recommended her see someone at a tertiary center months ago but patient declined at that time.    SUBJECTIVE:   Chief Complaint: Abdominal pain     History of Present Illness:  Rocael Zuniga is a pleasant 82-year old female patient who presents to the emergency department for generalized abdominal pain. She states the pain started around 2 am this morning and by 3 am the pain was severe. She states it is a constant, generalized ache. Nothing really makes it worse or better. History of multiple abdominal surgeries and GI issues but denies every having a bowel obstruction. Last abdominal surgery was in Feb with Dr. 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 History of achalasia with associated nausea and vomiting. States she is supposed to be getting an appointment with Jase from Dr. Harrison Pedro office. She vomits almost everyday. Bowel function has been normal for her. Had a BM yesterday. occasional has hematochezia. Last EGD/Colonoscopy was in February prior to surgery. She is also being worked up with Dr. Minnie Hughes for a watchman and had AAA repair with Dr. Silvia Santos in 2019. She takes Eliquis and Plavix. Took those last yesterday morning. No urinary complaints. No SOB or chest pain. No generalized edema. No dizziness, lightheadedness. No fevers or chills. Patient states she is down about 55 lbs over the last year with her GI issues. Past Medical History      Diagnosis Date    Arthritis     Blood circulation, collateral     BPPV (benign paroxysmal positional vertigo) 6/6/16    CHF (congestive heart failure) (HCC)     Chronic kidney disease     sees Dr Joe Denton    Gastrointestinal hemorrhage     GERD (gastroesophageal reflux disease)     ? 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    Pedal edema     denied any hx of hypertension    Pneumonia     PVC (premature ventricular contraction)     sees Dr Saskia Pastor PVD (peripheral vascular disease) ([address]     Tinnitus     UTI (urinary tract infection)      Past Surgical History      Procedure Laterality Date    ABDOMINAL AORTIC ANEURYSM REPAIR, ENDOVASCULAR N/A 2/15/2019    ABDOMINAL AORTIC ANEURYSM REPAIR ENDOVASCULAR, DECLOTTING RIGHT FEM TIB BYPASS GRAFT performed by Adriano Rick MD at [address]    lumpectomy    CHOLECYSTECTOMY      30 years ago    COLONOSCOPY  08/06/2018    Dr Teresa High 2/22/2019    COLONOSCOPY DIAGNOSTIC performed by Yancy Denis MD at [facility]    COLONOSCOPY N/A 2/22/2020    COLONOSCOPY CONTROL HEMORRHAGE performed by Thierry Davidson MD at [address] [facility]      eye, eyelids    EYE SURGERY      cataract    HEMICOLECTOMY N/A 2/25/2020    OPEN RIGHT COLON RESECTION performed by Pillo Lamar MD at [address] [facility] [address]  07/15/2016    IL OLEGARIO SKN SUB GRFT T/A/L AREA/<100SCM /<1ST 25 SCM N/A 9/6/2018    RE-EXPLORATION RIGHT GROIN, DECLOTTING OF FEMORAL BYPASS GRAFT performed by Allison Phan MD at [address] [place] [address] EGD TRANSORAL BIOPSY SINGLE/MULTIPLE Left 11/27/2017    EGD BIOPSY performed by Corrina Saavedra MD at [address] [place] [address], PARTIAL, Efrain Draft N/A 8/20/2018    ROBOT ASSISTED COLECTOMY (LOW ANTERIOR) performed by Armida Hunter MD at [address] [place] [address] OFFICE/OUTPT [address] [place] [address] N/A 9/5/2018    RIGHT FEMORAL EMBOLECTOMY, INTRAOPERATIVE ARTERIOGRAM, RIGHT ILIAC EMBOLECTOMY, RIGHT COMMON AND EXTERNAL ILIAC STENTING, RIGHT FEMORAL TO ANTERIOR POPLITEAL BYPASS WITH 6MM GORTEX GRAFT performed by Allison Phan MD at [address] [place] [address] 153 / [address] Right 2/14/2019    FEMORAL EMBOLECTOMY THROMBECTOMY, RIGHT LEG performed by Allison Phan MD at [facility] 11/27/2017    EGD SUBMUCOSAL/BOTOX INJECTION performed by Corrina Saavedra MD at [facility] N/A 2/22/2019    EGD BIOPSY performed by Max Toribio MD at [facility]     Medications  Prior to Admission medications    Medication Sig Start Date End Date Taking? Authorizing Provider   metoprolol succinate (TOPROL XL) 25 MG extended release tablet Take 1 tablet by mouth daily 9/21/20   ROMAN Stephens CNP   apixaban (ELIQUIS) 2.5 MG TABS tablet TAKE 1 TABLET BY MOUTH TWICE DAILY 8/31/20   ROMAN Espinosa CNP   losartan (COZAAR) 25 MG tablet Take 1 tablet by mouth daily 7/27/20   ROMAN Espinosa CNP   pravastatin (PRAVACHOL) 40 MG tablet TAKE 1 TABLET BY MOUTH DAILY 7/14/20   ROMAN Stephens CNP   hydrocortisone (ANUSOL-HC) 25 MG suppository Place 1 suppository rectally 2 times daily as needed for Hemorrhoids 7/2/20   [facility] ROMAN Mccurdy CNP   clopidogrel (PLAVIX) 75 MG tablet Take 1 tablet by mouth daily 6/8/20   ROMAN Espinosa CNP   potassium chloride (KLOR-CON M) 10 MEQ extended release tablet Take 1 tablet by mouth daily 6/8/20   ROMAN Espinosa CNP   magnesium (MAGNESIUM-OXIDE) 250 MG TABS tablet TAKE 2 TABLETS BY MOUTH TWICE DAILY 4/6/20   ROMAN Espinosa CNP   pantoprazole (PROTONIX) 40 MG tablet Take 40 mg by mouth 2 times daily (before meals)    Historical Provider, MD   albuterol sulfate HFA (PROVENTIL HFA) 108 (90 Base) MCG/ACT inhaler Inhale 2 puffs into the lungs every 6 hours as needed for Wheezing or Shortness of Breath 12/31/19   Jason Nolen DO   Blood Pressure KIT Check blood pressure daily, and if symptoms of lightheadedness.   Call physician if BP <80/40. 12/31/19   Jason Nolen DO   melatonin 3 MG TABS tablet Take 2 tablets by mouth nightly as needed (sleep) 2/26/19   Reba Goodell, PA-C   acetaminophen (TYLENOL ARTHRITIS PAIN) 650 MG CR tablet Take 1,300 mg by mouth every 12 hours as needed for Pain     Historical Provider, MD   timolol (TIMOPTIC) 0.5 % ophthalmic solution Place 1 drop into both eyes nightly     Historical Provider, MD Scheduled Meds:   sodium chloride flush  10 mL Intravenous 2 times per day    enoxaparin  40 mg Subcutaneous Daily     Continuous Infusions:   sodium chloride 100 mL/hr at 10/02/20 0708     PRN Meds:.sodium chloride flush, ondansetron **OR** ondansetron, acetaminophen  Allergies  is allergic to aspirin; lasix [furosemide]; lipitor [atorvastatin]; neurontin [gabapentin]; oxycontin [oxycodone hcl]; and penicillins. Family History  family history includes Cancer in her father; Dementia in her maternal grandmother; Emphysema in her father; Heart Disease in her maternal grandfather, maternal grandmother, and mother; Other in her sister; Stroke in her mother. Social History   reports that she quit smoking about a year ago. Her smoking use included cigarettes. She started smoking about 64 years ago. She has a 15.00 pack-year smoking history. She has never used smokeless tobacco. She reports current alcohol use of about 1.0 standard drinks of alcohol per week. She reports that she does not use drugs. Review of Systems:  General Denies any fever or chills. Weight change over the last year. HEENT Denies any diplopia, tinnitus or vertigo. No chronic headaches. Resp Denies any shortness of breath, cough or wheezing  Cardiac Denies any chest pain, palpitations, claudication or edema  GI Positive for GI bleeding, nausea, vomiting and abdominal pain   Denies any frequency, urgency, hesitancy or incontinence  Heme positive for - bleeding problems and blood transfusions  Endocrine Denies any history of diabetes or thyroid disease  Neuro Denies any focal motor or sensory deficits  Musculoskeletal  Denies osteoarthritis. No gout. No weakness. Psychiatric  Denies any severe depression or agitation. OBJECTIVE:   CURRENT VITALS:  height is 5' 5\" (1.651 m) and weight is 145 lb 11.2 oz (66.1 kg). Her axillary temperature is 96.2 °F (35.7 °C). Her blood pressure is 123/88 and her pulse is 76.  Her respiration is 16 and oxygen saturation is 97%. Temperature Range (24h):Temp: 96.2 °F (35.7 °C) Temp  Av °F (36.1 °C)  Min: 96.2 °F (35.7 °C)  Max: 97.8 °F (36.6 °C)  BP Range (82Y): Systolic (77BGU), AAC:658 , Min:123 , FKE:948     Diastolic (88QNO), PSM:53, Min:84, Max:98    Pulse Range (24h): Pulse  Av.4  Min: 72  Max: 98  Respiration Range (24h): Resp  Av  Min: 15  Max: 17  Current Pulse Ox (24h):  SpO2: 97 %  Pulse Ox Range (24h):  SpO2  Av.6 %  Min: 93 %  Max: 97 %  Oxygen Amount and Delivery: O2 Flow Rate (L/min): 2 L/min     CONSTITUTIONAL: Alert and oriented times 3, no acute distress and cooperative to examination with proper mood and affect. SKIN: Skin color, texture, turgor normal.  LYMPH: no cervical nodes, no inguinal nodes  HEENT: Head is normocephalic, atraumatic. NECK: Supple, symmetrical, trachea midline  CHEST/LUNGS: normal respiratory rate and rhythm, lungs clear to auscultation without wheezes, rales or rhonchi. CARDIOVASCULAR: Heart sounds are normal.  Regular rate and rhythm without murmur, gallop or rub. ABDOMEN: Normal shape. Midline scar(s) present. Diminished BS. Soft, nondistended, no masses or organomegaly. no evidence of hernia. Tenderness: generalized. RECTAL: deferred, not clinically indicated  NEUROLOGIC: There are no focalizing motor or sensory deficits. EXTREMITIES: no cyanosis, no clubbing and no edema.   LABS:     Recent Labs     10/02/20  0644 10/02/20  0826   WBC 12.8*  --    HGB 16.7*  --    HCT 55.3*  --      --      --    K 4.8  --      --    CO2 24  --    BUN 26*  --    CREATININE 1.1  --    MG 2.2  --    CALCIUM 9.5  --    AST 20  --    ALT 13  --    BILITOT 0.4  --    BILIDIR <0.2  --    LIPASE 21.7  --    NITRU  --  POSITIVE*   COLORU  --  YELLOW   BACTERIA  --  MANY     RADIOLOGY:   I have personally reviewed the following films:    PROCEDURE: CT ABDOMEN PELVIS W IV CONTRAST         CLINICAL INFORMATION: Abdominal pain         COMPARISON: 9/1/2020         TECHNIQUE:  Axial CT images were obtained through the abdomen and pelvis following the intravenous administration of 80 mL Isovue 370 contrast. Coronal and sagittal reformatted images were rendered. All CT scans at this facility use dose modulation,    iterative reconstruction, and/or weight-based dosing when appropriate to reduce radiation dose to as low as reasonably achievable.         FINDINGS:         Limited evaluation of the left lung base demonstrates mild dependent left basilar scarring versus atelectasis. There is dilation of the distal esophagus. There is thickening at the GE junction. Cannot exclude a neoplastic process.         There is mild ascites within the pelvis and perisplenic and perihepatic regions.         There is evidence of prior cholecystectomy.         The pancreas, spleen and adrenal glands appear normal. The graft there are areas of renal cortical scarring demonstrated. There is a wedge-shaped area of decreased enhancement of the anterior mid left kidney which is concerning for age-indeterminate    renal infarct.         There is sigmoid diverticulosis without evidence of diverticulitis.         Multiple dilated loops of jejunal small bowel are demonstrated. The transition point appears to be located within the anterior mid abdomen on axial image 59. This may be related to ileus versus early or partial small bowel obstruction.         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 The stent graft appears patent. 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         There is subtle increased attenuation involving the aneurysm sac on the right on axial image 52. Cannot exclude a focal area of endoleak which was not seen on the prior CT dated 9/1/2020.  Further evaluation could be obtained with routine CT angiography    of the abdomen and pelvis with and without contrast.      No lymphadenopathy is seen. There is no free air.         No acute osseous findings are demonstrated. There is lower lumbar facet arthrosis. Bilateral SI joint arthrosis is seen.              Impression    1. There is dilation of the distal esophagus. There is thickening at the GE junction. Cannot exclude a neoplastic process.         2. Multiple dilated loops of jejunal small bowel are demonstrated. The transition point appears to be located within the anterior mid abdomen on axial image 59. This may be related to ileus versus early or partial small bowel obstruction.         3. Multiple dilated loops of jejunal small bowel are demonstrated. The transition point appears to be located within the anterior mid abdomen on axial image 59. This may be related to ileus versus early or partial small bowel obstruction.         4. 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 The stent graft appears patent. 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         5. There is subtle increased attenuation involving a small portion of the rightward aneurysm sac on the axial image 52. Cannot exclude a focal area of endoleak which was not seen on the prior CT dated 9/1/2020. Further evaluation could be obtained with    routine CT angiography of the abdomen and pelvis with and without contrast.         **This report has been created using voice recognition software.  It may contain minor errors which are inherent in voice recognition technology. **         Final report electronically signed by Dr. Sarath Hillman on 10/2/2020 8:54 AM      PROCEDURE: XR CHEST PORTABLE         CLINICAL INFORMATION: verify NG tube placement         COMPARISON: 8/20/2020         TECHNIQUE:  AP mobile chest single view           FINDINGS:         There is an enteric tube present which is coiled within the upper mediastinum.  This appears to be situated within a patulous redundant portion of a dilated esophagus possibly. Recommend repositioning which was noted on prior chest CT from December 2019.         The heart size is normal. No pneumothorax is seen. There is a left-sided AICD. No focal consolidation is demonstrated. No acute osseous findings are seen.              Impression    1. There is an enteric tube present which is coiled within the upper mediastinum. This appears to be situated within a patulous redundant portion of a dilated esophagus possibly. Recommend repositioning.                   **This report has been created using voice recognition software.  It may contain minor errors which are inherent in voice recognition technology. **         Final report electronically signed by Dr. Alessio Barbosa on 10/2/2020 10:34 AM     Electronically signed by ROAMN Granados CNP on 10/2/2020 at 11:33 AM    Patient seen and examined independently by me. Above discussed and I agree with Mikayla Rainey CNP. See my additional comments below for updated orders and plan. Labs, cultures, and radiographs where available were reviewed. I discussed patient concerns with the patient's nurse and instructions were given. Please see our orders for the updated patient care plan.  -N.p.o.  Minimal ice chips. Sips with meds. Having difficulty with NG tube placement. If continues to fail then possible interventional radiology in a.m. for placement under fluoroscopy. Serial abdominal examinations. A.m. labs. Appreciate vascular and GI assistance. Medical service following for medical management and comorbid conditions. Continue blood thinners at this time as patient high risk for clots and currently attempting nonoperative management. Patient in agreement to proceed with conservative treatment if at all possible. KUB in a.m. Discussed tertiary center with patient as well. Reevaluate in a.m.     Electronically signed by Rigoberto Wilburn MD on 10/2/20 at 3:56 PM EDT

## 2020-10-02 NOTE — ED NOTES
Upon first contact with patient this RN receives bedside shift report from [facility]. Patient resting quietly in cot showing no signs of distress at this time. Updated on POC. Will continue to monitor.      Lam Langston RN  10/02/20 4678

## 2020-10-02 NOTE — PROGRESS NOTES
Karen Caldera is a 61-year-old female, who is known to us for endoluminal graft repair of abdominal aortic aneurysm in February 2019, and a recent follow-up with a CTA in September 2020. She was admitted at this time with a small bowel obstruction. A CTA of the abdomen and pelvis was performed upon admission, which showed possibility of endoleak. I was asked to evaluate the patient. I have reviewed current CTA of the abdomen and pelvis, and compared with the old ones. Her native abdominal aortic aneurysm sac has been shrunken since the surgery. There is a questionable image on CTA for type II endoleak, but the native abdominal aortic aneurysm sac has been shrunken. On examination, she has a distended abdomen with bowel sounds. There is a NG tube placed through the nose. Heart sounds are irregular without obvious murmurs. Lung sounds relatively clear bilaterally. She has surgical scars in the abdomen from previous multiple surgeries. Assessment: Possibility of type II endoleak without expansion of native abdominal aortic aneurysm sac. No evidence of endo-tension. Recommendation: We will follow as an outpatient in 6-month. And will follow her during this admission. No further study needed for repaired abdominal aortic aneurysm at this time.

## 2020-10-02 NOTE — ED NOTES
Bed: 005A  Expected date: 10/2/20  Expected time: 6:28 AM  Means of arrival: [facility]  Comments:     Kirsten Blackman RN  10/02/20 0632

## 2020-10-02 NOTE — ED TRIAGE NOTES
Pt to ED via EMS with c/o abdominal pain. Pt reports that the pain began this morning when they woke up. Pt denies taking anything for pain prior to arrival. Pt reports that their last BM was yesterday. Pt denies having any urinary complaints. Pt VSS. Pt abdomen appears soft and flat. Tele applied, ekg completed. Will continue to monitor.

## 2020-10-03 ENCOUNTER — APPOINTMENT (OUTPATIENT)
Dept: GENERAL RADIOLOGY | Age: 85
DRG: 389 | End: 2020-10-03
Payer: MEDICARE

## 2020-10-03 LAB
ALBUMIN SERPL-MCNC: 3.1 G/DL (ref 3.5–5.1)
ALP BLD-CCNC: 91 U/L (ref 38–126)
ALT SERPL-CCNC: 9 U/L (ref 11–66)
ANION GAP SERPL CALCULATED.3IONS-SCNC: 10 MEQ/L (ref 8–16)
AST SERPL-CCNC: 20 U/L (ref 5–40)
BILIRUB SERPL-MCNC: 0.3 MG/DL (ref 0.3–1.2)
BILIRUBIN DIRECT: < 0.2 MG/DL (ref 0–0.3)
BUN BLDV-MCNC: 47 MG/DL (ref 7–22)
CALCIUM SERPL-MCNC: 8.1 MG/DL (ref 8.5–10.5)
CHLORIDE BLD-SCNC: 109 MEQ/L (ref 98–111)
CO2: 22 MEQ/L (ref 23–33)
CREAT SERPL-MCNC: 1.6 MG/DL (ref 0.4–1.2)
ERYTHROCYTE [DISTWIDTH] IN BLOOD BY AUTOMATED COUNT: 21 % (ref 11.5–14.5)
ERYTHROCYTE [DISTWIDTH] IN BLOOD BY AUTOMATED COUNT: 59.7 FL (ref 35–45)
GFR SERPL CREATININE-BSD FRML MDRD: 31 ML/MIN/1.73M2
GLUCOSE BLD-MCNC: 98 MG/DL (ref 70–108)
HCT VFR BLD CALC: 46.8 % (ref 37–47)
HEMOGLOBIN: 13.9 GM/DL (ref 12–16)
LACTIC ACID, SEPSIS: 1.1 MMOL/L (ref 0.5–1.9)
LIPASE: 9.6 U/L (ref 5.6–51.3)
MCH RBC QN AUTO: 24.3 PG (ref 26–33)
MCHC RBC AUTO-ENTMCNC: 29.7 GM/DL (ref 32.2–35.5)
MCV RBC AUTO: 81.8 FL (ref 81–99)
PLATELET # BLD: 149 THOU/MM3 (ref 130–400)
POTASSIUM SERPL-SCNC: 5.1 MEQ/L (ref 3.5–5.2)
POTASSIUM SERPL-SCNC: 5.5 MEQ/L (ref 3.5–5.2)
RBC # BLD: 5.72 MILL/MM3 (ref 4.2–5.4)
SODIUM BLD-SCNC: 141 MEQ/L (ref 135–145)
TOTAL PROTEIN: 5.3 G/DL (ref 6.1–8)
WBC # BLD: 10.3 THOU/MM3 (ref 4.8–10.8)

## 2020-10-03 PROCEDURE — 6370000000 HC RX 637 (ALT 250 FOR IP): Performed by: PHYSICIAN ASSISTANT

## 2020-10-03 PROCEDURE — 99232 SBSQ HOSP IP/OBS MODERATE 35: CPT | Performed by: SURGERY

## 2020-10-03 PROCEDURE — 1200000003 HC TELEMETRY R&B

## 2020-10-03 PROCEDURE — 83690 ASSAY OF LIPASE: CPT

## 2020-10-03 PROCEDURE — 87186 SC STD MICRODIL/AGAR DIL: CPT

## 2020-10-03 PROCEDURE — 99233 SBSQ HOSP IP/OBS HIGH 50: CPT | Performed by: FAMILY MEDICINE

## 2020-10-03 PROCEDURE — 83605 ASSAY OF LACTIC ACID: CPT

## 2020-10-03 PROCEDURE — 87077 CULTURE AEROBIC IDENTIFY: CPT

## 2020-10-03 PROCEDURE — 85027 COMPLETE CBC AUTOMATED: CPT

## 2020-10-03 PROCEDURE — 87086 URINE CULTURE/COLONY COUNT: CPT

## 2020-10-03 PROCEDURE — 6360000002 HC RX W HCPCS: Performed by: NURSE PRACTITIONER

## 2020-10-03 PROCEDURE — 74018 RADEX ABDOMEN 1 VIEW: CPT

## 2020-10-03 PROCEDURE — 36415 COLL VENOUS BLD VENIPUNCTURE: CPT

## 2020-10-03 PROCEDURE — 84132 ASSAY OF SERUM POTASSIUM: CPT

## 2020-10-03 PROCEDURE — 6370000000 HC RX 637 (ALT 250 FOR IP): Performed by: NURSE PRACTITIONER

## 2020-10-03 PROCEDURE — C9113 INJ PANTOPRAZOLE SODIUM, VIA: HCPCS | Performed by: NURSE PRACTITIONER

## 2020-10-03 PROCEDURE — 80053 COMPREHEN METABOLIC PANEL: CPT

## 2020-10-03 PROCEDURE — 94760 N-INVAS EAR/PLS OXIMETRY 1: CPT

## 2020-10-03 PROCEDURE — 2580000003 HC RX 258: Performed by: PHYSICIAN ASSISTANT

## 2020-10-03 PROCEDURE — 82248 BILIRUBIN DIRECT: CPT

## 2020-10-03 PROCEDURE — 2580000003 HC RX 258: Performed by: NURSE PRACTITIONER

## 2020-10-03 RX ADMIN — ACETAMINOPHEN 650 MG: 325 TABLET ORAL at 04:15

## 2020-10-03 RX ADMIN — Medication 6 MG: at 21:40

## 2020-10-03 RX ADMIN — Medication 10 ML: at 10:14

## 2020-10-03 RX ADMIN — TIMOLOL MALEATE 1 DROP: 5 SOLUTION/ DROPS OPHTHALMIC at 21:40

## 2020-10-03 RX ADMIN — ACETAMINOPHEN 650 MG: 325 TABLET ORAL at 21:40

## 2020-10-03 RX ADMIN — Medication 10 ML: at 21:41

## 2020-10-03 RX ADMIN — SODIUM CHLORIDE: 9 INJECTION, SOLUTION INTRAVENOUS at 02:10

## 2020-10-03 RX ADMIN — POTASSIUM CHLORIDE 10 MEQ: 750 TABLET, FILM COATED, EXTENDED RELEASE ORAL at 10:14

## 2020-10-03 RX ADMIN — PANTOPRAZOLE SODIUM 40 MG: 40 INJECTION, POWDER, FOR SOLUTION INTRAVENOUS at 10:14

## 2020-10-03 RX ADMIN — PRAVASTATIN SODIUM 40 MG: 40 TABLET ORAL at 10:13

## 2020-10-03 RX ADMIN — PANTOPRAZOLE SODIUM 40 MG: 40 INJECTION, POWDER, FOR SOLUTION INTRAVENOUS at 21:40

## 2020-10-03 RX ADMIN — CLOPIDOGREL BISULFATE 75 MG: 75 TABLET ORAL at 10:14

## 2020-10-03 RX ADMIN — APIXABAN 2.5 MG: 2.5 TABLET, FILM COATED ORAL at 21:40

## 2020-10-03 RX ADMIN — APIXABAN 2.5 MG: 2.5 TABLET, FILM COATED ORAL at 10:13

## 2020-10-03 ASSESSMENT — PAIN DESCRIPTION - PROGRESSION: CLINICAL_PROGRESSION: GRADUALLY IMPROVING

## 2020-10-03 ASSESSMENT — PAIN SCALES - GENERAL
PAINLEVEL_OUTOF10: 0
PAINLEVEL_OUTOF10: 3
PAINLEVEL_OUTOF10: 5
PAINLEVEL_OUTOF10: 0

## 2020-10-03 ASSESSMENT — PAIN DESCRIPTION - LOCATION: LOCATION: ABDOMEN

## 2020-10-03 ASSESSMENT — PAIN DESCRIPTION - FREQUENCY: FREQUENCY: INTERMITTENT

## 2020-10-03 ASSESSMENT — PAIN - FUNCTIONAL ASSESSMENT: PAIN_FUNCTIONAL_ASSESSMENT: ACTIVITIES ARE NOT PREVENTED

## 2020-10-03 ASSESSMENT — PAIN DESCRIPTION - PAIN TYPE: TYPE: ACUTE PAIN

## 2020-10-03 ASSESSMENT — PAIN DESCRIPTION - ONSET: ONSET: ON-GOING

## 2020-10-03 ASSESSMENT — PAIN DESCRIPTION - ORIENTATION: ORIENTATION: LOWER

## 2020-10-03 ASSESSMENT — PAIN DESCRIPTION - DESCRIPTORS: DESCRIPTORS: CRAMPING

## 2020-10-03 NOTE — PLAN OF CARE
Problem: Falls - Risk of:  Goal: Will remain free from falls  Description: Will remain free from falls  10/3/2020 0212 by Nicholas Orozco RN  Outcome: Ongoing  Note: No falls noted this shift. Falling star protocol in place and call light within reach. Bed alarm active and nonskid socks on. Patient utilizes call light appropriately        Problem: Falls - Risk of:  Goal: Will remain free from falls  Description: Will remain free from falls  10/3/2020 0148 by Nicholas Orozco RN  Outcome: Ongoing  Note: No falls noted this shift. Falling star protocol in place and call light within reach. Bed alarm active and nonskid socks on. Patient utilizes call light appropriately        Problem: Falls - Risk of:  Goal: Absence of physical injury  Description: Absence of physical injury  10/3/2020 0212 by Nicholas Orozco RN  Outcome: Ongoing     Problem: Skin Integrity:  Goal: Will show no infection signs and symptoms  Description: Will show no infection signs and symptoms  10/3/2020 0212 by Nicholas Orozco RN  Outcome: Ongoing     Problem: Skin Integrity:  Goal: Absence of new skin breakdown  Description: Absence of new skin breakdown  10/3/2020 0212 by Nicholas Orozco RN  Outcome: Ongoing  Note: No new skin breakdown noted this shift. Patient encouraged to turn and make frequent positional changes. Will continue to monitor. Problem: Pain:  Goal: Pain level will decrease  Description: Pain level will decrease  10/3/2020 0212 by Nicholas Orozco RN  Outcome: Ongoing  Note: Pt states pain is a 7/10 on 0 to 10 pain scale. PRN pain medication given per MAR. Pt denies heat or cold application. Will continue to assess.       Problem: Pain:  Goal: Control of acute pain  Description: Control of acute pain  10/3/2020 0212 by Nicholas Orozco RN  Outcome: Ongoing     Problem: Pain:  Goal: Control of chronic pain  Description: Control of chronic pain  10/3/2020 0212 by Nicholas Orozco RN  Outcome: Ongoing      Care plan reviewed with patient. Patient  verbalize understanding of the plan of care and contribute to goal setting.

## 2020-10-03 NOTE — PROGRESS NOTES
echocardiogram June 2020 showing ejection fraction of 25 to 30%  -Echocardiogram from 12/2019 with ejection fraction of 10 to 15% with grade 2 diastolic dysfunction  -Follows with cardiology as an outpatient  -Recent ICD placement on 8/20/2020  -Continue Toprol-XL, continue statin, continue Plavix  -Hold Cozaar for hyperkalemia    6. Thickening of GE Junction  -Noted on CT abdomen pelvis on admission  -GI service is consulted  -Patient is high risk for endoscopic procedures. GI recommending tertiary center for endoscopic procedure however this can be done as an outpatient.   -[facility] to [facility] with [facility] in [facility] weeks    7. Nonobstructive CAD  -Follows with Dr. Monico Nageotte  -Combined congestive heart failure as above  -Status post ICD placement  -Continue cardiac meds. Hold Cozaar for hyperkalemia    8. PAD s/p stent placement and endovascular repair February 2019  -Follows with Dr. Monico Nageotte.   -Patient on Plavix. Will continue at this time  -Continue statin    9. COPD  -No acute exacerbation  -Continue albuterol as needed    10. Essential Hypertension  -Controlled  -Continue Toprol-XL  -Holding Cozaar for hyperkalemia    11. History of interanl hemorrhoids  -Continue Anusol    12. History of achalasia s/p esophageal dilation  -GI consulted patient is at high risk for endoscopic procedures  -Continue PPI  -Patient to follow with possible tertiary center as outpatient    Chief Complaint: Abdominal pain    Hospital Course: Patient is a 82-year-old female who presented to [facility] emergency department for generalized abdominal pain. Abdominal pain began around 2 AM on 10/2/2020 and began to worsen throughout the evening. Pain was described as constant and generalized achiness. Patient has a past medical history of multiple abdominal surgeries and GI issues. Last abdominal surgery was with Dr. Stevan Llamas for a cecal mass.   She has a known history of achalasia requiring esophageal dilation with  Huma Meehan. Per GI notes patient is to be referred to [facility] clinic for further care due to high risk endoscopies. Patient has a past medical history of nonischemic CAD with combined systolic and diastolic congestive heart failure with reduced ejection fraction of 25 to 30% post ICD placement. She is on long-term Eliquis for chronic atrial fibrillation, currently going through evaluation for watchman device. She is on long-term Plavix for history of PAD with stent placement as well as endovascular repair. Patient has a history of AAA status post EVAR in 2019. While in the ED patient underwent CT of the abdomen and pelvis showing thickening of the GE junction possibly suspicious for neoplastic process, multiple dilated loops of small bowel with a transition point, increased attenuation in a small portion of the aneurysm sac possible for endoleak. Hospitalist team was consulted for further management of medical issues. Subjective: Patient denies any abdominal pain this morning. She states that she had a bowel movement around 3 or 4:00 this morning and since that time she has felt well. Patient denies chest pain or shortness of breath. Positive flatus. Patient advance to full liquid diet. Possible discharge tomorrow if continues to progress.        Medications:  Reviewed    Infusion Medications    sodium chloride 100 mL/hr at 10/03/20 0210     Scheduled Medications    sodium chloride flush  10 mL Intravenous 2 times per day    pantoprazole  40 mg Intravenous BID    losartan  25 mg Oral Daily    metoprolol succinate  25 mg Oral Daily    pravastatin  40 mg Oral Daily    timolol  1 drop Both Eyes Nightly    potassium chloride  10 mEq Oral Daily    apixaban  2.5 mg Oral BID    clopidogrel  75 mg Oral Daily     PRN Meds: sodium chloride flush, ondansetron **OR** ondansetron, acetaminophen, hydrocortisone, melatonin, albuterol sulfate HFA      Intake/Output Summary (Last 24 hours) at 10/3/2020 7585  Last data filed at 10/3/2020 0410  Gross per 24 hour   Intake 1646.34 ml   Output 200 ml   Net 1446.34 ml       Diet:  DIET FULL LIQUID; Daily Fluid Restriction: 2000 ml    Exam:  /71   Pulse 79   Temp 97.6 °F (36.4 °C) (Oral)   Resp 18   Ht 5' 5\" (1.651 m)   Wt 145 lb 4.8 oz (65.9 kg)   SpO2 92%   BMI 24.18 kg/m²     General appearance: No apparent distress, appears stated age and cooperative. HEENT: Pupils equal, round, and reactive to light. Conjunctivae/corneas clear. Neck: Supple, with full range of motion. No jugular venous distention. Trachea midline. Respiratory:  Normal respiratory effort. Clear to auscultation, bilaterally without Rales/Wheezes/Rhonchi. Cardiovascular: Regular rate and rhythm with normal S1/S2 without murmurs, rubs or gallops. Abdomen: Soft, non-tender, non-distended with normal bowel sounds. Musculoskeletal: passive and active ROM x 4 extremities. Skin: Skin color, texture, turgor normal.  No rashes or lesions. Neurologic:  Neurovascularly intact without any focal sensory/motor deficits. Cranial nerves: II-XII intact, grossly non-focal.  Psychiatric: Alert and oriented, thought content appropriate, normal insight  Capillary Refill: Brisk,< 3 seconds   Peripheral Pulses: +2 palpable, equal bilaterally       Labs:   Recent Labs     10/02/20  0644 10/03/20  0606   WBC 12.8* 10.3   HGB 16.7* 13.9   HCT 55.3* 46.8    149     Recent Labs     10/02/20  0644 10/03/20  0606    141   K 4.8 5.5*    109   CO2 24 22*   BUN 26* 47*   CREATININE 1.1 1.6*   CALCIUM 9.5 8.1*     Recent Labs     10/02/20  0644 10/03/20  0606   AST 20 20   ALT 13 9*   BILIDIR <0.2 <0.2   BILITOT 0.4 0.3   ALKPHOS 128* 91     No results for input(s): INR in the last 72 hours. No results for input(s): [place] in the last 72 hours.     Urinalysis:      Lab Results   Component Value Date    NITRU POSITIVE 10/02/2020    WBCUA 2-4 10/02/2020    BACTERIA MANY 10/02/2020 RBCUA 0-2 10/02/2020    BLOODU NEGATIVE 10/02/2020    SPECGRAV 1.013 02/24/2019    GLUCOSEU NEGATIVE 10/02/2020       Radiology:  XR ABDOMEN (KUB) (SINGLE AP VIEW)   Final Result   No acute findings. **This report has been created using voice recognition software. It may contain minor errors which are inherent in voice recognition technology. **         Final report electronically signed by Dr. Tenisha Singh on 10/3/2020 7:19 AM      XR CHEST PORTABLE   Final Result   1. There has been interval repositioning of an enteric tube which appears coiled within the upper esophagus. **This report has been created using voice recognition software. It may contain minor errors which are inherent in voice recognition technology. **      Final report electronically signed by Dr. Linda Short on 10/2/2020 2:46 PM      XR CHEST PORTABLE   Final Result   NG tube has a large loop within a dilated portion of the proximal esophagus. It does not extend into the abdomen. **This report has been created using voice recognition software. It may contain minor errors which are inherent in voice recognition technology. **      Final report electronically signed by Dr. Macho Jimenez on 10/2/2020 2:06 PM      XR CHEST PORTABLE   Final Result   1. There is an enteric tube present which is coiled within the upper mediastinum. This appears to be situated within a patulous redundant portion of a dilated esophagus possibly. Recommend repositioning. **This report has been created using voice recognition software. It may contain minor errors which are inherent in voice recognition technology. **      Final report electronically signed by Dr. Linda Short on 10/2/2020 10:34 AM      CT ABDOMEN PELVIS W IV CONTRAST Additional Contrast? None   Final Result   1. There is dilation of the distal esophagus. There is thickening at the GE junction. Cannot exclude a neoplastic process.       2. Multiple dilated loops of

## 2020-10-03 NOTE — PROGRESS NOTES
[facility] - Dr. Veronica Hannon  Daily Progress Note    Pt Name: Susan Hunter  Medical Record Number: [id number]  Date of Birth 1935   Today's Date: 10/3/2020    Hospital day # 1     ASSESSMENT   1. SBO  2. Thickening at the GE junction - cannot rule out neoplastic process   3. Achalasia   4. Multiple GI bleeds  5. Status post ileocolonic resection Feb 2020 with Dr. Valeda Dubin  6. Status post robotic anterior resection secondary to diverticular stricture 2018 with  [facility]   7. Status post endoluminal graft repair of abdominal aortic aneurysm Feb 2019  8. CAD with stents  9. COPD  10. UTI  11. Atrial fibrillation    has a past medical history of Arthritis, Blood circulation, collateral, BPPV (benign paroxysmal positional vertigo), CHF (congestive heart failure) ([address] Chronic kidney disease, Gastrointestinal hemorrhage, GERD (gastroesophageal reflux disease), History of blood transfusion, HTN (hypertension), Hx of blood clots, Hyperlipidemia, Medtronic dual ICD , Neuromuscular disorder ([address] Obesity, Osteopenia, PAC (premature atrial contraction), Paralysis ([address] Pedal edema, Pneumonia, PVC (premature ventricular contraction), PVD (peripheral vascular disease) ([address] Tinnitus, and UTI (urinary tract infection). PLAN   1. KUB this morning with no acute findings  2. Decreased pain & nausea today. NG was removed yesterday secondary to difficulty insertion attempts. 3. Okay to try full liquids. Bowel function this morning. 4. IV fluids  5. Medical management  6. Continue eliquis and plavix  7. Appreciate cardiothoracic input  8. Repeat K+ later today  9. Labs reviewed. Creat 1.6. Repeat in am.   10. GI signed off. Recommendation for tertiary center transfer if anything needs done from their standpoint. Outpatient referral to [address] [place] [address] as already discussed. 11. Abdomen benign this morning. Feels much better. Advance diet as tolerated.  If doing well possible discharge home tomorrow pending labs. SUBJECTIVE   Chief complaint: No complaints    Patient was stable overnight. Chart reviewed. Updated by nursing staff. No fevers. Denies chest discomfort or dyspnea. No N/V. Unable to insert NG tube yesterday so it was out all day. (+) belching, flatus had a moderate sized BM this morning. Tolerating Diet NPO Time Specified Exceptions are: Ice Chips, Sips with Meds diet. Abdominal tenderness generalized but no pain. Up with assistance. No urinary complaints. CURRENT MEDICATIONS   Scheduled Meds:   sodium chloride flush  10 mL Intravenous 2 times per day    pantoprazole  40 mg Intravenous BID    losartan  25 mg Oral Daily    metoprolol succinate  25 mg Oral Daily    pravastatin  40 mg Oral Daily    timolol  1 drop Both Eyes Nightly    potassium chloride  10 mEq Oral Daily    apixaban  2.5 mg Oral BID    clopidogrel  75 mg Oral Daily     Continuous Infusions:   sodium chloride 100 mL/hr at 10/03/20 0210     PRN Meds:.sodium chloride flush, ondansetron **OR** ondansetron, acetaminophen, hydrocortisone, melatonin, albuterol sulfate HFA  OBJECTIVE   CURRENT VITALS:  height is 5' 5\" (1.651 m) and weight is 145 lb 4.8 oz (65.9 kg). Her oral temperature is 97.6 °F (36.4 °C). Her blood pressure is 105/71 and her pulse is 79. Her respiration is 18 and oxygen saturation is 92%.    Temperature Range (24h):Temp: 97.6 °F (36.4 °C) Temp  Av.5 °F (36.4 °C)  Min: 96.2 °F (35.7 °C)  Max: 98 °F (36.7 °C)  BP Range (86V): Systolic (27BWK), PKB:356 , Min:105 , XGZ:163     Diastolic (18TVW), NBY:26, Min:51, Max:98    Pulse Range (24h): Pulse  Av.4  Min: 72  Max: 105  Respiration Range (24h): Resp  Av.4  Min: 15  Max: 18  Current Pulse Ox (24h):  SpO2: 92 %  Pulse Ox Range (24h):  SpO2  Av %  Min: 90 %  Max: 97 %  Oxygen Amount and Delivery: O2 Flow Rate (L/min): 1 L/min(to room air)  Incentive Spirometry Tx:            GENERAL: alert, cooperative, no distress  SKIN: Skin color, texture, turgor normal. No rashes or lesions. HEENT: Head is normocephalic, atraumatic. NECK: Supple, symmetrical, trachea midline  LUNGS: clear to ausculation, without wheezes, rales or rhonci  HEART: normal rate and irregular rhythm  ABDOMEN: soft, generalized tenderness, non-distended, bowel sounds present. No peritoneal signs. NEUROLOGIC: There are no focalizing motor or sensory deficits. CN II-XII are grossly intact. EXTREMITIES: no cyanosis, no clubbing and no edema. In: 1646.3 [I.V.:1646.3]  Out: 200 [Urine:200]  Date 10/03/20 0000 - 10/03/20 2359   Shift 3274-9623 4073-7501 2509-7187 24 Hour Total   INTAKE   P.O. 0   0   I. V.(mL/kg/hr) 823.1   823.1   Shift Total(mL/kg) 823. 1(12.5)   823. 1(12.5)   OUTPUT   Urine(mL/kg/hr) 200   200   Emesis/NG output 0   0   Other 0   0   Stool 0   0   Blood 0   0   Shift Total(mL/kg) 200(3)   200(3)   Weight (kg) 65.9 65.9 65.9 65.9     LABS     Recent Labs     10/02/20  0644 10/03/20  0606   WBC 12.8* 10.3   HGB 16.7* 13.9   HCT 55.3* 46.8    149    141   K 4.8 5.5*    109   CO2 24 22*   BUN 26* 47*   CREATININE 1.1 1.6*   MG 2.2  --    CALCIUM 9.5 8.1*      No results for input(s): PTT, INR in the last 72 hours. Invalid input(s): PT  Recent Labs     10/02/20  0644 10/03/20  0606   AST 20 20   ALT 13 9*   BILITOT 0.4 0.3   BILIDIR <0.2 <0.2   LIPASE 21.7 9.6     Recent Labs     10/02/20  0644   TROPONINT < 0.010     RADIOLOGY     PROCEDURE: XR ABDOMEN (KUB) (SINGLE AP VIEW)         CLINICAL INFORMATION: Abdominal pain. Findings of possible ileus/obstruction on recent CT abdomen and pelvis.         COMPARISON: 2/16/2019 supine abdomen; CT abdomen and pelvis, 10/2/2020         TECHNIQUE: A single supine image of the abdomen was obtained.         FINDINGS:    Intestinal gas pattern is normal. I see no free air. No mass lesion is seen. Burlene Mckenzie are somewhat obscured. . No definite renal or ureteral calculi are seen. An aortic endograft is present.  In addition, there is a vascular stent in the right external    iliac artery. There has been prior cholecystectomy.              Impression    No acute findings.                   **This report has been created using voice recognition software.  It may contain minor errors which are inherent in voice recognition technology. **              Final report electronically signed by Dr. Etta Macias on 10/3/2020 7:19 AM      Electronically signed by ROMAN Lunsford CNP on 10/3/2020 at 7:02 AM     Patient seen and examined independently by me earlier this AM. Above discussed and I agree with Chuck Rubin CNP. See my additional comments below for updated orders and plan. Labs, cultures, and radiographs where available were reviewed. I discussed patient concerns with the patient's nurse and instructions were given. Please see our orders for the updated patient care plan. -KUB this morning looks good. Clinically, she is doing much better. Abdomen benign. Denies abdominal pain. Passing flatus and had a bowel movement. Trial full liquids. Remove NG tube. Appreciate consultant assistance with medical management. Monitor potassium and creatinine level. A.m. labs. Continue nonoperative supportive care.     Electronically signed by Teddy Gongora MD on 10/3/20 at 9:48 AM EDT

## 2020-10-04 LAB
ANION GAP SERPL CALCULATED.3IONS-SCNC: 9 MEQ/L (ref 8–16)
BUN BLDV-MCNC: 31 MG/DL (ref 7–22)
CALCIUM IONIZED: 1.11 MMOL/L (ref 1.12–1.32)
CALCIUM SERPL-MCNC: 7.7 MG/DL (ref 8.5–10.5)
CHLORIDE BLD-SCNC: 109 MEQ/L (ref 98–111)
CO2: 22 MEQ/L (ref 23–33)
CREAT SERPL-MCNC: 1.2 MG/DL (ref 0.4–1.2)
GFR SERPL CREATININE-BSD FRML MDRD: 43 ML/MIN/1.73M2
GLUCOSE BLD-MCNC: 77 MG/DL (ref 70–108)
HCT VFR BLD CALC: 38.2 % (ref 37–47)
HEMOGLOBIN: 11.2 GM/DL (ref 12–16)
MAGNESIUM: 1.9 MG/DL (ref 1.6–2.4)
PHOSPHORUS: 2.2 MG/DL (ref 2.4–4.7)
POTASSIUM SERPL-SCNC: 4.4 MEQ/L (ref 3.5–5.2)
SODIUM BLD-SCNC: 140 MEQ/L (ref 135–145)

## 2020-10-04 PROCEDURE — 99232 SBSQ HOSP IP/OBS MODERATE 35: CPT | Performed by: SURGERY

## 2020-10-04 PROCEDURE — 6370000000 HC RX 637 (ALT 250 FOR IP): Performed by: PHYSICIAN ASSISTANT

## 2020-10-04 PROCEDURE — 1200000003 HC TELEMETRY R&B

## 2020-10-04 PROCEDURE — 85018 HEMOGLOBIN: CPT

## 2020-10-04 PROCEDURE — 94760 N-INVAS EAR/PLS OXIMETRY 1: CPT

## 2020-10-04 PROCEDURE — 6370000000 HC RX 637 (ALT 250 FOR IP): Performed by: NURSE PRACTITIONER

## 2020-10-04 PROCEDURE — 99232 SBSQ HOSP IP/OBS MODERATE 35: CPT | Performed by: FAMILY MEDICINE

## 2020-10-04 PROCEDURE — 6360000002 HC RX W HCPCS: Performed by: NURSE PRACTITIONER

## 2020-10-04 PROCEDURE — 2580000003 HC RX 258: Performed by: NURSE PRACTITIONER

## 2020-10-04 PROCEDURE — 36415 COLL VENOUS BLD VENIPUNCTURE: CPT

## 2020-10-04 PROCEDURE — 80048 BASIC METABOLIC PNL TOTAL CA: CPT

## 2020-10-04 PROCEDURE — 2580000003 HC RX 258: Performed by: PHYSICIAN ASSISTANT

## 2020-10-04 PROCEDURE — 93010 ELECTROCARDIOGRAM REPORT: CPT | Performed by: INTERNAL MEDICINE

## 2020-10-04 PROCEDURE — 84100 ASSAY OF PHOSPHORUS: CPT

## 2020-10-04 PROCEDURE — 85014 HEMATOCRIT: CPT

## 2020-10-04 PROCEDURE — C9113 INJ PANTOPRAZOLE SODIUM, VIA: HCPCS | Performed by: NURSE PRACTITIONER

## 2020-10-04 PROCEDURE — 2580000003 HC RX 258: Performed by: FAMILY MEDICINE

## 2020-10-04 PROCEDURE — 2500000003 HC RX 250 WO HCPCS: Performed by: FAMILY MEDICINE

## 2020-10-04 PROCEDURE — APPSS45 APP SPLIT SHARED TIME 31-45 MINUTES: Performed by: NURSE PRACTITIONER

## 2020-10-04 PROCEDURE — 82330 ASSAY OF CALCIUM: CPT

## 2020-10-04 PROCEDURE — 83735 ASSAY OF MAGNESIUM: CPT

## 2020-10-04 RX ORDER — BISACODYL 10 MG
10 SUPPOSITORY, RECTAL RECTAL DAILY
Status: DISCONTINUED | OUTPATIENT
Start: 2020-10-04 | End: 2020-10-06 | Stop reason: HOSPADM

## 2020-10-04 RX ADMIN — BISACODYL 10 MG: 10 SUPPOSITORY RECTAL at 10:14

## 2020-10-04 RX ADMIN — Medication 10 ML: at 20:26

## 2020-10-04 RX ADMIN — SODIUM PHOSPHATE, MONOBASIC, MONOHYDRATE 10 MMOL: 276; 142 INJECTION, SOLUTION INTRAVENOUS at 13:48

## 2020-10-04 RX ADMIN — SODIUM CHLORIDE: 9 INJECTION, SOLUTION INTRAVENOUS at 04:26

## 2020-10-04 RX ADMIN — CLOPIDOGREL BISULFATE 75 MG: 75 TABLET ORAL at 08:42

## 2020-10-04 RX ADMIN — METOPROLOL SUCCINATE 25 MG: 25 TABLET, FILM COATED, EXTENDED RELEASE ORAL at 08:42

## 2020-10-04 RX ADMIN — ACETAMINOPHEN 650 MG: 325 TABLET ORAL at 12:49

## 2020-10-04 RX ADMIN — APIXABAN 2.5 MG: 2.5 TABLET, FILM COATED ORAL at 20:26

## 2020-10-04 RX ADMIN — TIMOLOL MALEATE 1 DROP: 5 SOLUTION/ DROPS OPHTHALMIC at 20:27

## 2020-10-04 RX ADMIN — POTASSIUM CHLORIDE 10 MEQ: 750 TABLET, FILM COATED, EXTENDED RELEASE ORAL at 08:42

## 2020-10-04 RX ADMIN — ACETAMINOPHEN 650 MG: 325 TABLET ORAL at 20:26

## 2020-10-04 RX ADMIN — Medication 10 ML: at 08:43

## 2020-10-04 RX ADMIN — PANTOPRAZOLE SODIUM 40 MG: 40 INJECTION, POWDER, FOR SOLUTION INTRAVENOUS at 08:42

## 2020-10-04 RX ADMIN — PANTOPRAZOLE SODIUM 40 MG: 40 INJECTION, POWDER, FOR SOLUTION INTRAVENOUS at 20:27

## 2020-10-04 RX ADMIN — APIXABAN 2.5 MG: 2.5 TABLET, FILM COATED ORAL at 08:42

## 2020-10-04 RX ADMIN — PRAVASTATIN SODIUM 40 MG: 40 TABLET ORAL at 08:42

## 2020-10-04 ASSESSMENT — PAIN DESCRIPTION - DESCRIPTORS
DESCRIPTORS: CRAMPING
DESCRIPTORS: CRAMPING;SHARP

## 2020-10-04 ASSESSMENT — PAIN SCALES - GENERAL
PAINLEVEL_OUTOF10: 0
PAINLEVEL_OUTOF10: 0
PAINLEVEL_OUTOF10: 6
PAINLEVEL_OUTOF10: 2
PAINLEVEL_OUTOF10: 0
PAINLEVEL_OUTOF10: 10

## 2020-10-04 ASSESSMENT — PAIN DESCRIPTION - ONSET
ONSET: ON-GOING
ONSET: PROGRESSIVE

## 2020-10-04 ASSESSMENT — PAIN DESCRIPTION - PAIN TYPE
TYPE: ACUTE PAIN
TYPE: ACUTE PAIN

## 2020-10-04 ASSESSMENT — PAIN DESCRIPTION - LOCATION
LOCATION: ABDOMEN
LOCATION: ABDOMEN

## 2020-10-04 ASSESSMENT — PAIN DESCRIPTION - ORIENTATION
ORIENTATION: RIGHT;LEFT;MID
ORIENTATION: LOWER;RIGHT;LEFT;MID

## 2020-10-04 ASSESSMENT — PAIN - FUNCTIONAL ASSESSMENT
PAIN_FUNCTIONAL_ASSESSMENT: ACTIVITIES ARE NOT PREVENTED
PAIN_FUNCTIONAL_ASSESSMENT: PREVENTS OR INTERFERES SOME ACTIVE ACTIVITIES AND ADLS

## 2020-10-04 ASSESSMENT — PAIN DESCRIPTION - FREQUENCY
FREQUENCY: INTERMITTENT
FREQUENCY: CONTINUOUS

## 2020-10-04 ASSESSMENT — PAIN DESCRIPTION - PROGRESSION
CLINICAL_PROGRESSION: GRADUALLY IMPROVING
CLINICAL_PROGRESSION: GRADUALLY IMPROVING
CLINICAL_PROGRESSION: GRADUALLY WORSENING
CLINICAL_PROGRESSION: GRADUALLY IMPROVING

## 2020-10-04 NOTE — PLAN OF CARE
Problem: Falls - Risk of:  Goal: Will remain free from falls  Description: Will remain free from falls  10/4/2020 1429 by Joellen Cruz RN  Note: No falls this shift. Bed alarm on, Falling star program in place. Call light within reach. Problem: Skin Integrity:  Goal: Absence of new skin breakdown  Description: Absence of new skin breakdown  10/4/2020 1429 by Joellen Cruz RN  Note: No new skin breakdown this shift. Will continue to observe and monitor. Problem: Pain:  Goal: Pain level will decrease  Description: Pain level will decrease  10/4/2020 1429 by Joellen Cruz RN  Note: Patient was pain free until dulcolax suppository administered, at which time pain gradually increased to 10/10 and described as cramping and uncomfortable. PO tylenol given and patient repositioned. Pain decreased to 8/10. Pain goal: no pain. Problem: Discharge Planning:  Goal: Discharged to appropriate level of care  Description: Discharged to appropriate level of care  10/4/2020 1429 by Joellen Cruz RN  Note: Patient prefers to discharge home w/support of family. Problem: Bowel Function - Altered:  Goal: Bowel elimination is within specified parameters  Description: Bowel elimination is within specified parameters  10/4/2020 1429 by Joellen Cruz RN  Note: Patient had one medium and one small BM after dulcolax suppository was given. Care plan reviewed with patient and son. Patient and son verbalize understanding of the plan of care and contribute to goal setting.

## 2020-10-04 NOTE — FLOWSHEET NOTE
10/04/20 1224   Provider Notification   Reason for Communication Evaluate   Provider Name Oscar Lipoma   Provider Notification Advance Practice Clinician (CNS, NP, CNM, CRNA, PA)   Method of Communication Secure Message   Notification Time 1225     Pt was given Dulcolax suppository @1014 and has had subsequent abdominal cramping - 10/10 on the pain scale. 2 BMs since suppository given. Not able to eat anything at this time. Leatha Moncada responded to discontinue suppository; supportive care for now. Safia Griggs also followed up and was updated on status. No new orders at this time.

## 2020-10-04 NOTE — PROGRESS NOTES
[facility] - Dr. Aviva Box  Daily Progress Note    Pt Name: Reuben Eugene  Medical Record Number: [id number]  Date of Birth 1935   Today's Date: 10/4/2020    Hospital day # 2     ASSESSMENT   1. SBO  2. Thickening at the GE junction - cannot rule out neoplastic process   3. Achalasia   4. Multiple GI bleeds  5. Status post ileocolonic resection Feb 2020 with Dr. Grace Rodriguez  6. Status post robotic anterior resection secondary to diverticular stricture 2018 with Dr. Adonna Bamberger   7. Status post endoluminal graft repair of abdominal aortic aneurysm Feb 2019  8. CAD with stents  9. COPD  10. UTI  11. Atrial fibrillation    has a past medical history of Arthritis, Blood circulation, collateral, BPPV (benign paroxysmal positional vertigo), CHF (congestive heart failure) ([address] Chronic kidney disease, Gastrointestinal hemorrhage, GERD (gastroesophageal reflux disease), History of blood transfusion, HTN (hypertension), Hx of blood clots, Hyperlipidemia, Medtronic dual ICD , Neuromuscular disorder ([address] Obesity, Osteopenia, PAC (premature atrial contraction), Paralysis ([address] Pedal edema, Pneumonia, PVC (premature ventricular contraction), PVD (peripheral vascular disease) ([address] Tinnitus, and UTI (urinary tract infection). PLAN   1. KUB yesterday with no acute findings  2. Did well with full liquids. Had a BM yesterday morning but no bowel function since. Increase to soft/liquids as she tolerates at home. 3. Takes suppositories BID so will order one for this morning   4. IV fluids  5. Medical management  6. Continue eliquis and plavix  7. Labs better this morning. Hemoglobin 11.2 from 13.8 but likely dilutional.   8. GI signed off. Recommendation for tertiary center transfer if anything needs done from their standpoint. Outpatient referral to [address] [place] [address] as already discussed. 9. Abdomen still benign but not much bowel function. Increase diet and give supp.  Well see how she does Pulse Ox (24h):  SpO2: 93 %  Pulse Ox Range (24h):  SpO2  Av.7 %  Min: 92 %  Max: 96 %  Oxygen Amount and Delivery: O2 Flow Rate (L/min): 1 L/min(to room air)  Incentive Spirometry Tx:            GENERAL: alert, cooperative, no distress  SKIN: Skin color, texture, turgor normal. No rashes or lesions. HEENT: Head is normocephalic, atraumatic. NECK: Supple, symmetrical, trachea midline  LUNGS: clear to ausculation, without wheezes, rales or rhonci  HEART: normal rate and irregular rhythm  ABDOMEN: soft, generalized tenderness, non-distended, bowel sounds present. No peritoneal signs. NEUROLOGIC: There are no focalizing motor or sensory deficits. CN II-XII are grossly intact. EXTREMITIES: no cyanosis, no clubbing and no edema. In: 2476.3 [P.O.:200; I.V.:2276.3]  Out: 825 [Urine:825]  Date 10/04/20 0000 - 10/04/20 2359   Shift 7209-2126 1837-4616 0703-5309 24 Hour Total   INTAKE   P.O. 200   200   I. V.(mL/kg/hr) 1231.3   1231.3   Shift Total(mL/kg) 1431. 3(20.8)   1431. 3(20.8)   OUTPUT   Urine(mL/kg/hr) 800   800   Emesis/NG output 0   0   Other 0   0   Stool 0   0   Blood 0   0   Shift Total(mL/kg) 800(11.6)   800(11.6)   Weight (kg) 68.7 68.7 68.7 68.7     LABS     Recent Labs     10/02/20  0644 10/03/20  0606 10/03/20  1156 10/04/20  0608   WBC 12.8* 10.3  --   --    HGB 16.7* 13.9  --  11.2*   HCT 55.3* 46.8  --  38.2    149  --   --     141  --  140   K 4.8 5.5* 5.1 4.4    109  --  109   CO2 24 22*  --  22*   BUN 26* 47*  --  31*   CREATININE 1.1 1.6*  --  1.2   MG 2.2  --   --   --    CALCIUM 9.5 8.1*  --  7.7*      No results for input(s): PTT, INR in the last 72 hours. Invalid input(s): PT  Recent Labs     10/02/20  0644 10/03/20  06   AST 20 20   ALT 13 9*   BILITOT 0.4 0.3   BILIDIR <0.2 <0.2   LIPASE 21.7 9.6     Recent Labs     10/02/20  0644   TROPONINT < 0.010     RADIOLOGY     PROCEDURE: XR ABDOMEN (KUB) (SINGLE AP VIEW)         CLINICAL INFORMATION: Abdominal pain. Findings of possible ileus/obstruction on recent CT abdomen and pelvis.         COMPARISON: 2/16/2019 supine abdomen; CT abdomen and pelvis, 10/2/2020         TECHNIQUE: A single supine image of the abdomen was obtained.         FINDINGS:    Intestinal gas pattern is normal. I see no free air. No mass lesion is seen. Gara Chatham are somewhat obscured. . No definite renal or ureteral calculi are seen. An aortic endograft is present. In addition, there is a vascular stent in the right external    iliac artery. There has been prior cholecystectomy.              Impression    No acute findings.                   **This report has been created using voice recognition software.  It may contain minor errors which are inherent in voice recognition technology. **              Final report electronically signed by Dr. Yesi Negrete on 10/3/2020 7:19 AM      Electronically signed by ROMAN Morillo CNP on 10/4/2020 at 7:58 AM     Patient seen and examined independently by me early this AM. Above discussed and I agree with Jacobo Luna CNP. See my additional comments below for updated orders and plan. Labs, cultures, and radiographs where available were reviewed. I discussed patient concerns with the patient's nurse and instructions were given. Please see our orders for the updated patient care plan. -Passing flatus. No bowel movement today. Stool softeners and suppositories. Full liquids. No signs of active bleeding clinically. Abdomen fairly benign. GI service signed off and planning tertiary center for any further needs. Clinically, appears to be improving. Home later today/tomorrow depending how she progresses. Discussed with patient again about recommending tertiary center evaluation.     Electronically signed by Maria Alejandra Watson MD on 10/4/20 at 11:06 AM EDT

## 2020-10-04 NOTE — PROGRESS NOTES
Hospitalist Progress Note      Patient:  Yesica Ngo      Unit/Bed:6K-03/003-A    YOB: 1935    MRN: [id number]       Acct: [de-identified]     PCP: Caridad Palm    Date of Admission: 10/2/2020    Assessment/Plan:    1. SBO secondary to multiple prior abdominal surgeries -- Multiple dilated loops of small bowel seen on CT of the abdomen and pelvis  10/2 -- per primary service gen surgery   -Unfortunately unable to place NG tube on admission due to patient's anatomy thus left out  -- 10/3/2020 --> Patient with bowel movement 10/3, pain improved and Surgery to advanced diet to full liquid. KUB with no acute process 10/3  -- 10/4 --> pt with increase abd pain with suppository added by gen surgery 10/4 am as pt uses them at home --> monitor sx --> ?repeat KUB -- ?check lactic acid -- repeat CBC 10/5  2. Hyperkalemia -- 10/3/2020 potassium of 5.5 --> cozaar held as of 10/3 -- given po daily supplement 10/3 am -> held pm --> Repeat potassium 4 hours later on 10/3 down to 5.1 and stable 4.7 on 10/4 --> resume home po Kcl supplement 10/4 and repeat K 10/5  3. ALEJANDRO on CKD stage III --  Improving 1.2 on 10/4 -- creat up to 1.6 on 10/3 (was 1.1 on 10/2 and baseline 0.9 - 1.3)  -> ??etiology - did get contrast with CT abd 10/2 -> ?? contrib - has been on IVF since admission --> cont but decrease IVF 10/4 -- follows outpt with Dr. Lemos Ratel -> monitor  -- +renal cortical scarring on CT abd 10/2 and notes \"decreased enhancement of the anterior mid left kidney which is concerning for age-indeterminate renal infarct. \" -- on chronic eliquis - ?why infarct   4. NSVT -- 7 beats 10/3 pm noted -- has ICD for cardiomyopathy -- check Mg, Phos, iCal 10/4 and replace as needed -- no cp, sob or signs of ischemia  5. Low bicarb -- on BMP 10/3 and 10/4 - likely related to ALEJANDRO -- cont monitor -- however if abd pain worsens would consider checking lactic acid.   6. Pyuria -- abn u/a -- u/a 10/2 with many bacteria and + nitrites but pt asx -- checked urine cx 10/3 and (p) as of 10/4  - no atbx at this time - afeb, WBC initially little elevated on admission and improved 10/3 w/o atbx -- monitor  7. Normocytic anemia -- drop to 11.2 on 10/4 from 16.7 on admission 10/2 -> 13.9 on 10/3 --> ??dilutional -- no acute signs of loss -- prior earlier 2020 was 9-11's and 8/20/2020 12.0 -- cont monitor for any loss as on eliquis, plavix  8. AAA s/p EVAR with possible acute endoleak  -Per CT abdomen and pelvis aneurysmal sac measuring 4 x 3.8 cm which compared to prior study is unchanged and Noted small attenuation suspicious for possible endoleak --> surgery consulted CT surgery -->  they will follow as an outpatient in 6 months. No further studies needed at this time  9. Chronic atrial fibrillation- rate controlled -- following with Dr. Dionne Echavarria -- on Eliquis at home, okay to continue per surgery -- Rate controlled with BB - monitor tele -- Patient is being evaluated by Dr. Dionne Echavarria for possible watchman procedure outpt   10. Chronic systolic/diastolic heart failure reduced EF of 25-30%/non-ischemic CM -- follows with Dr. Dionne Echavarria cardio -- fluid status stable currently as of 10/4 -- last limited echo 6/2020 EF 25-30% which is improved from EF 10-15% in 12/2019 -- follows with [facility] cardiology - monitor fluid status -- cont toprol, statin, plavix -- hold cozaar since 10/3 for hyperkalemia -- follows with [facility] CHF clinic -> no diuretics PTA - monitor wts, I/O, fluid status.   11. Thickening of GE Junction with History of achalasia s/p esophageal dilation --> Noted thickening on CT abdomen pelvis on admission  -[facility] service is consulted --> extensive hx of dysphagia, achalasia with botox injections -> per c/s note 10/2 \"At her last office visit, it was discussed that she should establish at a tertiary care center, [facility] recommended, for multiple GI concerns and very high risk for endoscopies due to heart disease, chronic anticoagulation, and multiple comorbidities. [facility] No plan for emergent endoscopy given patient is very high risk given cardiac disease, chronic anticoagulation, and comorbidities. Recommend EGD evaluation inpatient vs outpatient, timing to be determined by clinical course. However, EGD will need to be done at a tertiary care center\"  -- cont on PPI IV bid since admission -> ??change to po soon when abd pain improves  12. Nonobstructive CAD -Follows with Dr. Terry Griffin --continue Plavix, Eliquis, statin, BB -- asx  --per last cath 12/2019 = LM patent, RCA dominant with mid 30 to 40% stenosis and mild luminal irregularities distal, PL and PDA branches patent, circumflex patent proximally and 30 to 40% mid, LAD patent proximally with mild mid and distal luminal irregularities but no obstruction, patent diagonal branch  13. PAD s/p stent placement and endovascular repair February 2019 -- Follows with Dr. Terry Griffin. cont on home eliquis and Plavix, statin  14. COPD -- No acute exacerbation -- Continue albuterol as needed   15. Essential Hypertension -- Controlled -- cContinue Toprol-XL -- was resumed on home cozaar but held as of 10/3 due to hyperkalemia -- cont monitor and adjust prn  16. HLD -- cont statin  17. History of internal hemorrhoids -- Continue Anusol  18. ?renal infarct -- noted per CT abd on admission 10/2 - ?etiology - ?old vs new - on chronic eliquis - monitor renal fxn     Dispo  -- 10/4 --> increase abd pain this am with suppository but ++BMs - per gen surgery;  BP, HR stable but short run NSVT last pm -> check Mg, Phos, iCal and replace accordingly -- K improved to 4.7 -> resumed home po supplement but cont hold Cozaar with ALEJANDRO which is improving this am -- cont monitor renal fxn. H/h slightly down -> monitor/trend - asx. No acute signs of losses. Cont monitor lytes, renal fxn, BP, HR/tele. Abd issues per surgery.           Chief Complaint: abd pain, SBO    Hospital Course:   Per Dr. Jimmy Morales note 10/3 \"Patient is a 26-year-old female who presented to [facility] emergency department for generalized abdominal pain. Abdominal pain began around 2 AM on 10/2/2020 and began to worsen throughout the evening. Pain was described as constant and generalized achiness. Patient has a past medical history of multiple abdominal surgeries and GI issues. Last abdominal surgery was with Dr. Sidney Wilkinson for a cecal mass. She has a known history of achalasia requiring esophageal dilation with Dr. Brian Reid. Per GI notes patient is to be referred to [facility] clinic for further care due to high risk endoscopies. Patient has a past medical history of nonischemic CAD with combined systolic and diastolic congestive heart failure with reduced ejection fraction of 25 to 30% post ICD placement. She is on long-term Eliquis for chronic atrial fibrillation, currently going through evaluation for watchman device. She is on long-term Plavix for history of PAD with stent placement as well as endovascular repair. Patient has a history of AAA status post EVAR in 2019. While in the ED patient underwent CT of the abdomen and pelvis showing thickening of the GE junction possibly suspicious for neoplastic process, multiple dilated loops of small bowel with a transition point, increased attenuation in a small portion of the aneurysm sac possible for endoleak. Hospitalist team was consulted for further management of medical issues\"  -- KUB 10/3 with no acute process and per surgery diet started -> suppository added 10/4 and increase abd pain s/p BMs    Subjective/HPI:   -- 10/4/2020  --> pt states just took suppository and now having severe 10/10 diffuse abd pain and cramping - had ++BM with the suppository. +nausea now. No cp, sob. Was james liquids and doesn't want to eat lunch now (soft diet now). Denies f/c. On RA. Afebrile. Ambulating without difficulty - no lightheaded/dizziness.   Last BM 10/4 am.      PMH, SURGICAL HX, FH, SOCIAL HX reviewed and updated as needed. Medications:  Reviewed    Infusion Medications    sodium chloride 100 mL/hr at 10/04/20 0426     Scheduled Medications    magnesium replacement protocol   Other RX Placeholder    phosphorus replacement protocol   Other RX Placeholder    calcium replacement protocol   Other RX Placeholder    bisacodyl  10 mg Rectal Daily    sodium phosphate IVPB  10 mmol Intravenous Once    sodium chloride flush  10 mL Intravenous 2 times per day    pantoprazole  40 mg Intravenous BID    [Held by provider] losartan  25 mg Oral Daily    metoprolol succinate  25 mg Oral Daily    pravastatin  40 mg Oral Daily    timolol  1 drop Both Eyes Nightly    potassium chloride  10 mEq Oral Daily    apixaban  2.5 mg Oral BID    clopidogrel  75 mg Oral Daily     PRN Meds: sodium chloride flush, ondansetron **OR** ondansetron, acetaminophen, hydrocortisone, melatonin, albuterol sulfate HFA      Intake/Output Summary (Last 24 hours) at 10/4/2020 1204  Last data filed at 10/4/2020 1144  Gross per 24 hour   Intake 2476.32 ml   Output 1225 ml   Net 1251.32 ml       Diet:  DIET DENTAL SOFT; Daily Fluid Restriction: 2000 ml    Exam:  BP (!) 147/74   Pulse 64   Temp 97.3 °F (36.3 °C) (Axillary)   Resp 16   Ht 5' 5\" (1.651 m)   Wt 151 lb 8 oz (68.7 kg)   SpO2 98%   BMI 25.21 kg/m²     General appearance: Uncomfortable with abdominal pain, appears stated age and cooperative. Oriented x 3. HEENT: Pupils equal, round, and reactive to light. Conjunctivae/corneas clear. MMM. Neck: Supple, with full range of motion. Trachea midline. Respiratory:  Normal respiratory effort. Clear to auscultation, bilaterally without Rales/Wheezes/Rhonchi. No respiratory distress or accessory muscle use. Cardiovascular: irreg irreg, with normal S1/S2 without murmurs, rubs or gallops. No JVD. Abdomen: Soft, slightly rounded, Diffuse TTP but w/o rebound or guarding. ABSENT bowel sounds. Musculoskeletal: trace BLE edema bilaterally. Full range of motion without deformity. No calf tenderness palpation  Skin: Skin color, texture, turgor normal.  No rashes or lesions. Neurologic:  Neurovascularly intact without any focal sensory/motor deficits. Cranial nerves: II-XII intact, grossly non-focal.  Psychiatric: Alert and oriented, thought content appropriate, normal insight  Capillary Refill: Brisk,< 3 seconds   Peripheral Pulses: +diminished but palpable       All labs reviewed and interpreted by me:  Labs:   Recent Labs     10/02/20  0644 10/03/20  0606 10/04/20  0608   WBC 12.8* 10.3  --    HGB 16.7* 13.9 11.2*   HCT 55.3* 46.8 38.2    149  --      Recent Labs     10/02/20  0644 10/03/20  0606 10/03/20  1156 10/04/20  0608 10/04/20  0825    141  --  140  --    K 4.8 5.5* 5.1 4.4  --     109  --  109  --    CO2 24 22*  --  22*  --    BUN 26* 47*  --  31*  --    CREATININE 1.1 1.6*  --  1.2  --    CALCIUM 9.5 8.1*  --  7.7*  --    PHOS  --   --   --   --  2.2*     Recent Labs     10/02/20  0644 10/03/20  0606   AST 20 20   ALT 13 9*   BILIDIR <0.2 <0.2   BILITOT 0.4 0.3   ALKPHOS 128* 91     No results for input(s): INR in the last 72 hours. Recent Labs     10/02/20  0644   TROPONINT < 0.010       Urinalysis:      Lab Results   Component Value Date    NITRU POSITIVE 10/02/2020    WBCUA 2-4 10/02/2020    BACTERIA MANY 10/02/2020    RBCUA 0-2 10/02/2020    BLOODU NEGATIVE 10/02/2020    SPECGRAV 1.013 02/24/2019    GLUCOSEU NEGATIVE 10/02/2020       All radiology images and reports reviewed and interpreted by me:  Radiology:  XR ABDOMEN (KUB) (SINGLE AP VIEW)   Final Result   No acute findings. **This report has been created using voice recognition software. It may contain minor errors which are inherent in voice recognition technology. **         Final report electronically signed by Dr. Silver Harding on 10/3/2020 7:19 AM      XR CHEST PORTABLE   Final Result   1.  There has been interval repositioning of an enteric tube which appears coiled within the upper esophagus. **This report has been created using voice recognition software. It may contain minor errors which are inherent in voice recognition technology. **      Final report electronically signed by Dr. Sonny Vargas on 10/2/2020 2:46 PM      XR CHEST PORTABLE   Final Result   NG tube has a large loop within a dilated portion of the proximal esophagus. It does not extend into the abdomen. **This report has been created using voice recognition software. It may contain minor errors which are inherent in voice recognition technology. **      Final report electronically signed by Dr. Ashlie Stephenson on 10/2/2020 2:06 PM      XR CHEST PORTABLE   Final Result   1. There is an enteric tube present which is coiled within the upper mediastinum. This appears to be situated within a patulous redundant portion of a dilated esophagus possibly. Recommend repositioning. **This report has been created using voice recognition software. It may contain minor errors which are inherent in voice recognition technology. **      Final report electronically signed by Dr. Sonny Vargas on 10/2/2020 10:34 AM      CT ABDOMEN PELVIS W IV CONTRAST Additional Contrast? None   Final Result   1. There is dilation of the distal esophagus. There is thickening at the GE junction. Cannot exclude a neoplastic process. 2. Multiple dilated loops of jejunal small bowel are demonstrated. The transition point appears to be located within the anterior mid abdomen on axial image 59. This may be related to ileus versus early or partial small bowel obstruction. 3. Multiple dilated loops of jejunal small bowel are demonstrated. The transition point appears to be located within the anterior mid abdomen on axial image 59. This may be related to ileus versus early or partial small bowel obstruction. 4. 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  The stent graft appears patent. 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      5. There is subtle increased attenuation involving a small portion of the rightward aneurysm sac on the axial image 52. Cannot exclude a focal area of endoleak which was not seen on the prior CT dated 9/1/2020. Further evaluation could be obtained with    routine CT angiography of the abdomen and pelvis with and without contrast.      **This report has been created using voice recognition software. It may contain minor errors which are inherent in voice recognition technology. **      Final report electronically signed by Dr. Cecilia Benites on 10/2/2020 8:54 AM          Diet: DIET DENTAL SOFT; Daily Fluid Restriction: 2000 ml    Meredith: no    Microbiology:  Urine cx 10/3 (p) -> u/a 10/2 with +bacteria, +nitrite but (-) LE,     Tele review last 24 hrs:  Paced, SR, burst of 7 beats VT last pm - none since, 2-4 WBC    DVT prophylaxis: [] Lovenox                                 [] SCDs                                 [] SQ Heparin                                 [] Encourage ambulation           [x] Already on Anticoagulation - eliquis     Disposition:    [x] Home       [] TCU       [] Rehab       [] Psych       [] SNF       [] [place]       [] Other-    Code Status: Full Code    PT/OT Eval Status: not needed      Electronically signed by Sadaf Clark MD on 10/4/2020 at 12:04 PM

## 2020-10-05 ENCOUNTER — APPOINTMENT (OUTPATIENT)
Dept: GENERAL RADIOLOGY | Age: 85
DRG: 389 | End: 2020-10-05
Payer: MEDICARE

## 2020-10-05 ENCOUNTER — APPOINTMENT (OUTPATIENT)
Dept: CT IMAGING | Age: 85
DRG: 389 | End: 2020-10-05
Payer: MEDICARE

## 2020-10-05 LAB
ANION GAP SERPL CALCULATED.3IONS-SCNC: 9 MEQ/L (ref 8–16)
BASOPHILS # BLD: 0.5 %
BASOPHILS ABSOLUTE: 0 THOU/MM3 (ref 0–0.1)
BUN BLDV-MCNC: 18 MG/DL (ref 7–22)
CALCIUM SERPL-MCNC: 8.4 MG/DL (ref 8.5–10.5)
CHLORIDE BLD-SCNC: 107 MEQ/L (ref 98–111)
CO2: 25 MEQ/L (ref 23–33)
CREAT SERPL-MCNC: 0.9 MG/DL (ref 0.4–1.2)
EOSINOPHIL # BLD: 0.8 %
EOSINOPHILS ABSOLUTE: 0.1 THOU/MM3 (ref 0–0.4)
ERYTHROCYTE [DISTWIDTH] IN BLOOD BY AUTOMATED COUNT: 19.3 % (ref 11.5–14.5)
ERYTHROCYTE [DISTWIDTH] IN BLOOD BY AUTOMATED COUNT: 56.6 FL (ref 35–45)
GFR SERPL CREATININE-BSD FRML MDRD: 60 ML/MIN/1.73M2
GLUCOSE BLD-MCNC: 85 MG/DL (ref 70–108)
HCT VFR BLD CALC: 42.4 % (ref 37–47)
HEMOGLOBIN: 12.9 GM/DL (ref 12–16)
IMMATURE GRANS (ABS): 0.03 THOU/MM3 (ref 0–0.07)
IMMATURE GRANULOCYTES: 0.5 %
LACTIC ACID: 1.3 MMOL/L (ref 0.5–2.2)
LYMPHOCYTES # BLD: 14.2 %
LYMPHOCYTES ABSOLUTE: 0.9 THOU/MM3 (ref 1–4.8)
MCH RBC QN AUTO: 24.6 PG (ref 26–33)
MCHC RBC AUTO-ENTMCNC: 30.4 GM/DL (ref 32.2–35.5)
MCV RBC AUTO: 80.8 FL (ref 81–99)
MONOCYTES # BLD: 9 %
MONOCYTES ABSOLUTE: 0.6 THOU/MM3 (ref 0.4–1.3)
NUCLEATED RED BLOOD CELLS: 0 /100 WBC
PHOSPHORUS: 2.3 MG/DL (ref 2.4–4.7)
PLATELET # BLD: 120 THOU/MM3 (ref 130–400)
PMV BLD AUTO: 10.6 FL (ref 9.4–12.4)
POTASSIUM SERPL-SCNC: 4 MEQ/L (ref 3.5–5.2)
RBC # BLD: 5.25 MILL/MM3 (ref 4.2–5.4)
SEG NEUTROPHILS: 75 %
SEGMENTED NEUTROPHILS ABSOLUTE COUNT: 4.7 THOU/MM3 (ref 1.8–7.7)
SODIUM BLD-SCNC: 141 MEQ/L (ref 135–145)
WBC # BLD: 6.3 THOU/MM3 (ref 4.8–10.8)

## 2020-10-05 PROCEDURE — C9113 INJ PANTOPRAZOLE SODIUM, VIA: HCPCS | Performed by: NURSE PRACTITIONER

## 2020-10-05 PROCEDURE — 99232 SBSQ HOSP IP/OBS MODERATE 35: CPT | Performed by: FAMILY MEDICINE

## 2020-10-05 PROCEDURE — 6360000002 HC RX W HCPCS: Performed by: NURSE PRACTITIONER

## 2020-10-05 PROCEDURE — 2580000003 HC RX 258: Performed by: NURSE PRACTITIONER

## 2020-10-05 PROCEDURE — APPSS30 APP SPLIT SHARED TIME 16-30 MINUTES: Performed by: NURSE PRACTITIONER

## 2020-10-05 PROCEDURE — 2500000003 HC RX 250 WO HCPCS: Performed by: FAMILY MEDICINE

## 2020-10-05 PROCEDURE — 83605 ASSAY OF LACTIC ACID: CPT

## 2020-10-05 PROCEDURE — 71045 X-RAY EXAM CHEST 1 VIEW: CPT

## 2020-10-05 PROCEDURE — 1200000003 HC TELEMETRY R&B

## 2020-10-05 PROCEDURE — 2580000003 HC RX 258: Performed by: FAMILY MEDICINE

## 2020-10-05 PROCEDURE — 99232 SBSQ HOSP IP/OBS MODERATE 35: CPT | Performed by: SURGERY

## 2020-10-05 PROCEDURE — 85025 COMPLETE CBC W/AUTO DIFF WBC: CPT

## 2020-10-05 PROCEDURE — 80048 BASIC METABOLIC PNL TOTAL CA: CPT

## 2020-10-05 PROCEDURE — 74177 CT ABD & PELVIS W/CONTRAST: CPT

## 2020-10-05 PROCEDURE — 94760 N-INVAS EAR/PLS OXIMETRY 1: CPT

## 2020-10-05 PROCEDURE — 36415 COLL VENOUS BLD VENIPUNCTURE: CPT

## 2020-10-05 PROCEDURE — 6370000000 HC RX 637 (ALT 250 FOR IP): Performed by: NURSE PRACTITIONER

## 2020-10-05 PROCEDURE — 84100 ASSAY OF PHOSPHORUS: CPT

## 2020-10-05 PROCEDURE — 6370000000 HC RX 637 (ALT 250 FOR IP): Performed by: PHYSICIAN ASSISTANT

## 2020-10-05 PROCEDURE — 6360000004 HC RX CONTRAST MEDICATION: Performed by: NURSE PRACTITIONER

## 2020-10-05 RX ORDER — MORPHINE SULFATE 2 MG/ML
2 INJECTION, SOLUTION INTRAMUSCULAR; INTRAVENOUS EVERY 4 HOURS PRN
Status: DISCONTINUED | OUTPATIENT
Start: 2020-10-05 | End: 2020-10-06 | Stop reason: HOSPADM

## 2020-10-05 RX ORDER — MORPHINE SULFATE 2 MG/ML
1 INJECTION, SOLUTION INTRAMUSCULAR; INTRAVENOUS EVERY 4 HOURS PRN
Status: DISCONTINUED | OUTPATIENT
Start: 2020-10-05 | End: 2020-10-06 | Stop reason: HOSPADM

## 2020-10-05 RX ADMIN — IOPAMIDOL 80 ML: 755 INJECTION, SOLUTION INTRAVENOUS at 13:00

## 2020-10-05 RX ADMIN — APIXABAN 2.5 MG: 2.5 TABLET, FILM COATED ORAL at 08:10

## 2020-10-05 RX ADMIN — PANTOPRAZOLE SODIUM 40 MG: 40 INJECTION, POWDER, FOR SOLUTION INTRAVENOUS at 08:10

## 2020-10-05 RX ADMIN — Medication 6 MG: at 00:41

## 2020-10-05 RX ADMIN — MORPHINE SULFATE 2 MG: 2 INJECTION, SOLUTION INTRAMUSCULAR; INTRAVENOUS at 19:39

## 2020-10-05 RX ADMIN — METOPROLOL SUCCINATE 25 MG: 25 TABLET, FILM COATED, EXTENDED RELEASE ORAL at 08:10

## 2020-10-05 RX ADMIN — MORPHINE SULFATE 2 MG: 2 INJECTION, SOLUTION INTRAMUSCULAR; INTRAVENOUS at 13:45

## 2020-10-05 RX ADMIN — MORPHINE SULFATE 1 MG: 2 INJECTION, SOLUTION INTRAMUSCULAR; INTRAVENOUS at 09:14

## 2020-10-05 RX ADMIN — ACETAMINOPHEN 650 MG: 325 TABLET ORAL at 04:47

## 2020-10-05 RX ADMIN — CLOPIDOGREL BISULFATE 75 MG: 75 TABLET ORAL at 08:10

## 2020-10-05 RX ADMIN — SODIUM PHOSPHATE, MONOBASIC, MONOHYDRATE 10 MMOL: 276; 142 INJECTION, SOLUTION INTRAVENOUS at 11:09

## 2020-10-05 RX ADMIN — ACETAMINOPHEN 650 MG: 325 TABLET ORAL at 00:40

## 2020-10-05 RX ADMIN — PRAVASTATIN SODIUM 40 MG: 40 TABLET ORAL at 08:10

## 2020-10-05 RX ADMIN — Medication 10 ML: at 19:49

## 2020-10-05 RX ADMIN — POTASSIUM CHLORIDE 10 MEQ: 750 TABLET, FILM COATED, EXTENDED RELEASE ORAL at 08:10

## 2020-10-05 RX ADMIN — PANTOPRAZOLE SODIUM 40 MG: 40 INJECTION, POWDER, FOR SOLUTION INTRAVENOUS at 19:48

## 2020-10-05 RX ADMIN — TIMOLOL MALEATE 1 DROP: 5 SOLUTION/ DROPS OPHTHALMIC at 19:49

## 2020-10-05 ASSESSMENT — PAIN DESCRIPTION - PROGRESSION
CLINICAL_PROGRESSION: GRADUALLY IMPROVING
CLINICAL_PROGRESSION: NOT CHANGED
CLINICAL_PROGRESSION: GRADUALLY IMPROVING

## 2020-10-05 ASSESSMENT — PAIN SCALES - GENERAL
PAINLEVEL_OUTOF10: 7
PAINLEVEL_OUTOF10: 4
PAINLEVEL_OUTOF10: 10
PAINLEVEL_OUTOF10: 6
PAINLEVEL_OUTOF10: 5
PAINLEVEL_OUTOF10: 7
PAINLEVEL_OUTOF10: 10
PAINLEVEL_OUTOF10: 5
PAINLEVEL_OUTOF10: 10

## 2020-10-05 ASSESSMENT — PAIN DESCRIPTION - ORIENTATION: ORIENTATION: LOWER

## 2020-10-05 ASSESSMENT — PAIN DESCRIPTION - LOCATION: LOCATION: ABDOMEN

## 2020-10-05 ASSESSMENT — PAIN DESCRIPTION - ONSET: ONSET: ON-GOING

## 2020-10-05 ASSESSMENT — PAIN DESCRIPTION - DESCRIPTORS: DESCRIPTORS: ACHING;CRAMPING

## 2020-10-05 ASSESSMENT — PAIN - FUNCTIONAL ASSESSMENT: PAIN_FUNCTIONAL_ASSESSMENT: ACTIVITIES ARE NOT PREVENTED

## 2020-10-05 ASSESSMENT — PAIN DESCRIPTION - FREQUENCY: FREQUENCY: INTERMITTENT

## 2020-10-05 ASSESSMENT — PAIN DESCRIPTION - PAIN TYPE: TYPE: ACUTE PAIN

## 2020-10-05 NOTE — PROGRESS NOTES
Patient's CT enterography results received. Dr. Leatha Epley called this nurse and notified of possible ischemic bowel loops. Niru Banks NP updated.

## 2020-10-05 NOTE — PROGRESS NOTES
PROGRESS NOTE      Patient:  Lea Fournier      Unit/Bed:6K-03/003-A    YOB: 1935    MRN: [id number]       Acct: [de-identified]     PCP: Eliseo Gale    Date of Admission: 10/2/2020      Assessment/Plan:    Anticipated Discharge in : TBD pending progress    Active Hospital Problems    Diagnosis Date Noted    NSVT (nonsustained ventricular tachycardia) ([facility] [I47.2]     Hyperkalemia [E87.5]     Thickening of esophagus [K22.8]     COPD without exacerbation ([facility] [J44.9]     Essential hypertension [I10]     Achalasia [K22.0]     Pyuria [R82.81]     SBO (small bowel obstruction) ([facility] [K56.609] 10/02/2020    Abdominal pain [R10.9]     Chronic combined systolic and diastolic CHF (congestive heart failure) ([facility] [I50.42]     Acute anemia [D64.9] 09/10/2018    [facility]DR[facility]) [facility] [N17.9]        1. SBO secondary to multiple prior abdominal surgeries -- Multiple dilated loops of small bowel seen on CT of the abdomen and pelvis  10/2 -- per primary service gen surgery   -Unfortunately unable to place NG tube on admission due to patient's anatomy thus left out  -- 10/3/2020 --> Patient with bowel movement 10/3, pain improved and Surgery to advanced diet to full liquid.  KUB with no acute process 10/3  -- 10/4 --> pt with increase abd pain with suppository added by gen surgery 10/4 am as pt uses them at home --> monitor sx --> ?repeat KUB -- ?check lactic acid -- repeat CBC 10/5  --10/5-> patient with worsening abdominal pain this morning. General surgery placed order for CT enterography. Lactic acid ordered. Follow CT enterography results. Possibly consider transfer to tertiary care center. 2. Hyperkalemia -- 10/3/2020 potassium of 5.5 --> cozaar held as of 10/3 -- given po daily supplement 10/3 am -> held pm --> Repeat potassium 4 hours later on 10/3 down to 5.1 and stable 4.7 on 10/4 --> resume home po Kcl supplement 10/4--repeat potassium on 10/5 is 4.0--stable  3.  ALEJANDRO on currently as of 10/4 -- last limited echo 6/2020 EF 25-30% which is improved from EF 10-15% in 12/2019 -- follows with [facility] cardiology - monitor fluid status -- cont toprol, statin, plavix -- hold cozaar since 10/3 for hyperkalemia -- follows with [facility] CHF clinic -> no diuretics PTA - monitor wts, I/O, fluid status. 11. Thickening of GE Junction with History of achalasia s/p esophageal dilation --> Noted thickening on CT abdomen pelvis on admission  -MAR[facility] service is consulted --> extensive hx of dysphagia, achalasia with botox injections -> per c/s note 10/2 \"At her last office visit, it was discussed that she should establish at a tertiary care center, [facility] recommended, for multiple GI concerns and very high risk for endoscopies due to heart disease, chronic anticoagulation, and multiple comorbidities. Levon Hilt Levon Hilt No plan for emergent endoscopy given patient is very high risk given cardiac disease, chronic anticoagulation, and comorbidities. Recommend EGD evaluation inpatient vs outpatient, timing to be determined by clinical course. However, EGD will need to be done at a tertiary care center\"  -- cont on PPI IV bid since admission  12. Nonobstructive CAD -Follows with Dr. Carr Forward --continue Plavix, Eliquis, statin, BB -- asx--per last cath 12/2019 = LM patent, RCA dominant with mid 30 to 40% stenosis and mild luminal irregularities distal, PL and PDA branches patent, circumflex patent proximally and 30 to 40% mid, LAD patent proximally with mild mid and distal luminal irregularities but no obstruction, patent diagonal branch  13. PAD s/p stent placement and endovascular repair February 2019 -- Follows with Dr. Lilly Stephen. cont on home eliquis and Plavix, statin  14. COPD -- No acute exacerbation -- Continue albuterol as needed   15. Essential Hypertension -- Controlled -- Continue Toprol-XL -- was resumed on home cozaar but held as of 10/3 due to hyperkalemia -- cont monitor and adjust prn  16. HLD -- cont statin  17.  History of internal hemorrhoids -- Continue Anusol  18. ?renal infarct -- noted per CT abd on admission 10/2 - ?etiology - ?old vs new - on chronic eliquis - monitor renal fxn      Chief Complaint: abd pain, SBO    Hospital Course:  \"Patient is a 44-year-old female who presented to [facility] emergency department for generalized abdominal pain.  Abdominal pain began around 2 AM on 10/2/2020 and began to worsen throughout the evening.  Pain was described as constant and generalized achiness.  Patient has a past medical history of multiple abdominal surgeries and GI issues.  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  She has a known history of achalasia requiring esophageal dilation with Dr. Margarita Kowalski notes patient is to be referred to [facility] for further care due to high risk endoscopies.  Patient has a past medical history of nonischemic CAD with combined systolic and diastolic congestive heart failure with reduced ejection fraction of 25 to 30% post ICD placement.  She is on long-term Eliquis for chronic atrial fibrillation, currently going through evaluation for watchman device.  She is on long-term Plavix for history of PAD with stent placement as well as endovascular repair.  Patient has a history of AAA status post EVAR in 2019.  While in the ED patient underwent CT of the abdomen and pelvis showing thickening of the GE junction possibly suspicious for neoplastic process, multiple dilated loops of small bowel with a transition point, increased attenuation in a small portion of the aneurysm sac possible for endoleak.  Hospitalist team was consulted for further management of medical issues\"  -- KUB 10/3 with no acute process and per surgery diet started -> suppository added 10/4 and increase abd pain s/p BMs  10/4-patient with abdominal pain, suppository added, patient with bowel movement with some resolution of pain  10/5-patient with severe abdominal cramping and abdominal pain this morning. General surgery placed order for CT enterography. Patient on room air and afebrile. Possibly consider transfer to tertiary care center for further evaluation and treatment versus following CT enterography with possible intervention by general surgery team.     Subjective: Patient states that she is having more abdominal pain this morning. She states that abdominal pain improved yesterday after bowel movement however this morning her abdominal pain returned and is very severe. Abdominal pain is diffuse over her entire abdomen. General surgery following and placed order for CT enterography.        Medications:  Reviewed    Infusion Medications    sodium chloride 50 mL/hr at 10/04/20 1655     Scheduled Medications    sodium phosphate IVPB  10 mmol Intravenous Once    magnesium replacement protocol   Other RX Placeholder    phosphorus replacement protocol   Other RX Placeholder    calcium replacement protocol   Other RX Placeholder    bisacodyl  10 mg Rectal Daily    sodium chloride flush  10 mL Intravenous 2 times per day    pantoprazole  40 mg Intravenous BID    [Held by provider] losartan  25 mg Oral Daily    metoprolol succinate  25 mg Oral Daily    pravastatin  40 mg Oral Daily    timolol  1 drop Both Eyes Nightly    potassium chloride  10 mEq Oral Daily    apixaban  2.5 mg Oral BID    clopidogrel  75 mg Oral Daily     PRN Meds: morphine **OR** morphine, sodium chloride flush, ondansetron **OR** ondansetron, acetaminophen, hydrocortisone, melatonin, albuterol sulfate HFA      Intake/Output Summary (Last 24 hours) at 10/5/2020 1012  Last data filed at 10/5/2020 0624  Gross per 24 hour   Intake 2083.97 ml   Output 4630 ml   Net -2546.03 ml       Diet:  DIET DENTAL SOFT; Daily Fluid Restriction: 2000 ml    Exam:  /67   Pulse 72   Temp 97.4 °F (36.3 °C) (Oral)   Resp 16   Ht 5' 5\" (1.651 m)   Wt 149 lb 6.4 oz (67.8 kg)   SpO2 93%   BMI 24.86 kg/m²     General appearance: No apparent distress, appears stated age and cooperative. HEENT: Pupils equal, round, and reactive to light. Conjunctivae/corneas clear. Neck: Supple, with full range of motion. No jugular venous distention. Trachea midline. Respiratory:  Normal respiratory effort. Clear to auscultation, bilaterally without Rales/Wheezes/Rhonchi. Cardiovascular: Irregularly irregular rhythm normal rate with normal S1/S2 without murmurs, rubs or gallops. Abdomen: Soft, diffusely tender to palpation without rebound or guarding. Minimal bowel sounds. Musculoskeletal: passive and active ROM x 4 extremities. Skin: Skin color, texture, turgor normal.  No rashes or lesions. Neurologic:  Neurovascularly intact without any focal sensory/motor deficits. Cranial nerves: II-XII intact, grossly non-focal.  Psychiatric: Alert and oriented, thought content appropriate, normal insight  Capillary Refill: Brisk,< 3 seconds   Peripheral Pulses: +2 palpable, equal bilaterally       Labs:   Recent Labs     10/03/20  0606 10/04/20  0608 10/05/20  0604   WBC 10.3  --  6.3   HGB 13.9 11.2* 12.9   HCT 46.8 38.2 42.4     --  120*     Recent Labs     10/03/20  0606 10/03/20  1156 10/04/20  0608 10/04/20  0825 10/05/20  0604     --  140  --  141   K 5.5* 5.1 4.4  --  4.0     --  109  --  107   CO2 22*  --  22*  --  25   BUN 47*  --  31*  --  18   CREATININE 1.6*  --  1.2  --  0.9   CALCIUM 8.1*  --  7.7*  --  8.4*   PHOS  --   --   --  2.2* 2.3*     Recent Labs     10/03/20  0606   AST 20   ALT 9*   BILIDIR <0.2   BILITOT 0.3   ALKPHOS 91     No results for input(s): INR in the last 72 hours. No results for input(s): Jadene Moh in the last 72 hours.     Urinalysis:      Lab Results   Component Value Date    NITRU POSITIVE 10/02/2020    WBCUA 2-4 10/02/2020    BACTERIA MANY 10/02/2020    RBCUA 0-2 10/02/2020    BLOODU NEGATIVE 10/02/2020    SPECGRAV 1.013 02/24/2019    GLUCOSEU NEGATIVE 10/02/2020 Radiology:  XR ABDOMEN (KUB) (SINGLE AP VIEW)   Final Result   No acute findings. **This report has been created using voice recognition software. It may contain minor errors which are inherent in voice recognition technology. **         Final report electronically signed by Dr. Kathy Dc on 10/3/2020 7:19 AM      XR CHEST PORTABLE   Final Result   1. There has been interval repositioning of an enteric tube which appears coiled within the upper esophagus. **This report has been created using voice recognition software. It may contain minor errors which are inherent in voice recognition technology. **      Final report electronically signed by Dr. Cassi Hess on 10/2/2020 2:46 PM      XR CHEST PORTABLE   Final Result   NG tube has a large loop within a dilated portion of the proximal esophagus. It does not extend into the abdomen. **This report has been created using voice recognition software. It may contain minor errors which are inherent in voice recognition technology. **      Final report electronically signed by Dr. Brielle Herman on 10/2/2020 2:06 PM      XR CHEST PORTABLE   Final Result   1. There is an enteric tube present which is coiled within the upper mediastinum. This appears to be situated within a patulous redundant portion of a dilated esophagus possibly. Recommend repositioning. **This report has been created using voice recognition software. It may contain minor errors which are inherent in voice recognition technology. **      Final report electronically signed by Dr. Cassi Hess on 10/2/2020 10:34 AM      CT ABDOMEN PELVIS W IV CONTRAST Additional Contrast? None   Final Result   1. There is dilation of the distal esophagus. There is thickening at the GE junction. Cannot exclude a neoplastic process. 2. Multiple dilated loops of jejunal small bowel are demonstrated.  The transition point appears to be located within the anterior mid abdomen on axial image 59. This may be related to ileus versus early or partial small bowel obstruction. 3. Multiple dilated loops of jejunal small bowel are demonstrated. The transition point appears to be located within the anterior mid abdomen on axial image 59. This may be related to ileus versus early or partial small bowel obstruction. 4. 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 The stent graft appears patent. 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      5. There is subtle increased attenuation involving a small portion of the rightward aneurysm sac on the axial image 52. Cannot exclude a focal area of endoleak which was not seen on the prior CT dated 9/1/2020. Further evaluation could be obtained with    routine CT angiography of the abdomen and pelvis with and without contrast.      **This report has been created using voice recognition software. It may contain minor errors which are inherent in voice recognition technology. **      Final report electronically signed by Dr. King Magdaleno on 10/2/2020 8:54 AM      CT ENTEROGRAPHY W CONTRAST    (Results Pending)       Diet: DIET DENTAL SOFT; Daily Fluid Restriction: 2000 ml    DVT prophylaxis: [] Lovenox                                 [] SCDs                                 [] SQ Heparin                                 [] Encourage ambulation           [x] Already on Anticoagulation     Disposition:    [] Home       [] TCU       [] Rehab       [] Psych       [] SNF       [] [place]       [] Other-    Code Status: Full Code      Electronically signed by Salomon Rodriguez DO on 10/5/2020 at 10:12 AM

## 2020-10-05 NOTE — CARE COORDINATION
quit smoking about a year ago. Her smoking use included cigarettes. She started smoking about 64 years ago. She has a 15.00 pack-year smoking history. She has never used smokeless tobacco.   PCP: Cleve Vaz  Readmission 30 days or less: no  Readmission Risk Score: 29%    Discharge Planning Evaluation  Current Residence:     Living Arrangements:      Support Systems:     Current Services PTA:     Potential Assistance Needed:     Potential Assistance Purchasing Medications:     Does patient want to participate in local refill/ meds to beds program?     Type of Home Care Services:     Patient expects to be discharged to:     Expected Discharge date: Follow Up Appointment: Best Day/ Time:      Patient Goals/Plan/Treatment Preferences: Spoke with [facility], she is from home alone and lives at the [employer] [facility]. She is a patient of the [facility]. She is uncertain of needs for discharge at this time, as she is c/o terrible pain and Edna AKINS states she is reaching out to Dr. Elkin Mcdonnell to discuss plan, possible transfer? Will follow. Transportation/Food Security/Housekeeping Addressed:  No issues identified.     Evaluation: no

## 2020-10-05 NOTE — FLOWSHEET NOTE
[facility] [id number] PROGRESS NOTE      Patient: Saeid Miguel  Room #: 6K-03/003-A            YOB: 1935  Age: 82 y.o. Gender: female            Admit Date & Time: 10/2/2020  6:34 AM    Assessment: This 82year old female patient is in bed appeared uncomfortable during this visit. Patient told me she is admitted due small bowel obstruction and said she is in severe pain while staff do series of test. Patient said more tests have been scheduled for Monday at this point. Patient said her pain level is at 9 during this encounter. Interventions:  During my encounter with this patient, I offered comfort, nurtured hope, provided active listening and emotional support. Outcomes:  Patient engaged in conversation and appreciated the concerns and support I provided. Despite her pain, patient is receptive to  presence in the room. Plan:  1. SC will follow up to talk with patient about her test results and to put in place appropriate spiritual care plan.      Electronically signed by Anderson Santiago on 10/4/2020 at 9:35 PM.  [address] [facility]  [phone]

## 2020-10-05 NOTE — PLAN OF CARE
Problem: Falls - Risk of:  Goal: Will remain free from falls  Description: Will remain free from falls  10/5/2020 1014 by Fernando Willis RN  Outcome: Ongoing  Note: Falling star placed outside of patient's room. 2/4 bed rails up. Non-skid socks provided. Call light and personal items with in reach. Bed alarm on. Problem: Skin Integrity:  Goal: Will show no infection signs and symptoms  Description: Will show no infection signs and symptoms  10/5/2020 1014 by Fernando Willis RN  Outcome: Ongoing  Note: Afebrile. No signs or symptoms of infection. Will continue to reassess. Problem: Skin Integrity:  Goal: Absence of new skin breakdown  Description: Absence of new skin breakdown  10/5/2020 1014 by Fernando Willis RN  Outcome: Ongoing  Note: No new skin breakdown noted at this time. Will continue to reassess. Patient turns and repositions self in bed frequently. Problem: Pain:  Goal: Pain level will decrease  Description: Pain level will decrease  10/5/2020 1014 by Fernando Willis RN  Outcome: Ongoing  Note: Patient rates pain on 0-10 pain scale. States pain is a 10 on 0-10 scale. States goal is 0. PRN pain medication given as needed. Repositioned for comfort. Will continue to reassess. Problem: Discharge Planning:  Goal: Discharged to appropriate level of care  Description: Discharged to appropriate level of care  10/5/2020 1014 by Fernando Willis RN  Outcome: Ongoing  Note: Patient plans to discharge home when medically stable. Problem: Bowel Function - Altered:  Goal: Bowel elimination is within specified parameters  Description: Bowel elimination is within specified parameters  10/5/2020 1014 by Fernando Willis RN  Outcome: Ongoing  Note: Monitoring bowel movements. No bowel movement yet today. Will continue to reassess.       Problem: Gas Exchange - Impaired:  Goal: Levels of oxygenation will improve  Description: Levels of oxygenation will improve  10/5/2020 1014 by Lior Raya RN  Outcome: Ongoing  Note: Oxygen saturation >90% on room air. Will continue to reassess. Care plan reviewed with patient. No concerns voiced.

## 2020-10-05 NOTE — PROGRESS NOTES
Jose Khalil - Dr. Karlee Claros  Daily Progress Note    Pt Name: Lewis Alarcon  Medical Record Number: [id number]  Date of Birth 1935   Today's Date: 10/5/2020    Hospital day # 3     ASSESSMENT   1. SBO  2. Thickening at the GE junction - cannot rule out neoplastic process   3. Achalasia   4. Multiple GI bleeds  5. Status post ileocolonic resection Feb 2020 with Dr. Chavez Degroot  6. Status post robotic anterior resection secondary to diverticular stricture 2018 with Dr. Patrizia Hightower   7. Status post endoluminal graft repair of abdominal aortic aneurysm Feb 2019  8. CAD with stents  9. COPD  10. UTI  11. Atrial fibrillation    has a past medical history of Arthritis, Blood circulation, collateral, BPPV (benign paroxysmal positional vertigo), CHF (congestive heart failure) ([address] Chronic kidney disease, Gastrointestinal hemorrhage, GERD (gastroesophageal reflux disease), History of blood transfusion, HTN (hypertension), Hx of blood clots, Hyperlipidemia, Medtronic dual ICD , Neuromuscular disorder ([address] Obesity, Osteopenia, PAC (premature atrial contraction), Paralysis ([address] Pedal edema, Pneumonia, PVC (premature ventricular contraction), PVD (peripheral vascular disease) ([address] Tinnitus, and UTI (urinary tract infection). PLAN   1. Failed soft diet. Had two bowel movements yesterday after suppositories but has had increasing abdominal distention and pain. Back to NPO for now. 2. CT enterography this morning   3. IV fluids  4. Medical management  5. Pain control - Morphine ordered  6. Continue eliquis and plavix   7. Labs look good this morning   8. GI signed off. Recommendation for tertiary center transfer if anything needs done from their standpoint. Outpatient referral to [address] [place] [address] as already discussed. 9. Patient in a lot of pain this morning. Willing to take narcotics even though she does not tolerate them.  Complains of generalized 10/10 sharp pain with abdominal distention. Will repeat CT enterography today. Discussed case with Dr. Gloria Scott who operated on her back in Feb and pending results patient may be transferred to tertiary center with her significant vascular/cardiac history. Will wait on imaging. SUBJECTIVE   Chief complaint: \"Terrible 10/10 abdominal pain\"    Patient tried some soft diet for supper and since then she has had severe abdominal pain. Nausea but no vomiting. Abdomen is more distended. Chart reviewed. Updated by nursing staff. No fevers. Denies chest discomfort or dyspnea. Abdomen with generalized tenderness. (+) belching, flatus. No BMs this morning. Tolerating DIET DENTAL SOFT; Daily Fluid Restriction: 2000 ml diet. Up with assistance. No urinary complaints. CURRENT MEDICATIONS   Scheduled Meds:   magnesium replacement protocol   Other RX Placeholder    phosphorus replacement protocol   Other RX Placeholder    calcium replacement protocol   Other RX Placeholder    bisacodyl  10 mg Rectal Daily    sodium chloride flush  10 mL Intravenous 2 times per day    pantoprazole  40 mg Intravenous BID    [Held by provider] losartan  25 mg Oral Daily    metoprolol succinate  25 mg Oral Daily    pravastatin  40 mg Oral Daily    timolol  1 drop Both Eyes Nightly    potassium chloride  10 mEq Oral Daily    apixaban  2.5 mg Oral BID    clopidogrel  75 mg Oral Daily     Continuous Infusions:   sodium chloride 50 mL/hr at 10/04/20 1655     PRN Meds:.sodium chloride flush, ondansetron **OR** ondansetron, acetaminophen, hydrocortisone, melatonin, albuterol sulfate HFA  OBJECTIVE   CURRENT VITALS:  height is 5' 5\" (1.651 m) and weight is 149 lb 6.4 oz (67.8 kg). Her oral temperature is 98.5 °F (36.9 °C). Her blood pressure is 101/68 and her pulse is 78. Her respiration is 18 and oxygen saturation is 95%.    Temperature Range (24h):Temp: 98.5 °F (36.9 °C) Temp  Av.1 °F (36.7 °C)  Min: 97.3 °F (36.3 °C)  Max: 98.5 °F (36.9 °C)  BP Range (09D): Systolic (24hrs), Av , Min:101 , ZMC:643     Diastolic (19GSC), UEK:54, Min:58, Max:80    Pulse Range (24h): Pulse  Av.8  Min: 62  Max: 80  Respiration Range (24h): Resp  Av.7  Min: 16  Max: 18  Current Pulse Ox (24h):  SpO2: 95 %  Pulse Ox Range (24h):  SpO2  Av.1 %  Min: 92 %  Max: 98 %  Oxygen Amount and Delivery: O2 Flow Rate (L/min): 1 L/min(to room air)  Incentive Spirometry Tx:            GENERAL: alert, cooperative, distress noted  SKIN: Skin color, texture, turgor normal. No rashes or lesions. HEENT: Head is normocephalic, atraumatic. NECK: Supple, symmetrical, trachea midline  LUNGS: clear to ausculation, without wheezes, rales or rhonci  HEART: normal rate and irregular rhythm  ABDOMEN: soft but much more distended, generalized tenderness with some guarding. Minimal BS. NEUROLOGIC: There are no focalizing motor or sensory deficits. CN II-XII are grossly intact. EXTREMITIES: no cyanosis, no clubbing and no edema. In:  [P.O.:240; I.V.:1844]  Out: 0572 [CMRM]  Date 10/05/20 0000 - 10/05/20 2359   Shift 2465-3228 6313-1681 3633-0133 24 Hour Total   INTAKE   P.O. 240   240   I. V.(mL/kg/hr) 451.2   451.2   Shift Total(mL/kg) 691. 2(10.2)   691. 2(10.2)   OUTPUT   Urine(mL/kg/hr) 2680   2680   Emesis/NG output 0   0   Other 0   0   Stool 0   0   Blood 0   0   Shift Total(mL/kg) 2327(47.9)   6480(56.9)   Weight (kg) 67.8 67.8 67.8 67.8     LABS     Recent Labs     10/03/20  0606 10/03/20  1156 10/04/20  0608 10/04/20  0825 10/05/20  0604   WBC 10.3  --   --   --  6.3   HGB 13.9  --  11.2*  --  12.9   HCT 46.8  --  38.2  --  42.4     --   --   --  120*     --  140  --  141   K 5.5* 5.1 4.4  --  4.0     --  109  --  107   CO2 22*  --  22*  --  25   BUN 47*  --  31*  --  18   CREATININE 1.6*  --  1.2  --  0.9   MG  --   --   --  1.9  --    PHOS  --   --   --  2.2* 2.3*   CALCIUM 8.1*  --  7.7*  --  8.4*      No results for input(s): PTT, INR in the last 72 hours.    Invalid input(s): PT  Recent Labs     10/03/20  0606   AST 20   ALT 9*   BILITOT 0.3   BILIDIR <0.2   LIPASE 9.6     No results for input(s): TROPONINT in the last 72 hours. RADIOLOGY     PROCEDURE: XR ABDOMEN (KUB) (SINGLE AP VIEW)         CLINICAL INFORMATION: Abdominal pain. Findings of possible ileus/obstruction on recent CT abdomen and pelvis.         COMPARISON: 2/16/2019 supine abdomen; CT abdomen and pelvis, 10/2/2020         TECHNIQUE: A single supine image of the abdomen was obtained.         FINDINGS:    Intestinal gas pattern is normal. I see no free air. No mass lesion is seen. Geoffrey Adjutant are somewhat obscured. . No definite renal or ureteral calculi are seen. An aortic endograft is present. In addition, there is a vascular stent in the right external    iliac artery. There has been prior cholecystectomy.              Impression    No acute findings.                   **This report has been created using voice recognition software.  It may contain minor errors which are inherent in voice recognition technology. **              Final report electronically signed by Dr. Rosa Martin on 10/3/2020 7:19 AM      Electronically signed by ROMAN Haines CNP on 10/5/2020 at 7:45 AM     Patient seen and examined independently by me early this AM. Above discussed and I agree with Олег Booker CNP. See my additional comments below for updated orders and plan. Labs, cultures, and radiographs where available were reviewed. I discussed patient concerns with the patient's nurse and instructions were given. Please see our orders for the updated patient care plan. -Patient with worsening abdominal pain since last night. Reduce diet back to n.p.o.  CT enterography today as she seems to have regressed. Currently on Eliquis and Plavix. Patient acknowledges surgery and increased risk of bleeding but also acknowledges holding blood thinners and worsening clot formation given high risk of patient.   IV

## 2020-10-05 NOTE — PLAN OF CARE
Problem: Falls - Risk of:  Goal: Will remain free from falls  Description: Will remain free from falls  10/5/2020 0223 by Bert Barksdale RN  Outcome: Ongoing  Note: No falls noted this shift. Falling star protocol in place and call light within reach. Bed alarm active and nonskid socks on. Patient utilizes call light appropriately        Problem: Falls - Risk of:  Goal: Absence of physical injury  Description: Absence of physical injury  Outcome: Ongoing     Problem: Skin Integrity:  Goal: Will show no infection signs and symptoms  Description: Will show no infection signs and symptoms  Outcome: Ongoing     Problem: Skin Integrity:  Goal: Absence of new skin breakdown  Description: Absence of new skin breakdown  10/5/2020 0223 by Bert Barksdale RN  Outcome: Ongoing  Note: No new skin breakdown noted this shift. Patient encouraged to turn and make frequent positional changes. Will continue to monitor. Problem: Pain:  Goal: Control of acute pain  Description: Control of acute pain  Outcome: Ongoing  Note: Pt states pain is a 10/10 in abdomen. PRN pain medication given per MAR. Pt repositioned with pillow support. Will continue to assess      Problem: Pain:  Goal: Control of chronic pain  Description: Control of chronic pain  Outcome: Ongoing     Problem: Discharge Planning:  Goal: Participates in care planning  Description: Participates in care planning  Outcome: Ongoing  Note: Pt plans to return home alone to the [facility] when medically stable     Problem: Discharge Planning:  Goal: Discharged to appropriate level of care  Description: Discharged to appropriate level of care  10/5/2020 0223 by Bert Barksdale RN  Outcome: Ongoing     Problem: Bowel Function - Altered:  Goal: Bowel elimination is within specified parameters  Description: Bowel elimination is within specified parameters  10/5/2020 0223 by Bert Barksdale RN  Outcome: Ongoing  Note: No BM this shift. Pt rates abdominal pain a 10/10.  Bowel sounds active in all quadrants. Will continue to assess      Problem: Gas Exchange - Impaired:  Goal: Levels of oxygenation will improve  Description: Levels of oxygenation will improve  Outcome: Ongoing  Note: O2 > 90% on room air       Care plan reviewed with patient. Patient  verbalize understanding of the plan of care and contribute to goal setting.

## 2020-10-06 VITALS
WEIGHT: 150.2 LBS | SYSTOLIC BLOOD PRESSURE: 101 MMHG | HEIGHT: 65 IN | BODY MASS INDEX: 25.02 KG/M2 | HEART RATE: 66 BPM | TEMPERATURE: 97.3 F | OXYGEN SATURATION: 91 % | DIASTOLIC BLOOD PRESSURE: 54 MMHG | RESPIRATION RATE: 16 BRPM

## 2020-10-06 LAB
ANION GAP SERPL CALCULATED.3IONS-SCNC: 18 MEQ/L (ref 8–16)
APTT: 31.1 SECONDS (ref 22–38)
APTT: 31.5 SECONDS (ref 22–38)
BUN BLDV-MCNC: 22 MG/DL (ref 7–22)
CALCIUM SERPL-MCNC: 9.3 MG/DL (ref 8.5–10.5)
CHLORIDE BLD-SCNC: 98 MEQ/L (ref 98–111)
CO2: 22 MEQ/L (ref 23–33)
CREAT SERPL-MCNC: 1.4 MG/DL (ref 0.4–1.2)
ERYTHROCYTE [DISTWIDTH] IN BLOOD BY AUTOMATED COUNT: 20.4 % (ref 11.5–14.5)
ERYTHROCYTE [DISTWIDTH] IN BLOOD BY AUTOMATED COUNT: 20.5 % (ref 11.5–14.5)
ERYTHROCYTE [DISTWIDTH] IN BLOOD BY AUTOMATED COUNT: 53.5 FL (ref 35–45)
ERYTHROCYTE [DISTWIDTH] IN BLOOD BY AUTOMATED COUNT: 55.5 FL (ref 35–45)
GFR SERPL CREATININE-BSD FRML MDRD: 36 ML/MIN/1.73M2
GLUCOSE BLD-MCNC: 172 MG/DL (ref 70–108)
HCT VFR BLD CALC: 52.9 % (ref 37–47)
HCT VFR BLD CALC: 54 % (ref 37–47)
HEMOGLOBIN: 16.3 GM/DL (ref 12–16)
HEMOGLOBIN: 16.4 GM/DL (ref 12–16)
LACTIC ACID: 2.1 MMOL/L (ref 0.5–2.2)
MCH RBC QN AUTO: 24.5 PG (ref 26–33)
MCH RBC QN AUTO: 24.6 PG (ref 26–33)
MCHC RBC AUTO-ENTMCNC: 30.4 GM/DL (ref 32.2–35.5)
MCHC RBC AUTO-ENTMCNC: 30.8 GM/DL (ref 32.2–35.5)
MCV RBC AUTO: 79.4 FL (ref 81–99)
MCV RBC AUTO: 81.1 FL (ref 81–99)
ORGANISM: ABNORMAL
PLATELET # BLD: 187 THOU/MM3 (ref 130–400)
PLATELET # BLD: 217 THOU/MM3 (ref 130–400)
PMV BLD AUTO: 10.8 FL (ref 9.4–12.4)
PMV BLD AUTO: ABNORMAL FL (ref 9.4–12.4)
POTASSIUM SERPL-SCNC: 3.9 MEQ/L (ref 3.5–5.2)
RBC # BLD: 6.66 MILL/MM3 (ref 4.2–5.4)
RBC # BLD: 6.66 MILL/MM3 (ref 4.2–5.4)
SODIUM BLD-SCNC: 138 MEQ/L (ref 135–145)
URINE CULTURE, ROUTINE: ABNORMAL
WBC # BLD: 12.9 THOU/MM3 (ref 4.8–10.8)
WBC # BLD: 13.8 THOU/MM3 (ref 4.8–10.8)

## 2020-10-06 PROCEDURE — 99239 HOSP IP/OBS DSCHRG MGMT >30: CPT | Performed by: SURGERY

## 2020-10-06 PROCEDURE — 6370000000 HC RX 637 (ALT 250 FOR IP): Performed by: PHYSICIAN ASSISTANT

## 2020-10-06 PROCEDURE — 6370000000 HC RX 637 (ALT 250 FOR IP): Performed by: NURSE PRACTITIONER

## 2020-10-06 PROCEDURE — 99233 SBSQ HOSP IP/OBS HIGH 50: CPT | Performed by: FAMILY MEDICINE

## 2020-10-06 PROCEDURE — APPNB30 APP NON BILLABLE TIME 0-30 MINS: Performed by: NURSE PRACTITIONER

## 2020-10-06 PROCEDURE — 85730 THROMBOPLASTIN TIME PARTIAL: CPT

## 2020-10-06 PROCEDURE — 6360000002 HC RX W HCPCS: Performed by: NURSE PRACTITIONER

## 2020-10-06 PROCEDURE — C9113 INJ PANTOPRAZOLE SODIUM, VIA: HCPCS | Performed by: NURSE PRACTITIONER

## 2020-10-06 PROCEDURE — 80048 BASIC METABOLIC PNL TOTAL CA: CPT

## 2020-10-06 PROCEDURE — 85027 COMPLETE CBC AUTOMATED: CPT

## 2020-10-06 PROCEDURE — 36415 COLL VENOUS BLD VENIPUNCTURE: CPT

## 2020-10-06 PROCEDURE — 83605 ASSAY OF LACTIC ACID: CPT

## 2020-10-06 RX ORDER — HEPARIN SODIUM 1000 [USP'U]/ML
40 INJECTION, SOLUTION INTRAVENOUS; SUBCUTANEOUS PRN
Status: DISCONTINUED | OUTPATIENT
Start: 2020-10-06 | End: 2020-10-06

## 2020-10-06 RX ORDER — HEPARIN SODIUM 1000 [USP'U]/ML
80 INJECTION, SOLUTION INTRAVENOUS; SUBCUTANEOUS PRN
Status: DISCONTINUED | OUTPATIENT
Start: 2020-10-06 | End: 2020-10-06

## 2020-10-06 RX ORDER — HEPARIN SODIUM 1000 [USP'U]/ML
80 INJECTION, SOLUTION INTRAVENOUS; SUBCUTANEOUS ONCE
Status: DISCONTINUED | OUTPATIENT
Start: 2020-10-06 | End: 2020-10-06

## 2020-10-06 RX ORDER — HEPARIN SODIUM 10000 [USP'U]/100ML
18 INJECTION, SOLUTION INTRAVENOUS CONTINUOUS
Status: DISCONTINUED | OUTPATIENT
Start: 2020-10-06 | End: 2020-10-06

## 2020-10-06 RX ADMIN — PRAVASTATIN SODIUM 40 MG: 40 TABLET ORAL at 08:57

## 2020-10-06 RX ADMIN — ONDANSETRON 4 MG: 2 INJECTION INTRAMUSCULAR; INTRAVENOUS at 14:32

## 2020-10-06 RX ADMIN — POTASSIUM CHLORIDE 10 MEQ: 750 TABLET, FILM COATED, EXTENDED RELEASE ORAL at 08:58

## 2020-10-06 RX ADMIN — MORPHINE SULFATE 2 MG: 2 INJECTION, SOLUTION INTRAMUSCULAR; INTRAVENOUS at 01:48

## 2020-10-06 RX ADMIN — ACETAMINOPHEN 650 MG: 325 TABLET ORAL at 10:09

## 2020-10-06 RX ADMIN — PANTOPRAZOLE SODIUM 40 MG: 40 INJECTION, POWDER, FOR SOLUTION INTRAVENOUS at 08:57

## 2020-10-06 RX ADMIN — METOPROLOL SUCCINATE 25 MG: 25 TABLET, FILM COATED, EXTENDED RELEASE ORAL at 08:57

## 2020-10-06 RX ADMIN — ACETAMINOPHEN 650 MG: 325 TABLET ORAL at 14:09

## 2020-10-06 RX ADMIN — ACETAMINOPHEN 650 MG: 325 TABLET ORAL at 05:58

## 2020-10-06 ASSESSMENT — PAIN DESCRIPTION - PROGRESSION

## 2020-10-06 ASSESSMENT — PAIN SCALES - GENERAL
PAINLEVEL_OUTOF10: 6
PAINLEVEL_OUTOF10: 7
PAINLEVEL_OUTOF10: 5
PAINLEVEL_OUTOF10: 5

## 2020-10-06 NOTE — PROGRESS NOTES
Transfer initiated this morning at 1396 and just spoke with accepting surgeon at [facility]. They would like patient transferred as soon as possible. Updated nurse to get the process with paperwork started.

## 2020-10-06 NOTE — PROGRESS NOTES
Joy Escalante -  Author Chloé  Daily Progress Note    Pt Name: Sudha Hull  Medical Record Number: [id number]  Date of Birth 1935   Today's Date: 10/6/2020    Hospital day # 4     ASSESSMENT   1. SBO  2. Thickening at the GE junction - cannot rule out neoplastic process   3. Achalasia   4. Multiple GI bleeds  5. Status post ileocolonic resection Feb 2020 with Dr. Pillo Soler  6. Status post robotic anterior resection secondary to diverticular stricture 2018 with Dr. Jorge Herrera   7. Status post endoluminal graft repair of abdominal aortic aneurysm Feb 2019  8. CAD with stents  9. COPD  10. UTI  11. Atrial fibrillation    has a past medical history of Arthritis, Blood circulation, collateral, BPPV (benign paroxysmal positional vertigo), CHF (congestive heart failure) ([address] Chronic kidney disease, Gastrointestinal hemorrhage, GERD (gastroesophageal reflux disease), History of blood transfusion, HTN (hypertension), Hx of blood clots, Hyperlipidemia, Medtronic dual ICD , Neuromuscular disorder ([address] Obesity, Osteopenia, PAC (premature atrial contraction), Paralysis ([address] Pedal edema, Pneumonia, PVC (premature ventricular contraction), PVD (peripheral vascular disease) ([address] Tinnitus, and UTI (urinary tract infection). PLAN   1. Repeat CT enterography yesterday with some significant changes in regards to possible ischemia changes and suspicion for kidney infarcts. 2. Attempted NG tube again yesterday with failed. NPO at this time. 3. Vascular, cardiology, and nephrology all consulted last night. Discussed case with cardiology this morning and because she is being transferred we will cancel consult for now. 4. IV fluids  5. Medical management  6. Pain control   7. Eliquis and plavix held this morning. Heparin gtt was going to be started but since being transferred to [facility] and likely surgery their physicians instructed to hold. 8. Labs reviewed.  WBC up to 13.8 this morning. Creat up to 1.4. Lactic 2.1 from 1.3.   9. Unfortunately with CT changes and no clinical improvement with worsening labs we have decided to transfer patient to [facility]. Dr. Lucila Joyce and Dr. Alvin Loredo both reviewed CT imaging and agree this patient needs tertiary center if needing surgical intervention. Plans discussed with patient and she is agreeable to transfer. Already spoke with transfer center and awaiting bed details. SUBJECTIVE   Chief complaint: Abdominal pain     Patient stable but no clinical improvement. Attempted NG tube last night and patient had emesis. Nausea is manageable this morning. Still has significant abdominal pain but does not want to take the Morphine. Abdomen is still distended with tenderness. No fevers. Denies chest discomfort or dyspnea. Abdomen with generalized pain. No bowel function. Tolerating Diet NPO Effective Now Exceptions are: Sips with Meds diet. Up with assistance. No urinary complaints. CURRENT MEDICATIONS   Scheduled Meds:   magnesium replacement protocol   Other RX Placeholder    phosphorus replacement protocol   Other RX Placeholder    calcium replacement protocol   Other RX Placeholder    bisacodyl  10 mg Rectal Daily    sodium chloride flush  10 mL Intravenous 2 times per day    pantoprazole  40 mg Intravenous BID    [Held by provider] losartan  25 mg Oral Daily    metoprolol succinate  25 mg Oral Daily    pravastatin  40 mg Oral Daily    timolol  1 drop Both Eyes Nightly    potassium chloride  10 mEq Oral Daily     Continuous Infusions:   sodium chloride 50 mL/hr at 10/04/20 1655     PRN Meds:.morphine **OR** morphine, sodium chloride flush, ondansetron **OR** ondansetron, acetaminophen, hydrocortisone, melatonin, albuterol sulfate HFA  OBJECTIVE   CURRENT VITALS:  height is 5' 5\" (1.651 m) and weight is 150 lb 3.2 oz (68.1 kg). Her oral temperature is 98.4 °F (36.9 °C). Her blood pressure is 128/67 and her pulse is 53.  Her respiration is 16 and oxygen saturation is 94%. Temperature Range (24h):Temp: 98.4 °F (36.9 °C) Temp  Av.8 °F (36.6 °C)  Min: 97.4 °F (36.3 °C)  Max: 98.4 °F (36.9 °C)  BP Range (71W): Systolic (87APA), XHY:836 , Min:105 , QSI:161     Diastolic (56VBB), HVO:26, Min:60, Max:94    Pulse Range (24h): Pulse  Av  Min: 53  Max: 93  Respiration Range (24h): Resp  Av.5  Min: 16  Max: 18  Current Pulse Ox (24h):  SpO2: 94 %  Pulse Ox Range (24h):  SpO2  Av.9 %  Min: 92 %  Max: 96 %  Oxygen Amount and Delivery: O2 Flow Rate (L/min): 1 L/min(to room air)  Incentive Spirometry Tx:            GENERAL: alert, cooperative, distress noted  SKIN: Skin color, texture, turgor normal. No rashes or lesions. HEENT: Head is normocephalic, atraumatic. NECK: Supple, symmetrical, trachea midline  LUNGS: clear to ausculation, without wheezes, rales or rhonci  HEART: normal rate and irregular rhythm  ABDOMEN: soft & distended, generalized tenderness with some guarding. No BS. NEUROLOGIC: There are no focalizing motor or sensory deficits. CN II-XII are grossly intact. EXTREMITIES: no cyanosis, no clubbing and no edema. In: .1 [I.V.:.1]  Out: 950 [Urine:950]  Date 10/06/20 0000 - 10/06/20 2359   Shift 4951-5696 6511-1178 6922-5837 24 Hour Total   INTAKE   I.V.(mL/kg/hr) 293.3   293.3   Shift Total(mL/kg) 293. 3(4.3)   293. 3(4.3)   OUTPUT   Urine(mL/kg/hr) 100   100   Shift Total(mL/kg) 100(1.5)   100(1.5)   Weight (kg) 68.1 68.1 68.1 68.1     LABS     Recent Labs     10/03/20  1156 10/04/20  0608 10/04/20  0825 10/05/20  0604 10/06/20  06   WBC  --   --   --  6.3 12.9*   HGB  --  11.2*  --  12.9 16.3*   HCT  --  38.2  --  42.4 52.9*   PLT  --   --   --  120* 217   NA  --  140  --  141  --    K 5.1 4.4  --  4.0  --    CL  --  109  --  107  --    CO2  --  22*  --  25  --    BUN  --  31*  --  18  --    CREATININE  --  1.2  --  0.9  --    MG  --   --  1.9  --   --    PHOS  --   --  2.2* 2.3*  --    CALCIUM  --  7.7*  --  8.4*  -- Recent Labs     10/05/20  1100 10/06/20  0606   LACTA 1.3 2.1     10/6/2020  7:28 AM - [place] Incoming Lab Results From Soft     Specimen Information:  Urine          Component  Collected  Lab    Organism Abnormal    10/03/2020  6:45 PM  [facility]    Enterococcus faecalis - (Group D)    Urine Culture, Routine  10/03/2020  6:45 PM  [facility]    [place] count: 50,000-90,000 CFU/mL     Testing Performed By     Lab - Abbreviation  Name  Director  Address  Valid Date Range    [address]  LAB  Gema Hagen MD  [address] [facility] [address]  [birth year] 0855-Present    Narrative   Performed by: [facility]   Source: urine       Site:           Current Antibiotics: not stated    Susceptibility     Enterococcus  faecalis (1)     Antibiotic  Interpretation  TAMANNA  Status     Penicillin G  Sensitive  8  mcg/mL  Final     ampicillin  Sensitive  <=2  mcg/mL  Final     nitrofurantoin  Sensitive  <=16  mcg/mL  Final         RADIOLOGY     PROCEDURE: CT ENTEROGRAPHY.         HISTORY: Small bowel obstruction. Nausea and vomiting. Status post right hemicolectomy.         TECHNIQUE: CT enterography was performed following intravenous administration of 80 mL of Isovue-370. The patient was unable to drink oral contrast. Coronal and sagittal reformatted images were performed.         All CT scans at this facility as dose modulation, iteractive reconstruction and/or weight based dosing when appropriate to reduce radiation dose to as low as reasonably achievable.         COMPARISON: KUB dated 3 rd October 2020. CT scan of the abdomen dated second of October 2020.         FINDINGS:         The some moderate right and small left pleural effusion. There is atelectasis at the right lung base.         There is a moderate amount of ascites present. There is mild diffuse fatty replacement in the liver.  The patient is status post INFORMATION: Abdominal pain. Findings of possible ileus/obstruction on recent CT abdomen and pelvis.         COMPARISON: 2/16/2019 supine abdomen; CT abdomen and pelvis, 10/2/2020         TECHNIQUE: A single supine image of the abdomen was obtained.         FINDINGS:    Intestinal gas pattern is normal. I see no free air. No mass lesion is seen. Guadalupe Deny are somewhat obscured. . No definite renal or ureteral calculi are seen. An aortic endograft is present. In addition, there is a vascular stent in the right external    iliac artery. There has been prior cholecystectomy.              Impression    No acute findings.                   **This report has been created using voice recognition software.  It may contain minor errors which are inherent in voice recognition technology. **              Final report electronically signed by Dr. Ericka Dias on 10/3/2020 7:19 AM      Electronically signed by ROMAN Schuster CNP on 10/6/2020 at 7:30 AM     Patient seen and examined independently by me early this AM. Above discussed and I agree with Soledad Palma CNP. See my additional comments below for updated orders and plan. Labs, cultures, and radiographs where available were reviewed. I discussed patient concerns with the patient's nurse and instructions were given. Please see our orders for the updated patient care plan. -Patient seen early this morning. Long discussion with patient in regards to imaging, new consultants and options. Recommended tertiary center. Patient finally agreed with transfer to [facility] clinic. Initiate this morning.     Electronically signed by Immanuel Stephenson MD on 10/6/20 at 6:26 PM EDT

## 2020-10-06 NOTE — PROGRESS NOTES
PROGRESS NOTE      Patient:  Jimmy Vann      Unit/Bed:6K-03/003-A    YOB: 1935    MRN: [id number]       Acct: [de-identified]     PCP: Cammie See    Date of Admission: 10/2/2020      Assessment/Plan:    Anticipated Discharge in : TBD pending progress    Active Hospital Problems    Diagnosis Date Noted    Hypophosphatemia [E83.39]     NSVT (nonsustained ventricular tachycardia) ([facility]) [I47.2]     Hyperkalemia [E87.5]     Thickening of esophagus [K22.8]     COPD without exacerbation ([facility] [J44.9]     Essential hypertension [I10]     Achalasia [K22.0]     Pyuria [R82.81]     SBO (small bowel obstruction) ([facility] [K56.609] 10/02/2020    Abdominal pain [R10.9]     Chronic combined systolic and diastolic CHF (congestive heart failure) ([facility] [I50.42]     Acute anemia [D64.9] 09/10/2018    ALEJANDRO (acute kidney injury) ([facility] [N17.9]        1. SBO secondary to multiple prior abdominal surgeries -- Multiple dilated loops of small bowel seen on CT of the abdomen and pelvis  10/2 -- per primary service gen surgery   -Unfortunately unable to place NG tube on admission due to patient's anatomy thus left out  -- 10/3/2020 --> Patient with bowel movement 10/3, pain improved and Surgery to advanced diet to full liquid.  KUB with no acute process 10/3  -- 10/4 --> pt with increase abd pain with suppository added by gen surgery 10/4 am as pt uses them at home --> monitor sx --> ?repeat KUB -- ?check lactic acid -- repeat CBC 10/5  --10/5-> patient with worsening abdominal pain this morning. General surgery placed order for CT enterography. Lactic acid ordered. Follow CT enterography results. Possibly consider transfer to tertiary care center.   --10/6-CT enterography showing possible ischemic bowel, renal infarct, small bowel obstruction.   Patient's Plavix and Eliquis have been held due to possible bowel/renal infarcts, cardiology and nephrology have been consulted, patient will be transitioned to a consulted CT surgery --> Yuliet Adams will follow as an outpatient in 6 months.  No further studies needed at this time  9. Chronic atrial fibrillation- rate controlled -- following with Dr. Nancy Peralta -- on Eliquis at [facility] to continue per surgery -- Rate controlled with BB - monitor tele -- Patient is being evaluated by Dr. Nancy Peralta for possible watchman procedure outpt   10. Chronic systolic/diastolic heart failure reduced EF of 25-30%/non-ischemic CM -- follows with Dr. Nancy Peralta cardio -- fluid status stable currently as of 10/4 -- last limited echo 6/2020 EF 25-30% which is improved from EF 10-15% in 12/2019 -- follows with [facility] cardiology - monitor fluid status -- cont toprol, statin, plavix -- hold cozaar since 10/3 for hyperkalemia -- follows with [facility] CHF clinic -> no diuretics PTA - monitor wts, I/O, fluid status. 11. Thickening of GE Junction with History of achalasia s/p esophageal dilation --> Noted thickening on CT abdomen pelvis on admission  -[facility] service is consulted --> extensive hx of dysphagia, achalasia with botox injections -> per c/s note 10/2 \"At her last office visit, it was discussed that she should establish at a tertiary care center, [facility] recommended, for multiple GI concerns and very high risk for endoscopies due to heart disease, chronic anticoagulation, and multiple comorbidities. Chantel Mcmullen No plan for emergent endoscopy given patient is very high risk given cardiac disease, chronic anticoagulation, and comorbidities. Recommend EGD evaluation inpatient vs outpatient, timing to be determined by clinical course. However, EGD will need to be done at a tertiary care center\"  -- cont on PPI IV bid since admission  12.  Nonobstructive CAD -Follows with Dr. Nancy Peralta --continue Plavix, Eliquis, statin, BB -- asx--per last cath 12/2019 = LM patent, RCA dominant with mid 30 to 40% stenosis and mild luminal irregularities distal, PL and PDA branches patent, circumflex patent proximally and 30 to 40% mid, LAD patent proximally with mild mid and distal luminal irregularities but no obstruction, patent diagonal branch  13. PAD s/p stent placement and endovascular repair February 2019 -- Follows with Dr. Hanh Mukherjee. cont on home eliquis and Plavix, statin  14. COPD -- No acute exacerbation -- Continue albuterol as needed   15. Essential Hypertension -- Controlled -- Continue Toprol-XL -- was resumed on home cozaar but held as of 10/3 due to hyperkalemia -- cont monitor and adjust prn  16. HLD -- cont statin  17. History of internal hemorrhoids -- Continue Anusol  18. ?renal infarct -- noted per CT abd on admission 10/2 - ?etiology - ?old vs new - on chronic eliquis - monitor renal fxn. Cardiology and nephrology consulted due to CT enterography results. Patient will be transferred to the [address] clinic. Plavix and Eliquis held due to possible failure of therapy. Heparin drip has been ordered pending cardiology evaluation.        Chief Complaint: abd pain, SBO    Hospital Course:  \"Patient is a 68-year-old female who presented to [facility] emergency department for generalized abdominal pain.  Abdominal pain began around 2 AM on 10/2/2020 and began to worsen throughout the evening.  Pain was described as constant and generalized achiness.  Patient has a past medical history of multiple abdominal surgeries and GI issues.  Last abdominal surgery was with Dr. Li Dee for a cecal mass.  She has a known history of achalasia requiring esophageal dilation with Dr. Jacki Degroot notes patient is to be referred to [address] clinic for further care due to high risk endoscopies.  Patient has a past medical history of nonischemic CAD with combined systolic and diastolic congestive heart failure with reduced ejection fraction of 25 to 30% post ICD placement.  She is on long-term Eliquis for chronic atrial fibrillation, currently going through evaluation for watchman device.  She is on long-term Plavix for history of PAD with stent placement as well as endovascular repair.  Patient has a history of AAA status post EVAR in 2019.  While in the ED patient underwent CT of the abdomen and pelvis showing thickening of the GE junction possibly suspicious for neoplastic process, multiple dilated loops of small bowel with a transition point, increased attenuation in a small portion of the aneurysm sac possible for endoleak.  Hospitalist team was consulted for further management of medical issues\"  -- KUB 10/3 with no acute process and per surgery diet started -> suppository added 10/4 and increase abd pain s/p BMs  10/4-patient with abdominal pain, suppository added, patient with bowel movement with some resolution of pain  10/5-patient with severe abdominal cramping and abdominal pain this morning. General surgery placed order for CT enterography. Patient on room air and afebrile. Possibly consider transfer to tertiary care center for further evaluation and treatment versus following CT enterography with possible intervention by general surgery team.   10/6: CT enterography results: there are dilated fluid-filled small bowel loops,  significantly worse than on previous study. There are abnormal appearing small bowel loops in the right lower abdomen anteriorly with lack of mucosal enhancement suspicious ischemic changes, please correlate clinically. Areas of hypoperfusion to lower pole left kidney and midpole the right kidney concerning for possible renal ischemia. Subjective: Patient states that she is having abdominal pain with any movement. Pain is controlled on patient does not move. Discussed with general surgery this morning. Patient's Eliquis and Plavix were held overnight due to possible failure of therapy. Heparin drip was ordered this morning however this is been held until cardiology evaluated. Patient will be transferred to the [facility] today for possible bowel surgery.        Medications:  Reviewed    Infusion Medications    sodium chloride 50 mL/hr at 10/04/20 1655     Scheduled Medications    magnesium replacement protocol   Other RX Placeholder    phosphorus replacement protocol   Other RX Placeholder    calcium replacement protocol   Other RX Placeholder    bisacodyl  10 mg Rectal Daily    sodium chloride flush  10 mL Intravenous 2 times per day    pantoprazole  40 mg Intravenous BID    [Held by provider] losartan  25 mg Oral Daily    metoprolol succinate  25 mg Oral Daily    pravastatin  40 mg Oral Daily    timolol  1 drop Both Eyes Nightly    potassium chloride  10 mEq Oral Daily     PRN Meds: morphine **OR** morphine, sodium chloride flush, ondansetron **OR** ondansetron, acetaminophen, hydrocortisone, melatonin, albuterol sulfate HFA      Intake/Output Summary (Last 24 hours) at 10/6/2020 0729  Last data filed at 10/6/2020 0540  Gross per 24 hour   Intake 1995.08 ml   Output 950 ml   Net 1045.08 ml       Diet:  Diet NPO Effective Now Exceptions are: Sips with Meds    Exam:  /67   Pulse 53   Temp 98.4 °F (36.9 °C) (Oral)   Resp 16   Ht 5' 5\" (1.651 m)   Wt 150 lb 3.2 oz (68.1 kg)   SpO2 94%   BMI 24.99 kg/m²     General appearance: No apparent distress, appears stated age and cooperative. HEENT: Pupils equal, round, and reactive to light. Conjunctivae/corneas clear. Neck: Supple, with full range of motion. No jugular venous distention. Trachea midline. Respiratory:  Normal respiratory effort. Clear to auscultation, bilaterally without Rales/Wheezes/Rhonchi. Cardiovascular: Irregularly irregular rhythm normal rate with normal S1/S2 without murmurs, rubs or gallops. Abdomen: Soft, diffusely tender to palpation without rebound or guarding. Minimal bowel sounds. Musculoskeletal: passive and active ROM x 4 extremities. Skin: Skin color, texture, turgor normal.  No rashes or lesions. Neurologic:  Neurovascularly intact without any focal sensory/motor deficits.  Cranial nerves: II-XII intact, grossly non-focal.  Psychiatric: Alert and oriented, thought content appropriate, normal insight  Capillary Refill: Brisk,< 3 seconds   Peripheral Pulses: +2 palpable, equal bilaterally       Labs:   Recent Labs     10/04/20  0608 10/05/20  0604 10/06/20  0606   WBC  --  6.3 12.9*   HGB 11.2* 12.9 16.3*   HCT 38.2 42.4 52.9*   PLT  --  120* 217     Recent Labs     10/03/20  1156 10/04/20  0608 10/04/20  0825 10/05/20  0604   NA  --  140  --  141   K 5.1 4.4  --  4.0   CL  --  109  --  107   CO2  --  22*  --  25   BUN  --  31*  --  18   CREATININE  --  1.2  --  0.9   CALCIUM  --  7.7*  --  8.4*   PHOS  --   --  2.2* 2.3*     No results for input(s): AST, ALT, BILIDIR, BILITOT, ALKPHOS in the last 72 hours. No results for input(s): INR in the last 72 hours. No results for input(s): [place] in the last 72 hours. Urinalysis:      Lab Results   Component Value Date    NITRU POSITIVE 10/02/2020    WBCUA 2-4 10/02/2020    BACTERIA MANY 10/02/2020    RBCUA 0-2 10/02/2020    BLOODU NEGATIVE 10/02/2020    SPECGRAV 1.013 02/24/2019    GLUCOSEU NEGATIVE 10/02/2020       Radiology:  XR CHEST PORTABLE   Final Result   OG catheter is curled in the upper esophagus. Recommend retraction and repositioning. There is a rounded opacity in the upper mediastinum nonspecific. On prior study there is dilated, patulous esophagus. On today's study this could be opacified due to    fluid or food debris. Correlation is advised               **This report has been created using voice recognition software. It may contain minor errors which are inherent in voice recognition technology. **      Final report electronically signed by Dr. Ira Fletcher on 10/5/2020 6:52 PM      CT ENTEROGRAPHY W CONTRAST   Final Result   1.  There has been interval deterioration since previous CT scan dated October 2, 2020.   2. There are dilated fluid-filled small bowel loops,  significantly worse than on previous study dated October 2, 2020, This appears to be situated within a patulous redundant portion of a dilated esophagus possibly. Recommend repositioning. **This report has been created using voice recognition software. It may contain minor errors which are inherent in voice recognition technology. **      Final report electronically signed by Dr. Alessio Barbosa on 10/2/2020 10:34 AM      CT ABDOMEN PELVIS W IV CONTRAST Additional Contrast? None   Final Result   1. There is dilation of the distal esophagus. There is thickening at the GE junction. Cannot exclude a neoplastic process. 2. Multiple dilated loops of jejunal small bowel are demonstrated. The transition point appears to be located within the anterior mid abdomen on axial image 59. This may be related to ileus versus early or partial small bowel obstruction. 3. Multiple dilated loops of jejunal small bowel are demonstrated. The transition point appears to be located within the anterior mid abdomen on axial image 59. This may be related to ileus versus early or partial small bowel obstruction. 4. 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 The stent graft appears patent. 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      5. There is subtle increased attenuation involving a small portion of the rightward aneurysm sac on the axial image 52. Cannot exclude a focal area of endoleak which was not seen on the prior CT dated 9/1/2020. Further evaluation could be obtained with    routine CT angiography of the abdomen and pelvis with and without contrast.      **This report has been created using voice recognition software. It may contain minor errors which are inherent in voice recognition technology. **      Final report electronically signed by Dr. Alessio Barbosa on 10/2/2020 8:54 AM          Diet: Diet NPO Effective Now Exceptions are: Sips with Meds    DVT prophylaxis: [] Lovenox                                 [] SCDs                                 [] SQ Heparin                                 [] Encourage ambulation           [x] Already on Anticoagulation     Disposition:    [] Home       [] TCU       [] Rehab       [] Psych       [] SNF       [] [place]       [] Other-    Code Status: Full Code      Electronically signed by Ana Fong DO on 10/6/2020 at 7:29 AM

## 2020-10-06 NOTE — PROGRESS NOTES
RODNEY to transport patient to [facility] surgical ICU floor [address] [facility] room 09 at 2:15pm.

## 2020-10-06 NOTE — PLAN OF CARE
Problem: Falls - Risk of:  Goal: Will remain free from falls  Description: Will remain free from falls  Note: Discussed use of alarms and hourly rounding with patient. Belongings within reach including call light. Up with gait belt, non skid footwear       Problem: Skin Integrity:  Goal: Will show no infection signs and symptoms  Description: Will show no infection signs and symptoms  Note: Turns self in bed well. Skin assessed every 4 hours prn. Problem: Pain:  Goal: Pain level will decrease  Description: Pain level will decrease  Note: 0-10 pain scale explained and utilized with a pain goal of 4 or 5. Able to voice pain concerns well. Assisted with repositioning. Prn pain medications. Problem: Discharge Planning:  Goal: Participates in care planning  Description: Participates in care planning  Note: From home with family with plans to return unless transfer is needed. Problem: Bowel Function - Altered:  Goal: Bowel elimination is within specified parameters  Description: Bowel elimination is within specified parameters  Note: Admit for SBO. Continue to monitor     Problem: Gas Exchange - Impaired:  Goal: Levels of oxygenation will improve  Description: Levels of oxygenation will improve  Note: Remains on RA. Encouraged to cough and deep breath      Care plan reviewed with patient and family member in room. Patient and family member in room verbalize understanding of the plan of care and contribute to goal setting.

## 2020-10-08 NOTE — DISCHARGE SUMMARY
Orlin Scott [address] [facility]       Pt Name: Nataliia Cantu  MRN: [id number]  YOB: 1935  Primary Care Physician: Jose Roberto    Admit date:  10/2/2020  6:34 AM      Discharge date:  10/6/2020  2:58 PM    Admitting Diagnosis:   1. Abdominal pain, unspecified abdominal location    2. Urinary tract infection without hematuria, site unspecified    3. SBO (small bowel obstruction) ([facility] [address]      Discharge Diagnosis:   1. Abdominal pain, unspecified abdominal location    2. Urinary tract infection without hematuria, site unspecified    3. SBO (small bowel obstruction) ([address]    4. Possible ischemia changes  5. Leukocytosis     Admitting Service: General Surgery, Justyn Wiseman MD.    Consultants:  Cardiology, CTS, medicine, and nephrology    History and Physical:  Pt Name: Nataliia Cantu  MRN: [id number]  YOB: 1935  Date of evaluation: 10/2/2020  Primary Care Physician: Jose Roberto  Patient evaluated at the request of  MABLE Zelaya  Reason for evaluation: SBO  IMPRESSIONS:   1. SBO  2. Thickening at the GE junction - cannot rule out neoplastic process   3. Achalasia   4. Multiple GI bleeds  5. Status post ileocolonic resection Feb 2020 with Dr. Pilar Ford  6. Status post robotic anterior resection secondary to diverticular stricture 2018 with Dr. Johny Connell   7. Status post endoluminal graft repair of abdominal aortic aneurysm Feb 2019  8. CAD with stents  9. COPD  10. UTI  11. Atrial fibrillation   does not have any pertinent problems on file. PLANS:   1. Admit type: Inpatient  2. It is expected this patient's LOS will be: Greater than 2 midnights  3. Anticipated Disposition Upon Discharge: Home  4. NPO with NG decompression   5. Analgesics and antiemetics on a prn basis  6. IV hydration  7. Consult cardiothoracic surgery and GI. Appreciate recommendations. 8. DVT prophylaxis with SCD's and Lovenox.  Hold [address] [place] [address] for now.  9. Urine (+) nitrates but asymptomatic  10. Medical management per hospitalist   11. Serial abdominal exams   12. Labs and follow up KUB in am   13. Patient is extremely high risk for any surgical intervention. At this time abdomen is fairly benign without signs of peritonitis. Continue non-operative management right now. If surgical intervention would need to take place patient would likely be transferred to a tertiary center. Discussed case with GI and they will not do any further scopes or intervention - recommended her see someone at a tertiary center months ago but patient declined at that time. SUBJECTIVE:   Chief Complaint: Abdominal pain      History of Present Illness:  Maxi Webster is a pleasant 82-year old female patient who presents to the emergency department for generalized abdominal pain. She states the pain started around 2 am this morning and by 3 am the pain was severe. She states it is a constant, generalized ache. Nothing really makes it worse or better. History of multiple abdominal surgeries and GI issues but denies every having a bowel obstruction. Last abdominal surgery was in Feb with Dr. 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 History of achalasia with associated nausea and vomiting. States she is supposed to be getting an appointment with [facility] from Dr. Cami Yuen office. She vomits almost everyday. Bowel function has been normal for her. Had a BM yesterday. occasional has hematochezia. Last EGD/Colonoscopy was in February prior to surgery. She is also being worked up with Dr. Sadi Esparza for a watchman and had AAA repair with Dr. Trent Myers in 2019. She takes Eliquis and Plavix. Took those last yesterday morning. No urinary complaints. No SOB or chest pain. No generalized edema. No dizziness, lightheadedness. No fevers or chills.  Patient states she is down about 55 lbs over the last year with her GI issues.       Past Medical History  Past Medical History             Diagnosis Date    Arthritis      Blood circulation, collateral      BPPV (benign paroxysmal positional vertigo) 6/6/16    CHF (congestive heart failure) (HCC)      Chronic kidney disease       sees Dr Aniya Quezada    Gastrointestinal hemorrhage      GERD (gastroesophageal reflux disease)       ? esophageal stricture    History of blood transfusion      HTN (hypertension)      Hx of blood clots 2018     R leg-sees ABEBA Hargrove    Hyperlipidemia      Medtronic dual ICD  8/26/2020    Neuromuscular disorder ([facility] [address]      Obesity      Osteopenia      PAC (premature atrial contraction)       on betablocker    Paralysis (HCC)       baby    Pedal edema       denied any hx of hypertension    Pneumonia      PVC (premature ventricular contraction)       sees Dr Alfredo Jefferson PVD (peripheral vascular disease) ([address]      Tinnitus      UTI (urinary tract infection)          Past Surgical History  Past Surgical History             Procedure Laterality Date    ABDOMINAL AORTIC ANEURYSM REPAIR, ENDOVASCULAR N/A 2/15/2019     ABDOMINAL AORTIC ANEURYSM REPAIR ENDOVASCULAR, DECLOTTING RIGHT FEM TIB BYPASS GRAFT performed by Jose Gonzales MD at [facility]     lumpectomy    CHOLECYSTECTOMY         30 years ago    COLONOSCOPY   08/06/2018     Dr He Son 2/22/2019     COLONOSCOPY DIAGNOSTIC performed by Chan Orantes MD at [facility]    COLONOSCOPY N/A 2/22/2020     COLONOSCOPY CONTROL HEMORRHAGE performed by Skylar Cunningham MD at [address] [facility]         eye, eyelids    EYE SURGERY         cataract    HEMICOLECTOMY N/A 2/25/2020     OPEN RIGHT COLON RESECTION performed by Danuta Holly MD at [address] [facility] [address] [address]   07/15/2016    MT OLEGARIO SKN SUB GRFT T/A/L AREA/<100SCM /<1ST 25 SCM N/A 9/6/2018     RE-EXPLORATION RIGHT GROIN, DECLOTTING OF FEMORAL BYPASS GRAFT performed by Azam Krueger MD at [address] EGD TRANSORAL BIOPSY SINGLE/MULTIPLE Left 11/27/2017     EGD BIOPSY performed by Anil Heath MD at [address], PARTIAL, W/ANAST N/A 8/20/2018     ROBOT ASSISTED COLECTOMY (LOW ANTERIOR) performed by James Lamar MD at [address] OFFICE/OUTPT [facility] N/A 9/5/2018     RIGHT FEMORAL EMBOLECTOMY, INTRAOPERATIVE ARTERIOGRAM, RIGHT ILIAC EMBOLECTOMY, RIGHT COMMON AND EXTERNAL ILIAC STENTING, RIGHT FEMORAL TO ANTERIOR POPLITEAL BYPASS WITH 6MM GORTEX GRAFT performed by Pavan Romero MD at [address] Right 2/14/2019     FEMORAL EMBOLECTOMY THROMBECTOMY, RIGHT LEG performed by Pavan Romero MD at [facility] 11/27/2017     EGD SUBMUCOSAL/BOTOX INJECTION performed by Anil Heath MD at [facility] ENDOSCOPY N/A 2/22/2019     EGD BIOPSY performed by Ale Toledo MD at [facility]        Medications  Home Medications           Prior to Admission medications    Medication Sig Start Date End Date Taking?  Authorizing Provider   metoprolol succinate (TOPROL XL) 25 MG extended release tablet Take 1 tablet by mouth daily 9/21/20     ROMAN Rose CNP   apixaban (ELIQUIS) 2.5 MG TABS tablet TAKE 1 TABLET BY MOUTH TWICE DAILY 8/31/20     ROMAN Espinosa CNP   losartan (COZAAR) 25 MG tablet Take 1 tablet by mouth daily 7/27/20     ROMAN Espinosa CNP   pravastatin (PRAVACHOL) 40 MG tablet TAKE 1 TABLET BY MOUTH DAILY 7/14/20     ROMAN Rose CNP   hydrocortisone (ANUSOL-HC) 25 MG suppository Place 1 suppository rectally 2 times daily as needed for Hemorrhoids 7/2/20     Collette Shaggy, APRN - CNP   clopidogrel (PLAVIX) 75 MG tablet Take 1 tablet by mouth daily 6/8/20     ROMAN Espinosa CNP   potassium chloride (KLOR-CON M) 10 MEQ extended release tablet Take 1 tablet by mouth daily 6/8/20     ROMAN Espinosa CNP about 1.0 standard drinks of alcohol per week. She reports that she does not use drugs. Review of Systems:  General Denies any fever or chills. Weight change over the last year. HEENT Denies any diplopia, tinnitus or vertigo. No chronic headaches. Resp Denies any shortness of breath, cough or wheezing  Cardiac Denies any chest pain, palpitations, claudication or edema  GI Positive for GI bleeding, nausea, vomiting and abdominal pain   Denies any frequency, urgency, hesitancy or incontinence  Heme positive for - bleeding problems and blood transfusions  Endocrine Denies any history of diabetes or thyroid disease  Neuro Denies any focal motor or sensory deficits  Musculoskeletal  Denies osteoarthritis. No gout. No weakness. Psychiatric  Denies any severe depression or agitation. OBJECTIVE:   CURRENT VITALS:  height is 5' 5\" (1.651 m) and weight is 145 lb 11.2 oz (66.1 kg). Her axillary temperature is 96.2 °F (35.7 °C). Her blood pressure is 123/88 and her pulse is 76. Her respiration is 16 and oxygen saturation is 97%. Temperature Range (24h):Temp: 96.2 °F (35.7 °C) Temp  Av °F (36.1 °C)  Min: 96.2 °F (35.7 °C)  Max: 97.8 °F (36.6 °C)  BP Range (79V): Systolic (73CQW), SIW:797 , Min:123 , TNL:909     Diastolic (79NIA), DOZ:04, Min:84, Max:98     Pulse Range (24h): Pulse  Av.4  Min: 72  Max: 98  Respiration Range (24h): Resp  Av  Min: 15  Max: 17  Current Pulse Ox (24h):  SpO2: 97 %  Pulse Ox Range (24h):  SpO2  Av.6 %  Min: 93 %  Max: 97 %  Oxygen Amount and Delivery: O2 Flow Rate (L/min): 2 L/min      CONSTITUTIONAL: Alert and oriented times 3, no acute distress and cooperative to examination with proper mood and affect. SKIN: Skin color, texture, turgor normal.  LYMPH: no cervical nodes, no inguinal nodes  HEENT: Head is normocephalic, atraumatic.    NECK: Supple, symmetrical, trachea midline  CHEST/LUNGS: normal respiratory rate and rhythm, lungs clear to auscultation without demonstrated. There is a wedge-shaped area of decreased enhancement of the anterior mid left kidney which is concerning for age-indeterminate    renal infarct.         There is sigmoid diverticulosis without evidence of diverticulitis.         Multiple dilated loops of jejunal small bowel are demonstrated. The transition point appears to be located within the anterior mid abdomen on axial image 59. This may be related to ileus versus early or partial small bowel obstruction.         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 The stent graft appears patent. 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         There is subtle increased attenuation involving the aneurysm sac on the right on axial image 52. Cannot exclude a focal area of endoleak which was not seen on the prior CT dated 9/1/2020. Further evaluation could be obtained with routine CT angiography    of the abdomen and pelvis with and without contrast.         No lymphadenopathy is seen. There is no free air.         No acute osseous findings are demonstrated. There is lower lumbar facet arthrosis. Bilateral SI joint arthrosis is seen.              Impression    1. There is dilation of the distal esophagus. There is thickening at the GE junction. Cannot exclude a neoplastic process.         2. Multiple dilated loops of jejunal small bowel are demonstrated. The transition point appears to be located within the anterior mid abdomen on axial image 59. This may be related to ileus versus early or partial small bowel obstruction.         3. Multiple dilated loops of jejunal small bowel are demonstrated. The transition point appears to be located within the anterior mid abdomen on axial image 59. This may be related to ileus versus early or partial small bowel obstruction.         4. 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  The stent graft appears patent. 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         5. There is subtle increased attenuation involving a small portion of the rightward aneurysm sac on the axial image 52. Cannot exclude a focal area of endoleak which was not seen on the prior CT dated 9/1/2020. Further evaluation could be obtained with    routine CT angiography of the abdomen and pelvis with and without contrast.         **This report has been created using voice recognition software.  It may contain minor errors which are inherent in voice recognition technology. **         Final report electronically signed by Dr. Arielle Chau on 10/2/2020 8:54 AM       PROCEDURE: XR CHEST PORTABLE         CLINICAL INFORMATION: verify NG tube placement         COMPARISON: 8/20/2020         TECHNIQUE:  AP mobile chest single view           FINDINGS:         There is an enteric tube present which is coiled within the upper mediastinum. This appears to be situated within a patulous redundant portion of a dilated esophagus possibly. Recommend repositioning which was noted on prior chest CT from December 2019.         The heart size is normal. No pneumothorax is seen. There is a left-sided AICD. No focal consolidation is demonstrated. No acute osseous findings are seen.              Impression    1. There is an enteric tube present which is coiled within the upper mediastinum. This appears to be situated within a patulous redundant portion of a dilated esophagus possibly. Recommend repositioning.                   **This report has been created using voice recognition software.  It may contain minor errors which are inherent in voice recognition technology. **         Final report electronically signed by Dr. Arielle Chau on 10/2/2020 10:34 AM      Electronically signed by ROMAN Schuster CNP on 10/2/2020 at 11:33 AM     Patient seen and examined independently by me. Above discussed and I agree with Mara Galeano CNP. See my additional comments below for updated orders and plan. Labs, cultures, and radiographs where available were reviewed. I discussed patient concerns with the patient's nurse and instructions were given. Please see our orders for the updated patient care plan.  -N.p.o.  Minimal ice chips. Sips with meds. Having difficulty with NG tube placement. If continues to fail then possible interventional radiology in a.m. for placement under fluoroscopy. Serial abdominal examinations. A.m. labs. Appreciate vascular and GI assistance. Medical service following for medical management and comorbid conditions. Continue blood thinners at this time as patient high risk for clots and currently attempting nonoperative management. Patient in agreement to proceed with conservative treatment if at all possible. KUB in a.m. Discussed tertiary center with patient as well. Reevaluate in a.m.     Electronically signed by Mary Kelly MD on 10/2/20 at 3:56 PM EDT    Testing:   PROCEDURE: CT ENTEROGRAPHY.         HISTORY: Small bowel obstruction. Nausea and vomiting. Status post right hemicolectomy.         TECHNIQUE: CT enterography was performed following intravenous administration of 80 mL of Isovue-370. The patient was unable to drink oral contrast. Coronal and sagittal reformatted images were performed.         All CT scans at this facility as dose modulation, iteractive reconstruction and/or weight based dosing when appropriate to reduce radiation dose to as low as reasonably achievable.         COMPARISON: KUB dated 3 rd October 2020. CT scan of the abdomen dated second of October 2020.         FINDINGS:         The some moderate right and small left pleural effusion. There is atelectasis at the right lung base.         There is a moderate amount of ascites present. There is mild diffuse fatty replacement in the liver.  The patient is status post cholecystectomy. There is evidence for old granulomatous disease in the spleen pancreas and adrenal glands and left kidney    appear to be unremarkable. There is a defect in perfusion involving the   lower pole of the left kidney anteriorly and midpole of the right kidney anteriorly suspicious for renal infarcts.         There is a distended fluid-filled stomach. There are dilated fluid-filled small bowel loops. There are abnormal bowel loops in the right side of the abdomen anteriorly. There appears to be lack of mucosal enhancement suspicious for bowel ischemia, please     correlate clinically. There is free fluid within the pelvis. The patient is status post hysterectomy. There is an endoluminal graft extending from the abdominal aorta into both iliac arteries. The patient appears to be status post hysterectomy. There is     a probable graft in the right femoral artery. There is a hiatal hernia.              Impression    1. There has been interval deterioration since previous CT scan dated October 2, 2020.    2. There are dilated fluid-filled small bowel loops,  significantly worse than on previous study dated October 2, 2020, period    There are abnormal appearing small bowel loops in the right lower abdomen anteriorly with lack of mucosal enhancement suspicious ischemic changes, please correlate clinically. .    4. The patient is status post cholecystectomy, hysterectomy and placement of an endoluminal graft extending from the abdominal aorta into both iliac arteries. 5. There are areas of lack of perfusion in the lower pole of the left kidney and midpole of the right kidney anteriorly suspicious for areas of renal ischemia/infarction. 6. There is free fluid in the pelvis. 7. Hiatal hernia.     8. Probable graft in the region of the right femoral artery.         Final report electronically signed by DR Frankie Jensen on 10/5/2020 3:59 PM      Hospital Course: Raya Diez is a 82-year old female patient who presented to the ED for abdominal pain. CT demonstrated SBO. History of multiple abdominal surgeries with extensive cardiology/vascular history. She was admitted to [facility] for conservative management and treated with IV hydration, bowel rest, pain control. NG tube was unable to be placed after multiple attempts. Patient clinically was doing okay PAD #1 and has lessening abdominal pain/nausea. Advanced to clear liquids and PAD #2 patient had worsening abdominal distention. She was made NPO and bowel rested and CT enterography was ordered. Unfortunately CT enterography demonstrated possible ischemic changes to bowel along with possible kidney infarcts. Patient was still on her plavix and eliquis secondary to significant heart history so those were stopped and was going to place her on a heparin gtt. PAD #3 patient was no better with still a lot of abdominal pain, nausea and unable to place NG tube at bedside. After imaging reviewed with two different surgeons at [place] [address] and discussing with patient we felt best decision was for her be transferred to tertiary center. Patient was transferred to [facility]. Cardiology, medicine, cardiothoracic, and nephrology were on board and agreed with transfer. Discharge Condition: stable    Disposition:  To [address]:  Lab Results   Component Value Date    WBC 13.8 (H) 10/06/2020    HGB 16.4 (H) 10/06/2020    HCT 54.0 (H) 10/06/2020    MCV 81.1 10/06/2020     10/06/2020     Lab Results   Component Value Date    LACTA 2.1 10/06/2020       Lab Results   Component Value Date     10/06/2020    K 3.9 10/06/2020    K 4.5 08/20/2020    CL 98 10/06/2020    CO2 22 10/06/2020    BUN 22 10/06/2020    CREATININE 1.4 10/06/2020    GLUCOSE 172 10/06/2020    GLUCOSE 100 07/27/2020    CALCIUM 9.3 10/06/2020      Discharge Medications:        Medication List      ASK your doctor about these medications    albuterol sulfate  (90 Base) MCG/ACT inhaler  Commonly known as:  Proventil HFA  Inhale 2 puffs into the lungs every 6 hours as needed for Wheezing or Shortness of Breath     apixaban 2.5 MG Tabs tablet  Commonly known as:  ELIQUIS  TAKE 1 TABLET BY MOUTH TWICE DAILY     Blood Pressure Kit  Check blood pressure daily, and if symptoms of lightheadedness. Call physician if BP <80/40.      clopidogrel 75 MG tablet  Commonly known as:  Plavix  Take 1 tablet by mouth daily     hydrocortisone 25 MG suppository  Commonly known as:  ANUSOL-HC  Place 1 suppository rectally 2 times daily as needed for Hemorrhoids     losartan 25 MG tablet  Commonly known as:  COZAAR  Take 1 tablet by mouth daily     magnesium 250 MG Tabs tablet  Commonly known as:  MAGNESIUM-OXIDE  TAKE 2 TABLETS BY MOUTH TWICE DAILY     melatonin 3 MG Tabs tablet  Take 2 tablets by mouth nightly as needed (sleep)     metoprolol succinate 25 MG extended release tablet  Commonly known as:  TOPROL XL  Take 1 tablet by mouth daily     pantoprazole 40 MG tablet  Commonly known as:  PROTONIX     potassium chloride 10 MEQ extended release tablet  Commonly known as:  KLOR-CON M  Take 1 tablet by mouth daily     pravastatin 40 MG tablet  Commonly known as:  PRAVACHOL  TAKE 1 TABLET BY MOUTH DAILY     timolol 0.5 % ophthalmic solution  Commonly known as:  TIMOPTIC     Tylenol Arthritis Pain 650 MG extended release tablet  Generic drug:  acetaminophen            Amy Castro CNP   Electronincally signed 10/8/2020 at 4:08 PM    A total of 35 minutes was spent in preparing the patient for discharge with greater than 50% of the time involved with education, counseling and coordinating care

## 2020-10-27 ENCOUNTER — TELEPHONE (OUTPATIENT)
Dept: CARDIOLOGY CLINIC | Age: 85
End: 2020-10-27

## 2020-10-27 ENCOUNTER — PROCEDURE VISIT (OUTPATIENT)
Dept: CARDIOLOGY CLINIC | Age: 85
End: 2020-10-27

## 2020-10-27 NOTE — TELEPHONE ENCOUNTER
Please add a Pro-BNP to labs and a BMP if able if not done with her labs yesterday   otherwise please order both for today  Thanks

## 2020-10-27 NOTE — TELEPHONE ENCOUNTER
I called this pt and told her her labs were ok and she does not need any water pills for now. I did review the heart failure zones with her and instructed her of the importance of weighing herself daily . Pt says I know .  Pt is aware to call if any increased symptoms

## 2020-10-27 NOTE — TELEPHONE ENCOUNTER
Elevated optivol  Media Information                                                   Document Information     Cardiology Report    Mdt dual icd    10/27/2020 00:00    Attached To:     Icd: Remote Interrogate [[id number]]    Procedure visit on 10/27/20 with Daily Weems RN    Source Information     Daily Weems, [address] [place] [address]

## 2020-10-27 NOTE — TELEPHONE ENCOUNTER
[facility] without labs and her not being on diuretics even when I saw her last I am hesitant to initiate without her coming in or getting labs post  We will have to monitor for now  Please review HF Zones with her and call with any changes

## 2020-10-27 NOTE — TELEPHONE ENCOUNTER
I CALLED THIS PT. SHE SAID SHE DID. HER WEIGHT  LBS AND  Lbs YESTERDAY. SHE SAID SHE DID NOT WEIGHT HERSELF TODAY BECAUSE SHE FORGOT   PT SAYS SHE THINKS HER BASELINE WEIGHT IS IN 'S . SHE SAID SHE HAS HAD WAY TOO MUCH GOING ON LATELY AND HAVING A HARD TIME KEEPING IT ALL STRAIGHT . SAYS HER SOB IS NO WORSE BUT SHE DOES HAVE EDEMA IN HER LEGS AND SHE TRIES TO KEEP THEM UP AS MUCH AS SHE CAN.    SHE IS NOT TAKING ANY DIURETICS AT THIS TIME

## 2020-10-27 NOTE — PROGRESS NOTES
Mdt dual icd  batttery 10.2yrs  A paced 23.1%  V paced <0.1%  p wave 0.9mV  r wave 8.0mV  Atrial threshold 0.375v @ 0.40ms  rv threshold 0.625V @ 0.40ms  Atrial impedance 380 ohms  rv impedance 494 ohms  Shock 51 ohms   1 ns vt episode approx 9 beats lasting 2 seconds rate 201bpm  optivol is elevated  Sent to CHF clinic

## 2020-10-27 NOTE — TELEPHONE ENCOUNTER
Spoke with patient  She did not get labs done yesterday. She states she is not leaving her condo. Her son and daughter in law are helping her and she hasn't even gone down to do her laundry. She states she is not leaving her condo for labs she does not want to take a chance on \"getting the virus\"  Any other recommendations?

## 2020-11-03 PROBLEM — I73.9 PVD (PERIPHERAL VASCULAR DISEASE) (HCC): Status: RESOLVED | Noted: 2020-11-03 | Resolved: 2020-11-03

## 2020-12-02 ENCOUNTER — OFFICE VISIT (OUTPATIENT)
Dept: CARDIOLOGY CLINIC | Age: 85
End: 2020-12-02
Payer: MEDICARE

## 2020-12-02 ENCOUNTER — TELEPHONE (OUTPATIENT)
Dept: CARDIOLOGY CLINIC | Age: 85
End: 2020-12-02

## 2020-12-02 VITALS
HEIGHT: 65 IN | WEIGHT: 142 LBS | SYSTOLIC BLOOD PRESSURE: 110 MMHG | HEART RATE: 60 BPM | BODY MASS INDEX: 23.66 KG/M2 | DIASTOLIC BLOOD PRESSURE: 62 MMHG

## 2020-12-02 PROCEDURE — 99213 OFFICE O/P EST LOW 20 MIN: CPT | Performed by: INTERNAL MEDICINE

## 2020-12-02 PROCEDURE — 1036F TOBACCO NON-USER: CPT | Performed by: INTERNAL MEDICINE

## 2020-12-02 PROCEDURE — 4040F PNEUMOC VAC/ADMIN/RCVD: CPT | Performed by: INTERNAL MEDICINE

## 2020-12-02 PROCEDURE — G8484 FLU IMMUNIZE NO ADMIN: HCPCS | Performed by: INTERNAL MEDICINE

## 2020-12-02 PROCEDURE — G8427 DOCREV CUR MEDS BY ELIG CLIN: HCPCS | Performed by: INTERNAL MEDICINE

## 2020-12-02 PROCEDURE — 1123F ACP DISCUSS/DSCN MKR DOCD: CPT | Performed by: INTERNAL MEDICINE

## 2020-12-02 PROCEDURE — 1090F PRES/ABSN URINE INCON ASSESS: CPT | Performed by: INTERNAL MEDICINE

## 2020-12-02 PROCEDURE — G8420 CALC BMI NORM PARAMETERS: HCPCS | Performed by: INTERNAL MEDICINE

## 2020-12-02 PROCEDURE — G8400 PT W/DXA NO RESULTS DOC: HCPCS | Performed by: INTERNAL MEDICINE

## 2020-12-02 NOTE — PROGRESS NOTES
[address] [facility] [address] [place] [address]  Dept: [phone]  Dept Fax: [phone]  Loc: [phone]    Visit Date: 12/2/2020    Ms. Tana Keith is a 82 y.o. female  who presented for:  Chief Complaint   Patient presents with    1 Year Follow Up    Atrial Fibrillation       HPI:   HPI   83 yo F HTN, PVD, PAD and R LE stent placement, left heart catheterization on 12/30/2019 for nonobstructive coronary artery disease with elevated troponin, came in for evaluation for a watchman procedure for chronic a fib then developed bowel issues needing surgery. Recent GI bleed, required transfusions and was stabilized and put on low dose Eliquis for CVA prophylaxis. Peripheral angiogram/intervention was done on 02/14/2019 for acute-on-chronic limb ischemia related to post lower extremity bypass distal anastomotic stenosis with recent graft thrombosis with a successful right fem-tib angioplasty and stenting. Now has PEG tube. She is a pure liquid diet. Follows with [facility] for SBO issues. 1.  Status post ileocolonic resection Feb 2020 with Dr. Kortney Lamb  2. Status post robotic anterior resection secondary to diverticular stricture 2018 with Dr. Beth Santos     3.    Status post endoluminal graft repair of abdominal aortic aneurysm Feb 2019      Current Outpatient Medications:     metoprolol succinate (TOPROL XL) 25 MG extended release tablet, Take 1 tablet by mouth daily, Disp: 90 tablet, Rfl: 3    apixaban (ELIQUIS) 2.5 MG TABS tablet, TAKE 1 TABLET BY MOUTH TWICE DAILY, Disp: 60 tablet, Rfl: 5    losartan (COZAAR) 25 MG tablet, Take 1 tablet by mouth daily, Disp: 30 tablet, Rfl: 5    pravastatin (PRAVACHOL) 40 MG tablet, TAKE 1 TABLET BY MOUTH DAILY, Disp: 90 tablet, Rfl: 3    hydrocortisone (ANUSOL-HC) 25 MG suppository, Place 1 suppository rectally 2 times daily as needed for Hemorrhoids, Disp: 30 suppository, Rfl: 1    clopidogrel (PLAVIX) 75 MG tablet, Take 1 tablet by mouth daily, Disp: 90 tablet, Rfl: 3    potassium chloride (KLOR-CON M) 10 MEQ extended release tablet, Take 1 tablet by mouth daily, Disp: 180 tablet, Rfl: 2    magnesium (MAGNESIUM-OXIDE) 250 MG TABS tablet, TAKE 2 TABLETS BY MOUTH TWICE DAILY, Disp: 120 tablet, Rfl: 6    pantoprazole (PROTONIX) 40 MG tablet, Take 40 mg by mouth 2 times daily (before meals), Disp: , Rfl:     albuterol sulfate HFA (PROVENTIL HFA) 108 (90 Base) MCG/ACT inhaler, Inhale 2 puffs into the lungs every 6 hours as needed for Wheezing or Shortness of Breath, Disp: 1 Inhaler, Rfl: 0    Blood Pressure KIT, Check blood pressure daily, and if symptoms of lightheadedness. Call physician if BP <80/40., Disp: 1 kit, Rfl: 0    melatonin 3 MG TABS tablet, Take 2 tablets by mouth nightly as needed (sleep), Disp: 60 tablet, Rfl: 3    acetaminophen (TYLENOL ARTHRITIS PAIN) 650 MG CR tablet, Take 1,300 mg by mouth every 12 hours as needed for Pain , Disp: , Rfl:     timolol (TIMOPTIC) 0.5 % ophthalmic solution, Place 1 drop into both eyes nightly , Disp: , Rfl:     Past Medical History  Carlos Villarreal  has a past medical history of Arthritis, Blood circulation, collateral, BPPV (benign paroxysmal positional vertigo), CHF (congestive heart failure) ([address] Chronic kidney disease, Gastrointestinal hemorrhage, GERD (gastroesophageal reflux disease), History of blood transfusion, HTN (hypertension), Hx of blood clots, Hyperlipidemia, Medtronic dual ICD , Neuromuscular disorder ([address] Obesity, Osteopenia, PAC (premature atrial contraction), Paralysis ([address] Pedal edema, Pneumonia, PVC (premature ventricular contraction), PVD (peripheral vascular disease) ([address] Tinnitus, and UTI (urinary tract infection). Social History  Carlos Villarreal  reports that she quit smoking about 13 months ago. Her smoking use included cigarettes. She started smoking about 64 years ago. She has a 15.00 pack-year smoking history.  She has never used smokeless RIGHT ILIAC EMBOLECTOMY, RIGHT COMMON AND EXTERNAL ILIAC STENTING, RIGHT FEMORAL TO ANTERIOR POPLITEAL BYPASS WITH 6MM GORTEX GRAFT performed by Sadi Oliva MD at [address] Right 2/14/2019    FEMORAL EMBOLECTOMY THROMBECTOMY, RIGHT LEG performed by Sadi Oliva MD at [address] [facility] [address] 11/27/2017    EGD SUBMUCOSAL/BOTOX INJECTION performed by Macho Arroyo MD at [address] [facility] N/A 2/22/2019    EGD BIOPSY performed by Violetta Lewis MD at [facility]       Review of Systems   Constitutional: Negative for chills and fever  HENT: Negative for congestion, sinus pressure, sneezing and sore throat. Eyes: Negative for pain, discharge, redness and itching. Respiratory: Negative for apnea, cough  Gastrointestinal: Negative for blood in stool, constipation, diarrhea   Endocrine: Negative for cold intolerance, heat intolerance, polydipsia. Genitourinary: Negative for dysuria, enuresis, flank pain and hematuria. Musculoskeletal: Negative for arthralgias, joint swelling and neck pain. Neurological: Negative for numbness and headaches. Psychiatric/Behavioral: Negative for agitation, confusion, decreased concentration and dysphoric mood. Objective:     /62   Pulse 60   Ht 5' 5\" (1.651 m)   Wt 142 lb (64.4 kg)   BMI 23.63 kg/m²     Wt Readings from Last 3 Encounters:   12/02/20 142 lb (64.4 kg)   11/30/20 143 lb (64.9 kg)   10/06/20 150 lb 3.2 oz (68.1 kg)     BP Readings from Last 3 Encounters:   12/02/20 110/62   11/30/20 112/67   10/06/20 (!) 101/54       Nursing note and vitals reviewed. Physical Exam   Constitutional: Oriented to person, place, and time. Appears well-developed and well-nourished. HENT:   Head: Normocephalic and atraumatic. Eyes: EOM are normal. Pupils are equal, round, and reactive to light. Neck: Normal range of motion. Neck supple. No JVD present. Cardiovascular: Normal rate, regular rhythm, normal heart sounds and intact distal pulses. No murmur heard. Pulmonary/Chest: Effort normal and breath sounds normal. No respiratory distress. No wheezes. No rales. Abdominal: Soft. Bowel sounds are normal. No distension. There is no tenderness. Musculoskeletal: Normal range of motion. No edema. Neurological: Alert and oriented to person, place, and time. No cranial nerve deficit. Coordination normal.   Skin: Skin is warm and dry. Psychiatric: Normal mood and affect.        No results found for: CKTOTAL, CKMB, CKMBINDEX    Lab Results   Component Value Date    WBC 13.8 10/06/2020    RBC 6.66 10/06/2020    HGB 16.4 10/06/2020    HCT 54.0 10/06/2020    MCV 81.1 10/06/2020    MCH 24.6 10/06/2020    MCHC 30.4 10/06/2020    RDW 17.5 05/30/2019     10/06/2020    MPV 10.8 10/06/2020       Lab Results   Component Value Date     10/06/2020    K 3.9 10/06/2020    K 4.5 08/20/2020    CL 98 10/06/2020    CO2 22 10/06/2020    BUN 22 10/06/2020    LABALBU 3.1 10/03/2020    CREATININE 1.4 10/06/2020    CALCIUM 9.3 10/06/2020    LABGLOM 36 10/06/2020    GLUCOSE 172 10/06/2020    GLUCOSE 100 07/27/2020       Lab Results   Component Value Date    ALKPHOS 91 10/03/2020    ALT 9 10/03/2020    AST 20 10/03/2020    PROT 5.3 10/03/2020    BILITOT 0.3 10/03/2020    BILIDIR <0.2 10/03/2020    LABALBU 3.1 10/03/2020       Lab Results   Component Value Date    MG 1.9 10/04/2020       Lab Results   Component Value Date    INR 1.19 (H) 08/20/2020    INR 1.40 (H) 06/29/2020    INR 1.43 (H) 02/21/2020         Lab Results   Component Value Date    LABA1C 5.5 12/28/2019       Lab Results   Component Value Date    TRIG 121 05/18/2019    HDL 34 05/18/2019    LDLCALC 47 05/18/2019       Lab Results   Component Value Date    TSH 2.160 12/28/2019         Testing Reviewed:      I have individually reviewed the cardiac test below:    ECHO:   Results for orders placed during the structurally normal.   Mild tricuspid regurgitation. Right ventricular systolic pressure measures 35-40mmHg. Ascites Vs pleural effusion noted. The aorta is within normal limits. The IVC is dilated . Estimated right atrial pressure is 10-15mmHg . Signature      ----------------------------------------------------------------   Electronically signed by Rafi Sherman MD (Interpreting   physician) on 12/28/2019 at 06:31 PM   ----------------------------------------------------------------      Findings      Mitral Valve   Structurally normal mitral valve. Mild to Moderate mitral regurgitation is present. Aortic Valve   The aortic valve was trileaflet with normal thickness and cuspal   separation. DOPPLER: Transaortic velocity was within the normal range with   no evidence of aortic stenosis. There was no evidence of aortic   regurgitation. Tricuspid Valve   Tricuspid valve is structurally normal.   Mild tricuspid regurgitation. Right ventricular systolic pressure measures 35-40mmHg. Pulmonic Valve   The pulmonic valve leaflets exhibited normal thickness, no calcification,   and normal cuspal separation. DOPPLER: The transpulmonic velocity was   within the normal range with no evidence for regurgitation. Left Atrium   Mildly dilated left atrium. Left Ventricle   Normal left ventricular wall thickness. Left Ventricular size is Moderately increased . There was severe global hypokinesis of the left ventricle. Ejection fraction is visually estimated in the range of 10% to 15%. Features were consistent with a pseudonormal left ventricular filling   pattern, with concomitant abnormal relaxation and increased filling   pressure (grade 2 diastolic dysfunction). Right Atrium   Right atrial size was normal.      Right Ventricle   The right ventricular size was normal with normal systolic function and   wall thickness.       Pericardial Effusion   The pericardium was normal in appearance with no evidence of a pericardial   effusion. Pleural Effusion   Ascites Vs pleural effusion noted. Aorta / Great Vessels   The aorta is within normal limits. The IVC is dilated . Estimated right atrial pressure is 10-15mmHg .      M-Mode/2D Measurements & Calculations      LV Diastolic   LV Systolic Dimension: 6  AV Cusp Separation: 1.8 cmLA   Dimension: 6.3 cm                        Dimension: 4.9 cmAO Root   cm             LV Volume Diastolic:      Dimension: 3.3 cmLA Area: 29.3   LV FS:4.8 %    164.6 ml                  cm^2   LV PW          LV Volume Systolic: 149.5   Diastolic: 1.1 ml   cm             LV EDV/LV EDV Index:   Septum         164.6 ml/92 m^2LV ESV/LV  RV Diastolic Dimension: 2.6 cm   Diastolic: 1.1 ESV Index: 434.0 ml/78   cm             m^2                       LA/Aorta: 1.48                  EF Calculated: 15.7 %     Ascending Aorta: 3.5 cm                                            LA volume/Index: 106 ml /60m^2                  LV Length: 9 cm   LV Area   Diastolic:   14.2 cm^2   LV Area   Systolic: 79.3   cm^2     Doppler Measurements & Calculations      MV Peak E-Wave: 91.7 AV Peak Velocity: 118 LVOT Peak Velocity: 54.8 cm/s   cm/s                 cm/s                  LVOT Mean Velocity: 43.5 cm/s   MV Peak A-Wave: 81.8 AV Peak Gradient:     LVOT Peak Gradient: 1 mmHgLVOT   cm/s                 5.57 mmHg             Mean Gradient: 1 mmHg   MV E/A Ratio: 1.12   AV Mean Velocity:   MV Peak Gradient:    84.2 cm/s   3.36 mmHg            AV Mean Gradient: 3                        mmHg                  TR Velocity:201 cm/s   MV Deceleration      AV VTI: 21.7 cm       TR Gradient:16.16 mmHg   Time: 197 msec                             PV Peak Velocity: 68.1 cm/s   MV P1/2t: 58 msec                          PV Peak Gradient: 1.86 mmHg   MVA by PHT:3.79 cm^2 LVOT VTI: 11.1 cm                        IVRT: 88 msec   MV E' Septal   Velocity: 2.7 cm/s   MV A' Septal

## 2020-12-02 NOTE — TELEPHONE ENCOUNTER
OV note from 12/2/2020   Avoid full dose [address] for Afib by using WATCHMAN, but she is still recovering from her GI surgery. Leg has not had any issues related to ischemia for quite some time.

## 2020-12-08 RX ORDER — MULTIVITAMIN WITH IRON
TABLET ORAL
Qty: 120 TABLET | Refills: 6 | Status: SHIPPED | OUTPATIENT
Start: 2020-12-08 | End: 2021-07-19 | Stop reason: SDUPTHER

## 2020-12-10 ENCOUNTER — OFFICE VISIT (OUTPATIENT)
Dept: SURGERY | Age: 85
End: 2020-12-10
Payer: MEDICARE

## 2020-12-10 VITALS
TEMPERATURE: 95.5 F | HEART RATE: 56 BPM | BODY MASS INDEX: 23.94 KG/M2 | RESPIRATION RATE: 18 BRPM | SYSTOLIC BLOOD PRESSURE: 118 MMHG | WEIGHT: 143.7 LBS | DIASTOLIC BLOOD PRESSURE: 60 MMHG | OXYGEN SATURATION: 98 % | HEIGHT: 65 IN

## 2020-12-10 PROCEDURE — G8484 FLU IMMUNIZE NO ADMIN: HCPCS | Performed by: SURGERY

## 2020-12-10 PROCEDURE — 1036F TOBACCO NON-USER: CPT | Performed by: SURGERY

## 2020-12-10 PROCEDURE — G8420 CALC BMI NORM PARAMETERS: HCPCS | Performed by: SURGERY

## 2020-12-10 PROCEDURE — 1123F ACP DISCUSS/DSCN MKR DOCD: CPT | Performed by: SURGERY

## 2020-12-10 PROCEDURE — G8400 PT W/DXA NO RESULTS DOC: HCPCS | Performed by: SURGERY

## 2020-12-10 PROCEDURE — 1090F PRES/ABSN URINE INCON ASSESS: CPT | Performed by: SURGERY

## 2020-12-10 PROCEDURE — 4040F PNEUMOC VAC/ADMIN/RCVD: CPT | Performed by: SURGERY

## 2020-12-10 PROCEDURE — G8427 DOCREV CUR MEDS BY ELIG CLIN: HCPCS | Performed by: SURGERY

## 2020-12-10 PROCEDURE — 99215 OFFICE O/P EST HI 40 MIN: CPT | Performed by: SURGERY

## 2020-12-11 ASSESSMENT — ENCOUNTER SYMPTOMS
WHEEZING: 0
COUGH: 0
VOMITING: 0
TROUBLE SWALLOWING: 1
ABDOMINAL PAIN: 0
BLOOD IN STOOL: 0
NAUSEA: 0
COLOR CHANGE: 0
SORE THROAT: 0
SHORTNESS OF BREATH: 1

## 2020-12-11 NOTE — PROGRESS NOTES
Rosalie Smith MD   General Surgery  Follow up Patient Evaluation in Office  Pt Name: Susan Hunter  Date of Birth 1935   Today's Date: 12/10/2020  Medical Record Number: [id number]  Referring Provider: No ref. provider found  Primary Care Provider: Paul Lopes  Chief Complaint:  Chief Complaint   Patient presents with    Surgical Consult     pt states she wants to discuss getting gtube removed and hiatal hernia- ref [address]      1. Dilated cardiomyopathy ([address]    2. Esophageal dysmotility    3. Hiatal hernia    4. Esophageal diverticulum    5. Obstructive vascular disease     PLANS      1. Patient here for an opinion she wants her G-tube out. Extensive records from [facility] clinic reviewed. G-tube placed to pexied her stomach and pull her hiatal hernia down. 2.  Reviewed esophagram report and trying to obtain films. Stated patient had a very large thoracic inlet and esophageal diverticulum. 3.  Recommend patient continue puréed diet. 4.  She does not want to go back to [facility] because the drive is too long. However, if [facility] wants to perform some surgery those surgeries are not performed here and she is too high risk to have them performed here. 5.  I have recommended she leave the G-tube in place at least for another month to adequately pexied her stomach in the abdominal cavity. Also she may need it for nutrition with her swallowing difficulties. She currently states she is getting adequate calories and has not lost weight. With this diverticulum and her esophageal dysfunction she is at risk of aspiration. 6.  Encouraged her to follow-up with GI locally here in January as scheduled as they have dealt with her esophageal issues for some time. No previous imaging or EGD had noticed a diverticulum, to my knowledge. 7.  Encouraged follow-up at [facility] clinic.   She may want to inquire if she could at least have consultation and have her questions answered on a virtual basis. Rowena Diane is a 82y.o. year old female who is presenting today in the office for follow-up evaluation wants to discuss her options. Jasmine Bustamante has a history of significant vascular disease as well as cardiomyopathy. She has a history of a sigmoid colon resection for diverticular disease and a right colon resection for a villous adenoma with bleeding on blood thinning medication. She recently was admitted with [facility] with obstructive symptoms and abdominal pain could not rule out ischemic bowel. Due to her significant medical comorbidities and lack of vascular options here she was recommended transfer to a tertiary center. She was transferred to the [facility] where they reversed her blood thinning medications and was taken to surgery. On exploration she had hyperemic mesentery of the bowel but no evidence of ischemia. Her stomach was in her chest through a hiatal hernia which was brought back down into the abdominal cavity and pexied with placement of a gastrostomy tube. This was performed at a second look operation a few days after her admission. She was discharged home and has been eating a puréed diet. While at [facility] she had an esophagram which port shows a large 9 cm thoracic inlet esophageal diverticulum. She has a chronic history of esophageal dysmotility and had been getting Botox injections here on an intermittent basis. From a cardiac standpoint she is considered high risk for any intervention. She specifically is asking and does not like her gastrostomy tube. Recommendations as above. She does report regurgitation of undigested food at times and states she will back off on her diet and go to liquids when that happens.     Past Medical History  Past Medical History:   Diagnosis Date    A-fib [facility])     AAA (ab[facility]) [facility]     Achalasia     Anemia     Arthritis     Blood circulation, collateral     BPPV (benign paroxysmal positional vertigo) 6/6/16    CHF (congestive heart failure) (HCC)     Chronic kidney disease     sees Dr Vianney Taylor cancer [facility])     Gastrointestinal hemorrhage     GERD (gastroesophageal reflux disease)     ? esophageal stricture    Hiatal hernia     History of blood transfusion     HTN (hypertension)     Hx of blood clots 2018    R leg-sees ABE[facility] Hargrove    Hyperlipidemia     Medtronic dual ICD  8/26/2020    Neuromuscular disorder ([address]     Obesity     Osteopenia     Osteoporosis     PAC (premature atrial contraction)     on betablocker    Paralysis (HCC)     baby    Pedal edema     denied any hx of hypertension    Pneumonia     PVC (premature ventricular contraction)     sees Dr Ricardo Trujillo PVD (peripheral vascular disease) ([address]     Small bowel obstruction ([address]     Tinnitus     UTI (urinary tract infection)        Past Surgical History  Past Surgical History:   Procedure Laterality Date    ABDOMINAL AORTIC ANEURYSM REPAIR, ENDOVASCULAR N/A 2/15/2019    ABDOMINAL AORTIC ANEURYSM REPAIR ENDOVASCULAR, DECLOTTING RIGHT FEM TIB BYPASS GRAFT performed by Adriano Rick MD at [address]    lumpectomy    CHOLECYSTECTOMY      30 years ago    COLONOSCOPY  08/06/2018    Dr Teresa High 2/22/2019    COLONOSCOPY DIAGNOSTIC performed by Yancy Denis MD at [facility]    COLONOSCOPY N/A 2/22/2020    COLONOSCOPY CONTROL HEMORRHAGE performed by Thierry Davidson MD at [address] [facility]      eye, eyelids    EYE SURGERY      cataract    HEMICOLECTOMY N/A 2/25/2020    OPEN RIGHT COLON RESECTION performed by Pillo Lamar MD at [address]  07/15/2016    OTHER SURGICAL HISTORY  10/06/2020    Exp lap washout mesenteric lymph node biopsy EGD abthera placement Dr Javier Hammond  10/08/2020    reopening recent lap open g tube placement intra operative EGD and primary closure of open abdomen- [facility]    [place] OLEGARIO SKN SUB GRFT T/A/L AREA/<100SCM /<1ST 25 SCM N/A 9/6/2018    RE-EXPLORATION RIGHT GROIN, DECLOTTING OF FEMORAL BYPASS GRAFT performed by Azam Krueger MD at [address] [facility] EGD TRANSORAL BIOPSY SINGLE/MULTIPLE Left 11/27/2017    EGD BIOPSY performed by Elkin Bloom MD at [address] [place] [address], PARTIAL, Rose Hopkins N/A 8/20/2018    ROBOT ASSISTED COLECTOMY (LOW ANTERIOR) performed by Carlos Bennett MD at [address] [facility] OFFICE/OUTPT [facility] N/A 9/5/2018    RIGHT FEMORAL EMBOLECTOMY, INTRAOPERATIVE ARTERIOGRAM, RIGHT ILIAC EMBOLECTOMY, RIGHT COMMON AND EXTERNAL ILIAC STENTING, RIGHT FEMORAL TO ANTERIOR POPLITEAL BYPASS WITH 6MM GORTEX GRAFT performed by Azam Krueger MD at [address] / [address] Right 2/14/2019    FEMORAL EMBOLECTOMY THROMBECTOMY, RIGHT LEG performed by Azam Krueger MD at [facility] 11/27/2017    EGD SUBMUCOSAL/BOTOX INJECTION performed by Elkin Bloom MD at [facility] N/A 2/22/2019    EGD BIOPSY performed by Chan Orantes MD at [facility]       Medications  Current Outpatient Medications   Medication Sig Dispense Refill    magnesium (MAGNESIUM-OXIDE) 250 MG TABS tablet TAKE 2 TABLETS BY MOUTH TWICE DAILY 120 tablet 6    apixaban (ELIQUIS) 2.5 MG TABS tablet TAKE 1 TABLET BY MOUTH TWICE DAILY 60 tablet 5    metoprolol succinate (TOPROL XL) 25 MG extended release tablet Take 1 tablet by mouth daily 90 tablet 3    losartan (COZAAR) 25 MG tablet Take 1 tablet by mouth daily 30 tablet 5    pravastatin (PRAVACHOL) 40 MG tablet TAKE 1 TABLET BY MOUTH DAILY 90 tablet 3    clopidogrel (PLAVIX) 75 MG tablet Take 1 tablet by mouth daily 90 tablet 3    potassium chloride (KLOR-CON M) 10 MEQ extended release tablet Take 1 tablet by mouth daily 180 tablet 2    polydipsia. Genitourinary: Negative for dysuria, flank pain and hematuria. Musculoskeletal: Positive for arthralgias. Negative for gait problem, joint swelling and myalgias. Skin: Negative for color change and rash. Allergic/Immunologic: Negative for immunocompromised state. Neurological: Negative for dizziness, tremors, seizures and speech difficulty. Hematological: Bruises/bleeds easily. Psychiatric/Behavioral: Negative for agitation, behavioral problems, confusion and suicidal ideas. OBJECTIVE     /60 (Site: Right Upper Arm, Position: Sitting, Cuff Size: Medium Adult)   Pulse 56   Temp 95.5 °F (35.3 °C) (Temporal)   Resp 18   Ht 5' 5\" (1.651 m)   Wt 143 lb 11.2 oz (65.2 kg)   SpO2 98%   BMI 23.91 kg/m²      Physical Exam  Vitals signs reviewed. Constitutional:       Appearance: She is well-developed. She is not ill-appearing, toxic-appearing or diaphoretic. HENT:      Head: Normocephalic and atraumatic. Nose: No congestion. Eyes:      General: No scleral icterus. Extraocular Movements: Extraocular movements intact. Pupils: Pupils are equal, round, and reactive to light. Neck:      Musculoskeletal: Normal range of motion and neck supple. No neck rigidity. Thyroid: No thyromegaly. Vascular: No JVD. Trachea: No tracheal deviation. Cardiovascular:      Rate and Rhythm: Normal rate and regular rhythm. Heart sounds: Normal heart sounds. Pulmonary:      Effort: Pulmonary effort is normal. No respiratory distress. Breath sounds: Normal breath sounds. No wheezing. Abdominal:      General: There is no distension. Palpations: Abdomen is soft. Tenderness: There is no abdominal tenderness. There is no guarding or rebound. Musculoskeletal:      Comments: Mild edema bilateral lower extremities   Lymphadenopathy:      Cervical: No cervical adenopathy. Skin:     General: Skin is warm and dry. Findings: No rash. Neurological:      Mental Status: She is alert and oriented to person, place, and time. Cranial Nerves: No cranial nerve deficit. Psychiatric:         Mood and Affect: Mood normal.         Lab Results   Component Value Date    WBC 13.8 (H) 10/06/2020    HGB 16.4 (H) 10/06/2020    HCT 54.0 (H) 10/06/2020     10/06/2020    CHOL 105 05/18/2019    TRIG 121 05/18/2019    HDL 34 05/18/2019    ALT 9 (L) 10/03/2020    AST 20 10/03/2020     10/06/2020    K 3.9 10/06/2020    CL 98 10/06/2020    CREATININE 1.4 (H) 10/06/2020    BUN 22 10/06/2020    CO2 22 (L) 10/06/2020    TSH 2.160 12/28/2019    INR 1.19 (H) 08/20/2020    GLUF 124 (H) 06/14/2019    LABA1C 5.5 12/28/2019         Extensive outside records and imaging reviewed. Greater than 45 minutes spent with 51% of time in direct care of patient and answering of her questions.

## 2020-12-15 ENCOUNTER — TELEPHONE (OUTPATIENT)
Dept: CARDIOLOGY CLINIC | Age: 85
End: 2020-12-15

## 2020-12-15 ENCOUNTER — OFFICE VISIT (OUTPATIENT)
Dept: CARDIOLOGY CLINIC | Age: 85
End: 2020-12-15
Payer: MEDICARE

## 2020-12-15 VITALS
HEART RATE: 50 BPM | OXYGEN SATURATION: 96 % | DIASTOLIC BLOOD PRESSURE: 62 MMHG | WEIGHT: 143.8 LBS | HEIGHT: 65 IN | SYSTOLIC BLOOD PRESSURE: 104 MMHG | BODY MASS INDEX: 23.96 KG/M2

## 2020-12-15 LAB
ANION GAP SERPL CALCULATED.3IONS-SCNC: 10 MEQ/L (ref 8–16)
BUN BLDV-MCNC: 20 MG/DL (ref 7–22)
CALCIUM SERPL-MCNC: 9.4 MG/DL (ref 8.5–10.5)
CHLORIDE BLD-SCNC: 105 MEQ/L (ref 98–111)
CO2: 26 MEQ/L (ref 23–33)
CREAT SERPL-MCNC: 0.9 MG/DL (ref 0.4–1.2)
GFR SERPL CREATININE-BSD FRML MDRD: 60 ML/MIN/1.73M2
GLUCOSE BLD-MCNC: 97 MG/DL (ref 70–108)
MAGNESIUM: 2.1 MG/DL (ref 1.6–2.4)
POTASSIUM SERPL-SCNC: 4.8 MEQ/L (ref 3.5–5.2)
PRO-BNP: 2234 PG/ML (ref 0–1800)
SODIUM BLD-SCNC: 141 MEQ/L (ref 135–145)

## 2020-12-15 PROCEDURE — 4040F PNEUMOC VAC/ADMIN/RCVD: CPT | Performed by: NURSE PRACTITIONER

## 2020-12-15 PROCEDURE — 36415 COLL VENOUS BLD VENIPUNCTURE: CPT | Performed by: NURSE PRACTITIONER

## 2020-12-15 PROCEDURE — 1123F ACP DISCUSS/DSCN MKR DOCD: CPT | Performed by: NURSE PRACTITIONER

## 2020-12-15 PROCEDURE — G8482 FLU IMMUNIZE ORDER/ADMIN: HCPCS | Performed by: NURSE PRACTITIONER

## 2020-12-15 PROCEDURE — G8427 DOCREV CUR MEDS BY ELIG CLIN: HCPCS | Performed by: NURSE PRACTITIONER

## 2020-12-15 PROCEDURE — 99214 OFFICE O/P EST MOD 30 MIN: CPT | Performed by: NURSE PRACTITIONER

## 2020-12-15 PROCEDURE — G8420 CALC BMI NORM PARAMETERS: HCPCS | Performed by: NURSE PRACTITIONER

## 2020-12-15 PROCEDURE — 1090F PRES/ABSN URINE INCON ASSESS: CPT | Performed by: NURSE PRACTITIONER

## 2020-12-15 PROCEDURE — 1036F TOBACCO NON-USER: CPT | Performed by: NURSE PRACTITIONER

## 2020-12-15 PROCEDURE — G8400 PT W/DXA NO RESULTS DOC: HCPCS | Performed by: NURSE PRACTITIONER

## 2020-12-15 RX ORDER — BUMETANIDE 1 MG/1
1 TABLET ORAL DAILY PRN
Qty: 30 TABLET | Refills: 0 | Status: SHIPPED | OUTPATIENT
Start: 2020-12-15 | End: 2021-04-27

## 2020-12-15 ASSESSMENT — ENCOUNTER SYMPTOMS
SHORTNESS OF BREATH: 0
APNEA: 0
COLOR CHANGE: 0
COUGH: 0
CHEST TIGHTNESS: 0
NAUSEA: 0
ABDOMINAL DISTENTION: 0
ABDOMINAL PAIN: 0
WHEEZING: 0

## 2020-12-15 NOTE — PROGRESS NOTES
[facility]       Visit Date: 12/15/2020  Cardiologist:  Dr. Dionne Echavarria   Primary Care Physician: Dr. Dollie Aase is a 82 y.o. female who presents today for:  Chief Complaint   Patient presents with    Congestive Heart Failure       HPI: Obtained from patient and chart    Lea Fournier is a 82 y.o. female who presents to the office for a follow up visit in the heart failure clinic. Hx PAD stents 2/2019, ?COPD,  smoker (she had Quit but started smoking a 1/2 PPD), CKD, AAA repair (EVAR) 2/2019, right fem-tib PTA with stent 2/2019, HTN. Negative stress test 9/2018. She was admitted for CHF and possible COPD exacerbation. Her K+ and Mg+ were both low and replaced. Repeat BMP and Mg were done on 5/30/19 and her Mg+ was 1.2 and K+ was 3.5. I called the patient and orders were placed and she was given Mg 4 gm IV in OP nursing and I also started her on Potassium.  Admitted in [facility] for pneumonia with sepsis, flash pulmonary edema. EF was 55% (2018) down to 10-15% grade 2 DD (Dec 2019) wore a LifeVest but sent it back in May. NICM ? Viral CMP. EF did improve slightly to 25-30% June 2020. Recent admit for rectal bleeding (June 2020) with blood transfusions. She had refused all invasive testing/procedures but called in July was was \"ready to get things started. \" She had an ICD placed by Dr Jose Cain Aug 20, 2020. Possible Watchman. In October 2020 she had a SBP, has a PEG tube placed at [facility]. She is able to drink liquids. She is not using tube feeds. Weights are stable. She has some LE edema. She denies any increase in SOB. No CP.        Accompanied by: self  Last hospital admission related to Heart Failure:  Dec 2019  Chest Pain: no  Worsening SOB: no  Worsening Orthopnea/PND: no  Edema: no  Any extra diuretic use: no  Weight gain: no  Fatigue: some  Abdominal bloating: no  Appetite: good  KORINA: no  Cough: occasional   Compliant checking home weight: yes  141 SURGICAL HISTORY  10/06/2020    Exp lap washout mesenteric lymph node biopsy EGD abthera placement Dr Mabel Kumar  10/08/2020    reopening recent lap open g tube placement intra operative EGD and primary closure of open abdomen- [address] OLEGARIO SKN SUB GRFT T/A/L AREA/<100SCM /<1ST 25 SCM N/A 9/6/2018    RE-EXPLORATION RIGHT GROIN, DECLOTTING OF FEMORAL BYPASS GRAFT performed by Prieto Wild MD at [address] [facility] EGD TRANSORAL BIOPSY SINGLE/MULTIPLE Left 11/27/2017    EGD BIOPSY performed by Amparo Bolanos MD at [address] [place] [address], PARTIAL, Yoko Ballesteros N/A 8/20/2018    ROBOT ASSISTED COLECTOMY (LOW ANTERIOR) performed by Adia Daniels MD at [address] [facility] OFFICE/OUTPT [facility] N/A 9/5/2018    RIGHT FEMORAL EMBOLECTOMY, INTRAOPERATIVE ARTERIOGRAM, RIGHT ILIAC EMBOLECTOMY, RIGHT COMMON AND EXTERNAL ILIAC STENTING, RIGHT FEMORAL TO ANTERIOR POPLITEAL BYPASS WITH 6MM GORTEX GRAFT performed by Prieto Wild MD at [address] / [address] Right 2/14/2019    FEMORAL EMBOLECTOMY THROMBECTOMY, RIGHT LEG performed by Prieto Wild MD at [address] [facility] N/A 11/27/2017    EGD SUBMUCOSAL/BOTOX INJECTION performed by Amparo Bolanos MD at [address] [facility] N/A 2/22/2019    EGD BIOPSY performed by Magdaleno Zapien MD at [facility]     Family History   Problem Relation Age of Onset    Heart Disease Mother     Stroke Mother     Cancer Father         lung    Emphysema Father     Other Sister         leukemia    Heart Disease Maternal Grandmother     Dementia Maternal Grandmother     Heart Disease Maternal Grandfather     No Known Problems Paternal Grandmother     No Known Problems Paternal Grandfather      Social History     Tobacco Use    Smoking status: Former Smoker     Packs/day: 0.25     Years: 60.00     Pack years: 15.00     Types: Cigarettes     Start date:      Quit date: 10/18/2019     Years since quittin.1    Smokeless tobacco: Never Used    Tobacco comment: 1 cigarette every other day   Substance Use Topics    Alcohol use: Yes     Alcohol/week: 1.0 standard drinks     Types: 1 Glasses of wine per week     Comment: social     Current Outpatient Medications   Medication Sig Dispense Refill    magnesium (MAGNESIUM-OXIDE) 250 MG TABS tablet TAKE 2 TABLETS BY MOUTH TWICE DAILY 120 tablet 6    apixaban (ELIQUIS) 2.5 MG TABS tablet TAKE 1 TABLET BY MOUTH TWICE DAILY 60 tablet 5    metoprolol succinate (TOPROL XL) 25 MG extended release tablet Take 1 tablet by mouth daily 90 tablet 3    losartan (COZAAR) 25 MG tablet Take 1 tablet by mouth daily 30 tablet 5    pravastatin (PRAVACHOL) 40 MG tablet TAKE 1 TABLET BY MOUTH DAILY 90 tablet 3    clopidogrel (PLAVIX) 75 MG tablet Take 1 tablet by mouth daily 90 tablet 3    potassium chloride (KLOR-CON M) 10 MEQ extended release tablet Take 1 tablet by mouth daily 180 tablet 2    pantoprazole (PROTONIX) 40 MG tablet Take 40 mg by mouth 2 times daily (before meals)      albuterol sulfate HFA (PROVENTIL HFA) 108 (90 Base) MCG/ACT inhaler Inhale 2 puffs into the lungs every 6 hours as needed for Wheezing or Shortness of Breath 1 Inhaler 0    melatonin 3 MG TABS tablet Take 2 tablets by mouth nightly as needed (sleep) 60 tablet 3    acetaminophen (TYLENOL ARTHRITIS PAIN) 650 MG CR tablet Take 1,300 mg by mouth every 12 hours as needed for Pain       timolol (TIMOPTIC) 0.5 % ophthalmic solution Place 1 drop into both eyes nightly       Blood Pressure KIT Check blood pressure daily, and if symptoms of lightheadedness. Call physician if BP <80/40. 1 kit 0     No current facility-administered medications for this visit.       Allergies   Allergen Reactions    Aspirin Other (See Comments)     Unknown reaction, stated Dr. Kelly Vazquez didn't want her to take it anymore    Lasix [Furosemide] Other (See Comments)     PATIENT DOESN'T REMEMBER    Lipitor [Atorvastatin] Other (See Comments)     LEG PAIN    Neurontin [Gabapentin] Other (See Comments)     PATIENT DOESN'T REMEMBER    Oxycontin [Oxycodone Hcl]      confusion    Penicillins Other (See Comments)     HYPERTENSION       SUBJECTIVE:   Review of Systems   Constitutional: Negative for activity change, appetite change, fatigue and fever. HENT: Negative for congestion. Respiratory: Negative for apnea, cough, chest tightness, shortness of breath and wheezing. Cardiovascular: Positive for leg swelling. Negative for chest pain and palpitations. Gastrointestinal: Negative for abdominal distention, abdominal pain and nausea. Genitourinary: Negative for difficulty urinating and dysuria. Musculoskeletal: Positive for gait problem (walker ). Negative for arthralgias. Skin: Negative for color change. Neurological: Negative for dizziness, numbness and headaches. Psychiatric/Behavioral: Negative for agitation, confusion and sleep disturbance. The patient is not nervous/anxious. OBJECTIVE:   Today's Vitals:  /62   Pulse 50   Ht 5' 5\" (1.651 m)   Wt 143 lb 12.8 oz (65.2 kg)   SpO2 96%   BMI 23.93 kg/m²     Physical Exam  Vitals signs reviewed. Constitutional:       Appearance: She is well-developed. HENT:      Head: Normocephalic and atraumatic. Eyes:      Pupils: Pupils are equal, round, and reactive to light. Neck:      Musculoskeletal: Normal range of motion. Vascular: No JVD. Cardiovascular:      Rate and Rhythm: Normal rate and regular rhythm. Heart sounds: Normal heart sounds. No murmur. Comments: ICD  Pulmonary:      Effort: Pulmonary effort is normal. No respiratory distress. Breath sounds: No rales. Abdominal:      General: There is no distension. Palpations: Abdomen is soft. Tenderness: There is no abdominal tenderness.    Musculoskeletal:         General: No tenderness. Right lower leg: Edema (trace to 1+) present. Left lower leg: Edema (trace to 1+) present. Skin:     General: Skin is warm and dry. Capillary Refill: Capillary refill takes less than 2 seconds. Neurological:      Mental Status: She is alert and oriented to person, place, and time. Psychiatric:         Mood and Affect: Mood normal.         Behavior: Behavior normal.         Wt Readings from Last 3 Encounters:   12/15/20 143 lb 12.8 oz (65.2 kg)   12/10/20 143 lb 11.2 oz (65.2 kg)   12/02/20 142 lb (64.4 kg)     BP Readings from Last 3 Encounters:   12/15/20 104/62   12/10/20 118/60   12/02/20 110/62     Pulse Readings from Last 3 Encounters:   12/15/20 50   12/10/20 56   12/02/20 60     Body mass index is 23.93 kg/m². ECHO:   6/26/20  Summary   Limited examination. Normal left ventricular size and severely reduced systolic function. There was severe global hypokinesis. Hypertrophic basal septum. Ejection fraction was estimated at 25-30%. Left atrial size was severely dilated. The mitral valve structure demonstrated posterior leaflet calcification. DOPPLER: The transmitral velocity was within the normal range with no   evidence for mitral stenosis. There was trace mitral regurgitation. IVC size is within normal limits with normal respiratory phasic changes. Signature      ----------------------------------------------------------------   Electronically signed by Adelina Leggett MD (Interpreting   physician) on 06/26/2020 at 02:29 PM   ----------------------------------------------------------------      Findings      Mitral Valve   The mitral valve structure demonstrated posterior leaflet calcification. DOPPLER: The transmitral velocity was within the normal range with no   evidence for mitral stenosis. There was trace mitral regurgitation. Aortic Valve   The aortic valve was trileaflet with normal thickness and cuspal   separation.       Tricuspid Valve   The tricuspid valve structure was normal with normal leaflet separation. Pulmonic Valve   The pulmonic valve leaflets were not well seen. Left Atrium   Left atrial size was severely dilated. Left Ventricle   Normal left ventricular size and severely reduced systolic function. There was severe global hypokinesis. Hypertrophic basal septum. Ejection fraction was estimated at 25-30%. Right Atrium   Right atrial size was normal.      Right Ventricle   The right ventricular size was normal with normal systolic function and   wall thickness. Pericardial Effusion   The pericardium was normal in appearance with no evidence of a pericardial   effusion. Pleural Effusion   No evidence of pleural effusion. Aorta / Great Vessels   IVC size is within normal limits with normal respiratory phasic changes.     Results reviewed:  BNP:   Lab Results   Component Value Date    PROBNP 2234.0 (H) 12/15/2020     CBC:   Lab Results   Component Value Date    WBC 13.8 10/06/2020    RBC 6.66 10/06/2020    HGB 16.4 10/06/2020    HCT 54.0 10/06/2020     10/06/2020     CMP:    Lab Results   Component Value Date     12/15/2020    K 4.8 12/15/2020    K 4.5 08/20/2020     12/15/2020    CO2 26 12/15/2020    BUN 20 12/15/2020    CREATININE 0.9 12/15/2020    LABGLOM 60 12/15/2020    GLUCOSE 97 12/15/2020    GLUCOSE 100 07/27/2020    CALCIUM 9.4 12/15/2020     Hepatic Function Panel:    Lab Results   Component Value Date    ALKPHOS 91 10/03/2020    ALT 9 10/03/2020    AST 20 10/03/2020    PROT 5.3 10/03/2020    BILITOT 0.3 10/03/2020    BILIDIR <0.2 10/03/2020    LABALBU 3.1 10/03/2020     Magnesium:    Lab Results   Component Value Date    MG 2.1 12/15/2020     PT/INR:    Lab Results   Component Value Date    INR 1.19 08/20/2020     Lipids:    Lab Results   Component Value Date    TRIG 121 05/18/2019    HDL 34 05/18/2019    LDLCALC 47 05/18/2019       ASSESSMENT AND PLAN:   The patient's condition/symptoms are Stable      Diagnosis Orders   1. CHF (congestive heart failure), NYHA class II, chronic, combined (HCC)  Basic Metabolic Panel    Brain Natriuretic Peptide    Magnesium   2. S/P AAA repair     3. Atrial fibrillation, unspecified type ([facility] [address]     4. Nicotine dependence with current use     -smoking cessation x 3-5 minutes she is not interested in quitting. Plan:  · GDMT   · ACE/ARB/ARNi: Losartan 25 mg daily  · Beta Blocker: Toprol 25 mg daily  · Aldosterone antagonist: no  · Diuretic/Potassium: Potassium 10 mEq daily - not on diuretics   · Hydralazine/Nitrate: no  · Other: Eliquis, Plavix, Pravastatin, Mag ox   · Labs today reviewed. AMP and Mg WNL. BNP elevated 2234. We will add Bumex 1 mg x 3 days then PRN. She will also take an extra Potassium 10 mEq to equal 20 mEq. She is to call us on Fri morning with an update on swelling. She will be notified via phone encounter of the above orders. · HF Zones reviewed. · Daily weights and record  · Fluid restriction of 2 Liters per day (64 oz)   · Limit sodium in diet to 2101-7736 mg/day  · Healthier food options were discussed   · Monitor BP daily 1 hour after meds are taken  · Increase activity as tolerated     Patient was instructed to call the [facility] for changes in the following symptoms:    Weight gain of 3 pounds in 1 day or 5 pounds in 1 week   Increased shortness of breath   Shortness of breath while laying down   Cough   Chest pain   Swelling in feet, ankles or legs   Tenderness or bloating in the abdomen   Fatigue    Decreased appetite or feeling \"full\"   Nausea    Confusion      Return in about 1 month (around 1/15/2021). or sooner if needed     Patient given educational materials - see patient instructions. We discussed the importance of weighing oneself and recording daily.  We also discussed the importance of a low sodium diet, higher sodium foods to avoid and appropriate low sodium food choices. Discussed use, benefit, and side effects of prescribed medications. All patient questions answered. Patient verbalizes understanding of plan of care using teach back method, and is agreeable to the treatment plan. Greater then 40 minutes of time was spent reviewing the chart and educating the patient about HF, medications, diet, exercise, and discussing the plan of care. I personally spent more then 50% of the appt time face to face with the patient counseling/coordinating patient's care.       Electronicallysigned by ROMAN Rodrigues CNP on 12/15/2020 at 11:07 AM

## 2020-12-15 NOTE — TELEPHONE ENCOUNTER
Labs reviewed  BMP and Mg WNL  Kidney function improved   BNP is elevated 2234  Given she has LE edema not usually present at today's visit I would like for her to take Bumex 1 mg x 3 days (prescriipton sent)   Also take an EXTRA Potassium 10 mEq x 3 days with the Bumex to equal 20 mEq daily  Please be sure she understands these instructions   Call Friday morning with an update on swelling

## 2020-12-15 NOTE — PROGRESS NOTES
Venipuncture obtained from [address] [place] [address]. Patient tolerated procedure without complications or complaints.

## 2020-12-15 NOTE — PATIENT INSTRUCTIONS
You may receive a survey regarding the care you received during your visit. Your input is valuable to us. We encourage you to complete and return your survey. We hope you will choose us in the future for your healthcare needs. Continue:  · Take all medications as prescribed   · Daily weights and record - please try to weigh yourself upon awakening before eating or drinking   · Fluid restriction of 2 Liters per day (64 oz)  · Limit sodium in diet to around 5545-1214 mg/day  · Monitor BP - take BP 1 hour after meds  · Increase activity as tolerated     Call the Heart Failure Clinic for any of the following symptoms: [phone]   Weight gain of 3 pounds in 1 day or 5 pounds in 1 week   Increased shortness of breath   Shortness of breath while laying down   Cough   Chest pain   Swelling in feet, ankles or legs   Tenderness or bloating in the abdomen   Fatigue    Decreased appetite or feeling \"full\"    Nausea    Confusion     **PLEASE bring all medications in original bottles with you to your next appointment**    Educational websites:    http://www.[employer]/. org (American Heart Association)    [employer]. com ([employer])    Connotate.com ([employer])    Too much sodium causes your body to hold on to extra water. This can raise your blood pressure and force your heart and kidneys to work harder. In very serious cases, this could cause you to be put in the hospital. It might even be life-threatening. By limiting sodium, you will feel better and lower your risk of serious problems. The most common source of sodium is salt. People get most of the salt in their diet from canned, prepared, and packaged foods. Fast food and restaurant meals also are very high in sodium. Your doctor will probably limit your sodium to less than 2,000 milligrams (mg) a day. This limit counts all the sodium in prepared and packaged foods and any salt you add to your food.   Follow-up care is a key part of your treatment and safety. Be sure to make and go to all appointments, and call your doctor if you are having problems. It's also a good idea to know your test results and keep a list of the medicines you take. How can you care for yourself at home? Read food labels  · Read labels on cans and food packages. The labels tell you how much sodium is in each serving. Make sure that you look at the serving size. If you eat more than the serving size, you have eaten more sodium. · Food labels also tell you the Percent Daily Value for sodium. Choose products with low Percent Daily Values for sodium. · Be aware that sodium can come in forms other than salt, including monosodium glutamate (MSG), sodium citrate, and sodium bicarbonate (baking soda). MSG is often added to Asian food. When you eat out, you can sometimes ask for food without MSG or added salt. Buy low-sodium foods  · Buy foods that are labeled \"unsalted\" (no salt added), \"sodium-free\" (less than 5 mg of sodium per serving), or \"low-sodium\" (less than 140 mg of sodium per serving). Foods labeled \"reduced-sodium\" and \"light sodium\" may still have too much sodium. Be sure to read the label to see how much sodium you are getting. · Buy fresh vegetables, or frozen vegetables without added sauces. Buy low-sodium versions of canned vegetables, soups, and other canned goods. Prepare low-sodium meals  · Cut back on the amount of salt you use in cooking. This will help you adjust to the taste. Do not add salt after cooking. One teaspoon of salt has about 2,300 mg of sodium. · Take the salt shaker off the table. · Flavor your food with garlic, lemon juice, onion, vinegar, herbs, and spices. Do not use soy sauce, lite soy sauce, steak sauce, onion salt, garlic salt, celery salt, mustard, or ketchup on your food. · Use low-sodium salad dressings, sauces, and ketchup. Or make your own salad dressings and sauces without adding salt.   · Use less salt (or none) when recipes call for it. You can often use half the salt a recipe calls for without losing flavor. Other foods such as rice, pasta, and grains do not need added salt. · Rinse canned vegetables, and cook them in fresh water. This removes some--but not all--of the salt. · Avoid water that is naturally high in sodium or that has been treated with water softeners, which add sodium. Call your local water company to find out the sodium content of your water supply. If you buy bottled water, read the label and choose a sodium-free brand. Avoid high-sodium foods  · Avoid eating:  ? Smoked, cured, salted, and canned meat, fish, and poultry. ? Ham, shaw, hot dogs, and luncheon meats. ? Regular, hard, and processed cheese and regular peanut butter. ? Crackers with salted tops, and other salted snack foods such as pretzels, chips, and salted popcorn. ? Frozen prepared meals, unless labeled low-sodium. ? Canned and dried soups, broths, and bouillon, unless labeled sodium-free or low-sodium. ? Canned vegetables, unless labeled sodium-free or low-sodium. ? [place] [employer]ie fries, pizza, tacos, and other fast foods. ? Pickles, olives, ketchup, and other condiments, especially soy sauce, unless labeled sodium-free or low-sodium.

## 2020-12-17 ENCOUNTER — PREP FOR PROCEDURE (OUTPATIENT)
Dept: SURGERY | Age: 85
End: 2020-12-17

## 2020-12-17 ENCOUNTER — HOSPITAL ENCOUNTER (OUTPATIENT)
Dept: GENERAL RADIOLOGY | Age: 85
Discharge: HOME | End: 2020-12-17
Payer: MEDICARE

## 2020-12-17 PROCEDURE — 3209999900 XR COMPARISON OF OUTSIDE FILMS

## 2020-12-18 NOTE — TELEPHONE ENCOUNTER
Patient called into office to give us an update. She said her leg swelling has decreased and legs feel soft. Weight  12/16-12/18 remained at 137lb. Reviewed HF zones and instructed patient to call office if any issues arise.

## 2021-01-25 ENCOUNTER — OFFICE VISIT (OUTPATIENT)
Dept: CARDIOLOGY CLINIC | Age: 86
End: 2021-01-25
Payer: MEDICARE

## 2021-01-25 ENCOUNTER — NURSE ONLY (OUTPATIENT)
Dept: CARDIOLOGY CLINIC | Age: 86
End: 2021-01-25
Payer: MEDICARE

## 2021-01-25 VITALS
BODY MASS INDEX: 23.46 KG/M2 | HEART RATE: 59 BPM | HEIGHT: 65 IN | SYSTOLIC BLOOD PRESSURE: 100 MMHG | WEIGHT: 140.8 LBS | DIASTOLIC BLOOD PRESSURE: 60 MMHG | OXYGEN SATURATION: 98 %

## 2021-01-25 DIAGNOSIS — Z86.79 S/P AAA REPAIR: ICD-10-CM

## 2021-01-25 DIAGNOSIS — Z98.890 S/P AAA REPAIR: ICD-10-CM

## 2021-01-25 DIAGNOSIS — Z95.810 ICD (IMPLANTABLE CARDIOVERTER-DEFIBRILLATOR) IN PLACE: Primary | ICD-10-CM

## 2021-01-25 DIAGNOSIS — I48.91 ATRIAL FIBRILLATION, UNSPECIFIED TYPE (HCC): ICD-10-CM

## 2021-01-25 DIAGNOSIS — Z95.810 ICD (IMPLANTABLE CARDIOVERTER-DEFIBRILLATOR) IN PLACE: ICD-10-CM

## 2021-01-25 DIAGNOSIS — I50.42 CHF (CONGESTIVE HEART FAILURE), NYHA CLASS II, CHRONIC, COMBINED (HCC): Primary | ICD-10-CM

## 2021-01-25 DIAGNOSIS — I50.22 CHRONIC SYSTOLIC CONGESTIVE HEART FAILURE (HCC): ICD-10-CM

## 2021-01-25 PROCEDURE — 1090F PRES/ABSN URINE INCON ASSESS: CPT | Performed by: NURSE PRACTITIONER

## 2021-01-25 PROCEDURE — 99214 OFFICE O/P EST MOD 30 MIN: CPT | Performed by: NURSE PRACTITIONER

## 2021-01-25 PROCEDURE — 93289 INTERROG DEVICE EVAL HEART: CPT | Performed by: INTERNAL MEDICINE

## 2021-01-25 PROCEDURE — G8420 CALC BMI NORM PARAMETERS: HCPCS | Performed by: NURSE PRACTITIONER

## 2021-01-25 PROCEDURE — G8427 DOCREV CUR MEDS BY ELIG CLIN: HCPCS | Performed by: NURSE PRACTITIONER

## 2021-01-25 PROCEDURE — 1123F ACP DISCUSS/DSCN MKR DOCD: CPT | Performed by: NURSE PRACTITIONER

## 2021-01-25 PROCEDURE — 1036F TOBACCO NON-USER: CPT | Performed by: NURSE PRACTITIONER

## 2021-01-25 PROCEDURE — G8482 FLU IMMUNIZE ORDER/ADMIN: HCPCS | Performed by: NURSE PRACTITIONER

## 2021-01-25 PROCEDURE — G8400 PT W/DXA NO RESULTS DOC: HCPCS | Performed by: NURSE PRACTITIONER

## 2021-01-25 PROCEDURE — 4040F PNEUMOC VAC/ADMIN/RCVD: CPT | Performed by: NURSE PRACTITIONER

## 2021-01-25 ASSESSMENT — ENCOUNTER SYMPTOMS
SHORTNESS OF BREATH: 0
NAUSEA: 0
ABDOMINAL PAIN: 0
APNEA: 0
COUGH: 0
ABDOMINAL DISTENTION: 0
CHEST TIGHTNESS: 0
WHEEZING: 0
COLOR CHANGE: 0

## 2021-01-25 NOTE — PATIENT INSTRUCTIONS
food.  Follow-up care is a key part of your treatment and safety. Be sure to make and go to all appointments, and call your doctor if you are having problems. It's also a good idea to know your test results and keep a list of the medicines you take. How can you care for yourself at home? Read food labels  · Read labels on cans and food packages. The labels tell you how much sodium is in each serving. Make sure that you look at the serving size. If you eat more than the serving size, you have eaten more sodium. · Food labels also tell you the Percent Daily Value for sodium. Choose products with low Percent Daily Values for sodium. · Be aware that sodium can come in forms other than salt, including monosodium glutamate (MSG), sodium citrate, and sodium bicarbonate (baking soda). MSG is often added to Asian food. When you eat out, you can sometimes ask for food without MSG or added salt. Buy low-sodium foods  · Buy foods that are labeled \"unsalted\" (no salt added), \"sodium-free\" (less than 5 mg of sodium per serving), or \"low-sodium\" (less than 140 mg of sodium per serving). Foods labeled \"reduced-sodium\" and \"light sodium\" may still have too much sodium. Be sure to read the label to see how much sodium you are getting. · Buy fresh vegetables, or frozen vegetables without added sauces. Buy low-sodium versions of canned vegetables, soups, and other canned goods. Prepare low-sodium meals  · Cut back on the amount of salt you use in cooking. This will help you adjust to the taste. Do not add salt after cooking. One teaspoon of salt has about 2,300 mg of sodium. · Take the salt shaker off the table. · Flavor your food with garlic, lemon juice, onion, vinegar, herbs, and spices. Do not use soy sauce, lite soy sauce, steak sauce, onion salt, garlic salt, celery salt, mustard, or ketchup on your food. · Use low-sodium salad dressings, sauces, and ketchup.  Or make your own salad dressings and sauces without adding salt.  · Use less salt (or none) when recipes call for it. You can often use half the salt a recipe calls for without losing flavor. Other foods such as rice, pasta, and grains do not need added salt. · Rinse canned vegetables, and cook them in fresh water. This removes some--but not all--of the salt. · Avoid water that is naturally high in sodium or that has been treated with water softeners, which add sodium. Call your local water company to find out the sodium content of your water supply. If you buy bottled water, read the label and choose a sodium-free brand. Avoid high-sodium foods  · Avoid eating:  ? Smoked, cured, salted, and canned meat, fish, and poultry. ? Ham, shaw, hot dogs, and luncheon meats. ? Regular, hard, and processed cheese and regular peanut butter. ? Crackers with salted tops, and other salted snack foods such as pretzels, chips, and salted popcorn. ? Frozen prepared meals, unless labeled low-sodium. ? Canned and dried soups, broths, and bouillon, unless labeled sodium-free or low-sodium. ? Canned vegetables, unless labeled sodium-free or low-sodium. ? [employer]ie fries, pizza, tacos, and other fast foods. ? Pickles, olives, ketchup, and other condiments, especially soy sauce, unless labeled sodium-free or low-sodium.

## 2021-01-25 NOTE — PROGRESS NOTES
[facility]       Visit Date: 1/25/2021  Cardiologist:  Dr. Felipe Diamond   Primary Care Physician: Dr. Ronit Finley is a 82 y.o. female who presents today for:  Chief Complaint   Patient presents with    Congestive Heart Failure       HPI: Obtained from patient and chart    Georgina Maddox is a 82 y.o. female who presents to the office for a follow up visit in the heart failure clinic. Hx PAD stents 2/2019, ?COPD,  smoker (she had Quit but started smoking a 1/2 PPD), CKD, AAA repair (EVAR) 2/2019, right fem-tib PTA with stent 2/2019, HTN. Negative stress test 9/2018. She was admitted for CHF and possible COPD exacerbation. Her K+ and Mg+ were both low and replaced. Repeat BMP and Mg were done on 5/30/19 and her Mg+ was 1.2 and K+ was 3.5. I called the patient and orders were placed and she was given Mg 4 gm IV in OP nursing and I also started her on Potassium.  Admitted in [facility] 0832 for pneumonia with sepsis, flash pulmonary edema. EF was 55% (2018) down to 10-15% grade 2 DD (Dec 2019) wore a LifeVest but sent it back in May. NICM ? Viral CMP. EF did improve slightly to 25-30% June 2020. Recent admit for rectal bleeding (June 2020) with blood transfusions. She had refused all invasive testing/procedures but called in July was was \"ready to get things started. \" She had an ICD placed by Dr Nasrin Renee Aug 20, 2020. Possible Watchman. In October 2020 she had a SBP, has a PEG tube placed at [facility]. She is able to drink liquids and eating foods. She is not using tube feeds. Weights have been stable since taking the Bumex for 3 days last month and the swelling has improved. She has not used the Bumex since. Her LE edema has improved.  She can perform ADLs without SOB or fatigue.       Accompanied by: self  Last hospital admission related to Heart Failure:  Dec 2019  Chest Pain: no  Worsening SOB: no  Worsening Orthopnea/PND: no  Edema: no  Any extra diuretic use: no  Weight gain: no  Fatigue: no  Abdominal bloating: no  Appetite: good  KORINA: no  Cough: occasional   Compliant checking home weight: yes 138 lbs  Compliant checking blood pressure: no      Past Medical History:   Diagnosis Date    [facility]fib [facility]     AAA [facility]     Achalasia     Anemia     Arthritis     Blood circulation, collateral     BPPV (benign paroxysmal positional vertigo) 6/6/16    CHF (congestive heart failure) ([facility]     Chronic kidney disease     sees Dr Ferguson Never Colon [facility])     Gastrointestinal hemorrhage     GERD (gastroesophageal reflux disease)     ? esophageal stricture    Hiatal hernia     History of blood transfusion     HTN (hypertension)     Hx of blood clots 2018    R leg-sees ABEBA Hargrove    Hyperlipidemia     Medtronic dual ICD  8/26/2020    Neuromuscular disorder ([facility]     Obesity     Osteopenia     Osteoporosis     PAC (premature atrial contraction)     on betablocker    Paralysis ([facility])     baby    Pedal edema     denied any hx of hypertension    Pneumonia     PVC (premature ventricular contraction)     sees Dr Armani Larson PVD (peripheral vascular disease) ([facility]     Small bowel obstruction ([facility]     Tinnitus     UTI (urinary tract infection)      Past Surgical History:   Procedure Laterality Date    ABDOMINAL AORTIC ANEURYSM REPAIR, ENDOVASCULAR N/A 2/15/2019    ABDOMINAL AORTIC ANEURYSM REPAIR ENDOVASCULAR, DECLOTTING RIGHT FEM TIB BYPASS GRAFT performed by Diamond Lion MD at [address]    lumpectomy    CHOLECYSTECTOMY      30 years ago    COLONOSCOPY  08/06/2018    Dr Anneliese Rodrigues 2/22/2019    COLONOSCOPY DIAGNOSTIC performed by Selena Rose MD at [facility]    COLONOSCOPY N/A 2/22/2020    COLONOSCOPY CONTROL HEMORRHAGE performed by Emma Alegre MD at [id number] [facility]      eye, eyelids    EYE SURGERY      cataract    HEMICOLECTOMY N/A 2/25/2020 Grandfather      Social History     Tobacco Use    Smoking status: Former Smoker     Packs/day: 0.25     Years: 60.00     Pack years: 15.00     Types: Cigarettes     Start date:      Quit date: 10/18/2019     Years since quittin.2    Smokeless tobacco: Never Used    Tobacco comment: 1 cigarette every other day   Substance Use Topics    Alcohol use: Yes     Alcohol/week: 1.0 standard drinks     Types: 1 Glasses of wine per week     Comment: social     Current Outpatient Medications   Medication Sig Dispense Refill    bumetanide (BUMEX) 1 MG tablet Take 1 tablet by mouth daily as needed (as directed by  Clinic) 30 tablet 0    magnesium (MAGNESIUM-OXIDE) 250 MG TABS tablet TAKE 2 TABLETS BY MOUTH TWICE DAILY 120 tablet 6    apixaban (ELIQUIS) 2.5 MG TABS tablet TAKE 1 TABLET BY MOUTH TWICE DAILY 60 tablet 5    metoprolol succinate (TOPROL XL) 25 MG extended release tablet Take 1 tablet by mouth daily 90 tablet 3    losartan (COZAAR) 25 MG tablet Take 1 tablet by mouth daily 30 tablet 5    pravastatin (PRAVACHOL) 40 MG tablet TAKE 1 TABLET BY MOUTH DAILY 90 tablet 3    clopidogrel (PLAVIX) 75 MG tablet Take 1 tablet by mouth daily 90 tablet 3    potassium chloride (KLOR-CON M) 10 MEQ extended release tablet Take 1 tablet by mouth daily 180 tablet 2    pantoprazole (PROTONIX) 40 MG tablet Take 40 mg by mouth 2 times daily (before meals)      albuterol sulfate HFA (PROVENTIL HFA) 108 (90 Base) MCG/ACT inhaler Inhale 2 puffs into the lungs every 6 hours as needed for Wheezing or Shortness of Breath 1 Inhaler 0    Blood Pressure KIT Check blood pressure daily, and if symptoms of lightheadedness.   Call physician if BP <80/40. 1 kit 0    melatonin 3 MG TABS tablet Take 2 tablets by mouth nightly as needed (sleep) 60 tablet 3    acetaminophen (TYLENOL ARTHRITIS PAIN) 650 MG CR tablet Take 1,300 mg by mouth every 12 hours as needed for Pain       timolol (TIMOPTIC) 0.5 % ophthalmic solution Place 1 drop into both eyes nightly        No current facility-administered medications for this visit. Allergies   Allergen Reactions    Aspirin Other (See Comments)     Unknown reaction, stated Dr. Sterling Prince didn't want her to take it anymore    Lasix [Furosemide] Other (See Comments)     PATIENT DOESN'T REMEMBER    Lipitor [Atorvastatin] Other (See Comments)     LEG PAIN    Neurontin [Gabapentin] Other (See Comments)     PATIENT DOESN'T REMEMBER    Oxycontin [Oxycodone Hcl]      confusion    Penicillins Other (See Comments)     HYPERTENSION       SUBJECTIVE:   Review of Systems   Constitutional: Negative for activity change, appetite change, fatigue and fever. HENT: Negative for congestion. Respiratory: Negative for apnea, cough, chest tightness, shortness of breath and wheezing. Cardiovascular: Negative for chest pain, palpitations and leg swelling. Gastrointestinal: Negative for abdominal distention, abdominal pain and nausea. Genitourinary: Negative for difficulty urinating and dysuria. Musculoskeletal: Positive for gait problem (walker with longer activity ). Negative for arthralgias. Skin: Negative for color change. Neurological: Negative for dizziness, numbness and headaches. Psychiatric/Behavioral: Negative for agitation, confusion and sleep disturbance. The patient is not nervous/anxious. OBJECTIVE:   Today's Vitals:  /60   Pulse 59   Ht 5' 5\" (1.651 m)   Wt 140 lb 12.8 oz (63.9 kg)   SpO2 98%   BMI 23.43 kg/m²     Physical Exam  Vitals signs reviewed. Constitutional:       Appearance: She is well-developed. HENT:      Head: Normocephalic and atraumatic. Eyes:      Pupils: Pupils are equal, round, and reactive to light. Neck:      Musculoskeletal: Normal range of motion. Vascular: No JVD. Cardiovascular:      Rate and Rhythm: Normal rate and regular rhythm. Heart sounds: Normal heart sounds. No murmur.       Comments: ICD  Pulmonary:      Effort: Pulmonary effort is normal. No respiratory distress. Breath sounds: No rales. Abdominal:      General: There is no distension. Palpations: Abdomen is soft. Tenderness: There is no abdominal tenderness. Musculoskeletal:         General: No tenderness. Right lower leg: No edema. Left lower leg: Edema (trace ) present. Comments: Chronic dry skin LE   Skin:     General: Skin is warm and dry. Capillary Refill: Capillary refill takes less than 2 seconds. Neurological:      Mental Status: She is alert and oriented to person, place, and time. Psychiatric:         Mood and Affect: Mood normal.         Behavior: Behavior normal.         Wt Readings from Last 3 Encounters:   01/25/21 140 lb 12.8 oz (63.9 kg)   12/15/20 143 lb 12.8 oz (65.2 kg)   12/10/20 143 lb 11.2 oz (65.2 kg)     BP Readings from Last 3 Encounters:   01/25/21 100/60   12/15/20 104/62   12/10/20 118/60     Pulse Readings from Last 3 Encounters:   01/25/21 59   12/15/20 50   12/10/20 56     Body mass index is 23.43 kg/m². ECHO:   6/26/20      Summary   Limited examination. Normal left ventricular size and severely reduced systolic function. There was severe global hypokinesis. Hypertrophic basal septum. Ejection fraction was estimated at 25-30%. Left atrial size was severely dilated. The mitral valve structure demonstrated posterior leaflet calcification. DOPPLER: The transmitral velocity was within the normal range with no   evidence for mitral stenosis. There was trace mitral regurgitation. IVC size is within normal limits with normal respiratory phasic changes.       Signature      ----------------------------------------------------------------   Electronically signed by Ronaldo Ovalle MD (Interpreting   physician) on 06/26/2020 at 02:29 PM   ----------------------------------------------------------------      Findings      Mitral Valve   The mitral valve structure demonstrated posterior discussed the importance of a low sodium diet, higher sodium foods to avoid and appropriate low sodium food choices. Discussed use, benefit, and side effects of prescribed medications. All patient questions answered. Patient verbalizes understanding of plan of care using teach back method, and is agreeable to the treatment plan. Greater then 30 minutes of time was spent reviewing the chart and educating the patient about HF, medications, diet, exercise, and discussing the plan of care. I personally spent more then 50% of the appt time face to face with the patient counseling/coordinating patient's care.       Electronicallysigned by ROMAN Moran CNP on 1/25/2021 at 11:17 AM

## 2021-01-25 NOTE — PROGRESS NOTES
DR SNOWDEN PT Maria Elena Carvajal AFIB/ ELIQUIS   AFIB BURDEN <0.1%    MEDTRONIC DUAL ICD CHECK IN OFFICE FOR 3 MTH CHRONIC VALUES     BATTERY 10.2 YRS REMAINING  PRESENTS IN ASVS 76    ATRIAL IMPEDENCE 437  VENT IMPEDENCE 513  RV 57  P PAVES 1.3  RV WAVES 14.4  OPTIVOL WNL  1 NS VT EPISODE 10/13/20 FOR 2 SECONDS   ATRIAL THRESHOLD 0.5 @ 0.4  VENT THRESHOLD 0.75 @ 0.4  ATRIAL AND VENT AMPLITUDES ADAPTIVE

## 2021-02-17 ENCOUNTER — PROCEDURE VISIT (OUTPATIENT)
Dept: CARDIOLOGY CLINIC | Age: 86
End: 2021-02-17
Payer: MEDICARE

## 2021-02-17 DIAGNOSIS — I50.42 CHRONIC COMBINED SYSTOLIC AND DIASTOLIC CHF (CONGESTIVE HEART FAILURE) (HCC): Primary | ICD-10-CM

## 2021-02-17 DIAGNOSIS — Z95.810 ICD (IMPLANTABLE CARDIOVERTER-DEFIBRILLATOR) IN PLACE: ICD-10-CM

## 2021-02-17 PROCEDURE — 93297 REM INTERROG DEV EVAL ICPMS: CPT | Performed by: INTERNAL MEDICINE

## 2021-02-17 PROCEDURE — G2066 INTER DEVC REMOTE 30D: HCPCS | Performed by: INTERNAL MEDICINE

## 2021-02-28 ENCOUNTER — APPOINTMENT (OUTPATIENT)
Dept: NUCLEAR MEDICINE | Age: 86
DRG: 377 | End: 2021-02-28
Payer: MEDICARE

## 2021-02-28 ENCOUNTER — APPOINTMENT (OUTPATIENT)
Dept: GENERAL RADIOLOGY | Age: 86
DRG: 377 | End: 2021-02-28
Payer: MEDICARE

## 2021-02-28 ENCOUNTER — APPOINTMENT (OUTPATIENT)
Dept: CT IMAGING | Age: 86
DRG: 377 | End: 2021-02-28
Payer: MEDICARE

## 2021-02-28 ENCOUNTER — HOSPITAL ENCOUNTER (INPATIENT)
Age: 86
LOS: 7 days | Discharge: HOME | DRG: 377 | End: 2021-03-07
Attending: EMERGENCY MEDICINE | Admitting: INTERNAL MEDICINE
Payer: MEDICARE

## 2021-02-28 DIAGNOSIS — N17.0 ACUTE KIDNEY INJURY (AKI) WITH ACUTE TUBULAR NECROSIS (ATN) (HCC): ICD-10-CM

## 2021-02-28 DIAGNOSIS — E83.42 HYPOMAGNESEMIA: ICD-10-CM

## 2021-02-28 DIAGNOSIS — J95.89 ACUTE POSTOPERATIVE RESPIRATORY INSUFFICIENCY: ICD-10-CM

## 2021-02-28 DIAGNOSIS — J69.0 ASPIRATION PNEUMONIA OF LEFT LOWER LOBE DUE TO GASTRIC SECRETIONS (HCC): ICD-10-CM

## 2021-02-28 DIAGNOSIS — I95.9 HYPOTENSION, UNSPECIFIED HYPOTENSION TYPE: ICD-10-CM

## 2021-02-28 DIAGNOSIS — J44.9 COPD, MODERATE (HCC): ICD-10-CM

## 2021-02-28 DIAGNOSIS — K92.2 GASTROINTESTINAL HEMORRHAGE, UNSPECIFIED GASTROINTESTINAL HEMORRHAGE TYPE: Primary | ICD-10-CM

## 2021-02-28 DIAGNOSIS — J44.9 COPD WITHOUT EXACERBATION (HCC): ICD-10-CM

## 2021-02-28 DIAGNOSIS — D64.9 ANEMIA, UNSPECIFIED TYPE: ICD-10-CM

## 2021-02-28 DIAGNOSIS — I73.9 PVD (PERIPHERAL VASCULAR DISEASE) (HCC): ICD-10-CM

## 2021-02-28 DIAGNOSIS — K62.5 RECTAL BLEED: ICD-10-CM

## 2021-02-28 DIAGNOSIS — J96.01 ACUTE RESPIRATORY FAILURE WITH HYPOXIA (HCC): ICD-10-CM

## 2021-02-28 LAB
ABO: NORMAL
ALBUMIN SERPL-MCNC: 3.4 G/DL (ref 3.5–5.1)
ALP BLD-CCNC: 81 U/L (ref 38–126)
ALT SERPL-CCNC: 15 U/L (ref 11–66)
ANION GAP SERPL CALCULATED.3IONS-SCNC: 12 MEQ/L (ref 8–16)
ANTIBODY SCREEN: NORMAL
APTT: 28.7 SECONDS (ref 22–38)
AST SERPL-CCNC: 31 U/L (ref 5–40)
BASOPHILS # BLD: 0.4 %
BASOPHILS ABSOLUTE: 0 THOU/MM3 (ref 0–0.1)
BILIRUB SERPL-MCNC: 0.2 MG/DL (ref 0.3–1.2)
BILIRUBIN DIRECT: < 0.2 MG/DL (ref 0–0.3)
BUN BLDV-MCNC: 42 MG/DL (ref 7–22)
CALCIUM SERPL-MCNC: 9.1 MG/DL (ref 8.5–10.5)
CHLORIDE BLD-SCNC: 102 MEQ/L (ref 98–111)
CO2: 26 MEQ/L (ref 23–33)
CREAT SERPL-MCNC: 1.3 MG/DL (ref 0.4–1.2)
EKG ATRIAL RATE: 68 BPM
EKG P AXIS: 36 DEGREES
EKG P-R INTERVAL: 162 MS
EKG Q-T INTERVAL: 436 MS
EKG QRS DURATION: 80 MS
EKG QTC CALCULATION (BAZETT): 463 MS
EKG R AXIS: -58 DEGREES
EKG T AXIS: 102 DEGREES
EKG VENTRICULAR RATE: 68 BPM
EOSINOPHIL # BLD: 0.5 %
EOSINOPHILS ABSOLUTE: 0.1 THOU/MM3 (ref 0–0.4)
ERYTHROCYTE [DISTWIDTH] IN BLOOD BY AUTOMATED COUNT: 15.8 % (ref 11.5–14.5)
ERYTHROCYTE [DISTWIDTH] IN BLOOD BY AUTOMATED COUNT: 52.8 FL (ref 35–45)
GFR SERPL CREATININE-BSD FRML MDRD: 39 ML/MIN/1.73M2
GLUCOSE BLD-MCNC: 214 MG/DL (ref 70–108)
HCT VFR BLD CALC: 32.7 % (ref 37–47)
HCT VFR BLD CALC: 34.7 % (ref 37–47)
HEMOCCULT STL QL: POSITIVE
HEMOGLOBIN: 10 GM/DL (ref 12–16)
HEMOGLOBIN: 10.6 GM/DL (ref 12–16)
IMMATURE GRANS (ABS): 0.05 THOU/MM3 (ref 0–0.07)
IMMATURE GRANULOCYTES: 0.4 %
INR BLD: 1.43 (ref 0.85–1.13)
IRON: 44 UG/DL (ref 50–170)
LYMPHOCYTES # BLD: 8.8 %
LYMPHOCYTES ABSOLUTE: 1 THOU/MM3 (ref 1–4.8)
MCH RBC QN AUTO: 28.1 PG (ref 26–33)
MCHC RBC AUTO-ENTMCNC: 30.5 GM/DL (ref 32.2–35.5)
MCV RBC AUTO: 92 FL (ref 81–99)
MONOCYTES # BLD: 6.1 %
MONOCYTES ABSOLUTE: 0.7 THOU/MM3 (ref 0.4–1.3)
NUCLEATED RED BLOOD CELLS: 0 /100 WBC
OSMOLALITY CALCULATION: 296.3 MOSMOL/KG (ref 275–300)
PLATELET # BLD: 167 THOU/MM3 (ref 130–400)
PMV BLD AUTO: 12.1 FL (ref 9.4–12.4)
POTASSIUM REFLEX MAGNESIUM: 4.9 MEQ/L (ref 3.5–5.2)
RBC # BLD: 3.77 MILL/MM3 (ref 4.2–5.4)
RH FACTOR: NORMAL
SEG NEUTROPHILS: 83.8 %
SEGMENTED NEUTROPHILS ABSOLUTE COUNT: 9.8 THOU/MM3 (ref 1.8–7.7)
SODIUM BLD-SCNC: 140 MEQ/L (ref 135–145)
TOTAL PROTEIN: 5.6 G/DL (ref 6.1–8)
WBC # BLD: 11.7 THOU/MM3 (ref 4.8–10.8)

## 2021-02-28 PROCEDURE — 2580000003 HC RX 258: Performed by: NURSE PRACTITIONER

## 2021-02-28 PROCEDURE — 96374 THER/PROPH/DIAG INJ IV PUSH: CPT

## 2021-02-28 PROCEDURE — 85730 THROMBOPLASTIN TIME PARTIAL: CPT

## 2021-02-28 PROCEDURE — 6360000004 HC RX CONTRAST MEDICATION: Performed by: EMERGENCY MEDICINE

## 2021-02-28 PROCEDURE — 3430000000 HC RX DIAGNOSTIC RADIOPHARMACEUTICAL: Performed by: NURSE PRACTITIONER

## 2021-02-28 PROCEDURE — 36415 COLL VENOUS BLD VENIPUNCTURE: CPT

## 2021-02-28 PROCEDURE — 85018 HEMOGLOBIN: CPT

## 2021-02-28 PROCEDURE — 85610 PROTHROMBIN TIME: CPT

## 2021-02-28 PROCEDURE — 83540 ASSAY OF IRON: CPT

## 2021-02-28 PROCEDURE — P9016 RBC LEUKOCYTES REDUCED: HCPCS

## 2021-02-28 PROCEDURE — 85025 COMPLETE CBC W/AUTO DIFF WBC: CPT

## 2021-02-28 PROCEDURE — 96361 HYDRATE IV INFUSION ADD-ON: CPT

## 2021-02-28 PROCEDURE — 86900 BLOOD TYPING SEROLOGIC ABO: CPT

## 2021-02-28 PROCEDURE — 74177 CT ABD & PELVIS W/CONTRAST: CPT

## 2021-02-28 PROCEDURE — 99223 1ST HOSP IP/OBS HIGH 75: CPT | Performed by: INTERNAL MEDICINE

## 2021-02-28 PROCEDURE — 2500000003 HC RX 250 WO HCPCS: Performed by: NURSE PRACTITIONER

## 2021-02-28 PROCEDURE — 36430 TRANSFUSION BLD/BLD COMPNT: CPT

## 2021-02-28 PROCEDURE — 86850 RBC ANTIBODY SCREEN: CPT

## 2021-02-28 PROCEDURE — 83036 HEMOGLOBIN GLYCOSYLATED A1C: CPT

## 2021-02-28 PROCEDURE — 86901 BLOOD TYPING SEROLOGIC RH(D): CPT

## 2021-02-28 PROCEDURE — 80076 HEPATIC FUNCTION PANEL: CPT

## 2021-02-28 PROCEDURE — 85014 HEMATOCRIT: CPT

## 2021-02-28 PROCEDURE — 78278 ACUTE GI BLOOD LOSS IMAGING: CPT

## 2021-02-28 PROCEDURE — 1200000003 HC TELEMETRY R&B

## 2021-02-28 PROCEDURE — 86923 COMPATIBILITY TEST ELECTRIC: CPT

## 2021-02-28 PROCEDURE — 93010 ELECTROCARDIOGRAM REPORT: CPT | Performed by: NUCLEAR MEDICINE

## 2021-02-28 PROCEDURE — 82272 OCCULT BLD FECES 1-3 TESTS: CPT

## 2021-02-28 PROCEDURE — 99285 EMERGENCY DEPT VISIT HI MDM: CPT

## 2021-02-28 PROCEDURE — 80048 BASIC METABOLIC PNL TOTAL CA: CPT

## 2021-02-28 PROCEDURE — 82378 CARCINOEMBRYONIC ANTIGEN: CPT

## 2021-02-28 PROCEDURE — A9560 TC99M LABELED RBC: HCPCS | Performed by: NURSE PRACTITIONER

## 2021-02-28 PROCEDURE — 71045 X-RAY EXAM CHEST 1 VIEW: CPT

## 2021-02-28 PROCEDURE — 93005 ELECTROCARDIOGRAM TRACING: CPT | Performed by: NURSE PRACTITIONER

## 2021-02-28 RX ORDER — SODIUM CHLORIDE 9 MG/ML
INJECTION, SOLUTION INTRAVENOUS PRN
Status: DISCONTINUED | OUTPATIENT
Start: 2021-02-28 | End: 2021-03-03

## 2021-02-28 RX ORDER — 0.9 % SODIUM CHLORIDE 0.9 %
1000 INTRAVENOUS SOLUTION INTRAVENOUS ONCE
Status: COMPLETED | OUTPATIENT
Start: 2021-02-28 | End: 2021-02-28

## 2021-02-28 RX ADMIN — SODIUM CHLORIDE 1000 ML: 9 INJECTION, SOLUTION INTRAVENOUS at 15:26

## 2021-02-28 RX ADMIN — Medication 33 MILLICURIE: at 20:10

## 2021-02-28 RX ADMIN — FAMOTIDINE 20 MG: 10 INJECTION, SOLUTION INTRAVENOUS at 15:26

## 2021-02-28 RX ADMIN — IOPAMIDOL 80 ML: 755 INJECTION, SOLUTION INTRAVENOUS at 16:43

## 2021-02-28 ASSESSMENT — ENCOUNTER SYMPTOMS
EYE PAIN: 0
RHINORRHEA: 0
TROUBLE SWALLOWING: 0
EYE ITCHING: 0
SHORTNESS OF BREATH: 0
CHEST TIGHTNESS: 0
DIARRHEA: 0
BLOOD IN STOOL: 0
BLOOD IN STOOL: 1
COUGH: 0
VOICE CHANGE: 0
SINUS PAIN: 0
CONSTIPATION: 0
COLOR CHANGE: 0
APNEA: 0
FACIAL SWELLING: 0
PHOTOPHOBIA: 0
EYE DISCHARGE: 0
VOMITING: 0
ABDOMINAL PAIN: 0
WHEEZING: 0
ABDOMINAL DISTENTION: 0
CHOKING: 0
STRIDOR: 0
ANAL BLEEDING: 0
SORE THROAT: 0
NAUSEA: 0
RECTAL PAIN: 0
SINUS PRESSURE: 0
BACK PAIN: 0
EYE REDNESS: 0

## 2021-02-28 NOTE — ED NOTES
MD at bedside aware of blood pressures       [facility]Well[facility]  02/28/21 5926

## 2021-02-28 NOTE — ACP (ADVANCE CARE PLANNING)
Advance Care Planning   Advance Care Planning Clinical Specialist  Conversation Note      Date of ACP Conversation: 2/28/2021    Conversation Conducted with:   Patient with Decision Making Capacity   Healthcare Decision Maker: Named in Advance Directive or Healthcare Power of  bebeto Cindi Lincoln Clinical Specialist: GIRISH Franks      Current Designated Health Care Decision Maker:   Primary Decision Maker: Rohini Lowery - Child - [phone]    Secondary Decision Maker: Piyush Iraheta Child - [phone]      Hospitalization  If your health were to worsen and it became clear that your chance of recovery was unlikely, what would your preferences be regarding hospitalization?:    Choice:  [x]  The patient would want hospitalization  []  The patient would prefer comfort-focused treatment without hospitalization. Ventilation  If you were in your present state of health and suddenly became very ill and were unable to breathe on your own, what would your preference be about the use of a ventilator (breathing machine) if it were available to you? If patient would desire the use of a ventilator (breathing machine), answer \"yes\", if not \"no\":yes    If your health were to worsen and it became clear that your chance of recovery was unlikely, would that change your answer? No    Resuscitation  CPR works best to restart the heart when there is a sudden event, like a heart attack, in someone who is otherwise healthy. Unfortunately, CPR does not typically restart the heart for people who have serious health conditions or who are very sick. In the event your heart stopped, would you want attempts to restart your heart (answer \"yes\") or would you prefer a natural death (answer \"no\")? yes    If your health were to worsen and it became clear that your chance of recovery was unlikely, would that change your answer?  No    [x] Yes  [] No   Educated Patient / Radha Peña regarding differences between Advance Directives and portable DNR orders. Length of ACP Conversation in minutes:  26 minutes    Conversation Outcomes:  [x] ACP discussion completed  [] Existing advance directive reviewed with patient; no changes to patient's previously recorded wishes   [x] New Advance Directive completed   [] Portable Do Not Rescitate prepared for Provider review and signature  [] POLST/POST/MOLST/MOST prepared for Provider review and signature      Follow-up plan:    [] Schedule follow-up conversation to continue planning  [x] Referred individual to Provider for additional questions/concerns   [] Advised patient/agent/surrogate to review completed ACP document and update if needed with changes in condition, patient preferences or care setting     [] This note routed to one or more involved healthcare providers     Pt in the ER today alone. Pt has a local son and two local daughters, one daughter lives in [place]. Staff completed pts adv dir today.

## 2021-02-28 NOTE — ED TRIAGE NOTES
Patient to ER due to having rectal bleeding. No bleeding upon arrival but patient states she has had a lot of bleeding at home. Denies pain and denies cramping.

## 2021-02-28 NOTE — ED NOTES
Bed: 009A  Expected date:   Expected time:   Means of arrival: [facility]  Comments:     Jenise Aiken RN  02/28/21 0485

## 2021-03-01 PROBLEM — I95.1 ORTHOSTATIC HYPOTENSION: Status: ACTIVE | Noted: 2021-03-01

## 2021-03-01 PROBLEM — N17.0 ACUTE KIDNEY INJURY (AKI) WITH ACUTE TUBULAR NECROSIS (ATN) (HCC): Status: ACTIVE | Noted: 2021-03-01

## 2021-03-01 LAB
ABSOLUTE RETIC #: 68 THOU/MM3 (ref 20–115)
AVERAGE GLUCOSE: 105 MG/DL (ref 70–126)
BACTERIA: ABNORMAL
BILIRUBIN URINE: NEGATIVE
BLOOD, URINE: ABNORMAL
CASTS: ABNORMAL /LPF
CASTS: ABNORMAL /LPF
CEA: 3.5 NG/ML (ref 0–5)
CHARACTER, URINE: ABNORMAL
COLOR: YELLOW
CRYSTALS: ABNORMAL
EPITHELIAL CELLS, UA: ABNORMAL /HPF
FERRITIN: 21 NG/ML (ref 10–291)
FOLATE: 14.6 NG/ML (ref 4.8–24.2)
GLUCOSE BLD-MCNC: 111 MG/DL (ref 70–108)
GLUCOSE BLD-MCNC: 112 MG/DL (ref 70–108)
GLUCOSE BLD-MCNC: 182 MG/DL (ref 70–108)
GLUCOSE, URINE: NEGATIVE MG/DL
HBA1C MFR BLD: 5.5 % (ref 4.4–6.4)
HCT VFR BLD CALC: 29.5 % (ref 37–47)
HCT VFR BLD CALC: 32.5 % (ref 37–47)
HEMOGLOBIN: 9.2 GM/DL (ref 12–16)
HEMOGLOBIN: 9.9 GM/DL (ref 12–16)
IMMATURE RETIC FRACT: 19.7 % (ref 3–15.9)
IRON SATURATION: 13 % (ref 20–50)
IRON: 30 UG/DL (ref 50–170)
KETONES, URINE: NEGATIVE
LEUKOCYTE EST, POC: NEGATIVE
MISCELLANEOUS LAB TEST RESULT: ABNORMAL
NITRITE, URINE: POSITIVE
PH UA: 8.5 (ref 5–9)
PROTEIN UA: NEGATIVE MG/DL
RBC URINE: ABNORMAL /HPF
REASON FOR REJECTION: NORMAL
REJECTED TEST: NORMAL
RENAL EPITHELIAL, UA: ABNORMAL
RETIC HEMOGLOBIN: 32.1 PG (ref 28.2–35.7)
RETICULOCYTE ABSOLUTE COUNT: 2.1 % (ref 0.5–2)
SARS-COV-2, NAAT: NOT DETECTED
SPECIFIC GRAVITY UA: > 1.03 (ref 1–1.03)
TOTAL IRON BINDING CAPACITY: 232 UG/DL (ref 171–450)
UROBILINOGEN, URINE: 0.2 EU/DL (ref 0–1)
VITAMIN B-12: 451 PG/ML (ref 211–911)
WBC UA: ABNORMAL /HPF
YEAST: ABNORMAL

## 2021-03-01 PROCEDURE — 83550 IRON BINDING TEST: CPT

## 2021-03-01 PROCEDURE — 85014 HEMATOCRIT: CPT

## 2021-03-01 PROCEDURE — 85018 HEMOGLOBIN: CPT

## 2021-03-01 PROCEDURE — 87635 SARS-COV-2 COVID-19 AMP PRB: CPT

## 2021-03-01 PROCEDURE — 2580000003 HC RX 258: Performed by: INTERNAL MEDICINE

## 2021-03-01 PROCEDURE — 6370000000 HC RX 637 (ALT 250 FOR IP): Performed by: NURSE PRACTITIONER

## 2021-03-01 PROCEDURE — 83540 ASSAY OF IRON: CPT

## 2021-03-01 PROCEDURE — 6360000002 HC RX W HCPCS: Performed by: NURSE PRACTITIONER

## 2021-03-01 PROCEDURE — 36415 COLL VENOUS BLD VENIPUNCTURE: CPT

## 2021-03-01 PROCEDURE — 82746 ASSAY OF FOLIC ACID SERUM: CPT

## 2021-03-01 PROCEDURE — 1200000003 HC TELEMETRY R&B

## 2021-03-01 PROCEDURE — 81001 URINALYSIS AUTO W/SCOPE: CPT

## 2021-03-01 PROCEDURE — 82728 ASSAY OF FERRITIN: CPT

## 2021-03-01 PROCEDURE — 82607 VITAMIN B-12: CPT

## 2021-03-01 PROCEDURE — 99233 SBSQ HOSP IP/OBS HIGH 50: CPT | Performed by: INTERNAL MEDICINE

## 2021-03-01 PROCEDURE — 82948 REAGENT STRIP/BLOOD GLUCOSE: CPT

## 2021-03-01 PROCEDURE — 6370000000 HC RX 637 (ALT 250 FOR IP): Performed by: INTERNAL MEDICINE

## 2021-03-01 PROCEDURE — 85046 RETICYTE/HGB CONCENTRATE: CPT

## 2021-03-01 PROCEDURE — 2580000003 HC RX 258: Performed by: NURSE PRACTITIONER

## 2021-03-01 RX ORDER — DEXTROSE MONOHYDRATE 25 G/50ML
12.5 INJECTION, SOLUTION INTRAVENOUS PRN
Status: DISCONTINUED | OUTPATIENT
Start: 2021-03-01 | End: 2021-03-07 | Stop reason: HOSPADM

## 2021-03-01 RX ORDER — PANTOPRAZOLE SODIUM 40 MG/1
40 TABLET, DELAYED RELEASE ORAL
Status: DISCONTINUED | OUTPATIENT
Start: 2021-03-01 | End: 2021-03-01

## 2021-03-01 RX ORDER — SODIUM CHLORIDE 0.9 % (FLUSH) 0.9 %
10 SYRINGE (ML) INJECTION PRN
Status: DISCONTINUED | OUTPATIENT
Start: 2021-03-01 | End: 2021-03-07 | Stop reason: HOSPADM

## 2021-03-01 RX ORDER — SODIUM CHLORIDE 9 MG/ML
INJECTION, SOLUTION INTRAVENOUS CONTINUOUS
Status: DISCONTINUED | OUTPATIENT
Start: 2021-03-01 | End: 2021-03-03

## 2021-03-01 RX ORDER — PANTOPRAZOLE SODIUM 40 MG/1
40 TABLET, DELAYED RELEASE ORAL
Status: DISCONTINUED | OUTPATIENT
Start: 2021-03-01 | End: 2021-03-07 | Stop reason: HOSPADM

## 2021-03-01 RX ORDER — CHOLECALCIFEROL (VITAMIN D3) 125 MCG
5 CAPSULE ORAL NIGHTLY PRN
Status: DISCONTINUED | OUTPATIENT
Start: 2021-03-01 | End: 2021-03-07 | Stop reason: HOSPADM

## 2021-03-01 RX ORDER — IPRATROPIUM BROMIDE AND ALBUTEROL SULFATE 2.5; .5 MG/3ML; MG/3ML
1 SOLUTION RESPIRATORY (INHALATION) EVERY 4 HOURS PRN
Status: DISCONTINUED | OUTPATIENT
Start: 2021-03-01 | End: 2021-03-02

## 2021-03-01 RX ORDER — PROMETHAZINE HYDROCHLORIDE 25 MG/1
12.5 TABLET ORAL EVERY 6 HOURS PRN
Status: DISCONTINUED | OUTPATIENT
Start: 2021-03-01 | End: 2021-03-07 | Stop reason: HOSPADM

## 2021-03-01 RX ORDER — POLYETHYLENE GLYCOL 3350 17 G/17G
238 POWDER, FOR SOLUTION ORAL ONCE
Status: COMPLETED | OUTPATIENT
Start: 2021-03-01 | End: 2021-03-01

## 2021-03-01 RX ORDER — SODIUM CHLORIDE 0.9 % (FLUSH) 0.9 %
10 SYRINGE (ML) INJECTION EVERY 12 HOURS SCHEDULED
Status: DISCONTINUED | OUTPATIENT
Start: 2021-03-01 | End: 2021-03-07 | Stop reason: HOSPADM

## 2021-03-01 RX ORDER — ACETAMINOPHEN 650 MG/1
650 SUPPOSITORY RECTAL EVERY 6 HOURS PRN
Status: DISCONTINUED | OUTPATIENT
Start: 2021-03-01 | End: 2021-03-07 | Stop reason: HOSPADM

## 2021-03-01 RX ORDER — SENNA PLUS 8.6 MG/1
15 TABLET ORAL ONCE
Status: COMPLETED | OUTPATIENT
Start: 2021-03-01 | End: 2021-03-01

## 2021-03-01 RX ORDER — PRAVASTATIN SODIUM 40 MG
40 TABLET ORAL DAILY
Status: DISCONTINUED | OUTPATIENT
Start: 2021-03-01 | End: 2021-03-07 | Stop reason: HOSPADM

## 2021-03-01 RX ORDER — ONDANSETRON 2 MG/ML
4 INJECTION INTRAMUSCULAR; INTRAVENOUS EVERY 6 HOURS PRN
Status: DISCONTINUED | OUTPATIENT
Start: 2021-03-01 | End: 2021-03-07 | Stop reason: HOSPADM

## 2021-03-01 RX ORDER — HYDROCORTISONE ACETATE 25 MG/1
25 SUPPOSITORY RECTAL 2 TIMES DAILY
Status: DISCONTINUED | OUTPATIENT
Start: 2021-03-01 | End: 2021-03-07 | Stop reason: HOSPADM

## 2021-03-01 RX ORDER — ACETAMINOPHEN 325 MG/1
650 TABLET ORAL EVERY 6 HOURS PRN
Status: DISCONTINUED | OUTPATIENT
Start: 2021-03-01 | End: 2021-03-07 | Stop reason: HOSPADM

## 2021-03-01 RX ORDER — DEXTROSE MONOHYDRATE 50 MG/ML
100 INJECTION, SOLUTION INTRAVENOUS PRN
Status: DISCONTINUED | OUTPATIENT
Start: 2021-03-01 | End: 2021-03-07 | Stop reason: HOSPADM

## 2021-03-01 RX ORDER — NICOTINE POLACRILEX 4 MG
15 LOZENGE BUCCAL PRN
Status: DISCONTINUED | OUTPATIENT
Start: 2021-03-01 | End: 2021-03-07 | Stop reason: HOSPADM

## 2021-03-01 RX ADMIN — SODIUM CHLORIDE, PRESERVATIVE FREE 10 ML: 5 INJECTION INTRAVENOUS at 20:55

## 2021-03-01 RX ADMIN — PANTOPRAZOLE SODIUM 40 MG: 40 TABLET, DELAYED RELEASE ORAL at 18:18

## 2021-03-01 RX ADMIN — POLYETHYLENE GLYCOL 3350 238 G: 17 POWDER, FOR SOLUTION ORAL at 17:01

## 2021-03-01 RX ADMIN — SODIUM CHLORIDE: 9 INJECTION, SOLUTION INTRAVENOUS at 01:33

## 2021-03-01 RX ADMIN — SENNOSIDES 129 MG: 8.6 TABLET, FILM COATED ORAL at 17:01

## 2021-03-01 RX ADMIN — IRON SUCROSE 300 MG: 20 INJECTION, SOLUTION INTRAVENOUS at 14:07

## 2021-03-01 RX ADMIN — HYDROCORTISONE ACETATE 25 MG: 25 SUPPOSITORY RECTAL at 20:54

## 2021-03-01 RX ADMIN — PRAVASTATIN SODIUM 40 MG: 40 TABLET ORAL at 08:23

## 2021-03-01 RX ADMIN — HYDROCORTISONE ACETATE 25 MG: 25 SUPPOSITORY RECTAL at 12:28

## 2021-03-01 NOTE — ED PROVIDER NOTES
[place] ENCOUNTER          Pt Name: Salvadore Meckel  MRN: [id number]  [place] 1935  Date of evaluation: 2/28/2021  Treating Resident Physician: Kathrny Pena MD  Supervising [address] [facility], [address] [place] [address]       Chief Complaint   Patient presents with    Rectal Bleeding    Hypotension     History obtained from the patient. HISTORY OF PRESENT ILLNESS    HPI  Salvadore Meckel is a 82 y.o. female who presents to the emergency department for evaluation of gastrointestinal bleeding. Patient stated that she has had bright red rectal bleeding for the last 48 hours. Patient states that she has had for bright red BMs this morning prior to arrival.  Patient has associated fatigue and hypotension. There are no modifying factors. Patient denies fever, chest pain, abdominal pain, nausea, vomiting. The patient has no other acute complaints at this time. REVIEW OF SYSTEMS   Review of Systems   Constitutional: Negative for fatigue and fever. HENT: Negative for ear pain, rhinorrhea and sore throat. Respiratory: Negative for cough, shortness of breath and wheezing. Cardiovascular: Negative for chest pain, palpitations and leg swelling. Gastrointestinal: Positive for blood in stool. Negative for abdominal distention, constipation, diarrhea, nausea and vomiting. Endocrine: Negative for polydipsia and polyuria. Genitourinary: Negative for difficulty urinating and dysuria. Musculoskeletal: Negative for arthralgias. Skin: Negative for color change, pallor and rash. Neurological: Negative for dizziness, seizures, syncope, weakness and numbness.          PAST MEDICAL AND SURGICAL HISTORY     Past Medical History:   Diagnosis Date    A-fib [facility])     AAA (abdominal aortic aneurysm) ([facility] [address]     Achalasia     Anemia     Arthritis     Blood circulation, collateral     BPPV (benign paroxysmal positional vertigo) 6/6/16  CHF (congestive heart failure) (HCC)     Chronic kidney disease     sees Dr Hannah Herman Colon cancer [facility])     Gastrointestinal hemorrhage     GERD (gastroesophageal reflux disease)     ? esophageal stricture    Hiatal hernia     History of blood transfusion     HTN (hypertension)     Hx of blood clots 2018    R leg-sees ABEBA Hargrove    Hyperlipidemia     Med[employer] dual ICD  8/26/2020    Neuromuscular disorder ([address]     Obesity     Osteopenia     Osteoporosis     PAC (premature atrial contraction)     on betablocker    Paralysis (HCC)     baby    Pedal edema     denied any hx of hypertension    Pneumonia     PVC (premature ventricular contraction)     sees Dr Victor Hugo Drew PVD (peripheral vascular disease) ([address]     Small bowel obstruction ([address]     Tinnitus     UTI (urinary tract infection)      Past Surgical History:   Procedure Laterality Date    ABDOMINAL AORTIC ANEURYSM REPAIR, ENDOVASCULAR N/A 2/15/2019    ABDOMINAL AORTIC ANEURYSM REPAIR ENDOVASCULAR, DECLOTTING RIGHT FEM TIB BYPASS GRAFT performed by Janella Epley, MD at [address]    lumpectomy    CHOLECYSTECTOMY      30 years ago    COLONOSCOPY  08/06/2018    Dr Jeff Daniels 2/22/2019    COLONOSCOPY DIAGNOSTIC performed by John Angeles MD at [facility]    COLONOSCOPY N/A 2/22/2020    COLONOSCOPY CONTROL HEMORRHAGE performed by Vanesa Lomax MD at [address] [facility]      eye, eyelids    EYE SURGERY      cataract    HEMICOLECTOMY N/A 2/25/2020    OPEN RIGHT COLON RESECTION performed by Ruth Sanhi MD at [address] [facility]  07/15/2016    OTHER SURGICAL HISTORY  10/06/2020    Exp lap washout mesenteric lymph node biopsy EGD abthera placement Dr Reilly Holly  10/08/2020    reopening recent lap open g tube placement intra operative EGD and primary closure of open abdomen-Dr Moustapha Escalona [facility] OLEGARIO SKN SUB 2011 [facility] T/A/L AREA/<100SCM /<1ST 25 SCM N/A 9/6/2018    RE-EXPLORATION RIGHT GROIN, DECLOTTING OF FEMORAL BYPASS GRAFT performed by Enio Plaza MD at [address] [facility] EGD TRANSORAL BIOPSY SINGLE/MULTIPLE Left 11/27/2017    EGD BIOPSY performed by Elena Zaragoza MD at [address] [place] [address], PARTIAL, Enclaudia Schaeffer N/A 8/20/2018    ROBOT ASSISTED COLECTOMY (LOW ANTERIOR) performed by Charisse Peguero MD at [address] [facility] OFFICE/OUTPT [address] N/A 9/5/2018    RIGHT FEMORAL EMBOLECTOMY, INTRAOPERATIVE ARTERIOGRAM, RIGHT ILIAC EMBOLECTOMY, RIGHT COMMON AND EXTERNAL ILIAC STENTING, RIGHT FEMORAL TO ANTERIOR POPLITEAL BYPASS WITH 6MM GORTEX GRAFT performed by Enio Plaza MD at [address] / [address] Right 2/14/2019    FEMORAL EMBOLECTOMY THROMBECTOMY, RIGHT LEG performed by Enio Plaza MD at [address] [facility] 11/27/2017    EGD SUBMUCOSAL/BOTOX INJECTION performed by Elena Zaragoza MD at [address] [facility] N/A 2/22/2019    EGD BIOPSY performed by Red North MD at [facility]         MEDICATIONS     Current Facility-Administered Medications:     0.9 % sodium chloride infusion, , Intravenous, PRN, Daniella Leger MD    Current Outpatient Medications:     bumetanide (BUMEX) 1 MG tablet, Take 1 tablet by mouth daily as needed (as directed by [facility]), Disp: 30 tablet, Rfl: 0    magnesium (MAGNESIUM-OXIDE) 250 MG TABS tablet, TAKE 2 TABLETS BY MOUTH TWICE DAILY, Disp: 120 tablet, Rfl: 6    apixaban (ELIQUIS) 2.5 MG TABS tablet, TAKE 1 TABLET BY MOUTH TWICE DAILY, Disp: 60 tablet, Rfl: 5    metoprolol succinate (TOPROL XL) 25 MG extended release tablet, Take 1 tablet by mouth daily, Disp: 90 tablet, Rfl: 3    losartan (COZAAR) 25 MG tablet, Take 1 tablet by mouth daily, Disp: 30 tablet, Rfl: 5    pravastatin (PRAVACHOL) 40 MG tablet, TAKE 1 TABLET BY MOUTH DAILY, Disp: 90 tablet, Rfl: 3    clopidogrel (PLAVIX) 75 MG tablet, Take 1 tablet by mouth daily, Disp: 90 tablet, Rfl: 3    potassium chloride (KLOR-CON M) 10 MEQ extended release tablet, Take 1 tablet by mouth daily, Disp: 180 tablet, Rfl: 2    pantoprazole (PROTONIX) 40 MG tablet, Take 40 mg by mouth 2 times daily (before meals), Disp: , Rfl:     albuterol sulfate HFA (PROVENTIL HFA) 108 (90 Base) MCG/ACT inhaler, Inhale 2 puffs into the lungs every 6 hours as needed for Wheezing or Shortness of Breath, Disp: 1 Inhaler, Rfl: 0    Blood Pressure KIT, Check blood pressure daily, and if symptoms of lightheadedness. Call physician if BP <80/40., Disp: 1 kit, Rfl: 0    melatonin 3 MG TABS tablet, Take 2 tablets by mouth nightly as needed (sleep), Disp: 60 tablet, Rfl: 3    acetaminophen (TYLENOL ARTHRITIS PAIN) 650 MG CR tablet, Take 1,300 mg by mouth every 12 hours as needed for Pain , Disp: , Rfl:     timolol (TIMOPTIC) 0.5 % ophthalmic solution, Place 1 drop into both eyes nightly , Disp: , Rfl:       SOCIAL HISTORY     Social History     Social History Narrative    Not on file     Social History     Tobacco Use    Smoking status: Former Smoker     Packs/day: 0.25     Years: 60.00     Pack years: 15.00     Types: Cigarettes     Start date:      Quit date: 10/18/2019     Years since quittin.3    Smokeless tobacco: Never Used    Tobacco comment: 1 cigarette every other day   Substance Use Topics    Alcohol use:  Yes     Alcohol/week: 1.0 standard drinks     Types: 1 Glasses of wine per week     Comment: social    Drug use: No         ALLERGIES     Allergies   Allergen Reactions    Aspirin Other (See Comments)     Unknown reaction, stated Dr. Robert Elliott didn't want her to take it anymore    Lasix [Furosemide] Other (See Comments)     PATIENT DOESN'T REMEMBER    Lipitor [Atorvastatin] Other (See Comments)     LEG PAIN    Neurontin [Gabapentin] Other (See Comments)     PATIENT DOESN'T REMEMBER    Oxycontin [Oxycodone Hcl]      confusion    Penicillins Other (See Comments)     HYPERTENSION         FAMILY HISTORY     Family History   Problem Relation Age of Onset    Heart Disease Mother     Stroke Mother     Cancer Father         lung    Emphysema Father     Other Sister         leukemia    Heart Disease Maternal Grandmother     Dementia Maternal Grandmother     Heart Disease Maternal Grandfather     No Known Problems Paternal Grandmother     No Known Problems Paternal Grandfather          PREVIOUS RECORDS   Previous records reviewed: today    PHYSICAL EXAM     ED Triage Vitals   BP Temp Temp Source Pulse Resp SpO2 Height Weight   02/28/21 1501 02/28/21 1501 02/28/21 1501 02/28/21 1501 02/28/21 1501 02/28/21 1501 02/28/21 1504 02/28/21 1504   85/73 98.3 °F (36.8 °C) Rectal 106 20 100 % 5' 5\" (1.651 m) 140 lb (63.5 kg)     Initial vital signs and nursing assessment reviewed and normal. Body mass index is 23.3 kg/m². Pulsoximetry is normal per my interpretation. Additional Vital Signs:  Vitals:    02/28/21 1927   BP: (!) 97/47   Pulse: 71   Resp: 15   Temp:    SpO2: 96%       Physical Exam  Constitutional:       Appearance: Normal appearance. HENT:      Head: Normocephalic. Right Ear: External ear normal.      Left Ear: External ear normal.      Nose: Nose normal.      Mouth/Throat:      Mouth: Mucous membranes are moist.      Pharynx: Oropharynx is clear. Eyes:      Extraocular Movements: Extraocular movements intact. Conjunctiva/sclera: Conjunctivae normal.      Pupils: Pupils are equal, round, and reactive to light. Neck:      Musculoskeletal: Normal range of motion and neck supple. Cardiovascular:      Rate and Rhythm: Normal rate and regular rhythm. Pulses: Normal pulses. Heart sounds: Normal heart sounds. Pulmonary:      Effort: Pulmonary effort is normal.      Breath sounds: Normal breath sounds.    Abdominal:      General: Bowel sounds are normal. Palpations: Abdomen is soft. Genitourinary:     Rectum: Guaiac result positive. Comments: Bright red blood noted on digital rectal exam.  Musculoskeletal: Normal range of motion. Skin:     General: Skin is warm and dry. Capillary Refill: Capillary refill takes less than 2 seconds. Neurological:      General: No focal deficit present. Mental Status: She is alert and oriented to person, place, and time. MEDICAL DECISION MAKING   Initial Assessment:   Patient is a 65-year-old female with past medical history of diverticulosis and colon mass who presents to the ED with complaint of rectal bleeding x8 hours. Patient reported on physical exam with no abdominal pain but bright red blood on NILDA. Patient also hypotensive in the 35T for systolic on arrival.  Patient received 1 L of fluids with improvement to 100/45. Patient also noted anemia with a H&H 10.6/34.7 when previous 10/26/2020 H&H is 16.4/54.0. Patient appears stable and she had CT scan that showed no acute abnormality at this time. Patient likely has a lower GI bleed possibly secondary to diverticular disease. Patient will need further evaluation by GI and possible endoscopy. Patient received order for 1 unit of PRBCs at this time. Plan:   Admitted to hospitalist at this time. Spoke to Dr. Marcia Clark and he requested that bleeding scan be completed which was ordered. Also consulted IR spoke to Dr. ÓSCAR Joel he stated he would place patient on list to be seen. Also completed GI consult and they stated patient possibly received scope in a.m.           ED RESULTS   Laboratory results:  Labs Reviewed   CBC WITH AUTO DIFFERENTIAL - Abnormal; Notable for the following components:       Result Value    WBC 11.7 (*)     RBC 3.77 (*)     Hemoglobin 10.6 (*)     Hematocrit 34.7 (*)     MCHC 30.5 (*)     RDW-CV 15.8 (*)     RDW-SD 52.8 (*)     Segs Absolute 9.8 (*)     All other components within normal limits   PROTIME-INR - Abnormal; Notable for the following components:    INR 1.43 (*)     All other components within normal limits   BASIC METABOLIC PANEL W/ REFLEX TO MG FOR LOW K - Abnormal; Notable for the following components:    Glucose 214 (*)     BUN 42 (*)     CREATININE 1.3 (*)     All other components within normal limits   HEPATIC FUNCTION PANEL - Abnormal; Notable for the following components:    Albumin 3.4 (*)     Total Bilirubin 0.2 (*)     Total Protein 5.6 (*)     All other components within normal limits   GLOMERULAR FILTRATION RATE, ESTIMATED - Abnormal; Notable for the following components:    Est, Glom Filt Rate 39 (*)     All other components within normal limits   IRON - Abnormal; Notable for the following components:    Iron 44 (*)     All other components within normal limits   HEMOGLOBIN AND HEMATOCRIT, BLOOD - Abnormal; Notable for the following components:    Hemoglobin 10.0 (*)     Hematocrit 32.7 (*)     All other components within normal limits   APTT   BLOOD OCCULT STOOL SCREEN #1   ANION GAP   OSMOLALITY   URINALYSIS   TYPE AND SCREEN   PREPARE RBC (CROSSMATCH)    Narrative:     [id number]     issued       Radiologic studies results:  CT ABDOMEN PELVIS W IV CONTRAST   Final Result      No acute intra-abdominal or intrapelvic findings. Final report electronically signed by Dr. Brigid Guillaume on 2/28/2021 5:02 PM      XR CHEST PORTABLE   Final Result      No acute intrathoracic process. **This report has been created using voice recognition software. It may contain minor errors which are inherent in voice recognition technology. **      Final report electronically signed by Dr. Brigid Guillaume on 2/28/2021 3:33 PM      NM GI BLOOD LOSS    (Results Pending)       ED Medications administered this visit:   Medications   0.9 % sodium chloride infusion (has no administration in time range)   famotidine (PEPCID) injection 20 mg (20 mg Intravenous Given 2/28/21 1526)   0.9 % sodium chloride

## 2021-03-01 NOTE — H&P
Patient: Alla Sheriff    Unit/Bed: 4A-05/005-A    YOB: 1935    MRN: [id number]    Acct: [de-identified]     Admitting Diagnosis: Chronic lower GI bleeding [K92.2]    Admit Date:  2/28/2021    CC: Worsening bright red blood per rectum x 2 day. HPI :  82-year-old female patient presented to the ED today with worsening acute on chronic lower GI bleed with bright red blood per rectum. History of lower GI bleed for the past 2 months but increased hematochezia with blood clots in the last 1 to 2 days . Patient is on long-term anticoagulation w/  Eliquis and takes Plavix too. Patient has developed dizziness due to symptomatic anemia. S/p 1 unit of PRBC transfusion in the ED. CT abdomen pelvis and bleeding scan unremarkable. Patient hemodynamically stable at the time of interview. Initial vital signs in the ED temperature 36.8 °C, respirate of 20 breaths/min, heart rate 106 bpm, blood pressure 85/73 with the lowest of 79/47, oxygen 100% on room air. Labs in the ED sodium 140, potassium 4.9, creatinine 1.3, blood sugar 214, osmolality 296.3, WBC 7.7, hemoglobin 10.6, platelets 923. INR 1.43, positive occult blood test.    Twelve-lead EKG which reviewed shows normal sinus rhythm at 68 bpm.  Positive PVCs. To inversion V4 V5 and V6. No ST segment elevation or depression. Chest x-ray which reviewed is negative for infiltrate. Positive AICD. ED treatment included Pepcid 20 mg IV x1 dose, IV fluid normal saline 1 L bolus followed by normal saline infusion at 240 mils per hour. Review of Systems   Constitutional: Negative for activity change, appetite change, chills, diaphoresis, fatigue, fever and unexpected weight change.    HENT: Negative for congestion, dental problem, drooling, ear discharge, ear pain, facial swelling, hearing loss, mouth sores, nosebleeds, postnasal drip, rhinorrhea, sinus pressure, sinus pain, sneezing, sore throat, tinnitus, trouble swallowing and voice change. Eyes: Negative for photophobia, pain, discharge, redness, itching and visual disturbance. Respiratory: Negative for apnea, cough, choking, chest tightness, shortness of breath, wheezing and stridor. Cardiovascular: Negative for chest pain, palpitations and leg swelling. Gastrointestinal: Negative for abdominal distention, abdominal pain, anal bleeding, blood in stool, constipation, diarrhea, nausea, rectal pain and vomiting. Endocrine: Negative for cold intolerance, heat intolerance, polydipsia, polyphagia and polyuria. Genitourinary: Negative for decreased urine volume, difficulty urinating, dyspareunia, dysuria, enuresis, flank pain, frequency, genital sores, hematuria, menstrual problem, pelvic pain, urgency, vaginal bleeding, vaginal discharge and vaginal pain. Musculoskeletal: Negative for arthralgias, back pain, gait problem, joint swelling, myalgias, neck pain and neck stiffness. Skin: Negative for color change, pallor, rash and wound. Allergic/Immunologic: Negative for environmental allergies, food allergies and immunocompromised state. Neurological: Positive for dizziness and weakness. Negative for tremors, seizures, syncope, facial asymmetry, speech difficulty, light-headedness, numbness and headaches. Hematological: Negative for adenopathy. Does not bruise/bleed easily. Psychiatric/Behavioral: Negative for agitation, behavioral problems, confusion, decreased concentration, dysphoric mood, hallucinations, self-injury, sleep disturbance and suicidal ideas. The patient is not nervous/anxious and is not hyperactive.       Past Medical History:        Diagnosis Date    A-fib [facility])     [facility])     Achalasia     Anemia     Arthritis     Blood circulation, collateral     BPPV (benign paroxysmal positional vertigo) 6/6/16    CHF (congestive heart failure) (HCC)     Chronic kidney disease     sees Dr Kelsey Mcintosh [facility])  Gastrointestinal hemorrhage     GERD (gastroesophageal reflux disease)     ? esophageal stricture    Hiatal hernia     History of blood transfusion     HTN (hypertension)     Hx of blood clots 2018    R leg-sees ABEBA Hargrove    Hyperlipidemia     Medtronic dual ICD  8/26/2020    Neuromuscular disorder ([address]     Obesity     Osteopenia     Osteoporosis     PAC (premature atrial contraction)     on betablocker    Paralysis (HCC)     baby    Pedal edema     denied any hx of hypertension    Pneumonia     PVC (premature ventricular contraction)     sees Dr Victor Hugo Drew PVD (peripheral vascular disease) ([address]     Small bowel obstruction ([address]     Tinnitus     UTI (urinary tract infection)        Past Surgical History:        Procedure Laterality Date    ABDOMINAL AORTIC ANEURYSM REPAIR, ENDOVASCULAR N/A 2/15/2019    ABDOMINAL AORTIC ANEURYSM REPAIR ENDOVASCULAR, DECLOTTING RIGHT FEM TIB BYPASS GRAFT performed by Janella Epley, MD at [address]    lumpectomy    CHOLECYSTECTOMY      30 years ago    COLONOSCOPY  08/06/2018    Dr Jeff Daniels 2/22/2019    COLONOSCOPY DIAGNOSTIC performed by John Angeles MD at [facility]    COLONOSCOPY N/A 2/22/2020    COLONOSCOPY CONTROL HEMORRHAGE performed by Vanesa Lomax MD at [address] [facility]      eye, eyelids    EYE SURGERY      cataract    HEMICOLECTOMY N/A 2/25/2020    OPEN RIGHT COLON RESECTION performed by Ruth Sahni MD at [address] [facility]  07/15/2016    OTHER SURGICAL HISTORY  10/06/2020    Exp lap washout mesenteric lymph node biopsy EGD abthera placement Dr Reilly Holly  10/08/2020    reopening recent lap open g tube placement intra operative EGD and primary closure of open abdomen- [facility]    WI OLEGARIO SKN SUB GRFT T/A/L AREA/<100SCM /<1ST 25 SCM N/A 9/6/2018    RE-EXPLORATION RIGHT GROIN, DECLOTTING OF FEMORAL BYPASS GRAFT performed by Azam Bhatia MD at [address] [facility] EGD TRANSORAL BIOPSY SINGLE/MULTIPLE Left 11/27/2017    EGD BIOPSY performed by Efrain Tucker MD at [address] [place] [address], PARTIAL, W/ANAST N/A 8/20/2018    ROBOT ASSISTED COLECTOMY (LOW ANTERIOR) performed by Shamika Christiansen MD at [address] [facility] OFFICE/OUTPT [address] N/A 9/5/2018    RIGHT FEMORAL EMBOLECTOMY, INTRAOPERATIVE ARTERIOGRAM, RIGHT ILIAC EMBOLECTOMY, RIGHT COMMON AND EXTERNAL ILIAC STENTING, RIGHT FEMORAL TO ANTERIOR POPLITEAL BYPASS WITH 6MM GORTEX GRAFT performed by Azam Bhatia MD at [address] [facility] [address] Right 2/14/2019    FEMORAL EMBOLECTOMY THROMBECTOMY, RIGHT LEG performed by Azam Bhatia MD at [facility] 11/27/2017    EGD SUBMUCOSAL/BOTOX INJECTION performed by Efrain Tucker MD at [address] [facility] N/A 2/22/2019    EGD BIOPSY performed by Chloe Mooney MD at [address] [facility] Endoscopy       Medications Prior to Admission:    Prior to Admission medications    Medication Sig Start Date End Date Taking?  Authorizing Provider   magnesium (MAGNESIUM-OXIDE) 250 MG TABS tablet TAKE 2 TABLETS BY MOUTH TWICE DAILY 12/8/20  Yes ROMAN Espinosa CNP   apixaban (ELIQUIS) 2.5 MG TABS tablet TAKE 1 TABLET BY MOUTH TWICE DAILY 12/8/20  Yes ROMAN Espinosa CNP   metoprolol succinate (TOPROL XL) 25 MG extended release tablet Take 1 tablet by mouth daily 9/21/20  Yes ROMAN Fitzpatrick CNP   losartan (COZAAR) 25 MG tablet Take 1 tablet by mouth daily 7/27/20  Yes ROMAN Espinosa CNP   pravastatin (PRAVACHOL) 40 MG tablet TAKE 1 TABLET BY MOUTH DAILY 7/14/20  Yes ROMAN Fitzpatrick CNP   clopidogrel (PLAVIX) 75 MG tablet Take 1 tablet by mouth daily 6/8/20  Yes ROMAN Espinosa CNP   potassium chloride (KLOR-CON M) 10 MEQ extended release tablet Take 1 tablet by mouth daily 6/8/20  Yes ROMAN Espinosa - CNP   pantoprazole (PROTONIX) 40 MG tablet Take 40 mg by mouth 2 times daily (before meals)   Yes Historical Provider, MD   acetaminophen (TYLENOL ARTHRITIS PAIN) 650 MG CR tablet Take 1,300 mg by mouth every 12 hours as needed for Pain    Yes Historical Provider, MD   timolol (TIMOPTIC) 0.5 % ophthalmic solution Place 1 drop into both eyes nightly    Yes Historical Provider, MD   bumetanide (BUMEX) 1 MG tablet Take 1 tablet by mouth daily as needed (as directed by [facility]) 12/15/20   ROMAN Espinosa - CNP   albuterol sulfate HFA (PROVENTIL HFA) 108 (90 Base) MCG/ACT inhaler Inhale 2 puffs into the lungs every 6 hours as needed for Wheezing or Shortness of Breath 12/31/19   Meagan Mom, DO   Blood Pressure KIT Check blood pressure daily, and if symptoms of lightheadedness. Call physician if BP <80/40. 12/31/19   Meagan Mom, DO   melatonin 3 MG TABS tablet Take 2 tablets by mouth nightly as needed (sleep) 2/26/19   Viki Lopez PA-C       Allergies:    Aspirin, Lasix [furosemide], Lipitor [atorvastatin], Neurontin [gabapentin], Oxycontin [oxycodone hcl], and Penicillins    Social History:    TOBACCO:   reports that she quit smoking about 16 months ago. Her smoking use included cigarettes. She started smoking about 65 years ago. She has a 15.00 pack-year smoking history. She has never used smokeless tobacco.    ETOH:   reports current alcohol use of about 1.0 standard drinks of alcohol per week.     Family History:        Problem Relation Age of Onset    Heart Disease Mother     Stroke Mother     Cancer Father         lung    Emphysema Father     Other Sister         leukemia    Heart Disease Maternal Grandmother     Dementia Maternal Grandmother     Heart Disease Maternal Grandfather     No Known Problems Paternal Grandmother     No Known Problems Paternal Grandfather        Objective:   BP (!) 99/56   Pulse 59 Temp 97.9 °F (36.6 °C) (Oral)   Resp 16   Ht 5' 5\" (1.651 m)   Wt 140 lb (63.5 kg)   SpO2 100%   BMI 23.30 kg/m²       Intake/Output Summary (Last 24 hours) at 3/1/2021 0126  Last data filed at 2/28/2021 1842  Gross per 24 hour   Intake 310 ml   Output --   Net 310 ml       Physical Exam  Vitals signs and nursing note reviewed. Constitutional:       General: She is not in acute distress. Appearance: Normal appearance. She is normal weight. She is not ill-appearing, toxic-appearing or diaphoretic. HENT:      Head: Normocephalic and atraumatic. Right Ear: External ear normal. There is no impacted cerumen. Left Ear: External ear normal. There is no impacted cerumen. Nose: Nose normal. No congestion or rhinorrhea. Mouth/Throat:      Mouth: Mucous membranes are dry. Pharynx: Oropharynx is clear. No oropharyngeal exudate or posterior oropharyngeal erythema. Eyes:      General: No scleral icterus. Right eye: No discharge. Left eye: No discharge. Extraocular Movements: Extraocular movements intact. Conjunctiva/sclera: Conjunctivae normal.      Pupils: Pupils are equal, round, and reactive to light. Neck:      Musculoskeletal: Normal range of motion and neck supple. No neck rigidity or muscular tenderness. Vascular: No carotid bruit. Cardiovascular:      Rate and Rhythm: Normal rate and regular rhythm. Pulses: Normal pulses. Heart sounds: Normal heart sounds. No murmur. No friction rub. No gallop. Pulmonary:      Effort: Pulmonary effort is normal. No respiratory distress. Breath sounds: Normal breath sounds. No stridor. No wheezing, rhonchi or rales. Chest:      Chest wall: No tenderness. Abdominal:      General: Bowel sounds are normal. There is no distension. Palpations: Abdomen is soft. There is no mass. Tenderness: There is no abdominal tenderness.  There is no right CVA tenderness, left CVA tenderness, guarding or rebound. Hernia: No hernia is present. Genitourinary:     Vagina: No vaginal discharge. Rectum: Guaiac result positive. Musculoskeletal: Normal range of motion. General: No swelling, tenderness, deformity or signs of injury. Right lower leg: No edema. Left lower leg: No edema. Lymphadenopathy:      Cervical: No cervical adenopathy. Skin:     General: Skin is warm. Coloration: Skin is pale. Skin is not jaundiced. Findings: No bruising, erythema, lesion or rash. Neurological:      General: No focal deficit present. Mental Status: She is oriented to person, place, and time. Mental status is at baseline. Cranial Nerves: No cranial nerve deficit. Sensory: No sensory deficit. Motor: No weakness. Coordination: Coordination normal.      Gait: Gait normal.      Deep Tendon Reflexes: Reflexes normal.   Psychiatric:         Mood and Affect: Mood normal.         Behavior: Behavior normal.         Thought Content:  Thought content normal.         Judgment: Judgment normal.     :    Medications:    pantoprazole  40 mg Oral BID AC    pravastatin  40 mg Oral Daily    sodium chloride flush  10 mL Intravenous 2 times per day       Continuous Infusions:   sodium chloride      dextrose      sodium chloride         PRN Meds:ipratropium-albuterol, melatonin, sodium chloride flush, promethazine **OR** ondansetron, acetaminophen **OR** acetaminophen, glucose, dextrose, glucagon (rDNA), dextrose, sodium chloride    Data:    CBC:   Recent Labs     02/28/21  1510 02/28/21  1944   WBC 11.7*  --    RBC 3.77*  --    HGB 10.6* 10.0*   HCT 34.7* 32.7*   MCV 92.0  --      --        BMP:   Recent Labs     02/28/21  1510      K 4.9      CO2 26   BUN 42*   CREATININE 1.3*       PT/INR:   Recent Labs     02/28/21  1510   INR 1.43*       LIVER PROFILE:   Recent Labs     02/28/21  1510   AST 31   ALT 15   BILIDIR <0.2   BILITOT 0.2*   ALKPHOS 81      Results for Alvin Tafoya (MRN [id number]) as of 2/28/2021 19:16   Ref. Range 10/2/2020 06:44   Ferritin Latest Ref Range: 10 - 291 ng/mL 23   Iron Latest Ref Range: 50 - 170 ug/dL 43 (L)   Iron Saturation Latest Ref Range: 20 - 50 % 13 (L)   TIBC Latest Ref Range: 171 - 450 ug/dL 343     ABG. None. URINALYSIS. None. SEROLOGY  None. TUMOR MARKERS. None. MICROBIOLOGY   Results for Alvin Tafoya (MRN [id number]) as of 2/28/2021 19:16   Ref. Range 2/28/2021 16:00   OCCULT BLOOD FECAL Unknown Positive       HISTOPATHOLOGY. None. TOXICOLOGY. None. ENDOSCOPE STUDIES. None. PROCEDURES. Cardiac cath report-08/20/2020. CONCLUSION:  1. Successful dual-chamber pacemaker/ICD implant. 2.  Excellent bradycardia sensing/capture thresholds observed. 3.  Normal dual-chamber pacemaker / ICD function postoperatively.     RECOMMENDATIONS:   1. Routine postoperative wound care. 2.  Chest x-ray examination. 3.  Discharge home later today. 4.  Resume anticoagulation therapy this evening. 5.  Follow up with Dr. Maryjane Rubinstein and the [address] [place] [address] on a routine basis. JEFFREY Martinez. GI bleeding scan-02/28/2021. FINDINGS:  Approximately 33 mCi Tc labeled red blood cells were injected Intravenously. Dynamic and sequential static delayed images of the abdomen and pelvis were obtained.     Physiologic uptake is seen within the heart and large abdominal vessels. There is physiologic uptake within the liver and spleen. There are no foci of abnormal uptake within the small or large bowel. Radiotracer activity is not identified in the bladder within the scan duration.     IMPRESSION:  Negative for GI bleed.     This document has been electronically signed by: Deedee Chavarria MD on   02/28/2021 09:59 PM.    GI bleeding scan- 06/29/2020. FINDINGS:  There are no foci of abnormal radiotracer accumulation to suggest active gastrointestinal   bleeding.      Physiologic activity is present in the liver, spleen, urinary bladder and blood vessels.     IMPRESSION:  No scintigraphic evidence of active gastrointestinal bleeding.     Final report electronically signed by Dr. Sajan Singh on 6/29/2020 3:56 PM    CT abdomen pelvis with contrast 02/28/2020  FINDINGS:  Lower thorax:   Visualized lung bases are clear. There is no pleural effusion. There is fluid in a dilated esophagus. This is also present on the prior study.     Abdomen: There is no free intraperitoneal air or fluid. Bowel is normal in course and caliber without evidence of obstruction. There is a gastrostomy tube in the stomach. The gallbladder is surgically absent. Calcified granulomas are present in the spleen. There is fatty replacement of the pancreas. Prominence of the common bile duct is likely postsurgical.     Bilateral renal cortical thinning is noted. Small exophytic hypoattenuating lesions in the kidneys are likely cysts. The liver and adrenal glands are unremarkable. Atherosclerotic calcifications are present in the abdominal aorta. 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 The stent extends into the bilateral common iliac arteries. There is no mesenteric or retroperitoneal lymphadenopathy. Degenerative and scoliotic changes are present in the lumbar spine without evidence of   aggressive osseous lesions.     Pelvis: The urinary bladder is incompletely distended. Phleboliths are noted in the pelvis. The uterus is surgically absent. Metallic clips are present in the bilateral inguinal regions. There is no pelvic or inguinal lymphadenopathy.      Degenerative changes are present in the pelvis without evidence of aggressive osseous lesions.     IMPRESSION:  No acute intra-abdominal or intrapelvic findings.     Final report electronically signed by Dr. Sajan Singh on 2/28/2021 5:02 PM.    Chest x-ray dated 02/28/2020. FINDINGS:  A left-sided cardiac device is in stable position.     Cardiac silhouette is within normal limits. Atherosclerotic calcifications are present in a tortuous thoracic aorta. There is a poorly visualized dilated and tortuous esophagus. No lung consolidations are identified. Degenerative changes in the thoracic spine are poorly visualized. There are calcifications in the right breast.     IMPRESSION:  No acute intrathoracic process. Echocardiogram-06/26/2020. SUMMARY:   Limited examination. Normal left ventricular size and severely reduced systolic function. There was severe global hypokinesis. Hypertrophic basal septum. Ejection fraction was estimated at 25-30%. Left atrial size was severely dilated. The mitral valve structure demonstrated posterior leaflet calcification. DOPPLER:   The transmitral velocity was within the normal range with no evidence for mitral stenosis. There was trace mitral regurgitation. IVC size is within normal limits with normal respiratory phasic changes. Echocardiogram-12/28/2019. SUMMARY:   Normal left ventricular wall thickness. Left Ventricular size is Moderately increased . There was severe global hypokinesis of the left ventricle. Ejection fraction is visually estimated in the range of 10% to 15%. Features were consistent with a pseudonormal left ventricular filling pattern, with concomitant   abnormal relaxation and increased filling pressure (grade 2 diastolic dysfunction). Structurally normal mitral valve. Mild to Moderate mitral regurgitation is present. Tricuspid valve is structurally normal.    Mild tricuspid regurgitation. Right ventricular systolic pressure measures 35-40mmHg. Ascites Vs pleural effusion noted. The aorta is within normal limits. The IVC is dilated . Estimated right atrial pressure is 10-15mmHg .     ASSESSMENT AND PLAN. 1.PULMONARY. COPD without exacerbation-as needed nebs       2. CARDIOVASCULAR. Symptomatic hypovolemic hypotension due to GI bleed -IVF and PRBC. Chronic CHF w/ systolic and diastolic heart dysfunction w/o exacerbation. Cardiomyopathy with AICD. PAF. Hypertension now hypotensive. H/o AAA - stable. Medtronic pacemaker in situ    3.GI. Acute on chronic lower GI bleed -negative bleeding scan. Large hiatal hernia. H/o esophageal stricture and achalasia. 4.RENAL AND ELECTROLYTES. ALEJANDRO due to ATN-hold Bumex. 5.ID. Urinalysis to rule out a UTI. 6.ENDO. Hyperglycemia sliding scale insulin. TSH and A1c check. 7.MUSCULOSKELETAL. H/o neuromuscular disorder. General but weakness due to symptomatic anemia-PT OT evaluation. 8. LIFE STYLE. Remote h/o tobacco use disorder. 9.HEM-ONC. Symptomatic acute on chronic blood loss anemia d/t GIB-s/p PRBC transfusion x1 unit. H/o colon cancer-CEA check. [facility] w/ Eliquis -INR 1.43.    10.DISPO. Planned d/c to home vs rehab soon.                 Electronically signed by Brenda Rebolledo MD on 3/1/2021 at 1:26 AM

## 2021-03-01 NOTE — ED NOTES
Report received from [facility] at this time. Pt off floor for scans at this time.      Daniella Smith RN  02/28/21 [id number]

## 2021-03-01 NOTE — ED NOTES
ED to inpatient nurses report    Chief Complaint   Patient presents with    Rectal Bleeding    Hypotension      Present to ED from home  LOC: alert and orientated to name, place, date  Vital signs   Vitals:    02/28/21 1750 02/28/21 1842 02/28/21 1927 02/28/21 2214   BP: (!) 97/47 (!) 86/40 (!) 97/47 (!) 105/53   Pulse: 68 65 71 65   Resp: 16 14 15 24   Temp:  98 °F (36.7 °C)     TempSrc:  Oral     SpO2: 95% 95% 96% 94%   Weight:       Height:          Oxygen Baseline RA    Current needs required RA Bipap/Cpap no  LDAs:   Peripheral IV 08/20/20 Left Hand (Active)       Peripheral IV 10/05/20 Right Forearm (Active)       Peripheral IV 02/28/21 Left (Active)   Site Assessment Clean;Dry; Intact 02/28/21 1522   Line Status Normal saline locked 02/28/21 1522   Dressing Status Clean;Dry; Intact 02/28/21 1522       Peripheral IV 02/28/21 Right Forearm (Active)   Site Assessment Clean;Dry; Intact 02/28/21 1523   Line Status Normal saline locked 02/28/21 1523   Dressing Status Intact;Dry;Clean 02/28/21 1523     Mobility: Requires assistance * 1  Pending ED orders: none  Present condition: stable      Electronically signed by Rebecca Mccann RN on 2/28/2021 at 10:42 PM       Rebecca Mccann RN  02/28/21 [id number]

## 2021-03-01 NOTE — CONSULTS
Consult History & Physical      Patient:  Jessica Hernandez  YOB: 1935  MRN: [id number]     Acct: [de-identified]    Chief Complaint:    Chief Complaint   Patient presents with    Rectal Bleeding    Hypotension       Date of Service: Pt seen/examined in consultation on 3/1/2021    History Of Present Illness:      82 y.o. female who we are asked to see/evaluate by Muriel Ndiaye MD for medical management of rectal bleeding. She came to the ED yesterday for rectal bleeding with clots that started 2 days ago. She is on Eliquis and Plavix for a history of afib, AICD, & PVD. She last had both yesterday. She has a history of lower GI bleed at the anastomosis site and hemorrhoidal. She is concerned about her hemorrhoids. She uses Anusol suppositories at home as needed, but has not used them recently. She denies abdominal & rectal pain, nausea, vomiting, diarrhea, constipation, and melena. She was short of breath and dizzy when she came in. She was noted to be hypotensive. Initial Hgb 10.6 down from 16.4 10/2020, she received 1 unit PRBC, most recent Hgb 9.2. Denies NSAID use. CT A/P negative. NM GI blood loss scan negative. She has a history of partial colectomy in 2018 for sigmoid/rectal severe diverticular disease. She has a history of colon cancer with a right hemicolectomy 02/2020 for cecal mass, by Dr. Eliceo Mathis. She has a PEG tube that was placed at [facility] to pexy her stomach and pull her hiatal hernia down. She has a history of very large thoracic inlet and esophageal diverticulum. Last colonoscopy 02/25/20 demonstrated large cecal mass, 2 polyps not removed, bleeding at the anastomotic anastomotic site in the sigmoid colon with hemostasis achieved using Hemoclips. She was admitted at [facility] 10/2020 and underwent exploratory laparotomy 10/06/20 that demonstrated 75 cm of hyperemic small bowel & hemorrhagic ascites, wound kept open with Abthera dressing.  She underwent repeat exploratory laparotomy, gastrostomy tube CNP   potassium chloride (KLOR-CON M) 10 MEQ extended release tablet Take 1 tablet by mouth daily 6/8/20  Yes ROMAN Espinosa CNP   pantoprazole (PROTONIX) 40 MG tablet Take 40 mg by mouth 2 times daily (before meals)   Yes Historical Provider, MD   acetaminophen (TYLENOL ARTHRITIS PAIN) 650 MG CR tablet Take 1,300 mg by mouth every 12 hours as needed for Pain    Yes Historical Provider, MD   timolol (TIMOPTIC) 0.5 % ophthalmic solution Place 1 drop into both eyes nightly    Yes Historical Provider, MD   bumetanide (BUMEX) 1 MG tablet Take 1 tablet by mouth daily as needed (as directed by [facility]) 12/15/20   ROMAN Espinosa CNP   albuterol sulfate HFA (PROVENTIL HFA) 108 (90 Base) MCG/ACT inhaler Inhale 2 puffs into the lungs every 6 hours as needed for Wheezing or Shortness of Breath 12/31/19   Lamar West, DO   Blood Pressure KIT Check blood pressure daily, and if symptoms of lightheadedness.   Call physician if BP <80/40. 12/31/19   Lamar West, DO   melatonin 3 MG TABS tablet Take 2 tablets by mouth nightly as needed (sleep) 2/26/19   Chary Resendiz PA-C       Surgical History:  Past Surgical History:   Procedure Laterality Date    ABDOMINAL AORTIC ANEURYSM REPAIR, ENDOVASCULAR N/A 2/15/2019    ABDOMINAL AORTIC ANEURYSM REPAIR ENDOVASCULAR, DECLOTTING RIGHT FEM TIB BYPASS GRAFT performed by Katarzyna Ureña MD at [address] [facility] 1958    lumpectomy    CHOLECYSTECTOMY      30 years ago    COLONOSCOPY  08/06/2018    Dr Chele Villeda 2/22/2019    COLONOSCOPY DIAGNOSTIC performed by Praveen Riley MD at [facility]    COLONOSCOPY N/A 2/22/2020    COLONOSCOPY CONTROL HEMORRHAGE performed by Hayder Voss MD at [address]      eye, eyelids    EYE SURGERY      cataract    HEMICOLECTOMY N/A 2/25/2020    OPEN RIGHT COLON RESECTION performed by Kaya Ness MD at [address] [facility]  07/15/2016    OTHER SURGICAL HISTORY  10/06/2020    Exp lap washout mesenteric lymph node biopsy EGD abthera placement Dr Bertrand Lock  10/08/2020    reopening recent lap open g tube placement intra operative EGD and primary closure of open abdomen- [address] OLEGARIO SKN SUB GRFT T/A/L AREA/<100SCM /<1ST 25 SCM N/A 9/6/2018    RE-EXPLORATION RIGHT GROIN, DECLOTTING OF FEMORAL BYPASS GRAFT performed by Marvin Cobian MD at [address] EGD TRANSORAL BIOPSY SINGLE/MULTIPLE Left 11/27/2017    EGD BIOPSY performed by Dayanna Romero MD at [address] [place] [address], PARTIAL, Mesha Tirado N/A 8/20/2018    ROBOT ASSISTED COLECTOMY (LOW ANTERIOR) performed by Lory Rogers MD at [address] OFFICE/OUTPT [facility] N/A 9/5/2018    RIGHT FEMORAL EMBOLECTOMY, INTRAOPERATIVE ARTERIOGRAM, RIGHT ILIAC EMBOLECTOMY, RIGHT COMMON AND EXTERNAL ILIAC STENTING, RIGHT FEMORAL TO ANTERIOR POPLITEAL BYPASS WITH 6MM GORTEX GRAFT performed by Marvin Cobian MD at [address] / [address] Right 2/14/2019    FEMORAL EMBOLECTOMY THROMBECTOMY, RIGHT LEG [facility] N/A 11/27/2017    EGD SUBMUCOSAL/BOTOX INJECTION performed by Dayanna Romero MD at [address] [facility] N/A 2/22/2019    EGD BIOPSY performed by Maggie King MD at [facility]       Family History:  Family History   Problem Relation Age of Onset    Heart Disease Mother     Stroke Mother     Cancer Father         lung    Emphysema Father     Other Sister         leukemia    Heart Disease Maternal Grandmother     Dementia Maternal Grandmother     Heart Disease Maternal Grandfather     No Known Problems Paternal Grandmother     No Known Problems Paternal Grandfather        Past GI History:  Colon polyps, colon cancer, GERD, achalasia, PEG tube 10/2020, hemorrhoids, lower GI bleed, partial colectomy 08/2018 d/t sigmoid/rectal severe diverticular disease, right hemicolectomy 02/2020 for cecal mass & colon CA, esophageal diverticulum, hiatal hernia, esophageal dysmotility, EGD, colonoscopy  Dr. Shyam Saldaña patient  Follow[facility]    Allergies:  Aspirin, Lasix [furosemide], Lipitor [atorvastatin], Neurontin [gabapentin], Oxycontin [oxycodone hcl], and Penicillins    Social History:   TOBACCO:   reports that she quit smoking about 16 months ago. Her smoking use included cigarettes. She started smoking about 65 years ago. She has a 15.00 pack-year smoking history. She has never used smokeless tobacco.  ETOH:   reports current alcohol use of about 1.0 standard drinks of alcohol per week. Review Of Systems  GENERAL: No fever, chills or weight loss. EYES:  No  blurred vision, double vision   CARDIOVASCULAR: No chest pain or palpitations. RESPIRATORY:  +dyspnea   GI:  See HPI  MUSCULOSKELETAL: No new painful or swollen joints or myalgias. :   No dysuria or hematuria. SKIN:  No rashes or jaundice. NEUROLOGIC:  +dizziness  PSYCH:  No anxiety or depression. ENDOCRINE:  No polyuria or polydipsia. BLOOD:  +anemia +blood transfusion    PHYSICAL EXAM:  BP (!) 106/50   Pulse 62   Temp 98.1 °F (36.7 °C) (Oral)   Resp 16   Ht 5' 5\" (1.651 m)   Wt 143 lb (64.9 kg)   SpO2 96%   BMI 23.80 kg/m²     General appearance: Chronically ill appearing female  HEENT: Normal cephalic, atraumatic without obvious deformity. Pupils equal, round, and reactive to light. Neck: Supple, with full range of motion. No jugular venous distention. Trachea midline. Respiratory:  Normal respiratory effort. Clear to auscultation, bilaterally without Rales/Wheezes/Rhonchi. Cardiovascular: Regular rate and rhythm without murmurs, rubs or gallops. Abdomen: Soft, non-tender, non-distended with active bowel sounds.  PEG tube intact & patent  Musculoskeletal: No clubbing, cyanosis or edema bilaterally. Skin: Pink, warm, dry. No rashes or lesions. Psychiatric: Alert and oriented, thought content appropriate, normal insight  Rectal: patient refused    Labs:   Recent Labs     02/28/21  1510 02/28/21  1510 03/01/21  0648   WBC 11.7*  --   --    HGB 10.6*   < > 9.2*   HCT 34.7*   < > 29.5*     --   --     < > = values in this interval not displayed. Recent Labs     02/28/21  1510      K 4.9      CO2 26   BUN 42*   CREATININE 1.3*   CALCIUM 9.1     Recent Labs     02/28/21  1510   AST 31   ALT 15   BILIDIR <0.2   BILITOT 0.2*   ALKPHOS 81     Recent Labs     02/28/21  1510   INR 1.43*       Radiology:   CXR 02/28/21      Impression       No acute intrathoracic process. CT abdomen/pelvis W IV contrast 02/28/21      Impression       No acute intra-abdominal or intrapelvic findings. NM GI blood loss scan 02/28/21      Impression   Negative for GI bleed. Code Status: Full Code    ASSESSMENT:  1. Acute on chronic rectal bleeding with clots  2. Symptomatic anemia- s/p 1 unit PRBC  3. Afib s/p AICD- on Eliquis & Plavix- considering Watchman procedure  4. H/O partial colectomy 02/2018, h/o right hemicolectomy 02/2020  5. H/O colon CA s/p right hemicolectomy  6. H/O internal hemorrhoids- on Anusol suppositories prn  7. H/O diverticulosis  8. GERD  9. Large hiatal hernia & distal esophageal diverticulum s/p PEG 10/2020  10. Achalasia s/p PEG 10/2020  11. H/O AAA  12. CHF, not exacerbated  13. COPD, not exacerbated  14. PVD- on Eliquis & Plavix  15. H/O HTN  16.  HLD    PLAN:     Monitor H & H, transfuse prn   Nursing to monitor stool output & document   Anemia labs   Anusol suppository BID   Stop PPI gtt, change to PO PPI BID, home dose   Clear liquid diet, nothing red   NPO at midnight   Rapid COVID test for endo   Senna/Miralax prep   Colonoscopy 03/02/21 at 1345 with Dr. Luciana Haney care per primary team  Will follow       Case reviewed and

## 2021-03-01 NOTE — ED NOTES
Pt is transported to [facility] without incident. Floor nurse was contacted prior to departure.

## 2021-03-01 NOTE — CARE COORDINATION
3/1/21, 8:51 AM EST  DISCHARGE PLANNING EVALUATION:    Lee Houston       Admitted: 2/28/2021/ [address] day: 1   Location: [facility]05/005-A Reason for admit: Chronic lower GI bleeding [K92.2]   PMH:  has a past medical history of A-fib ([address] AAA (abdominal aortic aneurysm) ([address] Achalasia, Anemia, Arthritis, Blood circulation, collateral, BPPV (benign paroxysmal positional vertigo), CHF (congestive heart failure) ([address] Chronic kidney disease, Colon cancer ([address] Gastrointestinal hemorrhage, GERD (gastroesophageal reflux disease), Hiatal hernia, History of blood transfusion, HTN (hypertension), Hx of blood clots, Hyperlipidemia, [employer] dual ICD , Neuromuscular disorder ([address] Obesity, Osteopenia, Osteoporosis, PAC (premature atrial contraction), Paralysis ([address] Pedal edema, Pneumonia, PVC (premature ventricular contraction), PVD (peripheral vascular disease) ([address] Small bowel obstruction ([address] Tinnitus, and UTI (urinary tract infection). Procedure: Colonoscopy planned for 3/2. Barriers to Discharge:  From ED, urine (+), Hgb 10.6 on presentation to ED, transfused 1 unit PRBC's. Telemetry,  prophylactic, post hgb is 9.2, GI consult, diabetes management, PEG tube maintenance. IV fluids, Duonebs, Protonix drip. PCP: Nidia Roy  Readmission Risk Score: 25%    Patient Goals/Plan/Treatment Preferences: Met with Nhung Cruz. She currently lives at home alone at [facility]. She uses a walker. She has support from her neighbors. Plan is to return home at discharge. She denies need for DME and declines HH. Will follow for potential needs. Transportation/Food Security/Housekeeping Addressed:  No issues identified.

## 2021-03-01 NOTE — ED NOTES
Called to update family, Raeann, on admission and plan of care.       Sury Chaudhary RN  02/28/21 9262

## 2021-03-01 NOTE — PROGRESS NOTES
Hospitalist Progress Note      Patient:  Stephanie Mason    Unit/Bed:4A-05/005-A  YOB: 1935  MRN: [id number]   Acct: [de-identified]   PCP: Pooja Dior  Date of Admission: 2/28/2021    Assessment/Plan:    1. Acute blood loss anemia secondary to GI bleed likely exacerbated being on Plavix and Eliquis-received 1 unit PRBC transfusion posttransfusion 9.0 hemoglobin-added Protonix drip, keep patient n.p.o., GI consult, plan continue to monitor hemoglobin every 8 hours, hold all blood thinners,.  2. History of chronic systolic heart failure and cardiomyopathy status post AICD-currently compensated, on Bumex oral daily hypotension likely secondary to GI bleed-on gentle IV fluids-blood pressures better since admission  3. History of type 2 diabetes-continue insulin sliding scale being n.p.o.  4. History of benign essential hypertension-currently hypotensive-holding blood pressure medications for now  5. History of chronic GI bleed, GERD-on Protonix oral twice daily currently on Protonix drip  6. History of hyperlipidemia-on statin-continue    Chief Complaint: Rectal bleed    Initial H and P:-    72-year-old female patient presented to the ED today with worsening acute on chronic lower GI bleed with bright red blood per rectum. History of lower GI bleed for the past 2 months but increased hematochezia with blood clots in the last 1 to 2 days .       Patient is on long-term anticoagulation w/  Eliquis and takes Plavix too.     Patient has developed dizziness due to symptomatic anemia. S/p 1 unit of PRBC transfusion in the ED.       CT abdomen pelvis and bleeding scan unremarkable.        Patient hemodynamically stable at the time of interview.        Initial vital signs in the ED temperature 36.8 °C, respirate of 20 breaths/min, heart rate 106 bpm, blood pressure 85/73 with the lowest of 79/47, oxygen 100% on room air.     Labs in the ED sodium 140, potassium 4.9, creatinine 1.3, blood sugar 214, osmolality 296.3, WBC 7.7, hemoglobin 10.6, platelets 264. INR 1.43, positive occult blood test.     Twelve-lead EKG which reviewed shows normal sinus rhythm at 68 bpm.  Positive PVCs. To inversion V4 V5 and V6. No ST segment elevation or depression.     Chest x-ray which reviewed is negative for infiltrate. Positive AICD.        Subjective (past 24 hours):   Doing okay  Still with rectal bleed  Received PRBC overnight  Denies any nausea vomiting or diarrhea      Past medical history, family history, social history and allergies reviewed again and is unchanged since admission. ROS (12 point review of systems completed. Pertinent positives noted. Otherwise ROS is negative)     Medications:  Reviewed    Infusion Medications    sodium chloride 75 mL/hr at 03/01/21 0133    dextrose      sodium chloride       Scheduled Medications    pravastatin  40 mg Oral Daily    sodium chloride flush  10 mL Intravenous 2 times per day    pantoprazole  40 mg Oral BID AC    hydrocortisone  25 mg Rectal BID    senna  15 tablet Oral Once    polyethylene glycol  238 g Oral Once     PRN Meds: ipratropium-albuterol, melatonin, sodium chloride flush, promethazine **OR** ondansetron, acetaminophen **OR** acetaminophen, glucose, dextrose, glucagon (rDNA), dextrose, sodium chloride      Intake/Output Summary (Last 24 hours) at 3/1/2021 1125  Last data filed at 3/1/2021 5312  Gross per 24 hour   Intake 455.7 ml   Output --   Net 455.7 ml       Diet:  DIET CLEAR LIQUID;  Diet NPO, After Midnight Exceptions are: Ice Chips    Exam:  BP (!) 106/50   Pulse 62   Temp 98.1 °F (36.7 °C) (Oral)   Resp 16   Ht 5' 5\" (1.651 m)   Wt 143 lb (64.9 kg)   SpO2 96%   BMI 23.80 kg/m²   General appearance: No apparent distress, appears stated age and cooperative. HEENT: Pupils equal, round, and reactive to light. Conjunctivae/corneas clear. Neck: Supple, with full range of motion.  No jugular Results   Component Value Date    LABAERO light growth 10/24/2018     Lab Results   Component Value Date    LABANAE  10/24/2018     Culture yielded moderate mixed growth consisting of anaerobic  gram negative bacilli and anaerobic gram positive cocci. If a  true mixed aerobic and anaerobic infection is suspected, then  broad spectrum empiric antibiotic therapy is indicated and  should include coverage for anaerobic organisms. Urinalysis:      Lab Results   Component Value Date    NITRU POSITIVE 03/01/2021    WBCUA 0-2 03/01/2021    BACTERIA MANY 03/01/2021    RBCUA 0-2 03/01/2021    BLOODU TRACE 03/01/2021    SPECGRAV >1.030 03/01/2021    GLUCOSEU NEGATIVE 10/02/2020       Radiology:  NM GI BLOOD LOSS   Final Result   Negative for GI bleed. This document has been electronically signed by: Mili Caldwell MD on    02/28/2021 09:59 PM      CT ABDOMEN PELVIS W IV CONTRAST   Final Result      No acute intra-abdominal or intrapelvic findings. Final report electronically signed by Dr. Kenisha Garcias on 2/28/2021 5:02 PM      XR CHEST PORTABLE   Final Result      No acute intrathoracic process. **This report has been created using voice recognition software. It may contain minor errors which are inherent in voice recognition technology. **      Final report electronically signed by Dr. Kenisha Garcias on 2/28/2021 3:33 PM        Nm Gi Blood Loss    Result Date: 2/28/2021  NM GI Bleed Study Comparison:  NM GI Bleed 06/29/2020 Findings: Approximately 33 mCi Tc labeled red blood cells were injected intravenously. Dynamic and sequential static delayed images of the abdomen and pelvis were obtained. Physiologic uptake is seen within the heart and large abdominal vessels. There is physiologic uptake within the liver and spleen. There are no foci of abnormal uptake within the small or large bowel. Radiotracer activity is not identified in the bladder within the scan duration. Negative for GI bleed. This document has been electronically signed by: Swathi Nguyen MD on 02/28/2021 09:59 PM    Ct Abdomen Pelvis W Iv Contrast    Result Date: 2/28/2021  PROCEDURE: CT ABDOMEN PELVIS W IV CONTRAST CLINICAL INFORMATION: Abdominal pain TECHNIQUE: CT of the abdomen and pelvis was performed following administration of 80 mL Isovue-370 intravenous contrast only. Axial images as well as coronal and sagittal reconstructions were obtained. All CT scans at this facility use dose modulation, iterative reconstruction, and/or weight-based dosing when appropriate to reduce radiation dose to as low as reasonably achievable. COMPARISON: CT abdomen and pelvis 10/5/2020 FINDINGS: Lower thorax: Visualized lung bases are clear. There is no pleural effusion. There is fluid in a dilated esophagus. This is also present on the prior study. Abdomen: There is no free intraperitoneal air or fluid. Bowel is normal in course and caliber without evidence of obstruction. There is a gastrostomy tube in the stomach. The gallbladder is surgically absent. Calcified granulomas are present in the spleen. There is fatty replacement of the pancreas. Prominence of the common bile duct is likely postsurgical. Bilateral renal cortical thinning is noted. Small exophytic hypoattenuating lesions in the kidneys are likely cysts. The liver and adrenal glands are unremarkable. Atherosclerotic calcifications are present in the abdominal aorta. 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 The stent extends into the bilateral common iliac arteries. There is no mesenteric or retroperitoneal lymphadenopathy. Degenerative and scoliotic changes are present in the lumbar spine without evidence of aggressive osseous lesions. Pelvis: The urinary bladder is incompletely distended. Phleboliths are noted in the pelvis. The uterus is surgically absent. Metallic clips are present in the bilateral inguinal regions.  There is no pelvic or inguinal lymphadenopathy. Degenerative changes are present in the pelvis without evidence of aggressive osseous lesions. No acute intra-abdominal or intrapelvic findings. Final report electronically signed by Dr. Poornima Higginbotham on 2/28/2021 5:02 PM    Xr Chest Portable    Result Date: 2/28/2021  PROCEDURE: XR CHEST PORTABLE CLINICAL INFORMATION: Fatigue TECHNIQUE: Mobile AP chest radiograph. COMPARISON: Mobile AP chest radiograph 10/5/2020 FINDINGS: A left-sided cardiac device is in stable position. Cardiac silhouette is within normal limits. Atherosclerotic calcifications are present in a tortuous thoracic aorta. There is a poorly visualized dilated and tortuous esophagus. No lung consolidations are identified. Degenerative changes in the thoracic spine are poorly visualized. There are calcifications in the right breast.     No acute intrathoracic process. **This report has been created using voice recognition software. It may contain minor errors which are inherent in voice recognition technology. ** Final report electronically signed by Dr. Poornima Higginbotham on 2/28/2021 3:33 PM      Electronically signed by Palak Mccarthy MD on 3/1/2021 at 11:25 AM

## 2021-03-02 ENCOUNTER — APPOINTMENT (OUTPATIENT)
Dept: INTERVENTIONAL RADIOLOGY/VASCULAR | Age: 86
DRG: 377 | End: 2021-03-02
Payer: MEDICARE

## 2021-03-02 ENCOUNTER — ANESTHESIA (OUTPATIENT)
Dept: ENDOSCOPY | Age: 86
DRG: 377 | End: 2021-03-02
Payer: MEDICARE

## 2021-03-02 ENCOUNTER — ANESTHESIA EVENT (OUTPATIENT)
Dept: ENDOSCOPY | Age: 86
DRG: 377 | End: 2021-03-02
Payer: MEDICARE

## 2021-03-02 ENCOUNTER — APPOINTMENT (OUTPATIENT)
Dept: CT IMAGING | Age: 86
DRG: 377 | End: 2021-03-02
Payer: MEDICARE

## 2021-03-02 ENCOUNTER — APPOINTMENT (OUTPATIENT)
Dept: GENERAL RADIOLOGY | Age: 86
DRG: 377 | End: 2021-03-02
Payer: MEDICARE

## 2021-03-02 VITALS
OXYGEN SATURATION: 90 % | SYSTOLIC BLOOD PRESSURE: 112 MMHG | RESPIRATION RATE: 22 BRPM | DIASTOLIC BLOOD PRESSURE: 64 MMHG

## 2021-03-02 LAB
ANION GAP SERPL CALCULATED.3IONS-SCNC: 7 MEQ/L (ref 8–16)
APTT: 28.4 SECONDS (ref 22–38)
BASE EXCESS (CALCULATED): -3.8 MMOL/L (ref -2.5–2.5)
BUN BLDV-MCNC: 21 MG/DL (ref 7–22)
CALCIUM SERPL-MCNC: 8.1 MG/DL (ref 8.5–10.5)
CHLORIDE BLD-SCNC: 107 MEQ/L (ref 98–111)
CO2: 26 MEQ/L (ref 23–33)
COLLECTED BY:: ABNORMAL
CREAT SERPL-MCNC: 0.9 MG/DL (ref 0.4–1.2)
DEVICE: ABNORMAL
ERYTHROCYTE [DISTWIDTH] IN BLOOD BY AUTOMATED COUNT: 16.4 % (ref 11.5–14.5)
ERYTHROCYTE [DISTWIDTH] IN BLOOD BY AUTOMATED COUNT: 51.7 FL (ref 35–45)
GFR SERPL CREATININE-BSD FRML MDRD: 60 ML/MIN/1.73M2
GLUCOSE BLD-MCNC: 103 MG/DL (ref 70–108)
GLUCOSE BLD-MCNC: 72 MG/DL (ref 70–108)
GLUCOSE BLD-MCNC: 82 MG/DL (ref 70–108)
GLUCOSE BLD-MCNC: 89 MG/DL (ref 70–108)
GLUCOSE BLD-MCNC: 90 MG/DL (ref 70–108)
HCO3: 20 MMOL/L (ref 23–28)
HCT VFR BLD CALC: 31.5 % (ref 37–47)
HCT VFR BLD CALC: 31.6 % (ref 37–47)
HCT VFR BLD CALC: 32.1 % (ref 37–47)
HCT VFR BLD CALC: 33.6 % (ref 37–47)
HEMOGLOBIN: 10 GM/DL (ref 12–16)
HEMOGLOBIN: 10 GM/DL (ref 12–16)
HEMOGLOBIN: 10.6 GM/DL (ref 12–16)
HEMOGLOBIN: 9.6 GM/DL (ref 12–16)
IFIO2: 50
MCH RBC QN AUTO: 27.7 PG (ref 26–33)
MCHC RBC AUTO-ENTMCNC: 31.6 GM/DL (ref 32.2–35.5)
MCV RBC AUTO: 87.5 FL (ref 81–99)
O2 SATURATION: 86 %
PCO2: 33 MMHG (ref 35–45)
PH BLOOD GAS: 7.4 (ref 7.35–7.45)
PLATELET # BLD: 123 THOU/MM3 (ref 130–400)
PMV BLD AUTO: 11.4 FL (ref 9.4–12.4)
PO2: 51 MMHG (ref 71–104)
POTASSIUM REFLEX MAGNESIUM: 4.1 MEQ/L (ref 3.5–5.2)
RBC # BLD: 3.61 MILL/MM3 (ref 4.2–5.4)
SODIUM BLD-SCNC: 140 MEQ/L (ref 135–145)
SOURCE, BLOOD GAS: ABNORMAL
TROPONIN T: < 0.01 NG/ML
WBC # BLD: 9.3 THOU/MM3 (ref 4.8–10.8)

## 2021-03-02 PROCEDURE — 2500000003 HC RX 250 WO HCPCS: Performed by: INTERNAL MEDICINE

## 2021-03-02 PROCEDURE — 84484 ASSAY OF TROPONIN QUANT: CPT

## 2021-03-02 PROCEDURE — 6370000000 HC RX 637 (ALT 250 FOR IP): Performed by: INTERNAL MEDICINE

## 2021-03-02 PROCEDURE — 2709999900 HC NON-CHARGEABLE SUPPLY

## 2021-03-02 PROCEDURE — 80048 BASIC METABOLIC PNL TOTAL CA: CPT

## 2021-03-02 PROCEDURE — 93005 ELECTROCARDIOGRAM TRACING: CPT | Performed by: INTERNAL MEDICINE

## 2021-03-02 PROCEDURE — C1894 INTRO/SHEATH, NON-LASER: HCPCS

## 2021-03-02 PROCEDURE — 6360000004 HC RX CONTRAST MEDICATION: Performed by: INTERNAL MEDICINE

## 2021-03-02 PROCEDURE — 36600 WITHDRAWAL OF ARTERIAL BLOOD: CPT

## 2021-03-02 PROCEDURE — 85027 COMPLETE CBC AUTOMATED: CPT

## 2021-03-02 PROCEDURE — 3700000000 HC ANESTHESIA ATTENDED CARE: Performed by: INTERNAL MEDICINE

## 2021-03-02 PROCEDURE — 2500000003 HC RX 250 WO HCPCS

## 2021-03-02 PROCEDURE — C1769 GUIDE WIRE: HCPCS

## 2021-03-02 PROCEDURE — 6370000000 HC RX 637 (ALT 250 FOR IP): Performed by: NURSE PRACTITIONER

## 2021-03-02 PROCEDURE — 6360000002 HC RX W HCPCS: Performed by: NURSE PRACTITIONER

## 2021-03-02 PROCEDURE — 99232 SBSQ HOSP IP/OBS MODERATE 35: CPT | Performed by: INTERNAL MEDICINE

## 2021-03-02 PROCEDURE — 73706 CT ANGIO LWR EXTR W/O&W/DYE: CPT

## 2021-03-02 PROCEDURE — 36415 COLL VENOUS BLD VENIPUNCTURE: CPT

## 2021-03-02 PROCEDURE — 85018 HEMOGLOBIN: CPT

## 2021-03-02 PROCEDURE — 6360000002 HC RX W HCPCS

## 2021-03-02 PROCEDURE — 3609027000 HC COLONOSCOPY: Performed by: INTERNAL MEDICINE

## 2021-03-02 PROCEDURE — 99223 1ST HOSP IP/OBS HIGH 75: CPT | Performed by: INTERNAL MEDICINE

## 2021-03-02 PROCEDURE — 94669 MECHANICAL CHEST WALL OSCILL: CPT

## 2021-03-02 PROCEDURE — 94760 N-INVAS EAR/PLS OXIMETRY 1: CPT

## 2021-03-02 PROCEDURE — 71045 X-RAY EXAM CHEST 1 VIEW: CPT

## 2021-03-02 PROCEDURE — 82803 BLOOD GASES ANY COMBINATION: CPT

## 2021-03-02 PROCEDURE — 2580000003 HC RX 258: Performed by: NURSE PRACTITIONER

## 2021-03-02 PROCEDURE — 2700000000 HC OXYGEN THERAPY PER DAY

## 2021-03-02 PROCEDURE — 3700000001 HC ADD 15 MINUTES (ANESTHESIA): Performed by: INTERNAL MEDICINE

## 2021-03-02 PROCEDURE — 7100000010 HC PHASE II RECOVERY - FIRST 15 MIN: Performed by: INTERNAL MEDICINE

## 2021-03-02 PROCEDURE — 85730 THROMBOPLASTIN TIME PARTIAL: CPT

## 2021-03-02 PROCEDURE — 0DJD8ZZ INSPECTION OF LOWER INTESTINAL TRACT, VIA NATURAL OR ARTIFICIAL OPENING ENDOSCOPIC: ICD-10-PCS | Performed by: INTERNAL MEDICINE

## 2021-03-02 PROCEDURE — 94640 AIRWAY INHALATION TREATMENT: CPT

## 2021-03-02 PROCEDURE — 1200000003 HC TELEMETRY R&B

## 2021-03-02 PROCEDURE — 6370000000 HC RX 637 (ALT 250 FOR IP): Performed by: FAMILY MEDICINE

## 2021-03-02 PROCEDURE — 6360000002 HC RX W HCPCS: Performed by: FAMILY MEDICINE

## 2021-03-02 PROCEDURE — 82948 REAGENT STRIP/BLOOD GLUCOSE: CPT

## 2021-03-02 PROCEDURE — 85014 HEMATOCRIT: CPT

## 2021-03-02 PROCEDURE — 2580000003 HC RX 258: Performed by: INTERNAL MEDICINE

## 2021-03-02 PROCEDURE — 7100000011 HC PHASE II RECOVERY - ADDTL 15 MIN: Performed by: INTERNAL MEDICINE

## 2021-03-02 RX ORDER — HEPARIN SODIUM 1000 [USP'U]/ML
80 INJECTION, SOLUTION INTRAVENOUS; SUBCUTANEOUS PRN
Status: DISCONTINUED | OUTPATIENT
Start: 2021-03-02 | End: 2021-03-04

## 2021-03-02 RX ORDER — HEPARIN SODIUM 1000 [USP'U]/ML
60 INJECTION, SOLUTION INTRAVENOUS; SUBCUTANEOUS PRN
Status: DISCONTINUED | OUTPATIENT
Start: 2021-03-02 | End: 2021-03-02

## 2021-03-02 RX ORDER — FENTANYL CITRATE 50 UG/ML
50 INJECTION, SOLUTION INTRAMUSCULAR; INTRAVENOUS ONCE
Status: COMPLETED | OUTPATIENT
Start: 2021-03-02 | End: 2021-03-02

## 2021-03-02 RX ORDER — METOPROLOL SUCCINATE 25 MG/1
25 TABLET, EXTENDED RELEASE ORAL DAILY
Status: DISCONTINUED | OUTPATIENT
Start: 2021-03-02 | End: 2021-03-07 | Stop reason: HOSPADM

## 2021-03-02 RX ORDER — HEPARIN SODIUM 10000 [USP'U]/100ML
5-30 INJECTION, SOLUTION INTRAVENOUS CONTINUOUS
Status: DISCONTINUED | OUTPATIENT
Start: 2021-03-02 | End: 2021-03-02

## 2021-03-02 RX ORDER — HEPARIN SODIUM 10000 [USP'U]/100ML
5-30 INJECTION, SOLUTION INTRAVENOUS CONTINUOUS
Status: DISCONTINUED | OUTPATIENT
Start: 2021-03-02 | End: 2021-03-04

## 2021-03-02 RX ORDER — IPRATROPIUM BROMIDE AND ALBUTEROL SULFATE 2.5; .5 MG/3ML; MG/3ML
1 SOLUTION RESPIRATORY (INHALATION)
Status: CANCELLED | OUTPATIENT
Start: 2021-03-02

## 2021-03-02 RX ORDER — IPRATROPIUM BROMIDE AND ALBUTEROL SULFATE 2.5; .5 MG/3ML; MG/3ML
1 SOLUTION RESPIRATORY (INHALATION) 4 TIMES DAILY
Status: DISCONTINUED | OUTPATIENT
Start: 2021-03-02 | End: 2021-03-03

## 2021-03-02 RX ORDER — METOPROLOL TARTRATE 5 MG/5ML
5 INJECTION INTRAVENOUS ONCE
Status: COMPLETED | OUTPATIENT
Start: 2021-03-02 | End: 2021-03-02

## 2021-03-02 RX ORDER — HEPARIN SODIUM 1000 [USP'U]/ML
40 INJECTION, SOLUTION INTRAVENOUS; SUBCUTANEOUS PRN
Status: DISCONTINUED | OUTPATIENT
Start: 2021-03-02 | End: 2021-03-04

## 2021-03-02 RX ORDER — IPRATROPIUM BROMIDE AND ALBUTEROL SULFATE 2.5; .5 MG/3ML; MG/3ML
1 SOLUTION RESPIRATORY (INHALATION) PRN
Status: DISCONTINUED | OUTPATIENT
Start: 2021-03-02 | End: 2021-03-06

## 2021-03-02 RX ORDER — HEPARIN SODIUM 1000 [USP'U]/ML
30 INJECTION, SOLUTION INTRAVENOUS; SUBCUTANEOUS PRN
Status: DISCONTINUED | OUTPATIENT
Start: 2021-03-02 | End: 2021-03-02 | Stop reason: CLARIF

## 2021-03-02 RX ORDER — FENTANYL CITRATE 50 UG/ML
INJECTION, SOLUTION INTRAMUSCULAR; INTRAVENOUS
Status: COMPLETED
Start: 2021-03-02 | End: 2021-03-02

## 2021-03-02 RX ORDER — HEPARIN SODIUM 1000 [USP'U]/ML
80 INJECTION, SOLUTION INTRAVENOUS; SUBCUTANEOUS ONCE
Status: COMPLETED | OUTPATIENT
Start: 2021-03-02 | End: 2021-03-02

## 2021-03-02 RX ORDER — HEPARIN SODIUM 1000 [USP'U]/ML
60 INJECTION, SOLUTION INTRAVENOUS; SUBCUTANEOUS ONCE
Status: DISCONTINUED | OUTPATIENT
Start: 2021-03-02 | End: 2021-03-02

## 2021-03-02 RX ADMIN — FENTANYL CITRATE 50 MCG: 50 INJECTION, SOLUTION INTRAMUSCULAR; INTRAVENOUS at 23:10

## 2021-03-02 RX ADMIN — PRAVASTATIN SODIUM 40 MG: 40 TABLET ORAL at 08:48

## 2021-03-02 RX ADMIN — HEPARIN SODIUM 18 UNITS/KG/HR: 10000 INJECTION, SOLUTION INTRAVENOUS at 23:40

## 2021-03-02 RX ADMIN — HEPARIN SODIUM 4940 UNITS: 1000 INJECTION INTRAVENOUS; SUBCUTANEOUS at 23:36

## 2021-03-02 RX ADMIN — SODIUM CHLORIDE, PRESERVATIVE FREE 10 ML: 5 INJECTION INTRAVENOUS at 09:24

## 2021-03-02 RX ADMIN — ACETAMINOPHEN 650 MG: 650 SUPPOSITORY RECTAL at 20:12

## 2021-03-02 RX ADMIN — IPRATROPIUM BROMIDE AND ALBUTEROL SULFATE 1 AMPULE: .5; 3 SOLUTION RESPIRATORY (INHALATION) at 15:18

## 2021-03-02 RX ADMIN — FENTANYL CITRATE 50 MCG: 50 INJECTION, SOLUTION INTRAMUSCULAR; INTRAVENOUS at 22:17

## 2021-03-02 RX ADMIN — PIPERACILLIN AND TAZOBACTAM 3375 MG: 3; .375 INJECTION, POWDER, LYOPHILIZED, FOR SOLUTION INTRAVENOUS at 20:12

## 2021-03-02 RX ADMIN — NITROGLYCERIN 60 G: 20 OINTMENT TOPICAL at 23:29

## 2021-03-02 RX ADMIN — SODIUM CHLORIDE, PRESERVATIVE FREE 10 ML: 5 INJECTION INTRAVENOUS at 20:12

## 2021-03-02 RX ADMIN — PANTOPRAZOLE SODIUM 40 MG: 40 TABLET, DELAYED RELEASE ORAL at 05:14

## 2021-03-02 RX ADMIN — IOPAMIDOL 80 ML: 755 INJECTION, SOLUTION INTRAVENOUS at 22:37

## 2021-03-02 RX ADMIN — IPRATROPIUM BROMIDE AND ALBUTEROL SULFATE 1 AMPULE: .5; 3 SOLUTION RESPIRATORY (INHALATION) at 19:53

## 2021-03-02 RX ADMIN — HYDROCORTISONE ACETATE 25 MG: 25 SUPPOSITORY RECTAL at 20:12

## 2021-03-02 RX ADMIN — HYDROCORTISONE ACETATE 25 MG: 25 SUPPOSITORY RECTAL at 08:48

## 2021-03-02 RX ADMIN — METOPROLOL TARTRATE 5 MG: 5 INJECTION INTRAVENOUS at 19:20

## 2021-03-02 ASSESSMENT — COPD QUESTIONNAIRES: CAT_SEVERITY: MODERATE

## 2021-03-02 ASSESSMENT — PAIN - FUNCTIONAL ASSESSMENT: PAIN_FUNCTIONAL_ASSESSMENT: PREVENTS OR INTERFERES SOME ACTIVE ACTIVITIES AND ADLS

## 2021-03-02 NOTE — FLOWSHEET NOTE
03/02/21 1629   Provider Notification   Reason for Communication Evaluate   Provider Name Dr. Fab Cheatham   Provider Notification Physician   Method of Communication Secure Message   Response No new orders   Notification Time [phone]     Pt returned from endo on simple mask at 6L, satting low 80's. 15L NRB applied, sats improved to 90%. /62, HR 90, temp 96.6, warm blankets applied. Dr. Fab Cheatham updated, states to keep npo, continue nrb. If pt requiring more oxygen to check abg and place on bipap with settings of 12/6. Will monitor.

## 2021-03-02 NOTE — PROGRESS NOTES
Patient in Endoscopy for colonoscopy. SCOPE CFQ- 598  utilized. Photos taken and printed to chart. Patient had right colectomy, therefore cecum not reached in procedure. Patient tolerated the procedure well.

## 2021-03-02 NOTE — PROGRESS NOTES
"   08/28/19 1303   Post-Acute Status   Post-Acute Authorization Home Health/Hospice   Home Health/Hospice Status Referrals Sent   HH ordered for patient.  TN spoke with patient.  Patient requested [facility].  Referral sent via [facility].    DME (MARK) ordered.  Per Layla at [facility]  Ok to pull from depot.  MIRIAM Murphy notified and will deliver to pt's room.    1421:  TN spoke with S Ramy who stated that [facility] is working on referral.    1431:  Per LUISA Mccann at [facility] patient accepted and will be seen tomorrow.  All information placed on AVS.   NurseEthel notified that all C needs are met.  EDUCATION:  Mr Duong provided with educational information on syncope.  Information reviewed and placed in :My Healthcare Packet" to be brought home for him to use as resource after discharge.  Information included:  signs and symptoms to look for and call the doctor if experiencing, and symptoms that may indicate a medical emergency: CALL 911.      All questions answered.  Teach back method used.    Patient stated, "If I feel the same way I felt before call the doctor right away".        " BRIEF H&P//fENDOSCOPY PREPROCEDURE REPORT     Patient: Judith Odonnell  : 1935  Acct#: [de-identified]    Date: 3/2/2021  Indications/Brief History: Acute LGI bleeding  Anticipated Procedure: Colonoscopy    ASA class:  3    Airway Adequate? Yes___x__   No_______    Preprocedure Exam:   Neuro: Alert, oriented. Heart:  Regular rate and rhythm   Lungs: Clear to ausculation bilaterally, no evidence respiratory distress  Abdomen:  soft.   NT    PLAN:  EGD_____   COLONOSCOPY ____x__     ERCP________    Regulo Guerra MD  3/2/2021, 2:03 PM

## 2021-03-02 NOTE — PROGRESS NOTES
Hospitalist Progress Note      Patient:  Venkatesh Ku    Unit/Bed:[facility]05/005-A  YOB: 1935  MRN: [id number]   Acct: [de-identified]   PCP: Sarah Chandra  Date of Admission: 2/28/2021    Assessment/Plan:    1. Acute blood loss anemia secondary to GI bleed likely exacerbated being on Plavix and Eliquis-received 1 unit PRBC transfusion posttransfusion 9.0 hemoglobin-added Protonix drip, keep patient n.p.o., GI consult, plan continue to monitor hemoglobin every 8 hours, hold all blood thinners,.  3/2-patient is going for colonoscopy today however this is done under general anesthesia-we will wait to see what patient feels after she comes back from the OR and if she is willing to get discharged today so far hemoglobin has been stable  2. History of chronic systolic heart failure and cardiomyopathy status post AICD-currently compensated, on Bumex oral daily hypotension likely secondary to GI bleed-on gentle IV fluids-blood pressures better since admission  3. History of type 2 diabetes-continue insulin sliding scale being n.p.o.  4. History of benign essential hypertension-currently hypotensive-holding blood pressure medications for now  5. History of chronic GI bleed, GERD-on Protonix oral twice daily currently on Protonix drip  6. History of hyperlipidemia-on statin-continue    Chief Complaint: Rectal bleed    Initial H and P:-    31-year-old female patient presented to the ED today with worsening acute on chronic lower GI bleed with bright red blood per rectum. History of lower GI bleed for the past 2 months but increased hematochezia with blood clots in the last 1 to 2 days .       Patient is on long-term anticoagulation w/  Eliquis and takes Plavix too.     Patient has developed dizziness due to symptomatic anemia. S/p 1 unit of PRBC transfusion in the ED.       CT abdomen pelvis and bleeding scan unremarkable.        Patient hemodynamically stable at the time of interview.        Initial vital signs in the ED temperature 36.8 °C, respirate of 20 breaths/min, heart rate 106 bpm, blood pressure 85/73 with the lowest of 79/47, oxygen 100% on room air.     Labs in the ED sodium 140, potassium 4.9, creatinine 1.3, blood sugar 214, osmolality 296.3, WBC 7.7, hemoglobin 10.6, platelets 421. INR 1.43, positive occult blood test.     Twelve-lead EKG which reviewed shows normal sinus rhythm at 68 bpm.  Positive PVCs. To inversion V4 V5 and V6. No ST segment elevation or depression.     Chest x-ray which reviewed is negative for infiltrate. Positive AICD.        Subjective (past 24 hours):   Doing okay  No melena overnight however had preparation for colonoscopy today  No nausea no vomiting  Very anxious    Past medical history, family history, social history and allergies reviewed again and is unchanged since admission. ROS (12 point review of systems completed. Pertinent positives noted.  Otherwise ROS is negative)     Medications:  Reviewed    Infusion Medications    sodium chloride 45 mL/hr at 03/02/21 0253    dextrose      sodium chloride       Scheduled Medications    pravastatin  40 mg Oral Daily    sodium chloride flush  10 mL Intravenous 2 times per day    pantoprazole  40 mg Oral BID AC    hydrocortisone  25 mg Rectal BID     PRN Meds: ipratropium-albuterol, melatonin, sodium chloride flush, promethazine **OR** ondansetron, acetaminophen **OR** acetaminophen, glucose, dextrose, glucagon (rDNA), dextrose, sodium chloride      Intake/Output Summary (Last 24 hours) at 3/2/2021 1059  Last data filed at 3/2/2021 0421  Gross per 24 hour   Intake 2544.24 ml   Output --   Net 2544.24 ml       Diet:  Diet NPO, After Midnight Exceptions are: Ice Chips    Exam:  /62   Pulse 88   Temp 97.8 °F (36.6 °C) (Oral)   Resp 16   Ht 5' 5\" (1.651 m)   Wt 136 lb (61.7 kg)   SpO2 98%   BMI 22.63 kg/m²   General appearance: No apparent distress, appears stated age and cooperative. HEENT: Pupils equal, round, and reactive to light. Conjunctivae/corneas clear. Neck: Supple, with full range of motion. No jugular venous distention. Trachea midline. Respiratory:  Normal respiratory effort. Clear to auscultation, bilaterally without Rales/Wheezes/Rhonchi. Cardiovascular: Regular rate and rhythm with normal S1/S2 without murmurs, rubs or gallops. Abdomen: Soft, non-tender, non-distended with normal bowel sounds. Musculoskeletal: passive and active ROM x 4 extremities. Skin: Skin color, texture, turgor normal.  No rashes or lesions. Neurologic:  Neurovascularly intact without any focal sensory/motor deficits. Cranial nerves: II-XII intact, grossly non-focal.  Psychiatric: Alert and oriented, thought content appropriate, normal insight  Capillary Refill: Brisk,< 3 seconds   Peripheral Pulses: +2 palpable, equal bilaterally     Labs:   Recent Labs     02/28/21  1510 02/28/21  1510 03/01/21  0648 03/01/21  2231 03/02/21  0721   WBC 11.7*  --   --   --   --    HGB 10.6*   < > 9.2* 9.9* 9.6*   HCT 34.7*   < > 29.5* 32.5* 31.5*     --   --   --   --     < > = values in this interval not displayed. Recent Labs     02/28/21  1510 03/02/21  0352    140   K 4.9 4.1    107   CO2 26 26   BUN 42* 21   CREATININE 1.3* 0.9   CALCIUM 9.1 8.1*     Recent Labs     02/28/21  1510   AST 31   ALT 15   BILIDIR <0.2   BILITOT 0.2*   ALKPHOS 81     Recent Labs     02/28/21  1510   INR 1.43*     No results for input(s): Louisa Hayes in the last 72 hours. Microbiology:    Blood culture #1:   Lab Results   Component Value Date    BC No growth-preliminary No growth  12/28/2019       Blood culture #2:No results found for: Lay Marzena    Organism:  Lab Results   Component Value Date    ORG Enterococcus faecalis - (Group D) 10/03/2020         Lab Results   Component Value Date    LABGRAM  10/24/2018     Few segmented neutrophils observed.   No epithelial cells observed. Moderate gram positive cocci occurring singly and in pairs. MRSA culture only:No results found for: [facility]    Urine culture:   Lab Results   Component Value Date    LABURIN [place] count: 50,000-90,000 CFU/mL  10/03/2020       Respiratory culture: No results found for: CULTRESP    Aerobic and Anaerobic :  Lab Results   Component Value Date    LABAERO light growth 10/24/2018     Lab Results   Component Value Date    LABANAE  10/24/2018     Culture yielded moderate mixed growth consisting of anaerobic  gram negative bacilli and anaerobic gram positive cocci. If a  true mixed aerobic and anaerobic infection is suspected, then  broad spectrum empiric antibiotic therapy is indicated and  should include coverage for anaerobic organisms. Urinalysis:      Lab Results   Component Value Date    NITRU POSITIVE 03/01/2021    WBCUA 0-2 03/01/2021    BACTERIA MANY 03/01/2021    RBCUA 0-2 03/01/2021    BLOODU TRACE 03/01/2021    SPECGRAV >1.030 03/01/2021    GLUCOSEU NEGATIVE 10/02/2020       Radiology:  NM GI BLOOD LOSS   Final Result   Negative for GI bleed. This document has been electronically signed by: Brigido Estevez MD on    02/28/2021 09:59 PM      CT ABDOMEN PELVIS W IV CONTRAST   Final Result      No acute intra-abdominal or intrapelvic findings. Final report electronically signed by Dr. Srini Potter on 2/28/2021 5:02 PM      XR CHEST PORTABLE   Final Result      No acute intrathoracic process. **This report has been created using voice recognition software. It may contain minor errors which are inherent in voice recognition technology. **      Final report electronically signed by Dr. Srini Potter on 2/28/2021 3:33 PM        Nm Gi Blood Loss    Result Date: 2/28/2021  NM GI Bleed Study Comparison:  NM GI Bleed 06/29/2020 Findings: Approximately 33 mCi Tc labeled red blood cells were injected intravenously.  Dynamic and sequential static delayed images of the abdomen and pelvis were obtained. Physiologic uptake is seen within the heart and large abdominal vessels. There is physiologic uptake within the liver and spleen. There are no foci of abnormal uptake within the small or large bowel. Radiotracer activity is not identified in the bladder within the scan duration. Negative for GI bleed. This document has been electronically signed by: Nisa Matthews MD on 02/28/2021 09:59 PM    Ct Abdomen Pelvis W Iv Contrast    Result Date: 2/28/2021  PROCEDURE: CT ABDOMEN PELVIS W IV CONTRAST CLINICAL INFORMATION: Abdominal pain TECHNIQUE: CT of the abdomen and pelvis was performed following administration of 80 mL Isovue-370 intravenous contrast only. Axial images as well as coronal and sagittal reconstructions were obtained. All CT scans at this facility use dose modulation, iterative reconstruction, and/or weight-based dosing when appropriate to reduce radiation dose to as low as reasonably achievable. COMPARISON: CT abdomen and pelvis 10/5/2020 FINDINGS: Lower thorax: Visualized lung bases are clear. There is no pleural effusion. There is fluid in a dilated esophagus. This is also present on the prior study. Abdomen: There is no free intraperitoneal air or fluid. Bowel is normal in course and caliber without evidence of obstruction. There is a gastrostomy tube in the stomach. The gallbladder is surgically absent. Calcified granulomas are present in the spleen. There is fatty replacement of the pancreas. Prominence of the common bile duct is likely postsurgical. Bilateral renal cortical thinning is noted. Small exophytic hypoattenuating lesions in the kidneys are likely cysts. The liver and adrenal glands are unremarkable. Atherosclerotic calcifications are present in the abdominal aorta. 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 The stent extends into the bilateral common iliac arteries. There is no mesenteric or retroperitoneal lymphadenopathy. Degenerative and scoliotic changes are present in the lumbar spine without evidence of aggressive osseous lesions. Pelvis: The urinary bladder is incompletely distended. Phleboliths are noted in the pelvis. The uterus is surgically absent. Metallic clips are present in the bilateral inguinal regions. There is no pelvic or inguinal lymphadenopathy. Degenerative changes are present in the pelvis without evidence of aggressive osseous lesions. No acute intra-abdominal or intrapelvic findings. Final report electronically signed by Dr. Kate Caputo on 2/28/2021 5:02 PM    Xr Chest Portable    Result Date: 2/28/2021  PROCEDURE: XR CHEST PORTABLE CLINICAL INFORMATION: Fatigue TECHNIQUE: Mobile AP chest radiograph. COMPARISON: Mobile AP chest radiograph 10/5/2020 FINDINGS: A left-sided cardiac device is in stable position. Cardiac silhouette is within normal limits. Atherosclerotic calcifications are present in a tortuous thoracic aorta. There is a poorly visualized dilated and tortuous esophagus. No lung consolidations are identified. Degenerative changes in the thoracic spine are poorly visualized. There are calcifications in the right breast.     No acute intrathoracic process. **This report has been created using voice recognition software. It may contain minor errors which are inherent in voice recognition technology. ** Final report electronically signed by Dr. Kate Caputo on 2/28/2021 3:33 PM      Electronically signed by Davion Martinez MD on 3/2/2021 at 10:59 AM

## 2021-03-02 NOTE — PLAN OF CARE
Problem: Falls - Risk of:  Goal: Will remain free from falls  Description: Will remain free from falls  3/1/2021 2337 by Janina Mckeon RN  Outcome: Ongoing  3/1/2021 2337 by Janina Mckeon RN  Outcome: Met This Shift  3/1/2021 0952 by Jossy Dhillon RN  Outcome: Met This Shift  Call light within reach, bedside table within reach, environment well lit and clutter free, bed alarm on and commode by bedside.   Goal: Absence of physical injury  Description: Absence of physical injury  3/1/2021 2337 by Janina Mckeon RN  Outcome: Ongoing  3/1/2021 2337 by Janina Mckeon RN  Outcome: Met This Shift     Problem: Bleeding:  Goal: Will show no signs and symptoms of excessive bleeding  Description: Will show no signs and symptoms of excessive bleeding  Outcome: Ongoing not assessed intact

## 2021-03-02 NOTE — PROGRESS NOTES
Patient returned from colonoscopy on 6 liters simple mask. O2 sats 87%. Placed non rebreather mask at 15 liters and weaned to 12 liters for 90-92%. Notified Dr Loy Lin. Orders to keep NPO, on non rebreather to maintain O2 sats 92% or place on Bipap.

## 2021-03-02 NOTE — PLAN OF CARE
Problem: Falls - Risk of:  Goal: Will remain free from falls  Description: Will remain free from falls  3/2/2021 0951 by Hollie Webb RN  Outcome: Met This Shift  3/1/2021 2337 by Geraldine Morris RN  Outcome: Ongoing  3/1/2021 2337 by Geraldine Morris RN  Outcome: Met This Shift     Problem: Bleeding:  Goal: Will show no signs and symptoms of excessive bleeding  Description: Will show no signs and symptoms of excessive bleeding  3/2/2021 0951 by Hollie Webb RN  Outcome: Met This Shift  3/1/2021 2337 by Geraldine Morris RN  Outcome: Ongoing     Problem: Skin Integrity:  Goal: Absence of new skin breakdown  Description: Absence of new skin breakdown  Outcome: Met This Shift

## 2021-03-02 NOTE — ANESTHESIA POSTPROCEDURE EVALUATION
Department of Anesthesiology  Postprocedure Note    Patient: Judith Odonnell  MRN: [id number]  YOB: 1935  Date of evaluation: 3/2/2021  Time:  3:19 PM     Procedure Summary     Date: 03/02/21 Room / Location: [address] [place] [address] / [address] [place] [address]    Anesthesia Start: 7441 Anesthesia Stop: 1518    Procedure: COLORECTAL CANCER SCREENING, NOT HIGH RISK (Left Anus) Diagnosis: (GI BLEED)    Surgeons: Regulo Guerra MD Responsible Provider: Yasmine Montelongo MD    Anesthesia Type: MAC ASA Status: 3          Anesthesia Type: MAC    Christiane Phase I:      Christiane Phase II:      Last vitals: Reviewed and per EMR flowsheets.        Anesthesia Post Evaluation    Patient location during evaluation: PACU  Patient participation: complete - patient participated  Level of consciousness: awake  Pain score: 0  Airway patency: patent  Nausea & Vomiting: no nausea and no vomiting  Complications: no (pt awake and talking  having trouble picking up saturations      )  Cardiovascular status: hemodynamically stable  Respiratory status: acceptable  Hydration status: euvolemic

## 2021-03-02 NOTE — CARE COORDINATION
3/2/21, 12:31 PM EST    DISCHARGE ON GOING EVALUATION    Zandra Samano day: 2  Location: [facility] Reason for admit: Chronic lower GI bleeding [K92.2]   Procedure: Colonoscopy pending today  Barriers to Discharge: Hgb 9.6, GI following, IV fluids, diabetes management, Duonebs. PCP: Tyler Solorzano  Readmission Risk Score: 25%  Patient Goals/Plan/Treatment Preferences: Plan is to return home alone. She denies needs or services.

## 2021-03-02 NOTE — ANESTHESIA PRE PROCEDURE
Department of Anesthesiology  Preprocedure Note       Name:  Broderick Oconnor   Age:  82 y.o.  :  1935                                          MRN:  [id number]         Date:  3/2/2021      Surgeon: Maribel Armando):  David Mcclain MD    Procedure: Procedure(s):  COLORECTAL CANCER SCREENING, NOT HIGH RISK    Medications prior to admission:   Prior to Admission medications    Medication Sig Start Date End Date Taking?  Authorizing Provider   magnesium (MAGNESIUM-OXIDE) 250 MG TABS tablet TAKE 2 TABLETS BY MOUTH TWICE DAILY 20  Yes ROMAN Espinosa CNP   apixaban (ELIQUIS) 2.5 MG TABS tablet TAKE 1 TABLET BY MOUTH TWICE DAILY 20  Yes ROMAN Espinosa CNP   metoprolol succinate (TOPROL XL) 25 MG extended release tablet Take 1 tablet by mouth daily 20  Yes ROMAN Watson CNP   losartan (COZAAR) 25 MG tablet Take 1 tablet by mouth daily 20  Yes ROMAN Espinosa CNP   pravastatin (PRAVACHOL) 40 MG tablet TAKE 1 TABLET BY MOUTH DAILY 20  Yes ROMAN Watson CNP   clopidogrel (PLAVIX) 75 MG tablet Take 1 tablet by mouth daily 20  Yes ROMAN Espinosa CNP   potassium chloride (KLOR-CON M) 10 MEQ extended release tablet Take 1 tablet by mouth daily 20  Yes ROMAN Espinosa CNP   pantoprazole (PROTONIX) 40 MG tablet Take 40 mg by mouth 2 times daily (before meals)   Yes Historical Provider, MD   acetaminophen (TYLENOL ARTHRITIS PAIN) 650 MG CR tablet Take 1,300 mg by mouth every 12 hours as needed for Pain    Yes Historical Provider, MD   timolol (TIMOPTIC) 0.5 % ophthalmic solution Place 1 drop into both eyes nightly    Yes Historical Provider, MD   bumetanide (BUMEX) 1 MG tablet Take 1 tablet by mouth daily as needed (as directed by [facility]) 12/15/20   ROMAN Espinosa CNP   albuterol sulfate HFA (PROVENTIL HFA) 108 (90 Base) MCG/ACT inhaler Inhale 2 puffs into the lungs every 6 hours as needed for Wheezing or Shortness of Breath 12/31/19   Delbert Molina DO   Blood Pressure KIT Check blood pressure daily, and if symptoms of lightheadedness.   Call physician if BP <80/40. 12/31/19   Delbert Molina DO   melatonin 3 MG TABS tablet Take 2 tablets by mouth nightly as needed (sleep) 2/26/19   Consuelo Way PA-C       Current medications:    Current Facility-Administered Medications   Medication Dose Route Frequency Provider Last Rate Last Admin    ipratropium-albuterol (DUONEB) nebulizer solution 1 ampule  1 ampule Inhalation Q4H PRN Stevie Tuttle MD        melatonin tablet 5 mg  5 mg Oral Nightly PRN Stevie Tuttle MD        pravastatin (PRAVACHOL) tablet 40 mg  40 mg Oral Daily Stevie Tuttle MD   40 mg at 03/02/21 0848    0.9 % sodium chloride infusion   Intravenous Continuous Umesh Vick MD 45 mL/hr at 03/02/21 0253 Rate Verify at 03/02/21 0253    sodium chloride flush 0.9 % injection 10 mL  10 mL Intravenous 2 times per day Stevie Tuttle MD   10 mL at 03/02/21 0924    sodium chloride flush 0.9 % injection 10 mL  10 mL Intravenous PRN Stevie Tuttle MD        promethazine (PHENERGAN) tablet 12.5 mg  12.5 mg Oral Q6H PRN Stevie Tuttle MD        Or    ondansetron (ZOFRAN) injection 4 mg  4 mg Intravenous Q6H PRN Stevie Tuttle MD        acetaminophen (TYLENOL) tablet 650 mg  650 mg Oral Q6H PRN Stevie Tuttle MD        Or    acetaminophen (TYLENOL) suppository 650 mg  650 mg Rectal Q6H PRSANTIAGO Tuttle MD        glucose (GLUTOSE) 40 % oral gel 15 g  15 g Oral PRN Stevie Tuttle MD        dextrose 50 % IV solution  12.5 g Intravenous PRN Stevie Tuttle MD        glucagon (rDNA) injection 1 mg  1 mg Intramuscular PRN Stevie Tuttle MD        dextrose 5 % solution  100 mL/hr Intravenous PRN Stevie Tuttle MD        pantoprazole (PROTONIX) tablet 40 mg  40 mg Oral BID AC ROMAN See - CNP   40 mg at 03/02/21 0514    hydrocortisone (ANUSOL-HC) suppository 25 mg  25 mg Rectal BID Stephenie Singer, APRN - CNP   25 mg at 03/02/21 0848    0.9 % sodium chloride infusion   Intravenous PRN Oralia Tobar MD           Allergies: Allergies   Allergen Reactions    Aspirin Other (See Comments)     Unknown reaction, stated Dr. 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 left ear H92.02    Tinnitus H93.19    BPPV (benign paroxysmal positional vertigo) H81.10    Gait instability R26.81    PAC (premature atrial contraction) I49.1    Pedal edema R60.0    Stricture of colon determined by endoscopy ([facility] K56.699    Barium enema abnormal R93.3    Diverticulosis of large intestine without hemorrhage K57.30    Colon stricture ([facility] K56.699    Colonic mass K63.89    S/P laparoscopic colectomy Z90.49    Lower limb ischemia I99.8    Fall on same level from slipping, tripping, or stumbling W01. 0XXA    ALEJANDRO (acute kidney injury) ([facility] N17.9    Leukocytosis D72.829    Gastroesophageal reflux disease without esophagitis K21.9    Other hyperlipidemia E78.49    Osteoarthritis of multiple joints M15.9    Diverticulosis of intestine without bleeding K57.90    Septic shock (HCC) A41.9, R65.21    SVT (supraventricular tachycardia) ([facility]) I47.1    Hyperglycemia C02.2    Metabolic acidosis S99.3    Hypocalcemia E83.51    Chronic hypotension I95.89    Anemia D64.9    Urinary tract infection without hematuria N39.0    Paroxysmal atrial fibrillation (HCC) I48.0    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    Physical deconditioning R53.81    Abdominal aortic aneurysm (AAA) without rupture ([facility]) I71.4    Hiatal hernia K44.9    Elevated procalcitonin R79.89    Hypomagnesemia E83.42    Pulmonary nodule R91.1    CKD (chronic kidney disease) stage 2, GFR 60-89 ml/min N18.2    Stage 3 chronic kidney disease (HCC) N18.30    Femoral popliteal artery thrombus ([facility]) I74.3    PAD (peripheral artery disease) ([facility]) I73.9    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 9) E66.9    Respiratory failure ([facility]) J96.90    Respiratory distress R06.03    Acute on chronic congestive heart failure ([facility]) I50.9    Acute on chronic combined systolic and diastolic CHF (congestive heart failure) ([facility]) I50.43    Dilated cardiomyopathy ([facility]) I42.0    Moderate malnutrition ([facility]) E44.0    Rectal bleed K62.5    Bright red blood per rectum K62.5    Antiplatelet or antithrombotic long-term use Z79.02    Acute blood loss anemia D62    Chronic combined systolic and diastolic congestive heart failure ([facility]) I50.42    GIB (gastrointestinal bleeding) K92.2    CHF (congestive heart failure) ([facility]) I50.9    Medtronic dual ICD  Z95.810    SBO (small bowel obstruction) ([facility]) K56.609    Abdominal pain R10.9    Hyperkalemia E87.5    Esophageal dysmotility K22.4    COPD without exacerbation ([facility]) J44.9    Essential hypertension I10    Achalasia K22.0    Pyuria R82.81    NSVT (nonsustained ventricular tachycardia) ([facility]) I47.2    Hypophosphatemia E83.39    Renal infarction ([facility]) N28.0    Chronic lower GI bleeding K92.2    Acute kidney injury (ALEJANDRO) with acute tubular necrosis (ATN) ([facility]) N17.0    Orthostatic hypotension I95.1    Hypotension due to hypovolemia I95.89, E86.1       Past Medical History:        Diagnosis Date    [facility]-fib [facility]     AAA (abdominal aortic aneurysm) ([facility])     Achalasia     Anemia     Arthritis     Blood circulation, collateral     BPPV (benign paroxysmal positional vertigo) 6/6/16    CHF (congestive heart failure) ([address]     Chronic kidney disease     sees Dr Dyana Moeller [facility])     Gastrointestinal hemorrhage     GERD (gastroesophageal reflux disease)     ? esophageal stricture    Hiatal hernia     History of blood transfusion     HTN (hypertension)     Hx of blood clots 2018    R leg-sees ABEBA Hargrove    Hyperlipidemia     Medtronic dual ICD  8/26/2020    Neuromuscular disorder ([facility] [address]     Obesity     Osteopenia     Osteoporosis     PAC (premature atrial contraction)     on betablocker    Paralysis ([facility])     baby    Pedal edema     denied any hx of hypertension    Pneumonia     PVC (premature ventricular contraction)     sees Dr Indira Tomlin PVD (peripheral vascular disease) ([facility] [address]     Small bowel obstruction ([facility] [address]     Tinnitus     UTI (urinary tract infection)        Past Surgical History:        Procedure Laterality Date    ABDOMINAL AORTIC ANEURYSM REPAIR, ENDOVASCULAR N/A 2/15/2019    ABDOMINAL AORTIC ANEURYSM REPAIR ENDOVASCULAR, DECLOTTING RIGHT FEM TIB BYPASS GRAFT performed by Chandrakant Rooney MD at [address]    lumpectomy    CHOLECYSTECTOMY      30 years ago    COLONOSCOPY  08/06/2018    Dr Manjeet Tejada N/A 2/22/2019    COLONOSCOPY DIAGNOSTIC performed by Marquez Munoz MD at [address] [place] [address] COLONOSCOPY N/A 2/22/2020    COLONOSCOPY CONTROL HEMORRHAGE performed by Radha Robertson MD at [address] [place] [address]      eye, eyelids    EYE SURGERY      cataract    HEMICOLECTOMY N/A 2/25/2020    OPEN RIGHT COLON RESECTION performed by Maya Hernandez Screen (If Applicable):  Lab Results   Component Value Date    LABRH POS 02/28/2021       Drug/Infectious Status (If Applicable):  Lab Results   Component Value Date    HEPCAB Negative 12/28/2019       COVID-19 Screening (If Applicable):   Lab Results   Component Value Date    COVID19 NOT DETECTED 03/01/2021    COVID19 Not Detected 08/13/2020         Anesthesia Evaluation    Airway: Mallampati: II  TM distance: >3 FB   Neck ROM: full  Mouth opening: > = 3 FB Dental: normal exam         Pulmonary: breath sounds clear to auscultation  (+) pneumonia:  COPD: moderate,                             Cardiovascular:    (+) hypertension:, pacemaker: AICD, dysrhythmias:, CHF:,       ECG reviewed  Rhythm: irregular  Rate: normal                 ROS comment: Hx AAA repair    PE comment: PVC   Neuro/Psych:   (+) neuromuscular disease:,             GI/Hepatic/Renal:   (+) hiatal hernia, GERD:, bowel prep,           Endo/Other:                     Abdominal:         (-) obese Abdomen: soft. Vascular:                                        Anesthesia Plan      MAC     ASA 3       Induction: intravenous. Anesthetic plan and risks discussed with patient. Plan discussed with CRNA and attending.     Attending anesthesiologist reviewed and agrees with Pre Eval content              ROMAN Castellanos - CRNA   3/2/2021

## 2021-03-02 NOTE — PROGRESS NOTES
Patient and night shift RN stated that patient had multiple stools overnight and was tan liquid as of this morning. No stools were charted over night.

## 2021-03-02 NOTE — FLOWSHEET NOTE
Pt was asleep but she was blessed.       03/02/21 3780   Encounter Summary   Services provided to: Patient   Referral/Consult From: Rounding   Continue Visiting Yes  (3/2)   Complexity of Encounter Low   Length of Encounter 15 minutes   Routine   Type Initial   Assessment Sleeping   Intervention Turner

## 2021-03-02 NOTE — PROGRESS NOTES
ENDOSCOPY POSTPROCEDURE REPORT     PT NAME: Stephanie Mason  MEDICAL RECORD NUMBER: [id number]  YOB: 1935    PROCEDURE PERFORMED BY : Kvng Pedersen    PROCEDURE TYPE:   EGD ______   COLONOSCOPY __x_____   ERCP ________  MEDICATIONS USED :  VERSED _____   FENTANYL_____   PROPOFOL ___x___  Patient tolerated procedure well. No apparent complications. Estimated blood loss:  Nil  Specimens removed:  Yes_____  No__x____  Disposition of Specimens:  To Lab      BRIEF  FINDINGS:  1. No blood or active bleeding in colon. 2.  Anastamosis near RS jxn, prominent vascularity o/w normal.  Just beyond/prox to the anastamosis there was an area of nonspecific erythema noted. Benign. Prominent spasm. 3. Ileo-colonic anastamosis normal.   4. Few tics noted. Mod-large internal hemorrhoids. No bleeding or stigmata seen. Prep adequate for exam intent. PRELIMINARY PLAN:  1. Resume diet and meds  2. Will follow      For complete findings and plan, see dictated report.      Kvng Pedersen MD  3/2/2021  2:47 PM

## 2021-03-03 ENCOUNTER — APPOINTMENT (OUTPATIENT)
Dept: INTERVENTIONAL RADIOLOGY/VASCULAR | Age: 86
DRG: 377 | End: 2021-03-03
Payer: MEDICARE

## 2021-03-03 ENCOUNTER — APPOINTMENT (OUTPATIENT)
Dept: GENERAL RADIOLOGY | Age: 86
DRG: 377 | End: 2021-03-03
Payer: MEDICARE

## 2021-03-03 PROBLEM — I99.8 LIMB ISCHEMIA: Status: ACTIVE | Noted: 2021-03-03

## 2021-03-03 PROBLEM — K62.5 RECTAL BLEED: Status: RESOLVED | Noted: 2020-02-20 | Resolved: 2021-03-03

## 2021-03-03 LAB
APTT: 131 SECONDS (ref 22–38)
APTT: 32.6 SECONDS (ref 22–38)
APTT: 32.8 SECONDS (ref 22–38)
APTT: 35.3 SECONDS (ref 22–38)
APTT: > 200 SECONDS (ref 22–38)
EKG ATRIAL RATE: 120 BPM
EKG P AXIS: -24 DEGREES
EKG P-R INTERVAL: 146 MS
EKG Q-T INTERVAL: 298 MS
EKG QRS DURATION: 66 MS
EKG QTC CALCULATION (BAZETT): 421 MS
EKG R AXIS: -61 DEGREES
EKG T AXIS: 115 DEGREES
EKG VENTRICULAR RATE: 120 BPM
ERYTHROCYTE [DISTWIDTH] IN BLOOD BY AUTOMATED COUNT: 16.7 % (ref 11.5–14.5)
ERYTHROCYTE [DISTWIDTH] IN BLOOD BY AUTOMATED COUNT: 52.4 FL (ref 35–45)
FIBRIN SPLIT PRODUCTS: > 20 MCG/ML
FIBRINOGEN: 182 MG/100ML (ref 155–475)
FIBRINOGEN: 247 MG/100ML (ref 155–475)
FIBRINOGEN: 256 MG/100ML (ref 155–475)
FIBRINOGEN: 316 MG/100ML (ref 155–475)
HCT VFR BLD CALC: 24.7 % (ref 37–47)
HCT VFR BLD CALC: 28.5 % (ref 37–47)
HCT VFR BLD CALC: 30 % (ref 37–47)
HCT VFR BLD CALC: 30.8 % (ref 37–47)
HEMOGLOBIN: 7.8 GM/DL (ref 12–16)
HEMOGLOBIN: 9 GM/DL (ref 12–16)
HEMOGLOBIN: 9.4 GM/DL (ref 12–16)
HEMOGLOBIN: 9.7 GM/DL (ref 12–16)
INR BLD: 1.34 (ref 0.85–1.13)
INR BLD: 1.35 (ref 0.85–1.13)
INR BLD: 1.42 (ref 0.85–1.13)
INR BLD: 1.43 (ref 0.85–1.13)
INR BLD: 1.44 (ref 0.85–1.13)
LACTIC ACID: 1.6 MMOL/L (ref 0.5–2.2)
MAGNESIUM: 1.5 MG/DL (ref 1.6–2.4)
MCH RBC QN AUTO: 27.7 PG (ref 26–33)
MCHC RBC AUTO-ENTMCNC: 31.3 GM/DL (ref 32.2–35.5)
MCV RBC AUTO: 88.5 FL (ref 81–99)
MRSA SCREEN RT-PCR: NEGATIVE
PLATELET # BLD: 106 THOU/MM3 (ref 130–400)
PMV BLD AUTO: 11.4 FL (ref 9.4–12.4)
PRO-BNP: 2147 PG/ML (ref 0–1800)
PROCALCITONIN: 17.63 NG/ML (ref 0.01–0.09)
RBC # BLD: 3.39 MILL/MM3 (ref 4.2–5.4)
TROPONIN T: < 0.01 NG/ML
VANCOMYCIN RESISTANT ENTEROCOCCUS: NEGATIVE
WBC # BLD: 13.5 THOU/MM3 (ref 4.8–10.8)

## 2021-03-03 PROCEDURE — 85014 HEMATOCRIT: CPT

## 2021-03-03 PROCEDURE — 87641 MR-STAPH DNA AMP PROBE: CPT

## 2021-03-03 PROCEDURE — 6360000002 HC RX W HCPCS

## 2021-03-03 PROCEDURE — 84145 PROCALCITONIN (PCT): CPT

## 2021-03-03 PROCEDURE — 6360000002 HC RX W HCPCS: Performed by: NURSE PRACTITIONER

## 2021-03-03 PROCEDURE — 36246 INS CATH ABD/L-EXT ART 2ND: CPT

## 2021-03-03 PROCEDURE — 2709999900 HC NON-CHARGEABLE SUPPLY

## 2021-03-03 PROCEDURE — 87500 VANOMYCIN DNA AMP PROBE: CPT

## 2021-03-03 PROCEDURE — C1757 CATH, THROMBECTOMY/EMBOLECT: HCPCS

## 2021-03-03 PROCEDURE — 85362 FIBRIN DEGRADATION PRODUCTS: CPT

## 2021-03-03 PROCEDURE — C1887 CATHETER, GUIDING: HCPCS

## 2021-03-03 PROCEDURE — 36430 TRANSFUSION BLD/BLD COMPNT: CPT

## 2021-03-03 PROCEDURE — 87081 CULTURE SCREEN ONLY: CPT

## 2021-03-03 PROCEDURE — 6370000000 HC RX 637 (ALT 250 FOR IP): Performed by: NURSE PRACTITIONER

## 2021-03-03 PROCEDURE — 6360000002 HC RX W HCPCS: Performed by: RADIOLOGY

## 2021-03-03 PROCEDURE — B41J1ZZ FLUOROSCOPY OF OTHER LOWER ARTERIES USING LOW OSMOLAR CONTRAST: ICD-10-PCS | Performed by: RADIOLOGY

## 2021-03-03 PROCEDURE — 2580000003 HC RX 258: Performed by: STUDENT IN AN ORGANIZED HEALTH CARE EDUCATION/TRAINING PROGRAM

## 2021-03-03 PROCEDURE — 3E03317 INTRODUCTION OF OTHER THROMBOLYTIC INTO PERIPHERAL VEIN, PERCUTANEOUS APPROACH: ICD-10-PCS | Performed by: RADIOLOGY

## 2021-03-03 PROCEDURE — 2580000003 HC RX 258: Performed by: INTERNAL MEDICINE

## 2021-03-03 PROCEDURE — 2000000000 HC ICU R&B

## 2021-03-03 PROCEDURE — 83880 ASSAY OF NATRIURETIC PEPTIDE: CPT

## 2021-03-03 PROCEDURE — 71045 X-RAY EXAM CHEST 1 VIEW: CPT

## 2021-03-03 PROCEDURE — 75710 ARTERY X-RAYS ARM/LEG: CPT

## 2021-03-03 PROCEDURE — 85385 FIBRINOGEN ANTIGEN: CPT

## 2021-03-03 PROCEDURE — 6360000004 HC RX CONTRAST MEDICATION: Performed by: RADIOLOGY

## 2021-03-03 PROCEDURE — 75625 CONTRAST EXAM ABDOMINL AORTA: CPT

## 2021-03-03 PROCEDURE — 6370000000 HC RX 637 (ALT 250 FOR IP): Performed by: INTERNAL MEDICINE

## 2021-03-03 PROCEDURE — 83605 ASSAY OF LACTIC ACID: CPT

## 2021-03-03 PROCEDURE — 2700000000 HC OXYGEN THERAPY PER DAY

## 2021-03-03 PROCEDURE — 84484 ASSAY OF TROPONIN QUANT: CPT

## 2021-03-03 PROCEDURE — 37211 THROMBOLYTIC ART THERAPY: CPT

## 2021-03-03 PROCEDURE — 85730 THROMBOPLASTIN TIME PARTIAL: CPT

## 2021-03-03 PROCEDURE — 83735 ASSAY OF MAGNESIUM: CPT

## 2021-03-03 PROCEDURE — C1894 INTRO/SHEATH, NON-LASER: HCPCS

## 2021-03-03 PROCEDURE — 99233 SBSQ HOSP IP/OBS HIGH 50: CPT | Performed by: INTERNAL MEDICINE

## 2021-03-03 PROCEDURE — 85018 HEMOGLOBIN: CPT

## 2021-03-03 PROCEDURE — 85027 COMPLETE CBC AUTOMATED: CPT

## 2021-03-03 PROCEDURE — 36591 DRAW BLOOD OFF VENOUS DEVICE: CPT

## 2021-03-03 PROCEDURE — 94761 N-INVAS EAR/PLS OXIMETRY MLT: CPT

## 2021-03-03 PROCEDURE — 2580000003 HC RX 258: Performed by: RADIOLOGY

## 2021-03-03 PROCEDURE — B4101ZZ FLUOROSCOPY OF ABDOMINAL AORTA USING LOW OSMOLAR CONTRAST: ICD-10-PCS | Performed by: RADIOLOGY

## 2021-03-03 PROCEDURE — 85610 PROTHROMBIN TIME: CPT

## 2021-03-03 PROCEDURE — 2580000003 HC RX 258: Performed by: NURSE PRACTITIONER

## 2021-03-03 RX ORDER — HYDROCORTISONE ACETATE 25 MG/1
25 SUPPOSITORY RECTAL 2 TIMES DAILY
Qty: 14 SUPPOSITORY | Refills: 2 | Status: SHIPPED | OUTPATIENT
Start: 2021-03-03 | End: 2021-03-10

## 2021-03-03 RX ORDER — HEPARIN SODIUM 10000 [USP'U]/100ML
500 INJECTION, SOLUTION INTRAVENOUS CONTINUOUS
Status: DISCONTINUED | OUTPATIENT
Start: 2021-03-03 | End: 2021-03-04 | Stop reason: ALTCHOICE

## 2021-03-03 RX ORDER — SODIUM CHLORIDE 9 MG/ML
INJECTION, SOLUTION INTRAVENOUS PRN
Status: DISCONTINUED | OUTPATIENT
Start: 2021-03-03 | End: 2021-03-07 | Stop reason: HOSPADM

## 2021-03-03 RX ORDER — 0.9 % SODIUM CHLORIDE 0.9 %
30 INTRAVENOUS SOLUTION INTRAVENOUS ONCE
Status: COMPLETED | OUTPATIENT
Start: 2021-03-03 | End: 2021-03-03

## 2021-03-03 RX ORDER — FENTANYL CITRATE 50 UG/ML
25 INJECTION, SOLUTION INTRAMUSCULAR; INTRAVENOUS
Status: DISCONTINUED | OUTPATIENT
Start: 2021-03-03 | End: 2021-03-07 | Stop reason: HOSPADM

## 2021-03-03 RX ORDER — FENTANYL CITRATE 50 UG/ML
50 INJECTION, SOLUTION INTRAMUSCULAR; INTRAVENOUS ONCE
Status: COMPLETED | OUTPATIENT
Start: 2021-03-03 | End: 2021-03-03

## 2021-03-03 RX ORDER — FENTANYL CITRATE 50 UG/ML
INJECTION, SOLUTION INTRAMUSCULAR; INTRAVENOUS
Status: COMPLETED
Start: 2021-03-03 | End: 2021-03-03

## 2021-03-03 RX ORDER — NOREPINEPHRINE BIT/0.9 % NACL 16MG/250ML
2-100 INFUSION BOTTLE (ML) INTRAVENOUS CONTINUOUS
Status: DISCONTINUED | OUTPATIENT
Start: 2021-03-03 | End: 2021-03-04

## 2021-03-03 RX ADMIN — PIPERACILLIN AND TAZOBACTAM 3375 MG: 3; .375 INJECTION, POWDER, LYOPHILIZED, FOR SOLUTION INTRAVENOUS at 05:02

## 2021-03-03 RX ADMIN — HYDROCORTISONE ACETATE 25 MG: 25 SUPPOSITORY RECTAL at 20:43

## 2021-03-03 RX ADMIN — PANTOPRAZOLE SODIUM 40 MG: 40 TABLET, DELAYED RELEASE ORAL at 17:33

## 2021-03-03 RX ADMIN — IOPAMIDOL 30 ML: 612 INJECTION, SOLUTION INTRAVENOUS at 01:12

## 2021-03-03 RX ADMIN — IOPAMIDOL 60 ML: 612 INJECTION, SOLUTION INTRAVENOUS at 16:39

## 2021-03-03 RX ADMIN — HEPARIN SODIUM 5 ML/HR: 10000 INJECTION, SOLUTION INTRAVENOUS at 01:18

## 2021-03-03 RX ADMIN — PIPERACILLIN AND TAZOBACTAM 3375 MG: 3; .375 INJECTION, POWDER, LYOPHILIZED, FOR SOLUTION INTRAVENOUS at 13:19

## 2021-03-03 RX ADMIN — ALTEPLASE 0.5 MG/HR: KIT at 01:17

## 2021-03-03 RX ADMIN — ALTEPLASE 2 MG: 2.2 INJECTION, POWDER, LYOPHILIZED, FOR SOLUTION INTRAVENOUS at 01:11

## 2021-03-03 RX ADMIN — FENTANYL CITRATE 25 MCG: 50 INJECTION, SOLUTION INTRAMUSCULAR; INTRAVENOUS at 03:35

## 2021-03-03 RX ADMIN — FENTANYL CITRATE 50 MCG: 50 INJECTION, SOLUTION INTRAMUSCULAR; INTRAVENOUS at 01:51

## 2021-03-03 RX ADMIN — PANTOPRAZOLE SODIUM 40 MG: 40 TABLET, DELAYED RELEASE ORAL at 08:43

## 2021-03-03 RX ADMIN — SODIUM CHLORIDE 1710 ML: 9 INJECTION, SOLUTION INTRAVENOUS at 08:07

## 2021-03-03 RX ADMIN — SODIUM CHLORIDE, PRESERVATIVE FREE 10 ML: 5 INJECTION INTRAVENOUS at 20:42

## 2021-03-03 RX ADMIN — HYDROCORTISONE ACETATE 25 MG: 25 SUPPOSITORY RECTAL at 08:42

## 2021-03-03 RX ADMIN — PRAVASTATIN SODIUM 40 MG: 40 TABLET ORAL at 08:44

## 2021-03-03 RX ADMIN — SODIUM CHLORIDE, PRESERVATIVE FREE 10 ML: 5 INJECTION INTRAVENOUS at 08:43

## 2021-03-03 RX ADMIN — PIPERACILLIN AND TAZOBACTAM 3375 MG: 3; .375 INJECTION, POWDER, LYOPHILIZED, FOR SOLUTION INTRAVENOUS at 20:40

## 2021-03-03 ASSESSMENT — PAIN DESCRIPTION - ORIENTATION: ORIENTATION: RIGHT

## 2021-03-03 ASSESSMENT — PAIN DESCRIPTION - DESCRIPTORS: DESCRIPTORS: ACHING;THROBBING

## 2021-03-03 NOTE — PROCEDURES
[address] [place] [address]                                 PROCEDURE NOTE    PATIENT NAME: Ling Mendez                     :        1935  MED REC NO:   [id number]                           ROOM:       0005  ACCOUNT NO:   [de-identified]                           ADMIT DATE: 2021  PROVIDER:     Joann Harris M.D.    Yoly Larese:  2021    COLONOSCOPY REPORT    PROCEDURE TYPE:  Colonoscopy. SURGEON:  Jin Hdz MD    INSTRUMENT:  Video colonoscope. MEDICATIONS:  Propofol. See Anesthesia documentation report. ESTIMATED BLOOD LOSS: nil     BRIEF HISTORY:  The patient is an 62-year-old with a history of acute  lower GI bleeding. She is currently hospitalized. She did stop  bleeding here in the hospital yesterday. Her hemoglobin dropped  significantly from previous value. Due to her acute lower GI bleeding  (which occurred on Plavix and Eliquis), diagnostic colonoscopy was  planned. The procedure was discussed with the patient. Following  discussion, she expressed understanding and did wish to proceed. DESCRIPTION OF PROCEDURE:  The patient was brought from the medical  floor to the endoscopy department. She was placed on the appropriate  monitoring devices. She was placed in the left lateral decubitus  position. Sedation provided by the nurse anesthetist using propofol. Following performance of unremarkable digital rectal exam, the Olympus  video colonoscope was inserted gently through the anal canal and into  the rectum. The scope was advanced through the colon to the level of an  ileocolonic anastomosis, status post remote right colectomy. The  anastomosis was inspected. This was normal.  The visualized small bowel  mucosa from the anastomotic area appeared unremarkable and normal.  This  area was washed.   Again normal.  The scope was then brought back through  the mid colon, across the splenic flexure, down to the left colon, and  back into the rectum. The patient is status post remote sigmoid  colectomy for diverticular disease and previous right colectomy for  neoplastic disease. The anastomotic region near the rectosigmoid  junction did demonstrate some prominent vascularity consistent with  remote surgery and postsurgical appearance, but otherwise unremarkable  and normal.  There was no blood anywhere in the colon. There was no  bleeding. A couple of diverticula were noted. There was a focal area  of mucosal erythema just proximal to the sigmoid anastomosis. This was  of uncertain etiology or significance. Endoscopic appearance benign as  shown in photograph #7. Again, this was just beyond the sigmoid  anastomosis. Somewhat diffuse and nonspecific. No biopsies were  obtained. The patient has been on Plavix and Eliquis. There was no  blood, no bleeding. Otherwise normal back to the rectum where the scope  was retroflexed, moderate to large internal hemorrhoids. No bleeding. No stigmata. The scope was straightened. Air was withdrawn. Scope was  removed. The patient did have low oxygen saturations and this was  actively managed by the nurse anesthetist.  Otherwise, she tolerated the  procedure well. Following the procedure, she was awake and conversant. PHOTOGRAPHS:  #1 and #2 show the region of the sigmoid anastomosis. #3,  #4, and #5 show the ileocolonic anastomosis. #6 is diverticulosis. #7  is erythema just above the sigmoid anastomosis. #8 is rectum. #9 is  rectum. #10 and #11 in retroflexion showing internal hemorrhoids. IMPRESSION:  1. Colonoscopy to the ileocolonic anastomosis, status post previous  surgeries as described above. 2.  The ileocolonic anastomosis was normal.  3.  The sigmoid anastomosis was normal.  4.  Left-sided diverticulosis noted, best shown in photograph #6. No  blood seen in the diverticula.   5.  Focal area of nonspecific erythema in the sigmoid colon just beyond  the sigmoid anastomosis, benign and nonspecific. 6.  Moderate to large internal hemorrhoids. 7.  Otherwise unremarkable exam to the ileocolonic anastomosis, status  post previous surgeries. Colonic preparation was adequate for the exam  with washing and suctioning. ASSESSMENT:  As described above, colonoscopy was quite unremarkable. There was no blood or bleeding identified. No definitive cause for the  patient's hematochezia identified on today's exam.  She does have  residual diverticulosis. Also, rather large internal hemorrhoids which  could bleed in the setting of Eliquis and Plavix. At this point, there  is no blood or active bleeding. PLAN:  1. Resume diet and medications. 2.  Any further plans based on upcoming clinical course.         Lian Palm M.D.    D: 03/02/2021 14:58:09       T: 03/02/2021 15:11:10     [email or web address]  Job#: [id number]     Doc#: [id number]    CC:

## 2021-03-03 NOTE — PLAN OF CARE
Problem: Falls - Risk of:  Goal: Will remain free from falls  Description: Will remain free from falls  Outcome: Ongoing  Note: Call light in reach, bed in lowest position, and bed alarm activated. Education given on use of call light before ambulation and when in need of assistance. Patient expressed understanding. Hourly visual checks performed and charted. Toileting offered to patient. No falls this shift, at any time. Arm band and falling star in place. Will continue to monitor.    Goal: Absence of physical injury  Description: Absence of physical injury  Outcome: Ongoing  Note: No sign of physical injury     Problem: Bleeding:  Goal: Will show no signs and symptoms of excessive bleeding  Description: Will show no signs and symptoms of excessive bleeding  Outcome: Ongoing  Note: Monitoring sheath site, labs and vitals     Problem: Skin Integrity:  Goal: Will show no infection signs and symptoms  Description: Will show no infection signs and symptoms  Outcome: Ongoing  Note: Skin intact patient has better color in right foot  Goal: Absence of new skin breakdown  Description: Absence of new skin breakdown  Outcome: Ongoing  Note: Maintaining current skin condition     Problem: Pain:  Goal: Pain level will decrease  Description: Pain level will decrease  Outcome: Ongoing  Note: Patient denies pain  Goal: Control of acute pain  Description: Control of acute pain  Outcome: Ongoing  Note: Pat[place] denies pain  Goal: Control of chronic pain  Description: Control of chronic pain  Outcome: Ongoing

## 2021-03-03 NOTE — PROGRESS NOTES
1619 Procedure in progress. Pt resting quietly. 1634 Procedure complete. IV heparin and TPA restarted. 1642 Report to [employer]. Right foot reddened and warm to touch. Denies any pain in right foot. Decreased movement and sensation to right foot. Left hand warm with good capillary refill. Pt states left hand is \"half tingly. \"  7659 Pt positioned in bed for comfort. Report to [employer]. 1650 Transferred to  per cart with Torsten MASTERS.

## 2021-03-03 NOTE — CONSULTS
[facility]      Patient - Khalida Alamo   MRN -  [id number]   [facility]   - 1935      Date of Admission -  2021  2:48 PM  Date of evaluation -  3/2/2021  Room - 4A--A   Hospital Day -  [place], [place] Primary Care Physician - Ruthy Magdaleno     Problem List      Active Hospital Problems    Diagnosis Date Noted    Dilated cardiomyopathy [facility]) [I42.0]      Priority: High    Orthostatic dizziness [R42] 2016     Priority: High     Class: Acute    Acute kidney injury (ALEJANDRO) with acute tubular necrosis (ATN) ([address] [N17.0] 2021    Orthostatic hypotension [I95.1] 2021    Hypotension due to hypovolemia [I95.89, E86.1]     COPD without exacerbation ([address] [J44.9]     Medtronic dual ICD  [Z95.810] 2020    Acute blood loss anemia [D62]     Chronic combined systolic and diastolic congestive heart failure ([address] [I50.42]     Rectal bleed [K62.5] 2020    Tobacco abuse [Z72.0]     Paroxysmal atrial fibrillation ([address] [I48.0] 09/15/2018    Anemia [D64.9] 09/10/2018     Reason for Consult    For evaluation of post operative/procedure  hypoxia and respiratory insufficiency  HPI   History Obtained From: Patient  and electronic medical record. Khalida Aalmo is a 82 y.o. female She was admitted under hospitalist service. Pulmonary medicine was consulted for further evaluation of post operative/procedure hypoxia and respiratory insufficiency. She is a poor historian. Majority of the history obtained from reviewing the patient's chart. She underwent:  Type of surgery: Colonoscopy  Date of surgery: 2021  Surgeon: Dr. Montrell Dykes     She is a 79-year-old pleasant  female with past medical history of chronic tobacco smoking on and off for 30 years with less than 1 pack of cigarettes per day.   She was diagnosed with moderately severe COPD in the past with the full pulmonary function tests. She however not using any inhalers at home oxygen at this time. She underwent colonoscopy today as a part of evaluation for GI bleeding for acute blood loss anemia. After colonoscopy patient became a progressively hypoxic and currently requiring high flow at 45 L/min with a 70% FiO2. Patient at the time of my initial evaluation not in any acute distress however she remained on high flow. Input/Out put fluid balance from the time of admission: 2. 9 L    She was never diagnosed with pulmonary diseases I.e bronchial asthma, COPD, pulmonary embolism, pulmonary hypertension or pleural effusion/s in the past.    She is currently not using any oxygen supplementation at rest, exercise or during sleep/at night time. She denies orthopnea or paroxysmal nocturnal dyspnea. History of recreational or IV drug use in the past:NO     Occupation:  She is current working: No  Type of profession: retired. History of exposure to coal mines/coal dust: NO  History of exposure to foundry dust/welding: NO  History of exposure to quarry/silica/sandblasting: NO  History of exposure to asbestos/working with breaks/ships: NO  History of exposure to farm dust: NO  History of recent travel to long distances: NO  History of exposure to birds, pigeons, or chickens in the past:NO    History of pulmonary embolism in the past: No            History of DVT in the past:Yes -right lower extremity in the past         PMHx   Past Medical History      Diagnosis Date    A-fib [facility])     AAA [facility]ab[facility] aortic aneurysm) [facility]     Achalasia     Anemia     Arthritis     Blood circulation, collateral     BPPV (benign paroxysmal positional vertigo) 6/6/16    [facility]     Chronic kidney disease     sees Dr Jac Reed Colon cancer [facility])     Gastrointestinal hemorrhage     GERD (gastroesophageal reflux disease)     ?  esophageal stricture    Hiatal hernia     History of blood transfusion     HTN (hypertension)     Hx of blood clots 2018    R leg-sees ABEBA Hargrove    Hyperlipidemia     Medtronic dual ICD  8/26/2020    Neuromuscular disorder ([address]     Obesity     Osteopenia     Osteoporosis     PAC (premature atrial contraction)     on betablocker    Paralysis (HCC)     baby    Pedal edema     denied any hx of hypertension    Pneumonia     PVC (premature ventricular contraction)     sees Dr Angela Pettit PVD (peripheral vascular disease) ([address]     Small bowel obstruction ([address]     Tinnitus     UTI (urinary tract infection)       Past Surgical History        Procedure Laterality Date    ABDOMINAL AORTIC ANEURYSM REPAIR, ENDOVASCULAR N/A 2/15/2019    ABDOMINAL AORTIC ANEURYSM REPAIR ENDOVASCULAR, DECLOTTING RIGHT FEM TIB BYPASS GRAFT performed by Cecilia Ling MD at [address]    lumpectomy    CHOLECYSTECTOMY      30 years ago    COLONOSCOPY  08/06/2018    Dr Gannon Axon 2/22/2019    COLONOSCOPY DIAGNOSTIC performed by Eduardo Moreno MD at [address] [facility]    COLONOSCOPY N/A 2/22/2020    COLONOSCOPY CONTROL HEMORRHAGE performed by Kristen Denny MD at [address] [facility]      eye, eyelids    EYE SURGERY      cataract    HEMICOLECTOMY N/A 2/25/2020    OPEN RIGHT COLON RESECTION performed by Katie Dietrich MD at [address]  07/15/2016    OTHER SURGICAL HISTORY  10/06/2020    Exp lap washout mesenteric lymph node biopsy EGD abthera placement Dr Enrique Arm  10/08/2020    reopening recent lap open g tube placement intra operative EGD and primary closure of open abdomen- [address] OLEGARIO SKN SUB GRFT T/A/L AREA/<100SCM /<1ST 25 SCM N/A 9/6/2018    RE-EXPLORATION RIGHT GROIN, DECLOTTING OF FEMORAL BYPASS GRAFT performed by Levi Awad MD at [address] EGD TRANSORAL BIOPSY SINGLE/MULTIPLE Left 11/27/2017    EGD BIOPSY performed by Dl Berrios MD at [address], PARTIAL, W/ANAST N/A 8/20/2018    ROBOT ASSISTED COLECTOMY (LOW ANTERIOR) performed by Jesi Bhatt MD at [address] OFFICE/OUTPT [address] N/A 9/5/2018    RIGHT FEMORAL EMBOLECTOMY, INTRAOPERATIVE ARTERIOGRAM, RIGHT ILIAC EMBOLECTOMY, RIGHT COMMON AND EXTERNAL ILIAC STENTING, RIGHT FEMORAL TO ANTERIOR POPLITEAL BYPASS WITH 6MM GORTEX GRAFT performed by Antonio Valdovinos MD at [address] Right 2/14/2019    FEMORAL EMBOLECTOMY THROMBECTOMY, RIGHT LEG performed by Antonio Valdovinos MD at [address] [facility] 11/27/2017    EGD SUBMUCOSAL/BOTOX INJECTION performed by Dl Berrios MD at [address] [facility] ENDOSCOPY N/A 2/22/2019    EGD BIOPSY performed by Onel Alexander MD at [facility]     Meds    Current Medications    piperacillin-tazobactam  3,375 mg Intravenous Q8H    ipratropium-albuterol  1 ampule Inhalation 4x daily    metoprolol succinate  25 mg Oral Daily    heparin (porcine)  60 Units/kg Intravenous Once    fentanNYL  50 mcg Intravenous Once    fentaNYL        fentaNYL        pravastatin  40 mg Oral Daily    sodium chloride flush  10 mL Intravenous 2 times per day    pantoprazole  40 mg Oral BID AC    hydrocortisone  25 mg Rectal BID     ipratropium-albuterol, heparin (porcine), heparin (porcine), melatonin, sodium chloride flush, promethazine **OR** ondansetron, acetaminophen **OR** acetaminophen, glucose, dextrose, glucagon (rDNA), dextrose, sodium chloride  IV Drips/Infusions   heparin (PORCINE) Infusion      sodium chloride 45 mL/hr at 03/02/21 0253    dextrose      sodium chloride       Home Medications  Medications Prior to Admission: magnesium (MAGNESIUM-OXIDE) 250 MG TABS tablet, TAKE 2 TABLETS BY MOUTH TWICE DAILY  apixaban (ELIQUIS) 2.5 MG TABS tablet, TAKE 1 TABLET BY MOUTH TWICE DAILY  metoprolol succinate (TOPROL XL) 25 MG extended release tablet, Take 1 tablet by mouth daily  losartan (COZAAR) 25 MG tablet, Take 1 tablet by mouth daily  pravastatin (PRAVACHOL) 40 MG tablet, TAKE 1 TABLET BY MOUTH DAILY  clopidogrel (PLAVIX) 75 MG tablet, Take 1 tablet by mouth daily  potassium chloride (KLOR-CON M) 10 MEQ extended release tablet, Take 1 tablet by mouth daily  pantoprazole (PROTONIX) 40 MG tablet, Take 40 mg by mouth 2 times daily (before meals)  acetaminophen (TYLENOL ARTHRITIS PAIN) 650 MG CR tablet, Take 1,300 mg by mouth every 12 hours as needed for Pain   timolol (TIMOPTIC) 0.5 % ophthalmic solution, Place 1 drop into both eyes nightly   bumetanide (BUMEX) 1 MG tablet, Take 1 tablet by mouth daily as needed (as directed by [facility])  albuterol sulfate HFA (PROVENTIL HFA) 108 (90 Base) MCG/ACT inhaler, Inhale 2 puffs into the lungs every 6 hours as needed for Wheezing or Shortness of Breath  Blood Pressure KIT, Check blood pressure daily, and if symptoms of lightheadedness. Call physician if BP <80/40. melatonin 3 MG TABS tablet, Take 2 tablets by mouth nightly as needed (sleep)  Diet    Diet NPO Effective Now Exceptions are: Ice Chips  Allergies    Aspirin, Lasix [furosemide], Lipitor [atorvastatin], Neurontin [gabapentin], Oxycontin [oxycodone hcl], and Penicillins  Social History     Social History     Socioeconomic History    Marital status:       Spouse name: Gonzalo Santiago Number of children: 3    Years of education: Not on file    Highest education level: Not on file   Occupational History    Not on file   Social Needs    Financial resource strain: Not on file    Food insecurity     Worry: Not on file     Inability: Not on file    Transportation needs     Medical: Not on file     Non-medical: Not on file   Tobacco Use    Smoking status: Former Smoker     Packs/day: 0.25     Years: 60.00     Pack years: 15.00     Types: Cigarettes     Start date: 1956 Quit date: 10/18/2019     Years since quittin.3    Smokeless tobacco: Never Used    Tobacco comment: 1 cigarette every other day   Substance and Sexual Activity    Alcohol use: Yes     Alcohol/week: 1.0 standard drinks     Types: 1 Glasses of wine per week     Comment: social    Drug use: No    Sexual activity: Not Currently   Lifestyle    Physical activity     Days per week: Not on file     Minutes per session: Not on file    Stress: Not on file   Relationships    Social connections     Talks on phone: Not on file     Gets together: Not on file     Attends Restorationist service: Not on file     Active member of club or organization: Not on file     Attends meetings of clubs or organizations: Not on file     Relationship status: Not on file    Intimate partner violence     Fear of current or ex partner: Not on file     Emotionally abused: Not on file     Physically abused: Not on file     Forced sexual activity: Not on file   Other Topics Concern    Not on file   Social History Narrative    Not on file     Family History          Problem Relation Age of Onset    Heart Disease Mother     Stroke Mother     Cancer Father         lung    Emphysema Father     Other Sister         leukemia    Heart Disease Maternal Grandmother     Dementia Maternal Grandmother     Heart Disease Maternal Grandfather     No Known Problems Paternal Grandmother     No Known Problems Paternal Grandfather      Sleep History    Never diagnosed with sleep apnea in the past.    Riview of systems   General/Constitutional: No recent loss of weight with poor appetite. No fever or chills. HENT: Negative. Eyes: Negative. Upper respiratory tract: No nasal stuffiness or post nasal drip. Lower respiratory tract/ lungs: No cough or sputum production. No hemoptysis. On high flow  Cardiovascular: No palpitations or chest pain. Gastrointestinal: No nausea or vomiting. Neurological: No focal neurologiacal weakness.   Extremities: No edema.  Musculoskeletal: No complaints. Genitourinary: No complaints. Hematological: Negative. Psychiatric/Behavioral: Negative. Skin: No itching. Vitals     height is 5' 5\" (1.651 m) and weight is 136 lb (61.7 kg). Her axillary temperature is 100.7 °F (38.2 °C). Her blood pressure is 113/56 (abnormal) and her pulse is 100. Her respiration is 22 and oxygen saturation is 95%. Body mass index is 22.63 kg/m². SUPPLEMENTAL O2: O2 Flow Rate (L/min): 45 L/min     I/O        Intake/Output Summary (Last 24 hours) at 3/2/2021 2248  Last data filed at 3/2/2021 0421  Gross per 24 hour   Intake 530.96 ml   Output --   Net 530.96 ml     I/O last 3 completed shifts: In: 531 [I.V.:531]  Out: -    Patient Vitals for the past 96 hrs (Last 3 readings):   Weight   03/02/21 0421 136 lb (61.7 kg)   03/01/21 0332 143 lb (64.9 kg)   02/28/21 1504 140 lb (63.5 kg)       Exam   General Appearance: Patient appears moderately built and moderately nourished in no acute distress on high flow  HEENT: Normal, Head is normocephalic, atraumatic. Oropharynx is clear and moist.  No oral thrush. PERRLA  Neck - Supple, No JVD present. No tracheal deviation present. Lungs - Bilateral air entry present. Bilateral aeration good with diminished breath sounds at lung bases. Occasional bilateral expiratory wheezes. No rales or rhonchi,   Cardiovascular - Heart sounds are normal.  Regular rhythm normal rate without murmur, gallop or rub. Abdomen - Soft, nontender, nondistended, no masses or organomegaly. S/p PEG tube in place with the dressing  Neurologic - Awake, alert, oriented. There are no focal motor or sensory deficits  Extremities - No cyanosis, clubbing or edema. Musculoskeletal: Normal range of motion. Patient exhibits no tenderness. Lymphadenopathy:  No cervical adenopathy. Psychiatric: Patient  has a normal mood and affect. Skin - No bruising or bleeding.     Labs  - Old records and notes have been reviewed in [facility]  Lab Results   Component Value Date    PH 7.40 03/02/2021    PO2 51 03/02/2021    PCO2 33 03/02/2021    HCO3 20 03/02/2021    O2SAT 86 03/02/2021     Lab Results   Component Value Date    IFIO2 50 03/02/2021    MODE CPAP/PS 12/28/2019    SETPEEP 8.0 12/28/2019     CBC  Recent Labs     02/28/21  1510 02/28/21  1510 03/02/21  0721 03/02/21 2007 03/02/21 2149   WBC 11.7*  --   --   --  9.3   RBC 3.77*  --   --   --  3.61*   HGB 10.6*   < > 9.6* 10.6* 10.0*  10.0*   HCT 34.7*   < > 31.5* 33.6* 32.1*  31.6*   MCV 92.0  --   --   --  87.5   MCH 28.1  --   --   --  27.7   MCHC 30.5*  --   --   --  31.6*     --   --   --  123*   MPV 12.1  --   --   --  11.4    < > = values in this interval not displayed. BMP  Recent Labs     02/28/21  1510 03/02/21  0352    140   K 4.9 4.1    107   CO2 26 26   BUN 42* 21   CREATININE 1.3* 0.9   GLUCOSE 214* 72   CALCIUM 9.1 8.1*     LFT  Recent Labs     02/28/21  1510   AST 31   ALT 15   BILITOT 0.2*   ALKPHOS 81     TROP  Lab Results   Component Value Date    TROPONINT < 0.010 03/02/2021    TROPONINT < 0.010 10/02/2020    TROPONINT < 0.010 06/30/2020     BNP  No results for input(s): BNP in the last 72 hours. Lactic Acid  No results for input(s): LACTA in the last 72 hours. INR  Recent Labs     02/28/21  1510   INR 1.43*     PTT  Recent Labs     02/28/21  1510 03/02/21  2149   APTT 28.7 28.4     Glucose  Recent Labs     03/02/21  1011 03/02/21  1644 03/02/21  1938   POCGLU 89 82 103     UA   Recent Labs     03/01/21  0611   SPECGRAV >1.030*   PHUR 8.5   COLORU YELLOW   PROTEINU NEGATIVE   BLOODU TRACE*   RBCUA 0-2   WBCUA 0-2   BACTERIA MANY   NITRU POSITIVE*   BILIRUBINUR NEGATIVE   UROBILINOGEN 0.2   KETUA NEGATIVE   LABCAST NONE SEEN  NONE SEEN   . PFTs             Sleep studies   None in Epic.   Cultures    COVID-19 testing performed on 1 March 2021-none detected    EKG     Echocardiogram     [phone] 6/26/2020      Narrative & Impression Transthoracic Echocardiography Report (TTE)     Conclusions      Summary   Limited examination. Normal left ventricular size and severely reduced systolic function. There was severe global hypokinesis. Hypertrophic basal septum. Ejection fraction was estimated at 25-30%. Left atrial size was severely dilated. The mitral valve structure demonstrated posterior leaflet calcification. DOPPLER: The transmitral velocity was within the normal range with no   evidence for mitral stenosis. There was trace mitral regurgitation. IVC size is within normal limits with normal respiratory phasic changes. Signature      ----------------------------------------------------------------   Electronically signed by Spencer Guzman MD (Interpreting   physician) on 06/26/2020 at 02:29 PM   ----------------------------------------------------------------         Radiology    CXR  Tue Mar 2, 2021  5:35 PM   PROCEDURE: XR CHEST PORTABLE   1. Normal heart size. Permanent pacemaker/defibrillator. 2. Moderate atelectasis/pneumonia left mid and lower lung fields. No effusion is seen. 3. There is a cylindrical soft tissue density right paratracheal region which appears to continue posterior to the heart to the region of the GE junction. This represents a markedly fluid distended esophagus, better seen on prior CT thorax likely related  to presbyesophagus or achalasia. CT Scans  (See actual reports for details)  Sat Dec 28, 2019  9:26 AM   PROCEDURE: CT CHEST W CONTRAST   1. Markedly distended esophagus which contains fluid as well as recently ingested food products. This correlates with the recently seen soft tissue opacity in the right paratracheal region on the prior x-ray. 2. Small bilateral pleural effusions with adjacent atelectasis. 3. Cardiomegaly.        Assessment   -Acute Post operative pulmonary insufficiency due to bilateral atelectasis Vs aspiration pneumonia VS other etiologies.  -Chronic atrial fibrillation with controlled ventricle response.  -Chronic systolic congestive heart failure. .  -Gastroesophageal reflux disease.  -Essential hypertension      Plan   -Wean Fio2 to keep Spo2 > 90%. -Continue nebs Q4h while awake. -Incentive spirometry as ordered.  -Avoid all sedative meds if she is drowsy.  -Continue PT/OT. -Continue current antibiotics per primary service  -Aspiration precautions.  -Send blood cultures x2 sets. -Urinalysis and urine culture.  -We will send a serum lactic acid level now and repeat after 2 hours.  -Home O2 eval at the time of discharge. \"Thank you for asking us to see this patient\"     Case discussed with registered nurse taking care of patient. Ani Burgos educated about my impression and plan. She verbalizes understanding. Questions and concerns addressed.     Electronically signed by   Ganesh Hills MD on 3/2/2021 at 10:48 PM

## 2021-03-03 NOTE — FLOWSHEET NOTE
03/02/21 2100   Assessment   Charting Type Reassessment   Neurological   R Foot Dorsiflexion Absent   L Foot Dorsiflexion Moderate   R Foot Plantar Flexion Absent   L Foot Plantar Flexion Moderate   RLE Motor Response Responds to command  (wiggles toes only)   Dara Coma Scale   Eye Opening 4   Best Verbal Response 5   Best Motor Response 6   Dara Coma Scale Score 15   Breath Sounds   Right Upper Lobe Rhonchi   Right Middle Lobe Rhonchi   Right Lower Lobe Rhonchi   Left Upper Lobe Rhonchi   Left Lower Lobe Rhonchi   Cardiac   Cardiac (WDL) WDL   Cardiac Regularity Irregular   Heart Sounds S1, S2   Cardiac Rhythm NSR   Peripheral Vascular   Peripheral Vascular (WDL) X   RLE Neurovascular Assessment   Capillary Refill Greater than 3 seconds   Color Pale   Temperature Cold   Sensation RLE Numbness;Pain;Decreased   R Post Tibial Pulse Doppler   R Pedal Pulse Doppler   R Calf Tenderness  Negative       2100 Patient Called out for Generalized pain in the Right leg, 10/10. After attempting to find a dopler pulse of Pedal and tibia, this RN messaged Dr. Quincy Strange At 2113. ,.  2122 Dr. Emmett Acosta to bedside and assessed patient and Ordered for CTA of the RLE, Heparin gtt and for 50mcg of Fentanyl for Pain one time dose., The hospitalist also wanted this Rn to Message pt's Cardiology. 2152: This Rn Message Dr. Tia Martinez concerning the Patient and her status. 2204: Charge Nurse Nhi Larson concerning this patient, where Dr. Arti Larson shortly came to bedside to assess the patient. 2245 This RN and 1 tech Transported to CT with a tech, Patient vitals stable throughout transport. 2250: patient Returned to room and in stable conditions. 2329: this RN Applied 30g of Nitro to the RLE, Applied warm blanket, and put right leg in a dependant position. 2345: Patient transported to IR with 2 nurses, Tech, and doctor karishma. Patient in stable conditions.

## 2021-03-03 NOTE — OP NOTE
Department of Radiology  Post Procedure Progress Note      Pre-Procedure Diagnosis:  Right leg ischemia    Procedure Performed: Aortogram, right LE arteriogram.    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 Occlusion of the SFA and Fem - Tib bypass graft. 2 mg TPA was infused. A TPA and heparin infusion was initialized. The patient will return to the IR dept later in the day 3/3/21 for a followup arteriogram.    Immediate Complications:  None    Estimated Blood Loss: minimal    SEE DICTATED PROCEDURE NOTE FOR COMPLETE DETAILS.     Electronically signed by Duarte Allen MD on 3/3/2021 at 1:18 AM

## 2021-03-03 NOTE — PROGRESS NOTES
1527 Patient received in IR for TPA follow up consent continued from yesterday. 1615 Procedure started with Dr. Ivone Tinajero.   Chad Chandler [id number]

## 2021-03-03 NOTE — PROGRESS NOTES
200 Pt in specials radiology for right leg angiogram with possible intervention/thrombolysis. Dr Wili Alex with pt. Explained procedure to pt and pt verbalizes understanding. Consent signed. IV heparin infusing and Dr Magaly Pereira aware and to speak to pt.  3-3-21 0002 Pt moved to table and attached to monitor. Right foot with decreased sensation to mid shin and decreased gross movement. 0015 Attempted IV in right upper arm without success. Large bruise noted. Pressure applied. 0020 #22 angio started in RAC and flushes easily. IV fluids switched to RAC. IV in left upper arm capped and left arm prepped and draped. 0032 5 fr sheath inserted in left brachial artery. 0037 TPA ordered. 0059 5 fr sheath in left brachial artery exchanged for a 6 fr x 90 sheath. 0110 30 cm Unifuse positioned in right iliac lilmb of bypass graft. 0116 Report called to [facility].  Jossie Oliveira TPA started. 0118 Heparin started at 500 units in side port of sheath. 0120 Report to [facility].  7980 Pt positioned in bed for comfort. Transferred to ICU per bed. Report updated to RN.

## 2021-03-03 NOTE — H&P
Formulation and discussion of sedation / procedure plans, risks, benefits, side effects and alternatives with patient and/or responsible adult completed.     Electronically signed by Michael Abernathy MD on 3/3/2021 at 12:26 AM

## 2021-03-03 NOTE — PLAN OF CARE
Plan of Care Update    Name: Adalberto Schwartz  : 1935  MRN: [id number]  Date of Service: 3/3/21      2:09 PM: The patient was seen and examined today after transfer to the ICU service for further care and management of acute RIGHT limb ischemia overnight. The patient was initially presented to [facility] emergency department on 21 for acute GI bleed with BRBPR. The patient was admitted to the hospital and consultation to gastroenterology was obtained. The patient's home Plavix and Eliquis were held at admission. The patient received 1 unit PRBC for anemia at the time of admission. Colonoscopy on 3/2/21 revealed no signs of bleeding, demonstration of internal hemorrhoids, and no clear etiology of hematochezia. On 3/2/21 the patient had acute onset of right leg pain with decreased pulses. CTA of the right limb demonstrated occlusion of right iliac limb, occlusion of right superficial femoral artery, occlusion of right femoral anterior tibial graft, and occlusion of right popliteal artery. The patient was sent to IR for intervention and underwent angiogram. The patient was initiated on TPA and heparin infusion via left brachial sheath. The patient is planned for repeat angiogram today (3/3/21). Examination of the right limb at ~0900 revealed full range of motion of the limb, intact sensation, intact popliteal pulse, and intact dorsalis pedis pulse. Right posterior tibialis pulse was absent. Capillary refill was approximately 3 seconds at the distal right foot. CXR on 3/3/21 revealed moderate to advanced opacity of left perihilar region in the left lung base representing aspiration. The patient became hypotensive during the day and review of the chart demonstrated WBC elevation with hypotension, tachycardia, and fever. Urinalysis with many bacteria and leukocyte esterase. Sample was not reflexed to culture on admission and a new sample cannot be obtained via straight catheterization due to TPA administration. Sepsis meeting >2/4 SIRS criteria. Sepsis likely secondary to UTI and aspiration pneumonia. The patient was administered 0.9% normal saline at 30cc/kg of ideal body weight, lactic acid lab, and repeat Hgb=7.8. The patient was transfused 1 unit PRBC with repeat Hgb=9.0. Blood cultures were not obtained due to the patient currently receiving antibiotic therapy. The patient remains on HFNC at 60L/min at FiO2=40% secondary to acute hypoxic respiratory failure. The patient continues to be hypotensive despite volume resuscitation. Vasopressor support has been deferred secondary to right leg ischemia. Plan:  - Continue HFNC and wean as tolerated to maintain SpO2>90%  - Continue heparin gtt and TPA for right leg ischemia; follow recommendations of IR  - Monitor for s/s of bleeding.  Transfuse if Hgb<8  - Continue Zosyn      Electronically signed by Qasim Mckeon DO on 3/3/2021 at 2:09 PM

## 2021-03-03 NOTE — PROGRESS NOTES
CRITICAL CARE PROGRESS NOTE      Patient:  Sharad Simmons    Unit/Bed:4D-15/015-A  YOB: 1935  MRN: [id number]   PCP: KALYANI Valle  Date of Admission: 2/28/2021  Chief Complaint:-Right leg pain    Assessment and Plan:     1. Acute left limb ischemia:  Eliquis and Plavix on hold since 2/2828/2021. History of PAD and PVD. Acute onset of leg pain and loss of pulses on 3/2/2021 Complete occulsion of the right limb of the aortobifemoral bypass graft. Occlusion of the SFA and Fem-Tib bypass graft. 3/2/2021 CTA of Leg-Right common iliac artery stent-occluded, Occlusion of the right external iliac artery, Occluded proximal SFA, Anterior Femoral tibial is occluded, Popliteal artery is occluded, Minimal flow within the anterior tibial artery. Occluded peroneal artery and posterior tibial artery. 3/3/2021 IR-Agram completed TPA and Heparin infusion started via left brachial sheath. Return to IR 3/3/2021  2. Acute LGI Bleed :  Plavix and Eliquis were stopped on admission. 3/2/2021 Colonoscopy completed per Dr. Clemens Gamma sided diverticulosis no blood seen in the diverticula. Nonspecific erythema in the sigmoid colon. Moderate to large internal hemorrhoids. No blood or bleeding noted. Monitor and trend H/H while on Heparin and TPA. 3. Acute respiratory failure, hypoxia:  Concerns of aspiration pneumonia, s/p Colonoscopy. HFNC will be continued and titrate to maintain SaO2>90%. 4. Thrombocytopenia: [phone]. Fibrinogen and FSP pending. Patient is currently on Heparin and TPA infusion via brachial sheath. Zosyn was started earlier ?contributing. Will obtain HIT and TEG if Fibrinogen level low. 5. Anemia, normocytic:  3/1/2021 Iron saturation 13%, continue to monitor and trend. Transfusion if H/H less than 8      INITIAL H AND P AND ICU COURSE:  Diana Benavides is an 73-year-old  female transferred to [address] ICU on 3/3/2021 status post IR intervention.     Patient has past medical history erythema noted. Benign. Prominent spasm. Ileo-colonic anastamosis normal. A Few tics noted. Mod-large internal hemorrhoids. No bleeding or stigmata seen. 3/2/2021 Upon arrival back to the unit post procedure noted SaO2 87% on 6L/NC. HFNC was applied with no improvement patient was placed on Bipap. Pulmonary did evaluate. Chest Xray revealed concerns of Left mid and lower lung fields pneumonia. Nursing calling with complaints of a cold, painful, pulseless right leg. NTG ointment was applied to the leg. Patient was emergently taken to the IR. Under the care of Dr. Harshal Lutzt was completed noted complete occlusion of the right limb of the aortobifemoral bypass graft. Left brachial approach. Occlusion of the SFA and Fem - Tib bypass graft. 2 mg TPA was infused. A TPA and heparin infusion was initialized. Patient was admitted to the ICU for further managementt and care. Past Medical History:  See HPI. ROS   GENERAL: Positive for fatigue, poor appetitie  SKN: Positive for Right leg is cool, pale  HEAD: No headaches or recent injury  EYES: No drainage  EARS: No drainage  NOSE/THROAT: No nasal drainage  NECK: No lumps or unusual neck stiffness  PULMONARY: Positive for hypoxia, and dyspnea, Denies any  orthopnea or paroxysmal nocturnal dyspnea  CARDIAC: No chest pain, pressure, hypotension or tachycardia  GI: Denies any abdominal pain, No melena or hematochezia  : No Hematuria  PERIPHERAL VASCULAR: No intermittent claudication or unusual leg cramps  MUSCULOSKELETAL: Occasional arthralgias, myalgias  NEUROLOGICAL: No seizures or syncope  HEMATOLOGIC:  Positive for unusual bleeding  PSYCH: No homicidal or suicidal ideations.        Scheduled Meds:   piperacillin-tazobactam  3,375 mg Intravenous Q8H    ipratropium-albuterol  1 ampule Inhalation 4x daily    metoprolol succinate  25 mg Oral Daily    nitroglycerin  60 g Topical Once    pravastatin  40 mg Oral Daily    sodium chloride flush  10 mL Intravenous 2 times per day    pantoprazole  40 mg Oral BID AC    hydrocortisone  25 mg Rectal BID     Continuous Infusions:   heparin (porcine) 5 mL/hr (03/03/21 0118)    alteplase (ACTIVASE) 50 mg in 500 mL infusion - PERIPHERAL ARTERIAL OCCLUSION 0.5 mg/hr (03/03/21 0117)    heparin (PORCINE) Infusion Stopped (03/03/21 0118)    sodium chloride 45 mL/hr at 03/02/21 0253    dextrose      sodium chloride         PHYSICAL EXAMINATION:  T: 97.5.  P: 82. RR: 22. B/P: 109/63. FiO2: 65%. O2 Sat: 99.  I/O: Pending  Body mass index is 22.63 kg/m². GCS:   15  General:   Pale acutely ill in appearance  HEENT:  normocephalic and atraumatic. No scleral icterus. PERR  Neck: supple. No Thyromegaly. Lungs: clear to auscultation, diminished in bases bilaterally. No retractions. High flow nasal cannula on  Cardiac: RRR. No JVD. Abdomen: soft. Nontender, sounds present  Extremities: Positive for right femoral pulse present with Doppler. Positive for right lower to mid leg cool to touch from knee on down. Positive for delayed capillary refill at 8 seconds. Positive for movement and sensation. Positive for absent pulses popliteal or pedal on right side. Vasculature: capillary refill < 3 seconds-left side palpable dorsalis pedis pulses present on left side  Skin:  warm and dry. Psych:  Alert and oriented x3. Affect appropriate  Lymph:  No supraclavicular adenopathy. Neurologic:  No focal deficit. No seizures. Data: (All radiographs, tracings, PFTs, and imaging are personally viewed and interpreted unless otherwise noted).     3/2/2021 0352 sodium 140 potassium 3.1 chlorides 107 CO2 26 BUN 21 creatinine 0.9   Troponin is less than 0.010   3/2/2021 2149 hemoglobin 10.0 hematocrit 31.6 platelet count 152   3/2/2021 2149 PTT 28.4   2/28/2021 occult stool positive   3/1/2021 urine-yellow trace blood negative protein positive for nitrates 0-2 WBCs many bacteria, cloudy   3/1/2021 1739 ABG pH 7.40 PCO2 33 PO2 51 HCO3 20   3/2/2021 CTA of Left Lower extremity-aortic stent graft present. The right iliac limb is occluded. The native right superficial femoral vein is occluded distally. Right femoral anterior tibial graft is occluded. The right popliteal artery is occluded. Only the anterior artery is patent and this is just to the mid calf. Advanced multifocal disease within the left superficial femoral artery. Single-vessel runoff on the left. 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  3/2/2021 1715 x-ray-moderate atelectasis/pneumonia left mid and lower lung fields. No effusion is seen. There is cylinder soft tissue density right peritracheal region which appears to contain continue posterior to the heart to the region of the GE junction. This represents a markedly fluid distended esophagus,   3/2/2021 EKG Atrial Fibrillation    Cardiac telemetry Atrial Fibrillation    Full Code status    Seen with multidisciplinary ICU team yes. Meets Continued ICU Level Care Criteria:    [x] Yes   [] No - Transfer Planned to listed location:  [] HOSPITALIST CONTACTED-      Case and plan discussed with Dr. Sarai Hernandez. Electronically signed by ROMAN Ward - CNP  CRITICAL CARE SPECIALIST  Patient seen by me. Case discussed with nurse practitioner. Undergoing treatment for arterial obstruction. .  Time was discontiguous. Time does not include procedures. Time does include my direct assessment of the patient and coordination of care.   Electronically signed by Yousuf Bai MD on 3/3/2021 at 6:46 PM

## 2021-03-03 NOTE — PROGRESS NOTES
Gastroenterology Progress Note:     Patient Name:  Michael Wright   MRN: [id number]  [id number]  YOB: 1935  Admit Date: 2/28/2021  2:48 PM  Primary Care Physician: Srini Pena   4D-15/015-A     Patient seen and examined. 24 hours events and chart reviewed. Subjective: Patient sleeping in bed, arouses easily. Denies abdominal pain, nausea, & vomiting. No bowel movements overnight or today. Hgb 7.8 Colonoscopy results reviewed. She is currently on high flow nasal cannula. Transferred to ICU yesterday. She received a 2nd unit of PRBC today. Objective:  /64   Pulse 92   Temp 99.4 °F (37.4 °C) (Oral)   Resp 20   Ht 5' 5\" (1.651 m)   Wt 149 lb 14.6 oz (68 kg)   SpO2 97%   BMI 24.95 kg/m²     Physical Exam:    General:  Acutely ill appearing female  HEENT: Atraumatic, normocephalic. Moist oral mucous membranes. Neck: Supple without adenopathy, JVD, thyromegaly or masses. Trachea midline. CV: Heart RRR, no murmurs, rubs, gallops. Resp: Even, easy without cough or accessory use. Lungs clear to ascultation bilaterally. Abd: Round, soft, nontender. No hepatosplenomegaly or mass present. Active bowel sounds heard. No distention noted. Ext:  Without cyanosis, clubbing, edema. Skin: Pale, warm, dry  Neuro:  Alert, oriented x 3 with no obvious deficits.        Rectal: deferred    Labs:   CBC:   Lab Results   Component Value Date    WBC 13.5 03/03/2021    HGB 7.8 03/03/2021    HCT 24.7 03/03/2021    MCV 88.5 03/03/2021     03/03/2021     BMP:   Lab Results   Component Value Date     03/02/2021    K 4.1 03/02/2021     03/02/2021    CO2 26 03/02/2021    PHOS 2.3 10/05/2020    BUN 21 03/02/2021    CREATININE 0.9 03/02/2021    CALCIUM 8.1 03/02/2021     PT/INR:   Lab Results   Component Value Date    INR 1.35 03/03/2021     Lipids:   Lab Results   Component Value Date    ALKPHOS 81 02/28/2021    ALT 15 02/28/2021    AST 31 02/28/2021    BILITOT 0.2 02/28/2021    BILIDIR <0.2 02/28/2021    LABALBU 3.4 02/28/2021    LIPASE 9.6 10/03/2020      Ref. Range 3/1/2021 06:48   Ferritin Latest Ref Range: 10 - 291 ng/mL 21   Iron Latest Ref Range: 50 - 170 ug/dL 30 (L)   Iron Saturation Latest Ref Range: 20 - 50 % 13 (L)   TIBC Latest Ref Range: 171 - 450 ug/dL 232   Folate Latest Ref Range: 4.8 - 24.2 ng/mL 14.6   Vitamin B-12 Latest Ref Range: 211 - 911 pg/mL 451     Significant Diagnostic Studies:   Colonoscopy 03/02/21 by Dr. Berenice Reardon: no blood or active bleeding noted, anastomosis normal, left-sided diverticulosis, moderate to large internal hemorrhoids    CTA lower extremity left & right 03/02/21      Impression   Impression:   Aortic stent graft present.  The right iliac limb is occluded.  The native    right superficial femoral artery is occluded distally.  The right femoral    anterior tibial graft is occluded.  The right popliteal artery is    occluded.  Only the anterior tibial artery is patent and this is just to    the mid calf.       Advanced multifocal disease within the left superficial femoral artery.     Single-vessel runoff on the left. CXR 03/03/21      Impression   Impression:   Moderate to advanced opacity left perihilar region in the left lung base.     This could represent aspiration or pneumonia.       Suspect dilation of the esophagus.       Cardiomegaly, no CHF.      Current Meds:  Scheduled Meds:   piperacillin-tazobactam  3,375 mg Intravenous Q8H    metoprolol succinate  25 mg Oral Daily    pravastatin  40 mg Oral Daily    sodium chloride flush  10 mL Intravenous 2 times per day    pantoprazole  40 mg Oral BID AC    hydrocortisone  25 mg Rectal BID     Continuous Infusions:   heparin (porcine) 500 Units/hr (03/03/21 0945)    alteplase (ACTIVASE) 50 mg in 500 mL infusion - PERIPHERAL ARTERIAL OCCLUSION 0.5 mg/hr (03/03/21 0117)    sodium chloride      heparin (PORCINE) Infusion Stopped (03/03/21 0118)    dextrose         Assessment:  81 yo F admitted 02/28/21 for BRBPR with clots. H/O afib, AICD, PVD, on Eliquis & Plavix. Initial Hgb 10.6. CT A/P negative. NM GI blood loss scan negative. H/O partial colectomy d/t severe diverticular disease 2018. H/O right hemicolectomy for cecal mass 02/2020. S/P PEG tube to pexy her stomach & pull her hiatal hernia down, done at [facility]. H/O esophageal diverticulum & very large thoracic inlet. Admitted at [facility] 10/2020 and underwent exploratory laparotomy 10/06/20 that demonstrated 75 cm of hyperemic small bowel & hemorrhagic ascites, wound kept open with Abthera dressing. She underwent repeat exploratory laparotomy, gastrostomy tube placement & intraoperative EGD 10/08/20. Colonoscopy 03/02/21 demonstrated no blood or active bleeding noted, anastomosis normal, left-sided diverticulosis, moderate to large internal hemorrhoids. Developed right lower extremity pain 03/02/21, noted to have multiple occlusions to the RLE. Underwent angio by IR 03/03/21, started on TPA & Heparin gtts. 1. Acute on chronic rectal bleeding with clots- resolved, s/p colon 03/02/21 with no active bleeding  2. Symptomatic anemia- s/p 2 units PRBC  3. Afib s/p AICD- on Eliquis & Plavix- considering Watchman procedure  4. H/O partial colectomy 02/2018, h/o right hemicolectomy 02/2020  5. H/O colon CA s/p right hemicolectomy  6. H/O internal hemorrhoids- on Anusol suppositories prn  7. H/O diverticulosis  8. GERD  9. Large hiatal hernia & distal esophageal diverticulum s/p PEG 10/2020  10. Achalasia s/p PEG 10/2020  11. H/O AAA  12. CHF, not exacerbated  13. COPD, not exacerbated  14. PVD- on Eliquis & Plavix  15. H/O HTN  16. HLD  17. Acute right lower extremity ischemia s/p RLE aortogram & arteriogram 03/03/21- on TPA & Heparin gtt  18.  Acute hypoxic respiratory failure- on HFNC    Plan:    · Monitor H & H, transfuse prn  · Nursing to monitor stool output & document  · Anusol suppository BID, script sent  · Continue PO PPI BID, home dose  · Diet as

## 2021-03-04 ENCOUNTER — APPOINTMENT (OUTPATIENT)
Dept: INTERVENTIONAL RADIOLOGY/VASCULAR | Age: 86
DRG: 377 | End: 2021-03-04
Payer: MEDICARE

## 2021-03-04 LAB
ANION GAP SERPL CALCULATED.3IONS-SCNC: 6 MEQ/L (ref 8–16)
APTT: 33.1 SECONDS (ref 22–38)
APTT: 37 SECONDS (ref 22–38)
BUN BLDV-MCNC: 12 MG/DL (ref 7–22)
CALCIUM SERPL-MCNC: 8 MG/DL (ref 8.5–10.5)
CHLORIDE BLD-SCNC: 110 MEQ/L (ref 98–111)
CO2: 24 MEQ/L (ref 23–33)
CREAT SERPL-MCNC: 1.1 MG/DL (ref 0.4–1.2)
ERYTHROCYTE [DISTWIDTH] IN BLOOD BY AUTOMATED COUNT: 16.7 % (ref 11.5–14.5)
ERYTHROCYTE [DISTWIDTH] IN BLOOD BY AUTOMATED COUNT: 54 FL (ref 35–45)
FIBRIN SPLIT PRODUCTS: > 20 MCG/ML
FIBRIN SPLIT PRODUCTS: > 20 MCG/ML
FIBRINOGEN: 302 MG/100ML (ref 155–475)
FIBRINOGEN: 327 MG/100ML (ref 155–475)
GFR SERPL CREATININE-BSD FRML MDRD: 47 ML/MIN/1.73M2
GLUCOSE BLD-MCNC: 84 MG/DL (ref 70–108)
HCT VFR BLD CALC: 27.5 % (ref 37–47)
HCT VFR BLD CALC: 28.8 % (ref 37–47)
HCT VFR BLD CALC: 29.8 % (ref 37–47)
HEMOGLOBIN: 8.8 GM/DL (ref 12–16)
HEMOGLOBIN: 8.9 GM/DL (ref 12–16)
HEMOGLOBIN: 9.3 GM/DL (ref 12–16)
INR BLD: 1.16 (ref 0.85–1.13)
INR BLD: 1.28 (ref 0.85–1.13)
MCH RBC QN AUTO: 27.9 PG (ref 26–33)
MCHC RBC AUTO-ENTMCNC: 31.2 GM/DL (ref 32.2–35.5)
MCV RBC AUTO: 89.5 FL (ref 81–99)
PLATELET # BLD: 87 THOU/MM3 (ref 130–400)
PMV BLD AUTO: 11.6 FL (ref 9.4–12.4)
POTASSIUM REFLEX MAGNESIUM: 3.6 MEQ/L (ref 3.5–5.2)
RBC # BLD: 3.33 MILL/MM3 (ref 4.2–5.4)
SCAN OF BLOOD SMEAR: NORMAL
SODIUM BLD-SCNC: 140 MEQ/L (ref 135–145)
WBC # BLD: 7.7 THOU/MM3 (ref 4.8–10.8)

## 2021-03-04 PROCEDURE — 1200000003 HC TELEMETRY R&B

## 2021-03-04 PROCEDURE — 6360000002 HC RX W HCPCS: Performed by: NURSE PRACTITIONER

## 2021-03-04 PROCEDURE — 6370000000 HC RX 637 (ALT 250 FOR IP): Performed by: STUDENT IN AN ORGANIZED HEALTH CARE EDUCATION/TRAINING PROGRAM

## 2021-03-04 PROCEDURE — 94761 N-INVAS EAR/PLS OXIMETRY MLT: CPT

## 2021-03-04 PROCEDURE — 6370000000 HC RX 637 (ALT 250 FOR IP): Performed by: INTERNAL MEDICINE

## 2021-03-04 PROCEDURE — C1769 GUIDE WIRE: HCPCS

## 2021-03-04 PROCEDURE — 85014 HEMATOCRIT: CPT

## 2021-03-04 PROCEDURE — B41F1ZZ FLUOROSCOPY OF RIGHT LOWER EXTREMITY ARTERIES USING LOW OSMOLAR CONTRAST: ICD-10-PCS | Performed by: RADIOLOGY

## 2021-03-04 PROCEDURE — 85027 COMPLETE CBC AUTOMATED: CPT

## 2021-03-04 PROCEDURE — 6360000004 HC RX CONTRAST MEDICATION: Performed by: RADIOLOGY

## 2021-03-04 PROCEDURE — 85610 PROTHROMBIN TIME: CPT

## 2021-03-04 PROCEDURE — 2709999900 HC NON-CHARGEABLE SUPPLY

## 2021-03-04 PROCEDURE — 80048 BASIC METABOLIC PNL TOTAL CA: CPT

## 2021-03-04 PROCEDURE — 99233 SBSQ HOSP IP/OBS HIGH 50: CPT | Performed by: INTERNAL MEDICINE

## 2021-03-04 PROCEDURE — 6370000000 HC RX 637 (ALT 250 FOR IP): Performed by: FAMILY MEDICINE

## 2021-03-04 PROCEDURE — 2580000003 HC RX 258: Performed by: NURSE PRACTITIONER

## 2021-03-04 PROCEDURE — 6370000000 HC RX 637 (ALT 250 FOR IP): Performed by: NURSE PRACTITIONER

## 2021-03-04 PROCEDURE — 37214 CESSJ THERAPY CATH REMOVAL: CPT

## 2021-03-04 PROCEDURE — 85385 FIBRINOGEN ANTIGEN: CPT

## 2021-03-04 PROCEDURE — 3E03317 INTRODUCTION OF OTHER THROMBOLYTIC INTO PERIPHERAL VEIN, PERCUTANEOUS APPROACH: ICD-10-PCS | Performed by: RADIOLOGY

## 2021-03-04 PROCEDURE — 2580000003 HC RX 258: Performed by: INTERNAL MEDICINE

## 2021-03-04 PROCEDURE — 85018 HEMOGLOBIN: CPT

## 2021-03-04 PROCEDURE — 85362 FIBRIN DEGRADATION PRODUCTS: CPT

## 2021-03-04 PROCEDURE — 85730 THROMBOPLASTIN TIME PARTIAL: CPT

## 2021-03-04 RX ORDER — TIMOLOL MALEATE 5 MG/ML
1 SOLUTION/ DROPS OPHTHALMIC NIGHTLY
Status: DISCONTINUED | OUTPATIENT
Start: 2021-03-04 | End: 2021-03-07 | Stop reason: HOSPADM

## 2021-03-04 RX ORDER — CLOPIDOGREL BISULFATE 75 MG/1
75 TABLET ORAL DAILY
Status: DISCONTINUED | OUTPATIENT
Start: 2021-03-04 | End: 2021-03-07 | Stop reason: HOSPADM

## 2021-03-04 RX ADMIN — PANTOPRAZOLE SODIUM 40 MG: 40 TABLET, DELAYED RELEASE ORAL at 16:16

## 2021-03-04 RX ADMIN — HYDROCORTISONE ACETATE 25 MG: 25 SUPPOSITORY RECTAL at 08:27

## 2021-03-04 RX ADMIN — PIPERACILLIN AND TAZOBACTAM 3375 MG: 3; .375 INJECTION, POWDER, LYOPHILIZED, FOR SOLUTION INTRAVENOUS at 04:35

## 2021-03-04 RX ADMIN — SODIUM CHLORIDE, PRESERVATIVE FREE 10 ML: 5 INJECTION INTRAVENOUS at 08:25

## 2021-03-04 RX ADMIN — CLOPIDOGREL BISULFATE 75 MG: 75 TABLET ORAL at 16:16

## 2021-03-04 RX ADMIN — APIXABAN 2.5 MG: 2.5 TABLET, FILM COATED ORAL at 21:01

## 2021-03-04 RX ADMIN — PANTOPRAZOLE SODIUM 40 MG: 40 TABLET, DELAYED RELEASE ORAL at 08:29

## 2021-03-04 RX ADMIN — PIPERACILLIN AND TAZOBACTAM 3375 MG: 3; .375 INJECTION, POWDER, LYOPHILIZED, FOR SOLUTION INTRAVENOUS at 21:01

## 2021-03-04 RX ADMIN — SODIUM CHLORIDE, PRESERVATIVE FREE 10 ML: 5 INJECTION INTRAVENOUS at 21:02

## 2021-03-04 RX ADMIN — PIPERACILLIN AND TAZOBACTAM 3375 MG: 3; .375 INJECTION, POWDER, LYOPHILIZED, FOR SOLUTION INTRAVENOUS at 14:02

## 2021-03-04 RX ADMIN — TIMOLOL MALEATE 1 DROP: 5 SOLUTION/ DROPS OPHTHALMIC at 21:01

## 2021-03-04 RX ADMIN — HYDROCORTISONE ACETATE 25 MG: 25 SUPPOSITORY RECTAL at 21:01

## 2021-03-04 RX ADMIN — IOPAMIDOL 55 ML: 612 INJECTION, SOLUTION INTRAVENOUS at 09:48

## 2021-03-04 RX ADMIN — PRAVASTATIN SODIUM 40 MG: 40 TABLET ORAL at 08:27

## 2021-03-04 ASSESSMENT — ENCOUNTER SYMPTOMS
SHORTNESS OF BREATH: 1
CHEST TIGHTNESS: 0
SORE THROAT: 0
TROUBLE SWALLOWING: 1
BLOOD IN STOOL: 1
COUGH: 0
BACK PAIN: 0
SINUS PAIN: 0
ABDOMINAL PAIN: 0
VOMITING: 0
NAUSEA: 0
WHEEZING: 1
EYE PAIN: 0
PHOTOPHOBIA: 0

## 2021-03-04 ASSESSMENT — PAIN SCALES - GENERAL
PAINLEVEL_OUTOF10: 0

## 2021-03-04 NOTE — PROGRESS NOTES
Patient transferred from , 2 nurse skin assessment completed by Kianna MASTERS and [facility]. IV site free of s/s of infection or infiltration. Vital signs obtained. Kianna RN discussed hourly rounding with patient addressing 5 P's. Fall prevention and safety brochure discussed with patient. Bed alarm on. Call light in reach.

## 2021-03-04 NOTE — PROGRESS NOTES
CRITICAL CARE PROGRESS NOTE      Patient:  Sharad Simmons    Unit/Bed:4D-15/015-A  YOB: 1935  MRN: [id number]   PCP: Alyssa Gonsales  Date of Admission: 2/28/2021  Chief Complaint:- GI bleed    Assessment and Plan:    Acute right lower limb ischemia - Not present on admission. Secondary to discontinuation of Eliquis and Plavix on admission for ? GI bleed. Patient had acute pain of right lower limb with loss of pulses on 3/2/21 at ~2230. Patient underwent CTA of the right limb demonstrating occlusion of right iliac limb, occlusion of right superficial femoral artery, occlusion of right femoral anterior tibial graft, and occlusion of right popliteal artery. 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 Patient received 2mg TPA and TPA and Heparin gtt initialized. 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 GI bleed. Patient received 1 unit PRBC on admission and an additional 1 unit PRBC on 3/3/21.  - Trend H&H and transfuse with Hgb<8    Thrombocytopenia - Secondary to heparin infusion. Platelets=87 down trending from admission of 167 on 2/28/21. Suspicion for HIT does not fit clinical picture at present as patient has received less than 24 hours of heparin therapy. - Continue to trend platelet and consider switch to Argatroban. Acute Hypoxic respiratory failure - Secondary to aspiration s/p colonoscopy on 3/2/21. Patient does not use home oxygen and was needing BiPAP to maintain SpO2>90%.  The patient was weaned to HFNC and then to nasal cannula at 4LPM.  - Continue to wean supplemental oxygen as tolerated to maintain SpO2>90%  - Continue DuoNebs every 4 hours while awake     ? Sepsis aspiration pneumonia - Not present on arrival.  Patient became acutely hypotensive, tachypneic, tachycardic, febrile, and had elevated WBC meeting SIRS criteria with suspected source. Procalcitonin=17.63, lactic acid=1.6. The patient received 0.9% NS at a rate of 30cc/kg of ideal body weight. Blood cultures were not obtained secondary to currently receiving antibiotic therapy. In the setting of acute blood loss anemia, aspiration post colonoscopy, and resolution of leukocytosis the diagnosis of sepsis cannot be confirmed or denied as this is a mixed clinical picture. - Continue Zosyn  - Monitor for continued s/s of sepsis and hypotension    UTI - Positive urine nitrate and many bacteria found on urinalysis (3/1/21). Culture not ordered and cannot be re-drawn via straight catheterization secondary to patient currently receiving TPA. - Continue Zosyn coverage     COPD - without exacerbation. PFT 1/20/2020 FEV1=77%. -Continue DuoNebs every 4 hours     HFrEF NYHA class II - Without exacerbation. ECHO on 6/26/2020 with EF=25-30% imporved from EF=10-15% on ECHO 12/28/2019.     Hx of HTN - Currently hypotensive secondary to sepsis and acute blood loss anemia.  - Continue home antihypertensives with holding parameters     Hx of PVD/PAD - s/p stenting 2/2019, right fem-tib PTA with stent 2/2019. Peripheral angiogram/intervention was done on 2/14/2019 for acute on chronic limb ischemia related to post lower extremity bypass distal anastomotic stenosis with recent graft thromboses with a successful right fem-tib angioplasty and stenting.     Hx of A-fib - [employer] ICD placed 8/2020 for atrial arryhtmias. Currently rate controlled and NSR on telemetry. Patient has no complaint of chest pain. EKG with sinus tachycardia, PVCs, ST, and  T-wave changes on admission. Troponin negative. 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  Surgical pathology of cecal lesion, ileocecal lesion, tubulovillous adenoma,  and tubular adenoma free of dysplasia and malignancy. Hx of AAA - s/p EVAR 2/2019. Hx of esophageal stricture and achalasia - Noted. Hx of  multiple abdominal surgeries - mesenteric ischemia 10/20/2020 with exploratory laparotomy at [address] [place] [address], right yesi-colectomy, cholecystectomy, hysterectomy. Tobacco abuse - Noted. Patient Quit in 10/2019 with 60 pack year history. She recently resumed smoking again in 2020 0.25-0.5 packs/day.     Acute GI bleed - Resolved. Patient presented to emergency department with complaint of BRBPR for 2 days prior to presentation. The patient is on home anticoagulation of Eliquis and Plavix which were discontinued. Gastroenterology consulted with colonoscopy performed on 3/2/21 demonstrating internal hemorrhoids without evidence of bleeding. INITIAL H AND P AND ICU COURSE:  Yi Ny is a 82 y.o. female with PMHx of current every day smoker, PVD/PAD, HTN, CAD, AAA, A. fib, ileocecal lesion, GI bleed, HFrEF, and COPD who presented to [facility] emergency department on 2/28/21 for complaint of BRBPR for 2 days prior to presentation. The patient had associated symptoms of dizziness. Within the emergency department the patient received 1 unit PRBC transfusion secondary to acute blood loss anemia. Repeat H/H 10.6/34.7. CT abdomen pelvis and bleeding scan unremarkable. Nuclear medicine GI blood loss examination was negative. Home Plavix and Eliquis were held and gastroenterology was consulted. Patient was admitted to the hospital service for further care and management. 3/2/2021 - colonoscopy was completed with no evidence of active bleeding within the colon and moderate to large internal hemorrhoids were observed without bleeding or stigmata. Post procedure the patient was noted to have SpO2 =87% on 6 L/min nasal cannula. The patient was not having improvement and HFNC was applied.   The patient continued to desaturate and noninvasive ventilation was escalated to BiPAP. Chest x-ray revealed concerns for left mid and lower lung field pneumonia likely secondary to aspiration. Pulmonology was consulted with assessment of postoperative pulmonary insufficiency secondary to bilateral atelectasis versus aspiration pneumonia versus other etiology. Recommendation for continue duo nebs every 4 hours while awake, incentive spirometry, and supplemental oxygen weaning. The patient was started on prophylactic Zosyn by the hospitalist attending for pneumonia coverage. 3/3/21 - Overnight (3/2/21 at ~2230) the patient complaining of painful right lower extremity with decreased pulses and decreased warmth. Nitroglycerin ointment was applied to the leg and the patient was emergently taken to interventional radiology. Under the care of Dr. Sadi Dixon angiogram was completed and noted complete occlusion of the right limb of the aortobifemoral bypass graft, occlusion of the SFA and fem-tib bypass graft. 2 mg TPA was infused via left brachial sheath approach and TPA and heparin infusion were initialized. The patient was admitted to the ICU for further care and management. Past Medical History:  Per HPI. Family History: Mother () - heart disease and CVA. Father () - Lung CA, emphysema. Sister () - Leukemia. MGM () - Heart disease and dementia. MGF () - Heart disease. Social History:  Tobacco >60 pack year history, ETOH 1 glass of wine /week, denies illicit drug use    Review of Systems   Constitutional: Positive for fatigue. Negative for chills and fever. HENT: Positive for trouble swallowing. Negative for nosebleeds, sinus pain and sore throat. Eyes: Negative for photophobia, pain and visual disturbance. Respiratory: Positive for shortness of breath and wheezing. Negative for cough and chest tightness. Cardiovascular: Negative for chest pain and leg swelling.    Gastrointestinal: Positive for blood in stool. Negative for abdominal pain, nausea and vomiting. Endocrine: Negative for cold intolerance and heat intolerance. Genitourinary: Negative for difficulty urinating, hematuria and vaginal bleeding. Musculoskeletal: Negative for back pain, joint swelling and neck pain. Skin: Negative for rash and wound. Allergic/Immunologic: Negative for environmental allergies and food allergies. Neurological: Positive for weakness and light-headedness. Negative for seizures, syncope, numbness and headaches. Hematological: Negative for adenopathy. Does not bruise/bleed easily. Psychiatric/Behavioral: Negative for agitation and sleep disturbance. The patient is not nervous/anxious. All other systems reviewed and are negative. Scheduled Meds:   piperacillin-tazobactam  3,375 mg Intravenous Q8H    metoprolol succinate  25 mg Oral Daily    pravastatin  40 mg Oral Daily    sodium chloride flush  10 mL Intravenous 2 times per day    pantoprazole  40 mg Oral BID AC    hydrocortisone  25 mg Rectal BID     Continuous Infusions:   heparin (porcine) 5 mL/hr (03/03/21 1634)    alteplase (ACTIVASE) 50 mg in 500 mL infusion - PERIPHERAL ARTERIAL OCCLUSION 0.5 mg/hr (03/03/21 1634)    sodium chloride      norepinephrine Stopped (03/04/21 0332)    heparin (PORCINE) Infusion Stopped (03/03/21 0118)    dextrose         PHYSICAL EXAMINATION:  Vitals:  Temp: 98.5 °F (36.9 °C)  Pulse: 92  Resp: 23  BP: 124/60  FiO2 : 40 %  SpO2: 97 %  Baldwin Coma Scale  Eye Opening: Spontaneous  Best Verbal Response: Oriented  Best Motor Response: Obeys commands  Baldwin Coma Scale Score: 15      Input/Output  In: 2662.7   Out: 190 [Urine:190]    BMI:  Body mass index is 24.95 kg/m². Oxygenation/Ventilation  Vent Information  FiO2 : 40 %           Physical Exam  Vitals signs and nursing note reviewed. Constitutional:       General: She is awake. Appearance: Normal appearance. She is normal weight.       Interventions: Nasal cannula in place. HENT:      Head: Normocephalic and atraumatic. Right Ear: External ear normal.      Left Ear: External ear normal.      Nose: Nose normal.      Mouth/Throat:      Mouth: Mucous membranes are moist.      Pharynx: Oropharynx is clear. Eyes:      Conjunctiva/sclera: Conjunctivae normal.      Pupils: Pupils are equal, round, and reactive to light. Neck:      Musculoskeletal: Normal range of motion and neck supple. Cardiovascular:      Rate and Rhythm: Normal rate and regular rhythm. Pulses:           Popliteal pulses are 2+ on the right side and 2+ on the left side. Dorsalis pedis pulses are detected w/ Doppler on the right side and 2+ on the left side. Posterior tibial pulses are detected w/ Doppler on the right side and 2+ on the left side. Heart sounds: Normal heart sounds. Pulmonary:      Effort: Pulmonary effort is normal.      Breath sounds: Normal breath sounds. Abdominal:      General: Bowel sounds are normal.      Palpations: Abdomen is soft. Musculoskeletal: Normal range of motion. Right lower leg: No edema. Left lower leg: No edema. Skin:     General: Skin is warm and dry. Capillary Refill: Capillary refill takes less than 2 seconds. Neurological:      General: No focal deficit present. Mental Status: She is alert and oriented to person, place, and time. Mental status is at baseline. GCS: GCS eye subscore is 4. GCS verbal subscore is 5. GCS motor subscore is 6. Psychiatric:         Attention and Perception: Attention and perception normal.         Mood and Affect: Mood and affect normal.         Speech: Speech normal.         Behavior: Behavior normal. Behavior is cooperative. Thought Content:  Thought content normal.         Cognition and Memory: Cognition and memory normal.         Judgment: Judgment normal.         Data: (All radiographs, tracings, PFTs, and imaging are personally viewed and interpreted unless otherwise noted).  Eb=969, K=3.6, Chloride=110, CO2=24, BUN=12, Cr=1.1, Glucose=84, Anion Gap=6   WBC=7.7, Hgb=9.3, HCT=29.8, Platelets=87   Telemetry shows NSR   CTA right lower leg (3/2/21) - occlusion of right iliac limb, right superficial femoral artery, right femoral anterior tibial graft, right popliteal artery. 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 Seen with multidisciplinary ICU team.  Meets Continued ICU Level Care Criteria:    [x] Yes   [] No - Transfer Planned to listed location:  [] HOSPITALIST CONTACTED-      Case and plan discussed with Dr. June Uriostegui. Electronically signed by Roxanna Martinez DO on 3/4/2021 at [address]  Patient seen by me. Case discussed with resident physician. Case discussed with Dr. Netta Restrepo. Patient will need ongoing anticoagulation. Resume Eliquis. Status post TPA. Transition to medical floor as arterial occlusion has resolved. .  Time was discontiguous. Time does not include procedures. Time does include my direct assessment of the patient and coordination of care.   Electronically signed by Last Lyon MD on 3/4/2021 at 5:33 PM

## 2021-03-04 NOTE — OP NOTE
Department of Radiology  Post Procedure Progress Note      Pre-Procedure Diagnosis:  History of right LE ischemia    Procedure Performed:  Repeat right leg arteriogram.    Anesthesia: local     Findings: Significantly improved flow through the right lower extremity. The right limb of the aortic endograft is widely patent. The patients SFA and popliteal artery are chronically occluded. Extensive collateral flow is present to the PTA and ARACELIS. PTA maintains patency to the foot. ARACELIS is occluded in the distal calf but numerous collateral vessels continue to the foot. Immediate Complications:  None    Estimated Blood Loss: minimal    SEE DICTATED PROCEDURE NOTE FOR COMPLETE DETAILS.     Electronically signed by Rick Dakins, MD on 3/4/2021 at 9:49 AM

## 2021-03-04 NOTE — PROGRESS NOTES
0988 Patient received in IR for TPA recheck/follow up.   1403 Procedure started with Dr. Letha Piedra. 0969 Procedure completed; patient tolerated well. Sheath to left access pulled back and secured; no bleeding noted. 1000 Patient on bed; comfort ensured. 1005 Patient taken to 4D via bed.

## 2021-03-04 NOTE — CARE COORDINATION
3/4/21, 3:18 PM EST    DISCHARGE ON GOING EVALUATION    [facility] day: 4  Location: -15/015-A Reason for admit: Chronic lower GI bleeding [K92.2]  Limb ischemia [I99.8]   Procedure:   3/2 Colonoscopy: left diverticulosis; nonspecific erythema in sigmoid colon; mod to large internal hemorrhoids; no active bleeding noted  3/3 Aortogram/RLE arteriogram: complete occlusion of right limb aortobifemoral bypass graft; occlusion of SFA and fem-tib bypass graft  3/3 Repeat Arteriogram: improved flow, continue w/TPA and heparin via sheath  3/4 IR arteriogram: Significantly improved flow through the right lower extremity after approximately 32 hours of TPA and heparin infusion. Clinically the patient has improved and it was decided to terminate the procedure and abort further TPA infusion. The patient's left brachial artery sheath will be removed approximately 4 hours following the procedure    Barriers to Discharge: Returned to IR yesterday and TPA/heparin via sheath continued. Returned to IR today; stopped heparin and TPA; occlusion resolved. Plan to remove sheath. PT/OT ordered. Afebrile. NSR. Sats 93% on 1L O2. Ox4. Follows commands. RLE with brisk cap refill, warm, doppler pulses noted t/o. G/J tube clamped. External urinary catheter. Eliquis, Plavix, prn IV fentanyl, prn nebs, toprol xl, protonix bid, IV zosyn, pravastatin. Hgb 8.9, plt 87. PCP: aMnn Gimenez  Readmission Risk Score: 28%  Patient Goals/Plan/Treatment Preferences: Home alone with friend support. Denies needs. Monitor for possible needs; PT/OT to eval. Plan home O2 eval at discharge.

## 2021-03-05 LAB
ERYTHROCYTE [DISTWIDTH] IN BLOOD BY AUTOMATED COUNT: 16.8 % (ref 11.5–14.5)
ERYTHROCYTE [DISTWIDTH] IN BLOOD BY AUTOMATED COUNT: 53.3 FL (ref 35–45)
GLUCOSE BLD-MCNC: 138 MG/DL (ref 70–108)
HCT VFR BLD CALC: 30.6 % (ref 37–47)
HEMOGLOBIN: 9.7 GM/DL (ref 12–16)
MCH RBC QN AUTO: 28.3 PG (ref 26–33)
MCHC RBC AUTO-ENTMCNC: 31.7 GM/DL (ref 32.2–35.5)
MCV RBC AUTO: 89.2 FL (ref 81–99)
MRSA SCREEN: NORMAL
PLATELET # BLD: 103 THOU/MM3 (ref 130–400)
PMV BLD AUTO: 11.4 FL (ref 9.4–12.4)
RBC # BLD: 3.43 MILL/MM3 (ref 4.2–5.4)
WBC # BLD: 8.2 THOU/MM3 (ref 4.8–10.8)

## 2021-03-05 PROCEDURE — 99232 SBSQ HOSP IP/OBS MODERATE 35: CPT | Performed by: FAMILY MEDICINE

## 2021-03-05 PROCEDURE — 97162 PT EVAL MOD COMPLEX 30 MIN: CPT

## 2021-03-05 PROCEDURE — 97165 OT EVAL LOW COMPLEX 30 MIN: CPT

## 2021-03-05 PROCEDURE — 6370000000 HC RX 637 (ALT 250 FOR IP): Performed by: STUDENT IN AN ORGANIZED HEALTH CARE EDUCATION/TRAINING PROGRAM

## 2021-03-05 PROCEDURE — 36415 COLL VENOUS BLD VENIPUNCTURE: CPT

## 2021-03-05 PROCEDURE — 2580000003 HC RX 258: Performed by: NURSE PRACTITIONER

## 2021-03-05 PROCEDURE — 6370000000 HC RX 637 (ALT 250 FOR IP): Performed by: NURSE PRACTITIONER

## 2021-03-05 PROCEDURE — 82948 REAGENT STRIP/BLOOD GLUCOSE: CPT

## 2021-03-05 PROCEDURE — 6360000002 HC RX W HCPCS: Performed by: NURSE PRACTITIONER

## 2021-03-05 PROCEDURE — 6370000000 HC RX 637 (ALT 250 FOR IP): Performed by: INTERNAL MEDICINE

## 2021-03-05 PROCEDURE — 97530 THERAPEUTIC ACTIVITIES: CPT

## 2021-03-05 PROCEDURE — 2580000003 HC RX 258: Performed by: INTERNAL MEDICINE

## 2021-03-05 PROCEDURE — APPSS30 APP SPLIT SHARED TIME 16-30 MINUTES: Performed by: NURSE PRACTITIONER

## 2021-03-05 PROCEDURE — 97110 THERAPEUTIC EXERCISES: CPT

## 2021-03-05 PROCEDURE — 85027 COMPLETE CBC AUTOMATED: CPT

## 2021-03-05 PROCEDURE — 99232 SBSQ HOSP IP/OBS MODERATE 35: CPT | Performed by: INTERNAL MEDICINE

## 2021-03-05 PROCEDURE — 94640 AIRWAY INHALATION TREATMENT: CPT

## 2021-03-05 PROCEDURE — 1200000003 HC TELEMETRY R&B

## 2021-03-05 RX ORDER — CALCIUM CARBONATE 200(500)MG
500 TABLET,CHEWABLE ORAL 3 TIMES DAILY PRN
Status: DISCONTINUED | OUTPATIENT
Start: 2021-03-05 | End: 2021-03-07 | Stop reason: HOSPADM

## 2021-03-05 RX ORDER — IPRATROPIUM BROMIDE AND ALBUTEROL SULFATE 2.5; .5 MG/3ML; MG/3ML
1 SOLUTION RESPIRATORY (INHALATION)
Status: DISCONTINUED | OUTPATIENT
Start: 2021-03-05 | End: 2021-03-06

## 2021-03-05 RX ADMIN — PIPERACILLIN AND TAZOBACTAM 3375 MG: 3; .375 INJECTION, POWDER, LYOPHILIZED, FOR SOLUTION INTRAVENOUS at 20:28

## 2021-03-05 RX ADMIN — APIXABAN 2.5 MG: 2.5 TABLET, FILM COATED ORAL at 07:35

## 2021-03-05 RX ADMIN — HYDROCORTISONE ACETATE 25 MG: 25 SUPPOSITORY RECTAL at 07:34

## 2021-03-05 RX ADMIN — PIPERACILLIN AND TAZOBACTAM 3375 MG: 3; .375 INJECTION, POWDER, LYOPHILIZED, FOR SOLUTION INTRAVENOUS at 12:33

## 2021-03-05 RX ADMIN — SODIUM CHLORIDE, PRESERVATIVE FREE 10 ML: 5 INJECTION INTRAVENOUS at 20:28

## 2021-03-05 RX ADMIN — PANTOPRAZOLE SODIUM 40 MG: 40 TABLET, DELAYED RELEASE ORAL at 04:08

## 2021-03-05 RX ADMIN — Medication 5 MG: at 20:28

## 2021-03-05 RX ADMIN — APIXABAN 2.5 MG: 2.5 TABLET, FILM COATED ORAL at 20:28

## 2021-03-05 RX ADMIN — IPRATROPIUM BROMIDE AND ALBUTEROL SULFATE 1 AMPULE: .5; 3 SOLUTION RESPIRATORY (INHALATION) at 16:09

## 2021-03-05 RX ADMIN — HYDROCORTISONE ACETATE 25 MG: 25 SUPPOSITORY RECTAL at 20:28

## 2021-03-05 RX ADMIN — PANTOPRAZOLE SODIUM 40 MG: 40 TABLET, DELAYED RELEASE ORAL at 15:31

## 2021-03-05 RX ADMIN — CLOPIDOGREL BISULFATE 75 MG: 75 TABLET ORAL at 07:37

## 2021-03-05 RX ADMIN — METOPROLOL SUCCINATE 25 MG: 25 TABLET, FILM COATED, EXTENDED RELEASE ORAL at 07:34

## 2021-03-05 RX ADMIN — IPRATROPIUM BROMIDE AND ALBUTEROL SULFATE 1 AMPULE: .5; 3 SOLUTION RESPIRATORY (INHALATION) at 19:57

## 2021-03-05 RX ADMIN — TIMOLOL MALEATE 1 DROP: 5 SOLUTION/ DROPS OPHTHALMIC at 20:28

## 2021-03-05 RX ADMIN — SODIUM CHLORIDE, PRESERVATIVE FREE 10 ML: 5 INJECTION INTRAVENOUS at 07:35

## 2021-03-05 RX ADMIN — PIPERACILLIN AND TAZOBACTAM 3375 MG: 3; .375 INJECTION, POWDER, LYOPHILIZED, FOR SOLUTION INTRAVENOUS at 05:00

## 2021-03-05 RX ADMIN — PRAVASTATIN SODIUM 40 MG: 40 TABLET ORAL at 07:35

## 2021-03-05 ASSESSMENT — PAIN SCALES - GENERAL
PAINLEVEL_OUTOF10: 0
PAINLEVEL_OUTOF10: 0

## 2021-03-05 ASSESSMENT — PAIN DESCRIPTION - PAIN TYPE: TYPE: CHRONIC PAIN

## 2021-03-05 ASSESSMENT — PAIN DESCRIPTION - DESCRIPTORS: DESCRIPTORS: SORE

## 2021-03-05 ASSESSMENT — PAIN - FUNCTIONAL ASSESSMENT: PAIN_FUNCTIONAL_ASSESSMENT: PREVENTS OR INTERFERES SOME ACTIVE ACTIVITIES AND ADLS

## 2021-03-05 NOTE — PLAN OF CARE
Care plan reviewed with patient. Patient verbalizes understanding of the plan of care and contribute to goal setting. Problem: Falls - Risk of:  Goal: Will remain free from falls  Description: No falls this shift. Bed in lowest position and locked. Call light within reach. Bed alarm activated. 3/5/2021 1208 by Kings Brown RN  Outcome: Ongoing  Note: No falls noted this shift. Continue falling star program. Bed alarm on, bed in low position. Call light and personal belongings in reach. Patient uses call light appropriately. Goal: Absence of physical injury  Description: No physical injury occurred. 3/5/2021 1208 by Kings Brown RN  Outcome: Ongoing  Note: No falls noted this shift. Continue falling star program. Bed alarm on, bed in low position. Call light and personal belongings in reach. Patient uses call light appropriately. Problem: Bleeding:  Goal: Will show no signs and symptoms of excessive bleeding  Description: No signs and symptoms of excessive bleeding  3/5/2021 1208 by Kings Brown RN  Outcome: Ongoing    Problem: Skin Integrity:  Goal: Will show no infection signs and symptoms  Description: Not showing any new signs of infection  3/5/2021 1208 by Kings Brown RN  Outcome: Ongoing  Note: VSS, no fevers, on IV Zosyn    Goal: Absence of new skin breakdown  Description: Absence of new skin breakdown. Turns self in bed.  3/5/2021 1208 by Kings Brown RN  Outcome: Ongoing  Note: No new signs or symptoms of skin breakdown noted this shift, encouraging patient to turn and reposition self in bed q2h    Problem: Pain:  Description: Pain management should include both nonpharmacologic and pharmacologic interventions. Goal: Pain level will decrease  Description: Denies pain at this time  3/5/2021 1208 by Kings Brown RN  Outcome: Ongoing  Note: Patient denies pain so far this shift, will continue to monitor.    Pain goal 0    Problem: Discharge Planning:  Goal: Discharged to appropriate level of care  Description: Care plan reviewed with patient. Patient verbalize understanding of the plan of care and contribute to goal setting. Monitoring for discharge needs. Plans to return home at discharge. 3/5/2021 1208 by Benson Chandra RN  Outcome: Ongoing  Note: Plan to discharge home with support    Problem: Physical Regulation:  Goal: Complications related to the disease process, condition or treatment will be avoided or minimized  Description: Returned to IR. See note from MD. Doppler pulses present.   3/5/2021 1208 by Benson Chandra RN  Outcome: Ongoing  Note: Consultants following, see notes

## 2021-03-05 NOTE — PROGRESS NOTES
PROGRESS NOTE      Patient:  Jarett Almonte      Unit/Bed:6K-24/024-A    YOB: 1935    MRN: [id number]       Acct: [de-identified]     PCP: Clayton Bhat    Date of Admission: 2/28/2021    Chief Complaint: GI bleed    Assessment/Plan:    Anticipated Discharge in : 24-36 hours    1. Acute blood loss anemia   Likely secondary to ? GI bleed   Received 2 units PRBC  2. Thrombocytopenia   Secondary to heparin infusion which was stopped after less than 24 hours   Platelets 731 on admission, low of 87 on 3/4   Trend CBC   May need to switch to argatroban  3. ?  Sepsis aspiration pneumonia UTI   Not present on admission   3/2 patient became acutely hypotensive, tachypneic, tachycardic, febrile, and had elevated WBC with suspected source   3/3 procalcitonin 17.63, lactic acid 1.6 at that time   Fluid resuscitation provided   Monitor for continued signs and symptoms of sepsis and hypotension   Continue Zosyn, day 4  4. History of COPD   PFT 1/20/2020 FEV1 77%   Continue duo nebs every 4 hours  5. History of HFrEF NYHA class II   Echo 6/26/2020: EF 25-30% (previous echo 12/2019 EF 10-15%)  6. History of hypertension   Hypertension on admission secondary to sepsis and acute blood loss anemia   Continue home medications with holding parameters  7. History of PVD/PAD   S/p stenting 2/2019 right fem-tib PTA   2/14/2019: Peripheral angiogram/intervention for acute on chronic limb ischemia related to post lower extremity bypass distal anastomotic stenosis with recent graft thromboses   On Eliquis and Plavix  8. History of A. fib   Rate controlled and NSR on telemetry   Negative troponin   Medtronic ICD placed 8/2020   On Eliquis and Plavix  9. 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 History of AAA   S/p EVAR 2/2019  11.  History of esophageal stricture and achalasia   PEG tube in place   Not used during this admission   Ensure added to diet  12. History of multiple abdominal surgeries   10/2020 mesenteric ischemia requiring ex lap   2/25/2020 right hemicolectomy, cholecystectomy, hysterectomy  13. Tobacco abuse   Counseling provided.  Recently resumed smoking in 2020, 0.25-0.5 packs/day   She had quit in 10/2019.  60-pack-year history at that time. 14. Acute GI bleed, resolved   BRBPR for 2 days on admission   Eliquis and Plavix for long-term coagulation was held on admission   GI consulted. Colonoscopy 3/2/2021 showed internal hemorrhoids without evidence of bleeding. 15.  Acute hypoxic respiratory failure, resolved   Likely secondary to aspiration   No home O2 requirements, required BiPAP to maintain SPO2 greater than 90%   Maintain SPO2 greater than 90%   Continue duo nebs every 4 hours while awake  16. Acute right lower limb ischemia, resolved   Secondary to discontinuation of Eliquis and Plavix due to concern for ? GI bleed   3/2/2021: Patient began having acute pain of right lower limb with loss of pulses   CTA showed occlusion of the right iliac limb, right superficial femoral artery, right femoral anterior tibial graft, and right popliteal artery   3/3/2021 IR arteriogram: Evidence of complete occlusion of the aortobifemoral bypass graft, SFA, and fem-tib bypass graft   3/3/2021 patient given 2 mg TPA and heparin drip was started. Arteriogram was repeated and showed complete occlusion of the right limb of the aortic endograft with thrombosis throughout the right lower extremity.    3/4/2021 arteriogram repeated showing complete resolution of occlusion        Active Hospital Problems    Diagnosis Date Noted    Dilated cardiomyopathy ([facility] [address] [I42.0]      Priority: High    Orthostatic dizziness [R42] 06/05/2016     Priority: High     Class: Acute    Ischemia of right lower extremity [I99.8] 03/03/2021    Acute kidney injury ([facility]) with acute tubular necrosis (ATN) (HCC) [N17.0] 03/01/2021    Orthostatic hypotension [I95.1] 03/01/2021    Hypotension due to hypovolemia [I95.89, E86.1]     COPD without exacerbation (HCC) [J44.9]     Medtronic dual ICD  [Z95.810] 08/26/2020    Acute blood loss anemia [D62]     Chronic combined systolic and diastolic congestive heart failure (HCC) [I50.42]     Tobacco abuse [Z72.0]     Paroxysmal atrial fibrillation ([facility] [address] [I48.0] 09/15/2018    Anemia [D64.9] 09/10/2018       Hospital Course:   Per HPI:  \"15-FBXA-ODS female patient presented to the ED today with worsening acute on chronic lower GI bleed with bright red blood per rectum. History of lower GI bleed for the past 2 months but increased hematochezia with blood clots in the last 1 to 2 days .       Patient is on long-term anticoagulation w/  Eliquis and takes Plavix too. Patient has developed dizziness due to symptomatic anemia. S/p 1 unit of PRBC transfusion in the ED. \"    Patient had colonoscopy 3/2 which showed no evidence of active bleeding within the colon and moderate to large internal hemorrhoids that were not bleeding. Patient did desat to SPO2 87% while on 6 L nasal cannula. High flow nasal cannula was applied without improvement of SPO2. Patient required BiPAP. Subsequent chest x-ray revealed concerns for left mid and lower lung field pneumonia, likely secondary to aspiration. Pulmonology was consulted at that time and diagnosed her with postoperative pulmonary insufficiency secondary to bilateral atelectasis versus aspiration pneumonia versus other etiology. They recommended DuoNebs every 4 hours, incentive spirometry, and supplemental O2 weaning. Prophylactic Zosyn was started at that time. Overnight 3/2/2021, patient began having pain in her right lower extremity. Noted to have decreased pulses and decreased warmth. Nitroglycerin ointment was applied to the leg patient was emergently taken to interventional radiology. Angiogram was completed at that time which showed complete occlusion of the right limb of the aortobifemoral bypass graft, SFA, and fem-tib bypass graft. Patient was given 2 mg TPA with subsequent heparin gtt. Repeat arteriogram showed continued occlusion and the patient was transferred to the ICU. Continued heparin. Repeat arteriogram 3/4 showed complete resolution of clot, heparin was stopped, and patient was transferred to hospital floor. Eliquis and Plavix resumed 3/4 for long-term coagulation goals. Subjective: No acute events overnight. Patient states that her leg feels much better compared to yesterday. Denies any pain, but it does feel stiff. She has been having difficulties finding things she likes to eat at the hospital.  She likes to supplement her meals with Ensure at home. We discussed her plan on discharge. She had a lot of concerns about if she needed home health or not. PT/OT to see today. Denies lightheadedness, dizziness, fever/chills, shortness of breath, chest pain, palpitations, orthopnea, abdominal pain, hematochezia, hematemesis, diarrhea, constipation, or lower extremity edema.       Medications:  Reviewed    Infusion Medications    sodium chloride      dextrose       Scheduled Medications    apixaban  2.5 mg Oral BID    clopidogrel  75 mg Oral Daily    timolol  1 drop Both Eyes Nightly    piperacillin-tazobactam  3,375 mg Intravenous Q8H    metoprolol succinate  25 mg Oral Daily    pravastatin  40 mg Oral Daily    sodium chloride flush  10 mL Intravenous 2 times per day    pantoprazole  40 mg Oral BID AC    hydrocortisone  25 mg Rectal BID     PRN Meds: calcium carbonate, albuterol, fentanNYL, sodium chloride, ipratropium-albuterol, melatonin, sodium chloride flush, promethazine **OR** ondansetron, acetaminophen **OR** acetaminophen, glucose, dextrose, glucagon (rDNA), dextrose      Intake/Output Summary (Last 24 hours) at 3/5/2021 9377  Last data filed at Component Value Date    NITRU POSITIVE 03/01/2021    WBCUA 0-2 03/01/2021    BACTERIA MANY 03/01/2021    RBCUA 0-2 03/01/2021    BLOODU TRACE 03/01/2021    SPECGRAV >1.030 03/01/2021    GLUCOSEU NEGATIVE 10/02/2020       Radiology:  IR INF THROMB ART/LORETTA SUBSQ DAY   Final Result   Significantly improved flow through the right lower extremity after approximately 32 hours of TPA and heparin infusion. Clinically the patient has improved and it was decided to terminate the procedure and abort further TPA infusion. The patient's left    brachial artery sheath will be removed approximately 4 hours following the procedure. **This report has been created using voice recognition software. It may contain minor errors which are inherent in voice recognition technology. **                        Final report electronically signed by Dr Jacoby Lamar on 3/4/2021 11:32 AM      IR TRANSCATH INFUSION THROMB NONCORONARY   Final Result   Improved flow through the right lower extremity with areas of residual thrombus in the right limb of the abdominal aortic endograft. This appears to be superimposed over chronic changes of peripheral vascular disease throughout the right lower extremity. As the patient's laboratory values remain adequate with a fibrinogen level most recently measuring 247, the patient will continue to undergo a TPA/heparin infusion for one additional moderate. The patient will be brought back to the IR department    tomorrow for a repeat arteriogram.      **This report has been created using voice recognition software. It may contain minor errors which are inherent in voice recognition technology. **                        Final report electronically signed by Dr Jacoby Lamar on 3/3/2021 5:28 PM      IR ANGIOGRAM EXTREMITY RIGHT   Final Result   Complete occlusion of the right limb of the aortic endograft as well as thrombosis throughout the right lower extremity.  The patient will undergo a TPA and multifocal disease within the left superficial femoral artery. Single-vessel runoff on the left. This document has been electronically signed by: Funmi Hernandez MD on    03/02/2021 11:48 PM      All CTs at this facility use dose modulation techniques and iterative    reconstructions, and/or weight-based dosing   when appropriate to reduce radiation to a low as reasonably achievable. XR CHEST PORTABLE   Final Result   1. Normal heart size. Permanent pacemaker/defibrillator. 2. Moderate atelectasis/pneumonia left mid and lower lung fields. No effusion is seen. 3. There is a cylindrical soft tissue density right paratracheal region which appears to continue posterior to the heart to the region of the GE junction. This represents a markedly fluid distended esophagus, better seen on prior CT thorax likely related    to presbyesophagus or achalasia. **This report has been created using voice recognition software. It may contain minor errors which are inherent in voice recognition technology. **      Final report electronically signed by Dr. Jon Sarkar on 3/2/2021 5:35 PM      NM GI BLOOD LOSS   Final Result   Negative for GI bleed. This document has been electronically signed by: Edwin Mcdonald MD on    02/28/2021 09:59 PM      CT ABDOMEN PELVIS W IV CONTRAST   Final Result      No acute intra-abdominal or intrapelvic findings. Final report electronically signed by Dr. Rachel Hart on 2/28/2021 5:02 PM      XR CHEST PORTABLE   Final Result      No acute intrathoracic process. **This report has been created using voice recognition software. It may contain minor errors which are inherent in voice recognition technology. **      Final report electronically signed by Dr. Rachel Hart on 2/28/2021 3:33 PM          Diet: DIET CARDIAC;  Dental Soft    DVT prophylaxis: [] Lovenox                                 [] SCDs                                 [] SQ Heparin

## 2021-03-05 NOTE — PROGRESS NOTES
[address] [address]  INPATIENT PHYSICAL THERAPY  EVALUATION  [facility] RENAL TELEMETRY 6K - 6Y-69/902-J    Time In: 1016  Time Out: 1051  Timed Code Treatment Minutes: 23 Minutes  Minutes: 35          Date: 3/5/2021  Patient Name: Tomasz Rivera,  Gender:  female        MRN: [id number]  : 1935  (82 y.o.)      Referring Practitioner: Ramirez Sotelo DO  Diagnosis: Chronic lower GI bleed  Additional Pertinent Hx: Tomasz Rivera is a 82 y.o. female with PMHx of current every day smoker, PVD/PAD, HTN, CAD, AAA, A. fib, ileocecal lesion, GI bleed, HFrEF, and COPD who presented to [facility] emergency department on 21 for complaint of BRBPR for 2 days prior to presentation. The patient had associated symptoms of dizziness. Within the emergency department the patient received 1 unit PRBC transfusion secondary to acute blood loss anemia. Repeat H/H 10.6/34.7. CT abdomen pelvis and bleeding scan unremarkable. Nuclear medicine GI blood loss examination was negative. Home Plavix and Eliquis were held and gastroenterology was consulted. Patient was admitted to the hospital service for further care and management. Patient underwent CTA of the right limb demonstrating occlusion of right iliac limb, occlusion of right superficial femoral artery, occlusion of right femoral anterior tibial graft, and occlusion of right popliteal artery. 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 Patient received 2mg TPA and TPA and Heparin gtt initialized. Restrictions/Precautions:  Restrictions/Precautions: Fall Risk    Subjective:  Chart Reviewed: Yes  Patient assessed for rehabilitation services?: Yes  Family / Caregiver Present: No  Subjective: RN approved session, pt is supine in bed and agreeable to PT.     General:  Overall Orientation Status: Within Functional Limits  Follows Commands: Within Functional Limits    Vision: Within Functional Limits    Hearing: Within functional limits       Pain: denies    Vitals: Vitals not assessed per clinical judgement, see nursing flowsheet    Social/Functional History:    Lives With: Alone  Type of Home: Apartment([facility]- 4th floor)  Home Layout: One level  Home Access: Elevator  Home Equipment: Rolling walker      Ambulation Assistance: Independent  Transfer Assistance: Independent    Active : Yes     Additional Comments: Pt amb with RW at home; son assists as needed, daughter in [place], 2 other kids work and have limited availability to assist    OBJECTIVE:  Range of Motion:  Bilateral Lower Extremity: WFL    Strength:  Bilateral Lower Extremity: Impaired - grossly 4/5    Balance:  Static Sitting Balance:  Stand By Assistance  Static Standing Balance: Stand By Assistance  Dynamic Standing Balance: Stand By Assistance    Bed Mobility:  Supine to Sit: Stand By Assistance  Scooting: Stand By Assistance    Transfers:  Sit to Stand: Stand By Assistance  Stand to Sit:Stand By Assistance    Ambulation:  Stand By Assistance  Distance: 30 feet around the room (pt declines further ambulation, stating \"that's enough for now\")  Surface: Level Tile  Device:Rolling Walker  Gait Deviations: Forward Flexed Posture, Slow Pretty, Decreased Step Length Bilaterally and Decreased Gait Speed  **Slow, steady pace, no c/o dizziness or pain in R LE    Exercise:  Patient was guided in 1 set(s) 10 reps of exercise to both lower extremities. Ankle pumps, Quad sets, Heelslides and Hip abduction/adduction. Exercises were completed for increased independence with functional mobility. Functional Outcome Measures: Not completed     ASSESSMENT:  Activity Tolerance:  Patient tolerance of  treatment: good. Treatment Initiated: Treatment and education initiated within context of evaluation.   Evaluation time included review of current medical information, gathering information related to past medical, social and functional history, completion of standardized testing, formal and informal observation of tasks, assessment of data and development of plan of care and goals. Treatment time included skilled education and facilitation of tasks to increase safety and independence with functional mobility for improved independence and quality of life. See above exercises, education on recommendations, mobility while in the hospital.    Assessment: Body structures, Functions, Activity limitations: Decreased functional mobility , Decreased balance, Decreased endurance, Decreased strength  Assessment: Pt tolerates session well, limited by generalized weakness from prolonged hospitalization. PT to continue to progress strength and functional mobility. Prognosis: Good    REQUIRES PT FOLLOW UP: Yes    Discharge Recommendations:  Discharge Recommendations: Home with Home health PT(Pt needs  PT, however, pt is currently declining. Is agreeable to [facility] RN, CM and SW notified)    Patient Education:  PT Education: PT Role, Plan of Care, General Safety    Equipment Recommendations:  Equipment Needed: No    Plan:  Times per week: 3-5x GM  Current Treatment Recommendations: Strengthening, Functional Mobility Training, Transfer Training, Balance Training, Endurance Training, Gait Training, Home Exercise Program, Patient/Caregiver Education & Training    Goals:  Patient goals : to go home  Short term goals  Time Frame for Short term goals: by discharge  Short term goal 1: Pt to transfer supine <--> sit S to enable pt to get in/out of bed. Short term goal 2: Pt to transfer sit <--> stand S for increased functional mobility. Short term goal 3: Pt to ambulate >100 feet with RW SBA for household ambulation. Long term goals  Time Frame for Long term goals : NA due to short length of stay. Following session, patient left in safe position with all fall risk precautions in place.

## 2021-03-05 NOTE — CARE COORDINATION
3/3/21, 4:33 PM EST    DISCHARGE ON GOING EVALUATION    Sajan Angulo day: 5  Location: [facility]24/024-A Reason for admit: Chronic lower GI bleeding [K92.2]  Limb ischemia [I99.8]   Procedure:   3/2 Colonoscopy: left diverticulosis; nonspecific erythema in sigmoid colon; mod to large internal hemorrhoids; no active bleeding noted  3/3 Aortogram/RLE arteriogram: complete occlusion of right limb aortobifemoral bypass graft; occlusion of SFA and fem-tib bypass graft  Barriers to Discharge:  Transferred from ICU. INR 1.16, Hgb 8.9, plt 87. Heparin gtt and TPA stopped on 3/4, eliquis and plavix resumed. IV zosyn, duonebs prn. Oxygen weaned, 94% on RA. PT/OT consulted, PT recommends home therapy. PCP: Anderson Jarrell  Readmission Risk Score: 28%  Patient Goals/Plan/Treatment Preferences: Spoke with iLly Nichols, she plans to return home alone at discharge. She has two friends and a son locally that assist her with transportation, but she is nervous about accepting help at this time as none of them have been vaccinated for Covid. Discussed recommendation for [place] therapy, she declines stating she has used HH before and it wasn't much of a benefit, and she knows the exercises to do. Discussed a [address], she initially agrees that it may be a good idea but further in conversation declines to select agency or to see  about establishing [place]. Her daughter who lives in [place] has volunteered to come stay with her for a bit, and she is considering allowing her to. She does not anticipate being discharged this weekend and states CM can re-visit her on Monday if she is still here to see how she feels about [place] then.

## 2021-03-05 NOTE — PROGRESS NOTES
[facility]      Patient - Lee Houston   MRN -  [id number]   [facility] # - [de-identified]   - 1935      Date of Admission -  2021  2:48 PM  Date of evaluation -  3/5/2021  Room - [facility] Joshua Vuong MD Primary Care Physician - Nidia Roy     Problem List      Active Hospital Problems    Diagnosis Date Noted    Dilated cardiomyopathy [facility]) [I42.0]      Priority: High    Orthostatic dizziness [R42] 2016     Priority: High     Class: Acute    Ischemia of right lower extremity [I99.8] 2021    Acute kidney injury (ALEJANDRO) with acute tubular necrosis (ATN) (HCC) [N17.0] 2021    Orthostatic hypotension [I95.1] 2021    Hypotension due to hypovolemia [I95.89, E86.1]     COPD without exacerbation ([address] [J44.9]     Medtronic dual ICD  [Z95.810] 2020    Acute blood loss anemia [D62]     Chronic combined systolic and diastolic congestive heart failure ([address] [I50.42]     Tobacco abuse [Z72.0]     Paroxysmal atrial fibrillation ([address] [I48.0] 09/15/2018    Anemia [D64.9] 09/10/2018     Reason for Consult    For evaluation of post operative/procedure  hypoxia and respiratory insufficiency  HPI   History Obtained From: Patient  and electronic medical record. Lee Houston is a 82 y.o. female She was admitted under hospitalist service. Pulmonary medicine was consulted for further evaluation of post operative/procedure hypoxia and respiratory insufficiency. She is a poor historian. Majority of the history obtained from reviewing the patient's chart. She underwent:  Type of surgery: Colonoscopy  Date of surgery: 2021  Surgeon: Dr. Ruma Bhatia     She is a 57-year-old pleasant  female with past medical history of chronic tobacco smoking on and off for 30 years with less than 1 pack of cigarettes per day.   She was diagnosed with moderately severe COPD in the past with the full pulmonary function tests. She however not using any inhalers at home oxygen at this time. She underwent colonoscopy today as a part of evaluation for GI bleeding for acute blood loss anemia. After colonoscopy patient became a progressively hypoxic and currently requiring high flow at 45 L/min with a 70% FiO2. Patient at the time of my initial evaluation not in any acute distress however she remained on high flow. Input/Out put fluid balance from the time of admission: 2. 9 L    She was never diagnosed with pulmonary diseases I.e bronchial asthma, COPD, pulmonary embolism, pulmonary hypertension or pleural effusion/s in the past.    She is currently not using any oxygen supplementation at rest, exercise or during sleep/at night time. She denies orthopnea or paroxysmal nocturnal dyspnea. History of recreational or IV drug use in the past:NO     Occupation:  She is current working: No  Type of profession: retired.                      History of exposure to coal mines/coal dust: NO  History of exposure to foundry dust/welding: NO  History of exposure to quarry/silica/sandblasting: NO  History of exposure to asbestos/working with breaks/ships: NO  History of exposure to farm dust: NO  History of recent travel to long distances: NO  History of exposure to birds, pigeons, or chickens in the past:NO    History of pulmonary embolism in the past: No            History of DVT in the past:Yes -right lower extremity in the past         3/2 Colonoscopy: left diverticulosis; nonspecific erythema in sigmoid colon; mod to large internal hemorrhoids; no active bleeding noted  3/3 Aortogram/RLE arteriogram: complete occlusion of right limb aortobifemoral bypass graft; occlusion of SFA and fem-tib bypass graft  3/3 Repeat Arteriogram: improved flow, continue w/TPA and heparin via sheath  3/4 IR arteriogram: Significantly improved flow through the right lower extremity after approximately 32 hours of TPA and heparin infusion. Clinically the patient has improved and it was decided to terminate the procedure and abort further TPA infusion. The patient's left brachial artery sheath will be removed approximately 4 hours following the procedure  3/4 transferred to     Past 24 Hours   -Stable on room air  -Afebrile  -No pulmonary events overnight  -No complaints    -All systems reviewed. PMHx   Past Medical History      Diagnosis Date    A-fib [facility])     AAA [facility]abdominal [facility] aneurysm) ([facility]     Achalasia     Anemia     Arthritis     Blood circulation, collateral     BPPV (benign paroxysmal positional vertigo) 6/6/16    CHF (congestive heart failure) ([facility])     Chronic kidney disease     sees Dr Leticia Dee cancer [facility])     Gastrointestinal hemorrhage     GERD (gastroesophageal reflux disease)     ? esophageal stricture    Hiatal hernia     History of blood transfusion     HTN (hypertension)     Hx of blood clots 2018    R leg-sees ABEBA Hargrove    Hyperlipidemia     Medtronic dual ICD  8/26/2020    Neuromuscular disorder ([address]     Obesity     Osteopenia     Osteoporosis     PAC (premature atrial contraction)     on betablocker    Paralysis ([facility])     baby    Pedal edema     denied any hx of hypertension    Pneumonia     PVC (premature ventricular contraction)     sees Dr Marco Buck PVD (peripheral vascular disease) ([address]     Small bowel obstruction ([address]     Tinnitus     UTI (urinary tract infection)       Past Surgical History        Procedure Laterality Date    ABDOMINAL AORTIC ANEURYSM REPAIR, ENDOVASCULAR N/A 2/15/2019    ABDOMINAL AORTIC ANEURYSM REPAIR ENDOVASCULAR, DECLOTTING RIGHT FEM TIB BYPASS GRAFT performed by Hazel Diane MD at [address]    lumpectomy   [address]      30 years ago    COLONOSCOPY  08/06/2018    Dr Finn Mraques N/A 2/22/2019    COLONOSCOPY DIAGNOSTIC [facility] Red North [facility]    COLONOSCOPY N/A 2/22/2020    COLONOSCOPY CONTROL HEMORRHAGE performed by Niyah Epps MD at [facility]    COLONOSCOPY Left 3/2/2021    COLORECTAL CANCER SCREENING, NOT HIGH RISK performed by Sabi Fleming MD at [address]      eye, eyelids    EYE SURGERY      cataract    HEMICOLECTOMY N/A 2/25/2020    OPEN RIGHT COLON RESECTION performed by Lucina Yost MD at [address]  07/15/2016    OTHER SURGICAL HISTORY  10/06/2020    Exp lap washout mesenteric lymph node biopsy EGD abthera placement Dr Marlow Claude  10/08/2020    reopening recent lap open g tube placement intra operative EGD and primary closure of open abdomen- [address] OLEGARIO SKN SUB GRFT T/A/L AREA/<100SCM /<1ST 25 SCM N/A 9/6/2018    RE-EXPLORATION RIGHT GROIN, DECLOTTING OF FEMORAL BYPASS GRAFT performed by Enio Plaza MD at [address] EGD TRANSORAL BIOPSY SINGLE/MULTIPLE Left 11/27/2017    EGD BIOPSY performed by Elena Zaragoza MD at [address] [place] [address], [facility], [facility] N/A 8/20/2018    ROBOT ASSISTED COLECTOMY (LOW ANTERIOR) performed by Charisse Peguero MD at [address] OFFICE/OUTPT [address] [facility] N/A 9/5/2018    RIGHT FEMORAL EMBOLECTOMY, INTRAOPERATIVE ARTERIOGRAM, RIGHT ILIAC EMBOLECTOMY, RIGHT COMMON AND EXTERNAL ILIAC STENTING, RIGHT FEMORAL TO ANTERIOR POPLITEAL BYPASS WITH 6MM GORTEX GRAFT performed by Enio Plaza MD at [address] / [address] Right 2/14/2019    FEMORAL EMBOLECTOMY THROMBECTOMY, RIGHT LEG performed by Enio Plaza MD at [address] [facility] 11/27/2017    EGD SUBMUCOSAL/BOTOX INJECTION performed by Elena Zaragoza MD at [address] [facility] N/A 2/22/2019    EGD BIOPSY performed by Red North MD at [facility]     [facility]    [facility] Medications    apixaban  2.5 mg Oral BID    clopidogrel  75 mg Oral Daily    timolol  1 drop Both Eyes Nightly    piperacillin-tazobactam  3,375 mg Intravenous Q8H    metoprolol succinate  25 mg Oral Daily    pravastatin  40 mg Oral Daily    sodium chloride flush  10 mL Intravenous 2 times per day    pantoprazole  40 mg Oral BID AC    hydrocortisone  25 mg Rectal BID     calcium carbonate, albuterol, fentanNYL, sodium chloride, ipratropium-albuterol, melatonin, sodium chloride flush, promethazine **OR** ondansetron, acetaminophen **OR** acetaminophen, glucose, dextrose, glucagon (rDNA), dextrose  IV Drips/Infusions   sodium chloride      dextrose       Home Medications  Medications Prior to Admission: magnesium (MAGNESIUM-OXIDE) 250 MG TABS tablet, TAKE 2 TABLETS BY MOUTH TWICE DAILY  apixaban (ELIQUIS) 2.5 MG TABS tablet, TAKE 1 TABLET BY MOUTH TWICE DAILY  metoprolol succinate (TOPROL XL) 25 MG extended release tablet, Take 1 tablet by mouth daily  losartan (COZAAR) 25 MG tablet, Take 1 tablet by mouth daily  pravastatin (PRAVACHOL) 40 MG tablet, TAKE 1 TABLET BY MOUTH DAILY  clopidogrel (PLAVIX) 75 MG tablet, Take 1 tablet by mouth daily  potassium chloride (KLOR-CON M) 10 MEQ extended release tablet, Take 1 tablet by mouth daily  pantoprazole (PROTONIX) 40 MG tablet, Take 40 mg by mouth 2 times daily (before meals)  acetaminophen (TYLENOL ARTHRITIS PAIN) 650 MG CR tablet, Take 1,300 mg by mouth every 12 hours as needed for Pain   timolol (TIMOPTIC) 0.5 % ophthalmic solution, Place 1 drop into both eyes nightly   bumetanide (BUMEX) 1 MG tablet, Take 1 tablet by mouth daily as needed (as directed by [facility] Clinic)  albuterol sulfate HFA (PROVENTIL HFA) 108 (90 Base) MCG/ACT inhaler, Inhale 2 puffs into the lungs every 6 hours as needed for Wheezing or Shortness of Breath  Blood Pressure KIT, Check blood pressure daily, and if symptoms of lightheadedness. Call physician if BP <80/40.   melatonin 3 MG TABS tablet, Take 2 tablets by mouth nightly as needed (sleep)  Diet    DIET CARDIAC; Dental Soft  Allergies    Aspirin, Lasix [furosemide], Lipitor [atorvastatin], Neurontin [gabapentin], Oxycontin [oxycodone hcl], and Penicillins  Social History     Social History     Socioeconomic History    Marital status:      Spouse name: Lluvia Valenzuela Number of children: 3    Years of education: Not on file    Highest education level: Not on file   Occupational History    Not on file   Social Needs    Financial resource strain: Not on file    Food insecurity     Worry: Not on file     Inability: Not on file    Transportation needs     Medical: Not on file     Non-medical: Not on file   Tobacco Use    Smoking status: Current Some Day Smoker     Packs/day: 0.25     Years: 60.00     Pack years: 15.00     Types: Cigarettes     Start date: 1956    Smokeless tobacco: Never Used    Tobacco comment: 1 cigarette every other day   Substance and Sexual Activity    Alcohol use:  Yes     Alcohol/week: 1.0 standard drinks     Types: 1 Glasses of wine per week     Comment: social    Drug use: No    Sexual activity: Not Currently   Lifestyle    Physical activity     Days per week: Not on file     Minutes per session: Not on file    Stress: Not on file   Relationships    Social connections     Talks on phone: Not on file     Gets together: Not on file     Attends Sikhism service: Not on file     Active member of club or organization: Not on file     Attends meetings of clubs or organizations: Not on file     Relationship status: Not on file    Intimate partner violence     Fear of current or ex partner: Not on file     Emotionally abused: Not on file     Physically abused: Not on file     Forced sexual activity: Not on file   Other Topics Concern    Not on file   Social History Narrative    Not on file     Family History          Problem Relation Age of Onset    Heart Disease Mother     Stroke Mother     Cancer Father cervical adenopathy. Psychiatric: Patient  has a normal mood and affect. Skin - No bruising or bleeding. Labs  - Old records and notes have been reviewed in [facility]   ABG  Lab Results   Component Value Date    PH 7.40 03/02/2021    PO2 51 03/02/2021    PCO2 33 03/02/2021    HCO3 20 03/02/2021    O2SAT 86 03/02/2021     Lab Results   Component Value Date    IFIO2 50 03/02/2021    MODE CPAP/PS 12/28/2019    SETPEEP 8.0 12/28/2019     CBC  Recent Labs     03/02/21  2149 03/03/21  0202 03/03/21  0202 03/04/21  0123 03/04/21  0444 03/04/21  0820   WBC 9.3 13.5*  --   --  7.7  --    RBC 3.61* 3.39*  --   --  3.33*  --    HGB 10.0*  10.0* 9.4*   < > 8.8* 9.3* 8.9*   HCT 32.1*  31.6* 30.0*   < > 27.5* 29.8* 28.8*   MCV 87.5 88.5  --   --  89.5  --    MCH 27.7 27.7  --   --  27.9  --    MCHC 31.6* 31.3*  --   --  31.2*  --    * 106*  --   --  87*  --    MPV 11.4 11.4  --   --  11.6  --     < > = values in this interval not displayed. BMP  Recent Labs     03/03/21  0413 03/04/21  0820   NA  --  140   K  --  3.6   CL  --  110   CO2  --  24   BUN  --  12   CREATININE  --  1.1   GLUCOSE  --  84   MG 1.5*  --    CALCIUM  --  8.0*     LFT  No results for input(s): AST, ALT, ALB, BILITOT, ALKPHOS, LIPASE in the last 72 hours. Invalid input(s): AMYLASE  TROP  Lab Results   Component Value Date    TROPONINT < 0.010 03/03/2021    TROPONINT < 0.010 03/02/2021    TROPONINT < 0.010 10/02/2020     BNP  No results for input(s): BNP in the last 72 hours.   Lactic Acid  Recent Labs     03/03/21  0915   LACTA 1.6     INR  Recent Labs     03/03/21 1915 03/04/21  0123 03/04/21  0820   INR 1.43* 1.28* 1.16*     PTT  Recent Labs     03/03/21 1915 03/04/21  0123 03/04/21  0820   APTT 32.8 37.0 33.1     Glucose  Recent Labs     03/02/21  1644 03/02/21  1938   POCGLU 82 103     UA   No results for input(s): [place], [address], [address], MUCUS, PROTEINU, BLOODU, RBCUA, [address], BACTERIA, NITRU, [address], BILIRUBINUR, UROBILINOGEN, KETUA, LABCAST, LABCASTTY, AMORPHOS in the last 72 hours. Invalid input(s): CRYSTALS. PFTs             Sleep studies   None in Epic. Cultures    COVID-19 testing performed on 1 March 2021-none detected  VRE (-)  MRSA (-)  EKG     Echocardiogram     [phone] 6/26/2020      Narrative & Impression     Transthoracic Echocardiography Report (TTE)     Conclusions      Summary   Limited examination. Normal left ventricular size and severely reduced systolic function. There was severe global hypokinesis. Hypertrophic basal septum. Ejection fraction was estimated at 25-30%. Left atrial size was severely dilated. The mitral valve structure demonstrated posterior leaflet calcification. DOPPLER: The transmitral velocity was within the normal range with no   evidence for mitral stenosis. There was trace mitral regurgitation. IVC size is within normal limits with normal respiratory phasic changes. Signature      ----------------------------------------------------------------   Electronically signed by Leilani Corona MD (Interpreting   physician) on 06/26/2020 at 02:29 PM   ----------------------------------------------------------------         Radiology    CXR  03/03/2021   Single view of the chest   Impression: Moderate to advanced opacity left perihilar region in the left lung base. This could represent aspiration or pneumonia. Suspect dilation of the esophagus. Cardiomegaly, no CHF. Tue Mar 2, 2021  5:35 PM   PROCEDURE: XR CHEST PORTABLE   1. Normal heart size. Permanent pacemaker/defibrillator. 2. Moderate atelectasis/pneumonia left mid and lower lung fields. No effusion is seen. 3. There is a cylindrical soft tissue density right paratracheal region which appears to continue posterior to the heart to the region of the GE junction. This represents a markedly fluid distended esophagus, better seen on prior CT thorax likely related  to presbyesophagus or achalasia. CT Scans  (See actual reports for details)  Sat Dec 28, 2019  9:26 AM   PROCEDURE: CT CHEST W CONTRAST   1. Markedly distended esophagus which contains fluid as well as recently ingested food products. This correlates with the recently seen soft tissue opacity in the right paratracheal region on the prior x-ray. 2. Small bilateral pleural effusions with adjacent atelectasis. 3. Cardiomegaly. Assessment   -Acute Post operative pulmonary insufficiency due to bilateral atelectasis Vs aspiration pneumonia VS other etiologies. - improved currently stable on room air   -Chronic atrial fibrillation with controlled ventricle response.  -Chronic systolic congestive heart failure. .  -Gastroesophageal reflux disease.  -Essential hypertension  -Moderate COPD  -Current smoker/some days. Ramya Snider 0.25 PPD 60 years 13 PYH- unsure of accuracy   -Full Code     Plan   -Duonebs Q4h while awake. -Incentive spirometry as ordered. - encouraged use   -Avoid all sedative meds if she is drowsy.  -Continue PT/OT. -Continue current antibiotics per primary service  -Aspiration precautions. ..  -Home O2 eval at the time of discharge.  -Currently on Zosyn IV  -PFT outpatient   -VTE prophylaxis: Eliquis   -GI prophylaxis: Protonix   -Stable from pulmonary standpoint      Case discussed with registered nurse taking care of patient. Tessa Echevarria educated about my impression and plan. She verbalizes understanding. Questions and concerns addressed. Meds and orders reviewed. Electronically signed by   ROMAN Dunaway CNP on 3/5/2021 at 12:35 PM     Addendum by Dr. Sheree Solis MD:  I have seen and examined the patient independently. Face to face evaluation and examination was performed. The above evaluation and note has been reviewed. Labs and radiographs were reviewed. I Have discussed with SALEEM Parrish CNP about this patient in detail. The above assessment and plan has been reviewed.  Please see my modifications mentioned below. My modifications:  She is on room air. Not in distress. Having difficulty in using her incentive spirometer  Will prescribe Acapella to use every 4 hourly. Wean oxygen to keep saturations more than 90%. All her cultures were negative. Antibiotics per primary service.     Elisa Paige MD 3/5/2021 5:43 PM

## 2021-03-05 NOTE — CARE COORDINATION
03/05/21 6:56 AM    Pt transferred to 6K24. Handoff report given to [facility] [address] [place], 6K CM. 10-Dec-2018 11:30

## 2021-03-05 NOTE — PLAN OF CARE
Problem: Falls - Risk of:  Goal: Will remain free from falls  Description: No falls this shift. Bed in lowest position and locked. Call light within reach. Bed alarm activated. 3/4/2021 2259 by Shelia Cole RN  Outcome: Met This Shift  Note: Patient remained free from falls this shift. Used call light appropriately. On bedrest. Bed alarm on. Call light in reach. Problem: Falls - Risk of:  Goal: Absence of physical injury  Description: No physical injury occurred. 3/4/2021 2259 by Shelia Cole RN  Outcome: Met This Shift  Note: Patient remained free from physical injury this shift. Used call light appropriately. On bedrest. Bed alarm on. Call light in reach. Pathway clear. Problem: Bleeding:  Goal: Will show no signs and symptoms of excessive bleeding  Description: No signs and symptoms of excessive bleeding  3/4/2021 2259 by Shelia Cole RN  Outcome: Met This Shift  Note: H&H monitoring Q6H. H&H stable so far this shift. No s/s of excessive bleeding noted. VSS      Problem: Skin Integrity:  Goal: Will show no infection signs and symptoms  Description: Not showing any new signs of infection  3/4/2021 2259 by Shelia Cole RN  Outcome: Met This Shift  Note: Patient remained free from skin breakdown this shift. Patient turned Q2H and PRN in bed. Pillow support provided. Low-intermittent air loss mattress provided. Will continue to monitor skin integrity and encourage repositioning Q2H and PRN. Problem: Skin Integrity:  Goal: Absence of new skin breakdown  Description: Absence of new skin breakdown. Turns self in bed. 3/4/2021 2259 by Shelia Cole RN  Outcome: Met This Shift     Problem: Pain:  Goal: Pain level will decrease  Description: Denies pain at this time  3/4/2021 2259 by Shelia Cole RN  Outcome: Met This Shift  Note: Pain goal: no pain. Patient denies pain this shift.       Problem: Discharge Planning:  Goal: Discharged to appropriate level of care  Description: Care plan reviewed with patient. Patient verbalize understanding of the plan of care and contribute to goal setting. Monitoring for discharge needs. Plans to return home at discharge. 3/4/2021 2259 by Jair Gordon RN  Outcome: Ongoing  Note: Patient from home alone, plans to return home alone at discharge. Denies needs. Will continue to monitor for discharge needs. Unknown discharge date at this time. Problem: Physical Regulation:  Goal: Complications related to the disease process, condition or treatment will be avoided or minimized  Description: Returned to IR. See note from MD. Doppler pulses present. 3/4/2021 2259 by Jair Gordon RN  Outcome: Ongoing  Note: Pulses present in BLE with doppler. Left brachial site clean dry and intact free from redness, bleeding or hematoma. Receiving IV antibiotics. On bedrest. Patient verbalizes understanding of plan of care/ treatment and contributed to goal setting.

## 2021-03-06 LAB
ANION GAP SERPL CALCULATED.3IONS-SCNC: 7 MEQ/L (ref 8–16)
BASOPHILS # BLD: 0.2 %
BASOPHILS ABSOLUTE: 0 THOU/MM3 (ref 0–0.1)
BUN BLDV-MCNC: 10 MG/DL (ref 7–22)
CALCIUM SERPL-MCNC: 8.2 MG/DL (ref 8.5–10.5)
CHLORIDE BLD-SCNC: 107 MEQ/L (ref 98–111)
CO2: 22 MEQ/L (ref 23–33)
CREAT SERPL-MCNC: 1 MG/DL (ref 0.4–1.2)
EOSINOPHIL # BLD: 1.2 %
EOSINOPHILS ABSOLUTE: 0.1 THOU/MM3 (ref 0–0.4)
ERYTHROCYTE [DISTWIDTH] IN BLOOD BY AUTOMATED COUNT: 17.1 % (ref 11.5–14.5)
ERYTHROCYTE [DISTWIDTH] IN BLOOD BY AUTOMATED COUNT: 56 FL (ref 35–45)
GFR SERPL CREATININE-BSD FRML MDRD: 53 ML/MIN/1.73M2
GLUCOSE BLD-MCNC: 110 MG/DL (ref 70–108)
GLUCOSE BLD-MCNC: 113 MG/DL (ref 70–108)
GLUCOSE BLD-MCNC: 144 MG/DL (ref 70–108)
GLUCOSE BLD-MCNC: 170 MG/DL (ref 70–108)
GLUCOSE BLD-MCNC: 178 MG/DL (ref 70–108)
HCT VFR BLD CALC: 32.2 % (ref 37–47)
HEMOGLOBIN: 9.8 GM/DL (ref 12–16)
IMMATURE GRANS (ABS): 0.04 THOU/MM3 (ref 0–0.07)
IMMATURE GRANULOCYTES: 0.6 %
LYMPHOCYTES # BLD: 10.7 %
LYMPHOCYTES ABSOLUTE: 0.7 THOU/MM3 (ref 1–4.8)
MAGNESIUM: 1.4 MG/DL (ref 1.6–2.4)
MCH RBC QN AUTO: 28 PG (ref 26–33)
MCHC RBC AUTO-ENTMCNC: 30.4 GM/DL (ref 32.2–35.5)
MCV RBC AUTO: 92 FL (ref 81–99)
MONOCYTES # BLD: 6.6 %
MONOCYTES ABSOLUTE: 0.4 THOU/MM3 (ref 0.4–1.3)
NUCLEATED RED BLOOD CELLS: 0 /100 WBC
PLATELET # BLD: 100 THOU/MM3 (ref 130–400)
PMV BLD AUTO: 12 FL (ref 9.4–12.4)
POTASSIUM SERPL-SCNC: 2.8 MEQ/L (ref 3.5–5.2)
POTASSIUM SERPL-SCNC: 3.9 MEQ/L (ref 3.5–5.2)
RBC # BLD: 3.5 MILL/MM3 (ref 4.2–5.4)
SEG NEUTROPHILS: 80.7 %
SEGMENTED NEUTROPHILS ABSOLUTE COUNT: 5.2 THOU/MM3 (ref 1.8–7.7)
SODIUM BLD-SCNC: 136 MEQ/L (ref 135–145)
WBC # BLD: 6.5 THOU/MM3 (ref 4.8–10.8)

## 2021-03-06 PROCEDURE — 6360000002 HC RX W HCPCS: Performed by: STUDENT IN AN ORGANIZED HEALTH CARE EDUCATION/TRAINING PROGRAM

## 2021-03-06 PROCEDURE — 6360000002 HC RX W HCPCS: Performed by: NURSE PRACTITIONER

## 2021-03-06 PROCEDURE — 80048 BASIC METABOLIC PNL TOTAL CA: CPT

## 2021-03-06 PROCEDURE — 6370000000 HC RX 637 (ALT 250 FOR IP): Performed by: NURSE PRACTITIONER

## 2021-03-06 PROCEDURE — 99232 SBSQ HOSP IP/OBS MODERATE 35: CPT | Performed by: INTERNAL MEDICINE

## 2021-03-06 PROCEDURE — 99232 SBSQ HOSP IP/OBS MODERATE 35: CPT | Performed by: FAMILY MEDICINE

## 2021-03-06 PROCEDURE — 94760 N-INVAS EAR/PLS OXIMETRY 1: CPT

## 2021-03-06 PROCEDURE — 2580000003 HC RX 258: Performed by: NURSE PRACTITIONER

## 2021-03-06 PROCEDURE — 82948 REAGENT STRIP/BLOOD GLUCOSE: CPT

## 2021-03-06 PROCEDURE — 84132 ASSAY OF SERUM POTASSIUM: CPT

## 2021-03-06 PROCEDURE — 6370000000 HC RX 637 (ALT 250 FOR IP): Performed by: STUDENT IN AN ORGANIZED HEALTH CARE EDUCATION/TRAINING PROGRAM

## 2021-03-06 PROCEDURE — 36415 COLL VENOUS BLD VENIPUNCTURE: CPT

## 2021-03-06 PROCEDURE — 6370000000 HC RX 637 (ALT 250 FOR IP): Performed by: INTERNAL MEDICINE

## 2021-03-06 PROCEDURE — 83735 ASSAY OF MAGNESIUM: CPT

## 2021-03-06 PROCEDURE — 2580000003 HC RX 258: Performed by: INTERNAL MEDICINE

## 2021-03-06 PROCEDURE — 85025 COMPLETE CBC W/AUTO DIFF WBC: CPT

## 2021-03-06 PROCEDURE — 1200000003 HC TELEMETRY R&B

## 2021-03-06 PROCEDURE — 94640 AIRWAY INHALATION TREATMENT: CPT

## 2021-03-06 RX ORDER — POTASSIUM CHLORIDE 20 MEQ/1
40 TABLET, EXTENDED RELEASE ORAL PRN
Status: DISCONTINUED | OUTPATIENT
Start: 2021-03-06 | End: 2021-03-07 | Stop reason: HOSPADM

## 2021-03-06 RX ORDER — MAGNESIUM SULFATE IN WATER 40 MG/ML
2000 INJECTION, SOLUTION INTRAVENOUS ONCE
Status: COMPLETED | OUTPATIENT
Start: 2021-03-06 | End: 2021-03-06

## 2021-03-06 RX ORDER — POTASSIUM CHLORIDE 7.45 MG/ML
10 INJECTION INTRAVENOUS PRN
Status: DISCONTINUED | OUTPATIENT
Start: 2021-03-06 | End: 2021-03-07 | Stop reason: HOSPADM

## 2021-03-06 RX ADMIN — IPRATROPIUM BROMIDE AND ALBUTEROL SULFATE 1 AMPULE: .5; 3 SOLUTION RESPIRATORY (INHALATION) at 08:30

## 2021-03-06 RX ADMIN — CLOPIDOGREL BISULFATE 75 MG: 75 TABLET ORAL at 08:51

## 2021-03-06 RX ADMIN — PRAVASTATIN SODIUM 40 MG: 40 TABLET ORAL at 08:51

## 2021-03-06 RX ADMIN — PANTOPRAZOLE SODIUM 40 MG: 40 TABLET, DELAYED RELEASE ORAL at 15:57

## 2021-03-06 RX ADMIN — METOPROLOL SUCCINATE 25 MG: 25 TABLET, FILM COATED, EXTENDED RELEASE ORAL at 08:51

## 2021-03-06 RX ADMIN — POTASSIUM CHLORIDE 10 MEQ: 7.46 INJECTION, SOLUTION INTRAVENOUS at 14:33

## 2021-03-06 RX ADMIN — IPRATROPIUM BROMIDE AND ALBUTEROL SULFATE 1 AMPULE: .5; 3 SOLUTION RESPIRATORY (INHALATION) at 16:00

## 2021-03-06 RX ADMIN — SODIUM CHLORIDE, PRESERVATIVE FREE 10 ML: 5 INJECTION INTRAVENOUS at 19:55

## 2021-03-06 RX ADMIN — APIXABAN 2.5 MG: 2.5 TABLET, FILM COATED ORAL at 08:51

## 2021-03-06 RX ADMIN — PIPERACILLIN AND TAZOBACTAM 3375 MG: 3; .375 INJECTION, POWDER, LYOPHILIZED, FOR SOLUTION INTRAVENOUS at 19:55

## 2021-03-06 RX ADMIN — APIXABAN 2.5 MG: 2.5 TABLET, FILM COATED ORAL at 19:54

## 2021-03-06 RX ADMIN — PIPERACILLIN AND TAZOBACTAM 3375 MG: 3; .375 INJECTION, POWDER, LYOPHILIZED, FOR SOLUTION INTRAVENOUS at 05:14

## 2021-03-06 RX ADMIN — POTASSIUM CHLORIDE 10 MEQ: 7.46 INJECTION, SOLUTION INTRAVENOUS at 13:07

## 2021-03-06 RX ADMIN — IPRATROPIUM BROMIDE AND ALBUTEROL SULFATE 1 AMPULE: .5; 3 SOLUTION RESPIRATORY (INHALATION) at 12:28

## 2021-03-06 RX ADMIN — HYDROCORTISONE ACETATE 25 MG: 25 SUPPOSITORY RECTAL at 08:52

## 2021-03-06 RX ADMIN — POTASSIUM CHLORIDE 10 MEQ: 7.46 INJECTION, SOLUTION INTRAVENOUS at 17:04

## 2021-03-06 RX ADMIN — GLYCOPYRROLATE AND FORMOTEROL FUMARATE 2 PUFF: 9; 4.8 AEROSOL, METERED RESPIRATORY (INHALATION) at 21:37

## 2021-03-06 RX ADMIN — POTASSIUM CHLORIDE 10 MEQ: 7.46 INJECTION, SOLUTION INTRAVENOUS at 15:57

## 2021-03-06 RX ADMIN — POTASSIUM CHLORIDE 10 MEQ: 7.46 INJECTION, SOLUTION INTRAVENOUS at 10:16

## 2021-03-06 RX ADMIN — PANTOPRAZOLE SODIUM 40 MG: 40 TABLET, DELAYED RELEASE ORAL at 05:14

## 2021-03-06 RX ADMIN — HYDROCORTISONE ACETATE 25 MG: 25 SUPPOSITORY RECTAL at 19:54

## 2021-03-06 RX ADMIN — PIPERACILLIN AND TAZOBACTAM 3375 MG: 3; .375 INJECTION, POWDER, LYOPHILIZED, FOR SOLUTION INTRAVENOUS at 12:09

## 2021-03-06 RX ADMIN — MAGNESIUM SULFATE HEPTAHYDRATE 2000 MG: 40 INJECTION, SOLUTION INTRAVENOUS at 12:10

## 2021-03-06 RX ADMIN — TIMOLOL MALEATE 1 DROP: 5 SOLUTION/ DROPS OPHTHALMIC at 19:54

## 2021-03-06 RX ADMIN — POTASSIUM CHLORIDE 10 MEQ: 7.46 INJECTION, SOLUTION INTRAVENOUS at 11:39

## 2021-03-06 ASSESSMENT — PAIN SCALES - GENERAL: PAINLEVEL_OUTOF10: 0

## 2021-03-06 NOTE — PROGRESS NOTES
to Right: Supervision(using the bedrail)  Supine to Sit: Supervision  Scooting: Supervision(using the bedrail)    Functional Mobility  Functional - Mobility Device: Rolling Walker  Activity: Other(35 ft)  Assist Level: Stand by assistance  Functional Mobility Comments: Pt had a steady pace and no LOB. She favors her LLE slightly. Balance:  Balance  Sitting Balance: Supervision  Standing Balance: (from the edge of bed. No C/O feeling dizzy)  Standing Balance  Time: 25 seconds  Activity: preparing to walk in her room    Transfers:  Sit to stand: Stand by assistance(from the edge of bed)  Stand to sit: Stand by assistance(to the recliner chair)       Upper Extremity Assessment:Hand Dominance: Right  LUE AROM : WFL  Left Hand AROM: WFL  RUE AROM : WFL  Right Hand AROM: WFL    LUE Strength  L Hand General: 4/5  LUE Strength Comment: 3+/5 deltoid; 3+/5 pectoral; 4/5 biceps/triceps    RUE Strength  R Hand General: 4/5  RUE Strength Comment: 3+/5 deltoid; 3+/5 pectoral; 4/5 biceps/triceps      Sensation  Overall Sensation Status: WFL  Fine Motor Skills  Fine Motor Comment: No difficulty noted with doing basic tasks such as drinking from her water pitcher or using the remote control. Activity Tolerance: Patient limited by fatigue  Pt was on room air and showed 94% O2 saturation. She had a mild cough and had respiratory therapy giving her a treatment at the end of session. Pt remained in the recliner chair. Assessment:  Assessment: Pt presents with chronic lower GI Bleeding. She had a unit of blood this admission. She has had several abdominal surgeries in the past.  She eats a dental soft diet and uses nutrition supplements to help her with protein and calories. She reports being at a more comfortable weight at this time than in the past.  She did her self care independently prior to admission and amblated with a rolling walker at home. Pt demonstrated ambulation with the rolling walker and SBA.   She had

## 2021-03-06 NOTE — PLAN OF CARE
Care plan reviewed with patient and daughter. Patient and daughter verbalize understanding of the plan of care and contribute to goal setting. Problem: Falls - Risk of:  Goal: Will remain free from falls  Description: No falls this shift. Bed in lowest position and locked. Call light within reach. Bed alarm activated. Outcome: Ongoing  Note: No falls noted this shift. Continue falling star program. Bed alarm on, bed in low position. Call light and personal belongings in reach. Patient uses call light appropriately. Goal: Absence of physical injury  Description: No physical injury occurred. Outcome: Ongoing  Note: No falls noted this shift. Continue falling star program. Bed alarm on, bed in low position. Call light and personal belongings in reach. Patient uses call light appropriately. Problem: Bleeding:  Goal: Will show no signs and symptoms of excessive bleeding  Description: No signs and symptoms of excessive bleeding  Outcome: Ongoing  Note: VSS, no signs of bleeding     Problem: Skin Integrity:  Goal: Will show no infection signs and symptoms  Description: Not showing any new signs of infection  Outcome: Ongoing  Note: VSS, no signs of infection  Goal: Absence of new skin breakdown  Description: Absence of new skin breakdown. Turns self in bed. Outcome: Ongoing  Note: No new signs or symptoms of skin breakdown noted this shift, encouraging patient to turn and reposition self in bed q2h       Problem: Pain:  Description: Pain management should include both nonpharmacologic and pharmacologic interventions. Goal: Pain level will decrease  Description: Denies pain at this time  Outcome: Ongoing  Note: Patient denies pain so far this shift, will continue to monitor. Pain goal 0     Problem: Discharge Planning:  Goal: Discharged to appropriate level of care  Description: Care plan reviewed with patient. Patient verbalize understanding of the plan of care and contribute to goal setting. Monitoring for discharge needs. Plans to return home at discharge. Patient's daughter lives in [place], spoke with this RN on phone today. She plans to fly to her mom's house on discharge to help her    Outcome: Ongoing  Note: Plan to discharge home     Problem: Physical Regulation:  Goal: Complications related to the disease process, condition or treatment will be avoided or minimized  Description: Returned to IR. See note from MD. Doppler pulses present.   Outcome: Ongoing  Note: Neuro checks WNL, monitoring

## 2021-03-06 NOTE — PROGRESS NOTES
[facility]      Patient - Anitra Jimenez   MRN -  [id number]   [facility]   - 1935      Date of Admission -  2021  2:48 PM  Date of evaluation -  3/6/2021  Room - [address] [place] [address] [address], [place] Primary Care Physician - Festus Vale     Problem List      Active Hospital Problems    Diagnosis Date Noted    Dilated cardiomyopathy [facility]) [I42.0]      Priority: High    Orthostatic dizziness [R42] 2016     Priority: High     Class: Acute    Acute postoperative respiratory insufficiency [J95.89]     Aspiration pneumonia of left lower lobe due to gastric secretions ([address] [J69.0]     Ischemia of right lower extremity [I99.8] 2021    Acute kidney injury ([facility]) with acute tubular necrosis (ATN) (HCC) [N17.0] 2021    Orthostatic hypotension [I95.1] 2021    Hypotension due to hypovolemia [I95.89, E86.1]     COPD without exacerbation ([address] [J44.9]     Medtronic dual ICD  [Z95.810] 2020    Acute blood loss anemia [D62]     Chronic combined systolic and diastolic congestive heart failure ([address] [I50.42]     Tobacco abuse [Z72.0]     Paroxysmal atrial fibrillation ([address] [I48.0] 09/15/2018    Anemia [D64.9] 09/10/2018     Reason for Consult    For evaluation of postoperative hypoxia and respiratory insufficiency. HPI   History Obtained From: Patient  and electronic medical record. Anitra Jimenez is a 82 y.o. female She was admitted under hospitalist service. Pulmonary medicine was consulted for further evaluation of post operative/procedure hypoxia and respiratory insufficiency. She is a poor historian. Majority of the history obtained from reviewing the patient's chart.     She underwent:  Type of surgery: Colonoscopy  Date of surgery: 2021  Surgeon: Dr. Suki Moseley     She is a 61-year-old pleasant  female with past medical history of chronic tobacco smoking on and off for 30 years with less than 1 pack of cigarettes per day. She was diagnosed with moderately severe COPD in the past with the full pulmonary function tests. She however not using any inhalers at home oxygen at this time. She underwent colonoscopy today as a part of evaluation for GI bleeding for acute blood loss anemia. After colonoscopy patient became a progressively hypoxic and currently requiring high flow at 45 L/min with a 70% FiO2. Patient at the time of my initial evaluation not in any acute distress however she remained on high flow. History of pulmonary embolism in the past: No            History of DVT in the past:Yes -right lower extremity in the past           Past 24 Hours   -Stable on room air  -Afebrile  -No pulmonary events overnight  -No complaints  -All systems reviewed. PMHx   Past Medical History      Diagnosis Date    A-fib [facility])     AAA (abdominal aortic aneurysm) ([facility])     Achalasia     Anemia     Arthritis     Blood circulation, collateral     BPPV (benign paroxysmal positional vertigo) 6/6/16    CHF (congestive heart failure) ([facility])     Chronic kidney disease     sees Dr Sam Arias cancer [facility])     Gastrointestinal hemorrhage     GERD (gastroesophageal reflux disease)     ? esophageal stricture    Hiatal hernia     History of blood transfusion     HTN (hypertension)     Hx of blood clots 2018    R leg-sees ABEBA Hargrove    Hyperlipidemia     Medtronic dual ICD  8/26/2020    Neuromuscular disorder ([address]     Obesity     Osteopenia     Osteoporosis     PAC (premature atrial contraction)     on betablocker    Paralysis ([facility])     baby    Pedal edema     denied any hx of hypertension    Pneumonia     PVC (premature ventricular contraction)     sees Dr Sean Howard PVD (peripheral vascular disease) ([address]     Small bowel obstruction ([address]     Tinnitus     UTI (urinary tract infection)       Past Surgical History        Procedure Laterality Date    ABDOMINAL AORTIC ANEURYSM REPAIR, ENDOVASCULAR N/A 2/15/2019    ABDOMINAL AORTIC ANEURYSM REPAIR ENDOVASCULAR, DECLOTTING RIGHT FEM TIB BYPASS GRAFT performed by Cecilia Ling MD at [address] Right 1958    lumpectomy    CHOLECYSTECTOMY      30 years ago    COLONOSCOPY  08/06/2018    Dr Gannon Axon 2/22/2019    COLONOSCOPY DIAGNOSTIC performed by Eduardo Moreno MD at [facility]    COLONOSCOPY N/A 2/22/2020    COLONOSCOPY CONTROL HEMORRHAGE performed by Kristen Denny MD at [facility]    COLONOSCOPY Left 3/2/2021    COLORECTAL CANCER SCREENING, NOT HIGH RISK performed by Jarod Corral MD at [address] [facility]      eye, eyelids    EYE SURGERY      cataract    HEMICOLECTOMY N/A 2/25/2020    OPEN RIGHT COLON RESECTION performed by Katie Dietrich MD at [address]  07/15/2016    OTHER SURGICAL HISTORY  10/06/2020    Exp lap washout mesenteric lymph node biopsy EGD abthera placement Dr Enrique Arm  10/08/2020    reopening recent lap open g tube placement intra operative EGD and primary closure of open abdomen- [address] OLEGARIO SKN SUB GRFT T/A/L AREA/<100SCM /<1ST 25 SCM N/A 9/6/2018    RE-EXPLORATION RIGHT GROIN, DECLOTTING OF FEMORAL BYPASS GRAFT performed by Levi Awad MD at [address] EGD TRANSORAL BIOPSY SINGLE/MULTIPLE Left 11/27/2017    EGD BIOPSY performed by Radhames Platt MD at [address] [place] [address], [facility], Cassondra Paget N/A 8/20/2018    ROBOT ASSISTED COLECTOMY (LOW ANTERIOR) performed by Ava Garcia MD at [address] OFFICE/OUTPT [facility] N/A 9/5/2018    RIGHT FEMORAL EMBOLECTOMY, INTRAOPERATIVE ARTERIOGRAM, RIGHT ILIAC EMBOLECTOMY, RIGHT COMMON AND EXTERNAL ILIAC STENTING, RIGHT FEMORAL TO ANTERIOR POPLITEAL BYPASS WITH 6MM GORTEX GRAFT performed by Levi Awad MD at [facility]    THROMBECTOMY / EMBOLECTOMY FEMORAL Right 2/14/2019    FEMORAL EMBOLECTOMY THROMBECTOMY, RIGHT LEG performed by Antonio Valdovinos MD at [address] N/A 11/27/2017    EGD SUBMUCOSAL/BOTOX INJECTION performed by Dl Berrios MD at [address] [facility] ENDOSCOPY N/A 2/22/2019    EGD BIOPSY performed by Onel Alexander MD at [facility]     Meds    Current Medications    ipratropium-albuterol  1 ampule Inhalation Q4H WA    apixaban  2.5 mg Oral BID    clopidogrel  75 mg Oral Daily    timolol  1 drop Both Eyes Nightly    piperacillin-tazobactam  3,375 mg Intravenous Q8H    metoprolol succinate  25 mg Oral Daily    pravastatin  40 mg Oral Daily    sodium chloride flush  10 mL Intravenous 2 times per day    pantoprazole  40 mg Oral BID AC    hydrocortisone  25 mg Rectal BID     potassium chloride **OR** potassium alternative oral replacement **OR** potassium chloride, calcium carbonate, albuterol, fentanNYL, sodium chloride, ipratropium-albuterol, melatonin, sodium chloride flush, promethazine **OR** ondansetron, acetaminophen **OR** acetaminophen, glucose, dextrose, glucagon (rDNA), dextrose  IV Drips/Infusions   sodium chloride      dextrose       Home Medications  Medications Prior to Admission: magnesium (MAGNESIUM-OXIDE) 250 MG TABS tablet, TAKE 2 TABLETS BY MOUTH TWICE DAILY  apixaban (ELIQUIS) 2.5 MG TABS tablet, TAKE 1 TABLET BY MOUTH TWICE DAILY  metoprolol succinate (TOPROL XL) 25 MG extended release tablet, Take 1 tablet by mouth daily  losartan (COZAAR) 25 MG tablet, Take 1 tablet by mouth daily  pravastatin (PRAVACHOL) 40 MG tablet, TAKE 1 TABLET BY MOUTH DAILY  clopidogrel (PLAVIX) 75 MG tablet, Take 1 tablet by mouth daily  potassium chloride (KLOR-CON M) 10 MEQ extended release tablet, Take 1 tablet by mouth daily  pantoprazole (PROTONIX) 40 MG tablet, Take 40 mg by mouth 2 times daily (before meals)  acetaminophen (TYLENOL phone: Not on file     Gets together: Not on file     Attends Baptism service: Not on file     Active member of club or organization: Not on file     Attends meetings of clubs or organizations: Not on file     Relationship status: Not on file    Intimate partner violence     Fear of current or ex partner: Not on file     Emotionally abused: Not on file     Physically abused: Not on file     Forced sexual activity: Not on file   Other Topics Concern    Not on file   Social History Narrative    Not on file     Family History          Problem Relation Age of Onset    Heart Disease Mother     Stroke Mother     Cancer Father         lung    Emphysema Father     Other Sister         leukemia    Heart Disease Maternal Grandmother     Dementia Maternal Grandmother     Heart Disease Maternal Grandfather     No Known Problems Paternal Grandmother     No Known Problems Paternal Grandfather      Sleep History    Never diagnosed with sleep apnea in the past.  Vitals     height is 5' 5\" (1.651 m) and weight is 141 lb 6.4 oz (64.1 kg). Her oral temperature is 97.9 °F (36.6 °C). Her blood pressure is 99/54 (abnormal) and her pulse is 88. Her respiration is 16 and oxygen saturation is 92%. Body mass index is 23.53 kg/m². SUPPLEMENTAL O2: O2 Flow Rate (L/min): 1 L/min     I/O        Intake/Output Summary (Last 24 hours) at 3/6/2021 1737  Last data filed at 3/6/2021 1205  Gross per 24 hour   Intake 880 ml   Output 0 ml   Net 880 ml     I/O last 3 completed shifts: In: 1614.6 [P.O.:1500; I.V.:114.6]  Out: 0    Patient Vitals for the past 96 hrs (Last 3 readings):   Weight   03/06/21 0350 141 lb 6.4 oz (64.1 kg)   03/05/21 0400 140 lb 9.6 oz (63.8 kg)   03/03/21 0251 149 lb 14.6 oz (68 kg)       Exam   Constitutional: Patient appears in no distress. Mouth/Throat: Oropharynx is clear and moist.  No oral thrush. Neck: Neck supple. Cardiovascular: S1 and S2 heard. No murmurs.    Pulmonary/Chest: Bilateral air entry present. Good breath sounds on both sides, diminished at bases. No wheezes. No rales. Abdominal: Soft. No tenderness. Extremities: Patient exhibits no edema. Neurological: Patient is awake and alert. Labs  - Old records and notes have been reviewed in [facility]   ABG  Lab Results   Component Value Date    PH 7.40 03/02/2021    PO2 51 03/02/2021    PCO2 33 03/02/2021    HCO3 20 03/02/2021    O2SAT 86 03/02/2021     Lab Results   Component Value Date    IFIO2 50 03/02/2021    MODE CPAP/PS 12/28/2019    SETPEEP 8.0 12/28/2019     CBC  Recent Labs     03/04/21  0444 03/04/21  0820 03/05/21  1443 03/06/21  0844   WBC 7.7  --  8.2 6.5   RBC 3.33*  --  3.43* 3.50*   HGB 9.3* 8.9* 9.7* 9.8*   HCT 29.8* 28.8* 30.6* 32.2*   MCV 89.5  --  89.2 92.0   MCH 27.9  --  28.3 28.0   MCHC 31.2*  --  31.7* 30.4*   PLT 87*  --  103* 100*   MPV 11.6  --  11.4 12.0      BMP  Recent Labs     03/04/21  0820 03/06/21  0844    136   K 3.6 2.8*    107   CO2 24 22*   BUN 12 10   CREATININE 1.1 1.0   GLUCOSE 84 144*   MG  --  1.4*   CALCIUM 8.0* 8.2*     LFT  No results for input(s): AST, ALT, ALB, BILITOT, ALKPHOS, LIPASE in the last 72 hours. Invalid input(s): AMYLASE  TROP  Lab Results   Component Value Date    TROPONINT < 0.010 03/03/2021    TROPONINT < 0.010 03/02/2021    TROPONINT < 0.010 10/02/2020     BNP  No results for input(s): BNP in the last 72 hours. Lactic Acid  No results for input(s): LACTA in the last 72 hours.   INR  Recent Labs     03/03/21  1915 03/04/21  0123 03/04/21  0820   INR 1.43* 1.28* 1.16*     PTT  Recent Labs     03/03/21  1915 03/04/21  0123 03/04/21  0820   APTT 32.8 37.0 33.1     Glucose  Recent Labs     03/06/21  0627 03/06/21  1126 03/06/21  1533   POCGLU 110* 170* 113*     UA   No results for input(s): SPECGRAV, PHUR, COLORU, CLARITYU, MUCUS, PROTEINU, BLOODU, RBCUA, WBCUA, BACTERIA, NITRU, GLUCOSEU, BILIRUBINUR, UROBILINOGEN, KETUA, LABCAST, LABCASTTY, AMORPHOS in the last 72 9:26 AM   PROCEDURE: CT CHEST W CONTRAST   1. Markedly distended esophagus which contains fluid as well as recently ingested food products. This correlates with the recently seen soft tissue opacity in the right paratracheal region on the prior x-ray. 2. Small bilateral pleural effusions with adjacent atelectasis. 3. Cardiomegaly. Assessment   -Acute post operative pulmonary insufficiency due to bilateral atelectasis Vs aspiration pneumonia. - improved. She is currently on room air.  -Chronic atrial fibrillation with controlled ventricle response.  -Chronic systolic congestive heart failure. .  -Gastroesophageal reflux disease.  -Essential hypertension  -Moderate COPD- under control  -Current smoker/some days. Vira Libman 0.25 PPD 60 years 15 PYH- unsure of accuracy   -Full Code     Plan   -Will discontinue Duonebs Q4h.  -We will start patient on Bevespi 2 puffs twice daily  -Continue albuterol 2.5 mg 1 nebulization every 4hourly as needed  -Incentive spirometry as ordered. - encouraged use   -Avoid all sedative meds if she is drowsy.  -Continue PT/OT. -Continue Acapella to use every 4 hourly.  -Continue current antibiotics per primary service. She is currently on Zosyn IV. -All her cultures were negative.  -Aspiration precautions.  -Home O2 eval at the time of discharge. -PFT outpatient   -VTE prophylaxis: Eliquis   -GI prophylaxis: Protonix   -Stable from pulmonary standpoint      Tomasz Rivera educated about my impression and plan. She verbalizes understanding. Questions and concerns addressed. Meds and orders reviewed.      Electronically signed by   Gaviota Ng MD on 3/6/2021 at 5:37 PM

## 2021-03-06 NOTE — PROGRESS NOTES
PROGRESS NOTE      Patient:  Anitra Jimenez      Unit/Bed:6K-24/024-A    YOB: 1935    MRN: [id number]       Acct: [de-identified]     PCP: Festus Vale    Date of Admission: 2/28/2021    Chief Complaint: GI bleed    Assessment/Plan:    Anticipated Discharge in : 24-36 hours    1. Acute blood loss anemia   Likely secondary to ? GI bleed   Received 2 units PRBC   Moderate to large internal hemorrhoids noted on colonoscopy from 3/2/21. Possible source   Hemoglobin stable today at 9.8. No recurrence of bright red blood per rectum. 2. Thrombocytopenia   Possibly secondary to heparin infusion which was stopped after less than 24 hours   Platelets 874 on admission, low of 87 on 3/4   Platelet count stable at 100. Continue Eliquis. 3. ?  Sepsis aspiration pneumonia/UTI   Not present on admission   3/2 patient became acutely hypotensive, tachypneic, tachycardic, febrile, and had elevated WBC with suspected source   3/3 procalcitonin 17.63, lactic acid 1.6 at that time   Fluid resuscitation provided   Monitor for continued signs and symptoms of sepsis and hypotension   Continue Zosyn, day 5. Complete 7 to 10-day course of antibiotics. 4. Hypokalemia with hypomagnesemia  · Noted on lab work from 3/6/2021  · Replaced per protocol  · Daily BMP and magnesium  5. History of COPD   PFT 1/20/2020 FEV1 77%   Continue duo nebs every 4 hours  6. History of HFrEF NYHA class II   Echo 6/26/2020: EF 25-30% (previous echo 12/2019 EF 10-15%)  7. History of hypertension   Hypertension on admission secondary to sepsis and acute blood loss anemia   Continue home medications with holding parameters  8. History of PVD/PAD   S/p stenting 2/2019 right fem-tib PTA   2/14/2019: Peripheral angiogram/intervention for acute on chronic limb ischemia related to post lower extremity bypass distal anastomotic stenosis with recent graft thromboses   On Eliquis and Plavix  9.  History of A. fib   Rate controlled and NSR on telemetry   Negative troponin   Medtronic ICD placed 8/2020   On Eliquis and Plavix  10. 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 History of AAA   S/p EVAR 2/2019  12. History of esophageal stricture and achalasia   PEG tube in place   Not used during this admission   Ensure added to diet  13. History of multiple abdominal surgeries   10/2020 mesenteric ischemia requiring ex lap   2/25/2020 right hemicolectomy, cholecystectomy, hysterectomy  14. Tobacco abuse   Counseling provided.  Recently resumed smoking in 2020, 0.25-0.5 packs/day   She had quit in 10/2019.  60-pack-year history at that time. 15. Acute GI bleed, resolved   BRBPR for 2 days on admission   Eliquis and Plavix for long-term coagulation was held on admission   GI consulted. Colonoscopy 3/2/2021 showed internal hemorrhoids without evidence of bleeding. 16.  Acute hypoxic respiratory failure, resolved   Likely secondary to aspiration   No home O2 requirements, required BiPAP to maintain SPO2 greater than 90%   Maintain SPO2 greater than 90%   Continue duo nebs every 4 hours while awake  17. Acute right lower limb ischemia, resolved status post TPA infusion   Secondary to discontinuation of Eliquis and Plavix due to concern for ? GI bleed   3/2/2021: Patient began having acute pain of right lower limb with loss of pulses   CTA showed occlusion of the right iliac limb, right superficial femoral artery, right femoral anterior tibial graft, and right popliteal artery   3/3/2021 IR arteriogram: Evidence of complete occlusion of the aortobifemoral bypass graft, SFA, and fem-tib bypass graft   3/3/2021 patient given 2 mg TPA and heparin drip was started.   Arteriogram was repeated and showed complete occlusion of the right limb of the aortic endograft with thrombosis throughout the right lower extremity.  3/4/2021 arteriogram repeated showing complete resolution of occlusion        Active Hospital Problems    Diagnosis Date Noted    Dilated cardiomyopathy ([facility] [address] [I42.0]      Priority: High    Orthostatic dizziness [R42] 06/05/2016     Priority: High     Class: Acute    Acute postoperative respiratory insufficiency [J95.89]     Aspiration pneumonia of left lower lobe due to gastric secretions (HCC) [J69.0]     Ischemia of right lower extremity [I99.8] 03/03/2021    Acute kidney injury ([facility]) with acute tubular necrosis (ATN) (HCC) [N17.0] 03/01/2021    Orthostatic hypotension [I95.1] 03/01/2021    Hypotension due to hypovolemia [I95.89, E86.1]     COPD without exacerbation (HCC) [J44.9]     Medtronic dual ICD  [Z95.810] 08/26/2020    Acute blood loss anemia [D62]     Chronic combined systolic and diastolic congestive heart failure ([address] [I50.42]     Tobacco abuse [Z72.0]     Paroxysmal atrial fibrillation ([address] [I48.0] 09/15/2018    Anemia [D64.9] 09/10/2018       Hospital Course:   Per HPI:  \"15-TSHP[age]THERESE female patient presented to the ED today with worsening acute on chronic lower GI bleed with bright red blood per rectum. History of lower GI bleed for the past 2 months but increased hematochezia with blood clots in the last 1 to 2 days .       Patient is on long-term anticoagulation w/  Eliquis and takes Plavix too. Patient has developed dizziness due to symptomatic anemia. S/p 1 unit of PRBC transfusion in the ED. \"    Patient had colonoscopy 3/2 which showed no evidence of active bleeding within the colon and moderate to large internal hemorrhoids that were not bleeding. Patient did desat to SPO2 87% while on 6 L nasal cannula. High flow nasal cannula was applied without improvement of SPO2. Patient required BiPAP. Subsequent chest x-ray revealed concerns for left mid and lower lung field pneumonia, likely secondary to aspiration.   Pulmonology was consulted at that time and diagnosed her with postoperative pulmonary insufficiency secondary to bilateral atelectasis versus aspiration pneumonia versus other etiology. They recommended DuoNebs every 4 hours, incentive spirometry, and supplemental O2 weaning. Prophylactic Zosyn was started at that time. Overnight 3/2/2021, patient began having pain in her right lower extremity. Noted to have decreased pulses and decreased warmth. Nitroglycerin ointment was applied to the leg patient was emergently taken to interventional radiology. Angiogram was completed at that time which showed complete occlusion of the right limb of the aortobifemoral bypass graft, SFA, and fem-tib bypass graft. Patient was given 2 mg TPA with subsequent heparin gtt. Repeat arteriogram showed continued occlusion and the patient was transferred to the ICU. Continued heparin. Repeat arteriogram 3/4 showed complete resolution of clot, heparin was stopped, and patient was transferred to hospital floor. Eliquis and Plavix resumed 3/4 for long-term coagulation goals. Subjective: No acute events overnight. Patient sitting up in bed this morning denies any complaints. Patient states that she continues to improve and feels slightly better today than she did yesterday. Patient denies any cough, chills, fever. Patient denies any issues with bowels or bladder. Mild abnormalities noted on patient's lab work this morning including hyperkalemia with a potassium of 2.8 and hypomagnesemia with a magnesium of 1.4. Placement ordered today. Possible discharge for patient tomorrow if she continues to improve.        Medications:  Reviewed    Infusion Medications    sodium chloride      dextrose       Scheduled Medications    magnesium sulfate  2,000 mg Intravenous Once    ipratropium-albuterol  1 ampule Inhalation Q4H WA    apixaban  2.5 mg Oral BID    clopidogrel  75 mg Oral Daily    timolol  1 drop Both Eyes Nightly    piperacillin-tazobactam  3,375 mg Intravenous Q8H    metoprolol succinate  25 mg Oral Daily    pravastatin  40 mg Oral Daily    sodium chloride flush  10 mL Intravenous 2 times per day    pantoprazole  40 mg Oral BID AC    hydrocortisone  25 mg Rectal BID     PRN Meds: potassium chloride **OR** potassium alternative oral replacement **OR** potassium chloride, calcium carbonate, albuterol, fentanNYL, sodium chloride, ipratropium-albuterol, melatonin, sodium chloride flush, promethazine **OR** ondansetron, acetaminophen **OR** acetaminophen, glucose, dextrose, glucagon (rDNA), dextrose      Intake/Output Summary (Last 24 hours) at 3/6/2021 1214  Last data filed at 3/6/2021 1205  Gross per 24 hour   Intake 1614.6 ml   Output 0 ml   Net 1614.6 ml       Diet:  DIET CARDIAC; Dental Soft  Dietary Nutrition Supplements: Standard High Calorie Oral Supplement    Exam:  /76   Pulse 89   Temp 97.6 °F (36.4 °C) (Oral)   Resp 18   Ht 5' 5\" (1.651 m)   Wt 141 lb 6.4 oz (64.1 kg)   SpO2 92%   BMI 23.53 kg/m²     General appearance: No apparent distress, appears stated age and cooperative. HEENT: Pupils equal, round, and reactive to light. Conjunctivae/corneas clear. Neck: Supple, with full range of motion. No jugular venous distention. Trachea midline. Respiratory:  Normal respiratory effort. Clear to auscultation, bilaterally without Rales/Wheezes/Rhonchi. Cardiovascular: Regular rate and rhythm with normal S1/S2 without murmurs, rubs or gallops. Abdomen: Soft, non-tender, non-distended with normal bowel sounds. Musculoskeletal: passive and active ROM x 4 extremities. Skin: Skin color, texture, turgor normal.  No rashes or lesions. Neurologic:  Neurovascularly intact without any focal sensory/motor deficits.  Cranial nerves: II-XII intact, grossly non-focal.  Psychiatric: Alert and oriented, thought content appropriate, normal insight  Capillary Refill: Brisk,< 3 seconds   Peripheral Pulses: +2 palpable, equal bilaterally       Labs: undergo a TPA/heparin infusion for one additional moderate. The patient will be brought back to the IR department    tomorrow for a repeat arteriogram.      **This report has been created using voice recognition software. It may contain minor errors which are inherent in voice recognition technology. **                        Final report electronically signed by Dr Liliya Sagastume on 3/3/2021 5:28 PM      IR ANGIOGRAM EXTREMITY RIGHT   Final Result   Complete occlusion of the right limb of the aortic endograft as well as thrombosis throughout the right lower extremity. The patient will undergo a TPA and heparin infusion overnight and be brought back to the interventional radiology department later    the same day for repeat arteriogram.      **This report has been created using voice recognition software. It may contain minor errors which are inherent in voice recognition technology. **                        Final report electronically signed by Dr Liliya Sagastume on 3/3/2021 5:01 PM      XR CHEST PORTABLE   Final Result   Impression:   Moderate to advanced opacity left perihilar region in the left lung base. This could represent aspiration or pneumonia. Suspect dilation of the esophagus. Cardiomegaly, no CHF. This document has been electronically signed by: Shawn Zavala MD on    03/03/2021 03:05 AM      CTA LOWER EXTREMITY RIGHT W WO CONTRAST   Final Result   Impression:   Aortic stent graft present. The right iliac limb is occluded. The native    right superficial femoral artery is occluded distally. The right femoral    anterior tibial graft is occluded. The right popliteal artery is    occluded. Only the anterior tibial artery is patent and this is just to    the mid calf. Advanced multifocal disease within the left superficial femoral artery. Single-vessel runoff on the left.       This document has been electronically signed by: Shawn Zavala MD on    03/02/2021 11:57 PM      All CTs at this facility use dose modulation techniques and iterative    reconstructions, and/or weight-based dosing   when appropriate to reduce radiation to a low as reasonably achievable. CTA LOWER EXTREMITY LEFT W WO CONTRAST   Final Result   Impression:   Aortic stent graft present. The right iliac limb is occluded. The native    right superficial femoral artery is occluded distally. The right femoral    anterior tibial graft is occluded. The right popliteal artery is    occluded. Only the anterior tibial artery is patent and this is just to    the mid calf. Advanced multifocal disease within the left superficial femoral artery. Single-vessel runoff on the left. This document has been electronically signed by: Elisa Sylvester MD on    03/02/2021 11:48 PM      All CTs at this facility use dose modulation techniques and iterative    reconstructions, and/or weight-based dosing   when appropriate to reduce radiation to a low as reasonably achievable. XR CHEST PORTABLE   Final Result   1. Normal heart size. Permanent pacemaker/defibrillator. 2. Moderate atelectasis/pneumonia left mid and lower lung fields. No effusion is seen. 3. There is a cylindrical soft tissue density right paratracheal region which appears to continue posterior to the heart to the region of the GE junction. This represents a markedly fluid distended esophagus, better seen on prior CT thorax likely related    to presbyesophagus or achalasia. **This report has been created using voice recognition software. It may contain minor errors which are inherent in voice recognition technology. **      Final report electronically signed by Dr. Alexandre Sinclair on 3/2/2021 5:35 PM      NM GI BLOOD LOSS   Final Result   Negative for GI bleed.       This document has been electronically signed by: iRta Beal MD on    02/28/2021 09:59 PM      CT ABDOMEN PELVIS W IV CONTRAST   Final Result      No acute intra-abdominal or intrapelvic findings. Final report electronically signed by Dr. Crista Tee on 2/28/2021 5:02 PM      XR CHEST PORTABLE   Final Result      No acute intrathoracic process. **This report has been created using voice recognition software. It may contain minor errors which are inherent in voice recognition technology. **      Final report electronically signed by Dr. Crista Tee on 2/28/2021 3:33 PM          Diet: DIET CARDIAC;  Dental Soft  Dietary Nutrition Supplements: Standard High Calorie Oral Supplement    DVT prophylaxis: [] Lovenox                                 [] SCDs                                 [] SQ Heparin                                 [] Encourage ambulation           [x] Already on Anticoagulation     Disposition:    [x] Home         [] TCU       [] Rehab       [] Psych       [] SNF       [] [place]       [x] Other-PT/OT to see and make recommendations    Code Status: Full Code    PT/OT Eval Status: Consulted      Electronically signed by Ion Akhtar DO on 3/6/2021 at 12:14 PM

## 2021-03-07 VITALS
SYSTOLIC BLOOD PRESSURE: 119 MMHG | OXYGEN SATURATION: 92 % | WEIGHT: 144 LBS | RESPIRATION RATE: 16 BRPM | TEMPERATURE: 98.4 F | BODY MASS INDEX: 23.99 KG/M2 | HEIGHT: 65 IN | DIASTOLIC BLOOD PRESSURE: 56 MMHG | HEART RATE: 74 BPM

## 2021-03-07 LAB
ANION GAP SERPL CALCULATED.3IONS-SCNC: 6 MEQ/L (ref 8–16)
BASOPHILS # BLD: 0.5 %
BASOPHILS ABSOLUTE: 0 THOU/MM3 (ref 0–0.1)
BUN BLDV-MCNC: 11 MG/DL (ref 7–22)
CALCIUM SERPL-MCNC: 8.5 MG/DL (ref 8.5–10.5)
CHLORIDE BLD-SCNC: 110 MEQ/L (ref 98–111)
CO2: 23 MEQ/L (ref 23–33)
CREAT SERPL-MCNC: 0.7 MG/DL (ref 0.4–1.2)
EOSINOPHIL # BLD: 2.3 %
EOSINOPHILS ABSOLUTE: 0.1 THOU/MM3 (ref 0–0.4)
ERYTHROCYTE [DISTWIDTH] IN BLOOD BY AUTOMATED COUNT: 16.8 % (ref 11.5–14.5)
ERYTHROCYTE [DISTWIDTH] IN BLOOD BY AUTOMATED COUNT: 55.3 FL (ref 35–45)
GFR SERPL CREATININE-BSD FRML MDRD: 80 ML/MIN/1.73M2
GLUCOSE BLD-MCNC: 95 MG/DL (ref 70–108)
HCT VFR BLD CALC: 31.6 % (ref 37–47)
HEMOGLOBIN: 9.7 GM/DL (ref 12–16)
IMMATURE GRANS (ABS): 0.09 THOU/MM3 (ref 0–0.07)
IMMATURE GRANULOCYTES: 1.6 %
LYMPHOCYTES # BLD: 13.4 %
LYMPHOCYTES ABSOLUTE: 0.8 THOU/MM3 (ref 1–4.8)
MAGNESIUM: 1.7 MG/DL (ref 1.6–2.4)
MCH RBC QN AUTO: 28 PG (ref 26–33)
MCHC RBC AUTO-ENTMCNC: 30.7 GM/DL (ref 32.2–35.5)
MCV RBC AUTO: 91.3 FL (ref 81–99)
MONOCYTES # BLD: 9.8 %
MONOCYTES ABSOLUTE: 0.6 THOU/MM3 (ref 0.4–1.3)
NUCLEATED RED BLOOD CELLS: 0 /100 WBC
PLATELET # BLD: 114 THOU/MM3 (ref 130–400)
PMV BLD AUTO: 12 FL (ref 9.4–12.4)
POTASSIUM SERPL-SCNC: 3.8 MEQ/L (ref 3.5–5.2)
RBC # BLD: 3.46 MILL/MM3 (ref 4.2–5.4)
SEG NEUTROPHILS: 72.4 %
SEGMENTED NEUTROPHILS ABSOLUTE COUNT: 4.1 THOU/MM3 (ref 1.8–7.7)
SODIUM BLD-SCNC: 139 MEQ/L (ref 135–145)
WBC # BLD: 5.7 THOU/MM3 (ref 4.8–10.8)

## 2021-03-07 PROCEDURE — 6370000000 HC RX 637 (ALT 250 FOR IP): Performed by: NURSE PRACTITIONER

## 2021-03-07 PROCEDURE — 99232 SBSQ HOSP IP/OBS MODERATE 35: CPT | Performed by: INTERNAL MEDICINE

## 2021-03-07 PROCEDURE — 80048 BASIC METABOLIC PNL TOTAL CA: CPT

## 2021-03-07 PROCEDURE — 94640 AIRWAY INHALATION TREATMENT: CPT

## 2021-03-07 PROCEDURE — 94761 N-INVAS EAR/PLS OXIMETRY MLT: CPT

## 2021-03-07 PROCEDURE — 85025 COMPLETE CBC W/AUTO DIFF WBC: CPT

## 2021-03-07 PROCEDURE — 99239 HOSP IP/OBS DSCHRG MGMT >30: CPT | Performed by: FAMILY MEDICINE

## 2021-03-07 PROCEDURE — 83735 ASSAY OF MAGNESIUM: CPT

## 2021-03-07 PROCEDURE — 6370000000 HC RX 637 (ALT 250 FOR IP): Performed by: INTERNAL MEDICINE

## 2021-03-07 PROCEDURE — 36415 COLL VENOUS BLD VENIPUNCTURE: CPT

## 2021-03-07 PROCEDURE — 6370000000 HC RX 637 (ALT 250 FOR IP): Performed by: STUDENT IN AN ORGANIZED HEALTH CARE EDUCATION/TRAINING PROGRAM

## 2021-03-07 PROCEDURE — 2580000003 HC RX 258: Performed by: NURSE PRACTITIONER

## 2021-03-07 PROCEDURE — 6360000002 HC RX W HCPCS: Performed by: NURSE PRACTITIONER

## 2021-03-07 RX ORDER — AMOXICILLIN AND CLAVULANATE POTASSIUM 875; 125 MG/1; MG/1
1 TABLET, FILM COATED ORAL 2 TIMES DAILY
Qty: 8 TABLET | Refills: 0 | Status: SHIPPED | OUTPATIENT
Start: 2021-03-07 | End: 2021-03-11

## 2021-03-07 RX ADMIN — CLOPIDOGREL BISULFATE 75 MG: 75 TABLET ORAL at 08:40

## 2021-03-07 RX ADMIN — PRAVASTATIN SODIUM 40 MG: 40 TABLET ORAL at 08:40

## 2021-03-07 RX ADMIN — PIPERACILLIN AND TAZOBACTAM 3375 MG: 3; .375 INJECTION, POWDER, LYOPHILIZED, FOR SOLUTION INTRAVENOUS at 05:40

## 2021-03-07 RX ADMIN — METOPROLOL SUCCINATE 25 MG: 25 TABLET, FILM COATED, EXTENDED RELEASE ORAL at 08:40

## 2021-03-07 RX ADMIN — GLYCOPYRROLATE AND FORMOTEROL FUMARATE 2 PUFF: 9; 4.8 AEROSOL, METERED RESPIRATORY (INHALATION) at 08:02

## 2021-03-07 RX ADMIN — APIXABAN 2.5 MG: 2.5 TABLET, FILM COATED ORAL at 08:40

## 2021-03-07 RX ADMIN — HYDROCORTISONE ACETATE 25 MG: 25 SUPPOSITORY RECTAL at 08:40

## 2021-03-07 RX ADMIN — PANTOPRAZOLE SODIUM 40 MG: 40 TABLET, DELAYED RELEASE ORAL at 05:40

## 2021-03-07 NOTE — PROGRESS NOTES
A home oxygen evaluation has been completed. [x]Patient is an inpatient. It is expected that the patient will be discharged within the next 48 hours. Qualified provider to write orders for possible sleep study or home oxygen prescription. Social service/care managers will arrange for home oxygen if ordered. If patient is active, arrange for Home Medical supplier to assess for Oxygen Conserving Device per pulse oximetry. []Patient is an outpatient. Results will be faxed to the ordering provider. Qualified provider to write orders for possible sleep study or home oxygen prescription. Patient was on room air for a few days. SpO2 was 93 % on room air at rest. Patients SpO2 was 89% or above and did not qualify for home oxygen. Patient was walked for 6 minutes. SpO2 was 93 % during walking. Patients SpO2 was 89% or above and did not qualify for home oxygen. Patient does not have a positive pressure airway device at home. Patient is not  diagnosed with Obstructive Sleep Apnea.  Patient will not be set up/instructed on a nocturnal study per dr Mckenzie Eli

## 2021-03-07 NOTE — PROGRESS NOTES
[facility]      Patient - Tomasz Rivera   MRN -  [id number]   [id number]   - 1935      Date of Admission -  2021  2:48 PM  Date of evaluation -  3/7/2021  Room - [address] [place] [address], MD Primary Care Physician - Lucy Nose     Problem List      Active Hospital Problems    Diagnosis Date Noted    Dilated cardiomyopathy [facility]) [I42.0]      Priority: High    Orthostatic dizziness [R42] 2016     Priority: High     Class: Acute    Acute postoperative respiratory insufficiency [J95.89]     Aspiration pneumonia of left lower lobe due to gastric secretions ([address] [J69.0]     Ischemia of right lower extremity [I99.8] 2021    Acute kidney injury ([facility]) with acute tubular necrosis (ATN) (HCC) [N17.0] 2021    Orthostatic hypotension [I95.1] 2021    Hypotension due to hypovolemia [I95.89, E86.1]     COPD without exacerbation ([address] [J44.9]     Medtronic dual ICD  [Z95.810] 2020    Acute blood loss anemia [D62]     Chronic combined systolic and diastolic congestive heart failure ([address] [I50.42]     Tobacco abuse [Z72.0]     Paroxysmal atrial fibrillation ([address] [I48.0] 09/15/2018    Anemia [D64.9] 09/10/2018     Reason for Consult    For management of post operative hypoxia. HPI   History Obtained From: Patient  and electronic medical record. Tomasz Rivera is a 82 y.o. female She was admitted under hospitalist service. Pulmonary medicine was consulted for further evaluation of post operative/procedure hypoxia and respiratory insufficiency. She is a poor historian. Majority of the history obtained from reviewing the patient's chart.     She underwent:  Type of surgery: Colonoscopy  Date of surgery: 2021  Surgeon: Dr. Harmeet Barfield     She is a 61-year-old pleasant  female with past medical history of chronic tobacco smoking on and off for 30 years with less than 1 pack of cigarettes per day. She was diagnosed with moderately severe COPD in the past with the full pulmonary function tests. She however not using any inhalers at home oxygen at this time. She underwent colonoscopy today as a part of evaluation for GI bleeding for acute blood loss anemia. After colonoscopy patient became a progressively hypoxic and currently requiring high flow at 45 L/min with a 70% FiO2. Patient at the time of my initial evaluation not in any acute distress however she remained on high flow. History of pulmonary embolism in the past: No            History of DVT in the past:Yes -right lower extremity in the past           Past 24 Hours   -Stable on room air  -Afebrile  -No pulmonary events overnight  -No new complaints.  -She tolerated Bevespi inhaler well with no reported side effects.  -Possible discharge home today.  -All systems reviewed. PMHx   Past Medical History      Diagnosis Date    A-fib [facility])     AAA (abdominal aortic aneurysm) ([facility])     Achalasia     Anemia     Arthritis     Blood circulation, collateral     BPPV (benign paroxysmal positional vertigo) 6/6/16    CHF (congestive heart failure) ([facility])     Chronic kidney disease     sees Dr Andreia Alatorre cancer [facility])     Gastrointestinal hemorrhage     GERD (gastroesophageal reflux disease)     ? esophageal stricture    Hiatal hernia     History of blood transfusion     HTN (hypertension)     Hx of blood clots 2018    R leg-sees ABEBA Hargrove    Hyperlipidemia     Medtronic dual ICD  8/26/2020    Neuromuscular disorder ([address]     Obesity     Osteopenia     Osteoporosis     PAC (premature atrial contraction)     on betablocker    Paralysis ([facility])     baby    Pedal edema     denied any hx of hypertension    Pneumonia     PVC (premature ventricular contraction)     sees Dr Tari Mccain PVD (peripheral vascular disease) ([address]     Small bowel obstruction ([address]     Tinnitus     UTI (urinary tract infection)       Past Surgical History        Procedure Laterality Date    ABDOMINAL AORTIC ANEURYSM REPAIR, ENDOVASCULAR N/A 2/15/2019    ABDOMINAL AORTIC ANEURYSM REPAIR ENDOVASCULAR, DECLOTTING RIGHT FEM TIB BYPASS GRAFT performed by Chandrakant Rooney MD at [address] [facility] Right 1958    lumpectomy    CHOLECYSTECTOMY      30 years ago    COLONOSCOPY  08/06/2018    Dr Teodora Holliday 2/22/2019    COLONOSCOPY DIAGNOSTIC performed by Marquez Munoz MD at [address] Endoscopy    COLONOSCOPY N/A 2/22/2020    COLONOSCOPY CONTROL HEMORRHAGE performed by Radha Robertson MD at [address] Endoscopy    COLONOSCOPY Left 3/2/2021    COLORECTAL CANCER SCREENING, NOT HIGH RISK performed by Radha Alvares MD at [address] [facility]      eye, eyelids    EYE SURGERY      cataract    HEMICOLECTOMY N/A 2/25/2020    OPEN RIGHT COLON RESECTION performed by Maya Hernandez MD at [address]  07/15/2016    OTHER SURGICAL HISTORY  10/06/2020    Exp lap washout mesenteric lymph node biopsy EGD abthera placement Dr Eric Price  10/08/2020    reopening recent lap open g tube placement intra operative EGD and primary closure of open abdomen- [address] OLEGARIO SKN SUB GRFT T/A/L AREA/<100SCM /<1ST 25 SCM N/A 9/6/2018    RE-EXPLORATION RIGHT GROIN, DECLOTTING OF FEMORAL BYPASS GRAFT performed by Sylvia Martinez MD at [address] [facility] EGD TRANSORAL BIOPSY SINGLE/MULTIPLE Left 11/27/2017    EGD BIOPSY performed by Maria Elena Reeves MD at [address] [place] [address], [facility] N/A 8/20/2018    ROBOT ASSISTED COLECTOMY (LOW ANTERIOR) performed by Lazarus Bolder, MD at [address] [facility] OFFICE/OUTPT [address] N/A 9/5/2018    RIGHT FEMORAL EMBOLECTOMY, INTRAOPERATIVE ARTERIOGRAM, RIGHT ILIAC EMBOLECTOMY, RIGHT COMMON AND EXTERNAL ILIAC STENTING, RIGHT FEMORAL TO ANTERIOR POPLITEAL BYPASS WITH 6MM GORTEX GRAFT performed by Asya Bush MD at [address] Right 2/14/2019    FEMORAL EMBOLECTOMY THROMBECTOMY, RIGHT LEG performed by Asya Bush MD at [facility] N/A 11/27/2017    EGD SUBMUCOSAL/BOTOX INJECTION performed by Bayron Funez MD at [address] [facility] N/A 2/22/2019    EGD BIOPSY performed by Selena Rose MD at [address] [facility] Endoscopy     Meds    Current Medications    glycopyrrolate-formoterol  2 puff Inhalation BID    apixaban  2.5 mg Oral BID    clopidogrel  75 mg Oral Daily    timolol  1 drop Both Eyes Nightly    piperacillin-tazobactam  3,375 mg Intravenous Q8H    metoprolol succinate  25 mg Oral Daily    pravastatin  40 mg Oral Daily    sodium chloride flush  10 mL Intravenous 2 times per day    pantoprazole  40 mg Oral BID AC    hydrocortisone  25 mg Rectal BID     potassium chloride **OR** potassium alternative oral replacement **OR** potassium chloride, calcium carbonate, albuterol, fentanNYL, sodium chloride, melatonin, sodium chloride flush, promethazine **OR** ondansetron, acetaminophen **OR** acetaminophen, glucose, dextrose, glucagon (rDNA), dextrose  IV Drips/Infusions   sodium chloride      dextrose       Home Medications  Medications Prior to Admission: magnesium (MAGNESIUM-OXIDE) 250 MG TABS tablet, TAKE 2 TABLETS BY MOUTH TWICE DAILY  apixaban (ELIQUIS) 2.5 MG TABS tablet, TAKE 1 TABLET BY MOUTH TWICE DAILY  metoprolol succinate (TOPROL XL) 25 MG extended release tablet, Take 1 tablet by mouth daily  losartan (COZAAR) 25 MG tablet, Take 1 tablet by mouth daily  pravastatin (PRAVACHOL) 40 MG tablet, TAKE 1 TABLET BY MOUTH DAILY  clopidogrel (PLAVIX) 75 MG tablet, Take 1 tablet by mouth daily  potassium chloride (KLOR-CON M) 10 MEQ extended release tablet, Take 1 tablet by mouth daily  pantoprazole (PROTONIX) 40 MG tablet, Take 40 mg by mouth 2 Relationships    Social connections     Talks on phone: Not on file     Gets together: Not on file     Attends Mandaen service: Not on file     Active member of club or organization: Not on file     Attends meetings of clubs or organizations: Not on file     Relationship status: Not on file    Intimate partner violence     Fear of current or ex partner: Not on file     Emotionally abused: Not on file     Physically abused: Not on file     Forced sexual activity: Not on file   Other Topics Concern    Not on file   Social History Narrative    Not on file     Family History          Problem Relation Age of Onset    Heart Disease Mother     Stroke Mother     Cancer Father         lung    Emphysema Father     Other Sister         leukemia    Heart Disease Maternal Grandmother     Dementia Maternal Grandmother     Heart Disease Maternal Grandfather     No Known Problems Paternal Grandmother     No Known Problems Paternal Grandfather      Sleep History    Never diagnosed with sleep apnea in the past.  Vitals     height is 5' 5\" (1.651 m) and weight is 144 lb (65.3 kg). Her oral temperature is 98.4 °F (36.9 °C). Her blood pressure is 119/56 (abnormal) and her pulse is 74. Her respiration is 16 and oxygen saturation is 92%. Body mass index is 23.96 kg/m². SUPPLEMENTAL O2: O2 Flow Rate (L/min): 1 L/min     I/O        Intake/Output Summary (Last 24 hours) at 3/7/2021 1312  Last data filed at 3/7/2021 1248  Gross per 24 hour   Intake 1427 ml   Output 0 ml   Net 1427 ml     I/O last 3 completed shifts: In: 5029 [P.O.:200; I.V.:1263]  Out: 0    Patient Vitals for the past 96 hrs (Last 3 readings):   Weight   03/07/21 0300 144 lb (65.3 kg)   03/06/21 0350 141 lb 6.4 oz (64.1 kg)   03/05/21 0400 140 lb 9.6 oz (63.8 kg)       Exam   Constitutional: Patient appears in no distress on room air  Mouth/Throat: Oropharynx is clear and moist.  No oral thrush. Neck: Neck supple. Cardiovascular: S1 and S2 heard.  No murmurs. Pulmonary/Chest: Bilateral air entry present. Good breath sounds on both sides, diminished at bases. No wheezes. No rales. Abdominal: Soft. No tenderness. Extremities: Patient exhibits no edema. Neurological: Patient is awake and alert. Labs  - Old records and notes have been reviewed in [facility]   ABG  Lab Results   Component Value Date    PH 7.40 03/02/2021    PO2 51 03/02/2021    PCO2 33 03/02/2021    HCO3 20 03/02/2021    O2SAT 86 03/02/2021     Lab Results   Component Value Date    IFIO2 50 03/02/2021    MODE CPAP/PS 12/28/2019    SETPEEP 8.0 12/28/2019     CBC  Recent Labs     03/05/21  1443 03/06/21  0844 03/07/21  0524   WBC 8.2 6.5 5.7   RBC 3.43* 3.50* 3.46*   HGB 9.7* 9.8* 9.7*   HCT 30.6* 32.2* 31.6*   MCV 89.2 92.0 91.3   MCH 28.3 28.0 28.0   MCHC 31.7* 30.4* 30.7*   * 100* 114*   MPV 11.4 12.0 12.0      BMP  Recent Labs     03/06/21  0844 03/06/21  1918 03/07/21  0524     --  139   K 2.8* 3.9 3.8     --  110   CO2 22*  --  23   BUN 10  --  11   CREATININE 1.0  --  0.7   GLUCOSE 144*  --  95   MG 1.4*  --  1.7   CALCIUM 8.2*  --  8.5     LFT  No results for input(s): AST, ALT, ALB, BILITOT, ALKPHOS, LIPASE in the last 72 hours. Invalid input(s): AMYLASE  TROP  Lab Results   Component Value Date    TROPONINT < 0.010 03/03/2021    TROPONINT < 0.010 03/02/2021    TROPONINT < 0.010 10/02/2020     BNP  No results for input(s): BNP in the last 72 hours. Lactic Acid  No results for input(s): LACTA in the last 72 hours. INR  No results for input(s): INR, PROTIME in the last 72 hours. PTT  No results for input(s): APTT in the last 72 hours. Glucose  Recent Labs     03/06/21  1126 03/06/21  1533 03/06/21 1958   POCGLU 170* 113* 178*     UA   No results for input(s): SPECGRAV, PHUR, COLORU, CLARITYU, MUCUS, PROTEINU, BLOODU, RBCUA, WBCUA, BACTERIA, NITRU, GLUCOSEU, BILIRUBINUR, UROBILINOGEN, KETUA, LABCAST, LABCASTTY, AMORPHOS in the last 72 hours.     Invalid input(s): CRYSTALS. PFTs             Sleep studies   None in Epic. Cultures    COVID-19 testing performed on 1 March 2021-none detected  VRE (-)  MRSA (-)  EKG     Echocardiogram     [phone] 6/26/2020      Narrative & Impression     Transthoracic Echocardiography Report (TTE)     Conclusions      Summary   Limited examination. Normal left ventricular size and severely reduced systolic function. There was severe global hypokinesis. Hypertrophic basal septum. Ejection fraction was estimated at 25-30%. Left atrial size was severely dilated. The mitral valve structure demonstrated posterior leaflet calcification. DOPPLER: The transmitral velocity was within the normal range with no   evidence for mitral stenosis. There was trace mitral regurgitation. IVC size is within normal limits with normal respiratory phasic changes. Signature      ----------------------------------------------------------------   Electronically signed by Yves Matta MD (Interpreting   physician) on 06/26/2020 at 02:29 PM   ----------------------------------------------------------------         Radiology    CXR  03/03/2021   Single view of the chest   Impression: Moderate to advanced opacity left perihilar region in the left lung base. This could represent aspiration or pneumonia. Suspect dilation of the esophagus. Cardiomegaly, no CHF. Tue Mar 2, 2021  5:35 PM   PROCEDURE: XR CHEST PORTABLE   1. Normal heart size. Permanent pacemaker/defibrillator. 2. Moderate atelectasis/pneumonia left mid and lower lung fields. No effusion is seen. 3. There is a cylindrical soft tissue density right paratracheal region which appears to continue posterior to the heart to the region of the GE junction. This represents a markedly fluid distended esophagus, better seen on prior CT thorax likely related  to presbyesophagus or achalasia.          CT Scans  (See actual reports for details)  Sat Dec 28, 2019  9:26 AM PROCEDURE: CT CHEST W CONTRAST   1. Markedly distended esophagus which contains fluid as well as recently ingested food products. This correlates with the recently seen soft tissue opacity in the right paratracheal region on the prior x-ray. 2. Small bilateral pleural effusions with adjacent atelectasis. 3. Cardiomegaly. Home oxygen eval done by RT today 3/7/2021: A home oxygen evaluation has been completed.      [x]? Patient is an inpatient. It is expected that the patient will be discharged within the next 48 hours. Qualified provider to write orders for possible sleep study or home oxygen prescription. Social service/care managers will arrange for home oxygen if ordered. If patient is active, arrange for Home Medical supplier to assess for Oxygen Conserving Device per pulse oximetry. []? Patient is an outpatient. Results will be faxed to the ordering provider. Qualified provider to write orders for possible sleep study or home oxygen prescription.     Patient was on room air for a few days. SpO2 was 93 % on room air at rest. Patients SpO2 was 89% or above and did not qualify for home oxygen. Patient was walked for 6 minutes. SpO2 was 93 % during walking. Patients SpO2 was 89% or above and did not qualify for home oxygen. Patient does not have a positive pressure airway device at home. Patient is not  diagnosed with Obstructive Sleep Apnea. Patient will not be set up/instructed on a nocturnal study per dr Isrrael Piper   -Acute post operative pulmonary insufficiency due to bilateral atelectasis Vs aspiration pneumonia. - improved. She is currently on room air.  -Chronic atrial fibrillation with controlled ventricle response.  -Chronic systolic congestive heart failure. .  -Gastroesophageal reflux disease.  -Essential hypertension  -Moderate COPD- under control with current inhalers  -Current smoker/some days. Betsy Hoffmannon  0.25 PPD 60 years 13 PYH- unsure of accuracy   -Full Code     Plan   -Continue patient on Bevespi 2 puffs twice daily. New prescription given  -Continue albuterol 2.5 mg 1 nebulization every 4hourly as needed- will be stopped at the time of discharge. She had ventolin HFA at home. She refused to take any refills at this time.  -Continue Incentive spirometry as ordered. - encouraged use   -Avoid all sedative meds if she is drowsy.  -Continue PT/OT. -Continue current antibiotics per primary service.  -All her cultures were negative.  -Aspiration precautions.  -She did not qualified for home O2 at rest or with exercise.  -Follow with my ( Dr. Nan Tafoya) clinic at [facility] for pulmonary medicine in 3months with chest Xray PA and lateral views along with full PFTS to be done at least  2days before clinic visit to follow pneumonia and COPD respectively. -VTE prophylaxis: Eliquis   -GI prophylaxis: Protonix   -Stable from pulmonary standpoint. Follow up as above     Chelly Chirinos educated about my impression and plan. She verbalizes understanding. Questions and concerns addressed. Meds and orders reviewed.      Electronically signed by   Rudi Dixon MD on 3/7/2021 at 1:12 PM

## 2021-03-07 NOTE — DISCHARGE SUMMARY
DISCHARGE SUMMARY      Patient Identification:   Yi Ny   : 1935  MRN: [id number]   Account: [de-identified]      Patient's PCP: Cesar Mar    Admit Date: 2021     Discharge Date:   2021    Admitting Physician: Lilian Mcdonald MD     Discharge Physician: Benjamin Villavicencio DO     Discharge Diagnoses: Active Hospital Problems    Diagnosis Date Noted    Dilated cardiomyopathy ([address] [I42.0]      Priority: High    Orthostatic dizziness [R42] 2016     Priority: High     Class: Acute    Acute postoperative respiratory insufficiency [J95.89]     Aspiration pneumonia of left lower lobe due to gastric secretions (HCC) [J69.0]     Ischemia of right lower extremity [I99.8] 2021    Acute kidney injury (ALEJANDRO) with acute tubular necrosis (ATN) (HCC) [N17.0] 2021    Orthostatic hypotension [I95.1] 2021    Hypotension due to hypovolemia [I95.89, E86.1]     COPD without exacerbation ([address] [J44.9]     Medtronic dual ICD  [Z95.810] 2020    Acute blood loss anemia [D62]     Chronic combined systolic and diastolic congestive heart failure ([address] [I50.42]     Tobacco abuse [Z72.0]     Paroxysmal atrial fibrillation ([address] [I48.0] 09/15/2018    Anemia [D64.9] 09/10/2018     1. Acute blood loss anemia  · Likely secondary to ? GI bleed  · Received 2 units PRBC   · Moderate to large internal hemorrhoids noted on colonoscopy from 3/2/21. Possible source  · Hemoglobin stable during admission. No recurrence of bright red blood per rectum. 2. Thrombocytopenia  · Possibly secondary to heparin infusion, which was stopped after less than 24 hours  · Platelets 651 on admission, low of 87 on 3/4  · Platelet count stable at 114. Continue Eliquis.    3. ?  Sepsis aspiration pneumonia/UTI  · Not present on admission  · 3/2 patient became acutely hypotensive, tachypneic, tachycardic, febrile, and had elevated WBC with suspected source  · 3/3 procalcitonin 17.63, lactic acid 1.6 at that time  · Fluid resuscitation provided  · Monitor for continued signs and symptoms of sepsis and hypotension  · Zosyn, day 6 --> transition to Augmentin as OP. · Complete 10-day course of antibiotics as OP. 4. Hypokalemia with hypomagnesemia  § Noted on lab work from 3/6/2021  § Replaced per protocol  § Repeat BMP and magnesium in 1-2 weeks  5. History of COPD  · PFT 1/20/2020 FEV1 77%. Need repeat 6/5/2021. · Continue bevespi  6. History of HFrEF NYHA class II  · Echo 6/26/2020: EF 25-30% (previous echo 12/2019 EF 10-15%)  · Bumex PRN per CHF clinic guidelines. 7. History of hypertension  · Hypertension on admission secondary to sepsis and acute blood loss anemia  · Continue home medications with holding parameters on discharge  8. History of PVD/PAD  · S/p stenting 2/2019 right fem-tib PTA  · 2/14/2019: Peripheral angiogram/intervention for acute on chronic limb ischemia related to post lower extremity bypass distal anastomotic stenosis with recent graft thromboses  · Continue Eliquis and Plavix on discharge. 9. History of A. fib  · Rate controlled and NSR on telemetry  · Negative troponin  · Medtronic ICD placed 8/2020  · Continue Eliquis and Plavix  10. 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 History of AAA  · S/p EVAR 2/2019  12. History of esophageal stricture and achalasia  · PEG tube in place  · Not used during this admission  · Ensure added to diet  13. History of multiple abdominal surgeries  · 10/2020 mesenteric ischemia requiring ex lap  · 2/25/2020 right hemicolectomy, cholecystectomy, hysterectomy  14. Tobacco abuse  · Counseling provided. · Recently resumed smoking in 2020, 0.25-0.5 packs/day  · She had quit in 10/2019.  60-pack-year history at that time.   15. Acute GI bleed, resolved  · BRBPR for 2 days on admission  · Eliquis and Plavix for long-term coagulation were held on admission  · GI consulted. Colonoscopy 3/2/2021 showed internal hemorrhoids without evidence of bleeding. 16.  Acute hypoxic respiratory failure, resolved  · Likely secondary to aspiration  · No home O2 requirements, required BiPAP to maintain SPO2 greater than 90%  17. Acute right lower limb ischemia, resolved s/p TPA infusion  · Secondary to discontinuation of Eliquis and Plavix due to concern for ? GI bleed  · 3/2/2021: Patient began having acute pain of right lower limb with loss of pulses  · CTA showed occlusion of the right iliac limb, right superficial femoral artery, right femoral anterior tibial graft, and right popliteal artery  · 3/3/2021 IR arteriogram: Evidence of complete occlusion of the aortobifemoral bypass graft, SFA, and fem-tib bypass graft  · 3/3/2021 patient given 2 mg TPA and heparin drip was started. Arteriogram was repeated and showed complete occlusion of the right limb of the aortic endograft with thrombosis throughout the right lower extremity. · 3/4/2021 arteriogram repeated showing complete resolution of occlusion    The patient was seen and examined on day of discharge and this discharge summary is in conjunction with any daily progress note from day of discharge. Hospital Course:   Per HPI:  \"[id number] female patient presented to the ED today with worsening acute on chronic lower GI bleed with bright red blood per rectum.  History of lower GI bleed for the past 2 months but increased hematochezia with blood clots in the last 1 to 2 days .       Patient is on long-term anticoagulation w/  Eliquis and takes Plavix too.     Patient has developed dizziness due to symptomatic anemia.  S/p 1 unit of PRBC transfusion in the ED. \"     Patient had colonoscopy 3/2 which showed no evidence of active bleeding within the colon and moderate to large internal hemorrhoids that were not bleeding. Patient did desat to SPO2 87% while on 6 L nasal cannula.   High flow nasal cannula was applied without improvement of SPO2. Patient required BiPAP. Subsequent chest x-ray revealed concerns for left mid and lower lung field pneumonia, likely secondary to aspiration. Pulmonology was consulted at that time and diagnosed her with postoperative pulmonary insufficiency secondary to bilateral atelectasis versus aspiration pneumonia versus other etiology. They recommended DuoNebs every 4 hours, incentive spirometry, and supplemental O2 weaning. Prophylactic Zosyn was started at that time.       Overnight 3/2/2021, patient began having pain in her right lower extremity. Noted to have decreased pulses and decreased warmth. Nitroglycerin ointment was applied to the leg patient was emergently taken to interventional radiology. Angiogram was completed at that time which showed complete occlusion of the right limb of the aortobifemoral bypass graft, SFA, and fem-tib bypass graft. Patient was given 2 mg TPA with subsequent heparin gtt. Repeat arteriogram showed continued occlusion and the patient was transferred to the ICU. Continued heparin. Repeat arteriogram 3/4 showed complete resolution of clot, heparin was stopped, and patient was transferred to hospital floor. Eliquis and Plavix resumed 3/4 for long-term coagulation goals.     Patient did have hypokalemia 2.8 and hypomagnesemia 1.4 on 3/6 and were repleted per protocol. Both were improved and within normal limits on 3/7. Discharge 3/7 with plans for repeat BMP plus magnesium in 1-2 weeks.     Exam:     Vitals:  Vitals:    03/07/21 0300 03/07/21 0802 03/07/21 0839 03/07/21 1135   BP: (!) 127/58  (!) 120/58 (!) 119/56   Pulse: 72  85 74   Resp: 15  16    Temp: 97.7 °F (36.5 °C)  98.4 °F (36.9 °C)    TempSrc: Oral  Oral    SpO2: 95% 91% 92%    Weight: 144 lb (65.3 kg)      Height:         Weight: Weight: 144 lb (65.3 kg)     24 hour intake/output:    Intake/Output Summary (Last 24 hours) at 3/7/2021 1348  Last data filed at procedure and abort further TPA infusion. The patient's left    brachial artery sheath will be removed approximately 4 hours following the procedure. **This report has been created using voice recognition software. It may contain minor errors which are inherent in voice recognition technology. **                        Final report electronically signed by Dr Kailee Larson on 3/4/2021 11:32 AM      IR TRANSCATH INFUSION THROMB NONCORONARY   Final Result   Improved flow through the right lower extremity with areas of residual thrombus in the right limb of the abdominal aortic endograft. This appears to be superimposed over chronic changes of peripheral vascular disease throughout the right lower extremity. As the patient's laboratory values remain adequate with a fibrinogen level most recently measuring 247, the patient will continue to undergo a TPA/heparin infusion for one additional moderate. The patient will be brought back to the IR department    tomorrow for a repeat arteriogram.      **This report has been created using voice recognition software. It may contain minor errors which are inherent in voice recognition technology. **                        Final report electronically signed by Dr Kailee Larson on 3/3/2021 5:28 PM      IR ANGIOGRAM EXTREMITY RIGHT   Final Result   Complete occlusion of the right limb of the aortic endograft as well as thrombosis throughout the right lower extremity. The patient will undergo a TPA and heparin infusion overnight and be brought back to the interventional radiology department later    the same day for repeat arteriogram.      **This report has been created using voice recognition software. It may contain minor errors which are inherent in voice recognition technology. **                        Final report electronically signed by Dr Kailee Larson on 3/3/2021 5:01 PM      XR CHEST PORTABLE   Final Result   Impression:   Moderate to advanced opacity left perihilar region in the left lung base. This could represent aspiration or pneumonia. Suspect dilation of the esophagus. Cardiomegaly, no CHF. This document has been electronically signed by: Lori Collins MD on    03/03/2021 03:05 AM      CTA LOWER EXTREMITY RIGHT W WO CONTRAST   Final Result   Impression:   Aortic stent graft present. The right iliac limb is occluded. The native    right superficial femoral artery is occluded distally. The right femoral    anterior tibial graft is occluded. The right popliteal artery is    occluded. Only the anterior tibial artery is patent and this is just to    the mid calf. Advanced multifocal disease within the left superficial femoral artery. Single-vessel runoff on the left. This document has been electronically signed by: Lori Collins MD on    03/02/2021 11:57 PM      All CTs at this facility use dose modulation techniques and iterative    reconstructions, and/or weight-based dosing   when appropriate to reduce radiation to a low as reasonably achievable. CTA LOWER EXTREMITY LEFT W WO CONTRAST   Final Result   Impression:   Aortic stent graft present. The right iliac limb is occluded. The native    right superficial femoral artery is occluded distally. The right femoral    anterior tibial graft is occluded. The right popliteal artery is    occluded. Only the anterior tibial artery is patent and this is just to    the mid calf. Advanced multifocal disease within the left superficial femoral artery. Single-vessel runoff on the left. This document has been electronically signed by: Lori Collins MD on    03/02/2021 11:48 PM      All CTs at this facility use dose modulation techniques and iterative    reconstructions, and/or weight-based dosing   when appropriate to reduce radiation to a low as reasonably achievable. XR CHEST PORTABLE   Final Result   1. Normal heart size.  Permanent pacemaker/defibrillator. 2. Moderate atelectasis/pneumonia left mid and lower lung fields. No effusion is seen. 3. There is a cylindrical soft tissue density right paratracheal region which appears to continue posterior to the heart to the region of the GE junction. This represents a markedly fluid distended esophagus, better seen on prior CT thorax likely related    to presbyesophagus or achalasia. **This report has been created using voice recognition software. It may contain minor errors which are inherent in voice recognition technology. **      Final report electronically signed by Dr. Anali Isidro on 3/2/2021 5:35 PM      NM GI BLOOD LOSS   Final Result   Negative for GI bleed. This document has been electronically signed by: Nancy Granados MD on    02/28/2021 09:59 PM      CT ABDOMEN PELVIS W IV CONTRAST   Final Result      No acute intra-abdominal or intrapelvic findings. Final report electronically signed by Dr. Marek White on 2/28/2021 5:02 PM      XR CHEST PORTABLE   Final Result      No acute intrathoracic process. **This report has been created using voice recognition software. It may contain minor errors which are inherent in voice recognition technology. **      Final report electronically signed by Dr. Marek White on 2/28/2021 3:33 PM      XR CHEST (2 VW)    (Results Pending)          Consults:     IP CONSULT TO GI  STR ED TO IP CONSULT  IP CONSULT TO PULMONOLOGY  PHARMACY TO DOSE MEDICATION    Disposition:    [x] Home       [] TCU       [] Rehab       [] Psych       [] SNF       [] [facility]       [] Other-    Condition at Discharge: Stable    Code Status:  Full Code     Patient Instructions:    Discharge lab work: BMP, Mg, H&H in 1 week. Full PFTs and CXR (2 view) prior to 3 month follow-up with Pulmonology. Activity: activity as tolerated  Diet: DIET CARDIAC;  Dental Soft  Dietary Nutrition Supplements: Standard High Calorie Oral Supplement Follow-up visits:   Kevin Burr, APRN - CNP  [facility]  [address]  [place] [id number]  [phone]    Schedule an appointment as soon as possible for a visit in 4 weeks      [facility] [address]  [place] [facility] [address]  [phone]    In 1 week       Follow-up with Dr. Kaylynn Davila at [facility], [address] for Pulmonary Medicine in 3 months with CXR (2 views) along with full PFTs    Discharge Medications:      Lexus Nava   Home Medication Instructions DOJ:[id number]    Printed on:03/07/21 1348   Medication Information                      acetaminophen (TYLENOL ARTHRITIS PAIN) 650 MG CR tablet  Take 1,300 mg by mouth every 12 hours as needed for Pain              albuterol sulfate HFA (PROVENTIL HFA) 108 (90 Base) MCG/ACT inhaler  Inhale 2 puffs into the lungs every 6 hours as needed for Wheezing or Shortness of Breath             amoxicillin-clavulanate (AUGMENTIN) 875-125 MG per tablet  Take 1 tablet by mouth 2 times daily for 4 days             apixaban (ELIQUIS) 2.5 MG TABS tablet  TAKE 1 TABLET BY MOUTH TWICE DAILY             Blood Pressure KIT  Check blood pressure daily, and if symptoms of lightheadedness. Call physician if BP <80/40.              bumetanide (BUMEX) 1 MG tablet  Take 1 tablet by mouth daily as needed (as directed by HF Clinic)             clopidogrel (PLAVIX) 75 MG tablet  Take 1 tablet by mouth daily             glycopyrrolate-formoterol (BEVESPI) 9-4.8 MCG/ACT AERO  Inhale 2 puffs into the lungs 2 times daily             hydrocortisone (ANUSOL-HC) 25 MG suppository  Place 1 suppository rectally 2 times daily for 7 days             losartan (COZAAR) 25 MG tablet  Take 1 tablet by mouth daily             magnesium (MAGNESIUM-OXIDE) 250 MG TABS tablet  TAKE 2 TABLETS BY MOUTH TWICE DAILY             melatonin 3 MG TABS tablet  Take 2 tablets by mouth nightly as needed (sleep)             metoprolol succinate (TOPROL XL) 25 MG extended release tablet  Take 1 tablet by

## 2021-03-07 NOTE — PROGRESS NOTES
Discharge teaching and instructions for diagnosis/procedure of GIB completed with patient using teachback method. AVS reviewed. Printed prescriptions given to patient. Patient voiced understanding regarding prescriptions, follow up appointments, and care of self at home. Discharged ambulatory and in a wheelchair to  home with support per family     Educated on new medications, follow up labs, tests, and appointment. Educated on follow ups she needs to make as today was Sunday and offices are closed. Discharged with all belongings. 2 mo with constipation given a glycerin suppository advised Mom to give Pear nectar 1 oz a day and follow up with PMD

## 2021-03-07 NOTE — PLAN OF CARE
Problem: Falls - Risk of:  Goal: Will remain free from falls  Description: No falls this shift. Bed in lowest position and locked. Call light within reach. Bed alarm activated. 3/6/2021 2156 by Gigi Peterson RN  Outcome: Ongoing  Note: Bed in lowest position. Bed alarm on. Call light in reach Fall sign posted. Hourly rounding for pt safety Will continue monitor. Problem: Bleeding:  Goal: Will show no signs and symptoms of excessive bleeding  Description: No signs and symptoms of excessive bleeding  3/6/2021 2156 by Gigi Peterson RN  Outcome: Ongoing  Note: No bleeding noted. Will continue to monitor. Problem: Pain:  Goal: Pain level will decrease  Description: Denies pain at this time  3/6/2021 2156 by Gigi Peterson RN  Outcome: Ongoing  Note:    03/06/21 1943   Pain Assessment   Pain Assessment 0-10   Pain Level 0   Patient's Stated Pain Goal No pain   Will continue to monitor     Problem: Skin Integrity:  Goal: Will show no infection signs and symptoms  Outcome: Ongoing  Note: VSS, no signs of infection   Care plan reviewed with patient. Patient verbalize understanding of the plan of care and contribute to goal setting.

## 2021-03-07 NOTE — DISCHARGE INSTR - DIET

## 2021-03-17 RX ORDER — LOSARTAN POTASSIUM 25 MG/1
25 TABLET ORAL DAILY
Qty: 30 TABLET | Refills: 5 | Status: SHIPPED | OUTPATIENT
Start: 2021-03-17 | End: 2021-09-15 | Stop reason: SDUPTHER

## 2021-03-29 ENCOUNTER — PROCEDURE VISIT (OUTPATIENT)
Dept: CARDIOLOGY CLINIC | Age: 86
End: 2021-03-29
Payer: MEDICARE

## 2021-03-29 ENCOUNTER — TELEPHONE (OUTPATIENT)
Dept: CARDIOLOGY CLINIC | Age: 86
End: 2021-03-29

## 2021-03-29 DIAGNOSIS — Z95.810 ICD (IMPLANTABLE CARDIOVERTER-DEFIBRILLATOR) IN PLACE: Primary | ICD-10-CM

## 2021-03-29 PROCEDURE — 93296 REM INTERROG EVL PM/IDS: CPT | Performed by: INTERNAL MEDICINE

## 2021-03-29 PROCEDURE — 93295 DEV INTERROG REMOTE 1/2/MLT: CPT | Performed by: INTERNAL MEDICINE

## 2021-03-29 NOTE — PROGRESS NOTES
[employer] medtronic dual icd       . Farrah Elman Battery longevity:  10.1 years on device   Presenting rhythm  AS VS     Atrial impedance 380  RV impedance 456  Shock 54    P wave sensing 0.8  R wave sensing 8.6    30.2 % atrial paced  0 % RV paced     Atrial threshold 0.375 V  at 0.4ms  RV threshold 0.625 V at 0.4ms  Mode switches   6, under 1 minute   optivol elevated will address with chf

## 2021-04-05 ENCOUNTER — TELEPHONE (OUTPATIENT)
Dept: CARDIOLOGY CLINIC | Age: 86
End: 2021-04-05

## 2021-04-08 ENCOUNTER — TELEPHONE (OUTPATIENT)
Dept: CARDIOLOGY CLINIC | Age: 86
End: 2021-04-08

## 2021-04-08 NOTE — TELEPHONE ENCOUNTER
Spoke with patient   She has not taken any bumex yet   Patient instructed with orders and verbalized understanding

## 2021-04-08 NOTE — TELEPHONE ENCOUNTER
Patient called in with leg/ankle swelling. Has been weighing everyday but not gaining any. Weight 139 lb steadily. Denies eating any differently, \"does not change diet for anything\". Denies SOB.  Elevated legs last night, helped a little

## 2021-04-09 NOTE — TELEPHONE ENCOUNTER
We will withdraw the case at this time  Spoke with Katie Aguillon at [facility]  Patient will need a more recent office visit.    Spoke with Padmini Christian about this

## 2021-04-12 ENCOUNTER — TELEPHONE (OUTPATIENT)
Dept: CARDIOLOGY CLINIC | Age: 86
End: 2021-04-12

## 2021-04-12 NOTE — TELEPHONE ENCOUNTER
Please notify Dr Karen Reynolds of pt concern with taking Eliquis in a separate phone encounter under him so he can address  Thanks

## 2021-04-12 NOTE — TELEPHONE ENCOUNTER
Spoke with patient regarding heart failure and patient conversation below please advise    Stated she had one bloody stool yesterday, none today, but has hemorrhoids and has been taking her suppository BID. Advised patient to call her GI doctor, sees them Friday, to let them know what is going on. Was asking about taking her Eliquis that Dr Adrianna Mitchell has her on. Advised her to speak with GI doctor and Dr Adrianna Mitchell regarding bloody stools and Eliquis.

## 2021-04-12 NOTE — TELEPHONE ENCOUNTER
Spoke with patient. Swelling has improved some but thinks she always has a little edema all the time that wont go away. Weight stayed the same. Stated she had one bloody stool yesterday, none today, but has hemorrhoids and has been taking her suppository BID. Advised patient to call her GI doctor, sees them Friday, to let them know what is going on. Was asking about taking her Eliquis that Dr Penelope Haynes has her on. Advised her to speak with GI doctor and Dr Penelope Haynes regarding bloody stools and Eliquis.

## 2021-04-24 ENCOUNTER — HOSPITAL ENCOUNTER (EMERGENCY)
Age: 86
Discharge: HOME | End: 2021-04-24
Attending: EMERGENCY MEDICINE
Payer: MEDICARE

## 2021-04-24 VITALS
BODY MASS INDEX: 23.99 KG/M2 | HEIGHT: 65 IN | TEMPERATURE: 97.7 F | HEART RATE: 73 BPM | DIASTOLIC BLOOD PRESSURE: 58 MMHG | WEIGHT: 144 LBS | OXYGEN SATURATION: 96 % | RESPIRATION RATE: 20 BRPM | SYSTOLIC BLOOD PRESSURE: 147 MMHG

## 2021-04-24 DIAGNOSIS — Z43.1 ATTENTION TO G-TUBE (HCC): Primary | ICD-10-CM

## 2021-04-24 PROCEDURE — 99283 EMERGENCY DEPT VISIT LOW MDM: CPT

## 2021-04-24 ASSESSMENT — ENCOUNTER SYMPTOMS
DIARRHEA: 0
ABDOMINAL DISTENTION: 0
CHEST TIGHTNESS: 0
COUGH: 0
BACK PAIN: 0
SHORTNESS OF BREATH: 0
VOMITING: 0
CHOKING: 0
COLOR CHANGE: 0
ABDOMINAL PAIN: 0

## 2021-04-24 NOTE — ACP (ADVANCE CARE PLANNING)
Advance Care Planning     Advance Care Planning Activator (Inpatient)  Conversation Note      Date of ACP Conversation: 4/24/2021    Conversation Conducted with: Patient with Decision Making Capacity    ACP Activator: [address] Decision Maker:     Current Designated Health Care Decision Maker:     Primary Decision Maker: Cira Will - Child - [phone]    Secondary Decision Maker: Lissy Hurley Child - [phone]      Care Preferences    Ventilation: \"If you were in your present state of health and suddenly became very ill and were unable to breathe on your own, what would your preference be about the use of a ventilator (breathing machine) if it were available to you? \"      Would the patient desire the use of ventilator (breathing machine)?: yes    \"If your health worsens and it becomes clear that your chance of recovery is unlikely, what would your preference be about the use of a ventilator (breathing machine) if it were available to you? \"     Would the patient desire the use of ventilator (breathing machine)?: No      Resuscitation  \"CPR works best to restart the heart when there is a sudden event, like a heart attack, in someone who is otherwise healthy. Unfortunately, CPR does not typically restart the heart for people who have serious health conditions or who are very sick. \"    \"In the event your heart stopped as a result of an underlying serious health condition, would you want attempts to be made to restart your heart (answer \"yes\" for attempt to resuscitate) or would you prefer a natural death (answer \"no\" for do not attempt to resuscitate)? \" yes       [] Yes   [x] No   Educated Patient / June Precise regarding differences between Advance Directives and portable DNR orders.     Length of ACP Conversation in minutes:  3    Conversation Outcomes:  [x] ACP discussion completed  [] Existing advance directive reviewed with patient; no changes to patient's previously recorded wishes  [] New Advance Directive completed  [] Portable Do Not Rescitate prepared for Provider review and signature  [] POLST/POST/MOLST/MOST prepared for Provider review and signature      Follow-up plan:    [] Schedule follow-up conversation to continue planning  [x] Referred individual to Provider for additional questions/concerns   [] Advised patient/agent/surrogate to review completed ACP document and update if needed with changes in condition, patient preferences or care setting    [] This note routed to one or more involved healthcare providers     Pt came into ED due to g-tube fell out. Met with pt is ED-16 to discuss ACP. Pt brisk \"Yea, yea, they have asked me that before. Yes, go ahead and try everything. \"

## 2021-04-24 NOTE — ED PROVIDER NOTES
([address]     Achalasia     Anemia     Arthritis     Blood circulation, collateral     BPPV (benign paroxysmal positional vertigo) 6/6/16    CHF (congestive heart failure) (HCC)     Chronic kidney disease     sees Dr Jose Martin Bradshaw cancer [facility])     Gastrointestinal hemorrhage     GERD (gastroesophageal reflux disease)     ? esophageal stricture    Hiatal hernia     History of blood transfusion     HTN (hypertension)     Hx of blood clots 2018    R leg-sees ABEBA Hargrove    Hyperlipidemia     Medtronic dual ICD  8/26/2020    Neuromuscular disorder ([address]     Obesity     Osteopenia     Osteoporosis     PAC (premature atrial contraction)     on betablocker    Paralysis (HCC)     baby    Pedal edema     denied any hx of hypertension    Pneumonia     PVC (premature ventricular contraction)     sees Dr Sanchez Gómez PVD (peripheral vascular disease) ([address]     Small bowel obstruction ([address]     Tinnitus     UTI (urinary tract infection)      Past Surgical History:   Procedure Laterality Date    ABDOMINAL AORTIC ANEURYSM REPAIR, ENDOVASCULAR N/A 2/15/2019    ABDOMINAL AORTIC ANEURYSM REPAIR ENDOVASCULAR, DECLOTTING RIGHT FEM TIB BYPASS GRAFT performed by Suzan Boothe MD at [address]    lumpectomy    CHOLECYSTECTOMY      30 years ago    COLONOSCOPY  08/06/2018    Dr Alyssa Downey 2/22/2019    COLONOSCOPY DIAGNOSTIC performed by Kylah Willis MD at [facility]    COLONOSCOPY N/A 2/22/2020    COLONOSCOPY CONTROL HEMORRHAGE performed by Milton Dempsey MD at [address] [place] [address] COLONOSCOPY Left 3/2/2021    COLORECTAL CANCER SCREENING, NOT HIGH RISK performed by Fredy Mckinley MD at [address]      eye, eyelids    EYE SURGERY      cataract    HEMICOLECTOMY N/A 2/25/2020    OPEN RIGHT COLON RESECTION performed by Jolynn Self MD at [address] [facility]  07/15/2016    OTHER SURGICAL HISTORY 10/06/2020    Exp lap washout mesenteric lymph node biopsy EGD abthera placement Dr Kisha Mcnally  10/08/2020    reopening recent lap open g tube placement intra operative EGD and primary closure of open abdomen- [address] OLEGARIO SKN SUB GRFT T/A/L AREA/<100SCM /<1ST 25 SCM N/A 9/6/2018    RE-EXPLORATION RIGHT GROIN, DECLOTTING OF FEMORAL BYPASS GRAFT performed by Ramya Acosta MD at [address] [place] [address] EGD TRANSORAL BIOPSY SINGLE/MULTIPLE Left 11/27/2017    EGD BIOPSY performed by Faith Meredith MD at [address] [place] [address], PARTIAL, Bartolome Freerin N/A 8/20/2018    ROBOT ASSISTED COLECTOMY (LOW ANTERIOR) performed by Kayla Haney MD at [address] [place] [address] OFFICE/OUTPT [address] [facility] N/A 9/5/2018    RIGHT FEMORAL EMBOLECTOMY, INTRAOPERATIVE ARTERIOGRAM, RIGHT ILIAC EMBOLECTOMY, RIGHT COMMON AND EXTERNAL ILIAC STENTING, RIGHT FEMORAL TO ANTERIOR POPLITEAL BYPASS WITH 6MM GORTEX GRAFT performed by Ramya Acosta MD at [address] / [address] Right 2/14/2019    FEMORAL EMBOLECTOMY THROMBECTOMY, RIGHT LEG performed by Ramya Acosta MD at [address] 11/27/2017    EGD SUBMUCOSAL/BOTOX INJECTION performed by Faith Meredith MD at [address] [facility] N/A 2/22/2019    EGD BIOPSY performed by Kelley Finch MD at [address] [place] [address]   No current facility-administered medications for this encounter.      Current Outpatient Medications:     losartan (COZAAR) 25 MG tablet, TAKE 1 TABLET BY MOUTH DAILY, Disp: 30 tablet, Rfl: 5    glycopyrrolate-formoterol (BEVESPI) 9-4.8 MCG/ACT AERO, Inhale 2 puffs into the lungs 2 times daily, Disp: 1 Inhaler, Rfl: 11    bumetanide (BUMEX) 1 MG tablet, Take 1 tablet by mouth daily as needed (as directed by HF Clinic), Disp: 30 tablet, Rfl: 0    magnesium (MAGNESIUM-OXIDE) 250 MG TABS tablet, TAKE 2 TABLETS BY MOUTH TWICE DAILY, Disp: 120 tablet, Rfl: 6    apixaban (ELIQUIS) 2.5 MG TABS tablet, TAKE 1 TABLET BY MOUTH TWICE DAILY, Disp: 60 tablet, Rfl: 5    metoprolol succinate (TOPROL XL) 25 MG extended release tablet, Take 1 tablet by mouth daily, Disp: 90 tablet, Rfl: 3    pravastatin (PRAVACHOL) 40 MG tablet, TAKE 1 TABLET BY MOUTH DAILY, Disp: 90 tablet, Rfl: 3    clopidogrel (PLAVIX) 75 MG tablet, Take 1 tablet by mouth daily, Disp: 90 tablet, Rfl: 3    potassium chloride (KLOR-CON M) 10 MEQ extended release tablet, Take 1 tablet by mouth daily, Disp: 180 tablet, Rfl: 2    pantoprazole (PROTONIX) 40 MG tablet, Take 40 mg by mouth 2 times daily (before meals), Disp: , Rfl:     albuterol sulfate HFA (PROVENTIL HFA) 108 (90 Base) MCG/ACT inhaler, Inhale 2 puffs into the lungs every 6 hours as needed for Wheezing or Shortness of Breath, Disp: 1 Inhaler, Rfl: 0    Blood Pressure KIT, Check blood pressure daily, and if symptoms of lightheadedness. Call physician if BP <80/40., Disp: 1 kit, Rfl: 0    melatonin 3 MG TABS tablet, Take 2 tablets by mouth nightly as needed (sleep), Disp: 60 tablet, Rfl: 3    acetaminophen (TYLENOL ARTHRITIS PAIN) 650 MG CR tablet, Take 1,300 mg by mouth every 12 hours as needed for Pain , Disp: , Rfl:     timolol (TIMOPTIC) 0.5 % ophthalmic solution, Place 1 drop into both eyes nightly , Disp: , Rfl:       SOCIAL HISTORY     Social History     Social History Narrative    Not on file     Social History     Tobacco Use    Smoking status: Current Some Day Smoker     Packs/day: 0.25     Years: 60.00     Pack years: 15.00     Types: Cigarettes     Start date: 1956    Smokeless tobacco: Never Used    Tobacco comment: 1 cigarette every other day   Substance Use Topics    Alcohol use:  Yes     Alcohol/week: 1.0 standard drinks     Types: 1 Glasses of wine per week     Comment: social    Drug use: No         ALLERGIES     Allergies   Allergen Reactions    Aspirin Effort: Pulmonary effort is normal. No respiratory distress. Breath sounds: Normal breath sounds. No wheezing or rales. Abdominal:      General: Abdomen is flat. Bowel sounds are normal. There is no distension. Palpations: Abdomen is soft. Tenderness: There is no abdominal tenderness. There is no guarding. Comments: G-tube removed. Musculoskeletal:         General: No swelling, tenderness, deformity or signs of injury. Right lower leg: No edema. Left lower leg: No edema. Lymphadenopathy:      Cervical: No cervical adenopathy. Skin:     Capillary Refill: Capillary refill takes less than 2 seconds. Coloration: Skin is not jaundiced. Findings: No bruising or lesion. Neurological:      Mental Status: She is alert and oriented to person, place, and time. Cranial Nerves: No cranial nerve deficit. Motor: No weakness. Psychiatric:         Mood and Affect: Mood normal.         Behavior: Behavior normal.         Thought Content: Thought content normal.             MEDICAL DECISION MAKING   Initial Assessment:   1. G-tube removed  Plan:    We will call [facility] since patient had her G-tube placed there. Patient states she does not use it for any reasons. And tolerates a full liquid diet just fine. [facility] was reached and stated if patient does not use it for any reason it is okay to keep it removed. If patient has problems and will needs G-tube back she can return to [facility]. Patient understood and had no further questions. ED RESULTS   Laboratory results:  Labs Reviewed - No data to display    Radiologic studies results:  No orders to display       ED Medications administered this visit: Medications - No data to display      ED COURSE     ED Course as of Apr 24 1519   Sat Apr 24, 2021   [address] [facility] to get in touch with the surgeon who did the G-tube placement.   Will receive a call back from surgeon to determine if G-tube needs to be placed back or not. [YULIANA]      ED Course User Index  [YULIANA] Liz Willingham DO       Strict return precautions and follow up instructions were discussed with the patient prior to discharge, with which the patient agrees. MEDICATION CHANGES     New Prescriptions    No medications on file         FINAL DISPOSITION     Final diagnoses:   Attention to G-tube [facility])     Condition: condition: stable  Dispo: Discharge to home      This transcription was electronically signed. Parts of this transcriptions may have been dictated by use of voice recognition software and electronically transcribed, and parts may have been transcribed with the assistance of an ED scribe. The transcription may contain errors not detected in proofreading. Please refer to my supervising physician's documentation if my documentation differs.     Electronically Signed: Vandana Horan, 04/24/21, 3:19 PM        Vandana Horan DO  Resident  04/24/21 1526

## 2021-04-24 NOTE — ED NOTES
MD's to call [facility] to determine if feeding tube needs replaced.         Janice Wylie RN  04/24/21 [id number]

## 2021-04-24 NOTE — ED TRIAGE NOTES
Patient to ER due to feeding tube falling out while cleaning it. Patient does not actually use this feeding tube. Was placed around feburary per patient. Patient states it was placed in case she needed it and also \"to keep things in place\".

## 2021-04-27 ENCOUNTER — TELEPHONE (OUTPATIENT)
Dept: CARDIOLOGY CLINIC | Age: 86
End: 2021-04-27

## 2021-04-27 ENCOUNTER — PROCEDURE VISIT (OUTPATIENT)
Dept: CARDIOLOGY CLINIC | Age: 86
End: 2021-04-27

## 2021-04-27 ENCOUNTER — OFFICE VISIT (OUTPATIENT)
Dept: CARDIOLOGY CLINIC | Age: 86
End: 2021-04-27
Payer: MEDICARE

## 2021-04-27 VITALS
SYSTOLIC BLOOD PRESSURE: 116 MMHG | WEIGHT: 144 LBS | HEIGHT: 65 IN | OXYGEN SATURATION: 95 % | BODY MASS INDEX: 23.99 KG/M2 | DIASTOLIC BLOOD PRESSURE: 70 MMHG | HEART RATE: 60 BPM

## 2021-04-27 DIAGNOSIS — Z98.890 S/P AAA REPAIR: ICD-10-CM

## 2021-04-27 DIAGNOSIS — I50.42 CHF (CONGESTIVE HEART FAILURE), NYHA CLASS II, CHRONIC, COMBINED (HCC): Primary | ICD-10-CM

## 2021-04-27 DIAGNOSIS — Z95.810 ICD (IMPLANTABLE CARDIOVERTER-DEFIBRILLATOR) IN PLACE: ICD-10-CM

## 2021-04-27 DIAGNOSIS — I48.91 ATRIAL FIBRILLATION, UNSPECIFIED TYPE (HCC): ICD-10-CM

## 2021-04-27 DIAGNOSIS — Z86.79 S/P AAA REPAIR: ICD-10-CM

## 2021-04-27 PROCEDURE — 4040F PNEUMOC VAC/ADMIN/RCVD: CPT | Performed by: NURSE PRACTITIONER

## 2021-04-27 PROCEDURE — 1090F PRES/ABSN URINE INCON ASSESS: CPT | Performed by: NURSE PRACTITIONER

## 2021-04-27 PROCEDURE — G8400 PT W/DXA NO RESULTS DOC: HCPCS | Performed by: NURSE PRACTITIONER

## 2021-04-27 PROCEDURE — G8427 DOCREV CUR MEDS BY ELIG CLIN: HCPCS | Performed by: NURSE PRACTITIONER

## 2021-04-27 PROCEDURE — 1123F ACP DISCUSS/DSCN MKR DOCD: CPT | Performed by: NURSE PRACTITIONER

## 2021-04-27 PROCEDURE — 4004F PT TOBACCO SCREEN RCVD TLK: CPT | Performed by: NURSE PRACTITIONER

## 2021-04-27 PROCEDURE — G8420 CALC BMI NORM PARAMETERS: HCPCS | Performed by: NURSE PRACTITIONER

## 2021-04-27 PROCEDURE — 99214 OFFICE O/P EST MOD 30 MIN: CPT | Performed by: NURSE PRACTITIONER

## 2021-04-27 RX ORDER — BUMETANIDE 1 MG/1
1 TABLET ORAL WEEKLY
Qty: 30 TABLET | Refills: 3
Start: 2021-04-27 | End: 2022-07-11 | Stop reason: SDUPTHER

## 2021-04-27 ASSESSMENT — ENCOUNTER SYMPTOMS
APNEA: 0
COUGH: 0
ABDOMINAL PAIN: 0
ABDOMINAL DISTENTION: 0
NAUSEA: 0
CHEST TIGHTNESS: 0
COLOR CHANGE: 0
WHEEZING: 0
SHORTNESS OF BREATH: 0

## 2021-04-27 NOTE — PROGRESS NOTES
[facility]       Visit Date: 4/27/2021  Cardiologist:  Dr. Juan López   Primary Care Physician: Dr. Violet Curling is a 82 y.o. female who presents today for:  Chief Complaint   Patient presents with    Congestive Heart Failure       HPI: Obtained from patient and chart    Andrea Newby is a 82 y.o. female who presents to the office for a follow up visit in the heart failure clinic. Hx PAD stents 2/2019, ?COPD,  smoker (she had Quit but started smoking a 1/2 PPD now has Quit again Feb 2020), CKD, AAA repair (EVAR) 2/2019, right fem-tib PTA with stent 2/2019, HTN. Negative stress test 9/2018. She was admitted for CHF and possible COPD exacerbation. Her K+ and Mg+ were both low and replaced. Repeat BMP and Mg were done on 5/30/19 and her Mg+ was 1.2 and K+ was 3.5. I called the patient and orders were placed and she was given Mg 4 gm IV in OP nursing and I also started her on Potassium.  Admitted in [facility] 0104 for pneumonia with sepsis, flash pulmonary edema. EF was 55% (2018) down to 10-15% grade 2 DD (Dec 2019) wore a LifeVest but sent it back in May. NICM ? Viral CMP. EF did improve slightly to 25-30% June 2020. Recent admit for rectal bleeding (June 2020) with blood transfusions. She had refused all invasive testing/procedures but called in July was was \"ready to get things started. \" She had an ICD placed by Dr Darby Zuniga Aug 20, 2020. Possible Watchman?. In October 2020 she had a SBO, had a PEG tube placed at [facility]. This came out of its own last week. She is able to drink liquids and eating foods but is limited with the swallowing. She has to take Bumex about once a month for a few days. Weight is up 4 pounds, she did take Bumex 4/8/21 (see phone encounter). This helped the swelling originally. She has been eating more sodium foods as she is trying to get her deit figured out with the swallowing. She has been eating more cheese, soup and crackers lately.  She can 2/22/2019    COLONOSCOPY DIAGNOSTIC performed by Marcelina Carrillo MD at [address] Endoscopy    COLONOSCOPY N/A 2/22/2020    COLONOSCOPY CONTROL HEMORRHAGE performed by Jose Dawson MD at [address] Endoscopy    COLONOSCOPY Left 3/2/2021    COLORECTAL CANCER SCREENING, NOT HIGH RISK performed by Swapnil Smith MD at [address] [facility]      eye, eyelids    EYE SURGERY      cataract    HEMICOLECTOMY N/A 2/25/2020    OPEN RIGHT COLON RESECTION performed by Nilo Thompson MD at [address] [facility] [address]  07/15/2016    OTHER SURGICAL HISTORY  10/06/2020    Exp lap washout mesenteric lymph node biopsy EGD abthera placement Dr Ananth Hollins  10/08/2020    reopening recent lap open g tube placement intra operative EGD and primary closure of open abdomen- [address] OLEGARIO SKN SUB GRFT T/A/L AREA/<100SCM /<1ST 25 SCM N/A 9/6/2018    RE-EXPLORATION RIGHT GROIN, DECLOTTING OF FEMORAL BYPASS GRAFT performed by Ezra De La Paz MD at [address]  EGD TRANSORAL BIOPSY SINGLE/MULTIPLE Left 11/27/2017    EGD BIOPSY performed by Rachel Valenzuela MD at [address] [place] [address], PARTIAL, Michaeljordy Olivares N/A 8/20/2018    ROBOT ASSISTED COLECTOMY (LOW ANTERIOR) performed by Anthony Lobo MD at [address]  OFFICE/OUTPT [address] [facility] N/A 9/5/2018    RIGHT FEMORAL EMBOLECTOMY, INTRAOPERATIVE ARTERIOGRAM, RIGHT ILIAC EMBOLECTOMY, RIGHT COMMON AND EXTERNAL ILIAC STENTING, RIGHT FEMORAL TO ANTERIOR POPLITEAL BYPASS WITH 6MM GORTEX GRAFT performed by Ezra De La Paz MD at [address] / [address] Right 2/14/2019    FEMORAL EMBOLECTOMY THROMBECTOMY, RIGHT LEG performed by Ezra De La Paz MD at [address] [facility] 11/27/2017    EGD SUBMUCOSAL/BOTOX INJECTION performed by Rachel Valenzuela MD at [address] [facility] N/A 2/22/2019    EGD kg/m²     Physical Exam  Vitals signs reviewed. Constitutional:       Appearance: She is well-developed. HENT:      Head: Normocephalic and atraumatic. Eyes:      Pupils: Pupils are equal, round, and reactive to light. Neck:      Musculoskeletal: Normal range of motion. Vascular: No JVD. Cardiovascular:      Rate and Rhythm: Normal rate and regular rhythm. Heart sounds: Normal heart sounds. No murmur. Comments: ICD  Pulmonary:      Effort: Pulmonary effort is normal. No respiratory distress. Breath sounds: No rales. Abdominal:      General: There is no distension. Palpations: Abdomen is soft. Tenderness: There is no abdominal tenderness. Musculoskeletal:         General: No tenderness. Right lower leg: Edema (trace+) present. Left lower leg: Edema (trace) present. Skin:     General: Skin is warm and dry. Capillary Refill: Capillary refill takes less than 2 seconds. Neurological:      Mental Status: She is alert and oriented to person, place, and time. Psychiatric:         Mood and Affect: Mood normal.         Behavior: Behavior normal.         Wt Readings from Last 3 Encounters:   04/27/21 144 lb (65.3 kg)   04/24/21 144 lb (65.3 kg)   03/07/21 144 lb (65.3 kg)     BP Readings from Last 3 Encounters:   04/27/21 116/70   04/24/21 (!) 147/58   03/07/21 (!) 119/56     Pulse Readings from Last 3 Encounters:   04/27/21 60   04/24/21 73   03/07/21 74     Body mass index is 23.96 kg/m². ECHO:   6/26/20   Summary   Limited examination. Normal left ventricular size and severely reduced systolic function. There was severe global hypokinesis. Hypertrophic basal septum. Ejection fraction was estimated at 25-30%. Left atrial size was severely dilated. The mitral valve structure demonstrated posterior leaflet calcification. DOPPLER: The transmitral velocity was within the normal range with no   evidence for mitral stenosis.  There was trace mitral regurgitation. IVC size is within normal limits with normal respiratory phasic changes. Signature      ----------------------------------------------------------------   Electronically signed by Alexander Tubbs MD (Interpreting   physician) on 06/26/2020 at 02:29 PM   ----------------------------------------------------------------      Findings      Mitral Valve   The mitral valve structure demonstrated posterior leaflet calcification. DOPPLER: The transmitral velocity was within the normal range with no   evidence for mitral stenosis. There was trace mitral regurgitation. Aortic Valve   The aortic valve was trileaflet with normal thickness and cuspal   separation. Tricuspid Valve   The tricuspid valve structure was normal with normal leaflet separation. Pulmonic Valve   The pulmonic valve leaflets were not well seen. Left Atrium   Left atrial size was severely dilated. Left Ventricle   Normal left ventricular size and severely reduced systolic function. There was severe global hypokinesis. Hypertrophic basal septum. Ejection fraction was estimated at 25-30%. Right Atrium   Right atrial size was normal.      Right Ventricle   The right ventricular size was normal with normal systolic function and   wall thickness. Pericardial Effusion   The pericardium was normal in appearance with no evidence of a pericardial   effusion. Pleural Effusion   No evidence of pleural effusion. Aorta / Great Vessels   IVC size is within normal limits with normal respiratory phasic changes.       Results reviewed:  BNP:   Lab Results   Component Value Date    PROBNP 2147.0 (H) 03/03/2021     CBC:   Lab Results   Component Value Date    WBC 5.7 03/07/2021    RBC 3.46 03/07/2021    HGB 9.7 03/07/2021    HCT 31.6 03/07/2021     03/07/2021     CMP:    Lab Results   Component Value Date     03/07/2021    K 3.8 03/07/2021    K 3.6 03/04/2021     03/07/2021    CO2 23 pounds in 1 week   Increased shortness of breath   Shortness of breath while laying down   Cough   Chest pain   Swelling in feet, ankles or legs   Tenderness or bloating in the abdomen   Fatigue    Decreased appetite or feeling \"full\"   Nausea    Confusion      Return in about 3 months (around 7/27/2021). or sooner if needed     Patient given educational materials - see patient instructions. We discussed the importance of weighing oneself and recording daily. We also discussed the importance of a low sodium diet, higher sodium foods to avoid and appropriate low sodium food choices. Discussed use, benefit, and side effects of prescribed medications. All patient questions answered. Patient verbalizes understanding of plan of care using teach back method, and is agreeable to the treatment plan. 35 minutes of time was spent reviewing the chart and educating the patient about HF, medications, diet, exercise, and discussing the plan of care. I personally spent more then 50% of the appt time face to face with the patient counseling/coordinating patient's care.       Electronicallysigned by ROMAN Goldstein CNP on 4/27/2021 at 12:00 PM

## 2021-04-27 NOTE — PROGRESS NOTES
[facility] Med[facility]onic ICD Optivol  Pt of Jai Ward    No charge < 31 days    Battery 9.9 years    Optivol elevated. Sent to CHF clinic to review with patient.

## 2021-04-27 NOTE — TELEPHONE ENCOUNTER
Progress Notes       [facility] Medtronic ICD Optivol  Pt of Jai Ward     No charge < 31 days     Battery 9.9 years     Optivol elevated.   Sent to CHF clinic to review with patient.

## 2021-05-06 ENCOUNTER — TELEPHONE (OUTPATIENT)
Dept: CARDIOLOGY CLINIC | Age: 86
End: 2021-05-06

## 2021-05-06 NOTE — TELEPHONE ENCOUNTER
Patient called in stating that she would like to move forward with the WATCHMAN procedure. Spoke to Dr. Vinnie Bradshaw regarding the [facility]. He states that from his standpoint he does not believe that she will ever be able to stop her blood thinners due to her PAD. Updated the patient and she states that at this time she will just hold off on doing the watchman procedure. She does not want to go through an invasive procedure if the end result does not change with stopping the blood thinners.

## 2021-05-27 LAB
ANION GAP SERPL CALCULATED.3IONS-SCNC: 7 MMOL/L (ref 4–12)
BUN BLDV-MCNC: 26 MG/DL (ref 7–20)
CALCIUM SERPL-MCNC: 9.6 MG/DL (ref 8.8–10.5)
CHLORIDE BLD-SCNC: 103 MEQ/L (ref 101–111)
CO2: 32 MEQ/L (ref 21–32)
CREAT SERPL-MCNC: 1.23 MG/DL (ref 0.6–1.3)
CREATININE CLEARANCE: 42
GLUCOSE, FASTING: 101 MG/DL (ref 70–110)
POTASSIUM SERPL-SCNC: 4.6 MEQ/L (ref 3.6–5)
SODIUM BLD-SCNC: 142 MEQ/L (ref 135–145)

## 2021-06-01 ENCOUNTER — TELEPHONE (OUTPATIENT)
Dept: CARDIOLOGY CLINIC | Age: 86
End: 2021-06-01

## 2021-06-01 ENCOUNTER — PROCEDURE VISIT (OUTPATIENT)
Dept: CARDIOLOGY CLINIC | Age: 86
End: 2021-06-01
Payer: MEDICARE

## 2021-06-01 DIAGNOSIS — I50.42 CHF (CONGESTIVE HEART FAILURE), NYHA CLASS II, CHRONIC, COMBINED (HCC): Primary | ICD-10-CM

## 2021-06-01 NOTE — PROGRESS NOTES
[facility] Medtronic Dual ICD Optivol  Pt of [place]    Battery 9.8 years    Optivol WNL    Episodes: 4/30/21:  7 beats VT max rate 214

## 2021-06-01 NOTE — TELEPHONE ENCOUNTER
BMP reviewed   BUN and Cr did increase since adding the Bumex  Please see how she is feeling taking the Bumex once a week - did this help her symptoms?   Please confirm how often she is taking palpitations

## 2021-06-02 PROCEDURE — 93297 REM INTERROG DEV EVAL ICPMS: CPT | Performed by: INTERNAL MEDICINE

## 2021-06-02 PROCEDURE — G2066 INTER DEVC REMOTE 30D: HCPCS | Performed by: INTERNAL MEDICINE

## 2021-07-05 LAB
ANION GAP SERPL CALCULATED.3IONS-SCNC: 6 MMOL/L (ref 4–12)
BUN BLDV-MCNC: 24 MG/DL (ref 7–20)
CALCIUM SERPL-MCNC: 8.8 MG/DL (ref 8.8–10.5)
CHLORIDE BLD-SCNC: 104 MEQ/L (ref 101–111)
CO2: 32 MEQ/L (ref 21–32)
CREAT SERPL-MCNC: 1.21 MG/DL (ref 0.6–1.3)
CREATININE CLEARANCE: 42
GLUCOSE: 88 MG/DL (ref 70–110)
POTASSIUM SERPL-SCNC: 4.3 MEQ/L (ref 3.6–5)
SODIUM BLD-SCNC: 142 MEQ/L (ref 135–145)

## 2021-07-06 ENCOUNTER — TELEPHONE (OUTPATIENT)
Dept: CARDIOLOGY CLINIC | Age: 86
End: 2021-07-06

## 2021-07-06 ENCOUNTER — PROCEDURE VISIT (OUTPATIENT)
Dept: CARDIOLOGY CLINIC | Age: 86
End: 2021-07-06
Payer: MEDICARE

## 2021-07-06 DIAGNOSIS — Z95.810 ICD (IMPLANTABLE CARDIOVERTER-DEFIBRILLATOR) IN PLACE: Primary | ICD-10-CM

## 2021-07-06 PROCEDURE — 93296 REM INTERROG EVL PM/IDS: CPT | Performed by: INTERNAL MEDICINE

## 2021-07-06 PROCEDURE — 93295 DEV INTERROG REMOTE 1/2/MLT: CPT | Performed by: INTERNAL MEDICINE

## 2021-07-06 NOTE — PROGRESS NOTES
[facility] Medtronic Dual ICD --- [facility] every month  Pt of [place]    Battery 9.8     Presenting rhythm AS VS    A Impedance 380  RV Impedance 551    RV shock 66    P wave sensing 0.8  R wave sensing 10.6    A Threshold 0.375 @ 0.40  A Amplitude 1.50 @ 0.40  RV Thresholds 0.750 @ 0.40  RV Amplitude 2.0 @ 0.40    A Paced 14.9%  V Paced <0.1%    Programmed Mode AAIR <=> DDDR     Afib [place] 0%    Episodes: none    Optivol WNL

## 2021-07-06 NOTE — TELEPHONE ENCOUNTER
----- Message from ROMAN Ellington CNP sent at 7/6/2021  9:05 AM EDT -----  Labs stable.   Continue Bumex once/week

## 2021-07-08 RX ORDER — CLOPIDOGREL BISULFATE 75 MG/1
75 TABLET ORAL DAILY
Qty: 90 TABLET | Refills: 3 | Status: SHIPPED | OUTPATIENT
Start: 2021-07-08 | End: 2022-03-29

## 2021-07-12 RX ORDER — POTASSIUM CHLORIDE 750 MG/1
10 TABLET, EXTENDED RELEASE ORAL DAILY
Qty: 90 TABLET | Refills: 4 | Status: SHIPPED | OUTPATIENT
Start: 2021-07-12 | End: 2022-07-11

## 2021-07-19 RX ORDER — PRAVASTATIN SODIUM 40 MG
40 TABLET ORAL DAILY
Qty: 90 TABLET | Refills: 1 | Status: SHIPPED | OUTPATIENT
Start: 2021-07-19 | End: 2022-01-13

## 2021-07-19 RX ORDER — MULTIVITAMIN WITH IRON
TABLET ORAL
Qty: 120 TABLET | Refills: 4 | Status: SHIPPED | OUTPATIENT
Start: 2021-07-19 | End: 2021-12-17 | Stop reason: SDUPTHER

## 2021-08-10 ENCOUNTER — PROCEDURE VISIT (OUTPATIENT)
Dept: CARDIOLOGY CLINIC | Age: 86
End: 2021-08-10
Payer: MEDICARE

## 2021-08-10 DIAGNOSIS — I50.20 SYSTOLIC CONGESTIVE HEART FAILURE, UNSPECIFIED HF CHRONICITY (HCC): Primary | ICD-10-CM

## 2021-08-10 PROCEDURE — 93297 REM INTERROG DEV EVAL ICPMS: CPT | Performed by: INTERNAL MEDICINE

## 2021-08-10 PROCEDURE — G2066 INTER DEVC REMOTE 30D: HCPCS | Performed by: INTERNAL MEDICINE

## 2021-09-07 ENCOUNTER — HOSPITAL ENCOUNTER (OUTPATIENT)
Dept: CT IMAGING | Age: 86
Discharge: HOME | End: 2021-09-07
Payer: MEDICARE

## 2021-09-07 DIAGNOSIS — I71.40 ABDOMINAL AORTIC ANEURYSM (AAA) WITHOUT RUPTURE: ICD-10-CM

## 2021-09-07 LAB — POC CREATININE WHOLE BLOOD: 1.3 MG/DL (ref 0.5–1.2)

## 2021-09-07 PROCEDURE — 6360000004 HC RX CONTRAST MEDICATION: Performed by: THORACIC SURGERY (CARDIOTHORACIC VASCULAR SURGERY)

## 2021-09-07 PROCEDURE — 74174 CTA ABD&PLVS W/CONTRAST: CPT

## 2021-09-07 PROCEDURE — 82565 ASSAY OF CREATININE: CPT

## 2021-09-07 RX ADMIN — IOPAMIDOL 85 ML: 755 INJECTION, SOLUTION INTRAVENOUS at 09:38

## 2021-09-13 ENCOUNTER — OFFICE VISIT (OUTPATIENT)
Dept: CARDIOTHORACIC SURGERY | Age: 86
End: 2021-09-13
Payer: MEDICARE

## 2021-09-13 VITALS
HEIGHT: 65 IN | DIASTOLIC BLOOD PRESSURE: 65 MMHG | SYSTOLIC BLOOD PRESSURE: 111 MMHG | HEART RATE: 63 BPM | BODY MASS INDEX: 24.49 KG/M2 | WEIGHT: 147 LBS

## 2021-09-13 DIAGNOSIS — I71.40 ABDOMINAL AORTIC ANEURYSM (AAA) WITHOUT RUPTURE: Primary | ICD-10-CM

## 2021-09-13 PROCEDURE — 1090F PRES/ABSN URINE INCON ASSESS: CPT | Performed by: THORACIC SURGERY (CARDIOTHORACIC VASCULAR SURGERY)

## 2021-09-13 PROCEDURE — 4004F PT TOBACCO SCREEN RCVD TLK: CPT | Performed by: THORACIC SURGERY (CARDIOTHORACIC VASCULAR SURGERY)

## 2021-09-13 PROCEDURE — G8420 CALC BMI NORM PARAMETERS: HCPCS | Performed by: THORACIC SURGERY (CARDIOTHORACIC VASCULAR SURGERY)

## 2021-09-13 PROCEDURE — G8400 PT W/DXA NO RESULTS DOC: HCPCS | Performed by: THORACIC SURGERY (CARDIOTHORACIC VASCULAR SURGERY)

## 2021-09-13 PROCEDURE — G8427 DOCREV CUR MEDS BY ELIG CLIN: HCPCS | Performed by: THORACIC SURGERY (CARDIOTHORACIC VASCULAR SURGERY)

## 2021-09-13 PROCEDURE — 1123F ACP DISCUSS/DSCN MKR DOCD: CPT | Performed by: THORACIC SURGERY (CARDIOTHORACIC VASCULAR SURGERY)

## 2021-09-13 PROCEDURE — 4040F PNEUMOC VAC/ADMIN/RCVD: CPT | Performed by: THORACIC SURGERY (CARDIOTHORACIC VASCULAR SURGERY)

## 2021-09-13 PROCEDURE — 99213 OFFICE O/P EST LOW 20 MIN: CPT | Performed by: THORACIC SURGERY (CARDIOTHORACIC VASCULAR SURGERY)

## 2021-09-13 NOTE — PROGRESS NOTES
CT/CV Surgery Follow Up Office Visit      Patient's Name/Date of Birth: Saeid Miguel / 1935 (29 y.o.)    PCP: Santos Howe    Date: September 13, 2021    We had the pleasure of seeing Saeid Miguel in the office today, as you know this is a very pleasant 82y.o. year old female with a history of hypertension, peripheral artery disease, status post failed a right fem-pop bypass grafting, and status post EVAR 2/15/2019. She returned to cardiovascular surgery clinic for follow-up. She had a follow-up CTA of abdomen pelvis on 9/7/2021. The study shows no evidence of endoleak. She denied any abdominal pain or back pain. She also denied any right leg pain at rest.    PastMedical History:  Maralee Litten  has a past medical history of A-fib ([address] AAA (abdominal aortic aneurysm) ([address] Achalasia, Anemia, Arthritis, Blood circulation, collateral, BPPV (benign paroxysmal positional vertigo), CHF (congestive heart failure) ([address] Chronic kidney disease, Colon cancer ([address] Gastrointestinal hemorrhage, GERD (gastroesophageal reflux disease), Hiatal hernia, History of blood transfusion, HTN (hypertension), Hx of blood clots, Hyperlipidemia, Medtronic dual ICD , Neuromuscular disorder ([address] Obesity, Osteopenia, Osteoporosis, PAC (premature atrial contraction), Paralysis ([address] Pedal edema, Pneumonia, PVC (premature ventricular contraction), PVD (peripheral vascular disease) ([address] Small bowel obstruction ([address] Tinnitus, and UTI (urinary tract infection). Past Surgical History:  The patient  has a past surgical history that includes Cholecystectomy; Breast surgery (Right, 1958); Hysterectomy; Appendectomy; Cosmetic surgery; eye surgery; pr egd transoral biopsy single/multiple (Left, 11/27/2017); Upper gastrointestinal endoscopy (N/A, 11/27/2017);  Colonoscopy (08/06/2018); laryngoscopy (07/15/2016); pr lap,surg,colectomy, partial, w/anast (N/A, 8/20/2018); pr office/outpt visit,procedure only (N/A, 9/5/2018); pr preet skn sub grft t/a/l area/<100scm /<1st 25 scm (N/A, 9/6/2018); THROMBECTOMY / EMBOLECTOMY FEMORAL (Right, 2/14/2019); AAA repair, endovascular (N/A, 2/15/2019); Upper gastrointestinal endoscopy (N/A, 2/22/2019); Colonoscopy (N/A, 2/22/2019); Colonoscopy (N/A, 2/22/2020); hemicolectomy (N/A, 2/25/2020); other surgical history (10/06/2020); other surgical history (10/08/2020); and Colonoscopy (Left, 3/2/2021). Allergies: The patient is allergic to aspirin, lasix [furosemide], lipitor [atorvastatin], neurontin [gabapentin], oxycontin [oxycodone hcl], and penicillins.     Medications:    Current Outpatient Medications:     pravastatin (PRAVACHOL) 40 MG tablet, TAKE 1 TABLET BY MOUTH DAILY, Disp: 90 tablet, Rfl: 1    magnesium (MAGNESIUM-OXIDE) 250 MG TABS tablet, TAKE 2 TABLETS BY MOUTH TWICE DAILY, Disp: 120 tablet, Rfl: 4    potassium chloride (KLOR-CON M) 10 MEQ extended release tablet, Take 1 tablet by mouth daily, Disp: 90 tablet, Rfl: 4    clopidogrel (PLAVIX) 75 MG tablet, Take 1 tablet by mouth daily, Disp: 90 tablet, Rfl: 3    apixaban (ELIQUIS) 2.5 MG TABS tablet, TAKE 1 TABLET BY MOUTH TWICE DAILY, Disp: 60 tablet, Rfl: 12    bumetanide (BUMEX) 1 MG tablet, Take 1 tablet by mouth once a week On Mondays and PRN, Disp: 30 tablet, Rfl: 3    losartan (COZAAR) 25 MG tablet, TAKE 1 TABLET BY MOUTH DAILY, Disp: 30 tablet, Rfl: 5    glycopyrrolate-formoterol (BEVESPI) 9-4.8 MCG/ACT AERO, Inhale 2 puffs into the lungs 2 times daily, Disp: 1 Inhaler, Rfl: 11    metoprolol succinate (TOPROL XL) 25 MG extended release tablet, Take 1 tablet by mouth daily, Disp: 90 tablet, Rfl: 3    pantoprazole (PROTONIX) 40 MG tablet, Take 40 mg by mouth 2 times daily (before meals), Disp: , Rfl:     albuterol sulfate HFA (PROVENTIL HFA) 108 (90 Base) MCG/ACT inhaler, Inhale 2 puffs into the lungs every 6 hours as needed for Wheezing or Shortness of Breath, Disp: 1 Inhaler, Rfl: 0    Blood Pressure KIT, Check blood pressure daily, and if symptoms of lightheadedness. Call physician if BP <80/40., Disp: 1 kit, Rfl: 0    melatonin 3 MG TABS tablet, Take 2 tablets by mouth nightly as needed (sleep), Disp: 60 tablet, Rfl: 3    acetaminophen (TYLENOL ARTHRITIS PAIN) 650 MG CR tablet, Take 1,300 mg by mouth every 12 hours as needed for Pain , Disp: , Rfl:     timolol (TIMOPTIC) 0.5 % ophthalmic solution, Place 1 drop into both eyes nightly , Disp: , Rfl:     Family History: This patient's family history includes Cancer in her father; Dementia in her maternal grandmother; Emphysema in her father; Heart Disease in her maternal grandfather, maternal grandmother, and mother; No Known Problems in her paternal grandfather and paternal grandmother; Other in her sister; Stroke in her mother. Social History:  [facility]  reports that she has been smoking cigarettes. She started smoking about 65 years ago. She has a 15.00 pack-year smoking history. She has never used smokeless tobacco. She reports current alcohol use of about 1.0 standard drinks of alcohol per week. She reports that she does not use drugs. Vital Signs:   /65 (Site: Left Upper Arm, Position: Sitting, Cuff Size: Medium Adult)   Pulse 63   Ht 5' 5\" (1.651 m)   Wt 147 lb (66.7 kg)   BMI 24.46 kg/m²     ROS:   Constitutional: Negative for activity change, chills, fatigue, fever and unexpected weight change. Respiratory: Negative for sleep apnea, shortness of breath, wheezing, stridor, supplementary home oxygen. Cardiac: Negative for midsternal chest pain, arrhythmia, shortness of breath,  Vascular: Negative for claudication, leg  calf muscle pain, hip pain. Gastrointestinal: Negative for hematochezia, melana, constipation, and N/V/D. Musculoskeletal: Negative for myalgias, amputation. Skin: Negative for color change, rash and wound. Neurological: Negative for dizziness or syncope.   Nephrology: Negative for chronic kidney disease,     Physical Exam:  General appearance:  No acute distress, appears stated age and cooperative. Neck: No jugular venous distention. Trachea midline. No carotid bruits. Chest Wall: No deformity, midsternal scar, enlarged palpable supraclavicular lymphnode. Respiratory:  Normal respiratory effort. Clear to auscultation, bilaterally without Rales/Wheezes/Rhonch. Cardiovascular:  Regular rate and rhythm with normal S1/S2 without murmurs, rubs or gallops. Abdomen: Soft, non-tender, non-distended with normal bowel sounds. Well-healed lower midline incision scar. Ext: No clubbing, cyanosis or edema bilaterally. No chronic ischemic changes, No varicorsity in lower leg. Skin: Skin color, texture, turgor normal.  No rashes or lesions. No rubor. No venous stasis pigmentation. Neurologic:  Neurovascularly intact without any focal sensory/motor deficits. Peripheral Pulses: 1+ palpable femoral pulses bilaterally. Labs:    CBC:  Lab Results   Component Value Date    WBC 5.7 03/07/2021    HGB 9.7 03/07/2021    HCT 31.6 03/07/2021    MCV 91.3 03/07/2021     03/07/2021    INR 1.16 03/04/2021     BMP:   Lab Results   Component Value Date     07/05/2021     07/05/2021    K 4.3 07/05/2021    K 4.3 07/05/2021    K 3.6 03/04/2021     07/05/2021     07/05/2021    CO2 32 07/05/2021    CO2 32 07/05/2021    PHOS 2.3 10/05/2020    BUN 24 07/05/2021    BUN 24 07/05/2021    CREATININE 1.21 07/05/2021    CREATININE 1.20 07/05/2021    MG 1.7 03/07/2021       Imaging: I have reviewed CT a of abdomen pelvis.       Problem List:  Patient Active Problem List   Diagnosis    Orthostatic dizziness    Vertebrobasilar artery insufficiency    Acute pain of left ear    Tinnitus    BPPV (benign paroxysmal positional vertigo)    Gait instability    PAC (premature atrial contraction)    Pedal edema    Stricture of colon determined by end[facility]    Barium enema abnormal    Diverticulosis of large intestine without hemorrhage    Colon stricture (HCC)    Colonic mass    S/P laparoscopic colectomy    Lower limb ischemia    Fall on same level from slipping, tripping, or stumbling    ALEJANDRO (acute kidney injury) ([address]    Leukocytosis    Gastroesophageal reflux disease without esophagitis    Other hyperlipidemia    Osteoarthritis of multiple joints    Diverticulosis of intestine without bleeding    Septic shock (HCC)    SVT (supraventricular tachycardia) (HCC)    Hyperglycemia    Metabolic acidosis    Hypocalcemia    Chronic hypotension    Anemia    Urinary tract infection without hematuria    Paroxysmal atrial fibrillation (HCC)    Colovesical fistula    Physical deconditioning    Abdominal aortic aneurysm (AAA) without rupture (HCC)    Hiatal hernia    Elevated procalcitonin    Hypomagnesemia    Pulmonary nodule    CKD (chronic kidney disease) stage 2, GFR 60-89 ml/min    Stage 3 chronic kidney disease (HCC)    Femoral popliteal artery thrombus (HCC)    PAD (peripheral artery disease) (HCC)    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 9)    Respiratory failure (HCC)    Respiratory distress    Acute on chronic congestive heart failure (HCC)    Acute on chronic combined systolic and diastolic CHF (congestive heart failure) (HCC)    Dilated cardiomyopathy (HCC)    Moderate malnutrition (HCC)    Bright red blood per rectum    Antiplatelet or antithrombotic long-term use    Acute blood loss anemia    Chronic combined systolic and diastolic congestive heart failure (HCC)    GIB (gastrointestinal bleeding)    CHF (congestive heart failure) ([address]    Medtronic dual ICD  SBO (small bowel obstruction) (HCC)    Abdominal pain    Hyperkalemia    Esophageal dysmotility    COPD without exacerbation (HCC)    Essential hypertension    Achalasia    Pyuria    NSVT (nonsustained ventricular tachycardia) (HCC)    Hypophosphatemia    Renal infarction (HCC)    Chronic lower GI bleeding    Acute kidney injury (ALEJANDRO) with acute tubular necrosis (ATN) (HCC)    Orthostatic hypotension    Hypotension due to hypovolemia    Ischemia of right lower extremity    Acute postoperative respiratory insufficiency    Aspiration pneumonia of left lower lobe due to gastric secretions (HCC)       Assessment: Status post EVAR. No endoleak. Plan 9/13/21:  1) follow-up in 1 year with CTA of abdomen and pelvis. Thank you for allowing us to be involved in the patient's care.     Electronically by Sajan Kovacs MD  on 9/13/2021 at 12:29 PM

## 2021-09-14 ENCOUNTER — PROCEDURE VISIT (OUTPATIENT)
Dept: CARDIOLOGY CLINIC | Age: 86
End: 2021-09-14
Payer: MEDICARE

## 2021-09-14 DIAGNOSIS — I50.42 CHF (CONGESTIVE HEART FAILURE), NYHA CLASS II, CHRONIC, COMBINED (HCC): Primary | ICD-10-CM

## 2021-09-14 PROCEDURE — 93297 REM INTERROG DEV EVAL ICPMS: CPT | Performed by: INTERNAL MEDICINE

## 2021-09-14 PROCEDURE — G2066 INTER DEVC REMOTE 30D: HCPCS | Performed by: INTERNAL MEDICINE

## 2021-09-15 RX ORDER — LOSARTAN POTASSIUM 25 MG/1
25 TABLET ORAL DAILY
Qty: 30 TABLET | Refills: 2 | Status: SHIPPED | OUTPATIENT
Start: 2021-09-15 | End: 2021-09-15

## 2021-09-15 RX ORDER — LOSARTAN POTASSIUM 25 MG/1
TABLET ORAL
Qty: 90 TABLET | Refills: 0 | Status: SHIPPED | OUTPATIENT
Start: 2021-09-15 | End: 2021-09-16 | Stop reason: SDUPTHER

## 2021-09-16 RX ORDER — LOSARTAN POTASSIUM 25 MG/1
TABLET ORAL
Qty: 90 TABLET | Refills: 0 | Status: SHIPPED | OUTPATIENT
Start: 2021-09-16 | End: 2021-09-22 | Stop reason: ALTCHOICE

## 2021-09-17 NOTE — PROGRESS NOTES
Heart Failure Clinic       Visit Date: 9/20/2021  Cardiologist:  Dr. Anil Santillan  Primary Care Physician: Dr. Kenny Zhang is a 82 y.o. female who presents today for:  Chief Complaint   Patient presents with    Congestive Heart Failure       HPI:   Sonny Crowe is a 82 y.o. female who presents to the office for a follow up patient visit in the heart failure clinic. Last seen by [facility] on 4/27, increased bumex to once weekly. Accompanied by self    TYPE HF: HFrEF (25-30% - 6/2020) (10-15% - 2019)   Cause: nonischemic cardiomyopathy  Device: ICD   HX: COPD, HTN, Afib, PAD s/p R LE stent, on and off smoker     Dry Wt:  148    Hospitalization:  > 6 months    Concerns today: no HF concerns today. Trying to quit smoking    Activity: walker for longer distance, PARK  Diet: does not watch sodium \"eats what she can eat\" restricted diet d/t esophageal dysmotility, hiatal hernia, esophageal diverticulum. Does not watch fluid intake    Patient has:  Chest Pain: no  SOB: chronic PARK  Orthopnea/PND: wedge used  (for many reasons) KORINA: no  Edema: no  Fatigue: no  Abdominal bloating: no  Cough: no  Appetite: no change  Home weight: check sometimes  Home blood pressure: sometimes    Past Medical History:   Diagnosis Date    A-fib ([facility]     AAA (abdominal aortic aneurysm) (HCC)     Achalasia     Anemia     Arthritis     Blood circulation, collateral     BPPV (benign paroxysmal positional vertigo) 6/6/16    CHF (congestive heart failure) (HCC)     Chronic kidney disease     sees Dr Mike Sung Colon [facility])     Gastrointestinal hemorrhage     GERD (gastroesophageal reflux disease)     ? esophageal stricture    Hiatal hernia     History of blood transfusion     HTN (hypertension)     Hx of blood clots 2018    R leg-sees ABEBA Hargrove    Hyperlipidemia     Medtronic dual ICD  8/26/2020    Neuromuscular disorder ([facility]     Obesity     Osteopenia     Osteoporosis     PAC (premature atrial contraction)     on betablocker    Paralysis (HCC)     baby    Pedal edema     denied any hx of hypertension    Pneumonia     PVC (premature ventricular contraction)     sees Dr Sanjay Vuong PVD (peripheral vascular disease) ([address]     Small bowel obstruction ([address]     Tinnitus     UTI (urinary tract infection)      Past Surgical History:   Procedure Laterality Date    ABDOMINAL AORTIC ANEURYSM REPAIR, ENDOVASCULAR N/A 2/15/2019    ABDOMINAL AORTIC ANEURYSM REPAIR ENDOVASCULAR, DECLOTTING RIGHT FEM TIB BYPASS GRAFT performed by Rick Patino MD at [address]    lumpectomy    CHOLECYSTECTOMY      30 years ago    COLONOSCOPY  08/06/2018    Dr David Portillo 2/22/2019    COLONOSCOPY DIAGNOSTIC performed by Nhung Schmitt MD at [facility]    COLONOSCOPY N/A 2/22/2020    COLONOSCOPY CONTROL HEMORRHAGE performed by Terrell Rodriguez MD at [facility]    COLONOSCOPY Left 3/2/2021    COLORECTAL CANCER SCREENING, NOT HIGH RISK performed by Pedro Nash MD at [id number] [facility]      eye, eyelids    EYE SURGERY      cataract    HEMICOLECTOMY N/A 2/25/2020    OPEN RIGHT COLON RESECTION performed by Bonita Baumann MD at [address] [facility]  07/15/2016    OTHER SURGICAL HISTORY  10/06/2020    Exp lap washout mesenteric lymph node biopsy EGD abthera placement Dr Chip Rosas  10/08/2020    reopening recent lap open g tube placement intra operative EGD and primary closure of open abdomen- [address] OLEGARIO SKN SUB GRFT T/A/L AREA/<100SCM /<1ST 25 SCM N/A 9/6/2018    RE-EXPLORATION RIGHT GROIN, DECLOTTING OF FEMORAL BYPASS GRAFT performed by rSi Ignacio MD at [address] [facility] EGD TRANSORAL BIOPSY SINGLE/MULTIPLE Left 11/27/2017    EGD BIOPSY performed by Karey Felix MD at [address], PARTIAL, Arron Jensen N/A chloride (KLOR-CON M) 10 MEQ extended release tablet Take 1 tablet by mouth daily 90 tablet 4    clopidogrel (PLAVIX) 75 MG tablet Take 1 tablet by mouth daily 90 tablet 3    apixaban (ELIQUIS) 2.5 MG TABS tablet TAKE 1 TABLET BY MOUTH TWICE DAILY 60 tablet 12    bumetanide (BUMEX) 1 MG tablet Take 1 tablet by mouth once a week On Mondays and PRN 30 tablet 3    glycopyrrolate-formoterol (BEVESPI) 9-4.8 MCG/ACT AERO Inhale 2 puffs into the lungs 2 times daily 1 Inhaler 11    metoprolol succinate (TOPROL XL) 25 MG extended release tablet Take 1 tablet by mouth daily 90 tablet 3    pantoprazole (PROTONIX) 40 MG tablet Take 40 mg by mouth 2 times daily (before meals)      albuterol sulfate HFA (PROVENTIL HFA) 108 (90 Base) MCG/ACT inhaler Inhale 2 puffs into the lungs every 6 hours as needed for Wheezing or Shortness of Breath 1 Inhaler 0    Blood Pressure KIT Check blood pressure daily, and if symptoms of lightheadedness. Call physician if BP <80/40. 1 kit 0    melatonin 3 MG TABS tablet Take 2 tablets by mouth nightly as needed (sleep) 60 tablet 3    acetaminophen (TYLENOL ARTHRITIS PAIN) 650 MG CR tablet Take 1,300 mg by mouth every 12 hours as needed for Pain       timolol (TIMOPTIC) 0.5 % ophthalmic solution Place 1 drop into both eyes nightly        No current facility-administered medications for this visit. Allergies   Allergen Reactions    Aspirin Other (See Comments)     Unknown reaction, stated Dr. João Chin didn't want her to take it anymore    Lasix [Furosemide] Other (See Comments)     PATIENT DOESN'T REMEMBER    Lipitor [Atorvastatin] Other (See Comments)     LEG PAIN    Neurontin [Gabapentin] Other (See Comments)     PATIENT DOESN'T REMEMBER    Oxycontin [Oxycodone Hcl]      confusion    Penicillins Other (See Comments)     HYPERTENSION       SUBJECTIVE:   Review of Systems   Constitutional: Negative for activity change, appetite change and fatigue.    Respiratory: Positive for shortness of breath. Negative for wheezing. Cough:     Cardiovascular: Negative for chest pain, palpitations and leg swelling. Gastrointestinal: Negative for abdominal distention and abdominal pain. Musculoskeletal: Negative for gait problem. Neurological: Negative for weakness, light-headedness and headaches. Psychiatric/Behavioral: Negative for sleep disturbance. OBJECTIVE:   Today's Vitals:  /60   Pulse 58   Ht 5' 5\" (1.651 m)   Wt 148 lb 3.2 oz (67.2 kg)   SpO2 94%   BMI 24.66 kg/m²     Physical Exam  Vitals reviewed. Constitutional:       General: She is not in acute distress. Appearance: Normal appearance. She is well-developed. She is not diaphoretic. HENT:      Head: Normocephalic and atraumatic. Eyes:      Conjunctiva/sclera: Conjunctivae normal.   Cardiovascular:      Rate and Rhythm: Normal rate. Rhythm irregular. Heart sounds: Normal heart sounds. No murmur heard. Pulmonary:      Effort: Pulmonary effort is normal. No respiratory distress. Breath sounds: Normal breath sounds. No wheezing, rhonchi or rales. Abdominal:      General: Bowel sounds are normal. There is no distension. Palpations: Abdomen is soft. Tenderness: There is no abdominal tenderness. Musculoskeletal:         General: Normal range of motion. Cervical back: Normal range of motion and neck supple. Right lower leg: Edema (treace) present. Left lower leg: Edema (trace) present. Skin:     General: Skin is warm and dry. Capillary Refill: Capillary refill takes less than 2 seconds. Neurological:      Mental Status: She is alert and oriented to person, place, and time.       Coordination: Coordination normal.   Psychiatric:         Behavior: Behavior normal.         Wt Readings from Last 3 Encounters:   09/20/21 148 lb 3.2 oz (67.2 kg)   09/13/21 147 lb (66.7 kg)   04/27/21 144 lb (65.3 kg)     BP Readings from Last 3 Encounters:   09/20/21 108/60   09/13/21 111/65 04/27/21 116/70     Pulse Readings from Last 3 Encounters:   09/20/21 58   09/13/21 63   04/27/21 60     Body mass index is 24.66 kg/m². ECHO:   ECHO:   Conclusions      Summary   Limited examination.   Normal left ventricular size and severely reduced systolic function.   There was severe global hypokinesis.   Hypertrophic basal septum.   Ejection fraction was estimated at 25-30%.   Left atrial size was severely dilated.   The mitral valve structure demonstrated posterior leaflet calcification.   DOPPLER: The transmitral velocity was within the normal range with no   evidence for mitral stenosis. There was trace mitral regurgitation.    IVC size is within normal limits with normal respiratory phasic changes.      Signature      ----------------------------------------------------------------   Electronically signed by Jenny Cerda MD (Interpreting   [id number]) on 06/26/2020 at 02:29 PM   ----------------------------------------------------------------    CATH/STRESS:   None documented      Results reviewed:  BNP: No results found for: BNP  CBC:   Lab Results   Component Value Date    WBC 5.7 03/07/2021    RBC 3.46 03/07/2021    HGB 9.7 03/07/2021    HCT 31.6 03/07/2021     03/07/2021     CMP:    Lab Results   Component Value Date     07/05/2021     07/05/2021    K 4.3 07/05/2021    K 4.3 07/05/2021    K 3.6 03/04/2021     07/05/2021     07/05/2021    CO2 32 07/05/2021    CO2 32 07/05/2021    BUN 24 07/05/2021    BUN 24 07/05/2021    CREATININE 1.21 07/05/2021    CREATININE 1.20 07/05/2021    AGRATIO 1.6 07/05/2021    LABGLOM 80 03/07/2021    GLUCOSE 88 07/05/2021    GLUCOSE 98 07/05/2021    CALCIUM 8.80 07/05/2021    CALCIUM 9.10 07/05/2021     Hepatic Function Panel:    Lab Results   Component Value Date    ALKPHOS 91 07/05/2021    ALT 8 07/05/2021    AST 14 07/05/2021    PROT 6.2 07/05/2021    BILITOT 0.4 07/05/2021    BILIDIR <0.2 02/28/2021    LABALBU 3.8 07/05/2021 Magnesium:    Lab Results   Component Value Date    MG 1.7 03/07/2021     PT/INR:    Lab Results   Component Value Date    INR 1.16 03/04/2021     Lipids:    Lab Results   Component Value Date    TRIG 104 07/05/2021    HDL 41 07/05/2021    LDLCALC 47 05/18/2019    LDLDIRECT 49 07/05/2021       ASSESSMENT AND PLAN:   The patient's condition/symptoms are Stable: No clinical evidence of fluid overload today. Continue current medical regimen without changes at present time. Diagnosis Orders   1. Chronic systolic congestive heart failure, NYHA class 2 ([facility])  Basic Metabolic Panel    Magnesium    Brain Natriuretic Peptide   2. PAD (peripheral artery disease) ([facility]     3. Atrial fibrillation, controlled ([facility]       Continue:  GDMT:   ACE/ARB/ARNI - Losartan 25mg daily              BB - Toprol 25mg daily (ordering Entresto to see if it is covered)              Diuretic - Bumex 1mg weekly  AA - none  SGLT2 -  none  Vasodilator - none  Other - Magnesium, Potassium 10 daily, Eliquis (afib), plavix (stent)    Tolerating above noted HF meds, no ill side effects noted. Will continue to monitor kidney function and electrolytes. Will optimize as tolerated. Pt is compliant w/ medications. Total visit time of 30 minutes has been spent with patient on education of symptoms, management, medication, and plan of care; as well as review of chart: labs, ECHO, radiology reports, etc.   I personally spent more then 50% of the appt time face to face with the patient. · Daily weights  · Fluid restriction of 2 Liters per day  · Limit sodium in diet to around 3300-6418 mg/day  · Monitor BP  · Activity as tolerated       Stable, appears Euvolemic  Lab reviewed - stable  Repeat blood work in 9 months  BP/HR stable   No med changes today (discussed changing Bumex based on symptoms and weight gain, please call with any.) Ordering Entresto to see if covered. Suggesting daily wts.   F/U w/ Cardiology  F/U in clinic in 9 months    Patient was instructed to call the [facility] for any changes in symptoms as noted in AVS.      Return in about 9 months (around 6/20/2022). or sooner if needed     Patient given educational materials - see patient instructions. We discussed the importance of weighing oneself and recording daily. We also discussed the importance of a low sodium diet, higher sodium foods to avoid and better low sodium food options. Patient verbalizes understanding of plan of care using teach back method, and is agreeable to the treatment plan.        Electronically signed by ROMAN Alvarez CNP on 9/20/2021 at 10:28 AM

## 2021-09-20 ENCOUNTER — OFFICE VISIT (OUTPATIENT)
Dept: CARDIOLOGY CLINIC | Age: 86
End: 2021-09-20
Payer: MEDICARE

## 2021-09-20 VITALS
WEIGHT: 148.2 LBS | SYSTOLIC BLOOD PRESSURE: 108 MMHG | BODY MASS INDEX: 24.69 KG/M2 | HEIGHT: 65 IN | OXYGEN SATURATION: 94 % | HEART RATE: 58 BPM | DIASTOLIC BLOOD PRESSURE: 60 MMHG

## 2021-09-20 DIAGNOSIS — I50.22 CHRONIC SYSTOLIC CONGESTIVE HEART FAILURE, NYHA CLASS 2 (HCC): Primary | ICD-10-CM

## 2021-09-20 DIAGNOSIS — I73.9 PAD (PERIPHERAL ARTERY DISEASE) (HCC): ICD-10-CM

## 2021-09-20 DIAGNOSIS — I48.91 ATRIAL FIBRILLATION, CONTROLLED (HCC): ICD-10-CM

## 2021-09-20 PROCEDURE — 99214 OFFICE O/P EST MOD 30 MIN: CPT | Performed by: NURSE PRACTITIONER

## 2021-09-20 ASSESSMENT — ENCOUNTER SYMPTOMS
SHORTNESS OF BREATH: 1
ABDOMINAL PAIN: 0
ABDOMINAL DISTENTION: 0
WHEEZING: 0

## 2021-09-20 NOTE — PATIENT INSTRUCTIONS
You may receive a survey regarding the care you received during your visit. Your input is valuable to us. We encourage you to complete and return your survey. We hope you will choose us in the future for your healthcare needs. Continue:  · Continue current medications  · Daily weights and record  · Fluid restriction of 2 Liters per day  · Limit sodium in diet to around 5383-0774 mg/day  · Monitor BP  · Activity as tolerated     Call the Heart Failure Clinic for any of the following symptoms: [phone]   Weight gain of 2-3 pounds in 1 day or 5 pounds in 1 week   Increased shortness of breath   Shortness of breath while laying down   Cough   Chest pain   Swelling in feet, ankles or legs   Tenderness or bloating in the abdomen   Fatigue    Decreased appetite or nausea    Confusion       Stable, appears Euvolemic  Lab reviewed - stable  Repeat blood work in 9 months  BP/HR stable   No med changes today (discussed changing Bumex based on symptoms and weight gain, please call with any.)   Suggesting daily wts.   F/U w/ Cardiology  F/U in clinic in 9 months

## 2021-09-22 ENCOUNTER — TELEPHONE (OUTPATIENT)
Dept: CARDIOLOGY CLINIC | Age: 86
End: 2021-09-22

## 2021-09-22 DIAGNOSIS — I50.22 CHRONIC SYSTOLIC CONGESTIVE HEART FAILURE, NYHA CLASS 2 (HCC): Primary | ICD-10-CM

## 2021-09-27 RX ORDER — METOPROLOL SUCCINATE 25 MG/1
25 TABLET, EXTENDED RELEASE ORAL DAILY
Qty: 90 TABLET | Refills: 0 | Status: SHIPPED | OUTPATIENT
Start: 2021-09-27 | End: 2021-10-15

## 2021-09-27 NOTE — FLOWSHEET NOTE
0835 Pt to 4B06 from ER, awake and about to answer questions. Bipap in place. 0930 Daughter on phone and updated. Chrystal Merritt [phone]. Dear PCP Provider: [facility]) has partnered with [facility] to utilize predictive analytics to improve the care management for patients with arthritic knee pain. Utilizing claims data and patient visit features, we have developed an algorithmic risk score to determine which patient's quality of life may be most impacted to their knee pain. The selection criteria for this  program are: 1) risk score greater than .85; 2) existing [address] [place] [address] Floor patient; and 3) seen for knee pain in the past 3 years. Based upon the predictive analytics risk score  program, your patient has a risk score of greater than . 85 (i.e. 85% probability in having their quality of life impacted by their knee pain). To assist with care management of your patient, a navigator will be contacting them to understand their knee pain status and to educate on the [address] Pain Program, including scheduling an appointment with a joint specialist or their PCP. If you feel this patient not appropriate to be contacted given other medical reasons, please reply back to the navigator within 7 days with reason why not to be contacted. Otherwise, the navigator will follow up with you on the details of the patient encounter after the call.  Thank you for your support for this program.

## 2021-10-05 LAB
BUN BLDV-MCNC: 20 MG/DL
CALCIUM SERPL-MCNC: 9.5 MG/DL
CHLORIDE BLD-SCNC: 104 MMOL/L
CO2: 30 MMOL/L
CREAT SERPL-MCNC: 1.25 MG/DL
GFR CALCULATED: 41
GLUCOSE BLD-MCNC: 113 MG/DL
POTASSIUM SERPL-SCNC: 4.4 MMOL/L
SODIUM BLD-SCNC: 142 MMOL/L

## 2021-10-15 RX ORDER — METOPROLOL SUCCINATE 25 MG/1
25 TABLET, EXTENDED RELEASE ORAL DAILY
Qty: 90 TABLET | Refills: 3 | Status: SHIPPED | OUTPATIENT
Start: 2021-10-15

## 2021-10-19 ENCOUNTER — PROCEDURE VISIT (OUTPATIENT)
Dept: CARDIOLOGY CLINIC | Age: 86
End: 2021-10-19
Payer: MEDICARE

## 2021-10-19 DIAGNOSIS — Z95.810 ICD (IMPLANTABLE CARDIOVERTER-DEFIBRILLATOR) IN PLACE: Primary | ICD-10-CM

## 2021-10-19 NOTE — PROGRESS NOTES
carelink medtronic dual icd     9/27/21 7 beats VT   9/26/21  8 beats VT   optivol WNL      . Angelica Murdcok Battery longevity:  9.5 years   Presenting rhythm  AS VS     Atrial impedance 399  RV impedance 513  Shock 59    P wave sensing 2.8  R wave sensing 10.9    12.5 % atrial paced  0 % RV paced     Atrial threshold 0.375 V  at 0.4ms  RV threshold 0.75 V at 0.4ms  Mode switches   84, eliquis all under 1 minute  No strips to verify.   Oversensing on PAC's

## 2021-10-23 PROCEDURE — 93295 DEV INTERROG REMOTE 1/2/MLT: CPT | Performed by: INTERNAL MEDICINE

## 2021-10-23 PROCEDURE — 93296 REM INTERROG EVL PM/IDS: CPT | Performed by: INTERNAL MEDICINE

## 2021-11-23 ENCOUNTER — PROCEDURE VISIT (OUTPATIENT)
Dept: CARDIOLOGY CLINIC | Age: 86
End: 2021-11-23
Payer: MEDICARE

## 2021-11-23 DIAGNOSIS — I50.22 CHRONIC SYSTOLIC CONGESTIVE HEART FAILURE, NYHA CLASS 2 (HCC): Primary | ICD-10-CM

## 2021-11-23 PROCEDURE — 93297 REM INTERROG DEV EVAL ICPMS: CPT | Performed by: INTERNAL MEDICINE

## 2021-11-23 PROCEDURE — G2066 INTER DEVC REMOTE 30D: HCPCS | Performed by: INTERNAL MEDICINE

## 2021-11-23 NOTE — PROGRESS NOTES
Carelink Medtronic Dual ICD Optivol  Pt of Mikelchristos Odor -- out of town    Battery 9.4 years    Optivol WNL

## 2021-12-17 RX ORDER — MULTIVITAMIN WITH IRON
TABLET ORAL
Qty: 120 TABLET | Refills: 4 | Status: SHIPPED | OUTPATIENT
Start: 2021-12-17 | End: 2022-05-16

## 2021-12-28 ENCOUNTER — PROCEDURE VISIT (OUTPATIENT)
Dept: CARDIOLOGY CLINIC | Age: 86
End: 2021-12-28
Payer: MEDICARE

## 2021-12-28 DIAGNOSIS — Z95.810 ICD (IMPLANTABLE CARDIOVERTER-DEFIBRILLATOR) IN PLACE: Primary | ICD-10-CM

## 2021-12-28 PROCEDURE — G2066 INTER DEVC REMOTE 30D: HCPCS | Performed by: NUCLEAR MEDICINE

## 2021-12-28 PROCEDURE — 93297 REM INTERROG DEV EVAL ICPMS: CPT | Performed by: NUCLEAR MEDICINE

## 2021-12-28 NOTE — PROGRESS NOTES
[facility]  Pt of Verg[place] -oot    Battery 9.3 years    Episodes  9& 11 beats VT rate 200-207    Optivol WNL

## 2022-01-13 RX ORDER — LOSARTAN POTASSIUM 25 MG/1
TABLET ORAL
Qty: 90 TABLET | Refills: 0 | Status: SHIPPED | OUTPATIENT
Start: 2022-01-13 | End: 2022-01-26

## 2022-01-13 RX ORDER — PRAVASTATIN SODIUM 40 MG
40 TABLET ORAL DAILY
Qty: 90 TABLET | Refills: 0 | Status: SHIPPED | OUTPATIENT
Start: 2022-01-13 | End: 2022-03-29

## 2022-01-13 RX ORDER — PRAVASTATIN SODIUM 40 MG
40 TABLET ORAL DAILY
Qty: 30 TABLET | Refills: 0 | Status: SHIPPED | OUTPATIENT
Start: 2022-01-13 | End: 2022-01-13

## 2022-01-26 ENCOUNTER — NURSE ONLY (OUTPATIENT)
Dept: CARDIOLOGY CLINIC | Age: 87
End: 2022-01-26
Payer: MEDICARE

## 2022-01-26 ENCOUNTER — OFFICE VISIT (OUTPATIENT)
Dept: CARDIOLOGY CLINIC | Age: 87
End: 2022-01-26
Payer: MEDICARE

## 2022-01-26 VITALS
HEIGHT: 65 IN | WEIGHT: 148 LBS | SYSTOLIC BLOOD PRESSURE: 92 MMHG | DIASTOLIC BLOOD PRESSURE: 59 MMHG | BODY MASS INDEX: 24.66 KG/M2 | HEART RATE: 119 BPM

## 2022-01-26 DIAGNOSIS — Z95.810 ICD (IMPLANTABLE CARDIOVERTER-DEFIBRILLATOR) IN PLACE: Primary | ICD-10-CM

## 2022-01-26 DIAGNOSIS — Z95.810 ICD (IMPLANTABLE CARDIOVERTER-DEFIBRILLATOR) IN PLACE: ICD-10-CM

## 2022-01-26 DIAGNOSIS — I50.22 CHRONIC SYSTOLIC CONGESTIVE HEART FAILURE (HCC): ICD-10-CM

## 2022-01-26 DIAGNOSIS — I48.91 ATRIAL FIBRILLATION, UNSPECIFIED TYPE (HCC): Primary | ICD-10-CM

## 2022-01-26 DIAGNOSIS — I73.9 PAD (PERIPHERAL ARTERY DISEASE) (HCC): ICD-10-CM

## 2022-01-26 PROCEDURE — 4040F PNEUMOC VAC/ADMIN/RCVD: CPT | Performed by: INTERNAL MEDICINE

## 2022-01-26 PROCEDURE — G8484 FLU IMMUNIZE NO ADMIN: HCPCS | Performed by: INTERNAL MEDICINE

## 2022-01-26 PROCEDURE — 4004F PT TOBACCO SCREEN RCVD TLK: CPT | Performed by: INTERNAL MEDICINE

## 2022-01-26 PROCEDURE — 1123F ACP DISCUSS/DSCN MKR DOCD: CPT | Performed by: INTERNAL MEDICINE

## 2022-01-26 PROCEDURE — 99213 OFFICE O/P EST LOW 20 MIN: CPT | Performed by: INTERNAL MEDICINE

## 2022-01-26 PROCEDURE — G8428 CUR MEDS NOT DOCUMENT: HCPCS | Performed by: INTERNAL MEDICINE

## 2022-01-26 PROCEDURE — 93283 PRGRMG EVAL IMPLANTABLE DFB: CPT | Performed by: INTERNAL MEDICINE

## 2022-01-26 PROCEDURE — 1090F PRES/ABSN URINE INCON ASSESS: CPT | Performed by: INTERNAL MEDICINE

## 2022-01-26 PROCEDURE — G8420 CALC BMI NORM PARAMETERS: HCPCS | Performed by: INTERNAL MEDICINE

## 2022-01-26 PROCEDURE — G8400 PT W/DXA NO RESULTS DOC: HCPCS | Performed by: INTERNAL MEDICINE

## 2022-01-26 NOTE — PROGRESS NOTES
[address] [facility] [address]  [place] [address]  Dept: [phone]  Dept Fax: [phone]  Loc: [phone]    Visit Date: 1/26/2022    Ms. Kavya Trotter is a 82 y.o. female  who presented for:  Chief Complaint   Patient presents with   Lucia Sax    Atrial Fibrillation    Congestive Heart Failure       HPI:   HPI   81 yo F HTN, PVD, PAD and R LE stent placement, left heart catheterization on 12/30/2019 for nonobstructive coronary artery disease who presents for follow-up. Hx of GI bleed, chronic Afib. Does not want WATCHMAN. No leg pain or discomfort. Chronic sob, no new issues. BP 90s. No chest pain, angina, orthopnea, PND, sob at rest, palpitations, LE edema, or syncope. ICD not firing. No recent bleeds. EF 25-30%, 6/2020. No side effects from meds.          Current Outpatient Medications:     pravastatin (PRAVACHOL) 40 MG tablet, TAKE 1 TABLET BY MOUTH DAILY, Disp: 90 tablet, Rfl: 0    magnesium (MAGNESIUM-OXIDE) 250 MG TABS tablet, TAKE 2 TABLETS BY MOUTH TWICE DAILY, Disp: 120 tablet, Rfl: 4    metoprolol succinate (TOPROL XL) 25 MG extended release tablet, TAKE 1 TABLET BY MOUTH DAILY, Disp: 90 tablet, Rfl: 3    sacubitril-valsartan (ENTRESTO) 24-26 MG per tablet, Take 1 tablet by mouth 2 times daily, Disp: 180 tablet, Rfl: 3    potassium chloride (KLOR-CON M) 10 MEQ extended release tablet, Take 1 tablet by mouth daily, Disp: 90 tablet, Rfl: 4    clopidogrel (PLAVIX) 75 MG tablet, Take 1 tablet by mouth daily, Disp: 90 tablet, Rfl: 3    apixaban (ELIQUIS) 2.5 MG TABS tablet, TAKE 1 TABLET BY MOUTH TWICE DAILY, Disp: 60 tablet, Rfl: 12    bumetanide (BUMEX) 1 MG tablet, Take 1 tablet by mouth once a week On Mondays and PRN, Disp: 30 tablet, Rfl: 3    glycopyrrolate-formoterol (BEVESPI) 9-4.8 MCG/ACT AERO, Inhale 2 puffs into the lungs 2 times daily, Disp: 1 Inhaler, Rfl: 11    pantoprazole (PROTONIX) 40 MG tablet, Take 40 mg by mouth 2 times daily (before meals), Disp: , Rfl:     albuterol sulfate HFA (PROVENTIL HFA) 108 (90 Base) MCG/ACT inhaler, Inhale 2 puffs into the lungs every 6 hours as needed for Wheezing or Shortness of Breath, Disp: 1 Inhaler, Rfl: 0    Blood Pressure KIT, Check blood pressure daily, and if symptoms of lightheadedness. Call physician if BP <80/40., Disp: 1 kit, Rfl: 0    melatonin 3 MG TABS tablet, Take 2 tablets by mouth nightly as needed (sleep), Disp: 60 tablet, Rfl: 3    acetaminophen (TYLENOL ARTHRITIS PAIN) 650 MG CR tablet, Take 1,300 mg by mouth every 12 hours as needed for Pain , Disp: , Rfl:     timolol (TIMOPTIC) 0.5 % ophthalmic solution, Place 1 drop into both eyes nightly , Disp: , Rfl:     Past Medical History  Alexander Mcqueen  has a past medical history of A-fib ([address] AAA (abdominal aortic aneurysm) ([address] Achalasia, Anemia, Arthritis, Blood circulation, collateral, BPPV (benign paroxysmal positional vertigo), CHF (congestive heart failure) ([address] Chronic kidney disease, Colon cancer ([address] Gastrointestinal hemorrhage, GERD (gastroesophageal reflux disease), Hiatal hernia, History of blood transfusion, HTN (hypertension), Hx of blood clots, Hyperlipidemia, Medtronic dual ICD , Neuromuscular disorder ([address] Obesity, Osteopenia, Osteoporosis, PAC (premature atrial contraction), Paralysis ([address] Pedal edema, Pneumonia, PVC (premature ventricular contraction), PVD (peripheral vascular disease) ([address] Small bowel obstruction ([address] Tinnitus, and UTI (urinary tract infection). Social History  Alexander Mcqueen  reports that she has been smoking cigarettes. She started smoking about 66 years ago. She has a 15.00 pack-year smoking history. She has never used smokeless tobacco. She reports current alcohol use of about 1.0 standard drink of alcohol per week. She reports that she does not use drugs.     Family History  Alexander Mcqueen family history includes Cancer in her father; Dementia in her maternal grandmother; Emphysema in her father; Heart Disease in her maternal grandfather, maternal grandmother, and mother; No Known Problems in her paternal grandfather and paternal grandmother; Other in her sister; Stroke in her mother. There is no family history of bicuspid aortic valve, aneurysms, heart transplant, pacemakers, defibrillators, or sudden cardiac death.       Past Surgical History   Past Surgical History:   Procedure Laterality Date    ABDOMINAL AORTIC ANEURYSM REPAIR, ENDOVASCULAR N/A 2/15/2019    ABDOMINAL AORTIC ANEURYSM REPAIR ENDOVASCULAR, DECLOTTING RIGHT FEM TIB BYPASS GRAFT performed by Char Ring MD at [address] [facility] Right 1958    lumpectomy    CHOLECYSTECTOMY      30 years ago    COLONOSCOPY  08/06/2018    Dr Josie Douglas 2/22/2019    COLONOSCOPY DIAGNOSTIC performed by Jyoti Roberts MD at [facility]    COLONOSCOPY N/A 2/22/2020    COLONOSCOPY CONTROL HEMORRHAGE performed by Prasad Figueroa MD at [facility]    COLONOSCOPY Left 3/2/2021    COLORECTAL CANCER SCREENING, NOT HIGH RISK performed by Aaron Murillo MD at [address] [facility]      eye, eyelids    EYE SURGERY      cataract    HEMICOLECTOMY N/A 2/25/2020    OPEN RIGHT COLON RESECTION performed by Ulysses Mckenzie MD at [address] [facility]  07/15/2016    OTHER SURGICAL HISTORY  10/06/2020    Exp lap washout mesenteric lymph node biopsy EGD abthera placement Dr Kamila Gaona  10/08/2020    reopening recent lap open g tube placement intra operative EGD and primary closure of open abdomen- [address] OLEGARIO SKN SUB GRFT T/A/L AREA/<100SCM /<1ST 25 SCM N/A 9/6/2018    RE-EXPLORATION RIGHT GROIN, DECLOTTING OF FEMORAL BYPASS GRAFT performed by Alicia Clark MD at [address]  [place] EGD TRANSORAL BIOPSY SINGLE/MULTIPLE Left 11/27/2017    EGD BIOPSY performed by Lisette Pradhan MD at [facility]    NC LAP,SURG,COLECTOMY, PARTIAL, W/ANAST N/A 8/20/2018    ROBOT ASSISTED COLECTOMY (LOW ANTERIOR) performed by Alexei Chadwick MD at [address] [facility] OFFICE/OUTPT [address] N/A 9/5/2018    RIGHT FEMORAL EMBOLECTOMY, INTRAOPERATIVE ARTERIOGRAM, RIGHT ILIAC EMBOLECTOMY, RIGHT COMMON AND EXTERNAL ILIAC STENTING, RIGHT FEMORAL TO ANTERIOR POPLITEAL BYPASS WITH 6MM GORTEX GRAFT performed by Hanna Disla MD at [address] Right 2/14/2019    FEMORAL EMBOLECTOMY THROMBECTOMY, RIGHT LEG performed by Hanna Disla MD at [address] 11/27/2017    EGD SUBMUCOSAL/BOTOX INJECTION performed by Emma Ron MD at [facility] N/A 2/22/2019    EGD BIOPSY performed by Eda Collet, MD at [facility]       Review of Systems   Constitutional: Negative for chills and fever  HENT: Negative for congestion, sinus pressure, sneezing and sore throat. Eyes: Negative for pain, discharge, redness and itching. Respiratory: Negative for apnea, cough  Gastrointestinal: Negative for blood in stool, constipation, diarrhea   Endocrine: Negative for cold intolerance, heat intolerance, polydipsia. Genitourinary: Negative for dysuria, enuresis, flank pain and hematuria. Musculoskeletal: Negative for arthralgias, joint swelling and neck pain. Neurological: Negative for numbness and headaches. Psychiatric/Behavioral: Negative for agitation, confusion, decreased concentration and dysphoric mood. Objective:     BP (!) 92/59   Pulse 119   Ht 5' 5\" (1.651 m)   Wt 148 lb (67.1 kg)   BMI 24.63 kg/m²     Wt Readings from Last 3 Encounters:   01/26/22 148 lb (67.1 kg)   09/20/21 148 lb 3.2 oz (67.2 kg)   09/13/21 147 lb (66.7 kg)     BP Readings from Last 3 Encounters:   01/26/22 (!) 92/59   09/20/21 108/60   09/13/21 111/65       Nursing note and vitals reviewed.     Physical Exam Constitutional: Oriented to person, place, and time. Appears well-developed and well-nourished. HENT:   Head: Normocephalic and atraumatic. Eyes: EOM are normal. Pupils are equal, round, and reactive to light. Neck: Normal range of motion. Neck supple. No JVD present. Cardiovascular: Normal rate, regular rhythm, normal heart sounds and intact distal pulses. No murmur heard. Pulmonary/Chest: Effort normal and breath sounds normal. No respiratory distress. No wheezes. No rales. Abdominal: Soft. Bowel sounds are normal. No distension. There is no tenderness. Musculoskeletal: Normal range of motion. No edema. Neurological: Alert and oriented to person, place, and time. No cranial nerve deficit. Coordination normal.   Skin: Skin is warm and dry. Psychiatric: Normal mood and affect.        No results found for: CKTOTAL, CKMB, CKMBINDEX    Lab Results   Component Value Date    WBC 5.7 03/07/2021    RBC 3.46 03/07/2021    HGB 9.7 03/07/2021    HCT 31.6 03/07/2021    MCV 91.3 03/07/2021    MCH 28.0 03/07/2021    MCHC 30.7 03/07/2021    RDW 17.5 05/30/2019     03/07/2021    MPV 12.0 03/07/2021       Lab Results   Component Value Date     10/05/2021    K 4.4 10/05/2021    K 3.6 03/04/2021     10/05/2021    CO2 30 10/05/2021    BUN 20 10/05/2021    LABALBU 3.8 07/05/2021    CREATININE 1.25 10/05/2021    CALCIUM 9.50 10/05/2021    LABGLOM 41 10/05/2021    LABGLOM 80 03/07/2021    GLUCOSE 113 10/05/2021    GLUCOSE 88 07/05/2021    GLUCOSE 98 07/05/2021       Lab Results   Component Value Date    ALKPHOS 91 07/05/2021    ALT 8 07/05/2021    AST 14 07/05/2021    PROT 6.2 07/05/2021    BILITOT 0.4 07/05/2021    BILIDIR <0.2 02/28/2021    LABALBU 3.8 07/05/2021       Lab Results   Component Value Date    MG 1.7 03/07/2021       Lab Results   Component Value Date    INR 1.16 (H) 03/04/2021    INR 1.28 (H) 03/04/2021    INR 1.43 (H) 03/03/2021         Lab Results   Component Value Date    LABA1C 5.5 02/28/2021       Lab Results   Component Value Date    TRIG 104 07/05/2021    HDL 41 07/05/2021    LDLCALC 47 05/18/2019    LDLDIRECT 49 07/05/2021       Lab Results   Component Value Date    TSH 2.160 12/28/2019         Testing Reviewed:      I have individually reviewed the cardiac test below:    ECHO: Results for orders placed during the hospital encounter of 12/28/19    ECHO Complete 2D W Doppler W Color    Narrative  Transthoracic Echocardiography Report (TTE)    Demographics    Patient Name   Ga Saha  Gender               Female  A    MR #           [id number]     Race                     Ethnicity    Account #      [de-identified]     Room Number          8892    Accession      [id number]     Date of Study        12/28/2019  Number    Date of Birth  1935    Referring Physician  Oscar Fulton MD    Age            82 year(s)    Carola Zepeda MD  Physician    Procedure    Type of Study    TTE procedure:ECHOCARDIOGRAM COMPLETE 2D W DOPPLER W COLOR. Procedure Date  Date: 12/28/2019 Start: 10:32 AM    Study Location: Bedside  Technical Quality: Adequate visualization    Indications:Congestive heart failure. Additional Medical History: Former smoker, respiratory failure, PAC's, pedal  edema, peripheral vascular disease, GERD, hyperlipidemia, SVT, anemia,  atrial fibrillation, AAA, chronic kidney disease, pulmonary edema    Patient Status: Routine    Height: 65 inches Weight: 155 pounds BSA: 1.78 m^2 BMI: 25.79 kg/m^2    BP: 107/56 mmHg    Conclusions    Summary  Normal left ventricular wall thickness. Left Ventricular size is Moderately increased . There was severe global hypokinesis of the left ventricle. Ejection fraction is visually estimated in the range of 10% to 15%.   Features were consistent with a pseudonormal left ventricular filling  pattern, with concomitant abnormal relaxation and increased filling  pressure (grade 2 diastolic dysfunction). Structurally normal mitral valve. Mild to Moderate mitral regurgitation is present. Tricuspid valve is structurally normal.  Mild tricuspid regurgitation. Right ventricular systolic pressure measures 35-40mmHg. Ascites Vs pleural effusion noted. The aorta is within normal limits. The IVC is dilated . Estimated right atrial pressure is 10-15mmHg . Signature    ----------------------------------------------------------------  Electronically signed by Brandon Gilbert MD (Interpreting  physician) on 12/28/2019 at 06:31 PM  ----------------------------------------------------------------    Findings    Mitral Valve  Structurally normal mitral valve. Mild to Moderate mitral regurgitation is present. Aortic Valve  The aortic valve was trileaflet with normal thickness and cuspal  separation. DOPPLER: Transaortic velocity was within the normal range with  no evidence of aortic stenosis. There was no evidence of aortic  regurgitation. Tricuspid Valve  Tricuspid valve is structurally normal.  Mild tricuspid regurgitation. Right ventricular systolic pressure measures 35-40mmHg. Pulmonic Valve  The pulmonic valve leaflets exhibited normal thickness, no calcification,  and normal cuspal separation. DOPPLER: The transpulmonic velocity was  within the normal range with no evidence for regurgitation. Left Atrium  Mildly dilated left atrium. Left Ventricle  Normal left ventricular wall thickness. Left Ventricular size is Moderately increased . There was severe global hypokinesis of the left ventricle. Ejection fraction is visually estimated in the range of 10% to 15%. Features were consistent with a pseudonormal left ventricular filling  pattern, with concomitant abnormal relaxation and increased filling  pressure (grade 2 diastolic dysfunction).     Right Atrium  Right atrial size was normal.    Right Ventricle  The right ventricular size was normal with normal systolic function and  wall thickness. Pericardial Effusion  The pericardium was normal in appearance with no evidence of a pericardial  effusion. Pleural Effusion  Ascites Vs pleural effusion noted. Aorta / Great Vessels  The aorta is within normal limits. The IVC is dilated . Estimated right atrial pressure is 10-15mmHg .     M-Mode/2D Measurements & Calculations    LV Diastolic   LV Systolic Dimension: 6  AV Cusp Separation: 1.8 cmLA  Dimension: 6.3 cm                        Dimension: 4.9 cmAO Root  cm             LV Volume Diastolic:      Dimension: 3.3 cmLA Area: 29.3  LV FS:4.8 %    164.6 ml                  cm^2  LV PW          LV Volume Systolic: 705.3  Diastolic: 1.1 ml  cm             LV EDV/LV EDV Index:  Septum         164.6 ml/92 m^2LV ESV/LV  RV Diastolic Dimension: 2.6 cm  Diastolic: 1.1 ESV Index: 604.0 ml/78  cm             m^2                       LA/Aorta: 1.48  EF Calculated: 15.7 %     Ascending Aorta: 3.5 cm  LA volume/Index: 106 ml /60m^2  LV Length: 9 cm  LV Area  Diastolic:  19.8 cm^2  LV Area  Systolic: 51.6  cm^2    Doppler Measurements & Calculations    MV Peak E-Wave: 91.7 AV Peak Velocity: 118 LVOT Peak Velocity: 54.8 cm/s  cm/s                 cm/s                  LVOT Mean Velocity: 43.5 cm/s  MV Peak A-Wave: 81.8 AV Peak Gradient:     LVOT Peak Gradient: 1 mmHgLVOT  cm/s                 5.57 mmHg             Mean Gradient: 1 mmHg  MV E/A Ratio: 1.12   AV Mean Velocity:  MV Peak Gradient:    84.2 cm/s  3.36 mmHg            AV Mean Gradient: 3  mmHg                  TR Velocity:201 cm/s  MV Deceleration      AV VTI: 21.7 cm       TR Gradient:16.16 mmHg  Time: 197 msec                             PV Peak Velocity: 68.1 cm/s  MV P1/2t: 58 msec                          PV Peak Gradient: 1.86 mmHg  MVA by PHT:3.79 cm^2 LVOT VTI: 11.1 cm  IVRT: 88 msec  MV E' Septal  Velocity: 2.7 cm/s  MV A' Septal         AV DVI (VTI): 0.51AV  Velocity: 3.8 cm/s   DVI (Vmax):0.46  MV E' Lateral  Velocity: 7.4 cm/s  MV A' Lateral  Velocity: 4.4 cm/s  E/E' septal: 33.96  E/E' lateral: 12.39  MR Velocity: 434  cm/s    [id number]       Assessment/Plan   Afib on [address]  HTN, PVD, PAD and R LE stent placement, left heart catheterization on 12/30/2019 for nonobstructive coronary artery disease with elevated troponin,  Non-ischemic Cardiomyopathy, NYHA II  Status post ileocolonic resection Feb 2020 with Dr. Sin Mahoney post robotic anterior resection secondary to diverticular stricture 2018 with Dr. Harry Marcelino   Status post endoluminal graft repair of abdominal aortic aneurysm Feb 2019  S/p PEG Tube  S/p EVAR  Peripheral angiogram/intervention was done on 02/14/2019 for acute-on-chronic limb ischemia related to post lower extremity bypass distal anastomotic stenosis with recent graft thrombosis with a successful right fem-tib angioplasty and stenting  No WATCHMAN at this point per patient wishes, no bleeding, tolerating all other meds. No CHF issues, on GDMT, no side effects. Making urine. No Cr issues. Mild hypotension. Consider cutting back if becomes an issue. Discussed diet/exercise/BP/weight loss/health lifestyle choices/lipids; the patient understands the goals and will try to comply.     Disposition:  1 year         Electronically signed by Nilo Lan MD   1/26/2022 at 12:23 PM EST

## 2022-01-26 NOTE — PROGRESS NOTES
DR SNOWDEN PT   MEDTRONIC DUAL ICD CHECK IN OFFICE/ HAS CARELINK    BATTERY 9.2 YRS REMAINING  PRESENTS IN ASVS WITH PACS    ATRIAL IMPEDENCE 399  RV IMPEDENCE 551  RV DEFIB 57    P WAVES 1.1  RV WAVES 12.9  ATRIAL THRESHOLD 0.5 @ 0.4  VENT THRESHOLD 0.75 @ 0.4  AAI<=>DDD   PT HAVING ALOT OF PACS  ATRIAL SENSITIVITY PROGRAMMED FROM 0.15 TO 0.30 FOR SOME OVERSENSED T WAVES   12/20/21 SEVERAL EPISODES OF NS VT

## 2022-03-01 ENCOUNTER — PROCEDURE VISIT (OUTPATIENT)
Dept: CARDIOLOGY CLINIC | Age: 87
End: 2022-03-01
Payer: MEDICARE

## 2022-03-01 DIAGNOSIS — I50.22 CHRONIC SYSTOLIC CONGESTIVE HEART FAILURE (HCC): Primary | ICD-10-CM

## 2022-03-01 PROCEDURE — G2066 INTER DEVC REMOTE 30D: HCPCS | Performed by: INTERNAL MEDICINE

## 2022-03-01 PROCEDURE — 93297 REM INTERROG DEV EVAL ICPMS: CPT | Performed by: INTERNAL MEDICINE

## 2022-03-29 RX ORDER — PRAVASTATIN SODIUM 40 MG
40 TABLET ORAL DAILY
Qty: 90 TABLET | Refills: 3 | Status: SHIPPED | OUTPATIENT
Start: 2022-03-29

## 2022-03-29 RX ORDER — CLOPIDOGREL BISULFATE 75 MG/1
75 TABLET ORAL DAILY
Qty: 90 TABLET | Refills: 3 | Status: SHIPPED | OUTPATIENT
Start: 2022-03-29

## 2022-04-05 ENCOUNTER — PROCEDURE VISIT (OUTPATIENT)
Dept: CARDIOLOGY CLINIC | Age: 87
End: 2022-04-05
Payer: MEDICARE

## 2022-04-05 DIAGNOSIS — I50.22 CHRONIC SYSTOLIC CONGESTIVE HEART FAILURE (HCC): Primary | ICD-10-CM

## 2022-04-05 PROCEDURE — G2066 INTER DEVC REMOTE 30D: HCPCS | Performed by: INTERNAL MEDICINE

## 2022-04-05 PROCEDURE — 93297 REM INTERROG DEV EVAL ICPMS: CPT | Performed by: INTERNAL MEDICINE

## 2022-04-05 NOTE — PROGRESS NOTES
DR SNOWDEN PT / KNOWN AFIB/ ELIQUIS   AFIB BURDEN <0.1%    MEDTRONIC CARELINK OPTIVOL REMOTE   BATTERY 9 YRS REMAINING      3/26/22 11 BTS NS VT     OPTIVOL WNL

## 2022-05-10 ENCOUNTER — PROCEDURE VISIT (OUTPATIENT)
Dept: CARDIOLOGY CLINIC | Age: 87
End: 2022-05-10
Payer: MEDICARE

## 2022-05-10 DIAGNOSIS — Z95.810 ICD (IMPLANTABLE CARDIOVERTER-DEFIBRILLATOR) IN PLACE: Primary | ICD-10-CM

## 2022-05-10 PROCEDURE — 93295 DEV INTERROG REMOTE 1/2/MLT: CPT | Performed by: INTERNAL MEDICINE

## 2022-05-10 PROCEDURE — 93296 REM INTERROG EVL PM/IDS: CPT | Performed by: INTERNAL MEDICINE

## 2022-05-10 NOTE — PROGRESS NOTES
DR SNOWDEN PT / KNOWN PAF/ ELIQUIS   AFIB BURDEN <0.1%      MEDTRONIC CARELINK ICD REMOTE   BATTERY 8.9 YRS REMAINING    P WAVES 1.4  RV WAVES 10.8    ATRIAL IMPEDENCE 437  VENT IMPEDENCE 551  RV 61    ATRIAL THRESHOLD 0.375 @ 0.4  VENT THRESHOLD 0.625 @ 0.4    ATRIAL AMPLITUDE 1.5 @ 0.4  VENT AMPLITUDE 2 @ 0.4    AAI<=>DDD     A PACED 25.5%  V PACED <0.1%    4/15/22 11 BTS NS VT   OPTIVOL WNL

## 2022-05-16 RX ORDER — MULTIVITAMIN WITH IRON
TABLET ORAL
Qty: 120 TABLET | Refills: 7 | Status: SHIPPED | OUTPATIENT
Start: 2022-05-16

## 2022-06-14 ENCOUNTER — PROCEDURE VISIT (OUTPATIENT)
Dept: CARDIOLOGY CLINIC | Age: 87
End: 2022-06-14
Payer: MEDICARE

## 2022-06-14 DIAGNOSIS — I50.22 CHRONIC SYSTOLIC CONGESTIVE HEART FAILURE (HCC): Primary | ICD-10-CM

## 2022-06-14 PROCEDURE — G2066 INTER DEVC REMOTE 30D: HCPCS | Performed by: INTERNAL MEDICINE

## 2022-06-14 PROCEDURE — 93297 REM INTERROG DEV EVAL ICPMS: CPT | Performed by: INTERNAL MEDICINE

## 2022-06-14 NOTE — PROGRESS NOTES
Dr Eric Ward pt  medtronic carelink optivol remote  Battery 8.9 yrs    6/3/22 10 bts ns vt   optivol wnl

## 2022-07-11 ENCOUNTER — TELEPHONE (OUTPATIENT)
Dept: CARDIOLOGY CLINIC | Age: 87
End: 2022-07-11

## 2022-07-11 ENCOUNTER — OFFICE VISIT (OUTPATIENT)
Dept: CARDIOLOGY CLINIC | Age: 87
End: 2022-07-11
Payer: COMMERCIAL

## 2022-07-11 ENCOUNTER — HOSPITAL ENCOUNTER (OUTPATIENT)
Age: 87
Discharge: HOME | End: 2022-07-11
Payer: COMMERCIAL

## 2022-07-11 VITALS
HEART RATE: 56 BPM | SYSTOLIC BLOOD PRESSURE: 98 MMHG | HEIGHT: 65 IN | WEIGHT: 166 LBS | DIASTOLIC BLOOD PRESSURE: 61 MMHG | BODY MASS INDEX: 27.66 KG/M2 | OXYGEN SATURATION: 96 %

## 2022-07-11 DIAGNOSIS — I50.42 CHF (CONGESTIVE HEART FAILURE), NYHA CLASS II, CHRONIC, COMBINED (HCC): ICD-10-CM

## 2022-07-11 DIAGNOSIS — I50.42 CHF (CONGESTIVE HEART FAILURE), NYHA CLASS II, CHRONIC, COMBINED (HCC): Primary | ICD-10-CM

## 2022-07-11 LAB
ANION GAP SERPL CALCULATED.3IONS-SCNC: 10 MEQ/L (ref 8–16)
BUN BLDV-MCNC: 28 MG/DL (ref 7–22)
CALCIUM SERPL-MCNC: 9.8 MG/DL (ref 8.5–10.5)
CHLORIDE BLD-SCNC: 104 MEQ/L (ref 98–111)
CO2: 29 MEQ/L (ref 23–33)
CREAT SERPL-MCNC: 1 MG/DL (ref 0.4–1.2)
GFR SERPL CREATININE-BSD FRML MDRD: 53 ML/MIN/1.73M2
GLUCOSE BLD-MCNC: 96 MG/DL (ref 70–108)
MAGNESIUM: 2.2 MG/DL (ref 1.6–2.4)
POTASSIUM SERPL-SCNC: 5 MEQ/L (ref 3.5–5.2)
PRO-BNP: 598.3 PG/ML (ref 0–1800)
SODIUM BLD-SCNC: 143 MEQ/L (ref 135–145)

## 2022-07-11 PROCEDURE — 1123F ACP DISCUSS/DSCN MKR DOCD: CPT | Performed by: NURSE PRACTITIONER

## 2022-07-11 PROCEDURE — 83735 ASSAY OF MAGNESIUM: CPT

## 2022-07-11 PROCEDURE — 36415 COLL VENOUS BLD VENIPUNCTURE: CPT

## 2022-07-11 PROCEDURE — 80048 BASIC METABOLIC PNL TOTAL CA: CPT

## 2022-07-11 PROCEDURE — 83880 ASSAY OF NATRIURETIC PEPTIDE: CPT

## 2022-07-11 PROCEDURE — 99213 OFFICE O/P EST LOW 20 MIN: CPT | Performed by: NURSE PRACTITIONER

## 2022-07-11 RX ORDER — BUMETANIDE 1 MG/1
1 TABLET ORAL WEEKLY
Qty: 30 TABLET | Refills: 3 | Status: SHIPPED | OUTPATIENT
Start: 2022-07-11 | End: 2022-09-23

## 2022-07-11 RX ORDER — POTASSIUM CHLORIDE 750 MG/1
10 TABLET, EXTENDED RELEASE ORAL WEEKLY
Qty: 52 TABLET | Refills: 0
Start: 2022-07-11 | End: 2022-09-23

## 2022-07-11 ASSESSMENT — ENCOUNTER SYMPTOMS
ABDOMINAL DISTENTION: 0
SHORTNESS OF BREATH: 1
WHEEZING: 0
ABDOMINAL PAIN: 0

## 2022-07-11 NOTE — PATIENT INSTRUCTIONS
You may receive a survey regarding the care you received during your visit. Your input is valuable to us. We encourage you to complete and return your survey. We hope you will choose us in the future for your healthcare needs. Continue:  · Continue current medications  · Daily weights and record  · Fluid restriction of 2 Liters per day  · Limit sodium in diet to around 0224-4961 mg/day  · Monitor BP  · Activity as tolerated     Call the Heart Failure Clinic for any of the following symptoms: [phone]   Weight gain of 2-3 pounds in 1 day or 5 pounds in 1 week   Increased shortness of breath   Shortness of breath while laying down   Cough   Chest pain   Swelling in feet, ankles or legs   Tenderness or bloating in the abdomen   Fatigue    Decreased appetite or nausea    Confusion       Lab reviewed - need new  Cath 2019  Repeat blood work today   BP/HR stable   Start taking Bumex weekly again  Suggesting daily wts.   F/U w/ Cardiology  F/U in clinic in 4 months

## 2022-07-11 NOTE — PROGRESS NOTES
Heart Failure Clinic       Visit Date: 7/11/2022  Cardiologist:  Dr. Sandoval [facility]  Primary Care Physician: Dr. Paul Leslie is a 82 y.o. female who presents today for:  Chief Complaint   Patient presents with    Follow-up    Congestive Heart Failure       HPI:   Merlinda Feeling is a 82 y.o. female who presents to the office for a follow up patient visit in the heart failure clinic. Accompanied by self    TYPE HF: HFrEF (25-30% - 6/2020) (10-15% - 2019)   Cause: nonischemic cardiomyopathy  Device: ICD   HX: COPD, HTN, Afib, PAD s/p R LE stent, on and off smoker     Dry Wt:  148 (148 on 7/11/21) (166 on 7/11/22)    Hospitalization:  > 6 months    Concerns today: not eating much d/t difficulty swallowing, has had multiple endoscopies. Has gained 20lbs since last visit, stopped Bumex on her own d/t frequent urination. Visit on 9/20: no HF concerns today. Trying to quit smoking    Activity: walker for longer distance, PARK  Diet: does not watch sodium \"eats what she can eat\" restricted diet d/t esophageal dysmotility, hiatal hernia, esophageal diverticulum. Does not watch fluid intake    Patient has:  Chest Pain: no  SOB: chronic PARK  Orthopnea/PND: wedge used  (for many reasons) KORINA: no  Edema: no  Fatigue: no  Abdominal bloating: no  Cough: no  Appetite: no change  Home weight: check sometimes  Home blood pressure: sometimes    Past Medical History:   Diagnosis Date    A-fib ([address]     AAA (abdominal aortic aneurysm) (HCC)     Achalasia     Anemia     Arthritis     Blood circulation, collateral     BPPV (benign paroxysmal positional vertigo) 6/6/16    CHF (congestive heart failure) (HCC)     Chronic kidney disease     sees Dr Lian Poe Colon cancer [facility])     Gastrointestinal hemorrhage     GERD (gastroesophageal reflux disease)     ?  esophageal stricture    Hiatal hernia     History of blood transfusion     HTN (hypertension)     Hx of blood clots 2018    R leg-sees [facility]    Hyperlipidemia     Medtronic dual ICD  8/26/2020    Neuromuscular disorder ([address]     Obesity     Osteopenia     Osteoporosis     PAC (premature atrial contraction)     on betablocker    Paralysis (HCC)     baby    Pedal edema     denied any hx of hypertension    Pneumonia     PVC (premature ventricular contraction)     sees Dr Janette Burnett PVD (peripheral vascular disease) ([address]     Small bowel obstruction ([address]     Tinnitus     UTI (urinary tract infection)      Past Surgical History:   Procedure Laterality Date    ABDOMINAL AORTIC ANEURYSM REPAIR, ENDOVASCULAR N/A 2/15/2019    ABDOMINAL AORTIC ANEURYSM REPAIR ENDOVASCULAR, DECLOTTING RIGHT FEM TIB BYPASS GRAFT performed by Leyda Mccann MD at [address]    lumpectomy    CHOLECYSTECTOMY      30 years ago    COLONOSCOPY  08/06/2018    Dr Lai Canas 2/22/2019    COLONOSCOPY DIAGNOSTIC performed by Brock Perez MD at [facility]    COLONOSCOPY N/A 2/22/2020    COLONOSCOPY CONTROL HEMORRHAGE performed by Artis Pelayo MD at [address] [place] [address] COLONOSCOPY Left 3/2/2021    COLORECTAL CANCER SCREENING, NOT HIGH RISK performed by Edwin Max MD at [address] [facility]      eye, eyelids    EYE SURGERY      cataract    HEMICOLECTOMY N/A 2/25/2020    OPEN RIGHT COLON RESECTION performed by Annia High MD at [facility] ([address] [place] [address])      LARYNGOSCOPY  07/15/2016    OTHER SURGICAL HISTORY  10/06/2020    Exp lap washout mesenteric lymph node biopsy EGD abthera placement Dr Jytoi Royal  10/08/2020    reopening recent lap open g tube placement intra operative EGD and primary closure of open abdomen- [address] OLEGARIO SKN SUB GRFT T/A/L AREA/<100SCM /<1ST 25 SCM N/A 9/6/2018    RE-EXPLORATION RIGHT GROIN, DECLOTTING OF FEMORAL BYPASS GRAFT performed by Brandon Obando MD at [facility] EGD TRANSORAL BIOPSY SINGLE/MULTIPLE Left 11/27/2017    EGD BIOPSY performed by Sheeba Coelho MD at [address] [place] [address], PARTIAL, W/ANAST N/A 8/20/2018    ROBOT ASSISTED COLECTOMY (LOW ANTERIOR) performed by Noa Arguelles MD at [facility] OFFICE/OUTPT [address] N/A 9/5/2018    RIGHT FEMORAL EMBOLECTOMY, INTRAOPERATIVE ARTERIOGRAM, RIGHT ILIAC EMBOLECTOMY, RIGHT COMMON AND EXTERNAL ILIAC STENTING, RIGHT FEMORAL TO ANTERIOR POPLITEAL BYPASS WITH 6MM GORTEX GRAFT performed by Wendy Pritchard MD at [address] / [address] Right 2/14/2019    FEMORAL EMBOLECTOMY THROMBECTOMY, RIGHT LEG performed by Wendy Pritchard MD at [address] N/A 11/27/2017    EGD SUBMUCOSAL/BOTOX INJECTION performed by Sheeba Coelho MD at [address] [facility] [address] ENDOSCOPY N/A 2/22/2019    EGD BIOPSY performed by Magan Mullins MD at [facility]     Family History   Problem Relation Age of Onset    Heart Disease Mother     Stroke Mother     Cancer Father         lung    Emphysema Father     Other Sister         leukemia    Heart Disease Maternal Grandmother     Dementia Maternal Grandmother     Heart Disease Maternal Grandfather     No Known Problems Paternal Grandmother     No Known Problems Paternal Grandfather      Social History     Tobacco Use    Smoking status: Current Some Day Smoker     Packs/day: 0.25     Years: 60.00     Pack years: 15.00     Types: Cigarettes     Start date: 1954    Smokeless tobacco: Never Used    Tobacco comment: 4-5 a day   Substance Use Topics    Alcohol use:  Yes     Alcohol/week: 1.0 standard drink     Types: 1 Glasses of wine per week     Comment: social     Current Outpatient Medications   Medication Sig Dispense Refill    sacubitril-valsartan (ENTRESTO) 24-26 MG per tablet Take 1 tablet by mouth 2 times daily 180 tablet 3    bumetanide (BUMEX) 1 MG tablet Take 1 tablet by mouth once a week On Mondays and PRN 30 tablet 3    apixaban (ELIQUIS) 2.5 MG TABS tablet TAKE 1 TABLET BY MOUTH TWICE DAILY 60 tablet 7    magnesium (MAGNESIUM-OXIDE) 250 MG TABS tablet TAKE 2 TABLETS BY MOUTH TWICE DAILY 120 tablet 7    clopidogrel (PLAVIX) 75 MG tablet TAKE 1 TABLET BY MOUTH DAILY 90 tablet 3    pravastatin (PRAVACHOL) 40 MG tablet TAKE 1 TABLET BY MOUTH DAILY 90 tablet 3    metoprolol succinate (TOPROL XL) 25 MG extended release tablet TAKE 1 TABLET BY MOUTH DAILY 90 tablet 3    potassium chloride (KLOR-CON M) 10 MEQ extended release tablet Take 1 tablet by mouth daily 90 tablet 4    pantoprazole (PROTONIX) 40 MG tablet Take 40 mg by mouth 2 times daily (before meals)      albuterol sulfate HFA (PROVENTIL HFA) 108 (90 Base) MCG/ACT inhaler Inhale 2 puffs into the lungs every 6 hours as needed for Wheezing or Shortness of Breath 1 Inhaler 0    Blood Pressure KIT Check blood pressure daily, and if symptoms of lightheadedness. Call physician if BP <80/40. 1 kit 0    melatonin 3 MG TABS tablet Take 2 tablets by mouth nightly as needed (sleep) 60 tablet 3    apixaban (ELIQUIS) 5 MG TABS tablet Take 1 tablet by mouth 2 times daily 60 tablet 1    acetaminophen (TYLENOL ARTHRITIS PAIN) 650 MG CR tablet Take 1,300 mg by mouth every 12 hours as needed for Pain       timolol (TIMOPTIC) 0.5 % ophthalmic solution Place 1 drop into both eyes nightly        No current facility-administered medications for this visit.      Allergies   Allergen Reactions    Aspirin Other (See Comments)     Unknown reaction, stated Dr. Dylan Davison didn't want her to take it anymore    Lasix [Furosemide] Other (See Comments)     PATIENT DOESN'T REMEMBER    Lipitor [Atorvastatin] Other (See Comments)     LEG PAIN    Neurontin [Gabapentin] Other (See Comments)     PATIENT DOESN'T REMEMBER    Oxycontin [Oxycodone Hcl]      confusion    Penicillins Other (See Comments)     HYPERTENSION kg)   09/20/21 148 lb 3.2 oz (67.2 kg)     BP Readings from Last 3 Encounters:   07/11/22 98/61   01/26/22 (!) 92/59   09/20/21 108/60     Pulse Readings from Last 3 Encounters:   07/11/22 56   01/26/22 119   09/20/21 58     Body mass index is 27.62 kg/m². ECHO:   ECHO:   Conclusions      Summary   Limited examination.   Normal left ventricular size and severely reduced systolic function.   There was severe global hypokinesis.   Hypertrophic basal septum.   Ejection fraction was estimated at 25-30%.   Left atrial size was severely dilated.   The mitral valve structure demonstrated posterior leaflet calcification.   DOPPLER: The transmitral velocity was within the normal range with no   evidence for mitral stenosis. There was trace mitral regurgitation.   [place]  size is within normal limits with normal respiratory phasic changes.      Signature      ----------------------------------------------------------------   Electronically signed by Jocelin Goddard MD (Interpreting   [email or web address]) on 06/26/2020 at 02:29 PM   ----------------------------------------------------------------    CATH/STRESS:   None documented      Results reviewed:  BNP: No results found for: BNP  CBC:   Lab Results   Component Value Date/Time    WBC 5.7 03/07/2021 05:24 AM    RBC 3.46 03/07/2021 05:24 AM    HGB 9.7 03/07/2021 05:24 AM    HCT 31.6 03/07/2021 05:24 AM     03/07/2021 05:24 AM     CMP:    Lab Results   Component Value Date/Time     10/05/2021 12:00 AM    K 4.4 10/05/2021 12:00 AM    K 3.6 03/04/2021 08:20 AM     10/05/2021 12:00 AM    CO2 30 10/05/2021 12:00 AM    BUN 20 10/05/2021 12:00 AM    CREATININE 1.25 10/05/2021 12:00 AM    AGRATIO 1.6 07/05/2021 07:36 AM    LABGLOM 41 10/05/2021 12:00 AM    LABGLOM 80 03/07/2021 05:24 AM    GLUCOSE 113 10/05/2021 12:00 AM    GLUCOSE 88 07/05/2021 07:36 AM    GLUCOSE 98 07/05/2021 07:36 AM    CALCIUM 9.50 10/05/2021 12:00 AM     Hepatic Function Panel:    Lab Results Component Value Date/Time    ALKPHOS 91 07/05/2021 07:36 AM    ALT 8 07/05/2021 07:36 AM    AST 14 07/05/2021 07:36 AM    PROT 6.2 07/05/2021 07:36 AM    BILITOT 0.4 07/05/2021 07:36 AM    BILIDIR <0.2 02/28/2021 03:10 PM    LABALBU 3.8 07/05/2021 07:36 AM     Magnesium:    Lab Results   Component Value Date/Time    MG 1.7 03/07/2021 05:24 AM     PT/INR:    Lab Results   Component Value Date/Time    INR 1.16 03/04/2021 08:20 AM     Lipids:    Lab Results   Component Value Date/Time    TRIG 104 07/05/2021 07:36 AM    HDL 41 07/05/2021 07:36 AM    LDLCALC 47 05/18/2019 04:06 AM    LDLDIRECT 49 07/05/2021 07:36 AM       ASSESSMENT AND PLAN:   The patient's condition/symptoms are Stable: No clinical evidence of fluid overload today. Continue current medical regimen without changes at present time. Diagnosis Orders   1. CHF (congestive heart failure), NYHA class II, chronic, combined (HCC)  Basic Metabolic Panel    Magnesium    Brain Natriuretic Peptide     Continue:  GDMT:   ACE/ARB/ARNI - Entresto 24/26 BID              BB - Toprol 25mg daily               Diuretic - Bumex 1mg weekly  AA - none  SGLT2 -  none  Vasodilator - none  Other - Magnesium, Potassium 10 daily, Eliquis (afib), plavix (PAD-stent)    HFrEF 25-30% Grade II DD  Stable, appears Euvolemic on exam but has gained about 20lbs since she stopped her weekly Bumex. Does not have any CHF symptoms. GDMT: need to add   ECHO 2019: mild to mod MR; RVSP 35-40mmHg; dilated LA  Cath 2019: nonobstructive     Lab reviewed - need new    Repeat blood work today   BP/HR stable   Start taking Bumex weekly again   daily wts. F/U w/ Cardiology  F/U in clinic in 4 months    Tolerating above noted HF meds, no ill side effects noted. Will continue to monitor kidney function and electrolytes. Will optimize as tolerated. Pt is compliant w/ medications.     Total visit time of 20 minutes has been spent with patient on education of symptoms, management, medication, and plan of care; as well as review of chart: labs, ECHO, radiology reports, etc.   I personally spent more then 50% of the appt time face to face with the patient. · Daily weights  · Fluid restriction of 2 Liters per day  · Limit sodium in diet to around 2268-1832 mg/day  · Monitor BP  · Activity as tolerated       Patient was instructed to call the [employer] [facility] for any changes in symptoms as noted in AVS.      Return in about 4 months (around 11/11/2022). or sooner if needed     Patient given educational materials - see patient instructions. We discussed the importance of weighing oneself and recording daily. We also discussed the importance of a low sodium diet, higher sodium foods to avoid and better low sodium food options. Patient verbalizes understanding of plan of care using teach back method, and is agreeable to the treatment plan.        Electronically signed by Leopoldo Karvonen, APRN - CNP on 7/11/2022 at 12:52 PM

## 2022-07-19 ENCOUNTER — PROCEDURE VISIT (OUTPATIENT)
Dept: CARDIOLOGY CLINIC | Age: 87
End: 2022-07-19
Payer: COMMERCIAL

## 2022-07-19 DIAGNOSIS — I50.20 SYSTOLIC CONGESTIVE HEART FAILURE, UNSPECIFIED HF CHRONICITY (HCC): Primary | ICD-10-CM

## 2022-07-19 PROCEDURE — G2066 INTER DEVC REMOTE 30D: HCPCS | Performed by: INTERNAL MEDICINE

## 2022-07-19 PROCEDURE — 93297 REM INTERROG DEV EVAL ICPMS: CPT | Performed by: INTERNAL MEDICINE

## 2022-08-01 ENCOUNTER — TELEPHONE (OUTPATIENT)
Dept: CARDIOLOGY CLINIC | Age: 87
End: 2022-08-01

## 2022-08-01 DIAGNOSIS — I73.9 PAD (PERIPHERAL ARTERY DISEASE) (HCC): Primary | ICD-10-CM

## 2022-08-01 DIAGNOSIS — I48.91 ATRIAL FIBRILLATION, CONTROLLED (HCC): ICD-10-CM

## 2022-08-01 DIAGNOSIS — M79.605 PAIN IN BOTH LOWER EXTREMITIES: ICD-10-CM

## 2022-08-01 DIAGNOSIS — I50.42 CHF (CONGESTIVE HEART FAILURE), NYHA CLASS II, CHRONIC, COMBINED (HCC): ICD-10-CM

## 2022-08-01 DIAGNOSIS — M79.604 PAIN IN BOTH LOWER EXTREMITIES: ICD-10-CM

## 2022-08-01 NOTE — PROGRESS NOTES
[facility] Heart Failure Clinic       Visit Date: 7/15/2019  Cardiologist:  Dr. Sin Gonzalez   Primary Care Physician: Dr. Hal Ugalde is a 82 y.o. female who presents today for:  Chief Complaint   Patient presents with    Congestive Heart Failure       HPI:   Gregg Garcia is a 82 y.o. female who presents to the office for a follow up visit in the heart failure clinic. Hx PAD stents 2/2019, current smoker (about a half a pack per day) ? COPD, CKD, AAA repair (EVAR) 2/2019. Negative stress test 9/2018. She was admitted for CHF and ? COPD exacerbation. Her K+ and Mg+ were both low and replaced. Repeat BMP and Mg were completed on Fri 5/30/19 and her Mg+ was 1.2 and K+ was 3.5. I called the patient and orders were placed and she was given Mg 4 gm IV in OP nursing and I also started her on Potassium. BP are lower, on Midodrine. At the last clinic visit she was instructed to take extra Bumex for elevated BNP and increased SOB. Thi8s did cause an elevation of the Cr and we decreased her Bumex to 0.5 mg daily. She denies any swelling, has been losing weight. She can perform ADLs without SOB or fatigue. She sleeps with the [facility] on a wedge d/t her stomach/GERD issues. [facility] for \"stomach and esophogeal issues\".  She was to have an EGD but Dr Sin Gonzalez advised her to wait if the procedure was elective due to thrombosis of the stent back in Feb.     Patient has:  Last hospital admission related to Heart Failure:  May 2019  Chest Pain: no  Worsening SOB/orthopnea/PND: no  Edema: no  Any extra diuretic use: no  Weight gain: no  Compliant checking home weight: yes  Fatigue: sometimes  Abdominal bloating: sometimes  Appetite: fair  Difficulty sleeping: yes  Cough: no  Compliant checking blood pressure: yes  Any refills on CHF medications needed at this time: no    Past Medical History:   Diagnosis Date    Arthritis     BPPV (benign paroxysmal positional vertigo) 6/6/16    GERD 36

## 2022-08-01 NOTE — TELEPHONE ENCOUNTER
Pt called saying her right leg feels like it could \"explode\" if walking too much   No redness or swelling     Offered an appt but she doesn't have great transportation and doesn't think just an appt is going to figure things out     Wondering if there is any testing she should do?

## 2022-08-02 NOTE — TELEPHONE ENCOUNTER
CTA scheduled for 8/4/22 at 4:00 patient to arrive at 3:30. NPO 4 hrs. Patient notified and voiced understanding.

## 2022-08-04 ENCOUNTER — HOSPITAL ENCOUNTER (OUTPATIENT)
Dept: CT IMAGING | Age: 87
Discharge: HOME | End: 2022-08-04
Payer: COMMERCIAL

## 2022-08-04 DIAGNOSIS — M79.605 PAIN IN BOTH LOWER EXTREMITIES: ICD-10-CM

## 2022-08-04 DIAGNOSIS — I73.9 PAD (PERIPHERAL ARTERY DISEASE) (HCC): ICD-10-CM

## 2022-08-04 DIAGNOSIS — I50.42 CHF (CONGESTIVE HEART FAILURE), NYHA CLASS II, CHRONIC, COMBINED (HCC): ICD-10-CM

## 2022-08-04 DIAGNOSIS — M79.604 PAIN IN BOTH LOWER EXTREMITIES: ICD-10-CM

## 2022-08-04 DIAGNOSIS — I48.91 ATRIAL FIBRILLATION, CONTROLLED (HCC): ICD-10-CM

## 2022-08-04 PROCEDURE — 75635 CT ANGIO ABDOMINAL ARTERIES: CPT

## 2022-08-04 PROCEDURE — 6360000004 HC RX CONTRAST MEDICATION: Performed by: INTERNAL MEDICINE

## 2022-08-04 RX ADMIN — IOPAMIDOL 125 ML: 755 INJECTION, SOLUTION INTRAVENOUS at 15:52

## 2022-08-08 ENCOUNTER — TELEPHONE (OUTPATIENT)
Dept: CARDIOLOGY CLINIC | Age: 87
End: 2022-08-08

## 2022-08-08 DIAGNOSIS — I50.22 CHRONIC SYSTOLIC CONGESTIVE HEART FAILURE (HCC): Primary | ICD-10-CM

## 2022-08-08 NOTE — TELEPHONE ENCOUNTER
Peripheral angiogram scheduled 09-06-22 @ 1:00pm  Arrive 6:00am for prehydration  Last dose eliquis 09-03-22    Call to pt, date, time and instructions reviewed and mailed to pt

## 2022-08-08 NOTE — TELEPHONE ENCOUNTER
----- Message from Leticia Salazar RN sent at 7/11/2022  2:51 PM EDT -----  Call and check up on wts with decrease in bumex

## 2022-08-08 NOTE — TELEPHONE ENCOUNTER
Pt states she lost about 4 lbs, she is 162 lbs and is feeling ok CHF wise, but still having issues with leg, as she cant walk, but they think it could be due to vein.

## 2022-08-08 NOTE — TELEPHONE ENCOUNTER
Pt states she has issues with transportation    Can we just pre hyrdrate so she doesn't have to find a ride to have her labs done?

## 2022-08-16 NOTE — TELEPHONE ENCOUNTER
Spoke to patient voiced understanding will do Friday with [facility] lab. Faxed lab to [facility] lab.

## 2022-08-19 LAB
ANION GAP SERPL CALCULATED.3IONS-SCNC: 4 MMOL/L (ref 4–12)
BUN BLDV-MCNC: 31 MG/DL (ref 7–20)
CALCIUM SERPL-MCNC: 9.1 MG/DL (ref 8.8–10.5)
CHLORIDE BLD-SCNC: 104 MEQ/L (ref 101–111)
CO2: 30 MEQ/L (ref 21–32)
CREAT SERPL-MCNC: 1.13 MG/DL (ref 0.6–1.3)
CREATININE CLEARANCE: 46
GLUCOSE: 82 MG/DL (ref 70–110)
POTASSIUM SERPL-SCNC: 4.3 MEQ/L (ref 3.6–5)
SODIUM BLD-SCNC: 138 MEQ/L (ref 135–145)

## 2022-08-22 ENCOUNTER — PROCEDURE VISIT (OUTPATIENT)
Dept: CARDIOLOGY CLINIC | Age: 87
End: 2022-08-22
Payer: COMMERCIAL

## 2022-08-22 DIAGNOSIS — Z95.810 ICD (IMPLANTABLE CARDIOVERTER-DEFIBRILLATOR) IN PLACE: Primary | ICD-10-CM

## 2022-08-22 PROCEDURE — 93296 REM INTERROG EVL PM/IDS: CPT | Performed by: INTERNAL MEDICINE

## 2022-08-22 PROCEDURE — 93295 DEV INTERROG REMOTE 1/2/MLT: CPT | Performed by: INTERNAL MEDICINE

## 2022-08-22 NOTE — PROGRESS NOTES
[place] Medtronic Dual ICD   Pt of [place]    Battery 8.6 years     Presenting rhythm AP VS    A Impedance 399  RV Impedance 551    RV shock 65    P wave sensing 1.5  R wave sensing 11.1    A Threshold 0.375 @ 0.40  A Amplitude 1.50 @ 0.40  RV Thresholds 0.625 @ 0.40  RV Amplitude 2.0 @ 0.40    A Paced 19.5%  V Paced <0.1%    Programmed Mode AAI <=> DDD    Afib [place] <0.1%    Episodes:   8/19/22- 9 beats VT rate 218    Optivol WNL

## 2022-08-31 ENCOUNTER — TELEPHONE (OUTPATIENT)
Dept: CARDIOLOGY CLINIC | Age: 87
End: 2022-08-31

## 2022-08-31 ENCOUNTER — PRE-PROCEDURE TELEPHONE (OUTPATIENT)
Dept: INPATIENT UNIT | Age: 87
End: 2022-08-31

## 2022-08-31 NOTE — PROGRESS NOTES
NPO after midnight  Bring drivers license and insurance information  Wear comfortable clean clothes  Shower morning of and night before with liquid antibacterial soap  Remove jewelry   May have to stay overnight if have angioplasty  Bring medications in original bottles  Made aware of visitors limit to 2 at a time  Follow all instructions given by your physician  Please notify doctor office if you need to cancel or reschedule your procedure   needed at discharge

## 2022-09-02 ENCOUNTER — PREP FOR PROCEDURE (OUTPATIENT)
Dept: CARDIOLOGY | Age: 87
End: 2022-09-02

## 2022-09-02 RX ORDER — SODIUM CHLORIDE 0.9 % (FLUSH) 0.9 %
5-40 SYRINGE (ML) INJECTION PRN
Status: CANCELLED | OUTPATIENT
Start: 2022-09-02

## 2022-09-02 RX ORDER — SODIUM CHLORIDE 0.9 % (FLUSH) 0.9 %
5-40 SYRINGE (ML) INJECTION EVERY 12 HOURS SCHEDULED
Status: CANCELLED | OUTPATIENT
Start: 2022-09-02

## 2022-09-02 RX ORDER — SODIUM CHLORIDE 9 MG/ML
INJECTION, SOLUTION INTRAVENOUS PRN
Status: CANCELLED | OUTPATIENT
Start: 2022-09-02

## 2022-09-02 RX ORDER — ASPIRIN 325 MG
325 TABLET ORAL ONCE
Status: CANCELLED | OUTPATIENT
Start: 2022-09-02 | End: 2022-09-02

## 2022-09-02 RX ORDER — DIPHENHYDRAMINE HYDROCHLORIDE 50 MG/ML
50 INJECTION INTRAMUSCULAR; INTRAVENOUS ONCE
Status: CANCELLED | OUTPATIENT
Start: 2022-09-02 | End: 2022-09-02

## 2022-09-02 RX ORDER — NITROGLYCERIN 0.4 MG/1
0.4 TABLET SUBLINGUAL EVERY 5 MIN PRN
Status: CANCELLED | OUTPATIENT
Start: 2022-09-02

## 2022-09-06 ENCOUNTER — HOSPITAL ENCOUNTER (INPATIENT)
Dept: INTERVENTIONAL RADIOLOGY/VASCULAR | Age: 87
LOS: 4 days | Discharge: HOME | DRG: 253 | End: 2022-09-11
Attending: INTERNAL MEDICINE | Admitting: INTERNAL MEDICINE
Payer: MEDICARE

## 2022-09-06 DIAGNOSIS — I50.42 CHF (CONGESTIVE HEART FAILURE), NYHA CLASS II, CHRONIC, COMBINED (HCC): ICD-10-CM

## 2022-09-06 DIAGNOSIS — I73.9 PAD (PERIPHERAL ARTERY DISEASE) (HCC): ICD-10-CM

## 2022-09-06 DIAGNOSIS — M79.605 PAIN IN BOTH LOWER EXTREMITIES: ICD-10-CM

## 2022-09-06 DIAGNOSIS — I48.91 ATRIAL FIBRILLATION, CONTROLLED (HCC): ICD-10-CM

## 2022-09-06 DIAGNOSIS — M79.604 PAIN IN BOTH LOWER EXTREMITIES: ICD-10-CM

## 2022-09-06 LAB
ABO: NORMAL
ACTIVATED CLOTTING TIME: 318 SECONDS (ref 1–150)
ACTIVATED CLOTTING TIME: 387 SECONDS (ref 1–150)
ANION GAP SERPL CALCULATED.3IONS-SCNC: 8 MEQ/L (ref 8–16)
ANTIBODY SCREEN: NORMAL
APTT: 36.8 SECONDS (ref 22–38)
BUN BLDV-MCNC: 32 MG/DL (ref 7–22)
CALCIUM SERPL-MCNC: 9.5 MG/DL (ref 8.5–10.5)
CHLORIDE BLD-SCNC: 102 MEQ/L (ref 98–111)
CO2: 31 MEQ/L (ref 23–33)
CREAT SERPL-MCNC: 1.1 MG/DL (ref 0.4–1.2)
EKG ATRIAL RATE: 87 BPM
EKG P AXIS: 3 DEGREES
EKG P-R INTERVAL: 208 MS
EKG Q-T INTERVAL: 370 MS
EKG QRS DURATION: 78 MS
EKG QTC CALCULATION (BAZETT): 445 MS
EKG R AXIS: -69 DEGREES
EKG T AXIS: 60 DEGREES
EKG VENTRICULAR RATE: 87 BPM
ERYTHROCYTE [DISTWIDTH] IN BLOOD BY AUTOMATED COUNT: 14.6 % (ref 11.5–14.5)
ERYTHROCYTE [DISTWIDTH] IN BLOOD BY AUTOMATED COUNT: 48 FL (ref 35–45)
GFR SERPL CREATININE-BSD FRML MDRD: 47 ML/MIN/1.73M2
GLUCOSE BLD-MCNC: 110 MG/DL (ref 70–108)
HCT VFR BLD CALC: 48.1 % (ref 37–47)
HEMOGLOBIN: 15.9 GM/DL (ref 12–16)
INR BLD: 1.1 (ref 0.85–1.13)
MCH RBC QN AUTO: 30.3 PG (ref 26–33)
MCHC RBC AUTO-ENTMCNC: 33.1 GM/DL (ref 32.2–35.5)
MCV RBC AUTO: 91.6 FL (ref 81–99)
MRSA SCREEN RT-PCR: NEGATIVE
PLATELET # BLD: 132 THOU/MM3 (ref 130–400)
PMV BLD AUTO: 11.9 FL (ref 9.4–12.4)
POTASSIUM SERPL-SCNC: 4.2 MEQ/L (ref 3.5–5.2)
RBC # BLD: 5.25 MILL/MM3 (ref 4.2–5.4)
RH FACTOR: NORMAL
SODIUM BLD-SCNC: 141 MEQ/L (ref 135–145)
WBC # BLD: 6.5 THOU/MM3 (ref 4.8–10.8)

## 2022-09-06 PROCEDURE — 37226 HC FEMPOP PLASTY & STENT: CPT

## 2022-09-06 PROCEDURE — 6360000002 HC RX W HCPCS: Performed by: INTERNAL MEDICINE

## 2022-09-06 PROCEDURE — 6370000000 HC RX 637 (ALT 250 FOR IP): Performed by: INTERNAL MEDICINE

## 2022-09-06 PROCEDURE — 93010 ELECTROCARDIOGRAM REPORT: CPT | Performed by: INTERNAL MEDICINE

## 2022-09-06 PROCEDURE — 37252 INTRVASC US NONCORONARY 1ST: CPT

## 2022-09-06 PROCEDURE — 37223 PR REVSC OPN/PRQ ILIAC ART W/STNT & ANGIOP IPSILATL: CPT | Performed by: INTERNAL MEDICINE

## 2022-09-06 PROCEDURE — 2500000003 HC RX 250 WO HCPCS: Performed by: STUDENT IN AN ORGANIZED HEALTH CARE EDUCATION/TRAINING PROGRAM

## 2022-09-06 PROCEDURE — 2580000003 HC RX 258: Performed by: STUDENT IN AN ORGANIZED HEALTH CARE EDUCATION/TRAINING PROGRAM

## 2022-09-06 PROCEDURE — 6360000004 HC RX CONTRAST MEDICATION: Performed by: INTERNAL MEDICINE

## 2022-09-06 PROCEDURE — 047C35Z DILATION OF RIGHT COMMON ILIAC ARTERY WITH TWO DRUG-ELUTING INTRALUMINAL DEVICES, PERCUTANEOUS APPROACH: ICD-10-PCS | Performed by: INTERNAL MEDICINE

## 2022-09-06 PROCEDURE — 047K37Z DILATION OF RIGHT FEMORAL ARTERY WITH FOUR OR MORE DRUG-ELUTING INTRALUMINAL DEVICES, PERCUTANEOUS APPROACH: ICD-10-PCS | Performed by: INTERNAL MEDICINE

## 2022-09-06 PROCEDURE — 86901 BLOOD TYPING SEROLOGIC RH(D): CPT

## 2022-09-06 PROCEDURE — 047M3Z1 DILATION OF RIGHT POPLITEAL ARTERY USING DRUG-COATED BALLOON, PERCUTANEOUS APPROACH: ICD-10-PCS | Performed by: INTERNAL MEDICINE

## 2022-09-06 PROCEDURE — 85027 COMPLETE CBC AUTOMATED: CPT

## 2022-09-06 PROCEDURE — 93005 ELECTROCARDIOGRAM TRACING: CPT | Performed by: INTERNAL MEDICINE

## 2022-09-06 PROCEDURE — 75716 ARTERY X-RAYS ARMS/LEGS: CPT

## 2022-09-06 PROCEDURE — 75774 ARTERY X-RAY EACH VESSEL: CPT

## 2022-09-06 PROCEDURE — 85730 THROMBOPLASTIN TIME PARTIAL: CPT

## 2022-09-06 PROCEDURE — 37221 PR REVSC OPN/PRQ ILIAC ART W/STNT PLMT & ANGIOPLSTY: CPT | Performed by: INTERNAL MEDICINE

## 2022-09-06 PROCEDURE — 75625 CONTRAST EXAM ABDOMINL AORTA: CPT

## 2022-09-06 PROCEDURE — C1874 STENT, COATED/COV W/DEL SYS: HCPCS

## 2022-09-06 PROCEDURE — 37221 HC PLASTY ILIAC W STENT: CPT

## 2022-09-06 PROCEDURE — 94761 N-INVAS EAR/PLS OXIMETRY MLT: CPT

## 2022-09-06 PROCEDURE — 6370000000 HC RX 637 (ALT 250 FOR IP): Performed by: STUDENT IN AN ORGANIZED HEALTH CARE EDUCATION/TRAINING PROGRAM

## 2022-09-06 PROCEDURE — 37228 PR REVSC OPN/PRQ TIB/PERO W/ANGIOPLASTY UNI: CPT | Performed by: INTERNAL MEDICINE

## 2022-09-06 PROCEDURE — 93005 ELECTROCARDIOGRAM TRACING: CPT | Performed by: STUDENT IN AN ORGANIZED HEALTH CARE EDUCATION/TRAINING PROGRAM

## 2022-09-06 PROCEDURE — 37226 PR REVSC OPN/PRQ FEM/POP W/STNT/ANGIOP SM VSL: CPT | Performed by: INTERNAL MEDICINE

## 2022-09-06 PROCEDURE — 87641 MR-STAPH DNA AMP PROBE: CPT

## 2022-09-06 PROCEDURE — 85610 PROTHROMBIN TIME: CPT

## 2022-09-06 PROCEDURE — 2580000003 HC RX 258: Performed by: INTERNAL MEDICINE

## 2022-09-06 PROCEDURE — 2500000003 HC RX 250 WO HCPCS: Performed by: INTERNAL MEDICINE

## 2022-09-06 PROCEDURE — 37228 HC PLASTY UNI TIB/PER INITIAL: CPT

## 2022-09-06 PROCEDURE — 37252 INTRVASC US NONCORONARY 1ST: CPT | Performed by: INTERNAL MEDICINE

## 2022-09-06 PROCEDURE — 85347 COAGULATION TIME ACTIVATED: CPT

## 2022-09-06 PROCEDURE — 86900 BLOOD TYPING SEROLOGIC ABO: CPT

## 2022-09-06 PROCEDURE — 99291 CRITICAL CARE FIRST HOUR: CPT | Performed by: INTERNAL MEDICINE

## 2022-09-06 PROCEDURE — 86850 RBC ANTIBODY SCREEN: CPT

## 2022-09-06 PROCEDURE — 37253 INTRVASC US NONCORONARY ADDL: CPT

## 2022-09-06 PROCEDURE — 80048 BASIC METABOLIC PNL TOTAL CA: CPT

## 2022-09-06 PROCEDURE — 36415 COLL VENOUS BLD VENIPUNCTURE: CPT

## 2022-09-06 RX ORDER — FENTANYL CITRATE 50 UG/ML
INJECTION, SOLUTION INTRAMUSCULAR; INTRAVENOUS
Status: COMPLETED | OUTPATIENT
Start: 2022-09-06 | End: 2022-09-06

## 2022-09-06 RX ORDER — MIDAZOLAM HYDROCHLORIDE 1 MG/ML
INJECTION INTRAMUSCULAR; INTRAVENOUS
Status: COMPLETED | OUTPATIENT
Start: 2022-09-06 | End: 2022-09-06

## 2022-09-06 RX ORDER — ALBUTEROL SULFATE 90 UG/1
2 AEROSOL, METERED RESPIRATORY (INHALATION) EVERY 6 HOURS PRN
Status: DISCONTINUED | OUTPATIENT
Start: 2022-09-06 | End: 2022-09-11 | Stop reason: HOSPADM

## 2022-09-06 RX ORDER — ACETAMINOPHEN 650 MG/1
650 SUPPOSITORY RECTAL EVERY 6 HOURS PRN
Status: DISCONTINUED | OUTPATIENT
Start: 2022-09-06 | End: 2022-09-06 | Stop reason: SDUPTHER

## 2022-09-06 RX ORDER — POTASSIUM CHLORIDE 750 MG/1
10 TABLET, FILM COATED, EXTENDED RELEASE ORAL WEEKLY
Status: DISCONTINUED | OUTPATIENT
Start: 2022-09-11 | End: 2022-09-11 | Stop reason: HOSPADM

## 2022-09-06 RX ORDER — HEPARIN SODIUM 1000 [USP'U]/ML
INJECTION, SOLUTION INTRAVENOUS; SUBCUTANEOUS
Status: COMPLETED | OUTPATIENT
Start: 2022-09-06 | End: 2022-09-06

## 2022-09-06 RX ORDER — ONDANSETRON 2 MG/ML
4 INJECTION INTRAMUSCULAR; INTRAVENOUS EVERY 6 HOURS PRN
Status: DISCONTINUED | OUTPATIENT
Start: 2022-09-06 | End: 2022-09-11 | Stop reason: HOSPADM

## 2022-09-06 RX ORDER — FAMOTIDINE 20 MG/1
20 TABLET, FILM COATED ORAL NIGHTLY
Status: DISCONTINUED | OUTPATIENT
Start: 2022-09-06 | End: 2022-09-11 | Stop reason: HOSPADM

## 2022-09-06 RX ORDER — HEPARIN SODIUM 1000 [USP'U]/ML
40 INJECTION, SOLUTION INTRAVENOUS; SUBCUTANEOUS ONCE
Status: COMPLETED | OUTPATIENT
Start: 2022-09-06 | End: 2022-09-06

## 2022-09-06 RX ORDER — PRAVASTATIN SODIUM 40 MG
40 TABLET ORAL DAILY
Status: DISCONTINUED | OUTPATIENT
Start: 2022-09-07 | End: 2022-09-11 | Stop reason: HOSPADM

## 2022-09-06 RX ORDER — METOPROLOL SUCCINATE 25 MG/1
25 TABLET, EXTENDED RELEASE ORAL DAILY
Status: DISCONTINUED | OUTPATIENT
Start: 2022-09-07 | End: 2022-09-10

## 2022-09-06 RX ORDER — CLOPIDOGREL BISULFATE 75 MG/1
1 TABLET ORAL DAILY
Status: DISCONTINUED | OUTPATIENT
Start: 2022-09-06 | End: 2022-09-06 | Stop reason: SDUPTHER

## 2022-09-06 RX ORDER — SODIUM CHLORIDE 0.9 % (FLUSH) 0.9 %
5-40 SYRINGE (ML) INJECTION PRN
Status: DISCONTINUED | OUTPATIENT
Start: 2022-09-06 | End: 2022-09-06

## 2022-09-06 RX ORDER — ONDANSETRON 4 MG/1
4 TABLET, ORALLY DISINTEGRATING ORAL EVERY 8 HOURS PRN
Status: DISCONTINUED | OUTPATIENT
Start: 2022-09-06 | End: 2022-09-11 | Stop reason: HOSPADM

## 2022-09-06 RX ORDER — POLYETHYLENE GLYCOL 3350 17 G/17G
17 POWDER, FOR SOLUTION ORAL DAILY PRN
Status: DISCONTINUED | OUTPATIENT
Start: 2022-09-06 | End: 2022-09-11 | Stop reason: HOSPADM

## 2022-09-06 RX ORDER — ACETAMINOPHEN 325 MG/1
650 TABLET ORAL EVERY 6 HOURS PRN
Status: DISCONTINUED | OUTPATIENT
Start: 2022-09-06 | End: 2022-09-06 | Stop reason: SDUPTHER

## 2022-09-06 RX ORDER — SODIUM CHLORIDE 9 MG/ML
INJECTION, SOLUTION INTRAVENOUS PRN
Status: DISCONTINUED | OUTPATIENT
Start: 2022-09-06 | End: 2022-09-06 | Stop reason: SDUPTHER

## 2022-09-06 RX ORDER — ASPIRIN 325 MG
325 TABLET ORAL ONCE
Status: COMPLETED | OUTPATIENT
Start: 2022-09-06 | End: 2022-09-06

## 2022-09-06 RX ORDER — SODIUM CHLORIDE 0.9 % (FLUSH) 0.9 %
5-40 SYRINGE (ML) INJECTION EVERY 12 HOURS SCHEDULED
Status: DISCONTINUED | OUTPATIENT
Start: 2022-09-06 | End: 2022-09-06 | Stop reason: SDUPTHER

## 2022-09-06 RX ORDER — DIPHENHYDRAMINE HYDROCHLORIDE 50 MG/ML
50 INJECTION INTRAMUSCULAR; INTRAVENOUS ONCE
Status: DISCONTINUED | OUTPATIENT
Start: 2022-09-06 | End: 2022-09-11 | Stop reason: HOSPADM

## 2022-09-06 RX ORDER — HYDROCORTISONE ACETATE 25 MG/1
25 SUPPOSITORY RECTAL NIGHTLY
COMMUNITY

## 2022-09-06 RX ORDER — TIMOLOL MALEATE 5 MG/ML
1 SOLUTION/ DROPS OPHTHALMIC DAILY
Status: DISCONTINUED | OUTPATIENT
Start: 2022-09-06 | End: 2022-09-11 | Stop reason: HOSPADM

## 2022-09-06 RX ORDER — CLOPIDOGREL 300 MG/1
600 TABLET, FILM COATED ORAL ONCE
Status: COMPLETED | OUTPATIENT
Start: 2022-09-06 | End: 2022-09-06

## 2022-09-06 RX ORDER — FAMOTIDINE 20 MG/1
20 TABLET, FILM COATED ORAL 2 TIMES DAILY
Status: DISCONTINUED | OUTPATIENT
Start: 2022-09-06 | End: 2022-09-06 | Stop reason: DRUGHIGH

## 2022-09-06 RX ORDER — CLOPIDOGREL BISULFATE 75 MG/1
75 TABLET ORAL DAILY
Status: DISCONTINUED | OUTPATIENT
Start: 2022-09-07 | End: 2022-09-11 | Stop reason: HOSPADM

## 2022-09-06 RX ORDER — ACETAMINOPHEN 325 MG/1
650 TABLET ORAL EVERY 4 HOURS PRN
Status: DISCONTINUED | OUTPATIENT
Start: 2022-09-06 | End: 2022-09-11 | Stop reason: HOSPADM

## 2022-09-06 RX ORDER — SODIUM CHLORIDE 9 MG/ML
INJECTION, SOLUTION INTRAVENOUS PRN
Status: DISCONTINUED | OUTPATIENT
Start: 2022-09-06 | End: 2022-09-06

## 2022-09-06 RX ORDER — NITROGLYCERIN 0.4 MG/1
0.4 TABLET SUBLINGUAL EVERY 5 MIN PRN
Status: DISCONTINUED | OUTPATIENT
Start: 2022-09-06 | End: 2022-09-11 | Stop reason: HOSPADM

## 2022-09-06 RX ORDER — PANTOPRAZOLE SODIUM 40 MG/1
40 TABLET, DELAYED RELEASE ORAL
Status: DISCONTINUED | OUTPATIENT
Start: 2022-09-07 | End: 2022-09-11 | Stop reason: HOSPADM

## 2022-09-06 RX ORDER — ASPIRIN 81 MG/1
81 TABLET, CHEWABLE ORAL DAILY
Status: DISCONTINUED | OUTPATIENT
Start: 2022-09-07 | End: 2022-09-11 | Stop reason: HOSPADM

## 2022-09-06 RX ORDER — SODIUM CHLORIDE 0.9 % (FLUSH) 0.9 %
10 SYRINGE (ML) INJECTION PRN
Status: DISCONTINUED | OUTPATIENT
Start: 2022-09-06 | End: 2022-09-06 | Stop reason: SDUPTHER

## 2022-09-06 RX ORDER — SODIUM CHLORIDE 0.9 % (FLUSH) 0.9 %
5-40 SYRINGE (ML) INJECTION EVERY 12 HOURS SCHEDULED
Status: DISCONTINUED | OUTPATIENT
Start: 2022-09-06 | End: 2022-09-11 | Stop reason: HOSPADM

## 2022-09-06 RX ORDER — SODIUM CHLORIDE 0.9 % (FLUSH) 0.9 %
5-40 SYRINGE (ML) INJECTION PRN
Status: DISCONTINUED | OUTPATIENT
Start: 2022-09-06 | End: 2022-09-06 | Stop reason: SDUPTHER

## 2022-09-06 RX ADMIN — FENTANYL CITRATE 50 MCG: 50 INJECTION, SOLUTION INTRAMUSCULAR; INTRAVENOUS at 14:23

## 2022-09-06 RX ADMIN — MIDAZOLAM 1 MG: 1 INJECTION INTRAMUSCULAR; INTRAVENOUS at 14:11

## 2022-09-06 RX ADMIN — FENTANYL CITRATE 25 MCG: 50 INJECTION, SOLUTION INTRAMUSCULAR; INTRAVENOUS at 12:03

## 2022-09-06 RX ADMIN — MIDAZOLAM 1 MG: 1 INJECTION INTRAMUSCULAR; INTRAVENOUS at 14:22

## 2022-09-06 RX ADMIN — FENTANYL CITRATE 50 MCG: 50 INJECTION, SOLUTION INTRAMUSCULAR; INTRAVENOUS at 13:17

## 2022-09-06 RX ADMIN — NITROGLYCERIN 0.5 INCH: 20 OINTMENT TOPICAL at 19:20

## 2022-09-06 RX ADMIN — ACETAMINOPHEN 650 MG: 325 TABLET ORAL at 21:42

## 2022-09-06 RX ADMIN — SODIUM BICARBONATE: 84 INJECTION, SOLUTION INTRAVENOUS at 17:51

## 2022-09-06 RX ADMIN — CEFAZOLIN 2000 MG: 10 INJECTION, POWDER, FOR SOLUTION INTRAVENOUS at 19:20

## 2022-09-06 RX ADMIN — FENTANYL CITRATE 50 MCG: 50 INJECTION, SOLUTION INTRAMUSCULAR; INTRAVENOUS at 11:47

## 2022-09-06 RX ADMIN — FAMOTIDINE 20 MG: 20 TABLET ORAL at 21:43

## 2022-09-06 RX ADMIN — Medication 200 MCG: at 12:09

## 2022-09-06 RX ADMIN — MIDAZOLAM 0.5 MG: 1 INJECTION INTRAMUSCULAR; INTRAVENOUS at 12:02

## 2022-09-06 RX ADMIN — SODIUM CHLORIDE, PRESERVATIVE FREE 10 ML: 5 INJECTION INTRAVENOUS at 21:44

## 2022-09-06 RX ADMIN — NITROGLYCERIN 0.5 INCH: 20 OINTMENT TOPICAL at 23:11

## 2022-09-06 RX ADMIN — HEPARIN SODIUM 2940 UNITS: 1000 INJECTION, SOLUTION INTRAVENOUS; SUBCUTANEOUS at 12:09

## 2022-09-06 RX ADMIN — HEPARIN SODIUM 8000 UNITS: 1000 INJECTION INTRAVENOUS; SUBCUTANEOUS at 12:19

## 2022-09-06 RX ADMIN — MIDAZOLAM 1 MG: 1 INJECTION INTRAMUSCULAR; INTRAVENOUS at 11:46

## 2022-09-06 RX ADMIN — FENTANYL CITRATE 50 MCG: 50 INJECTION, SOLUTION INTRAMUSCULAR; INTRAVENOUS at 13:50

## 2022-09-06 RX ADMIN — FENTANYL CITRATE 50 MCG: 50 INJECTION, SOLUTION INTRAMUSCULAR; INTRAVENOUS at 14:38

## 2022-09-06 RX ADMIN — MIDAZOLAM 1 MG: 1 INJECTION INTRAMUSCULAR; INTRAVENOUS at 14:37

## 2022-09-06 RX ADMIN — HEPARIN SODIUM 5000 UNITS: 1000 INJECTION INTRAVENOUS; SUBCUTANEOUS at 13:50

## 2022-09-06 RX ADMIN — FENTANYL CITRATE 50 MCG: 50 INJECTION, SOLUTION INTRAMUSCULAR; INTRAVENOUS at 14:12

## 2022-09-06 RX ADMIN — IOPAMIDOL 225 ML: 612 INJECTION, SOLUTION INTRAVENOUS at 15:00

## 2022-09-06 RX ADMIN — HYDROMORPHONE HYDROCHLORIDE 0.5 MG: 1 INJECTION, SOLUTION INTRAMUSCULAR; INTRAVENOUS; SUBCUTANEOUS at 23:56

## 2022-09-06 RX ADMIN — MIDAZOLAM 1 MG: 1 INJECTION INTRAMUSCULAR; INTRAVENOUS at 13:50

## 2022-09-06 RX ADMIN — MIDAZOLAM 1 MG: 1 INJECTION INTRAMUSCULAR; INTRAVENOUS at 13:16

## 2022-09-06 RX ADMIN — MIDAZOLAM 0.5 MG: 1 INJECTION INTRAMUSCULAR; INTRAVENOUS at 13:02

## 2022-09-06 RX ADMIN — SODIUM CHLORIDE: 9 INJECTION, SOLUTION INTRAVENOUS at 06:20

## 2022-09-06 RX ADMIN — CLOPIDOGREL BISULFATE 600 MG: 300 TABLET, FILM COATED ORAL at 15:40

## 2022-09-06 RX ADMIN — FENTANYL CITRATE 25 MCG: 50 INJECTION, SOLUTION INTRAMUSCULAR; INTRAVENOUS at 13:02

## 2022-09-06 RX ADMIN — HEPARIN SODIUM 5000 UNITS: 1000 INJECTION INTRAVENOUS; SUBCUTANEOUS at 12:45

## 2022-09-06 RX ADMIN — ASPIRIN 325 MG: 325 TABLET ORAL at 07:25

## 2022-09-06 RX ADMIN — RIVAROXABAN 20 MG: 20 TABLET, FILM COATED ORAL at 18:18

## 2022-09-06 RX ADMIN — NITROGLYCERIN 0.5 INCH: 20 OINTMENT TOPICAL at 19:21

## 2022-09-06 ASSESSMENT — PAIN SCALES - GENERAL
PAINLEVEL_OUTOF10: 0
PAINLEVEL_OUTOF10: 2
PAINLEVEL_OUTOF10: 8
PAINLEVEL_OUTOF10: 5

## 2022-09-06 ASSESSMENT — PAIN DESCRIPTION - LOCATION
LOCATION: FOOT;LEG
LOCATION: FOOT
LOCATION: LEG

## 2022-09-06 ASSESSMENT — PAIN DESCRIPTION - ORIENTATION: ORIENTATION: LOWER

## 2022-09-06 ASSESSMENT — PAIN DESCRIPTION - DESCRIPTORS
DESCRIPTORS: ACHING
DESCRIPTORS: ACHING;PRESSURE

## 2022-09-06 NOTE — PROGRESS NOTES
[id number] Patient received in IR holding for procedure with family present. 1052 This procedure has been fully reviewed with the patient and written informed consent has been obtained. 1112 Pt taken to IR and positioned on table for comfort. Attached to monitor and prepped for procedure-right foot and bilateral groins. [address] 1800 [facility] in; spoke to patient and assessment obtained. 1147 Procedure started with Dr. Pepe Martinez. 1149 Access with use of sonosite. 5 fr sheath inserted in left femoral artery. 1152 SPO2 89%. O2 2 liters per nasal cannula applied. 2869 Introducer placed in right anterior tibial artery. 1208 6 fr sheath placed in right anterior tibial artery. 1220 Angioplasty of right proximal SFA with 3 x 100 [place] 18 balloon. 1231 Angioplasty of right proximal SFA with 8 x 40 [place] 35 balloon. 1236 Angioplasty of right proximal SFA, iliac artery and right limb of the stent graft with 3 x 60 [place] 18 balloon. 1245 Angioplasty of right limb of stent graft with 8 x 40 [place] 35 balloon. 65 Dr. Pepe Martinez accessed right femoral artery. 1257 6F sheath placed on the right femoral artery. 1314 10 fr sheath inserted in right femoral artery. [address] catheter for procedure. 1327 11 mm x 59 mm VianahnVBX stent deployed in right limb of stent graft. 1331 Second 11 mm x 59 mm ViabanhnVBX stent deployed per Dr Pepe Martinez  1333 8  mm x 59 mm Viabahn VBX stent deployed in right limb of stent graft. 1334 Second 8 x 59 mm ViabahnVBX stent deployed in right limb of stent graft. 1337 7 mm x 39 mm ViabahnVBX stent deployed in right limb of stent graft. 1339 Stents post dilated with Atlas14 mm x 40 mm balloon. 1343 11 mm x 39 mm ViabahnVBX stent deployed in right limb of stent graft. [address] catheter. 1400 Second 11 mm x 39 mm ViabahnVBX stent deployed in right limb of stent graft. 1404 Right femoral artery Angio-seal deployed per Dr Pepe Martinez.   1405 Stents post dilated with 8 x 40 [place] and 10 mm x 40 Conquest balloons. 1408 Angioplasty of right SFA  with 6 x 200 [place] 35 balloon. 1415 ACT drawn. 1417 Elizabeth stent 6 mm x 120 mm deployed in right SFA per Dr Rod Brooks. 1420 Second Elizabeth stent 6 mm x 120 mm stent deployed in right SFA. 1422 ACT result 387.  1424 Angioplasty of right distal SFA and popliteal artery with 5 x 150 mm IN. PACT Admiral balloon. 1427 Angioplasty of right SFA with 6 mm x 80 mm IN. PACT Admiral balloon. 1440 Post images taken. Oozing at right groin noted and manual pressure applied. 1447 ACT drawn. Angioseal left femoral artery complete. Site without redness, swelling or hematoma. Manual pressure applied. 1453 .  1455 Manual pressure to bilateral groins continue. Sheath intact in right anterior tibial artery with balloon inflated in right femoral artery. 1505 Balloon removed from right femoral artery. 1510 Vasc band applied to right anterior tibial artery site with 14 ml's air. No bleeding noted. 1517 Fem-stop applied to right femoral artery site with quick clot with 48 mm Hg. No bleeding noted. Quick clot with pressure dressing applied to left femoral artery site. No bleeding noted. 215 [facility] Report called to [facility]. Dr Rod Brooks to speak to family. 1538 Attempted to place razo catheter without success. 1541 2 ml's air removed from vasc band on right foot. O2 off. SPO2 95%. 1546 Patient on bed; comfort ensured. Right and left groin without bleeding and dressing remains dry and intact with area soft. Right foot site without bleeding noted. 1547 Patient taken to  via bed with family at bedside.

## 2022-09-06 NOTE — DISCHARGE INSTRUCTIONS
Follow with Dr. Carlton Wilson 2 weeks   Stop Aspirin in 2 weeks -- continue plavix and xarelto           Always wash hands before touching incision area. For Groin approach:  Remove  dressing in 24 hours, gently clean site with soap and water, pat dry, and apply bandaid daily for 5 days. Keep site clean and dry. Do not apply any creams, lotions, or powders to puncture site. May shower in 24 hours. Do not submerse site in water (no tub baths, swimming, or whirlpool) for 5 days. Take it easy for 3 days. No driving for 3 days. Avoid heaving lifting, pushing, pulling or straining. Monitor site for bleeding, drainage, or swelling. Monitor for signs of infection which include:  redness, drainage, soreness, or temp greater than 101 F. Notify doctor of any abnormal findings as listed. If bleeding occurs, hold firm pressure at site and call 911. Follow-up care is a key part of your treatment and safety. Be sure to make and go to all appointments, and call your doctor if you are having problems. It's also a good idea to know your test results and keep a list of the medicines you take. When should you call for help? Call 911 anytime you think you may need emergency care. For example, call if:  You passed out (lost consciousness). You have severe trouble breathing. You have sudden chest pain and shortness of breath, or you cough up blood. You have symptoms of a heart attack. These may include:  Chest pain or pressure, or a strange feeling in the chest.  Sweating. Shortness of breath. Nausea or vomiting. Pain, pressure, or a strange feeling in the back, neck, jaw, or upper belly, or in one or both shoulders or arms. Lightheadedness or sudden weakness. A fast or irregular heartbeat. You have been diagnosed with angina, and you have symptoms that do not go away with rest or are not getting better within 5 minutes after you take a dose of nitroglycerin.   Call your doctor now or seek immediate medical care if:  You are bleeding from the area where the catheter was put in your artery. You have a fast-growing, painful lump at the catheter site. You have signs of infection, such as: Increased pain, swelling, warmth, or redness. Red streaks leading from the catheter site. Pus draining from the catheter site. A fever. Your leg or arm looks blue or feels cold, numb, or tingly. Watch closely for changes in your health, and be sure to contact your doctor if you have any problems. Home going sedation advice sheet given to patient.

## 2022-09-06 NOTE — BRIEF OP NOTE
[facility]  Sedation/Analgesia Post Sedation Record    Pt Name: Kojo Brewer  Account number: [de-identified]  MRN: [id number]  YOB: 1935  Procedure Performed By: MD MD Jaspreet Cohen, [address]  Primary Care Physician: Elizabeth Soria  Date: 9/6/2022    POST-PROCEDURE    Physicians/Assistants: MD MD Jaspreet Cohen, QUINTINVI    Procedure Performed:Peripheral Angiogram/Intervention      Sedation/Anesthesia: Versed/ Fentanyl and 2% xylocaine local anesthesia. Estimated Blood Loss: 100 ml. Specimens Removed: None         Disposition of Specimen: N/A        Complications: No Immediate Complications.        Post-procedure Diagnosis/Findings:     R EVAR Limb occlusion, R CFA occlusion  R SFA occlusion  Dimunitive PFA  Pop artery occlusion with collaterals reconstituting minimally  All three tibials are occluded with some degree of flow in the PT and AT    Retrograde AT access, knuckled to the R CFA, then re-entry into the R EIA stents  VBX 11 x 39, VBX 11 x 59 x 2, VBX 11 x 39, VBX 8 x 59,   Retrograde SFA/pop 6.0 PTA  Elizabeth 6 x 120 x 2 for ostial SFA to distal SFA  DCB 5 x 150 of the pop, and DCB 6 x 80 of the distal SFA/prox pop  3.0 PTA of the AT  IVUS guidance    Complete reconstitution of the R EVAR limb, R CFA, R SFA, R POP, and R AT with improved flow into the PT - full flow into the pedal arch               MD MD Jaspreet Cohen, EVELYN  Electronically signed 9/6/2022 at 2:58 PM  Interventional Cardiology

## 2022-09-06 NOTE — PROGRESS NOTES
1600 Patient arrived to unit from IR via bed. Patient transferred to ICU bed and placed on continuous ICU bedside monitor. Patient admitted for Peripheral vascular disease, unspecified [I73.9]  Pain in right leg [M79.604]  Pain in left leg [M79.605]  Chronic combined systolic (congestive) and diastolic (congestive) heart failure [I50.42]  Unspecified atrial fibrillation [I48.91]. Vitals obtained. See flowsheets. Patient's IV access includes rac. Current infusions and rates of infusion includes saline at 20 ml. Assessment completed by maksim. Two nurse skin assessment completed by alana. See flowsheets for assessment details. Policies and procedures of ICU able to be explained to patient at this time. Family member(s)/representative(s) present at time of admission include daughters ,son in law. Patient rights explained to family member(s)/representatives and patient, as able. Patient/patient's family member(s)/representative(s) N/A to have physician notified of their admission. All questions posed by patient's family member(s)/representative(s) and patient answered at this time. 1610 Assessment completed. Pt alert oriented. noted femstop on rt groin 58 ml air. rt foot with vasc band with 12 ml in place. Lt groin site intact. pulses assessed per doppler, bilateral pedal and posterior tibial with doppler, fleeting. 82 family in updated. Pt taking sips water. [place] in to see pt.orders received.  aware rt pedal pulse difficult to find above vasc band.

## 2022-09-06 NOTE — H&P
[facility]  Sedation/Analgesia History & Physical    Pt Name: Jenny Abraham  Account number: [de-identified]  MRN: [id number]  YOB: 1935  Provider Performing Procedure: Renetta Michael MD MD  Referring Provider: Renetta Michael MD   Primary Care Physician: Orlando Frost  Date: 9/6/2022    PRE-PROCEDURE    Code Status: FULL CODE  Brief History/Pre-Procedure Diagnosis:   CLI  EVAR limb occlusion    Consent: : I have discussed with the patient risks, benefits, and alternatives (and relevant risks, benefits, and side effects related to alternatives or not receiving care), and likelihood of the success. The patient and/or representative appear to understand and agree to proceed. The discussion encompasses risks, benefits, and side effects related to the alternatives and the risks related to not receiving the proposed care, treatment, and services. The indication, risks and benefits of the procedure and possible therapeutic consequences and alternatives were discussed with the patient. The patient was given the opportunity to ask questions and to have them answered to his/her satisfaction. Risks of the procedure include but are not limited to the following: Bleeding, hematoma including retroperitoneal hemmorhage, infection, pain and discomfort, injury to the aorta and other blood vessels, rhythm disturbance, low blood pressure, myocardial infarction, stroke, kidney damage/failure, myocardial perforation, allergic reactions to sedatives/contrast material, loss of pulse/vascular compromise requiring surgery, aneurysm/pseudoaneurysm formation, possible loss of a limb/hand/leg, needing blood transfusion, requiring emergent open heart surgery or emergent coronary intervention, the need for intubation/respiratory support, the requirement for defibrillation/cardioversion, and death. Alternatives to and omission of the suggested procedure were discussed.  The patient had no further questions and wished to proceed; the consent form was signed. MEDICAL HISTORY   has a past medical history of A-fib ([address] AAA (abdominal aortic aneurysm) ([address] Achalasia, Anemia, Arthritis, Blood circulation, collateral, BPPV (benign paroxysmal positional vertigo), CHF (congestive heart failure) ([address] Chronic kidney disease, Colon cancer ([address] Gastrointestinal hemorrhage, GERD (gastroesophageal reflux disease), Hiatal hernia, History of blood transfusion, HTN (hypertension), Hx of blood clots, Hyperlipidemia, [employer] dual ICD , Neuromuscular disorder ([address] Obesity, Osteopenia, Osteoporosis, PAC (premature atrial contraction), Paralysis ([address] Pedal edema, Pneumonia, PVC (premature ventricular contraction), PVD (peripheral vascular disease) ([address] Small bowel obstruction ([address] Tinnitus, and UTI (urinary tract infection). SURGICAL HISTORY   has a past surgical history that includes Cholecystectomy; Breast surgery (Right, 1958); Hysterectomy; Appendectomy; Cosmetic surgery; eye surgery; pr egd transoral biopsy single/multiple (Left, 11/27/2017); Upper gastrointestinal endoscopy (N/A, 11/27/2017); Colonoscopy (08/06/2018); laryngoscopy (07/15/2016); pr lap,surg,colectomy, partial, w/anast (N/A, 8/20/2018); pr office/outpt visit,procedure only (N/A, 9/5/2018); pr preet skn sub grft t/a/l area/<100scm /<1st 25 scm (N/A, 9/6/2018); THROMBECTOMY / EMBOLECTOMY FEMORAL (Right, 2/14/2019); AAA repair, endovascular (N/A, 2/15/2019); Upper gastrointestinal endoscopy (N/A, 2/22/2019); Colonoscopy (N/A, 2/22/2019); Colonoscopy (N/A, 2/22/2020); hemicolectomy (N/A, 2/25/2020); other surgical history (10/06/2020); other surgical history (10/08/2020); and Colonoscopy (Left, 3/2/2021).   Additional information:       ALLERGIES   Allergies as of 09/06/2022 - Fully Reviewed 09/06/2022   Allergen Reaction Noted    Aspirin Other (See Comments) 12/28/2019    Lasix [furosemide] Other (See Comments) 06/06/2016    Lipitor [atorvastatin] Other (See Comments) 06/09/2014    Neurontin [gabapentin] Other (See Comments) 06/06/2016    Oxycontin [oxycodone hcl]  11/05/2018    Penicillins Other (See Comments) 06/09/2014     Additional information:       MEDICATIONS     Current Facility-Administered Medications:     sodium chloride flush 0.9 % injection 5-40 mL, 5-40 mL, IntraVENous, 2 times per day, Tiffanie Sanches PA-C    sodium chloride flush 0.9 % injection 5-40 mL, 5-40 mL, IntraVENous, PRN, Tiffanie Sanches PA-C    0.9 % sodium chloride infusion, , IntraVENous, PRN, Tiffanie Sanches PA-C, Last Rate: 75 mL/hr at 09/06/22 0620, New Bag at 09/06/22 0620    diphenhydrAMINE (BENADRYL) injection 50 mg, 50 mg, IntraVENous, Once, Tiffanie Sanches PA-C    hydrocortisone sodium succinate PF (SOLU-CORTEF) injection 200 mg, 200 mg, IntraVENous, Once, Tiffanie Sanches PA-C    nitroGLYCERIN (NITROSTAT) SL tablet 0.4 mg, 0.4 mg, SubLINGual, Q5 Min PRN, Tiffanie Sanches PA-C    ceFAZolin (ANCEF) 2000 mg in dextrose 5 % 50 mL IVPB, 2,000 mg, IntraVENous, Once, Oksana Cadet MD  Prior to Admission medications    Medication Sig Start Date End Date Taking?  Authorizing Provider   hydrocortisone (ANUSOL-HC) 25 MG suppository Place 25 mg rectally nightly   Yes Historical Provider, MD   sacubitril-valsartan (ENTRESTO) 24-26 MG per tablet Take 1 tablet by mouth 2 times daily 7/11/22   ROMAN Diez CNP   bumetanide (BUMEX) 1 MG tablet Take 1 tablet by mouth once a week On Mondays and PRN  Patient taking differently: Take 1 mg by mouth once a week On Sundays and PRN 7/11/22   ROMAN Diez CNP   potassium chloride (KLOR-CON M) 10 MEQ extended release tablet Take 1 tablet by mouth once a week 7/11/22   ROMAN Diez CNP   apixaban (ELIQUIS) 2.5 MG TABS tablet TAKE 1 TABLET BY MOUTH TWICE DAILY 6/28/22   Rosebud Fruits, APRN - CNP   magnesium (MAGNESIUM-OXIDE) 250 MG TABS tablet TAKE 2 TABLETS BY MOUTH TWICE DAILY 5/16/22   Malika []2 [x]3 []4 []5  Class 1: A normal healthy patient  Class 2: Pt with mild to moderate systemic disease  Class 3: Severe systemic disease or disturbance  Class 4: Severe systemic disorders that are already life threatening. Class 5: Moribund pt with little chances of survival, for more than 24 hours. Mallampati I Airway Classification:   []1 []2 [x]3 []4    [x]Pre-procedure diagnostic studies complete and results available. Comment:    [x]Previous sedation/anesthesia experiences assessed. Comment:    [x]The patient is an appropriate candidate to undergo the planned procedure sedation and anesthesia. (Refer to nursing sedation/analgesia documentation record)  [x]Formulation and discussion of sedation/procedure plan, risks, and expectations with patient and/or responsible adult completed. [x]Patient examined immediately prior to the procedure.  (Refer to nursing sedation/analgesia documentation record)    Jerod Bragg MD MD   Electronically signed 9/6/2022 at 2:57 PM

## 2022-09-06 NOTE — PROGRESS NOTES
Report to [facility] on ICU, aware patient going into  ICU 7, belongings in room.

## 2022-09-06 NOTE — PROGRESS NOTES
Pharmacy Renal Adjustment    Lucille Pandya is a 82 y.o. female. Pharmacy renally adjust the following medications per P[employer]T approved policy: pepcid    Height:   Ht Readings from Last 1 Encounters:   09/06/22 5' 5\" (1.651 m)     Weight:  Wt Readings from Last 1 Encounters:   09/06/22 162 lb (73.5 kg)     Recent Labs     09/06/22  0633   BUN 32*   CREATININE 1.1     Estimated Creatinine Clearance: 38 mL/min (based on SCr of 1.1 mg/dL). Calculated CrCl:    Assessment:  CKD    Plan:   Decrease pepcid from 20mg bid  to 20mg daily                Elizabeth Judge, BCPS, BCCCP 9/6/2022 5:02 PM

## 2022-09-06 NOTE — H&P
CRITICAL CARE- History & Physical      Patient: Darrin Allen    Unit/Bed:2E-03/003-A  YOB: 1935  MRN: [id number]   Acct: [de-identified]   PCP: Nilton Mcnamara    Date of Service: Pt seen/examined on 09/06/22  and Admitted to Inpatient with expected LOS greater than two midnights due to medical therapy. Chief Complaint:  Critical limb ischemia    Assessment and Plan:-  Critical limb ischemia - prior history of PVD s/p fem-tibial bypass, iliac arterial stent with chronic occlusion of fem distal bypass graft. CTA abdominal with runoff significant for occlusion of R iliac limb, R common artery, R SFA, R femoral/anterior tibial graft and multifocal disease of L SFA, mild stenosis of L popliteal artery 8/4/22. Reports month long worsening limb pain, erythema, numbness and tingling. Planned BLE angiogram demonstrated R EVAR, R CFA, R SFA occlusion, dimunitive PFA, popliteal artery occlusion with collaterals, all three tibials occluded. Complete reconstitution of the R EVAR limb, R CFA, R SFA, R POP, and R AT with improved flow into the PT by means of stenting and PTA 9/6/22. Per cardiology, started on DAPT and xarelto. Apply nitro ointment to RLE. Monitor for pulses. High risk for reperfusion injury. Check CK and lactic acid. Monitor daily labs. Encourage ambulation tomorrow. Chronic HFrEF, NYHA II - ICM s/p dual ICD. Last echo EF 25-30% 6/20. Home GDMT ARNi, BB and diuretic. Avoid nephrotoxins post contrast administration. Will continue BB for now. 2L fluid and 2 g sodium restriction. Monitor daily weights, strict I&Os. pAfib - [id number] 6. Initial EKG SR with arrhthymias. On BB for rate control and eliquis for anticoagulation. Continue metoprolol 25 mg p.o daily. Anticoagulation switched to Xarelto 20 mg p.o daily. Placed on continuous telemetry. HTN - continue home metoprolol. Hold ARNi and bumex for concerns of exacerbating potential ALEJANDRO post LE angiogram. Monitor blood pressure.   HLD - continue home pravastatin 40 mg p.o daily   AAA - s/p endovascular repair in 2019    History of blood clots - per chart review. On Anticoagulation  CKD III - baseline Cr ~1.1, eGFR 40-50. Post-bilateral LE angiogram with contrast. Anticipate potenial worsening renal function. Cautious rehydrate with NS 75 mL/hr given LVEF, see above. Per cardiology start sodium bicarb gtt. Avoid nephrotoxic agents. Monitor renal function with daily BMP. GERD - Hold home protonix for exacerbating potential ALEJANDRO post LE angiogram. Start famotidine 20 mg p.o daily. History colovesical fistula - s/p right hemicolectomy x2. No colostomy. Tobacco abuse - endorses prolonged history of intermittent smoking. Unable to clarify PPD. Counseled on smoking cessation. History Of Present Illness: The patient is a 82year old female with a past medical history of HFrEF s/p dual ICD, pAfib, HTN, HLD, AAA s/p repair, PVD, blood clots, CKD, colovesical fisutla s/p right hemicolectomy who presented to [facility] for CLI and planned angiogram of bilateral lower extremities. The patient reports persistent and worsening lower extremity pain, greater in the right over the past month. This has been associated with numbness and tingling. She notes her RLE has been getting more red, with darker digits. She notes increased difficulty with ambulation due to this. The patient was evaluated by cardiology with planned intervention 9/6/22.      She previously had a CTA abdominal aorta with runoff that was significant for 3.6 cm fusiform aneurysm of the infrarenal abdominal aorta; bifurcated endograft present; right iliac limb totally occluded and left iliac limb remains patent; stents in right common and external iliac arteries are totally occluded; collateral reconstitution of the right CFA and right profunda; total occlusion of native right SFA and right femoral/anterior tibial bypass graft; Collateral reconstitution of the trifurcation vessels right side; multifocal severe diffuse disease of left SFA; left popliteal artery demonstrated mild stenosis; all 3 trifurcation vessels left calf lateral but demonstrate multifocal disease; subcutaneous emphysema bilaterally that is worse on left 8/4/22. The patient underwent a bilateral lower extremity angiogram 9/6/2022. She was found to have R EVAR limb, R CFA, R SFA occlusions; dimunitive PFA; popliteal artery occlusion with collaterals, all three tibials occluded; and all three tibials are occluded with some degree of flow in the PT and AT. The patient underwent retrograde AT access, knuckled to the R CFA, then re-entry into the R EIA stents; VBX 11 x 39, VBX 11 x 59 x 2, VBX 11 x 39, VBX 8 x 59; Retrograde SFA/pop 6.0 PTA; Elizabeth 6 x 120 x 2 for ostial SFA to distal SFA; DCB 5 x 150 of the pop, and DCB 6 x 80 of the distal SFA/prox pop; and 3.0 PTA of the AT. This resulted in Complete reconstitution of the R EVAR limb, R CFA, R SFA, R POP, and R AT with improved flow into the PT - full flow into the pedal arch. Post procedure the patient was transferred to the ICU for further management and evaluation. Past Medical History:        Diagnosis Date    A-fib [facility])     AAA (abdominal aortic aneurysm) ([facility])     Achalasia     Anemia     Arthritis     Blood circulation, collateral     BPPV (benign paroxysmal positional vertigo) 6/6/16    CHF (congestive heart failure) ([facility])     Chronic kidney disease     sees Dr Hector Gonzalez [facility])     Gastrointestinal hemorrhage     GERD (gastroesophageal reflux disease)     ? esophageal stricture    Hiatal hernia     History of blood transfusion     HTN (hypertension)     Hx of blood clots 2018    R leg-sees ABEBA Hargrove    Hyperlipidemia     [employer] dual ICD  8/26/2020    Neuromuscular disorder ([facility]     Obesity     Osteopenia     Osteoporosis     PAC (premature atrial contraction)     on betablocker    Paralysis ([facility])     baby    Pedal edema     denied any hx of hypertension    Pneumonia     PVC (premature ventricular contraction)     sees Dr Wesley Hassan    PVD (peripheral vascular disease) ([facility] [address]     Small bowel obstruction ([facility])     Tinnitus     UTI (urinary tract infection)        Past Surgical History:        Procedure Laterality Date    ABDOMINAL AORTIC ANEURYSM REPAIR, ENDOVASCULAR N/A 2/15/2019    ABDOMINAL AORTIC ANEURYSM REPAIR ENDOVASCULAR, DECLOTTING RIGHT FEM TIB BYPASS GRAFT performed by Klarissa Krause MD at [address] [place] [address]    lumpectomy    CHOLECYSTECTOMY      30 years ago    COLONOSCOPY  08/06/2018    Dr Roney Ye N/A 2/22/2019    COLONOSCOPY DIAGNOSTIC performed by Tessa Burgess MD at [facility]    COLONOSCOPY N/A 2/22/2020    COLONOSCOPY CONTROL HEMORRHAGE performed by Lulu Rod MD at [facility]    COLONOSCOPY Left 3/2/2021    COLORECTAL CANCER SCREENING, NOT HIGH RISK performed by Galo Victoria MD at [address] [place] [address]      eye, eyelids    EYE SURGERY      cataract    HEMICOLECTOMY N/A 2/25/2020    OPEN RIGHT COLON RESECTION performed by Kathy Wharton MD at [address])      LARYNGOSCOPY  07/15/2016    OTHER SURGICAL HISTORY  10/06/2020    Exp lap washout mesenteric lymph node biopsy EGD abthera placement Dr Ugo Tanner  10/08/2020    reopening recent lap open g tube placement intra operative EGD and primary closure of open abdomen- [address] [place] [address] OLEGARIO SKN SUB GRFT T/A/L AREA/<100SCM /<1ST 25 SCM N/A 9/6/2018    RE-EXPLORATION RIGHT GROIN, DECLOTTING OF FEMORAL BYPASS GRAFT performed by Lucrecia Heredia MD at [facility] EGD TRANSORAL BIOPSY SINGLE/MULTIPLE Left 11/27/2017    EGD BIOPSY performed by Anjelica Urena MD at [facility], PARTIAL, Judy Davidson N/A 8/20/2018    ROBOT ASSISTED COLECTOMY (LOW ANTERIOR) performed by Sohan Martin [facility]    [place] OFFICE/OUTPT VISIT,PROCEDURE ONLY N/A 9/5/2018    RIGHT FEMORAL EMBOLECTOMY, INTRAOPERATIVE ARTERIOGRAM, RIGHT ILIAC EMBOLECTOMY, RIGHT COMMON AND EXTERNAL ILIAC STENTING, RIGHT FEMORAL TO ANTERIOR POPLITEAL BYPASS WITH 6MM GORTEX GRAFT performed by Aysha Agudelo MD at [address] / [address] Right 2/14/2019    FEMORAL EMBOLECTOMY THROMBECTOMY, RIGHT LEG performed by Aysha Agudelo MD at [facility] 11/27/2017    EGD SUBMUCOSAL/BOTOX INJECTION performed by Bruce Ignacio MD at [facility] 2/22/2019    EGD BIOPSY performed by Janine Greenberg MD at [facility]       Home Medications:   No current facility-administered medications on file prior to encounter.      Current Outpatient Medications on File Prior to Encounter   Medication Sig Dispense Refill    hydrocortisone (ANUSOL-HC) 25 MG suppository Place 25 mg rectally nightly      sacubitril-valsartan (ENTRESTO) 24-26 MG per tablet Take 1 tablet by mouth 2 times daily 180 tablet 3    bumetanide (BUMEX) 1 MG tablet Take 1 tablet by mouth once a week On Mondays and PRN (Patient taking differently: Take 1 mg by mouth once a week On Sundays and PRN) 30 tablet 3    potassium chloride (KLOR-CON M) 10 MEQ extended release tablet Take 1 tablet by mouth once a week 52 tablet 0    apixaban (ELIQUIS) 2.5 MG TABS tablet TAKE 1 TABLET BY MOUTH TWICE DAILY 60 tablet 7    magnesium (MAGNESIUM-OXIDE) 250 MG TABS tablet TAKE 2 TABLETS BY MOUTH TWICE DAILY 120 tablet 7    clopidogrel (PLAVIX) 75 MG tablet TAKE 1 TABLET BY MOUTH DAILY 90 tablet 3    pravastatin (PRAVACHOL) 40 MG tablet TAKE 1 TABLET BY MOUTH DAILY 90 tablet 3    metoprolol succinate (TOPROL XL) 25 MG extended release tablet TAKE 1 TABLET BY MOUTH DAILY 90 tablet 3    pantoprazole (PROTONIX) 40 MG tablet Take 40 mg by mouth 2 times daily (before meals)      albuterol sulfate HFA (PROVENTIL HFA) 108 (90 Base) MCG/ACT inhaler Inhale 2 puffs into the lungs every 6 hours as needed for Wheezing or Shortness of Breath 1 Inhaler 0    Blood Pressure KIT Check blood pressure daily, and if symptoms of lightheadedness. Call physician if BP <80/40. 1 kit 0    melatonin 3 MG TABS tablet Take 2 tablets by mouth nightly as needed (sleep) 60 tablet 3    acetaminophen (TYLENOL) 650 MG extended release tablet Take 1,300 mg by mouth every 12 hours as needed for Pain       timolol (TIMOPTIC) 0.5 % ophthalmic solution Place 1 drop into both eyes daily         Allergies:    Aspirin, Lasix [furosemide], Lipitor [atorvastatin], Neurontin [gabapentin], Oxycontin [oxycodone hcl], and Penicillins    Social History:    reports that she has been smoking cigarettes. She started smoking about 66 years ago. She has a 15.00 pack-year smoking history. She has never used smokeless tobacco. She reports current alcohol use of about 1.0 standard drink per week. She reports that she does not use drugs. Family History:       Problem Relation Age of Onset    Heart Disease Mother     Stroke Mother     Cancer Father         lung    Emphysema Father     Other Sister         leukemia    Heart Disease Maternal Grandmother     Dementia Maternal Grandmother     Heart Disease Maternal Grandfather     No Known Problems Paternal Grandmother     No Known Problems Paternal Grandfather        Diet:  Diet NPO Exceptions are: Sips of Water with Meds    Review of systems:   Pertinent positives as noted in the HPI. All other systems reviewed and negative.     PHYSICAL EXAMINATION:  Patient Vitals for the past 8 hrs:   BP Pulse Resp SpO2   09/06/22 1530 121/67 80 20 97 %   09/06/22 1520 133/66 75 21 97 %   09/06/22 1510 -- 76 16 96 %   09/06/22 1500 (!) 149/81 70 27 96 %   09/06/22 1455 (!) 147/66 70 18 96 %   09/06/22 1450 (!) 147/85 77 27 95 %   09/06/22 1440 (!) 151/74 68 24 98 %   09/06/22 1435 (!) 145/80 72 10 99 %   09/06/22 1430 132/72 75 27 100 %   09/06/22 1425 136/72 65 13 99 %   09/06/22 1420 (!) 143/75 68 26 99 %   09/06/22 1415 (!) 142/83 62 20 99 %   09/06/22 1410 (!) 142/83 66 23 100 %   09/06/22 1405 118/77 65 20 99 %   09/06/22 1400 139/84 65 17 99 %   09/06/22 1355 (!) 154/81 62 13 99 %   09/06/22 1350 (!) 144/68 74 15 99 %   09/06/22 1345 136/72 76 18 99 %   09/06/22 1340 (!) 141/78 69 17 98 %   09/06/22 1335 133/64 69 21 98 %   09/06/22 1320 137/89 66 13 98 %   09/06/22 1315 (!) 141/68 71 17 96 %   09/06/22 1310 137/81 67 19 99 %   09/06/22 1305 132/77 67 17 99 %   09/06/22 1300 (!) 141/88 69 20 100 %   09/06/22 1255 (!) 142/77 72 15 100 %   09/06/22 1250 131/69 75 14 99 %   09/06/22 1245 117/62 64 15 98 %   09/06/22 1240 118/62 71 14 96 %   09/06/22 1235 112/60 65 18 95 %   09/06/22 1230 (!) 106/53 66 13 95 %   09/06/22 1225 (!) 97/54 75 16 94 %   09/06/22 1220 (!) 106/57 78 16 94 %   09/06/22 1215 (!) 103/51 64 14 93 %   09/06/22 1210 (!) 117/50 64 18 94 %   09/06/22 1205 (!) 117/55 69 14 97 %   09/06/22 1200 (!) 108/56 67 14 96 %   09/06/22 1155 (!) 115/43 60 18 94 %   09/06/22 1150 110/70 72 20 96 %   09/06/22 1135 (!) 117/49 60 20 94 %   09/06/22 1122 117/75 62 20 96 %     No intake/output data recorded. Wt Readings from Last 3 Encounters:   09/06/22 162 lb (73.5 kg)   07/11/22 166 lb (75.3 kg)   01/26/22 148 lb (67.1 kg)      Body mass index is 26.96 kg/m². General:   No acute distress, well appearing, well developed, interactive and cooperative. HEENT:  normocephalic and atraumatic. No scleral icterus. PERR  Neck: supple. No Thyromegaly. Lungs: clear to auscultation. No retractions  Cardiac: RRR. No JVD. Abdomen: soft. Nontender. Extremities:  No clubbing or edema x 4. RLE erythematous, dark/dusky right digits. Right sided femstop present. Vasculature: capillary refill < 3 seconds. Palpable dorsalis pedis pulses. Doppler AT/PT/Dorsal pulses appreciated. Skin:  warm and dry. Psych:  Alert and oriented x3.   Affect appropriate  Lymph:  No supraclavicular adenopathy. Neurologic:  No focal deficit. No seizures. Data: (All radiographs, tracings, PFTs, and imaging are personally viewed and interpreted unless otherwise noted). CURRENT PARENTERAL VASOACTIVE / INOTROPIC AGENTS:  [x] None Vasopressors:  [] Norepinephrine  [] Dopamine  [] Phenylephrine  [] Vasopressin  [] Epinephrine  [] Other: Sedation:  [] No Sedation  [] Propofol gtt-    [] BZD gtt -    [] Opiate gtt  -    [] Dexmedetomidine  [] Paralytics Antihypertensives gtt  [] Ca Channel Antagonist:  [] Beta-blocker:  [] Nitroglycerin  [] Nitroprusside     SUPPORT DEVICES:       Additional Respiratory Assessments  Heart Rate: 80  Resp: 20  SpO2: 97 %  Oxygen Delivery - O2 Flow Rate (L/min): 2 L/min  ABGs:   Lab Results   Component Value Date/Time    PH 7.40 03/02/2021 05:39 PM    PCO2 33 03/02/2021 05:39 PM    PO2 51 03/02/2021 05:39 PM    HCO3 20 03/02/2021 05:39 PM    O2SAT 86 03/02/2021 05:39 PM     Lab Results   Component Value Date/Time    IFIO2 50 03/02/2021 05:39 PM    MODE CPAP/PS 12/28/2019 02:53 AM    SETPEEP 8.0 12/28/2019 02:53 AM         CENTRAL LINES/CHEMOPORT/TUNNEL CATH:-    [x] No   [] Yes   (Date )           If yes -    [] Right IJ   [] Left IJ [] Right Femoral [] Left Femoral     [] Right Subclavian [] Left Subclavian  [x] Peripheral IV access  [] Arterial Line (Specify Site)    MOBLEY CATHETER:-   [] No  [] Yes  (Date  )     ICU PROPHYLAXIS/THERAPY:   Stress ulcer:  [] PPI Agent  [x] H2RA [] Sucralfate [] Other:   VTE:     [] Enoxaparin    [] Warfarin [x] NOAC [] PCD Device:Bilat LE   [] Heparin: [] Subcut / [] IV     LABS/RADIOLOGY:-  Recent Labs     09/06/22  0633   WBC 6.5   HGB 15.9   HCT 48.1*        Recent Labs     09/06/22  0633      K 4.2      CO2 31   BUN 32*   CREATININE 1.1   CALCIUM 9.5     No results for input(s): AST, ALT, BILIDIR, BILITOT, ALKPHOS in the last 72 hours.   Recent Labs     09/06/22  0633   INR 1.10     No results for input(s): Kenia Shanks in the last 72 hours. No results for input(s): PROCAL in the last 72 hours. No results for input(s): LACTA in the last 72 hours. Microbiology:    Blood culture #1:   Lab Results   Component Value Date/Time    BC No growth-preliminary No growth  12/28/2019 05:54 AM     Blood culture #2:No results found for: Anya Oropeza  Organism:  Lab Results   Component Value Date/Time    ORG Enterococcus faecalis - (Group D) 10/03/2020 06:45 PM         Lab Results   Component Value Date/Time    LABGRAM  10/24/2018 01:12 PM     Few segmented neutrophils observed. No epithelial cells observed. Moderate gram positive cocci occurring singly and in pairs. MRSA culture only:No results found for: [facility]  Urine culture:   Lab Results   Component Value Date/Time    LABURIN [place] count: 50,000-90,000 CFU/mL  10/03/2020 06:45 PM     Respiratory culture: No results found for: CULTRESP  Aerobic and Anaerobic :  Lab Results   Component Value Date/Time    LABAERO light growth 10/24/2018 01:12 PM     Lab Results   Component Value Date/Time    LABANAE  10/24/2018 01:12 PM     Culture yielded moderate mixed growth consisting of anaerobic  gram negative bacilli and anaerobic gram positive cocci. If a  true mixed aerobic and anaerobic infection is suspected, then  broad spectrum empiric antibiotic therapy is indicated and  should include coverage for anaerobic organisms. Urinalysis:      Lab Results   Component Value Date/Time    NITRU POSITIVE 03/01/2021 06:11 AM    WBCUA 0-2 03/01/2021 06:11 AM    BACTERIA MANY 03/01/2021 06:11 AM    RBCUA 0-2 03/01/2021 06:11 AM    BLOODU TRACE 03/01/2021 06:11 AM    SPECGRAV >1.030 03/01/2021 06:11 AM    GLUCOSEU NEGATIVE 10/02/2020 08:26 AM       Radiology:(All radiographs, tracings, PFTs, and imaging are personally viewed and interpreted unless otherwise noted). IR ANGIOGRAM EXTREMITY BILATERAL    (Results Pending)     No results found.     EKG: Sinus rhythm with marked sinus arrhythmia, left axis deviation    CONSULTS:-  [] Cardiology  [] Nephrology  [] Hemo onco   [] GI   [] ID  [] Endocrine  [] Pulmo      [] Neuro    [] Psych   [] Urology  [] ENT   [] [place]   []Ortho    []CV surg        [] Palliative  [] Hospice [] Pain management   []    []TCU   [] PT/OT  OTHERS:-    CODE STATUS:-  [x] Full resuscitation [] DNR-Comfort Care-Arrest  [] DNR-Comfort Care   [] Limited Resuscitation   [] No ET intubation   [] No CPR   [] No shock for non-perfusing rhythm    Total critical care time caring for this patient with life threatening, unstable organ failure, including direct patient contact, management of life support systems, review of data including imaging and labs, discussions with other team members and physicians at least 27  Min so far today, excluding procedures. Electronically signed by Danae Tenorio DO on 9/6/2022 at 3:48 PM  CRITICAL CARE SPECIALIST. Patient seen by me including key components of medical care. Case discussed with resident physician. Patient with reconstruction of RLE vasculature. Leg with significantly improved perfusion post procedure. .  Italicized font, if present,  represents changes to the note made by me. CC time 35 minutes. Time was discontiguous. Time does not include procedure. Time does include my direct assessment of the patient and coordination of care. Time represents more than 50% of the time involved with patient care by the [address] [place] [address] team.  Electronically signed by Gualberto Jean.  Ramya Noland MD.

## 2022-09-06 NOTE — PROGRESS NOTES
0877 Patient admitted to 2E03  Ambulatory for angiogram adi extremity. Patient NPO. Patient accompanied by daughter and son in law. Vital signs obtained. Assessment and data collection intiated. Oriented to room. Policies and procedures for 2E explained. All questions answered with no further questions at this time. Fall prevention and safety precautions discussed with patient. Bottom on rt foot black, rt foot washed and black washed off.     [address] plan reviewed with patient and daughter. Patient and daughter verbalize understanding of the plan of care and contribute to goal setting. [address] in IR called, they might move patient up, William Ye updated no pulses found in rt foot. Also instructed on heart rhythm. 1036 To radiology per bed, stable condition. 1312 Report to Carol on 3b.   1325 Belongings brought to ICU room 7, bed was switched from 3b to ICU.

## 2022-09-07 ENCOUNTER — APPOINTMENT (OUTPATIENT)
Dept: INTERVENTIONAL RADIOLOGY/VASCULAR | Age: 87
DRG: 253 | End: 2022-09-07
Attending: INTERNAL MEDICINE
Payer: MEDICARE

## 2022-09-07 PROBLEM — I70.229 CRITICAL ISCHEMIA OF LOWER EXTREMITY (HCC): Status: ACTIVE | Noted: 2022-09-07

## 2022-09-07 LAB
ANION GAP SERPL CALCULATED.3IONS-SCNC: 6 MEQ/L (ref 8–16)
BUN BLDV-MCNC: 26 MG/DL (ref 7–22)
CALCIUM IONIZED: 1.1 MMOL/L (ref 1.12–1.32)
CALCIUM SERPL-MCNC: 8 MG/DL (ref 8.5–10.5)
CHLORIDE BLD-SCNC: 104 MEQ/L (ref 98–111)
CO2: 28 MEQ/L (ref 23–33)
CREAT SERPL-MCNC: 1 MG/DL (ref 0.4–1.2)
EKG ATRIAL RATE: 84 BPM
EKG P AXIS: 24 DEGREES
EKG P-R INTERVAL: 152 MS
EKG Q-T INTERVAL: 382 MS
EKG QRS DURATION: 76 MS
EKG QTC CALCULATION (BAZETT): 451 MS
EKG R AXIS: -69 DEGREES
EKG T AXIS: 43 DEGREES
EKG VENTRICULAR RATE: 84 BPM
ERYTHROCYTE [DISTWIDTH] IN BLOOD BY AUTOMATED COUNT: 14.5 % (ref 11.5–14.5)
ERYTHROCYTE [DISTWIDTH] IN BLOOD BY AUTOMATED COUNT: 48.6 FL (ref 35–45)
GFR SERPL CREATININE-BSD FRML MDRD: 53 ML/MIN/1.73M2
GLUCOSE BLD-MCNC: 150 MG/DL (ref 70–108)
HCT VFR BLD CALC: 38.1 % (ref 37–47)
HEMOGLOBIN: 12.5 GM/DL (ref 12–16)
LACTIC ACID: 1.5 MMOL/L (ref 0.5–2)
MCH RBC QN AUTO: 30.3 PG (ref 26–33)
MCHC RBC AUTO-ENTMCNC: 32.8 GM/DL (ref 32.2–35.5)
MCV RBC AUTO: 92.3 FL (ref 81–99)
PLATELET # BLD: 100 THOU/MM3 (ref 130–400)
PMV BLD AUTO: 12.5 FL (ref 9.4–12.4)
POTASSIUM REFLEX MAGNESIUM: 4.1 MEQ/L (ref 3.5–5.2)
RBC # BLD: 4.13 MILL/MM3 (ref 4.2–5.4)
SODIUM BLD-SCNC: 138 MEQ/L (ref 135–145)
TOTAL CK: 26 U/L (ref 30–135)
WBC # BLD: 7.4 THOU/MM3 (ref 4.8–10.8)

## 2022-09-07 PROCEDURE — 6370000000 HC RX 637 (ALT 250 FOR IP): Performed by: INTERNAL MEDICINE

## 2022-09-07 PROCEDURE — 82330 ASSAY OF CALCIUM: CPT

## 2022-09-07 PROCEDURE — 83605 ASSAY OF LACTIC ACID: CPT

## 2022-09-07 PROCEDURE — 82550 ASSAY OF CK (CPK): CPT

## 2022-09-07 PROCEDURE — 99232 SBSQ HOSP IP/OBS MODERATE 35: CPT | Performed by: NURSE PRACTITIONER

## 2022-09-07 PROCEDURE — 2580000003 HC RX 258: Performed by: INTERNAL MEDICINE

## 2022-09-07 PROCEDURE — 80048 BASIC METABOLIC PNL TOTAL CA: CPT

## 2022-09-07 PROCEDURE — 6370000000 HC RX 637 (ALT 250 FOR IP): Performed by: NURSE PRACTITIONER

## 2022-09-07 PROCEDURE — 2580000003 HC RX 258: Performed by: STUDENT IN AN ORGANIZED HEALTH CARE EDUCATION/TRAINING PROGRAM

## 2022-09-07 PROCEDURE — 99233 SBSQ HOSP IP/OBS HIGH 50: CPT | Performed by: INTERNAL MEDICINE

## 2022-09-07 PROCEDURE — 85027 COMPLETE CBC AUTOMATED: CPT

## 2022-09-07 PROCEDURE — 93010 ELECTROCARDIOGRAM REPORT: CPT | Performed by: INTERNAL MEDICINE

## 2022-09-07 PROCEDURE — 2580000003 HC RX 258: Performed by: FAMILY MEDICINE

## 2022-09-07 PROCEDURE — 2060000000 HC ICU INTERMEDIATE R&B

## 2022-09-07 PROCEDURE — 6360000002 HC RX W HCPCS: Performed by: INTERNAL MEDICINE

## 2022-09-07 RX ORDER — CALCIUM GLUCONATE 10 MG/ML
1000 INJECTION, SOLUTION INTRAVENOUS ONCE
Status: COMPLETED | OUTPATIENT
Start: 2022-09-07 | End: 2022-09-07

## 2022-09-07 RX ORDER — 0.9 % SODIUM CHLORIDE 0.9 %
500 INTRAVENOUS SOLUTION INTRAVENOUS ONCE
Status: DISCONTINUED | OUTPATIENT
Start: 2022-09-07 | End: 2022-09-11 | Stop reason: HOSPADM

## 2022-09-07 RX ORDER — HYDROCORTISONE ACETATE 25 MG/1
25 SUPPOSITORY RECTAL NIGHTLY
Status: DISCONTINUED | OUTPATIENT
Start: 2022-09-07 | End: 2022-09-11 | Stop reason: HOSPADM

## 2022-09-07 RX ORDER — 0.9 % SODIUM CHLORIDE 0.9 %
500 INTRAVENOUS SOLUTION INTRAVENOUS ONCE
Status: COMPLETED | OUTPATIENT
Start: 2022-09-07 | End: 2022-09-07

## 2022-09-07 RX ADMIN — HYDROCORTISONE ACETATE 25 MG: 25 SUPPOSITORY RECTAL at 21:37

## 2022-09-07 RX ADMIN — SODIUM CHLORIDE 500 ML: 9 INJECTION, SOLUTION INTRAVENOUS at 00:38

## 2022-09-07 RX ADMIN — CLOPIDOGREL BISULFATE 75 MG: 75 TABLET ORAL at 08:35

## 2022-09-07 RX ADMIN — ACETAMINOPHEN 650 MG: 325 TABLET ORAL at 18:15

## 2022-09-07 RX ADMIN — TIMOLOL MALEATE 1 DROP: 5 SOLUTION OPHTHALMIC at 08:20

## 2022-09-07 RX ADMIN — SODIUM CHLORIDE, PRESERVATIVE FREE 10 ML: 5 INJECTION INTRAVENOUS at 08:36

## 2022-09-07 RX ADMIN — SODIUM CHLORIDE 500 ML: 9 INJECTION, SOLUTION INTRAVENOUS at 08:46

## 2022-09-07 RX ADMIN — PANTOPRAZOLE SODIUM 40 MG: 40 TABLET, DELAYED RELEASE ORAL at 06:25

## 2022-09-07 RX ADMIN — RIVAROXABAN 20 MG: 20 TABLET, FILM COATED ORAL at 20:22

## 2022-09-07 RX ADMIN — ASPIRIN 81 MG 81 MG: 81 TABLET ORAL at 08:35

## 2022-09-07 RX ADMIN — ACETAMINOPHEN 650 MG: 325 TABLET ORAL at 22:33

## 2022-09-07 RX ADMIN — NITROGLYCERIN 0.5 INCH: 20 OINTMENT TOPICAL at 08:36

## 2022-09-07 RX ADMIN — PRAVASTATIN SODIUM 40 MG: 40 TABLET ORAL at 08:35

## 2022-09-07 RX ADMIN — PANTOPRAZOLE SODIUM 40 MG: 40 TABLET, DELAYED RELEASE ORAL at 18:09

## 2022-09-07 RX ADMIN — NITROGLYCERIN 0.5 INCH: 20 OINTMENT TOPICAL at 21:37

## 2022-09-07 RX ADMIN — CALCIUM GLUCONATE 1000 MG: 10 INJECTION, SOLUTION INTRAVENOUS at 08:48

## 2022-09-07 RX ADMIN — Medication 500 ML: at 04:09

## 2022-09-07 RX ADMIN — ACETAMINOPHEN 650 MG: 325 TABLET ORAL at 06:27

## 2022-09-07 RX ADMIN — FAMOTIDINE 20 MG: 20 TABLET ORAL at 21:37

## 2022-09-07 ASSESSMENT — PAIN DESCRIPTION - DESCRIPTORS
DESCRIPTORS: ACHING;TINGLING
DESCRIPTORS: ACHING;NUMBNESS;TINGLING
DESCRIPTORS: SHARP
DESCRIPTORS: SHARP
DESCRIPTORS: ACHING;TINGLING

## 2022-09-07 ASSESSMENT — PAIN SCALES - GENERAL
PAINLEVEL_OUTOF10: 8
PAINLEVEL_OUTOF10: 4
PAINLEVEL_OUTOF10: 3
PAINLEVEL_OUTOF10: 0
PAINLEVEL_OUTOF10: 9

## 2022-09-07 ASSESSMENT — PAIN DESCRIPTION - ORIENTATION
ORIENTATION: RIGHT;LEFT
ORIENTATION: RIGHT;LEFT
ORIENTATION: LOWER
ORIENTATION: RIGHT;LEFT
ORIENTATION: LOWER

## 2022-09-07 ASSESSMENT — PAIN DESCRIPTION - LOCATION
LOCATION: FOOT

## 2022-09-07 ASSESSMENT — PAIN DESCRIPTION - FREQUENCY: FREQUENCY: INTERMITTENT

## 2022-09-07 ASSESSMENT — PAIN DESCRIPTION - ONSET: ONSET: ON-GOING

## 2022-09-07 NOTE — PROGRESS NOTES
Pt up to commode with assistance. While pt was getting up off of the commode back into bed, this RN noticed a small amount of blood on the patients leg. This RN and student RN assisted pt back into bed where it was found that the left groin puncture site was bleeding. Pressure held on site for 15 minutes. New quick clot with transparent dressing placed on patient. Leah Saldaña with cardiology at bedside to round on patient. Sand bag temporarily applied to site. Will monitor closely.

## 2022-09-07 NOTE — PROGRESS NOTES
1815 noted bloody drainage on  dressing at post tibial site above vasc band. 1700 [facility] called ,informed of bloody drainage. states this was site of puncture in Interventional radiology. orders received. 1841 dressing removed, applied quick clot and manual pressure x 10 minutes. Then covered with transparent dressing. noted at same time oozing from vasc band site  Instilled 4 ml air . Back to 12 ml total.  1900 no bleeding from vasc band site. Posterior dressing dry. Pulses unchanged. 2015 report to oncoming nurse. 2115 daughter called in updated.

## 2022-09-07 NOTE — PROGRESS NOTES
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 S/p retrograde AT access, knuckled to the R CFA, then re-entry into the R EIA stents; VBX 11 x 39, VBX 11 x 59 x 2, VBX 11 x 39, VBX 8 x 59; Retrograde SFA/pop 6.0 PTA; Elizabeth 6 x 120 x 2 for ostial SFA to distal SFA; DCB 5 x 150 of the pop, and DCB 6 x 80 of the distal SFA/prox pop; and 3.0 PTA of the AT. Now with complete reconstitution of the R EVAR limb, R CFA, R SFA, R POP, and R AT with improved flow into the PT - full flow into the pedal arch on 09/06/2022. Continue local nitro ointment to RLE. On DAPT and Xeralto. High risk for reperfusion injury. Dr. Wagner Lay following. Encourage ambulation. PTOT. Chronic ischemic systolic HF NYHA class II: s/p dual ICD. Last limited TTE 06/26/2020  with EF 25-30%, severely dilated LA, hypertrophic basal septum. On DAPT/BB/statin/ARNi- held due to borderline BP. No signs of decompensation at this time. Monitor. Salt/fluid restrictions. PAF: [id number]. Currently in NSR with HR WNL. On Troprol-XL. Eliquis switched to [facility] in the setting of PVD. Monitor on tele. Primary HTN: controlled. On Entresto, Toprol-XL, Bumex as needed per chart. HLD: on pravastatin 40 mg daily. AAA: s/p endovascular repair in 2019. 3.4 cm on CTA 8/4/2022. Follow up outpatient. CKD III: baseline Cr ~1.1, eGFR 40-50. Monitor for contrast nephropathy 48-72 hours post procedure. Encourage PO intake for hydration. GERD - Hold home protonix to avoid potential ALEJANDRO post LE angiogram. Started on famotidine 20 mg p.o daily. Resume home protonix on discharge. Hx colovesical fistula - s/p right hemicolectomy x2. No colostomy. Tobacco use disorder - prolonged history of intermittent smoking. Unable to clarify PPD. Counseled on smoking cessation. Tele:   [x] yes             [] no    Code Status: Full Code    Fluids: none, encourage PO intake  Diet:  ADULT DIET; Regular    DVT prophylaxis: [] Lovenox                                 [] SCDs                                 [] SQ Heparin                                 [] Encourage ambulation                                 [x] Already on Anticoagulation    Disposition:      [x] TBD                             [x] Home                             [] TCU                             [] Rehab                             [] Psych                             [] SNF                             [] [place]                             [] Other-    Chief Complaint: leg pain/planned angiogram     Hospital Course:   81 yo F with history of HFrEF s/p dual ICD, PAF, HTN, HLD, AAA s/p repair, PVD, blood clots, CKD, colovesical fisutla s/p right hemicolectomy presented to [facility] on 09/06 for planned outpatient BLE angiogram for CLI per Dr. Taran Wiley. Has had persistent and progressively worsening RLE x 1 month with associated paresthesia and worsening rubor and cyanosis of toes, limiting ambulation.        Of note, 08/04/2022 CTA abdominal aorta with runoff that was significant for 3.6 cm fusiform aneurysm of the infrarenal abdominal aorta; bifurcated endograft present; right iliac limb totally occluded and left iliac limb remains patent; stents in right common and external iliac arteries are totally occluded; collateral reconstitution of the right CFA and right profunda; total occlusion of native right SFA and right femoral/anterior tibial bypass graft; Collateral reconstitution of the trifurcation vessels right side; multifocal severe diffuse disease of left SFA; left popliteal artery demonstrated mild stenosis; all 3 trifurcation vessels left calf lateral but demonstrate multifocal disease; subcutaneous emphysema bilaterally that is worse. Interval:   9/6 S/p BLE angiogram showed R EVAR limb, R CFA, R SFA occlusions; dimunitive PFA; popliteal artery occlusion with collaterals, all three tibials occluded; and all three tibials are occluded with some degree of flow in the PT and AT s/p retrograde AT access, knuckled to the R CFA, then re-entry into the R EIA stents; VBX 11 x 39, VBX 11 x 59 x 2, VBX 11 x 39, VBX 8 x 59; Retrograde SFA/pop 6.0 PTA; Elizabeth 6 x 120 x 2 for ostial SFA to distal SFA; DCB 5 x 150 of the pop, and DCB 6 x 80 of the distal SFA/prox pop; and 3.0 PTA of the AT. With complete reconstitution of the R EVAR limb, R CFA, R SFA, R POP, and R AT with improved flow into the PT - full flow into the pedal arch. Monitored in ICU post-procedurally. Subjective:   Reports 9/10 RLE pain otherwise no new complaints. Tolerated breakfast well this morning. ROS (12 point review of systems completed. Pertinent positives noted.  Otherwise ROS is negative)     Medications:  Reviewed    Infusion Medications    sodium bicarbonate infusion 75 mL/hr at 09/06/22 1751     Scheduled Medications    sodium chloride  500 mL IntraVENous Once    calcium gluconate  1,000 mg IntraVENous Once    diphenhydrAMINE  50 mg IntraVENous Once    hydrocortisone sodium succinate PF  200 mg IntraVENous Once    sodium chloride flush  5-40 mL IntraVENous 2 times per day    timolol  1 drop Both Eyes Daily    pantoprazole  40 mg Oral BID AC    pravastatin  40 mg Oral Daily    [START ON 9/11/2022] potassium chloride  10 mEq Oral Weekly    nitroglycerin  0.5 inch Topical BID    rivaroxaban  20 mg Oral Daily    aspirin  81 mg Oral Daily    clopidogrel  75 mg Oral Daily    metoprolol succinate  25 mg Oral 09/07/22  0524   CKTOTAL 26*       Microbiology:    Blood culture #1:   Lab Results   Component Value Date/Time    BC No growth-preliminary No growth  12/28/2019 05:54 AM       Blood culture #2:No results found for: Maribel Idlayo    Organism:  Lab Results   Component Value Date/Time    ORG Enterococcus faecalis - (Group D) 10/03/2020 06:45 PM         Lab Results   Component Value Date/Time    LABGRAM  10/24/2018 01:12 PM     Few segmented neutrophils observed. No epithelial cells observed. Moderate gram positive cocci occurring singly and in pairs. MRSA culture only:No results found for: [facility]    Urine culture:   Lab Results   Component Value Date/Time    LABURIN [place] count: 50,000-90,000 CFU/mL  10/03/2020 06:45 PM       Respiratory culture: No results found for: CULTRESP    Aerobic and Anaerobic :  Lab Results   Component Value Date/Time    LABAERO light growth 10/24/2018 01:12 PM     Lab Results   Component Value Date/Time    LABANAE  10/24/2018 01:12 PM     Culture yielded moderate mixed growth consisting of anaerobic  gram negative bacilli and anaerobic gram positive cocci. If a  true mixed aerobic and anaerobic infection is suspected, then  broad spectrum empiric antibiotic therapy is indicated and  should include coverage for anaerobic organisms. Urinalysis:      Lab Results   Component Value Date/Time    NITRU POSITIVE 03/01/2021 06:11 AM    WBCUA 0-2 03/01/2021 06:11 AM    BACTERIA MANY 03/01/2021 06:11 AM    RBCUA 0-2 03/01/2021 06:11 AM    BLOODU TRACE 03/01/2021 06:11 AM    SPECGRAV >1.030 03/01/2021 06:11 AM    GLUCOSEU NEGATIVE 10/02/2020 08:26 AM       Radiology:  IR ANGIOGRAM EXTREMITY BILATERAL   Final Result        IR ANGIOGRAM EXTREMITY BILATERAL    Result Date: 9/7/2022  Radiology exam is complete. No Radiologist dictation. Please follow up with ordering provider.            Herbert Brooks MD   PGY3, Internal Medicine   9/7/2022

## 2022-09-07 NOTE — FLOWSHEET NOTE
09/07/22 180   Safe Environment   Safety Measures Other (comment)  (virtual safety round complete)   Virtual Nurse rounds, staff responded to audio that they were with the patient, camera not turned on

## 2022-09-07 NOTE — PROGRESS NOTES
Cardiology Progress Note      Patient:  Eder Fajardo  YOB: 1935  MRN: [id number]   Acct: [de-identified]  Admit Date:  9/6/2022  Primary Cardiologist: Madiha Mendez MD    Rationale for Cardiology consult: EVAR limb occlusion; critical limb ischemia    HPI/PMH: Admitted for peripheral arteriogram with peripheral intervention. Patient had called office on 8/1/22 noting feeling like \"R leg was going to explode\" due to pain with ambulation. Known hx PAD s/p Peripheral angiogram/intervention was done on 02/14/2019 for acute-on-chronic limb ischemia related to post lower extremity bypass distal anastomotic stenosis with recent graft thrombosis with a successful right fem-tib angioplasty and stenting. PMHx:   Afib on [address], HTN, PVD, PAD and R LE stent placement, left heart catheterization on 12/30/2019 for nonobstructive coronary artery disease with elevated troponin, Non-ischemic Cardiomyopathy, NYHA II  Status post ileocolonic resection Feb 2020 with Dr. Olsen Fails post robotic anterior resection secondary to diverticular stricture 2018 with Dr. Mendez Chamber   Status post endoluminal graft repair of abdominal aortic aneurysm Feb 2019  S/p PEG Tube  S/p EVAR    Chief Complaint: \"Tired; still hurt a little - doing better\"    Subjective (Events in last 24 hours): Stable post procedure  ICU OVN; stable for TRO of unit today to 4K  VSS  No chest discomfort or dyspnea  Pain currently controlled in R foot; hypersensitive to touch  Doppler pulses (+)  R foot red, warm, wiggles toes readily, sensation intact. Entire R leg warm, pink, sensation intact. Access sites stable.      Objective:   BP (!) 93/57   Pulse 82   Temp 97.5 °F (36.4 °C) (Oral)   Resp 15   Ht 5' 5\" (1.651 m)   Wt 176 lb 12.9 oz (80.2 kg)   SpO2 98%   BMI 29.42 kg/m²        TELEMETRY: SR 80's    Physical Exam:  General Appearance: alert and oriented to person, place and time, in no acute distress, resting in bed  Cardiovascular: normal rate, regular rhythm, normal S1 and S2, no murmurs, rubs, clicks, or gallops, distal pulses intact  Pulmonary/Chest: clear to auscultation bilaterally- no wheezes, rales or rhonchi, normal air movement, no respiratory distress  Abdomen: soft, non-tender, non-distended, normal bowel sounds  Extremities: no cyanosis, R foot red, warm, CSM intact; hypersensitive to touch to mid calf. RLE pink, warm, SCM intact. L groin site with quarter sized area drainage; small amount oozing after up to commode; no active bleeding now - pressure had been held; sandbag to site. No hematoma.    Skin: warm and dry, no rash or erythema  Head: normocephalic and atraumatic  Eyes: pupils equal, round, and reactive to light  Neck: supple and non-tender without mass, no thyromegaly   Musculoskeletal: normal range of motion, no joint swelling, deformity or tenderness  Neurological: alert, oriented, normal speech, no focal findings or movement disorder noted    Medications:    sodium chloride  500 mL IntraVENous Once    calcium gluconate  1,000 mg IntraVENous Once    diphenhydrAMINE  50 mg IntraVENous Once    hydrocortisone sodium succinate PF  200 mg IntraVENous Once    sodium chloride flush  5-40 mL IntraVENous 2 times per day    timolol  1 drop Both Eyes Daily    pantoprazole  40 mg Oral BID AC    pravastatin  40 mg Oral Daily    [START ON 9/11/2022] potassium chloride  10 mEq Oral Weekly    nitroglycerin  0.5 inch Topical BID    rivaroxaban  20 mg Oral Daily    aspirin  81 mg Oral Daily    clopidogrel  75 mg Oral Daily    metoprolol succinate  25 mg Oral Daily    famotidine  20 mg Oral Nightly      sodium bicarbonate infusion 75 mL/hr at 09/06/22 1751     nitroGLYCERIN, 0.4 mg, Q5 Min PRN  acetaminophen, 650 mg, Q4H PRN  ondansetron, 4 mg, Q8H PRN   Or  ondansetron, 4 mg, Q6H PRN  polyethylene glycol, 17 g, Daily PRN  albuterol sulfate HFA, 2 puff, Q6H PRN  HYDROmorphone, 0.5 mg, Q2H PRN        9/6/22: peripheal arteriogram with intervention   [address] [place] [address]                             CARDIAC CATHETERIZATION     PATIENT NAME: Nik Peterson                     :        1935  MED REC NO:   [id number]                           ROOM:       0007  ACCOUNT NO:   [de-identified]                           ADMIT DATE: 2022  PROVIDER:     Srikanth Jean MD     DATE OF PROCEDURE:  2022     PERIPHERAL ANGIOGRAM/INTERVENTION     INDICATION:  Right lower extremity CLI, history of EVAR with complete  occlusion of the right EVAR limb, right SFA, right popliteal artery, and  right tibial artery. DESCRIPTION OF PROCEDURE:  After informed consent was obtained from the  patient, she was brought to the special procedure suite and prepped in  sterile fashion. Right femoral artery was chosen as the primary point  of access. Preprocedure timeout was completed. After infiltration of  the right inguinal region with 2% lidocaine using micropuncture and  modified Seldinger technique under fluoroscopic guidance and ultrasound  guidance, I was able to insert a 5-Hungarian sheath in the right femoral  artery. I inserted a 5-Hungarian VCF catheter using 0.035 angled Glidewire  at the proximal end of the EVAR graft and performed angiography from  this position. EVAR:  The entire proximal end of the EVAR with the aortic cuff was  patent. The renal arteries appeared to be patent. The entire right  EVAR limb is occluded in the proximal end of it. No flow was seen into  the leg. The right EVAR was patent all the way up to the right common  femoral artery. INTERVENTION:  Given the findings and presentation, I elected to proceed  with intervention. We had the CTA done demonstrated that there was  reconstitution of flow, faint in the common femoral artery and distal  popliteal artery as well as mainly in the tibial arteries. We elected  to proceed with intervention via right pedal approach. After  infiltration of the right anterior tibial region with 2% lidocaine using  micropuncture and modified Seldinger technique under fluoroscopic  guidance and ultrasound guidance, I was able to insert a 4-[place]ian  micropuncture sheath in the right anterior tibial artery. Angiography  was performed demonstrating I was in the true lumen. I upsized the  access to 6-[place]ian Slender sheath. Standard  antispasmodic/antithrombotic cocktail was given intraarterially. I then used a 0.018 angled TrailBlazer catheter and an 0.08 wire,  knuckled the wire. I was able to traverse the anterior tibial artery  all the way up to the proximal portion of the vessel. There was a  previously placed graft that was sutured into the vessel that was  tenting the vessel. Using roadmap guidance, I was able to wire around  the true lumen of the anterior tibial artery. I then knuckled the wire  into the popliteal artery. With the catheter back up the port, we used  the knuckle and I was able to dissect through the occluded popliteal  artery and then into the SFA. We then continued subintimal tracking all  the way up to the ostium of the SFA. I was able to re-enter into the  common femoral artery. I then performed angioplasty with a 3.0 [place]  balloon along the entire length of the anterior tibial artery all the  way into the popliteal artery and all the way up to the SFA and the  common femoral artery. The 3.0 provided a channel for what we needed in  an effort to place an 8.0 balloon. I then put an 8.0 balloon in the  common femoral artery, inflated this, but I was able to re-enter into  the true lumen in the stent in the right external iliac artery. Once I  confirmed this, I exchanged out for a TrailBlazer catheter and  injecting, I then knuckled the wire. I was able to retrograde, pass the  wire in knuckle fashion into the aorta. I used an angled TrailBlazer  with an 0.035 Glidewire Advantage.   Once I was in the aorta, I took an  angiographic injection from the tip, confirming that I was in the aorta. At that point, we needed to proceed with intervention of the iliac  system prior to proceeding with intervention of the right SFA. I  elected to use balloon assisted access by inflating the 8.0 balloon in  the right common femoral artery using ultrasound as well as fluoroscopic  guidance. I used a standard 18-gauge needle and punctured the balloon  and then advanced the wire at the level of the right common femoral  artery into the balloon. The balloon was then used to slide up into the  EVAR limb and then into the aorta confirming that the wire was in the  true lumen. I then sequentially upsized the access from 6-Czech to  8-Czech to 10-Czech. Once we were at 10-Czech sheath, I then took an  8.0 balloon and predilated the entire length of the right EVAR limb at  nominal pressure. Then, I used an _________ standard 0.035 [place] IVUS  catheter per 's recommendations, passed it into the EVAR  limb and confirmed the sizing. Confirmed on CT and on the IVUS that a  12-mm stent was placed. Thereafter, I passed an 11 x 59 Viabahn VBX  stent, deployed it in the proximal _____ length and passed another 11 x  59 _____ first stent deployed. I then passed an 8 x 59 Viabahn VBX  stent to the right common iliac limb of the EVAR. I then used another 8  x 59 to continue onto the right external iliac artery and then a 7 x 39  to complete the new graft limb. These were all deployed at nominal  pressure. I then used a 14 x 40 [place] balloon and postdilated the  proximal EVAR limbs all the way along the length of the proximal EVAR  and then to the right common iliac artery and then used a 10-mm balloon  and postdilated the common iliac artery portion of the limb and then  used an 8 x 40 [place] balloon and dilated the external iliac artery  portion of the limb.      Once that was done, there was clearly a stent gap between the two  Viabahn stents. I therefore deployed an 11 x 39 to cover the stent gap  between the first two stents deployed. This was likely a function of  postdilation. I used a 14 x 40 [place] balloon again and inflated  6-Macedonian, then postdilated the stent one more time confirming that they  were all well apposed. Once that was done, I then checked an injection  from above to confirm the flow. _____ the flow was quite sluggish on  the right EVAR limb and there was still some haziness that was  concerning while continuing thrombosis above the limb. I passed an 11 x  39 slightly higher, but below the renal artery on the right side and  deployed this in overlapping fashion with the first stent deployed and  then used a 14 x 40 [place] balloon, once again postdilated the stent. Once that was done, repeat injection demonstrated that we had brisk flow  into the common femoral artery with good pulses into the right femoral  artery. At that point, the plan was to proceed with right SFA and  popliteal artery revascularization. I wanted to remove the sheath to  ensure that the common femoral artery can be treated appropriately as  well, so the 10-Macedonian sheath was removed and an 8-Macedonian Angio-Seal was  used for hemostasis and then, I used an 8-mm [place] balloon for  secondary hemostasis from internally to superior while external pressure  was held, ________ and reasonably good hemostasis. I then proceeded with a 6 x 200 [place] balloon and predilated the entire  SFA and the ostium all the way to the popliteal artery as well. I then  treated the proximal SFA with a 6 x 120 [place] self-expanding stent and  another 6 x 120 [place] self-expanding stent deployed in an overlapping  fashion. Distal SFA was treated with 6 x 80 IN. PACT drug-coated balloon  for 3-minute inflation at nominal pressure and the popliteal artery was  treated with 5 x 150 IN. PACT Admiral drug-coated balloon at three  minutes at nominal pressure. Once that was done, repeat angiogram was done using an 0.035 TrailBlazer  catheter confirming that we had brisk flow into the common femoral  artery and the SFA. There was slight bleeding still that would be  treated with balloon occlusion once more in external pressure. I  confirmed that the flow went all the way down to the foot. The  posterior tibial artery was re-perfused. The anterior tibial artery was  quite brisk except for the sheath. The sheath was removed and then  proceeded for hemostasis after that. I did do balloon occlusion with an  8 mm [place] balloon over the common femoral artery with external  pressure for approximately 5 to 7 minutes x3 inflations. With these  inflations, we had improved hemostasis and at the last inflation, there  was no significant bleeding noted at the end of the case in the right  femoral artery. Femostop was applied for secondary hemostasis as well. IMMEDIATE COMPLICATIONS:  None. MEDICATIONS:  See EMR. ACCESS:  See above. Right femoral artery sheath was removed. 6-Togol[place] Angio-Seal was used  for hemostasis. ESTIMATED BLOOD LOSS:  Less than 50 to 100 mL. SUMMARY:  Successful percutaneous right EVAR graft occlusion, status  post reconstitution with four 11-mm Viabahn VBX stents postdilated with  a 14-mm [place] balloon, right common iliac and external iliac artery  graft occlusion, treated with two 8 mm and one 7 mm VBX stent  postdilated with 8 and 10 mm balloons with full reconstitution of flow  into the common femoral artery followed by right SFA/popliteal artery  occlusion treated with a 6 x 120 [place] x2 followed by 6 x 80 IN. PACT  Admiral drug-coated balloon to the distal SFA and a 5 x 150 IN. PACT  Admiral drug-coated balloon for the popliteal artery and a 3-mm [place]  angioplasty of the entire anterior tibial artery with reconstitution of  two-vessel runoff to the foot. PLAN:  1. Optimal medical therapy.   2.  Risk factor management. 3.  Routine access site care. 4.  Six hours bedrest.  5.  Will need FemoStop per protocol. 6.  DAPT. 7.  Eliquis per protocol, but if able to we will switch to Xarelto. 8.  Will need lifelong anticoagulation/antiplatelet therapy. 9.  _____________. 10.  Nitroglycerin paste to the foot. 11.  Warm blankets to the foot. 12.  Keep the foot in dependent position. 13.  IV fluids overnight. 14.  Frequent pulse checks q.1 to 2 h.  15.  Repeat labs in the a.m.  16.  Surveillance arterial duplex at 1, 3, 6, and 12 months. 17.  Follow with myself in two to four weeks postprocedure. All the above was explained to the patient's family. They were  agreeable and amenable to the plan.       Shannon Merlos MD   D: 09/06/2022    Lab Data:    Cardiac Enzymes:  Recent Labs     09/07/22  0524   CKTOTAL 26*       CBC:   Lab Results   Component Value Date/Time    WBC 7.4 09/07/2022 05:24 AM    RBC 4.13 09/07/2022 05:24 AM    HGB 12.5 09/07/2022 05:24 AM    HCT 38.1 09/07/2022 05:24 AM     09/07/2022 05:24 AM       CMP:    Lab Results   Component Value Date/Time     09/07/2022 05:24 AM    K 4.1 09/07/2022 05:24 AM     09/07/2022 05:24 AM    CO2 28 09/07/2022 05:24 AM    BUN 26 09/07/2022 05:24 AM    CREATININE 1.0 09/07/2022 05:24 AM    AGRATIO 1.6 07/05/2021 07:36 AM    LABGLOM 53 09/07/2022 05:24 AM    GLUCOSE 150 09/07/2022 05:24 AM    GLUCOSE 82 08/19/2022 09:18 AM    CALCIUM 8.0 09/07/2022 05:24 AM       Hepatic Function Panel:    Lab Results   Component Value Date/Time    ALKPHOS 91 07/05/2021 07:36 AM    ALT 8 07/05/2021 07:36 AM    AST 14 07/05/2021 07:36 AM    PROT 6.2 07/05/2021 07:36 AM    BILITOT 0.4 07/05/2021 07:36 AM    BILIDIR <0.2 02/28/2021 03:10 PM    LABALBU 3.8 07/05/2021 07:36 AM       Magnesium:    Lab Results   Component Value Date/Time    MG 2.2 07/11/2022 12:35 PM       PT/INR:    Lab Results   Component Value Date/Time    INR 1.10 09/06/2022 06:33 AM       HgBA1c: Lab Results   Component Value Date/Time    LABA1C 5.5 02/28/2021 07:44 PM       FLP:    Lab Results   Component Value Date/Time    TRIG 104 07/05/2021 07:36 AM    HDL 41 07/05/2021 07:36 AM    LDLCALC 47 05/18/2019 04:06 AM    LDLDIRECT 49 07/05/2021 07:36 AM       TSH:    Lab Results   Component Value Date/Time    TSH 2.160 12/28/2019 05:54 AM         Assessment/Plan:  RLE, CLI  Peripheral angiogram 9/6/22  R EVAR Limb occlusion, R CFA occlusion  R SFA occlusion  Dimunitive PFA  Pop artery occlusion with collaterals reconstituting minimally  All three tibials are occluded with some degree of flow in the PT and AT  S/p Successful percutaneous right EVAR graft occlusion, status post reconstitution with four 11-mm Viabahn VBX stents postdilated with a 14-mm [place] balloon, right common iliac and external iliac artery graft occlusion, treated with two 8 mm and one 7 mm VBX stent postdilated with 8 and 10 mm balloons with full reconstitution of flow into the common femoral artery followed by right SFA/popliteal artery occlusion treated with a 6 x 120 [place] x2 followed by 6 x 80 IN. PACT Admiral drug-coated balloon to the distal SFA and a 5 x 150 IN. PACT [place] drug-coated balloon for the popliteal artery and a 3-mm [place] angioplasty of the entire anterior tibial artery with reconstitution of two-vessel runoff to the foot. ICU OVN; remained stable post procedure  TRO 4K; stable; small oozing L groin after up to commode; easily stopped with direct pressure; no hematoma  RLE with (+) Doppler pulses, erythremic, warm, CSM intact; some hypersensitivity to touch  Access sites without bleeding or hematoma  VSS: SR 80's; stable BP  Labs stable  Denies chest discomfort or dyspnea  Pain RLE minimal   DAPT   Eliquis - switch to Xarelto if patient willing (PAD data)  Mobilize  Surveillance arterial duplex in AM an then at 1, 3, 6, and 12 months.   Follow-up in office in 2  - 4 weeks with Dr. Annette Osuna    - currently in SR   - Metoprolol  HFrEF : 25 -30% 6/2020 - NICMP; ICD, OPTIVOL   - no evidence of decompensation   - follows with CHF clinic    - GDMT: home medications              ACE/ARB/ARNI - Entresto 24/26 BID              BB - Toprol 25mg daily               Diuretic - Bumex 1mg weekly  AA - none  SGLT2 -  none  Vasodilator - none  Other - Magnesium, Potassium 10 daily, Eliquis (afib), plavix (PAD-stent)  HTN  COPD    Bilateral arterial duplex in AM  Mobilize  Switch to Xarelto if patient willing   Discharge plan as outlined above  Routine post arteriogram/peripheral intervention care    Electronically signed by ROMAN Lizama CNP on 9/7/2022 at 8:07 AM

## 2022-09-07 NOTE — PLAN OF CARE
pressure, respiratory therapy assess nares and determine need for appliance change or resting period.   Outcome: Progressing     Problem: Pain  Goal: Verbalizes/displays adequate comfort level or baseline comfort level  Outcome: Progressing  Flowsheets (Taken 9/7/2022 0713)  Verbalizes/displays adequate comfort level or baseline comfort level:   Encourage patient to monitor pain and request assistance   Assess pain using appropriate pain scale   Administer analgesics based on type and severity of pain and evaluate response   Implement non-pharmacological measures as appropriate and evaluate response   Consider cultural and social influences on pain and pain management   Notify Licensed Independent Practitioner if interventions unsuccessful or patient reports new pain

## 2022-09-07 NOTE — PROCEDURES
approach. After  infiltration of the right anterior tibial region with 2% lidocaine using  micropuncture and modified Seldinger technique under fluoroscopic  guidance and ultrasound guidance, I was able to insert a 4-Khmer  micropuncture sheath in the right anterior tibial artery. Angiography  was performed demonstrating I was in the true lumen. I upsized the  access to 6-Khmer Slender sheath. Standard  antispasmodic/antithrombotic cocktail was given intraarterially. I then used a 0.018 angled TrailBlazer catheter and an 0.08 wire,  knuckled the wire. I was able to traverse the anterior tibial artery  all the way up to the proximal portion of the vessel. There was a  previously placed graft that was sutured into the vessel that was  tenting the vessel. Using roadmap guidance, I was able to wire around  the true lumen of the anterior tibial artery. I then knuckled the wire  into the popliteal artery. With the catheter back up the port, we used  the knuckle and I was able to dissect through the occluded popliteal  artery and then into the SFA. We then continued subintimal tracking all  the way up to the ostium of the SFA. I was able to re-enter into the  common femoral artery. I then performed angioplasty with a 3.0 [place]  balloon along the entire length of the anterior tibial artery all the  way into the popliteal artery and all the way up to the SFA and the  common femoral artery. The 3.0 provided a channel for what we needed in  an effort to place an 8.0 balloon. I then put an 8.0 balloon in the  common femoral artery, inflated this, but I was able to re-enter into  the true lumen in the stent in the right external iliac artery. Once I  confirmed this, I exchanged out for a TrailBlazer catheter and  injecting, I then knuckled the wire. I was able to retrograde, pass the  wire in knuckle fashion into the aorta. I used an angled TrailBlazer  with an 0.035 Glidewire Advantage.   Once I was in the aorta, I took an  angiographic injection from the tip, confirming that I was in the aorta. At that point, we needed to proceed with intervention of the iliac  system prior to proceeding with intervention of the right SFA. I  elected to use balloon assisted access by inflating the 8.0 balloon in  the right common femoral artery using ultrasound as well as fluoroscopic  guidance. I used a standard 18-gauge needle and punctured the balloon  and then advanced the wire at the level of the right common femoral  artery into the balloon. The balloon was then used to slide up into the  EVAR limb and then into the aorta confirming that the wire was in the  true lumen. I then sequentially upsized the access from 6-Nauruan to  8-Nauruan to 10-Nauruan. Once we were at 10-Nauruan sheath, I then took an  8.0 balloon and predilated the entire length of the right EVAR limb at  nominal pressure. Then, I used a standard 0.035 [place] IVUS  catheter per 's recommendations, passed it into the EVAR  limb and confirmed the sizing. Confirmed on CT and on the IVUS that a  12-mm stent was placed. Thereafter, I passed an 11 x 59 Viabahn VBX  stent, deployed it in the proximal lesion and passed another 11 x  59 VBX overlapped with first stent deployed. I then passed an 8 x 59 Viabahn VBX  stent to the right common iliac limb of the EVAR. I then used another 8  x 59 to continue onto the right external iliac artery and then a 7 x 39  to complete the new graft limb. These were all deployed at nominal  pressure. I then used a 14 x 40 [place] balloon and postdilated the  proximal EVAR limbs all the way along the length of the proximal EVAR  and then to the right common iliac artery and then used a 10-mm balloon  and postdilated the common iliac artery portion of the limb and then  used an 8 x 40 [place] balloon and dilated the external iliac artery  portion of the limb.     Once that was done, there was clearly a stent gap between the two  Viabahn stents. I therefore deployed an 11 x 39 to cover the stent gap  between the first two stents deployed. This was likely a function of  postdilation. I used a 14 x 40 [place] balloon again and inflated  6-Ghanaian, then postdilated the stent one more time confirming that they  were all well apposed. Once that was done, I then checked an injection  from above to confirm the flow. The flow was quite sluggish on  the right EVAR limb and there was still some haziness that was  concerning while continuing thrombosis above the limb. I passed an 11 x  39 slightly higher, but below the renal artery on the right side and  deployed this in overlapping fashion with the first stent deployed and  then used a 14 x 40 [place] balloon, once again postdilated the stent. Once that was done, repeat injection demonstrated that we had brisk flow  into the common femoral artery with good pulses into the right femoral  artery. At that point, the plan was to proceed with right SFA and  popliteal artery revascularization. I wanted to remove the sheath to  ensure that the common femoral artery can be treated appropriately as  well, so the 10-Ghanaian sheath was removed and an 8-Ghanaian Angio-Seal was  used for hemostasis and then, I used an 8-mm [place] balloon for  secondary hemostasis from internally and superiorly with external pressure  And had good hemostasis. I then proceeded with a 6 x 200 [place] balloon and predilated the entire  SFA and the ostium all the way to the popliteal artery as well. I then  treated the proximal SFA with a 6 x 120 [place] self-expanding stent and  another 6 x 120 [place] self-expanding stent deployed in an overlapping  fashion. Distal SFA was treated with 6 x 80 IN. PACT drug-coated balloon  for 3-minute inflation at nominal pressure and the popliteal artery was  treated with 5 x 150 IN. PACT [place] drug-coated balloon at three  minutes at nominal pressure.     Once that was done, repeat angiogram was done using an 0.035 TrailBlazer  catheter confirming that we had brisk flow into the common femoral  artery and the SFA. There was slight bleeding still that would be  treated with balloon occlusion once more in external pressure. I  confirmed that the flow went all the way down to the foot. The  posterior tibial artery was re-perfused. The anterior tibial artery was  quite brisk except for the sheath. The sheath was removed and then  proceeded for hemostasis after that. I did do balloon occlusion with an  8 mm [place] balloon over the common femoral artery with external  pressure for approximately 5 to 7 minutes x3 inflations. With these  inflations, we had improved hemostasis and at the last inflation, there  was no significant bleeding noted at the end of the case in the right  femoral artery. Femostop was applied for secondary hemostasis as well. IMMEDIATE COMPLICATIONS:  None. MEDICATIONS:  See EMR. ACCESS:  See above. Right femoral artery sheath was removed. 6-Gambian Angio-Seal was used  for hemostasis. ESTIMATED BLOOD LOSS:  Less than 50 to 100 mL. SUMMARY:  Successful percutaneous right EVAR graft occlusion, status  post reconstitution with four 11-mm Viabahn VBX stents postdilated with  a 14-mm [place] balloon, right common iliac and external iliac artery  graft occlusion, treated with two 8 mm and one 7 mm VBX stent  postdilated with 8 and 10 mm balloons with full reconstitution of flow  into the common femoral artery followed by right SFA/popliteal artery  occlusion treated with a 6 x 120 [place] x2 followed by 6 x 80 IN. PACT  Admiral drug-coated balloon to the distal SFA and a 5 x 150 IN. PACT  Admiral drug-coated balloon for the popliteal artery and a 3-mm [place]  angioplasty of the entire anterior tibial artery with reconstitution of  two-vessel runoff to the foot. PLAN:  1. Optimal medical therapy. 2.  Risk factor management.   3.  Routine access site care.  4.  Six hours bedrest.  5.  Will need FemoStop per protocol. 6.  DAPT. 7.  Eliquis per protocol, but if able to we will switch to Xarelto. 8.  Will need lifelong anticoagulation/antiplatelet therapy. 10.  Nitroglycerin paste to the foot. 11.  Warm blankets to the foot. 12.  Keep the foot in dependent position. 13.  IV fluids overnight. 14.  Frequent pulse checks q.1 to 2 h.  15.  Repeat labs in the a.m.  16.  Surveillance arterial duplex at 1, 3, 6, and 12 months. 17.  Follow with myself in two to four weeks postprocedure. All the above was explained to the patient's family. They were  agreeable and amenable to the plan.         Armani Goodman MD    D: 09/06/2022 15:27:25       T: 09/06/2022 20:33:16     CRISTHIAN_TERENCE  Job#: [id number]     Doc#: [id number]

## 2022-09-07 NOTE — PROGRESS NOTES
At 2000 RN started letting out air from the Vas Band at the right foot, 2 mL every hr, last air taken out was at 09/07 at 0100. At 2100 Dr. Demarco Nicole called and order 0.5 Dilaudid to be administered for pain PRN. At 2110 during medication administration patient refused eye drop stated \"her eye doctor only wants her to take them in the morning hrs. \"       At 2200 patient BP remained low with SBP in the lower 80's. At 2300 500 mL of NS bolus was order to be adminstered after ultra sound procedure with Dr. Samy WALL At  0100 This RN noted blood leaking out from the puncture site, pressure was held for 10 min and quick clot was placed over the puncture site and covered with a clear occlusive dressing. At 0330 Patient's BP still running low with SBP in the lower 80's, Dr. Samy Gonzalez notified. Another, 500 mL of NS bolus ordered to be administered and ran for 30. At [id number] Patient's started showing signs of agitation and some hallucination states \"Something feels different, everything seemed scattered allover the place. \" Dr Samy WALL Notified.

## 2022-09-07 NOTE — PROGRESS NOTES
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 CTA abdominal with runoff significant for occlusion of R iliac limb, R common artery, R SFA, R femoral/anterior tibial graft and multifocal disease of L SFA, mild stenosis of L popliteal artery 8/4/22. Reports month long worsening limb pain, erythema, numbness and tingling. Planned BLE angiogram demonstrated R EVAR, R CFA, R SFA occlusion, dimunitive PFA, popliteal artery occlusion with collaterals, all three tibials occluded. Complete reconstitution of the R EVAR limb, R CFA, R SFA, R POP, and R AT with improved flow into the PT by means of stenting and PTA 9/6/22. Per cardiology, started on DAPT and xarelto. Apply nitro ointment to RLE. Monitor for pulses and labs for reperfusion injury. Encourage ambulation. Cardiology discontinue aspirin on discharge. Chronic HFrEF, NYHA II - ICM s/p dual ICD. Last echo EF 25-30% 6/20. Home GDMT ARNi, BB and diuretic. Avoid nephrotoxins post contrast administration. Will continue BB for now. 2L fluid and 2 g sodium restriction. Monitor daily weights, strict I&Os. pAfib - [id number]. Initial EKG SR with arrhthymias. On BB for rate control and eliquis for anticoagulation. Continue metoprolol 25 mg p.o daily. Anticoagulation switched to Xarelto 20 mg p.o daily. Placed on continuous telemetry. HTN - continue home metoprolol. Hold ARNi and bumex for concerns of exacerbating potential ALEJANDRO post LE angiogram. Monitor blood pressure. HLD - continue home pravastatin 40 mg p.o daily   AAA - s/p endovascular repair in 2019    History of blood clots - per chart review.  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 The patient underwent a bilateral lower extremity angiogram 9/6/2022. She was found to have R EVAR limb, R CFA, R SFA occlusions; dimunitive PFA; popliteal artery occlusion with collaterals, all three tibials occluded; and all three tibials are occluded with some degree of flow in the PT and AT. The patient underwent retrograde AT access, knuckled to the R CFA, then re-entry into the R EIA stents; VBX 11 x 39, VBX 11 x 59 x 2, VBX 11 x 39, VBX 8 x 59; Retrograde SFA/pop 6.0 PTA; Elizabeth 6 x 120 x 2 for ostial SFA to distal SFA; DCB 5 x 150 of the pop, and DCB 6 x 80 of the distal SFA/prox pop; and 3.0 PTA of the AT. This resulted in Complete reconstitution of the R EVAR limb, R CFA, R SFA, R POP, and R AT with improved flow into the PT - full flow into the pedal arch. Post procedure the patient was transferred to the ICU for further management and evaluation. Past 24 hours:  Overnight the patient was given Dilaudid for right lower extremity pain. She also was hypotensive with MAP running in the 50-60s. She was given 500 cc bolus x2 with noted resolution. Per RN the patient has continued to ooze from right ankle surgical dressing. FemoStop remains in place. This morning the patient states she is doing well. She does note continued pain but otherwise denies any other complaints. Labs not indicative of any obvious reperfusion injury as of yet. Renal function at baseline, on sodium bicarb gtt. Pulses intact. Otherwise hemodynamically stable, vitals WNL. Past Medical History: Per HPI. Family History:  Mother-heart disease, CVA                              Father-lung cancer                              Sister-leukemia  Social History: [place] is extensive history of smoking, recently intermittent and unable to quantify PPD, reports social drinking, denies illicit drug use    ROS   Reports right lower extremity pain, numbness and tingling. Scheduled Meds:   sodium chloride  500 mL IntraVENous Once    calcium gluconate  1,000 mg IntraVENous Once    diphenhydrAMINE  50 mg IntraVENous Once    hydrocortisone sodium succinate PF  200 mg IntraVENous Once    sodium chloride flush  5-40 mL IntraVENous 2 times per day    timolol  1 drop Both Eyes Daily    pantoprazole  40 mg Oral BID AC    pravastatin  40 mg Oral Daily    [START ON 9/11/2022] potassium chloride  10 mEq Oral Weekly    nitroglycerin  0.5 inch Topical BID    rivaroxaban  20 mg Oral Daily    aspirin  81 mg Oral Daily    clopidogrel  75 mg Oral Daily    metoprolol succinate  25 mg Oral Daily    famotidine  20 mg Oral Nightly     Continuous Infusions:   sodium bicarbonate infusion 75 mL/hr at 09/06/22 1751       PHYSICAL EXAMINATION:  T:  98.0.  P: 82. RR: 15. B/P: 93/57. O2 Sat: 98% on RA. I/O:  Not reported  Body mass index is 29.42 kg/m². GCS:   15    General:   No acute distress, well appearing, well developed, interactive and cooperative. HEENT:  normocephalic and atraumatic. No scleral icterus. PERR  Neck: supple. No Thyromegaly. Lungs: clear to auscultation. No retractions  Cardiac: RRR. No JVD. Abdomen: soft. Nontender. Extremities:  No clubbing or edema x 4. RLE erythematous, dark/dusky right digits. Right sided femstop present, right ankle surgical dressing. Vasculature: capillary refill < 3 seconds. Palpable dorsalis pedis pulses. Doppler AT/PT/Dorsal pulses appreciated. Skin:  warm and dry. Psych:  Alert and oriented x3. Affect appropriate  Lymph:  No supraclavicular adenopathy. Neurologic:  No focal deficit. No seizures. Data: (All radiographs, tracings, PFTs, and imaging are personally viewed and interpreted unless otherwise noted).    9/7/22 sodium 138, potassium 4.1, chloride 104, CO2 28, BUN 26, creatinine 1.1, glucose 150, AG 6.0  9/7/22 WBC 7.4, Hgb 12.5, HCT 38.1,   9/7/22 lactic acid 1.5  9/7/22 CK 26  Telemetry shows NSR        Seen with multidisciplinary ICU team .  Meets Continued ICU Level Care Criteria:    [] Yes   [x] No - Transfer Planned to listed location:  [] HOSPITALIST CONTACTED-      Case and plan discussed with Dr. Kana Askew. Electronically signed by [facility] Lonnie [address] [place] [address]   Patient seen by me including key components of medical care. Case discussed with Dr. Milena Melendrez. Good flow to RLE. Italicized font, if present,  represents changes to the note made by me. CC time 20 minutes. Time was discontiguous. Time does not include procedure. Time does include my direct assessment of the patient and coordination of care. Time represents more than 50% of the time involved with patient care by the [address] [place] [address] team.  Electronically signed by Nilo Coronado.  Kana Askew MD.

## 2022-09-07 NOTE — PROGRESS NOTES
Vas band came off, puncture sites was covered with quick cloth and occlusive dressing. No more oozing noted. Fem stop still left on but with 10 mm Hg pressure. Femoral site is dry, clean, no hematoma or bleeding is observed.

## 2022-09-08 ENCOUNTER — APPOINTMENT (OUTPATIENT)
Dept: INTERVENTIONAL RADIOLOGY/VASCULAR | Age: 87
DRG: 253 | End: 2022-09-08
Attending: INTERNAL MEDICINE
Payer: MEDICARE

## 2022-09-08 PROCEDURE — 97116 GAIT TRAINING THERAPY: CPT

## 2022-09-08 PROCEDURE — 97162 PT EVAL MOD COMPLEX 30 MIN: CPT

## 2022-09-08 PROCEDURE — 6370000000 HC RX 637 (ALT 250 FOR IP): Performed by: INTERNAL MEDICINE

## 2022-09-08 PROCEDURE — 2060000000 HC ICU INTERMEDIATE R&B

## 2022-09-08 PROCEDURE — 99223 1ST HOSP IP/OBS HIGH 75: CPT | Performed by: PHYSICAL MEDICINE & REHABILITATION

## 2022-09-08 PROCEDURE — 97166 OT EVAL MOD COMPLEX 45 MIN: CPT

## 2022-09-08 PROCEDURE — 97535 SELF CARE MNGMENT TRAINING: CPT

## 2022-09-08 PROCEDURE — 93925 LOWER EXTREMITY STUDY: CPT

## 2022-09-08 PROCEDURE — 97530 THERAPEUTIC ACTIVITIES: CPT

## 2022-09-08 PROCEDURE — 6370000000 HC RX 637 (ALT 250 FOR IP): Performed by: STUDENT IN AN ORGANIZED HEALTH CARE EDUCATION/TRAINING PROGRAM

## 2022-09-08 PROCEDURE — 6370000000 HC RX 637 (ALT 250 FOR IP): Performed by: NURSE PRACTITIONER

## 2022-09-08 PROCEDURE — 2580000003 HC RX 258: Performed by: INTERNAL MEDICINE

## 2022-09-08 PROCEDURE — 99232 SBSQ HOSP IP/OBS MODERATE 35: CPT | Performed by: NURSE PRACTITIONER

## 2022-09-08 RX ADMIN — PRAVASTATIN SODIUM 40 MG: 40 TABLET ORAL at 08:52

## 2022-09-08 RX ADMIN — ACETAMINOPHEN 650 MG: 325 TABLET ORAL at 20:05

## 2022-09-08 RX ADMIN — ACETAMINOPHEN 650 MG: 325 TABLET ORAL at 14:30

## 2022-09-08 RX ADMIN — PANTOPRAZOLE SODIUM 40 MG: 40 TABLET, DELAYED RELEASE ORAL at 16:53

## 2022-09-08 RX ADMIN — CLOPIDOGREL BISULFATE 75 MG: 75 TABLET ORAL at 08:53

## 2022-09-08 RX ADMIN — FAMOTIDINE 20 MG: 20 TABLET ORAL at 21:24

## 2022-09-08 RX ADMIN — SODIUM CHLORIDE, PRESERVATIVE FREE 10 ML: 5 INJECTION INTRAVENOUS at 21:29

## 2022-09-08 RX ADMIN — RIVAROXABAN 20 MG: 20 TABLET, FILM COATED ORAL at 16:53

## 2022-09-08 RX ADMIN — NITROGLYCERIN 0.5 INCH: 20 OINTMENT TOPICAL at 21:24

## 2022-09-08 RX ADMIN — NITROGLYCERIN 0.5 INCH: 20 OINTMENT TOPICAL at 08:55

## 2022-09-08 RX ADMIN — HYDROCORTISONE ACETATE 25 MG: 25 SUPPOSITORY RECTAL at 21:24

## 2022-09-08 RX ADMIN — TIMOLOL MALEATE 1 DROP: 5 SOLUTION OPHTHALMIC at 08:53

## 2022-09-08 RX ADMIN — SODIUM CHLORIDE, PRESERVATIVE FREE 10 ML: 5 INJECTION INTRAVENOUS at 10:07

## 2022-09-08 RX ADMIN — ASPIRIN 81 MG 81 MG: 81 TABLET ORAL at 08:53

## 2022-09-08 RX ADMIN — ACETAMINOPHEN 650 MG: 325 TABLET ORAL at 08:52

## 2022-09-08 RX ADMIN — PANTOPRAZOLE SODIUM 40 MG: 40 TABLET, DELAYED RELEASE ORAL at 08:53

## 2022-09-08 RX ADMIN — METOPROLOL SUCCINATE 25 MG: 25 TABLET, EXTENDED RELEASE ORAL at 08:52

## 2022-09-08 ASSESSMENT — PAIN DESCRIPTION - ONSET
ONSET: ON-GOING
ONSET: ON-GOING

## 2022-09-08 ASSESSMENT — PAIN SCALES - GENERAL
PAINLEVEL_OUTOF10: 8
PAINLEVEL_OUTOF10: 0
PAINLEVEL_OUTOF10: 2
PAINLEVEL_OUTOF10: 7
PAINLEVEL_OUTOF10: 3
PAINLEVEL_OUTOF10: 2
PAINLEVEL_OUTOF10: 3

## 2022-09-08 ASSESSMENT — PAIN - FUNCTIONAL ASSESSMENT
PAIN_FUNCTIONAL_ASSESSMENT: PREVENTS OR INTERFERES SOME ACTIVE ACTIVITIES AND ADLS
PAIN_FUNCTIONAL_ASSESSMENT: ACTIVITIES ARE NOT PREVENTED
PAIN_FUNCTIONAL_ASSESSMENT: PREVENTS OR INTERFERES SOME ACTIVE ACTIVITIES AND ADLS

## 2022-09-08 ASSESSMENT — PAIN DESCRIPTION - FREQUENCY
FREQUENCY: INTERMITTENT
FREQUENCY: INTERMITTENT

## 2022-09-08 ASSESSMENT — PAIN DESCRIPTION - LOCATION
LOCATION: LEG
LOCATION: FOOT
LOCATION: OTHER (COMMENT)
LOCATION: LEG
LOCATION: FOOT

## 2022-09-08 ASSESSMENT — PAIN DESCRIPTION - ORIENTATION
ORIENTATION: RIGHT;LOWER
ORIENTATION: RIGHT
ORIENTATION: RIGHT;LEFT

## 2022-09-08 ASSESSMENT — PAIN DESCRIPTION - DESCRIPTORS
DESCRIPTORS: ACHING;TINGLING
DESCRIPTORS: ACHING;TINGLING
DESCRIPTORS: ACHING
DESCRIPTORS: STABBING
DESCRIPTORS: STABBING

## 2022-09-08 ASSESSMENT — PAIN DESCRIPTION - PAIN TYPE: TYPE: ACUTE PAIN

## 2022-09-08 NOTE — PROGRESS NOTES
I[address] 62  INPATIENT OCCUPATIONAL THERAPY  [facility] ICU STEPDOWN TELEMETRY 4K  EVALUATION    Time:    Time In: 7746  Time Out: 0902  Timed Code Treatment Minutes: 24 Minutes  Minutes: 34          Date: 2022  Patient Name: Giselle Langston,   Gender: female      MRN: [id number]  : 1935  (82 y.o.)  Referring Practitioner: Rosa Monaco MD  Diagnosis: Peripheral Vascular Disease, unspecified  Additional Pertinent Hx: The patient is a 82year old female with a past medical history of HFrEF s/p dual ICD, pAfib, HTN, HLD, AAA s/p repair, PVD, blood clots, CKD, colovesical fisutla s/p right hemicolectomy who presented to [facility] for CLI and planned angiogram of bilateral lower extremities. The patient reports persistent and worsening lower extremity pain, greater in the right over the past month. This has been associated with numbness and tingling. She notes her RLE has been getting more red, with darker digits. She notes increased difficulty with ambulation due to this. The patient was evaluated by cardiology with planned intervention 22. She previously had a CTA abdominal aorta with runoff that was significant for 3.6 cm fusiform aneurysm of the infrarenal abdominal aorta; bifurcated endograft present; right iliac limb totally occluded and left iliac limb remains patent; stents in right common and external iliac arteries are totally occluded; collateral reconstitution of the right CFA and right profunda; total occlusion of native right SFA and right femoral/anterior tibial bypass graft; Collateral reconstitution of the trifurcation vessels right side; multifocal severe diffuse disease of left SFA; left popliteal artery demonstrated mild stenosis; all 3 trifurcation vessels left calf lateral but demonstrate multifocal disease; subcutaneous emphysema bilaterally that is worse on left 22. The patient underwent a bilateral lower extremity angiogram 2022.  She was found to have R EVAR limb, R CFA, R SFA occlusions; dimunitive PFA; popliteal artery occlusion with collaterals, all three tibials occluded; and all three tibials are occluded with some degree of flow in the PT and AT. The patient underwent retrograde AT access, knuckled to the R CFA, then re-entry into the R EIA stents; VBX 11 x 39, VBX 11 x 59 x 2, VBX 11 x 39, VBX 8 x 59; Retrograde SFA/pop 6.0 PTA; Elizabeth 6 x 120 x 2 for ostial SFA to distal SFA; DCB 5 x 150 of the pop, and DCB 6 x 80 of the distal SFA/prox pop; and 3.0 PTA of the AT. This resulted in Complete reconstitution of the R EVAR limb, R CFA, R SFA, R POP, and R AT with improved flow into the PT - full flow into the pedal arch. Post procedure the patient was transferred to the ICU for further management and evaluation. Restrictions/Precautions:  Restrictions/Precautions: General Precautions, Fall Risk    Subjective  Chart Reviewed: Yes, Orders, Progress Notes  Patient assessed for rehabilitation services?: Yes  Family / Caregiver Present: No    Subjective: RN [employer] and OOB activity. Pt very pleasant and cooperative. Pain: 0/10 at rest, increasing quickly with activity    Vitals: Nurse checked vitals prior to session    Social/Functional History:  Lives With: Alone  Type of Home: Condo  Home Layout: One level  Home Access: Level entry  Home Equipment: Walker, rolling, Walker, standard (Pt reports walker is very old and does not fold up/operate effecively)   Bathroom Shower/Tub: Walk-in shower, Shower chair with back  [employer]: Bedside commode (over STS)  Bathroom Equipment: Grab bars in shower  Bathroom Accessibility: Accessible       ADL Assistance: [address]: Independent  Homemaking Responsibilities: Yes  Ambulation Assistance: Independent  Transfer Assistance: Independent    Active : Yes     Additional Comments: Pt reports being indep prior to admission. Pt has to walk ~ 1block to get to car.  Tends to go through drive throughs as much as possible. VISION:WFL    HEARING:  WFL    COGNITION: Decreased Insight and Decreased Safety Awareness    RANGE OF MOTION:  Bilateral Upper Extremity:  WFL    STRENGTH:  Bilateral Upper Extremity:  grossly deconditinoed    SENSATION:   WFL    ADL:   Feeding: Modified Independent. Breakfast tray  Lower Extremity Dressing: Max A for donning depends. A to thread while seated. Max A to manage around hips. Dependent for  Donning slipper socks. BALANCE:  Sitting Balance:  Stand By Assistance. On EOB  Standing Balance: Minimal Assistance. With unilateral release    BED MOBILITY:  Supine to Sit: Minimal Assistance with increased time  Scooting: Minimal Assistance      TRANSFERS:  Sit to Stand:  Minimal Assistance. From EOB, VC for hand placement  Stand to Sit: Minimal Assistance. To recliner. Max VC for body positioning prior to t/f initiation    FUNCTIONAL MOBILITY:  Assistive Device: Rolling Walker  Assist Level:  Minimal Assistance. Distance:  2' EOB to recliner  Decreased safety awareness noted. Increased difficulty with weightshifting     Activity Tolerance:  Patient tolerance of  treatment: good. Assessment:  Assessment: Pt presented with the listed deficits s/p admission with peripheral vascular disease. Pt with decreased strength, balance, endurance, and activity tolerance and currently requires Min A for t/fs and mobility. Pt would benefit from continued OT to restore PLOF maximize pt's indep with ADLs and IADLs in prep for a safe return home at max level of indep. Performance deficits / Impairments: Decreased functional mobility , Decreased safe awareness, Decreased balance, Decreased posture, Decreased ADL status, Decreased endurance, Decreased high-level IADLs, Decreased strength  Prognosis: Good  REQUIRES OT FOLLOW-UP: Yes  Decision Making: Medium Complexity    Treatment Initiated: Treatment and education initiated within context of evaluation.   Evaluation time included review of current medical information, gathering information related to past medical, social and functional history, completion of standardized testing, formal and informal observation of tasks, assessment of data and development of plan of care and goals. Treatment time included skilled education and facilitation of tasks to increase safety and independence with ADL's for improved functional independence and quality of life. Discharge Recommendations:  Continue to assess pending progress, Patient would benefit from continued therapy after discharge    Patient Education:     Patient Education  Education Given To: Patient  Education Provided: Role of Therapy, Plan of Care, Energy Conservation, Transfer Training, Fall Prevention Strategies, IADL Safety, ADL Adaptive Strategies  Education Method: Demonstration, Verbal  Barriers to Learning: None  Education Outcome: Verbalized understanding, Demonstrated understanding    Equipment Recommendations:  Equipment Needed: Yes  Other: Walker    Plan:  Times per Week: 5x  Times per Day: Daily  Current Treatment Recommendations: Strengthening, Balance training, Functional mobility training, Endurance training, Patient/Caregiver education & training, Safety education & training, Self-Care / ADL, Home management training. See long-term goal time frame for expected duration of plan of care. If no long-term goals established, a short length of stay is anticipated.     Goals:  Patient goals : return home  Short Term Goals  Time Frame for Short term goals: by disharge  Short Term Goal 1: Pt will safely navigate short distances with RW and CGA with no LOB to increase indep with ADLs  Short Term Goal 2: Pt will tolerate dynamic standing x3-4min with SBA and unilateral release to increase indep with grooming  Short Term Goal 3: Pt will complete toileting routine, including t/fs, with SBA and no VC for safety to increase indep with self care  Short Term Goal 4: Pt will complete LB dressing with LHAE prn and Min A for increased indep with ADLs         Following session, patient left in safe position with all fall risk precautions in place.

## 2022-09-08 NOTE — PROGRESS NOTES
Orientation Status: Within Normal Limits  Orientation Level: Oriented X4  Vision: Impaired  Vision Exceptions: Wears glasses for reading  Hearing: Within functional limits       Pain: 1/10 at rest, 4-5/10 with mobility    Vitals: Vitals not assessed per clinical judgement, see nursing flowsheet    Social/Functional History:    Lives With: Alone  Type of Home:  (4th floor)  Home Layout: One level  Home Access: Elevator, Level entry  Home Equipment: Walker, rolling, Walker, standard, [employer]     Bathroom Shower/Tub: Walk-in shower, Shower chair with back  [employer]: Bedside commode (over STS)  Bathroom Equipment: Grab bars in shower  Bathroom Accessibility: Accessible       ADL Assistance: [address]: Independent  Homemaking Responsibilities: Yes  Ambulation Assistance: Independent  Transfer Assistance: Independent    Active : Yes  Mode of Transportation: Car  Occupation: Retired  Additional Comments: Pt reports being indep prior to admission. Pt has to walk ~ 1block to get to car. Tends to go through drive throughs as much as possible. Pt reports she has been using a walker the last 5 -6 weeks, has been complete sponge baths and light meal prep tasks. OBJECTIVE:  Range of Motion:  Right Lower Extremity: Impaired - not assess R due to increased pain  Left Lower Extremity: WFL    Strength:  Right Lower Extremity: Impaired - not assess R ankle due to increased pain  Left Lower Extremity: Impaired - deconditioned    Balance:  Static Sitting Balance:  Supervision, Stand By Assistance  Dynamic Sitting Balance: Stand By Assistance  Static Standing Balance: Contact Guard Assistance, Minimal Assistance  Pt completed toileting, required cues for safety, tech in to help assist. Pt with poor safety and deferred use of walker for transfer from commode to chair. Pt with increased assist without walker compared to with RW.    Bed Mobility:  Not Tested    Transfers:  Sit to Stand: Minimal Assistance, Moderate Assistance, X 1, cues for hand placement, with verbal cues  Stand to Sit:Minimal Assistance, X 1, cues for hand placement, with verbal cues  Stand Pivot:Minimal Assistance, X 1, cues for hand placement, with verbal cues  Transfers completed from various surfaces and heights with use of walker for support as needed, decreased WS to R LE due to increased pain  Ambulation:  Contact Guard Assistance, Minimal Assistance, X 1, with cues for safety, with verbal cues , with increased time for completion  Distance: 8 feet  Surface: Level Tile  Device:Rolling Walker  Gait Deviations: Forward Flexed Posture, Slow Pretty, Decreased Step Length Bilaterally, Decreased Weight Shift Right, Decreased Gait Speed, Decreased Heel Strike on Right, Unsteady Gait, and Decreased Terminal Knee Extension  Cues for safety, no LOB noted, antalgic gait pattern noted, pt declined further distances    Functional Outcome Measures: Completed  AM-PAC Inpatient Mobility without Stair Climbing Raw Score : 12  AM-PAC Inpatient without Stair Climbing T-Scale Score : 37.26    ASSESSMENT:  Activity Tolerance:  Patient tolerance of  treatment: fair. Increased pain      Treatment Initiated: Treatment and education initiated within context of evaluation. Evaluation time included review of current medical information, gathering information related to past medical, social and functional history, completion of standardized testing, formal and informal observation of tasks, assessment of data and development of plan of care and goals. Treatment time included skilled education and facilitation of tasks to increase safety and independence with functional mobility for improved independence and quality of life. Assessment:   Body Structures, Functions, Activity Limitations Requiring Skilled Therapeutic Intervention: Decreased functional mobility , Decreased endurance, Increased pain, Decreased balance, Decreased strength, Decreased safe awareness, Decreased tolerance to work activity  Assessment: Pt presents with the deficits above. Pt requires 1 person assist for functional tasks with use of RW for support. Pt is not safe to return home at this time unless with 24 hour care. Pt has increased pain, decreased WS on R LE and increased risk for falls. Pt cont to require skilled PT services to increase IND with functional tasks and progress towards PLOF safely. Therapy Prognosis: Good    Requires PT Follow-Up: Yes    Discharge Recommendations:  Discharge Recommendations: Continue to assess pending progress, Patient would benefit from continued therapy after discharge, 24 hour supervision or assist, IP Rehab    Patient Education:      . Patient Education  Education Given To: Patient  Education Provided: Role of Therapy, Plan of Care, Transfer Training  Education Provided Comments: d/c planning  Education Method: Verbal  Education Outcome: Verbalized understanding, Continued education needed       Equipment Recommendations:  Equipment Needed: No    Plan:  Current Treatment Recommendations: Strengthening, Balance training, Gait training, Functional mobility training, Neuromuscular re-education, Transfer training, Endurance training, Patient/Caregiver education & training, Therapeutic activities, Home exercise program, Safety education & training, Equipment evaluation, education, & procurement  Plan:  (5x GM)    Goals:  Patient goals : \"walk longer distances\"  Short Term Goals  Time Frame for Short term goals: by discharge  Short term goal 1: Pt will amb for 50 feet with RW for support with S for safety to progress with overall mobility. Short term goal 2: Pt will demo S for transfers with RW for support and good safety to progress towards PLOF safely. Short term goal 3: Pt will demo S for bed mobility tasks with bed flat to return home safely. Short term goal 4: Pt will tolerate 10-20 reps of ther ex to increase overall mobility.   Long Term Goals  Time Frame for Long term goals : NA due to short ELOS    Following session, patient left in safe position with all fall risk precautions in place. In bedside chair, all needs and call light in reach, alarm on.

## 2022-09-08 NOTE — FLOWSHEET NOTE
09/08/22 1057   Safe Environment   Safety Measures   (Virtual nurse check completed.)   Called into room, but did not voice in. Pt heard speaking with bedside staff. No safety concerns noted.

## 2022-09-08 NOTE — PROGRESS NOTES
[address] THERAPY MISSED TREATMENT NOTE  [facility] ICU STEPDOWN TELEMETRY 4K      Date: 2022  Patient Name: Natalie Chau        MRN: [id number]    : 1935  (82 y.o.)    REASON FOR MISSED TREATMENT:  ST received novel orders from Sophia Cruz MD to complete clinical swallow evaluation d/t patient coughing/vomiting during/following lunch meal. Melissa Tidwell 44 approved completion of evaluation. Upon arrival to room and rationale for services, patient reporting, \"this is something I have dealt with for years and it is not going to get better because I can't have the surgery d/t my chronic anti-coagulation. Per chart review, clinical swallow evaluation completed 2019. 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 Unable to safely repeat EGD and Botox d/t need for chronic anti coagulation'\". Patient refusing to complete clinical swallow evaluation despite extensive education from [address] [place] [address] to complete order at this time. Should patient be agreeable to complete clinical swallow evaluation/instrumental, please re-consult.      [facility]) [address]

## 2022-09-08 NOTE — PLAN OF CARE
Problem: Chronic Conditions and Co-morbidities  Goal: Patient's chronic conditions and co-morbidity symptoms are monitored and maintained or improved  9/8/2022 1355 by Vinicio Matthew RN  Outcome: Progressing  9/8/2022 0413 by Bobby Boyce RN  Flowsheets (Taken 9/7/2022 2000)  Care Plan - Patient's Chronic Conditions and Co-Morbidity Symptoms are Monitored and Maintained or Improved:   Monitor and assess patient's chronic conditions and comorbid symptoms for stability, deterioration, or improvement   Collaborate with multidisciplinary team to address chronic and comorbid conditions and prevent exacerbation or deterioration   Update acute care plan with appropriate goals if chronic or comorbid symptoms are exacerbated and prevent overall improvement and discharge     Problem: Discharge Planning  Goal: Discharge to home or other facility with appropriate resources  9/8/2022 1355 by Vinicio Matthew RN  Outcome: Progressing  9/8/2022 0413 by Bobby Boyce RN  Flowsheets (Taken 9/7/2022 2000)  Discharge to home or other facility with appropriate resources:   Identify barriers to discharge with patient and caregiver   Arrange for needed discharge resources and transportation as appropriate   Identify discharge learning needs (meds, wound care, etc)   Refer to discharge planning if patient needs post-hospital services based on physician order or complex needs related to functional status, cognitive ability or social support system     Problem: Safety - Adult  Goal: Free from fall injury  9/8/2022 1355 by Vinicio Matthew RN  Outcome: Progressing  9/8/2022 0413 by Bobby Boyce RN  Flowsheets (Taken 9/7/2022 9770 by Marina Mckay RN)  Free From Fall Injury: Based on caregiver fall risk screen, instruct family/caregiver to ask for assistance with transferring infant if caregiver noted to have fall risk factors     Problem: ABCDS Injury Assessment  Goal: Absence of physical injury  9/8/2022 1355 by Vinicio Matthew RN  Outcome: Progressing  9/8/2022 0413 by Ruth Diaz RN  Flowsheets (Taken 9/7/2022 0638 by Yoly Cantu RN)  Absence of Physical Injury: Implement safety measures based on patient assessment     Problem: Skin/Tissue Integrity  Goal: Absence of new skin breakdown  9/8/2022 1355 by Franchesca Cintron RN  Outcome: Progressing  9/8/2022 0413 by Ruth Diaz RN  Note: No new skin breakdown     Problem: Pain  Goal: Verbalizes/displays adequate comfort level or baseline comfort level  9/8/2022 1355 by Franchesca Cintron RN  Outcome: Progressing  9/8/2022 0413 by Ruth Diaz RN  Flowsheets (Taken 9/7/2022 2000)  Verbalizes/displays adequate comfort level or baseline comfort level:   Encourage patient to monitor pain and request assistance   Assess pain using appropriate pain scale   Administer analgesics based on type and severity of pain and evaluate response   Implement non-pharmacological measures as appropriate and evaluate response   Consider cultural and social influences on pain and pain management   Notify Licensed Independent Practitioner if interventions unsuccessful or patient reports new pain     Problem: Nutrition Deficit:  Goal: Optimize nutritional status  9/8/2022 1355 by Franchesca Cintron RN  Outcome: Progressing  9/8/2022 1219 by Kwame Benavides, MS, RD, LD  Flowsheets (Taken 9/8/2022 1219)  Nutrient intake appropriate for improving, restoring, or maintaining nutritional needs:   Assess nutritional status and recommend course of action   Monitor oral intake, labs, and treatment plans   Recommend appropriate diets, oral nutritional supplements, and vitamin/mineral supplements

## 2022-09-08 NOTE — PROGRESS NOTES
Comprehensive Nutrition Assessment    Type and Reason for Visit:  Initial, Positive Nutrition Screen    Nutrition Recommendations/Plan:   Recommend a Multivitamin daily. Started Ensure Enlive TID. Continue current diet. Malnutrition Assessment:  Malnutrition Status: At risk for malnutrition (Comment) (09/08/22 1206)    Context:  Acute Illness     Findings of the 6 clinical characteristics of malnutrition:  Energy Intake:  Mild decrease in energy intake (Comment) (intake 50% or less over the past few days)  Weight Loss:  No significant weight loss     Body Fat Loss:  No significant body fat loss     Muscle Mass Loss:  No significant muscle mass loss    Fluid Accumulation:  No significant fluid accumulation Extremities   Strength:  Not Performed    Nutrition Assessment:  Pt. nutritionally compromised AEB pt with decreased intake of meals over the past several days. At risk for further nutrition compromise r/t admit w/critical limb ischemia and underlying medical condition (Hx: HTN, GERD, HLD, CKD, CHF, GERD, PVD, colovesical fistula, blood clots, AAA s/p endovascular repair in 2019, pAfib, tobacco use disorder). Nutrition Related Findings:    Pt. Report/Treatments/Miscellaneous: Admit d/t  GI Status: No BM yet. Pt denies N/V. Pertinent Labs: 9/7: BUN 26, Glucose 150  Pertinent Meds: Pepcid, Protonix,     Wound Type: None       Current Nutrition Intake & Therapies:    Average Meal Intake: 1-25%, 51-75%  Average Supplements Intake:  (inititated today)  ADULT DIET; Regular  ADULT ORAL NUTRITION SUPPLEMENT; Breakfast, Dinner;  Low Calorie/High Protein Oral Supplement  ADULT ORAL NUTRITION SUPPLEMENT; Breakfast, Lunch, Dinner; Standard High Calorie/High Protein Oral Supplement    Anthropometric Measures:  Height: 5' 5\" (165.1 cm)  Ideal Body Weight (IBW): 125 lbs (57 kg)    Admission Body Weight: 176 lb 12.9 oz (80.2 kg) (9/7; +trace edema BLE)  Current Body Weight: 177 lb (80.3 kg) (9/8; +trace edema BLE)  Current BMI (kg/m2): 29.5  Usual Body Weight:  (Per EMR: 148 lb 3.2 oz on 9/20/21)  BMI Categories: Overweight (BMI 25.0-29. 9)    Estimated Daily Nutrient Needs:  Energy Requirements Based On: Kcal/kg  Weight Used for Energy Requirements: Current  Energy (kcal/day): 7848-8075 kcal/day (20-25 kcal/kg)  Weight Used for Protein Requirements: Ideal (56.8 kg)  Protein (g/day): 73-85 g/day (1.3-1.5 g/kg)    Nutrition Diagnosis:   Inadequate oral intake related to inadequate protein-energy intake as evidenced by intake 0-25%, intake 26-50%    Nutrition Interventions:   Food and/or Nutrient Delivery: Continue Current Diet, Start Oral Nutrition Supplement, Vitamin Supplement  Nutrition Education/Counseling: Education initiated (Encouraged po intake of meals at best effort)  Coordination of Nutrition Care: Continue to monitor while inpatient    Goals:  Goals: PO intake 75% or greater, by next RD assessment    Nutrition Monitoring and Evaluation:   Behavioral-Environmental Outcomes: None Identified  Food/Nutrient Intake Outcomes: Diet Advancement/Tolerance, Food and Nutrient Intake, Supplement Intake, Vitamin/Mineral Intake  Physical Signs/Symptoms Outcomes: Biochemical Data, Weight, Skin, Nutrition Focused Physical Findings, GI Status, Fluid Status or Edema    Discharge Planning:     Too soon to determine     Chelsie Herrera MS, RD, LD  Contact: [phone]

## 2022-09-08 NOTE — PROGRESS NOTES
[facility] COORDINATOR CONSULT    Referral Type: internal    Patient Name: Rock Edwards      MRN: [id number]    : 1935  (82 y.o.)  Gender: female   Race:White (non-)     Payor Source: Payor: Maisha Lee / Plan: Tigre Dior / Product Type: *No Product type* /   Secondary Payor Source:      Isolation Status: No active isolations    Lives With: Alone  Type of Home:  (4th floor)  Home Layout: One level  Home Access: Elevator, Level entry     Occupation: Retired  Additional Comments: Pt reports being indep prior to admission. Pt has to walk ~ 1block to get to car. Tends to go through drive throughs as much as possible. Pt reports she has been using a walker the last 5 -6 weeks, has been complete sponge baths and light meal prep tasks. Disciplines Required upon Admission to Inpatient Rehabilitation: Physical Therapy and Occupational Therapy  Post operative: Yes  Fall: No  Dialysis: No  Diet: ADULT DIET; Regular  ADULT ORAL NUTRITION SUPPLEMENT; Breakfast, Dinner; Low Calorie/High Protein Oral Supplement  ADULT ORAL NUTRITION SUPPLEMENT; Breakfast, Lunch, Dinner; Standard High Calorie/High Protein Oral Supplement  Await medical work up completion as well as PM&R consult for further determination of patient needs.

## 2022-09-08 NOTE — PLAN OF CARE
Problem: Chronic Conditions and Co-morbidities  Goal: Patient's chronic conditions and co-morbidity symptoms are monitored and maintained or improved  Flowsheets (Taken 9/7/2022 2000)  Care Plan - Patient's Chronic Conditions and Co-Morbidity Symptoms are Monitored and Maintained or Improved:   Monitor and assess patient's chronic conditions and comorbid symptoms for stability, deterioration, or improvement   Collaborate with multidisciplinary team to address chronic and comorbid conditions and prevent exacerbation or deterioration   Update acute care plan with appropriate goals if chronic or comorbid symptoms are exacerbated and prevent overall improvement and discharge     Problem: Discharge Planning  Goal: Discharge to home or other facility with appropriate resources  Flowsheets (Taken 9/7/2022 2000)  Discharge to home or other facility with appropriate resources:   Identify barriers to discharge with patient and caregiver   Arrange for needed discharge resources and transportation as appropriate   Identify discharge learning needs (meds, wound care, etc)   Refer to discharge planning if patient needs post-hospital services based on physician order or complex needs related to functional status, cognitive ability or social support system     Problem: Safety - Adult  Goal: Free from fall injury  Flowsheets (Taken 9/7/2022 0713 by Turner Cannon RN)  Free From Fall Injury: Based on caregiver fall risk screen, instruct family/caregiver to ask for assistance with transferring infant if caregiver noted to have fall risk factors     Problem: ABCDS Injury Assessment  Goal: Absence of physical injury  Flowsheets (Taken 9/7/2022 0713 by Turner Cannon RN)  Absence of Physical Injury: Implement safety measures based on patient assessment     Problem: Skin/Tissue Integrity  Goal: Absence of new skin breakdown  Description: 1. Monitor for areas of redness and/or skin breakdown  2.   Assess vascular access sites hourly  3. Every 4-6 hours minimum:  Change oxygen saturation probe site  4. Every 4-6 hours:  If on nasal continuous positive airway pressure, respiratory therapy assess nares and determine need for appliance change or resting period.   Note: No new skin breakdown     Problem: Pain  Goal: Verbalizes/displays adequate comfort level or baseline comfort level  Flowsheets (Taken 9/7/2022 2000)  Verbalizes/displays adequate comfort level or baseline comfort level:   Encourage patient to monitor pain and request assistance   Assess pain using appropriate pain scale   Administer analgesics based on type and severity of pain and evaluate response   Implement non-pharmacological measures as appropriate and evaluate response   Consider cultural and social influences on pain and pain management   Notify Licensed Independent Practitioner if interventions unsuccessful or patient reports new pain

## 2022-09-08 NOTE — PROGRESS NOTES
Cardiology Progress Note      Patient:  Darrin Allen  YOB: 1935  MRN: [id number]   Acct: [de-identified]  Admit Date:  9/6/2022  Primary Cardiologist: Shar Cervantes MD    Chief Complaint:   Pt presented for OP elective LE angiogram     Subjective (Events in last 24 hours):   Pt up in chair - c/o RLE pain -- she states \"can hardly walk on it\"   Strong Pulses present with doppler - good ROM of foot with pain   Dressings taken off and band-aides applied   Foot is red colored - warm     PT requests rehab - discussed with pt - she thinks is a good idea     Art duplex scheduled for 1230 today     Pt only taking tylenol as other \"medications\" make her \"crazy\"    Objective:   BP (!) 127/58   Pulse 88   Temp 97.6 °F (36.4 °C) (Oral)   Resp 18   Ht 5' 5\" (1.651 m)   Wt 177 lb (80.3 kg)   SpO2 90%   BMI 29.45 kg/m²        TELEMETRY: SR    Physical Exam:  General Appearance: alert and oriented to person, place and time, in no acute distress  Cardiovascular: normal rate, regular rhythm, normal S1 and S2, no murmurs, rubs, clicks, or gallops, distal pulses intact,  Pulmonary/Chest: clear to auscultation bilaterally- no wheezes, rales or rhonchi, normal air movement, no respiratory distress  Abdomen: soft, non-tender, non-distended, normal bowel sounds, no masses Extremities: no cyanosis, clubbing or edema, pulses as above for RT foot --- LT foot palpable   Musculoskeletal: normal range of motion, no joint swelling, deformity or tenderness  Neurological: alert, oriented, normal speech, no focal findings or movement disorder noted    Medications:    sodium chloride  500 mL IntraVENous Once    [Held by provider] sacubitril-valsartan  1 tablet Oral BID    hydrocortisone  25 mg Rectal Nightly    diphenhydrAMINE  50 mg IntraVENous Once    sodium chloride flush  5-40 mL IntraVENous 2 times per day    timolol  1 drop Both Eyes Daily    pantoprazole  40 mg Oral BID AC    pravastatin  40 mg Oral Daily    [START ON 9/11/2022] potassium chloride  10 mEq Oral Weekly    nitroglycerin  0.5 inch Topical BID    rivaroxaban  20 mg Oral Daily    aspirin  81 mg Oral Daily    clopidogrel  75 mg Oral Daily    metoprolol succinate  25 mg Oral Daily    famotidine  20 mg Oral Nightly       nitroGLYCERIN, 0.4 mg, Q5 Min PRN  acetaminophen, 650 mg, Q4H PRN  ondansetron, 4 mg, Q8H PRN   Or  ondansetron, 4 mg, Q6H PRN  polyethylene glycol, 17 g, Daily PRN  albuterol sulfate HFA, 2 puff, Q6H PRN  HYDROmorphone, 0.5 mg, Q2H PRN        Diagnostics:  LE angiogram:    Successful percutaneous right EVAR graft occlusion, status  post reconstitution with four 11-mm Viabahn VBX stents postdilated with  a 14-mm [place] balloon, right common iliac and external iliac artery  graft occlusion, treated with two 8 mm and one 7 mm VBX stent  postdilated with 8 and 10 mm balloons with full reconstitution of flow  into the common femoral artery followed by right SFA/popliteal artery  occlusion treated with a 6 x 120 [place] x2 followed by 6 x 80 IN. PACT  Admiral drug-coated balloon to the distal SFA and a 5 x 150 IN. PACT  Admiral drug-coated balloon for the popliteal artery and a 3-mm [place]  angioplasty of the entire anterior tibial artery with reconstitution of  two-vessel runoff to the foot. PLAN:  1. Optimal medical therapy. 2.  Risk factor management. 3.  Routine access site care. 4.  Six hours bedrest.  5.  Will need FemoStop per protocol. 6.  DAPT. 7.  Eliquis per protocol, but if able to we will switch to Xarelto. 8.  Will need lifelong anticoagulation/antiplatelet therapy. 9.  _____________. 10.  Nitroglycerin paste to the foot. 11.  Warm blankets to the foot. 12.  Keep the foot in dependent position. 13.  IV fluids overnight. 14.  Frequent pulse checks q.1 to 2 h.  15.  Repeat labs in the a.m.  16.  Surveillance arterial duplex at 1, 3, 6, and 12 months. 17.  Follow with myself in two to four weeks postprocedure.      All the above was explained to the patient's family. They were  agreeable and amenable to the plan.            Waylon Roth MD     D: 09/06/2022    Lab Data:    Cardiac Enzymes:  Recent Labs     09/07/22  0524   CKTOTAL 26*       CBC:   Lab Results   Component Value Date/Time    WBC 7.4 09/07/2022 05:24 AM    RBC 4.13 09/07/2022 05:24 AM    HGB 12.5 09/07/2022 05:24 AM    HCT 38.1 09/07/2022 05:24 AM     09/07/2022 05:24 AM       CMP:    Lab Results   Component Value Date/Time     09/07/2022 05:24 AM    K 4.1 09/07/2022 05:24 AM     09/07/2022 05:24 AM    CO2 28 09/07/2022 05:24 AM    BUN 26 09/07/2022 05:24 AM    CREATININE 1.0 09/07/2022 05:24 AM    AGRATIO 1.6 07/05/2021 07:36 AM    LABGLOM 53 09/07/2022 05:24 AM    GLUCOSE 150 09/07/2022 05:24 AM    GLUCOSE 82 08/19/2022 09:18 AM    CALCIUM 8.0 09/07/2022 05:24 AM       Hepatic Function Panel:    Lab Results   Component Value Date/Time    ALKPHOS 91 07/05/2021 07:36 AM    ALT 8 07/05/2021 07:36 AM    AST 14 07/05/2021 07:36 AM    PROT 6.2 07/05/2021 07:36 AM    BILITOT 0.4 07/05/2021 07:36 AM    BILIDIR <0.2 02/28/2021 03:10 PM    LABALBU 3.8 07/05/2021 07:36 AM       Magnesium:    Lab Results   Component Value Date/Time    MG 2.2 07/11/2022 12:35 PM       PT/INR:    Lab Results   Component Value Date/Time    INR 1.10 09/06/2022 06:33 AM       HgBA1c:    Lab Results   Component Value Date/Time    LABA1C 5.5 02/28/2021 07:44 PM       FLP:    Lab Results   Component Value Date/Time    TRIG 104 07/05/2021 07:36 AM    HDL 41 07/05/2021 07:36 AM    LDLCALC 47 05/18/2019 04:06 AM    LDLDIRECT 49 07/05/2021 07:36 AM       TSH:    Lab Results   Component Value Date/Time    TSH 2.160 12/28/2019 05:54 AM         Assessment:    Generalized weakness to LE     CLI to RLE     s\p OP elective LE angiogram 9/6/2022 w/   Successful percutaneous right EVAR graft occlusion, status  post reconstitution with four 11-mm Viabahn VBX stents postdilated with  a 14-mm Conquest balloon, right common iliac and external iliac artery  graft occlusion, treated with two 8 mm and one 7 mm VBX stent  postdilated with 8 and 10 mm balloons with full reconstitution of flow  into the common femoral artery followed by right SFA/popliteal artery  occlusion treated with a 6 x 120 Elizabeth x2 followed by 6 x 80 IN. PACT  Admiral drug-coated balloon to the distal SFA and a 5 x 150 IN. PACT  Admiral drug-coated balloon for the popliteal artery and a 3-mm [place]  angioplasty of the entire anterior tibial artery with reconstitution of  two-vessel runoff to the foot.       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   - FS xarelto       NICDMP EF 25-30% 2020   Dual ICD     PAD- Peripheral angiogram/intervention was done on 02/14/2019 for acute-on-chronic limb ischemia related to post lower extremity bypass distal anastomotic stenosis with recent graft thrombosis with a successful right fem-tib angioplasty and stenting    Hx EVAR     Hx GIB - refused LAAO device     CKD stage 3--- stable   HTN  stable   HLP    LDL 41 7/2021 -- recheck   Hx COPD       Plan:   for [facility] referral -- pt has pre-cert  Duplex of [place] pending - post angio  Ambulate  pain meds   Pt stable from cardiac standpoint for MI   Hospitalists to assume attending if willing          Electronically signed by ROMAN Klein - CNP on 9/8/2022 at 11:38 AM

## 2022-09-08 NOTE — CONSULTS
Physical Medicine & Rehabilitation   Consult Note      Admitting Physician: Shauna Roldan MD    Primary Care Provider: Joesph Otero     Reason for Consult: Critical limb ischemia of right lower extremity; rehab needs    History of Present Illness:  Sondra Perry is a 82 y.o. female admitted to [address] [place] [address] on 9/6/2022 with a past medical history of HFrEF s/p dual ICD, pAfib, HTN, HLD, AAA s/p repair, PVD, blood clots, CKD, colovesical fisutla s/p right hemicolectomy who presented to [facility] for CLI and planned angiogram of bilateral lower extremities. The patient reports persistent and worsening lower extremity pain, greater in the right over the past month. This has been associated with numbness and tingling. She notes her RLE has been getting more red, with darker digits. She notes increased difficulty with ambulation due to this. The patient was evaluated by cardiology with planned intervention 9/6/22. She previously had a CTA abdominal aorta with runoff that was significant for 3.6 cm fusiform aneurysm of the infrarenal abdominal aorta; bifurcated endograft present; right iliac limb totally occluded and left iliac limb remains patent; stents in right common and external iliac arteries are totally occluded; collateral reconstitution of the right CFA and right profunda; total occlusion of native right SFA and right femoral/anterior tibial bypass graft; Collateral reconstitution of the trifurcation vessels right side; multifocal severe diffuse disease of left SFA; left popliteal artery demonstrated mild stenosis; all 3 trifurcation vessels left calf lateral but demonstrate multifocal disease; subcutaneous emphysema bilaterally that is worse on left 8/4/22. The patient underwent a bilateral lower extremity angiogram 9/6/2022.  She was found to have R EVAR limb, R CFA, R SFA occlusions; dimunitive PFA; popliteal artery occlusion with collaterals, all three tibials occluded; and all three tibials are occluded with some degree of flow in the PT and AT. The patient underwent retrograde AT access, knuckled to the R CFA, then re-entry into the R EIA stents; VBX 11 x 39, VBX 11 x 59 x 2, VBX 11 x 39, VBX 8 x 59; Retrograde SFA/pop 6.0 PTA; Elizabeth 6 x 120 x 2 for ostial SFA to distal SFA; DCB 5 x 150 of the pop, and DCB 6 x 80 of the distal SFA/prox pop; and 3.0 PTA of the AT. This resulted in Complete reconstitution of the R EVAR limb, R CFA, R SFA, R POP, and R AT with improved flow into the PT - full flow into the pedal arch. Post procedure the patient was transferred to the ICU for further management and evaluation. Hypotensive overnight and 500 ml fld bolus x2 given. Pressures were much improved the following day and she was transferred out of the ICU to . Current Rehabilitation Assessments:  PT:       OBJECTIVE:  Range of Motion:  Right Lower Extremity: Impaired - not assess R due to increased pain  Left Lower Extremity: WFL     Strength:  Right Lower Extremity: Impaired - not assess R ankle due to increased pain  Left Lower Extremity: Impaired - deconditioned     Balance:  Static Sitting Balance:  Supervision, Stand By Assistance  Dynamic Sitting Balance: Stand By Assistance  Static Standing Balance: Contact Guard Assistance, Minimal Assistance  Pt completed toileting, required cues for safety, tech in to help assist. Pt with poor safety and deferred use of walker for transfer from commode to chair. Pt with increased assist without walker compared to with RW.    Bed Mobility:  Not Tested     Transfers:  Sit to Stand: Minimal Assistance, Moderate Assistance, X 1, cues for hand placement, with verbal cues  Stand to Sit:Minimal Assistance, X 1, cues for hand placement, with verbal cues  Stand Pivot:Minimal Assistance, X 1, cues for hand placement, with verbal cues  Transfers completed from various surfaces and heights with use of walker for support as needed, decreased WS to R LE due to increased pain  Ambulation:  Contact Guard Assistance, Minimal Assistance, X 1, with cues for safety, with verbal cues , with increased time for completion  Distance: 8 feet  Surface: Level Tile  Device:Rolling Walker  Gait Deviations: Forward Flexed Posture, Slow Pretty, Decreased Step Length Bilaterally, Decreased Weight Shift Right, Decreased Gait Speed, Decreased Heel Strike on Right, Unsteady Gait, and Decreased Terminal Knee Extension  Cues for safety, no LOB noted, antalgic gait pattern noted, pt declined further distances     Functional Outcome Measures: Completed  AM-PAC Inpatient Mobility without Stair Climbing Raw Score : 12  AM-PAC Inpatient without Stair Climbing T-Scale Score : 37.26     ASSESSMENT:  Activity Tolerance:  Patient tolerance of  treatment: fair. Increased pain         OT:       COGNITION: Decreased Insight and Decreased Safety Awareness     RANGE OF MOTION:  Bilateral Upper Extremity:  WFL     STRENGTH:  Bilateral Upper Extremity:  grossly deconditinoed     SENSATION:   WFL     ADL:   Feeding: Modified Independent. Breakfast tray  Lower Extremity Dressing: Max A for donning depends. A to thread while seated. Max A to manage around hips. Dependent for  Donning slipper socks. BALANCE:  Sitting Balance:  Stand By Assistance. On EOB  Standing Balance: Minimal Assistance. With unilateral release     BED MOBILITY:  Supine to Sit: Minimal Assistance with increased time  Scooting: Minimal Assistance       TRANSFERS:  Sit to Stand:  Minimal Assistance. From EOB, VC for hand placement  Stand to Sit: Minimal Assistance. To recliner. Max VC for body positioning prior to t/f initiation     FUNCTIONAL MOBILITY:  Assistive Device: Rolling Walker  Assist Level:  Minimal Assistance. Distance:  2' EOB to recliner  Decreased safety awareness noted. Increased difficulty with weightshifting      Activity Tolerance:  Patient tolerance of  treatment: good.          Assessment:  Assessment: Pt presented with the listed deficits s/p admission with peripheral vascular disease. Pt with decreased strength, balance, endurance, and activity tolerance and currently requires Min A for t/fs and mobility. Pt would benefit from continued OT to restore PLOF maximize pt's indep with ADLs and IADLs in prep for a safe return home at max level of indep. Performance deficits / Impairments: Decreased functional mobility , Decreased safe awareness, Decreased balance, Decreased posture, Decreased ADL status, Decreased endurance, Decreased high-level IADLs, Decreased strength  Prognosis: Good  REQUIRES OT FOLLOW-UP: Yes  Decision Making: Medium Complexity     Treatment Initiated: Treatment and education initiated within context of evaluation. Evaluation time included review of current medical information, gathering information related to past medical, social and functional history, completion of standardized testing, formal and informal observation of tasks, assessment of data and development of plan of care and goals. Treatment time included skilled education and facilitation of tasks to increase safety and independence with ADL's for improved functional independence and quality of life.      Discharge Recommendations:  Continue to assess pending progress, Patient would benefit from continued therapy after discharge     Patient Education:  Patient Education  Education Given To: Patient  Education Provided: Role of Therapy, Plan of Care, Energy Conservation, Transfer Training, Fall Prevention Strategies, IADL Safety, ADL Adaptive Strategies  Education Method: Demonstration, Verbal  Barriers to Learning: None  Education Outcome: Verbalized understanding, Demonstrated understanding     Equipment Recommendations:  Equipment Needed: Yes  Other: Elsy Jay       ST: Evaluation pending        Past Medical History:        Diagnosis Date    A-fib ([facility]     AAA (abdominal aortic aneurysm) ([facility]     Achalasia     Anemia     Arthritis Blood circulation, collateral     BPPV (benign paroxysmal positional vertigo) 6/6/16    CHF (congestive heart failure) (HCC)     Chronic kidney disease     sees Dr Cristopher Garcia cancer [facility])     Gastrointestinal hemorrhage     GERD (gastroesophageal reflux disease)     ? esophageal stricture    Hiatal hernia     History of blood transfusion     HTN (hypertension)     Hx of blood clots 2018    R leg-sees ABEBA Hargrove    Hyperlipidemia     [employer] dual ICD  8/26/2020    Neuromuscular disorder ([facility]     Obesity     Osteopenia     Osteoporosis     PAC (premature atrial contraction)     on betablocker    Paralysis (HCC)     baby    Pedal edema     denied any hx of hypertension    Pneumonia     PVC (premature ventricular contraction)     sees Dr Jaime Johnson    PVD (peripheral vascular disease) ([address]     Small bowel obstruction (HCC)     Tinnitus     UTI (urinary tract infection)        Past Surgical History:        Procedure Laterality Date    ABDOMINAL AORTIC ANEURYSM REPAIR, ENDOVASCULAR N/A 2/15/2019    ABDOMINAL AORTIC ANEURYSM REPAIR ENDOVASCULAR, DECLOTTING RIGHT FEM TIB BYPASS GRAFT performed by Pierre Arambula MD at [facility]    lumpectomy    CHOLECYSTECTOMY      30 years ago    COLONOSCOPY  08/06/2018    Dr Rony Luciano N/A 2/22/2019    COLONOSCOPY DIAGNOSTIC performed by Inge Piper MD at [facility]    COLONOSCOPY N/A 2/22/2020    COLONOSCOPY CONTROL HEMORRHAGE performed by Deniz Clemons MD at [facility]    COLONOSCOPY Left 3/2/2021    COLORECTAL CANCER SCREENING, NOT HIGH RISK performed by Mireya Nichols MD at [facility]      eye, eyelids    EYE SURGERY      cataract    HEMICOLECTOMY N/A 2/25/2020    OPEN RIGHT COLON RESECTION performed by Black Cristina MD at [address] [place] [address] ([facility])      LARYNGOSCOPY  07/15/2016    OTHER SURGICAL HISTORY  10/06/2020    Exp lap washout mesenteric lymph node biopsy EGD abthera placement Dr Roselinda Homans  10/08/2020    reopening recent lap open g tube placement intra operative EGD and primary closure of open abdomen- [address] T/A/L AREA/<100SCM /<1ST 25 SCM N/A 9/6/2018    RE-EXPLORATION RIGHT GROIN, DECLOTTING OF FEMORAL BYPASS GRAFT performed by Percy Frey MD at [address] [place] [address] EGD TRANSORAL BIOPSY SINGLE/MULTIPLE Left 11/27/2017    EGD BIOPSY performed by Hayder Lemos MD at [facility], PARTIAL, Nick Rufferster N/A 8/20/2018    ROBOT ASSISTED COLECTOMY (LOW ANTERIOR) performed by Danny Newby MD at [address] [place] [address] OFFICE/OUTPT [facility] N/A 9/5/2018    RIGHT FEMORAL EMBOLECTOMY, INTRAOPERATIVE ARTERIOGRAM, RIGHT ILIAC EMBOLECTOMY, RIGHT COMMON AND EXTERNAL ILIAC STENTING, RIGHT FEMORAL TO ANTERIOR POPLITEAL BYPASS WITH 6MM GORTEX GRAFT performed by Percy Frey MD at [address] [facility] [address] [facility] [address] Right 2/14/2019    FEMORAL EMBOLECTOMY THROMBECTOMY, RIGHT LEG performed by Percy Frey MD at [facility] 11/27/2017    EGD SUBMUCOSAL/BOTOX INJECTION performed by Hayder Lemos MD at [facility] 2/22/2019    EGD BIOPSY performed by Minor Otto MD at [facility]       Allergies:     Allergies   Allergen Reactions    Aspirin Other (See Comments)     Unknown reaction, stated Dr. Quintin Talamantes didn't want her to take it anymore on eliquis    Lasix [Furosemide] Other (See Comments)     PATIENT DOESN'T REMEMBER    Lipitor [Atorvastatin] Other (See Comments)     LEG PAIN    Neurontin [Gabapentin] Other (See Comments)     PATIENT DOESN'T REMEMBER    Oxycontin [Oxycodone Hcl]      confusion    Penicillins Other (See Comments)     HYPERTENSION        Current Medications:   Current Facility-Administered Medications: 0.9 % sodium chloride bolus, 500 mL, IntraVENous, Once  [Held by provider] sacubitril-valsartan (ENTRESTO) 24-26 MG per tablet 1 tablet, 1 tablet, Oral, BID  hydrocortisone (ANUSOL-HC) suppository 25 mg, 25 mg, Rectal, Nightly  diphenhydrAMINE (BENADRYL) injection 50 mg, 50 mg, IntraVENous, Once  nitroGLYCERIN (NITROSTAT) SL tablet 0.4 mg, 0.4 mg, SubLINGual, Q5 Min PRN  sodium chloride flush 0.9 % injection 5-40 mL, 5-40 mL, IntraVENous, 2 times per day  acetaminophen (TYLENOL) tablet 650 mg, 650 mg, Oral, Q4H PRN  timolol (TIMOPTIC) 0.5 % ophthalmic solution 1 drop, 1 drop, Both Eyes, Daily  pantoprazole (PROTONIX) tablet 40 mg, 40 mg, Oral, BID AC  pravastatin (PRAVACHOL) tablet 40 mg, 40 mg, Oral, Daily  [START ON 9/11/2022] potassium chloride (KLOR-CON) extended release tablet 10 mEq, 10 mEq, Oral, Weekly  ondansetron (ZOFRAN-ODT) disintegrating tablet 4 mg, 4 mg, Oral, Q8H PRN **OR** ondansetron (ZOFRAN) injection 4 mg, 4 mg, IntraVENous, Q6H PRN  polyethylene glycol (GLYCOLAX) packet 17 g, 17 g, Oral, Daily PRN  nitroglycerin (NITRO-BID) 2 % ointment 0.5 inch, 0.5 inch, Topical, BID  rivaroxaban (XARELTO) tablet 20 mg, 20 mg, Oral, Daily  aspirin chewable tablet 81 mg, 81 mg, Oral, Daily  clopidogrel (PLAVIX) tablet 75 mg, 75 mg, Oral, Daily  albuterol sulfate HFA (PROVENTIL;VENTOLIN;PROAIR) 108 (90 Base) MCG/ACT inhaler 2 puff, 2 puff, Inhalation, Q6H PRN  metoprolol succinate (TOPROL XL) extended release tablet 25 mg, 25 mg, Oral, Daily  famotidine (PEPCID) tablet 20 mg, 20 mg, Oral, Nightly  HYDROmorphone (DILAUDID) injection 0.5 mg, 0.5 mg, IntraVENous, Q2H PRN    Social History:  Social History     Socioeconomic History    Marital status:       Spouse name: Isis Zeepda    Number of children: 4    Years of education: Not on file    Highest education level: Not on file   Occupational History    Not on file   Tobacco Use    Smoking status: Some Days     Packs/day: 0.25     Years: 60.00     Pack years: 15.00     Types: Cigarettes     Start date: Grandfather        Review of Systems:  CONSTITUTIONAL:  positive for  fatigue and pain  EYES:  negative  HEENT:  negative  RESPIRATORY:  negative  CARDIOVASCULAR:  positive for  fatigue, RLE pain secondary to procedures  GASTROINTESTINAL:  negative  GENITOURINARY:  negative  SKIN:  RLE skin pink and warm  HEMATOLOGIC/LYMPHATIC:  negative  MUSCULOSKELETAL:  positive for  myalgias, pain, decreased range of motion, and muscle weakness  NEUROLOGICAL:  positive for memory problems, gait problems, weakness, numbness, and pain  BEHAVIOR/PSYCH:  positive for decreased energy level and fatigue  System review otherwise negative    Physical Exam:  /62   Pulse 74   Temp 98.2 °F (36.8 °C) (Oral)   Resp 18   Ht 5' 5\" (1.651 m)   Wt 177 lb (80.3 kg)   SpO2 98%   BMI 29.45 kg/m²   awake  Orientation:   person, place, time  Mood: within normal limits  Affect: calm and spontaneous  General appearance: well groomed and in no acute distress    Memory:   abnormal - ,   Attention/Concentration: normal  Language:  normal    Cranial Nerves:  cranial nerves II-XII are grossly intact  ROM:  abnormal - in RLE 2/2 pain  Motor Exam:  5/5 bilateral UE's except 4/5 in left triceps; LLE 5/5; RLE not tested at patient's request  Tone:  normal  Muscle bulk: normal  Sensory:  tingling in RLE; mild decreased sensation in LLE  Coordination:   normal  Deep Tendon Reflexes:  Reflexes are intact and symmetrical bilaterally  Plantar Response:  Right:  equivocal  Left:  equivocal  Gait: not tested    Heart: normal rate and regular rhythm  Lungs: clear to auscultation without wheezes or rales  Abdomen: soft, non-tender, non-distended, normal bowel sounds, no masses or organomegaly    Skin: warm and dry  Peripheral vascular: Pulses: Normal upper and lower extremity pulses; Edema: 1+      Diagnostics:  No results found for this or any previous visit (from the past 24 hour(s)).         Impression:  Critical limb ischemia  PVD   Complete

## 2022-09-09 PROCEDURE — 99232 SBSQ HOSP IP/OBS MODERATE 35: CPT | Performed by: NURSE PRACTITIONER

## 2022-09-09 PROCEDURE — 2060000000 HC ICU INTERMEDIATE R&B

## 2022-09-09 PROCEDURE — 97535 SELF CARE MNGMENT TRAINING: CPT

## 2022-09-09 PROCEDURE — 6370000000 HC RX 637 (ALT 250 FOR IP): Performed by: INTERNAL MEDICINE

## 2022-09-09 PROCEDURE — 97530 THERAPEUTIC ACTIVITIES: CPT

## 2022-09-09 PROCEDURE — 6370000000 HC RX 637 (ALT 250 FOR IP): Performed by: NURSE PRACTITIONER

## 2022-09-09 PROCEDURE — 2580000003 HC RX 258: Performed by: INTERNAL MEDICINE

## 2022-09-09 PROCEDURE — 6370000000 HC RX 637 (ALT 250 FOR IP): Performed by: STUDENT IN AN ORGANIZED HEALTH CARE EDUCATION/TRAINING PROGRAM

## 2022-09-09 PROCEDURE — 97110 THERAPEUTIC EXERCISES: CPT

## 2022-09-09 RX ADMIN — HYDROCORTISONE ACETATE 25 MG: 25 SUPPOSITORY RECTAL at 20:56

## 2022-09-09 RX ADMIN — TIMOLOL MALEATE 1 DROP: 5 SOLUTION OPHTHALMIC at 08:56

## 2022-09-09 RX ADMIN — ACETAMINOPHEN 650 MG: 325 TABLET ORAL at 20:56

## 2022-09-09 RX ADMIN — CLOPIDOGREL BISULFATE 75 MG: 75 TABLET ORAL at 08:55

## 2022-09-09 RX ADMIN — NITROGLYCERIN 0.5 INCH: 20 OINTMENT TOPICAL at 08:55

## 2022-09-09 RX ADMIN — ACETAMINOPHEN 650 MG: 325 TABLET ORAL at 11:34

## 2022-09-09 RX ADMIN — METOPROLOL SUCCINATE 25 MG: 25 TABLET, EXTENDED RELEASE ORAL at 08:57

## 2022-09-09 RX ADMIN — PANTOPRAZOLE SODIUM 40 MG: 40 TABLET, DELAYED RELEASE ORAL at 07:31

## 2022-09-09 RX ADMIN — PANTOPRAZOLE SODIUM 40 MG: 40 TABLET, DELAYED RELEASE ORAL at 15:40

## 2022-09-09 RX ADMIN — ACETAMINOPHEN 650 MG: 325 TABLET ORAL at 15:39

## 2022-09-09 RX ADMIN — PRAVASTATIN SODIUM 40 MG: 40 TABLET ORAL at 08:56

## 2022-09-09 RX ADMIN — ACETAMINOPHEN 650 MG: 325 TABLET ORAL at 02:20

## 2022-09-09 RX ADMIN — SODIUM CHLORIDE, PRESERVATIVE FREE 10 ML: 5 INJECTION INTRAVENOUS at 08:55

## 2022-09-09 RX ADMIN — RIVAROXABAN 20 MG: 20 TABLET, FILM COATED ORAL at 17:48

## 2022-09-09 RX ADMIN — ASPIRIN 81 MG 81 MG: 81 TABLET ORAL at 08:55

## 2022-09-09 RX ADMIN — FAMOTIDINE 20 MG: 20 TABLET ORAL at 20:56

## 2022-09-09 RX ADMIN — SODIUM CHLORIDE, PRESERVATIVE FREE 10 ML: 5 INJECTION INTRAVENOUS at 21:29

## 2022-09-09 RX ADMIN — ACETAMINOPHEN 650 MG: 325 TABLET ORAL at 07:31

## 2022-09-09 RX ADMIN — NITROGLYCERIN 0.5 INCH: 20 OINTMENT TOPICAL at 20:56

## 2022-09-09 ASSESSMENT — PAIN DESCRIPTION - ORIENTATION
ORIENTATION: RIGHT
ORIENTATION: RIGHT;LEFT
ORIENTATION: RIGHT;LEFT

## 2022-09-09 ASSESSMENT — PAIN SCALES - WONG BAKER

## 2022-09-09 ASSESSMENT — PAIN SCALES - GENERAL
PAINLEVEL_OUTOF10: 0
PAINLEVEL_OUTOF10: 5
PAINLEVEL_OUTOF10: 0
PAINLEVEL_OUTOF10: 2
PAINLEVEL_OUTOF10: 0
PAINLEVEL_OUTOF10: 2
PAINLEVEL_OUTOF10: 3
PAINLEVEL_OUTOF10: 4
PAINLEVEL_OUTOF10: 0
PAINLEVEL_OUTOF10: 0

## 2022-09-09 ASSESSMENT — PAIN DESCRIPTION - LOCATION
LOCATION: FOOT

## 2022-09-09 ASSESSMENT — PAIN DESCRIPTION - DESCRIPTORS
DESCRIPTORS: ACHING;DISCOMFORT
DESCRIPTORS: ACHING
DESCRIPTORS: ACHING;DISCOMFORT
DESCRIPTORS: ACHING;PRESSURE

## 2022-09-09 ASSESSMENT — PAIN - FUNCTIONAL ASSESSMENT
PAIN_FUNCTIONAL_ASSESSMENT: ACTIVITIES ARE NOT PREVENTED

## 2022-09-09 ASSESSMENT — PAIN DESCRIPTION - PAIN TYPE: TYPE: ACUTE PAIN

## 2022-09-09 ASSESSMENT — PAIN DESCRIPTION - ONSET: ONSET: ON-GOING

## 2022-09-09 NOTE — PROGRESS NOTES
[facility] ICU STEPDOWN TELEMETRY 4K  Occupational Therapy  Daily Note  Time:   Time In: 1030  Time Out: 9428  Timed Code Treatment Minutes: 23 Minutes  Minutes: 23          Date: 2022  Patient Name: Rock Edwards,   Gender: female      Room: [facility]003-A  MRN: [id number]  : 1935  (82 y.o.)  Referring Practitioner: Jerod Bragg MD  Diagnosis: Peripheral Vascular Disease, unspecified  Additional Pertinent Hx: The patient is a 82year old female with a past medical history of HFrEF s/p dual ICD, pAfib, HTN, HLD, AAA s/p repair, PVD, blood clots, CKD, colovesical fisutla s/p right hemicolectomy who presented to [facility] for CLI and planned angiogram of bilateral lower extremities. The patient reports persistent and worsening lower extremity pain, greater in the right over the past month. This has been associated with numbness and tingling. She notes her RLE has been getting more red, with darker digits. She notes increased difficulty with ambulation due to this. The patient was evaluated by cardiology with planned intervention 22. She previously had a CTA abdominal aorta with runoff that was significant for 3.6 cm fusiform aneurysm of the infrarenal abdominal aorta; bifurcated endograft present; right iliac limb totally occluded and left iliac limb remains patent; stents in right common and external iliac arteries are totally occluded; collateral reconstitution of the right CFA and right profunda; total occlusion of native right SFA and right femoral/anterior tibial bypass graft; Collateral reconstitution of the trifurcation vessels right side; multifocal severe diffuse disease of left SFA; left popliteal artery demonstrated mild stenosis; all 3 trifurcation vessels left calf lateral but demonstrate multifocal disease; subcutaneous emphysema bilaterally that is worse on left 22. The patient underwent a bilateral lower extremity angiogram 2022.  She was found to have R EVAR limb, R CFA, R SFA occlusions; dimunitive PFA; popliteal artery occlusion with collaterals, all three tibials occluded; and all three tibials are occluded with some degree of flow in the PT and AT. The patient underwent retrograde AT access, knuckled to the R CFA, then re-entry into the R EIA stents; VBX 11 x 39, VBX 11 x 59 x 2, VBX 11 x 39, VBX 8 x 59; Retrograde SFA/pop 6.0 PTA; Elizabeth 6 x 120 x 2 for ostial SFA to distal SFA; DCB 5 x 150 of the pop, and DCB 6 x 80 of the distal SFA/prox pop; and 3.0 PTA of the AT. This resulted in Complete reconstitution of the R EVAR limb, R CFA, R SFA, R POP, and R AT with improved flow into the PT - full flow into the pedal arch. Post procedure the patient was transferred to the ICU for further management and evaluation. Restrictions/Precautions:  Restrictions/Precautions: General Precautions, Fall Risk     SUBJECTIVE: Pt sitting on BSC upon arrival, pt agreeable to OT session, RN gave verbal approval for session, STNA present during session     PAIN: 0/10:     Vitals: Vitals not assessed per clinical judgement, see nursing flowsheet    COGNITION: Slow Processing, Decreased Recall, and Decreased Insight    ADL:   Grooming: Stand By Assistance. For combing hair  Lower Extremity Dressing: Maximum Assistance. For donning socks  Toileting: Maximum Assistance. For hygiene while standing  Toilet Transfer: [address] From the [facility] to the recliner . BALANCE:  Standing Balance: Contact Guard Assistance. With using a RW, and BUE release from the walker during ADL routine with pt standing for 2 minutes    BED MOBILITY:  Not Tested    TRANSFERS:  Sit to Stand:  Contact Guard Assistance. From the [facility]  Stand to Sit: [address] To the recliner    FUNCTIONAL MOBILITY:  Assistive Device: Rolling Walker  Assist Level:  Contact Guard Assistance.    Distance:  from the [facility] to the recliner       ASSESSMENT:     Activity Tolerance:  Patient tolerance of treatment: good. Discharge Recommendations: Continue to assess pending progress  Equipment Recommendations: Equipment Needed: Yes  Other: Walker  Plan: Times per Week: 5x  Times per Day: Daily  Current Treatment Recommendations: Strengthening, Balance training, Functional mobility training, Endurance training, Patient/Caregiver education & training, Safety education & training, Self-Care / ADL, Home management training    Patient Education  Patient Education: ADL's    Goals  Short Term Goals  Time Frame for Short term goals: by disharge  Short Term Goal 1: Pt will safely navigate short distances with RW and CGA with no LOB to increase indep with ADLs  Short Term Goal 2: Pt will tolerate dynamic standing x3-4min with SBA and unilateral release to increase indep with grooming  Short Term Goal 3: Pt will complete toileting routine, including t/fs, with SBA and no VC for safety to increase indep with self care  Short Term Goal 4: Pt will complete LB dressing with LHAE prn and Min A for increased indep with ADLs    Following session, patient left in safe position with all fall risk precautions in place.

## 2022-09-09 NOTE — PROGRESS NOTES
Precert denied acute inpatient rehab level of care. Denial indicates that the patient care may be provided at a lesser level of care such as a SNF. Patient had been a patient on our TCU which is the same level of care as a rehab at a SNF. Spoke with patient and explained the precert denial to her and her family present in the room.

## 2022-09-09 NOTE — FLOWSHEET NOTE
09/09/22 0920   Safe Environment   Safety Measures Other (comment)  (virtual nurse saety round complete)   Pt setting up in chair , call light within reach , stated only pain was when she walked no other voiced concerns

## 2022-09-09 NOTE — PROGRESS NOTES
Cardiology Progress Note      Patient:  Negar Baeza  YOB: 1935  MRN: [id number]   Acct: [de-identified]  Admit Date:  9/6/2022  Primary Cardiologist: Srikanth Jean MD    Chief Complaint:   Pt presented for OP elective LE angiogram     Subjective (Events in last 24 hours):   Pt up in chair - c/o RLE pain -- she states \"can hardly walk on it\"   Strong Pulses present with doppler - good ROM of foot with pain   Dressings taken off and band-aides applied   Foot is red colored - warm     PT requests rehab - discussed with pt - she thinks is a good idea     Art duplex scheduled for 1230 today     Pt only taking tylenol as other \"medications\" make her \"crazy\"        9/9/2022  She did walk around the bed this am per pt   Still with RLE pain - taking tylenol only per her request     Awaiting pre-cert to Rehab     VSS  tele SR no ectopy       Objective:   BP (!) 90/58   Pulse 92   Temp 97.9 °F (36.6 °C) (Oral)   Resp 16   Ht 5' 5\" (1.651 m)   Wt 177 lb 14.4 oz (80.7 kg)   SpO2 96%   BMI 29.60 kg/m²        TELEMETRY: SR    Physical Exam:  General Appearance: alert and oriented to person, place and time, in no acute distress  Cardiovascular: normal rate, regular rhythm, normal S1 and S2, no murmurs, rubs, clicks, or gallops, distal pulses intact,  Pulmonary/Chest: clear to auscultation bilaterally- no wheezes, rales or rhonchi, normal air movement, no respiratory distress  Abdomen: soft, non-tender, non-distended, normal bowel sounds, no masses Extremities: no cyanosis, clubbing or edema, pulses as above for RT foot --- LT foot palpable   Musculoskeletal: normal range of motion, no joint swelling, deformity or tenderness  Neurological: alert, oriented, normal speech, no focal findings or movement disorder noted    Medications:    sodium chloride  500 mL IntraVENous Once    [Held by provider] sacubitril-valsartan  1 tablet Oral BID    hydrocortisone  25 mg Rectal Nightly    diphenhydrAMINE  50 mg IntraVENous Once    sodium chloride flush  5-40 mL IntraVENous 2 times per day    timolol  1 drop Both Eyes Daily    pantoprazole  40 mg Oral BID AC    pravastatin  40 mg Oral Daily    [START ON 9/11/2022] potassium chloride  10 mEq Oral Weekly    nitroglycerin  0.5 inch Topical BID    rivaroxaban  20 mg Oral Daily    aspirin  81 mg Oral Daily    clopidogrel  75 mg Oral Daily    metoprolol succinate  25 mg Oral Daily    famotidine  20 mg Oral Nightly       nitroGLYCERIN, 0.4 mg, Q5 Min PRN  acetaminophen, 650 mg, Q4H PRN  ondansetron, 4 mg, Q8H PRN   Or  ondansetron, 4 mg, Q6H PRN  polyethylene glycol, 17 g, Daily PRN  albuterol sulfate HFA, 2 puff, Q6H PRN  HYDROmorphone, 0.5 mg, Q2H PRN      Diagnostics:  LE angiogram:    Successful percutaneous right EVAR graft occlusion, status  post reconstitution with four 11-mm Viabahn VBX stents postdilated with  a 14-mm Conquest balloon, right common iliac and external iliac artery  graft occlusion, treated with two 8 mm and one 7 mm VBX stent  postdilated with 8 and 10 mm balloons with full reconstitution of flow  into the common femoral artery followed by right SFA/popliteal artery  occlusion treated with a 6 x 120 Elizabeth x2 followed by 6 x 80 IN. PACT  Admiral drug-coated balloon to the distal SFA and a 5 x 150 IN. PACT  Admiral drug-coated balloon for the popliteal artery and a 3-mm [place]  angioplasty of the entire anterior tibial artery with reconstitution of  two-vessel runoff to the foot. PLAN:  1. Optimal medical therapy. 2.  Risk factor management. 3.  Routine access site care. 4.  Six hours bedrest.  5.  Will need FemoStop per protocol. 6.  DAPT. 7.  Eliquis per protocol, but if able to we will switch to Xarelto. 8.  Will need lifelong anticoagulation/antiplatelet therapy. 9.  _____________. 10.  Nitroglycerin paste to the foot. 11.  Warm blankets to the foot. 12.  Keep the foot in dependent position. 13.  IV fluids overnight.   14.  Frequent pulse checks q.1 to 2 h.  15.  Repeat labs in the a.m.  16.  Surveillance arterial duplex at 1, 3, 6, and 12 months. 17.  Follow with myself in two to four weeks postprocedure. All the above was explained to the patient's family. They were  agreeable and amenable to the plan.            Dafne Mora MD     D: 09/06/2022    Lab Data:    Cardiac Enzymes:  Recent Labs     09/07/22  0524   CKTOTAL 26*         CBC:   Lab Results   Component Value Date/Time    WBC 7.4 09/07/2022 05:24 AM    RBC 4.13 09/07/2022 05:24 AM    HGB 12.5 09/07/2022 05:24 AM    HCT 38.1 09/07/2022 05:24 AM     09/07/2022 05:24 AM       CMP:    Lab Results   Component Value Date/Time     09/07/2022 05:24 AM    K 4.1 09/07/2022 05:24 AM     09/07/2022 05:24 AM    CO2 28 09/07/2022 05:24 AM    BUN 26 09/07/2022 05:24 AM    CREATININE 1.0 09/07/2022 05:24 AM    AGRATIO 1.6 07/05/2021 07:36 AM    LABGLOM 53 09/07/2022 05:24 AM    GLUCOSE 150 09/07/2022 05:24 AM    GLUCOSE 82 08/19/2022 09:18 AM    CALCIUM 8.0 09/07/2022 05:24 AM       Hepatic Function Panel:    Lab Results   Component Value Date/Time    ALKPHOS 91 07/05/2021 07:36 AM    ALT 8 07/05/2021 07:36 AM    AST 14 07/05/2021 07:36 AM    PROT 6.2 07/05/2021 07:36 AM    BILITOT 0.4 07/05/2021 07:36 AM    BILIDIR <0.2 02/28/2021 03:10 PM    LABALBU 3.8 07/05/2021 07:36 AM       Magnesium:    Lab Results   Component Value Date/Time    MG 2.2 07/11/2022 12:35 PM       PT/INR:    Lab Results   Component Value Date/Time    INR 1.10 09/06/2022 06:33 AM       HgBA1c:    Lab Results   Component Value Date/Time    LABA1C 5.5 02/28/2021 07:44 PM       FLP:    Lab Results   Component Value Date/Time    TRIG 104 07/05/2021 07:36 AM    HDL 41 07/05/2021 07:36 AM    LDLCALC 47 05/18/2019 04:06 AM    LDLDIRECT 49 07/05/2021 07:36 AM       TSH:    Lab Results   Component Value Date/Time    TSH 2.160 12/28/2019 05:54 AM         Assessment:    Generalized weakness to LE -- PT following for therapy     CLI to RLE     s\p OP elective LE angiogram 9/6/2022 w/   Successful percutaneous right EVAR graft occlusion, status  post reconstitution with four 11-mm Viabahn VBX stents postdilated with  a 14-mm Conquest balloon, right common iliac and external iliac artery  graft occlusion, treated with two 8 mm and one 7 mm VBX stent  postdilated with 8 and 10 mm balloons with full reconstitution of flow  into the common femoral artery followed by right SFA/popliteal artery  occlusion treated with a 6 x 120 Elizabeth x2 followed by 6 x 80 IN. PACT  Admiral drug-coated balloon to the distal SFA and a 5 x 150 IN. PACT  Admiral drug-coated balloon for the popliteal artery and a 3-mm [place]  angioplasty of the entire anterior tibial artery with reconstitution of  two-vessel runoff to the foot.       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   - FS xarelto       NICDMP EF 25-30% 2020   Dual ICD     PAD- Peripheral angiogram/intervention was done on 02/14/2019 for acute-on-chronic limb ischemia related to post lower extremity bypass distal anastomotic stenosis with recent graft thrombosis with a successful right fem-tib angioplasty and stenting    Hx EVAR     Hx GIB - Refused  LAAO device     CKD stage 3--- stable   HTN  stable   HLP    LDL 41 7/2021 -- recheck   Hx COPD       Plan:   for [facility] referral -- pt has pre-cert  Duplex of Le resulted - patent stent   Ambulate  Pt stable from cardiac standpoint for DC          Electronically signed by ROMAN Dhillon - CNP on 9/9/2022 at 12:03 PM

## 2022-09-09 NOTE — PROGRESS NOTES
[facility]  Acute Inpatient Rehab Preadmission Assessment    Patient Name: Amol Dupree        Ethnicity:Not of Mariam Luevano, or [place] origin  Race:White  MRN: [id number]    : 1935  (82 y.o.)  Gender: female     Admitted from:[address]  Initial Assessment    Date of admission to the hospital: 2022  6:03 AM  Date patient eligible for admission:2022    Primary Diagnosis: Critical limb ischemia      Did patient have surgery? yes - Peripheral Angiogram/Intervention     Physicians: Summer Ribeiro MD,Dr. Segundo Butts, Dr. López Eusebio for clinical complications/co-morbidities:   Past Medical History:   Diagnosis Date    A-fib [facility])     AAA (abdominal aortic aneurysm) ([address]     Achalasia     Anemia     Arthritis     Blood circulation, collateral     BPPV (benign paroxysmal positional vertigo) 16    CHF (congestive heart failure) (HCC)     Chronic kidney disease     sees Dr Tana Bailey    Colon cancer [facility])     Gastrointestinal hemorrhage     GERD (gastroesophageal reflux disease)     ? esophageal stricture    Hiatal hernia     History of blood transfusion     HTN (hypertension)     Hx of blood clots 2018    R leg-sees ABEBA Hargrove    Hyperlipidemia     Medtronic dual ICD  2020    Neuromuscular disorder ([address]     Obesity     Osteopenia     Osteoporosis     PAC (premature atrial contraction)     on betablocker    Paralysis (HCC)     baby    Pedal edema     denied any hx of hypertension    Pneumonia     PVC (premature ventricular contraction)     sees Dr Rod Brooks    PVD (peripheral vascular disease) ([address]     Small bowel obstruction ([address]     Tinnitus     UTI (urinary tract infection)        Financial Information  Primary insurance:  [employer]     Secondary Insurance:   None    Has the patient had two or more falls in the past year or any fall with injury in the past year? no    Did the patient have major surgery during the 100 days prior to admission? yes    Precautions:     Restrictions/Precautions: General Precautions, Fall Risk      Isolation Precautions: None       Physiatrist: Dr. Osman Alvarado    Patients Occupation: Retired    Reviewed Lab and Diagnostic reports from Current Admission: Yes    Patients Prior Functional  Level: Prior Function  ADL Assistance: Independent  Homemaking Assistance: Independent  Ambulation Assistance: Independent  Transfer Assistance: Independent  Additional Comments: Pt reports being indep prior to admission. Pt has to walk ~ 1block to get to car. Tends to go through drive throughs as much as possible. Pt reports she has been using a walker the last 5 -6 weeks, has been complete sponge baths and light meal prep tasks.     Current functional status for upper extremity ADLs: Minimal assistance    Current functional status for lower extremity ADLs: Minimal assistance    Current functional status for bed, chair, wheelchair transfers: Minimal assistance    Current functional status for toilet transfers: Minimal assistance    Current functional status for locomotion: Minimal assistance    Current functional status for bladder management: Minimal contact assistance    Current functional status for bowel management:Moderate assistance    Current functional status for comprehension: Complete independence    Current functional status for expression: Complete independence    Current functional status for social interaction: Complete independence    Current functional status for problem solving: Complete independence    Current functional status for memory: Complete independence    Expected level of Improvement in Self-Care:  Modified independence    Expected level of Improvement in Sphincter Control:  Modified independence    Expected level of Improvement in Transfers: Modified independence    Expected level of Improvement in Locomotion:  Modified independence    Expected level of Improvement in Communication and Social Cognition: Modified independence    Expected length of time to achieve that level of improvement: 2 weeks    Current rehab issues: ADL dysfunction,bladder management, bowel management,carry over of therapy techniques, discharge planning, disease and co-morbidity management, gait/mobility dysfunction, medication management, nutrition and hydration management, Ongoing assessment of safety, Pain management, Patient and family education, Prevention of secondary complications, Skin Integrity. Required therapy: Physical Therapy, Occupational Therapy and Speech Therapy 3 hours per day, 5-6 days per week. Recreational Therapy 1 hour per week. Expected Discharge Destination: Home    Expected Post Discharge Treatments: Out Patient    Other information relevant to the care needs:   Lives With: Alone  Type of Home:  (4th floor)  Home Layout: One level  Home Access: Elevator, Level entry  Bathroom Shower/Tub: Walk-in shower, Shower chair with back  [employer]: Bedside commode (over STS)  Bathroom Equipment: Grab bars in shower  Bathroom Accessibility: Accessible  Home Equipment: Albino Hawks, rolling, Walker, standard, Wheelchair-manual  Has the patient had two or more falls in the past year or any fall with injury in the past year?: No  ADL Assistance: [facility] [address]: Independent  Homemaking Responsibilities: Yes  Ambulation Assistance: Independent  Transfer Assistance: Independent  Active : Yes  Mode of Transportation: Car  Occupation: Retired  Additional Comments: Pt reports being indep prior to admission. Pt has to walk ~ 1block to get to car. Tends to go through drive throughs as much as possible. Pt reports she has been using a walker the last 5 -6 weeks, has been complete sponge baths and light meal prep tasks. Acute Inpatient Rehabilitation Disclosure Statement provided to patient. Patient verbalized understanding.      I have reviewed and concur with the findings and results of the pre-admission screening assessment completed by the Inpatient Rehabilitation Admissions Coordinator.

## 2022-09-09 NOTE — PROGRESS NOTES
[address] Count includes the [facility]  INPATIENT REHABILITATION UNIT  PRECERTIFICATION TEMPLATE    Date of Admission: 2022  6:03 AM    Patient Name: Giselle Langston      MRN: [id number]    : 1935  (82 y.o.)  Gender: female     Payor Source: Payor: Og Dietrich / Plan: Salomon Balling / Product Type: *No Product type* /   Secondary Payor Source:      Isolation Status: No active isolations    Precert initiated for Inpatient Rehab Admission at [facility].     Reference Number: [id number]  Route of Precertification Transaction: Fax

## 2022-09-09 NOTE — PLAN OF CARE
Problem: Chronic Conditions and Co-morbidities  Goal: Patient's chronic conditions and co-morbidity symptoms are monitored and maintained or improved  Outcome: Progressing  Flowsheets (Taken 9/9/2022 0458)  Care Plan - Patient's Chronic Conditions and Co-Morbidity Symptoms are Monitored and Maintained or Improved:   Monitor and assess patient's chronic conditions and comorbid symptoms for stability, deterioration, or improvement   Collaborate with multidisciplinary team to address chronic and comorbid conditions and prevent exacerbation or deterioration   Update acute care plan with appropriate goals if chronic or comorbid symptoms are exacerbated and prevent overall improvement and discharge     Problem: Discharge Planning  Goal: Discharge to home or other facility with appropriate resources  Outcome: Progressing  Flowsheets (Taken 9/9/2022 0458)  Discharge to home or other facility with appropriate resources:   Identify barriers to discharge with patient and caregiver   Arrange for needed discharge resources and transportation as appropriate   Identify discharge learning needs (meds, wound care, etc)   Arrange for interpreters to assist at discharge as needed     Problem: Safety - Adult  Goal: Free from fall injury  Outcome: Progressing  Flowsheets (Taken 9/9/2022 0458)  Free From Fall Injury:   Instruct family/caregiver on patient safety   Based on caregiver fall risk screen, instruct family/caregiver to ask for assistance with transferring infant if caregiver noted to have fall risk factors     Problem: ABCDS Injury Assessment  Goal: Absence of physical injury  Outcome: Progressing  Flowsheets (Taken 9/7/2022 0713 by Ignacio Mendoza RN)  Absence of Physical Injury: Implement safety measures based on patient assessment     Problem: Skin/Tissue Integrity  Goal: Absence of new skin breakdown  Description: 1. Monitor for areas of redness and/or skin breakdown  2. Assess vascular access sites hourly  3. Every 4-6 hours minimum:  Change oxygen saturation probe site  4. Every 4-6 hours:  If on nasal continuous positive airway pressure, respiratory therapy assess nares and determine need for appliance change or resting period. Outcome: Progressing  Note: No new evidence of skin breakdown. Problem: Pain  Goal: Verbalizes/displays adequate comfort level or baseline comfort level  Outcome: Progressing  Flowsheets (Taken 9/9/2022 0454)  Verbalizes/displays adequate comfort level or baseline comfort level:   Encourage patient to monitor pain and request assistance   Assess pain using appropriate pain scale   Administer analgesics based on type and severity of pain and evaluate response   Implement non-pharmacological measures as appropriate and evaluate response   Notify Licensed Independent Practitioner if interventions unsuccessful or patient reports new pain     Problem: Nutrition Deficit:  Goal: Optimize nutritional status  Outcome: Progressing  Flowsheets (Taken 9/8/2022 1219 by Rosalio Valdovinos, MS, RD, LD)  Nutrient intake appropriate for improving, restoring, or maintaining nutritional needs:   Assess nutritional status and recommend course of action   Monitor oral intake, labs, and treatment plans   Recommend appropriate diets, oral nutritional supplements, and vitamin/mineral supplements   Care plan reviewed with patient. Patient verbalizes understanding of the care plan and contributed to goal setting.

## 2022-09-09 NOTE — CARE COORDINATION
9/8/22, 8:56 AM EDT    Patient goals/plan/ treatment preferences discussed by  and . Patient goals/plan/ treatment preferences reviewed with patient/ family. Patient/ family verbalize understanding of discharge plan and are in agreement with goal/plan/treatment preferences. Understanding was demonstrated using the teach back method. AVS provided by RN at time of discharge, which includes all necessary medical information pertaining to the patients current course of illness, treatment, post-discharge goals of care, and treatment preferences.      Services At/After Discharge: None  Plans home alone when medically cleared (from ICU w BLE Angiogram, x9 PCI, BLE US planned today); has walker; therapy following       IMM Letter  IMM Letter given to Patient/Family/Significant other/Guardian/POA/by[de-identified]  - Urban Lloyd  IMM Letter date given[de-identified] 09/07/22  IMM Letter time given[de-identified] 2085        Update; client prefers IPR/SNF (precert); await IPR eval (pending); monitor; collaborated w William Cheng IPR Coordinator  Electronically signed by Christiane eDluca RN on 9/8/2022 at 11:47 AM
9/9/22, 7:47 AM EDT    Patient goals/plan/ treatment preferences discussed by  and . Patient goals/plan/ treatment preferences reviewed with patient/ family. Patient/ family verbalize understanding of discharge plan and are in agreement with goal/plan/treatment preferences. Understanding was demonstrated using the teach back method. AVS provided by RN at time of discharge, which includes all necessary medical information pertaining to the patients current course of illness, treatment, post-discharge goals of care, and treatment preferences.      Services At/After Discharge: Inpatient rehab  Plans IPR (precert day 1) when medically cleared/precerted; collaborated w ZIA Osorio, Salvatore, IPR Coordinator    Update: insurance denied IPR; option for peer-peer (deadline Monday, 9/9 @ 11 am); messaged Salvatore IPR Coordinator; collaborated nenita Meza RN    Update: IPR suggests SNF; client prefers new [facility] 1st choice, [facility] 2nd choice from [employer] given to client; collaborated ZIA Quintero    Update; Client home at discharge, not IPR
Collaborative Discharge Planning    Amol Dupree  :  1935  MRN:  [id number]    ADMIT DATE:  2022      Discharge Planning Discharge Planning  Type of Residence: Apartment  Living Arrangements: Alone  Support Systems: Children, Family Members, Friends/Neighbors  Current Services Prior To Admission: None  Potential Assistance Needed: N/A  DME Ordered?: No  Potential Assistance Purchasing Medications: No  Type of Home Care Services: None  Patient expects to be discharged to[de-identified] Apartment  Follow Up Appointment: Best Day/Time :   History of falls?: No  White Board Notes /Social Work Whiteboard Notes  /Social Work Whiteboard:  CM: Home alone. Denies needs, declines HH. Has walker.  PT/OT to eval.    Discharge Plan Rehab  Lives alone, has walker, WC; Rehab eval pending; therapy following; prefers IPR (precert) 1st choice, SNF (precert) 2nd choice  Discharge Milestones and Delays: Clinical status    From ICU  Critical Limb Ischemia/EVAR Limb Occlusion     BLE Angiogram (PCI x9)    Await IPR eval/Physiatry recommendations      SIGNED:  Radha Velez RN   2022, 2:04 PM
xarelto, timoptic drops. Received loading dose of plavix x1 yesterday. Received 1g Ca+ gluconate x1 today. BUN 32- now 26, Plt 100. PCP: Cleo Cordon  Readmission Risk Score: 14.6%  Patient's Healthcare Decision Maker: Named in [address] [place] [address]    Patient Goals/Plan/Treatment Preferences: Spoke with Jaqueline Aguilar; states she lives at home alone and uses a walker. She did not have any HH services PTA. She drives, cares for herself independently, and has a PCP. Jaqueline Aguilar states she plans to return home at discharge, denies needs, and declines HH. Transportation/Food Security/Housekeeping Addressed:  No issues identified. Pt transferring to 4K09. Handoff report given to [place], 4K CM.

## 2022-09-09 NOTE — PROGRESS NOTES
[facility]  INPATIENT PHYSICAL THERAPY  DAILY NOTE  STRZ ICU STEPDOWN TELEMETRY 4K - 4K-03/003-A  Time In: 0820  Time Out: 0845  Timed Code Treatment Minutes: 25 Minutes  Minutes: 25          Date: 2022  Patient Name: Jose Russell,  Gender:  female        MRN: [id number]  : 1935  (82 y.o.)     Referring Practitioner: Suraj Brown MD  Diagnosis: PVD  Additional Pertinent Hx: Per EMR \"The patient is a 82year old female with a past medical history of HFrEF s/p dual ICD, pAfib, HTN, HLD, AAA s/p repair, PVD, blood clots, CKD, colovesical fisutla s/p right hemicolectomy who presented to [facility] for CLI and planned angiogram of bilateral lower extremities. The patient reports persistent and worsening lower extremity pain, greater in the right over the past month. This has been associated with numbness and tingling. She notes her RLE has been getting more red, with darker digits. She notes increased difficulty with ambulation due to this. The patient was evaluated by cardiology with planned intervention 22. She previously had a CTA abdominal aorta with runoff that was significant for 3.6 cm fusiform aneurysm of the infrarenal abdominal aorta; bifurcated endograft present; right iliac limb totally occluded and left iliac limb remains patent; stents in right common and external iliac arteries are totally occluded; collateral reconstitution of the right CFA and right profunda; total occlusion of native right SFA and right femoral/anterior tibial bypass graft; Collateral reconstitution of the trifurcation vessels right side; multifocal severe diffuse disease of left SFA; left popliteal artery demonstrated mild stenosis; all 3 trifurcation vessels left calf lateral but demonstrate multifocal disease; subcutaneous emphysema bilaterally that is worse on left 22. The patient underwent a bilateral lower extremity angiogram 2022.  She was found to have R EVAR limb, R CFA, R SFA occlusions; dimunitive PFA; popliteal artery occlusion with collaterals, all three tibials occluded; and all three tibials are occluded with some degree of flow in the PT and AT. The patient underwent retrograde AT access, knuckled to the R CFA, then re-entry into the R EIA stents; VBX 11 x 39, VBX 11 x 59 x 2, VBX 11 x 39, VBX 8 x 59; Retrograde SFA/pop 6.0 PTA; Elizabeth 6 x 120 x 2 for ostial SFA to distal SFA; DCB 5 x 150 of the pop, and DCB 6 x 80 of the distal SFA/prox pop; and 3.0 PTA of the AT. This resulted in Complete reconstitution of the R EVAR limb, R CFA, R SFA, R POP, and R AT with improved flow into the PT - full flow into the pedal arch. Post procedure the patient was transferred to the ICU for further management and evaluation. \"     Prior Level of Function:  Lives With: Alone  Type of Home:  (4th floor)  Home Layout: One level  Home Access: Elevator, Level entry  Home Equipment: Walker, rolling, Walker, standard, [employer]   Bathroom Shower/Tub: Walk-in shower, Shower chair with back  [employer]: Bedside commode (over STS)  Bathroom Equipment: Grab bars in shower  Bathroom Accessibility: Accessible    ADL Assistance: Independent  Homemaking Assistance: Independent  Homemaking Responsibilities: Yes  Ambulation Assistance: Independent  Transfer Assistance: Independent  Active : Yes  Additional Comments: Pt reports being indep prior to admission. Pt has to walk ~ 1block to get to car. Tends to go through drive throughs as much as possible. Pt reports she has been using a walker the last 5 -6 weeks, has been complete sponge baths and light meal prep tasks. Restrictions/Precautions:  Restrictions/Precautions: General Precautions, Fall Risk     SUBJECTIVE: OK to see pt per nursing. Pt in bed when PT arrived, required mod encouragement to complete PT session this date.  Pt agreeable to sit in bedside chair this AM.     PAIN: 4-5/10: R ankle    Vitals: Vitals not assessed per clinical judgement, see nursing flowsheet    OBJECTIVE:  Bed Mobility:  Supine to Sit: Stand By Assistance, X 1, with head of bed raised, with rail, with increased time for completion  Scooting: Stand By Assistance, X 1  Increased time due to pain and weakness  Transfers:  Sit to Stand: Minimal Assistance, X 1, with increased time for completion, cues for hand placement, with verbal cues  Stand to Sit:Contact Guard Assistance, X 1, cues for hand placement, with verbal cues  Cues for safety, increased time for transitioning hands to walker for support   Ambulation:  Contact Guard Assistance, X 1, with verbal cues , with increased time for completion  Distance: 15 feet  Surface: Level Tile  Device:Rolling Walker  Gait Deviations:  Slow Pretty, Decreased Step Length on Left, Decreased Weight Shift Right, Decreased Gait Speed, and Decreased Heel Strike on Right  Cues for safety, antalgic gait pattern noted, no LOB  Balance:  Static Sitting Balance:  Supervision, Stand By Assistance  Static Standing Balance: Contact Guard Assistance  Pt sitting on EOB to prepare for mobility. Instructed to pt to virgilio sock, pt refused to attempt, PT to assist with completion  Exercise:  Patient was guided in 1 set(s) 15 reps of exercise to both lower extremities. Ankle pumps, Glut sets, Quad sets, Heelslides, Hip abduction/adduction, and Straight leg raises. Exercises were completed for increased independence with functional mobility. VC for proper technique of exercises for completion with good demo. Functional Outcome Measures: Completed  -PAC Inpatient Mobility without Stair Climbing Raw Score : 15  AM-PAC Inpatient without Stair Climbing T-Scale Score : 43.03    ASSESSMENT:  Assessment: Patient progressing toward established goals. and improved gait distances this date. Pt can be self limiting with mobility. Pt at this time is not safe to return home. Activity Tolerance:  Patient tolerance of  treatment: fair.       Equipment Recommendations:Equipment Needed: No  Discharge Recommendations: Continue to assess pending progress and Inpatient Therapy Stay  Plan: Current Treatment Recommendations: Strengthening, Balance training, Gait training, Functional mobility training, Neuromuscular re-education, Transfer training, Endurance training, Patient/Caregiver education & training, Therapeutic activities, Home exercise program, Safety education & training, Equipment evaluation, education, & procurement  Plan:  (5x GM)    Patient Education  Patient Education: Plan of Care, Bed Mobility, Equipment Education, Transfers, Gait, Use of [employer], Up in Chair for All Meals, Verbal Exercise Instruction,  - Patient Verbalized Understanding, - Patient Requires Continued Education    Goals:  Patient goals : \"walk longer distances\"  Short Term Goals  Time Frame for Short term goals: by discharge  Short term goal 1: Pt will amb for 50 feet with RW for support with S for safety to progress with overall mobility. Short term goal 2: Pt will demo S for transfers with RW for support and good safety to progress towards PLOF safely. Short term goal 3: Pt will demo S for bed mobility tasks with bed flat to return home safely. Short term goal 4: Pt will tolerate 10-20 reps of ther ex to increase overall mobility. Long Term Goals  Time Frame for Long term goals : NA due to short ELOS    Following session, patient left in safe position with all fall risk precautions in place. In bedside chair, all needs and call light in reach, alarm on.

## 2022-09-09 NOTE — RT PROTOCOL NOTE
RT Inhaler-Nebulizer Bronchodilator Protocol Note    There is a bronchodilator order in the chart from a provider indicating to follow the RT Bronchodilator Protocol and there is an Initiate RT Inhaler-Nebulizer Bronchodilator Protocol order as well (see protocol at bottom of note). CXR Findings:  No results found. The findings from the last RT Protocol Assessment were as follows:   History Pulmonary Disease: Smoker 15 pack years or more  Respiratory Pattern: Regular pattern and RR 12-20 bpm  Breath Sounds: Clear breath sounds  Cough: Strong, productive  Indication for Bronchodilator Therapy: Decreased or absent breath sounds  Bronchodilator Assessment Score: 2    Aerosolized bronchodilator medication orders have been revised according to the RT Inhaler-Nebulizer Bronchodilator Protocol below. Respiratory Therapist to perform RT Therapy Protocol Assessment initially then follow the protocol. Repeat RT Therapy Protocol Assessment PRN for score 0-3 or on second treatment, BID, and PRN for scores above 3. No Indications - adjust the frequency to every 6 hours PRN wheezing or bronchospasm, if no treatments needed after 48 hours then discontinue using Per Protocol order mode. If indication present, adjust the RT bronchodilator orders based on the Bronchodilator Assessment Score as indicated below. Use Inhaler orders unless patient has one or more of the following: on home nebulizer, not able to hold breath for 10 seconds, is not alert and oriented, cannot activate and use MDI correctly, or respiratory rate 25 breaths per minute or more, then use the equivalent nebulizer order(s) with same Frequency and PRN reasons based on the score. If a patient is on this medication at home then do not decrease Frequency below that used at home.     0-3 - enter or revise RT bronchodilator order(s) to equivalent RT Bronchodilator order with Frequency of every 4 hours PRN for wheezing or increased work of breathing using Per Protocol order mode. 4-6 - enter or revise RT Bronchodilator order(s) to two equivalent RT bronchodilator orders with one order with BID Frequency and one order with Frequency of every 4 hours PRN wheezing or increased work of breathing using Per Protocol order mode. 7-10 - enter or revise RT Bronchodilator order(s) to two equivalent RT bronchodilator orders with one order with TID Frequency and one order with Frequency of every 4 hours PRN wheezing or increased work of breathing using Per Protocol order mode. 11-13 - enter or revise RT Bronchodilator order(s) to one equivalent RT bronchodilator order with QID Frequency and an Albuterol order with Frequency of every 4 hours PRN wheezing or increased work of breathing using Per Protocol order mode. Greater than 13 - enter or revise RT Bronchodilator order(s) to one equivalent RT bronchodilator order with every 4 hours Frequency and an Albuterol order with Frequency of every 2 hours PRN wheezing or increased work of breathing using Per Protocol order mode. RT to enter RT Home Evaluation for COPD & MDI Assessment order using Per Protocol order mode.     Electronically signed by Sylvia Ramos RCP on 9/9/2022 at 3:25 PM

## 2022-09-10 LAB — GLUCOSE BLD-MCNC: 92 MG/DL (ref 70–108)

## 2022-09-10 PROCEDURE — 6370000000 HC RX 637 (ALT 250 FOR IP): Performed by: NURSE PRACTITIONER

## 2022-09-10 PROCEDURE — 99232 SBSQ HOSP IP/OBS MODERATE 35: CPT | Performed by: NURSE PRACTITIONER

## 2022-09-10 PROCEDURE — 97116 GAIT TRAINING THERAPY: CPT

## 2022-09-10 PROCEDURE — 2580000003 HC RX 258: Performed by: INTERNAL MEDICINE

## 2022-09-10 PROCEDURE — 6370000000 HC RX 637 (ALT 250 FOR IP): Performed by: INTERNAL MEDICINE

## 2022-09-10 PROCEDURE — 2060000000 HC ICU INTERMEDIATE R&B

## 2022-09-10 PROCEDURE — 82948 REAGENT STRIP/BLOOD GLUCOSE: CPT

## 2022-09-10 RX ORDER — METOPROLOL SUCCINATE 50 MG/1
50 TABLET, EXTENDED RELEASE ORAL DAILY
Status: DISCONTINUED | OUTPATIENT
Start: 2022-09-11 | End: 2022-09-11 | Stop reason: HOSPADM

## 2022-09-10 RX ADMIN — PANTOPRAZOLE SODIUM 40 MG: 40 TABLET, DELAYED RELEASE ORAL at 16:09

## 2022-09-10 RX ADMIN — ACETAMINOPHEN 650 MG: 325 TABLET ORAL at 14:22

## 2022-09-10 RX ADMIN — PANTOPRAZOLE SODIUM 40 MG: 40 TABLET, DELAYED RELEASE ORAL at 06:37

## 2022-09-10 RX ADMIN — ACETAMINOPHEN 650 MG: 325 TABLET ORAL at 05:42

## 2022-09-10 RX ADMIN — PRAVASTATIN SODIUM 40 MG: 40 TABLET ORAL at 09:24

## 2022-09-10 RX ADMIN — NITROGLYCERIN 0.5 INCH: 20 OINTMENT TOPICAL at 09:25

## 2022-09-10 RX ADMIN — HYDROCORTISONE ACETATE 25 MG: 25 SUPPOSITORY RECTAL at 20:19

## 2022-09-10 RX ADMIN — SODIUM CHLORIDE, PRESERVATIVE FREE 10 ML: 5 INJECTION INTRAVENOUS at 20:20

## 2022-09-10 RX ADMIN — RIVAROXABAN 20 MG: 20 TABLET, FILM COATED ORAL at 17:39

## 2022-09-10 RX ADMIN — ACETAMINOPHEN 650 MG: 325 TABLET ORAL at 00:55

## 2022-09-10 RX ADMIN — CLOPIDOGREL BISULFATE 75 MG: 75 TABLET ORAL at 09:24

## 2022-09-10 RX ADMIN — FAMOTIDINE 20 MG: 20 TABLET ORAL at 20:19

## 2022-09-10 RX ADMIN — ACETAMINOPHEN 650 MG: 325 TABLET ORAL at 20:19

## 2022-09-10 RX ADMIN — TIMOLOL MALEATE 1 DROP: 5 SOLUTION OPHTHALMIC at 09:25

## 2022-09-10 RX ADMIN — ASPIRIN 81 MG 81 MG: 81 TABLET ORAL at 09:25

## 2022-09-10 RX ADMIN — SODIUM CHLORIDE, PRESERVATIVE FREE 10 ML: 5 INJECTION INTRAVENOUS at 09:25

## 2022-09-10 ASSESSMENT — PAIN DESCRIPTION - DESCRIPTORS
DESCRIPTORS: CRAMPING
DESCRIPTORS: SHOOTING

## 2022-09-10 ASSESSMENT — PAIN SCALES - GENERAL
PAINLEVEL_OUTOF10: 0
PAINLEVEL_OUTOF10: 5
PAINLEVEL_OUTOF10: 3
PAINLEVEL_OUTOF10: 3
PAINLEVEL_OUTOF10: 5
PAINLEVEL_OUTOF10: 0

## 2022-09-10 ASSESSMENT — PAIN SCALES - WONG BAKER
WONGBAKER_NUMERICALRESPONSE: 0

## 2022-09-10 ASSESSMENT — PAIN DESCRIPTION - ORIENTATION
ORIENTATION: RIGHT

## 2022-09-10 ASSESSMENT — PAIN DESCRIPTION - LOCATION
LOCATION: FOOT

## 2022-09-10 ASSESSMENT — PAIN DESCRIPTION - FREQUENCY: FREQUENCY: INTERMITTENT

## 2022-09-10 ASSESSMENT — PAIN - FUNCTIONAL ASSESSMENT: PAIN_FUNCTIONAL_ASSESSMENT: ACTIVITIES ARE NOT PREVENTED

## 2022-09-10 NOTE — FLOWSHEET NOTE
09/10/22 8999   Safe Environment   Safety Measures Other (comment)  (Virtual Safety Round Complete)   virtual Nurse introduced, pt up in chair, call light in reach, denies needs at this time.

## 2022-09-10 NOTE — PROGRESS NOTES
Cardiology Progress Note      Patient:  Negar Baeza  YOB: 1935  MRN: [id number]   Acct: [de-identified]  Admit Date:  9/6/2022  Primary Cardiologist: Srikanth Jean MD    Chief Complaint:   Pt presented for OP elective LE angiogram     Subjective (Events in last 24 hours):   Pt up in chair - c/o RLE pain -- she states \"can hardly walk on it\"   Strong Pulses present with doppler - good ROM of foot with pain   Dressings taken off and band-aides applied   Foot is red colored - warm     PT requests rehab - discussed with pt - she thinks is a good idea     Art duplex scheduled for 1230 today     Pt only taking tylenol as other \"medications\" make her \"crazy\"        9/9/2022  She did walk around the bed this am per pt   Still with RLE pain - taking tylenol only per her request     Awaiting pre-cert to Rehab     VSS  tele SR no ectopy       9/10/2022  Pt ambulated in the hallway today w/ walker   Pt really wants to go home and not ECF     Therapy to re- evaluate       Discussed with staff - she had some oozing from groin today     VSS      Objective:   BP (!) 108/55   Pulse 89   Temp 98.3 °F (36.8 °C) (Oral)   Resp 16   Ht 5' 5\" (1.651 m)   Wt 168 lb 7 oz (76.4 kg)   SpO2 95%   BMI 28.03 kg/m²        TELEMETRY: SR    Physical Exam:  General Appearance: alert and oriented to person, place and time, in no acute distress  Cardiovascular: normal rate, regular rhythm, normal S1 and S2, no murmurs, rubs, clicks, or gallops, distal pulses intact,  Pulmonary/Chest: clear to auscultation bilaterally- no wheezes, rales or rhonchi, normal air movement, no respiratory distress  Abdomen: soft, non-tender, non-distended, normal bowel sounds, no masses Extremities: no cyanosis, clubbing or edema, pulses as above for RT foot --- LT foot palpable   Musculoskeletal: normal range of motion, no joint swelling, deformity or tenderness  Neurological: alert, oriented, normal speech, no focal findings or movement disorder noted    Medications:    sodium chloride  500 mL IntraVENous Once    [Held by provider] sacubitril-valsartan  1 tablet Oral BID    hydrocortisone  25 mg Rectal Nightly    diphenhydrAMINE  50 mg IntraVENous Once    sodium chloride flush  5-40 mL IntraVENous 2 times per day    timolol  1 drop Both Eyes Daily    pantoprazole  40 mg Oral BID AC    pravastatin  40 mg Oral Daily    [START ON 9/11/2022] potassium chloride  10 mEq Oral Weekly    nitroglycerin  0.5 inch Topical BID    rivaroxaban  20 mg Oral Daily    aspirin  81 mg Oral Daily    clopidogrel  75 mg Oral Daily    metoprolol succinate  25 mg Oral Daily    famotidine  20 mg Oral Nightly       nitroGLYCERIN, 0.4 mg, Q5 Min PRN  acetaminophen, 650 mg, Q4H PRN  ondansetron, 4 mg, Q8H PRN   Or  ondansetron, 4 mg, Q6H PRN  polyethylene glycol, 17 g, Daily PRN  albuterol sulfate HFA, 2 puff, Q6H PRN  HYDROmorphone, 0.5 mg, Q2H PRN      Diagnostics:  LE angiogram:    Successful percutaneous right EVAR graft occlusion, status  post reconstitution with four 11-mm Viabahn VBX stents postdilated with  a 14-mm [place]quest balloon, right common iliac and external iliac artery  graft occlusion, treated with two 8 mm and one 7 mm VBX stent  postdilated with 8 and 10 mm balloons with full reconstitution of flow  into the common femoral artery followed by right SFA/popliteal artery  occlusion treated with a 6 x 120 Elizabeth x2 followed by 6 x 80 IN. PACT  Admiral drug-coated balloon to the distal SFA and a 5 x 150 IN. PACT  Admiral drug-coated balloon for the popliteal artery and a 3-mm [place]  angioplasty of the entire anterior tibial artery with reconstitution of  two-vessel runoff to the foot. PLAN:  1. Optimal medical therapy. 2.  Risk factor management. 3.  Routine access site care. 4.  Six hours bedrest.  5.  Will need FemoStop per protocol. 6.  DAPT. 7.  Eliquis per protocol, but if able to we will switch to Xarelto.   8.  Will need lifelong anticoagulation/antiplatelet therapy. 9.  _____________. 10.  Nitroglycerin paste to the foot. 11.  Warm blankets to the foot. 12.  Keep the foot in dependent position. 13.  IV fluids overnight. 14.  Frequent pulse checks q.1 to 2 h.  15.  Repeat labs in the a.m.  16.  Surveillance arterial duplex at 1, 3, 6, and 12 months. 17.  Follow with myself in two to four weeks postprocedure. All the above was explained to the patient's family. They were  agreeable and amenable to the plan. Pretty Lucas MD     D: 09/06/2022    Lab Data:    Cardiac Enzymes:  No results for input(s): CKTOTAL, CKMB, CKMBINDEX, TROPONINI in the last 72 hours.       CBC:   Lab Results   Component Value Date/Time    WBC 7.4 09/07/2022 05:24 AM    RBC 4.13 09/07/2022 05:24 AM    HGB 12.5 09/07/2022 05:24 AM    HCT 38.1 09/07/2022 05:24 AM     09/07/2022 05:24 AM       CMP:    Lab Results   Component Value Date/Time     09/07/2022 05:24 AM    K 4.1 09/07/2022 05:24 AM     09/07/2022 05:24 AM    CO2 28 09/07/2022 05:24 AM    BUN 26 09/07/2022 05:24 AM    CREATININE 1.0 09/07/2022 05:24 AM    AGRATIO 1.6 07/05/2021 07:36 AM    LABGLOM 53 09/07/2022 05:24 AM    GLUCOSE 150 09/07/2022 05:24 AM    GLUCOSE 82 08/19/2022 09:18 AM    CALCIUM 8.0 09/07/2022 05:24 AM       Hepatic Function Panel:    Lab Results   Component Value Date/Time    ALKPHOS 91 07/05/2021 07:36 AM    ALT 8 07/05/2021 07:36 AM    AST 14 07/05/2021 07:36 AM    PROT 6.2 07/05/2021 07:36 AM    BILITOT 0.4 07/05/2021 07:36 AM    BILIDIR <0.2 02/28/2021 03:10 PM    LABALBU 3.8 07/05/2021 07:36 AM       Magnesium:    Lab Results   Component Value Date/Time    MG 2.2 07/11/2022 12:35 PM       PT/INR:    Lab Results   Component Value Date/Time    INR 1.10 09/06/2022 06:33 AM       HgBA1c:    Lab Results   Component Value Date/Time    LABA1C 5.5 02/28/2021 07:44 PM       FLP:    Lab Results   Component Value Date/Time    TRIG 104 07/05/2021 07:36 AM    HDL 41 07/05/2021 07:36 AM LDLCALC 47 05/18/2019 04:06 AM    LDLDIRECT 49 07/05/2021 07:36 AM       TSH:    Lab Results   Component Value Date/Time    TSH 2.160 12/28/2019 05:54 AM         Assessment:    Generalized weakness to LE -- PT following for therapy --- improved per pt     CLI to RLE     s\p OP elective LE angiogram 9/6/2022 w/   Successful percutaneous right EVAR graft occlusion, status  post reconstitution with four 11-mm Viabahn VBX stents postdilated with  a 14-mm Conquest balloon, right common iliac and external iliac artery  graft occlusion, treated with two 8 mm and one 7 mm VBX stent  postdilated with 8 and 10 mm balloons with full reconstitution of flow  into the common femoral artery followed by right SFA/popliteal artery  occlusion treated with a 6 x 120 Elizabeth x2 followed by 6 x 80 IN. PACT  Admiral drug-coated balloon to the distal SFA and a 5 x 150 IN. PACT  Admiral drug-coated balloon for the popliteal artery and a 3-mm [place]  angioplasty of the entire anterior tibial artery with reconstitution of  two-vessel runoff to the foot. 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   - FS xarelto       NICDMP EF 25-30% 2020   Dual ICD     PAD- Peripheral angiogram/intervention was done on 02/14/2019 for acute-on-chronic limb ischemia related to post lower extremity bypass distal anastomotic stenosis with recent graft thrombosis with a successful right fem-tib angioplasty and stenting    Hx EVAR     Hx GIB - Refused  LAAO device     CKD stage 3--- stable   HTN  stable   HLP    LDL 41 7/2021 -- recheck   Hx COPD       Plan:  Awaiting pt too decide facility - pt did walk in the hallway today - pt to re-evaluate - can she go home alone? ?            Electronically signed by ROMAN Mcdaniel CNP on 9/10/2022 at 2:05 PM

## 2022-09-10 NOTE — PROGRESS NOTES
Received report from primary [address]. Pt sitting in chair eating breakfast. Call light within reach.  Jenny Ruffin, [place] [facility] [place]

## 2022-09-10 NOTE — PROGRESS NOTES
[facility]  INPATIENT PHYSICAL THERAPY  DAILY NOTE  STRZ ICU STEPDOWN TELEMETRY 4K - 4K-03/003-A    Time In: 5770  Time Out: 1445  Timed Code Treatment Minutes: 15 Minutes  Minutes: 13          Date: 9/10/2022  Patient Name: Darrin Allen,  Gender:  female        MRN: [id number]  : 1935  (82 y.o.)     Referring Practitioner: [address] MD  Diagnosis: PVD  Additional Pertinent Hx: Per EMR \"The patient is a 82year old female with a past medical history of HFrEF s/p dual ICD, pAfib, HTN, HLD, AAA s/p repair, PVD, blood clots, CKD, colovesical fisutla s/p right hemicolectomy who presented to [facility] for CLI and planned angiogram of bilateral lower extremities. The patient reports persistent and worsening lower extremity pain, greater in the right over the past month. This has been associated with numbness and tingling. She notes her RLE has been getting more red, with darker digits. She notes increased difficulty with ambulation due to this. The patient was evaluated by cardiology with planned intervention 22. She previously had a CTA abdominal aorta with runoff that was significant for 3.6 cm fusiform aneurysm of the infrarenal abdominal aorta; bifurcated endograft present; right iliac limb totally occluded and left iliac limb remains patent; stents in right common and external iliac arteries are totally occluded; collateral reconstitution of the right CFA and right profunda; total occlusion of native right SFA and right femoral/anterior tibial bypass graft; Collateral reconstitution of the trifurcation vessels right side; multifocal severe diffuse disease of left SFA; left popliteal artery demonstrated mild stenosis; all 3 trifurcation vessels left calf lateral but demonstrate multifocal disease; subcutaneous emphysema bilaterally that is worse on left 22. The patient underwent a bilateral lower extremity angiogram 2022.  She was found to have R EVAR limb, R CFA, R SFA occlusions; dimunitive PFA; popliteal artery occlusion with collaterals, all three tibials occluded; and all three tibials are occluded with some degree of flow in the PT and AT. The patient underwent retrograde AT access, knuckled to the R CFA, then re-entry into the R EIA stents; VBX 11 x 39, VBX 11 x 59 x 2, VBX 11 x 39, VBX 8 x 59; Retrograde SFA/pop 6.0 PTA; Elizabeth 6 x 120 x 2 for ostial SFA to distal SFA; DCB 5 x 150 of the pop, and DCB 6 x 80 of the distal SFA/prox pop; and 3.0 PTA of the AT. This resulted in Complete reconstitution of the R EVAR limb, R CFA, R SFA, R POP, and R AT with improved flow into the PT - full flow into the pedal arch. Post procedure the patient was transferred to the ICU for further management and evaluation. \"     Prior Level of Function:  Lives With: Alone  Type of Home:  (4th floor)  Home Layout: One level  Home Access: Elevator, Level entry  Home Equipment: Walker, rolling, Walker, standard, [employer]   Bathroom Shower/Tub: Walk-in shower, Shower chair with back  [address]: Bedside commode (over STS)  Bathroom Equipment: Grab bars in shower  Bathroom Accessibility: Accessible    ADL Assistance: Independent  Homemaking Assistance: Independent  Homemaking Responsibilities: Yes  Ambulation Assistance: Independent  Transfer Assistance: Independent  Active : Yes  Additional Comments: Pt reports being indep prior to admission. Pt has to walk ~ 1block to get to car. Tends to go through drive throughs as much as possible. Pt reports she has been using a walker the last 5 -6 weeks, has been complete sponge baths and light meal prep tasks. Restrictions/Precautions:  Restrictions/Precautions: General Precautions, Fall Risk     SUBJECTIVE: pleasant and cooperative, per RN-pt up and amb in hallway with staff and tolerated well this am.  Pt inquired about new walker, her current walker was purchased in 2018 and it works but one side won't fold up anymore.   Discussed with session, patient left in safe position with all fall risk precautions in place.

## 2022-09-10 NOTE — PROGRESS NOTES
Patient sitting in chair. Denies pain. Speech clear and appropriate. Alert and oriented x4. Pupils equal, round and react to light 3mm to 2mm. Mucous membrane pink and moist. Upper extremities tan, warm and dry. Skin turgor less than 3 sec and cap refill less than 3 sec. Hand grasp equal and strong arm drift negative. Heart sounds tachy and irregular. Breath sounds clear throughout. Abdomen flat and non tender. Bowel sounds active x4. Lower extremities tan, war, dry. No edema. No numbness or tingling. Pedal push and pull strong and equal. Pedal pulse present bilateral. No open areas. Call light within reach.  July THOMPSON, [place] [facility] [place]

## 2022-09-10 NOTE — PROGRESS NOTES
Report off to primary nurse Jason Michel. Patient walking with therapy.  July THOMPSON, [place] [facility] [place]

## 2022-09-10 NOTE — PROGRESS NOTES
Pt sitting in chair. Call light within reach. Assessment unchanged from 0930. DARYN. Saurabh Alfred, [facility]

## 2022-09-11 VITALS
HEART RATE: 82 BPM | OXYGEN SATURATION: 95 % | HEIGHT: 65 IN | TEMPERATURE: 98.3 F | BODY MASS INDEX: 28.06 KG/M2 | RESPIRATION RATE: 20 BRPM | SYSTOLIC BLOOD PRESSURE: 118 MMHG | WEIGHT: 168.44 LBS | DIASTOLIC BLOOD PRESSURE: 69 MMHG

## 2022-09-11 PROCEDURE — 6370000000 HC RX 637 (ALT 250 FOR IP): Performed by: INTERNAL MEDICINE

## 2022-09-11 PROCEDURE — 6370000000 HC RX 637 (ALT 250 FOR IP): Performed by: NURSE PRACTITIONER

## 2022-09-11 PROCEDURE — 99239 HOSP IP/OBS DSCHRG MGMT >30: CPT | Performed by: NURSE PRACTITIONER

## 2022-09-11 PROCEDURE — 2580000003 HC RX 258: Performed by: INTERNAL MEDICINE

## 2022-09-11 RX ORDER — ASPIRIN 81 MG/1
81 TABLET, CHEWABLE ORAL DAILY
Qty: 30 TABLET | Refills: 3 | Status: SHIPPED | OUTPATIENT
Start: 2022-09-12 | End: 2022-09-23

## 2022-09-11 RX ADMIN — TIMOLOL MALEATE 1 DROP: 5 SOLUTION OPHTHALMIC at 08:44

## 2022-09-11 RX ADMIN — POTASSIUM CHLORIDE 10 MEQ: 750 TABLET, EXTENDED RELEASE ORAL at 08:44

## 2022-09-11 RX ADMIN — PRAVASTATIN SODIUM 40 MG: 40 TABLET ORAL at 08:44

## 2022-09-11 RX ADMIN — ASPIRIN 81 MG 81 MG: 81 TABLET ORAL at 08:44

## 2022-09-11 RX ADMIN — ACETAMINOPHEN 650 MG: 325 TABLET ORAL at 05:00

## 2022-09-11 RX ADMIN — PANTOPRAZOLE SODIUM 40 MG: 40 TABLET, DELAYED RELEASE ORAL at 06:30

## 2022-09-11 RX ADMIN — SODIUM CHLORIDE, PRESERVATIVE FREE 10 ML: 5 INJECTION INTRAVENOUS at 08:44

## 2022-09-11 RX ADMIN — ACETAMINOPHEN 650 MG: 325 TABLET ORAL at 00:26

## 2022-09-11 RX ADMIN — CLOPIDOGREL BISULFATE 75 MG: 75 TABLET ORAL at 08:44

## 2022-09-11 RX ADMIN — METOPROLOL SUCCINATE 50 MG: 50 TABLET, EXTENDED RELEASE ORAL at 08:44

## 2022-09-11 ASSESSMENT — PAIN DESCRIPTION - PAIN TYPE: TYPE: ACUTE PAIN

## 2022-09-11 ASSESSMENT — PAIN SCALES - GENERAL
PAINLEVEL_OUTOF10: 5

## 2022-09-11 ASSESSMENT — PAIN DESCRIPTION - LOCATION
LOCATION: LEG
LOCATION: FOOT

## 2022-09-11 ASSESSMENT — PAIN DESCRIPTION - ONSET: ONSET: ON-GOING

## 2022-09-11 ASSESSMENT — PAIN - FUNCTIONAL ASSESSMENT: PAIN_FUNCTIONAL_ASSESSMENT: ACTIVITIES ARE NOT PREVENTED

## 2022-09-11 ASSESSMENT — PAIN DESCRIPTION - ORIENTATION: ORIENTATION: LEFT;RIGHT

## 2022-09-11 ASSESSMENT — PAIN DESCRIPTION - DESCRIPTORS: DESCRIPTORS: CRAMPING

## 2022-09-11 ASSESSMENT — PAIN DESCRIPTION - FREQUENCY: FREQUENCY: INTERMITTENT

## 2022-09-11 NOTE — DISCHARGE SUMMARY
Six hours bedrest.  5.  Will need FemoStop per protocol. 6.  DAPT. 7.  Eliquis per protocol, but if able to we will switch to Xarelto. 8.  Will need lifelong anticoagulation/antiplatelet therapy. 9.  _____________. 10.  Nitroglycerin paste to the foot. 11.  Warm blankets to the foot. 12.  Keep the foot in dependent position. 13.  IV fluids overnight. 14.  Frequent pulse checks q.1 to 2 h.  15.  Repeat labs in the a.m.  16.  Surveillance arterial duplex at 1, 3, 6, and 12 months. 17.  Follow with myself in two to four weeks postprocedure. All the above was explained to the patient's family. They were  agreeable and amenable to the plan. Shaan Alcantara MD     D: 09/06/2022    Lab Data:    Cardiac Enzymes:  No results for input(s): CKTOTAL, CKMB, CKMBINDEX, TROPONINI in the last 72 hours.       CBC:   Lab Results   Component Value Date/Time    WBC 7.4 09/07/2022 05:24 AM    RBC 4.13 09/07/2022 05:24 AM    HGB 12.5 09/07/2022 05:24 AM    HCT 38.1 09/07/2022 05:24 AM     09/07/2022 05:24 AM       CMP:    Lab Results   Component Value Date/Time     09/07/2022 05:24 AM    K 4.1 09/07/2022 05:24 AM     09/07/2022 05:24 AM    CO2 28 09/07/2022 05:24 AM    BUN 26 09/07/2022 05:24 AM    CREATININE 1.0 09/07/2022 05:24 AM    AGRATIO 1.6 07/05/2021 07:36 AM    LABGLOM 53 09/07/2022 05:24 AM    GLUCOSE 150 09/07/2022 05:24 AM    GLUCOSE 82 08/19/2022 09:18 AM    CALCIUM 8.0 09/07/2022 05:24 AM       Hepatic Function Panel:    Lab Results   Component Value Date/Time    ALKPHOS 91 07/05/2021 07:36 AM    ALT 8 07/05/2021 07:36 AM    AST 14 07/05/2021 07:36 AM    PROT 6.2 07/05/2021 07:36 AM    BILITOT 0.4 07/05/2021 07:36 AM    BILIDIR <0.2 02/28/2021 03:10 PM    LABALBU 3.8 07/05/2021 07:36 AM       Magnesium:    Lab Results   Component Value Date/Time    MG 2.2 07/11/2022 12:35 PM       PT/INR:    Lab Results   Component Value Date/Time    INR 1.10 09/06/2022 06:33 AM       HgBA1c:    Lab Results   Component Value Date/Time    LABA1C 5.5 02/28/2021 07:44 PM       FLP:    Lab Results   Component Value Date/Time    TRIG 104 07/05/2021 07:36 AM    HDL 41 07/05/2021 07:36 AM    LDLCALC 47 05/18/2019 04:06 AM    LDLDIRECT 49 07/05/2021 07:36 AM       TSH:    Lab Results   Component Value Date/Time    TSH 2.160 12/28/2019 05:54 AM         Assessment:    Generalized weakness to LE -- PT following for therapy ---PT reassess yesterday - improved     CLI to RLE     s\p OP elective LE angiogram 9/6/2022 w/   Successful percutaneous right EVAR graft occlusion, status  post reconstitution with four 11-mm Viabahn VBX stents postdilated with  a 14-mm Conquest balloon, right common iliac and external iliac artery  graft occlusion, treated with two 8 mm and one 7 mm VBX stent  postdilated with 8 and 10 mm balloons with full reconstitution of flow  into the common femoral artery followed by right SFA/popliteal artery  occlusion treated with a 6 x 120 Elizabeth x2 followed by 6 x 80 IN. PACT  Admiral drug-coated balloon to the distal SFA and a 5 x 150 IN. PACT  Admiral drug-coated balloon for the popliteal artery and a 3-mm [place]  angioplasty of the entire anterior tibial artery with reconstitution of  two-vessel runoff to the foot.       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

## 2022-09-11 NOTE — PROGRESS NOTES
1300-Patient discharge paperwork gone over and all questions were answered. Patient denies any needs at this time.

## 2022-09-11 NOTE — PROGRESS NOTES
This student nurse received report from Josefa Mckenzie. Will be assisting in Karen's care till noted. - Z.  [facility] Student Nurse

## 2022-09-11 NOTE — PROGRESS NOTES
This student nurse assisted Joseph Meraz to her ride home per Papa Trevino RN. This student nurse leaving the floor at this time. - BILL [employer] Student Nurse

## 2022-09-11 NOTE — FLOWSHEET NOTE
09/11/22 1348   Safe Environment   Safety Measures Other (comment)  (Virtual Safety Round Complete)   Virtual RN rounds, pt out of room at this time

## 2022-09-11 NOTE — PROGRESS NOTES
Cardiology Progress Note      Patient:  Marilee Mosley  YOB: 1935  MRN: [id number]   Acct: [de-identified]  Admit Date:  9/6/2022  Primary Cardiologist: Tra Alva MD    Chief Complaint:   Pt presented for OP elective LE angiogram     Subjective (Events in last 24 hours):   Pt up in chair - c/o RLE pain -- she states \"can hardly walk on it\"   Strong Pulses present with doppler - good ROM of foot with pain   Dressings taken off and band-aides applied   Foot is red colored - warm     PT requests rehab - discussed with pt - she thinks is a good idea     Art duplex scheduled for 1230 today     Pt only taking tylenol as other \"medications\" make her \"crazy\"        9/9/2022  She did walk around the bed this am per pt   Still with RLE pain - taking tylenol only per her request     Awaiting pre-cert to Rehab     VSS  tele SR no ectopy       9/10/2022  Pt ambulated in the hallway today w/ walker   Pt really wants to go home and not ECF     Therapy to re- evaluate       Discussed with staff - she had some oozing from groin today     VSS    9/11/2022  PT re-assessed yesterday   Pt walked in the hallway with walker w/out issue     No issues overnight   Ready to go home     Bilateral groin sites - dressings taken off - mild scattered ecchymosis noted   No hematoma - pulses present - neurovascular check WNL       Objective:   /67   Pulse 98   Temp 98.3 °F (36.8 °C) (Oral)   Resp 20   Ht 5' 5\" (1.651 m)   Wt 168 lb 7 oz (76.4 kg)   SpO2 96%   BMI 28.03 kg/m²        TELEMETRY: SR    Physical Exam:  General Appearance: alert and oriented to person, place and time, in no acute distress  Cardiovascular: normal rate, regular rhythm, normal S1 and S2, no murmurs, rubs, clicks, or gallops, distal pulses intact,  Pulmonary/Chest: clear to auscultation bilaterally- no wheezes, rales or rhonchi, normal air movement, no respiratory distress  Abdomen: soft, non-tender, non-distended, normal bowel sounds, no masses Extremities: no cyanosis, clubbing or edema, pulses as above for RT foot --- LT foot palpable   Musculoskeletal: normal range of motion, no joint swelling, deformity or tenderness  Neurological: alert, oriented, normal speech, no focal findings or movement disorder noted    Medications:    metoprolol succinate  50 mg Oral Daily    sodium chloride  500 mL IntraVENous Once    [Held by provider] sacubitril-valsartan  1 tablet Oral BID    hydrocortisone  25 mg Rectal Nightly    diphenhydrAMINE  50 mg IntraVENous Once    sodium chloride flush  5-40 mL IntraVENous 2 times per day    timolol  1 drop Both Eyes Daily    pantoprazole  40 mg Oral BID AC    pravastatin  40 mg Oral Daily    potassium chloride  10 mEq Oral Weekly    rivaroxaban  20 mg Oral Daily    aspirin  81 mg Oral Daily    clopidogrel  75 mg Oral Daily    famotidine  20 mg Oral Nightly       nitroGLYCERIN, 0.4 mg, Q5 Min PRN  acetaminophen, 650 mg, Q4H PRN  ondansetron, 4 mg, Q8H PRN   Or  ondansetron, 4 mg, Q6H PRN  polyethylene glycol, 17 g, Daily PRN  albuterol sulfate HFA, 2 puff, Q6H PRN  HYDROmorphone, 0.5 mg, Q2H PRN      Diagnostics:  LE angiogram:    Successful percutaneous right EVAR graft occlusion, status  post reconstitution with four 11-mm Viabahn VBX stents postdilated with  a 14-mm Conquest balloon, right common iliac and external iliac artery  graft occlusion, treated with two 8 mm and one 7 mm VBX stent  postdilated with 8 and 10 mm balloons with full reconstitution of flow  into the common femoral artery followed by right SFA/popliteal artery  occlusion treated with a 6 x 120 Elizabeth x2 followed by 6 x 80 IN. PACT  Admiral drug-coated balloon to the distal SFA and a 5 x 150 IN. PACT  Admiral drug-coated balloon for the popliteal artery and a 3-mm [place]  angioplasty of the entire anterior tibial artery with reconstitution of  two-vessel runoff to the foot. PLAN:  1. Optimal medical therapy. 2.  Risk factor management.   3.  Routine access site care. 4.  Six hours bedrest.  5.  Will need FemoStop per protocol. 6.  DAPT. 7.  Eliquis per protocol, but if able to we will switch to Xarelto. 8.  Will need lifelong anticoagulation/antiplatelet therapy. 9.  _____________. 10.  Nitroglycerin paste to the foot. 11.  Warm blankets to the foot. 12.  Keep the foot in dependent position. 13.  IV fluids overnight. 14.  Frequent pulse checks q.1 to 2 h.  15.  Repeat labs in the a.m.  16.  Surveillance arterial duplex at 1, 3, 6, and 12 months. 17.  Follow with myself in two to four weeks postprocedure. All the above was explained to the patient's family. They were  agreeable and amenable to the plan. Naveen Oliveira MD     D: 09/06/2022    Lab Data:    Cardiac Enzymes:  No results for input(s): CKTOTAL, CKMB, CKMBINDEX, TROPONINI in the last 72 hours.       CBC:   Lab Results   Component Value Date/Time    WBC 7.4 09/07/2022 05:24 AM    RBC 4.13 09/07/2022 05:24 AM    HGB 12.5 09/07/2022 05:24 AM    HCT 38.1 09/07/2022 05:24 AM     09/07/2022 05:24 AM       CMP:    Lab Results   Component Value Date/Time     09/07/2022 05:24 AM    K 4.1 09/07/2022 05:24 AM     09/07/2022 05:24 AM    CO2 28 09/07/2022 05:24 AM    BUN 26 09/07/2022 05:24 AM    CREATININE 1.0 09/07/2022 05:24 AM    AGRATIO 1.6 07/05/2021 07:36 AM    LABGLOM 53 09/07/2022 05:24 AM    GLUCOSE 150 09/07/2022 05:24 AM    GLUCOSE 82 08/19/2022 09:18 AM    CALCIUM 8.0 09/07/2022 05:24 AM       Hepatic Function Panel:    Lab Results   Component Value Date/Time    ALKPHOS 91 07/05/2021 07:36 AM    ALT 8 07/05/2021 07:36 AM    AST 14 07/05/2021 07:36 AM    PROT 6.2 07/05/2021 07:36 AM    BILITOT 0.4 07/05/2021 07:36 AM    BILIDIR <0.2 02/28/2021 03:10 PM    LABALBU 3.8 07/05/2021 07:36 AM       Magnesium:    Lab Results   Component Value Date/Time    MG 2.2 07/11/2022 12:35 PM       PT/INR:    Lab Results   Component Value Date/Time    INR 1.10 09/06/2022 06:33 AM HgBA1c:    Lab Results   Component Value Date/Time    LABA1C 5.5 02/28/2021 07:44 PM       FLP:    Lab Results   Component Value Date/Time    TRIG 104 07/05/2021 07:36 AM    HDL 41 07/05/2021 07:36 AM    LDLCALC 47 05/18/2019 04:06 AM    LDLDIRECT 49 07/05/2021 07:36 AM       TSH:    Lab Results   Component Value Date/Time    TSH 2.160 12/28/2019 05:54 AM         Assessment:    Generalized weakness to LE -- PT following for therapy ---PT reassess yesterday - improved     CLI to RLE     s\p OP elective LE angiogram 9/6/2022 w/   Successful percutaneous right EVAR graft occlusion, status  post reconstitution with four 11-mm Viabahn VBX stents postdilated with  a 14-mm Conquest balloon, right common iliac and external iliac artery  graft occlusion, treated with two 8 mm and one 7 mm VBX stent  postdilated with 8 and 10 mm balloons with full reconstitution of flow  into the common femoral artery followed by right SFA/popliteal artery  occlusion treated with a 6 x 120 Elizabeth x2 followed by 6 x 80 IN. PACT  Admiral drug-coated balloon to the distal SFA and a 5 x 150 IN. PACT  Admiral drug-coated balloon for the popliteal artery and a 3-mm [place]  angioplasty of the entire anterior tibial artery with reconstitution of  two-vessel runoff to the foot.       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

## 2022-09-12 NOTE — PROGRESS NOTES
Inpatient Cardiac Rehabilitation Consult    Received consult for Phase II Cardiac Rehabilitation. Patient does not meet qualifications for cardiac rehabilitation.   We do not currently have a PAD rehab program.

## 2022-09-15 ENCOUNTER — HOSPITAL ENCOUNTER (INPATIENT)
Age: 87
LOS: 8 days | Discharge: HOME | DRG: 853 | End: 2022-09-23
Attending: EMERGENCY MEDICINE | Admitting: INTERNAL MEDICINE
Payer: MEDICARE

## 2022-09-15 ENCOUNTER — APPOINTMENT (OUTPATIENT)
Dept: CT IMAGING | Age: 87
DRG: 853 | End: 2022-09-15
Payer: MEDICARE

## 2022-09-15 ENCOUNTER — APPOINTMENT (OUTPATIENT)
Dept: GENERAL RADIOLOGY | Age: 87
DRG: 853 | End: 2022-09-15
Payer: MEDICARE

## 2022-09-15 DIAGNOSIS — I72.9 PSEUDOANEURYSM (HCC): ICD-10-CM

## 2022-09-15 DIAGNOSIS — R65.21 SEPTIC SHOCK (HCC): Primary | ICD-10-CM

## 2022-09-15 DIAGNOSIS — A41.9 SEPTIC SHOCK (HCC): Primary | ICD-10-CM

## 2022-09-15 LAB
ABO: NORMAL
ALBUMIN SERPL-MCNC: 3.1 G/DL (ref 3.5–5.1)
ALP BLD-CCNC: 95 U/L (ref 38–126)
ALT SERPL-CCNC: 16 U/L (ref 11–66)
ANION GAP SERPL CALCULATED.3IONS-SCNC: 12 MEQ/L (ref 8–16)
ANTIBODY SCREEN: NORMAL
AST SERPL-CCNC: 36 U/L (ref 5–40)
BACTERIA: ABNORMAL /HPF
BASOPHILS # BLD: 0.1 %
BASOPHILS ABSOLUTE: 0 THOU/MM3 (ref 0–0.1)
BILIRUB SERPL-MCNC: 0.7 MG/DL (ref 0.3–1.2)
BILIRUBIN URINE: NEGATIVE
BLOOD, URINE: ABNORMAL
BUN BLDV-MCNC: 33 MG/DL (ref 7–22)
CALCIUM SERPL-MCNC: 8.9 MG/DL (ref 8.5–10.5)
CASTS 2: ABNORMAL /LPF
CASTS UA: ABNORMAL /LPF
CHARACTER, URINE: CLEAR
CHLORIDE BLD-SCNC: 102 MEQ/L (ref 98–111)
CO2: 24 MEQ/L (ref 23–33)
COLOR: YELLOW
CREAT SERPL-MCNC: 1.5 MG/DL (ref 0.4–1.2)
CRYSTALS, UA: ABNORMAL
EOSINOPHIL # BLD: 0 %
EOSINOPHILS ABSOLUTE: 0 THOU/MM3 (ref 0–0.4)
EPITHELIAL CELLS, UA: ABNORMAL /HPF
ERYTHROCYTE [DISTWIDTH] IN BLOOD BY AUTOMATED COUNT: 15.4 % (ref 11.5–14.5)
ERYTHROCYTE [DISTWIDTH] IN BLOOD BY AUTOMATED COUNT: 52.7 FL (ref 35–45)
GFR SERPL CREATININE-BSD FRML MDRD: 33 ML/MIN/1.73M2
GLUCOSE BLD-MCNC: 196 MG/DL (ref 70–108)
GLUCOSE URINE: NEGATIVE MG/DL
HCT VFR BLD CALC: 37.3 % (ref 37–47)
HEMOCCULT STL QL: NEGATIVE
HEMOGLOBIN: 12.1 GM/DL (ref 12–16)
IMMATURE GRANS (ABS): 0.09 THOU/MM3 (ref 0–0.07)
IMMATURE GRANULOCYTES: 0.8 %
KETONES, URINE: NEGATIVE
LACTIC ACID, SEPSIS: 4.3 MMOL/L (ref 0.5–1.9)
LACTIC ACID, SEPSIS: 5.3 MMOL/L (ref 0.5–1.9)
LEUKOCYTE ESTERASE, URINE: ABNORMAL
LYMPHOCYTES # BLD: 0.9 %
LYMPHOCYTES ABSOLUTE: 0.1 THOU/MM3 (ref 1–4.8)
MCH RBC QN AUTO: 30.5 PG (ref 26–33)
MCHC RBC AUTO-ENTMCNC: 32.4 GM/DL (ref 32.2–35.5)
MCV RBC AUTO: 94 FL (ref 81–99)
MISCELLANEOUS 2: ABNORMAL
MONOCYTES # BLD: 3.5 %
MONOCYTES ABSOLUTE: 0.4 THOU/MM3 (ref 0.4–1.3)
NITRITE, URINE: POSITIVE
NUCLEATED RED BLOOD CELLS: 0 /100 WBC
OSMOLALITY CALCULATION: 288.4 MOSMOL/KG (ref 275–300)
PH UA: 6 (ref 5–9)
PLATELET # BLD: 123 THOU/MM3 (ref 130–400)
PLATELET ESTIMATE: ABNORMAL
PMV BLD AUTO: 11.3 FL (ref 9.4–12.4)
POTASSIUM REFLEX MAGNESIUM: 4.8 MEQ/L (ref 3.5–5.2)
PRO-BNP: 6077 PG/ML (ref 0–1800)
PROCALCITONIN: 17.11 NG/ML (ref 0.01–0.09)
PROTEIN UA: 30
RBC # BLD: 3.97 MILL/MM3 (ref 4.2–5.4)
RBC URINE: ABNORMAL /HPF
RENAL EPITHELIAL, UA: ABNORMAL
RH FACTOR: NORMAL
SCAN OF BLOOD SMEAR: NORMAL
SEG NEUTROPHILS: 94.7 %
SEGMENTED NEUTROPHILS ABSOLUTE COUNT: 10.1 THOU/MM3 (ref 1.8–7.7)
SODIUM BLD-SCNC: 138 MEQ/L (ref 135–145)
SPECIFIC GRAVITY, URINE: > 1.03 (ref 1–1.03)
TOTAL PROTEIN: 5.9 G/DL (ref 6.1–8)
TROPONIN T: < 0.01 NG/ML
UROBILINOGEN, URINE: 0.2 EU/DL (ref 0–1)
WBC # BLD: 10.7 THOU/MM3 (ref 4.8–10.8)
WBC UA: ABNORMAL /HPF
YEAST: ABNORMAL

## 2022-09-15 PROCEDURE — 87086 URINE CULTURE/COLONY COUNT: CPT

## 2022-09-15 PROCEDURE — 87040 BLOOD CULTURE FOR BACTERIA: CPT

## 2022-09-15 PROCEDURE — 84484 ASSAY OF TROPONIN QUANT: CPT

## 2022-09-15 PROCEDURE — 96365 THER/PROPH/DIAG IV INF INIT: CPT

## 2022-09-15 PROCEDURE — 36415 COLL VENOUS BLD VENIPUNCTURE: CPT

## 2022-09-15 PROCEDURE — 99285 EMERGENCY DEPT VISIT HI MDM: CPT

## 2022-09-15 PROCEDURE — 87147 CULTURE TYPE IMMUNOLOGIC: CPT

## 2022-09-15 PROCEDURE — 83880 ASSAY OF NATRIURETIC PEPTIDE: CPT

## 2022-09-15 PROCEDURE — 80053 COMPREHEN METABOLIC PANEL: CPT

## 2022-09-15 PROCEDURE — 84145 PROCALCITONIN (PCT): CPT

## 2022-09-15 PROCEDURE — 93005 ELECTROCARDIOGRAM TRACING: CPT | Performed by: STUDENT IN AN ORGANIZED HEALTH CARE EDUCATION/TRAINING PROGRAM

## 2022-09-15 PROCEDURE — 96360 HYDRATION IV INFUSION INIT: CPT

## 2022-09-15 PROCEDURE — 86850 RBC ANTIBODY SCREEN: CPT

## 2022-09-15 PROCEDURE — 2000000000 HC ICU R&B

## 2022-09-15 PROCEDURE — 2580000003 HC RX 258: Performed by: STUDENT IN AN ORGANIZED HEALTH CARE EDUCATION/TRAINING PROGRAM

## 2022-09-15 PROCEDURE — 75635 CT ANGIO ABDOMINAL ARTERIES: CPT

## 2022-09-15 PROCEDURE — 87077 CULTURE AEROBIC IDENTIFY: CPT

## 2022-09-15 PROCEDURE — 81001 URINALYSIS AUTO W/SCOPE: CPT

## 2022-09-15 PROCEDURE — 83605 ASSAY OF LACTIC ACID: CPT

## 2022-09-15 PROCEDURE — 86901 BLOOD TYPING SEROLOGIC RH(D): CPT

## 2022-09-15 PROCEDURE — 71045 X-RAY EXAM CHEST 1 VIEW: CPT

## 2022-09-15 PROCEDURE — 87801 DETECT AGNT MULT DNA AMPLI: CPT

## 2022-09-15 PROCEDURE — 87186 SC STD MICRODIL/AGAR DIL: CPT

## 2022-09-15 PROCEDURE — 85025 COMPLETE CBC W/AUTO DIFF WBC: CPT

## 2022-09-15 PROCEDURE — 86900 BLOOD TYPING SEROLOGIC ABO: CPT

## 2022-09-15 PROCEDURE — 82272 OCCULT BLD FECES 1-3 TESTS: CPT

## 2022-09-15 PROCEDURE — 6360000002 HC RX W HCPCS: Performed by: STUDENT IN AN ORGANIZED HEALTH CARE EDUCATION/TRAINING PROGRAM

## 2022-09-15 RX ORDER — 0.9 % SODIUM CHLORIDE 0.9 %
1000 INTRAVENOUS SOLUTION INTRAVENOUS ONCE
Status: COMPLETED | OUTPATIENT
Start: 2022-09-15 | End: 2022-09-15

## 2022-09-15 RX ORDER — 0.9 % SODIUM CHLORIDE 0.9 %
286 INTRAVENOUS SOLUTION INTRAVENOUS ONCE
Status: COMPLETED | OUTPATIENT
Start: 2022-09-15 | End: 2022-09-15

## 2022-09-15 RX ADMIN — SODIUM CHLORIDE 286 ML: 9 INJECTION, SOLUTION INTRAVENOUS at 21:36

## 2022-09-15 RX ADMIN — SODIUM CHLORIDE 1000 ML: 9 INJECTION, SOLUTION INTRAVENOUS at 18:56

## 2022-09-15 RX ADMIN — Medication 1250 MG: at 22:20

## 2022-09-15 RX ADMIN — PIPERACILLIN AND TAZOBACTAM 4500 MG: 4; .5 INJECTION, POWDER, LYOPHILIZED, FOR SOLUTION INTRAVENOUS at 21:36

## 2022-09-15 ASSESSMENT — PAIN - FUNCTIONAL ASSESSMENT
PAIN_FUNCTIONAL_ASSESSMENT: NONE - DENIES PAIN

## 2022-09-15 NOTE — ED NOTES
Intubation    Date/Time: 4/7/2022 7:49 AM  Performed by: Pelon Patterson CRNA  Authorized by: Phylicia Winter MD     Intubation:     Induction:  Intravenous    Intubated:  Postinduction    Mask Ventilation:  Easy mask    Attempts:  1    Attempted By:  CRNA    Difficult Airway Encountered?: No      Complications:  None    Airway Device:  Supraglottic airway/LMA    Airway Device Size:  4.0    Style/Cuff Inflation:  Cuffed    Inflation Amount (mL):  28    Secured at:  The lips    Placement Verified By:  Capnometry    Complicating Factors:  None    Findings Post-Intubation:  BS equal bilateral and atraumatic/condition of teeth unchanged     Pt returned from CT scan.       Marcos Velasco, GUERRERO  09/15/22 [id number]

## 2022-09-15 NOTE — ED TRIAGE NOTES
Pt in through EMS. She states this morning she woke up with fatigue and weakness. She states she could not get out of bed. This did not improve through out the day. EMS noted hypotension in the 50K systolic. They started IV and a fluid bolus. In room BP noted at 73/45. Dr. Brenda Duvall at bedside to assess. Pt states she had a procedure to repair an occlusion of her right leg with Dr. Anju Naqvi 2 weeks ago. She denies any pain or symptoms other than fatigue and weakness.

## 2022-09-15 NOTE — ED NOTES
Pt placed trendelenburg- continues awake and alert- answering questions w/o difficulty.  Dr Yoly Thibodeaux at [facility], RN  09/15/22 [id number]

## 2022-09-16 ENCOUNTER — APPOINTMENT (OUTPATIENT)
Dept: GENERAL RADIOLOGY | Age: 87
DRG: 853 | End: 2022-09-16
Payer: MEDICARE

## 2022-09-16 ENCOUNTER — APPOINTMENT (OUTPATIENT)
Dept: INTERVENTIONAL RADIOLOGY/VASCULAR | Age: 87
DRG: 853 | End: 2022-09-16
Payer: MEDICARE

## 2022-09-16 ENCOUNTER — APPOINTMENT (OUTPATIENT)
Dept: ULTRASOUND IMAGING | Age: 87
DRG: 853 | End: 2022-09-16
Payer: MEDICARE

## 2022-09-16 LAB
ACINETOBACTER CALCOACETICUS-BAUMANNII COMPLEX: NOT DETECTED
ANION GAP SERPL CALCULATED.3IONS-SCNC: 10 MEQ/L (ref 8–16)
ANION GAP SERPL CALCULATED.3IONS-SCNC: 12 MEQ/L (ref 8–16)
ANION GAP SERPL CALCULATED.3IONS-SCNC: 8 MEQ/L (ref 8–16)
ANTIBIOTIC RESISTANCE MARKER: VANCOMYCIN: VANA,B: ABNORMAL
AVERAGE GLUCOSE: 96 MG/DL (ref 70–126)
BACTEROIDES FRAGILIS: NOT DETECTED
BOTTLE TYPE: ABNORMAL
BUN BLDV-MCNC: 25 MG/DL (ref 7–22)
BUN BLDV-MCNC: 26 MG/DL (ref 7–22)
BUN BLDV-MCNC: 27 MG/DL (ref 7–22)
CALCIUM SERPL-MCNC: 7.5 MG/DL (ref 8.5–10.5)
CALCIUM SERPL-MCNC: 8 MG/DL (ref 8.5–10.5)
CALCIUM SERPL-MCNC: 8.2 MG/DL (ref 8.5–10.5)
CANDIDA ALBICANS: NOT DETECTED
CANDIDA AURIS: NOT DETECTED
CANDIDA GLABRATA: NOT DETECTED
CANDIDA KRUSEI: NOT DETECTED
CANDIDA PARAPSILOSIS: NOT DETECTED
CANDIDA TROPICALIS: NOT DETECTED
CHLORIDE BLD-SCNC: 105 MEQ/L (ref 98–111)
CHLORIDE BLD-SCNC: 108 MEQ/L (ref 98–111)
CHLORIDE BLD-SCNC: 109 MEQ/L (ref 98–111)
CHLORIDE, URINE: 87 MEQ/L
CO2: 21 MEQ/L (ref 23–33)
CO2: 21 MEQ/L (ref 23–33)
CO2: 25 MEQ/L (ref 23–33)
CORTISOL: 51.65 UG/DL
CREAT SERPL-MCNC: 1.2 MG/DL (ref 0.4–1.2)
CREAT SERPL-MCNC: 1.2 MG/DL (ref 0.4–1.2)
CREAT SERPL-MCNC: 1.3 MG/DL (ref 0.4–1.2)
CREATININE URINE: 94.8 MG/DL
CRYPTOCOCCUS NEOFORMANS/GATTII: NOT DETECTED
CTX-M: ABNORMAL
EKG ATRIAL RATE: 98 BPM
EKG P AXIS: 98 DEGREES
EKG P-R INTERVAL: 168 MS
EKG Q-T INTERVAL: 328 MS
EKG QRS DURATION: 74 MS
EKG QTC CALCULATION (BAZETT): 418 MS
EKG R AXIS: -59 DEGREES
EKG T AXIS: 22 DEGREES
EKG VENTRICULAR RATE: 98 BPM
ENTEROBACTER CLOACAE COMPLEX: NOT DETECTED
ENTEROBACTERALES: NOT DETECTED
ENTEROCOCCUS FAECALIS: NOT DETECTED
ENTEROCOCCUS FAECIUM: NOT DETECTED
EOSINOPHIL SMEAR, URINE: NORMAL
ESCHERICHIA COLI: NOT DETECTED
GFR SERPL CREATININE-BSD FRML MDRD: 39 ML/MIN/1.73M2
GFR SERPL CREATININE-BSD FRML MDRD: 43 ML/MIN/1.73M2
GFR SERPL CREATININE-BSD FRML MDRD: 43 ML/MIN/1.73M2
GLUCOSE BLD-MCNC: 104 MG/DL (ref 70–108)
GLUCOSE BLD-MCNC: 125 MG/DL (ref 70–108)
GLUCOSE BLD-MCNC: 202 MG/DL (ref 70–108)
GLUCOSE BLD-MCNC: 223 MG/DL (ref 70–108)
GLUCOSE BLD-MCNC: 93 MG/DL (ref 70–108)
GLUCOSE BLD-MCNC: 97 MG/DL (ref 70–108)
HAEMOPHILUS INFLUENZAE: NOT DETECTED
HBA1C MFR BLD: 5.2 % (ref 4.4–6.4)
HCT VFR BLD CALC: 30 % (ref 37–47)
HCT VFR BLD CALC: 32.5 % (ref 37–47)
HEMOGLOBIN: 10.7 GM/DL (ref 12–16)
HEMOGLOBIN: 9.8 GM/DL (ref 12–16)
IMP: ABNORMAL
KLEBSIELLA AEROGENES: NOT DETECTED
KLEBSIELLA OXYTOCA: NOT DETECTED
KLEBSIELLA PNEUMONIAE GROUP: NOT DETECTED
KPC (CARBAPENEM RESISTANCE GENE): ABNORMAL
LACTIC ACID: 1 MMOL/L (ref 0.5–2)
LACTIC ACID: 2.1 MMOL/L (ref 0.5–2)
LISTERIA MONOCYTOGENES: NOT DETECTED
MCR-1: ABNORMAL
MECA/C AND MREJ (MRSA): DETECTED
MECA/C: ABNORMAL
MRSA SCREEN RT-PCR: NEGATIVE
MRSA SCREEN RT-PCR: NEGATIVE
NDM: ABNORMAL
NDM: NOT DETECTED
OSMOLALITY URINE: 596 MOSMOL/KG (ref 250–750)
OXA-48-LIKE: ABNORMAL
POTASSIUM SERPL-SCNC: 3.7 MEQ/L (ref 3.5–5.2)
POTASSIUM, URINE: 84.2 MEQ/L
PROTEUS SPP: NOT DETECTED
PSEUDOMONAS AERUGINOSA: NOT DETECTED
SALMONELLA SPECIES: NOT DETECTED
SERRATIA MARCESCENS: NOT DETECTED
SODIUM BLD-SCNC: 138 MEQ/L (ref 135–145)
SODIUM BLD-SCNC: 139 MEQ/L (ref 135–145)
SODIUM BLD-SCNC: 142 MEQ/L (ref 135–145)
SODIUM URINE: 64 MEQ/L
SOURCE OF BLOOD CULTURE: ABNORMAL
STAPHYLOCOCCUS AUREUS: DETECTED
STAPHYLOCOCCUS EPIDERMIDIS: NOT DETECTED
STAPHYLOCOCCUS LUGDUNENSIS: NOT DETECTED
STAPHYLOCOCCUS SPP: DETECTED
STENOTROPHOMONAS MALTOPHILIA: NOT DETECTED
STREPTOCOCCUS AGALACTIAE (GROUP B): NOT DETECTED
STREPTOCOCCUS PNEUMONIAE: NOT DETECTED
STREPTOCOCCUS PYOGENES (GROUP A): NOT DETECTED
STREPTOCOCCUS SPP: NOT DETECTED
TSH SERPL DL<=0.05 MIU/L-ACNC: 1.01 UIU/ML (ref 0.4–4.2)
UREA NITROGEN, UR: 650 MG/DL
VANCOMYCIN RESISTANT ENTEROCOCCUS: NEGATIVE
VIM: ABNORMAL

## 2022-09-16 PROCEDURE — 87641 MR-STAPH DNA AMP PROBE: CPT

## 2022-09-16 PROCEDURE — 87186 SC STD MICRODIL/AGAR DIL: CPT

## 2022-09-16 PROCEDURE — 99291 CRITICAL CARE FIRST HOUR: CPT | Performed by: INTERNAL MEDICINE

## 2022-09-16 PROCEDURE — 85014 HEMATOCRIT: CPT

## 2022-09-16 PROCEDURE — 87500 VANOMYCIN DNA AMP PROBE: CPT

## 2022-09-16 PROCEDURE — 36556 INSERT NON-TUNNEL CV CATH: CPT | Performed by: NURSE PRACTITIONER

## 2022-09-16 PROCEDURE — 2580000003 HC RX 258: Performed by: NURSE PRACTITIONER

## 2022-09-16 PROCEDURE — 71045 X-RAY EXAM CHEST 1 VIEW: CPT

## 2022-09-16 PROCEDURE — APPNB180 APP NON BILLABLE TIME > 60 MINS: Performed by: NURSE PRACTITIONER

## 2022-09-16 PROCEDURE — 85018 HEMOGLOBIN: CPT

## 2022-09-16 PROCEDURE — 2100000000 HC CCU R&B

## 2022-09-16 PROCEDURE — 84300 ASSAY OF URINE SODIUM: CPT

## 2022-09-16 PROCEDURE — 82570 ASSAY OF URINE CREATININE: CPT

## 2022-09-16 PROCEDURE — 87086 URINE CULTURE/COLONY COUNT: CPT

## 2022-09-16 PROCEDURE — 51702 INSERT TEMP BLADDER CATH: CPT

## 2022-09-16 PROCEDURE — 83935 ASSAY OF URINE OSMOLALITY: CPT

## 2022-09-16 PROCEDURE — 83605 ASSAY OF LACTIC ACID: CPT

## 2022-09-16 PROCEDURE — 82436 ASSAY OF URINE CHLORIDE: CPT

## 2022-09-16 PROCEDURE — 6370000000 HC RX 637 (ALT 250 FOR IP): Performed by: NURSE PRACTITIONER

## 2022-09-16 PROCEDURE — 84540 ASSAY OF URINE/UREA-N: CPT

## 2022-09-16 PROCEDURE — P9047 ALBUMIN (HUMAN), 25%, 50ML: HCPCS | Performed by: NURSE PRACTITIONER

## 2022-09-16 PROCEDURE — 87077 CULTURE AEROBIC IDENTIFY: CPT

## 2022-09-16 PROCEDURE — 05H533Z INSERTION OF INFUSION DEVICE INTO RIGHT SUBCLAVIAN VEIN, PERCUTANEOUS APPROACH: ICD-10-PCS | Performed by: NURSE PRACTITIONER

## 2022-09-16 PROCEDURE — 2500000003 HC RX 250 WO HCPCS: Performed by: NURSE PRACTITIONER

## 2022-09-16 PROCEDURE — 04FK3Z0 FRAGMENTATION OF RIGHT FEMORAL ARTERY, PERCUTANEOUS APPROACH, ULTRASONIC: ICD-10-PCS | Performed by: RADIOLOGY

## 2022-09-16 PROCEDURE — 80048 BASIC METABOLIC PNL TOTAL CA: CPT

## 2022-09-16 PROCEDURE — 93925 LOWER EXTREMITY STUDY: CPT

## 2022-09-16 PROCEDURE — 93010 ELECTROCARDIOGRAM REPORT: CPT | Performed by: INTERNAL MEDICINE

## 2022-09-16 PROCEDURE — 6360000002 HC RX W HCPCS: Performed by: STUDENT IN AN ORGANIZED HEALTH CARE EDUCATION/TRAINING PROGRAM

## 2022-09-16 PROCEDURE — 84133 ASSAY OF URINE POTASSIUM: CPT

## 2022-09-16 PROCEDURE — 2580000003 HC RX 258: Performed by: PHARMACIST

## 2022-09-16 PROCEDURE — 89190 NASAL SMEAR FOR EOSINOPHILS: CPT

## 2022-09-16 PROCEDURE — 82533 TOTAL CORTISOL: CPT

## 2022-09-16 PROCEDURE — 82948 REAGENT STRIP/BLOOD GLUCOSE: CPT

## 2022-09-16 PROCEDURE — 6360000002 HC RX W HCPCS: Performed by: NURSE PRACTITIONER

## 2022-09-16 PROCEDURE — 84443 ASSAY THYROID STIM HORMONE: CPT

## 2022-09-16 PROCEDURE — 76770 US EXAM ABDO BACK WALL COMP: CPT

## 2022-09-16 PROCEDURE — 83036 HEMOGLOBIN GLYCOSYLATED A1C: CPT

## 2022-09-16 PROCEDURE — 6360000002 HC RX W HCPCS: Performed by: PHARMACIST

## 2022-09-16 RX ORDER — NOREPINEPHRINE BIT/0.9 % NACL 16MG/250ML
1-100 INFUSION BOTTLE (ML) INTRAVENOUS CONTINUOUS
Status: DISCONTINUED | OUTPATIENT
Start: 2022-09-16 | End: 2022-09-20

## 2022-09-16 RX ORDER — PNV NO.95/FERROUS FUM/FOLIC AC 28MG-0.8MG
250 TABLET ORAL 2 TIMES DAILY
Status: DISCONTINUED | OUTPATIENT
Start: 2022-09-16 | End: 2022-09-23 | Stop reason: HOSPADM

## 2022-09-16 RX ORDER — PRAVASTATIN SODIUM 40 MG
40 TABLET ORAL DAILY
Status: DISCONTINUED | OUTPATIENT
Start: 2022-09-16 | End: 2022-09-23 | Stop reason: HOSPADM

## 2022-09-16 RX ORDER — ALBUTEROL SULFATE 90 UG/1
2 AEROSOL, METERED RESPIRATORY (INHALATION) EVERY 4 HOURS PRN
Status: DISCONTINUED | OUTPATIENT
Start: 2022-09-16 | End: 2022-09-19

## 2022-09-16 RX ORDER — SODIUM CHLORIDE 9 MG/ML
INJECTION, SOLUTION INTRAVENOUS PRN
Status: DISCONTINUED | OUTPATIENT
Start: 2022-09-16 | End: 2022-09-23 | Stop reason: HOSPADM

## 2022-09-16 RX ORDER — CLOPIDOGREL BISULFATE 75 MG/1
75 TABLET ORAL DAILY
Status: DISCONTINUED | OUTPATIENT
Start: 2022-09-16 | End: 2022-09-23 | Stop reason: HOSPADM

## 2022-09-16 RX ORDER — ASPIRIN 81 MG/1
81 TABLET, CHEWABLE ORAL DAILY
Status: DISCONTINUED | OUTPATIENT
Start: 2022-09-16 | End: 2022-09-18

## 2022-09-16 RX ORDER — PANTOPRAZOLE SODIUM 40 MG/1
40 TABLET, DELAYED RELEASE ORAL
Status: DISCONTINUED | OUTPATIENT
Start: 2022-09-16 | End: 2022-09-23 | Stop reason: HOSPADM

## 2022-09-16 RX ORDER — ONDANSETRON 2 MG/ML
4 INJECTION INTRAMUSCULAR; INTRAVENOUS EVERY 6 HOURS PRN
Status: DISCONTINUED | OUTPATIENT
Start: 2022-09-16 | End: 2022-09-23 | Stop reason: HOSPADM

## 2022-09-16 RX ORDER — DEXTROSE MONOHYDRATE 100 MG/ML
INJECTION, SOLUTION INTRAVENOUS CONTINUOUS PRN
Status: DISCONTINUED | OUTPATIENT
Start: 2022-09-16 | End: 2022-09-23 | Stop reason: HOSPADM

## 2022-09-16 RX ORDER — POLYETHYLENE GLYCOL 3350 17 G/17G
17 POWDER, FOR SOLUTION ORAL DAILY PRN
Status: DISCONTINUED | OUTPATIENT
Start: 2022-09-16 | End: 2022-09-23 | Stop reason: HOSPADM

## 2022-09-16 RX ORDER — LANOLIN ALCOHOL/MO/W.PET/CERES
6 CREAM (GRAM) TOPICAL NIGHTLY PRN
Status: DISCONTINUED | OUTPATIENT
Start: 2022-09-16 | End: 2022-09-23 | Stop reason: HOSPADM

## 2022-09-16 RX ORDER — TIMOLOL MALEATE 5 MG/ML
1 SOLUTION/ DROPS OPHTHALMIC DAILY
Status: DISCONTINUED | OUTPATIENT
Start: 2022-09-16 | End: 2022-09-23 | Stop reason: HOSPADM

## 2022-09-16 RX ORDER — SODIUM CHLORIDE 0.9 % (FLUSH) 0.9 %
5-40 SYRINGE (ML) INJECTION EVERY 12 HOURS SCHEDULED
Status: DISCONTINUED | OUTPATIENT
Start: 2022-09-16 | End: 2022-09-23 | Stop reason: HOSPADM

## 2022-09-16 RX ORDER — ALBUTEROL SULFATE 90 UG/1
2 AEROSOL, METERED RESPIRATORY (INHALATION) EVERY 6 HOURS PRN
Status: DISCONTINUED | OUTPATIENT
Start: 2022-09-16 | End: 2022-09-16

## 2022-09-16 RX ORDER — SODIUM CHLORIDE 0.9 % (FLUSH) 0.9 %
5-40 SYRINGE (ML) INJECTION PRN
Status: DISCONTINUED | OUTPATIENT
Start: 2022-09-16 | End: 2022-09-23 | Stop reason: HOSPADM

## 2022-09-16 RX ORDER — GENTAMICIN SULFATE 80 MG/100ML
80 INJECTION, SOLUTION INTRAVENOUS EVERY 24 HOURS
Status: DISCONTINUED | OUTPATIENT
Start: 2022-09-16 | End: 2022-09-20

## 2022-09-16 RX ORDER — ONDANSETRON 4 MG/1
4 TABLET, ORALLY DISINTEGRATING ORAL EVERY 8 HOURS PRN
Status: DISCONTINUED | OUTPATIENT
Start: 2022-09-16 | End: 2022-09-23 | Stop reason: HOSPADM

## 2022-09-16 RX ORDER — INSULIN LISPRO 100 [IU]/ML
0-4 INJECTION, SOLUTION INTRAVENOUS; SUBCUTANEOUS EVERY 6 HOURS
Status: DISCONTINUED | OUTPATIENT
Start: 2022-09-16 | End: 2022-09-17

## 2022-09-16 RX ORDER — ALBUMIN (HUMAN) 12.5 G/50ML
25 SOLUTION INTRAVENOUS EVERY 6 HOURS
Status: DISCONTINUED | OUTPATIENT
Start: 2022-09-16 | End: 2022-09-16

## 2022-09-16 RX ORDER — ACETAMINOPHEN 325 MG/1
650 TABLET ORAL EVERY 6 HOURS PRN
Status: DISCONTINUED | OUTPATIENT
Start: 2022-09-16 | End: 2022-09-23 | Stop reason: HOSPADM

## 2022-09-16 RX ORDER — HYDROCORTISONE ACETATE 25 MG/1
25 SUPPOSITORY RECTAL NIGHTLY
Status: DISCONTINUED | OUTPATIENT
Start: 2022-09-16 | End: 2022-09-23 | Stop reason: HOSPADM

## 2022-09-16 RX ORDER — METOPROLOL SUCCINATE 25 MG/1
25 TABLET, EXTENDED RELEASE ORAL DAILY
Status: DISCONTINUED | OUTPATIENT
Start: 2022-09-16 | End: 2022-09-18

## 2022-09-16 RX ADMIN — Medication 5 MCG/MIN: at 01:36

## 2022-09-16 RX ADMIN — HYDROCORTISONE ACETATE 25 MG: 25 SUPPOSITORY RECTAL at 23:01

## 2022-09-16 RX ADMIN — VANCOMYCIN HYDROCHLORIDE 1000 MG: 1 INJECTION, POWDER, LYOPHILIZED, FOR SOLUTION INTRAVENOUS at 11:33

## 2022-09-16 RX ADMIN — PANTOPRAZOLE SODIUM 40 MG: 40 TABLET, DELAYED RELEASE ORAL at 16:51

## 2022-09-16 RX ADMIN — GENTAMICIN SULFATE 80 MG: 80 INJECTION, SOLUTION INTRAVENOUS at 12:05

## 2022-09-16 RX ADMIN — PIPERACILLIN AND TAZOBACTAM 3375 MG: 3; .375 INJECTION, POWDER, FOR SOLUTION INTRAVENOUS at 05:55

## 2022-09-16 RX ADMIN — ALBUMIN (HUMAN) 25 G: 0.25 INJECTION, SOLUTION INTRAVENOUS at 02:20

## 2022-09-16 RX ADMIN — VASOPRESSIN 0.04 UNITS/MIN: 20 INJECTION PARENTERAL at 04:10

## 2022-09-16 RX ADMIN — CLOPIDOGREL BISULFATE 75 MG: 75 TABLET ORAL at 08:39

## 2022-09-16 RX ADMIN — PIPERACILLIN AND TAZOBACTAM 3375 MG: 3; .375 INJECTION, POWDER, FOR SOLUTION INTRAVENOUS at 12:52

## 2022-09-16 RX ADMIN — SODIUM CHLORIDE, PRESERVATIVE FREE 10 ML: 5 INJECTION INTRAVENOUS at 21:48

## 2022-09-16 RX ADMIN — SODIUM CHLORIDE 10 ML/HR: 9 INJECTION, SOLUTION INTRAVENOUS at 02:15

## 2022-09-16 RX ADMIN — PIPERACILLIN AND TAZOBACTAM 3375 MG: 3; .375 INJECTION, POWDER, FOR SOLUTION INTRAVENOUS at 21:47

## 2022-09-16 RX ADMIN — RIVAROXABAN 15 MG: 15 TABLET, FILM COATED ORAL at 08:42

## 2022-09-16 RX ADMIN — TIMOLOL MALEATE 1 DROP: 5 SOLUTION/ DROPS OPHTHALMIC at 08:42

## 2022-09-16 RX ADMIN — Medication 250 MG: at 21:48

## 2022-09-16 RX ADMIN — ASPIRIN 81 MG CHEWABLE TABLET 81 MG: 81 TABLET CHEWABLE at 08:39

## 2022-09-16 RX ADMIN — SODIUM CHLORIDE, PRESERVATIVE FREE 10 ML: 5 INJECTION INTRAVENOUS at 08:45

## 2022-09-16 RX ADMIN — PANTOPRAZOLE SODIUM 40 MG: 40 TABLET, DELAYED RELEASE ORAL at 08:39

## 2022-09-16 RX ADMIN — PRAVASTATIN SODIUM 40 MG: 40 TABLET ORAL at 08:44

## 2022-09-16 ASSESSMENT — ENCOUNTER SYMPTOMS
VOMITING: 0
SINUS PRESSURE: 0
SHORTNESS OF BREATH: 0
RHINORRHEA: 0
ABDOMINAL PAIN: 0
SINUS PAIN: 0
CHEST TIGHTNESS: 0
NAUSEA: 0
ABDOMINAL DISTENTION: 0
COLOR CHANGE: 0
DIARRHEA: 0
EYE ITCHING: 0
EYE REDNESS: 0
EYE DISCHARGE: 0

## 2022-09-16 ASSESSMENT — PAIN SCALES - GENERAL
PAINLEVEL_OUTOF10: 0
PAINLEVEL_OUTOF10: 0

## 2022-09-16 NOTE — ED NOTES
Pt resting with eyes closed at this time. Respirations even and unlabored.      Ericka Simms RN  09/15/22 [id number]

## 2022-09-16 NOTE — CARE COORDINATION
09/16/22 0953   Readmission Assessment   Number of Days since last admission? 8-30 days   Previous Disposition Home Alone   Who is being Interviewed Patient   What was the patient's/caregiver's perception as to why they think they needed to return back to the hospital? Other (Comment)  (weak, couldn't get out of bed)   Did you visit your Primary Care Physician after you left the hospital, before you returned this time? Yes   Did you see a specialist, such as Cardiac, Pulmonary, Orthopedic Physician, etc. after you left the hospital? Yes   Who advised the patient to return to the hospital? Self-referral   Does the patient report anything that got in the way of taking their medications? No   In our efforts to provide the best possible care to you and others like you, can you think of anything that we could have done to help you after you left the hospital the first time, so that you might not have needed to return so soon?  Other (Comment)  (n/a)

## 2022-09-16 NOTE — PROCEDURES
ICU PROCEDURE - CVC PLACEMENT:    Risks and benefits to the procedure were discussed. Alternatives and their risks were discussed as well. INDICATION: poor peripheral IV access    SITE: Right Subclavian Vein      CONSENT: was obtained after explaining indication/risks to patient and/or next of kin    TIME OUT: taken    STERILE PREP: Prior to the procedure all involved washed their hands. Full maximum sterile field/barrier technique was followed (with cap and mask, gloves and sterile gown, as well as broad field sterile drapes). Local disinfection was performed with broad field application of orange dyed chloraprep solution, which was allowed to dry fully prior to the initiation of the procedure. LOCAL ANESTHETIC: Aqueous lidocaine 1%     ESTIMATED BLOOD LOSS: Minimal    ULTRASOUND GUIDANCE USED: No    PROCEDURE:  Using modified seldinger technique, the appropriate vessel was located with the introducer needle subsequent to the use of a 22g x 1.5 inch \"\" needle. The flexible J-tip wire was passed without difficulty or resistance, followed by minimal skin incisions over the introducer needle. The introducer needle was withdrawn and discarded, maintaining manual control of the guidewire at all times. After dilation of the tract, a preflushed 3 lumen CVC catheter was advanced over the guidewire without difficulty or resistance to its full extent. The guidewire was withdrawn and discarded and good return of nonpulsatile, dark venous blood assured through all lumes, with easy flushing of the lumens. The line was sutured in place then the site was reprepped with a  chlorprep solution followed by a Biopatch device. After hemostasis was assured, an op site was applied and all sharps and materials were discarded appropriately. EBL < 5 ml.     COMPLICATIONS: None    POST PROCEDURE CHEST XRAY HAS BEEN ORDERED and reviewed    Electronically signed by     ROMAN Coon CNP on 9/16/2022 at 1:41 AM

## 2022-09-16 NOTE — PROGRESS NOTES
Pharmacy Note  BioFire Result    Nevaeh Martinez is a 82 y.o. female, with a positive blood culture result    [employer] message received from Reva, laboratory employee on 9/16/2022 at 7:45 AM    First Gram stain result: gram positive cocci in clusters    BioFire BCID result: Staphylococcus aureus mecA detected (MRSA)    BioFire BCID and gram stain congruent? Yes    Suspected source? Right lower extremity     Patient chart has been reviewed for signs/symptoms of infection: Yes  /62   Pulse 80   Temp 98.8 °F (37.1 °C) (Oral)   Resp 27   Ht 5' 5\" (1.651 m)   Wt 173 lb 11.6 oz (78.8 kg)   SpO2 91%   BMI 28.91 kg/m²   Lab Results   Component Value Date    WBC 10.7 09/15/2022     Allergies reviewed  Lasix [furosemide], Lipitor [atorvastatin], Neurontin [gabapentin], Oxycontin [oxycodone hcl], and Penicillins    Renal function reviewed  Estimated Creatinine Clearance: 36 mL/min (based on SCr of 1.2 mg/dL). Current antibiotic regimen: Zosyn, Vanc 1250 mg IV X 1 9/15/22 2220    Intervention needed: Yes    Individual contacted: Harley Garcia     Recommendations: Start Vancomycin     Recommendations accepted?  Harley Garcia will follow up with resident, pharmacy will follow closely to assure vancomycin ordered this AM.      Fermin Childers [facility]  9/16/2022 7:45 AM

## 2022-09-16 NOTE — PROGRESS NOTES
Patient transferred from [facility] to [facility] [phone] at this time with this RN and family at bedside.

## 2022-09-16 NOTE — PROGRESS NOTES
CRITICAL CARE PROGRESS NOTE      Patient:  Valentina Arevalo    Unit/Bed:4D-07/007-A  YOB: 1935  MRN: [id number]   PCP: Juan M Holguin  Date of Admission: 9/15/2022  Chief Complaint:- Fatigue    Assessment and Plan:    Septic shock:  secondary to MRSA bacteremia suspect secondary to infected endovascular graft vs pseudoaneurysm, see below. Presented SIRS 1/4 for tachycardia and hypotension. LA 5.3, Procal 17.11. Given 30 cc/kg IVF. Started on Abx, cultures obtained. Persistent hypotension, started on pressors 9/16. A-line placed 9/16. Wean of pressors for MAP > 65. MRSA bacteremia:  blood cultures positive for gram positive cocci in clusters x2. Blood molecular ID positive for MRSA. On synergistic therapy vancomycin and gentamicin  1/7. Will require prolonged course of antibiotics afterwards with repeat blood cultures. Complicated UTI:  Prior history of UTIs. Presented UA positive for nitrate, LE small, WBC 10-15 and many bacteria. Urine culture pending. On zosyn 1/10, de-escalate as appropriate per cultures. Pseudoaneurysm R FA:  CTA Abd w/ aorta runoff demonstrated 1.1 x 1.1 cm pseudoaneurysm in R FA 9/15. Likely secondary to recent extensive intravascular procedure. Will check arterial duplex bilateral to evaluate. Concern for infected pseudoaneurysm, see above. Cardiology following   Right ankle trifurcation vessel disease:  CTA Abd w/ aorta runoff demonstrated trifurcation vessel occlusion at R ankle 9/15. Recent extensive intravascular procedure involving stents and balloon throughout right lower extremity vessels. Dorsal and tibialis dorsal pulses present 9/16. Continue to monitor. Cardiology following. ALEJANDRO on CKD III, resolving:  baseline Cr ~1.1, eGFR 40-50. Presented Cr 1.5. BUN: Cr > 20, Sp gravity > 0.030. Currently oliguric. Meredith placed. Suspect pre-renal etiology. Possible renal healing from contrast induced nephropathy given recent angiogram. S/p IVF.  Check urine electrolytes, urine creatinine/urea, urine eosinophils and DIOGO. Avoid nephrotoxins. Renally dose medications. Continue gentle IVF given cardiac function. Monitor renal function with daily BMP. Chronic HFrEF, NYHA II:  ICM s/p dual ICD. Last echo EF 25-30% 6/20. Home GDMT ARNi, BB and diuretic. Presented BNP 6077. CXR not indicated of overload. Currently holding nephrotoxins. 2 g sodium restriction, defer fluid restriction. Monitor daily weights, strict I&Os. pAfib:   [id number]. Initial EKG SR with arrhthymias and occasional PVCs. On BB for rate control and xarelto for anticoagulation. Continue metoprolol 25 mg p.o daily and Xarelto 15 mg p.o daily. Placed on continuous telemetry. HTN:  continue home metoprolol. Hold ARNi and bumex for ALEJANDRO. Monitor blood pressure. HLD:  continue home pravastatin 40 mg p.o daily   AAA:   s/p endovascular repair in 2019    History of blood clots:  per chart review. On Anticoagulation  GERD:  started on pantoprazole 40 mg p.o twice daily   History of colovesical fistula:  s/p right hemicolectomy x2. No colostomy. Tobacco Abuse:  endorses prolonged history of intermittent smoking. Reports trying to quit, with last smoke a few months ago. INITIAL H AND P AND ICU COURSE:  The patient is a 82year old female with a past medical history of HFrEF s/p dual ICD, pAfib, HTN, HLD, AAA s/p repair, PVD, blood clots, CKD, colovesical fisutla s/p right hemicolectomy and recent CLI s/p angiogram with stent and balloon to RLE here for fatigue and weakness. The patient reports inability to get out of bed yesterday, with no improvement throughout the day. EMS was called, and she was found to be hypotensive with SBP in 80s. The patient denies any prior fevers, chills, N/V, changes in diet or sick contacts. She did not \"bomb exploding\" RLE leg pain, radiating downwards. This was worse with movement, alleviated by nothing.      In the ED, vitals T max 97.8, RR 18, HR 97, BP 73/43 and SpO2 93%. She became hypoxic and was escalated to 2L NC. Labs significant for BUN 33, Cr 1.5. Troponin WNL, BNP 6077. EKG non significant. TSH and Cortisol WNL. Procal 17.11, LA 5.3. UA positive for nitrite, small LE, WBC 10-15 and many bacteria. CXR negative for acute findings. 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 He was given 2L NS bolus, albumin x1. Cultures were drawn and he was started on vancomycin and zosyn for empiric antibiotics coverage. Cardiology was consulted, the patient was admitted to the ICU for further care and evaluation. Of note, She previously had a CTA abdominal aorta with runoff that was significant for 3.6 cm fusiform aneurysm of the infrarenal abdominal aorta; bifurcated endograft present; right iliac limb totally occluded and left iliac limb remains patent; stents in right common and external iliac arteries are totally occluded; collateral reconstitution of the right CFA and right profunda; total occlusion of native right SFA and right femoral/anterior tibial bypass graft; Collateral reconstitution of the trifurcation vessels right side; multifocal severe diffuse disease of left SFA; left popliteal artery demonstrated mild stenosis; all 3 trifurcation vessels left calf lateral but demonstrate multifocal disease; subcutaneous emphysema bilaterally that is worse on left 8/4/22. The patient underwent a bilateral lower extremity angiogram 9/6/2022.  She was found to have R EVAR limb, R CFA, R SFA occlusions; dimunitive PFA; popliteal artery occlusion with collaterals, all three tibials occluded; and all three tibials are occluded with norepinephrine 5 mcg/min (09/16/22 0136)    sodium chloride 10 mL/hr (09/16/22 0215)    vasopressin (Septic Shock) infusion 0.04 Units/min (09/16/22 4900)       PHYSICAL EXAMINATION:  T:  98.8.  P:  112. RR:  12. B/P:  95/50. O2 Sat:  94% on 4L NC. I/O:  not calculated  Body mass index is 28.91 kg/m². GCS:   15    General:   Acutely ill appearing, lethargic, pale, warm and dry  HEENT:  normocephalic and atraumatic. No scleral icterus. PERR  Neck: supple. No Thyromegaly. Lungs: clear to auscultation, coarse right lower base. No retractions  Cardiac: RRR. No JVD. Abdomen: soft. Nontender. Extremities:  Right lower leg mild edema reddened and warm midcalf to foot. Positive for movement and sensation. Left lower leg warm and dry. Right groin puncture site intact. Left groin positive for ecchymosis and tender to touch. Pulses are per doppler. Vasculature: capillary refill < 3 seconds. Palpable dorsalis pedis pulses. Skin:  warm and dry. Psych:  Alert and oriented x3. Affect appropriate  Lymph:  No supraclavicular adenopathy. Neurologic:  No focal deficit. No seizures. Data: (All radiographs, tracings, PFTs, and imaging are personally viewed and interpreted unless otherwise noted).    9/16/22 Sodium 139, potassium 3.7, chloride 108, CO2 21, BUN 27, creatinine 1.3, glucose 125, AG 10  9/16/22 hemoglobin 9.8, HCT 30   9/16/22 Lactic acid 2.1   9/16/22 Cortisol 51.65  9/16/22 TSH 1.010  9/15/22 Blood culture; gram positive cocci clusters x2  9/15/22 Procal 17.11  9/15/22 Troponin <0.010, BNP 6077  9/15/22 UA; nitrite positive, LE small, WBC 10-15, Sp gravity >1.030  9/15/22 CTA abd aorta w/ adi runoff demonstrated 1.1 x 1.1 cm R CFA pseudoaneurysm; occluded R femoral anterior tibial artery bypass; patent R SFA; Mild to moderate multifocal dx on R; trifurcation vessel occlusion at R ankle; moderate multifocal L CFA; 80% focal stenosis of L superficial femoral artery; mild multifocal dx of L popliteal

## 2022-09-16 NOTE — PROGRESS NOTES
09/16/22 0338   RT Protocol   History Pulmonary Disease 1   Respiratory pattern 2  (no PARK but RR 26/min)   Breath sounds 0   Cough 0   Indications for Bronchodilator Therapy On home bronchodilators  (home inhaler Q6H PRN)   Bronchodilator Assessment Score 3

## 2022-09-16 NOTE — ED PROVIDER NOTES
[facility] ENCOUNTER          Pt Name: Irma Valdez  MRN: [id number]  [place] 1935  Date of evaluation: 9/15/2022  Treating Resident Physician: Kirsten Gibson DO  Supervising Physician: Dr. Lorraine Singh    History obtained from the patient. CHIEF COMPLAINT       Chief Complaint   Patient presents with    Fatigue           HISTORY OF PRESENT ILLNESS    HPI  Irma Valdez is a 82 y.o. female who presents to the emergency department for evaluation of fatigue. She states that she called 911 because she thought that her blood pressure was low. She reports that she could not get out of bed today due to feeling weak. On EMS arrival they noted a blood pressure in the 08V systolic and on arrival to the emergency department the patient has a blood pressure of 73/45. She denies fevers. She was recently admitted to the hospital and 10 days ago underwent a revascularization procedure of her right lower extremity with Dr. Sarah Silveira. The patient has no other acute complaints at this time. REVIEW OF SYSTEMS   Review of Systems   Constitutional:  Positive for fatigue. Negative for fever. HENT:  Negative for rhinorrhea, sinus pressure and sinus pain. Eyes:  Negative for discharge, redness and itching. Respiratory:  Negative for chest tightness and shortness of breath. Cardiovascular:  Negative for chest pain. Gastrointestinal:  Negative for abdominal distention, abdominal pain, diarrhea, nausea and vomiting. Genitourinary:  Negative for difficulty urinating, dysuria, frequency, hematuria and urgency. Musculoskeletal:  Negative for arthralgias. Skin:  Negative for color change and pallor. Neurological:  Negative for dizziness, light-headedness and headaches.        PAST MEDICAL AND SURGICAL HISTORY     Past Medical History:   Diagnosis Date    A-fib [facility])     AAA (abdominal aortic aneurysm) ([facility] [address]     Achalasia     Anemia     Arthritis     Blood circulation, collateral     BPPV (benign paroxysmal positional vertigo) 6/6/16    CHF (congestive heart failure) (HCC)     Chronic kidney disease     sees Dr Hyatt [facility])     Gastrointestinal hemorrhage     GERD (gastroesophageal reflux disease)     ? esophageal stricture    Hiatal hernia     History of blood transfusion     HTN (hypertension)     Hx of blood clots 2018    R leg-sees ABBEA Hargrove    Hyperlipidemia     [employer] dual ICD  8/26/2020    Neuromuscular disorder ([facility] [address]     Obesity     Osteopenia     Osteoporosis     PAC (premature atrial contraction)     on betablocker    Paralysis (HCC)     baby    Pedal edema     denied any hx of hypertension    Pneumonia     PVC (premature ventricular contraction)     sees Dr Isidro Quintero    PVD (peripheral vascular disease) ([facility] [address]     Small bowel obstruction (HCC)     Tinnitus     UTI (urinary tract infection)      Past Surgical History:   Procedure Laterality Date    ABDOMINAL AORTIC ANEURYSM REPAIR, ENDOVASCULAR N/A 2/15/2019    ABDOMINAL AORTIC ANEURYSM REPAIR ENDOVASCULAR, DECLOTTING RIGHT FEM TIB BYPASS GRAFT performed by Coby Anna MD at [facility]    lumpectomy    CHOLECYSTECTOMY      30 years ago    COLONOSCOPY  08/06/2018    Dr Uriel Dudley N/A 2/22/2019    COLONOSCOPY DIAGNOSTIC performed by Sanjuanita Croft MD at [facility]    COLONOSCOPY N/A 2/22/2020    COLONOSCOPY CONTROL HEMORRHAGE performed by Peggy Lucas MD at [facility]    COLONOSCOPY Left 3/2/2021    COLORECTAL CANCER SCREENING, NOT HIGH RISK performed by Bereket Hdez MD at [facility]      eye, eyelids    EYE SURGERY      cataract    HEMICOLECTOMY N/A 2/25/2020    OPEN RIGHT COLON RESECTION performed by Gretchen Almeida MD at . [facility] [address] ([address])      LARYNGOSCOPY  07/15/2016    OTHER SURGICAL HISTORY  10/06/2020    Exp lap washout mesenteric lymph node biopsy EGD abthera placement Dr Francisco Rosa  10/08/2020    reopening recent lap open g tube placement intra operative EGD and primary closure of open abdomen- [address] T/A/L AREA/<100SCM /<1ST 25 SCM N/A 9/6/2018    RE-EXPLORATION RIGHT GROIN, DECLOTTING OF FEMORAL BYPASS GRAFT performed by To Melendez MD at [address] [employer] EGD TRANSORAL BIOPSY SINGLE/MULTIPLE Left 11/27/2017    EGD BIOPSY performed by Siddhartha Choi MD at [facility], [facility] N/A 8/20/2018    ROBOT ASSISTED COLECTOMY (LOW ANTERIOR) performed by Radha Vazquez MD at [address] [employer] OFFICE/OUTPT [address] [facility] N/A 9/5/2018    RIGHT FEMORAL EMBOLECTOMY, INTRAOPERATIVE ARTERIOGRAM, RIGHT ILIAC EMBOLECTOMY, RIGHT COMMON AND EXTERNAL ILIAC STENTING, RIGHT FEMORAL TO ANTERIOR POPLITEAL BYPASS WITH 6MM GORTEX GRAFT performed by To Melendez MD at [address] [facility] / [address] [facility] Right 2/14/2019    FEMORAL EMBOLECTOMY THROMBECTOMY, RIGHT LEG performed by To Melendez MD at [address] 11/27/2017    EGD SUBMUCOSAL/BOTOX INJECTION performed by Siddhartha Choi MD at [facility] N/A 2/22/2019    EGD BIOPSY performed by Miguel Cruz MD at [facility]         MEDICATIONS     Current Facility-Administered Medications:     iopamidol (ISOVUE-370) 76 % injection 80 mL, 80 mL, IntraVENous, ONCE PRN, Karla Hurley MD      SOCIAL HISTORY     Social History     Social History Narrative    Not on file     Social History     Tobacco Use    Smoking status: Some Days     Packs/day: 0.25     Years: 60.00     Pack years: 15.00     Types: Cigarettes     Start date: 1956    Smokeless tobacco: Never    Tobacco comments:     4-5 a day   Vaping Use    Vaping Use: Never used   Substance Use Topics    Alcohol use:  Yes     Alcohol/week: 1.0 standard drink Types: 1 Glasses of wine per week     Comment: social    Drug use: No         ALLERGIES     Allergies   Allergen Reactions    Lasix [Furosemide] Other (See Comments)     PATIENT DOESN'T REMEMBER    Lipitor [Atorvastatin] Other (See Comments)     LEG PAIN    Neurontin [Gabapentin] Other (See Comments)     PATIENT DOESN'T REMEMBER    Oxycontin [Oxycodone Hcl]      confusion    Penicillins Other (See Comments)     HYPERTENSION         FAMILY HISTORY     Family History   Problem Relation Age of Onset    Heart Disease Mother     Stroke Mother     Cancer Father         lung    Emphysema Father     Other Sister         leukemia    Heart Disease Maternal Grandmother     Dementia Maternal Grandmother     Heart Disease Maternal Grandfather     No Known Problems Paternal Grandmother     No Known Problems Paternal Grandfather          PREVIOUS RECORDS   Previous records reviewed        PHYSICAL EXAM     ED Triage Vitals   BP Temp Temp Source Heart Rate Resp SpO2 Height Weight   09/15/22 1842 09/15/22 1844 09/15/22 1844 09/15/22 1844 09/15/22 1844 09/15/22 1844 09/15/22 1945 09/15/22 1945   (!) 73/45 97.7 °F (36.5 °C) Oral 97 18 93 % 5' 5\" (1.651 m) 168 lb (76.2 kg)     Initial vital signs and nursing assessment reviewed and abnormal from hypotension . Body mass index is 28.91 kg/m². Pulsoximetry is normal per my interpretation. Additional Vital Signs:  Vitals:    09/16/22 0040   BP: (!) 95/50   Pulse: (!) 112   Resp: 16   Temp: 98.8 °F (37.1 °C)   SpO2: 94%       Physical Exam  Vitals and nursing note reviewed. Constitutional:       General: She is not in acute distress. Appearance: She is ill-appearing. HENT:      Head: Normocephalic and atraumatic. Nose: Nose normal. No congestion or rhinorrhea. Mouth/Throat:      Mouth: Mucous membranes are moist.      Pharynx: Oropharynx is clear. No oropharyngeal exudate or posterior oropharyngeal erythema.    Eyes:      Extraocular Movements: Extraocular movements intact. Pupils: Pupils are equal, round, and reactive to light. Cardiovascular:      Rate and Rhythm: Normal rate and regular rhythm. Pulses: Normal pulses. Heart sounds: Normal heart sounds. Pulmonary:      Effort: Pulmonary effort is normal.      Breath sounds: Normal breath sounds. Abdominal:      General: Abdomen is flat. There is no distension. Palpations: Abdomen is soft. Tenderness: There is no abdominal tenderness. Musculoskeletal:         General: Swelling and tenderness present. Normal range of motion. Cervical back: Normal range of motion. Right lower leg: Edema present. Left lower leg: No edema. Comments: Swelling and faint erythema of the right lower extremity, likely secondary to reperfusion edema. Doppler pulses present in the distal bilateral lower extremities. Skin:     General: Skin is warm and dry. Neurological:      General: No focal deficit present. Mental Status: She is alert and oriented to person, place, and time. Mental status is at baseline. MEDICAL DECISION MAKING   Initial Assessment:   62-year-old female presenting to the emergency department for evaluation of fatigue. Hypotension concerning for shock. Point-of-care ultrasound fast views negative for intra-abdominal free fluid. IVC appears collapsible on point-of-care ultrasound while in Trendelenburg. Plan:   IV, labs. 30 cc/kg IV fluid bolus. CTA abdominal aorta with bilateral runoff. Empiric Zosyn and vancomycin started after the patient returned from CT.     ED RESULTS   Laboratory results:  Labs Reviewed   CBC WITH AUTO DIFFERENTIAL - Abnormal; Notable for the following components:       Result Value    RBC 3.97 (*)     RDW-CV 15.4 (*)     RDW-SD 52.7 (*)     Platelets 238 (*)     Segs Absolute 10.1 (*)     Lymphocytes Absolute 0.1 (*)     Immature Grans (Abs) 0.09 (*)     All other components within normal limits   COMPREHENSIVE METABOLIC PANEL W/ REFLEX TO MG FOR LOW K - Abnormal; Notable for the following components:    Glucose 196 (*)     Creatinine 1.5 (*)     BUN 33 (*)     Total Protein 5.9 (*)     Albumin 3.1 (*)     All other components within normal limits   BRAIN NATRIURETIC PEPTIDE - Abnormal; Notable for the following components:    Pro-BNP 6077.0 (*)     All other components within normal limits   LACTATE, SEPSIS - Abnormal; Notable for the following components:    Lactic Acid, Sepsis 4.3 (*)     All other components within normal limits   LACTATE, SEPSIS - Abnormal; Notable for the following components:    Lactic Acid, Sepsis 5.3 (*)     All other components within normal limits   PROCALCITONIN - Abnormal; Notable for the following components:    Procalcitonin 17.11 (*)     All other components within normal limits   GLOMERULAR FILTRATION RATE, ESTIMATED - Abnormal; Notable for the following components:    Est, Glom Filt Rate 33 (*)     All other components within normal limits   URINE WITH REFLEXED MICRO - Abnormal; Notable for the following components:    Specific Gravity, Urine > 1.030 (*)     Blood, Urine TRACE (*)     Protein, UA 30 (*)     Nitrite, Urine POSITIVE (*)     Leukocyte Esterase, Urine SMALL (*)     All other components within normal limits   CULTURE, BLOOD 1   CULTURE, BLOOD 2   CULTURE, REFLEXED, URINE    Narrative:     Source: urine, clean catch       Site: clean void          Current Antibiotics: not stated   VRE SCREEN BY PCR   TROPONIN   ANION GAP   OSMOLALITY   BLOOD OCCULT STOOL SCREEN #1   SCAN OF BLOOD SMEAR   MRSA BY PCR   URINE DRUG SCREEN   TYPE AND SCREEN       Radiologic studies results:  XR CHEST PORTABLE   Final Result   Impression:   No acute findings. Right paratracheal and retrocardiac opacity which was also present on the    prior studies and previously reported to be a dilated esophagus. The    appearance is consistent with a dilated food filled esophagus.       This document has been electronically signed by: Kadi Dias MD    on 09/15/2022 11:02 PM      CTA ABDOMINAL AORTA W BILAT RUNOFF W WO CONTRAST   Final Result   Impression:   Pseudoaneurysm right femoral artery. This is new. Aortic endograft present. No definite leak. 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 This document has been electronically signed by: José Luis Maciel MD on    09/15/2022 08:30 PM      All CTs at this facility use dose modulation techniques and iterative    reconstructions, and/or weight-based dosing   when appropriate to reduce radiation to a low as reasonably achievable. 3D Post-processing was performed on this study. VL DUP LOWER EXTREMITY ARTERIES RIGHT    (Results Pending)       ED Medications administered this visit:   Medications   iopamidol (ISOVUE-370) 76 % injection 80 mL (has no administration in time range)   0.9 % sodium chloride bolus (0 mLs IntraVENous Stopped 9/15/22 1956)   0.9 % sodium chloride bolus (0 mLs IntraVENous Stopped 9/15/22 1956)   piperacillin-tazobactam (ZOSYN) 4,500 mg in dextrose 5 % 100 mL IVPB (mini-bag) (0 mg IntraVENous Stopped 9/15/22 2206)   vancomycin (VANCOCIN) 1250 mg in dextrose 5 % 250 mL IVPB (0 mg/kg × 76.2 kg IntraVENous Stopped 9/15/22 2350)   0.9 % sodium chloride bolus (0 mLs IntraVENous Stopped 9/15/22 2154)         ED COURSE     ED Course as of 09/16/22 0126   Thu Sep 15, 2022   2147 Case discussed with Dr. Mary Edmondson who reviewed the patient's CT. He recommended starting an arterial line and admitting the patient to the ICU. He also recommended a right groin ultrasound for the morning to further evaluate the CT finding of a right pseudoaneurysm. [DO]   2149 Case discussed with TINY Austin who accepted the patient for admission to the ICU. [DO]      ED Course User Index  [DO] Chepe Fernandez DO     Work-up in the emergency department reviewed and remarkable for:     An elevated creatinine of 1.5    An elevated procalcitonin of 17.11 concerning for sepsis. An elevated lactic acid of 4.3 which up trended to 5.3 towards the end of the emergency department course concerning for septic shock. A CTA abdominal aorta with bilateral runoff with the following impression:    Pseudoaneurysm right femoral artery. This is new. Aortic endograft present. No definite leak. 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 Case discussed with Dr. Robinson Slater as above. Given the patient's procalcitonin the patient likely has septic shock and after discussion with Dr. Robinson Slater this could be secondary to an infected graft. We will plan to admit the patient to the ICU. Right lower extremity duplex ultrasound ordered to further evaluate pseudoaneurysm and three-vessel occlusion at the right ankle. MEDICATION CHANGES     Current Discharge Medication List            FINAL DISPOSITION     Final diagnoses:   Septic shock ([address]     Condition: condition: critical  Dispo: Admit to CCU/ICU      This transcription was electronically signed. Parts of this transcriptions may have been dictated by use of voice recognition software and electronically transcribed, and parts may have been transcribed with the assistance of an ED scribe. The transcription may contain errors not detected in proofreading. Please refer to my supervising physician's documentation if my documentation differs.     Electronically Signed: Stefani Rebolledo DO, 09/16/22, 1:26 AM          Stefani Rebolledo DO  Resident  09/16/22 [id number]

## 2022-09-16 NOTE — CARE COORDINATION
9/16/22, 9:11 AM EDT  DISCHARGE PLANNING EVALUATION:    Alejandrina Marrero       Admitted: 9/15/2022/ 21 [facility] day: 1   Location: -07/007-A Reason for admit: Lactic acidosis [E87.2]  Septic shock (HCC) [A41.9, R65.21]   PMH:  has a past medical history of A-fib ([address] AAA (abdominal aortic aneurysm) ([address] Achalasia, Anemia, Arthritis, Blood circulation, collateral, BPPV (benign paroxysmal positional vertigo), CHF (congestive heart failure) ([address] Chronic kidney disease, Colon cancer ([address] Gastrointestinal hemorrhage, GERD (gastroesophageal reflux disease), Hiatal hernia, History of blood transfusion, HTN (hypertension), Hx of blood clots, Hyperlipidemia, Medtronic dual ICD , Neuromuscular disorder ([address] Obesity, Osteopenia, Osteoporosis, PAC (premature atrial contraction), Paralysis ([address] Pedal edema, Pneumonia, PVC (premature ventricular contraction), PVD (peripheral vascular disease) ([address] Small bowel obstruction ([address] Tinnitus, and UTI (urinary tract infection). Procedure:   9/15 CTA abdominal aorta: Impression: Pseudoaneurysm right femoral artery. This is new. Aortic endograft present. No definite leak. 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 9/15 CXR: No acute findings. Right paratracheal and retrocardiac opacity which was also present on the prior studies and previously reported to be a dilated esophagus. The appearance is consistent with a dilated food filled esophagus. 9/15 CVC placed  Barriers to Discharge:  Presented to ED with weakness and fatigue. Was unable to get out of bed. BP 73/45 upon arrival. She was recently admitted to the hospital and 10 days ago underwent a revascularization procedure of her right lower extremity with Dr. Raquel Brito. Lactic 5.3 upon arrival, currently 1.0. + UTI. Levo @ 5mcg. Vaso off. IV zosyn. IV vanc. Meredith. Daily wts. I/O. Telemetry. Consult to cardio for right groin pseudo and GI for possible dilated food filled esophagus.   PCP: Althea Slater Risk Score: 18.9%  Patient's Healthcare Decision Maker: Named in [address] [place] [address]    Patient Goals/Plan/Treatment Preferences: Spoke with Stevie Fongsreekanth, she is from home alone and uses a walker. Follow for home needs. Will need PT/OT once bedrest is lifted. Transportation/Food Security/Housekeeping Addressed:  No issues identified.

## 2022-09-16 NOTE — CONSULTS
Pt Name: Michel Soto  MRN: [id number]  [id number]  YOB: 1935  Admit Date: 9/15/2022  6:33 PM  Date of evaluation: 9/16/2022  Primary Care Physician: Jayjay Ram   4D-07/007-A     Requesting Provider:  ROMAN Sanabria - CNP    MARGE Consult    Indication:  food-filled esophagus    History:  The patient is a 82 y.o. female on ASA, Plavix, Xarelto admitted 9/15/22 for weakness with lactic acidosis, hypotension, MRSA bacteremia due to septic shock likely from recurrent UTI. She has PVD with stents placed on 9/6/22. CXR showed food in esophagus. Pt has h/o Achalasia. She vomits food every few days when her \"pocket fills\". Per pt, Dr Soheila Chance wants her to go to [facility] for treatment on her esophagus, but she doesn't want to go. She had no meat impaction. She has h/o -   colovesicular fistula s/p surgery 2018 w/o ostomy  Cecal cancer s/p right hemicolectomy 2/2020  Hiatal hernia repair with PEG at [facility] 10/2020  Exlap 10/6/20  AICD  GIB 2/2021  H/o GIB from colonic anastomosis    Location:  chronic dysphagia  Duration:  chronic  Radiation:  none  Associated:  vomiting  Mitigating factors:  noneo  Exacerbating factors:  Achalasia, hiatal hernia, diverticulum    Last EGD:  10/08/20  Last Colonoscopy:  03/02/21     Past Medical History:        Diagnosis Date    A-fib [facility])     AAA (abdominal aortic aneurysm) (HCC)     Achalasia     Anemia     Arthritis     Blood circulation, collateral     BPPV (benign paroxysmal positional vertigo) 6/6/16    CHF (congestive heart failure) (HCC)     Chronic kidney disease     sees Dr Debbie Mcneill    Colon cancer [facility])     Gastrointestinal hemorrhage     GERD (gastroesophageal reflux disease)     ? esophageal stricture    Hiatal hernia     History of blood transfusion     HTN (hypertension)     Hx of blood clots 2018    R leg-sees ABEBA Hargrove    Hyperlipidemia     Medtronic dual ICD  8/26/2020    Neuromuscular disorder ([address]     Obesity     Osteopenia Osteoporosis     PAC (premature atrial contraction)     on betablocker    Paralysis (HCC)     baby    Pedal edema     denied any hx of hypertension    Pneumonia     PVC (premature ventricular contraction)     sees Dr Beronica Lopez    PVD (peripheral vascular disease) [address]     Small bowel obstruction (HCC)     Tinnitus     UTI (urinary tract infection)      Past Surgical History:        Procedure Laterality Date    ABDOMINAL AORTIC ANEURYSM REPAIR, ENDOVASCULAR N/A 2/15/2019    ABDOMINAL AORTIC ANEURYSM REPAIR ENDOVASCULAR, DECLOTTING RIGHT FEM TIB BYPASS GRAFT performed by George Sparrow MD at [address] [place] [address]    lumpectomy    CHOLECYSTECTOMY      30 years ago    COLONOSCOPY  08/06/2018    Dr Jayme Rodrigues N/A 2/22/2019    COLONOSCOPY DIAGNOSTIC performed by Cecilia Dorantes MD at [facility]    COLONOSCOPY N/A 2/22/2020    COLONOSCOPY CONTROL HEMORRHAGE performed by Cassidy Molina MD at [facility]    COLONOSCOPY Left 3/2/2021    COLORECTAL CANCER SCREENING, NOT HIGH RISK performed by Tolu Amador MD at [address] [place] [address]      eye, eyelids    EYE SURGERY      cataract    HEMICOLECTOMY N/A 2/25/2020    OPEN RIGHT COLON RESECTION performed by Isak Lorenz MD at [place] [address] ([facility])      LARYNGOSCOPY  07/15/2016    OTHER SURGICAL HISTORY  10/06/2020    Exp lap washout mesenteric lymph node biopsy EGD abthera placement Dr Maximino Larson  10/08/2020    reopening recent lap open g tube placement intra operative EGD and primary closure of open abdomen- [facility] SUB GRFT T/A/L AREA/<100SCM /<1ST 25 SCM N/A 9/6/2018    RE-EXPLORATION RIGHT GROIN, DECLOTTING OF FEMORAL BYPASS GRAFT performed by Shanell Lema MD at [facility] EGD TRANSORAL BIOPSY SINGLE/MULTIPLE Left 11/27/2017    EGD BIOPSY performed by Chema Claudio MD at [address] [place] [address] LAP,SURG,COLECTOMY, PARTIAL, W/ANAST N/A 8/20/2018    ROBOT ASSISTED COLECTOMY (LOW ANTERIOR) performed by Cong Starks MD at [address] [facility] OFFICE/OUTPT [facility] N/A 9/5/2018    RIGHT FEMORAL EMBOLECTOMY, INTRAOPERATIVE ARTERIOGRAM, RIGHT ILIAC EMBOLECTOMY, RIGHT COMMON AND EXTERNAL ILIAC STENTING, RIGHT FEMORAL TO ANTERIOR POPLITEAL BYPASS WITH 6MM GORTEX GRAFT performed by Dwayne Damon MD at [address] / [address] Right 2/14/2019    FEMORAL EMBOLECTOMY THROMBECTOMY, RIGHT LEG performed by Dwayne Damon MD at [address] 11/27/2017    EGD SUBMUCOSAL/BOTOX INJECTION performed by Kasandra Mcqueen MD at [address] [facility] N/A 2/22/2019    EGD BIOPSY performed by Israel Eden MD at [facility]     Allergies:  Lasix [furosemide], Lipitor [atorvastatin], Neurontin [gabapentin], Oxycontin [oxycodone hcl], and Penicillins  Home Meds:  Prior to Admission medications    Medication Sig Start Date End Date Taking?  Authorizing Provider   rivaroxaban (XARELTO) 20 MG TABS tablet Take 1 tablet by mouth daily 9/11/22   Alena Richardson MD   aspirin 81 MG chewable tablet Take 1 tablet by mouth daily 9/12/22   Alena Richardson MD   hydrocortisone (ANUSOL-HC) 25 MG suppository Place 25 mg rectally nightly    Historical Provider, MD   sacubitril-valsartan (ENTRESTO) 24-26 MG per tablet Take 1 tablet by mouth 2 times daily 7/11/22   ROMAN Bright CNP   bumetanide (BUMEX) 1 MG tablet Take 1 tablet by mouth once a week On Mondays and PRN 7/11/22   ROMAN Bright CNP   potassium chloride (KLOR-CON M) 10 MEQ extended release tablet Take 1 tablet by mouth once a week 7/11/22   ROMAN Bright CNP   apixaban (ELIQUIS) 2.5 MG TABS tablet TAKE 1 TABLET BY MOUTH TWICE DAILY 6/28/22 9/11/22  ROMAN Thurman CNP   magnesium (MAGNESIUM-OXIDE) 250 MG TABS tablet TAKE 2 TABLETS BY MOUTH TWICE DAILY 5/16/22   ROMAN Villalta CNP   clopidogrel (PLAVIX) 75 MG tablet TAKE 1 TABLET BY MOUTH DAILY 3/29/22   Camille Ospina MD   pravastatin (PRAVACHOL) 40 MG tablet TAKE 1 TABLET BY MOUTH DAILY 3/29/22   Camille Ospina MD   metoprolol succinate (TOPROL XL) 25 MG extended release tablet TAKE 1 TABLET BY MOUTH DAILY 10/15/21   Geovanna Prakash MD   pantoprazole (PROTONIX) 40 MG tablet Take 40 mg by mouth 2 times daily (before meals)    Historical Provider, MD   albuterol sulfate HFA (PROVENTIL HFA) 108 (90 Base) MCG/ACT inhaler Inhale 2 puffs into the lungs every 6 hours as needed for Wheezing or Shortness of Breath 12/31/19   [facility], DO   Blood Pressure KIT Check blood pressure daily, and if symptoms of lightheadedness.   Call physician if BP <80/40. 12/31/19   [facility], DO   melatonin 3 MG TABS tablet Take 2 tablets by mouth nightly as needed (sleep) 2/26/19   Rabia Hamlin PA-C   acetaminophen (TYLENOL) 650 MG extended release tablet Take 1,300 mg by mouth every 12 hours as needed for Pain     Historical Provider, MD   timolol (TIMOPTIC) 0.5 % ophthalmic solution Place 1 drop into both eyes daily    Historical Provider, MD      Current Meds:     Current Facility-Administered Medications   Medication Dose Route Frequency Provider Last Rate Last Admin    norepinephrine (LEVOPHED) 16 mg in sodium chloride 0.9 % 250 mL infusion  1-100 mcg/min IntraVENous Continuous ROMAN rCuz CNP 5.6 mL/hr at 09/16/22 1248 6 mcg/min at 09/16/22 1248    aspirin chewable tablet 81 mg  81 mg Oral Daily ROMAN Crzu CNP   81 mg at 09/16/22 0839    clopidogrel (PLAVIX) tablet 75 mg  75 mg Oral Daily ROMAN Cruz CNP   75 mg at 09/16/22 0839    hydrocortisone (ANUSOL-HC) suppository 25 mg  25 mg Rectal Nightly ROMAN Cruz CNP        magnesium oxide (MAG-OX) tablet 250 mg  250 mg Oral BID Christin Deana Pallas ROMAN Magaña CNP        melatonin tablet 6 mg  6 mg Oral Nightly PRN ROMAN Pendleton - CNP        metoprolol succinate (TOPROL XL) extended release tablet 25 mg  25 mg Oral Daily ROMAN Pendleton - DEL        pantoprazole (PROTONIX) tablet 40 mg  40 mg Oral BID AC Enedina Dorantes APRN - CNP   40 mg at 09/16/22 0839    pravastatin (PRAVACHOL) tablet 40 mg  40 mg Oral Daily ROMAN Pendleton - CNP   40 mg at 09/16/22 0844    [Held by provider] sacubitril-valsartan (ENTRESTO) 24-26 MG per tablet 1 tablet  1 tablet Oral BID ROMAN Pendleton CNP        timolol (TIMOPTIC) 0.5 % ophthalmic solution 1 drop  1 drop Both Eyes Daily ROMAN Pendleton - CNP   1 drop at 09/16/22 0842    sodium chloride flush 0.9 % injection 5-40 mL  5-40 mL IntraVENous 2 times per day ROMAN Pendleton - CNP   10 mL at 09/16/22 0845    sodium chloride flush 0.9 % injection 5-40 mL  5-40 mL IntraVENous PRN ROMAN Pendleton - CNP        0.9 % sodium chloride infusion   IntraVENous PRN ROMAN Pendleton - CNP 10 mL/hr at 09/16/22 1248 Rate Verify at 09/16/22 1248    ondansetron (ZOFRAN-ODT) disintegrating tablet 4 mg  4 mg Oral Q8H PRN ROMAN Pendleton CNP        Or    ondansetron (ZOFRAN) injection 4 mg  4 mg IntraVENous Q6H PRN ROMAN Pendleton - DEL        polyethylene glycol (GLYCOLAX) packet 17 g  17 g Oral Daily PRN ROMAN Pendleton - DEL        acetaminophen (TYLENOL) tablet 650 mg  650 mg Oral Q6H PRN ROMAN Pendleton - CNP        Or    acetaminophen (TYLENOL) suppository 650 mg  650 mg Rectal Q6H PRN ROMAN Pendleton - CNP        albuterol sulfate HFA (PROVENTIL;VENTOLIN;PROAIR) 108 (90 Base) MCG/ACT inhaler 2 puff  2 puff Inhalation Q4H PRN Enedina Dorantes, APRN - CNP piperacillin-tazobactam (ZOSYN) 3,375 mg in dextrose 5 % 50 mL IVPB (mini-bag)  3,375 mg IntraVENous Q8H Enedina Cash APRN - CNP 12.5 mL/hr at 09/16/22 1252 3,375 mg at 09/16/22 1252    rivaroxaban (XARELTO) tablet 15 mg  15 mg Oral Daily with breakfast Enedina Cash, APRN - CNP   15 mg at 09/16/22 0842    insulin lispro (HUMALOG) injection vial 0-4 Units  0-4 Units SubCUTAneous Q6H Enedina Cash, APRN - CNP        glucose chewable tablet 16 g  4 tablet Oral PRN Enedina Aminin, APRN - CNP        dextrose bolus 10% 125 mL  125 mL IntraVENous PRN Enedina Aminin, APRN - CNP        Or    dextrose bolus 10% 250 mL  250 mL IntraVENous PRN Enedina Aminin, APRN - CNP        glucagon (rDNA) injection 1 mg  1 mg SubCUTAneous PRN Enedina Aminin, APRN - CNP        dextrose 10 % infusion   IntraVENous Continuous PRN Enedina Aminin, APRN - CNP        vancomycin (VANCOCIN) intermittent dosing (placeholder)   Other RX Placeholder Hue Erm, APRN - CNP        vancomycin (VANCOCIN) 1,000 mg in sodium chloride 0.9 % 250 mL IVPB ([id number])  1,000 mg IntraVENous Q24H NERIS [facility]   Stopped at 09/16/22 1239    gentamicin (GARAMYCIN) IVPB 80 mg  80 mg IntraVENous Q24H Yakov Aleman DO   Stopped at 09/16/22 1205    iopamidol (ISOVUE-370) 76 % injection 80 mL  80 mL IntraVENous ONCE PRN Vivienne Burnett MD         Social History:   Social History     Socioeconomic History    Marital status:       Spouse name: Lissette Skinner    Number of children: 4    Years of education: Not on file    Highest education level: Not on file   Occupational History    Not on file   Tobacco Use    Smoking status: Some Days     Packs/day: 0.25     Years: 60.00     Pack years: 15.00     Types: Cigarettes     Start date: 28    Smokeless tobacco: Never    Tobacco comments:     4-5 a day   Vaping Use    Vaping Use: Never used   Substance and Sexual Activity    Alcohol use: Yes     Alcohol/week: 1.0 standard drink     Types: 1 Glasses of wine per week     Comment: social    Drug use: No    Sexual activity: Not Currently   Other Topics Concern    Not on file   Social History Narrative    Not on file     Social Determinants of Health     Financial Resource Strain: Not on file   Food Insecurity: Not on file   Transportation Needs: Not on file   Physical Activity: Not on file   Stress: Not on file   Social Connections: Not on file   Intimate Partner Violence: Not on file   Housing Stability: Not on file     Family History:   No GI malignancies     ROS:  GENERAL: No fever or chills. NEURO: No headache or seizure. EYES: No diplopia or vision loss. CARDIOVASCULAR: No chest pain or palpitations. RESPIRATORY: No dyspnea or cough. GI: NO melena. NO hematochezia   : No dysuria or hematuria. GYN: No discharge or abnormal bleeding. MUSCULOSKELETAL: No new arthralgias or myalgias  DERM: No rash or jaundice. ENDOCRINE: No polyuria or polydipsia. PSYCH: No anxiety or depression. Physical Exam:  VITALS: BP (!) 133/42   Pulse 67   Temp 97.6 °F (36.4 °C) (Oral)   Resp 16   Ht 5' 5\" (1.651 m)   Wt 173 lb 11.6 oz (78.8 kg)   SpO2 96%   BMI 28.91 kg/m²   The patient is a 82 y.o. female with Body mass index is 28.91 kg/m². in no acute distress. HEAD: Normal cephalic/atraumatic. Extra-occular motions intact bilaterally. NECK: No lymphadenopathy or bruits. CHEST: Rises equally on inspiration. Clear to auscultation bilaterally. CARDIOVASCULAR: Regular rate and rhythm without murmurs, rubs or gallops. ABDOMEN: Soft and nondistended with normal bowel sounds. No Hepatosplemomegaly. Tender:  non  UPPER EXTREMITIES: no cyanosis, clubbing, or edema. DERM: no rash or jaundice. LOWER EXTREMITIES: no cyanosis, clubbing, or edema. NEURO: Alert and oriented times four.  Patient moves all extremities and has gross sensation in all extremities. 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 Please call GI with issues, signing off.     Job Spencer MD  1:50 PM 9/16/2022

## 2022-09-16 NOTE — PROGRESS NOTES
Patient's daughter, Cheryle Police, called for an update on patient's condition. This RN verified with patient that update can be provided and HIPPA code can be shared with April. This RN provided HIPPA code and update on patient's condition to April. April very appreciative and verbalized understanding. No questions at this time.

## 2022-09-16 NOTE — ED NOTES
ED to inpatient nurses report    Chief Complaint   Patient presents with    Fatigue      Present to ED from home  LOC: alert and orientated to name, place, date  Vital signs   Vitals:    09/15/22 2013 09/15/22 2032 09/15/22 2139 09/15/22 2223   BP: (!) 102/53 (!) 124/56 96/67 102/60   Pulse:  (!) 111 (!) 114 (!) 112   Resp:   20 20   Temp:       TempSrc:       SpO2:  96% 98% 96%   Weight:       Height:          Oxygen Baseline RA    Current needs required 2L NC   LDAs:   Peripheral IV 09/15/22 Left Antecubital (Active)   Site Assessment Clean, dry & intact 09/15/22 2224   Line Status Infusing 09/15/22 2224   Phlebitis Assessment No symptoms 09/15/22 2224   Infiltration Assessment 0 09/15/22 2224   Dressing Status Clean, dry & intact 09/15/22 2224       Peripheral IV 09/15/22 Right Antecubital (Active)   Site Assessment Clean, dry & intact 09/15/22 2224   Line Status Infusing 09/15/22 2224   Phlebitis Assessment No symptoms 09/15/22 2224   Infiltration Assessment 0 09/15/22 2224   Dressing Status Clean, dry & intact 09/15/22 2224     Mobility: Requires assistance * 2  Pending ED orders: NA  Present condition: Pt resting on cot comfortably at this time. Pt denies a need for anything.     C-SSRS    Swallow Screening    Preferred Language: English     Electronically signed by Anthony Rodríguez RN on 9/15/2022 at 10:26 PM     Anthony Rodríguez RN  09/15/22 2227

## 2022-09-16 NOTE — RT PROTOCOL NOTE
RT Inhaler-Nebulizer Bronchodilator Protocol Note    There is a bronchodilator order in the chart from a provider indicating to follow the RT Bronchodilator Protocol and there is an Initiate RT Inhaler-Nebulizer Bronchodilator Protocol order as well (see protocol at bottom of note). CXR Findings:  XR CHEST PORTABLE    Result Date: 9/16/2022  Impression: Properly positioned right central venous catheter. This document has been electronically signed by: Pratima Suarez. Piter Payne MD on 09/16/2022 03:16 AM    XR CHEST PORTABLE    Result Date: 9/15/2022  Impression: No acute findings. Right paratracheal and retrocardiac opacity which was also present on the prior studies and previously reported to be a dilated esophagus. The appearance is consistent with a dilated food filled esophagus. This document has been electronically signed by: Pratima Suarez. Piter Payne MD on 09/15/2022 11:02 PM      The findings from the last RT Protocol Assessment were as follows:   History Pulmonary Disease: Smoker 15 pack years or more  Respiratory Pattern: Dyspnea on exertion or RR 21-25 bpm (no PARK but RR 26/min)  Breath Sounds: Clear breath sounds  Cough: Strong, spontaneous, non-productive  Indication for Bronchodilator Therapy: On home bronchodilators (home inhaler Q6H PRN)  Bronchodilator Assessment Score: 3    Aerosolized bronchodilator medication orders have been revised according to the RT Inhaler-Nebulizer Bronchodilator Protocol below. Respiratory Therapist to perform RT Therapy Protocol Assessment initially then follow the protocol. Repeat RT Therapy Protocol Assessment PRN for score 0-3 or on second treatment, BID, and PRN for scores above 3. No Indications - adjust the frequency to every 6 hours PRN wheezing or bronchospasm, if no treatments needed after 48 hours then discontinue using Per Protocol order mode.      If indication present, adjust the RT bronchodilator orders based on the Bronchodilator Assessment Score as indicated below. Use Inhaler orders unless patient has one or more of the following: on home nebulizer, not able to hold breath for 10 seconds, is not alert and oriented, cannot activate and use MDI correctly, or respiratory rate 25 breaths per minute or more, then use the equivalent nebulizer order(s) with same Frequency and PRN reasons based on the score. If a patient is on this medication at home then do not decrease Frequency below that used at home. 0-3 - enter or revise RT bronchodilator order(s) to equivalent RT Bronchodilator order with Frequency of every 4 hours PRN for wheezing or increased work of breathing using Per Protocol order mode. 4-6 - enter or revise RT Bronchodilator order(s) to two equivalent RT bronchodilator orders with one order with BID Frequency and one order with Frequency of every 4 hours PRN wheezing or increased work of breathing using Per Protocol order mode. 7-10 - enter or revise RT Bronchodilator order(s) to two equivalent RT bronchodilator orders with one order with TID Frequency and one order with Frequency of every 4 hours PRN wheezing or increased work of breathing using Per Protocol order mode. 11-13 - enter or revise RT Bronchodilator order(s) to one equivalent RT bronchodilator order with QID Frequency and an Albuterol order with Frequency of every 4 hours PRN wheezing or increased work of breathing using Per Protocol order mode. Greater than 13 - enter or revise RT Bronchodilator order(s) to one equivalent RT bronchodilator order with every 4 hours Frequency and an Albuterol order with Frequency of every 2 hours PRN wheezing or increased work of breathing using Per Protocol order mode. RT to enter RT Home Evaluation for COPD & MDI Assessment order using Per Protocol order mode.     Electronically signed by Yvonne Gonzalez RCP on 9/16/2022 at 3:41 AM

## 2022-09-16 NOTE — PROGRESS NOTES
Pharmacy Note  Aminoglycoside Consult    Marian Jimenez is a 82 y.o. female ordered Gentamicin for synergy; consult received from Dr Emmanuelle Howard to manage therapy. Also receiving Vancomycin, Zosyn. Allergies:  Lasix [furosemide], Lipitor [atorvastatin], Neurontin [gabapentin], Oxycontin [oxycodone hcl], and Penicillins     Temp max: 97.7    Recent Labs     09/15/22  1900 09/16/22  0225 09/16/22  0655   BUN 33* 27* 26*   CREATININE 1.5* 1.3* 1.2   WBC 10.7  --   --        Intake/Output Summary (Last 24 hours) at 9/16/2022 1134  Last data filed at 9/16/2022 0958  Gross per 24 hour   Intake 2634.7 ml   Output 50 ml   Net 2584.7 ml     Culture Date Source Results   9/16/22 urine sent   9/15/22 urine Enteric GNB   9/15/22 BCX2 GPC clusters; BCID- MRSA     Height:   Ht Readings from Last 1 Encounters:   09/16/22 5' 5\" (1.651 m)     Weight:  Wt Readings from Last 1 Encounters:   09/16/22 173 lb 11.6 oz (78.8 kg)     Estimated Creatinine Clearance: 36 mL/min (based on SCr of 1.2 mg/dL). Assessment/Plan:  Gentamicin 80 mg (~1mg/kg) IV Q24H X 7 days, check gentamicin trough prior to 2nd dose     Pharmacy will continue to follow. Thank you for the consult.     Matt Shi PharmD 9/16/2022 11:44 AM

## 2022-09-16 NOTE — ED NOTES
Pt cleaned up and external catheter placed. Pt denies a need for anything else at this time.      Yon Bucio RN  09/15/22 2013

## 2022-09-17 ENCOUNTER — APPOINTMENT (OUTPATIENT)
Dept: INTERVENTIONAL RADIOLOGY/VASCULAR | Age: 87
DRG: 853 | End: 2022-09-17
Payer: MEDICARE

## 2022-09-17 LAB
ANION GAP SERPL CALCULATED.3IONS-SCNC: 9 MEQ/L (ref 8–16)
BUN BLDV-MCNC: 24 MG/DL (ref 7–22)
CALCIUM SERPL-MCNC: 8.3 MG/DL (ref 8.5–10.5)
CHLORIDE BLD-SCNC: 106 MEQ/L (ref 98–111)
CO2: 25 MEQ/L (ref 23–33)
CREAT SERPL-MCNC: 1.1 MG/DL (ref 0.4–1.2)
ERYTHROCYTE [DISTWIDTH] IN BLOOD BY AUTOMATED COUNT: 15.4 % (ref 11.5–14.5)
ERYTHROCYTE [DISTWIDTH] IN BLOOD BY AUTOMATED COUNT: 51.3 FL (ref 35–45)
GENTAMICIN TROUGH: 0.4 UG/ML (ref 0.1–1.9)
GFR SERPL CREATININE-BSD FRML MDRD: 47 ML/MIN/1.73M2
GLUCOSE BLD-MCNC: 102 MG/DL (ref 70–108)
GLUCOSE BLD-MCNC: 107 MG/DL (ref 70–108)
GLUCOSE BLD-MCNC: 93 MG/DL (ref 70–108)
GLUCOSE BLD-MCNC: 94 MG/DL (ref 70–108)
GLUCOSE BLD-MCNC: 95 MG/DL (ref 70–108)
GLUCOSE BLD-MCNC: 97 MG/DL (ref 70–108)
HCT VFR BLD CALC: 33.2 % (ref 37–47)
HEMOGLOBIN: 11 GM/DL (ref 12–16)
MAGNESIUM: 1.9 MG/DL (ref 1.6–2.4)
MCH RBC QN AUTO: 30.4 PG (ref 26–33)
MCHC RBC AUTO-ENTMCNC: 33.1 GM/DL (ref 32.2–35.5)
MCV RBC AUTO: 91.7 FL (ref 81–99)
ORGANISM: ABNORMAL
PLATELET # BLD: 81 THOU/MM3 (ref 130–400)
PMV BLD AUTO: 11.8 FL (ref 9.4–12.4)
POTASSIUM REFLEX MAGNESIUM: 3.5 MEQ/L (ref 3.5–5.2)
RBC # BLD: 3.62 MILL/MM3 (ref 4.2–5.4)
SCAN OF BLOOD SMEAR: NORMAL
SODIUM BLD-SCNC: 140 MEQ/L (ref 135–145)
URINE CULTURE REFLEX: ABNORMAL
URINE CULTURE REFLEX: ABNORMAL
VANCOMYCIN RANDOM: 12.4 UG/ML (ref 0.1–39.9)
WBC # BLD: 5.1 THOU/MM3 (ref 4.8–10.8)

## 2022-09-17 PROCEDURE — 2580000003 HC RX 258: Performed by: PHARMACIST

## 2022-09-17 PROCEDURE — 80202 ASSAY OF VANCOMYCIN: CPT

## 2022-09-17 PROCEDURE — 2000000000 HC ICU R&B

## 2022-09-17 PROCEDURE — 6360000002 HC RX W HCPCS: Performed by: NURSE PRACTITIONER

## 2022-09-17 PROCEDURE — 85027 COMPLETE CBC AUTOMATED: CPT

## 2022-09-17 PROCEDURE — 36415 COLL VENOUS BLD VENIPUNCTURE: CPT

## 2022-09-17 PROCEDURE — 2500000003 HC RX 250 WO HCPCS: Performed by: NURSE PRACTITIONER

## 2022-09-17 PROCEDURE — 99291 CRITICAL CARE FIRST HOUR: CPT | Performed by: INTERNAL MEDICINE

## 2022-09-17 PROCEDURE — 6360000002 HC RX W HCPCS: Performed by: PHARMACIST

## 2022-09-17 PROCEDURE — 6360000002 HC RX W HCPCS: Performed by: STUDENT IN AN ORGANIZED HEALTH CARE EDUCATION/TRAINING PROGRAM

## 2022-09-17 PROCEDURE — 87040 BLOOD CULTURE FOR BACTERIA: CPT

## 2022-09-17 PROCEDURE — 2500000003 HC RX 250 WO HCPCS: Performed by: RADIOLOGY

## 2022-09-17 PROCEDURE — 83735 ASSAY OF MAGNESIUM: CPT

## 2022-09-17 PROCEDURE — 76942 ECHO GUIDE FOR BIOPSY: CPT

## 2022-09-17 PROCEDURE — 6370000000 HC RX 637 (ALT 250 FOR IP): Performed by: NURSE PRACTITIONER

## 2022-09-17 PROCEDURE — 2580000003 HC RX 258: Performed by: NURSE PRACTITIONER

## 2022-09-17 PROCEDURE — 82948 REAGENT STRIP/BLOOD GLUCOSE: CPT

## 2022-09-17 PROCEDURE — 80170 ASSAY OF GENTAMICIN: CPT

## 2022-09-17 PROCEDURE — 80048 BASIC METABOLIC PNL TOTAL CA: CPT

## 2022-09-17 RX ORDER — INSULIN LISPRO 100 [IU]/ML
0-4 INJECTION, SOLUTION INTRAVENOUS; SUBCUTANEOUS
Status: DISCONTINUED | OUTPATIENT
Start: 2022-09-18 | End: 2022-09-21 | Stop reason: ALTCHOICE

## 2022-09-17 RX ADMIN — SODIUM CHLORIDE, PRESERVATIVE FREE 10 ML: 5 INJECTION INTRAVENOUS at 22:57

## 2022-09-17 RX ADMIN — PANTOPRAZOLE SODIUM 40 MG: 40 TABLET, DELAYED RELEASE ORAL at 16:37

## 2022-09-17 RX ADMIN — PRAVASTATIN SODIUM 40 MG: 40 TABLET ORAL at 09:02

## 2022-09-17 RX ADMIN — PANTOPRAZOLE SODIUM 40 MG: 40 TABLET, DELAYED RELEASE ORAL at 05:18

## 2022-09-17 RX ADMIN — VANCOMYCIN HYDROCHLORIDE 1000 MG: 1 INJECTION, POWDER, LYOPHILIZED, FOR SOLUTION INTRAVENOUS at 11:49

## 2022-09-17 RX ADMIN — Medication 250 MG: at 21:47

## 2022-09-17 RX ADMIN — Medication 4 MCG/MIN: at 13:52

## 2022-09-17 RX ADMIN — RIVAROXABAN 15 MG: 15 TABLET, FILM COATED ORAL at 09:02

## 2022-09-17 RX ADMIN — Medication 6 MG: at 22:26

## 2022-09-17 RX ADMIN — Medication 250 MG: at 09:00

## 2022-09-17 RX ADMIN — PIPERACILLIN AND TAZOBACTAM 3375 MG: 3; .375 INJECTION, POWDER, FOR SOLUTION INTRAVENOUS at 05:19

## 2022-09-17 RX ADMIN — CLOPIDOGREL BISULFATE 75 MG: 75 TABLET ORAL at 09:01

## 2022-09-17 RX ADMIN — ASPIRIN 81 MG CHEWABLE TABLET 81 MG: 81 TABLET CHEWABLE at 09:01

## 2022-09-17 RX ADMIN — THROMBIN, TOPICAL (BOVINE) 5000 UNITS: KIT at 13:24

## 2022-09-17 RX ADMIN — TIMOLOL MALEATE 1 DROP: 5 SOLUTION/ DROPS OPHTHALMIC at 09:03

## 2022-09-17 RX ADMIN — GENTAMICIN SULFATE 80 MG: 80 INJECTION, SOLUTION INTRAVENOUS at 13:50

## 2022-09-17 NOTE — PROGRESS NOTES
CRITICAL CARE PROGRESS NOTE      Patient:  [facility]    Unit/Bed:3A-04/004-A  YOB: 1935  MRN: [id number]   PCP: Elliott Jiang  Date of Admission: 9/15/2022  Chief Complaint:- Fatigue    Assessment and Plan:    Septic shock: Secondary to MRSA bacteremia. Possible infected endovascular graft vs pseudoaneurysm, see below. Presented SIRS 1/4 for tachycardia and hypotension. LA 5.3 (since normalized). Procal 17.11. Given 30 cc/kg IVF. Cultures obtained (MRSA bacteremia). Persistent hypotension, started on pressors 9/16. A-line placed 9/16. Wean off pressors for MAP > 65. Currently only requiring Levo. MRSA bacteremia:  Blood cultures positive for gram positive cocci in clusters x2. Blood molecular ID positive for MRSA. On synergistic therapy vancomycin and gentamicin  day 2/7. Will require prolonged course of antibiotics afterwards with repeat blood cultures. Complicated UTI:  Prior history of UTIs. Presented UA positive for nitrate, LE small, WBC 10-15 and many bacteria. Urine culture gram negative bacilli. Started on zosyn, will d/c as gent will cover gram neg. Pseudoaneurysm R FA:  CTA Abd w/ aorta runoff demonstrated 1.1 x 1.1 cm pseudoaneurysm in R FA 9/15. Likely secondary to recent extensive intravascular procedure. Arterial duplex bilateral confirmed 2 cm pseudoaneurysm R FA, lumen more than 50% thrombosed, amenable to thrombin injection, to take place 9/17 with IR. Cardiology following   Right ankle trifurcation vessel disease:  CTA Abd w/ aorta runoff demonstrated trifurcation vessel occlusion at R ankle 9/15. Recent extensive intravascular procedure involving stents and balloon throughout right lower extremity vessels. Dorsal and tibialis dorsal pulses present 9/16. Continue to monitor. Cardiology following. ALEJANDRO on CKD III, resolving: Cr 1.1 currently. baseline Cr ~1.0 - 1.2, eGFR 40-50. Presented Cr 1.5. BUN: Cr > 20, Sp gravity > 0.030. Currently oliguric. Meredith placed.  FeNA = prerenal origin, present urine eosinophils. Possible renal healing from contrast induced nephropathy given recent angiogram. Avoid nephrotoxins. Renally dose medications. Continue gentle IVF given cardiac function. Monitor renal function with daily BMP. Chronic HFrEF, NYHA II:  Dual ICD in place. Last echo EF 25-30% 6/20. Home GDMT ARNi, BB and diuretic. Presented BNP 6077. CXR not indicated of overload. Currently holding nephrotoxins. 2 g sodium restriction, defer fluid restriction. Monitor daily weights, strict I&Os. pAfib:   [id number] 6. Initial EKG SR with arrhthymias and occasional PVCs. On BB for rate control and xarelto for anticoagulation. Continue metoprolol 25 mg p.o daily and Xarelto 15 mg p.o daily. Placed on continuous telemetry. HTN: continue home metoprolol. Hold ARNi and bumex for ALEJANDRO. Monitor blood pressure. HLD:  continue home pravastatin 40 mg p.o daily   AAA:   s/p endovascular repair in 2019    History of blood clots:  per chart review. On Anticoagulation  GERD:  started on pantoprazole 40 mg p.o twice daily   Achalasia: GI consulted due to food stuck in esophagus. This however is chronic in nature and patient's baseline. Dysphagia diet started. History of colovesical fistula:  s/p right hemicolectomy x2. No colostomy. Tobacco Abuse:  endorses prolonged history of intermittent smoking. Reports trying to quit, with last smoke a few months ago. INITIAL H AND P AND ICU COURSE:  Per Initial H/P:  The patient is a 82year old female with a past medical history of HFrEF s/p dual ICD, pAfib, HTN, HLD, AAA s/p repair, PVD, blood clots, CKD, colovesical fisutla s/p right hemicolectomy and recent CLI s/p angiogram with stent and balloon to RLE here for fatigue and weakness. The patient reports inability to get out of bed yesterday, with no improvement throughout the day. EMS was called, and she was found to be hypotensive with SBP in 80s.  The patient denies any prior fevers, chills, N/V, changes in diet or sick contacts. She did not \"bomb exploding\" RLE leg pain, radiating downwards. This was worse with movement, alleviated by nothing. In the ED, vitals T max 97.8, RR 18, HR 97, BP 73/43 and SpO2 93%. She became hypoxic and was escalated to 2L NC. Labs significant for BUN 33, Cr 1.5. Troponin WNL, BNP 6077. EKG non significant. TSH and Cortisol WNL. Procal 17.11, LA 5.3. UA positive for nitrite, small LE, WBC 10-15 and many bacteria. CXR negative for acute findings. 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 He was given 2L NS bolus, albumin x1. Cultures were drawn and he was started on vancomycin and zosyn for empiric antibiotics coverage. Cardiology was consulted, the patient was admitted to the ICU for further care and evaluation. Of note, She previously had a CTA abdominal aorta with runoff that was significant for 3.6 cm fusiform aneurysm of the infrarenal abdominal aorta; bifurcated endograft present; right iliac limb totally occluded and left iliac limb remains patent; stents in right common and external iliac arteries are totally occluded; collateral reconstitution of the right CFA and right profunda; total occlusion of native right SFA and right femoral/anterior tibial bypass graft; Collateral reconstitution of the trifurcation vessels right side; multifocal severe diffuse disease of left SFA; left popliteal artery demonstrated mild stenosis; all 3 trifurcation vessels left calf lateral but demonstrate multifocal disease; subcutaneous emphysema bilaterally that is worse on left 8/4/22.   The patient underwent a bilateral lower extremity angiogram 9/6/2022. She was found to have R EVAR limb, R CFA, R SFA occlusions; dimunitive PFA; popliteal artery occlusion with collaterals, all three tibials occluded; and all three tibials are occluded with some degree of flow in the PT and AT. The patient underwent retrograde AT access, knuckled to the R CFA, then re-entry into the R EIA stents; VBX 11 x 39, VBX 11 x 59 x 2, VBX 11 x 39, VBX 8 x 59; Retrograde SFA/pop 6.0 PTA; Elizabeth 6 x 120 x 2 for ostial SFA to distal SFA; DCB 5 x 150 of the pop, and DCB 6 x 80 of the distal SFA/prox pop; and 3.0 PTA of the AT. This resulted in Complete reconstitution of the R EVAR limb, R CFA, R SFA, R POP, and R AT with improved flow into the PT - full flow into the pedal arch. Past 24 hours:  Afebrile overnight with stable vital signs. Patient states she is feeling well overall this morning, has some slight dull intermittent ache to right lower extremity. Patient was weaned off Vasopressin and currently only requiring Levophed. Arterial duplex confirmed pseudoaneurysm with 50% lumen amenable to thrombin injection. Plans for this to happen today (9/17). Will continue Vanc/Gent, d/c zosyn as urine cultures demonstrate gram negative bacteria which will be covered by gentamicin. Dr. Turner Guerrero following. Repeat blood cultures ordered, will continue to check until negative results obtained. Will likely require up to 4 weeks of abx. Would recommend doppler after thrombin injection in 1-2 weeks to reassess. Past Medical History:  Per HPI. Family History: Mother - heart disease, CVA     Father - lung cancer, emphysema     Sister - leukemia  Social History:  Current smoker 15 PPY, occasional EtOH use, denies illicit drug use. ROS   General: Fatigued. No fevers or chills.    Head: Denies headache  Ears: Denies Tinnitus  Pulmonary: Denies shortness of breath  Cardiac: Denies chest pains/pressures  GI: Denies abdominal pains  Musculoskeletal: Intermittent cramping right leg pain    Scheduled Meds:   aspirin  81 mg Oral Daily    clopidogrel  75 mg Oral Daily    hydrocortisone  25 mg Rectal Nightly    magnesium oxide  250 mg Oral BID    metoprolol succinate  25 mg Oral Daily    pantoprazole  40 mg Oral BID AC    pravastatin  40 mg Oral Daily    [Held by provider] sacubitril-valsartan  1 tablet Oral BID    timolol  1 drop Both Eyes Daily    sodium chloride flush  5-40 mL IntraVENous 2 times per day    piperacillin-tazobactam  3,375 mg IntraVENous Q8H    rivaroxaban  15 mg Oral Daily with breakfast    insulin lispro  0-4 Units SubCUTAneous Q6H    vancomycin (VANCOCIN) intermittent dosing (placeholder)   Other RX Placeholder    vancomycin  1,000 mg IntraVENous Q24H    gentamicin  80 mg IntraVENous Q24H     Continuous Infusions:   norepinephrine 4 mcg/min (09/17/22 0418)    sodium chloride 10 mL/hr at 09/16/22 1958    dextrose         PHYSICAL EXAMINATION:  T:  98.1 (36.7). P: 78. RR: 21. B/P:  131/69. O2 Sat: 95% on 2L NC. I/O:  oliguric   Body mass index is 29.75 kg/m². GCS:   15    General:   Pleasant elderly female, resting in hospital bed, acutely ill appearing, pale, warm and dry  HEENT:  normocephalic and atraumatic. No scleral icterus. PERR  Neck: supple. No Thyromegaly. Lungs: Breath sounds clear to auscultation. No retractions  Cardiac: RRR. No JVD. Abdomen: soft. Nontender. Extremities: Right lower extremity warm from mid-calf down to foot, has good movement and appropriate sensation. Left lower extremity warm and dry. Left groin positive for ecchymosis and slight tenderness to touch. Pulses have been noted per doppler. Vasculature: capillary refill < 3 seconds. Palpable dorsalis pedis pulses. Skin:  warm and dry. Psych:  Alert and oriented x3. Affect appropriate  Lymph:  No supraclavicular adenopathy. Neurologic:  No focal deficit. No seizures.     Data: (All radiographs, tracings, PFTs, and imaging are personally viewed and interpreted unless otherwise noted). 9/17/22 Sodium 140, potassium 3.5, chloride 106, CO2 21, BUN 24, creatinine 1.1, glucose 94, AG 9.0  9/17/22 WBC 5.1 Hemoglobin 11.0 HCT 33.2  9/16/22 Sodium 139, potassium 3.7, chloride 108, CO2 21, BUN 27, creatinine 1.3, glucose 125, AG 10  9/16/22 hemoglobin 9.8, HCT 30   9/16/22 Lactic acid 2.1   9/16/22 Cortisol 51.65  9/16/22 TSH 1.010  9/15/22 Blood culture; gram positive cocci clusters x2  9/15/22 Procal 17.11  9/15/22 Troponin <0.010, BNP 6077  9/15/22 UA; nitrite positive, LE small, WBC 10-15, Sp gravity >1.030  9/15/22 CTA abd aorta w/ adi runoff demonstrated 1.1 x 1.1 cm R CFA pseudoaneurysm; occluded R femoral anterior tibial artery bypass; patent R SFA; Mild to moderate multifocal dx on R; trifurcation vessel occlusion at R ankle; moderate multifocal L CFA; 80% focal stenosis of L superficial femoral artery; mild multifocal dx of L popliteal artery; trifurcation vessel multifocal disease, patent at L ankle. 9/15/22 CXR demonstrate no acute findings; right paratracheal and retrocardiac opacity present on prior studies; previously reported dilated esophagus. Telemetry shows NSR. Seen with multidisciplinary ICU team.  Meets Continued ICU Level Care Criteria:    [x] Yes   [] No - Transfer Planned to listed location:  [] HOSPITALIST CONTACTED-      Case and plan discussed with Dr. Lucita Pedro. Electronically signed by Wero Reddy [address] [place] [address]     Patient seen and examined independently by me. Above discussed and I agree with Marissa, and the abovenote except where indicated in the EMR revision history. Also see my additional comments and changes indicated by discrete font, text color, italics, and/or initials. Labs, cultures, and radiographs where available were reviewed. Changes were made in the orders as necessary. I discussed patient concerns with Bridgette KAUR and instructions were given. Respiratory care issues addressed.   Please see our orders for the updated patient care plan.   Ccm:32  Electronically signed by     Crista Linaers MD on 9/17/2022 at 6:44 PM

## 2022-09-17 NOTE — PROGRESS NOTES
1254 Pt in specials radiology for right groin pseudoaneurysm thrombin injection. Explained procedure to pt and pt verbalizes understanding.  [facility] to speak to pt. Consent signed. 1300 VL tech scanning right groin with US.  1311 Right groin prepped and draped. 1324 Thrombin 0.2 ml's injected in to pseudo per  [facility]. Pt tolerated well. 1325 Post images taken. [phone] Pt transferred to 3A per bed with RN. Report to RN.

## 2022-09-17 NOTE — H&P
Formulation and discussion of sedation / procedure plans, risks, benefits, side effects and alternatives with patient and/or responsible adult completed.     Electronically signed by Mary Gary MD on 9/17/22 at 1:30 PM EDT

## 2022-09-17 NOTE — PLAN OF CARE
Problem: Discharge Planning  Goal: Discharge to home or other facility with appropriate resources  Outcome: Progressing  Flowsheets  Taken 9/16/2022 2358  Discharge to home or other facility with appropriate resources: Identify barriers to discharge with patient and caregiver  Taken 9/16/2022 2000  Discharge to home or other facility with appropriate resources: Identify barriers to discharge with patient and caregiver     Problem: Respiratory - Adult  Goal: Achieves optimal ventilation and oxygenation  Outcome: Progressing  Flowsheets  Taken 9/16/2022 2358  Achieves optimal ventilation and oxygenation: Assess for changes in respiratory status  Taken 9/16/2022 2000  Achieves optimal ventilation and oxygenation:   Assess for changes in respiratory status   Assess for changes in mentation and behavior     Problem: Cardiovascular - Adult  Goal: Maintains optimal cardiac output and hemodynamic stability  Outcome: Progressing  Flowsheets (Taken 9/16/2022 2358)  Maintains optimal cardiac output and hemodynamic stability:   Monitor blood pressure and heart rate   Monitor urine output and notify Licensed Independent Practitioner for values outside of normal range     Problem: Skin/Tissue Integrity - Adult  Goal: Incisions, wounds, or drain sites healing without S/S of infection  Outcome: Progressing  Flowsheets (Taken 9/16/2022 2358)  Incisions, Wounds, or Drain Sites Healing Without Sign and Symptoms of Infection:   TWICE DAILY: Assess and document skin integrity   TWICE DAILY: Assess and document dressing/incision, wound bed, drain sites and surrounding tissue  Goal: Oral mucous membranes remain intact  Outcome: Progressing  Flowsheets (Taken 9/16/2022 2358)  Oral Mucous Membranes Remain Intact: Assess oral mucosa and hygiene practices     Problem: Metabolic/Fluid and Electrolytes - Adult  Goal: Electrolytes maintained within normal limits  Outcome: Progressing  Flowsheets (Taken 9/16/2022 2358)  Electrolytes maintained within normal limits: Monitor labs and assess patient for signs and symptoms of electrolyte imbalances     Problem: ABCDS Injury Assessment  Goal: Absence of physical injury  Outcome: Progressing  Flowsheets (Taken 9/16/2022 3647)  Absence of Physical Injury: Implement safety measures based on patient assessment     Problem: Safety - Adult  Goal: Free from fall injury  Outcome: Progressing     Problem: Chronic Conditions and Co-morbidities  Goal: Patient's chronic conditions and co-morbidity symptoms are monitored and maintained or improved  Outcome: Progressing  Flowsheets  Taken 9/16/2022 Raciel Celeste - Patient's Chronic Conditions and Co-Morbidity Symptoms are Monitored and Maintained or Improved: Monitor and assess patient's chronic conditions and comorbid symptoms for stability, deterioration, or improvement  Taken 9/16/2022 2000  Care Plan - Patient's Chronic Conditions and Co-Morbidity Symptoms are Monitored and Maintained or Improved:   Monitor and assess patient's chronic conditions and comorbid symptoms for stability, deterioration, or improvement   Collaborate with multidisciplinary team to address chronic and comorbid conditions and prevent exacerbation or deterioration

## 2022-09-17 NOTE — OP NOTE
Department of Radiology  Post Procedure Progress Note      Pre-Procedure Diagnosis:  pseudoaneurysm right groin     Procedure Performed:  thrombin injection to thrombose pseudo     Anesthesia: local    Findings: successful, 200 units injected    Immediate Complications:  None    Estimated Blood Loss: minimal    SEE DICTATED PROCEDURE NOTE FOR COMPLETE DETAILS.     Castillo Rivera MD   9/17/2022 1:30 PM

## 2022-09-17 NOTE — PROGRESS NOTES
Pt returned from IR orders received: No pressure on groin region  For 24 hours. Pedal pulses bilat +1 intact. R groin area with bandaid intact. Pt supine, no pressure to area. No active bleeding to site. Pt denies pain.

## 2022-09-17 NOTE — PROGRESS NOTES
Pharmacy Aminoglycoside Consult    Aminoglycoside: Gentamicin  Day: 2  Current Dosinmg q24h    Estimated Creatinine Clearance: 39 mL/min (based on SCr of 1.1 mg/dL). Recent Labs     09/15/22  1900 22  0225 22  0350   BUN 33*   < > 25* 24*   CREATININE 1.5*   < > 1.2 1.1   WBC 10.7  --   --  5.1    < > = values in this interval not displayed.       Trough: 0.4 mcg/mL  Culture Date Source Results   22 urine GNB   9/15/22 urine Citrobacter Freundii   9/15/22 BCX2 GPC clusters; BCID- MRSA     Assessment/Plan:   No change in current gentamicin dose  UO improving - will monitor closely

## 2022-09-17 NOTE — PROGRESS NOTES
[address] [facility] Pharmacokinetic Monitoring Service - Vancomycin    Consulting Provider: Julio Delgadillo   Indication: MRSA  Target Concentration: Goal AUC/TAMANNA 400-600 mg*hr/L  Day of Therapy: 3  Additional Antimicrobials: gentamicin    Pertinent Laboratory Values: Wt Readings from Last 1 Encounters:   09/17/22 178 lb 12.7 oz (81.1 kg)     Temp Readings from Last 1 Encounters:   09/17/22 98.1 °F (36.7 °C) (Oral)     Estimated Creatinine Clearance: 39 mL/min (based on SCr of 1.1 mg/dL). Recent Labs     09/15/22  1900 09/16/22  0225 09/16/22 2021 09/17/22  0350   CREATININE 1.5*   < > 1.2 1.1   WBC 10.7  --   --  5.1    < > = values in this interval not displayed.        Pertinent Cultures:  Culture Date Source Results   09/15/22 BCx2 MRSA   9/15/22 UC GNB       Recent vancomycin administrations                     vancomycin (VANCOCIN) 1,000 mg in sodium chloride 0.9 % 250 mL IVPB ([id number]) (mg) 1,000 mg New Bag 09/16/22 1133    vancomycin (VANCOCIN) 1250 mg in dextrose 5 % 250 mL IVPB (mg) 1,250 mg New Bag 09/15/22 2220                    Assessment:  Date/Time Current Dose Concentration Timing of Concentration (h) AUC   9/17/22 @ 0350 1000mg q24h 12.4 mcg/mL ~16 481   Note: Serum concentrations collected for AUC dosing may appear elevated if collected in close proximity to the dose administered, this is not necessarily an indication of toxicity    Plan:  Current dosing regimen is therapeutic  Continue current dose  Pharmacy will continue to monitor patient and adjust therapy as indicated    Thank you for the consult,  NERIS Chatman [facility]  9/17/2022 7:28 AM

## 2022-09-18 ENCOUNTER — APPOINTMENT (OUTPATIENT)
Dept: INTERVENTIONAL RADIOLOGY/VASCULAR | Age: 87
DRG: 853 | End: 2022-09-18
Payer: MEDICARE

## 2022-09-18 PROBLEM — R78.81 BACTEREMIA: Status: ACTIVE | Noted: 2022-09-18

## 2022-09-18 LAB
ANION GAP SERPL CALCULATED.3IONS-SCNC: 7 MEQ/L (ref 8–16)
BLOOD CULTURE, ROUTINE: ABNORMAL
BLOOD CULTURE, ROUTINE: ABNORMAL
BUN BLDV-MCNC: 23 MG/DL (ref 7–22)
CALCIUM SERPL-MCNC: 7.9 MG/DL (ref 8.5–10.5)
CHLORIDE BLD-SCNC: 105 MEQ/L (ref 98–111)
CO2: 25 MEQ/L (ref 23–33)
CREAT SERPL-MCNC: 1 MG/DL (ref 0.4–1.2)
ERYTHROCYTE [DISTWIDTH] IN BLOOD BY AUTOMATED COUNT: 15.1 % (ref 11.5–14.5)
ERYTHROCYTE [DISTWIDTH] IN BLOOD BY AUTOMATED COUNT: 50.5 FL (ref 35–45)
GFR SERPL CREATININE-BSD FRML MDRD: 53 ML/MIN/1.73M2
GLUCOSE BLD-MCNC: 141 MG/DL (ref 70–108)
GLUCOSE BLD-MCNC: 192 MG/DL (ref 70–108)
GLUCOSE BLD-MCNC: 84 MG/DL (ref 70–108)
GLUCOSE BLD-MCNC: 92 MG/DL (ref 70–108)
GLUCOSE BLD-MCNC: 93 MG/DL (ref 70–108)
HCT VFR BLD CALC: 33.4 % (ref 37–47)
HEMOGLOBIN: 11.1 GM/DL (ref 12–16)
MAGNESIUM: 1.9 MG/DL (ref 1.6–2.4)
MCH RBC QN AUTO: 30.3 PG (ref 26–33)
MCHC RBC AUTO-ENTMCNC: 33.2 GM/DL (ref 32.2–35.5)
MCV RBC AUTO: 91.3 FL (ref 81–99)
ORGANISM: ABNORMAL
PLATELET # BLD: 74 THOU/MM3 (ref 130–400)
PMV BLD AUTO: 12.2 FL (ref 9.4–12.4)
POTASSIUM REFLEX MAGNESIUM: 3.4 MEQ/L (ref 3.5–5.2)
RBC # BLD: 3.66 MILL/MM3 (ref 4.2–5.4)
SODIUM BLD-SCNC: 137 MEQ/L (ref 135–145)
URINE CULTURE, ROUTINE: ABNORMAL
WBC # BLD: 5.3 THOU/MM3 (ref 4.8–10.8)

## 2022-09-18 PROCEDURE — 99223 1ST HOSP IP/OBS HIGH 75: CPT | Performed by: INTERNAL MEDICINE

## 2022-09-18 PROCEDURE — 99291 CRITICAL CARE FIRST HOUR: CPT | Performed by: INTERNAL MEDICINE

## 2022-09-18 PROCEDURE — 6370000000 HC RX 637 (ALT 250 FOR IP): Performed by: NURSE PRACTITIONER

## 2022-09-18 PROCEDURE — 80048 BASIC METABOLIC PNL TOTAL CA: CPT

## 2022-09-18 PROCEDURE — 2580000003 HC RX 258: Performed by: NURSE PRACTITIONER

## 2022-09-18 PROCEDURE — 6360000002 HC RX W HCPCS: Performed by: PHARMACIST

## 2022-09-18 PROCEDURE — 82948 REAGENT STRIP/BLOOD GLUCOSE: CPT

## 2022-09-18 PROCEDURE — 6360000002 HC RX W HCPCS: Performed by: STUDENT IN AN ORGANIZED HEALTH CARE EDUCATION/TRAINING PROGRAM

## 2022-09-18 PROCEDURE — 6370000000 HC RX 637 (ALT 250 FOR IP): Performed by: STUDENT IN AN ORGANIZED HEALTH CARE EDUCATION/TRAINING PROGRAM

## 2022-09-18 PROCEDURE — 93926 LOWER EXTREMITY STUDY: CPT

## 2022-09-18 PROCEDURE — 83735 ASSAY OF MAGNESIUM: CPT

## 2022-09-18 PROCEDURE — 85027 COMPLETE CBC AUTOMATED: CPT

## 2022-09-18 PROCEDURE — 2000000000 HC ICU R&B

## 2022-09-18 PROCEDURE — 2580000003 HC RX 258: Performed by: PHARMACIST

## 2022-09-18 RX ORDER — POTASSIUM CHLORIDE 20 MEQ/1
40 TABLET, EXTENDED RELEASE ORAL PRN
Status: DISCONTINUED | OUTPATIENT
Start: 2022-09-18 | End: 2022-09-23 | Stop reason: HOSPADM

## 2022-09-18 RX ORDER — POTASSIUM CHLORIDE 7.45 MG/ML
10 INJECTION INTRAVENOUS PRN
Status: DISCONTINUED | OUTPATIENT
Start: 2022-09-18 | End: 2022-09-23 | Stop reason: HOSPADM

## 2022-09-18 RX ORDER — POTASSIUM CHLORIDE 29.8 MG/ML
20 INJECTION INTRAVENOUS ONCE
Status: DISCONTINUED | OUTPATIENT
Start: 2022-09-18 | End: 2022-09-18

## 2022-09-18 RX ADMIN — VANCOMYCIN HYDROCHLORIDE 1000 MG: 1 INJECTION, POWDER, LYOPHILIZED, FOR SOLUTION INTRAVENOUS at 11:31

## 2022-09-18 RX ADMIN — METOPROLOL SUCCINATE 25 MG: 25 TABLET, EXTENDED RELEASE ORAL at 08:55

## 2022-09-18 RX ADMIN — POTASSIUM BICARBONATE 40 MEQ: 782 TABLET, EFFERVESCENT ORAL at 11:19

## 2022-09-18 RX ADMIN — ACETAMINOPHEN 650 MG: 325 TABLET ORAL at 20:07

## 2022-09-18 RX ADMIN — PANTOPRAZOLE SODIUM 40 MG: 40 TABLET, DELAYED RELEASE ORAL at 06:37

## 2022-09-18 RX ADMIN — CLOPIDOGREL BISULFATE 75 MG: 75 TABLET ORAL at 08:55

## 2022-09-18 RX ADMIN — ACETAMINOPHEN 650 MG: 325 TABLET ORAL at 03:44

## 2022-09-18 RX ADMIN — PRAVASTATIN SODIUM 40 MG: 40 TABLET ORAL at 08:55

## 2022-09-18 RX ADMIN — SODIUM CHLORIDE, PRESERVATIVE FREE 10 ML: 5 INJECTION INTRAVENOUS at 08:57

## 2022-09-18 RX ADMIN — Medication 250 MG: at 08:55

## 2022-09-18 RX ADMIN — HYDROCORTISONE ACETATE 25 MG: 25 SUPPOSITORY RECTAL at 22:13

## 2022-09-18 RX ADMIN — POTASSIUM BICARBONATE 40 MEQ: 782 TABLET, EFFERVESCENT ORAL at 20:10

## 2022-09-18 RX ADMIN — GENTAMICIN SULFATE 80 MG: 80 INJECTION, SOLUTION INTRAVENOUS at 12:59

## 2022-09-18 RX ADMIN — PANTOPRAZOLE SODIUM 40 MG: 40 TABLET, DELAYED RELEASE ORAL at 16:16

## 2022-09-18 RX ADMIN — SODIUM CHLORIDE, PRESERVATIVE FREE 10 ML: 5 INJECTION INTRAVENOUS at 20:07

## 2022-09-18 RX ADMIN — Medication 250 MG: at 20:07

## 2022-09-18 RX ADMIN — TIMOLOL MALEATE 1 DROP: 5 SOLUTION/ DROPS OPHTHALMIC at 08:58

## 2022-09-18 RX ADMIN — RIVAROXABAN 15 MG: 15 TABLET, FILM COATED ORAL at 08:56

## 2022-09-18 RX ADMIN — ASPIRIN 81 MG CHEWABLE TABLET 81 MG: 81 TABLET CHEWABLE at 08:56

## 2022-09-18 ASSESSMENT — PAIN - FUNCTIONAL ASSESSMENT: PAIN_FUNCTIONAL_ASSESSMENT: PREVENTS OR INTERFERES SOME ACTIVE ACTIVITIES AND ADLS

## 2022-09-18 ASSESSMENT — PAIN SCALES - GENERAL
PAINLEVEL_OUTOF10: 4
PAINLEVEL_OUTOF10: 3
PAINLEVEL_OUTOF10: 2
PAINLEVEL_OUTOF10: 3
PAINLEVEL_OUTOF10: 6
PAINLEVEL_OUTOF10: 4

## 2022-09-18 ASSESSMENT — PAIN DESCRIPTION - FREQUENCY: FREQUENCY: CONTINUOUS

## 2022-09-18 ASSESSMENT — PAIN DESCRIPTION - ORIENTATION
ORIENTATION: RIGHT

## 2022-09-18 ASSESSMENT — PAIN DESCRIPTION - LOCATION
LOCATION: LEG
LOCATION: FOOT
LOCATION: LEG
LOCATION: FOOT
LOCATION: LEG

## 2022-09-18 ASSESSMENT — PAIN DESCRIPTION - PAIN TYPE
TYPE: ACUTE PAIN
TYPE: ACUTE PAIN

## 2022-09-18 ASSESSMENT — PAIN DESCRIPTION - DESCRIPTORS
DESCRIPTORS: ACHING

## 2022-09-18 ASSESSMENT — PAIN DESCRIPTION - ONSET
ONSET: ON-GOING
ONSET: ON-GOING

## 2022-09-18 NOTE — CONSULTS
[facility]    Patient's Name/Date of Birth: Judge Chueng / 1935 (62 y.o.)    Date: September 18, 2022     Referring Provider: Dinora Seals MD    CHIEF COMPLAINT: Fatigue    Cardiology consulted for: Right groin pseudoaneurysm      HPI: This is a pleasant 82 y.o. female with a past medical history of PAD, HFrEF s/p dual ICD, Afib, s/p EVAR, HTN, HLD, CKD stage 3, Hx of blood clots, COPD, and recent CLI gram with stent and balloon to right lower extremity who presents with fatigue and weakness. Patient states she was unable to get out of bed before coming to the hospital.  She recently had a lower extremity angiogram with successful percutaneous right EVAR graft occlusion status post reconstitution with 4 11-mm Viabahn VBX stents postdilated with a 14 mm conquest balloon, right common iliac and external iliac artery Occlusion, treated with 2 8 mm and 1 7 mm VBX stent postdilated with 8 and 10 mm balloons with full reconstitution of flow into common femoral artery followed by right SFA/popliteal artery occlusion treated with Elizabeth by Dr. Ervin Keene on 9/6/2022. In the ED patient became hypoxic and was escalated to 2 L NC. Troponins within normal limit. Chest x-ray negative for acute findings. CTA abdominal with aorta runoff demonstrated a 1.1 x 1.1 cm R CFA pseudoaneurysm, occluded right femoral anterior tibial artery bypass, patent R SFA. During hospitalization, blood cultures have been positive for MRSA bacteremia. Patient is on IV vancomycin and gentamicin for synergistic therapy. Patient underwent successful pseudoaneurysm thrombosis on 9/17.         Echo: Results for orders placed during the hospital encounter of 12/28/19    ECHO Complete 2D W Doppler W Color    Narrative  Transthoracic Echocardiography Report (TTE)    Demographics    Patient Name   Nikhil Waddell  Gender               Female  A    MR #           [id number]     Race     Ethnicity    Account #      [de-identified]     Room Number          3662    Accession      [id number]     Date of Study        12/28/2019  Number    Date of Birth  1935    Referring Physician  José Luis Gagnon MD    Age            82 year(s)    Jagdeep Hernandez MD  Physician    Procedure    Type of Study    TTE procedure:ECHOCARDIOGRAM COMPLETE 2D W DOPPLER W COLOR. Procedure Date  Date: 12/28/2019 Start: 10:32 AM    Study Location: Bedside  Technical Quality: Adequate visualization    Indications:Congestive heart failure. Additional Medical History: Former smoker, respiratory failure, PAC's, pedal  edema, peripheral vascular disease, GERD, hyperlipidemia, SVT, anemia,  atrial fibrillation, AAA, chronic kidney disease, pulmonary edema    Patient Status: Routine    Height: 65 inches Weight: 155 pounds BSA: 1.78 m^2 BMI: 25.79 kg/m^2    BP: 107/56 mmHg    Conclusions    Summary  Normal left ventricular wall thickness. Left Ventricular size is Moderately increased . There was severe global hypokinesis of the left ventricle. Ejection fraction is visually estimated in the range of 10% to 15%. Features were consistent with a pseudonormal left ventricular filling  pattern, with concomitant abnormal relaxation and increased filling  pressure (grade 2 diastolic dysfunction). Structurally normal mitral valve. Mild to Moderate mitral regurgitation is present. Tricuspid valve is structurally normal.  Mild tricuspid regurgitation. Right ventricular systolic pressure measures 35-40mmHg. Ascites Vs pleural effusion noted. The aorta is within normal limits. The IVC is dilated . Estimated right atrial pressure is 10-15mmHg .     Signature    ----------------------------------------------------------------  Electronically signed by Avelina Li MD (Interpreting  physician) on 12/28/2019 at 06:31 3.3 cmLA Area: 29.3  LV FS:4.8 %    164.6 ml                  cm^2  LV PW          LV Volume Systolic: 694.3  Diastolic: 1.1 ml  cm             LV EDV/LV EDV Index:  Septum         164.6 ml/92 m^2LV ESV/LV  RV Diastolic Dimension: 2.6 cm  Diastolic: 1.1 ESV Index: 127.3 ml/78  cm             m^2                       LA/Aorta: 1.48  EF Calculated: 15.7 %     Ascending Aorta: 3.5 cm  LA volume/Index: 106 ml /60m^2  LV Length: 9 cm  LV Area  Diastolic:  47.7 cm^2  LV Area  Systolic: 15.5  cm^2    Doppler Measurements & Calculations    MV Peak E-Wave: 91.7 AV Peak Velocity: 118 LVOT Peak Velocity: 54.8 cm/s  cm/s                 cm/s                  LVOT Mean Velocity: 43.5 cm/s  MV Peak A-Wave: 81.8 AV Peak Gradient:     LVOT Peak Gradient: 1 mmHgLVOT  cm/s                 5.57 mmHg             Mean Gradient: 1 mmHg  MV E/A Ratio: 1.12   AV Mean Velocity:  MV Peak Gradient:    84.2 cm/s  3.36 mmHg            AV Mean Gradient: 3  mmHg                  TR Velocity:201 cm/s  MV Deceleration      AV VTI: 21.7 cm       TR Gradient:16.16 mmHg  Time: 197 msec                             PV Peak Velocity: 68.1 cm/s  MV P1/2t: 58 msec                          PV Peak Gradient: 1.86 mmHg  MVA by PHT:3.79 cm^2 LVOT VTI: 11.1 cm  IVRT: 88 msec  MV E' Septal  Velocity: 2.7 cm/s  MV A' Septal         AV DVI (VTI): 0.51AV  Velocity: 3.8 cm/s   DVI (Vmax):0.46  MV E' Lateral  Velocity: 7.4 cm/s  MV A' Lateral  Velocity: 4.4 cm/s  E/E' septal: 33.96  E/E' lateral: 12.39  MR Velocity: 434  cm/s    [id number]%3d       All labs, EKG's, diagnostic testing and images as well as cardiac cath, stress testing were reviewed during this encounter    Past Medical History:   Diagnosis Date    A-fib [facility])     AAA (abdominal aortic aneurysm) (HCC)     Achalasia     Anemia     Arthritis     Blood circulation, collateral BPPV (benign paroxysmal positional vertigo) 16    CHF (congestive heart failure) (HCC)     Chronic kidney disease     sees Dr Sharad Gilman cancer [facility])     Gastrointestinal hemorrhage     GERD (gastroesophageal reflux disease)     ? esophageal stricture    Hiatal hernia     History of blood transfusion     HTN (hypertension)     Hx of blood clots 2018    R leg-sees ABEBA Hargrove    Hyperlipidemia     Medtronic dual ICD  2020    Neuromuscular disorder ([facility] [address]     Obesity     Osteopenia     Osteoporosis     PAC (premature atrial contraction)     on betablocker    Paralysis (HCC)     baby    Pedal edema     denied any hx of hypertension    Pneumonia     PVC (premature ventricular contraction)     sees Dr Anuj Cobian    PVD (peripheral vascular disease) ([address]     Small bowel obstruction (HCC)     Tinnitus     UTI (urinary tract infection)      Past Surgical History:   Procedure Laterality Date    ABDOMINAL AORTIC ANEURYSM REPAIR, ENDOVASCULAR N/A 2/15/2019    ABDOMINAL AORTIC ANEURYSM REPAIR ENDOVASCULAR, DECLOTTING RIGHT FEM TIB BYPASS GRAFT performed by Melissa Emerson MD at [facility]    lumpectomy    CHOLECYSTECTOMY      30 years ago    COLONOSCOPY  2018    Dr Long Vazquez N/A 2019    COLONOSCOPY DIAGNOSTIC performed by Lexis Schaeffer MD at [facility]    COLONOSCOPY N/A 2020    COLONOSCOPY CONTROL HEMORRHAGE performed by Ashok Soler MD at [facility]    COLONOSCOPY Left 3/2/2021    COLORECTAL CANCER SCREENING, NOT HIGH RISK performed by Paris Eisenmenger, MD at [facility]      eye, eyelids    EYE SURGERY      cataract    HEMICOLECTOMY N/A 2020    OPEN RIGHT COLON RESECTION performed by Patricia Taylor MD at [facility] ([address] [place] [address])      LARYNGOSCOPY  07/15/2016    OTHER SURGICAL HISTORY  10/06/2020    Exp lap washout mesenteric lymph node biopsy EGD abthera placement Dr Harshad Mendez [facility]    OTHER SURGICAL HISTORY  10/08/2020    reopening recent lap open g tube placement intra operative EGD and primary closure of open abdomen-Dr [address] OLEGARIO SKN SUB GRFT T/A/L AREA/<100SCM /<1ST 25 SCM N/A 9/6/2018    RE-EXPLORATION RIGHT GROIN, DECLOTTING OF FEMORAL BYPASS GRAFT performed by Ihsan Seymour MD at [address] EGD TRANSORAL BIOPSY SINGLE/MULTIPLE Left 11/27/2017    EGD BIOPSY performed by Gloria Morgan MD at [facility], PARTIAL, [facility] N/A 8/20/2018    ROBOT ASSISTED COLECTOMY (LOW ANTERIOR) performed by Ting Frank MD at [address] OFFICE/OUTPT [address] [facility] N/A 9/5/2018    RIGHT FEMORAL EMBOLECTOMY, INTRAOPERATIVE ARTERIOGRAM, RIGHT ILIAC EMBOLECTOMY, RIGHT COMMON AND EXTERNAL ILIAC STENTING, RIGHT FEMORAL TO ANTERIOR POPLITEAL BYPASS WITH 6MM GORTEX GRAFT performed by Ihsan Seymour MD at [address] / [address] Right 2/14/2019    FEMORAL EMBOLECTOMY THROMBECTOMY, RIGHT LEG performed by Ihsan Seymour MD at [address] 11/27/2017    EGD SUBMUCOSAL/BOTOX INJECTION performed by Gloria Morgan MD at [address] N/A 2/22/2019    EGD BIOPSY performed by Duong Salinas MD at [facility]     Current Facility-Administered Medications   Medication Dose Route Frequency Provider Last Rate Last Admin    potassium bicarb-citric acid (EFFER-K) effervescent tablet 40 mEq  40 mEq Oral BID Owen Da, DO        potassium chloride (KLOR-CON M) extended release tablet 40 mEq  40 mEq Oral PRN Hal Bouquet, DO        Or    potassium bicarb-citric acid (EFFER-K) effervescent tablet 40 mEq  40 mEq Oral PRN Hal Bouquet, DO        Or    potassium chloride 10 mEq/100 mL IVPB (Peripheral Line)  10 mEq IntraVENous PRN Owen Da, DO        insulin lispro (HUMALOG) injection vial 0-4 Units  0-4 Units SubCUTAneous 4x Daily AC & HS ROMAN Maldonado CNP        norepinephrine (LEVOPHED) 16 mg in sodium chloride 0.9 % 250 mL infusion  1-100 mcg/min IntraVENous Continuous ROMAN Maldonado CNP 2.8 mL/hr at 09/17/22 2046 3 mcg/min at 09/17/22 2046    aspirin chewable tablet 81 mg  81 mg Oral Daily ROMAN Maldonado CNP   81 mg at 09/18/22 0856    clopidogrel (PLAVIX) tablet 75 mg  75 mg Oral Daily ROMAN Maldonado CNP   75 mg at 09/18/22 0855    hydrocortisone (ANUSOL-HC) suppository 25 mg  25 mg Rectal Nightly ROMAN Maldonado CNP   25 mg at 09/16/22 2301    magnesium oxide (MAG-OX) tablet 250 mg  250 mg Oral BID ROMAN Maldonado CNP   250 mg at 09/18/22 0855    melatonin tablet 6 mg  6 mg Oral Nightly PRN ROMAN Maldonado CNP   6 mg at 09/17/22 2226    metoprolol succinate (TOPROL XL) extended release tablet 25 mg  25 mg Oral Daily ROMAN Maldonado CNP   25 mg at 09/18/22 0855    pantoprazole (PROTONIX) tablet 40 mg  40 mg Oral BID AC ROMAN Maldonado CNP   40 mg at 09/18/22 8713    pravastatin (PRAVACHOL) tablet 40 mg  40 mg Oral Daily ROMAN Maldonado CNP   40 mg at 09/18/22 0855    [Held by provider] sacubitril-valsartan (ENTRESTO) 24-26 MG per tablet 1 tablet  1 tablet Oral BID ROMAN Maldonado CNP        timolol (TIMOPTIC) 0.5 % ophthalmic solution 1 drop  1 drop Both Eyes Daily ROMAN Maldonado CNP   1 drop at 09/18/22 0858    sodium chloride flush 0.9 % injection 5-40 mL  5-40 mL IntraVENous 2 times per day ROMAN Maldonado CNP   10 mL at 09/18/22 0857    sodium chloride flush 0.9 % injection 5-40 mL  5-40 mL IntraVENous PRN ROMAN Maldonado CNP        0.9 % sodium chloride infusion   IntraVENous PRN ROMAN Maldonado CNP 10 mL/hr at 09/17/22 0996 Start Date End Date Taking? Authorizing Provider   rivaroxaban (XARELTO) 20 MG TABS tablet Take 1 tablet by mouth daily 9/11/22   Javid Carlos MD   aspirin 81 MG chewable tablet Take 1 tablet by mouth daily 9/12/22   Javid Carlos MD   hydrocortisone (ANUSOL-HC) 25 MG suppository Place 25 mg rectally nightly    Historical Provider, MD   sacubitril-valsartan (ENTRESTO) 24-26 MG per tablet Take 1 tablet by mouth 2 times daily 7/11/22   ROMAN Bailey CNP   bumetanide (BUMEX) 1 MG tablet Take 1 tablet by mouth once a week On Mondays and PRN 7/11/22   ROMAN Bailey CNP   potassium chloride (KLOR-CON M) 10 MEQ extended release tablet Take 1 tablet by mouth once a week 7/11/22   ROMAN Bailey CNP   apixaban (ELIQUIS) 2.5 MG TABS tablet TAKE 1 TABLET BY MOUTH TWICE DAILY 6/28/22 9/11/22  ROMAN Ya CNP   magnesium (MAGNESIUM-OXIDE) 250 MG TABS tablet TAKE 2 TABLETS BY MOUTH TWICE DAILY 5/16/22   ROMAN Bailey CNP   clopidogrel (PLAVIX) 75 MG tablet TAKE 1 TABLET BY MOUTH DAILY 3/29/22   Javid Carlos MD   pravastatin (PRAVACHOL) 40 MG tablet TAKE 1 TABLET BY MOUTH DAILY 3/29/22   Javid Carlos MD   metoprolol succinate (TOPROL XL) 25 MG extended release tablet TAKE 1 TABLET BY MOUTH DAILY 10/15/21   Geovanna Ramirez MD   pantoprazole (PROTONIX) 40 MG tablet Take 40 mg by mouth 2 times daily (before meals)    Historical Provider, MD   albuterol sulfate HFA (PROVENTIL HFA) 108 (90 Base) MCG/ACT inhaler Inhale 2 puffs into the lungs every 6 hours as needed for Wheezing or Shortness of Breath 12/31/19   Todd Beltrán, DO   Blood Pressure KIT Check blood pressure daily, and if symptoms of lightheadedness.   Call physician if BP <80/40. 12/31/19   Todd Beltrán, DO   melatonin 3 MG TABS tablet Take 2 tablets by mouth nightly as needed (sleep) 2/26/19   Manish English PA-C   acetaminophen (TYLENOL) 650 MG extended release tablet Take 1,300 mg by mouth every 12 hours as needed for Pain     Historical Provider, MD   timolol (TIMOPTIC) 0.5 % ophthalmic solution Place 1 drop into both eyes daily    Historical Provider, MD   Scheduled Meds:   potassium bicarb-citric acid  40 mEq Oral BID    insulin lispro  0-4 Units SubCUTAneous 4x Daily AC & HS    aspirin  81 mg Oral Daily    clopidogrel  75 mg Oral Daily    hydrocortisone  25 mg Rectal Nightly    magnesium oxide  250 mg Oral BID    metoprolol succinate  25 mg Oral Daily    pantoprazole  40 mg Oral BID AC    pravastatin  40 mg Oral Daily    [Held by provider] sacubitril-valsartan  1 tablet Oral BID    timolol  1 drop Both Eyes Daily    sodium chloride flush  5-40 mL IntraVENous 2 times per day    rivaroxaban  15 mg Oral Daily with breakfast    vancomycin (VANCOCIN) intermittent dosing (placeholder)   Other RX Placeholder    vancomycin  1,000 mg IntraVENous Q24H    gentamicin  80 mg IntraVENous Q24H     Continuous Infusions:   norepinephrine 3 mcg/min (09/17/22 2046)    sodium chloride 10 mL/hr at 09/17/22 0616    dextrose       PRN Meds:.potassium chloride **OR** potassium alternative oral replacement **OR** potassium chloride, melatonin, sodium chloride flush, sodium chloride, ondansetron **OR** ondansetron, polyethylene glycol, acetaminophen **OR** acetaminophen, albuterol sulfate HFA, glucose, dextrose bolus **OR** dextrose bolus, glucagon (rDNA), dextrose, iopamidol    Allergies   Allergen Reactions    Lasix [Furosemide] Other (See Comments)     PATIENT DOESN'T REMEMBER    Lipitor [Atorvastatin] Other (See Comments)     LEG PAIN    Neurontin [Gabapentin] Other (See Comments)     PATIENT DOESN'T REMEMBER    Oxycontin [Oxycodone Hcl]      confusion    Penicillins Other (See Comments)     HYPERTENSION     Family History   Problem Relation Age of Onset    Heart Disease Mother     Stroke Mother     Cancer Father         lung    Emphysema Father     Other Sister         leukemia    Heart Disease Maternal Grandmother Dementia Maternal Grandmother     Heart Disease Maternal Grandfather     No Known Problems Paternal Grandmother     No Known Problems Paternal Grandfather      Social History     Socioeconomic History    Marital status:      Spouse name: Juan José Chandra    Number of children: 4    Years of education: Not on file    Highest education level: Not on file   Occupational History    Not on file   Tobacco Use    Smoking status: Some Days     Packs/day: 0.25     Years: 60.00     Pack years: 15.00     Types: Cigarettes     Start date: 28    Smokeless tobacco: Never    Tobacco comments:     4-5 a day   Vaping Use    Vaping Use: Never used   Substance and Sexual Activity    Alcohol use: Yes     Alcohol/week: 1.0 standard drink     Types: 1 Glasses of wine per week     Comment: social    Drug use: No    Sexual activity: Not Currently   Other Topics Concern    Not on file   Social History Narrative    Not on file     Social Determinants of Health     Financial Resource Strain: Not on file   Food Insecurity: Not on file   Transportation Needs: Not on file   Physical Activity: Not on file   Stress: Not on file   Social Connections: Not on file   Intimate Partner Violence: Not on file   Housing Stability: Not on file     ROS:   Constitutional: Denies any recent wt change. Eyes:  Denies any blurring or double vision, no glaucoma  Ears/Nose/Mouth/Throat:  Denies any chronic sinus/rhinitis, bleeding gums  Cardiovascular:  As described above. Respiratory:  Denies any frequent cough, wheezing or coughing up blood  Genitourinary:  Denies difficulty with urination and kidney stones  Gastrointestinal:  Denies any chronic problems with abdominal pain, nausea, vomiting or diarrhea  Musculoskeletal: Right lower extremity pain  Integumentary:  Denies any rash  Neurological:  No numbness or tingling  Endocrine:  Denies any polydipsia. Hematologic/Lymphatic:  Denies any hemorrhage or lymphatic drainage problems.     Labs:  CBC:   Recent Labs     09/15/22  1900 09/16/22  0225 09/16/22 2021 09/17/22  0350 09/18/22  0500   WBC 10.7  --   --  5.1 5.3   HGB 12.1   < > 10.7* 11.0* 11.1*   HCT 37.3   < > 32.5* 33.2* 33.4*   MCV 94.0  --   --  91.7 91.3   *  --   --  81* 74*    < > = values in this interval not displayed. BMP:   Recent Labs     09/16/22 2021 09/17/22  0350 09/18/22  0500    140 137   K 3.7 3.5 3.4*    106 105   CO2 25 25 25   BUN 25* 24* 23*   CREATININE 1.2 1.1 1.0   MG  --  1.9 1.9     Accucheck Glucoses:   Recent Labs     09/17/22  0516 09/17/22  0816 09/17/22  1354 09/17/22  2158 09/18/22  0636   POCGLU 97 95 102 107 93     Cardiac Enzymes: No results for input(s): CKTOTAL, CKMB, CKMBINDEX, TROPONINI in the last 72 hours. PT/INR: No results for input(s): PROTIME, INR in the last 72 hours. APTT: No results for input(s): APTT in the last 72 hours.   Liver Profile:  Lab Results   Component Value Date/Time    AST 36 09/15/2022 07:00 PM    ALT 16 09/15/2022 07:00 PM    BILIDIR <0.2 02/28/2021 03:10 PM    BILITOT 0.7 09/15/2022 07:00 PM    ALKPHOS 95 09/15/2022 07:00 PM     Lab Results   Component Value Date/Time    CHOL 111 07/05/2021 07:36 AM    CHOL 105 05/18/2019 04:06 AM    HDL 41 07/05/2021 07:36 AM    TRIG 104 07/05/2021 07:36 AM     TSH:   Lab Results   Component Value Date/Time    TSH 1.010 09/16/2022 02:25 AM     UA:   Lab Results   Component Value Date/Time    COLORU YELLOW 09/15/2022 10:17 PM    PHUR 6.0 09/15/2022 10:17 PM    LABCAST NONE SEEN 03/01/2021 06:11 AM    LABCAST NONE SEEN 03/01/2021 06:11 AM    WBCUA 10-15 09/15/2022 10:17 PM    RBCUA 0-2 09/15/2022 10:17 PM    YEAST NONE SEEN 09/15/2022 10:17 PM    BACTERIA MANY 09/15/2022 10:17 PM    SPECGRAV >1.030 03/01/2021 06:11 AM    LEUKOCYTESUR SMALL 09/15/2022 10:17 PM    UROBILINOGEN 0.2 09/15/2022 10:17 PM    BILIRUBINUR NEGATIVE 09/15/2022 10:17 PM    BLOODU TRACE 09/15/2022 10:17 PM    GLUCOSEU NEGATIVE 09/15/2022 10:17 PM         Physical Exam:  Vitals:    09/18/22 0916   BP: 103/65   Pulse: 95   Resp: 27   Temp:    SpO2: 97%      Intake/Output Summary (Last 24 hours) at 9/18/2022 1113  Last data filed at 9/18/2022 1045  Gross per 24 hour   Intake 1473.19 ml   Output 1525 ml   Net -51.81 ml      General:  No acute distress  Neck: Supple, no JVD, no carotid bruits  Heart: Regular rhythm normal S1 and S2, no rubs, murmurs or gallops  Lungs: clear to ascultation no rales, wheezes, or rhonchi  Abdomen: positive bowel sounds, soft, non-tender, non-distended, no bruits, no masses  Extremities: Tenderness of right lower extremity, warm extremities   Neurologic: alert and oriented x 3, cranial nerves 2-12 grossly intact, motor and sensory intact, moving all extremities  Skin: No rashes  Psych: AO x 3, no depression/angela, no pressured speech, normal affect  Lymph: No obvious LAD      Assessment:  Septic shock  Pseudoaneurysm R FA  Right ankle trifurcation vessel disease  MRSA bacteremia-on IV vancomycin and gentamicin  Complicated UTI  Chronic HFrEF, NYHA II  Paroxysmal atrial fibrillation- [id number]  ALEJANDRO on CKD 3  HTN  HLD  GERD  Hx of AAA- s/p vascular repair 2019  Hx of blood clots  Hx of colovesical fistula  Tobacco abuse    Plan:  Lower extremity arteries doppler bilateral ordered to follow up on pseudoaneurysm thrombosis. Continue IV antibiotics. Continue Xarelto 15 mg oral daily, Plavix 75 mg oral daily, and aspirin 81 mg  Continue Pravastatin 40 mg daily  Further recommendations based on results and clinical course. Thank you for allowing us to participate in the care of this patient. Please do not hesitate to call us with questions. Electronically signed by Miguel Bertrand DO on 9/18/2022 at 11:13 AM    Attending Supervising Physician's [address] Statement  I performed a history and physical examination on the patient and discussed the management with the resident physician.  I reviewed and agree with the findings and plan as documented in the resident's note except for as noted below. Septic shock post complex LE intervention. Has multiple covered stents and EVAR with significant thrombus lining the endograft. Has PSA 2.0 cm that was thrombosed per IR. Has pain post thrombosis. Will check bilateral LE duplex. Additionally, pressor requirement improving. Gentle hydration. Leg is warm on exam.  Drop ASA, continue Xarelto/Plavix. May hold BB while treating with pressors. IV PPI. If cultures remain positive, consider YANDEL and PSA evaluation by vascular surgery to rule out infective source. Further recommendations based on results and clinical course.       Electronically signed by Willey Lombard, MD on 9/18/22 at 11:22 AM EDT  Interventional Cardiology - [facility]

## 2022-09-18 NOTE — PROGRESS NOTES
Physician Progress Note      aMrcos Ford  CSN #:                  [id number]  :                       1935  ADMIT DATE:       9/15/2022 6:33 PM  DISCH DATE:  RESPONDING  PROVIDER #:        MORRIS FIELDS          QUERY TEXT:    Patient admitted with Fatigue/weakness found to have UTI. Documentation   reflects Sepsis in ED note. If possible, please document in the progress   notes and discharge summary if Sepsis was: The medical record reflects the following:  Risk Factors: Elderly  Clinical Indicators: UTI +, lactic 5.3, procal 17.1, hypotension, ALEJANDRO, WBC   10.7  Treatment: ICU placement, IV ATB's,  IVF, lab monitoring, imaging    Thank You! Keith Quinonez RN, CRCR  RN Clinical Documentation Integrity  G) [phone] (A) [phone]  Options provided:  -- Sepsis POA  confirmed after study  -- Sepsis POA  ruled out after study  -- Other - I will add my own diagnosis  -- Disagree - Not applicable / Not valid  -- Disagree - Clinically unable to determine / Unknown  -- Refer to Clinical Documentation Reviewer    PROVIDER RESPONSE TEXT:    Sepsis POA confirmed after study.     Query created by: Wiliam Llamas on 2022 9:20 AM      Electronically signed by:  Emy Kearns 2022 10:29 AM

## 2022-09-18 NOTE — PLAN OF CARE
Problem: Discharge Planning  Goal: Discharge to home or other facility with appropriate resources  Outcome: Progressing  Flowsheets (Taken 9/17/2022 2000)  Discharge to home or other facility with appropriate resources: Identify barriers to discharge with patient and caregiver     Problem: Respiratory - Adult  Goal: Achieves optimal ventilation and oxygenation  Outcome: Progressing  Flowsheets  Taken 9/17/2022 2313 by Kev Savage RN  Achieves optimal ventilation and oxygenation:   Assess for changes in respiratory status   Assess for changes in mentation and behavior  Taken 9/17/2022 1621 by Chrystal Haines RN  Achieves optimal ventilation and oxygenation: Assess for changes in respiratory status     Problem: Cardiovascular - Adult  Goal: Maintains optimal cardiac output and hemodynamic stability  Outcome: Progressing  Flowsheets  Taken 9/17/2022 2313  Maintains optimal cardiac output and hemodynamic stability:   Monitor blood pressure and heart rate   Monitor urine output and notify Licensed Independent Practitioner for values outside of normal range   Assess for signs of decreased cardiac output  Taken 9/17/2022 2000  Maintains optimal cardiac output and hemodynamic stability:   Monitor blood pressure and heart rate   Monitor urine output and notify Licensed Independent Practitioner for values outside of normal range   Assess for signs of decreased cardiac output     Problem: Skin/Tissue Integrity - Adult  Goal: Incisions, wounds, or drain sites healing without S/S of infection  Outcome: Progressing  Flowsheets (Taken 9/17/2022 2000)  Incisions, Wounds, or Drain Sites Healing Without Sign and Symptoms of Infection:   TWICE DAILY: Assess and document skin integrity   TWICE DAILY: Assess and document dressing/incision, wound bed, drain sites and surrounding tissue  Goal: Oral mucous membranes remain intact  Outcome: Progressing     Problem: Genitourinary - Adult  Goal: Absence of urinary retention  Outcome: Progressing  Flowsheets (Taken 9/17/2022 2313)  Absence of urinary retention: Discuss with Licensed Independent Practitioner  medications to alleviate retention as needed     Problem: Metabolic/Fluid and Electrolytes - Adult  Goal: Electrolytes maintained within normal limits  Outcome: Progressing  Flowsheets (Taken 9/17/2022 2000)  Electrolytes maintained within normal limits:   Monitor labs and assess patient for signs and symptoms of electrolyte imbalances   Administer electrolyte replacement as ordered   Monitor response to electrolyte replacements, including repeat lab results as appropriate     Problem: ABCDS Injury Assessment  Goal: Absence of physical injury  Outcome: Progressing     Problem: Safety - Adult  Goal: Free from fall injury  Outcome: Progressing     Problem: Chronic Conditions and Co-morbidities  Goal: Patient's chronic conditions and co-morbidity symptoms are monitored and maintained or improved  Outcome: Progressing  Flowsheets (Taken 9/17/2022 2000)  Care Plan - Patient's Chronic Conditions and Co-Morbidity Symptoms are Monitored and Maintained or Improved:   Monitor and assess patient's chronic conditions and comorbid symptoms for stability, deterioration, or improvement   Collaborate with multidisciplinary team to address chronic and comorbid conditions and prevent exacerbation or deterioration   Update acute care plan with appropriate goals if chronic or comorbid symptoms are exacerbated and prevent overall improvement and discharge     Problem: Discharge Planning  Goal: Discharge to home or other facility with appropriate resources  9/17/2022 2315 by Shahid Lawton, RN  Outcome: Progressing  9/17/2022 2313 by Shahid Lawton, RN  Outcome: Progressing  Flowsheets (Taken 9/17/2022 2000)  Discharge to home or other facility with appropriate resources: Identify barriers to discharge with patient and caregiver     Problem: Respiratory - Adult  Goal: Achieves optimal ventilation and oxygenation  9/17/2022 2315 by Marcos Adkins RN  Outcome: Progressing  9/17/2022 2313 by Marcos Adkins RN  Outcome: Progressing  Flowsheets  Taken 9/17/2022 2313 by Marcos Adkins RN  Achieves optimal ventilation and oxygenation:   Assess for changes in respiratory status   Assess for changes in mentation and behavior  Taken 9/17/2022 1621 by Chrystal Haines RN  Achieves optimal ventilation and oxygenation: Assess for changes in respiratory status     Problem: Cardiovascular - Adult  Goal: Maintains optimal cardiac output and hemodynamic stability  9/17/2022 2315 by Marcos Adkins RN  Outcome: Progressing  9/17/2022 2313 by Marcos Adkins RN  Outcome: Progressing  Flowsheets  Taken 9/17/2022 2313  Maintains optimal cardiac output and hemodynamic stability:   Monitor blood pressure and heart rate   Monitor urine output and notify Licensed Independent Practitioner for values outside of normal range   Assess for signs of decreased cardiac output  Taken 9/17/2022 2000  Maintains optimal cardiac output and hemodynamic stability:   Monitor blood pressure and heart rate   Monitor urine output and notify Licensed Independent Practitioner for values outside of normal range   Assess for signs of decreased cardiac output     Problem: Skin/Tissue Integrity - Adult  Goal: Incisions, wounds, or drain sites healing without S/S of infection  9/17/2022 2315 by Marcos Adkins RN  Outcome: Progressing  9/17/2022 2313 by Marcos Adkins RN  Outcome: Progressing  Flowsheets (Taken 9/17/2022 2000)  Incisions, Wounds, or Drain Sites Healing Without Sign and Symptoms of Infection:   TWICE DAILY: Assess and document skin integrity   TWICE DAILY: Assess and document dressing/incision, wound bed, drain sites and surrounding tissue  Goal: Oral mucous membranes remain intact  9/17/2022 2315 by Marcos Adkins RN  Outcome: Progressing  9/17/2022 2313 by Marcos Adkins RN  Outcome: Progressing Problem: Genitourinary - Adult  Goal: Absence of urinary retention  9/17/2022 2315 by Karthikeyan Tejada RN  Outcome: Progressing  9/17/2022 2313 by Karthikeyan Tejada RN  Outcome: Progressing  Flowsheets (Taken 9/17/2022 2313)  Absence of urinary retention: Discuss with Licensed Independent Practitioner  medications to alleviate retention as needed     Problem: Metabolic/Fluid and Electrolytes - Adult  Goal: Electrolytes maintained within normal limits  9/17/2022 2315 by Karthikeyan Tejada RN  Outcome: Progressing  9/17/2022 2313 by Karthikeyan Tejada RN  Outcome: Progressing  Flowsheets (Taken 9/17/2022 2000)  Electrolytes maintained within normal limits:   Monitor labs and assess patient for signs and symptoms of electrolyte imbalances   Administer electrolyte replacement as ordered   Monitor response to electrolyte replacements, including repeat lab results as appropriate     Problem: ABCDS Injury Assessment  Goal: Absence of physical injury  9/17/2022 2315 by Karthikeyan Tejada RN  Outcome: Progressing  9/17/2022 2313 by Karthikeyan Tejada RN  Outcome: Progressing     Problem: Safety - Adult  Goal: Free from fall injury  9/17/2022 2315 by Karthikeyan Tejada RN  Outcome: Progressing  9/17/2022 2313 by Karthikeyan Tejada RN  Outcome: Progressing     Problem: Chronic Conditions and Co-morbidities  Goal: Patient's chronic conditions and co-morbidity symptoms are monitored and maintained or improved  9/17/2022 2315 by Karthikeyan Tejada RN  Outcome: Progressing  9/17/2022 2313 by Karthikeyan Tejada RN  Outcome: Progressing  Flowsheets (Taken 9/17/2022 2000)  Care Plan - Patient's Chronic Conditions and Co-Morbidity Symptoms are Monitored and Maintained or Improved:   Monitor and assess patient's chronic conditions and comorbid symptoms for stability, deterioration, or improvement   Collaborate with multidisciplinary team to address chronic and comorbid conditions and prevent exacerbation or deterioration   Update acute care plan with appropriate goals if chronic or comorbid symptoms are exacerbated and prevent overall improvement and discharge

## 2022-09-18 NOTE — PROGRESS NOTES
CRITICAL CARE PROGRESS NOTE      Patient:  Ankit Whitney    Unit/Bed:4D-17/017-A  YOB: 1935  MRN: [id number]   PCP: Gwenetta Romberg  Date of Admission: 9/15/2022  Chief Complaint:- Fatigue    Assessment and Plan:    Septic shock: Secondary to MRSA bacteremia. Possible infected endovascular graft vs pseudoaneurysm, see below. Presented SIRS 1/4 for tachycardia and hypotension. LA 5.3 (since normalized). Procal 17.11. Given 30 cc/kg IVF. Cultures obtained (MRSA bacteremia). Persistent hypotension, started on pressors 9/16. A-line placed 9/16. Wean off pressors for MAP > 65. Currently only requiring Levo. MRSA bacteremia:  Blood cultures positive for gram positive cocci in clusters x2. Blood molecular ID positive for MRSA. On synergistic therapy vancomycin and gentamicin  day 3/7. Will require prolonged course of antibiotics afterwards with repeat blood cultures pending. Complicated UTI:  Prior history of UTIs. Presented UA positive for nitrate, LE small, WBC 10-15 and many bacteria. Urine culture gram negative bacilli. Started on zosyn, will d/c as gent will cover gram neg. Pseudoaneurysm R FA:  CTA Abd w/ aorta runoff demonstrated 1.1 x 1.1 cm pseudoaneurysm in R FA 9/15. Likely secondary to recent extensive intravascular procedure. Arterial duplex bilateral confirmed 2 cm pseudoaneurysm R FA, lumen more than 50% thrombosed, amenable to thrombin injection. S/p thrombosis 9/17 with IR. Cardiology following. Right ankle trifurcation vessel disease:  CTA Abd w/ aorta runoff demonstrated trifurcation vessel occlusion at R ankle 9/15. Recent extensive intravascular procedure involving stents and balloon throughout right lower extremity vessels. Dorsal and tibialis dorsal pulses present 9/16. Continue to monitor. Cardiology following. ALEJANDRO on CKD III, resolved: Cr 1.1 currently. baseline Cr ~1.0 - 1.2, eGFR 40-50. Presented Cr 1.5. BUN: Cr > 20, Sp gravity > 0.030. Currently oliguric.  Viri placed. FeNA = prerenal origin, present urine eosinophils. Possible renal healing from contrast induced nephropathy given recent angiogram. Avoid nephrotoxins. Renally dose medications. Continue gentle IVF given cardiac function. Monitor renal function with daily BMP. Thrombocytopenia: In setting of sepsis and recent tPA for pseudoaneurysm. Continue monitor PLT with daily CBC. Chronic HFrEF, NYHA II:  Dual ICD in place. Last echo EF 25-30% 6/20. Home GDMT ARNi, BB and diuretic. Presented BNP 6077. CXR not indicated of overload. Currently holding nephrotoxins. 2 g sodium restriction, defer fluid restriction. Monitor daily weights, strict I&Os. pAfib:   [id number] 6. Initial EKG SR with arrhthymias and occasional PVCs. On BB for rate control and xarelto for anticoagulation. Continue metoprolol 25 mg p.o daily and Xarelto 15 mg p.o daily. Placed on continuous telemetry. HTN: continue home metoprolol. Hold ARNi and bumex for ALEJANDRO. Monitor blood pressure. HLD:  continue home pravastatin 40 mg p.o daily   AAA:   s/p endovascular repair in 2019    History of blood clots:  per chart review. On Anticoagulation  GERD:  started on pantoprazole 40 mg p.o twice daily   Achalasia: GI consulted due to food stuck in esophagus. This however is chronic in nature and patient's baseline. Dysphagia diet started. History of colovesical fistula:  s/p right hemicolectomy x2. No colostomy. Tobacco Abuse:  endorses prolonged history of intermittent smoking. Reports trying to quit, with last smoke a few months ago. INITIAL H AND P AND ICU COURSE:  Per Initial H/P:  The patient is a 82year old female with a past medical history of HFrEF s/p dual ICD, pAfib, HTN, HLD, AAA s/p repair, PVD, blood clots, CKD, colovesical fisutla s/p right hemicolectomy and recent CLI s/p angiogram with stent and balloon to RLE here for fatigue and weakness.  The patient reports inability to get out of bed yesterday, with no improvement throughout the day. EMS was called, and she was found to be hypotensive with SBP in 80s. The patient denies any prior fevers, chills, N/V, changes in diet or sick contacts. She did not \"bomb exploding\" RLE leg pain, radiating downwards. This was worse with movement, alleviated by nothing. In the ED, vitals T max 97.8, RR 18, HR 97, BP 73/43 and SpO2 93%. She became hypoxic and was escalated to 2L NC. Labs significant for BUN 33, Cr 1.5. Troponin WNL, BNP 6077. EKG non significant. TSH and Cortisol WNL. Procal 17.11, LA 5.3. UA positive for nitrite, small LE, WBC 10-15 and many bacteria. CXR negative for acute findings. 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 He was given 2L NS bolus, albumin x1. Cultures were drawn and he was started on vancomycin and zosyn for empiric antibiotics coverage. Cardiology was consulted, the patient was admitted to the ICU for further care and evaluation.     Of note, She previously had a CTA abdominal aorta with runoff that was significant for 3.6 cm fusiform aneurysm of the infrarenal abdominal aorta; bifurcated endograft present; right iliac limb totally occluded and left iliac limb remains patent; stents in right common and external iliac arteries are totally occluded; collateral reconstitution of the right CFA and right profunda; total occlusion of native right SFA and right femoral/anterior tibial bypass graft; Collateral reconstitution of the trifurcation vessels right side; multifocal severe diffuse disease of left SFA; left popliteal artery demonstrated mild stenosis; all 3 trifurcation vessels left calf lateral but demonstrate multifocal disease; subcutaneous emphysema bilaterally that is worse on left 8/4/22. The patient underwent a bilateral lower extremity angiogram 9/6/2022. She was found to have R EVAR limb, R CFA, R SFA occlusions; dimunitive PFA; popliteal artery occlusion with collaterals, all three tibials occluded; and all three tibials are occluded with some degree of flow in the PT and AT. The patient underwent retrograde AT access, knuckled to the R CFA, then re-entry into the R EIA stents; VBX 11 x 39, VBX 11 x 59 x 2, VBX 11 x 39, VBX 8 x 59; Retrograde SFA/pop 6.0 PTA; Elizabeth 6 x 120 x 2 for ostial SFA to distal SFA; DCB 5 x 150 of the pop, and DCB 6 x 80 of the distal SFA/prox pop; and 3.0 PTA of the AT. This resulted in Complete reconstitution of the R EVAR limb, R CFA, R SFA, R POP, and R AT with improved flow into the PT - full flow into the pedal arch. During hospitalization, blood cultures were positive for MRSA bacteremia. The patient was started on vancomycin and gentamicin for synergistic therapy. Arterial duplex confirmed pseudoaneurysm with 50% lumen amenable to thrombin injection. The patient underwent successful pseudoaneurysm thrombosis 9/17. Repeat cultures were ordered. Past 24 hours:  Overnight the patient did complain of pain in the right lower extremity. Vitals were normal.  This morning the patient continues to complain of right lower extremity pain, however says it has improved. Pulses are palpable. She denies any other complaints. Weaning off pressors. Bilateral arterial Doppler planned today. Otherwise hemodynamically stable, vitals WNL. Past Medical History:  Per HPI. Family History: Mother - heart disease, CVA     Father - lung cancer, emphysema     Sister - leukemia  Social History:  Current smoker 15 PPY, occasional EtOH use, denies illicit drug use. ROS   Reports right lower extremity pain.   Denies N/V, fever, chills, chest pain, shortness of breath, cough, abdominal or urinary complaints    Scheduled Meds:   insulin lispro  0-4 Units SubCUTAneous 4x Daily AC & HS    aspirin  81 mg Oral Daily    clopidogrel  75 mg Oral Daily    hydrocortisone  25 mg Rectal Nightly    magnesium oxide  250 mg Oral BID    metoprolol succinate  25 mg Oral Daily    pantoprazole  40 mg Oral BID AC    pravastatin  40 mg Oral Daily    [Held by provider] sacubitril-valsartan  1 tablet Oral BID    timolol  1 drop Both Eyes Daily    sodium chloride flush  5-40 mL IntraVENous 2 times per day    rivaroxaban  15 mg Oral Daily with breakfast    vancomycin (VANCOCIN) intermittent dosing (placeholder)   Other RX Placeholder    vancomycin  1,000 mg IntraVENous Q24H    gentamicin  80 mg IntraVENous Q24H     Continuous Infusions:   norepinephrine 3 mcg/min (09/17/22 2046)    sodium chloride 10 mL/hr at 09/17/22 0616    dextrose         PHYSICAL EXAMINATION:  T: 98.1. P: 93. RR: 24. B/P: 100/54. O2 Sat: 97% on 2L NC. I/O:  net + 305 mL over 24 hours   Body mass index is 29.75 kg/m². GCS:   15    General:   Pleasant elderly female, resting in hospital bed, acutely ill appearing, pale, warm and dry  HEENT:  normocephalic and atraumatic. No scleral icterus. PERR  Neck: supple. No Thyromegaly. Lungs: Breath sounds clear to auscultation. No retractions  Cardiac: RRR. No JVD. Abdomen: soft. Nontender. Extremities: Right lower extremity warm from mid-calf down to foot, has good movement and appropriate sensation. Left lower extremity warm and dry. Left groin positive for ecchymosis and slight tenderness to touch. Pulses have been noted per doppler. Vasculature: capillary refill < 3 seconds. Palpable dorsalis pedis pulses. Skin:  warm and dry. Psych:  Alert and oriented x3. Affect appropriate  Lymph:  No supraclavicular adenopathy. Neurologic:  No focal deficit. No seizures. Data: (All radiographs, tracings, PFTs, and imaging are personally viewed and interpreted unless otherwise noted). 9/18/22 sodium 137, potassium 3.4, chloride 105, CO2 25, BUN 23, creatinine 1.0, glucose 84, AG 7.0.  9/18/22 WBC 5.3, Hgb 11.1, HCT 33.4, PLT 74  9/17/22 Urine culture - gram-negative bacilli  9/17/22 Renal US -trace ascites in Morison's pouch and splenomegaly; right inferior renal echogenic nodule, 9 x 12 x 8 mm-RCC suspected; subtle heterogeneously enhancing 12 mm nodule in the right kidney  9/16/22 Lactic acid 2.1   9/16/22 Cortisol 51.65  9/16/22 TSH 1.010  9/15/22 Blood culture; Staph aureus-MRSA  9/15/22 Procal 17.11  9/15/22 Troponin <0.010, BNP 6077  9/15/22 UA; nitrite positive, LE small, WBC 10-15, Sp gravity >1.030  9/15/22 CTA abd aorta w/ adi runoff demonstrated 1.1 x 1.1 cm R CFA pseudoaneurysm; occluded R femoral anterior tibial artery bypass; patent R SFA; Mild to moderate multifocal dx on R; trifurcation vessel occlusion at R ankle; moderate multifocal L CFA; 80% focal stenosis of L superficial femoral artery; mild multifocal dx of L popliteal artery; trifurcation vessel multifocal disease, patent at L ankle. 9/15/22 CXR demonstrate no acute findings; right paratracheal and retrocardiac opacity present on prior studies; previously reported dilated esophagus. Telemetry shows NSR. Seen with multidisciplinary ICU team.  Meets Continued ICU Level Care Criteria:    [x] Yes   [] No - Transfer Planned to listed location:  [] HOSPITALIST CONTACTED-      Case and plan discussed with Dr. Ramona Reddy. Electronically signed by Rupali Jaeger [address] [place] [address]       Patient seen and examined independently by me. Above discussed and I agree with Dr. Keren Cervantes, and the abovenote except where indicated in the EMR revision history. Also see my additional comments and changes indicated by discrete font, text color, italics, and/or initials. Labs, cultures, and radiographs where available were reviewed. Changes were made in the orders as necessary.  I discussed patient concerns with Bridgette KAUR and instructions were given. Respiratory care issues addressed. Please see our orders for the updated patient care plan.   Ccm:35  Electronically signed by     Rivka Browning MD on 9/18/2022 at 1:08 PM

## 2022-09-19 LAB
ANION GAP SERPL CALCULATED.3IONS-SCNC: 6 MEQ/L (ref 8–16)
BUN BLDV-MCNC: 21 MG/DL (ref 7–22)
CALCIUM SERPL-MCNC: 8.6 MG/DL (ref 8.5–10.5)
CHLORIDE BLD-SCNC: 107 MEQ/L (ref 98–111)
CO2: 27 MEQ/L (ref 23–33)
CREAT SERPL-MCNC: 1.1 MG/DL (ref 0.4–1.2)
ERYTHROCYTE [DISTWIDTH] IN BLOOD BY AUTOMATED COUNT: 14.9 % (ref 11.5–14.5)
ERYTHROCYTE [DISTWIDTH] IN BLOOD BY AUTOMATED COUNT: 48.8 FL (ref 35–45)
GFR SERPL CREATININE-BSD FRML MDRD: 47 ML/MIN/1.73M2
GLUCOSE BLD-MCNC: 103 MG/DL (ref 70–108)
GLUCOSE BLD-MCNC: 161 MG/DL (ref 70–108)
GLUCOSE BLD-MCNC: 199 MG/DL (ref 70–108)
GLUCOSE BLD-MCNC: 95 MG/DL (ref 70–108)
HCT VFR BLD CALC: 33 % (ref 37–47)
HEMOGLOBIN: 10.9 GM/DL (ref 12–16)
MCH RBC QN AUTO: 29.9 PG (ref 26–33)
MCHC RBC AUTO-ENTMCNC: 33 GM/DL (ref 32.2–35.5)
MCV RBC AUTO: 90.4 FL (ref 81–99)
PLATELET # BLD: 97 THOU/MM3 (ref 130–400)
PMV BLD AUTO: 12.6 FL (ref 9.4–12.4)
POTASSIUM REFLEX MAGNESIUM: 4 MEQ/L (ref 3.5–5.2)
RBC # BLD: 3.65 MILL/MM3 (ref 4.2–5.4)
SODIUM BLD-SCNC: 140 MEQ/L (ref 135–145)
VANCOMYCIN RANDOM: 15 UG/ML (ref 0.1–39.9)
WBC # BLD: 6.3 THOU/MM3 (ref 4.8–10.8)

## 2022-09-19 PROCEDURE — 80202 ASSAY OF VANCOMYCIN: CPT

## 2022-09-19 PROCEDURE — 85027 COMPLETE CBC AUTOMATED: CPT

## 2022-09-19 PROCEDURE — 99291 CRITICAL CARE FIRST HOUR: CPT | Performed by: INTERNAL MEDICINE

## 2022-09-19 PROCEDURE — 2580000003 HC RX 258: Performed by: NURSE PRACTITIONER

## 2022-09-19 PROCEDURE — 2100000000 HC CCU R&B

## 2022-09-19 PROCEDURE — 2500000003 HC RX 250 WO HCPCS: Performed by: NURSE PRACTITIONER

## 2022-09-19 PROCEDURE — 6370000000 HC RX 637 (ALT 250 FOR IP): Performed by: NURSE PRACTITIONER

## 2022-09-19 PROCEDURE — 6360000002 HC RX W HCPCS: Performed by: STUDENT IN AN ORGANIZED HEALTH CARE EDUCATION/TRAINING PROGRAM

## 2022-09-19 PROCEDURE — 82948 REAGENT STRIP/BLOOD GLUCOSE: CPT

## 2022-09-19 PROCEDURE — 80048 BASIC METABOLIC PNL TOTAL CA: CPT

## 2022-09-19 PROCEDURE — 2580000003 HC RX 258: Performed by: PHARMACIST

## 2022-09-19 PROCEDURE — 6360000002 HC RX W HCPCS: Performed by: PHARMACIST

## 2022-09-19 PROCEDURE — 6370000000 HC RX 637 (ALT 250 FOR IP): Performed by: STUDENT IN AN ORGANIZED HEALTH CARE EDUCATION/TRAINING PROGRAM

## 2022-09-19 RX ORDER — SODIUM CHLORIDE 9 MG/ML
25 INJECTION, SOLUTION INTRAVENOUS PRN
Status: DISCONTINUED | OUTPATIENT
Start: 2022-09-19 | End: 2022-09-19 | Stop reason: SDUPTHER

## 2022-09-19 RX ORDER — ALBUTEROL SULFATE 90 UG/1
2 AEROSOL, METERED RESPIRATORY (INHALATION) EVERY 4 HOURS PRN
Status: DISCONTINUED | OUTPATIENT
Start: 2022-09-19 | End: 2022-09-23 | Stop reason: HOSPADM

## 2022-09-19 RX ORDER — SODIUM CHLORIDE 0.9 % (FLUSH) 0.9 %
5-40 SYRINGE (ML) INJECTION EVERY 12 HOURS SCHEDULED
Status: DISCONTINUED | OUTPATIENT
Start: 2022-09-19 | End: 2022-09-19 | Stop reason: SDUPTHER

## 2022-09-19 RX ORDER — LIDOCAINE HYDROCHLORIDE 10 MG/ML
5 INJECTION, SOLUTION EPIDURAL; INFILTRATION; INTRACAUDAL; PERINEURAL ONCE
Status: DISCONTINUED | OUTPATIENT
Start: 2022-09-19 | End: 2022-09-23

## 2022-09-19 RX ORDER — SODIUM CHLORIDE 0.9 % (FLUSH) 0.9 %
5-40 SYRINGE (ML) INJECTION PRN
Status: DISCONTINUED | OUTPATIENT
Start: 2022-09-19 | End: 2022-09-19 | Stop reason: SDUPTHER

## 2022-09-19 RX ORDER — MIDODRINE HYDROCHLORIDE 5 MG/1
5 TABLET ORAL
Status: DISCONTINUED | OUTPATIENT
Start: 2022-09-19 | End: 2022-09-20

## 2022-09-19 RX ADMIN — ACETAMINOPHEN 650 MG: 325 TABLET ORAL at 19:49

## 2022-09-19 RX ADMIN — TIMOLOL MALEATE 1 DROP: 5 SOLUTION/ DROPS OPHTHALMIC at 08:24

## 2022-09-19 RX ADMIN — VANCOMYCIN HYDROCHLORIDE 1000 MG: 1 INJECTION, POWDER, LYOPHILIZED, FOR SOLUTION INTRAVENOUS at 13:23

## 2022-09-19 RX ADMIN — RIVAROXABAN 15 MG: 15 TABLET, FILM COATED ORAL at 08:27

## 2022-09-19 RX ADMIN — MIDODRINE HYDROCHLORIDE 5 MG: 5 TABLET ORAL at 16:34

## 2022-09-19 RX ADMIN — PANTOPRAZOLE SODIUM 40 MG: 40 TABLET, DELAYED RELEASE ORAL at 16:11

## 2022-09-19 RX ADMIN — Medication 250 MG: at 21:08

## 2022-09-19 RX ADMIN — GENTAMICIN SULFATE 80 MG: 80 INJECTION, SOLUTION INTRAVENOUS at 12:04

## 2022-09-19 RX ADMIN — CLOPIDOGREL BISULFATE 75 MG: 75 TABLET ORAL at 08:27

## 2022-09-19 RX ADMIN — HYDROCORTISONE ACETATE 25 MG: 25 SUPPOSITORY RECTAL at 21:08

## 2022-09-19 RX ADMIN — PRAVASTATIN SODIUM 40 MG: 40 TABLET ORAL at 08:27

## 2022-09-19 RX ADMIN — Medication 250 MG: at 08:27

## 2022-09-19 RX ADMIN — SODIUM CHLORIDE, PRESERVATIVE FREE 10 ML: 5 INJECTION INTRAVENOUS at 08:25

## 2022-09-19 RX ADMIN — Medication 3 MCG/MIN: at 12:00

## 2022-09-19 RX ADMIN — PANTOPRAZOLE SODIUM 40 MG: 40 TABLET, DELAYED RELEASE ORAL at 06:47

## 2022-09-19 ASSESSMENT — PAIN DESCRIPTION - PAIN TYPE: TYPE: ACUTE PAIN

## 2022-09-19 ASSESSMENT — PAIN DESCRIPTION - DESCRIPTORS
DESCRIPTORS: ACHING
DESCRIPTORS: ACHING

## 2022-09-19 ASSESSMENT — PAIN - FUNCTIONAL ASSESSMENT: PAIN_FUNCTIONAL_ASSESSMENT: ACTIVITIES ARE NOT PREVENTED

## 2022-09-19 ASSESSMENT — PAIN DESCRIPTION - LOCATION
LOCATION: LEG
LOCATION: LEG

## 2022-09-19 ASSESSMENT — PAIN SCALES - GENERAL
PAINLEVEL_OUTOF10: 2
PAINLEVEL_OUTOF10: 0
PAINLEVEL_OUTOF10: 2
PAINLEVEL_OUTOF10: 0
PAINLEVEL_OUTOF10: 5
PAINLEVEL_OUTOF10: 5
PAINLEVEL_OUTOF10: 2

## 2022-09-19 ASSESSMENT — PAIN DESCRIPTION - ORIENTATION
ORIENTATION: RIGHT
ORIENTATION: RIGHT

## 2022-09-19 ASSESSMENT — PAIN DESCRIPTION - FREQUENCY: FREQUENCY: INTERMITTENT

## 2022-09-19 ASSESSMENT — PAIN DESCRIPTION - ONSET: ONSET: ON-GOING

## 2022-09-19 NOTE — CARE COORDINATION
9/19/22, 3:27 PM EDT    DISCHARGE PLANNING EVALUATION     Order received for SNF due to pt requiring 4 weeks of Vanc IV at discharge, deconditioned abd lives at home alone. SW met with pt and daughter while in ICU, both are refusing SNF at this time. ZIA updated SHANNAN Snyder to investigate if Managed Medicare would possibly cover in home.

## 2022-09-19 NOTE — PROGRESS NOTES
[address] [facility] Pharmacokinetic Monitoring Service - Vancomycin    Consulting Provider: Walter Armstrong   Indication: MRSA Bacteremia  Target Concentration: Goal AUC/TAMANNA 400-600 mg*hr/L  Day of Therapy: 5  Additional Antimicrobials: gentamicin    Pertinent Laboratory Values: Wt Readings from Last 1 Encounters:   09/17/22 178 lb 12.7 oz (81.1 kg)     Temp Readings from Last 1 Encounters:   09/19/22 97.6 °F (36.4 °C) (Oral)     Estimated Creatinine Clearance: 39 mL/min (based on SCr of 1.1 mg/dL). Recent Labs     09/18/22  0500 09/19/22  0400   CREATININE 1.0 1.1   WBC 5.3 6.3     Procalcitonin: 17.11 on 9/15    Pertinent Cultures:  Culture Date Source Results   09/15/22 BCx2 MRSA TAMANNA 0.5   9/15/22 UC Citrobacter fruendii   MRSA Nasal Swab: N/A. Non-respiratory infection.     Recent vancomycin administrations                     vancomycin (VANCOCIN) 1,000 mg in sodium chloride 0.9 % 250 mL IVPB ([id number]) (mg) 1,000 mg New Bag 09/18/22 1131     1,000 mg New Bag 09/17/22 1149     1,000 mg New Bag 09/16/22 1133        Assessment:  Date/Time Current Dose Concentration Timing of Concentration (h) AUC   9/19 0400 1000 mg q24h 15.0 16.5 hr 446   Note: Serum concentrations collected for AUC dosing may appear elevated if collected in close proximity to the dose administered, this is not necessarily an indication of toxicity    Plan:  Current dosing regimen is therapeutic  Continue current dose  Repeat vancomycin concentration planned in a couple days  Pharmacy will continue to monitor patient and adjust therapy as indicated    Thank you for the consult,  Stan Calle, PharmD, BCPS, BCCCP  9/19/2022 7:20 AM

## 2022-09-19 NOTE — PLAN OF CARE
Problem: Discharge Planning  Goal: Discharge to home or other facility with appropriate resources  Outcome: Progressing  Flowsheets (Taken 9/18/2022 2000)  Discharge to home or other facility with appropriate resources: Identify barriers to discharge with patient and caregiver     Problem: Respiratory - Adult  Goal: Achieves optimal ventilation and oxygenation  Outcome: Progressing  Flowsheets (Taken 9/18/2022 2000)  Achieves optimal ventilation and oxygenation:   Assess for changes in respiratory status   Assess for changes in mentation and behavior   Oxygen supplementation based on oxygen saturation or arterial blood gases   Assess the need for suctioning and aspirate as needed   Assess and instruct to report shortness of breath or any respiratory difficulty     Problem: Cardiovascular - Adult  Goal: Maintains optimal cardiac output and hemodynamic stability  Outcome: Progressing  Flowsheets (Taken 9/18/2022 2000)  Maintains optimal cardiac output and hemodynamic stability:   Monitor blood pressure and heart rate   Monitor urine output and notify Licensed Independent Practitioner for values outside of normal range   Assess for signs of decreased cardiac output   Administer fluid and/or volume expanders as ordered     Problem: Skin/Tissue Integrity - Adult  Goal: Incisions, wounds, or drain sites healing without S/S of infection  Outcome: Progressing  Flowsheets  Taken 9/18/2022 2117  Incisions, Wounds, or Drain Sites Healing Without Sign and Symptoms of Infection:   ADMISSION and DAILY: Assess and document risk factors for pressure ulcer development   TWICE DAILY: Assess and document skin integrity   TWICE DAILY: Assess and document dressing/incision, wound bed, drain sites and surrounding tissue   Implement wound care per orders  Taken 9/18/2022 2000  Incisions, Wounds, or Drain Sites Healing Without Sign and Symptoms of Infection:   ADMISSION and DAILY: Assess and document risk factors for pressure ulcer development   TWICE DAILY: Assess and document skin integrity   TWICE DAILY: Assess and document dressing/incision, wound bed, drain sites and surrounding tissue  Goal: Oral mucous membranes remain intact  Outcome: Progressing  Flowsheets  Taken 9/18/2022 2117  Oral Mucous Membranes Remain Intact: Assess oral mucosa and hygiene practices  Taken 9/18/2022 2000  Oral Mucous Membranes Remain Intact: Assess oral mucosa and hygiene practices     Problem: Genitourinary - Adult  Goal: Absence of urinary retention  Outcome: Progressing  Flowsheets (Taken 9/18/2022 2000)  Absence of urinary retention:   Assess patients ability to void and empty bladder   Monitor intake/output and perform bladder scan as needed     Problem: Metabolic/Fluid and Electrolytes - Adult  Goal: Electrolytes maintained within normal limits  Outcome: Progressing  Flowsheets (Taken 9/18/2022 2000)  Electrolytes maintained within normal limits:   Monitor labs and assess patient for signs and symptoms of electrolyte imbalances   Administer electrolyte replacement as ordered   Monitor response to electrolyte replacements, including repeat lab results as appropriate     Problem: ABCDS Injury Assessment  Goal: Absence of physical injury  Outcome: Progressing  Flowsheets (Taken 9/18/2022 2117)  Absence of Physical Injury: Implement safety measures based on patient assessment     Problem: Safety - Adult  Goal: Free from fall injury  Outcome: Progressing  Flowsheets (Taken 9/18/2022 2117)  Free From Fall Injury: Instruct family/caregiver on patient safety     Problem: Chronic Conditions and Co-morbidities  Goal: Patient's chronic conditions and co-morbidity symptoms are monitored and maintained or improved  Outcome: Progressing  Flowsheets (Taken 9/18/2022 2000)  Care Plan - Patient's Chronic Conditions and Co-Morbidity Symptoms are Monitored and Maintained or Improved:   Monitor and assess patient's chronic conditions and comorbid symptoms for stability, deterioration, or improvement   Collaborate with multidisciplinary team to address chronic and comorbid conditions and prevent exacerbation or deterioration   Update acute care plan with appropriate goals if chronic or comorbid symptoms are exacerbated and prevent overall improvement and discharge     Problem: Pain  Goal: Verbalizes/displays adequate comfort level or baseline comfort level  Outcome: Progressing  Flowsheets (Taken 9/18/2022 2000)  Verbalizes/displays adequate comfort level or baseline comfort level:   Encourage patient to monitor pain and request assistance   Assess pain using appropriate pain scale   Administer analgesics based on type and severity of pain and evaluate response   Implement non-pharmacological measures as appropriate and evaluate response   Notify Licensed Independent Practitioner if interventions unsuccessful or patient reports new pain     Problem: Skin/Tissue Integrity  Goal: Absence of new skin breakdown  Description: 1. Monitor for areas of redness and/or skin breakdown  2. Assess vascular access sites hourly  3. Every 4-6 hours minimum:  Change oxygen saturation probe site  4. Every 4-6 hours:  If on nasal continuous positive airway pressure, respiratory therapy assess nares and determine need for appliance change or resting period. Outcome: Progressing  Note: No new evidence of skin breakdown noted. Care plan reviewed with patient. Patient verbalizes understanding of the plan of care and contributes to goal setting.

## 2022-09-19 NOTE — RT PROTOCOL NOTE
RT Inhaler-Nebulizer Bronchodilator Protocol Note    There is a bronchodilator order in the chart from a provider indicating to follow the RT Bronchodilator Protocol and there is an Initiate RT Inhaler-Nebulizer Bronchodilator Protocol order as well (see protocol at bottom of note). CXR Findings:  No results found. The findings from the last RT Protocol Assessment were as follows:   History Pulmonary Disease: Smoker 15 pack years or more  Respiratory Pattern: Dyspnea on exertion or RR 21-25 bpm  Breath Sounds: Clear breath sounds  Cough: Strong, spontaneous, non-productive  Indication for Bronchodilator Therapy: On home bronchodilators  Bronchodilator Assessment Score: 3    Aerosolized bronchodilator medication orders have been revised according to the RT Inhaler-Nebulizer Bronchodilator Protocol below. Respiratory Therapist to perform RT Therapy Protocol Assessment initially then follow the protocol. Repeat RT Therapy Protocol Assessment PRN for score 0-3 or on second treatment, BID, and PRN for scores above 3. No Indications - adjust the frequency to every 6 hours PRN wheezing or bronchospasm, if no treatments needed after 48 hours then discontinue using Per Protocol order mode. If indication present, adjust the RT bronchodilator orders based on the Bronchodilator Assessment Score as indicated below. Use Inhaler orders unless patient has one or more of the following: on home nebulizer, not able to hold breath for 10 seconds, is not alert and oriented, cannot activate and use MDI correctly, or respiratory rate 25 breaths per minute or more, then use the equivalent nebulizer order(s) with same Frequency and PRN reasons based on the score. If a patient is on this medication at home then do not decrease Frequency below that used at home.     0-3 - enter or revise RT bronchodilator order(s) to equivalent RT Bronchodilator order with Frequency of every 4 hours PRN for wheezing or increased work of breathing using Per Protocol order mode. 4-6 - enter or revise RT Bronchodilator order(s) to two equivalent RT bronchodilator orders with one order with BID Frequency and one order with Frequency of every 4 hours PRN wheezing or increased work of breathing using Per Protocol order mode. 7-10 - enter or revise RT Bronchodilator order(s) to two equivalent RT bronchodilator orders with one order with TID Frequency and one order with Frequency of every 4 hours PRN wheezing or increased work of breathing using Per Protocol order mode. 11-13 - enter or revise RT Bronchodilator order(s) to one equivalent RT bronchodilator order with QID Frequency and an Albuterol order with Frequency of every 4 hours PRN wheezing or increased work of breathing using Per Protocol order mode. Greater than 13 - enter or revise RT Bronchodilator order(s) to one equivalent RT bronchodilator order with every 4 hours Frequency and an Albuterol order with Frequency of every 2 hours PRN wheezing or increased work of breathing using Per Protocol order mode. RT to enter RT Home Evaluation for COPD & MDI Assessment order using Per Protocol order mode.     Electronically signed by Brie Mayer RCP on 9/19/2022 at 8:51 AM

## 2022-09-19 NOTE — CARE COORDINATION
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 No definite leak. 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 9/16 CVC right subclavian  9/16 BLE Arterial doppler: 1. 2 cm pseudoaneurysm emanating from the right common femoral artery, the lumen of which is more than 50% thrombosed. This would be amenable to thrombin injection, if desired. 2. Stents are present in the right proximal, mid, distal SFA which are patent Diminished ABIs are present bilaterally. 3. 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 4. Diminished ABIs bilaterally. Despite this, there is good pulsatility in the trifurcation vessels bilaterally, as described above  9/16 Renal US: 1. Trace ascites in Morison's pouch and splenomegaly are incidental.  2. Right inferior renal echogenic nodule. Renal cell carcinoma suspected. Recommend further clinical investigation as needed  9/17 IR: Thrombin injection to thrombosed pseudoaneurysm- successful  9/18 RLE Arterial doppler: Pseudoaneurysm remains completely thrombosed    Barriers to Discharge: Went to IR on 9/17 for injection of thrombin to thrombosed pseudoaneurysm. Doppler on 9/18 revealed pseudoaneurysm remains completely thrombosed. PICC ordered. PT/OT consulted. Afebrile. Afib 90's. On room air. Ox4. Follows commands x4. BLE with dopplers DP & PT pulses. Intensivist and Cardiology following. PT/OT. Urine +Citrobacter freundii. Blood cultures +MRSA. Telemetry, CVC, SCDs.  Levo @ 3 mcg/min, plavix, IV gentamicin, hydrocortisone suppository, SSI, mag-ox, midodrine, protonix, pravastatin, xarelto, timolol drops, IV Vancomycin, Electrolyte replacement protocols. Hgb 10.9, plt 97. PCP: Hina Thorne  Readmission Risk Score: 16.7%  Patient Goals/Plan/Treatment Preferences: From home alone. Has walker. SW consulted for possible SNF. Patient and daughter refusing SNF. Per Dr. Yue Quinonez in [facility]; patient will need 6 weeks IV vancomycin and will need Gentamicin thru 9/23. Called [facility], spoke with Jose Soliman, to see if patient has coverage for home IV ATB infusion; states he will check into it and call CM back.

## 2022-09-19 NOTE — PLAN OF CARE
Problem: Discharge Planning  Goal: Discharge to home or other facility with appropriate resources  Outcome: Progressing  Flowsheets (Taken 9/19/2022 1901)  Discharge to home or other facility with appropriate resources:   Identify barriers to discharge with patient and caregiver   Identify discharge learning needs (meds, wound care, etc)  Note: Patient is from home alone and plans on returning there at discharge. Problem: Respiratory - Adult  Goal: Achieves optimal ventilation and oxygenation  Outcome: Progressing  Flowsheets (Taken 9/19/2022 1901)  Achieves optimal ventilation and oxygenation:   Assess for changes in respiratory status   Position to facilitate oxygenation and minimize respiratory effort     Problem: Cardiovascular - Adult  Goal: Maintains optimal cardiac output and hemodynamic stability  Outcome: Progressing  Flowsheets (Taken 9/19/2022 1901)  Maintains optimal cardiac output and hemodynamic stability:   Monitor blood pressure and heart rate   Assess for signs of decreased cardiac output     Problem: Skin/Tissue Integrity - Adult  Goal: Incisions, wounds, or drain sites healing without S/S of infection  Outcome: Progressing  Goal: Oral mucous membranes remain intact  Outcome: Progressing     Problem: Genitourinary - Adult  Goal: Absence of urinary retention  Outcome: Progressing  Flowsheets (Taken 9/19/2022 1901)  Absence of urinary retention: Assess patients ability to void and empty bladder     Problem: Metabolic/Fluid and Electrolytes - Adult  Goal: Electrolytes maintained within normal limits  Outcome: Progressing     Problem: ABCDS Injury Assessment  Goal: Absence of physical injury  Outcome: Progressing     Problem: Safety - Adult  Goal: Free from fall injury  Outcome: Progressing  Note: Bed locked & in low position, call light in reach, side-rails up x2, bed/chair alarm utilized, non-slip socks on when ambulating, reminded patient to use call light to call for assistance. Problem: Chronic Conditions and Co-morbidities  Goal: Patient's chronic conditions and co-morbidity symptoms are monitored and maintained or improved  Outcome: Progressing  Flowsheets (Taken 9/19/2022 1901)  Care Plan - Patient's Chronic Conditions and Co-Morbidity Symptoms are Monitored and Maintained or Improved:   Monitor and assess patient's chronic conditions and comorbid symptoms for stability, deterioration, or improvement   Collaborate with multidisciplinary team to address chronic and comorbid conditions and prevent exacerbation or deterioration     Problem: Pain  Goal: Verbalizes/displays adequate comfort level or baseline comfort level  Outcome: Progressing  Flowsheets (Taken 9/19/2022 1901)  Verbalizes/displays adequate comfort level or baseline comfort level:   Encourage patient to monitor pain and request assistance   Assess pain using appropriate pain scale     Problem: Skin/Tissue Integrity  Goal: Absence of new skin breakdown  Description: 1. Monitor for areas of redness and/or skin breakdown  2. Assess vascular access sites hourly  3. Every 4-6 hours minimum:  Change oxygen saturation probe site  4. Every 4-6 hours:  If on nasal continuous positive airway pressure, respiratory therapy assess nares and determine need for appliance change or resting period. Outcome: Progressing     Care plan reviewed with patient. Patient verbalizes understanding of the care plan and contributed to goal setting.

## 2022-09-19 NOTE — PROGRESS NOTES
09/19/22 0850   RT Protocol   History Pulmonary Disease 1   Respiratory pattern 2   Breath sounds 0   Cough 0   Bronchodilator Assessment Score 3

## 2022-09-19 NOTE — PROGRESS NOTES
CRITICAL CARE PROGRESS NOTE      Patient:  Fidelia Kan    Unit/Bed:4D-17/017-A  YOB: 1935  MRN: [id number]   PCP: Gregg Main  Date of Admission: 9/15/2022  Chief Complaint:- Fatigue    Assessment and Plan:    Septic shock: Secondary to MRSA bacteremia. Possible infected endovascular graft vs pseudoaneurysm, see below. Presented SIRS 1/4 for tachycardia and hypotension. LA 5.3 (since normalized). Procal 17.11. Given 30 cc/kg IVF. Cultures obtained (MRSA bacteremia). Persistent hypotension, started on pressors 9/16. A-line placed 9/16. Wean off pressors for MAP > 65. Currently only requiring Levo. MRSA bacteremia:  Blood cultures positive for gram positive cocci in clusters x2. Blood molecular ID positive for MRSA. On synergistic therapy vancomycin and gentamicin  day 3/7. Will require prolonged course of antibiotics afterwards with repeat blood cultures pending. Complicated UTI:  Prior history of UTIs. Presented UA positive for nitrate, LE small, WBC 10-15 and many bacteria. Urine culture positive for citrobacter. Started on zosyn, will d/c as gent will cover gram neg. Pseudoaneurysm R FA:  CTA Abd w/ aorta runoff demonstrated 1.1 x 1.1 cm pseudoaneurysm in R FA 9/15. Likely secondary to recent extensive intravascular procedure. Arterial duplex bilateral confirmed 2 cm pseudoaneurysm R FA, lumen more than 50% thrombosed, amenable to thrombin injection. S/p thrombosis 9/17 with IR. Cardiology following. Repeat arterial doppler revealed completely thrombosed pseudoaneurysm 9/18. Right ankle trifurcation vessel disease:  CTA Abd w/ aorta runoff demonstrated trifurcation vessel occlusion at R ankle 9/15. Recent extensive intravascular procedure involving stents and balloon throughout right lower extremity vessels. Dorsal and tibialis dorsal pulses present 9/16. Continue to monitor. Cardiology following. ALEJANDRO on CKD III, resolved: Cr 1.1 currently. baseline Cr ~1.0 - 1.2, eGFR 40-50. Presented Cr 1.5. BUN: Cr > 20, Sp gravity > 0.030. Currently oliguric. Meredith placed. FeNA = prerenal origin, present urine eosinophils. Possible renal healing from contrast induced nephropathy given recent angiogram. Avoid nephrotoxins. Renally dose medications. Continue gentle IVF given cardiac function. Monitor renal function with daily BMP. Thrombocytopenia, improving: In setting of sepsis and recent tPA for pseudoaneurysm. Continue monitor PLT with daily CBC. Chronic HFrEF, NYHA II:  Dual ICD in place. Last echo EF 25-30% 6/20. Home GDMT ARNi, BB and diuretic. Presented BNP 6077. CXR not indicated of overload. Currently holding nephrotoxins. 2 g sodium restriction, defer fluid restriction. Monitor daily weights, strict I&Os. pAfib:   [id number]. Initial EKG SR with arrhthymias and occasional PVCs. On BB for rate control and xarelto for anticoagulation. Continue metoprolol 25 mg p.o daily and Xarelto 15 mg p.o daily. Placed on continuous telemetry. HTN: continue home metoprolol. Hold ARNi and bumex for ALEJANDRO. Monitor blood pressure. HLD:  continue home pravastatin 40 mg p.o daily   AAA:   s/p endovascular repair in 2019    History of blood clots:  per chart review. On Anticoagulation  GERD:  started on pantoprazole 40 mg p.o twice daily   Achalasia: GI consulted due to food stuck in esophagus. This however is chronic in nature and patient's baseline. Dysphagia diet started. History of colovesical fistula:  s/p right hemicolectomy x2. No colostomy. Tobacco Abuse:  endorses prolonged history of intermittent smoking. Reports trying to quit, with last smoke a few months ago.         INITIAL H AND P AND ICU COURSE:  Per Initial H/P:  The patient is a 82year old female with a past medical history of HFrEF s/p dual ICD, pAfib, HTN, HLD, AAA s/p repair, PVD, blood clots, CKD, colovesical fisutla s/p right hemicolectomy and recent CLI s/p angiogram with stent and balloon to RLE here for fatigue and weakness. The patient reports inability to get out of bed yesterday, with no improvement throughout the day. EMS was called, and she was found to be hypotensive with SBP in 80s. The patient denies any prior fevers, chills, N/V, changes in diet or sick contacts. She did not \"bomb exploding\" RLE leg pain, radiating downwards. This was worse with movement, alleviated by nothing. In the ED, vitals T max 97.8, RR 18, HR 97, BP 73/43 and SpO2 93%. She became hypoxic and was escalated to 2L NC. Labs significant for BUN 33, Cr 1.5. Troponin WNL, BNP 6077. EKG non significant. TSH and Cortisol WNL. Procal 17.11, LA 5.3. UA positive for nitrite, small LE, WBC 10-15 and many bacteria. CXR negative for acute findings. 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 He was given 2L NS bolus, albumin x1. Cultures were drawn and he was started on vancomycin and zosyn for empiric antibiotics coverage. Cardiology was consulted, the patient was admitted to the ICU for further care and evaluation.     Of note, She previously had a CTA abdominal aorta with runoff that was significant for 3.6 cm fusiform aneurysm of the infrarenal abdominal aorta; bifurcated endograft present; right iliac limb totally occluded and left iliac limb remains patent; stents in right common and external iliac arteries are totally occluded; collateral reconstitution of the right CFA and right profunda; total occlusion of native right SFA and right femoral/anterior tibial bypass graft; Collateral reconstitution of the trifurcation vessels right side; multifocal severe diffuse disease of left SFA; left popliteal artery demonstrated mild stenosis; all 3 trifurcation vessels left calf lateral but demonstrate multifocal disease; subcutaneous emphysema bilaterally that is worse on left 8/4/22. The patient underwent a bilateral lower extremity angiogram 9/6/2022. She was found to have R EVAR limb, R CFA, R SFA occlusions; dimunitive PFA; popliteal artery occlusion with collaterals, all three tibials occluded; and all three tibials are occluded with some degree of flow in the PT and AT. The patient underwent retrograde AT access, knuckled to the R CFA, then re-entry into the R EIA stents; VBX 11 x 39, VBX 11 x 59 x 2, VBX 11 x 39, VBX 8 x 59; Retrograde SFA/pop 6.0 PTA; Elizabeth 6 x 120 x 2 for ostial SFA to distal SFA; DCB 5 x 150 of the pop, and DCB 6 x 80 of the distal SFA/prox pop; and 3.0 PTA of the AT. This resulted in Complete reconstitution of the R EVAR limb, R CFA, R SFA, R POP, and R AT with improved flow into the PT - full flow into the pedal arch. During hospitalization, blood cultures were positive for MRSA bacteremia. The patient was started on vancomycin and gentamicin for synergistic therapy. Arterial duplex confirmed pseudoaneurysm with 50% lumen amenable to thrombin injection. The patient underwent successful pseudoaneurysm thrombosis 9/17. Repeat cultures were ordered. Past 24 hours:  No significant events overnight. This morning the patient does continue to report RLE pain, but states it is better than yesterday. Pulses are palpable. She denies any other complaints. Continues to require minimal pressor support. Otherwise hemodynamically unstable, vitals WNL. Past Medical History:  Per HPI. Family History: Mother - heart disease, CVA     Father - lung cancer, emphysema     Sister - leukemia  Social History:  Current smoker 15 PPY, occasional EtOH use, denies illicit drug use. ROS   Reports right lower extremity pain.   Denies N/V, fever, chills, chest pain, shortness of breath, cough, abdominal or urinary complaints    Scheduled Meds:   insulin lispro  0-4 Units SubCUTAneous 4x Daily AC & HS    clopidogrel  75 mg Oral Daily    hydrocortisone  25 mg Rectal Nightly    magnesium oxide  250 mg Oral BID    pantoprazole  40 mg Oral BID AC    pravastatin  40 mg Oral Daily    [Held by provider] sacubitril-valsartan  1 tablet Oral BID    timolol  1 drop Both Eyes Daily    sodium chloride flush  5-40 mL IntraVENous 2 times per day    rivaroxaban  15 mg Oral Daily with breakfast    vancomycin (VANCOCIN) intermittent dosing (placeholder)   Other RX Placeholder    vancomycin  1,000 mg IntraVENous Q24H    gentamicin  80 mg IntraVENous Q24H     Continuous Infusions:   norepinephrine 2.027 mcg/min (09/19/22 0601)    sodium chloride 10 mL/hr at 09/17/22 0616    dextrose         PHYSICAL EXAMINATION:  T: 97.6. P: 84. RR: 17. B/P: 116/62. O2 Sat: 96%. I/O:  net + 352.9 mL over 24 hours   Body mass index is 29.75 kg/m². GCS:   15    General:   Pleasant elderly female, resting in hospital bed, acutely ill appearing, pale, warm and dry  HEENT:  normocephalic and atraumatic. No scleral icterus. PERR  Neck: supple. No Thyromegaly. Lungs: Breath sounds clear to auscultation. No retractions  Cardiac: RRR. No JVD. Abdomen: soft. Nontender. Extremities: Right lower extremity warm from mid-calf down to foot, has good movement and appropriate sensation. Left lower extremity warm and dry. Left groin positive for ecchymosis and slight tenderness to touch. Pulses have been noted per doppler. Vasculature: capillary refill < 3 seconds. Palpable dorsalis pedis pulses. Skin:  warm and dry. Psych:  Alert and oriented x3. Affect appropriate  Lymph:  No supraclavicular adenopathy. Neurologic:  No focal deficit. No seizures. Data: (All radiographs, tracings, PFTs, and imaging are personally viewed and interpreted unless otherwise noted).    9/19/22 sodium 140, potassium 4.0, chloride 107, CO2 27, BUN 21, creatinine 1.1, glucose 103, AG 6.0.  9/19/22 WBC 6.3, Hgb 10.9, HCT 33.0, PLT 97  9/18/22 Urine culture - Citrobacter freundii  9/17/22 Blood cultures - pending  9/17/22 Renal US -trace ascites in Morison's pouch and splenomegaly; right inferior renal echogenic nodule, 9 x 12 x 8 mm-RCC suspected; subtle heterogeneously enhancing 12 mm nodule in the right kidney  9/16/22 Lactic acid 2.1   9/16/22 Cortisol 51.65  9/16/22 TSH 1.010  9/15/22 Blood culture; Staph aureus-MRSA  9/15/22 Procal 17.11  9/15/22 Troponin <0.010, BNP 6077  9/15/22 UA; nitrite positive, LE small, WBC 10-15, Sp gravity >1.030  9/15/22 CTA abd aorta w/ adi runoff demonstrated 1.1 x 1.1 cm R CFA pseudoaneurysm; occluded R femoral anterior tibial artery bypass; patent R SFA; Mild to moderate multifocal dx on R; trifurcation vessel occlusion at R ankle; moderate multifocal L CFA; 80% focal stenosis of L superficial femoral artery; mild multifocal dx of L popliteal artery; trifurcation vessel multifocal disease, patent at L ankle. 9/15/22 CXR demonstrate no acute findings; right paratracheal and retrocardiac opacity present on prior studies; previously reported dilated esophagus. Telemetry shows NSR. Seen with multidisciplinary ICU team.  Meets Continued ICU Level Care Criteria:    [x] Yes   [] No - Transfer Planned to listed location:  [] HOSPITALIST CONTACTED-      Case and plan discussed with Dr. Gaviota Osborn. Electronically signed by Karlee Biggs DO  [address]       Electronically signed by     Karlee Biggs DO on 9/19/2022 at 6:26 AM    Patient seen by me including key components of medical care. Case discussed with resident physician. Repeat blood cultures pending. Suspect blood is now sterile. Will continue with antibiotics. Platelet count improving. Minimum 6 weeks of antibiotics. May benefit from tagged WBC scan at cessation of antibiotics. Weaning pressors.   Italicized font, if present,  represents changes to the note made by me.  CC time 35 minutes. Time was discontiguous. Time does not include procedure. Time does include my direct assessment of the patient and coordination of care. Time represents more than 50% of the time involved with patient care by the [address] [place] [address] team.  Electronically signed by Jessica Adhikari.  [place].

## 2022-09-19 NOTE — PROGRESS NOTES
1000 Order received for triple lumen PICC line to remove CVC per [address] [facility]/Dr Javier Ro. Chart reviewed. Patient receiving IV abx every 24 hours and has two peripheral IV sites. Message to Dr Javier Ro if it would be okay to remove CVC with no PICC line placement and use peripherals or would like PICC placed. Will await further direction. 1430  Dr Javier Ro okay with double lumen PICC    1453 IV team to room to discuss with patient and obtain consent. Patient unsure of placement at this time. Discussed benefits and risks of procedure. CVC has been in since 9/16. Patient and family member agree to follow up within the next couple days to allow time to discuss plan of care with Dr Maranda Winkler, family etc. [place] Expose to primary RN regarding discussion. IV team to follow.

## 2022-09-20 LAB
ANION GAP SERPL CALCULATED.3IONS-SCNC: 7 MEQ/L (ref 8–16)
ANION GAP SERPL CALCULATED.3IONS-SCNC: 8 MEQ/L (ref 8–16)
BUN BLDV-MCNC: 19 MG/DL (ref 7–22)
BUN BLDV-MCNC: 22 MG/DL (ref 7–22)
CALCIUM IONIZED: 1.24 MMOL/L (ref 1.12–1.32)
CALCIUM SERPL-MCNC: 8.6 MG/DL (ref 8.5–10.5)
CALCIUM SERPL-MCNC: 8.6 MG/DL (ref 8.5–10.5)
CHLORIDE BLD-SCNC: 103 MEQ/L (ref 98–111)
CHLORIDE BLD-SCNC: 104 MEQ/L (ref 98–111)
CO2: 27 MEQ/L (ref 23–33)
CO2: 29 MEQ/L (ref 23–33)
CREAT SERPL-MCNC: 0.9 MG/DL (ref 0.4–1.2)
CREAT SERPL-MCNC: 1.1 MG/DL (ref 0.4–1.2)
ERYTHROCYTE [DISTWIDTH] IN BLOOD BY AUTOMATED COUNT: 15.1 % (ref 11.5–14.5)
ERYTHROCYTE [DISTWIDTH] IN BLOOD BY AUTOMATED COUNT: 49.1 FL (ref 35–45)
GFR SERPL CREATININE-BSD FRML MDRD: 47 ML/MIN/1.73M2
GFR SERPL CREATININE-BSD FRML MDRD: 59 ML/MIN/1.73M2
GLUCOSE BLD-MCNC: 107 MG/DL (ref 70–108)
GLUCOSE BLD-MCNC: 119 MG/DL (ref 70–108)
GLUCOSE BLD-MCNC: 122 MG/DL (ref 70–108)
GLUCOSE BLD-MCNC: 86 MG/DL (ref 70–108)
GLUCOSE BLD-MCNC: 88 MG/DL (ref 70–108)
GLUCOSE BLD-MCNC: 94 MG/DL (ref 70–108)
HCT VFR BLD CALC: 31.6 % (ref 37–47)
HEMOGLOBIN: 10.5 GM/DL (ref 12–16)
LV EF: 45 %
LVEF MODALITY: NORMAL
MAGNESIUM: 1.8 MG/DL (ref 1.6–2.4)
MCH RBC QN AUTO: 30 PG (ref 26–33)
MCHC RBC AUTO-ENTMCNC: 33.2 GM/DL (ref 32.2–35.5)
MCV RBC AUTO: 90.3 FL (ref 81–99)
PHOSPHORUS: 1.6 MG/DL (ref 2.4–4.7)
PLATELET # BLD: 102 THOU/MM3 (ref 130–400)
PMV BLD AUTO: 12.2 FL (ref 9.4–12.4)
POTASSIUM REFLEX MAGNESIUM: 4.1 MEQ/L (ref 3.5–5.2)
POTASSIUM SERPL-SCNC: 4.1 MEQ/L (ref 3.5–5.2)
RBC # BLD: 3.5 MILL/MM3 (ref 4.2–5.4)
SODIUM BLD-SCNC: 138 MEQ/L (ref 135–145)
SODIUM BLD-SCNC: 140 MEQ/L (ref 135–145)
WBC # BLD: 6.4 THOU/MM3 (ref 4.8–10.8)

## 2022-09-20 PROCEDURE — 83735 ASSAY OF MAGNESIUM: CPT

## 2022-09-20 PROCEDURE — 2580000003 HC RX 258: Performed by: NURSE PRACTITIONER

## 2022-09-20 PROCEDURE — 82330 ASSAY OF CALCIUM: CPT

## 2022-09-20 PROCEDURE — 6370000000 HC RX 637 (ALT 250 FOR IP): Performed by: NURSE PRACTITIONER

## 2022-09-20 PROCEDURE — 2580000003 HC RX 258: Performed by: PHARMACIST

## 2022-09-20 PROCEDURE — 85027 COMPLETE CBC AUTOMATED: CPT

## 2022-09-20 PROCEDURE — P9047 ALBUMIN (HUMAN), 25%, 50ML: HCPCS | Performed by: NURSE PRACTITIONER

## 2022-09-20 PROCEDURE — 97116 GAIT TRAINING THERAPY: CPT

## 2022-09-20 PROCEDURE — 97535 SELF CARE MNGMENT TRAINING: CPT

## 2022-09-20 PROCEDURE — 2060000000 HC ICU INTERMEDIATE R&B

## 2022-09-20 PROCEDURE — 99291 CRITICAL CARE FIRST HOUR: CPT | Performed by: INTERNAL MEDICINE

## 2022-09-20 PROCEDURE — 82948 REAGENT STRIP/BLOOD GLUCOSE: CPT

## 2022-09-20 PROCEDURE — 6360000002 HC RX W HCPCS: Performed by: PHARMACIST

## 2022-09-20 PROCEDURE — 6360000002 HC RX W HCPCS: Performed by: NURSE PRACTITIONER

## 2022-09-20 PROCEDURE — 99233 SBSQ HOSP IP/OBS HIGH 50: CPT | Performed by: INTERNAL MEDICINE

## 2022-09-20 PROCEDURE — 80048 BASIC METABOLIC PNL TOTAL CA: CPT

## 2022-09-20 PROCEDURE — 97166 OT EVAL MOD COMPLEX 45 MIN: CPT

## 2022-09-20 PROCEDURE — 2100000000 HC CCU R&B

## 2022-09-20 PROCEDURE — 97163 PT EVAL HIGH COMPLEX 45 MIN: CPT

## 2022-09-20 PROCEDURE — 84100 ASSAY OF PHOSPHORUS: CPT

## 2022-09-20 PROCEDURE — 93306 TTE W/DOPPLER COMPLETE: CPT

## 2022-09-20 RX ORDER — ALBUMIN (HUMAN) 12.5 G/50ML
25 SOLUTION INTRAVENOUS ONCE
Status: COMPLETED | OUTPATIENT
Start: 2022-09-20 | End: 2022-09-20

## 2022-09-20 RX ORDER — MIDODRINE HYDROCHLORIDE 10 MG/1
10 TABLET ORAL
Status: DISCONTINUED | OUTPATIENT
Start: 2022-09-20 | End: 2022-09-21

## 2022-09-20 RX ADMIN — ACETAMINOPHEN 650 MG: 325 TABLET ORAL at 08:53

## 2022-09-20 RX ADMIN — ACETAMINOPHEN 650 MG: 325 TABLET ORAL at 23:57

## 2022-09-20 RX ADMIN — MIDODRINE HYDROCHLORIDE 10 MG: 10 TABLET ORAL at 12:00

## 2022-09-20 RX ADMIN — ACETAMINOPHEN 650 MG: 325 TABLET ORAL at 17:16

## 2022-09-20 RX ADMIN — ALBUMIN (HUMAN) 25 G: 0.25 INJECTION, SOLUTION INTRAVENOUS at 08:58

## 2022-09-20 RX ADMIN — Medication 250 MG: at 20:23

## 2022-09-20 RX ADMIN — SODIUM CHLORIDE, PRESERVATIVE FREE 10 ML: 5 INJECTION INTRAVENOUS at 08:54

## 2022-09-20 RX ADMIN — PANTOPRAZOLE SODIUM 40 MG: 40 TABLET, DELAYED RELEASE ORAL at 17:17

## 2022-09-20 RX ADMIN — PANTOPRAZOLE SODIUM 40 MG: 40 TABLET, DELAYED RELEASE ORAL at 06:45

## 2022-09-20 RX ADMIN — HYDROCORTISONE ACETATE 25 MG: 25 SUPPOSITORY RECTAL at 20:23

## 2022-09-20 RX ADMIN — MIDODRINE HYDROCHLORIDE 10 MG: 10 TABLET ORAL at 17:16

## 2022-09-20 RX ADMIN — PRAVASTATIN SODIUM 40 MG: 40 TABLET ORAL at 08:53

## 2022-09-20 RX ADMIN — MIDODRINE HYDROCHLORIDE 10 MG: 10 TABLET ORAL at 08:54

## 2022-09-20 RX ADMIN — SODIUM CHLORIDE, PRESERVATIVE FREE 10 ML: 5 INJECTION INTRAVENOUS at 20:23

## 2022-09-20 RX ADMIN — CLOPIDOGREL BISULFATE 75 MG: 75 TABLET ORAL at 08:53

## 2022-09-20 RX ADMIN — Medication 250 MG: at 08:54

## 2022-09-20 RX ADMIN — VANCOMYCIN HYDROCHLORIDE 1000 MG: 1 INJECTION, POWDER, LYOPHILIZED, FOR SOLUTION INTRAVENOUS at 11:55

## 2022-09-20 RX ADMIN — RIVAROXABAN 15 MG: 15 TABLET, FILM COATED ORAL at 08:53

## 2022-09-20 RX ADMIN — TIMOLOL MALEATE 1 DROP: 5 SOLUTION/ DROPS OPHTHALMIC at 08:53

## 2022-09-20 ASSESSMENT — PAIN DESCRIPTION - LOCATION
LOCATION: ANKLE
LOCATION: LEG
LOCATION: SHOULDER

## 2022-09-20 ASSESSMENT — PAIN DESCRIPTION - PAIN TYPE: TYPE: ACUTE PAIN

## 2022-09-20 ASSESSMENT — PAIN SCALES - GENERAL
PAINLEVEL_OUTOF10: 2
PAINLEVEL_OUTOF10: 5
PAINLEVEL_OUTOF10: 5

## 2022-09-20 ASSESSMENT — PAIN DESCRIPTION - FREQUENCY: FREQUENCY: INTERMITTENT

## 2022-09-20 ASSESSMENT — PAIN DESCRIPTION - DESCRIPTORS
DESCRIPTORS: ACHING
DESCRIPTORS: ACHING

## 2022-09-20 ASSESSMENT — PAIN DESCRIPTION - ORIENTATION
ORIENTATION: RIGHT
ORIENTATION: LEFT

## 2022-09-20 ASSESSMENT — PAIN DESCRIPTION - ONSET: ONSET: ON-GOING

## 2022-09-20 ASSESSMENT — PAIN - FUNCTIONAL ASSESSMENT: PAIN_FUNCTIONAL_ASSESSMENT: ACTIVITIES ARE NOT PREVENTED

## 2022-09-20 NOTE — PLAN OF CARE
Problem: Discharge Planning  Goal: Discharge to home or other facility with appropriate resources  9/20/2022 0417 by Nazia Olvera RN  Flowsheets (Taken 9/19/2022 2000)  Discharge to home or other facility with appropriate resources:   Identify barriers to discharge with patient and caregiver   Arrange for needed discharge resources and transportation as appropriate   Identify discharge learning needs (meds, wound care, etc)   Refer to discharge planning if patient needs post-hospital services based on physician order or complex needs related to functional status, cognitive ability or social support system  9/19/2022 1901 by Noreen Brown RN  Outcome: Progressing  Flowsheets (Taken 9/19/2022 1901)  Discharge to home or other facility with appropriate resources:   Identify barriers to discharge with patient and caregiver   Identify discharge learning needs (meds, wound care, etc)  Note: Patient is from home alone and plans on returning there at discharge.       Problem: Respiratory - Adult  Goal: Achieves optimal ventilation and oxygenation  9/20/2022 0417 by Nazia Olvera RN  Flowsheets (Taken 9/19/2022 2000)  Achieves optimal ventilation and oxygenation:   Assess for changes in respiratory status   Assess for changes in mentation and behavior   Position to facilitate oxygenation and minimize respiratory effort   Assess and instruct to report shortness of breath or any respiratory difficulty  9/19/2022 1901 by Noreen Brown RN  Outcome: Progressing  Flowsheets (Taken 9/19/2022 1901)  Achieves optimal ventilation and oxygenation:   Assess for changes in respiratory status   Position to facilitate oxygenation and minimize respiratory effort     Problem: Cardiovascular - Adult  Goal: Maintains optimal cardiac output and hemodynamic stability  9/20/2022 0417 by Nazia Olvera RN  Flowsheets (Taken 9/19/2022 2000)  Maintains optimal cardiac output and hemodynamic stability:   Monitor blood pressure and heart rate   Assess for signs of decreased cardiac output  9/19/2022 1901 by Lexx Lerma RN  Outcome: Progressing  Flowsheets (Taken 9/19/2022 1901)  Maintains optimal cardiac output and hemodynamic stability:   Monitor blood pressure and heart rate   Assess for signs of decreased cardiac output     Problem: Skin/Tissue Integrity - Adult  Goal: Incisions, wounds, or drain sites healing without S/S of infection  9/20/2022 0417 by Bassem Mcelroy RN  Flowsheets (Taken 9/19/2022 2000)  Incisions, Wounds, or Drain Sites Healing Without Sign and Symptoms of Infection: TWICE DAILY: Assess and document skin integrity  9/19/2022 1901 by Lexx Lerma RN  Outcome: Progressing  Goal: Oral mucous membranes remain intact  9/20/2022 0417 by Bassem Mcelroy RN  Flowsheets (Taken 9/19/2022 2000)  Oral Mucous Membranes Remain Intact: Assess oral mucosa and hygiene practices  9/19/2022 1901 by Lexx Lerma RN  Outcome: Progressing     Problem: Genitourinary - Adult  Goal: Absence of urinary retention  9/20/2022 0417 by Bassem Mcelroy RN  Flowsheets (Taken 9/19/2022 2000)  Absence of urinary retention:   Assess patients ability to void and empty bladder   Monitor intake/output and perform bladder scan as needed  9/19/2022 1901 by Lexx Lerma RN  Outcome: Progressing  Flowsheets (Taken 9/19/2022 1901)  Absence of urinary retention: Assess patients ability to void and empty bladder     Problem: Metabolic/Fluid and Electrolytes - Adult  Goal: Electrolytes maintained within normal limits  9/20/2022 0417 by Bassem Mcelroy RN  Flowsheets (Taken 9/19/2022 2000)  Electrolytes maintained within normal limits:   Monitor labs and assess patient for signs and symptoms of electrolyte imbalances   Administer electrolyte replacement as ordered   Monitor response to electrolyte replacements, including repeat lab results as appropriate  9/19/2022 1901 by Lexx Lerma RN  Outcome: Progressing     Problem: ABCDS Injury Assessment  Goal: Absence of physical injury  9/20/2022 1824 by Aram White RN  Flowsheets (Taken 9/18/2022 2117 by Ab Garvey RN)  Absence of Physical Injury: Implement safety measures based on patient assessment  9/19/2022 1901 by Vicki Teran RN  Outcome: Progressing     Problem: Safety - Adult  Goal: Free from fall injury  9/20/2022 0417 by Aram White RN  Flowsheets (Taken 9/18/2022 2117 by Ab Garvey, RN)  Free From Fall Injury: Instruct family/caregiver on patient safety  9/19/2022 1901 by Vicki Teran RN  Outcome: Progressing  Note: Bed locked & in low position, call light in reach, side-rails up x2, bed/chair alarm utilized, non-slip socks on when ambulating, reminded patient to use call light to call for assistance.       Problem: Chronic Conditions and Co-morbidities  Goal: Patient's chronic conditions and co-morbidity symptoms are monitored and maintained or improved  9/20/2022 0417 by Aram White RN  Flowsheets (Taken 9/19/2022 2000)  Care Plan - Patient's Chronic Conditions and Co-Morbidity Symptoms are Monitored and Maintained or Improved:   Monitor and assess patient's chronic conditions and comorbid symptoms for stability, deterioration, or improvement   Collaborate with multidisciplinary team to address chronic and comorbid conditions and prevent exacerbation or deterioration   Update acute care plan with appropriate goals if chronic or comorbid symptoms are exacerbated and prevent overall improvement and discharge  9/19/2022 1901 by Vicki Teran RN  Outcome: Progressing  Flowsheets (Taken 9/19/2022 1901)  Care Plan - Patient's Chronic Conditions and Co-Morbidity Symptoms are Monitored and Maintained or Improved:   Monitor and assess patient's chronic conditions and comorbid symptoms for stability, deterioration, or improvement   Collaborate with multidisciplinary team to address chronic and comorbid conditions and prevent exacerbation or deterioration     Problem: Pain  Goal: Verbalizes/displays adequate comfort level or baseline comfort level  9/20/2022 0417 by Kenisha Meyers RN  Flowsheets (Taken 9/19/2022 2000)  Verbalizes/displays adequate comfort level or baseline comfort level:   Encourage patient to monitor pain and request assistance   Assess pain using appropriate pain scale   Implement non-pharmacological measures as appropriate and evaluate response   Notify Licensed Independent Practitioner if interventions unsuccessful or patient reports new pain  9/19/2022 1901 by Liyah Carmichael RN  Outcome: Progressing  Flowsheets (Taken 9/19/2022 1901)  Verbalizes/displays adequate comfort level or baseline comfort level:   Encourage patient to monitor pain and request assistance   Assess pain using appropriate pain scale     Problem: Skin/Tissue Integrity  Goal: Absence of new skin breakdown  Description: 1. Monitor for areas of redness and/or skin breakdown  2. Assess vascular access sites hourly  3. Every 4-6 hours minimum:  Change oxygen saturation probe site  4. Every 4-6 hours:  If on nasal continuous positive airway pressure, respiratory therapy assess nares and determine need for appliance change or resting period.   9/20/2022 0417 by Kenisha Meyers RN  Note: No new skin breakdown  9/19/2022 1901 by Liyah Carmichael RN  Outcome: Progressing

## 2022-09-20 NOTE — PROGRESS NOTES
[address] [address]  INPATIENT PHYSICAL THERAPY  EVALUATION  [facility] CVU 4B - 4B-05/005-A    Time In: 0831  Time Out: 8169  Timed Code Treatment Minutes: 9 Minutes  Minutes: 21          Date: 2022  Patient Name: Justin Barone,  Gender:  female        MRN: [id number]  : 1935  (82 y.o.)      Referring Practitioner: Dr. 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, ALEJANDRO on CKD, CHF, HTN, HLD, AAA, GERD, achalasia, tobacco abuse     Restrictions/Precautions:  Restrictions/Precautions: Fall Risk, General Precautions    Subjective:  Chart Reviewed: Yes  Patient assessed for rehabilitation services?: Yes  Subjective: cooperative, requested use of bathroom    General:        Hearing: Within functional limits       Pain: 5/10: RLE per pt    Vitals: Vitals not assessed per clinical judgement, see nursing flowsheet    Social/Functional History:    Lives With: Alone  Type of Home: Condo  Home Layout: Multi-level  Home Access: Elevator, Level entry  Home Equipment: Walker, rolling, Sock aid, Reacher     Bathroom Shower/Tub: Walk-in shower  Bathroom Toilet: Standard  Bathroom Equipment: Toilet raiser, Shower chair, Grab bars in shower  Bathroom Accessibility: Walker accessible    [address] Help From: Family (check in on patient)  ADL Assistance: Independent  Homemaking Assistance: Needs assistance  Ambulation Assistance: Independent  Transfer Assistance: Independent    Active : Yes  Occupation: Retired  Additional Comments: reports using 2 w/w mainly in community    OBJECTIVE:  Range of Motion:  Bilateral Lower Extremity: WFL    Strength:  Bilateral Lower Extremity: WFL    Balance:  Static Sitting Balance:  Supervision  Static Standing Balance: Contact Guard Assistance, with RW    Bed Mobility:  Rolling to Left: Stand By Assistance Supine to Sit: pt reaching for RN UE to pull self up to sit  Scooting: Stand By Assistance    Transfers:  Sit to Stand: Contact Guard Assistance  Stand to [address]  From EOB and toilet  Ambulation:  Contact Guard Assistance  Distance: 5'x1, 100'x1  Surface: Level Tile  Device:Rolling Walker  Gait Deviations: Forward Flexed Posture, Slow Pretty, and Mild Path Deviations        Functional Outcome Measures: Completed  AM-PAC Inpatient Mobility without Stair Climbing Raw Score : 15  AM-PAC Inpatient without Stair Climbing T-Scale Score : 43.03    ASSESSMENT:  Activity Tolerance:  Patient tolerance of  treatment: fair. -      Treatment Initiated: Treatment and education initiated within context of evaluation. Evaluation time included review of current medical information, gathering information related to past medical, social and functional history, completion of standardized testing, formal and informal observation of tasks, assessment of data and development of plan of care and goals. Treatment time included skilled education and facilitation of tasks to increase safety and independence with functional mobility for improved independence and quality of life. Assessment: Body Structures, Functions, Activity Limitations Requiring Skilled Therapeutic Intervention: Decreased functional mobility , Decreased strength, Decreased endurance, Decreased tolerance to work activity, Decreased balance, Increased pain  Assessment: pain RLE s/p recent surgery, Michael Wild is a 82 y.o. female that presents with lactic acidosis. she is ind prior to admission now requiring assist for basic mobility. Pt demonstrates a decrease in baseline by way of bed mobility, transfers and ambulation secondary to decreased activity tolerance, strength, fatigue, and balance deficits.  Pt will benefit from skilled PT services throughout admission and beyond hospital discharge for improvements in functional mobility and in order to decrease fall risk and return pt to PLOF. Therapy Prognosis: Good    Requires PT Follow-Up: Yes    Discharge Recommendations:  Discharge Recommendations: Continue to assess pending progress, Patient would benefit from continued therapy after discharge (anticipate return home)    Patient Education:      . Patient Education  Education Given To: Patient  Education Provided: Role of Therapy, Plan of Care  Education Method: Verbal  Barriers to Learning: None  Education Outcome: Verbalized understanding       Equipment Recommendations:  Equipment Needed: No    Plan:  Specific Instructions for Next Treatment: therex and mobility  Plan:  (5X GM)  Specific Instructions for Next Treatment: therex and mobility    Goals:  Patient goals : feel better  Short Term Goals  Time Frame for Short term goals: by discharge  Short term goal 1: bed mobility with MOD I to get in/out of bed  Short term goal 2: transfer with MOD I to get in/out of chairs  Short term goal 3: amb >100'x1 with AD and MOD I to walk safely in home  Long Term Goals  Time Frame for Long term goals : no LTGs set secondary to short ELOS    Following session, patient left in safe position with all fall risk precautions in place.

## 2022-09-20 NOTE — PROGRESS NOTES
CRITICAL CARE PROGRESS NOTE      Patient:  Alejandrina Marrero    Unit/Bed:-05/005-A  YOB: 1935  MRN: [id number]   PCP: Althea Courtney  Date of Admission: 9/15/2022  Chief Complaint:- Fatigue    Assessment and Plan:    Septic shock: Secondary to MRSA bacteremia. Possible infected endovascular graft vs pseudoaneurysm, see below. Presented SIRS 1/4 for tachycardia and hypotension. LA 5.3 (since normalized). Procal 17.11. Given 30 cc/kg IVF. Cultures obtained (MRSA bacteremia). Persistent hypotension, started on pressors 9/16. A-line placed 9/16. Wean off pressors for MAP > 65. Weaned off pressors, on midodrine 9/20. MRSA bacteremia:  Blood cultures positive for gram positive cocci in clusters x2. Blood molecular ID positive for MRSA. On synergistic therapy vancomycin and gentamicin  day 5/7. Repeat blood cultures no growth. The patient will require life long antibiotics with either vancomycin or linezolid. She will require a PICC prior to discharge and follow up with outpatient ID. If persistent bacteremia or reoccurrences of symptoms, the patient may benefit from tagged WBC scan. Complicated UTI:  Prior history of UTIs. Presented UA positive for nitrate, LE small, WBC 10-15 and many bacteria. Urine culture positive for citrobacter. Started on zosyn, will d/c as gent will cover gram neg. Pseudoaneurysm R FA:  CTA Abd w/ aorta runoff demonstrated 1.1 x 1.1 cm pseudoaneurysm in R FA 9/15. Likely secondary to recent extensive intravascular procedure. Arterial duplex bilateral confirmed 2 cm pseudoaneurysm R FA, lumen more than 50% thrombosed, amenable to thrombin injection. S/p thrombosis 9/17 with IR. Cardiology following. Repeat arterial doppler revealed completely thrombosed pseudoaneurysm 9/18. Right ankle trifurcation vessel disease:  CTA Abd w/ aorta runoff demonstrated trifurcation vessel occlusion at R ankle 9/15.  Recent extensive intravascular procedure involving stents and balloon throughout right lower extremity vessels. Dorsal and tibialis dorsal pulses present 9/16. Continue to monitor. Cardiology following. ALEJANDRO on CKD III, resolved: Cr 1.1 currently. baseline Cr ~1.0 - 1.2, eGFR 40-50. Presented Cr 1.5. BUN: Cr > 20, Sp gravity > 0.030. Currently oliguric. Meredith placed. FeNA = prerenal origin, present urine eosinophils. Possible renal healing from contrast induced nephropathy given recent angiogram. Avoid nephrotoxins. Renally dose medications. Continue gentle IVF given cardiac function. Monitor renal function with daily BMP. Thrombocytopenia, improving: In setting of sepsis and recent tPA for pseudoaneurysm. Continue monitor PLT with daily CBC. Chronic HFrEF, NYHA II:  Dual ICD in place. Last echo EF 25-30% 6/20. Home GDMT ARNi, BB and diuretic. Presented BNP 6077. CXR not indicated of overload. Currently holding nephrotoxins. 2 g sodium restriction, defer fluid restriction. Monitor daily weights, strict I&Os. pAfib:   [id number] 6. Initial EKG SR with arrhthymias and occasional PVCs. On BB for rate control and xarelto for anticoagulation. Continue metoprolol 25 mg p.o daily and Xarelto 15 mg p.o daily. Placed on continuous telemetry. HTN: continue home metoprolol. Hold ARNi and bumex for ALEJANDRO. Monitor blood pressure. HLD:  continue home pravastatin 40 mg p.o daily   AAA:   s/p endovascular repair in 2019    History of blood clots:  per chart review. On Anticoagulation  GERD:  started on pantoprazole 40 mg p.o twice daily   Achalasia: GI consulted due to food stuck in esophagus. This however is chronic in nature and patient's baseline. Dysphagia diet started. History of colovesical fistula:  s/p right hemicolectomy x2. No colostomy. Tobacco Abuse:  endorses prolonged history of intermittent smoking. Reports trying to quit, with last smoke a few months ago.         INITIAL H AND P AND ICU COURSE:  Per Initial H/P:  The patient is a 82year old female with a past medical history of HFrEF s/p dual ICD, pAfib, HTN, HLD, AAA s/p repair, PVD, blood clots, CKD, colovesical fisutla s/p right hemicolectomy and recent CLI s/p angiogram with stent and balloon to RLE here for fatigue and weakness. The patient reports inability to get out of bed yesterday, with no improvement throughout the day. EMS was called, and she was found to be hypotensive with SBP in 80s. The patient denies any prior fevers, chills, N/V, changes in diet or sick contacts. She did not \"bomb exploding\" RLE leg pain, radiating downwards. This was worse with movement, alleviated by nothing. In the ED, vitals T max 97.8, RR 18, HR 97, BP 73/43 and SpO2 93%. She became hypoxic and was escalated to 2L NC. Labs significant for BUN 33, Cr 1.5. Troponin WNL, BNP 6077. EKG non significant. TSH and Cortisol WNL. Procal 17.11, LA 5.3. UA positive for nitrite, small LE, WBC 10-15 and many bacteria. CXR negative for acute findings. 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 He was given 2L NS bolus, albumin x1. Cultures were drawn and he was started on vancomycin and zosyn for empiric antibiotics coverage. Cardiology was consulted, the patient was admitted to the ICU for further care and evaluation.     Of note, She previously had a CTA abdominal aorta with runoff that was significant for 3.6 cm fusiform aneurysm of the infrarenal abdominal aorta; bifurcated endograft present; right iliac limb totally occluded and left iliac limb remains patent; stents in right common and external iliac arteries are totally occluded; collateral reconstitution of the right CFA and right profunda; total occlusion of native right SFA and right femoral/anterior tibial bypass graft; Collateral reconstitution of the trifurcation vessels right side; multifocal severe diffuse disease of left SFA; left popliteal artery demonstrated mild stenosis; all 3 trifurcation vessels left calf lateral but demonstrate multifocal disease; subcutaneous emphysema bilaterally that is worse on left 8/4/22. The patient underwent a bilateral lower extremity angiogram 9/6/2022. She was found to have R EVAR limb, R CFA, R SFA occlusions; dimunitive PFA; popliteal artery occlusion with collaterals, all three tibials occluded; and all three tibials are occluded with some degree of flow in the PT and AT. The patient underwent retrograde AT access, knuckled to the R CFA, then re-entry into the R EIA stents; VBX 11 x 39, VBX 11 x 59 x 2, VBX 11 x 39, VBX 8 x 59; Retrograde SFA/pop 6.0 PTA; Elizabeth 6 x 120 x 2 for ostial SFA to distal SFA; DCB 5 x 150 of the pop, and DCB 6 x 80 of the distal SFA/prox pop; and 3.0 PTA of the AT. This resulted in Complete reconstitution of the R EVAR limb, R CFA, R SFA, R POP, and R AT with improved flow into the PT - full flow into the pedal arch. During hospitalization, blood cultures were positive for MRSA bacteremia. The patient was started on vancomycin and gentamicin for synergistic therapy. Arterial duplex confirmed pseudoaneurysm with 50% lumen amenable to thrombin injection. The patient underwent successful pseudoaneurysm thrombosis 9/17. Repeat cultures were ordered. Past 24 hours:  No significant events overnight. This morning the patient reports improving RLE pain. Pulses are palpable. She denies any other complaints. Weaned off pressors. Otherwise hemodynamically unstable, vitals WNL. Past Medical History:  Per HPI. Family History:   Mother - heart disease, CVA     Father - lung cancer, emphysema     Sister - leukemia  Social History:  Current smoker 15 PPY, occasional EtOH use, denies illicit drug use. ROS   Reports right lower extremity pain. Denies N/V, fever, chills, chest pain, shortness of breath, cough, abdominal or urinary complaints    Scheduled Meds:   midodrine  10 mg Oral TID WC    albumin human  25 g IntraVENous Once    lidocaine 1 % injection  5 mL IntraDERmal Once    insulin lispro  0-4 Units SubCUTAneous 4x Daily AC & HS    clopidogrel  75 mg Oral Daily    hydrocortisone  25 mg Rectal Nightly    magnesium oxide  250 mg Oral BID    pantoprazole  40 mg Oral BID AC    pravastatin  40 mg Oral Daily    [Held by provider] sacubitril-valsartan  1 tablet Oral BID    timolol  1 drop Both Eyes Daily    sodium chloride flush  5-40 mL IntraVENous 2 times per day    rivaroxaban  15 mg Oral Daily with breakfast    vancomycin (VANCOCIN) intermittent dosing (placeholder)   Other RX Placeholder    vancomycin  1,000 mg IntraVENous Q24H    gentamicin  80 mg IntraVENous Q24H     Continuous Infusions:   norepinephrine 3 mcg/min (09/19/22 1554)    sodium chloride 10 mL/hr at 09/19/22 1554    dextrose         PHYSICAL EXAMINATION:  T: 97.8. P: 93. RR: 20. B/P: 90/54. O2 Sat: 92%. I/O:  net + 92.5 mL over 24 hours   Body mass index is 27.59 kg/m². GCS:   15    General:   Pleasant elderly female, resting in hospital bed, acutely ill appearing, pale, warm and dry  HEENT:  normocephalic and atraumatic. No scleral icterus. PERR  Neck: supple. No Thyromegaly. Lungs: Breath sounds clear to auscultation. No retractions  Cardiac: RRR. No JVD. Abdomen: soft. Nontender. Extremities: Right lower extremity warm from mid-calf down to foot, has good movement and appropriate sensation. Left lower extremity warm and dry. Left groin positive for ecchymosis and slight tenderness to touch. Pulses have been noted per doppler. Vasculature: capillary refill < 3 seconds. Palpable dorsalis pedis pulses. Skin:  warm and dry. Psych:  Alert and oriented x3.   Affect appropriate  Lymph:  No supraclavicular adenopathy. Neurologic:  No focal deficit. No seizures. Data: (All radiographs, tracings, PFTs, and imaging are personally viewed and interpreted unless otherwise noted). 9/20/22 sodium 140, potassium 4.1, chloride 104, CO2 29, BUN 2919 creatinine 0.9, glucose 86, AG 7.0.  9/20/22 WBC 6.4, Hgb 10.5, HCT 31.6,   9/18/22 Urine culture - Citrobacter freundii  9/17/22 Blood cultures - pending  9/17/22 Renal US -trace ascites in Morison's pouch and splenomegaly; right inferior renal echogenic nodule, 9 x 12 x 8 mm-RCC suspected; subtle heterogeneously enhancing 12 mm nodule in the right kidney  9/16/22 Lactic acid 2.1   9/16/22 Cortisol 51.65  9/16/22 TSH 1.010  9/15/22 Blood culture; Staph aureus-MRSA  9/15/22 Procal 17.11  9/15/22 Troponin <0.010, BNP 6077  9/15/22 UA; nitrite positive, LE small, WBC 10-15, Sp gravity >1.030  9/15/22 CTA abd aorta w/ adi runoff demonstrated 1.1 x 1.1 cm R CFA pseudoaneurysm; occluded R femoral anterior tibial artery bypass; patent R SFA; Mild to moderate multifocal dx on R; trifurcation vessel occlusion at R ankle; moderate multifocal L CFA; 80% focal stenosis of L superficial femoral artery; mild multifocal dx of L popliteal artery; trifurcation vessel multifocal disease, patent at L ankle. 9/15/22 CXR demonstrate no acute findings; right paratracheal and retrocardiac opacity present on prior studies; previously reported dilated esophagus. Telemetry shows NSR. Seen with multidisciplinary ICU team.  Meets Continued ICU Level Care Criteria:    [x] Yes   [] No - Transfer Planned to listed location:  [] HOSPITALIST CONTACTED-      Case and plan discussed with Dr. Deyanira Dangelo. Electronically signed by Yohan Swenson DO  [address]       Electronically signed by     Yohan Swenson DO on 9/20/2022 at 7:37 AM  Patient seen by me including key components of medical care. Case discussed with Dr. Nesha Franco. Patient will need life-long antibiotics.   Consider Linezolid orally when transition away from IV therapy at end of 6 weeks. Italicized font, if present,  represents changes to the note made by me. CC time 35 minutes. Time was discontiguous. Time does not include procedure. Time does include my direct assessment of the patient and coordination of care. Time represents more than 50% of the time involved with patient care by the [address] [place] [address] team.  Electronically signed by Jeanie Clifton.  Martin Go MD.

## 2022-09-20 NOTE — PROGRESS NOTES
[facility] 62  INPATIENT OCCUPATIONAL THERAPY  [facility] CVICU 4B  EVALUATION    Time:   Time In: 7918  Time Out: 09  Timed Code Treatment Minutes: 25 Minutes  Minutes: 35          Date: 2022  Patient Name: Valentina Arevalo,   Gender: female      MRN: [id number]  : 1935  (82 y.o.)  Referring Practitioner: Akanksha Degroot DO  Diagnosis: lactic acidosis  Additional Pertinent Hx: per chart review; The patient is a 82year old female with a past medical history of HFrEF s/p dual ICD, pAfib, HTN, HLD, AAA s/p repair, PVD, blood clots, CKD, colovesical fisutla s/p right hemicolectomy and recent CLI s/p angiogram with stent and balloon to RLE here for fatigue and weakness. The patient reports inability to get out of bed yesterday, with no improvement throughout the day. EMS was called, and she was found to be hypotensive with SBP in 80s. The patient denies any prior fevers, chills, N/V, changes in diet or sick contacts. She did not \"bomb exploding\" RLE leg pain, radiating downwards. This was worse with movement, alleviated by nothing. In the ED, vitals T max 97.8, RR 18, HR 97, BP 73/43 and SpO2 93%. She became hypoxic and was escalated to 2L NC. Labs significant for BUN 33, Cr 1.5. Troponin WNL, BNP 6077. EKG non significant. TSH and Cortisol WNL. Procal 17.11, LA 5.3. UA positive for nitrite, small LE, WBC 10-15 and many bacteria. CXR negative for acute findings. 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 He was given 2L NS bolus, albumin x1. Cultures were drawn and he was started on vancomycin and zosyn for empiric antibiotics coverage.  Cardiology was consulted, the patient was admitted to the ICU for further care and evaluation. Restrictions/Precautions:  Restrictions/Precautions: Fall Risk, General Precautions    Subjective  Chart Reviewed: Yes, Orders, Progress Notes, History and Physical, Imaging  Patient assessed for rehabilitation services?: Yes  Family / Caregiver Present: No    Subjective: RN appoved session, patient seated up in recliner upon OT arrival-states she is fatigued, however wants to stay up in the recliner. A &O x 4. Pain:  reports some overall discomfort; did not rate. Vitals:  on continuous ICU vitals monitor and demo WFL t/o eval    Social/Functional History:  Lives With: Alone  Type of Home: Condo  Home Layout: Multi-level  Home Access: Elevator, Level entry  Home Equipment: Walker, rolling, Sock aid, Reacher   Bathroom Shower/Tub: Walk-in shower  Bathroom Toilet: Standard  Bathroom Equipment: Toilet raiser, Shower chair, Grab bars in shower  Bathroom Accessibility: Walker accessible    [place] [address] Help From: Family (check in on patient)  ADL Assistance: Independent  Homemaking Assistance: Needs assistance  Ambulation Assistance: Independent  Transfer Assistance: Independent    Active : Yes  Occupation: Retired  Additional Comments: reports using 2 w/w mainly in community    VISION:Corrected    HEARING:  WFL    COGNITION: Decreased Insight, Decreased Problem Solving, and Decreased Safety Awareness    RANGE OF MOTION:  Right Upper Extremity: WFL  Left Upper Extremity:  Impaired - demo AROM shldr flex to approx 110 degrees with facial grimacing    STRENGTH:  Right Upper Extremity: elbow flex and ext 4+/5  (F)  Left Upper Extremity:  elbow flex 4/5 ext 4-/5  (F)  Patient reports she is R hand dominant    SENSATION:   WFL    ADL:   Grooming: with set-up. To apply deodorant following bathing, s/u for brushing teeth seated in recliner. Bathing: Stand By Assistance.   For UB and MIN A for back, stood with 2 w/w for support and bathed lavern area with CGA after s/u  Upper Extremity Dressing: Minimal Assistance. To change hospital gown  Lower Extremity Dressing: Moderate Assistance. For changing depend and MAX A for slipper socks. Patient states she uses AE at home for tasks. Sathya Vicente BALANCE:  Sitting Balance:  Supervision. Standing Balance: Contact Guard Assistance. With use of 2 w/w for support     BED MOBILITY:  Not Tested    TRANSFERS:  Sit to Stand:  Contact Guard Assistance. Cues for hand placement     FUNCTIONAL MOBILITY:  Assistive Device: Rolling Walker  Assist Level:  Contact Guard Assistance. Distance:  took a few steps forward and then backwards to recliner     Patient fatigued at end of eval.     Activity Tolerance:  Patient tolerance of  treatment: fair. Assessment:  Assessment: patient demonstrates overall de-conditioning and weakness secondary to lactic acidosis impacting her ability to complete ADLs and functional transfers as prior. patient has pre-existing L UE ROM deficits impacting her ability to complete effective functional reaching during grooming and UB dressing. patient would benefit from continued, skilled OT to increase activity tolerance, UB strength, L UE AROM and ease and (I) with ADLs and functional transfers to safely transition to prior living envionment. Performance deficits / Impairments: Decreased functional mobility , Decreased ADL status, Decreased ROM, Decreased strength, Decreased endurance  Prognosis: Good  REQUIRES OT FOLLOW-UP: Yes  Decision Making: Medium Complexity    Treatment Initiated: Treatment and education initiated within context of evaluation. Evaluation time included review of current medical information, gathering information related to past medical, social and functional history, completion of standardized testing, formal and informal observation of tasks, assessment of data and development of plan of care and goals.   Treatment time included skilled education and facilitation of tasks to increase safety and independence with ADL's for improved functional independence and quality of life. Discharge Recommendations:  Continue to assess pending progress, Patient would benefit from continued therapy after discharge    Patient Education:     Patient Education  Education Given To: Patient  Education Provided: Role of Therapy, Precautions, Fall Prevention Strategies, Plan of Care, ADL Adaptive Strategies, Transfer Training  Education Provided Comments: importance of increasing activity  Barriers to Learning: None    Equipment Recommendations:  Equipment Needed: Yes  Mobility Devices: ADL Assistive Devices    Plan:  Times per Week: 5x  Times per Day: Daily  Current Treatment Recommendations: Strengthening, ROM, Balance training, Functional mobility training, Endurance training, Neuromuscular re-education, Patient/Caregiver education & training, Equipment evaluation, education, & procurement, Self-Care / ADL, Safety education & training, Coordination training. See long-term goal time frame for expected duration of plan of care. If no long-term goals established, a short length of stay is anticipated. Goals:  Patient goals : return home at [facility]  Short Term Goals  Time Frame for Short term goals: by discharge  Short Term Goal 1: patient will tolerate 5 min functional standing with two hand release with SBA to increase activity tolerance for self care routine. Short Term Goal 2: patient will functionally ambulate house hold distances with (S) with 0-1 verbal cues for safety and sequencing. Short Term Goal 3: patient will complete ADL routine with SBA and use of AE PRN and edu in energy conservation tech to increase ADL success. Short Term Goal 4: patient will be edu in UB strengthening and L UE AROM HEP to increase UE function and strength for functional transfers and functional reaching.          Following session, patient left in safe position with all fall risk precautions in place.

## 2022-09-20 NOTE — CARE COORDINATION
09/20/22 8:40 AM    Received call back from Thuy Monahan at [address]; states [facility] Medicare plan follows Medicare guidelines and does not have coverage for home IV infusion.      Updated Kera, Esperanza2 Tiburcio Vuong and ZIA Regalado.

## 2022-09-20 NOTE — PROGRESS NOTES
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 Weaned off of pressors as of this morning with midodrine 10 mg Tid (started 9/20). On Gentamicin and Vancomycin. Repeat blood cultures 09/17 NGTD. Dr. García Aus recommending life long abx therapy due to suspicion for endograft infection, high risk recurrent bacteremia, and patient being high risk surgical candidate. Will discontinue Gentamicin due to potential for toxicity. Continue Vanc. Will get ECHO. Consult ID for assistance. Patient has iliac arterial stent/Fem-tib graft since 2019 that could also be a source of infection. Complicated UTI: Prior history of UTIs. Presented UA positive for nitrate, LE small, WBC 10-15 and many bacteria. Urine culture positive for citrobacter. Started on zosyn initially, now on Gentamicin. No dysuria. Pseudoaneurysm R FA:  CTA Abd w/ aorta runoff demonstrated 1.1 x 1.1 cm pseudoaneurysm in R FA 9/15. Likely secondary to recent extensive intravascular procedure. Arterial duplex bilateral confirmed 2 cm pseudoaneurysm R FA, lumen more than 50% thrombosed, amenable to thrombin injection. S/p thrombolysis 9/17 with IR. Repeat arterial doppler revealed completely thrombosed pseudoaneurysm 9/18. 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  S/p retrograde AT access, knuckled to the R CFA, then re-entry into the R EIA stents; VBX 11 x 39, VBX 11 x 59 x 2, VBX 11 x 39, VBX 8 x 59; Retrograde SFA/pop 6.0 PTA; Elizabeth 6 x 120 x 2 for ostial SFA to distal SFA; DCB 5 x 150 of the pop, and DCB 6 x 80 of the distal SFA/prox pop; and 3.0 PTA of the AT. Now with complete reconstitution of the R EVAR limb, R CFA, R SFA, R POP, and R AT with improved flow into the PT - full flow into the pedal arch on 09/06/2022. Continue local nitro ointment to RLE. On DAPT and Xeralto. High risk for reperfusion injury. Dr. Nesha Franco following. Currently pain has improved. Dopplerable DP/PT bilaterally, left also palpable. Encourage ambulation. PTOT. Right ankle trifurcation vessel disease:  CTA Abd w/ aorta runoff demonstrated trifurcation vessel occlusion at R ankle 9/15. Recent extensive intravascular procedure involving stents and balloon throughout right lower extremity vessels. Continue to monitor. Thrombocytopenia: thought to be due to sepsis, s/p extensive invasive interventions. No other etiology at this time. Improving. Trend daily. Chronic ischemic systolic HF NYHA class II: s/p dual ICD. Last limited TTE 06/26/2020  with EF 25-30%, severely dilated LA, hypertrophic basal septum. On DAPT/BB/statin/ARNi- held due to low BP. No signs of decompensation at this time. Monitor. Salt/fluid restrictions. PAF: [id number]. Currently in NSR with HR WNL. On Troprol-XL. Eliquis switched to Marco Antonio Frisk in the setting of PVD. Monitor on tele. Primary HTN: controlled. On Entresto, Toprol-XL, Bumex as needed per chart. All held due to hypotension. HLD: on pravastatin 40 mg daily. AAA: s/p endovascular repair in 2019. 3.4 cm on CTA 8/4/2022. Follow up outpatient. CKD III: baseline Cr ~1.0-1.2, eGFR 40-50. Monitor for contrast nephropathy 48-72 hours post procedure. Encourage PO intake for hydration. ALEJANDRO resolved. Avoid nephrotoxins. GERD - on protonix  Hx colovesical fistula - s/p right hemicolectomy x2. No colostomy. Tobacco use disorder - prolonged history of intermittent smoking.  Unable to clarify PPD. Counseled on smoking cessation. Tele:   [x] yes             [] no    Code Status: Full Code    Fluids: none, encourage PO intake  Diet:  ADULT DIET; Dysphagia - Pureed; 4 carb choices (60 gm/meal); Low Fat/Low Chol/High Fiber/2 gm Na    DVT prophylaxis: [] Lovenox                                 [x] SCDs                                 [] SQ Heparin                                 [] Encourage ambulation                                 [x] Already on Anticoagulation    Disposition:      [x] TBD                             [] Home                             [] TCU                             [] Rehab                             [] Psych                             [] SNF                             [] [facility]                             [] Other-    Chief Complaint: Fatigue    Hospital Course:   83 yo F with history of blood clots, CKD, colovesical fisutla s/p right hemicolectomy, HFrEF s/p dual ICD, pAfib, HTN, HLD, AAA s/p repair, PVD, and recent CLI s/p angiogram with stent and balloon to RLE initially presented for fatigue in the setting of intense RLE pain. On arrival, patient was hypotensive. UA positive for nitrite, small LE, WBC 10-15 and many bacteria. CXR negative for acute findings. 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 Admitted to ICU for management of septic shock post complex LE intervention. complicated UTI. Course complicated by MRSA bacteremia on Bcx 9/15, started on Vancomycin and Gentamicin (synergistic affect).      Of note, 08/04/2022 CTA abdominal aorta with runoff that was significant for 3.6 cm fusiform aneurysm of the infrarenal abdominal aorta; bifurcated endograft present; right iliac limb totally occluded and left iliac limb remains patent; stents in right common and external iliac arteries are totally occluded; collateral reconstitution of the right CFA and right profunda; total occlusion of native right SFA and right femoral/anterior tibial bypass graft; Collateral reconstitution of the trifurcation vessels right side; multifocal severe diffuse disease of left SFA; left popliteal artery demonstrated mild stenosis; all 3 trifurcation vessels left calf lateral but demonstrate multifocal disease; subcutaneous emphysema bilaterally that is worse. S/p bilateral lower extremity angiogram on 9/6/2022 by Dr. Nesha Franco. Noted to have R EVAR limb, R CFA, R SFA occlusions; dimunitive PFA; popliteal artery occlusion with collaterals, all three tibials occluded; and all three tibials are occluded with some degree of flow in the PT and AT. The patient underwent retrograde AT access, knuckled to the R CFA, then re-entry into the R EIA stents; VBX 11 x 39, VBX 11 x 59 x 2, VBX 11 x 39, VBX 8 x 59; Retrograde SFA/pop 6.0 PTA; Elizabeth 6 x 120 x 2 for ostial SFA to distal SFA; DCB 5 x 150 of the pop, and DCB 6 x 80 of the distal SFA/prox pop; and 3.0 PTA of the AT. This resulted in Complete reconstitution of the R EVAR limb, R CFA, R SFA, R POP, and R AT with improved flow into the PT - full flow into the pedal arch. Arterial duplex confirmed pseudoaneurysm with 50% lumen amenable to thrombin injection. The patient underwent successful pseudoaneurysm thrombosis 9/17. Repeat arterial doppler revealed completely thrombosed pseudoaneurysm 9/18. Repeat blood cultures 09/17/2022 NGTD. Interval:   Per ICU team, reportinh improvement in RLE pain, pulses palpable.  Reportedly interventional cardio team recommending life long Abx therapy due to concern for possible infection site from endograft and high risk for surgical intervention. Subjective: On evlauation, patient walking in the hallway. Continues to have RLE pain but slightly improved. No new complaints. ROS (12 point review of systems completed. Pertinent positives noted. Otherwise ROS is negative)     Medications:  Reviewed    Infusion Medications    norepinephrine 3 mcg/min (09/19/22 1554)    sodium chloride 10 mL/hr at 09/19/22 1554    dextrose       Scheduled Medications    midodrine  10 mg Oral TID WC    albumin human  25 g IntraVENous Once    lidocaine 1 % injection  5 mL IntraDERmal Once    insulin lispro  0-4 Units SubCUTAneous 4x Daily AC & HS    clopidogrel  75 mg Oral Daily    hydrocortisone  25 mg Rectal Nightly    magnesium oxide  250 mg Oral BID    pantoprazole  40 mg Oral BID AC    pravastatin  40 mg Oral Daily    [Held by provider] sacubitril-valsartan  1 tablet Oral BID    timolol  1 drop Both Eyes Daily    sodium chloride flush  5-40 mL IntraVENous 2 times per day    rivaroxaban  15 mg Oral Daily with breakfast    vancomycin (VANCOCIN) intermittent dosing (placeholder)   Other RX Placeholder    vancomycin  1,000 mg IntraVENous Q24H    gentamicin  80 mg IntraVENous Q24H     PRN Meds: albuterol sulfate HFA, potassium chloride **OR** potassium alternative oral replacement **OR** potassium chloride, melatonin, sodium chloride flush, sodium chloride, ondansetron **OR** ondansetron, polyethylene glycol, acetaminophen **OR** acetaminophen, glucose, dextrose bolus **OR** dextrose bolus, glucagon (rDNA), dextrose, iopamidol      Intake/Output Summary (Last 24 hours) at 9/20/2022 0903  Last data filed at 9/20/2022 6849  Gross per 24 hour   Intake 875.56 ml   Output 600 ml   Net 275.56 ml           Exam:  BP (!) 108/51   Pulse 83   Temp 97.8 °F (36.6 °C) (Oral)   Resp 22   Ht 5' 5\" (1.651 m)   Wt 165 lb 12.6 oz (75.2 kg)   SpO2 94%   BMI 27.59 kg/m²   General appearance: No apparent distress, appears stated age and cooperative.  Acutely ill and tired appearing. HEENT: Conjunctivae/corneas clear. EOMI. Rigth subclavian CVC noted. Neck: Supple, with full range of motion. No jugular venous distention. Trachea midline. Respiratory:  Normal respiratory effort. Clear to auscultation, bilaterally without Rales/Wheezes/Rhonchi. Cardiovascular: Regular rate and rhythm with normal S1/S2 without murmurs, rubs or gallops. Abdomen: Soft, non-tender, non-distended with normal bowel sounds. Musculoskeletal: passive and active ROM x 4 extremities. Skin: Skin color, texture, turgor normal.  No rashes or lesions. Neurologic: Neurovascularly intact without any focal sensory/motor deficits. Cranial nerves: II-XII intact, grossly non-focal.  Psychiatric: Alert and oriented, thought content appropriate, normal insight  Capillary Refill: Brisk,< 3 seconds   Peripheral Pulses: +1 palpable, equal bilaterally     Labs:   Recent Labs     09/18/22  0500 09/19/22  0400 09/20/22  0500   WBC 5.3 6.3 6.4   HGB 11.1* 10.9* 10.5*   HCT 33.4* 33.0* 31.6*   PLT 74* 97* 102*     Recent Labs     09/18/22  0500 09/19/22  0400 09/20/22  0500    140 140   K 3.4* 4.0 4.1    107 104   CO2 25 27 29   BUN 23* 21 19   CREATININE 1.0 1.1 0.9   CALCIUM 7.9* 8.6 8.6     No results for input(s): AST, ALT, BILIDIR, BILITOT, ALKPHOS in the last 72 hours. No results for input(s): INR in the last 72 hours. No results for input(s): Terrence Pander in the last 72 hours. Microbiology:    Blood culture #1:   Lab Results   Component Value Date/Time    BC No growth 24 hours. No growth 48 hours. 09/17/2022 11:45 AM       Blood culture #2:No results found for: Efra Adonay    Organism:  Lab Results   Component Value Date/Time    ORG Citrobacter freundii 09/16/2022 09:55 AM         Lab Results   Component Value Date/Time    LABGRAM  10/24/2018 01:12 PM     Few segmented neutrophils observed. No epithelial cells observed. Moderate gram positive cocci occurring singly and in pairs.          MRSA culture only:No results found for: [facility]    Urine culture:   Lab Results   Component Value Date/Time    LABURIN  09/16/2022 09:55 AM     [place] count: >100,000 CFU/mL Citrobacter species which may be initially susceptible to third generation cephalosporins may develop resistance within three to four days of initiation of this antimicrobial therapy. Respiratory culture: No results found for: CULTRESP    Aerobic and Anaerobic :  Lab Results   Component Value Date/Time    LABAERO light growth 10/24/2018 01:12 PM     Lab Results   Component Value Date/Time    LABANAE  10/24/2018 01:12 PM     Culture yielded moderate mixed growth consisting of anaerobic  gram negative bacilli and anaerobic gram positive cocci. If a  true mixed aerobic and anaerobic infection is suspected, then  broad spectrum empiric antibiotic therapy is indicated and  should include coverage for anaerobic organisms. Urinalysis:      Lab Results   Component Value Date/Time    NITRU POSITIVE 09/15/2022 10:17 PM    WBCUA 10-15 09/15/2022 10:17 PM    BACTERIA MANY 09/15/2022 10:17 PM    RBCUA 0-2 09/15/2022 10:17 PM    BLOODU TRACE 09/15/2022 10:17 PM    SPECGRAV >1.030 03/01/2021 06:11 AM    GLUCOSEU NEGATIVE 09/15/2022 10:17 PM       Radiology:  VL DUP LOWER EXTREMITY ARTERIES RIGHT   Final Result   1. Pseudoaneurysm remains completely thrombosed   2. Follow-up arterial duplex ultrasound recommended in one week to reassess for continued thrombosis of the pseudoaneurysm            **This report has been created using voice recognition software. It may contain minor errors which are inherent in voice recognition technology. **      Final report electronically signed by Dr. Rhonda Loyd on 9/18/2022 2:54 PM      VL GUIDED NEEDLE PLACEMENT   Final Result   1. Status post successful pseudoaneurysm thrombosis. 2. Repeat sonographic images of the groin will be obtained tomorrow and again in one week.             **This report has been created using voice recognition software. It may contain minor errors which are inherent in voice recognition technology. **      Final report electronically signed by Dr. Mali Ferreira on 9/17/2022 2:11 PM      US RENAL COMPLETE   Final Result   Impression:   1. Trace ascites in Morison's pouch and splenomegaly are incidental.   2. Right inferior renal echogenic nodule. Renal cell carcinoma suspected. Recommend further clinical investigation as needed. This document has been electronically signed by: Lorena Seip, MD on 09/16/2022 06:56 PM      VL DUP LOWER EXTREMITY ARTERIES BILATERAL   Final Result   1. 2 cm pseudoaneurysm emanating from the right common femoral artery, the lumen of which is more than 50% thrombosed. This would be amenable to thrombin injection, if desired. 2. Stents are present in the right proximal, mid, distal SFA which are patent Diminished ABIs are present bilaterally. 3. 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 4. Diminished ABIs bilaterally. Despite this, there is good pulsatility in the trifurcation vessels bilaterally, as described above. **This report has been created using voice recognition software. It may contain minor errors which are inherent in voice recognition technology. **      Final report electronically signed by Dr. Mali Ferreira on 9/16/2022 5:33 PM      XR CHEST PORTABLE   Final Result   Impression:   Properly positioned right central venous catheter. This document has been electronically signed by: Daved Cushing. Diamante Talamantes MD    on 09/16/2022 03:16 AM      XR CHEST PORTABLE   Final Result   Impression:   No acute findings. Right paratracheal and retrocardiac opacity which was also present on the    prior studies and previously reported to be a dilated esophagus. The    appearance is consistent with a dilated food filled esophagus.       This document has been electronically signed by: James Umana. Anibal Alvarez MD    on 09/15/2022 11:02 PM      CTA ABDOMINAL AORTA W BILAT RUNOFF W WO CONTRAST   Final Result   Impression:   Pseudoaneurysm right femoral artery. This is new. Aortic endograft present. No definite leak. 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 This document has been electronically signed by: Yandel Christiansen MD on    09/15/2022 08:30 PM      All CTs at this facility use dose modulation techniques and iterative    reconstructions, and/or weight-based dosing   when appropriate to reduce radiation to a low as reasonably achievable. 3D Post-processing was performed on this study. US RENAL COMPLETE    Result Date: 9/16/2022  U/S renal Comparison: CT,SR - CTA ABDOMINAL AORTA W BILAT RUNOFF W WO CONTRAST - 9/15/22 19:26 EDT Findings: Right kidney 9.2 cm length. No hydronephrosis. Normal color doppler. Right inferior renal echogenic nodule 9 x 12 x 8 mm. There is no angiomyolipoma seen on the comparison CT angiogram. There may be a subtle heterogeneously enhancing 12 mm nodule in the right kidney on image 100 series 4 of the comparison exam. Left kidney 10.4 cm length. No hydronephrosis. Normal color doppler. Trace fluid in Jackson's pouch. Splenomegaly 15.7 cm in length. Impression: 1. Trace ascites in Morison's pouch and splenomegaly are incidental. 2. Right inferior renal echogenic nodule. Renal cell carcinoma suspected. Recommend further clinical investigation as needed. This document has been electronically signed by: Rohit Farias MD on 09/16/2022 06:56 PM    CTA ABDOMINAL AORTA W BILAT RUNOFF W WO CONTRAST    Result Date: 9/15/2022  CTA of the abdomen and pelvis with bilateral lower extremity runoff after administration of IV contrast. Technique: CT from the lung bases through the toes after administration of IV contrast. 3-D surface rendering performed.  Comparison:  CT,KOSR - CTA ABDOMINAL AORTA W BILAT RUNOFF W WO CONTRAST - 08/04/2022 03:54 PM EDT Findings: Large hiatal hernia. Normal lung bases. Normal liver. No masses. The gallbladder is unremarkable by CT. Normal appearing adrenal glands. Normal spleen. No masses. The spleen appears normal in size. Normal kidneys. No renal mass. No hydronephrosis. Diverticulosis of the colon, no diverticulitis. No pneumoperitoneum or abscess. No bowel obstruction. Normal pancreas. No pancreatitis. Normal retroperitoneal structures. No adenopathy. Unremarkable urinary bladder. Normal bones. Abdominal aorta: Aortic endograft present. The grafts are patent. No evidence for leak. Renal arteries: Single right renal artery. Single left renal artery. No significant disease. Celiac artery: Multifocal disease within the celiac artery without significant stenosis. SMA: Multifocal disease within the SMA without significant stenosis. Right iliac arteries: Stent within the right common iliac artery and right internal artery. No stenosis greater than 50%. Left iliac artery: Stent within the left common iliac artery. No significant stenosis. Mild disease left external iliac artery. Right: CFA: Pseudoaneurysm right common femoral artery measuring 1.1 x 1.1 cm. Occluded right femoral anterior tibial artery bypass. SFA: Patent stent throughout the right common femoral artery. Popliteal artery: Mild to moderate multifocal disease. Vessels of the trifurcation: Mild disease within the anterior tibial artery. This is occluded at the ankle. The right posterior tibial artery and peroneal artery appear to be occluded mid calf. Left: CFA: Moderate multifocal disease left common femoral artery. SFA: 80% focal stenosis of the origin of the left superficial femoral artery. There is multifocal advanced disease throughout the left superficial femoral artery. This remains patent. Popliteal artery: Mild multifocal disease left popliteal artery.  Vessels of the trifurcation: Multifocal disease within the vessels of the trifurcation. These appear patent to the left ankle. Impression: Pseudoaneurysm right femoral artery. This is new. Aortic endograft present. No definite leak. 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 This document has been electronically signed by: Buffy Townsend MD on 09/15/2022 08:30 PM All CTs at this facility use dose modulation techniques and iterative reconstructions, and/or weight-based dosing when appropriate to reduce radiation to a low as reasonably achievable. 3D Post-processing was performed on this study. XR CHEST PORTABLE    Result Date: 9/16/2022  Chest Radiograph Comparison: 9/15/22 Findings: Right central venous catheter with the tip terminating at the cavoatrial junction. Cardiomegaly. Left chest wall cardiac pacemaker/AICD. Normal mediastinal contours. No pneumothorax; skinfold on the left. Right peritracheal and retrocardiac opacity secondary to a dilated esophagus, unchanged. No pleural effusion. Normal upper abdomen. No fracture. Impression: Properly positioned right central venous catheter. This document has been electronically signed by: Poorinma Uriarte MD on 09/16/2022 03:16 AM    XR CHEST PORTABLE    Result Date: 9/15/2022  Chest Radiograph Comparison: 3/3/21, 3/2/21 and 2/28/21 Findings: Cardiomegaly status post left chest wall cardiac pacemaker/AICD. Normal mediastinal contours. No pneumothorax. Right paratracheal and retrocardiac opacity, also present on the prior studies. No pleural effusion. Normal upper abdomen. No fracture. Impression: No acute findings. Right paratracheal and retrocardiac opacity which was also present on the prior studies and previously reported to be a dilated esophagus. The appearance is consistent with a dilated food filled esophagus. This document has been electronically signed by: Poornima Uriarte MD on 09/15/2022 11:02 PM    VL DUP LOWER EXTREMITY ARTERIES BILATERAL    Result Date: 9/16/2022  PROCEDURE: VL DUP LOWER EXTREMITY ARTERIES BILATERAL CLINICAL INFORMATION: Had vascular intervention 9/6/2022, CTA showed pseudoaneurysm in the right CFA 9/15/22, and right 3 vessel occlusion at the ankle. COMPARISON: 9/8/2022 TECHNIQUE: Multiple permanent grayscale and color flow sonographic images of the major arteries of both lower extremities were obtained from the level of the groin to the level of the ankle . Spectral Doppler waveforms were obtained and velocity measurements were measured. FINDINGS: RIGHT ARTERY (PSV cm/sec) CFA ---------------> 121 PSV cm/sec PROF -------------> 466 PSV cm/sec PROX STENT SFA PROX -------> 63 PSV cm/sec MID STENT SFA MID ----------> 71 PSV cm/sec DIST STENT --------> 66 PSV cm/sec SFA DIST --------> 39 PSV cm/sec POP A PROX ---> 63 PSV cm/sec POP A DIST ----> 58 PSV cm/sec PTA ---------------> 48 PSV cm/sec PERONEAL -----> 53 PSV cm/sec ARACELIS ----------------> 12 PSV cm/sec DPA ---------------->7 PSV cm/sec LEFT ARTERY (PSV cm/sec) CFA ---------------> 106 PSV cm/sec PROF -------------> 156 PSV cm/sec SFA PROX -------> 69 PSV cm/sec SFA MID ----------> 116 PSV cm/sec SFA DIST --------> 79 PSV cm/sec POP A PROX ---> 56 PSV cm/sec POP A DIST ----> 40 PSV cm/sec PTA ---------------> 65 PSV cm/sec PERONEAL -----> 65 PSV cm/sec ARACELIS ----------------> 28 PSV cm/sec DPA ----------------> 28 PSV cm/sec RAMOS RIGHT ARACELIS----->0.45 PTA----->0.63 RAMOS LEFT ARACELIS----->0.67 PTA----->0.80 VELOCITY MEASUREMENTS: Mildly diminished RAMOS left side. Moderately diminished ABIs right side. RIGHT LEG: Excellent pulsatility in the common femoral artery. There is a small pseudoaneurysm emanating from the right common femoral artery, measuring 2 cm in diameter. The lumen of the surrounding over 50% thrombosed. This would be amenable to thrombin injected,, if desired. Good pulsatility is seen in the proximal and mid SFA. A stent is present in the SFA which is patent. There is mildly dampened pulsatility in the distal SFA.  Good pulsatility in the popliteal artery. Good pulsatility is also seen in the posterior tibial and peroneal arteries. Moderately dampened pulsatility in the anterior tibial artery. LEFT LEG: Good pulsatility in the common femoral artery and profunda. Good pulsatility is seen throughout the SFA and in the popliteal artery. Good pulsatility is seen in all 3 trifurcation vessels. 1. 2 cm pseudoaneurysm emanating from the right common femoral artery, the lumen of which is more than 50% thrombosed. This would be amenable to thrombin injection, if desired. 2. Stents are present in the right proximal, mid, distal SFA which are patent Diminished ABIs are present bilaterally. 3. 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 4. Diminished ABIs bilaterally. Despite this, there is good pulsatility in the trifurcation vessels bilaterally, as described above. **This report has been created using voice recognition software. It may contain minor errors which are inherent in voice recognition technology. ** Final report electronically signed by Dr. Grace Spotted on 9/16/2022 5:33 PM          Sathya Quigley MD   PGY3, Internal Medicine   9/20/2022

## 2022-09-20 NOTE — CONSULTS
CONSULTATION NOTE :ID       Patient - Michael Wild,  Age - 82 y.o.    - 1935      Room Number - 4B-05/005-A   MRN -  [id number]   Acct # - [de-identified]  Date of Admission -  9/15/2022  6:33 PM  Patient's PCP: Bronson Escoto     Requesting Physician: Todd Merritt MD    REASON FOR CONSULTATION   MRSA bacteremia with suspected graft infection  CHIEF COMPLAINT   Generalized weakness    HISTORY OF PRESENT ILLNESS       This is a very pleasant 82 y.o. female who was admitted to the hospital with a chief complaints of generalized weakness. She was in ICU for septic shock. Was suspected to have UTI. Her blood cx was posiive for MRSA. She was started on vancomycin and gentamicin. She has hx of AAA and has graft on the aorta  iliac and has a graft on   the femoral  and popliteal area. She had a recent intervention ghazal open the occluded grafts. 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 She has also a pacemaker. Her medical hx include also CKD,COPD and PAD. She had thrombosis of the pseudoaneurysm by interventional radiology. She has swelling on the right lower leg, has pain, denies any fever or chills. No Echo was done during this admission    PAST MEDICAL  HISTORY       Past Medical History:   Diagnosis Date    A-fib [facility])     AAA (abdominal aortic aneurysm) (HCC)     Achalasia     Anemia     Arthritis     Blood circulation, collateral     BPPV (benign paroxysmal positional vertigo) 16    CHF (congestive heart failure) (HCC)     Chronic kidney disease     sees Dr Delarosa Robert    Colon cancer [facility])     Gastrointestinal hemorrhage     GERD (gastroesophageal reflux disease)     ? esophageal stricture    Hiatal hernia     History of blood transfusion     HTN (hypertension)     Hx of blood clots     R leg-sees ABEBA Hargrove    Hyperlipidemia     Medtronic dual ICD  2020    Neuromuscular disorder ([address]     Obesity     Osteopenia     Osteoporosis     PAC (premature atrial contraction)     on betablocker    Paralysis (HCC)     baby    Pedal edema     denied any hx of hypertension    Pneumonia     PVC (premature ventricular contraction)     sees Dr Thom Reid    PVD (peripheral vascular disease) [facility]     Small bowel obstruction (HCC)     Tinnitus     UTI (urinary tract infection)        PAST SURGICAL HISTORY     Past Surgical History:   Procedure Laterality Date    ABDOMINAL AORTIC ANEURYSM REPAIR, ENDOVASCULAR N/A 2/15/2019    ABDOMINAL AORTIC ANEURYSM REPAIR ENDOVASCULAR, DECLOTTING RIGHT FEM TIB BYPASS GRAFT performed by Indira Deleon MD at [facility]    lumpectomy    CHOLECYSTECTOMY      30 years ago    COLONOSCOPY  08/06/2018    Dr Jasper Long N/A 2/22/2019    COLONOSCOPY DIAGNOSTIC performed by Purnima Leonard MD at [facility]    COLONOSCOPY N/A 2/22/2020    COLONOSCOPY CONTROL HEMORRHAGE performed by Annemarie Ramirez MD at [facility]    COLONOSCOPY Left 3/2/2021    COLORECTAL CANCER SCREENING, NOT HIGH RISK performed by Alexis Kathleen MD at [facility]      eye, eyelids    EYE SURGERY      cataract    HEMICOLECTOMY N/A 2/25/2020    OPEN RIGHT COLON RESECTION performed by Frances Augustine MD at [place] [address] ([address] [place] [address])      LARYNGOSCOPY  07/15/2016    OTHER SURGICAL HISTORY  10/06/2020    Exp lap washout mesenteric lymph node biopsy EGD abthera placement Dr Juan Jose Houston  10/08/2020    reopening recent lap open g tube placement intra operative EGD and primary closure of open abdomen- [address] [facility] SUB GRFT T/A/L AREA/<100SCM /<1ST 25 SCM N/A 9/6/2018    RE-EXPLORATION RIGHT GROIN, DECLOTTING OF FEMORAL BYPASS GRAFT performed by Cody Corcoran MD at [facility] EGD TRANSORAL BIOPSY SINGLE/MULTIPLE Left 11/27/2017    EGD BIOPSY performed by Venita Interiano MD at [address] [place] [address] LAP,SURG,COLECTOMY, PARTIAL, W/ANAST N/A 8/20/2018    ROBOT ASSISTED COLECTOMY (LOW ANTERIOR) performed by Neetu Cline MD at [address] [facility] OFFICE/OUTPT [facility] N/A 9/5/2018    RIGHT FEMORAL EMBOLECTOMY, INTRAOPERATIVE ARTERIOGRAM, RIGHT ILIAC EMBOLECTOMY, RIGHT COMMON AND EXTERNAL ILIAC STENTING, RIGHT FEMORAL TO ANTERIOR POPLITEAL BYPASS WITH 6MM GORTEX GRAFT performed by Víctor Wells MD at [address] / [address] Right 2/14/2019    FEMORAL EMBOLECTOMY THROMBECTOMY, RIGHT LEG performed by Víctor Wells MD at [address] N/A 11/27/2017    EGD SUBMUCOSAL/BOTOX INJECTION performed by William Kat MD at [facility] N/A 2/22/2019    EGD BIOPSY performed by Narendra Cruz MD at [address] [place] [address]:       Scheduled Meds:   midodrine  10 mg Oral TID WC    lidocaine 1 % injection  5 mL IntraDERmal Once    insulin lispro  0-4 Units SubCUTAneous 4x Daily AC & HS    clopidogrel  75 mg Oral Daily    hydrocortisone  25 mg Rectal Nightly    magnesium oxide  250 mg Oral BID    pantoprazole  40 mg Oral BID AC    pravastatin  40 mg Oral Daily    [Held by provider] sacubitril-valsartan  1 tablet Oral BID    timolol  1 drop Both Eyes Daily    sodium chloride flush  5-40 mL IntraVENous 2 times per day    rivaroxaban  15 mg Oral Daily with breakfast    vancomycin (VANCOCIN) intermittent dosing (placeholder)   Other RX Placeholder    vancomycin  1,000 mg IntraVENous Q24H     Continuous Infusions:   sodium chloride 10 mL/hr at 09/19/22 1554    dextrose       PRN Meds:albuterol sulfate HFA, potassium chloride **OR** potassium alternative oral replacement **OR** potassium chloride, melatonin, sodium chloride flush, sodium chloride, ondansetron **OR** ondansetron, polyethylene glycol, acetaminophen **OR** acetaminophen, glucose, dextrose bolus **OR** dextrose bolus, glucagon (rDNA), dextrose, iopamidol  Allergies:   ALLERGIES:    Lasix [furosemide], Lipitor [atorvastatin], Neurontin [gabapentin], Oxycontin [oxycodone hcl], and Penicillins        SOCIAL HISTORY:     TOBACCO:   reports that she has been smoking cigarettes. She started smoking about 66 years ago. She has a 15.00 pack-year smoking history. She has never used smokeless tobacco.     ETOH:   reports current alcohol use of about 1.0 standard drink per week. FAMILY HISTORY:         Problem Relation Age of Onset    Heart Disease Mother     Stroke Mother     Cancer Father         lung    Emphysema Father     Other Sister         leukemia    Heart Disease Maternal Grandmother     Dementia Maternal Grandmother     Heart Disease Maternal Grandfather     No Known Problems Paternal Grandmother     No Known Problems Paternal Grandfather        REVIEW OF SYSTEMS:     Constitutional: no fever, no night sweats, + fatigue, no weight loss. Head: no head ache , no head injury, no migranes. Eye: no eye discharge, blurring of vision, no double vision,no eye pain. Ears: no hearing difficulty, no tinnitus  Mouth/throat: no ulceration, dental caries , dysphagia, no hoarseness and voice change  Respiratory: + cough no chest pain,+ shortness of breath,no wheezing  CVS: no palpitation, no chest pain,   GI: no abdominal pain, no nausea , no vomiting, no constipation,no diarrhea. NAYELI: no dysuria, frequency and urgency, no hematuria, no kidney stones  Musculoskeletal: no joint pain, swelling , stiffness,  Endocrine: no polyuria, polydipsia, no cold or heat intolerance  Hematology: + anemia, no easy brusing or bleeding, no hx of clotting disorder  Dermatology: no skin rash, no skin lesions, no pruritis,  Neurological:no headaches,no dizziness, no seizure, no numbness.   Psychiatry: no depression, no anxiety,no panic attacks, no suicide ideation    PHYSICAL EXAM:     BP (!) 98/55   Pulse 94   Temp 97.8 °F (36.6 °C) (Oral)   Resp 25   Ht 5' 5\" (1.651 m)   Wt 165 lb 12.6 oz (75.2 kg)   SpO2 93%   BMI 27.59 kg/m²   General apperance:  Awake, alert, chronically sick looking  HEENT: pale onjunctiva, unicteric sclera, moist oral mucosa. Chest:  bilateral air entry, diminished breath sound  Cardiovascular:  RRR ,S1S2, no murmur or gallop. Abdomen:  Soft, non tender to palpation. Extremities: edema on the lower legs, right more than left,pitting edema,bruising  Skin:  Warm and dry. CNS:oriented        LABS:     CBC:   Recent Labs     09/18/22  0500 09/19/22  0400 09/20/22  0500   WBC 5.3 6.3 6.4   HGB 11.1* 10.9* 10.5*   PLT 74* 97* 102*     BMP:    Recent Labs     09/18/22  0500 09/19/22  0400 09/20/22  0500    140 140   K 3.4* 4.0 4.1    107 104   CO2 25 27 29   BUN 23* 21 19   CREATININE 1.0 1.1 0.9   GLUCOSE 84 103 86     Calcium:  Recent Labs     09/20/22  0500   CALCIUM 8.6     Ionized Calcium:No results for input(s): IONCA in the last 72 hours. Magnesium:  Recent Labs     09/18/22  0500   MG 1.9     Phosphorus:No results for input(s): PHOS in the last 72 hours. BNP:No results for input(s): BNP in the last 72 hours. Glucose:  Recent Labs     09/19/22  1848 09/19/22  1957 09/20/22  0647   POCGLU 199* 161* 88         IMAGING:    Micro:   Lab Results   Component Value Date/Time    BC No growth 24 hours. No growth 48 hours.   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 0XXA    ALEJANDRO (acute kidney injury) ([facility] N17.9    Leukocytosis D72.829    Gastroesophageal reflux disease without esophagitis K21.9    Other hyperlipidemia E78.49    Osteoarthritis of multiple joints M15.9    Diverticulosis of intestine without bleeding K57.90    Septic shock ([facility]) A41.9, R65.21    SVT (supraventricular tachycardia) ([facility]) I47.1    Hyperglycemia G23.4    Metabolic acidosis N13.3    Hypocalcemia E83.51    Chronic hypotension I95.89    Anemia D64.9    Urinary tract infection without hematuria N39.0    Paroxysmal atrial fibrillation ([facility]) I48.0    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    Physical deconditioning R53.81    Abdominal aortic aneurysm (AAA) without rupture ([facility]) I71.4    Hiatal hernia K44.9    Elevated procalcitonin R79.89    Hypomagnesemia E83.42    Pulmonary nodule R91.1    CKD (chronic kidney disease) stage 2, GFR 60-89 ml/min N18.2    Stage 3 chronic kidney disease (HCC) N18.30    Femoral popliteal artery thrombus ([facility]) I74.3    PAD (peripheral artery disease) ([facility]) I73.9    Atherosclerotic femoro-popliteal artery disease with claudication ([facility]) I70.219    Acute on chronic diastolic (congestive) heart failure ([facility]) I50.33    Acute pulmonary edema ([facility]) J81.0    Acute respiratory failure with hypoxia ([facility]) J96.01    Hypokalemia E87.6    Arrhythmia I49.9    Lactic acidosis E87.2    Asymptomatic bacteriuria R82.71    History of abdominal aortic aneurysm (AAA) repair T85.759    Tobacco abuse Z72.0    Obesity (BMI 30-39. 9) E66.9    Respiratory failure ([facility]) J96.90    Respiratory distress R06.03    Acute on chronic congestive heart failure (HCC) I50.9    Acute on chronic combined systolic and diastolic CHF (congestive heart failure) ([facility]) I50.43    Dilated cardiomyopathy ([facility]) I42.0    Moderate malnutrition ([facility]) E44.0    Bright red blood per rectum K62.5    Antiplatelet or antithrombotic long-term use Z79.02    Acute blood loss anemia D62    Chronic combined systolic and diastolic congestive heart failure ([facility]) I50.42    GIB (gastrointestinal bleeding) K92.2    CHF (congestive heart failure) ([facility]) I50.9    Medtronic dual ICD  Z95.810    SBO (small bowel obstruction) ([facility]) K56.609    Abdominal pain R10.9    Hyperkalemia E87.5    Esophageal dysmotility K22.4    COPD without exacerbation ([facility]) J44.9    Essential hypertension I10    Achalasia K22.0    Pyuria R82.81    NSVT (nonsustained ventricular tachycardia) ([facility]) I47.2    Hypophosphatemia E83.39    Renal infarction ([facility]) N28.0    Chronic lower GI bleeding K92.2    Acute kidney injury (ALEJANDRO) with acute tubular necrosis (ATN) ([facility]) N17.0    Orthostatic hypotension I95.1    Hypotension due to hypovolemia I95.89, E86.1    Ischemia of right lower extremity I99.8    Acute postoperative respiratory insufficiency J95.89    Aspiration pneumonia of left lower lobe due to gastric secretions ([facility]) J69.0    Critical ischemia of lower extremity ([facility]) I70.229    Bacteremia R78.81           Impression and Recommendation:   Bacteremia due to MRSA: concern for infection of the graft. Pacer wires and possible mycotic aneurysm:very serious illness, high risk for complications and death. Will do TTE as patient has a large hiatal hernia. PVD  Not a best candidate for graft removal  Patient not a candidate for gentamicin  Recommend palliative care  Infection Control:   Contact precautions  Discharge Planning (Coordination of out patient care: To be determined  Thank you Latia Giraldo MD for allowing me to participate in this patient's care.     Larisa Joseph MD, MD,FACP 9/20/2022 11:24 AM

## 2022-09-20 NOTE — PLAN OF CARE
The patient is medically stable for transfer to stepdown.  Discussed the patient's clinical status and plan of care with hospitalist.

## 2022-09-20 NOTE — CARE COORDINATION
9/20/22, 1:42 PM EDT    DISCHARGE ON GOING EVALUATION    Kojo Jensen day: 5  Location: -05/005-A Reason for admit: Lactic acidosis [E87.2]  Septic shock ([address] [A41.9, R65.21]   Procedure:   9/15 CTA abdominal aorta: Impression: Pseudoaneurysm right femoral artery. This is new. Aortic endograft present. No definite leak. 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 9/16 CVC right subclavian  9/16 BLE Arterial doppler: 1. 2 cm pseudoaneurysm emanating from the right common femoral artery, the lumen of which is more than 50% thrombosed. This would be amenable to thrombin injection, if desired. 2. Stents are present in the right proximal, mid, distal SFA which are patent Diminished ABIs are present bilaterally. 3. 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 4. Diminished ABIs bilaterally. Despite this, there is good pulsatility in the trifurcation vessels bilaterally, as described above  9/16 Renal US: 1. Trace ascites in Morison's pouch and splenomegaly are incidental.  2. Right inferior renal echogenic nodule. Renal cell carcinoma suspected. Recommend further clinical investigation as needed  9/17 IR: Thrombin injection to thrombosed pseudoaneurysm- successful  9/18 RLE Arterial doppler: Pseudoaneurysm remains completely thrombosed  Barriers to Discharge: Transfer to stepdown status/hospitalist services. New ID consult. Currently on IV vanc q day. Eduarhomer Estes stopped r/t toxicity. Plan TTE. PCP: Amirah Pulse  Readmission Risk Score: 16.5%  Patient Goals/Plan/Treatment Preferences: Follow for final atb plan. Adamantly declined ECF when SW spoke with her and daughter. No home infusion benefits. Follow for needs.

## 2022-09-21 LAB
ANION GAP SERPL CALCULATED.3IONS-SCNC: 8 MEQ/L (ref 8–16)
BUN BLDV-MCNC: 20 MG/DL (ref 7–22)
CALCIUM SERPL-MCNC: 9.3 MG/DL (ref 8.5–10.5)
CHLORIDE BLD-SCNC: 103 MEQ/L (ref 98–111)
CO2: 29 MEQ/L (ref 23–33)
CREAT SERPL-MCNC: 1.1 MG/DL (ref 0.4–1.2)
ERYTHROCYTE [DISTWIDTH] IN BLOOD BY AUTOMATED COUNT: 15.2 % (ref 11.5–14.5)
ERYTHROCYTE [DISTWIDTH] IN BLOOD BY AUTOMATED COUNT: 50.3 FL (ref 35–45)
GFR SERPL CREATININE-BSD FRML MDRD: 47 ML/MIN/1.73M2
GLUCOSE BLD-MCNC: 84 MG/DL (ref 70–108)
GLUCOSE BLD-MCNC: 85 MG/DL (ref 70–108)
HCT VFR BLD CALC: 33.3 % (ref 37–47)
HEMOGLOBIN: 10.6 GM/DL (ref 12–16)
MCH RBC QN AUTO: 29.3 PG (ref 26–33)
MCHC RBC AUTO-ENTMCNC: 31.8 GM/DL (ref 32.2–35.5)
MCV RBC AUTO: 92 FL (ref 81–99)
PLATELET # BLD: 123 THOU/MM3 (ref 130–400)
PMV BLD AUTO: 11.8 FL (ref 9.4–12.4)
POTASSIUM REFLEX MAGNESIUM: 4.3 MEQ/L (ref 3.5–5.2)
RBC # BLD: 3.62 MILL/MM3 (ref 4.2–5.4)
SODIUM BLD-SCNC: 140 MEQ/L (ref 135–145)
WBC # BLD: 8.5 THOU/MM3 (ref 4.8–10.8)

## 2022-09-21 PROCEDURE — 99233 SBSQ HOSP IP/OBS HIGH 50: CPT | Performed by: HOSPITALIST

## 2022-09-21 PROCEDURE — 82948 REAGENT STRIP/BLOOD GLUCOSE: CPT

## 2022-09-21 PROCEDURE — 6370000000 HC RX 637 (ALT 250 FOR IP): Performed by: NURSE PRACTITIONER

## 2022-09-21 PROCEDURE — 2580000003 HC RX 258: Performed by: NURSE PRACTITIONER

## 2022-09-21 PROCEDURE — 6360000002 HC RX W HCPCS: Performed by: PHARMACIST

## 2022-09-21 PROCEDURE — 6370000000 HC RX 637 (ALT 250 FOR IP): Performed by: HOSPITALIST

## 2022-09-21 PROCEDURE — 2060000000 HC ICU INTERMEDIATE R&B

## 2022-09-21 PROCEDURE — 97110 THERAPEUTIC EXERCISES: CPT

## 2022-09-21 PROCEDURE — 97535 SELF CARE MNGMENT TRAINING: CPT

## 2022-09-21 PROCEDURE — 80048 BASIC METABOLIC PNL TOTAL CA: CPT

## 2022-09-21 PROCEDURE — 2580000003 HC RX 258: Performed by: PHARMACIST

## 2022-09-21 PROCEDURE — 36592 COLLECT BLOOD FROM PICC: CPT

## 2022-09-21 PROCEDURE — 85027 COMPLETE CBC AUTOMATED: CPT

## 2022-09-21 RX ORDER — MIDODRINE HYDROCHLORIDE 2.5 MG/1
2.5 TABLET ORAL
Status: DISCONTINUED | OUTPATIENT
Start: 2022-09-21 | End: 2022-09-23 | Stop reason: HOSPADM

## 2022-09-21 RX ADMIN — ACETAMINOPHEN 650 MG: 325 TABLET ORAL at 22:33

## 2022-09-21 RX ADMIN — Medication 250 MG: at 20:21

## 2022-09-21 RX ADMIN — Medication 250 MG: at 08:08

## 2022-09-21 RX ADMIN — MIDODRINE HYDROCHLORIDE 10 MG: 10 TABLET ORAL at 12:07

## 2022-09-21 RX ADMIN — VANCOMYCIN HYDROCHLORIDE 1000 MG: 1 INJECTION, POWDER, LYOPHILIZED, FOR SOLUTION INTRAVENOUS at 13:17

## 2022-09-21 RX ADMIN — PANTOPRAZOLE SODIUM 40 MG: 40 TABLET, DELAYED RELEASE ORAL at 17:23

## 2022-09-21 RX ADMIN — HYDROCORTISONE ACETATE 25 MG: 25 SUPPOSITORY RECTAL at 20:16

## 2022-09-21 RX ADMIN — SODIUM CHLORIDE, PRESERVATIVE FREE 10 ML: 5 INJECTION INTRAVENOUS at 08:08

## 2022-09-21 RX ADMIN — MIDODRINE HYDROCHLORIDE 10 MG: 10 TABLET ORAL at 08:08

## 2022-09-21 RX ADMIN — PRAVASTATIN SODIUM 40 MG: 40 TABLET ORAL at 08:08

## 2022-09-21 RX ADMIN — SODIUM CHLORIDE, PRESERVATIVE FREE 10 ML: 5 INJECTION INTRAVENOUS at 20:22

## 2022-09-21 RX ADMIN — RIVAROXABAN 15 MG: 15 TABLET, FILM COATED ORAL at 08:08

## 2022-09-21 RX ADMIN — CLOPIDOGREL BISULFATE 75 MG: 75 TABLET ORAL at 08:08

## 2022-09-21 RX ADMIN — MIDODRINE HYDROCHLORIDE 2.5 MG: 2.5 TABLET ORAL at 17:25

## 2022-09-21 RX ADMIN — PANTOPRAZOLE SODIUM 40 MG: 40 TABLET, DELAYED RELEASE ORAL at 07:26

## 2022-09-21 RX ADMIN — TIMOLOL MALEATE 1 DROP: 5 SOLUTION/ DROPS OPHTHALMIC at 08:08

## 2022-09-21 ASSESSMENT — PAIN SCALES - WONG BAKER
WONGBAKER_NUMERICALRESPONSE: 2

## 2022-09-21 ASSESSMENT — PAIN SCALES - GENERAL: PAINLEVEL_OUTOF10: 5

## 2022-09-21 ASSESSMENT — PAIN DESCRIPTION - ORIENTATION: ORIENTATION: RIGHT

## 2022-09-21 ASSESSMENT — PAIN DESCRIPTION - LOCATION: LOCATION: LEG

## 2022-09-21 ASSESSMENT — PAIN DESCRIPTION - DESCRIPTORS: DESCRIPTORS: ACHING

## 2022-09-21 NOTE — PROGRESS NOTES
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 Off pressors and on midodrine 10 mg Tid (started 9/20) which will aim to wean as well (reduced kessler to 2.5 TID) as associated w/ morbidity/mortality given concurrent HFrEF. On Vancomycin. Repeat blood cultures 09/17 NGTD. Given high surgical risk consideration for long-term suppressive antibiotic therapy due to  Off Gentamicin due to potential for nephrotoxicity. Continue Vanc. TTE non-contributory; pt not a YANDEL candidate d/t large hiatal hernia; ID and Cardiology following. Pt will need PICC for prolonged IV Abx however refusing at this time. Asymptomatic Bacteriuria: Prior history of UTIs. Presented UA positive for nitrate, LE small, WBC 10-15 and many bacteria. Urine culture positive for citrobacter. Pseudoaneurysm R FA:  CTA Abd w/ aorta runoff demonstrated 1.1 x 1.1 cm pseudoaneurysm in R FA 9/15. Likely secondary to recent extensive intravascular procedure. Arterial duplex bilateral confirmed 2 cm pseudoaneurysm R FA, lumen more than 50% thrombosed, amenable to thrombin injection. S/p thrombolysis 9/17 with IR. Repeat arterial doppler revealed completely thrombosed pseudoaneurysm 9/18. 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  S/p retrograde AT access, knuckled to the R CFA, then re-entry into the R EIA stents; VBX 11 x 39, VBX 11 x 59 x 2, VBX 11 x 39, VBX 8 x 59; Retrograde SFA/pop 6. 0 PTA; Elizabeth 6 x 120 x 2 for ostial SFA to distal SFA; DCB 5 x 150 of the pop, and DCB 6 x 80 of the distal SFA/prox pop; and 3.0 PTA of the AT. Now with complete reconstitution of the R EVAR limb, R CFA, R SFA, R POP, and R AT with improved flow into the PT - full flow into the pedal arch on 09/06/2022. Continue local nitro ointment to RLE. On DAPT and Xeralto. High risk for reperfusion injury. Cardiology following. Currently pain has improved. Dopplerable DP/PT bilaterally, left also palpable. Encourage ambulation. PTOT. Right ankle trifurcation vessel disease:  CTA Abd w/ aorta runoff demonstrated trifurcation vessel occlusion at R ankle 9/15. Recent extensive intravascular procedure involving stents and balloon throughout right lower extremity vessels. Continue to monitor. Thrombocytopenia: thought to be due to sepsis, s/p extensive invasive interventions. No other etiology at this time. Improving. Trend daily. Chronic ischemic systolic HF NYHA class II: s/p dual ICD. Last limited TTE 06/26/2020  with EF 25-30%, severely dilated LA, hypertrophic basal septum. On DAPT/BB/statin/ARNi- held due to low BP. No signs of decompensation at this time. Monitor. Salt/fluid restrictions. PAF: [id number] 6. Currently in NSR with HR WNL. On Troprol-XL. Apixaban switched to Rivaroxaban in the setting of PVD. Monitor on tele. Primary HTN: controlled. Off Entresto, Toprol-XL d/t above. Will aim to resume as tolerated. HLD: on pravastatin 40 mg daily. AAA: s/p endovascular repair in 2019. 3.4 cm on CTA 8/4/2022. Follow up outpatient. CKD III: baseline Cr ~1.0-1.2, eGFR 40-50. Monitor for contrast nephropathy 48-72 hours post procedure. Encourage PO intake for hydration. ALEJANDRO resolved. Avoid nephrotoxins. Cr stable. Will monitor closely     GERD - on protonix  Hx colovesical fistula - s/p right hemicolectomy x2. No colostomy. Tobacco use disorder - prolonged history of intermittent smoking. Unable to clarify PPD. Counseled on smoking cessation. Code status: Full code; Palliative care to see    Chief Complaint: Fatigue    Initial H and P:-    Admitted initially under ICU for management of septic shock. ID service also following. Pt transferred to  on 9/20. I took over care on 9/21/2022. Subjective (past 24 hours):   denies CP/SOB/fever/chills/cough/sputum/presyncope/palpitation/ffocal neuro deficit/leg pain. No new issues/events overnight. Past medical history, family history, social history and allergies reviewed again and is unchanged since admission. ROS (All review of systems completed. Pertinent positives noted.  Otherwise All other systems reviewed and negative.)     Medications:  Reviewed    Infusion Medications    sodium chloride 10 mL/hr at 09/19/22 1554    dextrose       Scheduled Medications    midodrine  10 mg Oral TID WC    lidocaine 1 % injection  5 mL IntraDERmal Once    insulin lispro  0-4 Units SubCUTAneous 4x Daily AC & HS    clopidogrel  75 mg Oral Daily    hydrocortisone  25 mg Rectal Nightly    magnesium oxide  250 mg Oral BID    pantoprazole  40 mg Oral BID AC    pravastatin  40 mg Oral Daily    [Held by provider] sacubitril-valsartan  1 tablet Oral BID    timolol  1 drop Both Eyes Daily    sodium chloride flush  5-40 mL IntraVENous 2 times per day    rivaroxaban  15 mg Oral Daily with breakfast    vancomycin (VANCOCIN) intermittent dosing (placeholder)   Other RX Placeholder    vancomycin  1,000 mg IntraVENous Q24H     PRN Meds: albuterol sulfate HFA, potassium chloride **OR** potassium alternative oral replacement **OR** potassium chloride, melatonin, sodium chloride flush, sodium chloride, ondansetron **OR** ondansetron, polyethylene glycol, acetaminophen **OR** acetaminophen, glucose, dextrose bolus **OR** dextrose bolus, glucagon (rDNA), dextrose, iopamidol      Intake/Output Summary (Last 24 hours) at 9/21/2022 0848  Last data filed at 9/21/2022 6774  Gross per 24 hour   Intake 30 ml   Output --   Net 30 ml       Diet:  ADULT DIET; Dysphagia - Pureed; 4 carb choices (60 gm/meal); Low Fat/Low Chol/High Fiber/2 gm Na    Physical Exam:  BP (!) 109/49   Pulse 72   Temp 98 °F (36.7 °C) (Oral)   Resp 19   Ht 5' 5\" (1.651 m)   Wt 165 lb 12.6 oz (75.2 kg)   SpO2 95%   BMI 27.59 kg/m²   General appearance: No apparent distress, appears stated age and cooperative. HEENT: Pupils equal, round, and reactive to light. Conjunctivae/corneas clear. Neck: Supple, with full range of motion. No jugular venous distention. Trachea midline. Respiratory:  Normal respiratory effort. Clear to auscultation, bilaterally without Rales/Wheezes/Rhonchi. Cardiovascular: Regular rate and rhythm with normal S1/S2 without murmurs, rubs or gallops. Abdomen: Soft, non-tender, non-distended with normal bowel sounds. Musculoskeletal: passive and active ROM x 4 extremities. Skin: Trace edema bilat legs, +ve ecchymosis. Neurologic:  Neurovascularly intact without any focal sensory/motor deficits. Cranial nerves: II-XII intact, grossly non-focal.  Psychiatric: Alert and oriented, thought content appropriate, normal insight  Capillary Refill: Brisk,< 3 seconds   Peripheral Pulses: +1 palpable, equal bilaterally     Labs:   Recent Labs     09/19/22  0400 09/20/22  0500 09/21/22  0540   WBC 6.3 6.4 8.5   HGB 10.9* 10.5* 10.6*   HCT 33.0* 31.6* 33.3*   PLT 97* 102* 123*     Recent Labs     09/20/22  0500 09/20/22  1515 09/21/22  0540    138 140   K 4.1 4.1 4.3    103 103   CO2 29 27 29   BUN 19 22 20   CREATININE 0.9 1.1 1.1   CALCIUM 8.6 8.6 9.3   PHOS  --  1.6*  --      No results for input(s): AST, ALT, BILIDIR, BILITOT, ALKPHOS in the last 72 hours. No results for input(s): INR in the last 72 hours. No results for input(s): Homa Smack in the last 72 hours. Microbiology:    Blood culture #1:   Lab Results   Component Value Date/Time    BC No growth 24 hours. No growth 48 hours. 09/17/2022 11:45 AM       Blood culture #2:No results found for: Dalia Barrett    Organism:  Lab Results   Component Value Date/Time    ORG Citrobacter freundii 09/16/2022 09:55 AM         Lab Results   Component Value Date/Time    LABGRAM  10/24/2018 01:12 PM     Few segmented neutrophils observed. No epithelial cells observed. Moderate gram positive cocci occurring singly and in pairs. MRSA culture only:No results found for: [facility]    Urine culture:   Lab Results   Component Value Date/Time    LABURIN  09/16/2022 09:55 AM     Jarratt count: >100,000 CFU/mL Citrobacter species which may be initially susceptible to third generation cephalosporins may develop resistance within three to four days of initiation of this antimicrobial therapy. Respiratory culture: No results found for: CULTRESP    Aerobic and Anaerobic :  Lab Results   Component Value Date/Time    LABAERO light growth 10/24/2018 01:12 PM     Lab Results   Component Value Date/Time    LABANAE  10/24/2018 01:12 PM     Culture yielded moderate mixed growth consisting of anaerobic  gram negative bacilli and anaerobic gram positive cocci. If a  true mixed aerobic and anaerobic infection is suspected, then  broad spectrum empiric antibiotic therapy is indicated and  should include coverage for anaerobic organisms. Urinalysis:      Lab Results   Component Value Date/Time    NITRU POSITIVE 09/15/2022 10:17 PM    WBCUA 10-15 09/15/2022 10:17 PM    BACTERIA MANY 09/15/2022 10:17 PM    RBCUA 0-2 09/15/2022 10:17 PM    BLOODU TRACE 09/15/2022 10:17 PM    SPECGRAV >1.030 03/01/2021 06:11 AM    GLUCOSEU NEGATIVE 09/15/2022 10:17 PM       Radiology:  VL DUP LOWER EXTREMITY ARTERIES RIGHT   Final Result   1. Pseudoaneurysm remains completely thrombosed   2.  Follow-up arterial duplex ultrasound recommended in one week to reassess for continued thrombosis of the pseudoaneurysm            **This report has been created using voice recognition software. It may contain minor errors which are inherent in voice recognition technology. **      Final report electronically signed by Dr. Cleve Durand on 9/18/2022 2:54 PM      VL GUIDED NEEDLE PLACEMENT   Final Result   1. Status post successful pseudoaneurysm thrombosis. 2. Repeat sonographic images of the groin will be obtained tomorrow and again in one week. **This report has been created using voice recognition software. It may contain minor errors which are inherent in voice recognition technology. **      Final report electronically signed by Dr. Cleve Durand on 9/17/2022 2:11 PM      US RENAL COMPLETE   Final Result   Impression:   1. Trace ascites in Morison's pouch and splenomegaly are incidental.   2. Right inferior renal echogenic nodule. Renal cell carcinoma suspected. Recommend further clinical investigation as needed. This document has been electronically signed by: Tatyana Page MD on 09/16/2022 06:56 PM      VL DUP LOWER EXTREMITY ARTERIES BILATERAL   Final Result   1. 2 cm pseudoaneurysm emanating from the right common femoral artery, the lumen of which is more than 50% thrombosed. This would be amenable to thrombin injection, if desired. 2. Stents are present in the right proximal, mid, distal SFA which are patent Diminished ABIs are present bilaterally. 3. 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 4. Diminished ABIs bilaterally. Despite this, there is good pulsatility in the trifurcation vessels bilaterally, as described above. **This report has been created using voice recognition software. It may contain minor errors which are inherent in voice recognition technology. **      Final report electronically signed by Dr. Cleve Durand on 9/16/2022 5:33 PM      XR CHEST PORTABLE   Final Result   Impression:   Properly positioned right central venous catheter. This document has been electronically signed by: Audra No. Hailey Matthews MD    on 09/16/2022 03:16 AM      XR CHEST PORTABLE   Final Result   Impression:   No acute findings. Right paratracheal and retrocardiac opacity which was also present on the    prior studies and previously reported to be a dilated esophagus. The    appearance is consistent with a dilated food filled esophagus. This document has been electronically signed by: Audra No. Hailey Matthews MD    on 09/15/2022 11:02 PM      CTA ABDOMINAL AORTA W BILAT RUNOFF W WO CONTRAST   Final Result   Impression:   Pseudoaneurysm right femoral artery. This is new. Aortic endograft present. No definite leak. 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 This document has been electronically signed by: Bebo Adkins MD on    09/15/2022 08:30 PM      All CTs at this facility use dose modulation techniques and iterative    reconstructions, and/or weight-based dosing   when appropriate to reduce radiation to a low as reasonably achievable. 3D Post-processing was performed on this study. US RENAL COMPLETE    Result Date: 9/16/2022  U/S renal Comparison: CT,SR - CTA ABDOMINAL AORTA W BILAT RUNOFF W WO CONTRAST - 9/15/22 19:26 EDT Findings: Right kidney 9.2 cm length. No hydronephrosis. Normal color doppler. Right inferior renal echogenic nodule 9 x 12 x 8 mm. There is no angiomyolipoma seen on the comparison CT angiogram. There may be a subtle heterogeneously enhancing 12 mm nodule in the right kidney on image 100 series 4 of the comparison exam. Left kidney 10.4 cm length. No hydronephrosis. Normal color doppler. Trace fluid in Jackson's pouch. Splenomegaly 15.7 cm in length. Impression: 1. Trace ascites in Morison's pouch and splenomegaly are incidental. 2. Right inferior renal echogenic nodule. Renal cell carcinoma suspected. Recommend further clinical investigation as needed. This document has been electronically signed by:  David Hair Alicia Sofia MD on 09/16/2022 06:56 PM    CTA ABDOMINAL AORTA W BILAT RUNOFF W WO CONTRAST    Result Date: 9/15/2022  CTA of the abdomen and pelvis with bilateral lower extremity runoff after administration of IV contrast. Technique: CT from the lung bases through the toes after administration of IV contrast. 3-D surface rendering performed. Comparison:  CT,KOSR - CTA ABDOMINAL AORTA W BILAT RUNOFF W WO CONTRAST - 08/04/2022 03:54 PM EDT Findings: Large hiatal hernia. Normal lung bases. Normal liver. No masses. The gallbladder is unremarkable by CT. Normal appearing adrenal glands. Normal spleen. No masses. The spleen appears normal in size. Normal kidneys. No renal mass. No hydronephrosis. Diverticulosis of the colon, no diverticulitis. No pneumoperitoneum or abscess. No bowel obstruction. Normal pancreas. No pancreatitis. Normal retroperitoneal structures. No adenopathy. Unremarkable urinary bladder. Normal bones. Abdominal aorta: Aortic endograft present. The grafts are patent. No evidence for leak. Renal arteries: Single right renal artery. Single left renal artery. No significant disease. Celiac artery: Multifocal disease within the celiac artery without significant stenosis. SMA: Multifocal disease within the SMA without significant stenosis. Right iliac arteries: Stent within the right common iliac artery and right internal artery. No stenosis greater than 50%. Left iliac artery: Stent within the left common iliac artery. No significant stenosis. Mild disease left external iliac artery. Right: CFA: Pseudoaneurysm right common femoral artery measuring 1.1 x 1.1 cm. Occluded right femoral anterior tibial artery bypass. SFA: Patent stent throughout the right common femoral artery. Popliteal artery: Mild to moderate multifocal disease. Vessels of the trifurcation: Mild disease within the anterior tibial artery. This is occluded at the ankle.  The right posterior tibial artery and peroneal artery appear to be occluded mid calf. Left: CFA: Moderate multifocal disease left common femoral artery. SFA: 80% focal stenosis of the origin of the left superficial femoral artery. There is multifocal advanced disease throughout the left superficial femoral artery. This remains patent. Popliteal artery: Mild multifocal disease left popliteal artery. Vessels of the trifurcation: Multifocal disease within the vessels of the trifurcation. These appear patent to the left ankle. Impression: Pseudoaneurysm right femoral artery. This is new. Aortic endograft present. No definite leak. 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 This document has been electronically signed by: Shanon Howard MD on 09/15/2022 08:30 PM All CTs at this facility use dose modulation techniques and iterative reconstructions, and/or weight-based dosing when appropriate to reduce radiation to a low as reasonably achievable. 3D Post-processing was performed on this study. XR CHEST PORTABLE    Result Date: 9/16/2022  Chest Radiograph Comparison: 9/15/22 Findings: Right central venous catheter with the tip terminating at the cavoatrial junction. Cardiomegaly. Left chest wall cardiac pacemaker/AICD. Normal mediastinal contours. No pneumothorax; skinfold on the left. Right peritracheal and retrocardiac opacity secondary to a dilated esophagus, unchanged. No pleural effusion. Normal upper abdomen. No fracture. Impression: Properly positioned right central venous catheter. This document has been electronically signed by: Feliberto Allen. Linsey Lim MD on 09/16/2022 03:16 AM    XR CHEST PORTABLE    Result Date: 9/15/2022  Chest Radiograph Comparison: 3/3/21, 3/2/21 and 2/28/21 Findings: Cardiomegaly status post left chest wall cardiac pacemaker/AICD. Normal mediastinal contours. No pneumothorax. Right paratracheal and retrocardiac opacity, also present on the prior studies. No pleural effusion. Normal upper abdomen. No fracture. Impression: No acute findings.  Right paratracheal and retrocardiac opacity which was also present on the prior studies and previously reported to be a dilated esophagus. The appearance is consistent with a dilated food filled esophagus. This document has been electronically signed by: Atilio Ruffin. Dalia Rose MD on 09/15/2022 11:02 PM     DUP LOWER EXTREMITY ARTERIES BILATERAL    Result Date: 9/16/2022  PROCEDURE: VL DUP LOWER EXTREMITY ARTERIES BILATERAL CLINICAL INFORMATION: Had vascular intervention 9/6/2022, CTA showed pseudoaneurysm in the right CFA 9/15/22, and right 3 vessel occlusion at the ankle. COMPARISON: 9/8/2022 TECHNIQUE: Multiple permanent grayscale and color flow sonographic images of the major arteries of both lower extremities were obtained from the level of the groin to the level of the ankle . Spectral Doppler waveforms were obtained and velocity measurements were measured. FINDINGS: RIGHT ARTERY (PSV cm/sec) CFA ---------------> 121 PSV cm/sec PROF -------------> 466 PSV cm/sec PROX STENT SFA PROX -------> 63 PSV cm/sec MID STENT SFA MID ----------> 71 PSV cm/sec DIST STENT --------> 66 PSV cm/sec SFA DIST --------> 39 PSV cm/sec POP A PROX ---> 63 PSV cm/sec POP A DIST ----> 58 PSV cm/sec PTA ---------------> 48 PSV cm/sec PERONEAL -----> 53 PSV cm/sec ARACELIS ----------------> 12 PSV cm/sec DPA ---------------->7 PSV cm/sec LEFT ARTERY (PSV cm/sec) CFA ---------------> 106 PSV cm/sec PROF -------------> 156 PSV cm/sec SFA PROX -------> 69 PSV cm/sec SFA MID ----------> 116 PSV cm/sec SFA DIST --------> 79 PSV cm/sec POP A PROX ---> 56 PSV cm/sec POP A DIST ----> 40 PSV cm/sec PTA ---------------> 65 PSV cm/sec PERONEAL -----> 65 PSV cm/sec ARACELIS ----------------> 28 PSV cm/sec DPA ----------------> 28 PSV cm/sec RAMOS RIGHT ARACELIS----->0.45 PTA----->0.63 RAMOS LEFT ARACELIS----->0.67 PTA----->0.80 VELOCITY MEASUREMENTS: Mildly diminished RAMOS left side. Moderately diminished ABIs right side.  RIGHT LEG: Excellent pulsatility in the common femoral artery. There is a small pseudoaneurysm emanating from the right common femoral artery, measuring 2 cm in diameter. The lumen of the surrounding over 50% thrombosed. This would be amenable to thrombin injected,, if desired. Good pulsatility is seen in the proximal and mid SFA. A stent is present in the SFA which is patent. There is mildly dampened pulsatility in the distal SFA. Good pulsatility in the popliteal artery. Good pulsatility is also seen in the posterior tibial and peroneal arteries. Moderately dampened pulsatility in the anterior tibial artery. LEFT LEG: Good pulsatility in the common femoral artery and profunda. Good pulsatility is seen throughout the SFA and in the popliteal artery. Good pulsatility is seen in all 3 trifurcation vessels. 1. 2 cm pseudoaneurysm emanating from the right common femoral artery, the lumen of which is more than 50% thrombosed. This would be amenable to thrombin injection, if desired. 2. Stents are present in the right proximal, mid, distal SFA which are patent Diminished ABIs are present bilaterally. 3. 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 4. Diminished ABIs bilaterally. Despite this, there is good pulsatility in the trifurcation vessels bilaterally, as described above. **This report has been created using voice recognition software. It may contain minor errors which are inherent in voice recognition technology. ** Final report electronically signed by Dr. Marylou John on 9/16/2022 5:33 PM    With RN in room, patient was updated about and agreed upon the treatment plan, all the questions and concerns were addressed.     Electronically signed by Nelly Harrington MD on 9/21/2022 at 8:48 AM

## 2022-09-21 NOTE — PROGRESS NOTES
Comprehensive Nutrition Assessment    Type and Reason for Visit:  Initial, Consult (Verbal consult patient requesting ONS)    Nutrition Recommendations/Plan:   Consider MVI. ONS initiated per patient request: Ensure Enlive TID and an Ice Cream with meals. Continue current diet. Malnutrition Assessment:  Malnutrition Status: At risk for malnutrition (Comment) (09/21/22 1045)    Context:  Chronic Illness     Findings of the 6 clinical characteristics of malnutrition:  Energy Intake:  Mild decrease in energy intake (Comment)  Weight Loss:  No significant weight loss     Body Fat Loss:  No significant body fat loss     Muscle Mass Loss:  No significant muscle mass loss    Fluid Accumulation:  Mild Extremities   Strength:  Not Performed    Nutrition Assessment:     Pt. nutritionally compromised AEB chronic swallowing difficulty due to achalasia affecting oral intake and need for ONS use at home to maintain weight. At risk for further nutrition compromise r/t increased nutrient needs d/t skin integrity issues, admit with septic shock, MRSA bacteremia, lactic acidosis, RLE pseudoaneurysm,  chronic limb ischemia, and underlying medical condition (hx:achalasia-s/p botox, colon cancer-partial colectomy, neuromuscular disorder, CHF, CKD, GERD, UTI, HTN, HLD, PVD, obesity, blood clots, afib, smoking ). Nutrition Related Findings:    Pt. Report/Treatments/Miscellaneous: Was asked to see patient by nutrition ambassador, patient upset she hasn't been getting ONS and certain foods she wants. Note she is on diabetic diet, not sure why, discussed with RN, no history of diabetes, good blood sugars. Patient has history of achalasia, used to get botox but hasn't  been able to recently due to need for blood thinners she reports. So intermittent dysphagia. Drinks 3-4 ONS at home, does well with pureed foods, sherbert, ice cream. Weight variable depending on medical status at the time.    GI Status: BM 9/18/22  Pertinent Labs: 9/16/22: HgbA1c 5.2%.  9/21/22: Sodium 140, Potassium 4.3, BUN 20, Creatinine 1.1, Glucose 85, chemstick 84  Pertinent Meds: anusol, humalog, protonix, pravachol, vanc     Wound Type:  (femerol old puncture site, right ankle puncture site)       Current Nutrition Intake & Therapies:    Average Meal Intake: 51-75%, %     ADULT DIET; Dysphagia - Pureed; Low Fat/Low Chol/High Fiber/2 gm Na  ADULT ORAL NUTRITION SUPPLEMENT; Breakfast, Lunch, Dinner; Standard High Calorie/High Protein Oral Supplement    Anthropometric Measures:  Height: 5' 5\" (165.1 cm)  Ideal Body Weight (IBW): 125 lbs (57 kg)    Admission Body Weight: 173 lb 11.6 oz (78.8 kg) (9/16/22 +3 RLE)  Current Body Weight: 165 lb 12.6 oz (75.2 kg) (9/20/22: 165#12.6oz +1  RUE),     Current BMI (kg/m2): 27.6  Usual Body Weight:  (Patient unable to state, \"fluctuates depending on medical status\". Per EMR: 9/18/20: 145#13oz, 9/20/21: 148#3oz, 7/11/22: 166#)                       BMI Categories: Overweight (BMI 25.0-29. 9)    Estimated Daily Nutrient Needs:  Energy Requirements Based On: Kcal/kg  Weight Used for Energy Requirements:  (75 kg)  Energy (kcal/day): ~ 7908-8221 kcals (20-25 kcals/kg/day)  Weight Used for Protein Requirements: Ideal (57 kg)  Protein (g/day): 74 grams or more (1.3 grams/kgIBW/day)         Nutrition Diagnosis:   Inadequate oral intake related to swallowing difficulty, inadequate protein-energy intake as evidenced by poor intake prior to admission (home ONS use.)    Nutrition Interventions:   Food and/or Nutrient Delivery: Modify Current Diet, Start Oral Nutrition Supplement (Stopped carb controlled-no history of diabetes and good blood sugars)  Nutrition Education/Counseling: Education initiated (Encouraged oral intake and continued ONS use as needed.)  Coordination of Nutrition Care: Continue to monitor while inpatient       Goals:     Goals: PO intake 75% or greater, by next RD assessment       Nutrition Monitoring and Evaluation:      Food/Nutrient Intake Outcomes: Food and Nutrient Intake, Supplement Intake  Physical Signs/Symptoms Outcomes: Biochemical Data, Chewing or Swallowing, GI Status, Fluid Status or Edema, Nutrition Focused Physical Findings, Weight, Skin    Discharge Planning:     Too soon to determine     Sweta Arzola RD, LD  Contact: [phone]

## 2022-09-21 NOTE — PROGRESS NOTES
Progress note: Infectious diseases    Patient - Bert Goss,  Age - 82 y.o.    - 1935      Room Number - 4B-05/005-A   MRN -  [id number]   Acct # - [de-identified]  Date of Admission -  9/15/2022  6:33 PM    SUBJECTIVE:   She has no new complaints. Denies any fever or chills  OBJECTIVE   VITALS    height is 5' 5\" (1.651 m) and weight is 165 lb 12.6 oz (75.2 kg). Her oral temperature is 97.8 °F (36.6 °C). Her blood pressure is 99/60 and her pulse is 70. Her respiration is 19 and oxygen saturation is 94%.        Wt Readings from Last 3 Encounters:   22 165 lb 12.6 oz (75.2 kg)   09/10/22 168 lb 7 oz (76.4 kg)   22 166 lb (75.3 kg)       I/O (24 Hours)    Intake/Output Summary (Last 24 hours) at 2022 1215  Last data filed at 2022 1587  Gross per 24 hour   Intake 30 ml   Output --   Net 30 ml       General Appearance  Awake, alert, oriented,  not  In acute distress  HEENT - normocephalic, atraumatic, pale conjunctiva,  anicteric sclera  Neck - Supple, no mass  Lungs -  Bilateral   air entry, no rhonchi, no wheeze  Cardiovascular - Heart sounds are normal.     Abdomen - soft, not distended, nontender,   Neurologic -awake and oriented  Skin - No bruising or bleeding  Extremities - bruising of the groin +edema    MEDICATIONS:      midodrine  10 mg Oral TID WC    lidocaine 1 % injection  5 mL IntraDERmal Once    clopidogrel  75 mg Oral Daily    hydrocortisone  25 mg Rectal Nightly    magnesium oxide  250 mg Oral BID    pantoprazole  40 mg Oral BID AC    pravastatin  40 mg Oral Daily    [Held by provider] sacubitril-valsartan  1 tablet Oral BID    timolol  1 drop Both Eyes Daily    sodium chloride flush  5-40 mL IntraVENous 2 times per day    rivaroxaban  15 mg Oral Daily with breakfast    vancomycin (VANCOCIN) intermittent dosing (placeholder)   Other RX Placeholder    vancomycin  1,000 mg IntraVENous Q24H      sodium chloride 10 mL/hr at 09/19/22 1554    dextrose       albuterol sulfate HFA, potassium chloride **OR** potassium alternative oral replacement **OR** potassium chloride, melatonin, sodium chloride flush, sodium chloride, ondansetron **OR** ondansetron, polyethylene glycol, acetaminophen **OR** acetaminophen, glucose, dextrose bolus **OR** dextrose bolus, glucagon (rDNA), dextrose, iopamidol      LABS:     CBC:   Recent Labs     09/19/22  0400 09/20/22  0500 09/21/22  0540   WBC 6.3 6.4 8.5   HGB 10.9* 10.5* 10.6*   PLT 97* 102* 123*     BMP:    Recent Labs     09/20/22  0500 09/20/22  1515 09/21/22  0540    138 140   K 4.1 4.1 4.3    103 103   CO2 29 27 29   BUN 19 22 20   CREATININE 0.9 1.1 1.1   GLUCOSE 86 122* 85     Calcium:  Recent Labs     09/21/22  0540   CALCIUM 9.3     Ionized Calcium:No results for input(s): IONCA in the last 72 hours. Magnesium:  Recent Labs     09/20/22  1515   MG 1.8     Phosphorus:  Recent Labs     09/20/22  1515   PHOS 1.6*     BNP:No results for input(s): BNP in the last 72 hours. Glucose:  Recent Labs     09/20/22  1703 09/20/22  2020 09/21/22  0753   POCGLU 94 119* 84        CULTURES:   UA: No results for input(s): SPECGRAV, PHUR, COLORU, CLARITYU, MUCUS, PROTEINU, BLOODU, RBCUA, WBCUA, BACTERIA, NITRU, GLUCOSEU, BILIRUBINUR, UROBILINOGEN, KETUA, LABCAST, LABCASTTY, AMORPHOS in the last 72 hours. Invalid input(s): CRYSTALS  Micro:   Lab Results   Component Value Date/Time    BC No growth 24 hours. No growth 48 hours.   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 left ear H92.02    Tinnitus H93.19    BPPV (benign paroxysmal positional vertigo) H81.10    Gait instability R26.81    PAC (premature atrial contraction) I49.1    Pedal edema R60.0    Stricture of colon determined by endoscopy ([facility] K56.699    Barium enema abnormal R93.3    Diverticulosis of large intestine without hemorrhage K57.30    Colon stricture ([facility]) K56.699    Colonic mass K63.89    S/P laparoscopic colectomy Z90.49    Lower limb ischemia I99.8    Fall on same level from slipping, tripping, or stumbling W01. 0XXA    ALEJANDRO (acute kidney injury) ([facility] N17.9    Leukocytosis D72.829    Gastroesophageal reflux disease without esophagitis K21.9    Other hyperlipidemia E78.49    Osteoarthritis of multiple joints M15.9    Diverticulosis of intestine without bleeding K57.90    Septic shock ([facility]) A41.9, R65.21    SVT (supraventricular tachycardia) ([facility]) I47.1    Hyperglycemia X67.5    Metabolic acidosis I66.0    Hypocalcemia E83.51    Chronic hypotension I95.89    Anemia D64.9    Urinary tract infection without hematuria N39.0    Paroxysmal atrial fibrillation ([facility]) I48.0    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    Physical deconditioning R53.81    Abdominal aortic aneurysm (AAA) without rupture ([facility]) I71.4    Hiatal hernia K44.9    Elevated procalcitonin R79.89    Hypomagnesemia E83.42    Pulmonary nodule R91.1    CKD (chronic kidney disease) stage 2, GFR 60-89 ml/min N18.2    Stage 3 chronic kidney disease (HCC) N18.30    Femoral popliteal artery thrombus ([facility]) I74.3    PAD (peripheral artery disease) ([facility]) I73.9    Atherosclerotic femoro-popliteal artery disease with claudication ([facility]) I70.219    Acute on chronic diastolic (congestive) heart failure ([facility]) I50.33    Acute pulmonary edema ([facility]) J81.0    Acute respiratory failure with hypoxia ([facility]) J96.01    Hypokalemia E87.6    Arrhythmia I49.9    Lactic acidosis E87.2    Asymptomatic bacteriuria R82.71    History of abdominal aortic aneurysm (AAA) repair D65.797    Tobacco abuse Z72.0    Obesity (BMI 30-39. 9) E66.9    Respiratory failure ([facility]) J96.90    Respiratory distress R06.03    Acute on chronic congestive heart failure ([facility]) I50.9    Acute on chronic combined systolic and diastolic CHF (congestive heart failure) ([facility]) I50.43    Dilated cardiomyopathy ([facility]) I42.0    Moderate malnutrition ([facility]) E44.0    Bright red blood per rectum K62.5    Antiplatelet or antithrombotic long-term use Z79.02    Acute blood loss anemia D62    Chronic combined systolic and diastolic congestive heart failure ([facility]) I50.42    GIB (gastrointestinal bleeding) K92.2    CHF (congestive heart failure) ([facility]) I50.9    [employer] dual ICD  Z95.810    SBO (small bowel obstruction) ([facility]) K56.609    Abdominal pain R10.9    Hyperkalemia E87.5    Esophageal dysmotility K22.4    COPD without exacerbation ([facility]) J44.9    Essential hypertension I10    Achalasia K22.0    Pyuria R82.81    NSVT (nonsustained ventricular tachycardia) ([facility]) I47.2    Hypophosphatemia E83.39    Renal infarction ([facility]) N28.0    Chronic lower GI bleeding K92.2    Acute kidney injury (ALEJANDRO) with acute tubular necrosis (ATN) ([facility]) N17.0    Orthostatic hypotension I95.1    Hypotension due to hypovolemia I95.89, E86.1    Ischemia of right lower extremity I99.8    Acute postoperative respiratory insufficiency J95.89    Aspiration pneumonia of left lower lobe due to gastric secretions ([facility]) J69.0    Critical ischemia of lower extremity ([facility]) I70.229    Bacteremia R78.81         ASSESSMENT/PLAN   MRSA bacteremia  Pseudoaneursym of the right gorin  Hx  PVD s/p graft/stent  Concern for involvement of the graft with infection  Asymptomatic bacteriuria  Recommend to continue iv vancomycin  Will do serial blood cx  Discussed with her daughter on treatment plan.       Ab Kiran MD, MD, FACP 9/21/2022 12:15 PM

## 2022-09-21 NOTE — CARE COORDINATION
9/21/22, 2:41 PM EDT    DISCHARGE ON GOING EVALUATION    Mari Quintero day: 6  Location: -05/005-A Reason for admit: Lactic acidosis [E87.2]  Septic shock ([address] [A41.9, R65.21]   Procedure:   9/15 CTA abdominal aorta: Impression: Pseudoaneurysm right femoral artery. This is new. Aortic endograft present. No definite leak. 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 9/16 CVC right subclavian  9/16 BLE Arterial doppler: 1. 2 cm pseudoaneurysm emanating from the right common femoral artery, the lumen of which is more than 50% thrombosed. This would be amenable to thrombin injection, if desired. 2. Stents are present in the right proximal, mid, distal SFA which are patent Diminished ABIs are present bilaterally. 3. 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 4. Diminished ABIs bilaterally. Despite this, there is good pulsatility in the trifurcation vessels bilaterally, as described above  9/16 Renal US: 1. Trace ascites in Morison's pouch and splenomegaly are incidental.  2. Right inferior renal echogenic nodule. Renal cell carcinoma suspected. Recommend further clinical investigation as needed  9/17 IR: Thrombin injection to thrombosed pseudoaneurysm- successful  9/18 RLE Arterial doppler: Pseudoaneurysm remains completely thrombosed    Barriers to Discharge: ID consulted yesterday; continue IV vancomycin. Concern for involvement of the graft with infection. Plan for serial blood cultures. Started on pureed diet. Palliative Care consulted for code status. Afebrile. Afib 70's. On room air. Ox4. Follows commands x4. Hospitalist, ID, and Cardiology following. PT/OT. Urine +Citrobacter freundii. Blood cultures +MRSA. Telemetry, CVC, SCDs. Plavix, hydrocortisone suppository, mag-ox, midodrine, protonix, pravastatin, xarelto, timolol drops, IV Vancomycin, Electrolyte replacement protocols. Hgb 10.6, plt 123.      PCP: Zeeshan Francis [place]  Readmission Risk Score: 16.6%  Patient Goals/Plan/Treatment Preferences: Follow for final atb plan. Adamantly declined ECF when SW spoke with her and daughter. No home infusion benefits. Follow for needs. Laura quick look to see if has LTACH criteria; awaiting answer. Pt transferring to 4A07. Handoff report given to DARI Lynch CM.

## 2022-09-21 NOTE — PROGRESS NOTES
[address]  Occupational Therapy  Daily Note  Time:   Time In: 6543  Time Out: 09  Timed Code Treatment Minutes: 23 Minutes  Minutes: 23          Date: 2022  Patient Name: Micehl Soto,   Gender: female      Room: [facility]-A  MRN: [id number]  : 1935  (82 y.o.)  Referring Practitioner: Rachael Orellana DO  Diagnosis: lactic acidosis  Additional Pertinent Hx: per chart review; The patient is a 82year old female with a past medical history of HFrEF s/p dual ICD, pAfib, HTN, HLD, AAA s/p repair, PVD, blood clots, CKD, colovesical fisutla s/p right hemicolectomy and recent CLI s/p angiogram with stent and balloon to RLE here for fatigue and weakness. The patient reports inability to get out of bed yesterday, with no improvement throughout the day. EMS was called, and she was found to be hypotensive with SBP in 80s. The patient denies any prior fevers, chills, N/V, changes in diet or sick contacts. She did not \"bomb exploding\" RLE leg pain, radiating downwards. This was worse with movement, alleviated by nothing. In the ED, vitals T max 97.8, RR 18, HR 97, BP 73/43 and SpO2 93%. She became hypoxic and was escalated to 2L NC. Labs significant for BUN 33, Cr 1.5. Troponin WNL, BNP 6077. EKG non significant. TSH and Cortisol WNL. Procal 17.11, LA 5.3. UA positive for nitrite, small LE, WBC 10-15 and many bacteria. CXR negative for acute findings. 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 He was given 2L NS bolus, albumin x1. Cultures were drawn and he was started on vancomycin and zosyn for empiric antibiotics coverage.  Cardiology was consulted, the patient was admitted to the ICU for further care and evaluation. Restrictions/Precautions:  Restrictions/Precautions: Fall Risk, General Precautions     SUBJECTIVE: RN approved session, patient supine in bed upon OT arrival and agreeable to tx. A & O x 4. PAIN: 0/10:     Vitals:  patient on continuous ICU vitals monitor and WFL t/o tx    COGNITION: Decreased Insight    ADL:   Lower Extremity Dressing: Maximum Assistance. To don slipper socks seated EOB; patient declined to attempt  Toileting: Stand By Assistance. For clothing mgmt and toilet hygiene. Patient had one post LOB when pulling up depend with self correct  Toilet Transfer: [employer]. With use of 2 w/w and has raised toilet seat over toilet . BALANCE:  Sitting Balance:  Stand By Assistance. Standing Balance: Contact Guard Assistance. With use of 2 w/w for support    BED MOBILITY:  Supine to Sit: Minimal Assistance cues for sequencing and tech    TRANSFERS:  Sit to Stand:  Contact Guard Assistance. Cues for hand placement and sequencing     FUNCTIONAL MOBILITY:  Assistive Device: Rolling Walker  Assist Level:  Contact Guard Assistance. Distance:  from EOB to toilet in room. Walked from toilet to recliner with cues for safety and sequencing. ADDITIONAL ACTIVITIES:  Completed 1 set x 15 reps of scapular elevation and retraction each with rest breaks to increase L shldr AROM for functional reaching and prep for ROM exer. Completed 1 set x 15 reps of self ROM shldr flex to increase L shldr AROM for UB dressing and grooming. Completed 4 exer x 10-15 reps of UB exer with 1# free wt with R UE only with rest breaks to increase UB strength for functional transfers. Breakfast arrived and interrupted session with patient stating she was hungry and wanted to eat her breakfast prior to getting cold and session was ended. ASSESSMENT:  Activity Tolerance:  Patient tolerance of  treatment: good. Discharge Recommendations: Continue to assess pending progress and Patient would benefit from continued OT at discharge  Equipment Recommendations: Equipment Needed: Yes  Mobility Devices: ADL Assistive Devices  Plan: Times per Week: 5x  Times per Day: Daily  Current Treatment Recommendations: Strengthening, ROM, Balance training, Functional mobility training, Endurance training, Neuromuscular re-education, Patient/Caregiver education & training, Equipment evaluation, education, & procurement, Self-Care / ADL, Safety education & training, Coordination training    Patient Education  Patient Education: Role of OT, Plan of Care, ADL's, Home Exercise Program, Precautions, Reviewed Prior Education, Importance of Increasing Activity, and Assistive Device Safety    Goals  Short Term Goals  Time Frame for Short term goals: by discharge  Short Term Goal 1: patient will tolerate 5 min functional standing with two hand release with SBA to increase activity tolerance for self care routine. Short Term Goal 2: patient will functionally ambulate house hold distances with (S) with 0-1 verbal cues for safety and sequencing. Short Term Goal 3: patient will complete ADL routine with SBA and use of AE PRN and edu in energy conservation tech to increase ADL success. Short Term Goal 4: patient will be edu in UB strengthening and L UE AROM HEP to increase UE function and strength for functional transfers and functional reaching. Following session, patient left in safe position with all fall risk precautions in place.

## 2022-09-21 NOTE — PLAN OF CARE
Problem: Discharge Planning  Goal: Discharge to home or other facility with appropriate resources  Outcome: Progressing  Flowsheets (Taken 9/21/2022 0800)  Discharge to home or other facility with appropriate resources: Identify barriers to discharge with patient and caregiver  Note: Patient wanting to be discharged home.       Problem: Respiratory - Adult  Goal: Achieves optimal ventilation and oxygenation  Outcome: Progressing  Problem: Cardiovascular - Adult  Goal: Maintains optimal cardiac output and hemodynamic stability  Outcome: Progressing  Flowsheets (Taken 9/21/2022 0800)  Maintains optimal cardiac output and hemodynamic stability: Monitor blood pressure and heart rate     Problem: Skin/Tissue Integrity - Adult  Goal: Incisions, wounds, or drain sites healing without S/S of infection  Outcome: Progressing  Flowsheets (Taken 9/21/2022 0800)  Incisions, Wounds, or Drain Sites Healing Without Sign and Symptoms of Infection: ADMISSION and DAILY: Assess and document risk factors for pressure ulcer development     Problem: Skin/Tissue Integrity - Adult  Goal: Oral mucous membranes remain intact  Outcome: Progressing  Flowsheets (Taken 9/21/2022 0800)  Oral Mucous Membranes Remain Intact: Assess oral mucosa and hygiene practices     Problem: Genitourinary - Adult  Goal: Absence of urinary retention  Outcome: Progressing  Problem: Metabolic/Fluid and Electrolytes - Adult  Goal: Electrolytes maintained within normal limits  Outcome: Progressing  Flowsheets (Taken 9/21/2022 0800)  Electrolytes maintained within normal limits: Monitor labs and assess patient for signs and symptoms of electrolyte imbalances     Problem: ABCDS Injury Assessment  Goal: Absence of physical injury  Outcome: Progressing  Problem: Safety - Adult  Goal: Free from fall injury  Outcome: Progressing  Problem: Chronic Conditions and Co-morbidities  Goal: Patient's chronic conditions and co-morbidity symptoms are monitored and maintained or improved  Outcome: Progressing  Flowsheets (Taken 9/21/2022 0800)  Problem: Pain  Goal: Verbalizes/displays adequate comfort level or baseline comfort level  Outcome: Progressing  Flowsheets (Taken 9/21/2022 1200)  Verbalizes/displays adequate comfort level or baseline comfort level: Encourage patient to monitor pain and request assistance     Problem: Skin/Tissue Integrity  Goal: Absence of new skin breakdown  Description: 1. Monitor for areas of redness and/or skin breakdown  2. Assess vascular access sites hourly  3. Every 4-6 hours minimum:  Change oxygen saturation probe site  4. Every 4-6 hours:  If on nasal continuous positive airway pressure, respiratory therapy assess nares and determine need for appliance change or resting period.   Outcome: Progressing     Problem: Nutrition Deficit:  Goal: Optimize nutritional status  Outcome: Progressing     Care Plan - Patient's Chronic Conditions and Co-Morbidity Symptoms are Monitored and Maintained or Improved: Monitor and assess patient's chronic conditions and comorbid symptoms for stability, deterioration, or improvement     Free From Fall Injury: Instruct family/caregiver on patient safety     Absence of Physical Injury: Implement safety measures based on patient assessment     Absence of urinary retention:   Assess patients ability to void and empty bladder   Monitor intake/output and perform bladder scan as needed   Place urinary catheter per Licensed Independent Practitioner order if needed   Discuss with Licensed Independent Practitioner  medications to alleviate retention as needed   Discuss catheterization for long term situations as appropriate     Achieves optimal ventilation and oxygenation:   Assess for changes in respiratory status   Assess for changes in mentation and behavior   Position to facilitate oxygenation and minimize respiratory effort   Oxygen supplementation based on oxygen saturation or arterial blood gases   Encourage broncho-pulmonary hygiene including cough, deep breathe, incentive spirometry   Assess the need for suctioning and aspirate as needed   Assess and instruct to report shortness of breath or any respiratory difficulty   Respiratory therapy support as indicated

## 2022-09-21 NOTE — PROGRESS NOTES
Initial Evaluation          Patient:   Fidelia Kan  YOB: 1935  Age:  82 y.o. Room:  [address] [place] [address]  MRN:  [id number]   Acct: [de-identified]    Date of Admission:  9/15/2022  6:33 PM  Date of Service:  9/21/2022  Completed By:  Panchito Byrd RN                 Reason for Palliative Care Evaluation:-             [x] Code Status Discussion              [] Goals of Care              [] Pain/Symptom Management               [] Emotional Support              [] Other:                   Current Issues:-  [x]  Pain  [x]  Fatigue  []  Nausea  []  Anxiety  []  Depression  []  Shortness of Breath  []  Constipation  []  Appetite  []  Other:sepsis r/t MRSA bacteremia             Advance Directives:-  [] [place] DNR Form  [x] Living Will  [x] Medical POA             Current Code Status:-  [x] Full Resuscitation  [] DNR-Comfort Care-Arrest  [] DNR-Comfort Care       [] Limited Resuscitation             [] No CPR            [] No shock            [] No ET intubation/reintubation            [] No resuscitative medications            [] Other limitation:              Palliative Performance Status:          [] 60%  Ambulation reduced; Significant disease;Can't do hobbies/housework; intake normal or reduced; occasional assist; LOC full/confusion        [x] 50%  Mainly sit/lie; Extensive disease; Can't do any work; Considerable assist; intake normal or reduced; LOC full/confusion        [] 40%  Mainly in bed; Extensive disease; Mainly assist; intake normal or reduced; LOC full/confusion         [] 30%  Bed Bound; Extensive disease; Total care; intake reduced; LOC full/confusion        [] 20%  Bed Bound; Extensive disease; Total care; intake minimal; Drowsy/coma        [] 10%  Bed Bound; Extensive disease;  Total care; Mouth care only; Drowsy/coma        [] 0  Death        Goals of care evaluation:-        The patient goals of care are to provide comfort care/supportive services/palliation & relieve suffering:  Goals of care discussed with:  [] Patient independently  [x] Patient and Family  [] Family or Healthcare DPOA independently  [] Unable to discuss with patient, family/DPOA not present         Family/Patient Discussion:  Spoke with Lex Roberts and her daughter at bedside. Discussed goals of care. Lex Roberts stated the doctor (Dr. Maria T Metz) has recommended IV antibiotics for 6 weeks. Her insurance does not cover home infusion and she refuses ECF placement for the IV therapy. She explained that her mother and her  were in [facility] and it was a bad experience. Lex Roberts stated she asked why she cannot take oral antibiotics instead, but she was told orals are not \"strong enough\". She stated she would rather take oral anyway. Lex Roberts confirms she has a living will and DPOA. Discussed code status. When asked if she would want to be resuscitated if her heart stopped, she replied \"I guess\". Asked if she would want to be put on a ventilator for respiratory distress, she stated \"yes\". Plan/Follow-Up:  Karen's wishes are to remain a full code. Spoke with case management to see if [facility] is an option for Lex Roberts. She will check with her insurance and Dr. Maria T Metz.         Electronically signed by Tanya Heath RN on 9/21/2022 at 1:08 PM           Palliative Care Office: [phone]

## 2022-09-22 LAB
ANION GAP SERPL CALCULATED.3IONS-SCNC: 9 MEQ/L (ref 8–16)
BLOOD CULTURE, ROUTINE: NORMAL
BLOOD CULTURE, ROUTINE: NORMAL
BUN BLDV-MCNC: 21 MG/DL (ref 7–22)
CALCIUM SERPL-MCNC: 9.3 MG/DL (ref 8.5–10.5)
CHLORIDE BLD-SCNC: 104 MEQ/L (ref 98–111)
CO2: 28 MEQ/L (ref 23–33)
CREAT SERPL-MCNC: 1 MG/DL (ref 0.4–1.2)
ERYTHROCYTE [DISTWIDTH] IN BLOOD BY AUTOMATED COUNT: 15.7 % (ref 11.5–14.5)
ERYTHROCYTE [DISTWIDTH] IN BLOOD BY AUTOMATED COUNT: 50.2 FL (ref 35–45)
GFR SERPL CREATININE-BSD FRML MDRD: 53 ML/MIN/1.73M2
GLUCOSE BLD-MCNC: 86 MG/DL (ref 70–108)
HCT VFR BLD CALC: 32 % (ref 37–47)
HEMOGLOBIN: 10.3 GM/DL (ref 12–16)
MCH RBC QN AUTO: 29.8 PG (ref 26–33)
MCHC RBC AUTO-ENTMCNC: 32.2 GM/DL (ref 32.2–35.5)
MCV RBC AUTO: 92.5 FL (ref 81–99)
PLATELET # BLD: 141 THOU/MM3 (ref 130–400)
PMV BLD AUTO: 11.8 FL (ref 9.4–12.4)
POTASSIUM REFLEX MAGNESIUM: 4.1 MEQ/L (ref 3.5–5.2)
RBC # BLD: 3.46 MILL/MM3 (ref 4.2–5.4)
SODIUM BLD-SCNC: 141 MEQ/L (ref 135–145)
WBC # BLD: 7.6 THOU/MM3 (ref 4.8–10.8)

## 2022-09-22 PROCEDURE — 6370000000 HC RX 637 (ALT 250 FOR IP): Performed by: NURSE PRACTITIONER

## 2022-09-22 PROCEDURE — 85027 COMPLETE CBC AUTOMATED: CPT

## 2022-09-22 PROCEDURE — 2580000003 HC RX 258: Performed by: NURSE PRACTITIONER

## 2022-09-22 PROCEDURE — 6360000002 HC RX W HCPCS: Performed by: PHARMACIST

## 2022-09-22 PROCEDURE — 2580000003 HC RX 258: Performed by: PHARMACIST

## 2022-09-22 PROCEDURE — 99233 SBSQ HOSP IP/OBS HIGH 50: CPT | Performed by: HOSPITALIST

## 2022-09-22 PROCEDURE — 97535 SELF CARE MNGMENT TRAINING: CPT

## 2022-09-22 PROCEDURE — 2060000000 HC ICU INTERMEDIATE R&B

## 2022-09-22 PROCEDURE — 6370000000 HC RX 637 (ALT 250 FOR IP): Performed by: HOSPITALIST

## 2022-09-22 PROCEDURE — 80048 BASIC METABOLIC PNL TOTAL CA: CPT

## 2022-09-22 PROCEDURE — 97530 THERAPEUTIC ACTIVITIES: CPT

## 2022-09-22 RX ORDER — SULFAMETHOXAZOLE AND TRIMETHOPRIM 800; 160 MG/1; MG/1
1 TABLET ORAL EVERY 12 HOURS SCHEDULED
Status: DISCONTINUED | OUTPATIENT
Start: 2022-09-22 | End: 2022-09-23 | Stop reason: HOSPADM

## 2022-09-22 RX ADMIN — SODIUM CHLORIDE, PRESERVATIVE FREE 10 ML: 5 INJECTION INTRAVENOUS at 08:01

## 2022-09-22 RX ADMIN — SODIUM CHLORIDE, PRESERVATIVE FREE 10 ML: 5 INJECTION INTRAVENOUS at 19:57

## 2022-09-22 RX ADMIN — ACETAMINOPHEN 650 MG: 325 TABLET ORAL at 08:01

## 2022-09-22 RX ADMIN — MIDODRINE HYDROCHLORIDE 2.5 MG: 2.5 TABLET ORAL at 08:01

## 2022-09-22 RX ADMIN — PANTOPRAZOLE SODIUM 40 MG: 40 TABLET, DELAYED RELEASE ORAL at 16:20

## 2022-09-22 RX ADMIN — MIDODRINE HYDROCHLORIDE 2.5 MG: 2.5 TABLET ORAL at 12:58

## 2022-09-22 RX ADMIN — VANCOMYCIN HYDROCHLORIDE 1000 MG: 1 INJECTION, POWDER, LYOPHILIZED, FOR SOLUTION INTRAVENOUS at 11:47

## 2022-09-22 RX ADMIN — HYDROCORTISONE ACETATE 25 MG: 25 SUPPOSITORY RECTAL at 21:36

## 2022-09-22 RX ADMIN — RIVAROXABAN 15 MG: 15 TABLET, FILM COATED ORAL at 08:01

## 2022-09-22 RX ADMIN — Medication 6 MG: at 23:09

## 2022-09-22 RX ADMIN — Medication 250 MG: at 08:01

## 2022-09-22 RX ADMIN — ACETAMINOPHEN 650 MG: 325 TABLET ORAL at 19:57

## 2022-09-22 RX ADMIN — CLOPIDOGREL BISULFATE 75 MG: 75 TABLET ORAL at 08:01

## 2022-09-22 RX ADMIN — PANTOPRAZOLE SODIUM 40 MG: 40 TABLET, DELAYED RELEASE ORAL at 06:26

## 2022-09-22 RX ADMIN — PRAVASTATIN SODIUM 40 MG: 40 TABLET ORAL at 08:01

## 2022-09-22 RX ADMIN — TIMOLOL MALEATE 1 DROP: 5 SOLUTION/ DROPS OPHTHALMIC at 08:01

## 2022-09-22 RX ADMIN — MIDODRINE HYDROCHLORIDE 2.5 MG: 2.5 TABLET ORAL at 16:20

## 2022-09-22 RX ADMIN — SULFAMETHOXAZOLE AND TRIMETHOPRIM 1 TABLET: 800; 160 TABLET ORAL at 19:57

## 2022-09-22 RX ADMIN — Medication 250 MG: at 19:57

## 2022-09-22 ASSESSMENT — PAIN DESCRIPTION - ORIENTATION: ORIENTATION: RIGHT

## 2022-09-22 ASSESSMENT — PAIN DESCRIPTION - DESCRIPTORS: DESCRIPTORS: ACHING

## 2022-09-22 ASSESSMENT — PAIN SCALES - GENERAL
PAINLEVEL_OUTOF10: 5
PAINLEVEL_OUTOF10: 0
PAINLEVEL_OUTOF10: 0
PAINLEVEL_OUTOF10: 2

## 2022-09-22 ASSESSMENT — PAIN - FUNCTIONAL ASSESSMENT: PAIN_FUNCTIONAL_ASSESSMENT: ACTIVITIES ARE NOT PREVENTED

## 2022-09-22 ASSESSMENT — PAIN SCALES - WONG BAKER: WONGBAKER_NUMERICALRESPONSE: 0

## 2022-09-22 ASSESSMENT — PAIN DESCRIPTION - ONSET: ONSET: ON-GOING

## 2022-09-22 ASSESSMENT — PAIN DESCRIPTION - LOCATION: LOCATION: LEG

## 2022-09-22 ASSESSMENT — PAIN DESCRIPTION - PAIN TYPE: TYPE: ACUTE PAIN

## 2022-09-22 ASSESSMENT — PAIN DESCRIPTION - FREQUENCY: FREQUENCY: INTERMITTENT

## 2022-09-22 NOTE — FLOWSHEET NOTE
09/22/22 1033   Safe Environment   Safety Measures Other (comment)  (Virtual Safety Round Complete)   Virtual Nurse introduced, pt up in chair, call light in reach, denies needs at this time.

## 2022-09-22 NOTE — PLAN OF CARE
Problem: Discharge Planning  Goal: Discharge to home or other facility with appropriate resources  Outcome: Progressing  Flowsheets (Taken 9/22/2022 1738)  Discharge to home or other facility with appropriate resources:   Identify barriers to discharge with patient and caregiver   Identify discharge learning needs (meds, wound care, etc)   Arrange for needed discharge resources and transportation as appropriate   Refer to discharge planning if patient needs post-hospital services based on physician order or complex needs related to functional status, cognitive ability or social support system     Problem: Respiratory - Adult  Goal: Achieves optimal ventilation and oxygenation  Outcome: Progressing  Flowsheets (Taken 9/22/2022 1738)  Achieves optimal ventilation and oxygenation:   Assess for changes in respiratory status   Position to facilitate oxygenation and minimize respiratory effort   Respiratory therapy support as indicated   Assess for changes in mentation and behavior   Oxygen supplementation based on oxygen saturation or arterial blood gases   Encourage broncho-pulmonary hygiene including cough, deep breathe, incentive spirometry   Assess and instruct to report shortness of breath or any respiratory difficulty     Problem: Cardiovascular - Adult  Goal: Maintains optimal cardiac output and hemodynamic stability  Outcome: Progressing  Flowsheets (Taken 9/22/2022 1738)  Maintains optimal cardiac output and hemodynamic stability:   Monitor blood pressure and heart rate   Monitor urine output and notify Licensed Independent Practitioner for values outside of normal range   Assess for signs of decreased cardiac output   Administer fluid and/or volume expanders as ordered   Administer vasoactive medications as ordered     Problem: Skin/Tissue Integrity - Adult  Goal: Incisions, wounds, or drain sites healing without S/S of infection  Outcome: Progressing  Flowsheets (Taken 9/22/2022 1738)  Incisions, Wounds, or Drain Sites Healing Without Sign and Symptoms of Infection:   ADMISSION and DAILY: Assess and document risk factors for pressure ulcer development   TWICE DAILY: Assess and document skin integrity  Goal: Oral mucous membranes remain intact  Outcome: Progressing  Flowsheets (Taken 9/22/2022 1738)  Oral Mucous Membranes Remain Intact:   Assess oral mucosa and hygiene practices   Implement preventative oral hygiene regimen   Implement oral medicated treatments as ordered     Problem: Genitourinary - Adult  Goal: Absence of urinary retention  Outcome: Progressing  Flowsheets (Taken 9/22/2022 1738)  Absence of urinary retention:   Assess patients ability to void and empty bladder   Monitor intake/output and perform bladder scan as needed     Problem: Metabolic/Fluid and Electrolytes - Adult  Goal: Electrolytes maintained within normal limits  Outcome: Progressing  Flowsheets (Taken 9/22/2022 1738)  Electrolytes maintained within normal limits:   Monitor labs and assess patient for signs and symptoms of electrolyte imbalances   Instruct patient on fluid and nutrition restrictions as appropriate   Monitor response to electrolyte replacements, including repeat lab results as appropriate   Administer electrolyte replacement as ordered     Problem: ABCDS Injury Assessment  Goal: Absence of physical injury  Outcome: Progressing  Flowsheets (Taken 9/22/2022 1738)  Absence of Physical Injury: Implement safety measures based on patient assessment     Problem: Safety - Adult  Goal: Free from fall injury  Outcome: Progressing  Note: Patient remained free from falls this shift. Bed is in low position with alarm on and siderails up x2. Patient ambulates with one assist. Education given on use of call light and patient voiced understanding. Patient uses call light appropriately. Call light and beside table within reach. Arm band and falling star in place.        Problem: Chronic Conditions and Co-morbidities  Goal: Patient's chronic conditions and co-morbidity symptoms are monitored and maintained or improved  Outcome: Progressing  Flowsheets (Taken 9/22/2022 1738)  Care Plan - Patient's Chronic Conditions and Co-Morbidity Symptoms are Monitored and Maintained or Improved:   Monitor and assess patient's chronic conditions and comorbid symptoms for stability, deterioration, or improvement   Collaborate with multidisciplinary team to address chronic and comorbid conditions and prevent exacerbation or deterioration   Update acute care plan with appropriate goals if chronic or comorbid symptoms are exacerbated and prevent overall improvement and discharge     Problem: Pain  Goal: Verbalizes/displays adequate comfort level or baseline comfort level  Outcome: Progressing  Flowsheets (Taken 9/22/2022 1738)  Verbalizes/displays adequate comfort level or baseline comfort level:   Encourage patient to monitor pain and request assistance   Administer analgesics based on type and severity of pain and evaluate response   Notify Licensed Independent Practitioner if interventions unsuccessful or patient reports new pain   Implement non-pharmacological measures as appropriate and evaluate response   Assess pain using appropriate pain scale     Problem: Skin/Tissue Integrity  Goal: Absence of new skin breakdown  Description: 1. Monitor for areas of redness and/or skin breakdown  2. Assess vascular access sites hourly  3. Every 4-6 hours minimum:  Change oxygen saturation probe site  4. Every 4-6 hours:  If on nasal continuous positive airway pressure, respiratory therapy assess nares and determine need for appliance change or resting period. Outcome: Progressing  Note: No signs of new skin breakdown noted with each assessment this shift. Skin warm, dry, and intact except where otherwise noted in head-to-toe assessment. Mucous membranes pink and moist. Assistance with turns/ambulation given as needed.         Problem: Nutrition Deficit:  Goal: Optimize nutritional status  Outcome: Progressing  Flowsheets (Taken 9/22/2022 0694)  Nutrient intake appropriate for improving, restoring, or maintaining nutritional needs:   Assess nutritional status and recommend course of action   Monitor oral intake, labs, and treatment plans   Provide specific nutrition education to patient or family as appropriate   Recommend appropriate diets, oral nutritional supplements, and vitamin/mineral supplements   Care plan reviewed with patient. Patient verbalizes understanding of the plan of care and contributes to goal setting.

## 2022-09-22 NOTE — CARE COORDINATION
9/22/22, 8:57 AM EDT    DISCHARGE BARRIERS        Patient transferred to 4A. Report given to unit SW, Lorena, regarding discharge plan for this patient.

## 2022-09-22 NOTE — PROGRESS NOTES
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 Off pressors and on midodrine 10 mg Tid (started 9/20) which will aim to wean as well (reduced kessler to 2.5 TID) as associated w/ morbidity/mortality given concurrent HFrEF. On Vancomycin. Repeat blood cultures 09/17 NGTD. Given high surgical risk consideration for long-term suppressive antibiotic therapy due to  Off Gentamicin due to potential for nephrotoxicity. Continue Vanc. TTE non-contributory; pt not a YANDEL candidate d/t large hiatal hernia; ID and Cardiology following. Pt will need PICC for prolonged IV Abx however refusing at this time. 9/22; adamant about PICC line; prefers Po Abx; discussed again in length risks including severe sepsis/septic shock and death. Will plan to step down to TMP/SMX     Asymptomatic Bacteriuria: Prior history of UTIs. Presented UA positive for nitrate, LE small, WBC 10-15 and many bacteria. Urine culture positive for citrobacter. Pseudoaneurysm R FA:  CTA Abd w/ aorta runoff demonstrated 1.1 x 1.1 cm pseudoaneurysm in R FA 9/15. Likely secondary to recent extensive intravascular procedure. Arterial duplex bilateral confirmed 2 cm pseudoaneurysm R FA, lumen more than 50% thrombosed, amenable to thrombin injection. S/p thrombolysis 9/17 with IR. Repeat arterial doppler revealed completely thrombosed pseudoaneurysm 9/18. 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 S/p retrograde AT access, knuckled to the R CFA, then re-entry into the R EIA stents; VBX 11 x 39, VBX 11 x 59 x 2, VBX 11 x 39, VBX 8 x 59; Retrograde SFA/pop 6.0 PTA; Elizabeth 6 x 120 x 2 for ostial SFA to distal SFA; DCB 5 x 150 of the pop, and DCB 6 x 80 of the distal SFA/prox pop; and 3.0 PTA of the AT. Now with complete reconstitution of the R EVAR limb, R CFA, R SFA, R POP, and R AT with improved flow into the PT - full flow into the pedal arch on 09/06/2022. Continue local nitro ointment to RLE. On DAPT and Xeralto. High risk for reperfusion injury. Cardiology following. Currently pain has improved. Dopplerable DP/PT bilaterally, left also palpable. Encourage ambulation. PTOT. Right ankle trifurcation vessel disease:  CTA Abd w/ aorta runoff demonstrated trifurcation vessel occlusion at R ankle 9/15. Recent extensive intravascular procedure involving stents and balloon throughout right lower extremity vessels. Continue to monitor. Thrombocytopenia: thought to be due to sepsis, s/p extensive invasive interventions. No other etiology at this time. Improving. Trend daily. Chronic ischemic systolic HF NYHA class II: s/p dual ICD. Last limited TTE 06/26/2020  with EF 25-30%, severely dilated LA, hypertrophic basal septum. On DAPT/BB/statin/ARNi- held due to low BP. No signs of decompensation at this time. Monitor. Salt/fluid restrictions. PAF: [id number] 6. Currently in NSR with HR WNL. On Troprol-XL. Apixaban switched to Rivaroxaban in the setting of PVD. Monitor on tele. Primary HTN: controlled. Off Entresto, Toprol-XL d/t above. Will aim to resume as tolerated. HLD: on pravastatin 40 mg daily. AAA: s/p endovascular repair in 2019. 3.4 cm on CTA 8/4/2022. Follow up outpatient. CKD III: baseline Cr ~1.0-1.2, eGFR 40-50. Monitor for contrast nephropathy 48-72 hours post procedure. Encourage PO intake for hydration. ALEJANDRO resolved. Avoid nephrotoxins. Cr stable.  Will monitor closely GERD - on protonix  Hx colovesical fistula - s/p right hemicolectomy x2. No colostomy. Tobacco use disorder - prolonged history of intermittent smoking. Unable to clarify PPD. Counseled on smoking cessation. Code status: Full code; seen by Palliative care; confused about what she wants. Ample empathy and support offered. Dispo: PT/OT/SW/CM following; pt wishes to possibly pursue Laura? Chief Complaint: Fatigue    Initial H and P:-    Admitted initially under ICU for management of septic shock. ID service also following. Pt transferred to  on 9/20. I took over care on 9/22/2022. Subjective (past 24 hours):   Denies any new complaints; denies CP/SOB/fever/chills/cough/sputum/presyncope/palpitation/ffocal neuro deficit/leg pain. No new issues/events overnight. Past medical history, family history, social history and allergies reviewed again and is unchanged since admission. ROS (All review of systems completed. Pertinent positives noted.  Otherwise All other systems reviewed and negative.)     Medications:  Reviewed    Infusion Medications    sodium chloride Stopped (09/21/22 1434)    dextrose       Scheduled Medications    midodrine  2.5 mg Oral TID WC    lidocaine 1 % injection  5 mL IntraDERmal Once    clopidogrel  75 mg Oral Daily    hydrocortisone  25 mg Rectal Nightly    magnesium oxide  250 mg Oral BID    pantoprazole  40 mg Oral BID AC    pravastatin  40 mg Oral Daily    [Held by provider] sacubitril-valsartan  1 tablet Oral BID    timolol  1 drop Both Eyes Daily    sodium chloride flush  5-40 mL IntraVENous 2 times per day    rivaroxaban  15 mg Oral Daily with breakfast    vancomycin (VANCOCIN) intermittent dosing (placeholder)   Other RX Placeholder    vancomycin  1,000 mg IntraVENous Q24H     PRN Meds: albuterol sulfate HFA, potassium chloride **OR** potassium alternative oral replacement **OR** potassium chloride, melatonin, sodium chloride flush, sodium chloride, ondansetron **OR** ondansetron, polyethylene glycol, acetaminophen **OR** acetaminophen, glucose, dextrose bolus **OR** dextrose bolus, glucagon (rDNA), dextrose, iopamidol      Intake/Output Summary (Last 24 hours) at 9/22/2022 2619  Last data filed at 9/21/2022 1619  Gross per 24 hour   Intake 1450.77 ml   Output --   Net 1450.77 ml         Diet:  ADULT DIET; Dysphagia - Pureed; Low Fat/Low Chol/High Fiber/2 gm Na  ADULT ORAL NUTRITION SUPPLEMENT; Breakfast, Lunch, Dinner; Standard High Calorie/High Protein Oral Supplement    Physical Exam:  /65   Pulse 89   Temp 97.6 °F (36.4 °C) (Oral)   Resp 18   Ht 5' 5\" (1.651 m)   Wt 179 lb 0.2 oz (81.2 kg)   SpO2 93%   BMI 29.79 kg/m²   General appearance: No apparent distress, appears stated age and cooperative. HEENT: Pupils equal, round, and reactive to light. Conjunctivae/corneas clear. Neck: Supple, with full range of motion. No jugular venous distention. Trachea midline. Respiratory:  Normal respiratory effort. Clear to auscultation, bilaterally without Rales/Wheezes/Rhonchi. Cardiovascular: Regular rate and rhythm with normal S1/S2 without murmurs, rubs or gallops. Abdomen: Soft, non-tender, non-distended with normal bowel sounds. Musculoskeletal: passive and active ROM x 4 extremities. Skin: Trace edema bilat legs, +ve ecchymosis. Neurologic:  Neurovascularly intact without any focal sensory/motor deficits.  Cranial nerves: II-XII intact, grossly non-focal.  Psychiatric: Alert and oriented, thought content appropriate, normal insight  Capillary Refill: Brisk,< 3 seconds   Peripheral Pulses: +1 palpable, equal bilaterally     Labs:   Recent Labs     09/20/22  0500 09/21/22  0540 09/22/22  0445   WBC 6.4 8.5 7.6   HGB 10.5* 10.6* 10.3*   HCT 31.6* 33.3* 32.0*   * 123* 141       Recent Labs     09/20/22  1515 09/21/22  0540 09/22/22  0445    140 141   K 4.1 4.3 4.1    103 104   CO2 27 29 28   BUN 22 20 21   CREATININE 1.1 1.1 1.0 CALCIUM 8.6 9.3 9.3   PHOS 1.6*  --   --        No results for input(s): AST, ALT, BILIDIR, BILITOT, ALKPHOS in the last 72 hours. No results for input(s): INR in the last 72 hours. No results for input(s): Stefani Bigness in the last 72 hours. Microbiology:    Blood culture #1:   Lab Results   Component Value Date/Time    BC No growth 24 hours. No growth 48 hours. 09/17/2022 11:45 AM       Blood culture #2:No results found for: [facility]    Organism:  Lab Results   Component Value Date/Time    ORG Citrobacter freundii 09/16/2022 09:55 AM         Lab Results   Component Value Date/Time    LABGRAM  10/24/2018 01:12 PM     Few segmented neutrophils observed. No epithelial cells observed. Moderate gram positive cocci occurring singly and in pairs. MRSA culture only:No results found for: [facility]    Urine culture:   Lab Results   Component Value Date/Time    LABURIN  09/16/2022 09:55 AM     [place] count: >100,000 CFU/mL Citrobacter species which may be initially susceptible to third generation cephalosporins may develop resistance within three to four days of initiation of this antimicrobial therapy. Respiratory culture: No results found for: CULTRESP    Aerobic and Anaerobic :  Lab Results   Component Value Date/Time    LABAERO light growth 10/24/2018 01:12 PM     Lab Results   Component Value Date/Time    LABANAE  10/24/2018 01:12 PM     Culture yielded moderate mixed growth consisting of anaerobic  gram negative bacilli and anaerobic gram positive cocci. If a  true mixed aerobic and anaerobic infection is suspected, then  broad spectrum empiric antibiotic therapy is indicated and  should include coverage for anaerobic organisms.          Urinalysis:      Lab Results   Component Value Date/Time    NITRU POSITIVE 09/15/2022 10:17 PM    WBCUA 10-15 09/15/2022 10:17 PM    BACTERIA MANY 09/15/2022 10:17 PM    RBCUA 0-2 09/15/2022 10:17 PM    BLOODU TRACE 09/15/2022 10:17 PM    SPECGRAV >1.030 03/01/2021 06:11 AM    GLUCOSEU NEGATIVE 09/15/2022 10:17 PM       Radiology:  VL DUP LOWER EXTREMITY ARTERIES RIGHT   Final Result   1. Pseudoaneurysm remains completely thrombosed   2. Follow-up arterial duplex ultrasound recommended in one week to reassess for continued thrombosis of the pseudoaneurysm            **This report has been created using voice recognition software. It may contain minor errors which are inherent in voice recognition technology. **      Final report electronically signed by Dr. Joaquin Centeno on 9/18/2022 2:54 PM      VL GUIDED NEEDLE PLACEMENT   Final Result   1. Status post successful pseudoaneurysm thrombosis. 2. Repeat sonographic images of the groin will be obtained tomorrow and again in one week. **This report has been created using voice recognition software. It may contain minor errors which are inherent in voice recognition technology. **      Final report electronically signed by Dr. Joaquin Centeno on 9/17/2022 2:11 PM      US RENAL COMPLETE   Final Result   Impression:   1. Trace ascites in Morison's pouch and splenomegaly are incidental.   2. Right inferior renal echogenic nodule. Renal cell carcinoma suspected. Recommend further clinical investigation as needed. This document has been electronically signed by: Daphnie Dominique MD on 09/16/2022 06:56 PM      VL DUP LOWER EXTREMITY ARTERIES BILATERAL   Final Result   1. 2 cm pseudoaneurysm emanating from the right common femoral artery, the lumen of which is more than 50% thrombosed. This would be amenable to thrombin injection, if desired. 2. Stents are present in the right proximal, mid, distal SFA which are patent Diminished ABIs are present bilaterally. 3. 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 4. Diminished ABIs bilaterally.  Despite this, there is good pulsatility in the trifurcation vessels bilaterally, as described above. **This report has been created using voice recognition software. It may contain minor errors which are inherent in voice recognition technology. **      Final report electronically signed by Dr. Babita Matthews on 9/16/2022 5:33 PM      XR CHEST PORTABLE   Final Result   Impression:   Properly positioned right central venous catheter. This document has been electronically signed by: Nikhil Mcnair MD    on 09/16/2022 03:16 AM      XR CHEST PORTABLE   Final Result   Impression:   No acute findings. Right paratracheal and retrocardiac opacity which was also present on the    prior studies and previously reported to be a dilated esophagus. The    appearance is consistent with a dilated food filled esophagus. This document has been electronically signed by: Nikhil Mcnair MD    on 09/15/2022 11:02 PM      CTA ABDOMINAL AORTA W BILAT RUNOFF W WO CONTRAST   Final Result   Impression:   Pseudoaneurysm right femoral artery. This is new. Aortic endograft present. No definite leak. 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 This document has been electronically signed by: Saúl Alfaro MD on    09/15/2022 08:30 PM      All CTs at this facility use dose modulation techniques and iterative    reconstructions, and/or weight-based dosing   when appropriate to reduce radiation to a low as reasonably achievable. 3D Post-processing was performed on this study. US RENAL COMPLETE    Result Date: 9/16/2022  U/S renal Comparison: CT,SR - CTA ABDOMINAL AORTA W BILAT RUNOFF W WO CONTRAST - 9/15/22 19:26 EDT Findings: Right kidney 9.2 cm length. No hydronephrosis. Normal color doppler. Right inferior renal echogenic nodule 9 x 12 x 8 mm. There is no angiomyolipoma seen on the comparison CT angiogram. There may be a subtle heterogeneously enhancing 12 mm nodule in the right kidney on image 100 series 4 of the comparison exam. Left kidney 10.4 cm length.  No hydronephrosis. Normal color doppler. Trace fluid in Jackson's pouch. Splenomegaly 15.7 cm in length. Impression: 1. Trace ascites in Morison's pouch and splenomegaly are incidental. 2. Right inferior renal echogenic nodule. Renal cell carcinoma suspected. Recommend further clinical investigation as needed. This document has been electronically signed by: Priyanka Peralta MD on 09/16/2022 06:56 PM    CTA ABDOMINAL AORTA W BILAT RUNOFF W WO CONTRAST    Result Date: 9/15/2022  CTA of the abdomen and pelvis with bilateral lower extremity runoff after administration of IV contrast. Technique: CT from the lung bases through the toes after administration of IV contrast. 3-D surface rendering performed. Comparison:  CT,KOSR - CTA ABDOMINAL AORTA W BILAT RUNOFF W WO CONTRAST - 08/04/2022 03:54 PM EDT Findings: Large hiatal hernia. Normal lung bases. Normal liver. No masses. The gallbladder is unremarkable by CT. Normal appearing adrenal glands. Normal spleen. No masses. The spleen appears normal in size. Normal kidneys. No renal mass. No hydronephrosis. Diverticulosis of the colon, no diverticulitis. No pneumoperitoneum or abscess. No bowel obstruction. Normal pancreas. No pancreatitis. Normal retroperitoneal structures. No adenopathy. Unremarkable urinary bladder. Normal bones. Abdominal aorta: Aortic endograft present. The grafts are patent. No evidence for leak. Renal arteries: Single right renal artery. Single left renal artery. No significant disease. Celiac artery: Multifocal disease within the celiac artery without significant stenosis. SMA: Multifocal disease within the SMA without significant stenosis. Right iliac arteries: Stent within the right common iliac artery and right internal artery. No stenosis greater than 50%. Left iliac artery: Stent within the left common iliac artery. No significant stenosis. Mild disease left external iliac artery.  Right: CFA: Pseudoaneurysm right common femoral artery measuring 1.1 x 1.1 cm. Occluded right femoral anterior tibial artery bypass. SFA: Patent stent throughout the right common femoral artery. Popliteal artery: Mild to moderate multifocal disease. Vessels of the trifurcation: Mild disease within the anterior tibial artery. This is occluded at the ankle. The right posterior tibial artery and peroneal artery appear to be occluded mid calf. Left: CFA: Moderate multifocal disease left common femoral artery. SFA: 80% focal stenosis of the origin of the left superficial femoral artery. There is multifocal advanced disease throughout the left superficial femoral artery. This remains patent. Popliteal artery: Mild multifocal disease left popliteal artery. Vessels of the trifurcation: Multifocal disease within the vessels of the trifurcation. These appear patent to the left ankle. Impression: Pseudoaneurysm right femoral artery. This is new. Aortic endograft present. No definite leak. 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 This document has been electronically signed by: Yandel Christiansen MD on 09/15/2022 08:30 PM All CTs at this facility use dose modulation techniques and iterative reconstructions, and/or weight-based dosing when appropriate to reduce radiation to a low as reasonably achievable. 3D Post-processing was performed on this study. XR CHEST PORTABLE    Result Date: 9/16/2022  Chest Radiograph Comparison: 9/15/22 Findings: Right central venous catheter with the tip terminating at the cavoatrial junction. Cardiomegaly. Left chest wall cardiac pacemaker/AICD. Normal mediastinal contours. No pneumothorax; skinfold on the left. Right peritracheal and retrocardiac opacity secondary to a dilated esophagus, unchanged. No pleural effusion. Normal upper abdomen. No fracture. Impression: Properly positioned right central venous catheter. This document has been electronically signed by: James Umana.  Anibal Alvarez MD on 09/16/2022 03:16 AM    XR CHEST PORTABLE    Result Date: 9/15/2022  Chest Radiograph Comparison: 3/3/21, 3/2/21 and 2/28/21 Findings: Cardiomegaly status post left chest wall cardiac pacemaker/AICD. Normal mediastinal contours. No pneumothorax. Right paratracheal and retrocardiac opacity, also present on the prior studies. No pleural effusion. Normal upper abdomen. No fracture. Impression: No acute findings. Right paratracheal and retrocardiac opacity which was also present on the prior studies and previously reported to be a dilated esophagus. The appearance is consistent with a dilated food filled esophagus. This document has been electronically signed by: Glenn Martinez. Tamia Osborn MD on 09/15/2022 11:02 PM    VL DUP LOWER EXTREMITY ARTERIES BILATERAL    Result Date: 9/16/2022  PROCEDURE: VL DUP LOWER EXTREMITY ARTERIES BILATERAL CLINICAL INFORMATION: Had vascular intervention 9/6/2022, CTA showed pseudoaneurysm in the right CFA 9/15/22, and right 3 vessel occlusion at the ankle. COMPARISON: 9/8/2022 TECHNIQUE: Multiple permanent grayscale and color flow sonographic images of the major arteries of both lower extremities were obtained from the level of the groin to the level of the ankle . Spectral Doppler waveforms were obtained and velocity measurements were measured.             FINDINGS: RIGHT ARTERY (PSV cm/sec) CFA ---------------> 121 PSV cm/sec PROF -------------> 466 PSV cm/sec PROX STENT SFA PROX -------> 63 PSV cm/sec MID STENT SFA MID ----------> 71 PSV cm/sec DIST STENT --------> 66 PSV cm/sec SFA DIST --------> 39 PSV cm/sec POP A PROX ---> 63 PSV cm/sec POP A DIST ----> 58 PSV cm/sec PTA ---------------> 48 PSV cm/sec PERONEAL -----> 53 PSV cm/sec ARACELIS ----------------> 12 PSV cm/sec DPA ---------------->7 PSV cm/sec LEFT ARTERY (PSV cm/sec) CFA ---------------> 106 PSV cm/sec PROF -------------> 156 PSV cm/sec SFA PROX -------> 69 PSV cm/sec SFA MID ----------> 116 PSV cm/sec SFA DIST --------> 79 PSV cm/sec POP A PROX ---> 56 PSV cm/sec POP A DIST ----> 40 PSV cm/sec PTA ---------------> 65 PSV cm/sec PERONEAL -----> 65 PSV cm/sec ARACELIS ----------------> 28 PSV cm/sec DPA ----------------> 28 PSV cm/sec RAMOS RIGHT ARACELIS----->0.45 PTA----->0.63 RAMOS LEFT ARACELIS----->0.67 PTA----->0.80 VELOCITY MEASUREMENTS: Mildly diminished RAMOS left side. Moderately diminished ABIs right side. RIGHT LEG: Excellent pulsatility in the common femoral artery. There is a small pseudoaneurysm emanating from the right common femoral artery, measuring 2 cm in diameter. The lumen of the surrounding over 50% thrombosed. This would be amenable to thrombin injected,, if desired. Good pulsatility is seen in the proximal and mid SFA. A stent is present in the SFA which is patent. There is mildly dampened pulsatility in the distal SFA. Good pulsatility in the popliteal artery. Good pulsatility is also seen in the posterior tibial and peroneal arteries. Moderately dampened pulsatility in the anterior tibial artery. LEFT LEG: Good pulsatility in the common femoral artery and profunda. Good pulsatility is seen throughout the SFA and in the popliteal artery. Good pulsatility is seen in all 3 trifurcation vessels. 1. 2 cm pseudoaneurysm emanating from the right common femoral artery, the lumen of which is more than 50% thrombosed. This would be amenable to thrombin injection, if desired. 2. Stents are present in the right proximal, mid, distal SFA which are patent Diminished ABIs are present bilaterally. 3. 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 4. Diminished ABIs bilaterally. Despite this, there is good pulsatility in the trifurcation vessels bilaterally, as described above. **This report has been created using voice recognition software. It may contain minor errors which are inherent in voice recognition technology. ** Final report electronically signed by Dr. Nadeem Marquez on 9/16/2022 5:33 PM    With RN in room, patient was updated about and agreed upon the treatment plan, all the questions and concerns were addressed. More than 30 minutes of time spent on counseling.    Electronically signed by Laura Pichardo MD on 9/22/2022 at 8:32 AM

## 2022-09-22 NOTE — FLOWSHEET NOTE
[facility]  PHYSICAL THERAPY MISSED TREATMENT NOTE  [facility]    Date: 2022  Patient Name: Sadie Frank        MRN: [id number]   : 1935  (82 y.o.)  Gender: female   Referring Practitioner: Dr. Cari Schuster  Diagnosis: lactic acidosis         REASON FOR MISSED TREATMENT:  Missed Treat. Attempted patient twice today. Patient declined physical therapy needs at this time. Patient reported getting bad news earlier and is upset with POC. Will resume physical therapy on next appropriate date.

## 2022-09-22 NOTE — CARE COORDINATION
9/22/22, 2:32 PM EDT    DISCHARGE ON GOING EVALUATION    Jeffry Escamilla day: 7  Location: -07/007-A Reason for admit: Lactic acidosis [E87.2]  Septic shock ([address] [A41.9, R65.21]   Procedure:   9/15 CTA abdominal aorta: Impression: Pseudoaneurysm right femoral artery. This is new. Aortic endograft present. No definite leak. 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 9/16 CVC right subclavian  9/16 BLE Arterial doppler: 1. 2 cm pseudoaneurysm emanating from the right common femoral artery, the lumen of which is more than 50% thrombosed. This would be amenable to thrombin injection, if desired. 2. Stents are present in the right proximal, mid, distal SFA which are patent Diminished ABIs are present bilaterally. 3. 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 4. Diminished ABIs bilaterally. Despite this, there is good pulsatility in the trifurcation vessels bilaterally, as described above  9/16 Renal US: 1. Trace ascites in Morison's pouch and splenomegaly are incidental.  2. Right inferior renal echogenic nodule. Renal cell carcinoma suspected. Recommend further clinical investigation as needed  9/17 IR: Thrombin injection to thrombosed pseudoaneurysm- successful  9/18 RLE Arterial doppler: Pseudoaneurysm remains completely thrombosed  Barriers to Discharge: ID following, CVC line discontinued, PT/OT, Palliative Care following. Plavix, diabetes management, Midodrine, Protonix, Xarelto, electrolyte replacement protocols. IV Vancomycin to be stopped, pt refusing to go to a facility to receive IV antibiotics due to [facility] coverage. Plan home tomorrow on oral antibiotics. PCP: Marylou Trejo  Readmission Risk Score: 17%  Patient Goals/Plan/Treatment Preferences: Plans home. Denies needs.

## 2022-09-22 NOTE — PROGRESS NOTES
Progress note: Infectious diseases    Patient - Valentina Arevalo,  Age - 82 y.o.    - 1935      Room Number - 4A-07/007-A   MRN -  [id number]   Acct # - [de-identified]  Date of Admission -  9/15/2022  6:33 PM    SUBJECTIVE:   Patient doesn't want to go to [facility]  Wants to go home. Declined picc line and iv antibiotic  OBJECTIVE   VITALS    height is 5' 5\" (1.651 m) and weight is 179 lb 0.2 oz (81.2 kg). Her oral temperature is 97.6 °F (36.4 °C). Her blood pressure is 115/65 and her pulse is 89. Her respiration is 18 and oxygen saturation is 93%.        Wt Readings from Last 3 Encounters:   22 179 lb 0.2 oz (81.2 kg)   09/10/22 168 lb 7 oz (76.4 kg)   22 166 lb (75.3 kg)       I/O (24 Hours)    Intake/Output Summary (Last 24 hours) at 2022 1128  Last data filed at 2022 1619  Gross per 24 hour   Intake 1090.77 ml   Output --   Net 1090.77 ml         General Appearance  Awake, alert, oriented,  not  In acute distress  HEENT - normocephalic, atraumatic, pale conjunctiva,  anicteric sclera  Neck - Supple, no mass  Lungs -  Bilateral   air entry, no rhonchi, no wheeze  Cardiovascular - Heart sounds are normal.     Abdomen - soft, not distended, nontender,   Neurologic -awake and oriented  Skin - No bruising or bleeding  Extremities - bruising of the groin +edema    MEDICATIONS:      midodrine  2.5 mg Oral TID WC    lidocaine 1 % injection  5 mL IntraDERmal Once    clopidogrel  75 mg Oral Daily    hydrocortisone  25 mg Rectal Nightly    magnesium oxide  250 mg Oral BID    pantoprazole  40 mg Oral BID AC    pravastatin  40 mg Oral Daily    [Held by provider] sacubitril-valsartan  1 tablet Oral BID    timolol  1 drop Both Eyes Daily    sodium chloride flush  5-40 mL IntraVENous 2 times per day    rivaroxaban  15 mg Oral Daily with breakfast    vancomycin (VANCOCIN) intermittent dosing (placeholder) Other RX Placeholder    vancomycin  1,000 mg IntraVENous Q24H      sodium chloride Stopped (09/21/22 1434)    dextrose       albuterol sulfate HFA, potassium chloride **OR** potassium alternative oral replacement **OR** potassium chloride, melatonin, sodium chloride flush, sodium chloride, ondansetron **OR** ondansetron, polyethylene glycol, acetaminophen **OR** acetaminophen, glucose, dextrose bolus **OR** dextrose bolus, glucagon (rDNA), dextrose, iopamidol      LABS:     CBC:   Recent Labs     09/20/22  0500 09/21/22  0540 09/22/22  0445   WBC 6.4 8.5 7.6   HGB 10.5* 10.6* 10.3*   * 123* 141       BMP:    Recent Labs     09/20/22  1515 09/21/22  0540 09/22/22  0445    140 141   K 4.1 4.3 4.1    103 104   CO2 27 29 28   BUN 22 20 21   CREATININE 1.1 1.1 1.0   GLUCOSE 122* 85 86       Calcium:  Recent Labs     09/22/22  0445   CALCIUM 9.3       Ionized Calcium:No results for input(s): IONCA in the last 72 hours. Magnesium:  Recent Labs     09/20/22  1515   MG 1.8       Phosphorus:  Recent Labs     09/20/22  1515   PHOS 1.6*       BNP:No results for input(s): BNP in the last 72 hours. Glucose:  Recent Labs     09/20/22  1703 09/20/22  2020 09/21/22  0753   POCGLU 94 119* 84          CULTURES:   UA: No results for input(s): SPECGRAV, PHUR, COLORU, CLARITYU, MUCUS, PROTEINU, BLOODU, RBCUA, WBCUA, BACTERIA, NITRU, GLUCOSEU, BILIRUBINUR, UROBILINOGEN, KETUA, LABCAST, LABCASTTY, AMORPHOS in the last 72 hours. Invalid input(s): CRYSTALS  Micro:   Lab Results   Component Value Date/Time    BC No growth 24 hours. No growth 48 hours.   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 0XXA    ALEJANDRO (acute kidney injury) ([facility] N17.9    Leukocytosis D72.829    Gastroesophageal reflux disease without esophagitis K21.9    Other hyperlipidemia E78.49    Osteoarthritis of multiple joints M15.9    Diverticulosis of intestine without bleeding K57.90    Septic shock ([facility]) A41.9, R65.21    SVT (supraventricular tachycardia) ([facility]) I47.1    Hyperglycemia Y28.4    Metabolic acidosis I83.9    Hypocalcemia E83.51    Chronic hypotension I95.89    Anemia D64.9    Urinary tract infection without hematuria N39.0    Paroxysmal atrial fibrillation ([facility]) I48.0    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    Physical deconditioning R53.81    Abdominal aortic aneurysm (AAA) without rupture ([facility]) I71.4    Hiatal hernia K44.9    Elevated procalcitonin R79.89    Hypomagnesemia E83.42    Pulmonary nodule R91.1    CKD (chronic kidney disease) stage 2, GFR 60-89 ml/min N18.2    Stage 3 chronic kidney disease (HCC) N18.30    Femoral popliteal artery thrombus ([facility]) I74.3    PAD (peripheral artery disease) ([facility]) I73.9    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 9) E66.9    Respiratory failure ([facility]) J96.90    Respiratory distress R06.03    Acute on chronic congestive heart failure ([facility]) I50.9    Acute on chronic combined systolic and diastolic CHF (congestive heart failure) ([facility]) I50.43    Dilated cardiomyopathy ([facility]) I42.0    Moderate malnutrition ([facility]) E44.0    Bright red blood per rectum K62.5    Antiplatelet or antithrombotic long-term use Z79.02    Acute blood loss anemia D62    Chronic combined systolic and diastolic congestive heart failure ([facility]) I50.42    GIB (gastrointestinal bleeding) K92.2    CHF (congestive heart failure) ([facility]) I50.9    [employer] dual ICD  Z95.810    SBO (small bowel obstruction) ([facility]) K56.609    Abdominal pain R10.9    Hyperkalemia E87.5    Esophageal dysmotility K22.4    COPD without exacerbation ([facility]) J44.9    Essential hypertension I10    Achalasia K22.0    Pyuria R82.81    NSVT (nonsustained ventricular tachycardia) ([facility]) I47.2    Hypophosphatemia E83.39    Renal infarction ([facility]) N28.0    Chronic lower GI bleeding K92.2    Acute kidney injury (ALEJANDRO) with acute tubular necrosis (ATN) ([facility]) N17.0    Orthostatic hypotension I95.1    Hypotension due to hypovolemia I95.89, E86.1    Ischemia of right lower extremity I99.8    Acute postoperative respiratory insufficiency J95.89    Aspiration pneumonia of left lower lobe due to gastric secretions ([facility]) J69.0    Critical ischemia of lower extremity ([facility]) I70.229    Bacteremia R78.81         ASSESSMENT/PLAN   MRSA bacteremia  Pseudoaneursym of the right gorin  Hx  PVD s/p graft/stent  Concern for involvement of the graft with infection  Asymptomatic bacteriuria  Patient doesn't want iv antibiotic . She wants to go home and not ECF  Recommend to place her on oral bactrim. Need close follow up for her renal function  Need follow up with  her primary physician  ID will sign off. Call if needed.     Yaneth Hdez MD, MD, FACP 9/22/2022 11:28 AM

## 2022-09-22 NOTE — PROGRESS NOTES
Order received for CVC removal per Dr. Gonzalo Castro . Patient placed in bed. Transparent dressing removed. Skin accessed appears clean and dry. Sutures removed, area cleaned with chloro prep, bio patch applied, sterile gauze placed over bio patch, CVC removed with green tip intact, pressure held with sterile gauze for 5 minutes. New transparent dressing applied. Educated the patient on remaining in bed for one hour, dressing stays on for 24 hours, signs and symptoms of infection and notify primary nurse if any blood or oozing. Primary RN notified.

## 2022-09-22 NOTE — PROGRESS NOTES
[address]  Occupational Therapy  Daily Note  Time:   Time In: 5338  Time Out: 0214  Timed Code Treatment Minutes: 25 Minutes  Minutes: 25          Date: 2022  Patient Name: Irma Valdez,   Gender: female      Room: -A  MRN: [id number]  : 1935  (82 y.o.)  Referring Practitioner: Terence Castillo DO  Diagnosis: lactic acidosis  Additional Pertinent Hx: per chart review; The patient is a 82year old female with a past medical history of HFrEF s/p dual ICD, pAfib, HTN, HLD, AAA s/p repair, PVD, blood clots, CKD, colovesical fisutla s/p right hemicolectomy and recent CLI s/p angiogram with stent and balloon to RLE here for fatigue and weakness. The patient reports inability to get out of bed yesterday, with no improvement throughout the day. EMS was called, and she was found to be hypotensive with SBP in 80s. The patient denies any prior fevers, chills, N/V, changes in diet or sick contacts. She did not \"bomb exploding\" RLE leg pain, radiating downwards. This was worse with movement, alleviated by nothing. In the ED, vitals T max 97.8, RR 18, HR 97, BP 73/43 and SpO2 93%. She became hypoxic and was escalated to 2L NC. Labs significant for BUN 33, Cr 1.5. Troponin WNL, BNP 6077. EKG non significant. TSH and Cortisol WNL. Procal 17.11, LA 5.3. UA positive for nitrite, small LE, WBC 10-15 and many bacteria. CXR negative for acute findings. 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 He was given 2L NS bolus, albumin x1. Cultures were drawn and he was started on vancomycin and zosyn for empiric antibiotics coverage. Cardiology was consulted, the patient was admitted to the ICU for further care and evaluation. Restrictions/Precautions:  Restrictions/Precautions: Fall Risk, General Precautions     SUBJECTIVE: Patient supine in bed upon arrival; agreeable to therapy this date. Patient cooperative throughout. PAIN: 0/10:     Vitals: Vitals not assessed per clinical judgement, see nursing flowsheet    COGNITION: Decreased Problem Solving and Decreased Safety Awareness    ADL:   Grooming: Stand By Assistance, X 1, and with set-up. Wash hands with wipe  Lower Extremity Dressing: Maximum Assistance and X 1. [place]wick B socks, patient able to doff brief, requiring assistance to thread B LE into brief  Toileting: Stand By Assistance, X 1, and with increased time for completion. Toilet Transfer: Stand By Assistance, X 1, with verbal cues , and with increased time for completion. BSC over standard toilet . BALANCE:  Sitting Balance:  Supervision. Standing Balance: Stand By Assistance. RW, no LOB    BED MOBILITY:  Supine to Sit: Stand By Assistance, X 1, with head of bed raised, with rail, with verbal cues , with increased time for completion      TRANSFERS:  Sit to Stand:  Stand By Assistance, X 1, with increased time for completion, cues for hand placement, with verbal cues. Stand to Sit: Stand By Assistance, X 1, with increased time for completion, cues for hand placement, with verbal cues, to/from chair with arms. FUNCTIONAL MOBILITY:  Assistive Device: Rolling Walker  Assist Level:  Stand By Assistance. Distance: To and from bathroom and within unit  Slow pace, no LOB     ASSESSMENT:     Activity Tolerance:  Patient tolerance of  treatment: good.        Discharge Recommendations: Continue to assess pending progress  Equipment Recommendations: Equipment Needed: Yes  Mobility Devices: ADL Assistive Devices  Plan: Times per Week: 5x  Times per Day: Daily  Current Treatment Recommendations: Strengthening, ROM, Balance training, Functional mobility training, Endurance training, Neuromuscular re-education, Patient/Caregiver education & training, Equipment evaluation, education, & procurement, Self-Care / ADL, Safety education & training, Coordination training    Patient Education  Patient Education: Role of OT, Plan of Care, ADL's, Energy Conservation, Home Safety, Importance of Increasing Activity, and Assistive Device Safety    Goals  Short Term Goals  Time Frame for Short term goals: by discharge  Short Term Goal 1: patient will tolerate 5 min functional standing with two hand release with SBA to increase activity tolerance for self care routine. Short Term Goal 2: patient will functionally ambulate house hold distances with (S) with 0-1 verbal cues for safety and sequencing. Short Term Goal 3: patient will complete ADL routine with SBA and use of AE PRN and edu in energy conservation tech to increase ADL success. Short Term Goal 4: patient will be edu in UB strengthening and L UE AROM HEP to increase UE function and strength for functional transfers and functional reaching. Following session, patient left in safe position with all fall risk precautions in place.

## 2022-09-22 NOTE — PROGRESS NOTES
Follow Up / Progress Note        Patient:   Yakov Baca  YOB: 1935  Age:  82 y.o. Room:  [facility]-  MRN:  [id number]            Family/Patient Discussion:  Spoke with Stevie Green as she was sitting up in her chair. No visitors present. Asked Stevie Green what her thoughts were regarding IV antibiotics after speaking with Dr. Amalia Harris. She became upset and stated \"I don't want to talk about it anymore. I want to go home\". I stated to her that it is her decision. Attempted to discuss her home going plan but she stated \"I just want to be left alone. Please leave me alone\". Plan/Follow-Up:  Rose Lin, primary nurse, on conversation.        Electronically signed by Evans Christiansen RN on 9/22/2022 at 12:37 PM             Palliative Care Office: [phone]

## 2022-09-23 VITALS
WEIGHT: 179.01 LBS | OXYGEN SATURATION: 97 % | DIASTOLIC BLOOD PRESSURE: 71 MMHG | TEMPERATURE: 97.7 F | HEIGHT: 65 IN | SYSTOLIC BLOOD PRESSURE: 93 MMHG | BODY MASS INDEX: 29.83 KG/M2 | HEART RATE: 56 BPM | RESPIRATION RATE: 16 BRPM

## 2022-09-23 PROCEDURE — 6370000000 HC RX 637 (ALT 250 FOR IP): Performed by: NURSE PRACTITIONER

## 2022-09-23 PROCEDURE — 2580000003 HC RX 258: Performed by: NURSE PRACTITIONER

## 2022-09-23 PROCEDURE — 99239 HOSP IP/OBS DSCHRG MGMT >30: CPT | Performed by: HOSPITALIST

## 2022-09-23 PROCEDURE — 6370000000 HC RX 637 (ALT 250 FOR IP): Performed by: HOSPITALIST

## 2022-09-23 PROCEDURE — 97116 GAIT TRAINING THERAPY: CPT

## 2022-09-23 RX ORDER — SULFAMETHOXAZOLE AND TRIMETHOPRIM 800; 160 MG/1; MG/1
1 TABLET ORAL EVERY 12 HOURS SCHEDULED
Qty: 60 TABLET | Refills: 3 | Status: SHIPPED | OUTPATIENT
Start: 2022-09-23 | End: 2022-10-23

## 2022-09-23 RX ADMIN — SODIUM CHLORIDE, PRESERVATIVE FREE 10 ML: 5 INJECTION INTRAVENOUS at 08:35

## 2022-09-23 RX ADMIN — PANTOPRAZOLE SODIUM 40 MG: 40 TABLET, DELAYED RELEASE ORAL at 05:41

## 2022-09-23 RX ADMIN — CLOPIDOGREL BISULFATE 75 MG: 75 TABLET ORAL at 08:35

## 2022-09-23 RX ADMIN — Medication 250 MG: at 08:35

## 2022-09-23 RX ADMIN — ACETAMINOPHEN 650 MG: 325 TABLET ORAL at 03:34

## 2022-09-23 RX ADMIN — RIVAROXABAN 15 MG: 15 TABLET, FILM COATED ORAL at 08:35

## 2022-09-23 RX ADMIN — PRAVASTATIN SODIUM 40 MG: 40 TABLET ORAL at 08:35

## 2022-09-23 RX ADMIN — SULFAMETHOXAZOLE AND TRIMETHOPRIM 1 TABLET: 800; 160 TABLET ORAL at 08:35

## 2022-09-23 RX ADMIN — TIMOLOL MALEATE 1 DROP: 5 SOLUTION/ DROPS OPHTHALMIC at 08:35

## 2022-09-23 RX ADMIN — MIDODRINE HYDROCHLORIDE 2.5 MG: 2.5 TABLET ORAL at 08:36

## 2022-09-23 ASSESSMENT — PAIN DESCRIPTION - FREQUENCY
FREQUENCY: INTERMITTENT
FREQUENCY: INTERMITTENT

## 2022-09-23 ASSESSMENT — PAIN DESCRIPTION - DESCRIPTORS
DESCRIPTORS: ACHING

## 2022-09-23 ASSESSMENT — PAIN DESCRIPTION - ORIENTATION
ORIENTATION: RIGHT

## 2022-09-23 ASSESSMENT — PAIN SCALES - GENERAL
PAINLEVEL_OUTOF10: 4
PAINLEVEL_OUTOF10: 4
PAINLEVEL_OUTOF10: 3

## 2022-09-23 ASSESSMENT — PAIN DESCRIPTION - PAIN TYPE
TYPE: CHRONIC PAIN

## 2022-09-23 ASSESSMENT — PAIN DESCRIPTION - LOCATION
LOCATION: LEG

## 2022-09-23 ASSESSMENT — PAIN - FUNCTIONAL ASSESSMENT: PAIN_FUNCTIONAL_ASSESSMENT: PREVENTS OR INTERFERES SOME ACTIVE ACTIVITIES AND ADLS

## 2022-09-23 ASSESSMENT — PAIN DESCRIPTION - ONSET: ONSET: ON-GOING

## 2022-09-23 NOTE — PROGRESS NOTES
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 Off pressors and on midodrine 10 mg Tid (started 9/20) which will aim to wean as well (reduced kessler to 2.5 TID) as associated w/ morbidity/mortality given concurrent HFrEF. On Vancomycin. Repeat blood cultures 09/17 NGTD. Given high surgical risk consideration for long-term suppressive antibiotic therapy due to  Off Gentamicin due to potential for nephrotoxicity. Continue Vanc. TTE non-contributory; pt not a YANDEL candidate d/t large hiatal hernia; ID and Cardiology following. Pt will need PICC for prolonged IV Abx however refusing at this time. 9/22; adamant about PICC line; prefers Po Abx; discussed again in length risks including severe sepsis/septic shock and death. 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 Plan to continue indefinitely (suppressive)     Asymptomatic Bacteriuria: Prior history of UTIs. Presented UA positive for nitrate, LE small, WBC 10-15 and many bacteria. Urine culture positive for citrobacter. Pseudoaneurysm R FA:  CTA Abd w/ aorta runoff demonstrated 1.1 x 1.1 cm pseudoaneurysm in R FA 9/15. Likely secondary to recent extensive intravascular procedure. Arterial duplex bilateral confirmed 2 cm pseudoaneurysm R FA, lumen more than 50% thrombosed, amenable to thrombin injection. S/p thrombolysis 9/17 with IR. Repeat arterial doppler revealed completely thrombosed pseudoaneurysm 9/18.       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 S/p retrograde AT access, knuckled to the R CFA, then re-entry into the R EIA stents; VBX 11 x 39, VBX 11 x 59 x 2, VBX 11 x 39, VBX 8 x 59; Retrograde SFA/pop 6.0 PTA; Elizabeth 6 x 120 x 2 for ostial SFA to distal SFA; DCB 5 x 150 of the pop, and DCB 6 x 80 of the distal SFA/prox pop; and 3.0 PTA of the AT. Now with complete reconstitution of the R EVAR limb, R CFA, R SFA, R POP, and R AT with improved flow into the PT - full flow into the pedal arch on 09/06/2022. Continue local nitro ointment to RLE. On DAPT and Xeralto. High risk for reperfusion injury. Cardiology following. Currently pain has improved. Dopplerable DP/PT bilaterally, left also palpable. Encourage ambulation. PTOT. Right ankle trifurcation vessel disease:  CTA Abd w/ aorta runoff demonstrated trifurcation vessel occlusion at R ankle 9/15. Recent extensive intravascular procedure involving stents and balloon throughout right lower extremity vessels. Continue to monitor. Thrombocytopenia (resolved): thought to be due to sepsis, s/p extensive invasive interventions. No other etiology at this time. Improving.      Chronic ischemic systolic HF NYHA class II: s/p dual ICD. Last limited TTE 06/26/2020  with EF 25-30%, severely dilated LA, hypertrophic basal septum. On DAPT/BB/statin/ARNi- held due to low BP. No signs of decompensation at this time. Monitor. Salt/fluid restrictions. PAF: [id number]. Currently in NSR with HR WNL. On Troprol-XL. Apixaban switched to Rivaroxaban in the setting of PVD. Monitor on tele. Primary HTN: controlled. Off Entresto, BBL d/t above. Will aim to resume as tolerated. Continued to hold off on ARNI at discharge )no strong indication in ansence of HFsrEF); also stopped Midodrine. Pt and PCP need to monitor BP with antihypertensive regimen adjustment as needed       HLD: on pravastatin 40 mg daily.      AAA: s/p endovascular repair in 2019. 3.4 cm on CTA 8/4/2022. Follow up outpatient. CKD III: baseline Cr ~1.0-1.2, eGFR 40-50. Monitor for contrast nephropathy 48-72 hours post procedure. Encourage PO intake for hydration. ALEJANDRO resolved. Avoid nephrotoxins. Cr stable. Will monitor closely     GERD - on protonix  Hx colovesical fistula - s/p right hemicolectomy x2. No colostomy. Tobacco use disorder - prolonged history of intermittent smoking. Unable to clarify PPD. Counseled on smoking cessation. Code status: Full code; seen by Palliative care; confused about what she wants. Ample empathy and support offered. Dispo: PT/OT/SW/CM following; pt wishes to possibly pursue Laura? Otherwise medically stable for discharge    Chief Complaint: Fatigue    Initial H and P:-    Admitted initially under ICU for management of septic shock. ID service also following. Pt transferred to  on 9/20. I took over care on 9/23/2022. Subjective (past 24 hours):   Denies any new complaints; denies CP/SOB/fever/chills/cough/sputum/presyncope/palpitation/ffocal neuro deficit/leg pain. No new issues/events overnight. Past medical history, family history, social history and allergies reviewed again and is unchanged since admission. ROS (All review of systems completed. Pertinent positives noted.  Otherwise All other systems reviewed and negative.)     Medications:  Reviewed    Infusion Medications    sodium chloride Stopped (09/21/22 1434)    dextrose       Scheduled Medications    sulfamethoxazole-trimethoprim  1 tablet Oral 2 times per day    midodrine  2.5 mg Oral TID WC    lidocaine 1 % injection  5 mL IntraDERmal Once    clopidogrel  75 mg Oral Daily    hydrocortisone  25 mg Rectal Nightly    magnesium oxide  250 mg Oral BID    pantoprazole  40 mg Oral BID AC    pravastatin  40 mg Oral Daily    [Held by provider] sacubitril-valsartan  1 tablet Oral BID    timolol  1 drop Both Eyes Daily    sodium chloride flush  5-40 mL IntraVENous 2 times per day    rivaroxaban  15 mg Oral Daily with breakfast     PRN Meds: albuterol sulfate HFA, potassium chloride **OR** potassium alternative oral replacement **OR** potassium chloride, melatonin, sodium chloride flush, sodium chloride, ondansetron **OR** ondansetron, polyethylene glycol, acetaminophen **OR** acetaminophen, glucose, dextrose bolus **OR** dextrose bolus, glucagon (rDNA), dextrose, iopamidol      Intake/Output Summary (Last 24 hours) at 9/23/2022 0805  Last data filed at 9/22/2022 1505  Gross per 24 hour   Intake 120 ml   Output --   Net 120 ml         Diet:  ADULT DIET; Dysphagia - Pureed; Low Fat/Low Chol/High Fiber/2 gm Na  ADULT ORAL NUTRITION SUPPLEMENT; Breakfast, Lunch, Dinner; Standard High Calorie/High Protein Oral Supplement    Physical Exam:  /61   Pulse 85   Temp 97.8 °F (36.6 °C) (Oral)   Resp 16   Ht 5' 5\" (1.651 m)   Wt 179 lb 0.2 oz (81.2 kg)   SpO2 95%   BMI 29.79 kg/m²   General appearance: No apparent distress, appears stated age and cooperative. HEENT: Pupils equal, round, and reactive to light. Conjunctivae/corneas clear. Neck: Supple, with full range of motion. No jugular venous distention. Trachea midline. Respiratory:  Normal respiratory effort. Clear to auscultation, bilaterally without Rales/Wheezes/Rhonchi. Cardiovascular: Regular rate and rhythm with normal S1/S2 without murmurs, rubs or gallops. Abdomen: Soft, non-tender, non-distended with normal bowel sounds. Musculoskeletal: passive and active ROM x 4 extremities. Skin: Trace edema bilat legs, +ve ecchymosis. Neurologic:  Neurovascularly intact without any focal sensory/motor deficits.  Cranial nerves: II-XII intact, grossly non-focal.  Psychiatric: Alert and oriented, thought content appropriate, normal insight  Capillary Refill: Brisk,< 3 seconds   Peripheral Pulses: +1 palpable, equal bilaterally     Labs:   Recent Labs     09/21/22  0540 09/22/22  0445   WBC 8.5 7.6   HGB 10.6* 10.3*   HCT 33.3* 32.0*   * 141       Recent Labs     09/20/22  1515 09/21/22  0540 09/22/22  0445    140 141   K 4.1 4.3 4.1    103 104   CO2 27 29 28   BUN 22 20 21   CREATININE 1.1 1.1 1.0   CALCIUM 8.6 9.3 9.3   PHOS 1.6*  --   --        No results for input(s): AST, ALT, BILIDIR, BILITOT, ALKPHOS in the last 72 hours. No results for input(s): INR in the last 72 hours. No results for input(s): Terrence Pander in the last 72 hours. Microbiology:    Blood culture #1:   Lab Results   Component Value Date/Time    [facility]  09/17/2022 11:45 AM     No growth 24 hours. No growth 48 hours. No growth at 5 days        Blood culture #2:No results found for: BLOODCULT2    Organism:  Lab Results   Component Value Date/Time    ORG Citrobacter freundii 09/16/2022 09:55 AM         Lab Results   Component Value Date/Time    LABGRAM  10/24/2018 01:12 PM     Few segmented neutrophils observed. No epithelial cells observed. Moderate gram positive cocci occurring singly and in pairs. MRSA culture only:No results found for: [facility]    Urine culture:   Lab Results   Component Value Date/Time    LABURIN  09/16/2022 09:55 AM     [place] count: >100,000 CFU/mL Citrobacter species which may be initially susceptible to third generation cephalosporins may develop resistance within three to four days of initiation of this antimicrobial therapy. Respiratory culture: No results found for: CULTRESP    Aerobic and Anaerobic :  Lab Results   Component Value Date/Time    LABAERO light growth 10/24/2018 01:12 PM     Lab Results   Component Value Date/Time    LABANAE  10/24/2018 01:12 PM     Culture yielded moderate mixed growth consisting of anaerobic  gram negative bacilli and anaerobic gram positive cocci. If a  true mixed aerobic and anaerobic infection is suspected, then  broad spectrum empiric antibiotic therapy is indicated and  should include coverage for anaerobic organisms.          Urinalysis:      Lab Results Component Value Date/Time    NITRU POSITIVE 09/15/2022 10:17 PM    WBCUA 10-15 09/15/2022 10:17 PM    BACTERIA MANY 09/15/2022 10:17 PM    RBCUA 0-2 09/15/2022 10:17 PM    BLOODU TRACE 09/15/2022 10:17 PM    SPECGRAV >1.030 03/01/2021 06:11 AM    GLUCOSEU NEGATIVE 09/15/2022 10:17 PM       Radiology:  VL DUP LOWER EXTREMITY ARTERIES RIGHT   Final Result   1. Pseudoaneurysm remains completely thrombosed   2. Follow-up arterial duplex ultrasound recommended in one week to reassess for continued thrombosis of the pseudoaneurysm            **This report has been created using voice recognition software. It may contain minor errors which are inherent in voice recognition technology. **      Final report electronically signed by Dr. Charo Saunders on 9/18/2022 2:54 PM      VL GUIDED NEEDLE PLACEMENT   Final Result   1. Status post successful pseudoaneurysm thrombosis. 2. Repeat sonographic images of the groin will be obtained tomorrow and again in one week. **This report has been created using voice recognition software. It may contain minor errors which are inherent in voice recognition technology. **      Final report electronically signed by Dr. Charo Saunders on 9/17/2022 2:11 PM      US RENAL COMPLETE   Final Result   Impression:   1. Trace ascites in Morison's pouch and splenomegaly are incidental.   2. Right inferior renal echogenic nodule. Renal cell carcinoma suspected. Recommend further clinical investigation as needed. This document has been electronically signed by: Annie Metz MD on 09/16/2022 06:56 PM      VL DUP LOWER EXTREMITY ARTERIES BILATERAL   Final Result   1. 2 cm pseudoaneurysm emanating from the right common femoral artery, the lumen of which is more than 50% thrombosed. This would be amenable to thrombin injection, if desired. 2. Stents are present in the right proximal, mid, distal SFA which are patent Diminished ABIs are present bilaterally.    3. 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 4. Diminished ABIs bilaterally. Despite this, there is good pulsatility in the trifurcation vessels bilaterally, as described above. **This report has been created using voice recognition software. It may contain minor errors which are inherent in voice recognition technology. **      Final report electronically signed by Dr. Nasreen Mcgovern on 9/16/2022 5:33 PM      XR CHEST PORTABLE   Final Result   Impression:   Properly positioned right central venous catheter. This document has been electronically signed by: Alla Martin. Corrie Pritchard MD    on 09/16/2022 03:16 AM      XR CHEST PORTABLE   Final Result   Impression:   No acute findings. Right paratracheal and retrocardiac opacity which was also present on the    prior studies and previously reported to be a dilated esophagus. The    appearance is consistent with a dilated food filled esophagus. This document has been electronically signed by: Alla Martin. Corrie Pritchard MD    on 09/15/2022 11:02 PM      CTA ABDOMINAL AORTA W BILAT RUNOFF W WO CONTRAST   Final Result   Impression:   Pseudoaneurysm right femoral artery. This is new. Aortic endograft present. No definite leak. 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 This document has been electronically signed by: Linda Rajan MD on    09/15/2022 08:30 PM      All CTs at this facility use dose modulation techniques and iterative    reconstructions, and/or weight-based dosing   when appropriate to reduce radiation to a low as reasonably achievable. 3D Post-processing was performed on this study. US RENAL COMPLETE    Result Date: 9/16/2022  U/S renal Comparison: CT,SR - CTA ABDOMINAL AORTA W BILAT RUNOFF W WO CONTRAST - 9/15/22 19:26 EDT Findings: Right kidney 9.2 cm length. No hydronephrosis. Normal color doppler. Right inferior renal echogenic nodule 9 x 12 x 8 mm. There is no angiomyolipoma seen on the comparison CT angiogram. There may be a subtle heterogeneously enhancing 12 mm nodule in the right kidney on image 100 series 4 of the comparison exam. Left kidney 10.4 cm length. No hydronephrosis. Normal color doppler. Trace fluid in Jackson's pouch. Splenomegaly 15.7 cm in length. Impression: 1. Trace ascites in Morison's pouch and splenomegaly are incidental. 2. Right inferior renal echogenic nodule. Renal cell carcinoma suspected. Recommend further clinical investigation as needed. This document has been electronically signed by: Segundo Goss MD on 09/16/2022 06:56 PM    CTA ABDOMINAL AORTA W BILAT RUNOFF W WO CONTRAST    Result Date: 9/15/2022  CTA of the abdomen and pelvis with bilateral lower extremity runoff after administration of IV contrast. Technique: CT from the lung bases through the toes after administration of IV contrast. 3-D surface rendering performed. Comparison:  CT,KOSR - CTA ABDOMINAL AORTA W BILAT RUNOFF W WO CONTRAST - 08/04/2022 03:54 PM EDT Findings: Large hiatal hernia. Normal lung bases. Normal liver. No masses. The gallbladder is unremarkable by CT. Normal appearing adrenal glands. Normal spleen. No masses. The spleen appears normal in size. Normal kidneys. No renal mass. No hydronephrosis. Diverticulosis of the colon, no diverticulitis. No pneumoperitoneum or abscess. No bowel obstruction. Normal pancreas. No pancreatitis. Normal retroperitoneal structures. No adenopathy. Unremarkable urinary bladder. Normal bones. Abdominal aorta: Aortic endograft present. The grafts are patent. No evidence for leak. Renal arteries: Single right renal artery. Single left renal artery. No significant disease. Celiac artery: Multifocal disease within the celiac artery without significant stenosis. SMA: Multifocal disease within the SMA without significant stenosis.  Right iliac arteries: Stent within the right common iliac artery and right internal artery. No stenosis greater than 50%. Left iliac artery: Stent within the left common iliac artery. No significant stenosis. Mild disease left external iliac artery. Right: CFA: Pseudoaneurysm right common femoral artery measuring 1.1 x 1.1 cm. Occluded right femoral anterior tibial artery bypass. SFA: Patent stent throughout the right common femoral artery. Popliteal artery: Mild to moderate multifocal disease. Vessels of the trifurcation: Mild disease within the anterior tibial artery. This is occluded at the ankle. The right posterior tibial artery and peroneal artery appear to be occluded mid calf. Left: CFA: Moderate multifocal disease left common femoral artery. SFA: 80% focal stenosis of the origin of the left superficial femoral artery. There is multifocal advanced disease throughout the left superficial femoral artery. This remains patent. Popliteal artery: Mild multifocal disease left popliteal artery. Vessels of the trifurcation: Multifocal disease within the vessels of the trifurcation. These appear patent to the left ankle. Impression: Pseudoaneurysm right femoral artery. This is new. Aortic endograft present. No definite leak. 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 This document has been electronically signed by: Yandel Christiansen MD on 09/15/2022 08:30 PM All CTs at this facility use dose modulation techniques and iterative reconstructions, and/or weight-based dosing when appropriate to reduce radiation to a low as reasonably achievable. 3D Post-processing was performed on this study. XR CHEST PORTABLE    Result Date: 9/16/2022  Chest Radiograph Comparison: 9/15/22 Findings: Right central venous catheter with the tip terminating at the cavoatrial junction. Cardiomegaly. Left chest wall cardiac pacemaker/AICD. Normal mediastinal contours. No pneumothorax; skinfold on the left. Right peritracheal and retrocardiac opacity secondary to a dilated esophagus, unchanged. No pleural effusion. Normal upper abdomen. No fracture. Impression: Properly positioned right central venous catheter. This document has been electronically signed by: Lissa Andes. Layvonne Merlin, MD on 09/16/2022 03:16 AM    XR CHEST PORTABLE    Result Date: 9/15/2022  Chest Radiograph Comparison: 3/3/21, 3/2/21 and 2/28/21 Findings: Cardiomegaly status post left chest wall cardiac pacemaker/AICD. Normal mediastinal contours. No pneumothorax. Right paratracheal and retrocardiac opacity, also present on the prior studies. No pleural effusion. Normal upper abdomen. No fracture. Impression: No acute findings. Right paratracheal and retrocardiac opacity which was also present on the prior studies and previously reported to be a dilated esophagus. The appearance is consistent with a dilated food filled esophagus. This document has been electronically signed by: Lissa Andes. Layvonne Merlin, MD on 09/15/2022 11:02 PM    VL DUP LOWER EXTREMITY ARTERIES BILATERAL    Result Date: 9/16/2022  PROCEDURE: VL DUP LOWER EXTREMITY ARTERIES BILATERAL CLINICAL INFORMATION: Had vascular intervention 9/6/2022, CTA showed pseudoaneurysm in the right CFA 9/15/22, and right 3 vessel occlusion at the ankle. COMPARISON: 9/8/2022 TECHNIQUE: Multiple permanent grayscale and color flow sonographic images of the major arteries of both lower extremities were obtained from the level of the groin to the level of the ankle . Spectral Doppler waveforms were obtained and velocity measurements were measured.             FINDINGS: RIGHT ARTERY (PSV cm/sec) CFA ---------------> 121 PSV cm/sec PROF -------------> 466 PSV cm/sec PROX STENT SFA PROX -------> 63 PSV cm/sec MID STENT SFA MID ----------> 71 PSV cm/sec DIST STENT --------> 66 PSV cm/sec SFA DIST --------> 39 PSV cm/sec POP A PROX ---> 63 PSV cm/sec POP A DIST ----> 58 PSV cm/sec PTA ---------------> 48 PSV cm/sec PERONEAL -----> 53 PSV cm/sec ARACELIS ----------------> 12 PSV cm/sec DPA ---------------->7 PSV cm/sec LEFT ARTERY (PSV cm/sec) CFA ---------------> 106 PSV cm/sec PROF -------------> 156 PSV cm/sec SFA PROX -------> 69 PSV cm/sec SFA MID ----------> 116 PSV cm/sec SFA DIST --------> 79 PSV cm/sec POP A PROX ---> 56 PSV cm/sec POP A DIST ----> 40 PSV cm/sec PTA ---------------> 65 PSV cm/sec PERONEAL -----> 65 PSV cm/sec ARACELIS ----------------> 28 PSV cm/sec DPA ----------------> 28 PSV cm/sec RAMOS RIGHT ARACELIS----->0.45 PTA----->0.63 RAMOS LEFT ARACELIS----->0.67 PTA----->0.80 VELOCITY MEASUREMENTS: Mildly diminished RAMOS left side. Moderately diminished ABIs right side. RIGHT LEG: Excellent pulsatility in the common femoral artery. There is a small pseudoaneurysm emanating from the right common femoral artery, measuring 2 cm in diameter. The lumen of the surrounding over 50% thrombosed. This would be amenable to thrombin injected,, if desired. Good pulsatility is seen in the proximal and mid SFA. A stent is present in the SFA which is patent. There is mildly dampened pulsatility in the distal SFA. Good pulsatility in the popliteal artery. Good pulsatility is also seen in the posterior tibial and peroneal arteries. Moderately dampened pulsatility in the anterior tibial artery. LEFT LEG: Good pulsatility in the common femoral artery and profunda. Good pulsatility is seen throughout the SFA and in the popliteal artery. Good pulsatility is seen in all 3 trifurcation vessels. 1. 2 cm pseudoaneurysm emanating from the right common femoral artery, the lumen of which is more than 50% thrombosed. This would be amenable to thrombin injection, if desired. 2. Stents are present in the right proximal, mid, distal SFA which are patent Diminished ABIs are present bilaterally. 3. 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 4. Diminished ABIs bilaterally. Despite this, there is good pulsatility in the trifurcation vessels bilaterally, as described above.  **This report has been created using voice recognition software. It may contain minor errors which are inherent in voice recognition technology. ** Final report electronically signed by Dr. Ivanna Marina on 9/16/2022 5:33 PM    With RN in room, patient was updated about and agreed upon the treatment plan, all the questions and concerns were addressed. More than 30 minutes of time spent on counseling.    Electronically signed by Shoshana Marsh MD on 9/23/2022 at 8:05 AM

## 2022-09-23 NOTE — PROGRESS NOTES
Attempted to call both children POA of record with  bot admit and med reconciliation information. No answer, no option to leave VM.

## 2022-09-23 NOTE — PROGRESS NOTES
[facility]  INPATIENT PHYSICAL THERAPY  DAILY NOTE  [facility] 4A - 4A-07/[id number]    Time In: 0404  Time Out: 4464  Timed Code Treatment Minutes: 8 Minutes  Minutes: 8          Date: 2022  Patient Name: Saad Morrissey,  Gender:  female        MRN: [id number]  : 1935  (82 y.o.)     Referring Practitioner: Dr. 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, ALEJANDRO on CKD, CHF, HTN, HLD, AAA, GERD, achalasia, tobacco abuse     Prior Level of Function:  Lives With: Alone  Type of Home: Condo  Home Layout: Multi-level  Home Access: Elevator, Level entry  Home Equipment: Walker, rolling, Sock aid, Reacher   Bathroom Shower/Tub: Walk-in shower  Bathroom Toilet: Standard  Bathroom Equipment: Toilet raiser, Shower chair, Grab bars in shower  Bathroom Accessibility: Walker accessible    [address] Help From: Family (check in on patient)  ADL Assistance: [address]: Needs assistance  Ambulation Assistance: Independent  Transfer Assistance: Independent  Active : Yes  Additional Comments: reports using 2 w/w mainly in community    Restrictions/Precautions:  Restrictions/Precautions: Fall Risk, General Precautions     SUBJECTIVE: RN approved session. Patient laying in bed upon arrival and agreeable to therapy with encouragement. Patient requested to arnold restroom during session. PAIN: denies    Vitals: Nurse checked vitals prior to session    OBJECTIVE:  Bed Mobility:  Supine to Sit: Stand By Assistance, with head of bed raised, with increased time for completion  Scooting: Stand By Assistance    Transfers:  Sit to Stand: Stand By Assistance  Stand to Sit:Stand By Assistance    Ambulation:  Stand By Assistance  Distance: 8ft, 120ft  Surface: Level Tile  Device:Rolling Walker  Gait Deviations:   Forward Flexed Posture, Slow Pretty, Decreased Step Length Bilaterally, Decreased Gait Speed, Decreased Heel Strike Bilaterally, and fairly steady, no LOB, cues to keep walker closer to body    Balance:  Dynamic Standing Balance: Stand By Assistance, during bathroom tasks    Exercise:  None this session, patient declined. Functional Outcome Measures: Completed  AM-PAC Inpatient Mobility without Stair Climbing Raw Score : 15  AM-PAC Inpatient without Stair Climbing T-Scale Score : 43.03    ASSESSMENT:  Assessment: Patient progressing toward established goals. Activity Tolerance:  Patient tolerance of  treatment: good. Equipment Recommendations:Equipment Needed: No  Discharge Recommendations: Home with Home Health PT  Plan: Specific Instructions for Next Treatment: therex and mobility  Plan:  (5X GM)  Specific Instructions for Next Treatment: therex and mobility    Patient Education  Patient Education: Plan of Care, Bed Mobility, Transfers, Gait, Up in Chair for All Meals    Goals:  Patient goals : feel better  Short Term Goals  Time Frame for Short term goals: by discharge  Short term goal 1: bed mobility with MOD I to get in/out of bed  Short term goal 2: transfer with MOD I to get in/out of chairs  Short term goal 3: amb >100'x1 with AD and MOD I to walk safely in home  Long Term Goals  Time Frame for Long term goals : no LTGs set secondary to short ELOS    Following session, patient left in safe position with all fall risk precautions in place.

## 2022-09-23 NOTE — CARE COORDINATION
9/23/22, 11:38 AM EDT    Patient goals/plan/ treatment preferences discussed by  and . Patient goals/plan/ treatment preferences reviewed with patient/ family. Patient/ family verbalize understanding of discharge plan and are in agreement with goal/plan/treatment preferences. Understanding was demonstrated using the teach back method. AVS provided by RN at time of discharge, which includes all necessary medical information pertaining to the patients current course of illness, treatment, post-discharge goals of care, and treatment preferences. Services At/After Discharge: None       IMM Letter  IMM Letter given to Patient/Family/Significant other/Guardian/POA/by[de-identified] SHANNAN Desai  IMM Letter date given[de-identified] 09/23/22  IMM Letter time given[de-identified] 0900        Pt wishing to change PCP to PCP within [place] system.  She has been given list. [place] police and patient relations notified of misplaced wallet

## 2022-09-23 NOTE — DISCHARGE SUMMARY
Pt wishes to return home. Pt remained clinically stable and was discharged home w/ repeat CBC/CMP in 3 days and appropriate OP F/Us in stable conditions after cleared by consulting services. 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 Off pressors and on midodrine 10 mg Tid (started 9/20) which will aim to wean as well (reduced kessler to 2.5 TID) as associated w/ morbidity/mortality given concurrent HFrEF. On Vancomycin. Repeat blood cultures 09/17 NGTD. Given high surgical risk consideration for long-term suppressive antibiotic therapy due to  Off Gentamicin due to potential for nephrotoxicity. Continue Vanc. TTE non-contributory; pt not a YANDEL candidate d/t large hiatal hernia; ID and Cardiology following. Pt will need PICC for prolonged IV Abx however refusing at this time. 9/22; adamant about PICC line; prefers Po Abx; discussed again in length risks including severe sepsis/septic shock and death. 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 Plan to continue indefinitely (suppressive)     Asymptomatic Bacteriuria: Prior history of UTIs. Presented UA positive for nitrate, LE small, WBC 10-15 and many bacteria. Urine culture positive for citrobacter. Pseudoaneurysm R FA:  CTA Abd w/ aorta runoff demonstrated 1.1 x 1.1 cm pseudoaneurysm in R FA 9/15. Likely secondary to recent extensive intravascular procedure. Arterial duplex bilateral confirmed 2 cm pseudoaneurysm R FA, lumen more than 50% thrombosed, amenable to thrombin injection. S/p thrombolysis 9/17 with IR. Repeat arterial doppler revealed completely thrombosed pseudoaneurysm 9/18. 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 S/p retrograde AT access, knuckled to the R CFA, then re-entry into the R EIA stents; VBX 11 x 39, VBX 11 x 59 x 2, VBX 11 x 39, VBX 8 x 59; Retrograde SFA/pop 6.0 PTA; Elizabeth 6 x 120 x 2 for ostial SFA to distal SFA; DCB 5 x 150 of the pop, and DCB 6 x 80 of the distal SFA/prox pop; and 3.0 PTA of the AT. Now with complete reconstitution of the R EVAR limb, R CFA, R SFA, R POP, and R AT with improved flow into the PT - full flow into the pedal arch on 09/06/2022. Continue local nitro ointment to RLE. On DAPT and Xeralto. High risk for reperfusion injury. Cardiology following. Currently pain has improved. Dopplerable DP/PT bilaterally, left also palpable. Encourage ambulation. PTOT. Right ankle trifurcation vessel disease:  CTA Abd w/ aorta runoff demonstrated trifurcation vessel occlusion at R ankle 9/15. Recent extensive intravascular procedure involving stents and balloon throughout right lower extremity vessels. Continue to monitor. Thrombocytopenia (resolved): thought to be due to sepsis, s/p extensive invasive interventions. No other etiology at this time. Improving.      Chronic ischemic systolic HF NYHA class II: s/p dual ICD. Last limited TTE 06/26/2020  with EF 25-30%, severely dilated LA, hypertrophic basal septum. On DAPT/BB/statin/ARNi- held due to low BP. No signs of decompensation at this time. Monitor. Salt/fluid restrictions. PAF: [id number] 6. Currently in NSR with HR WNL. On Troprol-XL. Apixaban switched to Rivaroxaban in the setting of PVD. Monitor on tele. Primary HTN: controlled. Off Entresto, BBL d/t above. Will aim to resume as tolerated.  Continued to hold off on ARNI at discharge )no strong indication in ansence of HFsrEF); also stopped Midodrine. Pt and PCP need to monitor BP with antihypertensive regimen adjustment as needed       HLD: on pravastatin 40 mg daily. AAA: s/p endovascular repair in 2019. 3.4 cm on CTA 8/4/2022. Follow up outpatient. CKD III: baseline Cr ~1.0-1.2, eGFR 40-50. Monitor for contrast nephropathy 48-72 hours post procedure. Encourage PO intake for hydration. ALEJANDRO resolved. Avoid nephrotoxins. Cr stable. Will monitor closely     GERD - on protonix  Hx colovesical fistula - s/p right hemicolectomy x2. No colostomy. Tobacco use disorder - prolonged history of intermittent smoking. Unable to clarify PPD. Counseled on smoking cessation. Code status: Full code; seen by Palliative care; confused about what she wants. Ample empathy and support offered. Dispo: PT/OT/SW/CM following; pt wishes to possibly pursue [place]? Otherwise medically stable for discharge    Chief Complaint: Fatigue    Initial H and P:-    Admitted initially under ICU for management of septic shock. ID service also following. Pt transferred to  on 9/20. I took over care on 9/23/2022. Subjective (past 24 hours):   Denies any new complaints; denies CP/SOB/fever/chills/cough/sputum/presyncope/palpitation/ffocal neuro deficit/leg pain. No new issues/events overnight. Past medical history, family history, social history and allergies reviewed again and is unchanged since admission. ROS (All review of systems completed. Pertinent positives noted.  Otherwise All other systems reviewed and negative.)     Medications:  Reviewed    Infusion Medications    sodium chloride Stopped (09/21/22 1434)    dextrose       Scheduled Medications    sulfamethoxazole-trimethoprim  1 tablet Oral 2 times per day    midodrine  2.5 mg Oral TID WC    clopidogrel  75 mg Oral Daily    hydrocortisone  25 mg Rectal Nightly    magnesium oxide  250 mg Oral BID pantoprazole  40 mg Oral BID AC    pravastatin  40 mg Oral Daily    [Held by provider] sacubitril-valsartan  1 tablet Oral BID    timolol  1 drop Both Eyes Daily    sodium chloride flush  5-40 mL IntraVENous 2 times per day    rivaroxaban  15 mg Oral Daily with breakfast     PRN Meds: albuterol sulfate HFA, potassium chloride **OR** potassium alternative oral replacement **OR** potassium chloride, melatonin, sodium chloride flush, sodium chloride, ondansetron **OR** ondansetron, polyethylene glycol, acetaminophen **OR** acetaminophen, glucose, dextrose bolus **OR** dextrose bolus, glucagon (rDNA), dextrose      Intake/Output Summary (Last 24 hours) at 9/23/2022 1234  Last data filed at 9/23/2022 1156  Gross per 24 hour   Intake 360 ml   Output --   Net 360 ml       Diet:  ADULT DIET; Dysphagia - Pureed; Low Fat/Low Chol/High Fiber/2 gm Na  ADULT ORAL NUTRITION SUPPLEMENT; Breakfast, Lunch, Dinner; Standard High Calorie/High Protein Oral Supplement    Physical Exam:  BP 93/71   Pulse 56   Temp 97.7 °F (36.5 °C) (Axillary)   Resp 16   Ht 5' 5\" (1.651 m)   Wt 179 lb 0.2 oz (81.2 kg)   SpO2 97%   BMI 29.79 kg/m²   General appearance: No apparent distress, appears stated age and cooperative. HEENT: Pupils equal, round, and reactive to light. Conjunctivae/corneas clear. Neck: Supple, with full range of motion. No jugular venous distention. Trachea midline. Respiratory:  Normal respiratory effort. Clear to auscultation, bilaterally without Rales/Wheezes/Rhonchi. Cardiovascular: Regular rate and rhythm with normal S1/S2 without murmurs, rubs or gallops. Abdomen: Soft, non-tender, non-distended with normal bowel sounds. Musculoskeletal: passive and active ROM x 4 extremities. Skin: Trace edema bilat legs, +ve ecchymosis. Neurologic:  Neurovascularly intact without any focal sensory/motor deficits.  Cranial nerves: II-XII intact, grossly non-focal.  Psychiatric: Alert and oriented, thought content appropriate, normal insight  Capillary Refill: Brisk,< 3 seconds   Peripheral Pulses: +1 palpable, equal bilaterally     Labs:   Recent Labs     09/21/22  0540 09/22/22  0445   WBC 8.5 7.6   HGB 10.6* 10.3*   HCT 33.3* 32.0*   * 141     Recent Labs     09/20/22  1515 09/21/22  0540 09/22/22  0445    140 141   K 4.1 4.3 4.1    103 104   CO2 27 29 28   BUN 22 20 21   CREATININE 1.1 1.1 1.0   CALCIUM 8.6 9.3 9.3   PHOS 1.6*  --   --      No results for input(s): AST, ALT, BILIDIR, BILITOT, ALKPHOS in the last 72 hours. No results for input(s): INR in the last 72 hours. No results for input(s): Pamla Clines in the last 72 hours. Microbiology:    Blood culture #1:   Lab Results   Component Value Date/Time    [facility]  09/17/2022 11:45 AM     No growth 24 hours. No growth 48 hours. No growth at 5 days        Blood culture #2:No results found for: BLOODCULT2    Organism:  Lab Results   Component Value Date/Time    ORG Citrobacter freundii 09/16/2022 09:55 AM         Lab Results   Component Value Date/Time    LABGRAM  10/24/2018 01:12 PM     Few segmented neutrophils observed. No epithelial cells observed. Moderate gram positive cocci occurring singly and in pairs. MRSA culture only:No results found for: [facility]    Urine culture:   Lab Results   Component Value Date/Time    LABURIN  09/16/2022 09:55 AM     [place] count: >100,000 CFU/mL Citrobacter species which may be initially susceptible to third generation cephalosporins may develop resistance within three to four days of initiation of this antimicrobial therapy. Respiratory culture: No results found for: CULTRESP    Aerobic and Anaerobic :  Lab Results   Component Value Date/Time    LABAERO light growth 10/24/2018 01:12 PM     Lab Results   Component Value Date/Time    LABANAE  10/24/2018 01:12 PM     Culture yielded moderate mixed growth consisting of anaerobic  gram negative bacilli and anaerobic gram positive cocci.  If a  true mixed aerobic and anaerobic infection is suspected, then  broad spectrum empiric antibiotic therapy is indicated and  should include coverage for anaerobic organisms. Urinalysis:      Lab Results   Component Value Date/Time    NITRU POSITIVE 09/15/2022 10:17 PM    WBCUA 10-15 09/15/2022 10:17 PM    BACTERIA MANY 09/15/2022 10:17 PM    RBCUA 0-2 09/15/2022 10:17 PM    BLOODU TRACE 09/15/2022 10:17 PM    SPECGRAV >1.030 03/01/2021 06:11 AM    GLUCOSEU NEGATIVE 09/15/2022 10:17 PM       Radiology:  VL DUP LOWER EXTREMITY ARTERIES RIGHT   Final Result   1. Pseudoaneurysm remains completely thrombosed   2. Follow-up arterial duplex ultrasound recommended in one week to reassess for continued thrombosis of the pseudoaneurysm            **This report has been created using voice recognition software. It may contain minor errors which are inherent in voice recognition technology. **      Final report electronically signed by Dr. Claudette Purple on 9/18/2022 2:54 PM      VL GUIDED NEEDLE PLACEMENT   Final Result   1. Status post successful pseudoaneurysm thrombosis. 2. Repeat sonographic images of the groin will be obtained tomorrow and again in one week. **This report has been created using voice recognition software. It may contain minor errors which are inherent in voice recognition technology. **      Final report electronically signed by Dr. Claudette Purple on 9/17/2022 2:11 PM      US RENAL COMPLETE   Final Result   Impression:   1. Trace ascites in Morison's pouch and splenomegaly are incidental.   2. Right inferior renal echogenic nodule. Renal cell carcinoma suspected. Recommend further clinical investigation as needed. This document has been electronically signed by: Daniel Gosselin, MD on 09/16/2022 06:56 PM      VL DUP LOWER EXTREMITY ARTERIES BILATERAL   Final Result   1. 2 cm pseudoaneurysm emanating from the right common femoral artery, the lumen of which is more than 50% thrombosed. This would be amenable to thrombin injection, if desired. 2. Stents are present in the right proximal, mid, distal SFA which are patent Diminished ABIs are present bilaterally. 3. 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 4. Diminished ABIs bilaterally. Despite this, there is good pulsatility in the trifurcation vessels bilaterally, as described above. **This report has been created using voice recognition software. It may contain minor errors which are inherent in voice recognition technology. **      Final report electronically signed by Dr. Jie Olson on 9/16/2022 5:33 PM      XR CHEST PORTABLE   Final Result   Impression:   Properly positioned right central venous catheter. This document has been electronically signed by: Audra No. Hailey Matthews MD    on 09/16/2022 03:16 AM      XR CHEST PORTABLE   Final Result   Impression:   No acute findings. Right paratracheal and retrocardiac opacity which was also present on the    prior studies and previously reported to be a dilated esophagus. The    appearance is consistent with a dilated food filled esophagus. This document has been electronically signed by: Audra No. Hailey Matthews MD    on 09/15/2022 11:02 PM      CTA ABDOMINAL AORTA W BILAT RUNOFF W WO CONTRAST   Final Result   Impression:   Pseudoaneurysm right femoral artery. This is new. Aortic endograft present. No definite leak. 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 This document has been electronically signed by: Bebo Adkins MD on    09/15/2022 08:30 PM      All CTs at this facility use dose modulation techniques and iterative    reconstructions, and/or weight-based dosing   when appropriate to reduce radiation to a low as reasonably achievable. 3D Post-processing was performed on this study.         US RENAL COMPLETE    Result Date: 9/16/2022  U/S renal Comparison: CT,SR - CTA ABDOMINAL AORTA W BILAT RUNOFF W WO CONTRAST - 9/15/22 19:26 EDT Findings: Right kidney 9.2 cm length. No hydronephrosis. Normal color doppler. Right inferior renal echogenic nodule 9 x 12 x 8 mm. There is no angiomyolipoma seen on the comparison CT angiogram. There may be a subtle heterogeneously enhancing 12 mm nodule in the right kidney on image 100 series 4 of the comparison exam. Left kidney 10.4 cm length. No hydronephrosis. Normal color doppler. Trace fluid in Jackson's pouch. Splenomegaly 15.7 cm in length. Impression: 1. Trace ascites in Morison's pouch and splenomegaly are incidental. 2. Right inferior renal echogenic nodule. Renal cell carcinoma suspected. Recommend further clinical investigation as needed. This document has been electronically signed by: Cadence Vogt MD on 09/16/2022 06:56 PM    CTA ABDOMINAL AORTA W BILAT RUNOFF W WO CONTRAST    Result Date: 9/15/2022  CTA of the abdomen and pelvis with bilateral lower extremity runoff after administration of IV contrast. Technique: CT from the lung bases through the toes after administration of IV contrast. 3-D surface rendering performed. Comparison:  CT,KOSR - CTA ABDOMINAL AORTA W BILAT RUNOFF W WO CONTRAST - 08/04/2022 03:54 PM EDT Findings: Large hiatal hernia. Normal lung bases. Normal liver. No masses. The gallbladder is unremarkable by CT. Normal appearing adrenal glands. Normal spleen. No masses. The spleen appears normal in size. Normal kidneys. No renal mass. No hydronephrosis. Diverticulosis of the colon, no diverticulitis. No pneumoperitoneum or abscess. No bowel obstruction. Normal pancreas. No pancreatitis. Normal retroperitoneal structures. No adenopathy. Unremarkable urinary bladder. Normal bones. Abdominal aorta: Aortic endograft present. The grafts are patent. No evidence for leak. Renal arteries: Single right renal artery. Single left renal artery. No significant disease.  Celiac artery: Multifocal disease within the celiac artery without significant stenosis. SMA: Multifocal disease within the SMA without significant stenosis. Right iliac arteries: Stent within the right common iliac artery and right internal artery. No stenosis greater than 50%. Left iliac artery: Stent within the left common iliac artery. No significant stenosis. Mild disease left external iliac artery. Right: CFA: Pseudoaneurysm right common femoral artery measuring 1.1 x 1.1 cm. Occluded right femoral anterior tibial artery bypass. SFA: Patent stent throughout the right common femoral artery. Popliteal artery: Mild to moderate multifocal disease. Vessels of the trifurcation: Mild disease within the anterior tibial artery. This is occluded at the ankle. The right posterior tibial artery and peroneal artery appear to be occluded mid calf. Left: CFA: Moderate multifocal disease left common femoral artery. SFA: 80% focal stenosis of the origin of the left superficial femoral artery. There is multifocal advanced disease throughout the left superficial femoral artery. This remains patent. Popliteal artery: Mild multifocal disease left popliteal artery. Vessels of the trifurcation: Multifocal disease within the vessels of the trifurcation. These appear patent to the left ankle. Impression: Pseudoaneurysm right femoral artery. This is new. Aortic endograft present. No definite leak. 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 This document has been electronically signed by: Joesph Ho MD on 09/15/2022 08:30 PM All CTs at this facility use dose modulation techniques and iterative reconstructions, and/or weight-based dosing when appropriate to reduce radiation to a low as reasonably achievable. 3D Post-processing was performed on this study. XR CHEST PORTABLE    Result Date: 9/16/2022  Chest Radiograph Comparison: 9/15/22 Findings: Right central venous catheter with the tip terminating at the cavoatrial junction. Cardiomegaly.  Left chest wall cardiac pacemaker/AICD. Normal mediastinal contours. No pneumothorax; skinfold on the left. Right peritracheal and retrocardiac opacity secondary to a dilated esophagus, unchanged. No pleural effusion. Normal upper abdomen. No fracture. Impression: Properly positioned right central venous catheter. This document has been electronically signed by: Audra Matthews MD on 09/16/2022 03:16 AM    XR CHEST PORTABLE    Result Date: 9/15/2022  Chest Radiograph Comparison: 3/3/21, 3/2/21 and 2/28/21 Findings: Cardiomegaly status post left chest wall cardiac pacemaker/AICD. Normal mediastinal contours. No pneumothorax. Right paratracheal and retrocardiac opacity, also present on the prior studies. No pleural effusion. Normal upper abdomen. No fracture. Impression: No acute findings. Right paratracheal and retrocardiac opacity which was also present on the prior studies and previously reported to be a dilated esophagus. The appearance is consistent with a dilated food filled esophagus. This document has been electronically signed by: Audra No. Hailey Matthews MD on 09/15/2022 11:02 PM    VL DUP LOWER EXTREMITY ARTERIES BILATERAL    Result Date: 9/16/2022  PROCEDURE: VL DUP LOWER EXTREMITY ARTERIES BILATERAL CLINICAL INFORMATION: Had vascular intervention 9/6/2022, CTA showed pseudoaneurysm in the right CFA 9/15/22, and right 3 vessel occlusion at the ankle. COMPARISON: 9/8/2022 TECHNIQUE: Multiple permanent grayscale and color flow sonographic images of the major arteries of both lower extremities were obtained from the level of the groin to the level of the ankle . Spectral Doppler waveforms were obtained and velocity measurements were measured.             FINDINGS: RIGHT ARTERY (PSV cm/sec) CFA ---------------> 121 PSV cm/sec PROF -------------> 466 PSV cm/sec PROX STENT SFA PROX -------> 63 PSV cm/sec MID STENT SFA MID ----------> 71 PSV cm/sec DIST STENT --------> 66 PSV cm/sec SFA DIST --------> 39 PSV cm/sec POP A PROX ---> 63 PSV cm/sec POP A DIST ----> 58 PSV cm/sec PTA ---------------> 48 PSV cm/sec PERONEAL -----> 53 PSV cm/sec ARACELIS ----------------> 12 PSV cm/sec DPA ---------------->7 PSV cm/sec LEFT ARTERY (PSV cm/sec) CFA ---------------> 106 PSV cm/sec PROF -------------> 156 PSV cm/sec SFA PROX -------> 69 PSV cm/sec SFA MID ----------> 116 PSV cm/sec SFA DIST --------> 79 PSV cm/sec POP A PROX ---> 56 PSV cm/sec POP A DIST ----> 40 PSV cm/sec PTA ---------------> 65 PSV cm/sec PERONEAL -----> 65 PSV cm/sec ARACELIS ----------------> 28 PSV cm/sec DPA ----------------> 28 PSV cm/sec RAMOS RIGHT ARACELIS----->0.45 PTA----->0.63 RAMOS LEFT ARACELIS----->0.67 PTA----->0.80 VELOCITY MEASUREMENTS: Mildly diminished RAMOS left side. Moderately diminished ABIs right side. RIGHT LEG: Excellent pulsatility in the common femoral artery. There is a small pseudoaneurysm emanating from the right common femoral artery, measuring 2 cm in diameter. The lumen of the surrounding over 50% thrombosed. This would be amenable to thrombin injected,, if desired. Good pulsatility is seen in the proximal and mid SFA. A stent is present in the SFA which is patent. There is mildly dampened pulsatility in the distal SFA. Good pulsatility in the popliteal artery. Good pulsatility is also seen in the posterior tibial and peroneal arteries. Moderately dampened pulsatility in the anterior tibial artery. LEFT LEG: Good pulsatility in the common femoral artery and profunda. Good pulsatility is seen throughout the SFA and in the popliteal artery. Good pulsatility is seen in all 3 trifurcation vessels. 1. 2 cm pseudoaneurysm emanating from the right common femoral artery, the lumen of which is more than 50% thrombosed. This would be amenable to thrombin injection, if desired. 2. Stents are present in the right proximal, mid, distal SFA which are patent Diminished ABIs are present bilaterally. 3. 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 4. Diminished ABIs bilaterally. Despite this, there is good pulsatility in the trifurcation vessels bilaterally, as described above. **This report has been created using voice recognition software. It may contain minor errors which are inherent in voice recognition technology. ** Final report electronically signed by Dr. Sonal Hodgson on 9/16/2022 5:33 PM    With RN in room, patient was updated about the treatment plan, all the questions and concerns were addressed. Alarming signs and symptoms to return to ED were explained in length. Pt remains at high risk for readmission given multiple medical comorbidities. More than 30 minutes of time spent on counseling.    Electronically signed by Jessica Cuello MD on 9/23/2022 at 12:34 PM

## 2022-09-23 NOTE — PLAN OF CARE
Problem: Discharge Planning  Goal: Discharge to home or other facility with appropriate resources  Outcome: Completed     Problem: Respiratory - Adult  Goal: Achieves optimal ventilation and oxygenation  Outcome: Completed     Problem: Cardiovascular - Adult  Goal: Maintains optimal cardiac output and hemodynamic stability  Outcome: Completed     Problem: Skin/Tissue Integrity - Adult  Goal: Incisions, wounds, or drain sites healing without S/S of infection  Outcome: Completed  Goal: Oral mucous membranes remain intact  Outcome: Completed     Problem: Genitourinary - Adult  Goal: Absence of urinary retention  Outcome: Completed     Problem: Metabolic/Fluid and Electrolytes - Adult  Goal: Electrolytes maintained within normal limits  Outcome: Completed     Problem: Safety - Adult  Goal: Free from fall injury  Outcome: Completed     Problem: Chronic Conditions and Co-morbidities  Goal: Patient's chronic conditions and co-morbidity symptoms are monitored and maintained or improved  Outcome: Completed     Problem: Pain  Goal: Verbalizes/displays adequate comfort level or baseline comfort level  Outcome: Completed     Problem: Skin/Tissue Integrity  Goal: Absence of new skin breakdown  Description: 1. Monitor for areas of redness and/or skin breakdown  2. Assess vascular access sites hourly  3. Every 4-6 hours minimum:  Change oxygen saturation probe site  4. Every 4-6 hours:  If on nasal continuous positive airway pressure, respiratory therapy assess nares and determine need for appliance change or resting period.   Outcome: Completed     Problem: Nutrition Deficit:  Goal: Optimize nutritional status  Outcome: Completed

## 2022-09-26 PROCEDURE — G2066 INTER DEVC REMOTE 30D: HCPCS | Performed by: INTERNAL MEDICINE

## 2022-09-26 PROCEDURE — 93297 REM INTERROG DEV EVAL ICPMS: CPT | Performed by: INTERNAL MEDICINE

## 2022-09-26 RX ORDER — POTASSIUM CHLORIDE 750 MG/1
10 TABLET, EXTENDED RELEASE ORAL DAILY
Qty: 90 TABLET | Refills: 4 | OUTPATIENT
Start: 2022-09-26

## 2022-09-26 NOTE — CARE COORDINATION
9/26/22, 7:20 AM EDT    Patient goals/plan/ treatment preferences discussed by  and . Patient goals/plan/ treatment preferences reviewed with patient/ family. Patient/ family verbalize understanding of discharge plan and are in agreement with goal/plan/treatment preferences. Understanding was demonstrated using the teach back method. AVS provided by RN at time of discharge, which includes all necessary medical information pertaining to the patients current course of illness, treatment, post-discharge goals of care, and treatment preferences. Services At/After Discharge: None       IMM Letter  IMM Letter given to Patient/Family/Significant other/Guardian/POA/by[de-identified] SHANNAN Desai  IMM Letter date given[de-identified] 09/23/22  IMM Letter time given[de-identified] 0900     Wendie was discharged to home on 9/23/2022. She denied needs.

## 2022-09-27 ENCOUNTER — PROCEDURE VISIT (OUTPATIENT)
Dept: CARDIOLOGY CLINIC | Age: 87
End: 2022-09-27
Payer: COMMERCIAL

## 2022-09-27 ENCOUNTER — TELEPHONE (OUTPATIENT)
Dept: CARDIOLOGY CLINIC | Age: 87
End: 2022-09-27

## 2022-09-27 DIAGNOSIS — I50.22 CHRONIC SYSTOLIC CONGESTIVE HEART FAILURE (HCC): Primary | ICD-10-CM

## 2022-09-27 NOTE — PROGRESS NOTES
Dr Sari Alatorre pt   [employer] carelink optivol remote   Battery 8.5 yrs remaining  Optivol is elevated   Pt [facility]onal in [facility]./ routed to [facility] in phone encounter     2 high vent rate episodes / both :01 seconds

## 2022-09-27 NOTE — TELEPHONE ENCOUNTER
STOP taking:  apixaban 2.5 MG Tabs tablet (Eliquis)  aspirin 81 MG chewable tablet  bumetanide 1 MG tablet (BUMEX)  potassium chloride 10 MEQ extended release tablet (KLOR-CON M)  sacubitril-valsartan 24-26 MG per tablet        Pt seeing Basia next Wednesday. Please see if she can bring a BP/HR log.  Ask what BP has been running since D/C may need to add back bumex until f/u

## 2022-09-29 LAB
ALBUMIN SERPL-MCNC: 3.9 G/DL
ALP BLD-CCNC: 97 U/L
ALT SERPL-CCNC: 12 U/L
ANION GAP SERPL CALCULATED.3IONS-SCNC: 9 MMOL/L
AST SERPL-CCNC: 21 U/L
BASOPHILS ABSOLUTE: 100 /ΜL
BASOPHILS RELATIVE PERCENT: 0.8 %
BILIRUB SERPL-MCNC: 0.4 MG/DL (ref 0.1–1.4)
BUN BLDV-MCNC: 30 MG/DL
CALCIUM SERPL-MCNC: 9.3 MG/DL
CHLORIDE BLD-SCNC: 103 MMOL/L
CO2: 27 MMOL/L
CREAT SERPL-MCNC: 1.5 MG/DL
EOSINOPHILS ABSOLUTE: 0 /ΜL
EOSINOPHILS RELATIVE PERCENT: 0.5 %
GFR CALCULATED: 33
GLUCOSE BLD-MCNC: 87 MG/DL
HCT VFR BLD CALC: 34.6 % (ref 36–46)
HEMOGLOBIN: 11.9 G/DL (ref 12–16)
LYMPHOCYTES ABSOLUTE: 800 /ΜL
LYMPHOCYTES RELATIVE PERCENT: 10.4 %
MCH RBC QN AUTO: 30.6 PG
MCHC RBC AUTO-ENTMCNC: 34.3 G/DL
MCV RBC AUTO: 89.1 FL
MONOCYTES ABSOLUTE: 600 /ΜL
MONOCYTES RELATIVE PERCENT: 7.7 %
NEUTROPHILS ABSOLUTE: 0.1 /ΜL
NEUTROPHILS RELATIVE PERCENT: 80.6 %
PLATELET # BLD: 181 K/ΜL
PMV BLD AUTO: ABNORMAL FL
POTASSIUM SERPL-SCNC: 5.3 MMOL/L
RBC # BLD: 3.88 10^6/ΜL
SODIUM BLD-SCNC: 139 MMOL/L
TOTAL PROTEIN: 6.5
WBC # BLD: 7.3 10^3/ML

## 2022-10-03 NOTE — TELEPHONE ENCOUNTER
Lab work reviewed, It is show ALEJANDRO. Ordering for another set of labs tomorrow. Drink at least 1500cc of fluid, closer to 2L.

## 2022-10-03 NOTE — TELEPHONE ENCOUNTER
LM for patient on cell phone to call our office back. Attempted to call home phone number and line just rings busy.

## 2022-10-05 ENCOUNTER — HOSPITAL ENCOUNTER (OUTPATIENT)
Age: 87
Discharge: HOME | End: 2022-10-05
Payer: COMMERCIAL

## 2022-10-05 ENCOUNTER — OFFICE VISIT (OUTPATIENT)
Dept: CARDIOLOGY CLINIC | Age: 87
End: 2022-10-05
Payer: COMMERCIAL

## 2022-10-05 VITALS
HEIGHT: 65 IN | DIASTOLIC BLOOD PRESSURE: 74 MMHG | WEIGHT: 163.6 LBS | SYSTOLIC BLOOD PRESSURE: 128 MMHG | BODY MASS INDEX: 27.26 KG/M2 | HEART RATE: 76 BPM

## 2022-10-05 DIAGNOSIS — I73.9 PAD (PERIPHERAL ARTERY DISEASE) (HCC): ICD-10-CM

## 2022-10-05 DIAGNOSIS — I50.22 CHRONIC SYSTOLIC CONGESTIVE HEART FAILURE (HCC): ICD-10-CM

## 2022-10-05 DIAGNOSIS — I10 ESSENTIAL HYPERTENSION: ICD-10-CM

## 2022-10-05 DIAGNOSIS — Z95.820 S/P PERCUTANEOUS TRANSLUMINAL ANGIOPLASTY (PTA) WITH STENT PLACEMENT: Primary | ICD-10-CM

## 2022-10-05 DIAGNOSIS — I48.0 PAROXYSMAL ATRIAL FIBRILLATION (HCC): ICD-10-CM

## 2022-10-05 DIAGNOSIS — Z95.810 ICD (IMPLANTABLE CARDIOVERTER-DEFIBRILLATOR) IN PLACE: ICD-10-CM

## 2022-10-05 DIAGNOSIS — R78.81 BACTEREMIA: ICD-10-CM

## 2022-10-05 DIAGNOSIS — I42.8 NON-ISCHEMIC CARDIOMYOPATHY (HCC): ICD-10-CM

## 2022-10-05 LAB
ANION GAP SERPL CALCULATED.3IONS-SCNC: 12 MEQ/L (ref 8–16)
BUN BLDV-MCNC: 34 MG/DL (ref 7–22)
CALCIUM SERPL-MCNC: 9.7 MG/DL (ref 8.5–10.5)
CHLORIDE BLD-SCNC: 98 MEQ/L (ref 98–111)
CO2: 24 MEQ/L (ref 23–33)
CREAT SERPL-MCNC: 1.6 MG/DL (ref 0.4–1.2)
GFR SERPL CREATININE-BSD FRML MDRD: 31 ML/MIN/1.73M2
GLUCOSE BLD-MCNC: 106 MG/DL (ref 70–108)
POTASSIUM SERPL-SCNC: 5 MEQ/L (ref 3.5–5.2)
SODIUM BLD-SCNC: 134 MEQ/L (ref 135–145)

## 2022-10-05 PROCEDURE — 3074F SYST BP LT 130 MM HG: CPT | Performed by: NURSE PRACTITIONER

## 2022-10-05 PROCEDURE — 1090F PRES/ABSN URINE INCON ASSESS: CPT | Performed by: NURSE PRACTITIONER

## 2022-10-05 PROCEDURE — 4004F PT TOBACCO SCREEN RCVD TLK: CPT | Performed by: NURSE PRACTITIONER

## 2022-10-05 PROCEDURE — 1111F DSCHRG MED/CURRENT MED MERGE: CPT | Performed by: NURSE PRACTITIONER

## 2022-10-05 PROCEDURE — G8427 DOCREV CUR MEDS BY ELIG CLIN: HCPCS | Performed by: NURSE PRACTITIONER

## 2022-10-05 PROCEDURE — G8417 CALC BMI ABV UP PARAM F/U: HCPCS | Performed by: NURSE PRACTITIONER

## 2022-10-05 PROCEDURE — 99213 OFFICE O/P EST LOW 20 MIN: CPT | Performed by: NURSE PRACTITIONER

## 2022-10-05 PROCEDURE — G8400 PT W/DXA NO RESULTS DOC: HCPCS | Performed by: NURSE PRACTITIONER

## 2022-10-05 PROCEDURE — 3078F DIAST BP <80 MM HG: CPT | Performed by: NURSE PRACTITIONER

## 2022-10-05 PROCEDURE — 36415 COLL VENOUS BLD VENIPUNCTURE: CPT

## 2022-10-05 PROCEDURE — 1123F ACP DISCUSS/DSCN MKR DOCD: CPT | Performed by: NURSE PRACTITIONER

## 2022-10-05 PROCEDURE — 80048 BASIC METABOLIC PNL TOTAL CA: CPT

## 2022-10-05 PROCEDURE — G8484 FLU IMMUNIZE NO ADMIN: HCPCS | Performed by: NURSE PRACTITIONER

## 2022-10-05 NOTE — PATIENT INSTRUCTIONS
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 Start a probiotic to keep your gut bacteria healthy on the antibiotic. Pick one that has many strains over just the number of bacteria. May use Colace or Miralax to prevent constipation. Continue current medications as prescribed. Follow-up with your PCP. - will get you list of providers taking new patients. Follow-up with Dr. Fifi Coronado in 3 months as scheduled or sooner if need.

## 2022-10-05 NOTE — PROGRESS NOTES
[id number] [facility] [address] [place] [address]  Dept: [phone]  Dept Fax: [phone]  Loc: [phone]    Visit Date: 10/5/2022    Primary Cardiologist: Gianfranco Mack MD    Ms. Evens Truong is a 82 y.o. female  who presented for: follow-up peripheral angiogram    Chief Complaint   Patient presents with    Follow-up       HPI:   HPI     9/15/22: admitted with bacteremia/septic shock. Urine culture (+) citrobacter. CTA abdominal with aorta runoff demonstrated a 1.1 x 1.1 cm R CFA pseudoaneurysm, occluded right femoral anterior tibial artery bypass, patent R SFA. During hospitalization, blood cultures have been positive for MRSA bacteremia. Patient was treated with IV vancomycin and gentamicin for synergistic therapy. Patient underwent successful pseudoaneurysm thrombosis on 9/17.    9/6: Arteriogram with extensive RLE revascularization. 8/8/22: Difficulty walking; pain in legs    Last seen in office on 1/26/22 per Dr. Darius Dhaliwal. Per office note:  83 yo F HTN, PVD, PAD and R LE stent placement, left heart catheterization on 12/30/2019 for nonobstructive coronary artery disease who presents for follow-up. Hx of GI bleed, chronic Afib. Does not want WATCHMAN. No leg pain or discomfort. Chronic sob, no new issues. BP 90s. No chest pain, angina, orthopnea, PND, sob at rest, palpitations, LE edema, or syncope. ICD not firing. No recent bleeds. EF 25-30%, 6/2020. No side effects from meds.    Assessment/Plan   Afib on [facility]  HTN, PVD, PAD and R LE stent placement, left heart catheterization on 12/30/2019 for nonobstructive coronary artery disease with elevated troponin,  Non-ischemic Cardiomyopathy, NYHA II  Status post ileocolonic resection Feb 2020 with Dr. Klaudia Espinosa post robotic anterior resection secondary to diverticular stricture 2018 with Dr. Jean Kumar   Status post endoluminal graft repair of abdominal aortic aneurysm Feb 2019  S/p PEG Tube  S/p EVAR  Peripheral angiogram/intervention was done on 02/14/2019 for acute-on-chronic limb ischemia related to post lower extremity bypass distal anastomotic stenosis with recent graft thrombosis with a successful right fem-tib angioplasty and stenting  No WATCHMAN at this point per patient wishes, no bleeding, tolerating all other meds. No CHF issues, on GDMT, no side effects. Making urine. No Cr issues. Mild hypotension. Consider cutting back if becomes an issue. Discussed diet/exercise/BP/weight loss/health lifestyle choices/lipids; the patient understands the goals and will try to comply. Disposition: 1 year        Current Outpatient Medications:     rivaroxaban (XARELTO) 20 MG TABS tablet, Take 1 tablet by mouth daily, Disp: 30 tablet, Rfl: 3    hydrocortisone (ANUSOL-HC) 25 MG suppository, Place 25 mg rectally nightly, Disp: , Rfl:     magnesium (MAGNESIUM-OXIDE) 250 MG TABS tablet, TAKE 2 TABLETS BY MOUTH TWICE DAILY, Disp: 120 tablet, Rfl: 7    clopidogrel (PLAVIX) 75 MG tablet, TAKE 1 TABLET BY MOUTH DAILY, Disp: 90 tablet, Rfl: 3    pravastatin (PRAVACHOL) 40 MG tablet, TAKE 1 TABLET BY MOUTH DAILY, Disp: 90 tablet, Rfl: 3    metoprolol succinate (TOPROL XL) 25 MG extended release tablet, TAKE 1 TABLET BY MOUTH DAILY, Disp: 90 tablet, Rfl: 3    pantoprazole (PROTONIX) 40 MG tablet, Take 40 mg by mouth 2 times daily (before meals), Disp: , Rfl:     albuterol sulfate HFA (PROVENTIL HFA) 108 (90 Base) MCG/ACT inhaler, Inhale 2 puffs into the lungs every 6 hours as needed for Wheezing or Shortness of Breath, Disp: 1 Inhaler, Rfl: 0    Blood Pressure KIT, Check blood pressure daily, and if symptoms of lightheadedness.   Call physician if BP <80/40., Disp: 1 kit, Rfl: 0    melatonin 3 MG TABS tablet, Take 2 tablets by mouth nightly as needed (sleep), Disp: 60 tablet, Rfl: 3    acetaminophen (TYLENOL) 650 MG extended release tablet, Take 1,300 mg by mouth every 12 hours as needed for Pain , Disp: , Rfl:     timolol (TIMOPTIC) 0.5 % ophthalmic solution, Place 1 drop into both eyes daily, Disp: , Rfl:     Past Medical History  Tom De La Garza  has a past medical history of A-fib ([facility] [address] AAA (abdominal aortic aneurysm), Achalasia, Anemia, Arthritis, Blood circulation, collateral, BPPV (benign paroxysmal positional vertigo), CHF (congestive heart failure) ([address] Chronic kidney disease, Colon cancer ([facility] [address] Gastrointestinal hemorrhage, GERD (gastroesophageal reflux disease), Hiatal hernia, History of blood transfusion, HTN (hypertension), Hx of blood clots, Hyperlipidemia, Medtronic dual ICD , Neuromuscular disorder ([facility] [address] Obesity, Osteopenia, Osteoporosis, PAC (premature atrial contraction), Paralysis ([address] Pedal edema, Pneumonia, PVC (premature ventricular contraction), PVD (peripheral vascular disease) ([facility] [address] Small bowel obstruction ([facility] [address] Tinnitus, and UTI (urinary tract infection). Social History  Tom De La Garza  reports that she has been smoking cigarettes. She started smoking about 66 years ago. She has a 15.00 pack-year smoking history. She has never used smokeless tobacco. She reports current alcohol use of about 1.0 standard drink per week. She reports that she does not use drugs. Family History  Tom De La Garza family history includes Cancer in her father; Dementia in her maternal grandmother; Emphysema in her father; Heart Disease in her maternal grandfather, maternal grandmother, and mother; No Known Problems in her paternal grandfather and paternal grandmother; Other in her sister; Stroke in her mother. There is no family history of bicuspid aortic valve, aneurysms, heart transplant, pacemakers, defibrillators, or sudden cardiac death.       Past Surgical History   Past Surgical History:   Procedure Laterality Date    ABDOMINAL AORTIC ANEURYSM REPAIR, ENDOVASCULAR N/A 2/15/2019    ABDOMINAL AORTIC ANEURYSM REPAIR ENDOVASCULAR, DECLOTTING RIGHT FEM TIB BYPASS GRAFT performed by Black Mendoza MD at [place] [facility] APPENDECTOMY      BREAST SURGERY Right 1958    lumpectomy    CHOLECYSTECTOMY      30 years ago    COLONOSCOPY  08/06/2018    Dr Tala Mera N/A 2/22/2019    COLONOSCOPY DIAGNOSTIC performed by Nikolas Chaparro MD at [facility]    COLONOSCOPY N/A 2/22/2020    COLONOSCOPY CONTROL HEMORRHAGE performed by Ana Burton MD at [facility]    COLONOSCOPY Left 3/2/2021    COLORECTAL CANCER SCREENING, NOT HIGH RISK performed by Maryla Leyden, MD at [facility]      eye, eyelids    EYE SURGERY      cataract    HEMICOLECTOMY N/A 2/25/2020    OPEN RIGHT COLON RESECTION performed by Catina Khalil MD at [facility] ([facility])      LARYNGOSCOPY  07/15/2016    OTHER SURGICAL HISTORY  10/06/2020    Exp lap washout mesenteric lymph node biopsy EGD abthera placement Dr Rabia Chapa  10/08/2020    reopening recent lap open g tube placement intra operative EGD and primary [facility] of [facility] SUB GRFT T/A/L AREA/<100SCM /<1ST 25 SCM N/A 9/6/2018    RE-EXPLORATION RIGHT GROIN, DECLOTTING OF FEMORAL BYPASS GRAFT performed by Sourav Blackwell MD at [address] [email or web address] EGD TRANSORAL BIOPSY SINGLE/MULTIPLE Left 11/27/2017    EGD BIOPSY performed by Zander Santos MD at [facility], PARTIAL, [facility] N/A 8/20/2018    ROBOT ASSISTED COLECTOMY (LOW ANTERIOR) performed by Kika Lopez MD at [address] [email or web address] OFFICE/OUTPT [facility] N/A 9/5/2018    RIGHT FEMORAL EMBOLECTOMY, INTRAOPERATIVE ARTERIOGRAM, RIGHT ILIAC EMBOLECTOMY, RIGHT COMMON AND EXTERNAL ILIAC STENTING, RIGHT FEMORAL TO ANTERIOR POPLITEAL BYPASS WITH 6MM GORTEX GRAFT performed by Sourav Blackwell MD at [address] [facility] / [address] [facility] Right 2/14/2019    FEMORAL EMBOLECTOMY THROMBECTOMY, RIGHT LEG performed by Sourav Blackwell MD at [address] [facility] N/A 11/27/2017    EGD SUBMUCOSAL/BOTOX INJECTION performed by Yogesh Woodall MD at [facility] [address] N/A 2/22/2019    EGD BIOPSY performed by Francois Hester MD at [facility]     Today's visit:   Subjective:  BP has been up and down; per her log  Much improvement in RLE swelling - not having discomfort  Tolerating medications; no bleeding issues  Mainly concerned over recent UTI and possibility of on-going infection  No chest discomfort or dyspnea    Review of Systems   Constitutional: Negative for chills and fever  HENT: Negative for congestion, sinus pressure, sneezing and sore throat. Eyes: Negative for pain, discharge, redness and itching. Respiratory: Negative for PND, orthopnea, cough  Gastrointestinal: Negative for blood in stool, constipation, diarrhea   Endocrine: Negative for cold intolerance, heat intolerance, polydipsia. Genitourinary: Negative for dysuria, hematuria. Musculoskeletal: Negative for arthralgias, joint swelling and neck pain. Neurological: Negative for numbness and headaches. Psychiatric/Behavioral: Negative for agitation, confusion, decreased concentration and dysphoric mood. Objective:     /74   Pulse 76   Ht 5' 5\" (1.651 m)   Wt 163 lb 9.6 oz (74.2 kg)   BMI 27.22 kg/m²     Wt Readings from Last 3 Encounters:   10/05/22 163 lb 9.6 oz (74.2 kg)   09/22/22 179 lb 0.2 oz (81.2 kg)   09/10/22 168 lb 7 oz (76.4 kg)     BP Readings from Last 3 Encounters:   10/05/22 128/74   09/23/22 93/71   09/11/22 118/69       Nursing note and vitals reviewed. Physical Exam   Constitutional: Oriented to person, place, and time. Appears well-developed and well-nourished. HENT:   Head: Normocephalic and atraumatic. Eyes: EOM are normal. Pupils are equal, round, and reactive to light. Neck: Normal range of motion. Neck supple. No JVD present. Cardiovascular: Normal rate, regular rhythm, normal heart sounds and intact distal pulses. No murmur heard. Pulmonary/Chest: Effort normal and breath sounds normal. No respiratory distress. No wheezes. No rales. Abdominal: Soft. Bowel sounds are normal. No distension. There is no tenderness. Musculoskeletal: Normal range of motion. No edema. Neurological: Alert and oriented to person, place, and time. No cranial nerve deficit. Coordination normal.   Skin: Skin is warm and dry. Psychiatric: Normal mood and affect.        Lab Results   Component Value Date/Time    CKTOTAL 26 09/07/2022 05:24 AM       Lab Results   Component Value Date/Time    WBC 5.7 10/20/2022 03:06 PM    RBC 4.37 10/20/2022 03:06 PM    HGB 13.4 10/20/2022 03:06 PM    HCT 41.6 10/20/2022 03:06 PM    MCV 95.2 10/20/2022 03:06 PM    MCH 30.7 10/20/2022 03:06 PM    MCHC 32.2 10/20/2022 03:06 PM    RDW 17.5 05/30/2019 04:43 PM     10/20/2022 03:06 PM    MPV 11.2 10/20/2022 03:06 PM       Lab Results   Component Value Date/Time     10/20/2022 03:06 PM    K 5.0 10/20/2022 03:06 PM    K 4.1 09/22/2022 04:45 AM    CL 99 10/20/2022 03:06 PM    CO2 28 10/20/2022 03:06 PM    BUN 29 10/20/2022 03:06 PM    LABALBU 3.9 09/29/2022 12:00 AM    CREATININE 1.6 10/20/2022 03:06 PM    CALCIUM 9.5 10/20/2022 03:06 PM    LABGLOM 31 10/20/2022 03:26 PM    GLUCOSE 98 10/20/2022 03:06 PM    GLUCOSE 82 08/19/2022 09:18 AM       Lab Results   Component Value Date/Time    ALKPHOS 97 09/29/2022 12:00 AM    ALT 12 09/29/2022 12:00 AM    AST 21 09/29/2022 12:00 AM    PROT 5.9 09/15/2022 07:00 PM    BILITOT 0.4 09/29/2022 12:00 AM    BILIDIR <0.2 02/28/2021 03:10 PM    LABALBU 3.9 09/29/2022 12:00 AM       Lab Results   Component Value Date/Time    MG 1.8 09/20/2022 03:15 PM       Lab Results   Component Value Date    INR 1.10 09/06/2022    INR 1.16 (H) 03/04/2021    INR 1.28 (H) 03/04/2021         Lab Results   Component Value Date/Time    LABA1C 5.2 09/16/2022 08:12 AM       Lab Results   Component Value Date/Time    TRIG 104 07/05/2021 07:36 AM HDL 41 2021 07:36 AM    LDLCALC 47 2019 04:06 AM    LDLDIRECT 49 2021 07:36 AM       Lab Results   Component Value Date/Time    TSH 1.010 2022 02:25 AM         Testing Reviewed:      I have individually reviewed the cardiac test below:  EK/15/22  15-SEP-2022 18:48:09 [facility] ROUTINE RECORD  Sinus rhythm with marked sinus arrhythmia with occasional Premature ventricular complexes  Left axis deviation  Pulmonary disease pattern  Septal infarct , age undetermined  Abnormal ECG  When compared with ECG of 06-SEP-2022 18:01,  Premature supraventricular complexes are no longer Present  Septal infarct is now Present  Confirmed by [facility] MD, [address] [facility] ([id number]) on 2022 2:28:58 AM      Echo 22:   Summary   Ejection fraction is visually estimated at 45%. There was mild global hypokinesis of the left ventricle. The aortic valve was trileaflet with normal thickness and cuspal   separation. DOPPLER: Transaortic velocity was within the normal range with   no evidence of aortic stenosis. There was no evidence of aortic   regurgitation. Signature    ----------------------------------------------------------------   Electronically signed by Mario Carranza MD (Interpreting   physician) on 2022 at 03:43 PM    ECHO: 19    Indications:Congestive heart failure. Additional Medical History: Former smoker, respiratory failure, PAC's, pedal  edema, peripheral vascular disease, GERD, hyperlipidemia, SVT, anemia,  atrial fibrillation, AAA, chronic kidney disease, pulmonary edema    Summary  Normal left ventricular wall thickness. Left Ventricular size is Moderately increased . There was severe global hypokinesis of the left ventricle. Ejection fraction is visually estimated in the range of 10% to 15%.   Features were consistent with a pseudonormal left ventricular filling  pattern, with concomitant abnormal relaxation and increased filling  pressure (grade 2 diastolic dysfunction). Structurally normal mitral valve. Mild to Moderate mitral regurgitation is present. Tricuspid valve is structurally normal.  Mild tricuspid regurgitation. Right ventricular systolic pressure measures 35-40mmHg. Ascites Vs pleural effusion noted. The aorta is within normal limits. The IVC is dilated . Estimated right atrial pressure is 10-15mmHg . Signature    Electronically signed by Ana Ricks MD (Interpreting  physician) on 2019 at 06:31 PM       2022  CARDIAC CATHETERIZATION     PATIENT NAME: Eva Martínez                     :        1935  MED REC NO:   [id number]                           ROOM:       0007  ACCOUNT NO:   [de-identified]                           ADMIT DATE: 2022  PROVIDER:     Schuyler Becker MD     DATE OF PROCEDURE:  2022     PERIPHERAL ANGIOGRAM/INTERVENTION     INDICATION:  Right lower extremity CLI, history of EVAR with complete  occlusion of the right EVAR limb, right SFA, right popliteal artery, and  right tibial artery. DESCRIPTION OF PROCEDURE:  After informed consent was obtained from the  patient, she was brought to the special procedure suite and prepped in  sterile fashion. Right femoral artery was chosen as the primary point  of access. Preprocedure timeout was completed. After infiltration of  the right inguinal region with 2% lidocaine using micropuncture and  modified Seldinger technique under fluoroscopic guidance and ultrasound  guidance, I was able to insert a 5-Taiwanese sheath in the right femoral  artery. I inserted a 5-Taiwanese VCF catheter using 0.035 angled Glidewire  at the proximal end of the EVAR graft and performed angiography from  this position. EVAR:  The entire proximal end of the EVAR with the aortic cuff was  patent. The renal arteries appeared to be patent. The entire right  EVAR limb is occluded in the proximal end of it. No flow was seen into  the leg.   The right EVAR was patent all the way up to the right common  femoral artery. INTERVENTION:  Given the findings and presentation, I elected to proceed  with intervention. We had the CTA done demonstrated that there was  reconstitution of flow, faint in the common femoral artery and distal  popliteal artery as well as mainly in the tibial arteries. We elected  to proceed with intervention via right pedal approach. After  infiltration of the right anterior tibial region with 2% lidocaine using  micropuncture and modified Seldinger technique under fluoroscopic  guidance and ultrasound guidance, I was able to insert a 4-Gu[place]ian  micropuncture sheath in the right anterior tibial artery. Angiography  was performed demonstrating I was in the true lumen. I upsized the  access to 6-Guamanian Slender sheath. Standard  antispasmodic/antithrombotic cocktail was given intraarterially. I then used a 0.018 angled TrailBlazer catheter and an 0.08 wire,  knuckled the wire. I was able to traverse the anterior tibial artery  all the way up to the proximal portion of the vessel. There was a  previously placed graft that was sutured into the vessel that was  tenting the vessel. Using roadmap guidance, I was able to wire around  the true lumen of the anterior tibial artery. I then knuckled the wire  into the popliteal artery. With the catheter back up the port, we used  the knuckle and I was able to dissect through the occluded popliteal  artery and then into the SFA. We then continued subintimal tracking all  the way up to the ostium of the SFA. I was able to re-enter into the  common femoral artery. I then performed angioplasty with a 3.0 [place]  balloon along the entire length of the anterior tibial artery all the  way into the popliteal artery and all the way up to the SFA and the  common femoral artery. The 3.0 provided a channel for what we needed in  an effort to place an 8.0 balloon.   I then put an 8.0 balloon in the  common femoral artery, inflated this, but I was able to re-enter into  the true lumen in the stent in the right external iliac artery. Once I  confirmed this, I exchanged out for a TrailBlazer catheter and  injecting, I then knuckled the wire. I was able to retrograde, pass the  wire in knuckle fashion into the aorta. I used an angled TrailBlazer  with an 0.035 Glidewire Advantage. Once I was in the aorta, I took an  angiographic injection from the tip, confirming that I was in the aorta. At that point, we needed to proceed with intervention of the iliac  system prior to proceeding with intervention of the right SFA. I  elected to use balloon assisted access by inflating the 8.0 balloon in  the right common femoral artery using ultrasound as well as fluoroscopic  guidance. I used a standard 18-gauge needle and punctured the balloon  and then advanced the wire at the level of the right common femoral  artery into the balloon. The balloon was then used to slide up into the  EVAR limb and then into the aorta confirming that the wire was in the  true lumen. I then sequentially upsized the access from 6-[place] to  8-[place] to 10-[place]. Once we were at 10-[place] sheath, I then took an  8.0 balloon and predilated the entire length of the right EVAR limb at  nominal pressure. Then, I used a standard 0.035 [place] IVUS  catheter per 's recommendations, passed it into the EVAR  limb and confirmed the sizing. Confirmed on CT and on the IVUS that a  12-mm stent was placed. Thereafter, I passed an 11 x 59 Viabahn VBX  stent, deployed it in the proximal lesion and passed another 11 x  59 VBX overlapped with first stent deployed. I then passed an 8 x 59 Viabahn VBX  stent to the right common iliac limb of the EVAR. I then used another 8  x 59 to continue onto the right external iliac artery and then a 7 x 39  to complete the new graft limb. These were all deployed at nominal  pressure.   I then used a 14 x 40 [place] balloon and postdilated the  proximal EVAR limbs all the way along the length of the proximal EVAR  and then to the right common iliac artery and then used a 10-mm balloon  and postdilated the common iliac artery portion of the limb and then  used an 8 x 40 Conquest balloon and dilated the external iliac artery  portion of the limb. Once that was done, there was clearly a stent gap between the two  Viabahn stents. I therefore deployed an 11 x 39 to cover the stent gap  between the first two stents deployed. This was likely a function of  postdilation. I used a 14 x 40 [place] balloon again and inflated  6-Macanese, then postdilated the stent one more time confirming that they  were all well apposed. Once that was done, I then checked an injection  from above to confirm the flow. The flow was quite sluggish on  the right EVAR limb and there was still some haziness that was  concerning while continuing thrombosis above the limb. I passed an 11 x  39 slightly higher, but below the renal artery on the right side and  deployed this in overlapping fashion with the first stent deployed and  then used a 14 x 40 [place] balloon, once again postdilated the stent. Once that was done, repeat injection demonstrated that we had brisk flow  into the common femoral artery with good pulses into the right femoral  artery. At that point, the plan was to proceed with right SFA and  popliteal artery revascularization. I wanted to remove the sheath to  ensure that the common femoral artery can be treated appropriately as  well, so the 10-Macanese sheath was removed and an 8-Macanese Angio-Seal was  used for hemostasis and then, I used an 8-mm [place] balloon for  secondary hemostasis from internally and superiorly with external pressure  And had good hemostasis. I then proceeded with a 6 x 200 [place] balloon and predilated the entire  SFA and the ostium all the way to the popliteal artery as well.   I then  treated the proximal SFA with a 6 x 120 Elizabeth self-expanding stent and  another 6 x 120 Elizabeth self-expanding stent deployed in an overlapping  fashion. Distal SFA was treated with 6 x 80 IN. PACT drug-coated balloon  for 3-minute inflation at nominal pressure and the popliteal artery was  treated with 5 x 150 IN. PACT Admiral drug-coated balloon at three  minutes at nominal pressure. Once that was done, repeat angiogram was done using an 0.035 TrailBlazer  catheter confirming that we had brisk flow into the common femoral  artery and the SFA. There was slight bleeding still that would be  treated with balloon occlusion once more in external pressure. I  confirmed that the flow went all the way down to the foot. The  posterior tibial artery was re-perfused. The anterior tibial artery was  quite brisk except for the sheath. The sheath was removed and then  proceeded for hemostasis after that. I did do balloon occlusion with an  8 mm [place] balloon over the common femoral artery with external  pressure for approximately 5 to 7 minutes x3 inflations. With these  inflations, we had improved hemostasis and at the last inflation, there  was no significant bleeding noted at the end of the case in the right  femoral artery. Femostop was applied for secondary hemostasis as well. IMMEDIATE COMPLICATIONS:  None. MEDICATIONS:  See EMR. ACCESS:  See above. Right femoral artery sheath was removed. 6-Romansh Angio-Seal was used  for hemostasis. ESTIMATED BLOOD LOSS:  Less than 50 to 100 mL.      SUMMARY:  Successful percutaneous right EVAR graft occlusion, status  post reconstitution with four 11-mm Viabahn VBX stents postdilated with  a 14-mm Conquest balloon, right common iliac and external iliac artery  graft occlusion, treated with two 8 mm and one 7 mm VBX stent  postdilated with 8 and 10 mm balloons with full reconstitution of flow  into the common femoral artery followed by right SFA/popliteal artery  occlusion treated with a 6 x 120 Elizabeth x2 followed by 6 x 80 IN. PACT  Admiral drug-coated balloon to the distal SFA and a 5 x 150 IN. PACT  Admiral drug-coated balloon for the popliteal artery and a 3-mm [place]  angioplasty of the entire anterior tibial artery with reconstitution of  two-vessel runoff to the foot. PLAN:  1. Optimal medical therapy. 2.  Risk factor management. 3.  Routine access site care. 4.  Six hours bedrest.  5.  Will need FemoStop per protocol. 6.  DAPT. 7.  Eliquis per protocol, but if able to we will switch to Xarelto. 8.  Will need lifelong anticoagulation/antiplatelet therapy. 10.  Nitroglycerin paste to the foot. 11.  Warm blankets to the foot. 12.  Keep the foot in dependent position. 13.  IV fluids overnight. 14.  Frequent pulse checks q.1 to 2 h.  15.  Repeat labs in the a.m.  16.  Surveillance arterial duplex at 1, 3, 6, and 12 months. 17.  Follow with myself in two to four weeks postprocedure. All the above was explained to the patient's family. They were  agreeable and amenable to the plan. Walter Stern MD   D: 09/06/2022 9/18/22: US art LE  PROCEDURE: VL DUP LOWER EXTREMITY ARTERIES RIGHT       CLINICAL INFORMATION: Recently thrombosed pseudoaneurysm right groin. COMPARISON: 9/17/2022       TECHNIQUE: Several grayscale and color flow sonographic images of the right groin were obtained to reevaluate the recently thrombosed pseudoaneurysm. [facility] Spectral Doppler waveforms were also obtained. FINDINGS: The pseudoaneurysm remains thrombosed. There is no evidence for recanalization. Color flow images and spectral Doppler waveforms of the common femoral artery are normal. Good pulsatility is seen in the right peroneal and right posterior tibial    arteries. Impression   1. Pseudoaneurysm remains completely thrombosed   2.  Follow-up arterial duplex ultrasound recommended in one week to reassess for continued thrombosis of the pseudoaneurysm       **This report has been created using voice recognition software. It may contain minor errors which are inherent in voice recognition technology. **       Final report electronically signed by Dr. Lilliana Carmona on 9/18/2022 2:54 PM     9/16/22:  PROCEDURE: VL DUP LOWER EXTREMITY ARTERIES BILATERAL       CLINICAL INFORMATION: Had vascular intervention 9/6/2022, CTA showed pseudoaneurysm in the right CFA 9/15/22, and right 3 vessel occlusion at the ankle. COMPARISON: 9/8/2022       TECHNIQUE: Multiple permanent grayscale and color flow sonographic images of the major arteries of both lower extremities were obtained from the level of the groin to the level of the ankle . Spectral Doppler waveforms were obtained and velocity    measurements were measured. FINDINGS:        RIGHT ARTERY    (PSV cm/sec)        CFA ---------------> 121 PSV cm/sec    PROF -------------> 466 PSV cm/sec    PROX STENT SFA PROX -------> 63 PSV cm/sec    MID STENT SFA MID ----------> 71 PSV cm/sec    DIST STENT --------> 66 PSV cm/sec    SFA DIST --------> 39 PSV cm/sec    POP A PROX ---> 63 PSV cm/sec    POP A DIST ----> 58 PSV cm/sec    PTA ---------------> 48 PSV cm/sec    PERONEAL -----> 53 PSV cm/sec    ARACELIS ----------------> 12 PSV cm/sec    DPA ---------------->7 PSV cm/sec        LEFT ARTERY    (PSV cm/sec)        CFA ---------------> 106 PSV cm/sec    PROF -------------> 156 PSV cm/sec    SFA PROX -------> 69 PSV cm/sec    SFA MID ----------> 116 PSV cm/sec    SFA DIST --------> 79 PSV cm/sec    POP A PROX ---> 56 PSV cm/sec    POP A DIST ----> 40 PSV cm/sec    PTA ---------------> 65 PSV cm/sec    PERONEAL -----> 65 PSV cm/sec    ARACELIS ----------------> 28 PSV cm/sec    DPA ----------------> 28 PSV cm/sec        RAMOS    RIGHT        ARACELIS----->0.45    PTA----->0.63        RAMOS    LEFT        ARACELIS----->0.67    PTA----->0.80           VELOCITY MEASUREMENTS: Mildly diminished RAMOS left side. Moderately diminished ABIs right side. RIGHT LEG: Excellent pulsatility in the common femoral artery. There is a small pseudoaneurysm emanating from the right common femoral artery, measuring 2 cm in diameter. The lumen of the surrounding over 50% thrombosed. This would be amenable to    thrombin injected,, if desired. Good pulsatility is seen in the proximal and mid SFA. A stent is present in the SFA which is patent. There is mildly dampened pulsatility in the distal SFA. Good pulsatility in the popliteal artery. Good pulsatility is    also seen in the posterior tibial and peroneal arteries. Moderately dampened pulsatility in the anterior tibial artery. LEFT LEG: Good pulsatility in the common femoral artery and profunda. Good pulsatility is seen throughout the SFA and in the popliteal artery. Good pulsatility is seen in all 3 trifurcation vessels. Impression   1. 2 cm pseudoaneurysm emanating from the right common femoral artery, the lumen of which is more than 50% thrombosed. This would be amenable to thrombin injection, if desired. 2. Stents are present in the right proximal, mid, distal SFA which are patent Diminished ABIs are present bilaterally. 3. 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 4. Diminished ABIs bilaterally. Despite this, there is good pulsatility in the trifurcation vessels bilaterally, as described above. **This report has been created using voice recognition software. It may contain minor errors which are inherent in voice recognition technology. **       Final report electronically signed by Dr. Bronson Oh on 9/16/2022 5:33 PM           Assessment/Plan   9/15/22: admitted with bacteremia/septic shock. Urine culture (+) citrobacter. CTA abdominal with aorta runoff demonstrated a 1.1 x 1.1 cm R CFA pseudoaneurysm, occluded right femoral anterior tibial artery bypass, patent R SFA.   During hospitalization, blood cultures have been positive for MRSA bacteremia. Patient was treated with IV vancomycin and gentamicin for synergistic therapy. Patient underwent successful pseudoaneurysm thrombosis on 9/17.   - doing well - no issues with RLE   - concerned regarding recent infection - recovering  9/6/22: Arteriogram with extensive RLE revascularization:   Successful percutaneous right EVAR graft occlusion, status post reconstitution with four 11-mm Viabahn VBX stents postdilated with a 14-mm [place] balloon, right common iliac and external iliac artery  graft occlusion, treated with two 8 mm and one 7 mm VBX stent postdilated with 8 and 10 mm balloons with full reconstitution of flow into the common femoral artery followed by right SFA/popliteal artery  occlusion treated with a 6 x 120 Elizabeth x2 followed by 6 x 80 IN. PACT Admiral drug-coated balloon to the distal SFA and a 5 x 150 IN. PACT Admiral drug-coated balloon for the popliteal artery and a 3-mm [place]  angioplasty of the entire anterior tibial artery with reconstitution of two-vessel runoff to the foot.   8/8/22: Difficulty walking; pain in legs    Afib on [address] - no WATCHMAN; no bleeding issues  HTN, PVD, PAD and R LE stent placement, left heart catheterization on 12/30/2019 for nonobstructive coronary artery disease with elevated troponin,  Non-ischemic Cardiomyopathy, NYHA II: EF improved to 45% per Echo 9/2022  Status post ileocolonic resection Feb 2020 with Dr. Venessa Franklin post robotic anterior resection secondary to diverticular stricture 2018 with Dr. Marvin Amezquita   Status post endoluminal graft repair of abdominal aortic aneurysm Feb 2019  S/p PEG Tube  S/p EVAR  Peripheral angiogram/intervention was done on 02/14/2019 for acute-on-chronic limb ischemia related to post lower extremity bypass distal anastomotic stenosis with recent graft thrombosis with a successful right fem-tib angioplasty and stenting    Obtain the vascular arterial ultrasound in 1 week to reassess the pseudoaneurysm and the circulation down the right leg. Start a probiotic to keep your gut bacteria healthy on the antibiotic. Pick one that has many strains over just the number of bacteria. May use Colace or Miralax to prevent constipation. Continue current medications as prescribed. Follow-up with your PCP. - will get you list of providers taking new patients. Follow-up with Dr. Dandy Coyle in 3 months as scheduled or sooner if need.        Electronically signed by ROMAN Stevens CNP   11/4/2022 at 12:23 PM EST

## 2022-10-06 NOTE — TELEPHONE ENCOUNTER
Cr is still up, K at 5. Continue to hold Entresto and Bumex. Would she like to see nephrology now or check labs again in two weeks?

## 2022-10-10 NOTE — PLAN OF CARE
Problem: Pain:  Goal: Pain level will decrease  Pain level will decrease   Outcome: Ongoing  Pain decreases with rest, repositioning, ice application, and prn pain medications. Problem: Bleeding:  Goal: Will show no signs and symptoms of excessive bleeding  Will show no signs and symptoms of excessive bleeding   Outcome: Ongoing  No signs of excessive bleeding noted this shift. Problem: Falls - Risk of:  Goal: Will remain free from falls  Will remain free from falls    Outcome: Ongoing  Falling star prevention in place. Bed and chair alarms in use. Call light in reach. Purposeful hourly rounding. Problem: Skin Integrity:  Goal: Will show no infection signs and symptoms  Will show no infection signs and symptoms   Outcome: Ongoing  No signs or symptoms of infection noted. Goal: Absence of new skin breakdown  Absence of new skin breakdown   Outcome: Ongoing  No new skin breakdown noted since admission. Problem: Risk for Impaired Skin Integrity  Goal: Tissue integrity - skin and mucous membranes  Structural intactness and normal physiological function of skin and  mucous membranes. Outcome: Ongoing  Skin assessed daily for breakdown. Wounds monitored for healing. Problem: Activity:  Goal: Able to sleep  Able to sleep     Outcome: Ongoing  Patient states that she has been awake since 5 am this morning, unable to get back to sleep once she was up to the bathroom. [EKG obtained to assist in diagnosis and management of assessed problem(s)] : EKG obtained to assist in diagnosis and management of assessed problem(s) [FreeTextEntry1] : 83M h/o HTN, HLD, CAD/MI (2002 with 2 stents on Plavix, upper GI bleeding off ASA), rheumatoid arthritis, essential tremor (on metoprolol), trigeminal neuralgia with chronic L-sided facial pain planning for repeat trigeminal rhizotomy at [facility] scheduled for 10/14/22, presents for preoperative cardiac risk evaluation. \par \par No cardiac complains, EKG within normal limit with borderline sinus bradycardia on low dose metoprolol, remote history of CAD/MI with prior stents but appear stable CAD, no reported history of cardiomyopathy or heart failure, will obtain prior Echocardiogram from his outside cardiologist's office for review; baseline MET >4, no unstable cardiac issue. \par \par \par \par 1. Preoperative cardiac risk evaluation\par -revised cardiac risk index of 1\par -patient is optimized and no cardiac contraindication cardiac standpoint to proceed with the planned surgery with anesthesia. \par -hold Plavix for 5 days (he has been holding 3 days ago hence will be 1 week), continue low dose metoprolol perioperatively. \par \par 2. CAD/MI, remote stents\par -continue long term Plavix use give intolerant to ASA\par -continue metorpolol/statin, LDL goal <70\par \par 3. HTN\par -advised home BP monitoring if also elevated SBP >130s to add additional antihypertensive agent given bradycardia not able to uptitrate metoprolol dose. \par \par 4. Rheumatoid arthritis\par -stable on chronic Enbrel use. \par

## 2022-10-20 ENCOUNTER — HOSPITAL ENCOUNTER (OUTPATIENT)
Dept: INTERVENTIONAL RADIOLOGY/VASCULAR | Age: 87
Discharge: HOME | End: 2022-10-20
Payer: COMMERCIAL

## 2022-10-20 ENCOUNTER — HOSPITAL ENCOUNTER (OUTPATIENT)
Age: 87
Discharge: HOME | End: 2022-10-20
Payer: COMMERCIAL

## 2022-10-20 DIAGNOSIS — I73.9 PAD (PERIPHERAL ARTERY DISEASE) (HCC): ICD-10-CM

## 2022-10-20 DIAGNOSIS — Z95.820 S/P PERCUTANEOUS TRANSLUMINAL ANGIOPLASTY (PTA) WITH STENT PLACEMENT: ICD-10-CM

## 2022-10-20 DIAGNOSIS — R78.81 BACTEREMIA: ICD-10-CM

## 2022-10-20 DIAGNOSIS — I50.22 CHRONIC SYSTOLIC CONGESTIVE HEART FAILURE (HCC): ICD-10-CM

## 2022-10-20 LAB
ANION GAP SERPL CALCULATED.3IONS-SCNC: 10 MEQ/L (ref 8–16)
BASOPHILS # BLD: 0.5 %
BASOPHILS ABSOLUTE: 0 THOU/MM3 (ref 0–0.1)
BUN BLDV-MCNC: 29 MG/DL (ref 7–22)
CALCIUM SERPL-MCNC: 9.5 MG/DL (ref 8.5–10.5)
CHLORIDE BLD-SCNC: 99 MEQ/L (ref 98–111)
CO2: 28 MEQ/L (ref 23–33)
CREAT SERPL-MCNC: 1.6 MG/DL (ref 0.4–1.2)
EOSINOPHIL # BLD: 1.6 %
EOSINOPHILS ABSOLUTE: 0.1 THOU/MM3 (ref 0–0.4)
ERYTHROCYTE [DISTWIDTH] IN BLOOD BY AUTOMATED COUNT: 16.3 % (ref 11.5–14.5)
ERYTHROCYTE [DISTWIDTH] IN BLOOD BY AUTOMATED COUNT: 56.7 FL (ref 35–45)
GFR SERPL CREATININE-BSD FRML MDRD: 31 ML/MIN/1.73M2
GLUCOSE BLD-MCNC: 98 MG/DL (ref 70–108)
HCT VFR BLD CALC: 41.6 % (ref 37–47)
HEMOGLOBIN: 13.4 GM/DL (ref 12–16)
IMMATURE GRANS (ABS): 0.03 THOU/MM3 (ref 0–0.07)
IMMATURE GRANULOCYTES: 0.5 %
LYMPHOCYTES # BLD: 13.1 %
LYMPHOCYTES ABSOLUTE: 0.7 THOU/MM3 (ref 1–4.8)
MCH RBC QN AUTO: 30.7 PG (ref 26–33)
MCHC RBC AUTO-ENTMCNC: 32.2 GM/DL (ref 32.2–35.5)
MCV RBC AUTO: 95.2 FL (ref 81–99)
MONOCYTES # BLD: 8.7 %
MONOCYTES ABSOLUTE: 0.5 THOU/MM3 (ref 0.4–1.3)
NUCLEATED RED BLOOD CELLS: 0 /100 WBC
PLATELET # BLD: 148 THOU/MM3 (ref 130–400)
PMV BLD AUTO: 11.2 FL (ref 9.4–12.4)
POTASSIUM SERPL-SCNC: 5 MEQ/L (ref 3.5–5.2)
RBC # BLD: 4.37 MILL/MM3 (ref 4.2–5.4)
SEG NEUTROPHILS: 75.6 %
SEGMENTED NEUTROPHILS ABSOLUTE COUNT: 4.3 THOU/MM3 (ref 1.8–7.7)
SODIUM BLD-SCNC: 137 MEQ/L (ref 135–145)
WBC # BLD: 5.7 THOU/MM3 (ref 4.8–10.8)

## 2022-10-20 PROCEDURE — 80048 BASIC METABOLIC PNL TOTAL CA: CPT

## 2022-10-20 PROCEDURE — 93925 LOWER EXTREMITY STUDY: CPT

## 2022-10-20 PROCEDURE — 85025 COMPLETE CBC W/AUTO DIFF WBC: CPT

## 2022-10-20 PROCEDURE — 36415 COLL VENOUS BLD VENIPUNCTURE: CPT

## 2022-10-21 ENCOUNTER — TELEPHONE (OUTPATIENT)
Dept: CARDIOLOGY CLINIC | Age: 87
End: 2022-10-21

## 2022-10-21 DIAGNOSIS — N17.9 AKI (ACUTE KIDNEY INJURY) (HCC): Primary | ICD-10-CM

## 2022-10-21 NOTE — TELEPHONE ENCOUNTER
Patient notified and verbalized understanding. Nephrology appt made.     Patient asking if she needs to see you next month as she just seen Basia this month and had a \"long follow up appointment\", does she need to see within a month of eachother

## 2022-10-21 NOTE — TELEPHONE ENCOUNTER
----- Message from ROMAN Blas CNP sent at 10/21/2022  8:10 AM EDT -----  Cr still up.  Will make nephrology referral.

## 2022-10-24 NOTE — PROGRESS NOTES
AREA/<100SCM /<1ST 25 SCM N/A 9/6/2018    RE-EXPLORATION RIGHT GROIN, DECLOTTING OF FEMORAL BYPASS GRAFT performed by Jacqulyn Nyhan, MD at [address] EGD TRANSORAL BIOPSY SINGLE/MULTIPLE Left 11/27/2017    EGD BIOPSY performed by Rosita Horvath MD at [address] [place] [address], PARTIAL, W/ANAST N/A 8/20/2018    ROBOT ASSISTED COLECTOMY (LOW ANTERIOR) performed by Blossom Christianson MD at [address] OFFICE/OUTPT [facility] N/A 9/5/2018    RIGHT FEMORAL EMBOLECTOMY, INTRAOPERATIVE ARTERIOGRAM, RIGHT ILIAC EMBOLECTOMY, RIGHT COMMON AND EXTERNAL ILIAC STENTING, RIGHT FEMORAL TO ANTERIOR POPLITEAL BYPASS WITH 6MM GORTEX GRAFT performed by Jacqulyn Nyhan, MD at [address] 11/27/2017    EGD SUBMUCOSAL/BOTOX INJECTION performed by Rosita Horvath MD at [facility]       Restrictions/Precautions:  Fall Risk           Prior Level of Function:  ADL Assistance: Independent  Homemaking Assistance: Independent  Ambulation Assistance: Independent  Transfer Assistance: Independent  Additional Comments: Pt reporting she was able to complete her own ADL tasks at home.  No Ad used prior to admission    Subjective:     Subjective: pt in bed on arrival required encouragement to get up, her breakfast arrived during session so no ex completed,     Pain:   .  Pain Assessment  Pain Level: 6 (at right LE )       Social/Functional:  Lives With: Alone  Type of Home: Apartment  Home Layout: One level  Home Access: Elevator  Home Equipment: Rolling walker     Objective:  Supine to Sit: Moderate assistance (with HOB elevated and took extra time to complete and once sitting needing min assist to scoot to edge of bed )    Transfers  Sit to Stand: Minimal Assistance (x2 pt pulling upto standing, pt declined to attempt with out lift equipment )  Stand to sit: Minimal Assistance (x2 cues to control descend and once in chair needed min assist to scoot back in bed)  Bed to patient

## 2022-10-31 ENCOUNTER — PROCEDURE VISIT (OUTPATIENT)
Dept: CARDIOLOGY CLINIC | Age: 87
End: 2022-10-31
Payer: COMMERCIAL

## 2022-10-31 DIAGNOSIS — I50.22 CHRONIC SYSTOLIC CONGESTIVE HEART FAILURE (HCC): Primary | ICD-10-CM

## 2022-10-31 NOTE — PROGRESS NOTES
[facility] [employer] Dual [employer] Optivol  Pt of [place]    Battery 8.4 years    Optivol WNL

## 2022-11-01 PROCEDURE — 93297 REM INTERROG DEV EVAL ICPMS: CPT | Performed by: INTERNAL MEDICINE

## 2022-11-01 PROCEDURE — G2066 INTER DEVC REMOTE 30D: HCPCS | Performed by: INTERNAL MEDICINE

## 2022-11-08 ENCOUNTER — TELEPHONE (OUTPATIENT)
Dept: CARDIOLOGY CLINIC | Age: 87
End: 2022-11-08

## 2022-11-08 NOTE — TELEPHONE ENCOUNTER
Chasidy Messer is calling to RS her appt with Sonal Vernon and her Device check that she has scheduled for 01-19-23. She wants an afternoon appt, Please call her to get theses appts RS.

## 2022-12-01 ENCOUNTER — OFFICE VISIT (OUTPATIENT)
Dept: CARDIOLOGY CLINIC | Age: 87
End: 2022-12-01
Payer: COMMERCIAL

## 2022-12-01 VITALS
SYSTOLIC BLOOD PRESSURE: 98 MMHG | HEIGHT: 65 IN | WEIGHT: 166.4 LBS | BODY MASS INDEX: 27.72 KG/M2 | OXYGEN SATURATION: 99 % | DIASTOLIC BLOOD PRESSURE: 62 MMHG | HEART RATE: 67 BPM

## 2022-12-01 DIAGNOSIS — I50.42 CHF (CONGESTIVE HEART FAILURE), NYHA CLASS II, CHRONIC, COMBINED (HCC): Primary | ICD-10-CM

## 2022-12-01 DIAGNOSIS — I42.8 NON-ISCHEMIC CARDIOMYOPATHY (HCC): ICD-10-CM

## 2022-12-01 PROCEDURE — 3078F DIAST BP <80 MM HG: CPT | Performed by: NURSE PRACTITIONER

## 2022-12-01 PROCEDURE — 99214 OFFICE O/P EST MOD 30 MIN: CPT | Performed by: NURSE PRACTITIONER

## 2022-12-01 PROCEDURE — 3074F SYST BP LT 130 MM HG: CPT | Performed by: NURSE PRACTITIONER

## 2022-12-01 PROCEDURE — 1123F ACP DISCUSS/DSCN MKR DOCD: CPT | Performed by: NURSE PRACTITIONER

## 2022-12-01 RX ORDER — SULFAMETHOXAZOLE AND TRIMETHOPRIM 800; 160 MG/1; MG/1
1 TABLET ORAL 2 TIMES DAILY
COMMUNITY

## 2022-12-01 ASSESSMENT — ENCOUNTER SYMPTOMS
WHEEZING: 0
ABDOMINAL PAIN: 0
ABDOMINAL DISTENTION: 0
SHORTNESS OF BREATH: 0

## 2022-12-01 NOTE — PATIENT INSTRUCTIONS
You may receive a survey regarding the care you received during your visit. Your input is valuable to us. We encourage you to complete and return your survey. We hope you will choose us in the future for your healthcare needs.     Continue:  Continue current medications  Daily weights and record  Fluid restriction of 2 Liters per day  Limit sodium in diet to around 9267-3816 mg/day  Monitor BP  Activity as tolerated     Call the [facility] for any of the following symptoms: [phone]  Weight gain of 2-3 pounds in 1 day or 5 pounds in 1 week  Increased shortness of breath  Shortness of breath while laying down  Cough  Chest pain  Swelling in feet, ankles or legs  Tenderness or bloating in the abdomen  Fatigue   Decreased appetite or nausea   Confusion          BP/HR stable     F/U w/ Cardiology  F/U in clinic PRN

## 2022-12-01 NOTE — PROGRESS NOTES
Heart Failure Clinic       Visit Date: 12/1/2022  Cardiologist:  Dr. Lizzeth Araujo  Primary Care Physician: Dr. Katarzyna Deluna is a 82 y.o. female who presents today for:  Chief Complaint   Patient presents with    Congestive Heart Failure       HPI:   Izaiah West is a 82 y.o. female who presents to the office for a follow up patient visit in the heart failure clinic. Accompanied by self    TYPE HF: HFrEF (25-30% - 6/2020) (10-15% - 2019)   Cause: nonischemic cardiomyopathy  Device: ICD   HX: COPD, HTN, Afib, PAD s/p R LE stent, on and off smoker     Dry Wt:  148 (148 on 7/11/21) (166 on 7/11/22) (166 on 12/1/22)    Hospitalization:  > 6 months    Concerns today: here today for her 4 month f/u. She started her Bumex back up again and it was again stopped. She denies weight gain, SOB, or bloating. Doing well. Biggest concern is her bacteremia. Visit on 7/11/22: not eating much d/t difficulty swallowing, has had multiple endoscopies. Has gained 20lbs since last visit, stopped Bumex on her own d/t frequent urination. Visit on 9/20: no HF concerns today. Trying to quit smoking    Activity: walker for longer distance, PARK  Diet: does not watch sodium \"eats what she can eat\" restricted diet d/t esophageal dysmotility, hiatal hernia, esophageal diverticulum. Does not watch fluid intake    Patient has:  Chest Pain: no  SOB: chronic PARK  Orthopnea/PND: wedge used  (for many reasons) KORINA: no  Edema: no  Fatigue: no  Abdominal bloating: no  Cough: no  Appetite: no change      Past Medical History:   Diagnosis Date    A-fib ([address]     AAA (abdominal aortic aneurysm)     Achalasia     Anemia     Arthritis     Blood circulation, collateral     BPPV (benign paroxysmal positional vertigo) 6/6/16    CHF (congestive heart failure) (HCC)     Chronic kidney disease     sees Dr Jeffrey Murray    Colon cancer [facility])     Gastrointestinal hemorrhage     GERD (gastroesophageal reflux disease)     ?  esophageal stricture    Hiatal hernia     History of blood transfusion     HTN (hypertension)     Hx of blood clots 2018    R leg-sees ABEBA Hargrove    Hyperlipidemia     Medtronic dual ICD  8/26/2020    Neuromuscular disorder ([address]     Obesity     Osteopenia     Osteoporosis     PAC (premature atrial contraction)     on betablocker    Paralysis (HCC)     baby    Pedal edema     denied any hx of hypertension    Pneumonia     PVC (premature ventricular contraction)     sees Dr Isidro Quintero    PVD (peripheral vascular disease) ([address]     Small bowel obstruction (HCC)     Tinnitus     UTI (urinary tract infection)      Past Surgical History:   Procedure Laterality Date    ABDOMINAL AORTIC ANEURYSM REPAIR, ENDOVASCULAR N/A 2/15/2019    ABDOMINAL AORTIC ANEURYSM REPAIR ENDOVASCULAR, DECLOTTING RIGHT FEM TIB BYPASS GRAFT performed by Coby Anna MD at [facility]    lumpectomy    CHOLECYSTECTOMY      30 years ago    COLONOSCOPY  08/06/2018    Dr Uriel Dudley N/A 2/22/2019    COLONOSCOPY DIAGNOSTIC performed by Sanjuanita Croft MD at [facility]    COLONOSCOPY N/A 2/22/2020    COLONOSCOPY CONTROL HEMORRHAGE performed by Peggy Lucas MD at [facility]    COLONOSCOPY Left 3/2/2021    COLORECTAL CANCER SCREENING, NOT HIGH RISK performed by Bereket Hdez MD at [facility]      eye, eyelids    EYE SURGERY      cataract    HEMICOLECTOMY N/A 2/25/2020    OPEN RIGHT COLON RESECTION performed by Gretchen Almeida MD at [address] [place] [address] ([address] [place] [address])      LARYNGOSCOPY  07/15/2016    OTHER SURGICAL HISTORY  10/06/2020    Exp lap washout mesenteric lymph node biopsy EGD abthera placement Dr Nataliia Travis  10/08/2020    reopening recent lap open g tube placement intra operative EGD and primary closure of open abdomen- [facility] OLEGARIO SKN SUB GRFT T/A/L AREA/<100SCM /<1ST 25 SCM N/A 9/6/2018 RE-EXPLORATION RIGHT GROIN, DECLOTTING OF FEMORAL BYPASS GRAFT performed by Shanell Lema MD at [address] [facility]ila [facility] EGD TRANSORAL BIOPSY SINGLE/MULTIPLE Left 11/27/2017    EGD BIOPSY performed [facility] Che[facility] Claudio [facility] 8/[facility]    ROBOT ASSISTED COLECTOMY (LOW ANTERIOR) performed by Bety Downey MD at [address] [facility] OFFICE/OUTPT [facility] N/A 9/5/2018    RIGHT FEMORAL EMBOLECTOMY, INTRAOPERATIVE ARTERIOGRAM, RIGHT ILIAC EMBOLECTOMY, RIGHT COMMON AND EXTERNAL ILIAC STENTING, RIGHT FEMORAL TO ANTERIOR POPLITEAL BYPASS WITH 6MM GORTEX GRAFT performed by Shanell Lema MD at [address] [facility] / [address] [facility] Right 2/14/2019    FEMORAL EMBOLECTOMY THROMBECTOMY, RIGHT LEG performed by Shanell Lema MD at [facility]. N/A 11/27/2017    EGD SUBMUCOSAL/BOTOX INJECTION performed by Chema Claudio MD at [address] N/A 2/22/2019    EGD BIOPSY performed by Cecilia Dorantes MD at [address] [facility]     Family History   Problem Relation Age of Onset    Heart Disease Mother     Stroke Mother     Cancer Father         lung    Emphysema Father     Other Sister         leukemia    Heart Disease Maternal Grandmother     Dementia Maternal Grandmother     Heart Disease Maternal Grandfather     No Known Problems Paternal Grandmother     No Known Problems Paternal Grandfather      Social History     Tobacco Use    Smoking status: Some Days     Packs/day: 0.25     Years: 60.00     Pack years: 15.00     Types: Cigarettes     Start date: 1954    Smokeless tobacco: Never    Tobacco comments:     4-5 a day   Substance Use Topics    Alcohol use:  Yes     Alcohol/week: 1.0 standard drink     Types: 1 Glasses of wine per week     Comment: social     Current Outpatient Medications   Medication Sig Dispense Refill    sulfamethoxazole-trimethoprim (BACTRIM DS;SEPTRA DS) 800-160 MG per tablet Take 1 tablet by mouth 2 times daily      rivaroxaban (XARELTO) 20 MG TABS tablet Take 1 tablet by mouth daily 30 tablet 3    hydrocortisone (ANUSOL-HC) 25 MG suppository Place 25 mg rectally nightly      magnesium (MAGNESIUM-OXIDE) 250 MG TABS tablet TAKE 2 TABLETS BY MOUTH TWICE DAILY 120 tablet 7    clopidogrel (PLAVIX) 75 MG tablet TAKE 1 TABLET BY MOUTH DAILY 90 tablet 3    pravastatin (PRAVACHOL) 40 MG tablet TAKE 1 TABLET BY MOUTH DAILY 90 tablet 3    metoprolol succinate (TOPROL XL) 25 MG extended release tablet TAKE 1 TABLET BY MOUTH DAILY 90 tablet 3    pantoprazole (PROTONIX) 40 MG tablet Take 40 mg by mouth 2 times daily (before meals)      albuterol sulfate HFA (PROVENTIL HFA) 108 (90 Base) MCG/ACT inhaler Inhale 2 puffs into the lungs every 6 hours as needed for Wheezing or Shortness of Breath 1 Inhaler 0    Blood Pressure KIT Check blood pressure daily, and if symptoms of lightheadedness. Call physician if BP <80/40. 1 kit 0    melatonin 3 MG TABS tablet Take 2 tablets by mouth nightly as needed (sleep) 60 tablet 3    timolol (TIMOPTIC) 0.5 % ophthalmic solution Place 1 drop into both eyes daily      acetaminophen (TYLENOL) 650 MG extended release tablet Take 1,300 mg by mouth every 12 hours as needed for Pain        No current facility-administered medications for this visit. Allergies   Allergen Reactions    Lasix [Furosemide] Other (See Comments)     PATIENT DOESN'T REMEMBER    Lipitor [Atorvastatin] Other (See Comments)     LEG PAIN    Neurontin [Gabapentin] Other (See Comments)     PATIENT DOESN'T REMEMBER    Oxycontin [Oxycodone Hcl]      confusion    Penicillins Other (See Comments)     HYPERTENSION       SUBJECTIVE:   Review of Systems   Constitutional:  Negative for activity change, appetite change and fatigue. Respiratory:  Negative for shortness of breath and wheezing. Cough: .   Cardiovascular:  Negative for chest pain, palpitations and leg swelling.    Gastrointestinal: Negative for abdominal distention and abdominal pain. Musculoskeletal:  Negative for gait problem. Neurological:  Negative for weakness, light-headedness and headaches. Psychiatric/Behavioral:  Negative for sleep disturbance. OBJECTIVE:   Today's Vitals:  BP 98/62   Pulse 67   Ht 5' 5\" (1.651 m)   Wt 166 lb 6.4 oz (75.5 kg)   SpO2 99%   BMI 27.69 kg/m²     Physical Exam  Vitals reviewed. Constitutional:       General: She is not in acute distress. Appearance: Normal appearance. She is well-developed. She is not diaphoretic. HENT:      Head: Normocephalic and atraumatic. Eyes:      Conjunctiva/sclera: Conjunctivae normal.   Cardiovascular:      Rate and Rhythm: Normal rate. Rhythm irregular. Heart sounds: Normal heart sounds. No murmur heard. Pulmonary:      Effort: Pulmonary effort is normal. No respiratory distress. Breath sounds: Normal breath sounds. No wheezing, rhonchi or rales. Abdominal:      General: Bowel sounds are normal. There is no distension. Palpations: Abdomen is soft. Tenderness: There is no abdominal tenderness. Musculoskeletal:         General: Normal range of motion. Cervical back: Normal range of motion and neck supple. Right lower leg: No edema. Left lower leg: No edema. Skin:     General: Skin is warm and dry. Capillary Refill: Capillary refill takes less than 2 seconds. Neurological:      Mental Status: She is alert and oriented to person, place, and time. Coordination: Coordination normal.   Psychiatric:         Behavior: Behavior normal.       Wt Readings from Last 3 Encounters:   12/01/22 166 lb 6.4 oz (75.5 kg)   10/05/22 163 lb 9.6 oz (74.2 kg)   09/22/22 179 lb 0.2 oz (81.2 kg)     BP Readings from Last 3 Encounters:   12/01/22 98/62   10/05/22 128/74   09/23/22 93/71     Pulse Readings from Last 3 Encounters:   12/01/22 67   10/05/22 76   09/23/22 56     Body mass index is 27.69 kg/m².     ECHO:   ECHO: Conclusions      Summary   Ejection fraction is visually estimated at 45%. There was mild global hypokinesis of the left ventricle. The aortic valve was trileaflet with normal thickness and cuspal   separation. DOPPLER: Transaortic velocity was within the normal range with   no evidence of aortic stenosis. There was no evidence of aortic   regurgitation. Signature      ----------------------------------------------------------------   Electronically signed by Matteo Jones MD (Interpreting   physician) on 09/20/2022 at 03:43 PM   ----------------------------------------------------------------      Conclusions      Summary   Limited examination. Normal left ventricular size and severely reduced systolic function. There was severe global hypokinesis. Hypertrophic basal septum. Ejection fraction was estimated at 25-30%. Left atrial size was severely dilated. The mitral valve structure demonstrated posterior leaflet calcification. DOPPLER: The transmitral velocity was within the normal range with no   evidence for mitral stenosis. There was trace mitral regurgitation. IVC size is within normal limits with normal respiratory phasic changes.       Signature      ----------------------------------------------------------------   Electronically signed by Elizabeth Reza MD (Interpreting   physician) on 06/26/2020 at 02:29 PM   ----------------------------------------------------------------    CATH/STRESS:   None documented      Results reviewed:  BNP: No results found for: BNP  CBC:   Lab Results   Component Value Date/Time    WBC 5.7 10/20/2022 03:06 PM    RBC 4.37 10/20/2022 03:06 PM    HGB 13.4 10/20/2022 03:06 PM    HCT 41.6 10/20/2022 03:06 PM     10/20/2022 03:06 PM     CMP:    Lab Results   Component Value Date/Time     10/20/2022 03:06 PM    K 5.0 10/20/2022 03:06 PM    K 4.1 09/22/2022 04:45 AM    CL 99 10/20/2022 03:06 PM    CO2 28 10/20/2022 03:06 PM    BUN 29 10/20/2022 03:06 PM    CREATININE 1.6 10/20/2022 03:06 PM    AGRATIO 1.6 07/05/2021 07:36 AM    LABGLOM 31 10/20/2022 03:26 PM    GLUCOSE 98 10/20/2022 03:06 PM    GLUCOSE 82 08/19/2022 09:18 AM    CALCIUM 9.5 10/20/2022 03:06 PM     Hepatic Function Panel:    Lab Results   Component Value Date/Time    ALKPHOS 97 09/29/2022 12:00 AM    ALT 12 09/29/2022 12:00 AM    AST 21 09/29/2022 12:00 AM    PROT 5.9 09/15/2022 07:00 PM    BILITOT 0.4 09/29/2022 12:00 AM    BILIDIR <0.2 02/28/2021 03:10 PM    LABALBU 3.9 09/29/2022 12:00 AM     Magnesium:    Lab Results   Component Value Date/Time    MG 1.8 09/20/2022 03:15 PM     PT/INR:    Lab Results   Component Value Date/Time    INR 1.10 09/06/2022 06:33 AM     Lipids:    Lab Results   Component Value Date/Time    TRIG 104 07/05/2021 07:36 AM    HDL 41 07/05/2021 07:36 AM    LDLCALC 47 05/18/2019 04:06 AM    LDLDIRECT 49 07/05/2021 07:36 AM       ASSESSMENT AND PLAN:   The patient's condition/symptoms are Stable: No clinical evidence of fluid overload today. Continue current medical regimen without changes at present time. Diagnosis Orders   1. CHF (congestive heart failure), NYHA class II, chronic, combined ([facility]        2. Non-ischemic cardiomyopathy ([facility]          Continue:  GDMT:   ACE/ARB/ARNI - Entresto 24/26 stopped d/t hypotension? BB - Toprol 25mg daily               Diuretic - none, has been on bumex in the past  AA - none  SGLT2 -  none  Vasodilator - none  Other - Magnesium, xarelto (afib), plavix (PAD-stent)    HFrEF 45% 9/2022 25-30% Grade II DD  Stable, no fluid on exam today. Not on a diuretic. Going to start seeing nephrology     GDMT: unable to optimize d/t hypotension  ECHO 2022: trace MR, LA mildly dilated,   Cath 2019: nonobstructive     Lab reviewed - K 5.0 Cr 1.6 Mag 1.8      BP/HR stable     F/U w/ Cardiology  F/U in clinic PRN    Tolerating above noted HF meds, no ill side effects noted.  Will continue to monitor kidney function and electrolytes. Will optimize as tolerated. Pt is compliant w/ medications. Total visit time of 20 minutes has been spent with patient on education of symptoms, management, medication, and plan of care; as well as review of chart: labs, ECHO, radiology reports, etc.   I personally spent more then 50% of the appt time face to face with the patient. Daily weights  Fluid restriction of 2 Liters per day  Limit sodium in diet to around 5330-3111 mg/day  Monitor BP  Activity as tolerated       Patient was instructed to call the [facility] for any changes in symptoms as noted in AVS.      No follow-ups on file. or sooner if needed     Patient given educational materials - see patient instructions. We discussed the importance of weighing oneself and recording daily. We also discussed the importance of a low sodium diet, higher sodium foods to avoid and better low sodium food options. Patient verbalizes understanding of plan of care using teach back method, and is agreeable to the treatment plan.        Electronically signed by ROMAN Lu CNP on 12/1/2022 at 3:25 PM

## 2022-12-05 ENCOUNTER — PROCEDURE VISIT (OUTPATIENT)
Dept: CARDIOLOGY CLINIC | Age: 87
End: 2022-12-05
Payer: COMMERCIAL

## 2022-12-05 DIAGNOSIS — Z95.810 ICD (IMPLANTABLE CARDIOVERTER-DEFIBRILLATOR) IN PLACE: Primary | ICD-10-CM

## 2022-12-05 PROCEDURE — 93296 REM INTERROG EVL PM/IDS: CPT | Performed by: INTERNAL MEDICINE

## 2022-12-05 PROCEDURE — 93295 DEV INTERROG REMOTE 1/2/MLT: CPT | Performed by: INTERNAL MEDICINE

## 2022-12-05 NOTE — PROGRESS NOTES
[employer] Medtronic Dual ICD   Pt of [place]    Battery 8.3 years     Presenting rhythm AP VS    A Impedance 399  RV Impedance 513    RV shock 66    P wave sensing 2.3  R wave sensing 10.1    A Threshold 0.375 @ 0.40  A Amplitude 1.50 @ 0.40  RV Thresholds 0.50 @ 0.40  RV Amplitude 2.0 @ 0.40    A Paced 28.6%  V Paced <0.1%    Programmed Mode AAI <=> DDD     Afib Bruni <0.1%    Episodes:   none    Optivol WNL

## 2022-12-09 ENCOUNTER — TELEPHONE (OUTPATIENT)
Dept: CARDIOLOGY CLINIC | Age: 87
End: 2022-12-09

## 2022-12-09 DIAGNOSIS — Z95.820 S/P PERCUTANEOUS TRANSLUMINAL ANGIOPLASTY (PTA) WITH STENT PLACEMENT: Primary | ICD-10-CM

## 2022-12-09 DIAGNOSIS — I73.9 PAD (PERIPHERAL ARTERY DISEASE) (HCC): ICD-10-CM

## 2022-12-09 NOTE — TELEPHONE ENCOUNTER
Called and spoke with patient. Having some mild swelling in right lower leg since off her lasix. Some continued ache in RLE post peripheral PCI - no recent change. Discussed need for 3 month surveillance exam. States may have some transportation issues - will discuss when called to schedule.    Basia Burnett, APRN - CNP

## 2022-12-29 ENCOUNTER — HOSPITAL ENCOUNTER (OUTPATIENT)
Dept: INTERVENTIONAL RADIOLOGY/VASCULAR | Age: 87
Discharge: HOME | End: 2022-12-29
Payer: COMMERCIAL

## 2022-12-29 DIAGNOSIS — Z95.820 S/P PERCUTANEOUS TRANSLUMINAL ANGIOPLASTY (PTA) WITH STENT PLACEMENT: ICD-10-CM

## 2022-12-29 DIAGNOSIS — I73.9 PAD (PERIPHERAL ARTERY DISEASE) (HCC): ICD-10-CM

## 2022-12-29 PROCEDURE — 93925 LOWER EXTREMITY STUDY: CPT

## 2023-01-04 NOTE — TELEPHONE ENCOUNTER
Pt states she has some aches, but nothing new, she will keep an eye on things and let us know if anything is different.

## 2023-01-09 RX ORDER — METOPROLOL SUCCINATE 25 MG/1
25 TABLET, EXTENDED RELEASE ORAL DAILY
Qty: 90 TABLET | Refills: 0 | Status: SHIPPED | OUTPATIENT
Start: 2023-01-09

## 2023-01-09 NOTE — TELEPHONE ENCOUNTER
Merlinda Feeling called requesting a refill on the following medications:  Requested Prescriptions     Pending Prescriptions Disp Refills    rivaroxaban (XARELTO) 20 MG TABS tablet 30 tablet 3     Sig: Take 1 tablet by mouth daily    metoprolol succinate (TOPROL XL) 25 MG extended release tablet 90 tablet 3     Sig: Take 1 tablet by mouth daily Indications: Hold for heart rate less than 55 bpm and SBP less than 100 mmHg. Pharmacy verified: [facility] on [address] [place] [address] Drive, [address]  . pv      Date of last visit: 10/05/2022  Date of next visit (if applicable): 8/91/5659

## 2023-01-19 ENCOUNTER — NURSE ONLY (OUTPATIENT)
Dept: CARDIOLOGY CLINIC | Age: 88
End: 2023-01-19

## 2023-01-19 ENCOUNTER — OFFICE VISIT (OUTPATIENT)
Dept: CARDIOLOGY CLINIC | Age: 88
End: 2023-01-19

## 2023-01-19 VITALS
SYSTOLIC BLOOD PRESSURE: 112 MMHG | HEART RATE: 68 BPM | DIASTOLIC BLOOD PRESSURE: 68 MMHG | WEIGHT: 169.2 LBS | BODY MASS INDEX: 28.19 KG/M2 | HEIGHT: 65 IN

## 2023-01-19 DIAGNOSIS — Z95.810 ICD (IMPLANTABLE CARDIOVERTER-DEFIBRILLATOR) IN PLACE: Primary | ICD-10-CM

## 2023-01-19 DIAGNOSIS — I73.9 PAD (PERIPHERAL ARTERY DISEASE) (HCC): Primary | ICD-10-CM

## 2023-01-19 DIAGNOSIS — R78.81 BACTEREMIA: ICD-10-CM

## 2023-01-19 DIAGNOSIS — I50.42 CHF (CONGESTIVE HEART FAILURE), NYHA CLASS II, CHRONIC, COMBINED (HCC): ICD-10-CM

## 2023-01-19 DIAGNOSIS — I48.0 PAROXYSMAL ATRIAL FIBRILLATION (HCC): ICD-10-CM

## 2023-01-19 RX ORDER — PRAVASTATIN SODIUM 40 MG
40 TABLET ORAL DAILY
Qty: 90 TABLET | Refills: 3 | Status: SHIPPED | OUTPATIENT
Start: 2023-01-19

## 2023-01-19 RX ORDER — CLOPIDOGREL BISULFATE 75 MG/1
75 TABLET ORAL DAILY
Qty: 90 TABLET | Refills: 3 | Status: SHIPPED | OUTPATIENT
Start: 2023-01-19

## 2023-01-19 RX ORDER — SULFAMETHOXAZOLE AND TRIMETHOPRIM 800; 160 MG/1; MG/1
1 TABLET ORAL 2 TIMES DAILY
Qty: 90 TABLET | Refills: 3 | Status: SHIPPED | OUTPATIENT
Start: 2023-01-19

## 2023-01-19 RX ORDER — METOPROLOL SUCCINATE 25 MG/1
25 TABLET, EXTENDED RELEASE ORAL DAILY
Qty: 90 TABLET | Refills: 0 | Status: SHIPPED | OUTPATIENT
Start: 2023-01-19

## 2023-01-19 NOTE — PROGRESS NOTES
[address] [facility] [address]  [place] [address]  Dept: [phone]  Dept Fax: [phone]  Loc: [phone]    Visit Date: 1/19/2023    Ms. Daniel Mercer is a 82 y.o. female  who presented for:  Chief Complaint   Patient presents with    Follow-up       HPI:   HPI   82 y.o. female with a past medical history of PAD, HFrEF s/p dual ICD, Afib, s/p EVAR, HTN, HLD, CKD stage 3, Hx of blood clots, COPD, and recent CLI gram with stent and balloon to right lower extremity. She recently had a lower extremity angiogram with successful percutaneous right EVAR graft occlusion status post reconstitution with 4 11-mm Viabahn VBX stents postdilated with a 14 mm conquest balloon, right common iliac and external iliac artery Occlusion, treated with 2 8 mm and 1 7 mm VBX stent postdilated with 8 and 10 mm balloons with full reconstitution of flow into common femoral artery followed by right SFA/popliteal artery occlusion treated with Elizabeth on 9/6/2022 . CTA abdominal with aorta runoff demonstrated a 1.1 x 1.1 cm R CFA pseudoaneurysm, occluded right femoral anterior tibial artery bypass, patent R SFA. During recent hospitalization, blood cultures have been positive for MRSA bacteremia. RLE has discomfort at times and has some unsteadiness but no ulcers, wounds, pain, or leg color changes. She has been put on chronic suppressive antibiotic therapy. She has poor tibial disease, but there is some degree of flow in the PT. Left leg is stable based on duplex studies. She is on Xarelto for life due to recurrent thromboses.        Current Outpatient Medications:     rivaroxaban (XARELTO) 20 MG TABS tablet, Take 1 tablet by mouth daily, Disp: 90 tablet, Rfl: 0    metoprolol succinate (TOPROL XL) 25 MG extended release tablet, Take 1 tablet by mouth daily Indications: Hold for heart rate less than 55 bpm and SBP less than 100 mmHg., Disp: 90 tablet, Rfl: 0 sulfamethoxazole-trimethoprim (BACTRIM DS;SEPTRA DS) 800-160 MG per tablet, Take 1 tablet by mouth 2 times daily, Disp: , Rfl:     magnesium (MAGNESIUM-OXIDE) 250 MG TABS tablet, TAKE 2 TABLETS BY MOUTH TWICE DAILY, Disp: 120 tablet, Rfl: 7    clopidogrel (PLAVIX) 75 MG tablet, TAKE 1 TABLET BY MOUTH DAILY, Disp: 90 tablet, Rfl: 3    pravastatin (PRAVACHOL) 40 MG tablet, TAKE 1 TABLET BY MOUTH DAILY, Disp: 90 tablet, Rfl: 3    pantoprazole (PROTONIX) 40 MG tablet, Take 40 mg by mouth 2 times daily (before meals), Disp: , Rfl:     albuterol sulfate HFA (PROVENTIL HFA) 108 (90 Base) MCG/ACT inhaler, Inhale 2 puffs into the lungs every 6 hours as needed for Wheezing or Shortness of Breath, Disp: 1 Inhaler, Rfl: 0    Blood Pressure KIT, Check blood pressure daily, and if symptoms of lightheadedness.   Call physician if BP <80/40., Disp: 1 kit, Rfl: 0    melatonin 3 MG TABS tablet, Take 2 tablets by mouth nightly as needed (sleep), Disp: 60 tablet, Rfl: 3    acetaminophen (TYLENOL) 650 MG extended release tablet, Take 1,300 mg by mouth every 12 hours as needed for Pain , Disp: , Rfl:     timolol (TIMOPTIC) 0.5 % ophthalmic solution, Place 1 drop into both eyes daily, Disp: , Rfl:     Past Medical History  Raya Diez  has a past medical history of A-fib ([facility] AAA (abdominal aortic aneurysm), Achalasia, Anemia, Arthritis, Blood circulation, collateral, BPPV (benign paroxysmal positional vertigo), CHF (congestive heart failure) ([facility] Chronic kidney disease, Colon cancer ([facility] Gastrointestinal hemorrhage, GERD (gastroesophageal reflux disease), Hiatal hernia, History of blood transfusion, HTN (hypertension), Hx of blood clots, Hyperlipidemia, Medtronic dual ICD , Neuromuscular disorder ([facility] Obesity, Osteopenia, Osteoporosis, PAC (premature atrial contraction), Paralysis ([facility] Pedal edema, Pneumonia, PVC (premature ventricular contraction), PVD (peripheral vascular disease) ([facility] obstruction [facility] Tinnitus, and UTI (urinary tract infection). Social History  Tito Darling  reports that she has been smoking cigarettes. She started smoking about 67 years ago. She has a 15.00 pack-year smoking history. She has never used smokeless tobacco. She reports current alcohol use of about 1.0 standard drink per week. She reports that she does not use drugs. Family History  Tito Darling family history includes Cancer in her father; Dementia in her maternal grandmother; Emphysema in her father; Heart Disease in her maternal grandfather, maternal grandmother, and mother; No Known Problems in her paternal grandfather and paternal grandmother; Other in her sister; Stroke in her mother. There is no family history of bicuspid aortic valve, aneurysms, heart transplant, pacemakers, defibrillators, or sudden cardiac death.       Past Surgical History   Past Surgical History:   Procedure Laterality Date    ABDOMINAL AORTIC ANEURYSM REPAIR, ENDOVASCULAR N/A 2/15/2019    ABDOMINAL AORTIC ANEURYSM REPAIR ENDOVASCULAR, DECLOTTING RIGHT FEM TIB BYPASS GRAFT performed by Jose Gonzales MD at [facility]    lumpectomy    CHOLECYSTECTOMY      30 years ago    COLONOSCOPY  08/06/2018    Dr Sarah Evans N/A 2/22/2019    COLONOSCOPY DIAGNOSTIC performed by Chan Orantes MD at [facility]    COLONOSCOPY N/A 2/22/2020    COLONOSCOPY CONTROL HEMORRHAGE performed by Skylar Cunnignham MD at [facility]    COLONOSCOPY Left 3/2/2021    COLORECTAL CANCER SCREENING, NOT HIGH RISK performed by Holley Rincon MD at [facility]      eye, eyelids    EYE SURGERY      cataract    HEMICOLECTOMY N/A 2/25/2020    OPEN RIGHT COLON RESECTION performed by Danuta Holly MD at [address] [place] [address] ([address] [place] [address])      LARYNGOSCOPY  07/15/2016    OTHER SURGICAL HISTORY  10/06/2020    Exp lap washout mesenteric lymph node biopsy EGD abthera placement Dr Ronni Wilson SURGICAL HISTORY  10/08/2020    reopening recent lap open g tube placement intra operative EGD and primary closure of open abdomen-Dr [address] OLEGARIO SKN SUB GRFT T/A/L AREA/<100SCM /<1ST 25 SCM N/A 9/6/2018    RE-EXPLORATION RIGHT GROIN, DECLOTTING OF FEMORAL BYPASS GRAFT performed by Aj Figueroa MD at [facility] EGD TRANSORAL BIOPSY SINGLE/MULTIPLE Left 11/27/2017    EGD BIOPSY performed by Francoise Trotter MD at [facility], PARTIAL, Dmitry Oh N/A 8/20/2018    ROBOT ASSISTED COLECTOMY (LOW ANTERIOR) performed by Debi Ny MD at [address] [place] [address] OFFICE/OUTPT [facility] N/A 9/5/2018    RIGHT FEMORAL EMBOLECTOMY, INTRAOPERATIVE ARTERIOGRAM, RIGHT ILIAC EMBOLECTOMY, RIGHT COMMON AND EXTERNAL ILIAC STENTING, RIGHT FEMORAL TO ANTERIOR POPLITEAL BYPASS WITH 6MM GORTEX GRAFT performed by Aj Figueroa MD at [facility] / [address] Right 2/14/2019    FEMORAL EMBOLECTOMY THROMBECTOMY, RIGHT LEG [facility] 11/27/2017    EGD SUBMUCOSAL/BOTOX INJECTION performed by Francoise Trotter MD at [facility] 2/22/2019    EGD BIOPSY performed by Caleb Suazo MD at [facility]       Review of Systems   Constitutional: Negative for chills and fever  HENT: Negative for congestion, sinus pressure, sneezing and sore throat. Eyes: Negative for pain, discharge, redness and itching. Respiratory: Negative for apnea, cough  Gastrointestinal: Negative for blood in stool, constipation, diarrhea   Endocrine: Negative for cold intolerance, heat intolerance, polydipsia. Genitourinary: Negative for dysuria, enuresis, flank pain and hematuria. Musculoskeletal: Negative for arthralgias, joint swelling and neck pain. Neurological: Negative for numbness and headaches.    Psychiatric/Behavioral: Negative for agitation, confusion, decreased concentration and dysphoric mood. Objective:     /68   Pulse 68   Ht 5' 5\" (1.651 m)   Wt 169 lb 3.2 oz (76.7 kg)   BMI 28.16 kg/m²     Wt Readings from Last 3 Encounters:   01/19/23 169 lb 3.2 oz (76.7 kg)   12/01/22 166 lb 6.4 oz (75.5 kg)   10/05/22 163 lb 9.6 oz (74.2 kg)     BP Readings from Last 3 Encounters:   01/19/23 112/68   12/01/22 98/62   10/05/22 128/74       Nursing note and vitals reviewed. Physical Exam   Constitutional: Oriented to person, place, and time. Appears well-developed and well-nourished. HENT:   Head: Normocephalic and atraumatic. Eyes: EOM are normal. Pupils are equal, round, and reactive to light. Neck: Normal range of motion. Neck supple. No JVD present. Cardiovascular: Normal rate, regular rhythm, normal heart sounds and intact distal pulses. No murmur heard. Pulmonary/Chest: Effort normal and breath sounds normal. No respiratory distress. No wheezes. No rales. Abdominal: Soft. Bowel sounds are normal. No distension. There is no tenderness. Musculoskeletal: Normal range of motion. 2+ edema. Neurological: Alert and oriented to person, place, and time. No cranial nerve deficit. Coordination normal.   Skin: Skin is warm and dry. Psychiatric: Normal mood and affect.        Lab Results   Component Value Date/Time    CKTOTAL 26 09/07/2022 05:24 AM       Lab Results   Component Value Date/Time    WBC 5.7 10/20/2022 03:06 PM    RBC 4.37 10/20/2022 03:06 PM    HGB 13.4 10/20/2022 03:06 PM    HCT 41.6 10/20/2022 03:06 PM    MCV 95.2 10/20/2022 03:06 PM    MCH 30.7 10/20/2022 03:06 PM    MCHC 32.2 10/20/2022 03:06 PM    RDW 17.5 05/30/2019 04:43 PM     10/20/2022 03:06 PM    MPV 11.2 10/20/2022 03:06 PM       Lab Results   Component Value Date/Time     10/20/2022 03:06 PM    K 5.0 10/20/2022 03:06 PM    K 4.1 09/22/2022 04:45 AM    CL 99 10/20/2022 03:06 PM    CO2 28 10/20/2022 03:06 PM    BUN 29 10/20/2022 03:06 PM    LABALBU 3.9 09/29/2022 12:00 AM    CREATININE 1.6 10/20/2022 03:06 PM    CALCIUM 9.5 10/20/2022 03:06 PM    LABGLOM 31 10/20/2022 03:26 PM    GLUCOSE 98 10/20/2022 03:06 PM    GLUCOSE 82 08/19/2022 09:18 AM       Lab Results   Component Value Date/Time    ALKPHOS 97 09/29/2022 12:00 AM    ALT 12 09/29/2022 12:00 AM    AST 21 09/29/2022 12:00 AM    PROT 5.9 09/15/2022 07:00 PM    BILITOT 0.4 09/29/2022 12:00 AM    BILIDIR <0.2 02/28/2021 03:10 PM    LABALBU 3.9 09/29/2022 12:00 AM       Lab Results   Component Value Date/Time    MG 1.8 09/20/2022 03:15 PM       Lab Results   Component Value Date    INR 1.10 09/06/2022    INR 1.16 (H) 03/04/2021    INR 1.28 (H) 03/04/2021         Lab Results   Component Value Date/Time    LABA1C 5.2 09/16/2022 08:12 AM       Lab Results   Component Value Date/Time    TRIG 104 07/05/2021 07:36 AM    HDL 41 07/05/2021 07:36 AM    LDLCALC 47 05/18/2019 04:06 AM    LDLDIRECT 49 07/05/2021 07:36 AM       Lab Results   Component Value Date/Time    TSH 1.010 09/16/2022 02:25 AM         Testing Reviewed:      I have individually reviewed the cardiac test below:    ECHO: No results found for this or any previous visit. Assessment/Plan   Successful percutaneous right EVAR graft occlusion, status  post reconstitution with four 11-mm Viabahn VBX stents postdilated with  a 14-mm Conquest balloon, right common iliac and external iliac artery  graft occlusion, treated with two 8 mm and one 7 mm VBX stent  postdilated with 8 and 10 mm balloons with full reconstitution of flow  into the common femoral artery followed by right SFA/popliteal artery  occlusion treated with a 6 x 120 Elizabeth x2 followed by 6 x 80 IN. PACT  Admiral drug-coated balloon to the distal SFA and a 5 x 150 IN. PACT  Admiral drug-coated balloon for the popliteal artery and a 3-mm Damon  angioplasty of the entire anterior tibial artery with reconstitution of  two-vessel runoff to the foot - 9/6/2022  Hx of Septic shock  Pseudoaneurysm R FA  Right ankle trifurcation vessel disease  MRSA bacteremia-on IV vancomycin and gentamicin  Complicated UTI  Chronic HFrEF, NYHA II s/p ICD - EF 45%  Paroxysmal atrial fibrillation- [id number] 6  ALEJANDRO on CKD 3  HTN  HLD  GERD  Hx of AAA- s/p vascular repair 2019  Hx of blood clots  Hx of colovesical fistula  Tobacco abuse  Recurrent tibial disease, but appears to have some degree of preserved flow for inflow and sfa-pop segment on the right with minimal symptoms. Can walk and is working on rehab. Taking all meds. She will need life long ppx due to all of the prosthesis, thus she is on bactrim. Life long Xarelto as well due to recurrent thromboses and Afib. No major side effects. Continue Plavix. No bleeding. Restart Bumex 0.5 mg three times week to help with swelling. Discussed diet/exercise/BP/weight loss/health lifestyle choices/lipids; the patient understands the goals and will try to comply.       Disposition:  6-12 months           Electronically signed by Reid Pinto MD   1/19/2023 at 3:40 PM EST

## 2023-01-19 NOTE — PROGRESS NOTES
Dr Geneva Burgess pt  Medtronic dual icd check in office   Battery 8.2 yrs remaining  Known afib/ xarelto   Afib burden <0.1%    Mvp     A paced 23.4%  V paced <0.1%    P waves 1  Rv waves 11.5    Atrial impedence 437  Vent impedence 551  Rv 61    Atrial threshold 0.5 @ 0.4  Vent threshold 0.75 @ 0.4      Atrial amplitude 1.5 @ 0.4  Vent amplitude 2 @ 0.4  Optivol wnl  Presents in asvs    7 high vent rate episodes     See pvc counters

## 2023-02-20 ENCOUNTER — PROCEDURE VISIT (OUTPATIENT)
Dept: CARDIOLOGY CLINIC | Age: 88
End: 2023-02-20
Payer: COMMERCIAL

## 2023-02-20 DIAGNOSIS — I50.42 CHF (CONGESTIVE HEART FAILURE), NYHA CLASS II, CHRONIC, COMBINED (HCC): Primary | ICD-10-CM

## 2023-02-20 PROCEDURE — G2066 INTER DEVC REMOTE 30D: HCPCS | Performed by: INTERNAL MEDICINE

## 2023-02-20 PROCEDURE — 93297 REM INTERROG DEV EVAL ICPMS: CPT | Performed by: INTERNAL MEDICINE

## 2023-02-20 NOTE — PROGRESS NOTES
[facility] Medtronic Dual ICD Optivol  Pt of [place]    Battery 8.1 years    Optivol WNL

## 2023-03-27 ENCOUNTER — PROCEDURE VISIT (OUTPATIENT)
Dept: CARDIOLOGY CLINIC | Age: 88
End: 2023-03-27
Payer: COMMERCIAL

## 2023-03-27 DIAGNOSIS — I50.42 CHF (CONGESTIVE HEART FAILURE), NYHA CLASS II, CHRONIC, COMBINED (HCC): ICD-10-CM

## 2023-03-27 DIAGNOSIS — Z95.810 ICD (IMPLANTABLE CARDIOVERTER-DEFIBRILLATOR) IN PLACE: Primary | ICD-10-CM

## 2023-03-27 DIAGNOSIS — G45.0 VERTEBROBASILAR ARTERY INSUFFICIENCY: ICD-10-CM

## 2023-03-27 PROCEDURE — G2066 INTER DEVC REMOTE 30D: HCPCS | Performed by: INTERNAL MEDICINE

## 2023-03-27 PROCEDURE — 93297 REM INTERROG DEV EVAL ICPMS: CPT | Performed by: INTERNAL MEDICINE

## 2023-03-27 NOTE — PROGRESS NOTES
[facility] Med[facility]onic Dual ICD Optivol  Pt of [place]    Battery 8 years    Optivol WNL

## 2023-04-03 ENCOUNTER — TELEPHONE (OUTPATIENT)
Dept: CARDIOLOGY CLINIC | Age: 88
End: 2023-04-03

## 2023-04-06 ENCOUNTER — OFFICE VISIT (OUTPATIENT)
Dept: NEPHROLOGY | Age: 88
End: 2023-04-06
Payer: COMMERCIAL

## 2023-04-06 VITALS
BODY MASS INDEX: 28.29 KG/M2 | DIASTOLIC BLOOD PRESSURE: 68 MMHG | HEART RATE: 86 BPM | SYSTOLIC BLOOD PRESSURE: 97 MMHG | OXYGEN SATURATION: 98 % | WEIGHT: 170 LBS

## 2023-04-06 DIAGNOSIS — N28.9 RENAL LESION: ICD-10-CM

## 2023-04-06 DIAGNOSIS — N18.32 STAGE 3B CHRONIC KIDNEY DISEASE (HCC): Primary | ICD-10-CM

## 2023-04-06 PROCEDURE — 99204 OFFICE O/P NEW MOD 45 MIN: CPT | Performed by: INTERNAL MEDICINE

## 2023-04-06 PROCEDURE — 1123F ACP DISCUSS/DSCN MKR DOCD: CPT | Performed by: INTERNAL MEDICINE

## 2023-04-06 PROCEDURE — 3078F DIAST BP <80 MM HG: CPT | Performed by: INTERNAL MEDICINE

## 2023-04-06 PROCEDURE — 3074F SYST BP LT 130 MM HG: CPT | Performed by: INTERNAL MEDICINE

## 2023-04-06 RX ORDER — BUMETANIDE 1 MG/1
0.5 TABLET ORAL DAILY
COMMUNITY

## 2023-04-06 NOTE — PROGRESS NOTES
OFFICE/OUTPT VISIT,PROCEDURE ONLY N/A 9/5/2018    RIGHT FEMORAL EMBOLECTOMY, INTRAOPERATIVE ARTERIOGRAM, RIGHT ILIAC EMBOLECTOMY, RIGHT COMMON AND EXTERNAL ILIAC STENTING, RIGHT FEMORAL TO ANTERIOR POPLITEAL BYPASS WITH 6MM GORTEX GRAFT performed by Tosha Shah MD at [address] / [address] Right 2/14/2019    FEMORAL EMBOLECTOMY THROMBECTOMY, RIGHT LEG performed by Tosha Shah MD at [facility] N/A 11/27/2017    EGD SUBMUCOSAL/BOTOX [facility]JE[facility] N/A 2/22/2019    EGD BIOPSY performed by Clark Benavides MD at [facility]        Family History:  Family History   Problem Relation Age of Onset    Heart Disease Mother     Stroke Mother     Cancer Father         lung    Emphysema Father     Other Sister         leukemia    Heart Disease Maternal Grandmother     Dementia Maternal Grandmother     Heart Disease Maternal Grandfather     No Known Problems Paternal Grandmother     No Known Problems Paternal Grandfather         Social History:  Social History     Socioeconomic History    Marital status:      Spouse name: Araceli Dakin    Number of children: 4    Years of education: Not on file    Highest education level: Not on file   Occupational History    Not on file   Tobacco Use    Smoking status: Some Days     Packs/day: 0.25     Years: 60.00     Pack years: 15.00     Types: Cigarettes     Start date: 28    Smokeless tobacco: Never    Tobacco comments:     4-5 a day   Vaping Use    Vaping Use: Never used   Substance and Sexual Activity    Alcohol use:  Yes     Alcohol/week: 1.0 standard drink     Types: 1 Glasses of wine per week     Comment: social    Drug use: No    Sexual activity: Not Currently   Other Topics Concern    Not on file   Social History Narrative    Not on file     Social Determinants of Health     Financial Resource Strain: Not on file   Food Insecurity: Not

## 2023-05-01 ENCOUNTER — PROCEDURE VISIT (OUTPATIENT)
Dept: CARDIOLOGY CLINIC | Age: 88
End: 2023-05-01
Payer: MEDICARE

## 2023-05-01 DIAGNOSIS — Z95.810 ICD (IMPLANTABLE CARDIOVERTER-DEFIBRILLATOR) IN PLACE: Primary | ICD-10-CM

## 2023-05-01 PROCEDURE — 93295 DEV INTERROG REMOTE 1/2/MLT: CPT | Performed by: INTERNAL MEDICINE

## 2023-05-01 PROCEDURE — 93296 REM INTERROG EVL PM/IDS: CPT | Performed by: INTERNAL MEDICINE

## 2023-05-01 NOTE — PROGRESS NOTES
[employer] Medtronic Dual ICD   Pt of [place]    Battery 7.9 years     Presenting rhythm ASVS    A Impedance 399  RV Impedance 513    RV shock 66    P wave sensing 0.6  R wave sensing 10.1    A Threshold 0.375 @ 0.40  A Amplitude 1.50 @ 0.40  RV Thresholds 0.5 @ 0.40  RV Amplitude 2.0 @ 0.40    A Paced 32%  V Paced <0.1%    Programmed Mode AAI <=> DDD     Afib [place] <0.1%    Episodes:   none    Optivol WNL

## 2023-05-04 ENCOUNTER — TELEPHONE (OUTPATIENT)
Dept: CARDIOLOGY CLINIC | Age: 88
End: 2023-05-04

## 2023-05-04 LAB
ABSOLUTE BASO #: 0 /CMM (ref 0–200)
ABSOLUTE EOS #: 100 /CMM (ref 0–500)
ABSOLUTE LYMPH #: 800 /CMM (ref 1000–4800)
ABSOLUTE MONO #: 600 /CMM (ref 0–800)
ABSOLUTE NEUT #: 4500 /CMM (ref 1800–7700)
AMORPHOUS: PRESENT
ANION GAP SERPL CALCULATED.3IONS-SCNC: 5 MMOL/L (ref 4–12)
APPEARANCE: ABNORMAL
BACTERIA: ABNORMAL
BASOPHILS RELATIVE PERCENT: 0.4 % (ref 0–2)
BILIRUBIN URINE: NEGATIVE
BUN BLDV-MCNC: 36 MG/DL (ref 7–20)
CALCIUM SERPL-MCNC: 10 MG/DL (ref 8.8–10.5)
CHLORIDE BLD-SCNC: 102 MEQ/L (ref 101–111)
CO2: 30 MEQ/L (ref 21–32)
COLOR: YELLOW
CREAT SERPL-MCNC: 1.67 MG/DL (ref 0.6–1.3)
CREATININE CLEARANCE: 29
CREATININE, RANDOM URINE: 148.7 MG/DL
EOSINOPHILS RELATIVE PERCENT: 1.4 % (ref 0–6)
GLUCOSE URINE: NEGATIVE
GLUCOSE: 86 MG/DL (ref 70–110)
HCT VFR BLD CALC: 41.5 % (ref 35–44)
HEMOGLOBIN: 13.6 GM/DL (ref 12–15)
KETONES, URINE: NEGATIVE
LEUKOCYTES, UA: ABNORMAL
LYMPHOCYTES RELATIVE PERCENT: 13.8 % (ref 15–45)
MCH RBC QN AUTO: 27.4 PG (ref 27.5–33)
MCHC RBC AUTO-ENTMCNC: 32.8 GM/DL (ref 33–36)
MCV RBC AUTO: 83.7 CU MIC (ref 80–97)
MONOCYTES RELATIVE PERCENT: 9.7 % (ref 2–10)
MUCUS: PRESENT
NEUTROPHILS RELATIVE PERCENT: 74.7 % (ref 40–70)
NITRITE, URINE: NEGATIVE
NUCLEATED RBCS: 0 /100 WBC
PARATHYROID HORMONE INTACT: 122 U/ML (ref 12–88)
PDW BLD-RTO: 18 % (ref 12–16)
PH, URINE: 6 (ref 5–8)
PHOSPHORUS: 3.5 MG/DL (ref 2.4–4.7)
PLATELET # BLD: 131 TH/CMM (ref 150–400)
POTASSIUM SERPL-SCNC: 5.3 MEQ/L (ref 3.6–5)
PROTEIN, URINE, RANDOM: 46.2 MG/DL
PROTEIN, URINE: ABNORMAL
PROTEIN/CREAT RATIO: 0.31 G/1.73M2
RBC # BLD: 4.96 MIL/CMM (ref 4–5.1)
RBC URINE: ABNORMAL /HPF
RENAL EPITHELIAL, UA: ABNORMAL /HPF
SODIUM BLD-SCNC: 137 MEQ/L (ref 135–145)
SPECIFIC GRAVITY UA: 1.02 (ref 1–1.03)
SQUAMOUS EPITHELIAL: ABNORMAL /HPF
URINALYSIS REFLEX: YES
URINE HGB: ABNORMAL
UROBILINOGEN, URINE: ABNORMAL (ref 0.2–1)
WBC # BLD: 6 TH/CMM (ref 4.4–10.5)
WBC URINE: >100 /HPF

## 2023-05-05 ENCOUNTER — TELEPHONE (OUTPATIENT)
Dept: NEPHROLOGY | Age: 88
End: 2023-05-05

## 2023-05-05 DIAGNOSIS — E87.5 HYPERKALEMIA: Primary | ICD-10-CM

## 2023-05-05 NOTE — TELEPHONE ENCOUNTER
----- Message from Pillo Peoples MD sent at 5/5/2023  1:03 PM EDT -----  Advise low K diet  Repeat K in 2 weeks  Does she have any UTI symptoms?

## 2023-05-05 NOTE — TELEPHONE ENCOUNTER
Spoke to pt, she understood that she is to eat foods low in potassium and repeat labs. She is not having any UTI symptoms.     Lab order pending

## 2023-05-06 LAB — URINE CULTURE REFLEX: NORMAL

## 2023-05-08 ENCOUNTER — TELEPHONE (OUTPATIENT)
Dept: NEPHROLOGY | Age: 88
End: 2023-05-08

## 2023-05-08 DIAGNOSIS — N39.0 URINARY TRACT INFECTION WITHOUT HEMATURIA, SITE UNSPECIFIED: Primary | ICD-10-CM

## 2023-05-08 NOTE — TELEPHONE ENCOUNTER
Pt has trouble driving, can she do it next week with her other labs she is doing for you?     Lab order pending

## 2023-05-08 NOTE — TELEPHONE ENCOUNTER
----- Message from Lowell Wise MD sent at 5/8/2023 11:58 AM EDT -----  Labs automatically ran a urine culture and it is growing mixed lisa  I need for her to repeat a urine culture.

## 2023-05-18 LAB — POTASSIUM SERPL-SCNC: 4.3 MEQ/L (ref 3.6–5)

## 2023-05-20 LAB — URINE CULTURE, ROUTINE: NORMAL

## 2023-05-22 ENCOUNTER — TELEPHONE (OUTPATIENT)
Dept: NEPHROLOGY | Age: 88
End: 2023-05-22

## 2023-05-22 RX ORDER — CIPROFLOXACIN 250 MG/1
250 TABLET, FILM COATED ORAL 2 TIMES DAILY
Qty: 6 TABLET | Refills: 0 | Status: SHIPPED | OUTPATIENT
Start: 2023-05-22

## 2023-05-22 RX ORDER — CIPROFLOXACIN 250 MG/1
250 TABLET, FILM COATED ORAL 2 TIMES DAILY
COMMUNITY
End: 2023-05-22 | Stop reason: SDUPTHER

## 2023-05-22 NOTE — TELEPHONE ENCOUNTER
Spoke to pt, she understood that she has to start taking Cipro 250 mg bid for 3 days. She informed me that she is going to reschedule her US Renal due to she is having hemorriod issues.     Script pending

## 2023-05-22 NOTE — TELEPHONE ENCOUNTER
----- Message from Frantz Hermosillo MD sent at 5/21/2023  9:41 PM EDT -----  Ciprofloxacin 250 mg BID x 3 days.

## 2023-05-24 NOTE — TELEPHONE ENCOUNTER
Patient just called stating she had an reaction to the ciprofloxacin. States her leg went numb and swollen.  She would like me to add it to the allergy list

## 2023-05-25 NOTE — TELEPHONE ENCOUNTER
Patient states her uti symptoms have gotten a little better she has stopped the cipro and the swelling has went down a little still feels tight but states it has always felt that way because it has been through a lot.

## 2023-05-31 PROBLEM — M79.604 RIGHT LEG PAIN: Status: ACTIVE | Noted: 2023-05-31

## 2023-05-31 PROBLEM — I70.201 FEMORAL ARTERY OCCLUSION, RIGHT (HCC): Status: ACTIVE | Noted: 2023-05-31

## 2023-05-31 PROBLEM — I99.8 ACUTE LOWER LIMB ISCHEMIA: Status: ACTIVE | Noted: 2023-05-31

## 2023-06-05 PROBLEM — K64.8 INTERNAL HEMORRHOID, BLEEDING: Status: ACTIVE | Noted: 2023-06-05

## 2023-06-06 ENCOUNTER — ANESTHESIA (OUTPATIENT)
Dept: OPERATING ROOM | Age: 88
End: 2023-06-06
Payer: MEDICARE

## 2023-06-06 ENCOUNTER — ANESTHESIA EVENT (OUTPATIENT)
Dept: OPERATING ROOM | Age: 88
End: 2023-06-06
Payer: MEDICARE

## 2023-06-06 PROBLEM — D69.6 THROMBOCYTOPENIA (HCC): Status: ACTIVE | Noted: 2023-06-06

## 2023-06-06 PROCEDURE — 2500000003 HC RX 250 WO HCPCS: Performed by: ANESTHESIOLOGY

## 2023-06-06 PROCEDURE — 2580000003 HC RX 258: Performed by: ANESTHESIOLOGY

## 2023-06-06 PROCEDURE — 6360000002 HC RX W HCPCS: Performed by: ANESTHESIOLOGY

## 2023-06-06 RX ORDER — EPHEDRINE SULFATE/0.9% NACL/PF 50 MG/5 ML
SYRINGE (ML) INTRAVENOUS PRN
Status: DISCONTINUED | OUTPATIENT
Start: 2023-06-06 | End: 2023-06-06 | Stop reason: SDUPTHER

## 2023-06-06 RX ORDER — FENTANYL CITRATE 50 UG/ML
INJECTION, SOLUTION INTRAMUSCULAR; INTRAVENOUS PRN
Status: DISCONTINUED | OUTPATIENT
Start: 2023-06-06 | End: 2023-06-06 | Stop reason: SDUPTHER

## 2023-06-06 RX ORDER — ONDANSETRON 2 MG/ML
INJECTION INTRAMUSCULAR; INTRAVENOUS PRN
Status: DISCONTINUED | OUTPATIENT
Start: 2023-06-06 | End: 2023-06-06 | Stop reason: SDUPTHER

## 2023-06-06 RX ORDER — SODIUM CHLORIDE 9 MG/ML
INJECTION, SOLUTION INTRAVENOUS CONTINUOUS PRN
Status: DISCONTINUED | OUTPATIENT
Start: 2023-06-06 | End: 2023-06-06 | Stop reason: SDUPTHER

## 2023-06-06 RX ORDER — PROPOFOL 10 MG/ML
INJECTION, EMULSION INTRAVENOUS PRN
Status: DISCONTINUED | OUTPATIENT
Start: 2023-06-06 | End: 2023-06-06 | Stop reason: SDUPTHER

## 2023-06-06 RX ADMIN — ONDANSETRON 4 MG: 2 INJECTION INTRAMUSCULAR; INTRAVENOUS at 16:02

## 2023-06-06 RX ADMIN — FENTANYL CITRATE 50 MCG: 50 INJECTION, SOLUTION INTRAMUSCULAR; INTRAVENOUS at 15:52

## 2023-06-06 RX ADMIN — FENTANYL CITRATE 25 MCG: 50 INJECTION, SOLUTION INTRAMUSCULAR; INTRAVENOUS at 15:59

## 2023-06-06 RX ADMIN — SODIUM CHLORIDE: 9 INJECTION, SOLUTION INTRAVENOUS at 15:47

## 2023-06-06 RX ADMIN — Medication 10 MG: at 16:09

## 2023-06-06 RX ADMIN — FENTANYL CITRATE 25 MCG: 50 INJECTION, SOLUTION INTRAMUSCULAR; INTRAVENOUS at 16:07

## 2023-06-06 RX ADMIN — Medication 10 MG: at 16:02

## 2023-06-06 RX ADMIN — PROPOFOL 100 MG: 10 INJECTION, EMULSION INTRAVENOUS at 15:52

## 2023-06-06 ASSESSMENT — COPD QUESTIONNAIRES: CAT_SEVERITY: MODERATE

## 2023-06-06 NOTE — ANESTHESIA POSTPROCEDURE EVALUATION
Department of Anesthesiology  Postprocedure Note    Patient: Javier Villarreal  MRN: [id number]  YOB: 1935  Date of evaluation: 6/6/2023      Procedure Summary     Date: 06/06/23 Room / Location: [address] NAKUL Rincon    Anesthesia Start: 4689 Anesthesia Stop: 1631    Procedure: Anal Exam under Anesthesia with Hemorridectomy (Anus) Diagnosis:       Right leg pain      (Right leg pain [M79.604])    Surgeons: Jose Rockwell MD Responsible Provider: Tom Robison MD    Anesthesia Type: general ASA Status: 3          Anesthesia Type: No value filed.     Christiane Phase I: Crhistiane Score: 9    Christiane Phase II:        Anesthesia Post Evaluation    Patient location during evaluation: PACU  Patient participation: complete - patient participated  Level of consciousness: awake  Airway patency: patent  Nausea & Vomiting: no nausea and no vomiting  Complications: no  Cardiovascular status: hemodynamically stable  Respiratory status: acceptable  Hydration status: euvolemic

## 2023-06-07 PROBLEM — Z86.718 HISTORY OF VENOUS THROMBOEMBOLISM: Status: ACTIVE | Noted: 2023-06-07

## 2023-06-07 PROBLEM — R11.0 CHRONIC NAUSEA: Status: ACTIVE | Noted: 2023-06-07

## 2023-06-07 PROBLEM — I70.221 CRITICAL LIMB ISCHEMIA OF RIGHT LOWER EXTREMITY (HCC): Status: ACTIVE | Noted: 2022-09-07

## 2023-06-07 PROBLEM — Z86.14 HISTORY OF MRSA INFECTION: Status: ACTIVE | Noted: 2023-06-07

## 2023-06-07 PROBLEM — K92.1 HEMATOCHEZIA: Status: ACTIVE | Noted: 2023-06-07

## 2023-06-07 PROBLEM — Z85.038 HISTORY OF COLON CANCER: Status: ACTIVE | Noted: 2023-06-07

## 2023-06-07 PROBLEM — Z95.828 HISTORY OF ENDOVASCULAR STENT GRAFT FOR ABDOMINAL AORTIC ANEURYSM (AAA): Status: ACTIVE | Noted: 2023-06-07

## 2023-06-07 PROBLEM — D62 ACUTE ON CHRONIC BLOOD LOSS ANEMIA: Status: ACTIVE | Noted: 2018-09-10

## 2023-06-08 PROBLEM — R33.9 URINARY RETENTION: Status: ACTIVE | Noted: 2023-06-08

## 2023-06-28 ENCOUNTER — OFFICE VISIT (OUTPATIENT)
Dept: SURGERY | Age: 88
End: 2023-06-28

## 2023-06-28 VITALS
BODY MASS INDEX: 28.44 KG/M2 | RESPIRATION RATE: 16 BRPM | DIASTOLIC BLOOD PRESSURE: 60 MMHG | SYSTOLIC BLOOD PRESSURE: 94 MMHG | HEART RATE: 72 BPM | TEMPERATURE: 97.2 F | HEIGHT: 65 IN | WEIGHT: 170.7 LBS | OXYGEN SATURATION: 96 %

## 2023-06-28 DIAGNOSIS — Z87.19 S/P HEMORRHOIDECTOMY: Primary | ICD-10-CM

## 2023-06-28 DIAGNOSIS — Z98.890 S/P HEMORRHOIDECTOMY: Primary | ICD-10-CM

## 2023-06-29 ENCOUNTER — OFFICE VISIT (OUTPATIENT)
Dept: UROLOGY | Age: 88
End: 2023-06-29
Payer: MEDICARE

## 2023-06-29 VITALS — RESPIRATION RATE: 16 BRPM | BODY MASS INDEX: 28.32 KG/M2 | WEIGHT: 170 LBS | HEIGHT: 65 IN

## 2023-06-29 DIAGNOSIS — R33.9 URINARY RETENTION: Primary | ICD-10-CM

## 2023-06-29 PROCEDURE — 4004F PT TOBACCO SCREEN RCVD TLK: CPT | Performed by: UROLOGY

## 2023-06-29 PROCEDURE — 1123F ACP DISCUSS/DSCN MKR DOCD: CPT | Performed by: UROLOGY

## 2023-06-29 PROCEDURE — G8427 DOCREV CUR MEDS BY ELIG CLIN: HCPCS | Performed by: UROLOGY

## 2023-06-29 PROCEDURE — 1090F PRES/ABSN URINE INCON ASSESS: CPT | Performed by: UROLOGY

## 2023-06-29 PROCEDURE — 1111F DSCHRG MED/CURRENT MED MERGE: CPT | Performed by: UROLOGY

## 2023-06-29 PROCEDURE — G8400 PT W/DXA NO RESULTS DOC: HCPCS | Performed by: UROLOGY

## 2023-06-29 PROCEDURE — G8417 CALC BMI ABV UP PARAM F/U: HCPCS | Performed by: UROLOGY

## 2023-06-29 PROCEDURE — 99204 OFFICE O/P NEW MOD 45 MIN: CPT | Performed by: UROLOGY

## 2023-06-30 ENCOUNTER — APPOINTMENT (OUTPATIENT)
Dept: GENERAL RADIOLOGY | Age: 88
DRG: 522 | End: 2023-06-30
Payer: MEDICARE

## 2023-06-30 ENCOUNTER — HOSPITAL ENCOUNTER (INPATIENT)
Age: 88
LOS: 4 days | Discharge: NURSING, REHAB OR LONG-TERM CARE | DRG: 522 | End: 2023-07-05
Attending: STUDENT IN AN ORGANIZED HEALTH CARE EDUCATION/TRAINING PROGRAM | Admitting: ORTHOPAEDIC SURGERY
Payer: MEDICARE

## 2023-06-30 ENCOUNTER — APPOINTMENT (OUTPATIENT)
Dept: CT IMAGING | Age: 88
DRG: 522 | End: 2023-06-30
Payer: MEDICARE

## 2023-06-30 DIAGNOSIS — S72.002A CLOSED FRACTURE OF NECK OF LEFT FEMUR, INITIAL ENCOUNTER (HCC): Primary | ICD-10-CM

## 2023-06-30 LAB
ALBUMIN SERPL BCG-MCNC: 4.3 G/DL (ref 3.5–5.1)
ALP SERPL-CCNC: 148 U/L (ref 38–126)
ALT SERPL W/O P-5'-P-CCNC: 14 U/L (ref 11–66)
ANION GAP SERPL CALC-SCNC: 15 MEQ/L (ref 8–16)
APTT PPP: 31.1 SECONDS (ref 22–38)
AST SERPL-CCNC: 27 U/L (ref 5–40)
BASOPHILS ABSOLUTE: 0 THOU/MM3 (ref 0–0.1)
BASOPHILS NFR BLD AUTO: 0.3 %
BILIRUB SERPL-MCNC: 0.7 MG/DL (ref 0.3–1.2)
BUN SERPL-MCNC: 19 MG/DL (ref 7–22)
CALCIUM SERPL-MCNC: 9.9 MG/DL (ref 8.5–10.5)
CHLORIDE SERPL-SCNC: 97 MEQ/L (ref 98–111)
CO2 SERPL-SCNC: 24 MEQ/L (ref 23–33)
CREAT SERPL-MCNC: 1.5 MG/DL (ref 0.4–1.2)
DEPRECATED RDW RBC AUTO: 64.9 FL (ref 35–45)
EOSINOPHIL NFR BLD AUTO: 0.7 %
EOSINOPHILS ABSOLUTE: 0 THOU/MM3 (ref 0–0.4)
ERYTHROCYTE [DISTWIDTH] IN BLOOD BY AUTOMATED COUNT: 19 % (ref 11.5–14.5)
GFR SERPL CREATININE-BSD FRML MDRD: 34 ML/MIN/1.73M2
GLUCOSE SERPL-MCNC: 127 MG/DL (ref 70–108)
HCT VFR BLD AUTO: 43.5 % (ref 37–47)
HGB BLD-MCNC: 14 GM/DL (ref 12–16)
IMM GRANULOCYTES # BLD AUTO: 0.04 THOU/MM3 (ref 0–0.07)
IMM GRANULOCYTES NFR BLD AUTO: 0.6 %
INR PPP: 1.28 (ref 0.85–1.13)
LYMPHOCYTES ABSOLUTE: 0.4 THOU/MM3 (ref 1–4.8)
LYMPHOCYTES NFR BLD AUTO: 5.4 %
MCH RBC QN AUTO: 30.2 PG (ref 26–33)
MCHC RBC AUTO-ENTMCNC: 32.2 GM/DL (ref 32.2–35.5)
MCV RBC AUTO: 94 FL (ref 81–99)
MONOCYTES ABSOLUTE: 0.6 THOU/MM3 (ref 0.4–1.3)
MONOCYTES NFR BLD AUTO: 8.8 %
NEUTROPHILS NFR BLD AUTO: 84.2 %
NRBC BLD AUTO-RTO: 0 /100 WBC
OSMOLALITY SERPL CALC.SUM OF ELEC: 275.8 MOSMOL/KG (ref 275–300)
PLATELET # BLD AUTO: 118 THOU/MM3 (ref 130–400)
PMV BLD AUTO: 10.6 FL (ref 9.4–12.4)
POTASSIUM SERPL-SCNC: 4.4 MEQ/L (ref 3.5–5.2)
PROT SERPL-MCNC: 7.3 G/DL (ref 6.1–8)
RBC # BLD AUTO: 4.63 MILL/MM3 (ref 4.2–5.4)
SEGMENTED NEUTROPHILS ABSOLUTE COUNT: 5.8 THOU/MM3 (ref 1.8–7.7)
SODIUM SERPL-SCNC: 136 MEQ/L (ref 135–145)
TROPONIN T: < 0.01 NG/ML
WBC # BLD AUTO: 6.9 THOU/MM3 (ref 4.8–10.8)

## 2023-06-30 PROCEDURE — 36415 COLL VENOUS BLD VENIPUNCTURE: CPT

## 2023-06-30 PROCEDURE — 85610 PROTHROMBIN TIME: CPT

## 2023-06-30 PROCEDURE — 73502 X-RAY EXAM HIP UNI 2-3 VIEWS: CPT

## 2023-06-30 PROCEDURE — 6360000002 HC RX W HCPCS: Performed by: STUDENT IN AN ORGANIZED HEALTH CARE EDUCATION/TRAINING PROGRAM

## 2023-06-30 PROCEDURE — 6820000001 HC L2 TRAUMA SURGERY EVALUATION: Performed by: ORTHOPAEDIC SURGERY

## 2023-06-30 PROCEDURE — 6370000000 HC RX 637 (ALT 250 FOR IP): Performed by: STUDENT IN AN ORGANIZED HEALTH CARE EDUCATION/TRAINING PROGRAM

## 2023-06-30 PROCEDURE — 2580000003 HC RX 258: Performed by: STUDENT IN AN ORGANIZED HEALTH CARE EDUCATION/TRAINING PROGRAM

## 2023-06-30 PROCEDURE — 96376 TX/PRO/DX INJ SAME DRUG ADON: CPT

## 2023-06-30 PROCEDURE — 71045 X-RAY EXAM CHEST 1 VIEW: CPT

## 2023-06-30 PROCEDURE — 85025 COMPLETE CBC W/AUTO DIFF WBC: CPT

## 2023-06-30 PROCEDURE — 93005 ELECTROCARDIOGRAM TRACING: CPT | Performed by: STUDENT IN AN ORGANIZED HEALTH CARE EDUCATION/TRAINING PROGRAM

## 2023-06-30 PROCEDURE — 85730 THROMBOPLASTIN TIME PARTIAL: CPT

## 2023-06-30 PROCEDURE — 72125 CT NECK SPINE W/O DYE: CPT

## 2023-06-30 PROCEDURE — 84484 ASSAY OF TROPONIN QUANT: CPT

## 2023-06-30 PROCEDURE — 96374 THER/PROPH/DIAG INJ IV PUSH: CPT

## 2023-06-30 PROCEDURE — 70450 CT HEAD/BRAIN W/O DYE: CPT

## 2023-06-30 PROCEDURE — 80053 COMPREHEN METABOLIC PANEL: CPT

## 2023-06-30 PROCEDURE — 73700 CT LOWER EXTREMITY W/O DYE: CPT

## 2023-06-30 PROCEDURE — 82550 ASSAY OF CK (CPK): CPT

## 2023-06-30 PROCEDURE — 99285 EMERGENCY DEPT VISIT HI MDM: CPT

## 2023-06-30 RX ORDER — FENTANYL CITRATE 50 UG/ML
25 INJECTION, SOLUTION INTRAMUSCULAR; INTRAVENOUS ONCE
Status: COMPLETED | OUTPATIENT
Start: 2023-06-30 | End: 2023-06-30

## 2023-06-30 RX ORDER — 0.9 % SODIUM CHLORIDE 0.9 %
500 INTRAVENOUS SOLUTION INTRAVENOUS ONCE
Status: COMPLETED | OUTPATIENT
Start: 2023-06-30 | End: 2023-07-01

## 2023-06-30 RX ORDER — ACETAMINOPHEN 500 MG
1000 TABLET ORAL ONCE
Status: COMPLETED | OUTPATIENT
Start: 2023-06-30 | End: 2023-06-30

## 2023-06-30 RX ORDER — FENTANYL CITRATE 50 UG/ML
50 INJECTION, SOLUTION INTRAMUSCULAR; INTRAVENOUS ONCE
Status: COMPLETED | OUTPATIENT
Start: 2023-06-30 | End: 2023-06-30

## 2023-06-30 RX ADMIN — FENTANYL CITRATE 50 MCG: 50 INJECTION, SOLUTION INTRAMUSCULAR; INTRAVENOUS at 21:23

## 2023-06-30 RX ADMIN — SODIUM CHLORIDE 500 ML: 9 INJECTION, SOLUTION INTRAVENOUS at 22:51

## 2023-06-30 RX ADMIN — ACETAMINOPHEN 1000 MG: 500 TABLET ORAL at 22:45

## 2023-06-30 RX ADMIN — FENTANYL CITRATE 25 MCG: 50 INJECTION, SOLUTION INTRAMUSCULAR; INTRAVENOUS at 22:47

## 2023-06-30 ASSESSMENT — PAIN DESCRIPTION - LOCATION: LOCATION: HIP

## 2023-06-30 ASSESSMENT — PAIN SCALES - GENERAL
PAINLEVEL_OUTOF10: 10

## 2023-06-30 ASSESSMENT — PAIN - FUNCTIONAL ASSESSMENT
PAIN_FUNCTIONAL_ASSESSMENT: 0-10
PAIN_FUNCTIONAL_ASSESSMENT: 0-10

## 2023-06-30 ASSESSMENT — PAIN DESCRIPTION - ORIENTATION: ORIENTATION: LEFT

## 2023-07-01 ENCOUNTER — APPOINTMENT (OUTPATIENT)
Dept: GENERAL RADIOLOGY | Age: 88
DRG: 522 | End: 2023-07-01
Payer: MEDICARE

## 2023-07-01 ENCOUNTER — ANESTHESIA (OUTPATIENT)
Dept: OPERATING ROOM | Age: 88
End: 2023-07-01
Payer: MEDICARE

## 2023-07-01 ENCOUNTER — ANESTHESIA EVENT (OUTPATIENT)
Dept: OPERATING ROOM | Age: 88
End: 2023-07-01
Payer: MEDICARE

## 2023-07-01 PROBLEM — S72.002A CLOSED DISPLACED FRACTURE OF LEFT FEMORAL NECK (HCC): Status: ACTIVE | Noted: 2023-07-01

## 2023-07-01 LAB
25(OH)D3 SERPL-MCNC: 49 NG/ML (ref 30–100)
ABO: NORMAL
ANTIBODY SCREEN: NORMAL
BACTERIA: ABNORMAL
BILIRUB UR QL STRIP: NEGATIVE
CASTS #/AREA URNS LPF: ABNORMAL /LPF
CASTS #/AREA URNS LPF: ABNORMAL /LPF
CHARACTER UR: ABNORMAL
CHARCOAL URNS QL MICRO: ABNORMAL
CK SERPL-CCNC: 306 U/L (ref 30–135)
COLOR UR: YELLOW
CRYSTALS URNS QL MICRO: ABNORMAL
EKG ATRIAL RATE: 99 BPM
EKG P AXIS: 61 DEGREES
EKG P-R INTERVAL: 194 MS
EKG Q-T INTERVAL: 346 MS
EKG QRS DURATION: 74 MS
EKG QTC CALCULATION (BAZETT): 444 MS
EKG R AXIS: -73 DEGREES
EKG T AXIS: 50 DEGREES
EKG VENTRICULAR RATE: 99 BPM
EPITHELIAL CELLS, UA: ABNORMAL /HPF
GLUCOSE UR QL STRIP.AUTO: NEGATIVE MG/DL
HGB UR QL STRIP.AUTO: NEGATIVE
KETONES UR QL STRIP.AUTO: ABNORMAL
LEUKOCYTE ESTERASE UR QL STRIP.AUTO: ABNORMAL
MRSA DNA SPEC QL NAA+PROBE: NEGATIVE
NITRITE UR QL STRIP.AUTO: NEGATIVE
PH UR STRIP.AUTO: 7.5 [PH] (ref 5–9)
PROT UR STRIP.AUTO-MCNC: ABNORMAL MG/DL
RBC #/AREA URNS HPF: ABNORMAL /HPF
REASON FOR REJECTION: NORMAL
REJECTED TEST: NORMAL
RENAL EPI CELLS #/AREA URNS HPF: ABNORMAL /[HPF]
RH FACTOR: NORMAL
SPECIFIC GRAVITY UA: 1.02 (ref 1–1.03)
UROBILINOGEN, URINE: 0.2 EU/DL (ref 0–1)
WBC #/AREA URNS HPF: ABNORMAL /HPF
YEAST LIKE FUNGI URNS QL MICRO: ABNORMAL

## 2023-07-01 PROCEDURE — 6370000000 HC RX 637 (ALT 250 FOR IP): Performed by: PHYSICIAN ASSISTANT

## 2023-07-01 PROCEDURE — 2500000003 HC RX 250 WO HCPCS: Performed by: NURSE ANESTHETIST, CERTIFIED REGISTERED

## 2023-07-01 PROCEDURE — 6360000002 HC RX W HCPCS: Performed by: NURSE ANESTHETIST, CERTIFIED REGISTERED

## 2023-07-01 PROCEDURE — 7100000001 HC PACU RECOVERY - ADDTL 15 MIN: Performed by: ORTHOPAEDIC SURGERY

## 2023-07-01 PROCEDURE — 3700000000 HC ANESTHESIA ATTENDED CARE: Performed by: ORTHOPAEDIC SURGERY

## 2023-07-01 PROCEDURE — 1200000000 HC SEMI PRIVATE

## 2023-07-01 PROCEDURE — 0SRS01Z REPLACEMENT OF LEFT HIP JOINT, FEMORAL SURFACE WITH METAL SYNTHETIC SUBSTITUTE, OPEN APPROACH: ICD-10-PCS | Performed by: ORTHOPAEDIC SURGERY

## 2023-07-01 PROCEDURE — 3600000014 HC SURGERY LEVEL 4 ADDTL 15MIN: Performed by: ORTHOPAEDIC SURGERY

## 2023-07-01 PROCEDURE — 36430 TRANSFUSION BLD/BLD COMPNT: CPT

## 2023-07-01 PROCEDURE — 86900 BLOOD TYPING SEROLOGIC ABO: CPT

## 2023-07-01 PROCEDURE — 2580000003 HC RX 258: Performed by: PHYSICIAN ASSISTANT

## 2023-07-01 PROCEDURE — 6360000002 HC RX W HCPCS: Performed by: PHYSICIAN ASSISTANT

## 2023-07-01 PROCEDURE — 2580000003 HC RX 258: Performed by: ORTHOPAEDIC SURGERY

## 2023-07-01 PROCEDURE — 2709999900 HC NON-CHARGEABLE SUPPLY: Performed by: ORTHOPAEDIC SURGERY

## 2023-07-01 PROCEDURE — A4216 STERILE WATER/SALINE, 10 ML: HCPCS | Performed by: ORTHOPAEDIC SURGERY

## 2023-07-01 PROCEDURE — 86901 BLOOD TYPING SEROLOGIC RH(D): CPT

## 2023-07-01 PROCEDURE — 2500000003 HC RX 250 WO HCPCS: Performed by: ORTHOPAEDIC SURGERY

## 2023-07-01 PROCEDURE — C1776 JOINT DEVICE (IMPLANTABLE): HCPCS | Performed by: ORTHOPAEDIC SURGERY

## 2023-07-01 PROCEDURE — 3600000004 HC SURGERY LEVEL 4 BASE: Performed by: ORTHOPAEDIC SURGERY

## 2023-07-01 PROCEDURE — 72170 X-RAY EXAM OF PELVIS: CPT

## 2023-07-01 PROCEDURE — 81001 URINALYSIS AUTO W/SCOPE: CPT

## 2023-07-01 PROCEDURE — 2580000003 HC RX 258: Performed by: NURSE ANESTHETIST, CERTIFIED REGISTERED

## 2023-07-01 PROCEDURE — 93010 ELECTROCARDIOGRAM REPORT: CPT | Performed by: INTERNAL MEDICINE

## 2023-07-01 PROCEDURE — 86850 RBC ANTIBODY SCREEN: CPT

## 2023-07-01 PROCEDURE — 87641 MR-STAPH DNA AMP PROBE: CPT

## 2023-07-01 PROCEDURE — 6360000002 HC RX W HCPCS: Performed by: ORTHOPAEDIC SURGERY

## 2023-07-01 PROCEDURE — 3700000001 HC ADD 15 MINUTES (ANESTHESIA): Performed by: ORTHOPAEDIC SURGERY

## 2023-07-01 PROCEDURE — 7100000000 HC PACU RECOVERY - FIRST 15 MIN: Performed by: ORTHOPAEDIC SURGERY

## 2023-07-01 PROCEDURE — 99222 1ST HOSP IP/OBS MODERATE 55: CPT | Performed by: INTERNAL MEDICINE

## 2023-07-01 PROCEDURE — 36415 COLL VENOUS BLD VENIPUNCTURE: CPT

## 2023-07-01 PROCEDURE — P9037 PLATE PHERES LEUKOREDU IRRAD: HCPCS

## 2023-07-01 PROCEDURE — 82306 VITAMIN D 25 HYDROXY: CPT

## 2023-07-01 DEVICE — COBALT CHROME 12/14 TAPER FEMORAL                                    HEAD 28MM + 4: Type: IMPLANTABLE DEVICE | Site: HIP | Status: FUNCTIONAL

## 2023-07-01 DEVICE — HIP H4 TOT HEMI UNIV BIPLR IMPL CAPPED H4 SN: Type: IMPLANTABLE DEVICE | Site: HIP | Status: FUNCTIONAL

## 2023-07-01 DEVICE — POLARSTEM STANDARD NON-CEMENTED                                    WITH TI/HA 4
Type: IMPLANTABLE DEVICE | Site: HIP | Status: FUNCTIONAL
Brand: POLARSTEM

## 2023-07-01 DEVICE — TANDEM BIPOLAR COBALT CHROME 44MM                                    OUTER DIAMETER 28MM INNER DIAMETER
Type: IMPLANTABLE DEVICE | Site: HIP | Status: FUNCTIONAL
Brand: TANDEM

## 2023-07-01 RX ORDER — SODIUM CHLORIDE 9 MG/ML
INJECTION, SOLUTION INTRAVENOUS PRN
Status: DISCONTINUED | OUTPATIENT
Start: 2023-07-01 | End: 2023-07-01 | Stop reason: HOSPADM

## 2023-07-01 RX ORDER — CEFAZOLIN SODIUM 1 G/3ML
INJECTION, POWDER, FOR SOLUTION INTRAMUSCULAR; INTRAVENOUS PRN
Status: DISCONTINUED | OUTPATIENT
Start: 2023-07-01 | End: 2023-07-01 | Stop reason: SDUPTHER

## 2023-07-01 RX ORDER — SODIUM CHLORIDE 9 MG/ML
INJECTION, SOLUTION INTRAVENOUS PRN
Status: DISCONTINUED | OUTPATIENT
Start: 2023-07-01 | End: 2023-07-03

## 2023-07-01 RX ORDER — TRANEXAMIC ACID 10 MG/ML
1000 INJECTION, SOLUTION INTRAVENOUS
Status: DISCONTINUED | OUTPATIENT
Start: 2023-07-01 | End: 2023-07-01 | Stop reason: HOSPADM

## 2023-07-01 RX ORDER — PRAVASTATIN SODIUM 40 MG
40 TABLET ORAL DAILY
Status: DISCONTINUED | OUTPATIENT
Start: 2023-07-01 | End: 2023-07-05 | Stop reason: HOSPADM

## 2023-07-01 RX ORDER — METOPROLOL SUCCINATE 25 MG/1
12.5 TABLET, EXTENDED RELEASE ORAL DAILY
Status: DISCONTINUED | OUTPATIENT
Start: 2023-07-01 | End: 2023-07-05 | Stop reason: HOSPADM

## 2023-07-01 RX ORDER — ONDANSETRON 2 MG/ML
INJECTION INTRAMUSCULAR; INTRAVENOUS PRN
Status: DISCONTINUED | OUTPATIENT
Start: 2023-07-01 | End: 2023-07-01 | Stop reason: SDUPTHER

## 2023-07-01 RX ORDER — BUMETANIDE 0.5 MG/1
0.5 TABLET ORAL
Status: DISCONTINUED | OUTPATIENT
Start: 2023-07-01 | End: 2023-07-05 | Stop reason: HOSPADM

## 2023-07-01 RX ORDER — HYDROCODONE BITARTRATE AND ACETAMINOPHEN 5; 325 MG/1; MG/1
1 TABLET ORAL EVERY 6 HOURS PRN
Status: DISCONTINUED | OUTPATIENT
Start: 2023-07-01 | End: 2023-07-01

## 2023-07-01 RX ORDER — FENTANYL CITRATE 50 UG/ML
50 INJECTION, SOLUTION INTRAMUSCULAR; INTRAVENOUS EVERY 5 MIN PRN
Status: DISCONTINUED | OUTPATIENT
Start: 2023-07-01 | End: 2023-07-01 | Stop reason: HOSPADM

## 2023-07-01 RX ORDER — POLYETHYLENE GLYCOL 3350 17 G/17G
17 POWDER, FOR SOLUTION ORAL DAILY PRN
Status: DISCONTINUED | OUTPATIENT
Start: 2023-07-01 | End: 2023-07-05 | Stop reason: HOSPADM

## 2023-07-01 RX ORDER — LANOLIN ALCOHOL/MO/W.PET/CERES
6 CREAM (GRAM) TOPICAL NIGHTLY PRN
Status: DISCONTINUED | OUTPATIENT
Start: 2023-07-01 | End: 2023-07-05 | Stop reason: HOSPADM

## 2023-07-01 RX ORDER — PANTOPRAZOLE SODIUM 40 MG/1
40 TABLET, DELAYED RELEASE ORAL
Status: DISCONTINUED | OUTPATIENT
Start: 2023-07-01 | End: 2023-07-05 | Stop reason: HOSPADM

## 2023-07-01 RX ORDER — DOCUSATE SODIUM 100 MG/1
100 CAPSULE, LIQUID FILLED ORAL 2 TIMES DAILY
Status: DISCONTINUED | OUTPATIENT
Start: 2023-07-01 | End: 2023-07-05 | Stop reason: HOSPADM

## 2023-07-01 RX ORDER — LIDOCAINE HCL/PF 100 MG/5ML
SYRINGE (ML) INJECTION PRN
Status: DISCONTINUED | OUTPATIENT
Start: 2023-07-01 | End: 2023-07-01 | Stop reason: SDUPTHER

## 2023-07-01 RX ORDER — POLYETHYLENE GLYCOL 3350 17 G/17G
17 POWDER, FOR SOLUTION ORAL DAILY PRN
Status: DISCONTINUED | OUTPATIENT
Start: 2023-07-01 | End: 2023-07-01 | Stop reason: SDUPTHER

## 2023-07-01 RX ORDER — SODIUM CHLORIDE 0.9 % (FLUSH) 0.9 %
5-40 SYRINGE (ML) INJECTION EVERY 12 HOURS SCHEDULED
Status: DISCONTINUED | OUTPATIENT
Start: 2023-07-01 | End: 2023-07-05 | Stop reason: HOSPADM

## 2023-07-01 RX ORDER — DEXAMETHASONE SODIUM PHOSPHATE 10 MG/ML
INJECTION, EMULSION INTRAMUSCULAR; INTRAVENOUS PRN
Status: DISCONTINUED | OUTPATIENT
Start: 2023-07-01 | End: 2023-07-01 | Stop reason: SDUPTHER

## 2023-07-01 RX ORDER — ONDANSETRON 2 MG/ML
4 INJECTION INTRAMUSCULAR; INTRAVENOUS EVERY 6 HOURS PRN
Status: DISCONTINUED | OUTPATIENT
Start: 2023-07-01 | End: 2023-07-05 | Stop reason: HOSPADM

## 2023-07-01 RX ORDER — SODIUM CHLORIDE 9 MG/ML
INJECTION, SOLUTION INTRAVENOUS CONTINUOUS
Status: DISCONTINUED | OUTPATIENT
Start: 2023-07-01 | End: 2023-07-01 | Stop reason: SDUPTHER

## 2023-07-01 RX ORDER — FENTANYL CITRATE 50 UG/ML
INJECTION, SOLUTION INTRAMUSCULAR; INTRAVENOUS PRN
Status: DISCONTINUED | OUTPATIENT
Start: 2023-07-01 | End: 2023-07-01 | Stop reason: SDUPTHER

## 2023-07-01 RX ORDER — SODIUM CHLORIDE 0.9 % (FLUSH) 0.9 %
5-40 SYRINGE (ML) INJECTION PRN
Status: DISCONTINUED | OUTPATIENT
Start: 2023-07-01 | End: 2023-07-05 | Stop reason: HOSPADM

## 2023-07-01 RX ORDER — SODIUM CHLORIDE 0.9 % (FLUSH) 0.9 %
5-40 SYRINGE (ML) INJECTION EVERY 12 HOURS SCHEDULED
Status: DISCONTINUED | OUTPATIENT
Start: 2023-07-01 | End: 2023-07-01 | Stop reason: HOSPADM

## 2023-07-01 RX ORDER — TRAMADOL HYDROCHLORIDE 50 MG/1
50 TABLET ORAL EVERY 6 HOURS PRN
Status: DISCONTINUED | OUTPATIENT
Start: 2023-07-01 | End: 2023-07-05 | Stop reason: HOSPADM

## 2023-07-01 RX ORDER — SODIUM CHLORIDE 9 MG/ML
INJECTION, SOLUTION INTRAVENOUS CONTINUOUS PRN
Status: DISCONTINUED | OUTPATIENT
Start: 2023-07-01 | End: 2023-07-01 | Stop reason: SDUPTHER

## 2023-07-01 RX ORDER — SODIUM CHLORIDE 9 MG/ML
INJECTION, SOLUTION INTRAVENOUS CONTINUOUS
Status: DISCONTINUED | OUTPATIENT
Start: 2023-07-01 | End: 2023-07-05 | Stop reason: HOSPADM

## 2023-07-01 RX ORDER — ALBUTEROL SULFATE 90 UG/1
2 AEROSOL, METERED RESPIRATORY (INHALATION) EVERY 4 HOURS PRN
Status: DISCONTINUED | OUTPATIENT
Start: 2023-07-01 | End: 2023-07-05 | Stop reason: HOSPADM

## 2023-07-01 RX ORDER — ROCURONIUM BROMIDE 10 MG/ML
INJECTION, SOLUTION INTRAVENOUS PRN
Status: DISCONTINUED | OUTPATIENT
Start: 2023-07-01 | End: 2023-07-01 | Stop reason: SDUPTHER

## 2023-07-01 RX ORDER — SODIUM CHLORIDE 0.9 % (FLUSH) 0.9 %
5-40 SYRINGE (ML) INJECTION PRN
Status: DISCONTINUED | OUTPATIENT
Start: 2023-07-01 | End: 2023-07-01 | Stop reason: HOSPADM

## 2023-07-01 RX ORDER — CYCLOBENZAPRINE HCL 10 MG
5 TABLET ORAL 3 TIMES DAILY PRN
Status: DISCONTINUED | OUTPATIENT
Start: 2023-07-01 | End: 2023-07-05 | Stop reason: HOSPADM

## 2023-07-01 RX ORDER — SODIUM CHLORIDE 0.9 % (FLUSH) 0.9 %
10 SYRINGE (ML) INJECTION PRN
Status: DISCONTINUED | OUTPATIENT
Start: 2023-07-01 | End: 2023-07-03

## 2023-07-01 RX ORDER — ACETAMINOPHEN 325 MG/1
650 TABLET ORAL EVERY 6 HOURS
Status: DISCONTINUED | OUTPATIENT
Start: 2023-07-01 | End: 2023-07-05 | Stop reason: HOSPADM

## 2023-07-01 RX ORDER — SODIUM CHLORIDE 0.9 % (FLUSH) 0.9 %
10 SYRINGE (ML) INJECTION EVERY 12 HOURS SCHEDULED
Status: DISCONTINUED | OUTPATIENT
Start: 2023-07-01 | End: 2023-07-03

## 2023-07-01 RX ORDER — SODIUM CHLORIDE 9 MG/ML
INJECTION, SOLUTION INTRAVENOUS PRN
Status: DISCONTINUED | OUTPATIENT
Start: 2023-07-01 | End: 2023-07-05 | Stop reason: HOSPADM

## 2023-07-01 RX ORDER — FENTANYL CITRATE 50 UG/ML
25 INJECTION, SOLUTION INTRAMUSCULAR; INTRAVENOUS EVERY 5 MIN PRN
Status: DISCONTINUED | OUTPATIENT
Start: 2023-07-01 | End: 2023-07-01 | Stop reason: HOSPADM

## 2023-07-01 RX ORDER — BISACODYL 10 MG
10 SUPPOSITORY, RECTAL RECTAL DAILY PRN
Status: DISCONTINUED | OUTPATIENT
Start: 2023-07-01 | End: 2023-07-01 | Stop reason: SDUPTHER

## 2023-07-01 RX ORDER — PROPOFOL 10 MG/ML
INJECTION, EMULSION INTRAVENOUS PRN
Status: DISCONTINUED | OUTPATIENT
Start: 2023-07-01 | End: 2023-07-01 | Stop reason: SDUPTHER

## 2023-07-01 RX ORDER — BISACODYL 10 MG
10 SUPPOSITORY, RECTAL RECTAL DAILY PRN
Status: DISCONTINUED | OUTPATIENT
Start: 2023-07-01 | End: 2023-07-05 | Stop reason: HOSPADM

## 2023-07-01 RX ORDER — CLOPIDOGREL BISULFATE 75 MG/1
75 TABLET ORAL DAILY
Status: DISCONTINUED | OUTPATIENT
Start: 2023-07-02 | End: 2023-07-05 | Stop reason: HOSPADM

## 2023-07-01 RX ADMIN — SODIUM CHLORIDE: 9 INJECTION, SOLUTION INTRAVENOUS at 03:14

## 2023-07-01 RX ADMIN — ONDANSETRON 4 MG: 2 INJECTION INTRAMUSCULAR; INTRAVENOUS at 15:07

## 2023-07-01 RX ADMIN — DEXAMETHASONE SODIUM PHOSPHATE 8 MG: 10 INJECTION, EMULSION INTRAMUSCULAR; INTRAVENOUS at 14:08

## 2023-07-01 RX ADMIN — VANCOMYCIN HYDROCHLORIDE 1000 MG: 1 INJECTION, POWDER, LYOPHILIZED, FOR SOLUTION INTRAVENOUS at 13:54

## 2023-07-01 RX ADMIN — PHENYLEPHRINE HYDROCHLORIDE 100 MCG: 10 INJECTION INTRAVENOUS at 14:42

## 2023-07-01 RX ADMIN — HYDROCODONE BITARTRATE AND ACETAMINOPHEN 1 TABLET: 5; 325 TABLET ORAL at 05:40

## 2023-07-01 RX ADMIN — ACETAMINOPHEN 650 MG: 325 TABLET ORAL at 21:01

## 2023-07-01 RX ADMIN — PHENYLEPHRINE HYDROCHLORIDE 100 MCG: 10 INJECTION INTRAVENOUS at 15:03

## 2023-07-01 RX ADMIN — SODIUM CHLORIDE: 9 INJECTION, SOLUTION INTRAVENOUS at 13:29

## 2023-07-01 RX ADMIN — SUGAMMADEX 200 MG: 100 INJECTION, SOLUTION INTRAVENOUS at 15:23

## 2023-07-01 RX ADMIN — SODIUM CHLORIDE: 9 INJECTION, SOLUTION INTRAVENOUS at 15:27

## 2023-07-01 RX ADMIN — FENTANYL CITRATE 25 MCG: 50 INJECTION, SOLUTION INTRAMUSCULAR; INTRAVENOUS at 14:49

## 2023-07-01 RX ADMIN — CYCLOBENZAPRINE 5 MG: 10 TABLET, FILM COATED ORAL at 06:18

## 2023-07-01 RX ADMIN — CEFAZOLIN 2 G: 1 INJECTION, POWDER, FOR SOLUTION INTRAMUSCULAR; INTRAVENOUS at 13:51

## 2023-07-01 RX ADMIN — ROCURONIUM BROMIDE 10 MG: 10 INJECTION INTRAVENOUS at 14:31

## 2023-07-01 RX ADMIN — FENTANYL CITRATE 25 MCG: 50 INJECTION, SOLUTION INTRAMUSCULAR; INTRAVENOUS at 14:12

## 2023-07-01 RX ADMIN — HYDROMORPHONE HYDROCHLORIDE 0.5 MG: 1 INJECTION, SOLUTION INTRAMUSCULAR; INTRAVENOUS; SUBCUTANEOUS at 03:16

## 2023-07-01 RX ADMIN — PROPOFOL 100 MG: 10 INJECTION, EMULSION INTRAVENOUS at 13:36

## 2023-07-01 RX ADMIN — Medication 2000 MG: at 21:08

## 2023-07-01 RX ADMIN — FENTANYL CITRATE 50 MCG: 50 INJECTION, SOLUTION INTRAMUSCULAR; INTRAVENOUS at 14:17

## 2023-07-01 RX ADMIN — HYDROMORPHONE HYDROCHLORIDE 0.5 MG: 1 INJECTION, SOLUTION INTRAMUSCULAR; INTRAVENOUS; SUBCUTANEOUS at 06:44

## 2023-07-01 RX ADMIN — PHENYLEPHRINE HYDROCHLORIDE 100 MCG: 10 INJECTION INTRAVENOUS at 14:39

## 2023-07-01 RX ADMIN — ROCURONIUM BROMIDE 50 MG: 10 INJECTION INTRAVENOUS at 13:36

## 2023-07-01 RX ADMIN — Medication 100 MG: at 13:36

## 2023-07-01 ASSESSMENT — ENCOUNTER SYMPTOMS
VOMITING: 0
CHEST TIGHTNESS: 0
SHORTNESS OF BREATH: 0
APNEA: 0
SORE THROAT: 0
BACK PAIN: 0
BLOOD IN STOOL: 0
DIARRHEA: 0
COLOR CHANGE: 0
CONSTIPATION: 0
ABDOMINAL DISTENTION: 0
NAUSEA: 0
COUGH: 0
WHEEZING: 0
ANAL BLEEDING: 0
ALLERGIC/IMMUNOLOGIC NEGATIVE: 1
EYE ITCHING: 0
CHOKING: 0
EYE DISCHARGE: 0
EYE PAIN: 0
SINUS PRESSURE: 0
ABDOMINAL PAIN: 0
RECTAL PAIN: 1
RHINORRHEA: 0
PHOTOPHOBIA: 0

## 2023-07-01 ASSESSMENT — PAIN SCALES - GENERAL
PAINLEVEL_OUTOF10: 6
PAINLEVEL_OUTOF10: 10
PAINLEVEL_OUTOF10: 8
PAINLEVEL_OUTOF10: 10
PAINLEVEL_OUTOF10: 10
PAINLEVEL_OUTOF10: 0

## 2023-07-01 ASSESSMENT — LIFESTYLE VARIABLES
HOW MANY STANDARD DRINKS CONTAINING ALCOHOL DO YOU HAVE ON A TYPICAL DAY: PATIENT DOES NOT DRINK
HOW OFTEN DO YOU HAVE A DRINK CONTAINING ALCOHOL: NEVER

## 2023-07-01 ASSESSMENT — PAIN DESCRIPTION - DESCRIPTORS: DESCRIPTORS: SORE

## 2023-07-01 ASSESSMENT — PAIN - FUNCTIONAL ASSESSMENT: PAIN_FUNCTIONAL_ASSESSMENT: PREVENTS OR INTERFERES SOME ACTIVE ACTIVITIES AND ADLS

## 2023-07-01 ASSESSMENT — PAIN DESCRIPTION - ORIENTATION: ORIENTATION: LEFT

## 2023-07-01 ASSESSMENT — COPD QUESTIONNAIRES: CAT_SEVERITY: MODERATE

## 2023-07-01 ASSESSMENT — PAIN DESCRIPTION - LOCATION: LOCATION: HIP

## 2023-07-02 LAB
ANION GAP SERPL CALC-SCNC: 10 MEQ/L (ref 8–16)
ANISOCYTOSIS BLD QL SMEAR: PRESENT
BASOPHILS ABSOLUTE: 0 THOU/MM3 (ref 0–0.1)
BASOPHILS NFR BLD AUTO: 0.5 %
BUN SERPL-MCNC: 19 MG/DL (ref 7–22)
CALCIUM SERPL-MCNC: 7.9 MG/DL (ref 8.5–10.5)
CHLORIDE SERPL-SCNC: 109 MEQ/L (ref 98–111)
CO2 SERPL-SCNC: 20 MEQ/L (ref 23–33)
CREAT SERPL-MCNC: 1.4 MG/DL (ref 0.4–1.2)
DEPRECATED RDW RBC AUTO: 67.7 FL (ref 35–45)
EOSINOPHIL NFR BLD AUTO: 0.5 %
EOSINOPHILS ABSOLUTE: 0 THOU/MM3 (ref 0–0.4)
ERYTHROCYTE [DISTWIDTH] IN BLOOD BY AUTOMATED COUNT: 18.6 % (ref 11.5–14.5)
GFR SERPL CREATININE-BSD FRML MDRD: 37 ML/MIN/1.73M2
GLUCOSE SERPL-MCNC: 85 MG/DL (ref 70–108)
HCT VFR BLD AUTO: 30 % (ref 37–47)
HGB BLD-MCNC: 8.9 GM/DL (ref 12–16)
IMM GRANULOCYTES # BLD AUTO: 0.05 THOU/MM3 (ref 0–0.07)
IMM GRANULOCYTES NFR BLD AUTO: 0.9 %
LYMPHOCYTES ABSOLUTE: 0.3 THOU/MM3 (ref 1–4.8)
LYMPHOCYTES NFR BLD AUTO: 4.9 %
MCH RBC QN AUTO: 29.6 PG (ref 26–33)
MCHC RBC AUTO-ENTMCNC: 29.7 GM/DL (ref 32.2–35.5)
MCV RBC AUTO: 99.7 FL (ref 81–99)
MONOCYTES ABSOLUTE: 0.6 THOU/MM3 (ref 0.4–1.3)
MONOCYTES NFR BLD AUTO: 10.7 %
NEUTROPHILS NFR BLD AUTO: 82.5 %
NRBC BLD AUTO-RTO: 0 /100 WBC
PLATELET # BLD AUTO: 96 THOU/MM3 (ref 130–400)
PMV BLD AUTO: 11.6 FL (ref 9.4–12.4)
POTASSIUM SERPL-SCNC: 4.9 MEQ/L (ref 3.5–5.2)
POTASSIUM SERPL-SCNC: 4.9 MEQ/L (ref 3.5–5.2)
RBC # BLD AUTO: 3.01 MILL/MM3 (ref 4.2–5.4)
SEGMENTED NEUTROPHILS ABSOLUTE COUNT: 4.8 THOU/MM3 (ref 1.8–7.7)
SODIUM SERPL-SCNC: 139 MEQ/L (ref 135–145)
WBC # BLD AUTO: 5.8 THOU/MM3 (ref 4.8–10.8)

## 2023-07-02 PROCEDURE — 97530 THERAPEUTIC ACTIVITIES: CPT

## 2023-07-02 PROCEDURE — 97166 OT EVAL MOD COMPLEX 45 MIN: CPT

## 2023-07-02 PROCEDURE — 97535 SELF CARE MNGMENT TRAINING: CPT

## 2023-07-02 PROCEDURE — 36415 COLL VENOUS BLD VENIPUNCTURE: CPT

## 2023-07-02 PROCEDURE — 97110 THERAPEUTIC EXERCISES: CPT

## 2023-07-02 PROCEDURE — 6360000002 HC RX W HCPCS: Performed by: PHYSICIAN ASSISTANT

## 2023-07-02 PROCEDURE — 97162 PT EVAL MOD COMPLEX 30 MIN: CPT

## 2023-07-02 PROCEDURE — 2580000003 HC RX 258: Performed by: INTERNAL MEDICINE

## 2023-07-02 PROCEDURE — 1200000000 HC SEMI PRIVATE

## 2023-07-02 PROCEDURE — 80048 BASIC METABOLIC PNL TOTAL CA: CPT

## 2023-07-02 PROCEDURE — 85025 COMPLETE CBC W/AUTO DIFF WBC: CPT

## 2023-07-02 PROCEDURE — 2580000003 HC RX 258: Performed by: PHYSICIAN ASSISTANT

## 2023-07-02 PROCEDURE — 6370000000 HC RX 637 (ALT 250 FOR IP): Performed by: PHYSICIAN ASSISTANT

## 2023-07-02 RX ORDER — 0.9 % SODIUM CHLORIDE 0.9 %
500 INTRAVENOUS SOLUTION INTRAVENOUS ONCE
Status: COMPLETED | OUTPATIENT
Start: 2023-07-02 | End: 2023-07-02

## 2023-07-02 RX ADMIN — APIXABAN 5 MG: 5 TABLET, FILM COATED ORAL at 20:36

## 2023-07-02 RX ADMIN — DOCUSATE SODIUM 100 MG: 100 CAPSULE, LIQUID FILLED ORAL at 09:03

## 2023-07-02 RX ADMIN — CYCLOBENZAPRINE 5 MG: 10 TABLET, FILM COATED ORAL at 18:40

## 2023-07-02 RX ADMIN — APIXABAN 5 MG: 5 TABLET, FILM COATED ORAL at 09:03

## 2023-07-02 RX ADMIN — ACETAMINOPHEN 650 MG: 325 TABLET ORAL at 20:37

## 2023-07-02 RX ADMIN — TRAMADOL HYDROCHLORIDE 50 MG: 50 TABLET, COATED ORAL at 09:13

## 2023-07-02 RX ADMIN — TRAMADOL HYDROCHLORIDE 50 MG: 50 TABLET, COATED ORAL at 18:40

## 2023-07-02 RX ADMIN — ACETAMINOPHEN 650 MG: 325 TABLET ORAL at 01:21

## 2023-07-02 RX ADMIN — PRAVASTATIN SODIUM 40 MG: 40 TABLET ORAL at 09:03

## 2023-07-02 RX ADMIN — HYDROMORPHONE HYDROCHLORIDE 0.5 MG: 1 INJECTION, SOLUTION INTRAMUSCULAR; INTRAVENOUS; SUBCUTANEOUS at 13:42

## 2023-07-02 RX ADMIN — DOCUSATE SODIUM 100 MG: 100 CAPSULE, LIQUID FILLED ORAL at 20:37

## 2023-07-02 RX ADMIN — Medication 2000 MG: at 05:18

## 2023-07-02 RX ADMIN — SODIUM CHLORIDE: 9 INJECTION, SOLUTION INTRAVENOUS at 05:16

## 2023-07-02 RX ADMIN — PANTOPRAZOLE SODIUM 40 MG: 40 TABLET, DELAYED RELEASE ORAL at 05:16

## 2023-07-02 RX ADMIN — SODIUM CHLORIDE 500 ML: 9 INJECTION, SOLUTION INTRAVENOUS at 11:17

## 2023-07-02 RX ADMIN — VANCOMYCIN HYDROCHLORIDE 1000 MG: 1 INJECTION, POWDER, LYOPHILIZED, FOR SOLUTION INTRAVENOUS at 01:25

## 2023-07-02 RX ADMIN — CLOPIDOGREL BISULFATE 75 MG: 75 TABLET ORAL at 09:03

## 2023-07-02 RX ADMIN — HYDROMORPHONE HYDROCHLORIDE 0.5 MG: 1 INJECTION, SOLUTION INTRAMUSCULAR; INTRAVENOUS; SUBCUTANEOUS at 20:37

## 2023-07-02 ASSESSMENT — PAIN DESCRIPTION - ORIENTATION: ORIENTATION: LEFT

## 2023-07-02 ASSESSMENT — PAIN SCALES - GENERAL
PAINLEVEL_OUTOF10: 9
PAINLEVEL_OUTOF10: 3
PAINLEVEL_OUTOF10: 0

## 2023-07-02 ASSESSMENT — PAIN DESCRIPTION - DESCRIPTORS: DESCRIPTORS: ACHING

## 2023-07-02 ASSESSMENT — PAIN - FUNCTIONAL ASSESSMENT: PAIN_FUNCTIONAL_ASSESSMENT: PREVENTS OR INTERFERES SOME ACTIVE ACTIVITIES AND ADLS

## 2023-07-02 ASSESSMENT — PAIN DESCRIPTION - LOCATION: LOCATION: HIP

## 2023-07-03 ENCOUNTER — APPOINTMENT (OUTPATIENT)
Dept: GENERAL RADIOLOGY | Age: 88
DRG: 522 | End: 2023-07-03
Payer: MEDICARE

## 2023-07-03 LAB
ANION GAP SERPL CALC-SCNC: 6 MEQ/L (ref 8–16)
ANISOCYTOSIS BLD QL SMEAR: PRESENT
BASOPHILS ABSOLUTE: 0 THOU/MM3 (ref 0–0.1)
BASOPHILS NFR BLD AUTO: 0.6 %
BUN SERPL-MCNC: 23 MG/DL (ref 7–22)
CALCIUM SERPL-MCNC: 8.1 MG/DL (ref 8.5–10.5)
CHLORIDE SERPL-SCNC: 108 MEQ/L (ref 98–111)
CO2 SERPL-SCNC: 23 MEQ/L (ref 23–33)
CREAT SERPL-MCNC: 1.2 MG/DL (ref 0.4–1.2)
DEPRECATED RDW RBC AUTO: 68.3 FL (ref 35–45)
EOSINOPHIL NFR BLD AUTO: 3.9 %
EOSINOPHILS ABSOLUTE: 0.2 THOU/MM3 (ref 0–0.4)
ERYTHROCYTE [DISTWIDTH] IN BLOOD BY AUTOMATED COUNT: 18.6 % (ref 11.5–14.5)
GFR SERPL CREATININE-BSD FRML MDRD: 44 ML/MIN/1.73M2
GLUCOSE SERPL-MCNC: 90 MG/DL (ref 70–108)
HCT VFR BLD AUTO: 26.8 % (ref 37–47)
HGB BLD-MCNC: 8.2 GM/DL (ref 12–16)
IMM GRANULOCYTES # BLD AUTO: 0.03 THOU/MM3 (ref 0–0.07)
IMM GRANULOCYTES NFR BLD AUTO: 0.6 %
LYMPHOCYTES ABSOLUTE: 0.5 THOU/MM3 (ref 1–4.8)
LYMPHOCYTES NFR BLD AUTO: 9.4 %
MCH RBC QN AUTO: 30.6 PG (ref 26–33)
MCHC RBC AUTO-ENTMCNC: 30.6 GM/DL (ref 32.2–35.5)
MCV RBC AUTO: 100 FL (ref 81–99)
MONOCYTES ABSOLUTE: 0.5 THOU/MM3 (ref 0.4–1.3)
MONOCYTES NFR BLD AUTO: 10.5 %
NEUTROPHILS NFR BLD AUTO: 75 %
NRBC BLD AUTO-RTO: 0 /100 WBC
PLATELET # BLD AUTO: 88 THOU/MM3 (ref 130–400)
PMV BLD AUTO: 11.6 FL (ref 9.4–12.4)
POTASSIUM SERPL-SCNC: 4.2 MEQ/L (ref 3.5–5.2)
POTASSIUM SERPL-SCNC: 4.2 MEQ/L (ref 3.5–5.2)
RBC # BLD AUTO: 2.68 MILL/MM3 (ref 4.2–5.4)
SEGMENTED NEUTROPHILS ABSOLUTE COUNT: 3.8 THOU/MM3 (ref 1.8–7.7)
SODIUM SERPL-SCNC: 137 MEQ/L (ref 135–145)
WBC # BLD AUTO: 5.1 THOU/MM3 (ref 4.8–10.8)

## 2023-07-03 PROCEDURE — 36415 COLL VENOUS BLD VENIPUNCTURE: CPT

## 2023-07-03 PROCEDURE — 80048 BASIC METABOLIC PNL TOTAL CA: CPT

## 2023-07-03 PROCEDURE — 6370000000 HC RX 637 (ALT 250 FOR IP): Performed by: PHYSICIAN ASSISTANT

## 2023-07-03 PROCEDURE — 6370000000 HC RX 637 (ALT 250 FOR IP)

## 2023-07-03 PROCEDURE — 99232 SBSQ HOSP IP/OBS MODERATE 35: CPT | Performed by: INTERNAL MEDICINE

## 2023-07-03 PROCEDURE — 2580000003 HC RX 258: Performed by: INTERNAL MEDICINE

## 2023-07-03 PROCEDURE — 97530 THERAPEUTIC ACTIVITIES: CPT

## 2023-07-03 PROCEDURE — 85025 COMPLETE CBC W/AUTO DIFF WBC: CPT

## 2023-07-03 PROCEDURE — 97535 SELF CARE MNGMENT TRAINING: CPT

## 2023-07-03 PROCEDURE — 1200000000 HC SEMI PRIVATE

## 2023-07-03 PROCEDURE — 71045 X-RAY EXAM CHEST 1 VIEW: CPT

## 2023-07-03 PROCEDURE — 97110 THERAPEUTIC EXERCISES: CPT

## 2023-07-03 PROCEDURE — 97116 GAIT TRAINING THERAPY: CPT

## 2023-07-03 RX ORDER — TRAMADOL HYDROCHLORIDE 50 MG/1
50 TABLET ORAL EVERY 6 HOURS PRN
Qty: 28 TABLET | Refills: 0 | Status: SHIPPED | OUTPATIENT
Start: 2023-07-03 | End: 2023-07-10

## 2023-07-03 RX ORDER — PSEUDOEPHEDRINE HCL 30 MG
100 TABLET ORAL 2 TIMES DAILY
Qty: 20 CAPSULE | Refills: 0 | Status: SHIPPED | OUTPATIENT
Start: 2023-07-03 | End: 2023-07-13

## 2023-07-03 RX ORDER — CYCLOBENZAPRINE HCL 5 MG
5 TABLET ORAL 3 TIMES DAILY PRN
Qty: 30 TABLET | Refills: 0 | Status: SHIPPED | OUTPATIENT
Start: 2023-07-03 | End: 2023-07-13

## 2023-07-03 RX ORDER — MIDODRINE HYDROCHLORIDE 2.5 MG/1
2.5 TABLET ORAL
Status: DISCONTINUED | OUTPATIENT
Start: 2023-07-03 | End: 2023-07-05 | Stop reason: HOSPADM

## 2023-07-03 RX ADMIN — CLOPIDOGREL BISULFATE 75 MG: 75 TABLET ORAL at 08:35

## 2023-07-03 RX ADMIN — MIDODRINE HYDROCHLORIDE 2.5 MG: 2.5 TABLET ORAL at 13:13

## 2023-07-03 RX ADMIN — SODIUM CHLORIDE: 9 INJECTION, SOLUTION INTRAVENOUS at 13:51

## 2023-07-03 RX ADMIN — SODIUM CHLORIDE: 9 INJECTION, SOLUTION INTRAVENOUS at 21:41

## 2023-07-03 RX ADMIN — PRAVASTATIN SODIUM 40 MG: 40 TABLET ORAL at 08:36

## 2023-07-03 RX ADMIN — PANTOPRAZOLE SODIUM 40 MG: 40 TABLET, DELAYED RELEASE ORAL at 16:25

## 2023-07-03 RX ADMIN — APIXABAN 5 MG: 5 TABLET, FILM COATED ORAL at 08:35

## 2023-07-03 RX ADMIN — TRAMADOL HYDROCHLORIDE 50 MG: 50 TABLET, COATED ORAL at 16:59

## 2023-07-03 RX ADMIN — PANTOPRAZOLE SODIUM 40 MG: 40 TABLET, DELAYED RELEASE ORAL at 05:16

## 2023-07-03 RX ADMIN — MIDODRINE HYDROCHLORIDE 2.5 MG: 2.5 TABLET ORAL at 16:25

## 2023-07-03 RX ADMIN — TRAMADOL HYDROCHLORIDE 50 MG: 50 TABLET, COATED ORAL at 23:17

## 2023-07-03 RX ADMIN — TRAMADOL HYDROCHLORIDE 50 MG: 50 TABLET, COATED ORAL at 08:45

## 2023-07-03 RX ADMIN — DOCUSATE SODIUM 100 MG: 100 CAPSULE, LIQUID FILLED ORAL at 08:35

## 2023-07-03 RX ADMIN — APIXABAN 5 MG: 5 TABLET, FILM COATED ORAL at 20:39

## 2023-07-03 ASSESSMENT — PAIN DESCRIPTION - DESCRIPTORS
DESCRIPTORS: ACHING
DESCRIPTORS: ACHING

## 2023-07-03 ASSESSMENT — PAIN DESCRIPTION - LOCATION
LOCATION: HIP
LOCATION: HIP

## 2023-07-03 ASSESSMENT — PAIN DESCRIPTION - FREQUENCY
FREQUENCY: CONTINUOUS
FREQUENCY: CONTINUOUS

## 2023-07-03 ASSESSMENT — PAIN SCALES - GENERAL
PAINLEVEL_OUTOF10: 9
PAINLEVEL_OUTOF10: 2
PAINLEVEL_OUTOF10: 2

## 2023-07-03 ASSESSMENT — PAIN DESCRIPTION - PAIN TYPE
TYPE: SURGICAL PAIN
TYPE: SURGICAL PAIN

## 2023-07-03 ASSESSMENT — PAIN - FUNCTIONAL ASSESSMENT
PAIN_FUNCTIONAL_ASSESSMENT: ACTIVITIES ARE NOT PREVENTED
PAIN_FUNCTIONAL_ASSESSMENT: ACTIVITIES ARE NOT PREVENTED

## 2023-07-03 ASSESSMENT — PAIN DESCRIPTION - ONSET
ONSET: ON-GOING
ONSET: ON-GOING

## 2023-07-03 ASSESSMENT — PAIN DESCRIPTION - ORIENTATION: ORIENTATION: LEFT

## 2023-07-03 NOTE — CARE COORDINATION
Case Management Assessment  Initial Evaluation    Date/Time of Evaluation: 7/3/2023 7:47 AM  Assessment Completed by: Marlene Garces RN    If patient is discharged prior to next notation, then this note serves as note for discharge by case management. Patient Name: Afia Sparks                   YOB: 1935  Diagnosis: Closed fracture of neck of left femur, initial encounter ([address]) [S72.002A]  Closed displaced fracture of left femoral neck ([address]) Sarina Lenz                   Date / Time: 6/30/2023  8:54 PM  Location: [facility]007     Patient Admission Status: Inpatient   Readmission Risk Low 0-14, Mod 15-19), High > 20: Readmission Risk Score: 23.3    Current PCP: Ronit Germain  PCP verified by CM? Yes    Chart Reviewed: Yes      History Provided by: Child/Family  Patient Orientation: Alert and Oriented    Patient Cognition: Alert    Hospitalization in the last 30 days (Readmission):  Yes    If yes, Readmission Assessment in CM Navigator will be completed. Advance Directives:      Code Status: Full Code   Patient's Primary Decision Maker is: Named in [facility] E Marlon     Primary Decision MakerRoxy Single - Child - [phone]    Secondary Decision Maker: Raeann Merritt - Laith - [phone]    Discharge Planning:    Patient lives with: Alone Type of Home: Apartment  Primary Care Giver: Self  Patient Support Systems include: Children, Family Members   Current Financial resources: Medicare  Current community resources: ECF/Home Care  Current services prior to admission: Durable Medical Equipment            Current DME: Walker            Type of Home Care services:  None    ADLS  Prior functional level: Independent in ADLs/IADLs, Shopping, Housework  Current functional level:  Independent in ADLs/IADLs, Housework, Shopping    Family can provide assistance at DC: No  Would you like Case Management to discuss the discharge plan with any other family members/significant others, and if so, who?

## 2023-07-03 NOTE — CARE COORDINATION
DISCHARGE PLANNING EVALUATION  7/3/23, 12:23 PM EDT    Reason for Referral:  ecf  Mental Status: alert, oriented   Decision Making:  makes own decisions   Family/Social/Home Environment: patient lives at home alone, was recently at [facility] and returned home on 6/29. She and family would like [phone] at discharge   Current Services including food security, transportation and housekeeping: no current services, family assists as needed, no concerns prior to admission  Current Equipment: walker   Payment Source: [employer]   Concerns or Barriers to Discharge: will need precert for ecf   Post-acute [facility]) provider list was provided to patient. Patient was informed of their freedom to choose [facility] provider. Discussed and offered to show the patient the relevant [facility] Providers quality and resource use measures on Medicare Compare web site via computer based on patient's goals of care and treatment preferences. Questions regarding selection process were answered. Teach Back Method used with patient and daughter regarding care plan and discharge plan  Patient and daughter verbalized understanding of the plan of care and contributed to goal setting. Patient goals, treatment preferences and discharge plan: spoke with Marcellina Landau and her daughter, they are requesting [id number]. Referral made, facility will accept and will start precert.       Electronically signed by GIRISH Campos on 7/3/2023 at 12:23 PM

## 2023-07-03 NOTE — DISCHARGE INSTR - COC
procedure approx 2 weeks ago. not healed yet. (Active)   Number of days: 290       Wound 09/16/22 Ankle Anterior;Right open tear or puncture wound approx 4mm long. (Active)   Number of days: 290       Incision 06/06/23 Perineum (Active)   Dressing Status Clean;Dry; Intact 06/09/23 1947   Dressing/Treatment Open to air 06/12/23 0805   Drainage Amount None 06/08/23 2101   Odor None 06/08/23 2101   Number of days: 26       Incision 07/01/23 Hip Left (Active)   Dressing Status Clean;Dry; Intact 07/02/23 2035   Dressing/Treatment ABD pad;Gauze dressing/dressing sponge;Tegaderm/transparent film dressing 07/02/23 2035   Closure Other (Comment) 07/02/23 2035   Margins Approximated 07/01/23 1517   Incision Assessment Other (Comment) 07/02/23 2035   Drainage Amount None 07/02/23 2035   Odor None 07/02/23 2035   Number of days: 1        Elimination:  Continence: Bowel: Yes  Bladder: Yes  Urinary Catheter: None   Colostomy/Ileostomy/Ileal Conduit: No       Date of Last BM: 7/5/23    Intake/Output Summary (Last 24 hours) at 7/3/2023 0829  Last data filed at 7/2/2023 2035  Gross per 24 hour   Intake 560 ml   Output 450 ml   Net 110 ml     I/O last 3 completed shifts: In: 560 [P.O.:560]  Out: 1000 [Urine:1000]    Safety Concerns:     History of Falls (last 30 days) and At Risk for Falls    Impairments/Disabilities:      None    Nutrition Therapy:  Current Nutrition Therapy:   - Oral Diet:  Dysphagia - Pureed    Routes of Feeding: Oral  Liquids: No Restrictions  Daily Fluid Restriction: no  Last Modified Barium Swallow with Video (Video Swallowing Test): not done    Treatments at the Time of Hospital Discharge:   Respiratory Treatments: ***  Oxygen Therapy:  is on oxygen at 1 L/min per nasal cannula.   Ventilator:    - No ventilator support    Rehab Therapies: Physical Therapy and Occupational Therapy  Weight Bearing Status/Restrictions: No weight bearing restrictions  Other Medical Equipment (for information only, NOT a DME

## 2023-07-04 LAB
ANION GAP SERPL CALC-SCNC: 7 MEQ/L (ref 8–16)
ANISOCYTOSIS BLD QL SMEAR: PRESENT
BASOPHILS ABSOLUTE: 0 THOU/MM3 (ref 0–0.1)
BASOPHILS NFR BLD AUTO: 0.6 %
BUN SERPL-MCNC: 17 MG/DL (ref 7–22)
CALCIUM SERPL-MCNC: 8 MG/DL (ref 8.5–10.5)
CHLORIDE SERPL-SCNC: 110 MEQ/L (ref 98–111)
CO2 SERPL-SCNC: 22 MEQ/L (ref 23–33)
CREAT SERPL-MCNC: 0.9 MG/DL (ref 0.4–1.2)
DEPRECATED RDW RBC AUTO: 66.7 FL (ref 35–45)
EOSINOPHIL NFR BLD AUTO: 3.2 %
EOSINOPHILS ABSOLUTE: 0.2 THOU/MM3 (ref 0–0.4)
ERYTHROCYTE [DISTWIDTH] IN BLOOD BY AUTOMATED COUNT: 18.2 % (ref 11.5–14.5)
GFR SERPL CREATININE-BSD FRML MDRD: > 60 ML/MIN/1.73M2
GLUCOSE SERPL-MCNC: 89 MG/DL (ref 70–108)
HCT VFR BLD AUTO: 26 % (ref 37–47)
HGB BLD-MCNC: 7.8 GM/DL (ref 12–16)
IMM GRANULOCYTES # BLD AUTO: 0.04 THOU/MM3 (ref 0–0.07)
IMM GRANULOCYTES NFR BLD AUTO: 0.9 %
LYMPHOCYTES ABSOLUTE: 0.4 THOU/MM3 (ref 1–4.8)
LYMPHOCYTES NFR BLD AUTO: 8.1 %
MCH RBC QN AUTO: 30.2 PG (ref 26–33)
MCHC RBC AUTO-ENTMCNC: 30 GM/DL (ref 32.2–35.5)
MCV RBC AUTO: 100.8 FL (ref 81–99)
MONOCYTES ABSOLUTE: 0.5 THOU/MM3 (ref 0.4–1.3)
MONOCYTES NFR BLD AUTO: 10.2 %
NEUTROPHILS NFR BLD AUTO: 77 %
NRBC BLD AUTO-RTO: 0 /100 WBC
PLATELET # BLD AUTO: 85 THOU/MM3 (ref 130–400)
PMV BLD AUTO: 12.1 FL (ref 9.4–12.4)
POTASSIUM SERPL-SCNC: 4.1 MEQ/L (ref 3.5–5.2)
POTASSIUM SERPL-SCNC: 4.1 MEQ/L (ref 3.5–5.2)
RBC # BLD AUTO: 2.58 MILL/MM3 (ref 4.2–5.4)
SEGMENTED NEUTROPHILS ABSOLUTE COUNT: 3.6 THOU/MM3 (ref 1.8–7.7)
SODIUM SERPL-SCNC: 139 MEQ/L (ref 135–145)
WBC # BLD AUTO: 4.7 THOU/MM3 (ref 4.8–10.8)

## 2023-07-04 PROCEDURE — 2580000003 HC RX 258: Performed by: PHYSICIAN ASSISTANT

## 2023-07-04 PROCEDURE — 99232 SBSQ HOSP IP/OBS MODERATE 35: CPT | Performed by: INTERNAL MEDICINE

## 2023-07-04 PROCEDURE — 80048 BASIC METABOLIC PNL TOTAL CA: CPT

## 2023-07-04 PROCEDURE — 1200000000 HC SEMI PRIVATE

## 2023-07-04 PROCEDURE — 6370000000 HC RX 637 (ALT 250 FOR IP)

## 2023-07-04 PROCEDURE — 36415 COLL VENOUS BLD VENIPUNCTURE: CPT

## 2023-07-04 PROCEDURE — 6370000000 HC RX 637 (ALT 250 FOR IP): Performed by: PHYSICIAN ASSISTANT

## 2023-07-04 PROCEDURE — 6360000002 HC RX W HCPCS: Performed by: PHYSICIAN ASSISTANT

## 2023-07-04 PROCEDURE — 2580000003 HC RX 258: Performed by: INTERNAL MEDICINE

## 2023-07-04 PROCEDURE — 85025 COMPLETE CBC W/AUTO DIFF WBC: CPT

## 2023-07-04 RX ADMIN — PANTOPRAZOLE SODIUM 40 MG: 40 TABLET, DELAYED RELEASE ORAL at 16:53

## 2023-07-04 RX ADMIN — TRAMADOL HYDROCHLORIDE 50 MG: 50 TABLET, COATED ORAL at 17:48

## 2023-07-04 RX ADMIN — HYDROMORPHONE HYDROCHLORIDE 0.25 MG: 1 INJECTION, SOLUTION INTRAMUSCULAR; INTRAVENOUS; SUBCUTANEOUS at 02:38

## 2023-07-04 RX ADMIN — MIDODRINE HYDROCHLORIDE 2.5 MG: 2.5 TABLET ORAL at 08:08

## 2023-07-04 RX ADMIN — ACETAMINOPHEN 650 MG: 325 TABLET ORAL at 08:11

## 2023-07-04 RX ADMIN — ACETAMINOPHEN 650 MG: 325 TABLET ORAL at 20:12

## 2023-07-04 RX ADMIN — CYCLOBENZAPRINE 5 MG: 10 TABLET, FILM COATED ORAL at 16:47

## 2023-07-04 RX ADMIN — TRAMADOL HYDROCHLORIDE 50 MG: 50 TABLET, COATED ORAL at 05:21

## 2023-07-04 RX ADMIN — DOCUSATE SODIUM 100 MG: 100 CAPSULE, LIQUID FILLED ORAL at 08:07

## 2023-07-04 RX ADMIN — ACETAMINOPHEN 650 MG: 325 TABLET ORAL at 14:17

## 2023-07-04 RX ADMIN — SODIUM CHLORIDE, PRESERVATIVE FREE 10 ML: 5 INJECTION INTRAVENOUS at 20:12

## 2023-07-04 RX ADMIN — DOCUSATE SODIUM 100 MG: 100 CAPSULE, LIQUID FILLED ORAL at 20:12

## 2023-07-04 RX ADMIN — SODIUM CHLORIDE: 9 INJECTION, SOLUTION INTRAVENOUS at 22:42

## 2023-07-04 RX ADMIN — MIDODRINE HYDROCHLORIDE 2.5 MG: 2.5 TABLET ORAL at 11:36

## 2023-07-04 RX ADMIN — PRAVASTATIN SODIUM 40 MG: 40 TABLET ORAL at 08:08

## 2023-07-04 RX ADMIN — TRAMADOL HYDROCHLORIDE 50 MG: 50 TABLET, COATED ORAL at 11:36

## 2023-07-04 RX ADMIN — APIXABAN 5 MG: 5 TABLET, FILM COATED ORAL at 08:08

## 2023-07-04 RX ADMIN — MIDODRINE HYDROCHLORIDE 2.5 MG: 2.5 TABLET ORAL at 16:49

## 2023-07-04 RX ADMIN — APIXABAN 5 MG: 5 TABLET, FILM COATED ORAL at 20:12

## 2023-07-04 RX ADMIN — SODIUM CHLORIDE: 9 INJECTION, SOLUTION INTRAVENOUS at 06:12

## 2023-07-04 RX ADMIN — CYCLOBENZAPRINE 5 MG: 10 TABLET, FILM COATED ORAL at 08:24

## 2023-07-04 RX ADMIN — CLOPIDOGREL BISULFATE 75 MG: 75 TABLET ORAL at 08:07

## 2023-07-04 RX ADMIN — PANTOPRAZOLE SODIUM 40 MG: 40 TABLET, DELAYED RELEASE ORAL at 05:19

## 2023-07-04 ASSESSMENT — PAIN SCALES - GENERAL
PAINLEVEL_OUTOF10: 5
PAINLEVEL_OUTOF10: 4
PAINLEVEL_OUTOF10: 5
PAINLEVEL_OUTOF10: 0
PAINLEVEL_OUTOF10: 8
PAINLEVEL_OUTOF10: 2
PAINLEVEL_OUTOF10: 5
PAINLEVEL_OUTOF10: 9
PAINLEVEL_OUTOF10: 7
PAINLEVEL_OUTOF10: 9
PAINLEVEL_OUTOF10: 0
PAINLEVEL_OUTOF10: 3
PAINLEVEL_OUTOF10: 7
PAINLEVEL_OUTOF10: 9

## 2023-07-04 ASSESSMENT — PAIN DESCRIPTION - DESCRIPTORS
DESCRIPTORS: ACHING
DESCRIPTORS: STABBING
DESCRIPTORS: STABBING
DESCRIPTORS: ACHING
DESCRIPTORS: STABBING

## 2023-07-04 ASSESSMENT — PAIN - FUNCTIONAL ASSESSMENT
PAIN_FUNCTIONAL_ASSESSMENT: PREVENTS OR INTERFERES SOME ACTIVE ACTIVITIES AND ADLS

## 2023-07-04 ASSESSMENT — PAIN DESCRIPTION - ONSET: ONSET: ON-GOING

## 2023-07-04 ASSESSMENT — PAIN DESCRIPTION - ORIENTATION
ORIENTATION: LEFT

## 2023-07-04 ASSESSMENT — PAIN DESCRIPTION - LOCATION
LOCATION: HIP

## 2023-07-04 ASSESSMENT — PAIN DESCRIPTION - FREQUENCY: FREQUENCY: CONTINUOUS

## 2023-07-04 ASSESSMENT — PAIN DESCRIPTION - PAIN TYPE: TYPE: SURGICAL PAIN

## 2023-07-05 VITALS
SYSTOLIC BLOOD PRESSURE: 116 MMHG | DIASTOLIC BLOOD PRESSURE: 70 MMHG | HEIGHT: 65 IN | HEART RATE: 83 BPM | RESPIRATION RATE: 16 BRPM | BODY MASS INDEX: 28.32 KG/M2 | TEMPERATURE: 97.3 F | WEIGHT: 170 LBS | OXYGEN SATURATION: 95 %

## 2023-07-05 LAB
ANION GAP SERPL CALC-SCNC: 11 MEQ/L (ref 8–16)
BASOPHILS ABSOLUTE: 0 THOU/MM3 (ref 0–0.1)
BASOPHILS NFR BLD AUTO: 0.5 %
BUN SERPL-MCNC: 15 MG/DL (ref 7–22)
CALCIUM SERPL-MCNC: 8 MG/DL (ref 8.5–10.5)
CHLORIDE SERPL-SCNC: 106 MEQ/L (ref 98–111)
CO2 SERPL-SCNC: 21 MEQ/L (ref 23–33)
CREAT SERPL-MCNC: 0.7 MG/DL (ref 0.4–1.2)
DEPRECATED RDW RBC AUTO: 64.7 FL (ref 35–45)
EOSINOPHIL NFR BLD AUTO: 4.2 %
EOSINOPHILS ABSOLUTE: 0.2 THOU/MM3 (ref 0–0.4)
ERYTHROCYTE [DISTWIDTH] IN BLOOD BY AUTOMATED COUNT: 17.8 % (ref 11.5–14.5)
GFR SERPL CREATININE-BSD FRML MDRD: > 60 ML/MIN/1.73M2
GLUCOSE SERPL-MCNC: 102 MG/DL (ref 70–108)
HCT VFR BLD AUTO: 27.6 % (ref 37–47)
HGB BLD-MCNC: 8.5 GM/DL (ref 12–16)
IMM GRANULOCYTES # BLD AUTO: 0.06 THOU/MM3 (ref 0–0.07)
IMM GRANULOCYTES NFR BLD AUTO: 1.4 %
LYMPHOCYTES ABSOLUTE: 0.5 THOU/MM3 (ref 1–4.8)
LYMPHOCYTES NFR BLD AUTO: 11.2 %
MCH RBC QN AUTO: 30.6 PG (ref 26–33)
MCHC RBC AUTO-ENTMCNC: 30.8 GM/DL (ref 32.2–35.5)
MCV RBC AUTO: 99.3 FL (ref 81–99)
MONOCYTES ABSOLUTE: 0.4 THOU/MM3 (ref 0.4–1.3)
MONOCYTES NFR BLD AUTO: 9.8 %
NEUTROPHILS NFR BLD AUTO: 72.9 %
NRBC BLD AUTO-RTO: 0 /100 WBC
PLATELET # BLD AUTO: 102 THOU/MM3 (ref 130–400)
PMV BLD AUTO: 11.6 FL (ref 9.4–12.4)
POTASSIUM SERPL-SCNC: 3.8 MEQ/L (ref 3.5–5.2)
RBC # BLD AUTO: 2.78 MILL/MM3 (ref 4.2–5.4)
SEGMENTED NEUTROPHILS ABSOLUTE COUNT: 3.1 THOU/MM3 (ref 1.8–7.7)
SODIUM SERPL-SCNC: 138 MEQ/L (ref 135–145)
WBC # BLD AUTO: 4.3 THOU/MM3 (ref 4.8–10.8)

## 2023-07-05 PROCEDURE — 2580000003 HC RX 258: Performed by: PHYSICIAN ASSISTANT

## 2023-07-05 PROCEDURE — 97110 THERAPEUTIC EXERCISES: CPT

## 2023-07-05 PROCEDURE — 85025 COMPLETE CBC W/AUTO DIFF WBC: CPT

## 2023-07-05 PROCEDURE — 6370000000 HC RX 637 (ALT 250 FOR IP)

## 2023-07-05 PROCEDURE — 97530 THERAPEUTIC ACTIVITIES: CPT

## 2023-07-05 PROCEDURE — 36415 COLL VENOUS BLD VENIPUNCTURE: CPT

## 2023-07-05 PROCEDURE — C1713 ANCHOR/SCREW BN/BN,TIS/BN: HCPCS

## 2023-07-05 PROCEDURE — 97535 SELF CARE MNGMENT TRAINING: CPT

## 2023-07-05 PROCEDURE — 99232 SBSQ HOSP IP/OBS MODERATE 35: CPT | Performed by: INTERNAL MEDICINE

## 2023-07-05 PROCEDURE — 80048 BASIC METABOLIC PNL TOTAL CA: CPT

## 2023-07-05 PROCEDURE — 6370000000 HC RX 637 (ALT 250 FOR IP): Performed by: PHYSICIAN ASSISTANT

## 2023-07-05 RX ADMIN — CLOPIDOGREL BISULFATE 75 MG: 75 TABLET ORAL at 10:14

## 2023-07-05 RX ADMIN — DOCUSATE SODIUM 100 MG: 100 CAPSULE, LIQUID FILLED ORAL at 10:14

## 2023-07-05 RX ADMIN — CYCLOBENZAPRINE 5 MG: 10 TABLET, FILM COATED ORAL at 04:50

## 2023-07-05 RX ADMIN — CYCLOBENZAPRINE 5 MG: 10 TABLET, FILM COATED ORAL at 14:49

## 2023-07-05 RX ADMIN — BUMETANIDE 0.5 MG: 0.5 TABLET ORAL at 10:14

## 2023-07-05 RX ADMIN — PRAVASTATIN SODIUM 40 MG: 40 TABLET ORAL at 10:13

## 2023-07-05 RX ADMIN — ACETAMINOPHEN 650 MG: 325 TABLET ORAL at 10:14

## 2023-07-05 RX ADMIN — SODIUM CHLORIDE, PRESERVATIVE FREE 10 ML: 5 INJECTION INTRAVENOUS at 10:13

## 2023-07-05 RX ADMIN — TRAMADOL HYDROCHLORIDE 50 MG: 50 TABLET, COATED ORAL at 12:36

## 2023-07-05 RX ADMIN — MIDODRINE HYDROCHLORIDE 2.5 MG: 2.5 TABLET ORAL at 12:36

## 2023-07-05 RX ADMIN — ACETAMINOPHEN 650 MG: 325 TABLET ORAL at 14:50

## 2023-07-05 RX ADMIN — APIXABAN 5 MG: 5 TABLET, FILM COATED ORAL at 10:14

## 2023-07-05 RX ADMIN — TRAMADOL HYDROCHLORIDE 50 MG: 50 TABLET, COATED ORAL at 04:51

## 2023-07-05 RX ADMIN — PANTOPRAZOLE SODIUM 40 MG: 40 TABLET, DELAYED RELEASE ORAL at 05:18

## 2023-07-05 ASSESSMENT — PAIN DESCRIPTION - DESCRIPTORS
DESCRIPTORS: ACHING

## 2023-07-05 ASSESSMENT — PAIN DESCRIPTION - LOCATION
LOCATION: HIP

## 2023-07-05 ASSESSMENT — PAIN DESCRIPTION - ORIENTATION
ORIENTATION: LEFT

## 2023-07-05 ASSESSMENT — PAIN DESCRIPTION - ONSET: ONSET: ON-GOING

## 2023-07-05 ASSESSMENT — PAIN SCALES - GENERAL
PAINLEVEL_OUTOF10: 1
PAINLEVEL_OUTOF10: 8
PAINLEVEL_OUTOF10: 8
PAINLEVEL_OUTOF10: 5
PAINLEVEL_OUTOF10: 7
PAINLEVEL_OUTOF10: 6

## 2023-07-05 ASSESSMENT — PAIN DESCRIPTION - FREQUENCY: FREQUENCY: CONTINUOUS

## 2023-07-05 ASSESSMENT — PAIN DESCRIPTION - PAIN TYPE: TYPE: SURGICAL PAIN

## 2023-07-05 NOTE — DISCHARGE SUMMARY
tablet TAKE 2 TABLETS BY MOUTH TWICE DAILY  Qty: 120 tablet, Refills: 7      pantoprazole (PROTONIX) 40 MG tablet Take 1 tablet by mouth 2 times daily (before meals)      albuterol sulfate HFA (PROVENTIL HFA) 108 (90 Base) MCG/ACT inhaler Inhale 2 puffs into the lungs every 6 hours as needed for Wheezing or Shortness of Breath  Qty: 1 Inhaler, Refills: 0      !! Blood Pressure KIT Check blood pressure daily, and if symptoms of lightheadedness. Call physician if BP <80/40. Qty: 1 kit, Refills: 0      melatonin 3 MG TABS tablet Take 2 tablets by mouth nightly as needed (sleep)  Qty: 60 tablet, Refills: 3      acetaminophen (TYLENOL) 650 MG extended release tablet Take 2 tablets by mouth every 12 hours as needed for Pain      timolol (TIMOPTIC) 0.5 % ophthalmic solution Place 1 drop into both eyes daily       ! ! - Potential duplicate medications found. Please discuss with provider.         Activity: activity as tolerated  Diet: regular diet  Wound Care: keep wound clean and dry    Follow-up with Dr Werner Mccarthy in 2 weeks    Signed:  Jennifer Smith PA-C    7/5/2023  2:28 PM

## 2023-07-05 NOTE — PLAN OF CARE
Consult received.   Please see progress note 07/03/23
Problem: Discharge Planning  Goal: Discharge to home or other facility with appropriate resources  7/3/2023 1124 by Luis Menard RN  Outcome: Progressing  Flowsheets (Taken 7/2/2023 0130 by David Grant RN)  Discharge to home or other facility with appropriate resources:   Identify barriers to discharge with patient and caregiver   Arrange for needed discharge resources and transportation as appropriate   Identify discharge learning needs (meds, wound care, etc)  7/2/2023 2131 by Sandie Sanchez RN  Outcome: Progressing     Problem: Pain  Goal: Verbalizes/displays adequate comfort level or baseline comfort level  7/3/2023 1124 by Luis Menard RN  Outcome: Progressing  Flowsheets (Taken 7/2/2023 0130 by David Grant RN)  Verbalizes/displays adequate comfort level or baseline comfort level:   Encourage patient to monitor pain and request assistance   Assess pain using appropriate pain scale   Administer analgesics based on type and severity of pain and evaluate response   Implement non-pharmacological measures as appropriate and evaluate response  7/2/2023 2131 by Sandie Sanchez RN  Outcome: Progressing     Problem: Safety - Adult  Goal: Free from fall injury  7/3/2023 1124 by Luis Menard RN  Outcome: Progressing  Flowsheets (Taken 7/2/2023 0130 by David Grant RN)  Free From Fall Injury: Instruct family/caregiver on patient safety  7/2/2023 2131 by Sandie Snachez RN  Outcome: Progressing     Problem: ABCDS Injury Assessment  Goal: Absence of physical injury  7/3/2023 1124 by Luis Menard RN  Outcome: Progressing  Flowsheets (Taken 7/2/2023 0130 by David Grant RN)  Absence of Physical Injury: Implement safety measures based on patient assessment  7/2/2023 2131 by Sandie Sanchez RN  Outcome: Progressing     Problem: Skin/Tissue Integrity  Goal: Absence of new skin breakdown  Description: 1. Monitor for areas of redness and/or skin breakdown  2. Assess vascular access sites hourly  3.   Every 4-6
Problem: Discharge Planning  Goal: Discharge to home or other facility with appropriate resources  7/4/2023 0023 by Inga Abarca RN  Outcome: Progressing  Flowsheets (Taken 7/3/2023 2034)  Discharge to home or other facility with appropriate resources:   Identify discharge learning needs (meds, wound care, etc)   Arrange for needed discharge resources and transportation as appropriate   Identify barriers to discharge with patient and caregiver   Refer to discharge planning if patient needs post-hospital services based on physician order or complex needs related to functional status, cognitive ability or social support system  7/3/2023 1124 by Jennifer Siddiqui RN  Outcome: Progressing  Flowsheets (Taken 7/2/2023 0130 by Cristopher Lagos, RN)  Discharge to home or other facility with appropriate resources:   Identify barriers to discharge with patient and caregiver   Arrange for needed discharge resources and transportation as appropriate   Identify discharge learning needs (meds, wound care, etc)     Problem: Pain  Goal: Verbalizes/displays adequate comfort level or baseline comfort level  7/4/2023 0023 by Inga Abarca RN  Outcome: Progressing  Flowsheets (Taken 7/4/2023 0023)  Verbalizes/displays adequate comfort level or baseline comfort level:   Encourage patient to monitor pain and request assistance   Administer analgesics based on type and severity of pain and evaluate response   Assess pain using appropriate pain scale   Implement non-pharmacological measures as appropriate and evaluate response  7/3/2023 1124 by Jennifer Siddiqui RN  Outcome: Progressing  Flowsheets (Taken 7/2/2023 0130 by Cristopher Lagos, RN)  Verbalizes/displays adequate comfort level or baseline comfort level:   Encourage patient to monitor pain and request assistance   Assess pain using appropriate pain scale   Administer analgesics based on type and severity of pain and evaluate response   Implement non-pharmacological measures as
Problem: Discharge Planning  Goal: Discharge to home or other facility with appropriate resources  7/4/2023 2308 by Elias Bradford RN  Outcome: Progressing  Flowsheets  Taken 7/4/2023 2307  Discharge to home or other facility with appropriate resources:   Identify barriers to discharge with patient and caregiver   Arrange for needed discharge resources and transportation as appropriate   Identify discharge learning needs (meds, wound care, etc)  Taken 7/4/2023 2011  Discharge to home or other facility with appropriate resources: Identify barriers to discharge with patient and caregiver  Note: To [facility] for discharge. Referral made, facility will accept and will start precert. Problem: Pain  Goal: Verbalizes/displays adequate comfort level or baseline comfort level  7/4/2023 2308 by Elias Bradford RN  Outcome: Progressing  Flowsheets (Taken 7/4/2023 2308)  Verbalizes/displays adequate comfort level or baseline comfort level:   Encourage patient to monitor pain and request assistance   Assess pain using appropriate pain scale   Administer analgesics based on type and severity of pain and evaluate response     Problem: ABCDS Injury Assessment  Goal: Absence of physical injury  7/4/2023 2308 by Elias Bradford RN  Outcome: Progressing  Flowsheets (Taken 7/4/2023 2308)  Absence of Physical Injury: Implement safety measures based on patient assessment     Problem: Skin/Tissue Integrity  Goal: Absence of new skin breakdown  Description: 1. Monitor for areas of redness and/or skin breakdown  2. Assess vascular access sites hourly  3. Every 4-6 hours minimum:  Change oxygen saturation probe site  4. Every 4-6 hours:  If on nasal continuous positive airway pressure, respiratory therapy assess nares and determine need for appliance change or resting period.   7/4/2023 2308 by Elias Bradford RN  Outcome: Progressing  Note: No new skin
Problem: Discharge Planning  Goal: Discharge to home or other facility with appropriate resources  Outcome: Adequate for Discharge     Problem: Pain  Goal: Verbalizes/displays adequate comfort level or baseline comfort level  Outcome: Adequate for Discharge     Problem: Safety - Adult  Goal: Free from fall injury  Outcome: Adequate for Discharge     Problem: ABCDS Injury Assessment  Goal: Absence of physical injury  Outcome: Adequate for Discharge     Problem: Skin/Tissue Integrity  Goal: Absence of new skin breakdown  Description: 1. Monitor for areas of redness and/or skin breakdown  2. Assess vascular access sites hourly  3. Every 4-6 hours minimum:  Change oxygen saturation probe site  4. Every 4-6 hours:  If on nasal continuous positive airway pressure, respiratory therapy assess nares and determine need for appliance change or resting period.   Outcome: Adequate for Discharge     Problem: Chronic Conditions and Co-morbidities  Goal: Patient's chronic conditions and co-morbidity symptoms are monitored and maintained or improved  Outcome: Adequate for Discharge
Problem: Discharge Planning  Goal: Discharge to home or other facility with appropriate resources  Outcome: Progressing     Problem: Pain  Goal: Verbalizes/displays adequate comfort level or baseline comfort level  Outcome: Progressing     Problem: Safety - Adult  Goal: Free from fall injury  Outcome: Progressing     Problem: ABCDS Injury Assessment  Goal: Absence of physical injury  Outcome: Progressing     Problem: Skin/Tissue Integrity  Goal: Absence of new skin breakdown  Description: 1. Monitor for areas of redness and/or skin breakdown  2. Assess vascular access sites hourly  3. Every 4-6 hours minimum:  Change oxygen saturation probe site  4. Every 4-6 hours:  If on nasal continuous positive airway pressure, respiratory therapy assess nares and determine need for appliance change or resting period. Outcome: Progressing     Problem: Chronic Conditions and Co-morbidities  Goal: Patient's chronic conditions and co-morbidity symptoms are monitored and maintained or improved  Outcome: Progressing   Care plan reviewed with patient. Patient verbalized understanding of the plan of care and contribute to goal setting.
appliance change or resting period. Outcome: Progressing     Problem: Chronic Conditions and Co-morbidities  Goal: Patient's chronic conditions and co-morbidity symptoms are monitored and maintained or improved  Outcome: Progressing  Flowsheets (Taken 7/3/2023 2034 by Dank Huang RN)  Care Plan - Patient's Chronic Conditions and Co-Morbidity Symptoms are Monitored and Maintained or Improved:   Collaborate with multidisciplinary team to address chronic and comorbid conditions and prevent exacerbation or deterioration   Monitor and assess patient's chronic conditions and comorbid symptoms for stability, deterioration, or improvement   Update acute care plan with appropriate goals if chronic or comorbid symptoms are exacerbated and prevent overall improvement and discharge   Care plan reviewed with patient. Patient verbalizes understanding of plan of care and contributes to goal setting.

## 2023-07-05 NOTE — CARE COORDINATION
7/5/23, 10:32 AM EDT    DISCHARGE PLANNING EVALUATION    Spoke with [id number] admissions, precert is approved through today for admission to [facility]. Spoke with patient, she is contacting her family to notify them of discharge. Transport is available at 4:00 pm.       7/5/23, 1:50 PM EDT    Patient goals/plan/ treatment preferences discussed by  and . Patient goals/plan/ treatment preferences reviewed with patient/ family. Patient/ family verbalize understanding of discharge plan and are in agreement with goal/plan/treatment preferences. Understanding was demonstrated using the teach back method. AVS provided by RN at time of discharge, which includes all necessary medical information pertaining to the patients current course of illness, treatment, post-discharge goals of care, and treatment preferences. Services At/After Discharge: [facility] (SNF) and In ambulance       IMM Letter  IMM Letter given to Patient/Family/Significant other/Guardian/POA/by[de-identified] CM:  Yair Lin RN  IMM Letter date given[de-identified] 07/05/23  IMM Letter time given[de-identified] 3205

## 2023-07-08 RX ORDER — METOPROLOL SUCCINATE 25 MG/1
12.5 TABLET, EXTENDED RELEASE ORAL DAILY
Qty: 45 TABLET | Refills: 0 | Status: SHIPPED | OUTPATIENT
Start: 2023-07-08 | End: 2023-11-05

## 2023-07-14 ENCOUNTER — TELEPHONE (OUTPATIENT)
Dept: CARDIOLOGY CLINIC | Age: 88
End: 2023-07-14

## 2023-07-14 NOTE — TELEPHONE ENCOUNTER
Missed download from device. Called [facility] is in rehab there. Nurse, Ursula Brennan, will talk to patient and asked to send download.

## 2023-07-18 ENCOUNTER — HOSPITAL ENCOUNTER (OUTPATIENT)
Age: 88
Setting detail: OBSERVATION
Discharge: NURSING, REHAB OR LONG-TERM CARE | End: 2023-07-22
Admitting: NURSE PRACTITIONER
Payer: MEDICARE

## 2023-07-18 ASSESSMENT — PAIN - FUNCTIONAL ASSESSMENT: PAIN_FUNCTIONAL_ASSESSMENT: 0-10

## 2023-07-18 ASSESSMENT — PAIN SCALES - GENERAL: PAINLEVEL_OUTOF10: 2

## 2023-07-19 ENCOUNTER — ANESTHESIA (OUTPATIENT)
Dept: ENDOSCOPY | Age: 88
End: 2023-07-19
Payer: MEDICARE

## 2023-07-19 ENCOUNTER — ANESTHESIA EVENT (OUTPATIENT)
Dept: ENDOSCOPY | Age: 88
End: 2023-07-19
Payer: MEDICARE

## 2023-07-19 PROBLEM — K92.2 GI BLEED: Status: ACTIVE | Noted: 2023-07-19

## 2023-07-19 PROCEDURE — 6360000002 HC RX W HCPCS

## 2023-07-19 PROCEDURE — 2580000003 HC RX 258

## 2023-07-19 RX ADMIN — PHENYLEPHRINE HYDROCHLORIDE 150 MCG: 10 INJECTION INTRAVENOUS at 15:25

## 2023-07-19 RX ADMIN — PROPOFOL 20 MG: 10 INJECTION, EMULSION INTRAVENOUS at 15:30

## 2023-07-19 RX ADMIN — PROPOFOL 20 MG: 10 INJECTION, EMULSION INTRAVENOUS at 15:28

## 2023-07-19 RX ADMIN — PROPOFOL 20 MG: 10 INJECTION, EMULSION INTRAVENOUS at 15:24

## 2023-07-19 RX ADMIN — PROPOFOL 20 MG: 10 INJECTION, EMULSION INTRAVENOUS at 15:20

## 2023-07-19 RX ADMIN — PHENYLEPHRINE HYDROCHLORIDE 200 MCG: 10 INJECTION INTRAVENOUS at 15:26

## 2023-07-19 RX ADMIN — PROPOFOL 20 MG: 10 INJECTION, EMULSION INTRAVENOUS at 15:26

## 2023-07-19 RX ADMIN — PROPOFOL 20 MG: 10 INJECTION, EMULSION INTRAVENOUS at 15:16

## 2023-07-19 RX ADMIN — PHENYLEPHRINE HYDROCHLORIDE 100 MCG: 10 INJECTION INTRAVENOUS at 15:15

## 2023-07-19 RX ADMIN — PROPOFOL 20 MG: 10 INJECTION, EMULSION INTRAVENOUS at 15:22

## 2023-07-19 RX ADMIN — PROPOFOL 20 MG: 10 INJECTION, EMULSION INTRAVENOUS at 15:18

## 2023-07-19 RX ADMIN — SODIUM CHLORIDE: 9 INJECTION, SOLUTION INTRAVENOUS at 15:06

## 2023-07-19 ASSESSMENT — PAIN DESCRIPTION - DESCRIPTORS
DESCRIPTORS: ACHING
DESCRIPTORS: ACHING

## 2023-07-19 ASSESSMENT — PAIN SCALES - GENERAL
PAINLEVEL_OUTOF10: 3
PAINLEVEL_OUTOF10: 4

## 2023-07-19 ASSESSMENT — COPD QUESTIONNAIRES: CAT_SEVERITY: MILD

## 2023-07-19 ASSESSMENT — PAIN - FUNCTIONAL ASSESSMENT: PAIN_FUNCTIONAL_ASSESSMENT: NONE - DENIES PAIN

## 2023-07-19 ASSESSMENT — PAIN DESCRIPTION - LOCATION: LOCATION: GENERALIZED

## 2023-07-19 NOTE — PROGRESS NOTES
Pharmacy Note - Renal dose adjustment made per P/T protocol    Original order:  Bactrim DS BID    Estimated Creatinine Clearance: 25 mL/min (A) (based on SCr of 1.7 mg/dL (H)). Recent Labs     07/18/23 2136   BUN 23*   CREATININE 1.7*       Renally adjusted order:  Bactrim DS daily    Please call pharmacy with any questions.     Thank you,  Alyce Perez, [facility]  7/19/2023 6:57 AM

## 2023-07-19 NOTE — ED NOTES
Bedpan placed per pt request prior to taking to IP bed.      Sergey Montenegro, MACRINA  47/21/29 8051

## 2023-07-19 NOTE — ED NOTES
Patient resting in bed with eyes closed. Respirations easy and unlabored. No distress noted. Call light within reach.       Karma Ness RN  07/19/23 7068

## 2023-07-19 NOTE — ANESTHESIA POSTPROCEDURE EVALUATION
Department of Anesthesiology  Postprocedure Note    Patient: Petty Kimble  MRN: [id number]  YOB: 1935  Date of evaluation: 7/19/2023      Procedure Summary     Date: 07/19/23 Room / Location: [address] [place], [place] [address] / [address] [place] [address]    Anesthesia Start: 4153 Anesthesia Stop: 6261    Procedure: EGD BIOPSY Diagnosis:       Other iron deficiency anemia      (Other iron deficiency anemia [D50.8])    Surgeons: Camron Lopes MD Responsible Provider: Lynne Boeck, DO    Anesthesia Type: MAC ASA Status: 4          Anesthesia Type: No value filed.     Christiane Phase I: Christiane Score: 10    Christiane Phase II:        Anesthesia Post Evaluation    Patient location during evaluation: bedside  Patient participation: complete - patient participated  Level of consciousness: awake  Pain score: 0  Airway patency: patent  Nausea & Vomiting: no nausea and no vomiting  Complications: no  Cardiovascular status: hemodynamically stable  Respiratory status: room air  Hydration status: stable

## 2023-07-19 NOTE — DISCHARGE INSTR - COC
Continuity of Care Form    Patient Name: Shamar Merino   :  1935  MRN:  [id number]    Admit date:  2023  Discharge date:  23    Code Status Order: Full Code   Advance Directives:     Admitting Physician:  No admitting provider for patient encounter.   PCP: Carl Caal    Discharging Nurse: [facility] Unit/Room#: 8B-25/025-A  Discharging Unit Phone Number: [phone]    Emergency Contact:   Extended Emergency Contact Information  Primary Emergency Contact: Vicki Clark [place] [address] [place] Phone: [phone]  Mobile Phone: [phone]  Relation: Child  Secondary Emergency Contact: Tab Basilio [place] [address] [place] Phone: [phone]  Mobile Phone: [phone]  Relation: Child    Past Surgical History:  Past Surgical History:   Procedure Laterality Date    ABDOMINAL AORTIC ANEURYSM REPAIR, ENDOVASCULAR N/A 2/15/2019    ABDOMINAL AORTIC ANEURYSM REPAIR ENDOVASCULAR, DECLOTTING RIGHT FEM TIB BYPASS GRAFT performed by Charles Ruby MD at [address] [facility],[address]    lumpectomy    CHOLECYSTECTOMY      30 years ago    COLONOSCOPY  2018    Dr Rose Paniagua N/A 2019    COLONOSCOPY DIAGNOSTIC performed by Saleem Red MD at [facility]    COLONOSCOPY N/A 2020    COLONOSCOPY CONTROL HEMORRHAGE performed by Collette Ramirez MD at [facility]    COLONOSCOPY Left 3/2/2021    COLORECTAL CANCER SCREENING, NOT HIGH RISK performed by Kavon Antunez MD at [address] [place], [place] [address]      eye, eyelids    EYE SURGERY      cataract    HEMICOLECTOMY N/A 2020    OPEN RIGHT COLON RESECTION performed by Vernon Crawford MD at [facility] 2023    Anal Exam under Anesthesia with Hemorridectomy performed by Johny Riddle MD at [address] Left 2023    LEFT HIP HEMIARTHROPLASTY performed by Lian Fernandez MD at [address] [facility] ( [facility]) 7/18/2023    Readmission Risk Assessment Score:  Readmission Risk              Risk of Unplanned Readmission:  0           Discharging to Facility/ Agency   Name: [facility]  Address: [address] [facility], [address] [place] [address]  Phone: [phone]  Fax: [phone]    Dialysis Facility (if applicable)   Name:  Address:  Dialysis Schedule:  Phone:  Fax:    / signature: Electronically signed by GIRISH Odell on 7/19/23 at 11:35 AM EDT    PHYSICIAN SECTION    Prognosis: Good    Condition at Discharge: Stable    Rehab Potential (if transferring to Rehab): Good    Recommended Labs or Other Treatments After Discharge: PT/OT. Monitor stools for bleeding. CBC/BMP in 1 week. Follow up with GI. Full liquid diet until seen. Physician Certification: I certify the above information and transfer of Alivia Roca  is necessary for the continuing treatment of the diagnosis listed and that she requires [address] for greater 30 days.      Update Admission H&P: No change in H&P    PHYSICIAN SIGNATURE:  Electronically signed by ROMAN Ayoub CNP on 7/22/23 at 12:12 PM EDT

## 2023-07-19 NOTE — CARE COORDINATION
7/19/23, 8:58 AM EDT      DISCHARGE PLANNING EVALUATION    Cathy Mesa  Admitted: 7/18/2023  Hospital Day: 0    Location: -25/025-A Reason for admit: GI bleed [K92.2]  Acute kidney injury ([address]) [N17.9]  Gastrointestinal hemorrhage, unspecified gastrointestinal hemorrhage type [K92.2]    Past Medical History:   Diagnosis Date    A-fib [facility])     AAA (abdominal aortic aneurysm) ([facility])     Achalasia     Anemia     Arthritis     Blood circulation, collateral     BPPV (benign paroxysmal positional vertigo) 6/6/16    CHF (congestive heart failure) ([facility])     Chronic kidney disease     sees Dr Marnie George    Colon cancer [facility])     Gastrointestinal hemorrhage     GERD (gastroesophageal reflux disease)     ? esophageal stricture    Hiatal hernia     History of blood transfusion     HTN (hypertension)     Hx of blood clots 2018    R leg-sees ABEBA Hargrove    Hyperlipidemia     [employer] dual ICD  8/26/2020    Neuromuscular disorder ([address])     Obesity     Osteopenia     Osteoporosis     PAC (premature atrial contraction)     on betablocker    Paralysis ([facility])     baby    Pedal edema     denied any hx of hypertension    Pneumonia     PVC (premature ventricular contraction)     sees Dr Maria Lim    PVD (peripheral vascular disease) ([address])     Small bowel obstruction ([address])     Tinnitus     UTI (urinary tract infection)        Procedure: No  Barriers to Discharge: Admitted from ER with GI bleeding. From ECF. HGB 9.6. Creat 1.7. IVF at 50/hr. GI consulted. PT/OT. SW to follow for discharge disposition.      PCP: Elkin Roberto    Readmission Risk Low 0-14, Mod 15-19), High > 20: Readmission Risk Score: 24.9      Advance Directives:      Code Status: Full Code   Patient's Primary Decision Maker is:      Primary Decision MakerAkeesha Park Child - [phone]    Secondary Decision Maker: Raeann Merritt - Child - [phone]    Patient Goals/Plan/Treatment Preferences: CM visit deferred today as SW will see for discharge disposition. Transportation/Food Security/Housekeeping Addressed: No issues identified.

## 2023-07-19 NOTE — ED NOTES
Patient repositioned at this time. Patient requested that her son be called and updated. Patient son's Adia Mettle called and updated on patient status and plan of care. Patient resting in bed. Respirations easy and unlabored. No distress noted. Call light within reach.       Samantha Sanchez RN  07/19/23 5341

## 2023-07-19 NOTE — ED NOTES
Removed bedpan. Prepared pt for transport to [facility] via wheelchair.      Mirna Bertrand LPN  11/95/84 1136

## 2023-07-19 NOTE — PROGRESS NOTES
EGD complete, biopsies taken and 1 jar sent to lab. photos taken, pt tolerated procedure well. Scope Number  used.

## 2023-07-19 NOTE — ED TRIAGE NOTES
Patient presents to ED via EMS from [facility] with chief complaint of GI bleed. Patient reports having two episode of bloody stools today. Patient states stools were dark with a red ring around it. Patient is on eliquis and had a recent hip replacement surgery. Patient resting in bed. Respirations easy and unlabored. No distress noted. Call light within reach.

## 2023-07-19 NOTE — PROGRESS NOTES
New GI consult noted. Upon review of the patient's chart, noted she is established with [facility], having seen Dileep Barrett CNP on 6/2/2023. Primary RN, Radha Broussard, contacted and requested that GI consult be forwarded to [facility]. Thank you.

## 2023-07-19 NOTE — ED NOTES
Blood occult stool screen collected at this time and sent to lab. Patient resting in bed. Respirations easy and unlabored. No distress noted. Call light within reach.       Artis Hayes RN  07/18/23 8511

## 2023-07-19 NOTE — PLAN OF CARE
Problem: Pain  Goal: Verbalizes/displays adequate comfort level or baseline comfort level  Outcome: Progressing     Problem: Skin/Tissue Integrity  Goal: Absence of new skin breakdown  Description: 1. Monitor for areas of redness and/or skin breakdown  2. Assess vascular access sites hourly  3. Every 4-6 hours minimum:  Change oxygen saturation probe site  4. Every 4-6 hours:  If on nasal continuous positive airway pressure, respiratory therapy assess nares and determine need for appliance change or resting period. Outcome: Progressing     Problem: Safety - Adult  Goal: Free from fall injury  Outcome: Progressing     Problem: Nutrition Deficit:  Goal: Optimize nutritional status  Outcome: Progressing   Patient admitted with noted blood in stool, positive for GI bleed.  Thinners on hold, protonix drip initiated, will monitor output closely

## 2023-07-19 NOTE — BRIEF OP NOTE
Brief Postoperative Note      Patient: Maryse Sanchez  YOB: 1935  MRN: [id number]    Date of Procedure: 7/19/2023    Pre-Op Diagnosis Codes:     * Other iron deficiency anemia [D50.8]    Post-Op Diagnosis: Same       Procedure(s):  EGD BIOPSY    Surgeon(s): Alex Ruff MD    Assistant:  * No surgical staff found *    Anesthesia: Monitor Anesthesia Care    Estimated Blood Loss (mL): Minimal    Complications: None    Specimens:   ID Type Source Tests Collected by Time Destination   A : Bx distal esophagus at 40 cm Tissue Esophagus SURGICAL PATHOLOGY Alex Ruff MD 7/19/2023 1529        Implants:  * No implants in log *      Drains:   [REMOVED] Urinary Catheter 07/01/23 (Removed)   $ Urethral catheter insertion Inserted for procedure 07/01/23 2015   Catheter Indications Perioperative use for selected surgical procedures 07/03/23 2043   Site Assessment Pink 07/03/23 2043   Urine Color Yellow 07/04/23 0307   Urine Appearance Clear 07/04/23 0307   Urine Odor Other (Comment) 07/02/23 0858   Collection Container Standard 07/03/23 2043   Securement Method Securing device (Describe) 07/03/23 2043   Catheter Care  Soap and water 07/03/23 1346   Catheter Best Practices  Drainage tube clipped to bed;Catheter secured to thigh; Bag below bladder; Tamper seal intact; Bag not on floor;Drainage bag less than half full;Lack of dependent loop in tubing 07/03/23 2043   Status Draining;Patent 07/03/23 2043   Output (mL) 500 mL 07/04/23 0820       [REMOVED] External Urinary Catheter (Removed)   Output (mL) 450 mL 07/01/23 1354       Findings: above      Electronically signed by Alex Ruff MD on 7/19/2023 at 3:36 PM

## 2023-07-19 NOTE — OP NOTE
Operative Note      Patient: Mara Eli  YOB: 1935  MRN: [id number]    Date of Procedure: 7/19/2023    Pre-Op Diagnosis Codes:     * Other iron deficiency anemia [D50.8]    Post-Op Diagnosis: Same:   Dilated esophagus filled with fluid to UES;  distal esophagitis  (bx);  gastritis with elongated stomach. Unable to enter the duodenum. No signs of gi bleeding. Liquids PO;  elevate HOB at least 30 degrees at all times. Change to Protonix BID. Consider colonoscopy  (we can also check duodenum using a longer scope). Procedure(s):  EGD BIOPSY    Surgeon(s): Linda Jimenez MD    Assistant:   * No surgical staff found *    Anesthesia: Monitor Anesthesia Care    Estimated Blood Loss (mL): Minimal    Complications: None    Specimens:   ID Type Source Tests Collected by Time Destination   A : Bx distal esophagus at 40 cm Tissue Esophagus SURGICAL PATHOLOGY Linda Jimenez MD 7/19/2023 1529        Implants:  * No implants in log *      Drains:   [REMOVED] Urinary Catheter 07/01/23 (Removed)   $ Urethral catheter insertion Inserted for procedure 07/01/23 2015   Catheter Indications Perioperative use for selected surgical procedures 07/03/23 2043   Site Assessment Pink 07/03/23 2043   Urine Color Yellow 07/04/23 0307   Urine Appearance Clear 07/04/23 0307   Urine Odor Other (Comment) 07/02/23 0858   Collection Container Standard 07/03/23 2043   Securement Method Securing device (Describe) 07/03/23 2043   Catheter Care  Soap and water 07/03/23 1346   Catheter Best Practices  Drainage tube clipped to bed;Catheter secured to thigh; Bag below bladder; Tamper seal intact; Bag not on floor;Drainage bag less than half full;Lack of dependent loop in tubing 07/03/23 2043   Status Draining;Patent 07/03/23 2043   Output (mL) 500 mL 07/04/23 0820       [REMOVED] External Urinary Catheter (Removed)   Output (mL) 450 mL 07/01/23 1354       Findings: above        Detailed Description of Procedure:

## 2023-07-19 NOTE — ED NOTES
ED to inpatient nurses report    Chief Complaint   Patient presents with    GI Bleeding      Present to ED from nursing home  LOC: alert and orientated to name, place, date  Vital signs   Vitals:    07/18/23 2237 07/19/23 0022 07/19/23 0127 07/19/23 0245   BP: (!) 118/55 125/77 (!) 108/59 126/80   Pulse: 85 77 76 91   Resp: 16 16 15 16   Temp:       TempSrc:       SpO2: 96% 97% 93% 94%   Weight:       Height:          Oxygen Baseline room air    Current needs required none Bipap/Cpap No  LDAs:   Peripheral IV 07/18/23 Left Antecubital (Active)   Site Assessment Clean, dry & intact 07/18/23 2156       Peripheral IV 07/19/23 Left Forearm (Active)     Mobility: Requires assistance * 1  Pending ED orders: none  Present condition: stable      C-SSRS Risk of Suicide: No Risk  Swallow Screening    Preferred [facility]: [facility]     Electronically signed by Herlinda Mendoza RN on 7/19/2023 at 3:23 AM      Herlinda Mendoza RN  07/19/23 2058

## 2023-07-19 NOTE — ED NOTES
Spoke to [employer] to approve pt transport to [facility] in stable condition.      Simi Rodriguez LPN  32/97/97 1248

## 2023-07-20 ENCOUNTER — TELEPHONE (OUTPATIENT)
Dept: CARDIOLOGY CLINIC | Age: 88
End: 2023-07-20

## 2023-07-20 ASSESSMENT — PAIN SCALES - GENERAL: PAINLEVEL_OUTOF10: 0

## 2023-07-20 NOTE — PROGRESS NOTES
7/20/23, 9:37 AM EDT    DISCHARGE ON GOING EVALUATION    Javier Mcnally day: 0  Location: -25/025-A Reason for admit: GI bleed [K92.2]  Acute kidney injury [facility] [N17.9]  Gastrointestinal hemorrhage, unspecified gastrointestinal hemorrhage type [K92.2]   Procedure: 7-19-23 EGD  Barriers to Discharge: Planning Colonoscopy tomorrow. Hgb presently stable at 9.4. Creat down to 1.5. IVF at 50/hr. PCP: Kavya Bal  Patient Goals/Plan/Treatment Preferences: SW following. Pt from [facility]. Pre-cert needed.

## 2023-07-20 NOTE — CARE COORDINATION
7/20/23, 9:29 AM EDT    DISCHARGE PLANNING EVALUATION    [facility] [address] requested pre-cert be started today once PT/OT notes are completed.

## 2023-07-20 NOTE — PROGRESS NOTES
Hospitalist Progress Note    Patient:  Ang Flores      Unit/Bed:8B-25/025-A    YOB: 1935    MRN: [id number]       Acct: [de-identified]     PCP: Segundo Patel    Date of Admission: 7/18/2023    Assessment/Plan:    Melena/hematochezia. EGD 7/19 with moderate gastritis and markedly dilated esophagus consistent with achalasia. Initially on Protonix gtt. Changed to IVP and PPI increased to BID. Hx of GERD. Plans for Colonoscopy tomorrow. HH trend is stable. Holding Eliquis and Plavix. Tolerating clear liquid diet. ALEJANDRO on CKD stage III. Creatinine 1.7 on admission. Gentle IVF. Creatinine improved at 1.5. Hyponatremia, resolved. Chronic dysphagia. Chronic normocytic anemia. Leukopenia, resolved. PAFIB. BB resumed. [facility] on hold. HFimpEF s/p PM. Last EF 50% 6/2023. Monitor fluid status closely with IVF. Holding Bumex. Prolonged Qtc. Repeat EKG. Primary hypertension. Severe PAD s/p multiple procedures & stents, last procedure 06/2023- on Plavix PTA  Hx of colon CA s/p right hemicolectomy  Hx of severe diverticular disease s/p partial colectomy  Hx of colonic anastomosis bleed      Chief Complaint: melena    Hospital Course:     Ang Flores is a 82 y.o. female with PMHx of HFpEF, achalasia, A-fib, anemia, colon cancer, GERD, HTN, hyperlipidemia, medtronic dual ICD, obesity, and PVD  who presents to [facility] with melena. Patient states that she is at a nursing home in rehab from having a hip fracture. States that today she had a bowel movement and noted to have blood in it and it was black in color as well. States that she had 2 episodes prior to staff sending her to the hospital. Endorses some fatigue today. Denies any shortness of breath, dizziness, lightheadedness, nausea, vomiting or diarrhea. Does endorse a history positive for colon cancer, hemorrhoids and GI bleeds. Subjective: No melena following EGD. Denies n/v. No abd pain.        Medications: 07/18/23 2136 07/19/23 0604 07/19/23  1638 07/19/23  2303 07/20/23  0554   WBC 5.0 4.0*  --   --  4.9   HGB 9.6* 9.0*  9.1* 9.4* 9.5* 9.4*  9.4*   HCT 31.6* 29.6*  29.4* 30.5* 33.8* 30.9*  30.8*    235  --   --  225     Recent Labs     07/18/23 2136 07/19/23  0604 07/20/23  0554   * 140 138   K 4.7 4.2 4.4    103 103   CO2 25 24 22*   BUN 23* 21 17   CREATININE 1.7* 1.6* 1.5*   CALCIUM 9.1 8.8 8.8   PHOS  --  3.2 3.2     Recent Labs     07/18/23 2136   AST 16   ALT <5*   BILITOT 0.3   ALKPHOS 160*     Recent Labs     07/18/23 2136 07/19/23  0604 07/20/23  0554   INR 1.70* 1.56* 1.32*     No results for input(s): Juan Manuel Serinessaant in the last 72 hours. Urinalysis:      Lab Results   Component Value Date/Time    NITRU NEGATIVE 07/01/2023 07:30 AM    WBCUA 15-25 07/01/2023 07:30 AM    WBCUA >100 05/04/2023 01:16 PM    BACTERIA FEW 07/01/2023 07:30 AM    RBCUA 0-2 07/01/2023 07:30 AM    BLOODU NEGATIVE 07/01/2023 07:30 AM    SPECGRAV 1.018 07/01/2023 07:30 AM    GLUCOSEU NEGATIVE 06/08/2023 08:55 AM       Radiology:  No orders to display       Diet: ADULT DIET; Clear Liquid;  Low Fat (less than or equal to 50 gm/day)    DVT prophylaxis: [] Lovenox                                 [x] SCDs                                 [] SQ Heparin                                 [] Encourage ambulation           [] Already on Anticoagulation     Disposition:    [x] Home       [] TCU       [] Rehab       [] Psych       [] SNF       [] [address]       [] Other-    Code Status: Full Code          Electronically signed by ROMAN Dneton CNP on 7/20/2023 at 11:40 AM

## 2023-07-20 NOTE — OP NOTE
[place] [address]                                OPERATIVE REPORT    PATIENT NAME: Vamsi Blum                     :        1935  MED REC NO:   [id number]                           ROOM:       0025  ACCOUNT NO:   [de-identified]                           ADMIT DATE: 2023  PROVIDER:     Morenonader Blum. JEFFREY Figueroa Doreen:  2023    PROCEDURE PERFORMED:  Upper endoscopy. INDICATION:  GI bleed. ANESTHESIA:  IV Diprivan per Anesthesia. DESCRIPTION OF PROCEDURE:  Prior to procedure, risks, benefits, and  complications (including but not limited to the following:  Bleeding,  infection, perforation, emergency surgery, stroke, heart attack, death,  possible anesthetic risks, and the fact that the procedure is not 100%  accurate or successful), indications, and alternatives of upper  endoscopy were explained to the patient. The patient understood and  agreed to the procedure. All questions were answered. With the patient in left lateral decubitus position, GIF-180  forward-viewing video endoscope was introduced in proximal esophagus. When   The esophagus was entered,  at the level of the upper esophageal sphincter,  I saw fluid present. We elevated the head of the bed. I removed the liquid   in the esophagus. Esophagus was markedly dilated consistent with known achalasia. Endoscope was advanced to GE junction approximately 40 cm. Distal esophagitis noted. No masses were seen. Endoscope advanced to proximal stomach. Moderate  gastritis was noted. The endoscope was advanced along greater curvature  of the stomach in the antrum. Gastritis is noted. I tried for 10  minutes to straighten the scope and advance into duodenum but was unable  to. Both the esophagus and stomach were very redundant. I did  retroflex. Cardiac and fundal portions of the stomach were evaluated.    No other

## 2023-07-20 NOTE — PROGRESS NOTES
[address] [place] [address]  INPATIENT PHYSICAL THERAPY  EVALUATION  [facility] MED SURG 8B - 8B-25/025-A    Time In: 6493  Time Out: 2592  Timed Code Treatment Minutes: 12 Minutes  Minutes: 20          Date: 2023  Patient Name: Paresh Medina,  Gender:  female        MRN: [id number]  : 1935  (82 y.o.)      Referring Practitioner: Jani Treivno MD  Diagnosis: GI bleed  Additional Pertinent Hx: 82 y.o. female with PMHx of HFpEF, achalasia, A-fib, anemia, colon cancer, GERD, HTN, hyperlipidemia, medtronic dual ICD, obesity, and PVD  who presents to [address] [place] [address] with melena. Patient states that she is at a nursing home in rehab from having a hip fracture(underwent Left hip hemiarthroplasty for femoral neck fracture by Dr. Wanda Ríos on 23). States that today she had a bowel movement and noted to have blood in it and it was black in color as well. States that she had 2 episodes prior to staff sending her to the hospital. Endorses some fatigue today. Restrictions/Precautions:  Restrictions/Precautions: Fall Risk, General Precautions    Subjective:  Chart Reviewed: Yes  Patient assessed for rehabilitation services?: Yes  Family / Caregiver Present: No  Subjective: RN approved session.  Pt pleasant and agreeable to therapy    General:  Overall Orientation Status: Within Functional Limits  Vision: Within Functional Limits  Hearing: Within functional limits       Pain: does not rate but reports:  \"achy\" BLE     Vitals: Vitals not assessed per clinical judgement, see nursing flowsheet    Social/Functional History:    Type of Home: Facility ([facility])  Home Equipment: bill Concepcion             ADL Assistance: Independent  Homemaking Assistance: Independent  Ambulation Assistance: Independent  Transfer Assistance: Independent               OBJECTIVE:  Range of Motion:  Bilateral Lower Extremity: WFL    Strength:  Bilateral Lower Extremity: Impaired - grossly deconditioned     Balance:  Static Sitting Balance:  Contact Guard Assistance  Static Standing Balance: Contact Guard Assistance, with RW     Bed Mobility:  Supine to Sit: Minimal Assistance, X 1, with head of bed raised, with rail    Transfers:  Sit to Stand: [employer], with verbal cues, from EOB x1, from Com[employer] x1  Stand to Sit:Contact Albin Schwab, X 1, with verbal cues    Ambulation:  Contact Guard Assistance, with verbal cues   Distance: 15'   Surface: Level Tile  Device:Rolling Walker  Gait Deviations:  Slow Pretty, Decreased Weight Shift Right, Decreased Gait Speed, and Decreased Heel Strike Bilaterally    Functional Outcome Measures: Completed  Balance Score: 8  AM-PAC Inpatient Mobility without Stair Climbing Raw Score : 15  AM-PAC Inpatient without Stair Climbing T-Scale Score : 43.03    ASSESSMENT:  Activity Tolerance:  Patient tolerance of  treatment: good. Treatment Initiated: Treatment and education initiated within context of evaluation. Evaluation time included review of current medical information, gathering information related to past medical, social and functional history, completion of standardized testing, formal and informal observation of tasks, assessment of data and development of plan of care and goals. Treatment time included skilled education and facilitation of tasks to increase safety and independence with functional mobility for improved independence and quality of life. Assessment: Body Structures, Functions, Activity Limitations Requiring Skilled Therapeutic Intervention: Decreased functional mobility , Decreased strength, Decreased endurance, Decreased balance, Decreased posture  Assessment: Nathan Weinberg is a 82 y.o. female that presents with GI bleed. Pt demonstrates a decrease in baseline by way of bed mobility, transfers and ambulation secondary to decreased activity tolerance, strength, fatigue, and balance deficits.  Pt will benefit from skilled PT services throughout admission and

## 2023-07-20 NOTE — PLAN OF CARE
Problem: Discharge Planning  Goal: Discharge to home or other facility with appropriate resources  Outcome: Progressing  Flowsheets (Taken 7/19/2023 2116)  Discharge to home or other facility with appropriate resources:   Identify barriers to discharge with patient and caregiver   Arrange for needed discharge resources and transportation as appropriate   Identify discharge learning needs (meds, wound care, etc)     Problem: Pain  Goal: Verbalizes/displays adequate comfort level or baseline comfort level  Outcome: Progressing  Flowsheets (Taken 7/20/2023 0610)  Verbalizes/displays adequate comfort level or baseline comfort level:   Encourage patient to monitor pain and request assistance   Assess pain using appropriate pain scale   Administer analgesics based on type and severity of pain and evaluate response   Implement non-pharmacological measures as appropriate and evaluate response   Consider cultural and social influences on pain and pain management   Notify Licensed Independent Practitioner if interventions unsuccessful or patient reports new pain     Problem: Skin/Tissue Integrity  Goal: Absence of new skin breakdown  Description: 1. Monitor for areas of redness and/or skin breakdown  2. Assess vascular access sites hourly  3. Every 4-6 hours minimum:  Change oxygen saturation probe site  4. Every 4-6 hours:  If on nasal continuous positive airway pressure, respiratory therapy assess nares and determine need for appliance change or resting period.   Outcome: Progressing     Problem: Safety - Adult  Goal: Free from fall injury  Outcome: Progressing  Flowsheets (Taken 7/19/2023 2116)  Free From Fall Injury: Instruct family/caregiver on patient safety     Problem: ABCDS Injury Assessment  Goal: Absence of physical injury  Outcome: Progressing  Flowsheets (Taken 7/19/2023 2116)  Absence of Physical Injury: Implement safety measures based on patient assessment     Problem: Nutrition Deficit:  Goal: Optimize

## 2023-07-20 NOTE — TELEPHONE ENCOUNTER
Pt daughter April called and [facility], pt is in hospital and off of her Eliquis pt was concerned and wanted Dr. Penelope Caldwell and staff be aware of this     Dre Benavidez [phone]

## 2023-07-20 NOTE — PLAN OF CARE
Problem: Discharge Planning  Goal: Discharge to home or other facility with appropriate resources  7/20/2023 0945 by Jennifer Mendez RN  Outcome: Progressing  Flowsheets (Taken 7/20/2023 0945)  Discharge to home or other facility with appropriate resources:   Identify barriers to discharge with patient and caregiver   Arrange for needed discharge resources and transportation as appropriate   Identify discharge learning needs (meds, wound care, etc)  7/20/2023 0610 by Gunjan Ferrer RN  Outcome: Progressing  Flowsheets (Taken 7/19/2023 2116)  Discharge to home or other facility with appropriate resources:   Identify barriers to discharge with patient and caregiver   Arrange for needed discharge resources and transportation as appropriate   Identify discharge learning needs (meds, wound care, etc)     Problem: Pain  Goal: Verbalizes/displays adequate comfort level or baseline comfort level  7/20/2023 0945 by Jennifer Mendez RN  Outcome: Progressing  Flowsheets (Taken 7/20/2023 0945)  Verbalizes/displays adequate comfort level or baseline comfort level:   Encourage patient to monitor pain and request assistance   Assess pain using appropriate pain scale   Administer analgesics based on type and severity of pain and evaluate response   Implement non-pharmacological measures as appropriate and evaluate response  7/20/2023 0610 by Gunjan Ferrer RN  Outcome: Progressing  Flowsheets (Taken 7/20/2023 0610)  Verbalizes/displays adequate comfort level or baseline comfort level:   Encourage patient to monitor pain and request assistance   Assess pain using appropriate pain scale   Administer analgesics based on type and severity of pain and evaluate response   Implement non-pharmacological measures as appropriate and evaluate response   Consider cultural and social influences on pain and pain management   Notify Licensed Independent Practitioner if interventions unsuccessful or patient reports new pain     Problem: as needed  7/20/2023 4213 by Shannan Zaidi RN  Outcome: Progressing  Flowsheets (Taken 7/20/2023 5194)  Nutrient intake appropriate for improving, restoring, or maintaining nutritional needs:   Assess nutritional status and recommend course of action   Recommend appropriate diets, oral nutritional supplements, and vitamin/mineral supplements   Order, calculate, and assess calorie counts as needed   Recommend, monitor, and adjust tube feedings and TPN/PPN based on assessed needs   Provide specific nutrition education to patient or family as appropriate     Problem: Chronic Conditions and Co-morbidities  Goal: Patient's chronic conditions and co-morbidity symptoms are monitored and maintained or improved  7/20/2023 0945 by Hiawatha Peabody, RN  Outcome: Progressing  Flowsheets (Taken 7/20/2023 0945)  Care Plan - Patient's Chronic Conditions and Co-Morbidity Symptoms are Monitored and Maintained or Improved:   Monitor and assess patient's chronic conditions and comorbid symptoms for stability, deterioration, or improvement   Collaborate with multidisciplinary team to address chronic and comorbid conditions and prevent exacerbation or deterioration   Update acute care plan with appropriate goals if chronic or comorbid symptoms are exacerbated and prevent overall improvement and discharge  7/20/2023 0610 by Shannan Zaidi RN  Outcome: Progressing  Flowsheets (Taken 7/19/2023 2116)  Care Plan - Patient's Chronic Conditions and Co-Morbidity Symptoms are Monitored and Maintained or Improved:   Monitor and assess patient's chronic conditions and comorbid symptoms for stability, deterioration, or improvement   Collaborate with multidisciplinary team to address chronic and comorbid conditions and prevent exacerbation or deterioration

## 2023-07-20 NOTE — PROGRESS NOTES
commode  Bathroom Accessibility: Walker accessible    Receives Help From: [employer], Family (check in on patient)  ADL Assistance: Independent  Homemaking Assistance: Independent  Ambulation Assistance: Independent  Transfer Assistance: Independent    Active : Yes (hasn't driven in approx 1 month)  Occupation: Retired  Additional Comments: patient was at an ECF ([facility]) prior to admit for therapy only and planned on returning to her condo after the rehab stay. VISION:WFL    HEARING:  WFL    COGNITION: Decreased Recall and Decreased Insight    RANGE OF MOTION:  Bilateral Upper Extremity:  WFL    STRENGTH:  Bilateral Upper Extremity:  shldr 4/5 elbow flex 4+/5 ext 4/5  (F)    SENSATION:   WFL    ADL:   Grooming: Stand By Assistance and [address]. Standing at sink with cues for 2 w/w placement to complete oral care. Completed extensive routine. Footwear Management: Maximum Assistance. To doff/don slipper socks due to BRYSON precautions . BALANCE:  Sitting Balance:  Independent. Standing Balance: Contact Guard Assistance. With use of 2 w/w for support    BED MOBILITY:  Not Tested    TRANSFERS:  Sit to Stand:  Contact Guard Assistance. Cues for hand placement     FUNCTIONAL MOBILITY:  Assistive Device: Rolling Walker  Assist Level:  Contact Guard Assistance. Distance: To and from bathroom       Activity Tolerance:  Patient tolerance of  treatment: Good treatment tolerance      Assessment:  Assessment: patient demo overall de-conditioning and weakness with hx of L BRYSON in early July and was recently admitted to hospital for GI bleed. patient continues to have BRYSON precautions impacting her ability to complete ADLs and functional transfers as prior. patient would benefit from continued, skilled OT to progress toward PLOF, decrease caregiver burden and increase occupational performance.   Performance deficits / Impairments: Decreased functional mobility , Decreased ADL status, Decreased strength, Decreased endurance, Decreased balance  Prognosis: Good  REQUIRES OT FOLLOW-UP: Yes  Decision Making: Medium Complexity    Treatment Initiated: Treatment and education initiated within context of evaluation. Evaluation time included review of current medical information, gathering information related to past medical, social and functional history, completion of standardized testing, formal and informal observation of tasks, assessment of data and development of plan of care and goals. Treatment time included skilled education and facilitation of tasks to increase safety and independence with ADL's for improved functional independence and quality of life. Discharge Recommendations:  Continue to assess pending progress, Patient would benefit from continued therapy after discharge, Subacute/Skilled Nursing Facility    Patient Education:     Patient Education  Education Given To: Patient  Education Provided: Role of Therapy, Precautions, Fall Prevention Strategies, Plan of Care, ADL Adaptive Strategies, Transfer Training  Barriers to Learning: None    Equipment Recommendations:  Equipment Needed: Yes  Mobility Devices: ADL Assistive Devices    Plan:  Times Per Week: 4-5x  Times Per Day: Once a day  Current Treatment Recommendations: Strengthening, Balance training, Functional mobility training, Endurance training, Neuromuscular re-education, Safety education & training, Equipment evaluation, education, & procurement, Patient/Caregiver education & training, Self-Care / ADL. See long-term goal time frame for expected duration of plan of care. If no long-term goals established, a short length of stay is anticipated. Goals:  Patient goals : return [facility] at [facility]  Short Term Goals  Time Frame for Short Term Goals: by discharge  Short Term Goal 1: patient will tolerate 6 min functional standing with two hand release with (S) to increase ease with toileting and grooming.   Short Term Goal 2: patient will functionally ambulate house hold distances with (S). Short Term Goal 3: patient will complete ADL routine with (S), use of AE and 0-1 cues for hip precautions. Short Term Goal 4: patient will participate in moderate resistive UB exer to increase UB strength for functional transfers. Following session, patient left in safe position with all fall risk precautions in place.

## 2023-07-21 ENCOUNTER — ANESTHESIA EVENT (OUTPATIENT)
Dept: ENDOSCOPY | Age: 88
End: 2023-07-21
Payer: MEDICARE

## 2023-07-21 ENCOUNTER — ANESTHESIA (OUTPATIENT)
Dept: ENDOSCOPY | Age: 88
End: 2023-07-21
Payer: MEDICARE

## 2023-07-21 PROCEDURE — 2580000003 HC RX 258: Performed by: NURSE ANESTHETIST, CERTIFIED REGISTERED

## 2023-07-21 PROCEDURE — 6360000002 HC RX W HCPCS: Performed by: NURSE ANESTHETIST, CERTIFIED REGISTERED

## 2023-07-21 RX ORDER — PROPOFOL 10 MG/ML
INJECTION, EMULSION INTRAVENOUS PRN
Status: DISCONTINUED | OUTPATIENT
Start: 2023-07-21 | End: 2023-07-21 | Stop reason: SDUPTHER

## 2023-07-21 RX ORDER — SODIUM CHLORIDE 9 MG/ML
INJECTION, SOLUTION INTRAVENOUS CONTINUOUS PRN
Status: DISCONTINUED | OUTPATIENT
Start: 2023-07-21 | End: 2023-07-21 | Stop reason: SDUPTHER

## 2023-07-21 RX ADMIN — PROPOFOL 20 MG: 10 INJECTION, EMULSION INTRAVENOUS at 13:56

## 2023-07-21 RX ADMIN — PROPOFOL 20 MG: 10 INJECTION, EMULSION INTRAVENOUS at 13:36

## 2023-07-21 RX ADMIN — PROPOFOL 20 MG: 10 INJECTION, EMULSION INTRAVENOUS at 13:25

## 2023-07-21 RX ADMIN — PROPOFOL 20 MG: 10 INJECTION, EMULSION INTRAVENOUS at 13:32

## 2023-07-21 RX ADMIN — PROPOFOL 20 MG: 10 INJECTION, EMULSION INTRAVENOUS at 13:47

## 2023-07-21 RX ADMIN — PROPOFOL 20 MG: 10 INJECTION, EMULSION INTRAVENOUS at 13:41

## 2023-07-21 RX ADMIN — SODIUM CHLORIDE: 9 INJECTION, SOLUTION INTRAVENOUS at 13:23

## 2023-07-21 RX ADMIN — PROPOFOL 20 MG: 10 INJECTION, EMULSION INTRAVENOUS at 13:51

## 2023-07-21 RX ADMIN — PROPOFOL 30 MG: 10 INJECTION, EMULSION INTRAVENOUS at 13:24

## 2023-07-21 RX ADMIN — PROPOFOL 20 MG: 10 INJECTION, EMULSION INTRAVENOUS at 13:28

## 2023-07-21 ASSESSMENT — PAIN SCALES - GENERAL
PAINLEVEL_OUTOF10: 4
PAINLEVEL_OUTOF10: 0

## 2023-07-21 ASSESSMENT — PAIN DESCRIPTION - FREQUENCY: FREQUENCY: CONTINUOUS

## 2023-07-21 ASSESSMENT — PAIN - FUNCTIONAL ASSESSMENT: PAIN_FUNCTIONAL_ASSESSMENT: PREVENTS OR INTERFERES SOME ACTIVE ACTIVITIES AND ADLS

## 2023-07-21 ASSESSMENT — PAIN DESCRIPTION - DESCRIPTORS: DESCRIPTORS: ACHING

## 2023-07-21 ASSESSMENT — PAIN DESCRIPTION - ONSET: ONSET: ON-GOING

## 2023-07-21 ASSESSMENT — PAIN DESCRIPTION - LOCATION: LOCATION: GENERALIZED

## 2023-07-21 ASSESSMENT — COPD QUESTIONNAIRES: CAT_SEVERITY: MILD

## 2023-07-21 ASSESSMENT — PAIN DESCRIPTION - PAIN TYPE: TYPE: ACUTE PAIN

## 2023-07-21 NOTE — PLAN OF CARE
Problem: Discharge Planning  Goal: Discharge to home or other facility with appropriate resources  Outcome: Progressing  Flowsheets (Taken 7/21/2023 1802)  Discharge to home or other facility with appropriate resources:   Identify barriers to discharge with patient and caregiver   Arrange for needed discharge resources and transportation as appropriate   Identify discharge learning needs (meds, wound care, etc)   Arrange for interpreters to assist at discharge as needed   Refer to discharge planning if patient needs post-hospital services based on physician order or complex needs related to functional status, cognitive ability or social support system     Problem: Pain  Goal: Verbalizes/displays adequate comfort level or baseline comfort level  Outcome: Progressing  Flowsheets (Taken 7/21/2023 1802)  Verbalizes/displays adequate comfort level or baseline comfort level:   Encourage patient to monitor pain and request assistance   Assess pain using appropriate pain scale   Administer analgesics based on type and severity of pain and evaluate response   Implement non-pharmacological measures as appropriate and evaluate response   Consider cultural and social influences on pain and pain management   Notify Licensed Independent Practitioner if interventions unsuccessful or patient reports new pain     Problem: Skin/Tissue Integrity  Goal: Absence of new skin breakdown  Description: 1. Monitor for areas of redness and/or skin breakdown  2. Assess vascular access sites hourly  3. Every 4-6 hours minimum:  Change oxygen saturation probe site  4. Every 4-6 hours:  If on nasal continuous positive airway pressure, respiratory therapy assess nares and determine need for appliance change or resting period.   Outcome: Progressing     Problem: Safety - Adult  Goal: Free from fall injury  Outcome: Progressing  Flowsheets (Taken 7/21/2023 1802)  Free From Fall Injury:   Instruct family/caregiver on patient safety   Based on rate   Monitor urine output and notify Licensed Independent Practitioner for values outside of normal range   Assess for signs of decreased cardiac output   Administer vasoactive medications as ordered   Administer fluid and/or volume expanders as ordered  Goal: Absence of cardiac dysrhythmias or at baseline  Outcome: Progressing  Flowsheets (Taken 7/21/2023 1802)  Absence of cardiac dysrhythmias or at baseline:   Monitor cardiac rate and rhythm   Assess for signs of decreased cardiac output   Administer antiarrhythmia medication and electrolyte replacement as ordered     Problem: Musculoskeletal - Adult  Goal: Return mobility to safest level of function  Outcome: Progressing  Flowsheets (Taken 7/21/2023 1802)  Return Mobility to Safest Level of Function:   Assess patient stability and activity tolerance for standing, transferring and ambulating with or without assistive devices   Assist with transfers and ambulation using safe patient handling equipment as needed   Ensure adequate protection for wounds/incisions during mobilization   Obtain physical therapy/occupational therapy consults as needed   Apply continuous passive motion per provider or physical therapy orders to increase flexion toward goal   Instruct patient/family in ordered activity level     Problem: Gastrointestinal - Adult  Goal: Maintains or returns to baseline bowel function  Outcome: Progressing  Flowsheets (Taken 7/21/2023 1802)  Maintains or returns to baseline bowel function:   Assess bowel function   Encourage oral fluids to ensure adequate hydration   Administer IV fluids as ordered to ensure adequate hydration   Administer ordered medications as needed   Encourage mobilization and activity   Nutrition consult to assist patient with appropriate food choices  Goal: Maintains adequate nutritional intake  Outcome: Progressing  Flowsheets (Taken 7/21/2023 1802)  Maintains adequate nutritional intake:   Monitor percentage of each meal consumed

## 2023-07-21 NOTE — PROGRESS NOTES
Patient brought to phase 2. VSS. Report called to [facility] on 8b.

## 2023-07-21 NOTE — BRIEF OP NOTE
Brief Postoperative Note      Patient: Jayashree Gore  YOB: 1935  MRN: [id number]    Date of Procedure: 7/21/2023    Pre-Op Diagnosis Codes:     * Gastrointestinal hemorrhage, unspecified gastrointestinal hemorrhage type [K92.2]    Post-Op Diagnosis: Same       Procedure(s):  COLONOSCOPY POLYPECTOMY SNARE/COLD BIOPSY  EGD ESOPHAGOGASTRODUODENOSCOPY    Surgeon(s): Olga To MD    Assistant:  * No surgical staff found *    Anesthesia: Monitor Anesthesia Care    Estimated Blood Loss (mL): Minimal    Complications: None    Specimens:   ID Type Source Tests Collected by Time Destination   A : hepatic flexure polyp Tissue Colon SURGICAL PATHOLOGY Olga To MD 7/21/2023 1334        Implants:  * No implants in log *      Drains:   [REMOVED] Urinary Catheter 07/01/23 (Removed)   $ Urethral catheter insertion Inserted for procedure 07/01/23 2015   Catheter Indications Perioperative use for selected surgical procedures 07/03/23 2043   Site Assessment Pink 07/03/23 2043   Urine Color Yellow 07/04/23 0307   Urine Appearance Clear 07/04/23 0307   Urine Odor Other (Comment) 07/02/23 0858   Collection Container Standard 07/03/23 2043   Securement Method Securing device (Describe) 07/03/23 2043   Catheter Care  Soap and water 07/03/23 1346   Catheter Best Practices  Drainage tube clipped to bed;Catheter secured to thigh; Bag below bladder; Tamper seal intact; Bag not on floor;Drainage bag less than half full;Lack of dependent loop in tubing 07/03/23 2043   Status Draining;Patent 07/03/23 2043   Output (mL) 500 mL 07/04/23 0820       [REMOVED] External Urinary Catheter (Removed)   Output (mL) 450 mL 07/01/23 1354       Findings: above      Electronically signed by Olga To MD on 7/21/2023 at 2:06 PM

## 2023-07-21 NOTE — CARE COORDINATION
7/21/23, 11:00 AM EDT    DISCHARGE  [address] day: 0  Location: [facility]25/025-A Reason for admit: GI bleed [K92.2]  Acute kidney injury [facility] [N17.9]  Gastrointestinal hemorrhage, unspecified gastrointestinal hemorrhage type [K92.2]   Procedure: 7-19-23 EGD  7-21-23 Pending Colonoscopy  Barriers to Discharge: Hgb stable today at 9.1. Colonoscopy planned for this afternoon. Possible weekend discharge pending pre-cert. PCP: Oneida Sandifer  Patient Goals/Plan/Treatment Preferences: Return to [facility] pending pre-cert. SW following.

## 2023-07-21 NOTE — OP NOTE
Operative Note      Patient: Hilario Curtis  YOB: 1935  MRN: [id number]    Date of Procedure: 7/21/2023    Pre-Op Diagnosis Codes:     * Gastrointestinal hemorrhage, unspecified gastrointestinal hemorrhage type [K92.2]    Post-Op Diagnosis: Same:  Sigmoid anastomosis  and right colon anastomosis with small bowel. No active bleeding,  Scattered diverticuli and old blood. EGD  completed  (duodenum not visualized on 7/19/21). No new findings in esophagus or stomach. Small bowel normal to 3rd and 4th portions of the duodenum. Bleeding most likely from diverticula. Will resume liquid diet,  resume anticoagulation 7/22/23 as needed. We will sign off at this time. Procedure(s):  COLONOSCOPY POLYPECTOMY SNARE/COLD BIOPSY  EGD ESOPHAGOGASTRODUODENOSCOPY    Surgeon(s): Lesley Kellogg MD    Assistant:   * No surgical staff found *    Anesthesia: Monitor Anesthesia Care    Estimated Blood Loss (mL): Minimal    Complications: None    Specimens:   ID Type Source Tests Collected by Time Destination   A : hepatic flexure polyp Tissue Colon SURGICAL PATHOLOGY Lesley Kellogg MD 7/21/2023 1334        Implants:  * No implants in log *      Drains:   [REMOVED] Urinary Catheter 07/01/23 (Removed)   $ Urethral catheter insertion Inserted for procedure 07/01/23 2015   Catheter Indications Perioperative use for selected surgical procedures 07/03/23 2043   Site Assessment Pink 07/03/23 2043   Urine Color Yellow 07/04/23 0307   Urine Appearance Clear 07/04/23 0307   Urine Odor Other (Comment) 07/02/23 0858   Collection Container Standard 07/03/23 2043   Securement Method Securing device (Describe) 07/03/23 2043   Catheter Care  Soap and water 07/03/23 1346   Catheter Best Practices  Drainage tube clipped to bed;Catheter secured to thigh; Bag below bladder; Tamper seal intact; Bag not on floor;Drainage bag less than half full;Lack of dependent loop in tubing 07/03/23 2043   Status Draining;Patent 07/03/23 2043

## 2023-07-21 NOTE — PROGRESS NOTES
1,000ml tap water enema given; Pt tolerated well; Small BM resulted; Pt did not finish bowel prep for colonoscopy today.

## 2023-07-21 NOTE — PROGRESS NOTES
1000 ML tap water enema given; Pt tolerated well with thin liquid, no solidified BM; Pt did not finish bowel prep for colonoscopy today.

## 2023-07-21 NOTE — CARE COORDINATION
7/21/23, 10:45 AM EDT    DISCHARGE PLANNING EVALUATION    Spoke with Yvonne Scott at [facility], pre-cert started today 9/78. Gave her nurses station phone number in case the pre-cert is approved over the weekend. 11:14 AM  Spoke with patient, family will transport at discharge.

## 2023-07-21 NOTE — PROGRESS NOTES
Hospitalist Progress Note    Patient:  Hilario Curtis      Unit/Bed:[facility] ENDO POOL RM/NONE    YOB: 1935    MRN: [id number]       Acct: [de-identified]     PCP: Kevin Wetzel    Date of Admission: 7/18/2023    Assessment/Plan:    Melena/hematochezia. EGD 7/19 with moderate gastritis and markedly dilated esophagus consistent with achalasia. Initially on Protonix gtt. Changed to IVP and PPI increased to BID. Hx of GERD. Plans for Colonoscopy today. Did not complete bowel prep. GI is aware and ordered enemas. Scope pushed back until this afternoon. HH trend is stable at 9.1 this AM. Holding Eliquis and Plavix. Tolerated clear liquid diet. ALEJANDRO on CKD stage III. Creatinine 1.7 on admission. Gentle IVF. Creatinine improved at 1.4. Hyponatremia, resolved. Chronic dysphagia. Chronic normocytic anemia. Leukopenia, resolved. PAFIB. BB resumed. [address] on hold. HFimpEF s/p PM. Last EF 50% 6/2023. Monitor fluid status closely with IVF. Holding Bumex. Prolonged Qtc. Repeat EKG. Primary hypertension. Severe PAD s/p multiple procedures & stents, last procedure 06/2023- on Plavix PTA  Hx of colon CA s/p right hemicolectomy  Hx of severe diverticular disease s/p partial colectomy  Hx of colonic anastomosis bleed    Dispo: discussed with [facility]. She is a precert to go back. This will be started today. Chief Complaint: melena    Hospital Course:     Hilario Curtis is a 82 y.o. female with PMHx of HFpEF, achalasia, A-fib, anemia, colon cancer, GERD, HTN, hyperlipidemia, medtronic dual ICD, obesity, and PVD  who presents to [facility] with melena. Patient states that she is at a nursing home in rehab from having a hip fracture. States that today she had a bowel movement and noted to have blood in it and it was black in color as well. States that she had 2 episodes prior to staff sending her to the hospital. Endorses some fatigue today.  Denies any shortness of breath, dizziness,

## 2023-07-21 NOTE — PLAN OF CARE
Problem: Discharge Planning  Goal: Discharge to home or other facility with appropriate resources  Outcome: Progressing  Discharge to home or other facility with appropriate resources: Identify barriers to discharge with patient and caregiver     Problem: Pain  Goal: Verbalizes/displays adequate comfort level or baseline comfort level  Outcome: Progressing  Verbalizes/displays adequate comfort level or baseline comfort level:   Encourage patient to monitor pain and request assistance   Assess pain using appropriate pain scale     Problem: Skin/Tissue Integrity  Goal: Absence of new skin breakdown  Description: 1. Monitor for areas of redness and/or skin breakdown  2. Assess vascular access sites hourly  3. Every 4-6 hours minimum:  Change oxygen saturation probe site  4. Every 4-6 hours:  If on nasal continuous positive airway pressure, respiratory therapy assess nares and determine need for appliance change or resting period. Outcome: Progressing  Note: Skin is assessed every shift, no new skin breakdown noted     Problem: Safety - Adult  Goal: Free from fall injury  Outcome: Progressing  Note: Patient free of falls this shift. Patient on falling star program. Fall band intact. RN visually checks on patient with hourly rounds. Patient tolerates ambulation each shift.        Problem: ABCDS Injury Assessment  Goal: Absence of physical injury  Outcome: Progressing  Absence of Physical Injury: Implement safety measures based on patient assessment     Problem: Nutrition Deficit:  Goal: Optimize nutritional status  Outcome: Progressing  Nutrient intake appropriate for improving, restoring, or maintaining nutritional needs:   Assess nutritional status and recommend course of action   Monitor oral intake, labs, and treatment plans     Problem: Chronic Conditions and Co-morbidities  Goal: Patient's chronic conditions and co-morbidity symptoms are monitored and maintained or improved  Care Plan - Patient's Chronic

## 2023-07-21 NOTE — ANESTHESIA POSTPROCEDURE EVALUATION
Department of Anesthesiology  Postprocedure Note    Patient: León Castillo  MRN: [id number]  YOB: 1935  Date of evaluation: 7/21/2023      Procedure Summary     Date: 07/21/23 Room / Location: [address] [place] [address] / [address] [place] [address]    Anesthesia Start: 6197 Anesthesia Stop: 4070    Procedures:       COLONOSCOPY POLYPECTOMY SNARE/COLD BIOPSY      EGD ESOPHAGOGASTRODUODENOSCOPY Diagnosis:       Gastrointestinal hemorrhage, unspecified gastrointestinal hemorrhage type      (Gastrointestinal hemorrhage, unspecified gastrointestinal hemorrhage type [K92.2])    Surgeons: Chad Daily MD Responsible Provider: Mars Dangelo DO    Anesthesia Type: MAC ASA Status: 4          Anesthesia Type: No value filed.     Christiane Phase I: Christiane Score: 9    Christiane Phase II: Christiane Score: 9      Anesthesia Post Evaluation    Patient location during evaluation: bedside  Patient participation: complete - patient participated  Level of consciousness: awake and alert  Pain score: 0  Airway patency: patent  Nausea & Vomiting: no nausea and no vomiting  Complications: no  Cardiovascular status: hemodynamically stable and blood pressure returned to baseline  Respiratory status: spontaneous ventilation, room air and acceptable  Hydration status: stable

## 2023-07-21 NOTE — PROGRESS NOTES
Scope # CF [id number] and [id number]. Colonoscopy and EGD completed, tolerated well. 1 polyps removed with cold snare, 1 jars labeled and sent to lab for processing. Photos taken.

## 2023-07-22 NOTE — PLAN OF CARE
Problem: Discharge Planning  Goal: Discharge to home or other facility with appropriate resources  Outcome: Progressing  Discharge to home or other facility with appropriate resources: Identify barriers to discharge with patient and caregiver     Problem: Pain  Goal: Verbalizes/displays adequate comfort level or baseline comfort level  Outcome: Progressing  Verbalizes/displays adequate comfort level or baseline comfort level:   Encourage patient to monitor pain and request assistance   Assess pain using appropriate pain scale     Problem: Skin/Tissue Integrity  Goal: Absence of new skin breakdown  Description: 1. Monitor for areas of redness and/or skin breakdown  2. Assess vascular access sites hourly  3. Every 4-6 hours minimum:  Change oxygen saturation probe site  4. Every 4-6 hours:  If on nasal continuous positive airway pressure, respiratory therapy assess nares and determine need for appliance change or resting period. Outcome: Progressing  Note: Skin is assessed every shift, no new skin breakdown noted     Problem: Safety - Adult  Goal: Free from fall injury  Outcome: Progressing  Note: Patient free of falls this shift. Patient on falling star program. Fall band intact. RN visually checks on patient with hourly rounds. Patient tolerates ambulation each shift.         Problem: ABCDS Injury Assessment  Goal: Absence of physical injury  Outcome: Progressing  Absence of Physical Injury: Implement safety measures based on patient assessment     Problem: Nutrition Deficit:  Goal: Optimize nutritional status  Outcome: Progressing  Nutrient intake appropriate for improving, restoring, or maintaining nutritional needs:   Assess nutritional status and recommend course of action   Monitor oral intake, labs, and treatment plans     Problem: Chronic Conditions and Co-morbidities  Goal: Patient's chronic conditions and co-morbidity symptoms are monitored and maintained or improved  Care Plan - Patient's Chronic Conditions and Co-Morbidity Symptoms are Monitored and Maintained or Improved: Monitor and assess patient's chronic conditions and comorbid symptoms for stability, deterioration, or improvement     Care plan reviewed with patient. Patient verbalizes understanding of the plan of care and contribute to goal setting.

## 2023-07-22 NOTE — PLAN OF CARE
Problem: Discharge Planning  Goal: Discharge to home or other facility with appropriate resources  Outcome: Completed     Problem: Pain  Goal: Verbalizes/displays adequate comfort level or baseline comfort level  Outcome: Completed     Problem: Skin/Tissue Integrity  Goal: Absence of new skin breakdown  Description: 1. Monitor for areas of redness and/or skin breakdown  2. Assess vascular access sites hourly  3. Every 4-6 hours minimum:  Change oxygen saturation probe site  4. Every 4-6 hours:  If on nasal continuous positive airway pressure, respiratory therapy assess nares and determine need for appliance change or resting period.   Outcome: Completed     Problem: Safety - Adult  Goal: Free from fall injury  Outcome: Completed     Problem: ABCDS Injury Assessment  Goal: Absence of physical injury  Outcome: Completed     Problem: Nutrition Deficit:  Goal: Optimize nutritional status  Outcome: Completed     Problem: Chronic Conditions and Co-morbidities  Goal: Patient's chronic conditions and co-morbidity symptoms are monitored and maintained or improved  Outcome: Completed     Problem: Cardiovascular - Adult  Goal: Maintains optimal cardiac output and hemodynamic stability  Outcome: Completed  Flowsheets (Taken 7/22/2023 1142)  Maintains optimal cardiac output and hemodynamic stability: Monitor blood pressure and heart rate  Goal: Absence of cardiac dysrhythmias or at baseline  Outcome: Completed     Problem: Musculoskeletal - Adult  Goal: Return mobility to safest level of function  Outcome: Completed     Problem: Gastrointestinal - Adult  Goal: Maintains or returns to baseline bowel function  Outcome: Completed  Flowsheets (Taken 7/22/2023 1140)  Maintains or returns to baseline bowel function: Assess bowel function  Goal: Maintains adequate nutritional intake  Outcome: Completed     Problem: Metabolic/Fluid and Electrolytes - Adult  Goal: Electrolytes maintained within normal limits  Outcome:

## 2023-07-22 NOTE — OP NOTE
[place] [address]                                OPERATIVE REPORT    PATIENT NAME: Vamsi Blum                     :        1935  MED REC NO:   [id number]                           ROOM:       0025  ACCOUNT NO:   [de-identified]                           ADMIT DATE: 2023  PROVIDER:     Vamsi Blum. Polo Barnhart M.D.    Hermilo Randolphney:  2023    PROCEDURES:  Colonoscopy with upper endoscopy. SURGEON:  Vamsi Blum. Polo Barnhart MD    INDICATION:  GI bleed. ANESTHESIA:  IV Diprivan per Anesthesia. DESCRIPTION OF PROCEDURE:  Prior to procedure, risks, benefits,  complications (including, but not limited to, the following; bleeding,  infection, perforation, emergency surgery, stroke, heart attack, death,  possible anesthetic risks, and the fact that the procedure is not 100%  accurate or successful), indications, and alternatives of colonoscopy  was explained to the patient. The patient understood and agreed to the  procedures. All questions were answered. With the patient in the left lateral decubitus position, digital rectal  exam was done which was normal.  Colonoscope introduced to the rectum. Mucosa appeared normal.  Colonoscope was advanced to rectosigmoid colon,  anastomosis. A few diverticula were seen. No bleeding source  identified. Colonoscope was advanced to descending colon, transverse  colon, ascending colon, anastomosis. Again, we were able to enter the  small bowel. This was normal for 10 to 15 cm. No signs of bleeding. There was some old blood noted in the colon when the colonoscope was  advanced through the colon. In addition, at the hepatic flexure, a 4 to  5 mm polyp, removed by cold snare. Coloscope retraced back to the  ascending colon, anastomosis, transverse colon, descending colon,  rectum; retroflexed. Internal hemorrhoids were seen. Colonoscope was  straightened and removed. Procedure was terminated. IMPRESSION:  1. Prior surgery is noted. 2.  Diverticula scattered in colon. 3.  Small blood present, but no active bleeding. The patient was changed position and we repeated the upper endoscopy  because I could not visualize duodenum on the first upper endoscopy. The PCF endoscope was advanced through the esophagus and as much liquid  was removed as possible. Again, esophagus was very dilated. Endoscope  was advanced through the EG junction which was tight, it was expected  with achalasia. Endoscope was advanced stomach, fluid was removed. Endoscope was advanced along the greater curvature of the stomach and  the antrum through the pylorus. The endoscope was advanced through the  first, second, third, and fourth portions of duodenum, showing no  abnormalities noted. The endoscope was then retraced back to the  stomach and retroflexed. Cardiac and fundal portions of the stomach  were evaluated. No other abnormalities noted. The scope was  straightened and removed, and the procedure was terminated. The patient  tolerated the procedure well and was taken to GI lab recovery area in a  stable condition. IMPRESSION:  1.  Markedly dilated esophagus as previously noted. Liquid removed as  much as possible. 2.  Gastritis, some liquid in stomach. 3.  Duodenum normal up to fourth portion of the duodenum. 4.  Colonoscopy was done prior to surgery, it was as noted. There was  no active bleeding present. Some old blood was present. Able to  advance into the small bowel, which was normal.  Diverticula were noted  throughout the colon, most likely source of bleeding. 5.  Estimated blood loss less than 10 mL. 6.  I am advancing her to full liquid diet, changing back to p.o. Protonix. 7.  Follow up in the office three months for 30-minute visit.   8.  She needs to be elevated at least 30 degrees at all times and should  be at 90 degrees when eating to help gravity, help to

## 2023-07-22 NOTE — PROGRESS NOTES
Patient current INR is 1.3. This nurse called report to [facility]. Daughter will transport her there.

## 2023-07-24 NOTE — CARE COORDINATION
7/24/23, 7:40 AM EDT  Late entry  Patient goals/plan/ treatment preferences discussed by  and . Patient goals/plan/ treatment preferences reviewed with patient/ family. Patient/ family verbalize understanding of discharge plan and are in agreement with goal/plan/treatment preferences. Understanding was demonstrated using the teach back method. AVS provided by RN at time of discharge, which includes all necessary medical information pertaining to the patients current course of illness, treatment, post-discharge goals of care, and treatment preferences. Services At/After Discharge: [facility] (SNF), Aide services, Nursing service, OT, and PT       IMM Letter  Observation Status Letter date given[de-identified] 07/19/23  Observation Status Letter time given[de-identified] 8424  Observation Status Letter given to Patient/Family/Significant other/Guardian/POA/by[de-identified] Pt Access     Patient discharged to return to [facility] skilled medicare bed. [facility] at [facility] aware of discharge and family transporting.

## 2023-08-08 ENCOUNTER — TELEPHONE (OUTPATIENT)
Dept: CARDIOLOGY CLINIC | Age: 88
End: 2023-08-08

## 2023-08-09 ENCOUNTER — APPOINTMENT (OUTPATIENT)
Dept: GENERAL RADIOLOGY | Age: 88
DRG: 690 | End: 2023-08-09
Payer: MEDICARE

## 2023-08-09 ENCOUNTER — HOSPITAL ENCOUNTER (INPATIENT)
Age: 88
LOS: 5 days | Discharge: NURSING, REHAB OR LONG-TERM CARE | DRG: 690 | End: 2023-08-14
Attending: STUDENT IN AN ORGANIZED HEALTH CARE EDUCATION/TRAINING PROGRAM | Admitting: PHYSICIAN ASSISTANT
Payer: MEDICARE

## 2023-08-09 ENCOUNTER — APPOINTMENT (OUTPATIENT)
Dept: CT IMAGING | Age: 88
DRG: 690 | End: 2023-08-09
Payer: MEDICARE
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
## 2023-08-09 DIAGNOSIS — N39.0 URINARY TRACT INFECTION WITHOUT HEMATURIA, SITE UNSPECIFIED: ICD-10-CM

## 2023-08-09 DIAGNOSIS — S73.005A: Primary | ICD-10-CM

## 2023-08-09 LAB
ABO: NORMAL
ALBUMIN SERPL BCG-MCNC: 4 G/DL (ref 3.5–5.1)
ALP SERPL-CCNC: 141 U/L (ref 38–126)
ALT SERPL W/O P-5'-P-CCNC: 7 U/L (ref 11–66)
ANION GAP SERPL CALC-SCNC: 12 MEQ/L (ref 8–16)
ANTIBODY SCREEN: NORMAL
AST SERPL-CCNC: 18 U/L (ref 5–40)
BACTERIA URNS QL MICRO: ABNORMAL /HPF
BASOPHILS ABSOLUTE: 0 THOU/MM3 (ref 0–0.1)
BASOPHILS NFR BLD AUTO: 0.4 %
BILIRUB SERPL-MCNC: 0.5 MG/DL (ref 0.3–1.2)
BILIRUB UR QL STRIP.AUTO: NEGATIVE
BUN SERPL-MCNC: 24 MG/DL (ref 7–22)
CALCIUM SERPL-MCNC: 9.8 MG/DL (ref 8.5–10.5)
CASTS #/AREA URNS LPF: ABNORMAL /LPF
CASTS 2: ABNORMAL /LPF
CHARACTER UR: CLEAR
CHLORIDE SERPL-SCNC: 102 MEQ/L (ref 98–111)
CK SERPL-CCNC: 134 U/L (ref 30–135)
CO2 SERPL-SCNC: 25 MEQ/L (ref 23–33)
COLOR: YELLOW
CREAT SERPL-MCNC: 1.4 MG/DL (ref 0.4–1.2)
CRYSTALS URNS MICRO: ABNORMAL
DEPRECATED RDW RBC AUTO: 57.7 FL (ref 35–45)
EOSINOPHIL NFR BLD AUTO: 0.1 %
EOSINOPHILS ABSOLUTE: 0 THOU/MM3 (ref 0–0.4)
EPITHELIAL CELLS, UA: ABNORMAL /HPF
ERYTHROCYTE [DISTWIDTH] IN BLOOD BY AUTOMATED COUNT: 16.1 % (ref 11.5–14.5)
GFR SERPL CREATININE-BSD FRML MDRD: 37 ML/MIN/1.73M2
GLUCOSE SERPL-MCNC: 132 MG/DL (ref 70–108)
GLUCOSE UR QL STRIP.AUTO: NEGATIVE MG/DL
HCT VFR BLD AUTO: 34.6 % (ref 37–47)
HGB BLD-MCNC: 10.7 GM/DL (ref 12–16)
HGB UR QL STRIP.AUTO: NEGATIVE
IMM GRANULOCYTES # BLD AUTO: 0.02 THOU/MM3 (ref 0–0.07)
IMM GRANULOCYTES NFR BLD AUTO: 0.3 %
INR PPP: 1.51 (ref 0.85–1.13)
KETONES UR QL STRIP.AUTO: NEGATIVE
LYMPHOCYTES ABSOLUTE: 0.5 THOU/MM3 (ref 1–4.8)
LYMPHOCYTES NFR BLD AUTO: 7.4 %
MCH RBC QN AUTO: 30.1 PG (ref 26–33)
MCHC RBC AUTO-ENTMCNC: 30.9 GM/DL (ref 32.2–35.5)
MCV RBC AUTO: 97.2 FL (ref 81–99)
MISCELLANEOUS 2: ABNORMAL
MONOCYTES ABSOLUTE: 0.6 THOU/MM3 (ref 0.4–1.3)
MONOCYTES NFR BLD AUTO: 9 %
NEUTROPHILS NFR BLD AUTO: 82.8 %
NITRITE UR QL STRIP: NEGATIVE
NRBC BLD AUTO-RTO: 0 /100 WBC
OSMOLALITY SERPL CALC.SUM OF ELEC: 283.4 MOSMOL/KG (ref 275–300)
PH UR STRIP.AUTO: 7 [PH] (ref 5–9)
PLATELET # BLD AUTO: 178 THOU/MM3 (ref 130–400)
PMV BLD AUTO: 12 FL (ref 9.4–12.4)
POTASSIUM SERPL-SCNC: 4.6 MEQ/L (ref 3.5–5.2)
PROT SERPL-MCNC: 6.7 G/DL (ref 6.1–8)
PROT UR STRIP.AUTO-MCNC: NEGATIVE MG/DL
RBC # BLD AUTO: 3.56 MILL/MM3 (ref 4.2–5.4)
RBC URINE: ABNORMAL /HPF
RENAL EPI CELLS #/AREA URNS HPF: ABNORMAL /[HPF]
RH FACTOR: NORMAL
SEGMENTED NEUTROPHILS ABSOLUTE COUNT: 5.6 THOU/MM3 (ref 1.8–7.7)
SODIUM SERPL-SCNC: 139 MEQ/L (ref 135–145)
SP GR UR REFRACT.AUTO: 1.03 (ref 1–1.03)
TROPONIN, HIGH SENSITIVITY: 38 NG/L (ref 0–12)
UROBILINOGEN, URINE: 0.2 EU/DL (ref 0–1)
WBC # BLD AUTO: 6.8 THOU/MM3 (ref 4.8–10.8)
WBC #/AREA URNS HPF: ABNORMAL /HPF
WBC #/AREA URNS HPF: ABNORMAL /[HPF]
YEAST LIKE FUNGI URNS QL MICRO: ABNORMAL

## 2023-08-09 PROCEDURE — 80053 COMPREHEN METABOLIC PANEL: CPT

## 2023-08-09 PROCEDURE — 96375 TX/PRO/DX INJ NEW DRUG ADDON: CPT

## 2023-08-09 PROCEDURE — 73502 X-RAY EXAM HIP UNI 2-3 VIEWS: CPT

## 2023-08-09 PROCEDURE — 74177 CT ABD & PELVIS W/CONTRAST: CPT

## 2023-08-09 PROCEDURE — 87077 CULTURE AEROBIC IDENTIFY: CPT

## 2023-08-09 PROCEDURE — 87186 SC STD MICRODIL/AGAR DIL: CPT

## 2023-08-09 PROCEDURE — 36415 COLL VENOUS BLD VENIPUNCTURE: CPT

## 2023-08-09 PROCEDURE — 85610 PROTHROMBIN TIME: CPT

## 2023-08-09 PROCEDURE — 6820000001 HC L2 TRAUMA SURGERY EVALUATION: Performed by: ORTHOPAEDIC SURGERY

## 2023-08-09 PROCEDURE — 0SSBXZZ REPOSITION LEFT HIP JOINT, EXTERNAL APPROACH: ICD-10-PCS | Performed by: STUDENT IN AN ORGANIZED HEALTH CARE EDUCATION/TRAINING PROGRAM

## 2023-08-09 PROCEDURE — 2580000003 HC RX 258: Performed by: STUDENT IN AN ORGANIZED HEALTH CARE EDUCATION/TRAINING PROGRAM

## 2023-08-09 PROCEDURE — 6360000002 HC RX W HCPCS: Performed by: STUDENT IN AN ORGANIZED HEALTH CARE EDUCATION/TRAINING PROGRAM

## 2023-08-09 PROCEDURE — 96376 TX/PRO/DX INJ SAME DRUG ADON: CPT

## 2023-08-09 PROCEDURE — 96361 HYDRATE IV INFUSION ADD-ON: CPT

## 2023-08-09 PROCEDURE — 2W3MXYZ IMMOBILIZATION OF LEFT LOWER EXTREMITY USING OTHER DEVICE: ICD-10-PCS | Performed by: STUDENT IN AN ORGANIZED HEALTH CARE EDUCATION/TRAINING PROGRAM

## 2023-08-09 PROCEDURE — 85025 COMPLETE CBC W/AUTO DIFF WBC: CPT

## 2023-08-09 PROCEDURE — 72125 CT NECK SPINE W/O DYE: CPT

## 2023-08-09 PROCEDURE — 84484 ASSAY OF TROPONIN QUANT: CPT

## 2023-08-09 PROCEDURE — 74174 CTA ABD&PLVS W/CONTRAST: CPT

## 2023-08-09 PROCEDURE — 6360000004 HC RX CONTRAST MEDICATION: Performed by: STUDENT IN AN ORGANIZED HEALTH CARE EDUCATION/TRAINING PROGRAM

## 2023-08-09 PROCEDURE — 0SWBXJZ REVISION OF SYNTHETIC SUBSTITUTE IN LEFT HIP JOINT, EXTERNAL APPROACH: ICD-10-PCS | Performed by: STUDENT IN AN ORGANIZED HEALTH CARE EDUCATION/TRAINING PROGRAM

## 2023-08-09 PROCEDURE — 86850 RBC ANTIBODY SCREEN: CPT

## 2023-08-09 PROCEDURE — 99223 1ST HOSP IP/OBS HIGH 75: CPT | Performed by: PHYSICIAN ASSISTANT

## 2023-08-09 PROCEDURE — 82550 ASSAY OF CK (CPK): CPT

## 2023-08-09 PROCEDURE — 81001 URINALYSIS AUTO W/SCOPE: CPT

## 2023-08-09 PROCEDURE — 70450 CT HEAD/BRAIN W/O DYE: CPT

## 2023-08-09 PROCEDURE — 99285 EMERGENCY DEPT VISIT HI MDM: CPT

## 2023-08-09 PROCEDURE — 2500000003 HC RX 250 WO HCPCS: Performed by: STUDENT IN AN ORGANIZED HEALTH CARE EDUCATION/TRAINING PROGRAM

## 2023-08-09 PROCEDURE — 96374 THER/PROPH/DIAG INJ IV PUSH: CPT

## 2023-08-09 PROCEDURE — 86900 BLOOD TYPING SEROLOGIC ABO: CPT

## 2023-08-09 PROCEDURE — 1200000003 HC TELEMETRY R&B

## 2023-08-09 PROCEDURE — 87086 URINE CULTURE/COLONY COUNT: CPT

## 2023-08-09 PROCEDURE — 86901 BLOOD TYPING SEROLOGIC RH(D): CPT

## 2023-08-09 PROCEDURE — 87040 BLOOD CULTURE FOR BACTERIA: CPT

## 2023-08-09 RX ORDER — 0.9 % SODIUM CHLORIDE 0.9 %
1000 INTRAVENOUS SOLUTION INTRAVENOUS ONCE
Status: COMPLETED | OUTPATIENT
Start: 2023-08-09 | End: 2023-08-09

## 2023-08-09 RX ORDER — FENTANYL CITRATE 50 UG/ML
50 INJECTION, SOLUTION INTRAMUSCULAR; INTRAVENOUS ONCE
Status: COMPLETED | OUTPATIENT
Start: 2023-08-09 | End: 2023-08-09

## 2023-08-09 RX ORDER — PROPOFOL 10 MG/ML
1 INJECTION, EMULSION INTRAVENOUS ONCE
Status: DISCONTINUED | OUTPATIENT
Start: 2023-08-09 | End: 2023-08-09

## 2023-08-09 RX ORDER — KETAMINE HCL IN NACL, ISO-OSM 100MG/10ML
1 SYRINGE (ML) INJECTION ONCE
Status: COMPLETED | OUTPATIENT
Start: 2023-08-09 | End: 2023-08-09

## 2023-08-09 RX ORDER — PROPOFOL 10 MG/ML
1 INJECTION, EMULSION INTRAVENOUS ONCE
Status: DISCONTINUED | OUTPATIENT
Start: 2023-08-09 | End: 2023-08-10

## 2023-08-09 RX ADMIN — SODIUM CHLORIDE 1000 ML: 9 INJECTION, SOLUTION INTRAVENOUS at 20:33

## 2023-08-09 RX ADMIN — Medication 35 MG: at 22:28

## 2023-08-09 RX ADMIN — FENTANYL CITRATE 50 MCG: 50 INJECTION, SOLUTION INTRAMUSCULAR; INTRAVENOUS at 18:06

## 2023-08-09 RX ADMIN — FENTANYL CITRATE 50 MCG: 50 INJECTION, SOLUTION INTRAMUSCULAR; INTRAVENOUS at 22:15

## 2023-08-09 RX ADMIN — IOPAMIDOL 80 ML: 755 INJECTION, SOLUTION INTRAVENOUS at 20:54

## 2023-08-09 RX ADMIN — CEFTRIAXONE SODIUM 1000 MG: 1 INJECTION, POWDER, FOR SOLUTION INTRAMUSCULAR; INTRAVENOUS at 23:32

## 2023-08-09 RX ADMIN — IOPAMIDOL 80 ML: 755 INJECTION, SOLUTION INTRAVENOUS at 18:56

## 2023-08-09 ASSESSMENT — PAIN SCALES - GENERAL: PAINLEVEL_OUTOF10: 8

## 2023-08-09 ASSESSMENT — PAIN - FUNCTIONAL ASSESSMENT: PAIN_FUNCTIONAL_ASSESSMENT: NONE - DENIES PAIN

## 2023-08-09 NOTE — ED NOTES
Patient resting in bed. Respirations easy and unlabored. No distress noted. Call light within reach.        Xin Mike RN  08/09/23 4087

## 2023-08-09 NOTE — ED NOTES
Changed into hospital gown, assessment done with Dr Amanda Angelo. Pt medicated per mar for pain. External cath in place.         Xin Mike RN  08/09/23 9245

## 2023-08-09 NOTE — ED TRIAGE NOTES
Pt arrives to ED from home by EMS with c/o fall around 10pm last night, pt reports being on the floor since then. Her son called to check on her, when she did not answer, he sent squad to check on her. Pt was found on the floor. Was not able to get herself up, obvious internal rotation of left leg. Pt reports recent partial hip replacement, had only been home a day from inpatient rehab. Pt reports using walker, lives alone. Complains of pain 7/10, states she is cold from being on the floor for so long. Denies LOC or hitting her head.  Pt states she had defib placed about 2 years ago

## 2023-08-10 LAB
ANION GAP SERPL CALC-SCNC: 10 MEQ/L (ref 8–16)
BASOPHILS ABSOLUTE: 0 THOU/MM3 (ref 0–0.1)
BASOPHILS NFR BLD AUTO: 0.7 %
BUN SERPL-MCNC: 18 MG/DL (ref 7–22)
CALCIUM SERPL-MCNC: 8.8 MG/DL (ref 8.5–10.5)
CHLORIDE SERPL-SCNC: 107 MEQ/L (ref 98–111)
CO2 SERPL-SCNC: 22 MEQ/L (ref 23–33)
CREAT SERPL-MCNC: 1.3 MG/DL (ref 0.4–1.2)
DEPRECATED RDW RBC AUTO: 58.9 FL (ref 35–45)
EOSINOPHIL NFR BLD AUTO: 0.7 %
EOSINOPHILS ABSOLUTE: 0 THOU/MM3 (ref 0–0.4)
ERYTHROCYTE [DISTWIDTH] IN BLOOD BY AUTOMATED COUNT: 16.3 % (ref 11.5–14.5)
GFR SERPL CREATININE-BSD FRML MDRD: 40 ML/MIN/1.73M2
GLUCOSE SERPL-MCNC: 86 MG/DL (ref 70–108)
HCT VFR BLD AUTO: 30.5 % (ref 37–47)
HGB BLD-MCNC: 9.2 GM/DL (ref 12–16)
IMM GRANULOCYTES # BLD AUTO: 0.01 THOU/MM3 (ref 0–0.07)
IMM GRANULOCYTES NFR BLD AUTO: 0.2 %
LYMPHOCYTES ABSOLUTE: 0.5 THOU/MM3 (ref 1–4.8)
LYMPHOCYTES NFR BLD AUTO: 11.5 %
MCH RBC QN AUTO: 30.4 PG (ref 26–33)
MCHC RBC AUTO-ENTMCNC: 30.2 GM/DL (ref 32.2–35.5)
MCV RBC AUTO: 100.7 FL (ref 81–99)
MONOCYTES ABSOLUTE: 0.5 THOU/MM3 (ref 0.4–1.3)
MONOCYTES NFR BLD AUTO: 11.7 %
NEUTROPHILS NFR BLD AUTO: 75.2 %
NRBC BLD AUTO-RTO: 0 /100 WBC
PLATELET # BLD AUTO: 137 THOU/MM3 (ref 130–400)
PMV BLD AUTO: 12 FL (ref 9.4–12.4)
POTASSIUM SERPL-SCNC: 4.7 MEQ/L (ref 3.5–5.2)
RBC # BLD AUTO: 3.03 MILL/MM3 (ref 4.2–5.4)
SEGMENTED NEUTROPHILS ABSOLUTE COUNT: 3.4 THOU/MM3 (ref 1.8–7.7)
SODIUM SERPL-SCNC: 139 MEQ/L (ref 135–145)
TROPONIN, HIGH SENSITIVITY: 57 NG/L (ref 0–12)
WBC # BLD AUTO: 4.5 THOU/MM3 (ref 4.8–10.8)

## 2023-08-10 PROCEDURE — 97110 THERAPEUTIC EXERCISES: CPT

## 2023-08-10 PROCEDURE — 80048 BASIC METABOLIC PNL TOTAL CA: CPT

## 2023-08-10 PROCEDURE — 99233 SBSQ HOSP IP/OBS HIGH 50: CPT | Performed by: NURSE PRACTITIONER

## 2023-08-10 PROCEDURE — 6370000000 HC RX 637 (ALT 250 FOR IP): Performed by: PHYSICIAN ASSISTANT

## 2023-08-10 PROCEDURE — 97530 THERAPEUTIC ACTIVITIES: CPT

## 2023-08-10 PROCEDURE — 84484 ASSAY OF TROPONIN QUANT: CPT

## 2023-08-10 PROCEDURE — 97162 PT EVAL MOD COMPLEX 30 MIN: CPT

## 2023-08-10 PROCEDURE — 85025 COMPLETE CBC W/AUTO DIFF WBC: CPT

## 2023-08-10 PROCEDURE — 93005 ELECTROCARDIOGRAM TRACING: CPT | Performed by: NURSE PRACTITIONER

## 2023-08-10 PROCEDURE — 1200000003 HC TELEMETRY R&B

## 2023-08-10 PROCEDURE — 6360000002 HC RX W HCPCS: Performed by: PHYSICIAN ASSISTANT

## 2023-08-10 PROCEDURE — 2580000003 HC RX 258: Performed by: PHYSICIAN ASSISTANT

## 2023-08-10 PROCEDURE — 36415 COLL VENOUS BLD VENIPUNCTURE: CPT

## 2023-08-10 RX ORDER — SODIUM CHLORIDE 9 MG/ML
INJECTION, SOLUTION INTRAVENOUS PRN
Status: DISCONTINUED | OUTPATIENT
Start: 2023-08-10 | End: 2023-08-14 | Stop reason: HOSPADM

## 2023-08-10 RX ORDER — ONDANSETRON 2 MG/ML
4 INJECTION INTRAMUSCULAR; INTRAVENOUS EVERY 6 HOURS PRN
Status: DISCONTINUED | OUTPATIENT
Start: 2023-08-10 | End: 2023-08-14 | Stop reason: HOSPADM

## 2023-08-10 RX ORDER — ONDANSETRON 4 MG/1
4 TABLET, ORALLY DISINTEGRATING ORAL EVERY 8 HOURS PRN
Status: DISCONTINUED | OUTPATIENT
Start: 2023-08-10 | End: 2023-08-14 | Stop reason: HOSPADM

## 2023-08-10 RX ORDER — LANOLIN ALCOHOL/MO/W.PET/CERES
6 CREAM (GRAM) TOPICAL NIGHTLY PRN
Status: DISCONTINUED | OUTPATIENT
Start: 2023-08-10 | End: 2023-08-14 | Stop reason: HOSPADM

## 2023-08-10 RX ORDER — TIMOLOL MALEATE 5 MG/ML
1 SOLUTION/ DROPS OPHTHALMIC DAILY
Status: DISCONTINUED | OUTPATIENT
Start: 2023-08-10 | End: 2023-08-14 | Stop reason: HOSPADM

## 2023-08-10 RX ORDER — POLYETHYLENE GLYCOL 3350 17 G/17G
17 POWDER, FOR SOLUTION ORAL DAILY PRN
Status: DISCONTINUED | OUTPATIENT
Start: 2023-08-10 | End: 2023-08-14 | Stop reason: HOSPADM

## 2023-08-10 RX ORDER — PANTOPRAZOLE SODIUM 40 MG/1
40 TABLET, DELAYED RELEASE ORAL
Status: DISCONTINUED | OUTPATIENT
Start: 2023-08-10 | End: 2023-08-14 | Stop reason: HOSPADM

## 2023-08-10 RX ORDER — SODIUM CHLORIDE 0.9 % (FLUSH) 0.9 %
5-40 SYRINGE (ML) INJECTION EVERY 12 HOURS SCHEDULED
Status: DISCONTINUED | OUTPATIENT
Start: 2023-08-10 | End: 2023-08-14 | Stop reason: HOSPADM

## 2023-08-10 RX ORDER — ACETAMINOPHEN 650 MG/1
650 SUPPOSITORY RECTAL EVERY 6 HOURS PRN
Status: DISCONTINUED | OUTPATIENT
Start: 2023-08-10 | End: 2023-08-14 | Stop reason: HOSPADM

## 2023-08-10 RX ORDER — ACETAMINOPHEN 325 MG/1
650 TABLET ORAL EVERY 6 HOURS PRN
Status: DISCONTINUED | OUTPATIENT
Start: 2023-08-10 | End: 2023-08-14 | Stop reason: HOSPADM

## 2023-08-10 RX ORDER — PRAVASTATIN SODIUM 40 MG
40 TABLET ORAL DAILY
Status: DISCONTINUED | OUTPATIENT
Start: 2023-08-10 | End: 2023-08-14 | Stop reason: HOSPADM

## 2023-08-10 RX ORDER — CLOPIDOGREL BISULFATE 75 MG/1
75 TABLET ORAL DAILY
Status: DISCONTINUED | OUTPATIENT
Start: 2023-08-10 | End: 2023-08-14 | Stop reason: HOSPADM

## 2023-08-10 RX ORDER — METOPROLOL SUCCINATE 25 MG/1
12.5 TABLET, EXTENDED RELEASE ORAL DAILY
Status: DISCONTINUED | OUTPATIENT
Start: 2023-08-10 | End: 2023-08-14 | Stop reason: HOSPADM

## 2023-08-10 RX ORDER — SODIUM CHLORIDE 0.9 % (FLUSH) 0.9 %
5-40 SYRINGE (ML) INJECTION PRN
Status: DISCONTINUED | OUTPATIENT
Start: 2023-08-10 | End: 2023-08-14 | Stop reason: HOSPADM

## 2023-08-10 RX ORDER — BUMETANIDE 0.5 MG/1
0.5 TABLET ORAL
Status: DISCONTINUED | OUTPATIENT
Start: 2023-08-11 | End: 2023-08-14 | Stop reason: HOSPADM

## 2023-08-10 RX ADMIN — SODIUM CHLORIDE, PRESERVATIVE FREE 10 ML: 5 INJECTION INTRAVENOUS at 20:44

## 2023-08-10 RX ADMIN — APIXABAN 5 MG: 5 TABLET, FILM COATED ORAL at 20:43

## 2023-08-10 RX ADMIN — PRAVASTATIN SODIUM 40 MG: 40 TABLET ORAL at 08:49

## 2023-08-10 RX ADMIN — CLOPIDOGREL BISULFATE 75 MG: 75 TABLET ORAL at 08:54

## 2023-08-10 RX ADMIN — PANTOPRAZOLE SODIUM 40 MG: 40 TABLET, DELAYED RELEASE ORAL at 15:35

## 2023-08-10 RX ADMIN — SODIUM CHLORIDE, PRESERVATIVE FREE 10 ML: 5 INJECTION INTRAVENOUS at 08:16

## 2023-08-10 RX ADMIN — TIMOLOL MALEATE 1 DROP: 5 SOLUTION/ DROPS OPHTHALMIC at 11:07

## 2023-08-10 RX ADMIN — APIXABAN 5 MG: 5 TABLET, FILM COATED ORAL at 08:48

## 2023-08-10 RX ADMIN — APIXABAN 5 MG: 5 TABLET, FILM COATED ORAL at 03:30

## 2023-08-10 RX ADMIN — PANTOPRAZOLE SODIUM 40 MG: 40 TABLET, DELAYED RELEASE ORAL at 05:28

## 2023-08-10 RX ADMIN — METOPROLOL SUCCINATE 12.5 MG: 25 TABLET, EXTENDED RELEASE ORAL at 08:49

## 2023-08-10 RX ADMIN — ONDANSETRON 4 MG: 2 INJECTION INTRAMUSCULAR; INTRAVENOUS at 17:20

## 2023-08-10 ASSESSMENT — PAIN SCALES - GENERAL
PAINLEVEL_OUTOF10: 0
PAINLEVEL_OUTOF10: 0

## 2023-08-10 ASSESSMENT — ENCOUNTER SYMPTOMS
EYES NEGATIVE: 1
GASTROINTESTINAL NEGATIVE: 1
ALLERGIC/IMMUNOLOGIC NEGATIVE: 1
RESPIRATORY NEGATIVE: 1

## 2023-08-10 NOTE — ED NOTES
Pt returned to pre procedure baseline.    No complaints, states pain is under control      Cabrera Troy, GUERRERO  08/09/23 7854

## 2023-08-10 NOTE — ED NOTES
Patient resting in bed. Respirations easy and unlabored. No distress noted.  Call light within reach.'  Medicated per adia Kaur RN  08/09/23 6555

## 2023-08-10 NOTE — PROGRESS NOTES
Pt admitted to  7K23 from ED. IV none infusing into the antecubital  left, condition patent and no redness. IV site free of s/s of infection or infiltration. Vital signs obtained. Assessment and data collection initiated. Two nurse skin assessment performed by Tha Wadsworth and Rei Pinto RN. Oriented to room. Policies and procedures for 7K explained. All questions answered with no further questions at this time. Fall prevention and safety brochure discussed with patient. Bed alarm on. Call light in reach.

## 2023-08-10 NOTE — ED NOTES
ED to inpatient nurses report    Chief Complaint   Patient presents with    Fall    Hip Pain     Left        Present to ED from home  LOC: alert and orientated to name, place, date  Vital signs   Vitals:    08/09/23 2238 08/09/23 2239 08/09/23 2250 08/09/23 2302   BP:   131/64 134/74   Pulse: 93 (!) 103 (!) 102 93   Resp: 19 17 17 18   Temp:       TempSrc:       SpO2: 97% 97% 95% 97%   Weight:          Oxygen Baseline 100    Current needs required 1 L NC  LDAs:   Peripheral IV 08/09/23 Left Antecubital (Active)   Site Assessment Clean, dry & intact 08/09/23 1656     Mobility: Requires assistance * 2  Pending ED orders: none  Present condition: stable    C-SSRS Risk of Suicide: No Risk  Swallow Screening    Preferred Language: Leanna     Electronically signed by Rodriguez Mcmullen RN on 8/9/2023 at 11:36 PM       Mary Hills  08/09/23 2262

## 2023-08-10 NOTE — PLAN OF CARE
Problem: Discharge Planning  Goal: Discharge to home or other facility with appropriate resources  Outcome: Progressing  Flowsheets (Taken 8/10/2023 1814)  Discharge to home or other facility with appropriate resources:   Identify barriers to discharge with patient and caregiver   Identify discharge learning needs (meds, wound care, etc)   Arrange for needed discharge resources and transportation as appropriate     Problem: Pain  Goal: Verbalizes/displays adequate comfort level or baseline comfort level  Outcome: Progressing  Flowsheets (Taken 8/10/2023 1814)  Verbalizes/displays adequate comfort level or baseline comfort level:   Encourage patient to monitor pain and request assistance   Administer analgesics based on type and severity of pain and evaluate response   Assess pain using appropriate pain scale   Implement non-pharmacological measures as appropriate and evaluate response     Problem: Safety - Adult  Goal: Free from fall injury  Outcome: Progressing  Flowsheets (Taken 8/10/2023 1814)  Free From Fall Injury: Instruct family/caregiver on patient safety     Problem: ABCDS Injury Assessment  Goal: Absence of physical injury  Outcome: Progressing  Flowsheets (Taken 8/10/2023 1814)  Absence of Physical Injury: Implement safety measures based on patient assessment     Problem: Chronic Conditions and Co-morbidities  Goal: Patient's chronic conditions and co-morbidity symptoms are monitored and maintained or improved  Outcome: Progressing  Flowsheets (Taken 8/10/2023 1814)  Care Plan - Patient's Chronic Conditions and Co-Morbidity Symptoms are Monitored and Maintained or Improved:   Monitor and assess patient's chronic conditions and comorbid symptoms for stability, deterioration, or improvement   Collaborate with multidisciplinary team to address chronic and comorbid conditions and prevent exacerbation or deterioration     Problem: Skin/Tissue Integrity - Adult  Goal: Skin integrity remains intact  Outcome:

## 2023-08-10 NOTE — DISCHARGE INSTR - COC
Continuity of Care Form    Patient Name: Ethan Hanna   :  1935  MRN:  [id number]    Admit date:  2023  Discharge date:  2023      Code Status Order: Full Code   Advance Directives:     Admitting Physician:  Mitul Moyer PA-C  PCP: Bobo Del Toro    Discharging Nurse: [facility] Unit/Room#: 8K-19/26-A  Discharging Unit Phone Number: [phone]    Emergency Contact:   Extended Emergency Contact Information  Primary Emergency Contact: [facility] of [address] [address] [place] Phone: [phone]  Mobile Phone: [phone]  Relation: Child  Secondary Emergency Contact: Raeann Merritt   [facility] of [address] [place] Phone: [phone]  Mobile Phone: [phone]  Relation: Child    Past Surgical History:  Past Surgical History:   Procedure Laterality Date    ABDOMINAL AORTIC ANEURYSM REPAIR, ENDOVASCULAR N/A 2/15/2019    ABDOMINAL AORTIC ANEURYSM REPAIR ENDOVASCULAR, DECLOTTING RIGHT FEM TIB BYPASS GRAFT performed by Khushboo Ribera MD at [facility] to [facility]    lumpectomy    CHOLECYSTECTOMY      30 years ago    COLONOSCOPY  2018    Dr Sherrie Vaughan N/A 2019    COLONOSCOPY DIAGNOSTIC performed by Meir Salazar MD at [facility]    COLONOSCOPY N/A 2020    COLONOSCOPY CONTROL HEMORRHAGE performed by Yoni Bonilla MD at [facility]    COLONOSCOPY Left 3/2/2021    COLORECTAL CANCER SCREENING, NOT HIGH RISK performed by Ronnie Louis MD at [facility]    COLONOSCOPY  2023    COLONOSCOPY POLYPECTOMY SNARE/COLD BIOPSY performed by Michaela Rodríguez MD at [address] [place] [address]      eye, eyelids    EYE SURGERY      cataract    HEMICOLECTOMY N/A 2020    OPEN RIGHT COLON RESECTION performed by Stephenie Mario MD at [facility] 2023    Anal Exam under Anesthesia with Hemorridectomy performed by Deyanira Leslie MD at [address] [place] [address] Left 2023

## 2023-08-10 NOTE — CONSULTS
Orthopaedic Consult  Patient:  Hilario Curtis  YOB: 1935  MRN: [id number]     Acct: [de-identified]    PCP: Kevin Wetzel  Date of Admission: 8/9/2023  Date of Service: Pt seen/examined on 8/10/2023     Chief Complaint: sL hip yesi dislocation s/p bedside reduction in ED  History Of Present Illness: 82 y.o. female who presented to the ED last night after a fall at her SNF. She is s/p L hip yesi on 7/1/23 with Dr Dionte Guerra, uncomplicated post operative course from an orthopaedic standpoint. Was ambulating without aid and without much pain prior to fall. Patient sustained a mechanical fall landing on her left hip yesterday, immediate pain and deformity and inability to WB, imaging in the ED revealed a dislocated hip prosthesis, this was reduced by the ED providers under conscious sedation. Pain is minimal at this time, KI is intact, no paresthesias or weakness, overall pain well controlled at the left hip      Index L hip yesi was on 7/1/23 with Dr Dionte Guerra  This is her first dislocation  Successfully reduced in the ED  Difficulty with transportation post operatively, unable to successfully follow up as outpatient secondary to residence at [facility], admitted 7/18 for GI bleed  Eliquis, Plavix    Admitted for UTI and fall risk per ED note      Past Medical History:        Diagnosis Date    A-fib [facility])     AAA (abdominal aortic aneurysm) ([address])     Achalasia     Anemia     Arthritis     Blood circulation, collateral     BPPV (benign paroxysmal positional vertigo) 6/6/16    CHF (congestive heart failure) ([address])     Chronic kidney disease     sees Dr Colin Pritchard cancer [facility])     Gastrointestinal hemorrhage     GERD (gastroesophageal reflux disease)     ? esophageal stricture    Hiatal hernia     History of blood transfusion     HTN (hypertension)     Hx of blood clots 2018    R leg-sees [facility] Hargrove    Hyperlipidemia     [employer] dual ICD  8/26/2020    Neuromuscular disorder ([address])     Obesity

## 2023-08-10 NOTE — H&P
Hospitalist - History & Physical      Patient: Vamsi Parents    Unit/Bed:[facility]23/023-A  YOB: 1935  MRN: [id number]   Acct: [de-identified]   PCP: Eligio Villanueva      Assessment and Plan:        Closed left hip dislocation:    A. Left hip dislocated after falling 8/8/2023   B. Superior dislocation of left hip prosthesis on XR pelvis   C. Anatomic left hip alignment on XR pelvis post hip reduction  Fall:    A. Lost her balance while getting dressed   B. Laid on the floor overnight unable to get up   C. Denies LOC or hitting head   D. CK wnl  UTI:    A. Leukocytes seen on UA   B. WBC 6.8   C. No urinary symptoms   D. Blood cultures pending   E. Rocephin given   F. Repeat CBC and BMP 5597  Systolic CHF:    A. Estimated EF of 50%   B. Currently on Eliquis, Bumex, Troprol xl, and pravachol   C. Continue current medications         CC:  Fall    HPI: Ceci Silva is an 82year old female presenting for a fall. She reports losing her balance and falling last night while putting on her pajamas, around 10pm. She lives alone and was on the floor until this afternoon when her son called the squad to her house. She reports left hip pain that has improved after being reduced in the ED. She denies LOC or hitting her head during the fall. She had a left hip hemiarthroplasty 7/1/23 and reports that she was healing slowly but improving prior to the fall. ROS: Review of Systems   Constitutional: Negative. HENT: Negative. Eyes: Negative. Respiratory: Negative. Cardiovascular: Negative. Gastrointestinal: Negative. Endocrine: Negative. Genitourinary: Negative. Musculoskeletal:  Positive for arthralgias. Skin: Negative. Allergic/Immunologic: Negative. Neurological: Negative. Hematological: Negative. Psychiatric/Behavioral: Negative.        PMH:    Past Medical History:   Diagnosis Date    A-fib [facility])     AAA (abdominal aortic aneurysm) ([facility])     Achalasia     Anemia     Arthritis Absolute 0.6 0.4 - 1.3 thou/mm3    Eosinophils Absolute 0.0 0.0 - 0.4 thou/mm3    Basophils Absolute 0.0 0.0 - 0.1 thou/mm3    Immature Grans (Abs) 0.02 0.00 - 0.07 thou/mm3    nRBC 0 /100 wbc   Comprehensive Metabolic Panel    Collection Time: 08/09/23  5:00 PM   Result Value Ref Range    Glucose 132 (H) 70 - 108 mg/dL    Creatinine 1.4 (H) 0.4 - 1.2 mg/dL    BUN 24 (H) 7 - 22 mg/dL    Sodium 139 135 - 145 meq/L    Potassium 4.6 3.5 - 5.2 meq/L    Chloride 102 98 - 111 meq/L    CO2 25 23 - 33 meq/L    Calcium 9.8 8.5 - 10.5 mg/dL    AST 18 5 - 40 U/L    Alkaline Phosphatase 141 (H) 38 - 126 U/L    Total Protein 6.7 6.1 - 8.0 g/dL    Albumin 4.0 3.5 - 5.1 g/dL    Total Bilirubin 0.5 0.3 - 1.2 mg/dL    ALT 7 (L) 11 - 66 U/L   Anion Gap    Collection Time: 08/09/23  5:00 PM   Result Value Ref Range    Anion Gap 12.0 8.0 - 16.0 meq/L   Glomerular Filtration Rate, Estimated    Collection Time: 08/09/23  5:00 PM   Result Value Ref Range    Est, Glom Filt Rate 37 (A) >60 ml/min/1.73m2   Osmolality    Collection Time: 08/09/23  5:00 PM   Result Value Ref Range    Osmolality Calc 283.4 275.0 - 300.0 mOsmol/kg   CK    Collection Time: 08/09/23  6:30 PM   Result Value Ref Range    Total  30 - 135 U/L   Troponin    Collection Time: 08/09/23  6:30 PM   Result Value Ref Range    Troponin, High Sensitivity 38 (H) 0 - 12 ng/L   Protime-INR    Collection Time: 08/09/23  6:30 PM   Result Value Ref Range    INR 1.51 (H) 0.85 - 1.13   TYPE AND SCREEN    Collection Time: 08/09/23  6:30 PM   Result Value Ref Range    ABO O     Rh Factor POS     Antibody Screen NEG    Urine with Reflexed Micro    Collection Time: 08/09/23  8:10 PM   Result Value Ref Range    Glucose, Ur NEGATIVE NEGATIVE mg/dl    Bilirubin Urine NEGATIVE NEGATIVE    Ketones, Urine NEGATIVE NEGATIVE    Specific Gravity, Urine 1.028 1.002 - 1.030    Blood, Urine NEGATIVE NEGATIVE    pH, UA 7.0 5.0 - 9.0    Protein, UA NEGATIVE NEGATIVE    Urobilinogen, Urine 0.2 0.0

## 2023-08-10 NOTE — CARE COORDINATION
08/10/23 1116   Readmission Assessment   Number of Days since last admission? 8-30 days   Previous Disposition Home Alone  (d/c to [address] [facility], then home for 3 days before falling pt reports)   Who is being Interviewed Patient   What was the patient's/caregiver's perception as to why they think they needed to return back to the hospital? Other (Comment)  (\"falling. my own goof\")   Did you visit your Primary Care Physician after you left the hospital, before you returned this time? No   Why weren't you able to visit your PCP? Other (Comment)  ([facility]   Did you see a specialist, such as Cardiac, Pulmonary, Orthopedic Physician, etc. after you left the hospital? Yes   Who advised the patient to return to the hospital? Self-referral   Does the patient report anything that got in the way of taking their medications? No   In our efforts to provide the best possible care to you and others like you, can you think of anything that we could have done to help you after you left the hospital the first time, so that you might not have needed to return so soon? Other (Comment)  (\"You all could not have done anything differently.  This was my own goof\")

## 2023-08-10 NOTE — CARE COORDINATION
DISCHARGE PLANNING EVALUATION  8/10/23, 1:56 PM EDT    Reason for Referral: discharge plan  Mental Status: alert, oriented   Decision Making:  makes own decisions   Family/Social/Home Environment:  patient lives at home alone, manages her own care, has support from family. She was at [facility], was discharged home 5 days ago. Current Services including food security, transportation and housekeeping: no current services  Current Equipment: walker   Payment [address]   Concerns or Barriers to Discharge: precert for ecf   Post-acute [facility]) provider list was provided to patient. Patient was informed of their freedom to choose [facility] provider. Discussed and offered to show the patient the relevant [facility] Providers quality and resource use measures on Medicare Compare web site via computer based on patient's goals of care and treatment preferences. Questions regarding selection process were answered. Declined list, only ecf preference is [place]     Teach Back Method used with patient regarding care plan and discharge plan  Patient verbalized understanding of the plan of care and contributed to goal setting. Patient goals, treatment preferences and discharge plan:  spoke with patient about discharge plan. She is requesting [facility] for a rehab stay. Referral made to [facility], facility is reviewing.       Electronically signed by GIRISH Ramirez on 8/10/2023 at 1:56 PM

## 2023-08-10 NOTE — H&P
Vital Signs: T: 98.0 P: 101 RR: 17 B/P: 140/80: FiO2: 2L NC: O2 Sat:96: I/O: No intake or output data in the 24 hours ending 08/09/23 2340      General:   Well appearing, in no acute distress  HEENT:  normocephalic and atraumatic. No scleral icterus. PEARLA, mucous membranes moist  Neck: supple. Trachea midline. No JVD. Full ROM, no meningismus. Lungs: clear to auscultation. No retractions, no accessory muscle use. Cardiac: RRR, no murmur  Abdomen: soft. Nontender. Bowel sounds normal  Extremities:  No clubbing, cyanosis x 4, no edema    Vasculature: capillary refill < 3 seconds. Skin:  warm and dry. no visible rashes  Psych:  Alert and oriented x3. Affect appropriate  Lymph:  No supraclavicular adenopathy. Neurologic:  CN II-XII grossly intact. No focal deficit. Data: (All radiographs, tracings, PFTs, and imaging are personally viewed and interpreted unless otherwise noted).        Electronically signed by  Rip Bishop

## 2023-08-10 NOTE — ED NOTES
Patient resting in bed. Respirations easy and unlabored. No distress noted. Call light within reach.        Amol Silveira RN  08/09/23 2034

## 2023-08-10 NOTE — PROGRESS NOTES
Hospitalist Progress Note    Patient:  Ang Flores      Unit/Bed:7K-23/023-A    YOB: 1935    MRN: [id number]       Acct: [de-identified]     PCP: Segundo Patel    Date of Admission: 8/9/2023    Assessment/Plan:    Mechanical fall at home--CT head/C-spine reveals nothing acute; PT/OT  Closed left hip dislocation S/P reduction in ER on 8/9/2023--secondary to #1; had left hip hemiarthroplasty for femoral neck fracture on 7/1/2023; appreciate orthopedic input, no further intervention needed, weightbearing as tolerated left lower extremity in knee immobilizer, strict posterior hip precautions, needs a 2-week follow-up after discharge with Dr. Jacquelyn Jasso  Asymptomatic bacteriuria--received Rocephin x 1 dose on 8/9; denies any complaints  Elevated troponin--initial troponin at 38 with repeat at 57, denies any symptoms, will obtain stat EKG  Chronic diastolic heart failure--echo from June 5, 2023 revealed an EF 50%; on Bumex 3 times weekly, on Toprol 12.5 mg daily  Small bilateral pleural effusions--on Bumex 0.5 mg on Monday/Wednesday/Friday  CKD stage 3b  Elevated alkaline phosphatase--this does appear to be a chronic issue actually dating back to 2019  Macrocytic, normocytic anemia--monitor  Elevated INR--on Eliquis 5 mg twice daily  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

## 2023-08-10 NOTE — CARE COORDINATION
Case Management Assessment  Initial Evaluation    Date/Time of Evaluation: 8/10/2023 12:05 PM  Assessment Completed by: Jamir Tamayo RN    If patient is discharged prior to next notation, then this note serves as note for discharge by case management. Patient Name: Alivia Roca                   YOB: 1935  Diagnosis: UTI (urinary tract infection) [N39.0]  Fall, initial encounter [W19. XXXA]  Dislocation, hip closed, left, initial encounter ([address] [facility]) [S73.005A]  Urinary tract infection without hematuria, site unspecified [N39.0]                   Date / Time: 8/9/2023  4:36 PM  Location: [address] [place] [address]     Patient Admission Status: Inpatient   Readmission Risk Low 0-14, Mod 15-19), High > 20: Readmission Risk Score: 26.9    Current PCP: Mary Call  PCP verified by CM? Yes Moy Hunter    Chart Reviewed: Yes      History Provided by: Patient  Patient Orientation: Alert and Oriented    Patient Cognition: Alert    Hospitalization in the last 30 days (Readmission):  Yes    If yes, Readmission Assessment in CM Navigator will be completed. Advance Directives:      Code Status: Full Code   Patient's Primary Decision Maker is: Named in [facility] E Marlon     Primary Decision MakerMelvi Zamoraile  Child - [phone]    Secondary Decision Maker: Raeann Merritt - Child - [phone]    Discharge Planning:    Patient lives with: Alone Type of Home: Apartment  Primary Care Giver: Self  Patient Support Systems include: Children, Friends/Neighbors   Current Financial resources: Medicare  Current community resources: None  Current services prior to admission: Durable Medical Equipment            Current DME: Shower Chair, Walker            Type of Home Care services:  None    ADLS  Prior functional level:  Independent in ADLs/IADLs  Current functional level: Assistance with the following:, Bathing, Dressing, Toileting, Housework, Mobility (due to hip brace)    Family can provide assistance at DC:

## 2023-08-10 NOTE — CARE COORDINATION
8/10/23, 10:22 AM EDT    DISCHARGE PLANNING EVALUATION    Spoke with patient about discharge plan. She was at [facility] after her last admit, went home without services from the [facility]. She does not want home health, does not find that home health is helpful for her. She plans home at discharge, mentions she may want a wheelchair if it is small and easy to maneuver.   She plans to discuss with PT.

## 2023-08-10 NOTE — PROGRESS NOTES
[facility]  INPATIENT PHYSICAL THERAPY  EVALUATION  [facility] ORTHOPEDICS 7K - 7K-23/023-A    Time In: 1130  Time Out: 1201  Timed Code Treatment Minutes: 23 Minutes  Minutes: 31          Date: 8/10/2023  Patient Name: Alivia Roca,  Gender:  female        MRN: [id number]  : 1935  (82 y.o.)      Referring Practitioner: ROMAN Schumacher CNP  Diagnosis: UTI (urinary tract infection)  Additional Pertinent Hx: Per H&P 8/10: 82 y.o. female who presented to the ED last night after a fall at her SNF. She is s/p L hip yesi on 23 with Dr Nena Palafox, uncomplicated post operative course from an orthopaedic standpoint. Was ambulating without aid and without much pain prior to fall. Patient sustained a mechanical fall landing on her left hip yesterday, immediate pain and deformity and inability to WB, imaging in the ED revealed a dislocated hip prosthesis, this was reduced by the ED providers under conscious sedation. Pain is minimal at this time, KI is intact, no paresthesias or weakness, overall pain well controlled at the left hip. L hip yesi was on 23 with Dr Nena Palafox  This is her first dislocation  Successfully reduced in the ED     Restrictions/Precautions:  Restrictions/Precautions: Fall Risk, General Precautions, Weight Bearing  Left Lower Extremity Weight Bearing: Weight Bearing As Tolerated  Required Braces or Orthoses  Left Lower Extremity Brace: Knee Immobilizer  Position Activity Restriction  Hip Precautions: Posterior hip precautions, No hip flexion > 90 degrees, No ADduction, No hip internal rotation  Other position/activity restrictions: maintain knee immobilizer, strict posterior hip precautions    Subjective:  Chart Reviewed: Yes  Patient assessed for rehabilitation services?: Yes  Family / Caregiver Present: No  Subjective: RN approved session. Pt agreed for PT evaluation but was noted to have increased frustration regarding her situation.  Following mobility we discussed her d/c

## 2023-08-11 LAB
EKG ATRIAL RATE: 107 BPM
EKG P AXIS: 66 DEGREES
EKG P-R INTERVAL: 180 MS
EKG Q-T INTERVAL: 346 MS
EKG QRS DURATION: 68 MS
EKG QTC CALCULATION (BAZETT): 461 MS
EKG R AXIS: -61 DEGREES
EKG VENTRICULAR RATE: 107 BPM

## 2023-08-11 PROCEDURE — 97535 SELF CARE MNGMENT TRAINING: CPT

## 2023-08-11 PROCEDURE — 6370000000 HC RX 637 (ALT 250 FOR IP): Performed by: NURSE PRACTITIONER

## 2023-08-11 PROCEDURE — 97166 OT EVAL MOD COMPLEX 45 MIN: CPT

## 2023-08-11 PROCEDURE — 93010 ELECTROCARDIOGRAM REPORT: CPT | Performed by: INTERNAL MEDICINE

## 2023-08-11 PROCEDURE — 99233 SBSQ HOSP IP/OBS HIGH 50: CPT | Performed by: NURSE PRACTITIONER

## 2023-08-11 PROCEDURE — 6370000000 HC RX 637 (ALT 250 FOR IP): Performed by: PHYSICIAN ASSISTANT

## 2023-08-11 PROCEDURE — 97116 GAIT TRAINING THERAPY: CPT

## 2023-08-11 PROCEDURE — 97530 THERAPEUTIC ACTIVITIES: CPT

## 2023-08-11 PROCEDURE — 97110 THERAPEUTIC EXERCISES: CPT

## 2023-08-11 PROCEDURE — 2580000003 HC RX 258: Performed by: NURSE PRACTITIONER

## 2023-08-11 PROCEDURE — 1200000003 HC TELEMETRY R&B

## 2023-08-11 PROCEDURE — 2580000003 HC RX 258: Performed by: PHYSICIAN ASSISTANT

## 2023-08-11 RX ORDER — 0.9 % SODIUM CHLORIDE 0.9 %
500 INTRAVENOUS SOLUTION INTRAVENOUS ONCE
Status: COMPLETED | OUTPATIENT
Start: 2023-08-11 | End: 2023-08-11

## 2023-08-11 RX ORDER — SULFAMETHOXAZOLE AND TRIMETHOPRIM 800; 160 MG/1; MG/1
1 TABLET ORAL 2 TIMES DAILY
Status: DISCONTINUED | OUTPATIENT
Start: 2023-08-11 | End: 2023-08-14 | Stop reason: HOSPADM

## 2023-08-11 RX ADMIN — SODIUM CHLORIDE, PRESERVATIVE FREE 10 ML: 5 INJECTION INTRAVENOUS at 08:40

## 2023-08-11 RX ADMIN — SULFAMETHOXAZOLE AND TRIMETHOPRIM 1 TABLET: 800; 160 TABLET ORAL at 19:56

## 2023-08-11 RX ADMIN — APIXABAN 5 MG: 5 TABLET, FILM COATED ORAL at 19:56

## 2023-08-11 RX ADMIN — PANTOPRAZOLE SODIUM 40 MG: 40 TABLET, DELAYED RELEASE ORAL at 15:15

## 2023-08-11 RX ADMIN — TIMOLOL MALEATE 1 DROP: 5 SOLUTION/ DROPS OPHTHALMIC at 08:41

## 2023-08-11 RX ADMIN — APIXABAN 5 MG: 5 TABLET, FILM COATED ORAL at 08:40

## 2023-08-11 RX ADMIN — BUMETANIDE 0.5 MG: 0.5 TABLET ORAL at 08:40

## 2023-08-11 RX ADMIN — SODIUM CHLORIDE 500 ML: 9 INJECTION, SOLUTION INTRAVENOUS at 12:53

## 2023-08-11 RX ADMIN — METOPROLOL SUCCINATE 12.5 MG: 25 TABLET, EXTENDED RELEASE ORAL at 08:40

## 2023-08-11 RX ADMIN — CLOPIDOGREL BISULFATE 75 MG: 75 TABLET ORAL at 08:40

## 2023-08-11 RX ADMIN — SODIUM CHLORIDE, PRESERVATIVE FREE 10 ML: 5 INJECTION INTRAVENOUS at 19:56

## 2023-08-11 RX ADMIN — PRAVASTATIN SODIUM 40 MG: 40 TABLET ORAL at 08:41

## 2023-08-11 RX ADMIN — PANTOPRAZOLE SODIUM 40 MG: 40 TABLET, DELAYED RELEASE ORAL at 05:49

## 2023-08-11 RX ADMIN — SULFAMETHOXAZOLE AND TRIMETHOPRIM 1 TABLET: 800; 160 TABLET ORAL at 15:15

## 2023-08-11 ASSESSMENT — PAIN SCALES - GENERAL: PAINLEVEL_OUTOF10: 0

## 2023-08-11 NOTE — PROGRESS NOTES
[facility] OCCUPATIONAL THERAPY  [facility] ORTHOPEDICS 7K  EVALUATION    Time:   Time In: 89  Time Out: 1506  Timed Code Treatment Minutes: 45 Minutes  Minutes: 46          Date: 2023  Patient Name: Shamar Merino,   Gender: female      MRN: [id number]  : 1935  (82 y.o.)  Referring Practitioner: ROMAN Harris - CNP  Diagnosis: presence of artificial shoulder joint, left  Additional Pertinent Hx: 82 y.o. female who presented to the ED last night after a fall at her SNF. She is s/p L hip yesi on 23 with  [facility] [place], uncomplicated post operative course from an orthopaedic standpoint. Was ambulating without aid and without much pain prior to fall. Patient sustained a mechanical fall landing on her left hip yesterday, immediate pain and deformity and inability to WB, imaging in the ED revealed a dislocated hip prosthesis, this was reduced by the ED providers under conscious sedation. Pain is minimal at this time, KI is intact, no paresthesias or weakness, overall pain well controlled at the left hip. L hip yesi was on 23 with  [facility]  This is her first dislocation  Successfully reduced in the ED    Restrictions/Precautions:  Restrictions/Precautions: Fall Risk, General Precautions, Weight Bearing  Left Lower Extremity Weight Bearing: Weight Bearing As Tolerated  Required Braces or Orthoses  Left Lower Extremity Brace: Knee Immobilizer  Position Activity Restriction  Hip Precautions: Posterior hip precautions, No hip flexion > 90 degrees, No ADduction, No hip internal rotation  Other position/activity restrictions: maintain knee immobilizer, strict posterior hip precautions    Subjective  Chart Reviewed: Yes, Orders, Progress Notes, History and Physical  Patient assessed for rehabilitation services?: Yes    Subjective: Nurse approved OT eval. Pt in recliner on OT arrival, pleasant and cooperative. Pt is A+Ox4.  Pt appears frustrated by recent medical history and recent

## 2023-08-11 NOTE — PROGRESS NOTES
Patient's daughter April called to ask questions regarding, Patient verbally gave permission to give information. All questions answered.

## 2023-08-11 NOTE — PLAN OF CARE
Problem: Discharge Planning  Goal: Discharge to home or other facility with appropriate resources  Outcome: Progressing  Flowsheets (Taken 8/10/2023 1814 by Hank Teague LPN)  Discharge to home or other facility with appropriate resources:   Identify barriers to discharge with patient and caregiver   Identify discharge learning needs (meds, wound care, etc)   Arrange for needed discharge resources and transportation as appropriate     Problem: Pain  Goal: Verbalizes/displays adequate comfort level or baseline comfort level  Outcome: Progressing  Flowsheets (Taken 8/10/2023 1814 by Hank Teague LPN)  Verbalizes/displays adequate comfort level or baseline comfort level:   Encourage patient to monitor pain and request assistance   Administer analgesics based on type and severity of pain and evaluate response   Assess pain using appropriate pain scale   Implement non-pharmacological measures as appropriate and evaluate response     Problem: Safety - Adult  Goal: Free from fall injury  Outcome: Progressing  Flowsheets (Taken 8/10/2023 1814 by Hank Teague LPN)  Free From Fall Injury: Instruct family/caregiver on patient safety     Problem: ABCDS Injury Assessment  Goal: Absence of physical injury  Outcome: Progressing  Flowsheets (Taken 8/10/2023 1814 by Hank Teague LPN)  Absence of Physical Injury: Implement safety measures based on patient assessment     Problem: Chronic Conditions and Co-morbidities  Goal: Patient's chronic conditions and co-morbidity symptoms are monitored and maintained or improved  Outcome: Progressing  Flowsheets (Taken 8/10/2023 1814 by Hank Teague LPN)  Care Plan - Patient's Chronic Conditions and Co-Morbidity Symptoms are Monitored and Maintained or Improved:   Monitor and assess patient's chronic conditions and comorbid symptoms for stability, deterioration, or improvement   Collaborate with multidisciplinary team to address chronic and comorbid conditions and prevent exacerbation or deterioration Problem: Skin/Tissue Integrity - Adult  Goal: Skin integrity remains intact  Outcome: Progressing  Flowsheets (Taken 8/10/2023 1814 by Jaylin Joy LPN)  Skin Integrity Remains Intact: Monitor for areas of redness and/or skin breakdown     Problem: Musculoskeletal - Adult  Goal: Return mobility to safest level of function  Outcome: Progressing  Flowsheets (Taken 8/10/2023 1814 by Jaylin Joy LPN)  Return Mobility to Safest Level of Function:   Assess patient stability and activity tolerance for standing, transferring and ambulating with or without assistive devices   Assist with transfers and ambulation using safe patient handling equipment as needed   Ensure adequate protection for wounds/incisions during mobilization   Obtain physical therapy/occupational therapy consults as needed   Care plan reviewed with patient. Patient verbalizes understanding of the plan of care and contribute to goal setting. (0) indicator not present

## 2023-08-11 NOTE — PROGRESS NOTES
[facility]  INPATIENT PHYSICAL THERAPY  DAILY NOTE  [facility] ORTHOPEDICS 7K - 7K-23/023-A    Time In: 7167  Time Out: 1048  Timed Code Treatment Minutes: 40 Minutes  Minutes: 40          Date: 2023  Patient Name: Shamar Merino,  Gender:  female        MRN: [id number]  : 1935  (82 y.o.)     Referring Practitioner: ROMAN Harris CNP  Diagnosis: UTI (urinary tract infection)  Additional Pertinent Hx: Per H&P 8/10: 82 y.o. female who presented to the ED last night after a fall at her SNF. She is s/p L hip yesi on 23 with Dr Meliton Brennan, uncomplicated post operative course from an orthopaedic standpoint. Was ambulating without aid and without much pain prior to fall. Patient sustained a mechanical fall landing on her left hip yesterday, immediate pain and deformity and inability to WB, imaging in the ED revealed a dislocated hip prosthesis, this was reduced by the ED providers under conscious sedation. Pain is minimal at this time, KI is intact, no paresthesias or weakness, overall pain well controlled at the left hip. L hip yesi was on 23 with Dr Meliton Brennan  This is her first dislocation  Successfully reduced in the ED     Prior Level of Function:  Lives With: Alone  Type of Home: Condo  Home Access: Elevator, Level entry  Home Equipment: Bereket Hollis,  [facility] Help From: Family  Ambulation Assistance: Independent  Transfer Assistance: Independent  Additional Comments: Pt went to a SNF following initial surgery, she was home for three days prior to return to the hospital with dislocation. She states she was mod I with RW. Her son calls to check on her daily and lives nearby, her daughter lives in [facility].     Restrictions/Precautions:  Restrictions/Precautions: Fall Risk, General Precautions, Weight Bearing  Left Lower Extremity Weight Bearing: Weight Bearing As Tolerated  Required Braces or Orthoses  Left Lower Extremity Brace: Knee Immobilizer  Position Activity Restriction  Hip Precautions: Posterior hip precautions, No hip flexion > 90 degrees, No ADduction, No hip internal rotation  Other position/activity restrictions: maintain knee immobilizer, strict posterior hip precautions     SUBJECTIVE: Pt in bed upon arrival, and agrees to therapy, RN approved session, Pt A&O X 4/4, Pt demos flat affect, pt is cooperative    PAIN: \"if I dont move I dont have pain\"    Vitals: Oxygen: 93% on room air  Heart Rate: 86    OBJECTIVE:  Bed Mobility:  Supine to Sit: Maximum Assistance, with head of bed raised, with rail, with verbal cues   Scooting: Moderate Assistance, with head of bed flat, with rail, with verbal cues , with increased time for completion    Transfers:  Sit to Stand: Moderate Assistance, cues for hand placement, with verbal cues  Stand to Sit:Moderate Assistance, cues for hand placement, with verbal cues    Ambulation:  Minimal Assistance, with verbal cues , with increased time for completion  Distance: 4ft  Surface: Level Tile  Device:Rolling Walker  Gait Deviations: Forward Flexed Posture, Slow Pretty, Decreased Step Length Bilaterally, Decreased Gait Speed, Decreased Heel Strike Bilaterally, Mild Path Deviations, Unsteady Gait, and Decreased Terminal Knee Extension    Exercise:  Patient was guided in 1 set(s) 10 reps of exercise to both lower extremities. General LE therex in hospital bed with immobilizer donned all within limits of restrictions. Provided HEP as well . Exercises were completed for increased independence with functional mobility. Functional Outcome Measures: Completed  -PAC Inpatient Mobility without Stair Climbing Raw Score : 13  -PAC Inpatient without Stair Climbing T-Scale Score : 38.96    ASSESSMENT:  Assessment: Patient progressing toward established goals. Activity Tolerance:  Patient tolerance of  treatment: good.  Pt demonstrates impairments with strength, balance, endurance, activity tolerance, independence, requires hands on

## 2023-08-11 NOTE — PROGRESS NOTES
Hospitalist Progress Note    Patient:  Noa Castellano      Unit/Bed:7K-23/023-A    YOB: 1935    MRN: [id number]       Acct: [de-identified]     PCP: Oscar Belcher    Date of Admission: 8/9/2023    Assessment/Plan:    Mechanical fall at home--CT head/C-spine reveals nothing acute; PT/OT  Closed left hip dislocation S/P reduction in ER on 8/9/2023--secondary to #1; had left hip hemiarthroplasty for femoral neck fracture on 7/1/2023; appreciate orthopedic input, no further intervention needed, weightbearing as tolerated left lower extremity in knee immobilizer, strict posterior hip precautions, needs a 2-week follow-up after discharge with Dr. John Lozano  Hypotension--patient's blood pressure does run on the lower side; asymptomatic; gave 500 mL bolus, please hold parameters on Toprol, patient also did have Lasix today  Asymptomatic bacteriuria--received Rocephin x 1 dose on 8/9; denies any complaints  Elevated troponin--initial troponin at 38 with repeat at 57, denies any symptoms, EKG did not show any acute changes  Chronic diastolic heart failure--echo from June 5, 2023 revealed an EF 50%; on Bumex 3 times weekly, on Toprol 12.5 mg daily~at home parameters to hold for less than a systolic blood pressure 618  Small bilateral pleural effusions--on Bumex 0.5 mg on Monday/Wednesday/Friday  CKD stage 3b  Elevated alkaline phosphatase--this does appear to be a chronic issue actually dating back to 2019  Macrocytic, normocytic anemia--monitor  Elevated INR--on Eliquis 5 mg twice daily  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 Likely due to interruptions in [facility];  Avoid any prolonged interruptions in [facility]; on Eliquis 5 mg twice daily currently along with Plavix 75 mg lobe. Mild dependent changes within the right lung. Partial visualization of a cardiac pacemaker. Small focus of fatty infiltration within the left lobe of the liver. Numerous calcified granulomas within the spleen. Unremarkable pancreas and adrenal glands. Two small nonobstructive calculi within the left kidney. Bilateral renal volume loss. Small bilateral kidney cysts. No hydronephrosis. Prior cholecystectomy. Severe esophageal dilatation and distention with fluid. Poor distention of the stomach. Postsurgical changes of the cecum and ascending colon. Colonic diverticulosis without diverticulitis. No pneumatosis or portal venous gas. 4 cm aneurysm of the infrarenal abdominal aorta status post endograft repair. Small focus of hyperdensity within the right lateral aspect of the excluded lumen of the abdominal aorta (305, 47). Interval resolution of the previously seen pseudoaneurysm at the junction between the right external iliac and common femoral arteries. Mild-to-moderate stenosis of the right common femoral artery. Partial visualization of an occluded bypass graft within the right lower extremity, grossly unchanged. Status post hysterectomy. Unremarkable urinary bladder. Prior appendectomy. Status post left hip replacement. Superior dislocation of the left hip prosthesis. No acute fracture. 1. Dislocation of the left femoral head prosthesis. 2. 4 cm aneurysm of the infrarenal abdominal aorta status post endograft repair. Small focus of hyperdensity within the excluded lumen of the abdominal aorta. This may be artifactual or could indicate an endograft leak. Recommend further evaluation with CTA when clinically appropriate. 3. Dilatation and fluid distention of the esophagus suggesting achalasia or distal obstruction. 4. No hemoperitoneum or abdominal organ injury. 5. Interval resolution of the previously seen pseudoaneurysm at the junction between the right external iliac and common femoral arteries.

## 2023-08-12 LAB
ANION GAP SERPL CALC-SCNC: 8 MEQ/L (ref 8–16)
BACTERIA UR CULT: ABNORMAL
BUN SERPL-MCNC: 18 MG/DL (ref 7–22)
CALCIUM SERPL-MCNC: 8.6 MG/DL (ref 8.5–10.5)
CHLORIDE SERPL-SCNC: 108 MEQ/L (ref 98–111)
CO2 SERPL-SCNC: 24 MEQ/L (ref 23–33)
CREAT SERPL-MCNC: 1.3 MG/DL (ref 0.4–1.2)
DEPRECATED RDW RBC AUTO: 59.8 FL (ref 35–45)
ERYTHROCYTE [DISTWIDTH] IN BLOOD BY AUTOMATED COUNT: 16.1 % (ref 11.5–14.5)
GFR SERPL CREATININE-BSD FRML MDRD: 40 ML/MIN/1.73M2
GLUCOSE SERPL-MCNC: 88 MG/DL (ref 70–108)
HCT VFR BLD AUTO: 28.9 % (ref 37–47)
HGB BLD-MCNC: 8.7 GM/DL (ref 12–16)
MAGNESIUM SERPL-MCNC: 1.7 MG/DL (ref 1.6–2.4)
MCH RBC QN AUTO: 30.1 PG (ref 26–33)
MCHC RBC AUTO-ENTMCNC: 30.1 GM/DL (ref 32.2–35.5)
MCV RBC AUTO: 100 FL (ref 81–99)
ORGANISM: ABNORMAL
PLATELET # BLD AUTO: 128 THOU/MM3 (ref 130–400)
PMV BLD AUTO: 11.9 FL (ref 9.4–12.4)
POTASSIUM SERPL-SCNC: 4.1 MEQ/L (ref 3.5–5.2)
RBC # BLD AUTO: 2.89 MILL/MM3 (ref 4.2–5.4)
SODIUM SERPL-SCNC: 140 MEQ/L (ref 135–145)
WBC # BLD AUTO: 3.9 THOU/MM3 (ref 4.8–10.8)

## 2023-08-12 PROCEDURE — 6370000000 HC RX 637 (ALT 250 FOR IP): Performed by: PHYSICIAN ASSISTANT

## 2023-08-12 PROCEDURE — 2580000003 HC RX 258

## 2023-08-12 PROCEDURE — 2580000003 HC RX 258: Performed by: PHYSICIAN ASSISTANT

## 2023-08-12 PROCEDURE — 99232 SBSQ HOSP IP/OBS MODERATE 35: CPT

## 2023-08-12 PROCEDURE — 85027 COMPLETE CBC AUTOMATED: CPT

## 2023-08-12 PROCEDURE — 6360000002 HC RX W HCPCS

## 2023-08-12 PROCEDURE — 83735 ASSAY OF MAGNESIUM: CPT

## 2023-08-12 PROCEDURE — 36415 COLL VENOUS BLD VENIPUNCTURE: CPT

## 2023-08-12 PROCEDURE — 80048 BASIC METABOLIC PNL TOTAL CA: CPT

## 2023-08-12 PROCEDURE — 1200000003 HC TELEMETRY R&B

## 2023-08-12 PROCEDURE — 6370000000 HC RX 637 (ALT 250 FOR IP): Performed by: NURSE PRACTITIONER

## 2023-08-12 RX ADMIN — SULFAMETHOXAZOLE AND TRIMETHOPRIM 1 TABLET: 800; 160 TABLET ORAL at 08:50

## 2023-08-12 RX ADMIN — CLOPIDOGREL BISULFATE 75 MG: 75 TABLET ORAL at 08:52

## 2023-08-12 RX ADMIN — PANTOPRAZOLE SODIUM 40 MG: 40 TABLET, DELAYED RELEASE ORAL at 15:44

## 2023-08-12 RX ADMIN — AMPICILLIN 2000 MG: 2 INJECTION, POWDER, FOR SOLUTION INTRAMUSCULAR; INTRAVENOUS at 15:00

## 2023-08-12 RX ADMIN — SODIUM CHLORIDE, PRESERVATIVE FREE 10 ML: 5 INJECTION INTRAVENOUS at 19:49

## 2023-08-12 RX ADMIN — APIXABAN 5 MG: 5 TABLET, FILM COATED ORAL at 08:50

## 2023-08-12 RX ADMIN — Medication 6 MG: at 03:39

## 2023-08-12 RX ADMIN — APIXABAN 5 MG: 5 TABLET, FILM COATED ORAL at 19:48

## 2023-08-12 RX ADMIN — SODIUM CHLORIDE, PRESERVATIVE FREE 10 ML: 5 INJECTION INTRAVENOUS at 08:51

## 2023-08-12 RX ADMIN — AMPICILLIN 2000 MG: 2 INJECTION, POWDER, FOR SOLUTION INTRAMUSCULAR; INTRAVENOUS at 23:24

## 2023-08-12 RX ADMIN — SULFAMETHOXAZOLE AND TRIMETHOPRIM 1 TABLET: 800; 160 TABLET ORAL at 19:49

## 2023-08-12 RX ADMIN — PRAVASTATIN SODIUM 40 MG: 40 TABLET ORAL at 08:50

## 2023-08-12 RX ADMIN — PANTOPRAZOLE SODIUM 40 MG: 40 TABLET, DELAYED RELEASE ORAL at 03:40

## 2023-08-12 RX ADMIN — Medication 6 MG: at 23:25

## 2023-08-12 RX ADMIN — TIMOLOL MALEATE 1 DROP: 5 SOLUTION/ DROPS OPHTHALMIC at 08:50

## 2023-08-12 ASSESSMENT — PAIN SCALES - GENERAL
PAINLEVEL_OUTOF10: 0

## 2023-08-12 NOTE — PLAN OF CARE
Problem: Discharge Planning  Goal: Discharge to home or other facility with appropriate resources  8/11/2023 2241 by Christopher Salgado RN  Outcome: Progressing  Flowsheets (Taken 8/11/2023 2241)  Discharge to home or other facility with appropriate resources:   Identify barriers to discharge with patient and caregiver   Arrange for needed discharge resources and transportation as appropriate   Identify discharge learning needs (meds, wound care, etc)     Problem: Pain  Goal: Verbalizes/displays adequate comfort level or baseline comfort level  8/11/2023 2241 by Christopher Salgado RN  Outcome: Progressing  Flowsheets (Taken 8/11/2023 2241)  Verbalizes/displays adequate comfort level or baseline comfort level:   Encourage patient to monitor pain and request assistance   Assess pain using appropriate pain scale   Administer analgesics based on type and severity of pain and evaluate response   Implement non-pharmacological measures as appropriate and evaluate response     Problem: Safety - Adult  Goal: Free from fall injury  8/11/2023 2241 by Christopher Salgado RN  Outcome: Progressing  Flowsheets (Taken 8/11/2023 2241)  Free From Fall Injury:   Instruct family/caregiver on patient safety   Based on caregiver fall risk screen, instruct family/caregiver to ask for assistance with transferring infant if caregiver noted to have fall risk factors     Problem: ABCDS Injury Assessment  Goal: Absence of physical injury  8/11/2023 2241 by Christopher Salgado RN  Outcome: Progressing  Flowsheets (Taken 8/11/2023 2241)  Absence of Physical Injury: Implement safety measures based on patient assessment     Problem: Chronic Conditions and Co-morbidities  Goal: Patient's chronic conditions and co-morbidity symptoms are monitored and maintained or improved  8/11/2023 2241 by Christopher Salgado RN  Outcome: Progressing     Problem: Skin/Tissue Integrity - Adult  Goal: Skin integrity remains intact  8/11/2023 2241 by Christopher Salgado

## 2023-08-12 NOTE — PLAN OF CARE
Problem: Discharge Planning  Goal: Discharge to home or other facility with appropriate resources  Outcome: Progressing  Flowsheets (Taken 8/12/2023 1706)  Discharge to home or other facility with appropriate resources:   Identify barriers to discharge with patient and caregiver   Arrange for needed discharge resources and transportation as appropriate   Identify discharge learning needs (meds, wound care, etc)     Problem: Pain  Goal: Verbalizes/displays adequate comfort level or baseline comfort level  Outcome: Progressing  Flowsheets (Taken 8/12/2023 1706)  Verbalizes/displays adequate comfort level or baseline comfort level:   Encourage patient to monitor pain and request assistance   Assess pain using appropriate pain scale   Implement non-pharmacological measures as appropriate and evaluate response     Problem: Safety - Adult  Goal: Free from fall injury  Outcome: Progressing  Flowsheets (Taken 8/12/2023 1706)  Free From Fall Injury: Instruct family/caregiver on patient safety     Problem: ABCDS Injury Assessment  Goal: Absence of physical injury  Outcome: Progressing  Flowsheets (Taken 8/12/2023 1706)  Absence of Physical Injury: Implement safety measures based on patient assessment     Problem: Chronic Conditions and Co-morbidities  Goal: Patient's chronic conditions and co-morbidity symptoms are monitored and maintained or improved  Outcome: Progressing  Flowsheets (Taken 8/12/2023 1706)  Care Plan - Patient's Chronic Conditions and Co-Morbidity Symptoms are Monitored and Maintained or Improved: Monitor and assess patient's chronic conditions and comorbid symptoms for stability, deterioration, or improvement     Problem: Skin/Tissue Integrity - Adult  Goal: Skin integrity remains intact  Outcome: Progressing  Flowsheets (Taken 8/12/2023 1706)  Skin Integrity Remains Intact:   Monitor for areas of redness and/or skin breakdown   Assess vascular access sites hourly     Problem: Musculoskeletal -

## 2023-08-12 NOTE — PROGRESS NOTES
Hospitalist Progress Note    Patient:  Star Mullins    YOB: 1935  Unit/Bed:7K-23/023-A  Date of Admission: 8/9/2023    Expected Discharge: 8/14-8/15  Disposition: pending precert    Assessment/Plan:    Disposition: Awaiting pre-CERT. Mechanical fall: CT head/C-spine reveals no acute fractures. PT OT following. Closed left hip dislocation s/p reduction: Reduced in the ER on 8/9/2023. Patient had left hip hemiarthroplasty for femoral neck fracture on 7/1/2023. Orthopedics saw the patient and recommended no further and for intervention, WBAT, left lower extremity and knee explant immobilizer, strict posterior hip precautions and follow-up in 2 weeks. Hypotension: Intermittent, patient reports she has been dealing with this chronically. Continue to monitor. Urine culture positive: Urine culture grew Enterococcus faecalis with colony count >100,000 CFU/ml. Patient s/p 1 dose rocephin. Discontinued due to lack of symptoms. Initiated on penicillin G based on sensitivities. Chronic HFpEF: Echo 6/5/23 shows EF 50%. Does not appear acutely decompensated. Continue home medications. Strict I&Os. Daily weights. Fluid/salt restriction. CKD Stage 3: Cr stable. Baseline creatinine is about 1.0-1.4. Continue home medications. Avoid nephrotoxic agents as possible. Monitor with daily labs. Unruptured infrarenal AAA: S/p aorto stent graft repair with no evidence of endoleak. RLE acute limb ischemia: 5/31/2023. S/p angiogram and intervention with repeat angio on 6/1 for hematoma with stent placement in distal SFA. Continue on Eliquis-avoid any prolonged interruptions in anticoagulation. Continue Plavix. 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       Chief Complaint:  Fall at home    HPI / Hospital Course:   \"Per H&P done 8/9/2023: Yee Varma reconstructions, and/or weight-based dosing   when appropriate to reduce radiation to a low as reasonably achievable. CT CERVICAL SPINE WO CONTRAST   Final Result   No evidence of cervical spine fracture. This document has been electronically signed by: Lenny Culver MD on    08/09/2023 07:20 PM      All CTs at this facility use dose modulation techniques and iterative    reconstructions, and/or weight-based dosing   when appropriate to reduce radiation to a low as reasonably achievable. CT ABDOMEN PELVIS W IV CONTRAST Additional Contrast? None   Final Result   1. Dislocation of the left femoral head prosthesis. 2. 4 cm aneurysm of the infrarenal abdominal aorta status post endograft    repair. Small focus of hyperdensity within the excluded lumen of the    abdominal aorta. This may be artifactual or could indicate an endograft    leak. Recommend further evaluation with CTA when clinically appropriate. 3. Dilatation and fluid distention of the esophagus suggesting achalasia    or distal obstruction. 4. No hemoperitoneum or abdominal organ injury. 5. Interval resolution of the previously seen pseudoaneurysm at the    junction between the right external iliac and common femoral arteries. Mild-to-moderate stenosis of the right common femoral artery. This document has been electronically signed by: Lenny Culver MD on    08/09/2023 07:43 PM      All CTs at this facility use dose modulation techniques and iterative    reconstructions, and/or weight-based dosing   when appropriate to reduce radiation to a low as reasonably achievable. XR HIP 2-3 VW W PELVIS LEFT   Final Result   Superior dislocation of the left hip prosthesis.       This document has been electronically signed by: Lenny Culver MD on    08/09/2023 05:44 PM        XR HIP LEFT (2-3 VIEWS)    Result Date: 8/9/2023  Left hip, 1 view, 2 images COMPARISON: CT/SR - CTA ABDOMEN PELVIS W WO CONTRAST - 08/09/2023 08:59 PM

## 2023-08-13 LAB
DEPRECATED RDW RBC AUTO: 57.8 FL (ref 35–45)
ERYTHROCYTE [DISTWIDTH] IN BLOOD BY AUTOMATED COUNT: 16.1 % (ref 11.5–14.5)
HCT VFR BLD AUTO: 29 % (ref 37–47)
HGB BLD-MCNC: 8.8 GM/DL (ref 12–16)
MCH RBC QN AUTO: 30 PG (ref 26–33)
MCHC RBC AUTO-ENTMCNC: 30.3 GM/DL (ref 32.2–35.5)
MCV RBC AUTO: 99 FL (ref 81–99)
PLATELET # BLD AUTO: 136 THOU/MM3 (ref 130–400)
PMV BLD AUTO: 11.9 FL (ref 9.4–12.4)
RBC # BLD AUTO: 2.93 MILL/MM3 (ref 4.2–5.4)
WBC # BLD AUTO: 3.7 THOU/MM3 (ref 4.8–10.8)

## 2023-08-13 PROCEDURE — 97535 SELF CARE MNGMENT TRAINING: CPT

## 2023-08-13 PROCEDURE — 6360000002 HC RX W HCPCS

## 2023-08-13 PROCEDURE — 2580000003 HC RX 258: Performed by: PHYSICIAN ASSISTANT

## 2023-08-13 PROCEDURE — 36415 COLL VENOUS BLD VENIPUNCTURE: CPT

## 2023-08-13 PROCEDURE — 97530 THERAPEUTIC ACTIVITIES: CPT

## 2023-08-13 PROCEDURE — 6370000000 HC RX 637 (ALT 250 FOR IP): Performed by: PHYSICIAN ASSISTANT

## 2023-08-13 PROCEDURE — 97110 THERAPEUTIC EXERCISES: CPT

## 2023-08-13 PROCEDURE — 97116 GAIT TRAINING THERAPY: CPT

## 2023-08-13 PROCEDURE — 85027 COMPLETE CBC AUTOMATED: CPT

## 2023-08-13 PROCEDURE — 1200000000 HC SEMI PRIVATE

## 2023-08-13 PROCEDURE — 99232 SBSQ HOSP IP/OBS MODERATE 35: CPT

## 2023-08-13 PROCEDURE — 6370000000 HC RX 637 (ALT 250 FOR IP): Performed by: NURSE PRACTITIONER

## 2023-08-13 PROCEDURE — 2580000003 HC RX 258

## 2023-08-13 RX ADMIN — SULFAMETHOXAZOLE AND TRIMETHOPRIM 1 TABLET: 800; 160 TABLET ORAL at 08:53

## 2023-08-13 RX ADMIN — POLYETHYLENE GLYCOL 3350 17 G: 17 POWDER, FOR SOLUTION ORAL at 08:52

## 2023-08-13 RX ADMIN — SODIUM CHLORIDE, PRESERVATIVE FREE 10 ML: 5 INJECTION INTRAVENOUS at 19:49

## 2023-08-13 RX ADMIN — TIMOLOL MALEATE 1 DROP: 5 SOLUTION/ DROPS OPHTHALMIC at 08:53

## 2023-08-13 RX ADMIN — PRAVASTATIN SODIUM 40 MG: 40 TABLET ORAL at 08:53

## 2023-08-13 RX ADMIN — APIXABAN 5 MG: 5 TABLET, FILM COATED ORAL at 08:53

## 2023-08-13 RX ADMIN — PANTOPRAZOLE SODIUM 40 MG: 40 TABLET, DELAYED RELEASE ORAL at 06:30

## 2023-08-13 RX ADMIN — PANTOPRAZOLE SODIUM 40 MG: 40 TABLET, DELAYED RELEASE ORAL at 16:22

## 2023-08-13 RX ADMIN — APIXABAN 5 MG: 5 TABLET, FILM COATED ORAL at 19:49

## 2023-08-13 RX ADMIN — AMPICILLIN 2000 MG: 2 INJECTION, POWDER, FOR SOLUTION INTRAMUSCULAR; INTRAVENOUS at 06:34

## 2023-08-13 RX ADMIN — AMPICILLIN 2000 MG: 2 INJECTION, POWDER, FOR SOLUTION INTRAMUSCULAR; INTRAVENOUS at 16:19

## 2023-08-13 RX ADMIN — SODIUM CHLORIDE, PRESERVATIVE FREE 10 ML: 5 INJECTION INTRAVENOUS at 08:52

## 2023-08-13 RX ADMIN — SULFAMETHOXAZOLE AND TRIMETHOPRIM 1 TABLET: 800; 160 TABLET ORAL at 19:49

## 2023-08-13 RX ADMIN — CLOPIDOGREL BISULFATE 75 MG: 75 TABLET ORAL at 08:52

## 2023-08-13 RX ADMIN — AMPICILLIN 2000 MG: 2 INJECTION, POWDER, FOR SOLUTION INTRAMUSCULAR; INTRAVENOUS at 23:09

## 2023-08-13 ASSESSMENT — PAIN SCALES - GENERAL
PAINLEVEL_OUTOF10: 0

## 2023-08-13 NOTE — PROGRESS NOTES
XR PELVIS (1-2 VIEWS) - 07/01/2023 03:25 PM EDT Findings: Status post left hip replacement. Superior dislocation of the left hip prosthesis. No acute displaced fracture. No significant arthritic change. The soft tissues are unremarkable. Partial visualization of aortoiliac stents. Superior dislocation of the left hip prosthesis. This document has been electronically signed by: Edin Landon MD on 08/09/2023 05:44 PM          DVT prophylaxis: Lovenox  Diet: ADULT DIET;  Regular; 4 carb choices (60 gm/meal)  PT/OT: Yes  Tele: Yes  IVF: no  Code Status: Full Code      Electronically signed by Kathy Flores PA-C on 8/13/2023 at 9:20 AM

## 2023-08-13 NOTE — PLAN OF CARE
Problem: Discharge Planning  Goal: Discharge to home or other facility with appropriate resources  Outcome: Progressing  Flowsheets (Taken 8/13/2023 1714)  Discharge to home or other facility with appropriate resources:   Identify barriers to discharge with patient and caregiver   Arrange for needed discharge resources and transportation as appropriate     Problem: Pain  Goal: Verbalizes/displays adequate comfort level or baseline comfort level  Outcome: Progressing  Flowsheets (Taken 8/13/2023 1714)  Verbalizes/displays adequate comfort level or baseline comfort level:   Encourage patient to monitor pain and request assistance   Assess pain using appropriate pain scale     Problem: Safety - Adult  Goal: Free from fall injury  Outcome: Progressing  Flowsheets (Taken 8/13/2023 1714)  Free From Fall Injury: Instruct family/caregiver on patient safety     Problem: ABCDS Injury Assessment  Goal: Absence of physical injury  Outcome: Progressing  Flowsheets (Taken 8/13/2023 1714)  Absence of Physical Injury: Implement safety measures based on patient assessment     Problem: Chronic Conditions and Co-morbidities  Goal: Patient's chronic conditions and co-morbidity symptoms are monitored and maintained or improved  Outcome: Progressing  Flowsheets (Taken 8/13/2023 1714)  Care Plan - Patient's Chronic Conditions and Co-Morbidity Symptoms are Monitored and Maintained or Improved: Monitor and assess patient's chronic conditions and comorbid symptoms for stability, deterioration, or improvement     Problem: Skin/Tissue Integrity - Adult  Goal: Skin integrity remains intact  Outcome: Progressing  Flowsheets (Taken 8/13/2023 1714)  Skin Integrity Remains Intact:   Monitor for areas of redness and/or skin breakdown   Assess vascular access sites hourly     Problem: Musculoskeletal - Adult  Goal: Return mobility to safest level of function  Outcome: Progressing  Flowsheets (Taken 8/13/2023 1714)  Return Mobility to Safest Level of Function:   Assess patient stability and activity tolerance for standing, transferring and ambulating with or without assistive devices   Assist with transfers and ambulation using safe patient handling equipment as needed   Care plan reviewed with patient . Patient  verbalize understanding of the plan of care and contribute to goal setting.

## 2023-08-13 NOTE — PLAN OF CARE
Problem: Discharge Planning  Goal: Discharge to home or other facility with appropriate resources  8/12/2023 2054 by Daryle Sidles, RN  Outcome: Progressing  Flowsheets (Taken 8/12/2023 2054)  Discharge to home or other facility with appropriate resources:   Identify barriers to discharge with patient and caregiver   Arrange for needed discharge resources and transportation as appropriate   Identify discharge learning needs (meds, wound care, etc)     Problem: Pain  Goal: Verbalizes/displays adequate comfort level or baseline comfort level  8/12/2023 2054 by Daryle Sidles, RN  Outcome: Progressing  Flowsheets (Taken 8/12/2023 2054)  Verbalizes/displays adequate comfort level or baseline comfort level:   Encourage patient to monitor pain and request assistance   Assess pain using appropriate pain scale   Implement non-pharmacological measures as appropriate and evaluate response   Administer analgesics based on type and severity of pain and evaluate response     Problem: Safety - Adult  Goal: Free from fall injury  8/12/2023 2054 by Daryle Sidles, RN  Outcome: Progressing  Flowsheets (Taken 8/12/2023 2054)  Free From Fall Injury:   Instruct family/caregiver on patient safety   Based on caregiver fall risk screen, instruct family/caregiver to ask for assistance with transferring infant if caregiver noted to have fall risk factors     Problem: ABCDS Injury Assessment  Goal: Absence of physical injury  8/12/2023 2054 by Daryle Sidles, RN  Outcome: Progressing  [address] (Taken 8/12/2023 2054)  Absence of Physical Injury: Implement safety measures based on patient assessment     Problem: Chronic Conditions and Co-morbidities  Goal: Patient's chronic conditions and co-morbidity symptoms are monitored and maintained or improved  8/12/2023 2054 by Daryle Sidles, RN  Outcome: Progressing  Flowsheets (Taken 8/12/2023 2054)  Care Plan - Patient's Chronic Conditions and Co-Morbidity Symptoms are Monitored and

## 2023-08-13 NOTE — PROGRESS NOTES
[address] [facility]  Occupational Therapy  Daily Note  Time:    Time In: 1046  Time Out: 1113  Timed Code Treatment Minutes: 27 Minutes  Minutes: 27          Date: 2023  Patient Name: Noa Castellano,   Gender: female      Room: 7023-A  MRN: [id number]  : 1935  (82 y.o.)  Referring Practitioner: Mark Santana, ROMAN Ayon CNP  Diagnosis: presence of artificial shoulder joint, left  Additional Pertinent Hx: 82 y.o. female who presented to the ED last night after a fall at her SNF. She is s/p L hip yesi on 23 with  [facility], uncomplicated post operative course from an orthopaedic standpoint. Was ambulating without aid and without much pain prior to fall. Patient sustained a mechanical fall landing on her left hip yesterday, immediate pain and deformity and inability to WB, imaging in the ED revealed a dislocated hip prosthesis, this was reduced by the ED providers under conscious sedation. Pain is minimal at this time, KI is intact, no paresthesias or weakness, overall pain well controlled at the left hip. L hip yesi was on 23 with  [facility] [place]  This is her first dislocation  Successfully reduced in the ED    Restrictions/Precautions:  Restrictions/Precautions: Fall Risk, General Precautions, Weight Bearing  Left Lower Extremity Weight Bearing: Weight Bearing As Tolerated  Required Braces or Orthoses  Left Lower Extremity Brace: Knee Immobilizer  Position Activity Restriction  Hip Precautions: Posterior hip precautions, No hip flexion > 90 degrees, No ADduction, No hip internal rotation  Other position/activity restrictions: maintain knee immobilizer, strict posterior hip precautions      SUBJECTIVE: required encouragement, little grumpy    PAIN: 0/10: no c/o pain during session    Vitals: Vitals not assessed per clinical judgement, see nursing flowsheet    COGNITION: Decreased Insight and Decreased Safety Awareness    ADL:   Grooming: Contact Guard Assistance.   Stood at

## 2023-08-13 NOTE — PROGRESS NOTES
Restriction  Hip Precautions: Posterior hip precautions, No hip flexion > 90 degrees, No ADduction, No hip internal rotation  Other position/activity restrictions: maintain knee immobilizer, strict posterior hip precautions     SUBJECTIVE: RN ok'd PT session. Patient laying in bed and agreeable to therapy. PAIN: 0/10    Vitals: Blood Pressure: 90/59    OBJECTIVE:  Bed Mobility:  Supine to Sit: Minimal Assistance, Moderate Assistance, X 1  *Patient required assistance for LLE     Transfers:  Sit to Stand: Contact Guard Assistance, Minimal Assistance  Stand to [address], Minimal Assistance  *Patient instructed in sit<>stand transfers with use of RW from edge of bed and recliner. Patient required cueing for hand placement. Ambulation:  Contact Guard Assistance  Distance: 5 feet  Surface: Level Tile  Device:Rolling Walker and L knee Immobilizer  Gait Deviations: Forward Flexed Posture, Slow Pretty, Decreased Step Length on Right, Decreased Weight Shift Left, Decreased Gait Speed, Decreased Heel Strike Bilaterally, Narrow Base of Support, Mild Path Deviations, and Unsteady Gait  *Patient demonstrated heavy reliance on BUE support with use of RW     Balance:  Dynamic Standing Balance: Contact Guard Assistance  *Patient instructed in standing with use of RW and reaching outside FABIAN for objects placed on table with CGA and cueing for posture with slight unsteadiness however no formal LOB. Patient instructed in standing dynamic balance in order to assist with safety with functional mobility. Exercise:  Supine Exercises X 1 repetitions X 15 sets to increase strength for functional mobility.   *Patient performed quad sets, glut sets to BLE's and heel slides, hip abduction and short arc quads with RLE only in order to maintaining precautions      Functional Outcome Measures: Completed  AM-PAC Inpatient Mobility without Stair Climbing Raw Score : 13  AM-PAC Inpatient without Stair Climbing T-Scale Score : 38.96    ASSESSMENT:  Assessment: Patient progressing toward established goals. Activity Tolerance:  Patient tolerance of  treatment: good. Equipment Recommendations:Equipment Needed: No  Discharge Recommendations: Subacte/Skilled Nursing Facility and Patient would benefit from continued PT at discharge  Plan: Current Treatment Recommendations: Strengthening, Balance training, Functional mobility training, Transfer training, Endurance training, Neuromuscular re-education, Gait training, Home exercise program, Safety education & training, Patient/Caregiver education & training, Equipment evaluation, education, & procurement, Therapeutic activities  General Plan:  (6x O)    Patient Education  Patient Education: Transfers, Gait,  - Patient Verbalized Understanding, - Patient Requires Continued Education    Goals:  Patient Goals : none stated  Short Term Goals  Time Frame for Short Term Goals: by hospital d/c  Short Term Goal 1: Pt to complete supine <->sit with S with hip precautions for ease with getting in bed  Short Term Goal 2: Pt to complete sit <->Stand with RW and SBA for safe transfers  Short Term Goal 3: Pt to ambulate >=30' with RW with SBA for safe mobility in her environment  Long Term Goals  Time Frame for Long Term Goals : NA due to short ELOS    Following session, patient left in safe position with all fall risk precautions in place.

## 2023-08-14 VITALS
RESPIRATION RATE: 18 BRPM | DIASTOLIC BLOOD PRESSURE: 60 MMHG | WEIGHT: 165.57 LBS | HEART RATE: 90 BPM | BODY MASS INDEX: 27.58 KG/M2 | TEMPERATURE: 98.1 F | OXYGEN SATURATION: 94 % | SYSTOLIC BLOOD PRESSURE: 107 MMHG | HEIGHT: 65 IN

## 2023-08-14 PROCEDURE — 6360000002 HC RX W HCPCS

## 2023-08-14 PROCEDURE — 6370000000 HC RX 637 (ALT 250 FOR IP): Performed by: NURSE PRACTITIONER

## 2023-08-14 PROCEDURE — 2580000003 HC RX 258: Performed by: PHYSICIAN ASSISTANT

## 2023-08-14 PROCEDURE — 2580000003 HC RX 258

## 2023-08-14 PROCEDURE — 97110 THERAPEUTIC EXERCISES: CPT

## 2023-08-14 PROCEDURE — 6370000000 HC RX 637 (ALT 250 FOR IP): Performed by: PHYSICIAN ASSISTANT

## 2023-08-14 PROCEDURE — 99239 HOSP IP/OBS DSCHRG MGMT >30: CPT

## 2023-08-14 PROCEDURE — 97116 GAIT TRAINING THERAPY: CPT

## 2023-08-14 RX ORDER — AMOXICILLIN 500 MG/1
500 CAPSULE ORAL 2 TIMES DAILY
Qty: 6 CAPSULE | Refills: 0 | DISCHARGE
Start: 2023-08-14 | End: 2023-08-17

## 2023-08-14 RX ADMIN — CLOPIDOGREL BISULFATE 75 MG: 75 TABLET ORAL at 10:16

## 2023-08-14 RX ADMIN — TIMOLOL MALEATE 1 DROP: 5 SOLUTION/ DROPS OPHTHALMIC at 10:11

## 2023-08-14 RX ADMIN — POLYETHYLENE GLYCOL 3350 17 G: 17 POWDER, FOR SOLUTION ORAL at 05:41

## 2023-08-14 RX ADMIN — SULFAMETHOXAZOLE AND TRIMETHOPRIM 1 TABLET: 800; 160 TABLET ORAL at 10:11

## 2023-08-14 RX ADMIN — AMPICILLIN 2000 MG: 2 INJECTION, POWDER, FOR SOLUTION INTRAMUSCULAR; INTRAVENOUS at 06:34

## 2023-08-14 RX ADMIN — PRAVASTATIN SODIUM 40 MG: 40 TABLET ORAL at 10:11

## 2023-08-14 RX ADMIN — SODIUM CHLORIDE, PRESERVATIVE FREE 10 ML: 5 INJECTION INTRAVENOUS at 10:11

## 2023-08-14 RX ADMIN — BUMETANIDE 0.5 MG: 0.5 TABLET ORAL at 10:11

## 2023-08-14 RX ADMIN — PANTOPRAZOLE SODIUM 40 MG: 40 TABLET, DELAYED RELEASE ORAL at 05:29

## 2023-08-14 RX ADMIN — APIXABAN 5 MG: 5 TABLET, FILM COATED ORAL at 10:11

## 2023-08-14 ASSESSMENT — PAIN SCALES - GENERAL
PAINLEVEL_OUTOF10: 0
PAINLEVEL_OUTOF10: 0

## 2023-08-14 NOTE — DISCHARGE SUMMARY
Hospitalist Discharge Summary    Patient: Patrica Henriquez  YOB: 1935  MRN: [id number]   Acct: [de-identified]    Primary Care Physician: Jose Roberto Blue date  8/9/2023    Discharge date: 8/14/2023     Discharge Assessment and Plan:    Disposition: pre-CERT approved. Mechanical fall: CT head/C-spine reveals no acute fractures. PT OT following. Closed left hip dislocation s/p reduction: Reduced in the ER on 8/9/2023. Patient had left hip hemiarthroplasty for femoral neck fracture on 7/1/2023. Orthopedics saw the patient and recommended no further and for intervention, WBAT, left lower extremity and knee explant immobilizer, strict posterior hip precautions and follow-up in 2 weeks. Hypotension, improved: Intermittent, patient reports she has been dealing with this chronically. Continue to monitor. Urine culture positive: Urine culture grew Enterococcus faecalis with colony count >100,000 CFU/ml. Patient s/p 1 dose rocephin. Discontinued due to lack of symptoms. Initiated on ampicillin based on sensitivities, switch to amoxicillin on discharge. Chronic HFpEF: Echo 6/5/23 shows EF 50%. Does not appear acutely decompensated. Continue home medications. Strict I&Os. Daily weights. Fluid/salt restriction. CKD Stage 3: Cr stable. Baseline creatinine is about 1.0-1.4. Continue home medications. Avoid nephrotoxic agents as possible. Monitor with daily labs. Unruptured infrarenal AAA: S/p aorto stent graft repair with no evidence of endoleak. RLE acute limb ischemia: 5/31/2023. S/p angiogram and intervention with repeat angio on 6/1 for hematoma with stent placement in distal SFA. Continue on Eliquis-avoid any prolonged interruptions in anticoagulation. Continue Plavix.      History of MRSA September 2022: Chronic Bactrim therapy     History of partial colectomy: Completed for diverticular disease and history of right hemicolectomy for colon

## 2023-08-14 NOTE — PROGRESS NOTES
[address]   INPATIENT PHYSICAL THERAPY  DAILY NOTE  [facility] ORTHOPEDICS 7K - 6C-47/357-D    Time In: 0845  Time Out: 6456  Timed Code Treatment Minutes: 32 Minutes  Minutes: 27          Date: 2023  Patient Name: Paul Vasquez,  Gender:  female        MRN: [id number]  : 1935  (82 y.o.)     Referring Practitioner: ROMAN Turner CNP  Diagnosis: UTI (urinary tract infection)  Additional Pertinent Hx: Per H&P 8/10: 82 y.o. female who presented to the ED last night after a fall at her SNF. She is s/p L hip yesi on 23 with Dr Fahad Calderon, uncomplicated post operative course from an orthopaedic standpoint. Was ambulating without aid and without much pain prior to fall. Patient sustained a mechanical fall landing on her left hip yesterday, immediate pain and deformity and inability to WB, imaging in the ED revealed a dislocated hip prosthesis, this was reduced by the ED providers under conscious sedation. Pain is minimal at this time, KI is intact, no paresthesias or weakness, overall pain well controlled at the left hip. L hip yesi was on 23 with Dr Fahad Calderon  This is her first dislocation  Successfully reduced in the ED     Prior Level of Function:  Lives With: Alone  Type of Home: Condo  Home Access: Elevator, Level entry  Home Equipment: Layla Bautista,  [facility] Help From: Family  Ambulation Assistance: Independent  Transfer Assistance: Independent  Additional Comments: Pt went to a SNF following initial surgery, she was home for three days prior to return to the hospital with dislocation. She states she was mod I with RW. Her son calls to check on her daily and lives nearby, her daughter lives in [facility].     Restrictions/Precautions:  Restrictions/Precautions: Fall Risk, General Precautions, Weight Bearing  Left Lower Extremity Weight Bearing: Weight Bearing As Tolerated  Required Braces or Orthoses  Left Lower Extremity Brace: Knee Immobilizer  Position Activity Assistance  Dynamic Sitting Balance: Stand By Assistance  Static Standing Balance: Contact Guard Assistance, Minimal Assistance  Dynamic Standing Balance: Minimal Assistance    Pt completed functional tasks in bathroom with assist for stability and safety. pt completed reaching tasks while seated on commode/toilet, cues needed for safety, extra time to complete. Min A needed for dynamic standing tasks in the restroom. ,    Min A required to facilitate pre gait tasks including x10 B LE lateral weight shifts. Completed this to ensure stability in stance with weight shifting. Exercise:  Patient was guided in 1 set(s) 12 reps of exercise to both lower extremities. Ankle pumps, Glut sets, Quad sets, Heelslides, Hip abduction/adduction, and Straight leg raises. Exercises were completed for increased independence with functional mobility. *Cues and demo provided for appropriate completion of exercises. Cues to maintain within a tolerable range provided. AAROM provided with L LE exercises, maintained hip precautions with all of these    Functional Outcome Measures: Completed  AM-PAC Inpatient Mobility without Stair Climbing Raw Score : 13  AM-PAC Inpatient without Stair Climbing T-Scale Score : 38.96    ASSESSMENT:  Assessment: Patient progressing toward established goals. Activity Tolerance:  Patient tolerance of  treatment: good. Pt tolerated mobility well, required min to mod A with transfers and mod A for bed mobility. She also required education on hip precautions adjusting her knee immobilizer along with assist for this. She will greatly benefit from a therapy stay to progress her safe mobility.    Equipment Recommendations:Equipment Needed: No  Discharge Recommendations: Subacte/Skilled Nursing Facility  Plan: Current Treatment Recommendations: Strengthening, Balance training, Functional mobility training, Transfer training, Endurance training, Neuromuscular re-education, Gait training, Home exercise

## 2023-08-14 NOTE — PLAN OF CARE
Problem: Discharge Planning  Goal: Discharge to home or other facility with appropriate resources  8/13/2023 2155 by Abigail Bradshaw RN  Outcome: Progressing  Flowsheets (Taken 8/13/2023 2155)  Discharge to home or other facility with appropriate resources:   Identify barriers to discharge with patient and caregiver   Arrange for needed discharge resources and transportation as appropriate   Identify discharge learning needs (meds, wound care, etc)     Problem: Pain  Goal: Verbalizes/displays adequate comfort level or baseline comfort level  8/13/2023 2155 by Abigail Bradshaw RN  Outcome: Progressing  Flowsheets (Taken 8/13/2023 2155)  Verbalizes/displays adequate comfort level or baseline comfort level:   Encourage patient to monitor pain and request assistance   Assess pain using appropriate pain scale   Administer analgesics based on type and severity of pain and evaluate response     Problem: Safety - Adult  Goal: Free from fall injury  8/13/2023 2155 by Abigail Bradshaw RN  Outcome: Progressing  Flowsheets (Taken 8/13/2023 2155)  Free From Fall Injury:   Instruct family/caregiver on patient safety   Based on caregiver fall risk screen, instruct family/caregiver to ask for assistance with transferring infant if caregiver noted to have fall risk factors     Problem: ABCDS Injury Assessment  Goal: Absence of physical injury  8/13/2023 2155 by Abigail Bradshaw RN  Outcome: Progressing  [address] (Taken 8/13/2023 2155)  Absence of Physical Injury: Implement safety measures based on patient assessment     Problem: Chronic Conditions and Co-morbidities  Goal: Patient's chronic conditions and co-morbidity symptoms are monitored and maintained or improved  8/13/2023 2155 by Abigail Bradshaw RN  Outcome: Progressing  Flowsheets (Taken 8/13/2023 2155)  Care Plan - Patient's Chronic Conditions and Co-Morbidity Symptoms are Monitored and Maintained or Improved:   Monitor and assess patient's chronic conditions and

## 2023-08-14 NOTE — PROGRESS NOTES
Patient discharged to [facility]. Patient left with all belongings, AVS, blue folder. All questions answered at this time. Patient has follow up appointments scheduled with PCP.

## 2023-08-14 NOTE — CARE COORDINATION
8/14/23, 12:07 PM EDT    DISCHARGE PLANNING EVALUATION    Precert approved for [facility] [address], confirmed plan for discharge today to the [facility] with ecf and patient. Daughter plans to transport. 8/14/23, 12:08 PM EDT    Patient goals/plan/ treatment preferences discussed by  and . Patient goals/plan/ treatment preferences reviewed with patient/ family. Patient/ family verbalize understanding of discharge plan and are in agreement with goal/plan/treatment preferences. Understanding was demonstrated using the teach back method. AVS provided by RN at time of discharge, which includes all necessary medical information pertaining to the patients current course of illness, treatment, post-discharge goals of care, and treatment preferences. Services At/After Discharge: [address] (SNF)       IMM Letter  IMM Letter given to Patient/Family/Significant other/Guardian/POA/by[de-identified] CM:  Marshall Tapia RN  IMM Letter date given[de-identified] 08/14/23  IMM Letter time given[de-identified] 9184

## 2023-08-15 LAB
BACTERIA BLD AEROBE CULT: NORMAL
BACTERIA BLD AEROBE CULT: NORMAL

## 2023-08-29 ENCOUNTER — NURSE ONLY (OUTPATIENT)
Dept: CARDIOLOGY CLINIC | Age: 88
End: 2023-08-29
Payer: MEDICARE

## 2023-08-29 ENCOUNTER — OFFICE VISIT (OUTPATIENT)
Dept: CARDIOLOGY CLINIC | Age: 88
End: 2023-08-29

## 2023-08-29 VITALS
HEART RATE: 60 BPM | BODY MASS INDEX: 27.32 KG/M2 | SYSTOLIC BLOOD PRESSURE: 104 MMHG | HEIGHT: 65 IN | DIASTOLIC BLOOD PRESSURE: 58 MMHG | WEIGHT: 164 LBS

## 2023-08-29 DIAGNOSIS — I48.0 PAROXYSMAL ATRIAL FIBRILLATION (HCC): ICD-10-CM

## 2023-08-29 DIAGNOSIS — Z95.810 ICD (IMPLANTABLE CARDIOVERTER-DEFIBRILLATOR) IN PLACE: ICD-10-CM

## 2023-08-29 DIAGNOSIS — I99.8 ISCHEMIA OF RIGHT LOWER EXTREMITY: ICD-10-CM

## 2023-08-29 DIAGNOSIS — I10 ESSENTIAL HYPERTENSION: ICD-10-CM

## 2023-08-29 DIAGNOSIS — Z79.02 ANTIPLATELET OR ANTITHROMBOTIC LONG-TERM USE: ICD-10-CM

## 2023-08-29 DIAGNOSIS — Z95.810 ICD (IMPLANTABLE CARDIOVERTER-DEFIBRILLATOR) IN PLACE: Primary | ICD-10-CM

## 2023-08-29 DIAGNOSIS — I50.22 CHRONIC SYSTOLIC CONGESTIVE HEART FAILURE, NYHA CLASS 2 (HCC): ICD-10-CM

## 2023-08-29 DIAGNOSIS — I73.9 PAD (PERIPHERAL ARTERY DISEASE) (HCC): Primary | ICD-10-CM

## 2023-08-29 PROCEDURE — 93283 PRGRMG EVAL IMPLANTABLE DFB: CPT | Performed by: INTERNAL MEDICINE

## 2023-08-29 NOTE — PROGRESS NOTES
In Office Medtronic Dual ICD   Pt of Jai    At [facility] currently, not interested in bringing monitor there. Hoping to get out soon. Aware to call office if she gets out and gets monitor working. Can r/s her appt    Per Medtronic request, due to advisory, Tx pathways changed to B>Ax. Battery 7.4 years     Presenting rhythm ASVS , PVCs    A Impedance 380  RV Impedance 494    RV shock 55  SVC shock -    P wave sensing 0.5  R wave sensing 9    A Threshold 0.5 @ 0.40  A Amplitude 1.50 @ 0.40  RV Thresholds 0.75 @ 0.40  RV Amplitude 2.0 @ 0.40    A Paced 21.7%  V Paced <0.1%    Programmed Mode AAI <=> DDD 60    Afib [place] <0.1%    Episodes:   7/1/23- noise on ventricular lead -- Hip surgery  8/22/23-9 beats VT rate 188-250    Optivol elefvated -- seeing Basia today.

## 2023-08-29 NOTE — PROGRESS NOTES
Pt here for hospital f/u.
one 7 mm VBX stent  postdilated with 8 and 10 mm balloons with full reconstitution of flow  into the common femoral artery followed by right SFA/popliteal artery  occlusion treated with a 6 x 120 Elizabeth x2 followed by 6 x 80 IN. PACT  Admiral drug-coated balloon to the distal SFA and a 5 x 150 IN. PACT  Admiral drug-coated balloon for the popliteal artery and a 3-mm [place]  angioplasty of the entire anterior tibial artery with reconstitution of  two-vessel runoff to the foot - 9/6/2022  Hx of Septic shock  Pseudoaneurysm R FA  Right ankle trifurcation vessel disease  MRSA bacteremia-on IV vancomycin and gentamicin  Complicated UTI  Chronic HFrEF, NYHA II s/p ICD - EF 45%  Paroxysmal atrial fibrillation- [id number] 6  ALEJANDRO on CKD 3  HTN  HLD  GERD  Hx of AAA- s/p vascular repair 2019  Hx of blood clots  Hx of colovesical fistula  Tobacco abuse  Recurrent tibial disease, but appears to have some degree of preserved flow for inflow and sfa-pop segment on the right with minimal symptoms. Can walk and is working on rehab. Taking all meds. She will need life long ppx due to all of the prosthesis, thus she is on bactrim. Life long Xarelto as well due to recurrent thromboses and Afib. No major side effects. Continue Plavix. No bleeding. Restart Bumex 0.5 mg three times week to help with swelling. Discussed diet/exercise/BP/weight loss/health lifestyle choices/lipids; the patient understands the goals and will try to comply. Recent Hospitalization: 8/9-8/14/23 s/p mechanical fall with closed left hip dislocation with reduction with incidental finding of UTI. Discharged back to nursing facility.     Today's Visit   HPI: Ms. Sanjana Coronado is an 82 y.o female with complex medical history significant for AAA s/p EVAR with subsequent stenting and balloon angioplasty, PAD s/p multiple stents of right lower circulation with recurrent occlusions; HFrEF, PPM/AICD; PAF, CKD, HTN, HLD, complicated UTI, MRSA bacteremia on chronic

## 2023-08-29 NOTE — PATIENT INSTRUCTIONS
Continue current medications as prescribed. Do not stop your eliquis or plavix without discussion with Dr. Nathalia Brambila. Have the ultrasound of the blood vessels in your legs completed sometime in the next week. A referral has been made to podiatry to assist with maintaining your toenail growth. Please do not attempt to cut nails yourself or have non-medical professional perform to reduce your risk of trauma/infection. Continue the following:  Daily weights  Fluid restriction of 2 Liters per day  Limit sodium in diet to around 1134-6757 mg/day  Monitor BP  Activity as tolerated       Follow-up with your PCP as scheduled. Follow-up with Dr. Nathalia Brambila in January on 1/25/2024  as scheduled or sooner if need.

## 2023-08-31 ENCOUNTER — HOSPITAL ENCOUNTER (OUTPATIENT)
Dept: INTERVENTIONAL RADIOLOGY/VASCULAR | Age: 88
Discharge: HOME | End: 2023-08-31
Payer: MEDICARE

## 2023-08-31 DIAGNOSIS — I73.9 PAD (PERIPHERAL ARTERY DISEASE) (HCC): ICD-10-CM

## 2023-08-31 PROCEDURE — 93925 LOWER EXTREMITY STUDY: CPT

## 2023-09-01 ENCOUNTER — TELEPHONE (OUTPATIENT)
Dept: CARDIOLOGY CLINIC | Age: 88
End: 2023-09-01

## 2023-09-05 ENCOUNTER — TELEPHONE (OUTPATIENT)
Dept: CARDIOLOGY CLINIC | Age: 88
End: 2023-09-05

## 2023-09-05 DIAGNOSIS — I73.9 PAD (PERIPHERAL ARTERY DISEASE) (HCC): Primary | ICD-10-CM

## 2023-09-05 NOTE — TELEPHONE ENCOUNTER
----- Message from ROMAN Tavarez CNP sent at 9/5/2023  3:12 PM EDT -----  Please call Ms. Christian Ann to update that a referral to Dr. Damion Marcus with vascular surgery has been made on her behalf as the recent ultrasound of her lower right extremity continues to show compromised blood flow and we would like to have his opinion on whet  her there would be any surgical options for her moving forward. Attempted call x2; went to voicemail; voicemail box full.

## 2023-09-07 NOTE — TELEPHONE ENCOUNTER
Spoke to patient, notified. Informed her she will be hearing from Dr Héctor Nicole staff to schedule. She states number in chart is best. Informed her voicemail full. She states she does not have voicemail. She will try to watch for their call.

## 2023-09-08 PROBLEM — N39.0 UTI (URINARY TRACT INFECTION): Status: RESOLVED | Noted: 2023-08-09 | Resolved: 2023-09-08

## 2023-10-10 ENCOUNTER — OFFICE VISIT (OUTPATIENT)
Dept: CARDIOTHORACIC SURGERY | Age: 88
End: 2023-10-10

## 2023-10-10 VITALS
SYSTOLIC BLOOD PRESSURE: 100 MMHG | WEIGHT: 160 LBS | BODY MASS INDEX: 26.66 KG/M2 | DIASTOLIC BLOOD PRESSURE: 58 MMHG | HEART RATE: 66 BPM | HEIGHT: 65 IN

## 2023-10-10 DIAGNOSIS — I73.9 PAD (PERIPHERAL ARTERY DISEASE) (HCC): Primary | ICD-10-CM

## 2023-10-10 DIAGNOSIS — I10 ESSENTIAL HYPERTENSION: ICD-10-CM

## 2023-10-10 NOTE — PATIENT INSTRUCTIONS
If you receive a survey asking about your care experience, please respond. Your answers will help ensure you receive high-quality care at this office. Thank you! Your Medical Assistant today: Tammy Galvan. Thank you for coming to our office! It was a pleasure to serve you.

## 2023-10-10 NOTE — PROGRESS NOTES
Lower extremity duplex 8/31/23  Uncomfortable, numbness in right leg more so than left  C/o swelling in feet
06:30 PM     BMP:   Lab Results   Component Value Date/Time     08/12/2023 06:19 AM    K 4.1 08/12/2023 06:19 AM     08/12/2023 06:19 AM    CO2 24 08/12/2023 06:19 AM    PHOS 3.1 07/21/2023 06:06 AM    BUN 18 08/12/2023 06:19 AM    CREATININE 1.3 08/12/2023 06:19 AM    MG 1.7 08/12/2023 06:19 AM       Imaging: I have reviewed the CTA.       Problem List:  Patient Active Problem List   Diagnosis    Orthostatic dizziness    Vertebrobasilar artery insufficiency    Acute pain of left ear    Tinnitus    BPPV (benign paroxysmal positional vertigo)    Gait instability    Multifocal atrial tachycardia    Pedal edema    Stricture of colon determined by endoscopy ([address])    Barium enema abnormal    Diverticulosis of large intestine without hemorrhage    Colon stricture (HCC)    Colonic mass    History of hemicolectomy    Lower limb ischemia    Fall on same level from slipping, tripping, or stumbling    ALEJANDRO (acute kidney injury) ([address])    Leukocytosis    Gastroesophageal reflux disease    Other hyperlipidemia    Osteoarthritis of multiple joints    Diverticulosis of intestine without bleeding    Septic shock (HCC)    SVT (supraventricular tachycardia)    Hyperglycemia    Metabolic acidosis    Hypocalcemia    Chronic hypotension    Acute on chronic blood loss anemia    Urinary tract infection without hematuria    Paroxysmal atrial fibrillation (HCC)    Colovesical fistula    Physical deconditioning    Abdominal aortic aneurysm (AAA) without rupture (HCC)    Hiatal hernia    Elevated procalcitonin    Hypomagnesemia    Pulmonary nodule    CKD (chronic kidney disease) stage 2, GFR 60-89 ml/min    Stage 3 chronic kidney disease (HCC)    Femoral popliteal artery thrombus (HCC)    PAD (peripheral artery disease) (HCC)    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

## 2023-11-01 RX ORDER — METOPROLOL SUCCINATE 25 MG/1
12.5 TABLET, EXTENDED RELEASE ORAL DAILY
Qty: 45 TABLET | Refills: 3 | Status: SHIPPED | OUTPATIENT
Start: 2023-11-01 | End: 2024-10-26

## 2023-11-01 RX ORDER — BUMETANIDE 0.5 MG/1
0.5 TABLET ORAL
Qty: 45 TABLET | Refills: 3 | Status: SHIPPED | OUTPATIENT
Start: 2023-11-01

## 2023-11-01 RX ORDER — PANTOPRAZOLE SODIUM 40 MG/1
40 TABLET, DELAYED RELEASE ORAL
Qty: 180 TABLET | Refills: 3 | Status: SHIPPED | OUTPATIENT
Start: 2023-11-01

## 2023-11-01 RX ORDER — CLOPIDOGREL BISULFATE 75 MG/1
75 TABLET ORAL DAILY
Qty: 90 TABLET | Refills: 3 | Status: SHIPPED | OUTPATIENT
Start: 2023-11-01

## 2023-11-06 ENCOUNTER — HOSPITAL ENCOUNTER (OUTPATIENT)
Dept: CT IMAGING | Age: 88
Discharge: HOME | End: 2023-11-06
Attending: THORACIC SURGERY (CARDIOTHORACIC VASCULAR SURGERY)
Payer: MEDICARE

## 2023-11-06 DIAGNOSIS — I73.9 PAD (PERIPHERAL ARTERY DISEASE) (HCC): ICD-10-CM

## 2023-11-06 DIAGNOSIS — I10 ESSENTIAL HYPERTENSION: ICD-10-CM

## 2023-11-06 LAB — POC CREATININE WHOLE BLOOD: 1.5 MG/DL (ref 0.5–1.2)

## 2023-11-06 PROCEDURE — 75635 CT ANGIO ABDOMINAL ARTERIES: CPT

## 2023-11-06 PROCEDURE — 6360000004 HC RX CONTRAST MEDICATION: Performed by: THORACIC SURGERY (CARDIOTHORACIC VASCULAR SURGERY)

## 2023-11-06 PROCEDURE — 82565 ASSAY OF CREATININE: CPT

## 2023-11-06 RX ADMIN — IOPAMIDOL 115 ML: 755 INJECTION, SOLUTION INTRAVENOUS at 15:20

## 2023-11-08 ENCOUNTER — HOSPITAL ENCOUNTER (OUTPATIENT)
Age: 88
Discharge: HOME | End: 2023-11-08
Payer: MEDICARE

## 2023-11-08 LAB
CREAT SERPL-MCNC: 1.6 MG/DL (ref 0.4–1.2)
GFR SERPL CREATININE-BSD FRML MDRD: 31 ML/MIN/1.73M2

## 2023-11-08 PROCEDURE — 82565 ASSAY OF CREATININE: CPT

## 2023-11-08 PROCEDURE — 36415 COLL VENOUS BLD VENIPUNCTURE: CPT

## 2023-11-14 NOTE — TELEPHONE ENCOUNTER
So did she stop cipro? If not having any UTI symptoms then will discuss further with her in office next week. Check with patient. Kenalog Type Of Vial: Multiple Dose

## 2023-11-28 ENCOUNTER — OFFICE VISIT (OUTPATIENT)
Dept: CARDIOTHORACIC SURGERY | Age: 88
End: 2023-11-28

## 2023-11-28 ENCOUNTER — NURSE ONLY (OUTPATIENT)
Dept: CARDIOLOGY CLINIC | Age: 88
End: 2023-11-28
Payer: MEDICARE

## 2023-11-28 VITALS
HEART RATE: 60 BPM | SYSTOLIC BLOOD PRESSURE: 111 MMHG | HEIGHT: 65 IN | DIASTOLIC BLOOD PRESSURE: 80 MMHG | BODY MASS INDEX: 26.66 KG/M2 | WEIGHT: 160 LBS

## 2023-11-28 DIAGNOSIS — I73.9 PAD (PERIPHERAL ARTERY DISEASE) (HCC): Primary | ICD-10-CM

## 2023-11-28 DIAGNOSIS — Z95.810 ICD (IMPLANTABLE CARDIOVERTER-DEFIBRILLATOR) IN PLACE: Primary | ICD-10-CM

## 2023-11-28 PROCEDURE — 93289 INTERROG DEVICE EVAL HEART: CPT | Performed by: INTERNAL MEDICINE

## 2023-11-28 NOTE — PROGRESS NOTES
In Office Medtronic Dual ICD   Pt of Jai    Battery 7.3 years     Presenting rhythm ASVS - frequent PACs/PVCs    1/19/23-8/29/23- single PVCs 435/hr ; run PVCs: 75/hr  8/29/23-11/28/23- single PVCs 598/hr ; run PVCs 118/hr    Has only been taking 12.5 mg metoprolol daily due to her BPs being lower. She will start to check her BPs and if higher she will take the full 25 mg tablet.      A Impedance 380  RV Impedance 551    RV shock 61  SVC shock -    P wave sensing 0.5  R wave sensing 13.5    A Threshold 0.5 @ 0.40  A Amplitude 1.50 @ 0.40  RV Thresholds 0.75 @ 0.40  RV Amplitude 2.0 @ 0.40    A Paced 33.8%  V Paced 0.1%    Programmed Mode AAI <=> DDD     Afib [place] <0.1%    Episodes:   none    Optivol WNL

## 2023-11-28 NOTE — PROGRESS NOTES
CT/CV Surgery Follow Up Office Visit      Patient's Name/Date of Birth: Radha Corral / 1935 (89 y.o.)    PCP: Vanessa Ortiz    Date: November 28, 2023    We had the pleasure of seeing Radha Corral in the office today, as you know this is a very pleasant 82y.o. year old female with a history of hypertension, severe peripheral arterial disease, thrombectomy, right common and external iliac artery stents, right SFA to right anterior tibial artery bypass graft on 9/5/2018, multiple declotting procedures of the right fem-tib bypass graft, stent graft repair for ruptured abdominal aortic aneurysm on 2/15/2019, and status post tPA infusion for the acute right leg ischemia on 6/5/2023. She returned to the cardiovascular surgery clinic for follow-up. She reports constant numbness of the right foot. She denies any pain in her right foot. She had a CTA on 11/6/2023, which demonstrated totally occluded native right SFA and popliteal artery, occluded right fem-ant.tib. bypass graft, total occlusion of all 3 trifurcation vessels of the right lower leg. It also showed EVAR without endoleak. PastMedical History:  Patricia Vaughan  has a past medical history of A-fib ([address]), AAA (abdominal aortic aneurysm) ([address]), Achalasia, Anemia, Arthritis, Blood circulation, collateral, BPPV (benign paroxysmal positional vertigo), CHF (congestive heart failure) ([address]), Chronic kidney disease, Colon cancer ([address]), Gastrointestinal hemorrhage, GERD (gastroesophageal reflux disease), Hiatal hernia, History of blood transfusion, HTN (hypertension), Hx of blood clots, Hyperlipidemia, Medtronic dual ICD , Neuromuscular disorder ([address]), Obesity, Osteopenia, Osteoporosis, PAC (premature atrial contraction), Paralysis ([address]), Pedal edema, Pneumonia, PVC (premature ventricular contraction), PVD (peripheral vascular disease) ([address]), Small bowel obstruction ([address]), Tinnitus, and UTI (urinary tract infection).     Past Surgical History:  The patient

## 2023-12-04 ENCOUNTER — OFFICE VISIT (OUTPATIENT)
Age: 88
End: 2023-12-04
Payer: MEDICARE

## 2023-12-04 VITALS — DIASTOLIC BLOOD PRESSURE: 62 MMHG | HEART RATE: 62 BPM | SYSTOLIC BLOOD PRESSURE: 98 MMHG

## 2023-12-04 DIAGNOSIS — I73.9 PAD (PERIPHERAL ARTERY DISEASE) (HCC): Primary | ICD-10-CM

## 2023-12-04 PROCEDURE — 1123F ACP DISCUSS/DSCN MKR DOCD: CPT | Performed by: THORACIC SURGERY (CARDIOTHORACIC VASCULAR SURGERY)

## 2023-12-04 PROCEDURE — G8417 CALC BMI ABV UP PARAM F/U: HCPCS | Performed by: THORACIC SURGERY (CARDIOTHORACIC VASCULAR SURGERY)

## 2023-12-04 PROCEDURE — 4004F PT TOBACCO SCREEN RCVD TLK: CPT | Performed by: THORACIC SURGERY (CARDIOTHORACIC VASCULAR SURGERY)

## 2023-12-04 PROCEDURE — G8484 FLU IMMUNIZE NO ADMIN: HCPCS | Performed by: THORACIC SURGERY (CARDIOTHORACIC VASCULAR SURGERY)

## 2023-12-04 PROCEDURE — 99204 OFFICE O/P NEW MOD 45 MIN: CPT | Performed by: THORACIC SURGERY (CARDIOTHORACIC VASCULAR SURGERY)

## 2023-12-04 PROCEDURE — 1090F PRES/ABSN URINE INCON ASSESS: CPT | Performed by: THORACIC SURGERY (CARDIOTHORACIC VASCULAR SURGERY)

## 2023-12-04 PROCEDURE — G8427 DOCREV CUR MEDS BY ELIG CLIN: HCPCS | Performed by: THORACIC SURGERY (CARDIOTHORACIC VASCULAR SURGERY)

## 2023-12-04 ASSESSMENT — ENCOUNTER SYMPTOMS
BACK PAIN: 1
COLOR CHANGE: 1
EYES NEGATIVE: 1
GASTROINTESTINAL NEGATIVE: 1
RESPIRATORY NEGATIVE: 1

## 2023-12-04 NOTE — PROGRESS NOTES
infrarenal abdominal aortic aneurysm and this was repaired with an EVAR graft at [facility]. Following the June 23 procedure by Dr. Panda Dominique the patient was then referred to Dr. Sussy Ivy for further evaluation of her PAD. Repeat CT angiogram was performed on 11/6/2023 which revealed a 70% right common femoral artery stenosis as well as severe origin stenosis to the right profunda. Patient was also noted to have an occluded right SFA and popliteal arteries and occluded right SFA and popliteal stents. The patient also has occluded right [place] anterior tibial bypass and has severe trifurcation disease and all trifurcation vessels with the single-vessel runoff via a small diseased peroneal artery. It should also be noted that the EVAR graft is patent with no endoleak. It is also notable that the patient has a proximal left SFA 90% stenosis with moderate but patent anterior tibial and posterior tibial artery disease. Patient is also had a RAMOS exam and it revealed a right RAMOS 0.25 in the PT and 0.72 on the left PT. Today the patient states that right now all she has is right leg numbness from the knees distally. She uses a walker since June 2023. She also states that the feet are cold and have some paresthesias but she explicitly denies calf or thigh or buttock claudication and denies weakness or early fatigue to the right leg. She denies any rest pain or nocturnal pain. She lives alone in an adult home and previously smoked 1 pack a day for 65 years but quit in June 2023 finally. She further denies any edema or ulcerations but does describe a distal dependent rubor on the right feet greater than the left feet.     Current Outpatient Medications:     Handicap Placard MISC, by Does not apply route, Disp: 1 each, Rfl: 0    rivaroxaban (XARELTO) 20 MG TABS tablet, Take 1 tablet by mouth, Disp: , Rfl:     metoprolol succinate (TOPROL XL) 25 MG extended release tablet, Take 0.5 tablets by mouth

## 2023-12-14 LAB
ALBUMIN SERPL-MCNC: 4 G/DL (ref 3.5–5)
ALP BLD-CCNC: 111 IU/L (ref 39–118)
ALT SERPL-CCNC: 13 IU/L (ref 10–40)
AST SERPL-CCNC: 17 IU/L (ref 15–41)
BILIRUB SERPL-MCNC: 0.3 MG/DL (ref 0.2–1)
BILIRUBIN DIRECT: 0.1 MG/DL (ref 0.1–0.2)
TOTAL PROTEIN: 6.6 G/DL (ref 6.2–8)
TSH SERPL DL<=0.05 MIU/L-ACNC: 4.76 MCIU/ML (ref 0.49–4.67)

## 2023-12-27 RX ORDER — SULFAMETHOXAZOLE AND TRIMETHOPRIM 800; 160 MG/1; MG/1
1 TABLET ORAL 2 TIMES DAILY
Qty: 180 TABLET | Refills: 3 | Status: SHIPPED | OUTPATIENT
Start: 2023-12-27

## 2023-12-27 NOTE — TELEPHONE ENCOUNTER
Trish Victoria called requesting a refill on the following medications:  Requested Prescriptions     Pending Prescriptions Disp Refills    sulfamethoxazole-trimethoprim (BACTRIM DS;SEPTRA DS) 800-160 MG per tablet 90 tablet 3     Sig: Take 1 tablet by mouth 2 times daily     [facility]: [facility]      Date of last visit: 8/29/2023  Date of next visit (if applicable): 6/75/3519

## 2024-01-08 ENCOUNTER — TELEPHONE (OUTPATIENT)
Dept: CARDIOLOGY CLINIC | Age: 89
End: 2024-01-08

## 2024-01-08 NOTE — TELEPHONE ENCOUNTER
Pt called office stating she can not afford multaq. Amiodarone was called in to [facility] 1/3/24. Not Multaq.   Multaq removed from med list. [facility] made aware.     Pt updated. Will continue to start amiodarone therapy.

## 2024-01-20 ENCOUNTER — HOSPITAL ENCOUNTER (INPATIENT)
Age: 89
LOS: 10 days | Discharge: NURSING, REHAB OR LONG-TERM CARE | DRG: 377 | End: 2024-01-30
Attending: EMERGENCY MEDICINE | Admitting: INTERNAL MEDICINE
Payer: MEDICARE

## 2024-01-20 DIAGNOSIS — I48.0 PAROXYSMAL ATRIAL FIBRILLATION (HCC): ICD-10-CM

## 2024-01-20 DIAGNOSIS — K92.2 GASTROINTESTINAL HEMORRHAGE, UNSPECIFIED GASTROINTESTINAL HEMORRHAGE TYPE: Primary | ICD-10-CM

## 2024-01-20 DIAGNOSIS — K92.1 GASTROINTESTINAL HEMORRHAGE WITH MELENA: ICD-10-CM

## 2024-01-20 DIAGNOSIS — I73.9 PAD (PERIPHERAL ARTERY DISEASE) (HCC): ICD-10-CM

## 2024-01-20 DIAGNOSIS — I95.9 HYPOTENSION, UNSPECIFIED HYPOTENSION TYPE: ICD-10-CM

## 2024-01-20 LAB
ABO: NORMAL
ALBUMIN SERPL BCG-MCNC: 3.9 G/DL (ref 3.5–5.1)
ALP SERPL-CCNC: 130 U/L (ref 38–126)
ALT SERPL W/O P-5'-P-CCNC: 19 U/L (ref 11–66)
ANION GAP SERPL CALC-SCNC: 11 MEQ/L (ref 8–16)
ANTIBODY SCREEN: NORMAL
APTT PPP: 36.3 SECONDS (ref 22–38)
AST SERPL-CCNC: 20 U/L (ref 5–40)
BASOPHILS ABSOLUTE: 0 THOU/MM3 (ref 0–0.1)
BASOPHILS NFR BLD AUTO: 0.5 %
BILIRUB SERPL-MCNC: 0.3 MG/DL (ref 0.3–1.2)
BUN SERPL-MCNC: 41 MG/DL (ref 7–22)
CALCIUM SERPL-MCNC: 9.2 MG/DL (ref 8.5–10.5)
CHLORIDE SERPL-SCNC: 101 MEQ/L (ref 98–111)
CO2 SERPL-SCNC: 24 MEQ/L (ref 23–33)
CREAT SERPL-MCNC: 1.7 MG/DL (ref 0.4–1.2)
DEPRECATED MEAN GLUCOSE BLD GHB EST-ACNC: 84 MG/DL (ref 70–126)
DEPRECATED RDW RBC AUTO: 53.3 FL (ref 35–45)
EOSINOPHIL NFR BLD AUTO: 0.4 %
EOSINOPHILS ABSOLUTE: 0 THOU/MM3 (ref 0–0.4)
ERYTHROCYTE [DISTWIDTH] IN BLOOD BY AUTOMATED COUNT: 14.8 % (ref 11.5–14.5)
GFR SERPL CREATININE-BSD FRML MDRD: 29 ML/MIN/1.73M2
GLUCOSE BLD STRIP.AUTO-MCNC: 87 MG/DL (ref 70–108)
GLUCOSE SERPL-MCNC: 242 MG/DL (ref 70–108)
HBA1C MFR BLD HPLC: 4.8 % (ref 4.4–6.4)
HCT VFR BLD AUTO: 38.8 % (ref 37–47)
HEMOCCULT STL QL: POSITIVE
HGB BLD-MCNC: 12.8 GM/DL (ref 12–16)
IMM GRANULOCYTES # BLD AUTO: 0.03 THOU/MM3 (ref 0–0.07)
IMM GRANULOCYTES NFR BLD AUTO: 0.5 %
INR PPP: 1.88 (ref 0.85–1.13)
LACTIC ACID, SEPSIS: 0.7 MMOL/L (ref 0.5–1.9)
LACTIC ACID, SEPSIS: 2.2 MMOL/L (ref 0.5–1.9)
LYMPHOCYTES ABSOLUTE: 0.6 THOU/MM3 (ref 1–4.8)
LYMPHOCYTES NFR BLD AUTO: 9.8 %
MCH RBC QN AUTO: 32.1 PG (ref 26–33)
MCHC RBC AUTO-ENTMCNC: 33 GM/DL (ref 32.2–35.5)
MCV RBC AUTO: 97.2 FL (ref 81–99)
MONOCYTES ABSOLUTE: 0.3 THOU/MM3 (ref 0.4–1.3)
MONOCYTES NFR BLD AUTO: 5.6 %
NEUTROPHILS NFR BLD AUTO: 83.2 %
NRBC BLD AUTO-RTO: 0 /100 WBC
OSMOLALITY SERPL CALC.SUM OF ELEC: 290 MOSMOL/KG (ref 275–300)
PLATELET # BLD AUTO: 156 THOU/MM3 (ref 130–400)
PMV BLD AUTO: 10.9 FL (ref 9.4–12.4)
POTASSIUM SERPL-SCNC: 4.4 MEQ/L (ref 3.5–5.2)
PROT SERPL-MCNC: 6.1 G/DL (ref 6.1–8)
RBC # BLD AUTO: 3.99 MILL/MM3 (ref 4.2–5.4)
RH FACTOR: NORMAL
SEGMENTED NEUTROPHILS ABSOLUTE COUNT: 4.7 THOU/MM3 (ref 1.8–7.7)
SODIUM SERPL-SCNC: 136 MEQ/L (ref 135–145)
WBC # BLD AUTO: 5.7 THOU/MM3 (ref 4.8–10.8)

## 2024-01-20 PROCEDURE — 30233N1 TRANSFUSION OF NONAUTOLOGOUS RED BLOOD CELLS INTO PERIPHERAL VEIN, PERCUTANEOUS APPROACH: ICD-10-PCS | Performed by: HOSPITALIST

## 2024-01-20 PROCEDURE — 96366 THER/PROPH/DIAG IV INF ADDON: CPT

## 2024-01-20 PROCEDURE — 80053 COMPREHEN METABOLIC PANEL: CPT

## 2024-01-20 PROCEDURE — 83036 HEMOGLOBIN GLYCOSYLATED A1C: CPT

## 2024-01-20 PROCEDURE — 86900 BLOOD TYPING SEROLOGIC ABO: CPT

## 2024-01-20 PROCEDURE — C9113 INJ PANTOPRAZOLE SODIUM, VIA: HCPCS | Performed by: STUDENT IN AN ORGANIZED HEALTH CARE EDUCATION/TRAINING PROGRAM

## 2024-01-20 PROCEDURE — 36415 COLL VENOUS BLD VENIPUNCTURE: CPT

## 2024-01-20 PROCEDURE — 6360000002 HC RX W HCPCS: Performed by: STUDENT IN AN ORGANIZED HEALTH CARE EDUCATION/TRAINING PROGRAM

## 2024-01-20 PROCEDURE — 85730 THROMBOPLASTIN TIME PARTIAL: CPT

## 2024-01-20 PROCEDURE — 2140000000 HC CCU INTERMEDIATE R&B

## 2024-01-20 PROCEDURE — 86850 RBC ANTIBODY SCREEN: CPT

## 2024-01-20 PROCEDURE — 86923 COMPATIBILITY TEST ELECTRIC: CPT

## 2024-01-20 PROCEDURE — 2580000003 HC RX 258: Performed by: STUDENT IN AN ORGANIZED HEALTH CARE EDUCATION/TRAINING PROGRAM

## 2024-01-20 PROCEDURE — 85025 COMPLETE CBC W/AUTO DIFF WBC: CPT

## 2024-01-20 PROCEDURE — 82948 REAGENT STRIP/BLOOD GLUCOSE: CPT

## 2024-01-20 PROCEDURE — 2580000003 HC RX 258

## 2024-01-20 PROCEDURE — 82272 OCCULT BLD FECES 1-3 TESTS: CPT

## 2024-01-20 PROCEDURE — A4216 STERILE WATER/SALINE, 10 ML: HCPCS | Performed by: STUDENT IN AN ORGANIZED HEALTH CARE EDUCATION/TRAINING PROGRAM

## 2024-01-20 PROCEDURE — 99285 EMERGENCY DEPT VISIT HI MDM: CPT

## 2024-01-20 PROCEDURE — 86901 BLOOD TYPING SEROLOGIC RH(D): CPT

## 2024-01-20 PROCEDURE — 93005 ELECTROCARDIOGRAM TRACING: CPT | Performed by: STUDENT IN AN ORGANIZED HEALTH CARE EDUCATION/TRAINING PROGRAM

## 2024-01-20 PROCEDURE — 83605 ASSAY OF LACTIC ACID: CPT

## 2024-01-20 PROCEDURE — 96365 THER/PROPH/DIAG IV INF INIT: CPT

## 2024-01-20 PROCEDURE — P9016 RBC LEUKOCYTES REDUCED: HCPCS

## 2024-01-20 PROCEDURE — 85610 PROTHROMBIN TIME: CPT

## 2024-01-20 RX ORDER — 0.9 % SODIUM CHLORIDE 0.9 %
500 INTRAVENOUS SOLUTION INTRAVENOUS ONCE
Status: COMPLETED | OUTPATIENT
Start: 2024-01-20 | End: 2024-01-20

## 2024-01-20 RX ORDER — SODIUM CHLORIDE 9 MG/ML
INJECTION, SOLUTION INTRAVENOUS PRN
Status: DISCONTINUED | OUTPATIENT
Start: 2024-01-20 | End: 2024-01-27 | Stop reason: SDUPTHER

## 2024-01-20 RX ORDER — SODIUM CHLORIDE 9 MG/ML
INJECTION, SOLUTION INTRAVENOUS CONTINUOUS
Status: DISCONTINUED | OUTPATIENT
Start: 2024-01-20 | End: 2024-01-21

## 2024-01-20 RX ORDER — PRAVASTATIN SODIUM 40 MG
40 TABLET ORAL DAILY
Status: DISCONTINUED | OUTPATIENT
Start: 2024-01-21 | End: 2024-01-30 | Stop reason: HOSPADM

## 2024-01-20 RX ORDER — PNV NO.95/FERROUS FUM/FOLIC AC 28MG-0.8MG
500 TABLET ORAL 2 TIMES DAILY
Status: DISCONTINUED | OUTPATIENT
Start: 2024-01-21 | End: 2024-01-30 | Stop reason: HOSPADM

## 2024-01-20 RX ORDER — IBUPROFEN 600 MG/1
1 TABLET ORAL PRN
Status: DISCONTINUED | OUTPATIENT
Start: 2024-01-20 | End: 2024-01-30 | Stop reason: HOSPADM

## 2024-01-20 RX ORDER — ONDANSETRON 2 MG/ML
4 INJECTION INTRAMUSCULAR; INTRAVENOUS EVERY 6 HOURS PRN
Status: DISCONTINUED | OUTPATIENT
Start: 2024-01-20 | End: 2024-01-30 | Stop reason: HOSPADM

## 2024-01-20 RX ORDER — ACETAMINOPHEN 325 MG/1
650 TABLET ORAL EVERY 6 HOURS PRN
Status: DISCONTINUED | OUTPATIENT
Start: 2024-01-20 | End: 2024-01-30 | Stop reason: HOSPADM

## 2024-01-20 RX ORDER — METOPROLOL SUCCINATE 25 MG/1
12.5 TABLET, EXTENDED RELEASE ORAL DAILY
Status: DISCONTINUED | OUTPATIENT
Start: 2024-01-21 | End: 2024-01-30 | Stop reason: HOSPADM

## 2024-01-20 RX ORDER — LANOLIN ALCOHOL/MO/W.PET/CERES
6 CREAM (GRAM) TOPICAL NIGHTLY PRN
Status: DISCONTINUED | OUTPATIENT
Start: 2024-01-20 | End: 2024-01-30 | Stop reason: HOSPADM

## 2024-01-20 RX ORDER — CLOPIDOGREL BISULFATE 75 MG/1
75 TABLET ORAL DAILY
Status: DISCONTINUED | OUTPATIENT
Start: 2024-01-21 | End: 2024-01-30 | Stop reason: HOSPADM

## 2024-01-20 RX ORDER — INSULIN LISPRO 100 [IU]/ML
0-4 INJECTION, SOLUTION INTRAVENOUS; SUBCUTANEOUS
Status: DISCONTINUED | OUTPATIENT
Start: 2024-01-21 | End: 2024-01-23

## 2024-01-20 RX ORDER — BUMETANIDE 0.5 MG/1
0.5 TABLET ORAL
Status: DISCONTINUED | OUTPATIENT
Start: 2024-01-22 | End: 2024-01-30 | Stop reason: HOSPADM

## 2024-01-20 RX ORDER — SODIUM CHLORIDE 9 MG/ML
INJECTION, SOLUTION INTRAVENOUS PRN
Status: DISCONTINUED | OUTPATIENT
Start: 2024-01-20 | End: 2024-01-30 | Stop reason: HOSPADM

## 2024-01-20 RX ORDER — SODIUM CHLORIDE 0.9 % (FLUSH) 0.9 %
5-40 SYRINGE (ML) INJECTION PRN
Status: DISCONTINUED | OUTPATIENT
Start: 2024-01-20 | End: 2024-01-30 | Stop reason: HOSPADM

## 2024-01-20 RX ORDER — ACETAMINOPHEN 650 MG/1
650 SUPPOSITORY RECTAL EVERY 6 HOURS PRN
Status: DISCONTINUED | OUTPATIENT
Start: 2024-01-20 | End: 2024-01-30 | Stop reason: HOSPADM

## 2024-01-20 RX ORDER — ONDANSETRON 4 MG/1
4 TABLET, ORALLY DISINTEGRATING ORAL EVERY 8 HOURS PRN
Status: DISCONTINUED | OUTPATIENT
Start: 2024-01-20 | End: 2024-01-30 | Stop reason: HOSPADM

## 2024-01-20 RX ORDER — DEXTROSE MONOHYDRATE 100 MG/ML
INJECTION, SOLUTION INTRAVENOUS CONTINUOUS PRN
Status: DISCONTINUED | OUTPATIENT
Start: 2024-01-20 | End: 2024-01-30 | Stop reason: HOSPADM

## 2024-01-20 RX ORDER — TIMOLOL MALEATE 5 MG/ML
1 SOLUTION/ DROPS OPHTHALMIC DAILY
Status: DISCONTINUED | OUTPATIENT
Start: 2024-01-21 | End: 2024-01-30 | Stop reason: HOSPADM

## 2024-01-20 RX ORDER — AMIODARONE HYDROCHLORIDE 200 MG/1
200 TABLET ORAL DAILY
Status: DISCONTINUED | OUTPATIENT
Start: 2024-01-21 | End: 2024-01-21

## 2024-01-20 RX ORDER — INSULIN LISPRO 100 [IU]/ML
0-4 INJECTION, SOLUTION INTRAVENOUS; SUBCUTANEOUS NIGHTLY
Status: DISCONTINUED | OUTPATIENT
Start: 2024-01-20 | End: 2024-01-23

## 2024-01-20 RX ORDER — SODIUM CHLORIDE 0.9 % (FLUSH) 0.9 %
5-40 SYRINGE (ML) INJECTION EVERY 12 HOURS SCHEDULED
Status: DISCONTINUED | OUTPATIENT
Start: 2024-01-20 | End: 2024-01-30 | Stop reason: HOSPADM

## 2024-01-20 RX ADMIN — SODIUM CHLORIDE 500 ML: 9 INJECTION, SOLUTION INTRAVENOUS at 18:45

## 2024-01-20 RX ADMIN — PANTOPRAZOLE SODIUM 8 MG/HR: 40 INJECTION, POWDER, FOR SOLUTION INTRAVENOUS at 18:07

## 2024-01-20 RX ADMIN — SODIUM CHLORIDE: 9 INJECTION, SOLUTION INTRAVENOUS at 23:28

## 2024-01-20 RX ADMIN — PANTOPRAZOLE SODIUM 80 MG: 40 INJECTION, POWDER, FOR SOLUTION INTRAVENOUS at 18:07

## 2024-01-20 ASSESSMENT — PAIN - FUNCTIONAL ASSESSMENT: PAIN_FUNCTIONAL_ASSESSMENT: NONE - DENIES PAIN

## 2024-01-20 NOTE — ED PROVIDER NOTES
[facility] EMERGENCY DEPARTMENT    EMERGENCY MEDICINE     Patient Name: Karen Cruz  MRN: [id number]  YOB: 1935  Date of Evaluation: 1/20/2024  Treating Resident Physician: Alin Keating MD  Supervising Physician: Max Gutierrez MD    CHIEF COMPLAINT       Chief Complaint   Patient presents with    Rectal Bleeding       HISTORY OF PRESENT ILLNESS    HPI    History obtained from chart review and the patient.    Karen Cruz is a 88 y.o. female who presents to the emergency department from home brought in by EMS for evaluation of rectal bleeding.  Patient reports that bleeding started about 3 hours ago and had bright red blood gushing from her rectum she reports she is on blood thinner but not sure which 1.  She was from her medication list she is on aspirin and clopidogrel she previously had been on apixaban and rivaroxaban but not filled either of these recently according to pharmacy fill information.  She reports that she otherwise has been well no cough cold fever runny nose no nausea no vomiting no abdominal pain no dysuria.  She reports adherence to her medications and catheter blood thinners this morning.  She reports a prior episode of lower GI bleed that she states was related to hemorrhoids that required banding.    Pertinent previous and/or external records reviewed: Non-contributory    REVIEW OF SYSTEMS   Review of Systems  Negative unless documented in HPI    PAST MEDICAL AND SURGICAL HISTORY     Past Medical History:   Diagnosis Date    A-fib (HCC)     AAA (abdominal aortic aneurysm) (HCC)     Achalasia     Anemia     Arthritis     Blood circulation, collateral     BPPV (benign paroxysmal positional vertigo) 6/6/16    CHF (congestive heart failure) (HCC)     Chronic kidney disease     sees Dr Muñoz    Colon cancer (HCC)     Gastrointestinal hemorrhage     GERD (gastroesophageal reflux disease)     ? esophageal stricture    Hiatal hernia     History of blood transfusion     HTN

## 2024-01-20 NOTE — ED PROVIDER NOTES
PATIENT NAME: Karen Cruz  MRN: [id number]  : 1935  PARK: 2024    I performed a history and physical examination of the patient and discussed management with the Resident. I reviewed the Resident's note and agree with the documented findings and plan of care. Any areas of disagreement are noted on the chart. I was personally present for the key portions of any procedures and have documented in the chart those procedures where I was not present during the key portions. I have reviewed the emergency nurses triage note and agree with the chief complaint, past medical history, past surgical history, allergies, medications, social and family history as documented unless otherwise noted below.    MEDICAL DEDISION MAKING (MDM)     Karen Cruz is a 88 y.o. female who presents to Emergency Department with Rectal Bleeding     A 88 y.o. female with a PMH GI bleeding presents with mutiple runny dark stools with clots since yesterday and continued on to today.  She denied lightheadedness, vertigo, headache, fatigue, or malaise. Borderline low BP. Bright and dark red blood on digital rectal exam. Sinus rhythm with sinus arrhythmia, ventricular rate 68 bpm, ID interval 194 ms, QRS duration 80 ms,  ms, no acute ischemic changes. NS bolus and 1 unit of PRBC given in ED. Admitted to Hospital medicine service.     Vitals:    24 2130 24 2215 24 2302   BP: (!) 121/59 (!) 116/58 137/61    Pulse: 63 68 78    Resp: 13 24 16    Temp:   97.7 °F (36.5 °C)    TempSrc:   Oral    SpO2: 93% 96% 100%    Weight:    68.3 kg (150 lb 8 oz)   Height:    1.651 m (5' 5\")     Labs Reviewed   CBC WITH AUTO DIFFERENTIAL - Abnormal; Notable for the following components:       Result Value    RBC 3.99 (*)     RDW-CV 14.8 (*)     RDW-SD 53.3 (*)     Lymphocytes Absolute 0.6 (*)     Monocytes Absolute 0.3 (*)     All other components within normal limits   COMPREHENSIVE METABOLIC PANEL - Abnormal; Notable

## 2024-01-20 NOTE — ED TRIAGE NOTES
Pt arrives to ED from home with c/o rectal bleeding that started about 3 hours ago, pt states she went to the bathroom and it gushed out of her rectum. Pt states she is on a blood thinner.   Denies any pain  Reports history of hemorrhoid removal

## 2024-01-21 ENCOUNTER — APPOINTMENT (OUTPATIENT)
Dept: CT IMAGING | Age: 89
DRG: 377 | End: 2024-01-21
Payer: MEDICARE

## 2024-01-21 LAB
ANION GAP SERPL CALC-SCNC: 6 MEQ/L (ref 8–16)
BUN SERPL-MCNC: 36 MG/DL (ref 7–22)
CALCIUM SERPL-MCNC: 8.4 MG/DL (ref 8.5–10.5)
CHLORIDE SERPL-SCNC: 109 MEQ/L (ref 98–111)
CO2 SERPL-SCNC: 27 MEQ/L (ref 23–33)
CREAT SERPL-MCNC: 1.5 MG/DL (ref 0.4–1.2)
DEPRECATED RDW RBC AUTO: 56.6 FL (ref 35–45)
EKG ATRIAL RATE: 68 BPM
EKG P AXIS: 19 DEGREES
EKG P-R INTERVAL: 194 MS
EKG Q-T INTERVAL: 448 MS
EKG QRS DURATION: 80 MS
EKG QTC CALCULATION (BAZETT): 476 MS
EKG R AXIS: -69 DEGREES
EKG T AXIS: -15 DEGREES
EKG VENTRICULAR RATE: 68 BPM
ERYTHROCYTE [DISTWIDTH] IN BLOOD BY AUTOMATED COUNT: 15.7 % (ref 11.5–14.5)
GFR SERPL CREATININE-BSD FRML MDRD: 34 ML/MIN/1.73M2
GLUCOSE BLD STRIP.AUTO-MCNC: 101 MG/DL (ref 70–108)
GLUCOSE BLD STRIP.AUTO-MCNC: 88 MG/DL (ref 70–108)
GLUCOSE BLD STRIP.AUTO-MCNC: 90 MG/DL (ref 70–108)
GLUCOSE BLD STRIP.AUTO-MCNC: 99 MG/DL (ref 70–108)
GLUCOSE SERPL-MCNC: 83 MG/DL (ref 70–108)
HCT VFR BLD AUTO: 30.1 % (ref 37–47)
HCT VFR BLD AUTO: 33.1 % (ref 37–47)
HCT VFR BLD AUTO: 34.2 % (ref 37–47)
HCT VFR BLD AUTO: 35.4 % (ref 37–47)
HCT VFR BLD AUTO: 36.5 % (ref 37–47)
HCT VFR BLD AUTO: 37.5 % (ref 37–47)
HCT VFR BLD AUTO: 37.7 % (ref 37–47)
HGB BLD-MCNC: 10.8 GM/DL (ref 12–16)
HGB BLD-MCNC: 10.8 GM/DL (ref 12–16)
HGB BLD-MCNC: 11.4 GM/DL (ref 12–16)
HGB BLD-MCNC: 11.6 GM/DL (ref 12–16)
HGB BLD-MCNC: 11.8 GM/DL (ref 12–16)
HGB BLD-MCNC: 12 GM/DL (ref 12–16)
HGB BLD-MCNC: 9.9 GM/DL (ref 12–16)
INR PPP: 1.71 (ref 0.85–1.13)
LACTIC ACID, SEPSIS: 0.8 MMOL/L (ref 0.5–1.9)
MAGNESIUM SERPL-MCNC: 2 MG/DL (ref 1.6–2.4)
MCH RBC QN AUTO: 31.8 PG (ref 26–33)
MCHC RBC AUTO-ENTMCNC: 32.2 GM/DL (ref 32.2–35.5)
MCV RBC AUTO: 98.6 FL (ref 81–99)
OSMOLALITY SERPL CALC.SUM OF ELEC: 290.6 MOSMOL/KG (ref 275–300)
PLATELET # BLD AUTO: 101 THOU/MM3 (ref 130–400)
PMV BLD AUTO: 10.8 FL (ref 9.4–12.4)
POTASSIUM SERPL-SCNC: 4.2 MEQ/L (ref 3.5–5.2)
RBC # BLD AUTO: 3.59 MILL/MM3 (ref 4.2–5.4)
SODIUM SERPL-SCNC: 142 MEQ/L (ref 135–145)
WBC # BLD AUTO: 4.6 THOU/MM3 (ref 4.8–10.8)

## 2024-01-21 PROCEDURE — 36415 COLL VENOUS BLD VENIPUNCTURE: CPT

## 2024-01-21 PROCEDURE — 6370000000 HC RX 637 (ALT 250 FOR IP)

## 2024-01-21 PROCEDURE — 6360000004 HC RX CONTRAST MEDICATION: Performed by: INTERNAL MEDICINE

## 2024-01-21 PROCEDURE — 85027 COMPLETE CBC AUTOMATED: CPT

## 2024-01-21 PROCEDURE — 85014 HEMATOCRIT: CPT

## 2024-01-21 PROCEDURE — 2580000003 HC RX 258

## 2024-01-21 PROCEDURE — 6360000002 HC RX W HCPCS: Performed by: INTERNAL MEDICINE

## 2024-01-21 PROCEDURE — 80048 BASIC METABOLIC PNL TOTAL CA: CPT

## 2024-01-21 PROCEDURE — 6360000002 HC RX W HCPCS: Performed by: STUDENT IN AN ORGANIZED HEALTH CARE EDUCATION/TRAINING PROGRAM

## 2024-01-21 PROCEDURE — 93010 ELECTROCARDIOGRAM REPORT: CPT | Performed by: NUCLEAR MEDICINE

## 2024-01-21 PROCEDURE — 99222 1ST HOSP IP/OBS MODERATE 55: CPT | Performed by: OPHTHALMOLOGY

## 2024-01-21 PROCEDURE — 83735 ASSAY OF MAGNESIUM: CPT

## 2024-01-21 PROCEDURE — C9113 INJ PANTOPRAZOLE SODIUM, VIA: HCPCS | Performed by: STUDENT IN AN ORGANIZED HEALTH CARE EDUCATION/TRAINING PROGRAM

## 2024-01-21 PROCEDURE — 85610 PROTHROMBIN TIME: CPT

## 2024-01-21 PROCEDURE — 2140000000 HC CCU INTERMEDIATE R&B

## 2024-01-21 PROCEDURE — 2580000003 HC RX 258: Performed by: INTERNAL MEDICINE

## 2024-01-21 PROCEDURE — 74174 CTA ABD&PLVS W/CONTRAST: CPT

## 2024-01-21 PROCEDURE — 85018 HEMOGLOBIN: CPT

## 2024-01-21 PROCEDURE — 2580000003 HC RX 258: Performed by: STUDENT IN AN ORGANIZED HEALTH CARE EDUCATION/TRAINING PROGRAM

## 2024-01-21 PROCEDURE — 82948 REAGENT STRIP/BLOOD GLUCOSE: CPT

## 2024-01-21 PROCEDURE — 83605 ASSAY OF LACTIC ACID: CPT

## 2024-01-21 RX ORDER — SODIUM CHLORIDE 9 MG/ML
INJECTION, SOLUTION INTRAVENOUS CONTINUOUS
Status: DISCONTINUED | OUTPATIENT
Start: 2024-01-21 | End: 2024-01-23

## 2024-01-21 RX ORDER — 0.9 % SODIUM CHLORIDE 0.9 %
500 INTRAVENOUS SOLUTION INTRAVENOUS ONCE
Status: COMPLETED | OUTPATIENT
Start: 2024-01-21 | End: 2024-01-21

## 2024-01-21 RX ORDER — OCTREOTIDE ACETATE 50 UG/ML
50 INJECTION, SOLUTION INTRAVENOUS; SUBCUTANEOUS ONCE
Status: COMPLETED | OUTPATIENT
Start: 2024-01-21 | End: 2024-01-21

## 2024-01-21 RX ADMIN — PANTOPRAZOLE SODIUM 8 MG/HR: 40 INJECTION, POWDER, FOR SOLUTION INTRAVENOUS at 12:00

## 2024-01-21 RX ADMIN — DICLOFENAC SODIUM 4 G: 10 GEL TOPICAL at 09:25

## 2024-01-21 RX ADMIN — METOPROLOL SUCCINATE 12.5 MG: 25 TABLET, EXTENDED RELEASE ORAL at 09:22

## 2024-01-21 RX ADMIN — Medication 500 MG: at 20:05

## 2024-01-21 RX ADMIN — IOPAMIDOL 80 ML: 755 INJECTION, SOLUTION INTRAVENOUS at 23:52

## 2024-01-21 RX ADMIN — SODIUM CHLORIDE, PRESERVATIVE FREE 10 ML: 5 INJECTION INTRAVENOUS at 20:00

## 2024-01-21 RX ADMIN — SODIUM CHLORIDE: 9 INJECTION, SOLUTION INTRAVENOUS at 22:49

## 2024-01-21 RX ADMIN — OCTREOTIDE ACETATE 50 MCG: 50 INJECTION, SOLUTION INTRAVENOUS; SUBCUTANEOUS at 23:41

## 2024-01-21 RX ADMIN — Medication 500 MG: at 09:23

## 2024-01-21 RX ADMIN — AMIODARONE HYDROCHLORIDE 200 MG: 200 TABLET ORAL at 09:22

## 2024-01-21 RX ADMIN — DICLOFENAC SODIUM 4 G: 10 GEL TOPICAL at 19:57

## 2024-01-21 RX ADMIN — TIMOLOL MALEATE 1 DROP: 5 SOLUTION OPHTHALMIC at 09:23

## 2024-01-21 RX ADMIN — SODIUM CHLORIDE 500 ML: 9 INJECTION, SOLUTION INTRAVENOUS at 20:21

## 2024-01-21 RX ADMIN — PRAVASTATIN SODIUM 40 MG: 40 TABLET ORAL at 09:22

## 2024-01-21 NOTE — CONSULTS
[facility]  Gastroenterology Consult      Patient's Name/Date of Birth: Karen Cruz / 1935 (88 y.o.)  MRN: [id number]  Acct: [id number]  Admit Date: 1/20/2024  5:18 PM  Referring Provider: Beatriz Nayak MD  Primary Care Physician: Srini Garces   [id number]     Date of Service: Pt seen/examined in consultation on 1/21/2024    CHIEF COMPLAINT:   Chief Complaint   Patient presents with    Rectal Bleeding       HPI: This is a pleasant 88 y.o. female with afib, CKD, PAD who we are consulted by Beatriz Nayak MD for rectal bleeding.    She presented with complaints of multiple BMs that were bloody. She is a poor historian. She states that she had similar episode years ago that was thought be secondary to a diverticular bleeding. She denies any abdominal pain, N/V/D, F/C. She has a BM daily to every 3-4 days, firmer in nature. She does not take a stool softener.     Last EGD on  07/19/23 with dilated esophagus filled with fluid to UES;  distal esophagitis\  gastritis with elongated stomach, unable to enter the duodenum, and no signs of gi bleeding.      Last Colonoscopy on 02/22/20 w/ cecal mass extensive diverticulosis involving the sigmoid colon, active bleeding near the anastomosis of the sigmoid resection in the rectum from a diverticulum, and grade 2 large internal hemorrhoids. 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 esophageal stricture    Hiatal hernia     History of blood transfusion     HTN

## 2024-01-21 NOTE — ED NOTES
Blood transfusion completed, line flushed with normal saline   Patient resting in bed. Respirations easy and unlabored. No distress noted. Call light within reach.    
Blood transfusion started, blood reaches vein at 1923  
Called [facility] and talked with charge nurse ok to transport to [facility] in stable condition.  
Pt medicated per mar  Provider informed soft BP's, verbal given for bolus of fluids  Pt resting in bed no complaints at this time  
9/6/2018    RE-EXPLORATION RIGHT GROIN, DECLOTTING OF FEMORAL BYPASS GRAFT performed by Jhonny Ambriz MD at [facility] OR    VA EGD TRANSORAL BIOPSY SINGLE/MULTIPLE Left 11/27/2017    EGD BIOPSY performed by Slava Ku MD at [facility] Endoscopy    VA LAPAROSCOPY COLECTOMY PARTIAL W/ANASTOMOSIS N/A 8/20/2018    ROBOT ASSISTED COLECTOMY (LOW ANTERIOR) performed by Billy Giles MD at [facility] OR    VA OFFICE/OUTPT VISIT,PROCEDURE ONLY N/A 9/5/2018    RIGHT FEMORAL EMBOLECTOMY, INTRAOPERATIVE ARTERIOGRAM, RIGHT ILIAC EMBOLECTOMY, RIGHT COMMON AND EXTERNAL ILIAC STENTING, RIGHT FEMORAL TO ANTERIOR POPLITEAL BYPASS WITH 6MM GORTEX GRAFT performed by Jhonny Ambriz MD at [facility] OR    THROMBECTOMY / EMBOLECTOMY FEMORAL Right 2/14/2019    FEMORAL EMBOLECTOMY THROMBECTOMY, RIGHT LEG performed by Jhonny Ambriz MD at [facility] OR    UPPER GASTROINTESTINAL ENDOSCOPY N/A 11/27/2017    EGD SUBMUCOSAL/BOTOX INJECTION performed by Slava Ku MD at [facility] Endoscopy    UPPER GASTROINTESTINAL ENDOSCOPY N/A 2/22/2019    EGD BIOPSY performed by Brigida Vergara MD at [facility] Endoscopy    UPPER GASTROINTESTINAL ENDOSCOPY N/A 7/19/2023    EGD BIOPSY performed by Slava Ku MD at [facility] Endoscopy    UPPER GASTROINTESTINAL ENDOSCOPY  7/21/2023    EGD ESOPHAGOGASTRODUODENOSCOPY performed by Slava Ku MD at [facility] Endoscopy       PAST MEDICAL HISTORY       Past Medical History:   Diagnosis Date    A-fib (HCC)     AAA (abdominal aortic aneurysm) (HCC)     Achalasia     Anemia     Arthritis     Blood circulation, collateral     BPPV (benign paroxysmal positional vertigo) 6/6/16    CHF (congestive heart failure) (HCC)     Chronic kidney disease     sees Dr Muñoz    Colon cancer (HCC)     Gastrointestinal hemorrhage     GERD (gastroesophageal reflux disease)     ? esophageal stricture    Hiatal hernia     History of blood transfusion     HTN (hypertension)     Hx of blood clots 2018    R leg-sees ABEBAPagani    Hyperlipidemia

## 2024-01-21 NOTE — H&P
Medicine Admission History & Physical      Patient:  Karen Cruz  YOB: 1935  Date of Service: 1/20/2024  MRN: [id number]   Acct: [id number]   Primary Care Physician: Srini Garces    Admitting Faculty MD: Santiago Sawyer MD    Code Status: Full Code No additional code details    Date of Service: Pt seen/examined in consultation on 1/20/2024     Assessment / Plan     Briefly, pt Karen Cruz is a 88 y.o. female with a PMH of multiple GI surgeries and severe vasculopathy who presents with U vs L GIB.     #Non-Variceal Upper vs Lower GIB: Active  H/o Large HH, internal hemorrhoids s/p hemorrhoidectomy x 2 (last one 06/07/23), cecal mass removal w/ ileocolic resection and primary ileocolic anastomosis 02/25/20, and many other abdominal surgeries. Bleeding source: Pt has mixed clots and BRBPR on NILDA. H/o EGD on  07/19/23  demonstrating dilated esophagus filled with fluid to UES;  distal esophagitis  (bx);  gastritis with elongated stomach, unable to enter the duodenum, and no signs of gi bleeding.  Colonoscopy on 02/22/20 w/ cecal mass which has since been resected, extensive diverticulosis involving the sigmoid colon, active bleeding near the anastomosis of the sigmoid resection in the rectum from a diverticulum, and grade 2 large internal hemorrhoids which have since been resected. May be upper vs lower GIB given her extensive GI hx as above.   Plan:  -GI Consulted for UGIB vs LGIB; appreciate recs  -Will consider general surgery consult  -Hold AC / AP  -IV PPI gtt  -If elevated INR and suspected nutritional deficiency of vitamin K or warfarin use. Any bleeding, Give IV Vitamin K 10 mg x1.   -H&H every 6 hours  -Type and Screen; Prepare 2U pRBC  -Transfuse if Hgb <7.0 mg/dL and s/s of acute anemia    #Mild Hyperglycemia: Active  Initial BG was 242. No h/o DM.   Plan:   -A1c  -LD SSI  -Will determine TDD of insulin and consider basal-bolus regimen  -POCT every 4 hours while NPO then qAC/HS when

## 2024-01-22 ENCOUNTER — ANESTHESIA (OUTPATIENT)
Dept: ENDOSCOPY | Age: 89
End: 2024-01-22
Payer: MEDICARE

## 2024-01-22 ENCOUNTER — APPOINTMENT (OUTPATIENT)
Dept: GENERAL RADIOLOGY | Age: 89
DRG: 377 | End: 2024-01-22
Payer: MEDICARE

## 2024-01-22 ENCOUNTER — APPOINTMENT (OUTPATIENT)
Dept: NUCLEAR MEDICINE | Age: 89
DRG: 377 | End: 2024-01-22
Payer: MEDICARE

## 2024-01-22 ENCOUNTER — ANESTHESIA EVENT (OUTPATIENT)
Dept: ENDOSCOPY | Age: 89
End: 2024-01-22
Payer: MEDICARE

## 2024-01-22 LAB
ANION GAP SERPL CALC-SCNC: 11 MEQ/L (ref 8–16)
ANION GAP SERPL CALC-SCNC: 13 MEQ/L (ref 8–16)
ANION GAP SERPL CALC-SCNC: 18 MEQ/L (ref 8–16)
BILIRUB UR QL STRIP: NEGATIVE
BUN SERPL-MCNC: 37 MG/DL (ref 7–22)
BUN SERPL-MCNC: 38 MG/DL (ref 7–22)
BUN SERPL-MCNC: 40 MG/DL (ref 7–22)
CA-I BLD ISE-SCNC: 1.19 MMOL/L (ref 1.12–1.32)
CALCIUM SERPL-MCNC: 6.9 MG/DL (ref 8.5–10.5)
CALCIUM SERPL-MCNC: 7.6 MG/DL (ref 8.5–10.5)
CALCIUM SERPL-MCNC: 8.1 MG/DL (ref 8.5–10.5)
CHARACTER UR: CLEAR
CHLORIDE SERPL-SCNC: 111 MEQ/L (ref 98–111)
CHLORIDE SERPL-SCNC: 111 MEQ/L (ref 98–111)
CHLORIDE SERPL-SCNC: 117 MEQ/L (ref 98–111)
CO2 SERPL-SCNC: 13 MEQ/L (ref 23–33)
CO2 SERPL-SCNC: 13 MEQ/L (ref 23–33)
CO2 SERPL-SCNC: 19 MEQ/L (ref 23–33)
COLOR UR: YELLOW
CREAT SERPL-MCNC: 1.5 MG/DL (ref 0.4–1.2)
CREAT SERPL-MCNC: 1.6 MG/DL (ref 0.4–1.2)
CREAT SERPL-MCNC: 1.8 MG/DL (ref 0.4–1.2)
CREAT UR-MCNC: 58.1 MG/DL
DEPRECATED RDW RBC AUTO: 57.1 FL (ref 35–45)
DEPRECATED RDW RBC AUTO: 58.6 FL (ref 35–45)
DEPRECATED RDW RBC AUTO: 60.5 FL (ref 35–45)
ERYTHROCYTE [DISTWIDTH] IN BLOOD BY AUTOMATED COUNT: 15.9 % (ref 11.5–14.5)
ERYTHROCYTE [DISTWIDTH] IN BLOOD BY AUTOMATED COUNT: 16.3 % (ref 11.5–14.5)
ERYTHROCYTE [DISTWIDTH] IN BLOOD BY AUTOMATED COUNT: 16.3 % (ref 11.5–14.5)
GFR SERPL CREATININE-BSD FRML MDRD: 27 ML/MIN/1.73M2
GFR SERPL CREATININE-BSD FRML MDRD: 31 ML/MIN/1.73M2
GFR SERPL CREATININE-BSD FRML MDRD: 34 ML/MIN/1.73M2
GLUCOSE BLD STRIP.AUTO-MCNC: 102 MG/DL (ref 70–108)
GLUCOSE BLD STRIP.AUTO-MCNC: 83 MG/DL (ref 70–108)
GLUCOSE BLD STRIP.AUTO-MCNC: 95 MG/DL (ref 70–108)
GLUCOSE BLD STRIP.AUTO-MCNC: 97 MG/DL (ref 70–108)
GLUCOSE SERPL-MCNC: 66 MG/DL (ref 70–108)
GLUCOSE SERPL-MCNC: 88 MG/DL (ref 70–108)
GLUCOSE SERPL-MCNC: 93 MG/DL (ref 70–108)
GLUCOSE UR QL STRIP.AUTO: NEGATIVE MG/DL
HCT VFR BLD AUTO: 27.3 % (ref 37–47)
HCT VFR BLD AUTO: 27.7 % (ref 37–47)
HCT VFR BLD AUTO: 28.2 % (ref 37–47)
HCT VFR BLD AUTO: 33.1 % (ref 37–47)
HCT VFR BLD AUTO: 33.2 % (ref 37–47)
HCT VFR BLD AUTO: 33.8 % (ref 37–47)
HCT VFR BLD AUTO: 34 % (ref 37–47)
HCT VFR BLD AUTO: 34.1 % (ref 37–47)
HGB BLD-MCNC: 10.5 GM/DL (ref 12–16)
HGB BLD-MCNC: 10.6 GM/DL (ref 12–16)
HGB BLD-MCNC: 10.7 GM/DL (ref 12–16)
HGB BLD-MCNC: 10.7 GM/DL (ref 12–16)
HGB BLD-MCNC: 10.8 GM/DL (ref 12–16)
HGB BLD-MCNC: 8.4 GM/DL (ref 12–16)
HGB BLD-MCNC: 8.5 GM/DL (ref 12–16)
HGB BLD-MCNC: 8.8 GM/DL (ref 12–16)
HGB UR QL STRIP.AUTO: NEGATIVE
INR PPP: 1.56 (ref 0.85–1.13)
KETONES UR QL STRIP.AUTO: NEGATIVE
LACTATE SERPL-SCNC: 1 MMOL/L (ref 0.5–2)
LACTATE SERPL-SCNC: 1.4 MMOL/L (ref 0.5–2)
LACTATE SERPL-SCNC: 1.8 MMOL/L (ref 0.5–2)
LEUKOCYTE ESTERASE UR QL STRIP.AUTO: NEGATIVE
MAGNESIUM SERPL-MCNC: 1.7 MG/DL (ref 1.6–2.4)
MAGNESIUM SERPL-MCNC: 2 MG/DL (ref 1.6–2.4)
MCH RBC QN AUTO: 31.7 PG (ref 26–33)
MCH RBC QN AUTO: 31.7 PG (ref 26–33)
MCH RBC QN AUTO: 31.8 PG (ref 26–33)
MCHC RBC AUTO-ENTMCNC: 31.1 GM/DL (ref 32.2–35.5)
MCHC RBC AUTO-ENTMCNC: 31.8 GM/DL (ref 32.2–35.5)
MCHC RBC AUTO-ENTMCNC: 32 GM/DL (ref 32.2–35.5)
MCV RBC AUTO: 100 FL (ref 81–99)
MCV RBC AUTO: 101.9 FL (ref 81–99)
MCV RBC AUTO: 99.1 FL (ref 81–99)
MRSA DNA SPEC QL NAA+PROBE: NEGATIVE
NITRITE UR QL STRIP.AUTO: NEGATIVE
OSMOLALITY UR: 525 MOSMOL/KG (ref 250–750)
PH UR STRIP.AUTO: 5.5 [PH] (ref 5–9)
PHOSPHATE SERPL-MCNC: 4.3 MG/DL (ref 2.4–4.7)
PLATELET # BLD AUTO: 119 THOU/MM3 (ref 130–400)
PLATELET # BLD AUTO: 139 THOU/MM3 (ref 130–400)
PLATELET # BLD AUTO: 217 THOU/MM3 (ref 130–400)
PMV BLD AUTO: 10.5 FL (ref 9.4–12.4)
PMV BLD AUTO: 10.6 FL (ref 9.4–12.4)
PMV BLD AUTO: 10.6 FL (ref 9.4–12.4)
POTASSIUM SERPL-SCNC: 4.6 MEQ/L (ref 3.5–5.2)
POTASSIUM SERPL-SCNC: 5 MEQ/L (ref 3.5–5.2)
POTASSIUM SERPL-SCNC: 5.3 MEQ/L (ref 3.5–5.2)
PROT UR STRIP.AUTO-MCNC: NEGATIVE MG/DL
RBC # BLD AUTO: 2.68 MILL/MM3 (ref 4.2–5.4)
RBC # BLD AUTO: 3.34 MILL/MM3 (ref 4.2–5.4)
RBC # BLD AUTO: 3.4 MILL/MM3 (ref 4.2–5.4)
SODIUM SERPL-SCNC: 141 MEQ/L (ref 135–145)
SODIUM SERPL-SCNC: 142 MEQ/L (ref 135–145)
SODIUM SERPL-SCNC: 143 MEQ/L (ref 135–145)
SODIUM UR-SCNC: 111 MEQ/L
SP GR UR REFRACT.AUTO: 1.02 (ref 1–1.03)
UROBILINOGEN UR QL STRIP.AUTO: 0.2 EU/DL (ref 0–1)
UUN 24H UR-MCNC: 536 MG/DL
WBC # BLD AUTO: 22.2 THOU/MM3 (ref 4.8–10.8)
WBC # BLD AUTO: 5.3 THOU/MM3 (ref 4.8–10.8)
WBC # BLD AUTO: 5.8 THOU/MM3 (ref 4.8–10.8)

## 2024-01-22 PROCEDURE — 3609017100 HC EGD: Performed by: INTERNAL MEDICINE

## 2024-01-22 PROCEDURE — 82330 ASSAY OF CALCIUM: CPT

## 2024-01-22 PROCEDURE — 2709999900 HC NON-CHARGEABLE SUPPLY: Performed by: INTERNAL MEDICINE

## 2024-01-22 PROCEDURE — 2580000003 HC RX 258

## 2024-01-22 PROCEDURE — 0DB58ZX EXCISION OF ESOPHAGUS, VIA NATURAL OR ARTIFICIAL OPENING ENDOSCOPIC, DIAGNOSTIC: ICD-10-PCS | Performed by: INTERNAL MEDICINE

## 2024-01-22 PROCEDURE — 3430000000 HC RX DIAGNOSTIC RADIOPHARMACEUTICAL: Performed by: INTERNAL MEDICINE

## 2024-01-22 PROCEDURE — 81003 URINALYSIS AUTO W/O SCOPE: CPT

## 2024-01-22 PROCEDURE — 85025 COMPLETE CBC W/AUTO DIFF WBC: CPT

## 2024-01-22 PROCEDURE — 85027 COMPLETE CBC AUTOMATED: CPT

## 2024-01-22 PROCEDURE — 83735 ASSAY OF MAGNESIUM: CPT

## 2024-01-22 PROCEDURE — 2000000000 HC ICU R&B

## 2024-01-22 PROCEDURE — 6370000000 HC RX 637 (ALT 250 FOR IP)

## 2024-01-22 PROCEDURE — 83605 ASSAY OF LACTIC ACID: CPT

## 2024-01-22 PROCEDURE — 87070 CULTURE OTHR SPECIMN AEROBIC: CPT

## 2024-01-22 PROCEDURE — 2580000003 HC RX 258: Performed by: INTERNAL MEDICINE

## 2024-01-22 PROCEDURE — 85014 HEMATOCRIT: CPT

## 2024-01-22 PROCEDURE — 3700000000 HC ANESTHESIA ATTENDED CARE: Performed by: INTERNAL MEDICINE

## 2024-01-22 PROCEDURE — 2700000000 HC OXYGEN THERAPY PER DAY

## 2024-01-22 PROCEDURE — 80048 BASIC METABOLIC PNL TOTAL CA: CPT

## 2024-01-22 PROCEDURE — 36415 COLL VENOUS BLD VENIPUNCTURE: CPT

## 2024-01-22 PROCEDURE — 88305 TISSUE EXAM BY PATHOLOGIST: CPT

## 2024-01-22 PROCEDURE — 87086 URINE CULTURE/COLONY COUNT: CPT

## 2024-01-22 PROCEDURE — 0DB68ZX EXCISION OF STOMACH, VIA NATURAL OR ARTIFICIAL OPENING ENDOSCOPIC, DIAGNOSTIC: ICD-10-PCS | Performed by: INTERNAL MEDICINE

## 2024-01-22 PROCEDURE — 6360000002 HC RX W HCPCS: Performed by: INTERNAL MEDICINE

## 2024-01-22 PROCEDURE — 3700000001 HC ADD 15 MINUTES (ANESTHESIA): Performed by: INTERNAL MEDICINE

## 2024-01-22 PROCEDURE — 94761 N-INVAS EAR/PLS OXIMETRY MLT: CPT

## 2024-01-22 PROCEDURE — 82948 REAGENT STRIP/BLOOD GLUCOSE: CPT

## 2024-01-22 PROCEDURE — 0W3P8ZZ CONTROL BLEEDING IN GASTROINTESTINAL TRACT, VIA NATURAL OR ARTIFICIAL OPENING ENDOSCOPIC: ICD-10-PCS | Performed by: INTERNAL MEDICINE

## 2024-01-22 PROCEDURE — 87641 MR-STAPH DNA AMP PROBE: CPT

## 2024-01-22 PROCEDURE — 84540 ASSAY OF URINE/UREA-N: CPT

## 2024-01-22 PROCEDURE — C9113 INJ PANTOPRAZOLE SODIUM, VIA: HCPCS | Performed by: INTERNAL MEDICINE

## 2024-01-22 PROCEDURE — 83935 ASSAY OF URINE OSMOLALITY: CPT

## 2024-01-22 PROCEDURE — 82570 ASSAY OF URINE CREATININE: CPT

## 2024-01-22 PROCEDURE — 99232 SBSQ HOSP IP/OBS MODERATE 35: CPT

## 2024-01-22 PROCEDURE — 78278 ACUTE GI BLOOD LOSS IMAGING: CPT

## 2024-01-22 PROCEDURE — A9560 TC99M LABELED RBC: HCPCS | Performed by: INTERNAL MEDICINE

## 2024-01-22 PROCEDURE — 84300 ASSAY OF URINE SODIUM: CPT

## 2024-01-22 PROCEDURE — 85610 PROTHROMBIN TIME: CPT

## 2024-01-22 PROCEDURE — 84100 ASSAY OF PHOSPHORUS: CPT

## 2024-01-22 PROCEDURE — 2500000003 HC RX 250 WO HCPCS

## 2024-01-22 PROCEDURE — 85018 HEMOGLOBIN: CPT

## 2024-01-22 PROCEDURE — 94002 VENT MGMT INPAT INIT DAY: CPT

## 2024-01-22 PROCEDURE — 6360000002 HC RX W HCPCS

## 2024-01-22 PROCEDURE — 71045 X-RAY EXAM CHEST 1 VIEW: CPT

## 2024-01-22 PROCEDURE — 36430 TRANSFUSION BLD/BLD COMPNT: CPT

## 2024-01-22 PROCEDURE — 83930 ASSAY OF BLOOD OSMOLALITY: CPT

## 2024-01-22 PROCEDURE — 99291 CRITICAL CARE FIRST HOUR: CPT | Performed by: INTERNAL MEDICINE

## 2024-01-22 PROCEDURE — A4216 STERILE WATER/SALINE, 10 ML: HCPCS | Performed by: INTERNAL MEDICINE

## 2024-01-22 RX ORDER — FENTANYL CITRATE 50 UG/ML
INJECTION, SOLUTION INTRAMUSCULAR; INTRAVENOUS PRN
Status: DISCONTINUED | OUTPATIENT
Start: 2024-01-22 | End: 2024-01-22 | Stop reason: SDUPTHER

## 2024-01-22 RX ORDER — MAGNESIUM SULFATE IN WATER 40 MG/ML
2000 INJECTION, SOLUTION INTRAVENOUS ONCE
Status: COMPLETED | OUTPATIENT
Start: 2024-01-23 | End: 2024-01-23

## 2024-01-22 RX ORDER — NOREPINEPHRINE BITARTRATE 0.06 MG/ML
INJECTION, SOLUTION INTRAVENOUS
Status: COMPLETED
Start: 2024-01-22 | End: 2024-01-22

## 2024-01-22 RX ORDER — BUMETANIDE 0.25 MG/ML
1 INJECTION INTRAMUSCULAR; INTRAVENOUS ONCE
Status: COMPLETED | OUTPATIENT
Start: 2024-01-22 | End: 2024-01-22

## 2024-01-22 RX ORDER — NOREPINEPHRINE BITARTRATE 0.06 MG/ML
1-100 INJECTION, SOLUTION INTRAVENOUS CONTINUOUS
Status: DISCONTINUED | OUTPATIENT
Start: 2024-01-22 | End: 2024-01-25

## 2024-01-22 RX ORDER — BUMETANIDE 0.25 MG/ML
INJECTION INTRAMUSCULAR; INTRAVENOUS
Status: COMPLETED
Start: 2024-01-22 | End: 2024-01-22

## 2024-01-22 RX ORDER — SODIUM CHLORIDE 9 MG/ML
INJECTION, SOLUTION INTRAVENOUS PRN
Status: DISCONTINUED | OUTPATIENT
Start: 2024-01-22 | End: 2024-01-24

## 2024-01-22 RX ORDER — 0.9 % SODIUM CHLORIDE 0.9 %
500 INTRAVENOUS SOLUTION INTRAVENOUS ONCE
Status: COMPLETED | OUTPATIENT
Start: 2024-01-22 | End: 2024-01-22

## 2024-01-22 RX ADMIN — PANTOPRAZOLE SODIUM 8 MG/HR: 40 INJECTION, POWDER, FOR SOLUTION INTRAVENOUS at 00:04

## 2024-01-22 RX ADMIN — Medication 500 MG: at 10:23

## 2024-01-22 RX ADMIN — OCTREOTIDE ACETATE 50 MCG/HR: 500 INJECTION, SOLUTION INTRAVENOUS; SUBCUTANEOUS at 09:55

## 2024-01-22 RX ADMIN — PROPOFOL 20 MG: 10 INJECTION, EMULSION INTRAVENOUS at 13:33

## 2024-01-22 RX ADMIN — OCTREOTIDE ACETATE 50 MCG/HR: 500 INJECTION, SOLUTION INTRAVENOUS; SUBCUTANEOUS at 20:25

## 2024-01-22 RX ADMIN — OCTREOTIDE ACETATE 50 MCG/HR: 500 INJECTION, SOLUTION INTRAVENOUS; SUBCUTANEOUS at 00:04

## 2024-01-22 RX ADMIN — SODIUM CHLORIDE 500 ML: 9 INJECTION, SOLUTION INTRAVENOUS at 01:54

## 2024-01-22 RX ADMIN — PROPOFOL 20 MG: 10 INJECTION, EMULSION INTRAVENOUS at 13:43

## 2024-01-22 RX ADMIN — PROPOFOL 50 MG: 10 INJECTION, EMULSION INTRAVENOUS at 14:00

## 2024-01-22 RX ADMIN — PROPOFOL 20 MG: 10 INJECTION, EMULSION INTRAVENOUS at 14:04

## 2024-01-22 RX ADMIN — BUMETANIDE 1 MG: 0.25 INJECTION INTRAMUSCULAR; INTRAVENOUS at 15:01

## 2024-01-22 RX ADMIN — PROPOFOL 20 MG: 10 INJECTION, EMULSION INTRAVENOUS at 13:39

## 2024-01-22 RX ADMIN — PHENYLEPHRINE HYDROCHLORIDE 200 MCG: 10 INJECTION INTRAVENOUS at 13:39

## 2024-01-22 RX ADMIN — PROPOFOL 50 MG: 10 INJECTION, EMULSION INTRAVENOUS at 13:55

## 2024-01-22 RX ADMIN — Medication 4 MCG/MIN: at 19:03

## 2024-01-22 RX ADMIN — PROPOFOL 10 MG: 10 INJECTION, EMULSION INTRAVENOUS at 13:41

## 2024-01-22 RX ADMIN — PANTOPRAZOLE SODIUM 80 MG: 40 INJECTION, POWDER, FOR SOLUTION INTRAVENOUS at 00:07

## 2024-01-22 RX ADMIN — PHENYLEPHRINE HYDROCHLORIDE 250 MCG: 10 INJECTION INTRAVENOUS at 13:40

## 2024-01-22 RX ADMIN — PHENYLEPHRINE HYDROCHLORIDE 100 MCG: 10 INJECTION INTRAVENOUS at 14:13

## 2024-01-22 RX ADMIN — PANTOPRAZOLE SODIUM 8 MG/HR: 40 INJECTION, POWDER, FOR SOLUTION INTRAVENOUS at 07:42

## 2024-01-22 RX ADMIN — PROPOFOL 20 MG: 10 INJECTION, EMULSION INTRAVENOUS at 14:07

## 2024-01-22 RX ADMIN — PRAVASTATIN SODIUM 40 MG: 40 TABLET ORAL at 09:57

## 2024-01-22 RX ADMIN — PROPOFOL 20 MG: 10 INJECTION, EMULSION INTRAVENOUS at 13:34

## 2024-01-22 RX ADMIN — PHENYLEPHRINE HYDROCHLORIDE 100 MCG: 10 INJECTION INTRAVENOUS at 13:43

## 2024-01-22 RX ADMIN — PROPOFOL 20 MG: 10 INJECTION, EMULSION INTRAVENOUS at 14:14

## 2024-01-22 RX ADMIN — SODIUM CHLORIDE: 9 INJECTION, SOLUTION INTRAVENOUS at 20:25

## 2024-01-22 RX ADMIN — FENTANYL CITRATE 25 MCG: 50 INJECTION, SOLUTION INTRAMUSCULAR; INTRAVENOUS at 14:00

## 2024-01-22 RX ADMIN — SODIUM CHLORIDE: 9 INJECTION, SOLUTION INTRAVENOUS at 05:22

## 2024-01-22 RX ADMIN — TIMOLOL MALEATE 1 DROP: 5 SOLUTION OPHTHALMIC at 09:58

## 2024-01-22 RX ADMIN — PROPOFOL 20 MG: 10 INJECTION, EMULSION INTRAVENOUS at 14:11

## 2024-01-22 RX ADMIN — PANTOPRAZOLE SODIUM 8 MG/HR: 40 INJECTION, POWDER, FOR SOLUTION INTRAVENOUS at 20:30

## 2024-01-22 RX ADMIN — PHENYLEPHRINE HYDROCHLORIDE 100 MCG: 10 INJECTION INTRAVENOUS at 14:00

## 2024-01-22 RX ADMIN — FENTANYL CITRATE 25 MCG: 50 INJECTION, SOLUTION INTRAMUSCULAR; INTRAVENOUS at 14:15

## 2024-01-22 RX ADMIN — Medication 26.2 MILLICURIE: at 09:27

## 2024-01-22 RX ADMIN — PROPOFOL 20 MG: 10 INJECTION, EMULSION INTRAVENOUS at 13:37

## 2024-01-22 RX ADMIN — DICLOFENAC SODIUM 4 G: 10 GEL TOPICAL at 09:57

## 2024-01-22 RX ADMIN — Medication 500 MG: at 21:40

## 2024-01-22 RX ADMIN — PROPOFOL 20 MG: 10 INJECTION, EMULSION INTRAVENOUS at 13:35

## 2024-01-22 ASSESSMENT — COPD QUESTIONNAIRES: CAT_SEVERITY: MILD

## 2024-01-22 ASSESSMENT — PULMONARY FUNCTION TESTS
PIF_VALUE: 13
PIF_VALUE: 20

## 2024-01-22 NOTE — ANESTHESIA POSTPROCEDURE EVALUATION
Department of Anesthesiology  Postprocedure Note    Patient: Karen Cruz  MRN: [id number]  YOB: 1935  Date of evaluation: 1/22/2024    Procedure Summary       Date: 01/22/24 Room / Location: [place] [address] / [facility]    Anesthesia Start: 1329 Anesthesia Stop: 1420    Procedure: EGD Diagnosis:       Gastrointestinal hemorrhage, unspecified gastrointestinal hemorrhage type      (Gastrointestinal hemorrhage, unspecified gastrointestinal hemorrhage type [K92.2])    Surgeons: Brigida Vergara MD Responsible Provider: Rico Nash MD    Anesthesia Type: MAC ASA Status: 4            Anesthesia Type: No value filed.    Christiane Phase I: Christiane Score: 10    Christiane Phase II:      Anesthesia Post Evaluation    Patient location during evaluation: ICU  Patient participation: complete - patient cannot participate  Level of consciousness: sedated and ventilated  Pain score: 0  Airway patency: patent  Nausea & Vomiting: no nausea and no vomiting  Cardiovascular status: hemodynamically stable  Respiratory status: intubated  Hydration status: stable        No notable events documented.

## 2024-01-22 NOTE — ANESTHESIA PRE PROCEDURE
pacemaker: AICD, CHF: diastolic and systolic      ECG reviewed  Rhythm: regular  Rate: normal  Echocardiogram reviewed  Stress test reviewed       Beta Blocker:  Dose within 24 Hrs      ROS comment: LVEF: 50%  Pt is not able to walk up steps, only used to taking the elevator at home. Pt attempts to walk 2 blocks on a weekly basis with walker.     Neuro/Psych:   (+) neuromuscular disease:            GI/Hepatic/Renal:   (+) hiatal hernia, GERD: well controlled, renal disease: ARF          Endo/Other:    (+) : arthritis:..                  ROS comment: Left shoulder arthritis. Abdominal:       Abdomen: soft.      Vascular:          Other Findings:             Anesthesia Plan      MAC     ASA 4       Induction: intravenous.      Anesthetic plan and risks discussed with patient.    Use of blood products discussed with patient whom.    Plan discussed with attending and surgical team.    Attending anesthesiologist reviewed and agrees with Preprocedure content                ROMAN Baugh - CRNA   1/22/2024

## 2024-01-22 NOTE — SIGNIFICANT EVENT
Called to bedside due to hypotension. Patient noted to have SBP in the 70's. 1 L NS bolus ordered and 1 unit PRBC ordered due to the amount of blood that has been passed thus far. As soon as bedding has been changed patient begins to pass more bright red blood and has even spilled over onto the ground. Will notify ICU provider for consideration for transfer.     Spoke with ICU provider about possible transfer. Patient to receive blood STAT and NS bolus being given. If no improvement agrees to accept patient for pressor support.

## 2024-01-22 NOTE — H&P
CRITICAL CARE PROGRESS NOTE      Patient:  Karen Cruz    Unit/Bed:4D-04/004-A  YOB: 1935  MRN: [id number]   PCP: Srini Garces  Date of Admission: 1/20/2024  Chief Complaint:- bright red blood per rectum     Assessment and Plan:    Non-Variceal Upper GI Bleed versus diverticular bleed: History of internal hemorrhoids s/p hemorrhoidectomy 6/7/2023, cecal mass removal with ilio colic resection and primary ileocolic anastomoses 2/25/2020, many other abdominal surgeries.  EGD 7/19/2023 demonstrated dilated esophagus filled with fluid, distal esophagitis, gastritis with elongated stomach.  Colonoscopy 2/22/2020 with cecal mass and extensive diverticulosis involving the sigmoid colon.  EGD 1/22/2024 demonstrated esophagus filled with fluid and food.  Sloughing of esophageal mucosa, mild diffuse gastritis, multiple 2 to 4 mm gastric polyps, multiple areas of friable tissue with bleeding in gastric body.  Bleeding controlled with APC on gastric settings.  Duodenitis in duodenal bulb.  Bright red blood per rectum, patient would benefit from colonoscopy as well.  GI following  Continue Protonix drip  Continue octreotide drip  N.p.o.  Holding OAC and Plavix  Acute blood loss anemia: 2/2 above.  Hemoglobin 8.4 s/p 2 unit PRBC.  H&H every 8 hours.  Transfuse PRBC if symptomatic or hemodynamically unstable.  Monitor for fluid overload in setting of heart failure.  CKD GB 3: Serum creatinine on admission 1.7, today 1.5.  Baseline appears to be 1.4-1.6.  Patient follows with Dr. Godoy and nephrology.  Strict I's and O's.  Avoid nephrotoxic substances.  Renally dose medications.  BMP daily.  Paroxysmal A-fib: [id number] 6, has bled 7.  Patient takes metoprolol succinate 12.5 mg daily, amiodarone 200 mg, Eliquis 5 mg twice daily at home.  TSH 12/23 4.5.  Telemetry monitoring.  Toprol XL held in setting of hypotension, resume when able.  Eliquis held in setting of GI bleed.  Consider Watchman device given

## 2024-01-22 NOTE — H&P
[facility]   Pre-Operative History and Physical: EGD    Patient: Karen Cruz  : 1935     History Obtained From:  patient, electronic medical record    HISTORY OF PRESENT ILLNESS:    The patient is a 88 y.o. female who presents for an EGD for rectal bleeding.    Past Medical History:        Diagnosis Date    A-fib (HCC)     AAA (abdominal aortic aneurysm) (HCC)     Achalasia     Anemia     Arthritis     Blood circulation, collateral     BPPV (benign paroxysmal positional vertigo) 16    CHF (congestive heart failure) (HCC)     Chronic kidney disease     sees Dr Muñoz    Colon cancer (HCC)     Gastrointestinal hemorrhage     GERD (gastroesophageal reflux disease)     ? esophageal stricture    Hiatal hernia     History of blood transfusion     HTN (hypertension)     Hx of blood clots     R leg-sees Magaly    Hyperlipidemia     Medtronic dual ICD  2020    Neuromuscular disorder (HCC)     Obesity     Osteopenia     Osteoporosis     PAC (premature atrial contraction)     on betablocker    Paralysis (HCC)     baby    Pedal edema     denied any hx of hypertension    Pneumonia     PVC (premature ventricular contraction)     sees Dr Vergara    PVD (peripheral vascular disease) (HCC)     Small bowel obstruction (HCC)     Tinnitus     UTI (urinary tract infection)      Past Surgical History:        Procedure Laterality Date    ABDOMINAL AORTIC ANEURYSM REPAIR, ENDOVASCULAR N/A 2/15/2019    ABDOMINAL AORTIC ANEURYSM REPAIR ENDOVASCULAR, DECLOTTING RIGHT FEM TIB BYPASS GRAFT performed by Wilver Hall MD at [facility] OR    APPENDECTOMY      BREAST SURGERY Right     lumpectomy    CHOLECYSTECTOMY      30 years ago    COLONOSCOPY  2018    Dr vasquez    COLONOSCOPY N/A 2019    COLONOSCOPY DIAGNOSTIC performed by Brigida Vergara MD at [facility] Endoscopy    COLONOSCOPY N/A 2020    COLONOSCOPY CONTROL HEMORRHAGE performed by Sasha Martinez MD at [facility] Endoscopy    COLONOSCOPY Left 3/2/2021

## 2024-01-22 NOTE — PALLIATIVE CARE
Initial Evaluation        Patient:   Karen Cruz  YOB: 1935  Age:  88 y.o.  Room:  [facility]-  MRN:  [id number]   Acct: [id number]    Date of Admission:  1/20/2024  5:18 PM  Date of Service:  1/22/2024  Completed By:  Alexus Palma RN                 Reason for Palliative Care Evaluation:-               [] Code Status Discussion              [x] Goals of Care              [] Pain/Symptom Management               [] Emotional Support              [] Other:                            Advance Directives:-    [] [place] DNR Form  [x] Living Will  [x] Medical POA              Current Code Status:-     [] Full Resuscitation  [] DNR-Comfort Care-Arrest  [] DNR-Comfort Care       [x] Limited Resuscitation             [x] No CPR            [x] No shock            [x] No ET intubation/reintubation            [] No resuscitative medications            [] Other limitation:                      Goals of care evaluation:-          Goals of care discussed with:  [] Patient independently  [] Patient and Family  [] Family or Healthcare DPOA independently  [x] Unable to discuss with patient, family/DPOA not present      History:-    Patient with history of Afib on eliquis, CHF, CKD, PPM, AICD, cecal mass with bowel resection, PAD with multiple interventions.  Patient is admitted with rectal bleeding.        Plan/Follow-Up:-    Discussed case with Yudith Novoa PA-C.  Patient continues to have active bleeding and GI is to reevaluate.  At this time, Yudith Novoa PA-C requests this RN to hold off on evaluation.  Palliative care will continue to follow and assist as appropriate.  Staff may call prn if needs arise.           Electronically signed by Alexus Palma RN on 1/22/2024 at 11:02 AM           Palliative Care Office: [phone]

## 2024-01-23 ENCOUNTER — APPOINTMENT (OUTPATIENT)
Dept: GENERAL RADIOLOGY | Age: 89
DRG: 377 | End: 2024-01-23
Payer: MEDICARE

## 2024-01-23 ENCOUNTER — APPOINTMENT (OUTPATIENT)
Dept: CT IMAGING | Age: 89
DRG: 377 | End: 2024-01-23
Payer: MEDICARE

## 2024-01-23 LAB
ABO: NORMAL
ALBUMIN SERPL BCG-MCNC: 2.6 G/DL (ref 3.5–5.1)
ANION GAP SERPL CALC-SCNC: 18 MEQ/L (ref 8–16)
ANION GAP SERPL CALC-SCNC: 21 MEQ/L (ref 8–16)
ANION GAP SERPL CALC-SCNC: 8 MEQ/L (ref 8–16)
ANION GAP SERPL CALC-SCNC: 9 MEQ/L (ref 8–16)
ANTIBODY SCREEN: NORMAL
ARTERIAL PATENCY WRIST A: NORMAL
B-OH-BUTYR SERPL-MSCNC: 42.73 MG/DL (ref 0.2–2.81)
BASE EXCESS BLDA CALC-SCNC: -0.3 MMOL/L (ref -2.5–2.5)
BASE EXCESS BLDA CALC-SCNC: -15.1 MMOL/L (ref -2–3)
BASOPHILS ABSOLUTE: 0.1 THOU/MM3 (ref 0–0.1)
BASOPHILS NFR BLD AUTO: 0.3 %
BDY SITE: NORMAL
BUN SERPL-MCNC: 47 MG/DL (ref 7–22)
CA-I BLD ISE-SCNC: 1.17 MMOL/L (ref 1.12–1.32)
CALCIUM SERPL-MCNC: 7.6 MG/DL (ref 8.5–10.5)
CALCIUM SERPL-MCNC: 7.7 MG/DL (ref 8.5–10.5)
CALCIUM SERPL-MCNC: 7.9 MG/DL (ref 8.5–10.5)
CALCIUM SERPL-MCNC: 8.5 MG/DL (ref 8.5–10.5)
CHLORIDE SERPL-SCNC: 105 MEQ/L (ref 98–111)
CHLORIDE SERPL-SCNC: 105 MEQ/L (ref 98–111)
CHLORIDE SERPL-SCNC: 106 MEQ/L (ref 98–111)
CHLORIDE SERPL-SCNC: 107 MEQ/L (ref 98–111)
CO2 SERPL-SCNC: 12 MEQ/L (ref 23–33)
CO2 SERPL-SCNC: 17 MEQ/L (ref 23–33)
CO2 SERPL-SCNC: 24 MEQ/L (ref 23–33)
CO2 SERPL-SCNC: 25 MEQ/L (ref 23–33)
COLLECTED BY:: ABNORMAL
COLLECTED BY:: NORMAL
CREAT SERPL-MCNC: 2.4 MG/DL (ref 0.4–1.2)
CREAT SERPL-MCNC: 2.6 MG/DL (ref 0.4–1.2)
DEPRECATED RDW RBC AUTO: 54.2 FL (ref 35–45)
DEPRECATED RDW RBC AUTO: 57.3 FL (ref 35–45)
DEPRECATED RDW RBC AUTO: 58 FL (ref 35–45)
DEVICE: ABNORMAL
DEVICE: NORMAL
EOSINOPHIL NFR BLD AUTO: 0 %
EOSINOPHILS ABSOLUTE: 0 THOU/MM3 (ref 0–0.4)
ERYTHROCYTE [DISTWIDTH] IN BLOOD BY AUTOMATED COUNT: 15.9 % (ref 11.5–14.5)
ERYTHROCYTE [DISTWIDTH] IN BLOOD BY AUTOMATED COUNT: 16.1 % (ref 11.5–14.5)
ERYTHROCYTE [DISTWIDTH] IN BLOOD BY AUTOMATED COUNT: 16.4 % (ref 11.5–14.5)
FIO2 ON VENT O2 ANALYZER: 2 %
FIO2 ON VENT O2 ANALYZER: 2 %
GFR SERPL CREATININE-BSD FRML MDRD: 17 ML/MIN/1.73M2
GFR SERPL CREATININE-BSD FRML MDRD: 19 ML/MIN/1.73M2
GLUCOSE BLD STRIP.AUTO-MCNC: 186 MG/DL (ref 70–108)
GLUCOSE BLD STRIP.AUTO-MCNC: 275 MG/DL (ref 70–108)
GLUCOSE BLD STRIP.AUTO-MCNC: 330 MG/DL (ref 70–108)
GLUCOSE BLD-MCNC: 87 MG/DL (ref 70–108)
GLUCOSE SERPL-MCNC: 120 MG/DL (ref 70–108)
GLUCOSE SERPL-MCNC: 131 MG/DL (ref 70–108)
GLUCOSE SERPL-MCNC: 255 MG/DL (ref 70–108)
GLUCOSE SERPL-MCNC: 272 MG/DL (ref 70–108)
HCO3 BLDA-SCNC: 13 MMOL/L (ref 23–28)
HCO3 BLDA-SCNC: 25 MMOL/L (ref 23–28)
HCT VFR BLD AUTO: 21.9 % (ref 37–47)
HCT VFR BLD AUTO: 21.9 % (ref 37–47)
HCT VFR BLD AUTO: 22.3 % (ref 37–47)
HCT VFR BLD AUTO: 31.8 % (ref 37–47)
HCT VFR BLD AUTO: 37.4 % (ref 37–47)
HGB BLD-MCNC: 12 GM/DL (ref 12–16)
HGB BLD-MCNC: 7.2 GM/DL (ref 12–16)
HGB BLD-MCNC: 7.2 GM/DL (ref 12–16)
HGB BLD-MCNC: 7.4 GM/DL (ref 12–16)
HGB BLD-MCNC: 9.9 GM/DL (ref 12–16)
IMM GRANULOCYTES # BLD AUTO: 0.23 THOU/MM3 (ref 0–0.07)
IMM GRANULOCYTES NFR BLD AUTO: 0.9 %
LACTATE SERPL-SCNC: 1.8 MMOL/L (ref 0.5–2)
LACTATE SERPL-SCNC: 2 MMOL/L (ref 0.5–2)
LYMPHOCYTES ABSOLUTE: 0.5 THOU/MM3 (ref 1–4.8)
LYMPHOCYTES NFR BLD AUTO: 1.8 %
MAGNESIUM SERPL-MCNC: 2 MG/DL (ref 1.6–2.4)
MAGNESIUM SERPL-MCNC: 2.2 MG/DL (ref 1.6–2.4)
MCH RBC QN AUTO: 31.2 PG (ref 26–33)
MCH RBC QN AUTO: 31.3 PG (ref 26–33)
MCH RBC QN AUTO: 31.6 PG (ref 26–33)
MCHC RBC AUTO-ENTMCNC: 31.1 GM/DL (ref 32.2–35.5)
MCHC RBC AUTO-ENTMCNC: 32.1 GM/DL (ref 32.2–35.5)
MCHC RBC AUTO-ENTMCNC: 32.9 GM/DL (ref 32.2–35.5)
MCV RBC AUTO: 100.3 FL (ref 81–99)
MCV RBC AUTO: 95.2 FL (ref 81–99)
MCV RBC AUTO: 98.4 FL (ref 81–99)
MONOCYTES ABSOLUTE: 1.7 THOU/MM3 (ref 0.4–1.3)
MONOCYTES NFR BLD AUTO: 6.3 %
NEUTROPHILS NFR BLD AUTO: 90.7 %
NRBC BLD AUTO-RTO: 0 /100 WBC
OSMOLALITY SERPL: 307 MOSMOL/KG (ref 275–295)
PATHOLOGIST REVIEW: ABNORMAL
PCO2 BLDA: 43 MMHG (ref 35–45)
PCO2 TEMP ADJ BLDMV: 35 MMHG (ref 41–51)
PH BLDA: 7.37 [PH] (ref 7.35–7.45)
PH BLDMV: 7.16 [PH] (ref 7.31–7.41)
PHOSPHATE SERPL-MCNC: 3.5 MG/DL (ref 2.4–4.7)
PHOSPHATE SERPL-MCNC: 5.6 MG/DL (ref 2.4–4.7)
PLATELET # BLD AUTO: 138 THOU/MM3 (ref 130–400)
PLATELET # BLD AUTO: 230 THOU/MM3 (ref 130–400)
PLATELET # BLD AUTO: 268 THOU/MM3 (ref 130–400)
PLATELET BLD QL SMEAR: ADEQUATE
PMV BLD AUTO: 10.3 FL (ref 9.4–12.4)
PMV BLD AUTO: 10.3 FL (ref 9.4–12.4)
PMV BLD AUTO: 9.6 FL (ref 9.4–12.4)
PO2 BLDA: 79 MMHG (ref 71–104)
PO2 BLDMV: 63 MMHG (ref 25–40)
POIKILOCYTES: ABNORMAL
POTASSIUM SERPL-SCNC: 4.1 MEQ/L (ref 3.5–5.2)
POTASSIUM SERPL-SCNC: 4.5 MEQ/L (ref 3.5–5.2)
POTASSIUM SERPL-SCNC: 5 MEQ/L (ref 3.5–5.2)
POTASSIUM SERPL-SCNC: 5.2 MEQ/L (ref 3.5–5.2)
RBC # BLD AUTO: 2.3 MILL/MM3 (ref 4.2–5.4)
RBC # BLD AUTO: 3.17 MILL/MM3 (ref 4.2–5.4)
RBC # BLD AUTO: 3.8 MILL/MM3 (ref 4.2–5.4)
RH FACTOR: NORMAL
SAO2 % BLDA: 95 %
SAO2 % BLDMV: 85 %
SCAN OF BLOOD SMEAR: NORMAL
SCAN OF BLOOD SMEAR: NORMAL
SEGMENTED NEUTROPHILS ABSOLUTE COUNT: 24.4 THOU/MM3 (ref 1.8–7.7)
SITE: ABNORMAL
SODIUM SERPL-SCNC: 138 MEQ/L (ref 135–145)
SODIUM SERPL-SCNC: 139 MEQ/L (ref 135–145)
SODIUM SERPL-SCNC: 140 MEQ/L (ref 135–145)
SODIUM SERPL-SCNC: 140 MEQ/L (ref 135–145)
WBC # BLD AUTO: 12 THOU/MM3 (ref 4.8–10.8)
WBC # BLD AUTO: 26.9 THOU/MM3 (ref 4.8–10.8)
WBC # BLD AUTO: 30 THOU/MM3 (ref 4.8–10.8)

## 2024-01-23 PROCEDURE — 6370000000 HC RX 637 (ALT 250 FOR IP)

## 2024-01-23 PROCEDURE — 2580000003 HC RX 258: Performed by: INTERNAL MEDICINE

## 2024-01-23 PROCEDURE — 83605 ASSAY OF LACTIC ACID: CPT

## 2024-01-23 PROCEDURE — 6360000002 HC RX W HCPCS

## 2024-01-23 PROCEDURE — 93005 ELECTROCARDIOGRAM TRACING: CPT

## 2024-01-23 PROCEDURE — 37799 UNLISTED PX VASCULAR SURGERY: CPT

## 2024-01-23 PROCEDURE — 93010 ELECTROCARDIOGRAM REPORT: CPT | Performed by: INTERNAL MEDICINE

## 2024-01-23 PROCEDURE — 99291 CRITICAL CARE FIRST HOUR: CPT | Performed by: INTERNAL MEDICINE

## 2024-01-23 PROCEDURE — 82040 ASSAY OF SERUM ALBUMIN: CPT

## 2024-01-23 PROCEDURE — 94761 N-INVAS EAR/PLS OXIMETRY MLT: CPT

## 2024-01-23 PROCEDURE — 86923 COMPATIBILITY TEST ELECTRIC: CPT

## 2024-01-23 PROCEDURE — 85014 HEMATOCRIT: CPT

## 2024-01-23 PROCEDURE — P9041 ALBUMIN (HUMAN),5%, 50ML: HCPCS | Performed by: NURSE PRACTITIONER

## 2024-01-23 PROCEDURE — 4A133B1 MONITORING OF ARTERIAL PRESSURE, PERIPHERAL, PERCUTANEOUS APPROACH: ICD-10-PCS

## 2024-01-23 PROCEDURE — 6360000002 HC RX W HCPCS: Performed by: NURSE PRACTITIONER

## 2024-01-23 PROCEDURE — 36415 COLL VENOUS BLD VENIPUNCTURE: CPT

## 2024-01-23 PROCEDURE — 82947 ASSAY GLUCOSE BLOOD QUANT: CPT

## 2024-01-23 PROCEDURE — 86850 RBC ANTIBODY SCREEN: CPT

## 2024-01-23 PROCEDURE — 82330 ASSAY OF CALCIUM: CPT

## 2024-01-23 PROCEDURE — P9016 RBC LEUKOCYTES REDUCED: HCPCS

## 2024-01-23 PROCEDURE — C9113 INJ PANTOPRAZOLE SODIUM, VIA: HCPCS | Performed by: INTERNAL MEDICINE

## 2024-01-23 PROCEDURE — 85018 HEMOGLOBIN: CPT

## 2024-01-23 PROCEDURE — 71045 X-RAY EXAM CHEST 1 VIEW: CPT

## 2024-01-23 PROCEDURE — 85027 COMPLETE CBC AUTOMATED: CPT

## 2024-01-23 PROCEDURE — 36430 TRANSFUSION BLD/BLD COMPNT: CPT

## 2024-01-23 PROCEDURE — 6360000002 HC RX W HCPCS: Performed by: INTERNAL MEDICINE

## 2024-01-23 PROCEDURE — 2500000003 HC RX 250 WO HCPCS: Performed by: INTERNAL MEDICINE

## 2024-01-23 PROCEDURE — 87040 BLOOD CULTURE FOR BACTERIA: CPT

## 2024-01-23 PROCEDURE — 82948 REAGENT STRIP/BLOOD GLUCOSE: CPT

## 2024-01-23 PROCEDURE — 2500000003 HC RX 250 WO HCPCS

## 2024-01-23 PROCEDURE — 2580000003 HC RX 258

## 2024-01-23 PROCEDURE — 4A133J1 MONITORING OF ARTERIAL PULSE, PERIPHERAL, PERCUTANEOUS APPROACH: ICD-10-PCS

## 2024-01-23 PROCEDURE — 73020 X-RAY EXAM OF SHOULDER: CPT

## 2024-01-23 PROCEDURE — 86901 BLOOD TYPING SEROLOGIC RH(D): CPT

## 2024-01-23 PROCEDURE — 74176 CT ABD & PELVIS W/O CONTRAST: CPT

## 2024-01-23 PROCEDURE — 82010 KETONE BODYS QUAN: CPT

## 2024-01-23 PROCEDURE — 2700000000 HC OXYGEN THERAPY PER DAY

## 2024-01-23 PROCEDURE — 2000000000 HC ICU R&B

## 2024-01-23 PROCEDURE — 03HY32Z INSERTION OF MONITORING DEVICE INTO UPPER ARTERY, PERCUTANEOUS APPROACH: ICD-10-PCS

## 2024-01-23 PROCEDURE — 3E033XZ INTRODUCTION OF VASOPRESSOR INTO PERIPHERAL VEIN, PERCUTANEOUS APPROACH: ICD-10-PCS

## 2024-01-23 PROCEDURE — 83735 ASSAY OF MAGNESIUM: CPT

## 2024-01-23 PROCEDURE — 82803 BLOOD GASES ANY COMBINATION: CPT

## 2024-01-23 PROCEDURE — 02HV33Z INSERTION OF INFUSION DEVICE INTO SUPERIOR VENA CAVA, PERCUTANEOUS APPROACH: ICD-10-PCS | Performed by: HOSPITALIST

## 2024-01-23 PROCEDURE — 80048 BASIC METABOLIC PNL TOTAL CA: CPT

## 2024-01-23 PROCEDURE — 84100 ASSAY OF PHOSPHORUS: CPT

## 2024-01-23 PROCEDURE — 36556 INSERT NON-TUNNEL CV CATH: CPT

## 2024-01-23 PROCEDURE — 86900 BLOOD TYPING SEROLOGIC ABO: CPT

## 2024-01-23 PROCEDURE — 73030 X-RAY EXAM OF SHOULDER: CPT

## 2024-01-23 RX ORDER — INSULIN LISPRO 100 [IU]/ML
0-8 INJECTION, SOLUTION INTRAVENOUS; SUBCUTANEOUS EVERY 6 HOURS
Status: DISCONTINUED | OUTPATIENT
Start: 2024-01-23 | End: 2024-01-24

## 2024-01-23 RX ORDER — SODIUM CHLORIDE 9 MG/ML
INJECTION, SOLUTION INTRAVENOUS PRN
Status: DISCONTINUED | OUTPATIENT
Start: 2024-01-23 | End: 2024-01-27 | Stop reason: SDUPTHER

## 2024-01-23 RX ORDER — SODIUM CHLORIDE 9 MG/ML
INJECTION, SOLUTION INTRAVENOUS CONTINUOUS
Status: DISCONTINUED | OUTPATIENT
Start: 2024-01-23 | End: 2024-01-23

## 2024-01-23 RX ORDER — ALBUMIN (HUMAN) 12.5 G/50ML
50 SOLUTION INTRAVENOUS ONCE
Status: DISCONTINUED | OUTPATIENT
Start: 2024-01-23 | End: 2024-01-23

## 2024-01-23 RX ORDER — INSULIN LISPRO 100 [IU]/ML
4 INJECTION, SOLUTION INTRAVENOUS; SUBCUTANEOUS ONCE
Status: COMPLETED | OUTPATIENT
Start: 2024-01-23 | End: 2024-01-23

## 2024-01-23 RX ORDER — FENTANYL CITRATE 50 UG/ML
100 INJECTION, SOLUTION INTRAMUSCULAR; INTRAVENOUS ONCE
Status: COMPLETED | OUTPATIENT
Start: 2024-01-23 | End: 2024-01-23

## 2024-01-23 RX ORDER — FENTANYL CITRATE 50 UG/ML
INJECTION, SOLUTION INTRAMUSCULAR; INTRAVENOUS
Status: COMPLETED
Start: 2024-01-23 | End: 2024-01-23

## 2024-01-23 RX ORDER — FENTANYL CITRATE 50 UG/ML
25 INJECTION, SOLUTION INTRAMUSCULAR; INTRAVENOUS
Status: DISCONTINUED | OUTPATIENT
Start: 2024-01-23 | End: 2024-01-25

## 2024-01-23 RX ORDER — LIDOCAINE 4 G/G
1 PATCH TOPICAL DAILY
Status: DISCONTINUED | OUTPATIENT
Start: 2024-01-23 | End: 2024-01-30 | Stop reason: HOSPADM

## 2024-01-23 RX ORDER — ALBUMIN, HUMAN INJ 5% 5 %
25 SOLUTION INTRAVENOUS ONCE
Status: COMPLETED | OUTPATIENT
Start: 2024-01-23 | End: 2024-01-23

## 2024-01-23 RX ADMIN — PANTOPRAZOLE SODIUM 8 MG/HR: 40 INJECTION, POWDER, FOR SOLUTION INTRAVENOUS at 05:46

## 2024-01-23 RX ADMIN — NITROGLYCERIN 0.5 INCH: 20 OINTMENT TOPICAL at 18:00

## 2024-01-23 RX ADMIN — SODIUM BICARBONATE 50 MEQ: 84 INJECTION, SOLUTION INTRAVENOUS at 05:24

## 2024-01-23 RX ADMIN — PIPERACILLIN AND TAZOBACTAM 4500 MG: 4; .5 INJECTION, POWDER, FOR SOLUTION INTRAVENOUS at 10:29

## 2024-01-23 RX ADMIN — PRAVASTATIN SODIUM 40 MG: 40 TABLET ORAL at 09:53

## 2024-01-23 RX ADMIN — Medication 18 MCG/MIN: at 07:28

## 2024-01-23 RX ADMIN — PIPERACILLIN AND TAZOBACTAM 3375 MG: 3; .375 INJECTION, POWDER, FOR SOLUTION INTRAVENOUS at 21:52

## 2024-01-23 RX ADMIN — FENTANYL CITRATE 100 MCG: 50 INJECTION INTRAMUSCULAR; INTRAVENOUS at 03:18

## 2024-01-23 RX ADMIN — ALBUMIN (HUMAN) 25 G: 12.5 INJECTION, SOLUTION INTRAVENOUS at 20:34

## 2024-01-23 RX ADMIN — OCTREOTIDE ACETATE 50 MCG/HR: 500 INJECTION, SOLUTION INTRAVENOUS; SUBCUTANEOUS at 06:28

## 2024-01-23 RX ADMIN — SODIUM CHLORIDE 3000 MG: 900 INJECTION INTRAVENOUS at 04:05

## 2024-01-23 RX ADMIN — TIMOLOL MALEATE 1 DROP: 5 SOLUTION OPHTHALMIC at 09:52

## 2024-01-23 RX ADMIN — INSULIN LISPRO 4 UNITS: 100 INJECTION, SOLUTION INTRAVENOUS; SUBCUTANEOUS at 19:08

## 2024-01-23 RX ADMIN — FENTANYL CITRATE 25 MCG: 50 INJECTION INTRAMUSCULAR; INTRAVENOUS at 21:55

## 2024-01-23 RX ADMIN — INSULIN LISPRO 3 UNITS: 100 INJECTION, SOLUTION INTRAVENOUS; SUBCUTANEOUS at 17:58

## 2024-01-23 RX ADMIN — MAGNESIUM SULFATE HEPTAHYDRATE 2000 MG: 40 INJECTION, SOLUTION INTRAVENOUS at 04:29

## 2024-01-23 RX ADMIN — SODIUM CHLORIDE, PRESERVATIVE FREE 10 ML: 5 INJECTION INTRAVENOUS at 19:47

## 2024-01-23 RX ADMIN — NITROGLYCERIN 0.5 INCH: 20 OINTMENT TOPICAL at 10:17

## 2024-01-23 RX ADMIN — VASOPRESSIN 0.01 UNITS/MIN: 20 INJECTION INTRAVENOUS at 03:57

## 2024-01-23 RX ADMIN — SODIUM BICARBONATE: 84 INJECTION, SOLUTION INTRAVENOUS at 04:06

## 2024-01-23 RX ADMIN — INSULIN LISPRO 3 UNITS: 100 INJECTION, SOLUTION INTRAVENOUS; SUBCUTANEOUS at 14:40

## 2024-01-23 RX ADMIN — PANTOPRAZOLE SODIUM 8 MG/HR: 40 INJECTION, POWDER, FOR SOLUTION INTRAVENOUS at 15:17

## 2024-01-23 RX ADMIN — SODIUM CHLORIDE, PRESERVATIVE FREE 10 ML: 5 INJECTION INTRAVENOUS at 09:53

## 2024-01-23 RX ADMIN — FENTANYL CITRATE 100 MCG: 50 INJECTION, SOLUTION INTRAMUSCULAR; INTRAVENOUS at 03:18

## 2024-01-23 RX ADMIN — NITROGLYCERIN 0.5 INCH: 20 OINTMENT TOPICAL at 23:51

## 2024-01-23 ASSESSMENT — PAIN DESCRIPTION - ORIENTATION: ORIENTATION: LEFT

## 2024-01-23 ASSESSMENT — PAIN DESCRIPTION - LOCATION: LOCATION: SHOULDER

## 2024-01-23 ASSESSMENT — PAIN SCALES - GENERAL: PAINLEVEL_OUTOF10: 9

## 2024-01-23 NOTE — FLOWSHEET NOTE
0800 Mrs Cruz rests in the bed without apparent distress. She has complaints of left shoulder pain and asking to have breakfast. She has a 3 lumen CVC right SC site clear. She has a razo patent to gravity. She has an art line left radial site clear. She requires Levo and vaso to maintain her BP at ordered parameters. She was taken to CT scan for abd.    1000 GI in to see OK to start clear liquid diet. She tolerates this well.

## 2024-01-23 NOTE — PROCEDURES
Karen Cruz is a 88 y.o. female patient.  1. Gastrointestinal hemorrhage, unspecified gastrointestinal hemorrhage type      Past Medical History:   Diagnosis Date    A-fib (HCC)     AAA (abdominal aortic aneurysm) (HCC)     Achalasia     Anemia     Arthritis     Blood circulation, collateral     BPPV (benign paroxysmal positional vertigo) 6/6/16    CHF (congestive heart failure) (HCC)     Chronic kidney disease     sees Dr Muñoz    Colon cancer (HCC)     Gastrointestinal hemorrhage     GERD (gastroesophageal reflux disease)     ? esophageal stricture    Hiatal hernia     History of blood transfusion     HTN (hypertension)     Hx of blood clots 2018    R leg-sees Magaly    Hyperlipidemia     Medtronic dual ICD  8/26/2020    Neuromuscular disorder (HCC)     Obesity     Osteopenia     Osteoporosis     PAC (premature atrial contraction)     on betablocker    Paralysis (HCC)     baby    Pedal edema     denied any hx of hypertension    Pneumonia     PVC (premature ventricular contraction)     sees Dr Vergara    PVD (peripheral vascular disease) (HCC)     Small bowel obstruction (HCC)     Tinnitus     UTI (urinary tract infection)      Blood pressure (!) 103/57, pulse (!) 110, temperature 98.6 °F (37 °C), temperature source Bladder, resp. rate 25, height 1.651 m (5' 5\"), weight 74 kg (163 lb 2.3 oz), SpO2 95 %, not currently breastfeeding.    Insert Arterial Line    Date/Time: 1/23/2024 3:09 AM    Performed by: Ernesto Vergara MD  Authorized by: Ernesto Vergara MD  Consent: Verbal consent obtained. Written consent obtained.  Consent given by: patient  Patient understanding: patient states understanding of the procedure being performed  Patient consent: the patient's understanding of the procedure matches consent given  Procedure consent: procedure consent matches procedure scheduled  Relevant documents: relevant documents present and verified  Test results: test results available and properly labeled  Site marked:

## 2024-01-23 NOTE — PROCEDURES
CENTRAL LINE PROCEDURE NOTES  [facility]  Department of Critical Care Medicine    PATIENT: Karen Cruz, 1935, 88 y.o., female    MRN: [id number]    DATE: January 23, 2024    PRE-OP DIAGNOSIS: hemodynamic instability, poor venous access       POST-OP DIAGNOSIS: Same    PROCEDURE: Placement of right subclavian vein central venous catheter.   DISSECTION INSTRUMENT: Fingers    Nurse Practitioner: ROMAN Calle CNP    ANESTHESIA: Local 1% Xylocaine, ~5mL in total    ESTIMATED BLOOD LOSS:  Less then 5 ml           COMPLICATIONS: none           CONDITION / DISPOSITION:  Stable    CONSENT: The patient was counseled regarding the procedure, its indications, risks, potential complications and alternatives, and any questions were answered. Consent was obtained to proceed.     INDICATIONS: Karen Cruz is a 88 y.o. female presenting with hemodynamic instability, poor venous access, and need for central venous access. The risks, benefits, potential complications and possible alternatives of the procedure were discussed in detail, including complications of but not limited to infection, bleeding, pneumothorax, need for chest tube placement, or need for different venous access. All questions were answered. The patient wished to proceed and consent was documented in the medical record.    PROCEDURE IN DETAIL:   After informed consent was obtained, a pre-procedural time-out was conducted, and the patient was placed in Trendelenburg position. The right  upper chest  was prepped and draped in the usual sterile fashion with chlorhexidine. The right subclavian vein was trace by fingers.  A small amount of 1% lidocaine without epinephrine was infiltrated into the subcutaneous tissues overlying the vein. A finder needle was then used to access the vein. Dark, venous, non pulsatile blood flow was observed.  A guide wire was passed without difficulty. A skin nick was made.  The finder needle

## 2024-01-24 LAB
ALBUMIN SERPL BCG-MCNC: 3 G/DL (ref 3.5–5.1)
ALP SERPL-CCNC: 74 U/L (ref 38–126)
ALT SERPL W/O P-5'-P-CCNC: 7 U/L (ref 11–66)
ANION GAP SERPL CALC-SCNC: 9 MEQ/L (ref 8–16)
AST SERPL-CCNC: 14 U/L (ref 5–40)
BACTERIA SPEC AEROBE CULT: NORMAL
BACTERIA UR CULT: ABNORMAL
BASE EXCESS BLDA CALC-SCNC: 0.6 MMOL/L (ref -2–3)
BILIRUB SERPL-MCNC: 0.6 MG/DL (ref 0.3–1.2)
BUN SERPL-MCNC: 44 MG/DL (ref 7–22)
CALCIUM SERPL-MCNC: 7.9 MG/DL (ref 8.5–10.5)
CHLORIDE SERPL-SCNC: 105 MEQ/L (ref 98–111)
CO2 SERPL-SCNC: 24 MEQ/L (ref 23–33)
COLLECTED BY:: NORMAL
CREAT SERPL-MCNC: 2.1 MG/DL (ref 0.4–1.2)
DEPRECATED RDW RBC AUTO: 55.3 FL (ref 35–45)
DEVICE: NORMAL
EKG ATRIAL RATE: 98 BPM
EKG P AXIS: 22 DEGREES
EKG P-R INTERVAL: 182 MS
EKG Q-T INTERVAL: 362 MS
EKG QRS DURATION: 78 MS
EKG QTC CALCULATION (BAZETT): 462 MS
EKG R AXIS: -66 DEGREES
EKG T AXIS: 85 DEGREES
EKG VENTRICULAR RATE: 98 BPM
ERYTHROCYTE [DISTWIDTH] IN BLOOD BY AUTOMATED COUNT: 16.3 % (ref 11.5–14.5)
FIO2 ON VENT O2 ANALYZER: 2 %
GFR SERPL CREATININE-BSD FRML MDRD: 22 ML/MIN/1.73M2
GLUCOSE BLD STRIP.AUTO-MCNC: 132 MG/DL (ref 70–108)
GLUCOSE BLD STRIP.AUTO-MCNC: 137 MG/DL (ref 70–108)
GLUCOSE BLD STRIP.AUTO-MCNC: 154 MG/DL (ref 70–108)
GLUCOSE SERPL-MCNC: 129 MG/DL (ref 70–108)
HCO3 BLDA-SCNC: 27 MMOL/L (ref 23–28)
HCT VFR BLD AUTO: 23.8 % (ref 37–47)
HCT VFR BLD AUTO: 25.2 % (ref 37–47)
HCT VFR BLD AUTO: 26.6 % (ref 37–47)
HGB BLD-MCNC: 7.9 GM/DL (ref 12–16)
HGB BLD-MCNC: 8.1 GM/DL (ref 12–16)
HGB BLD-MCNC: 8.7 GM/DL (ref 12–16)
INR PPP: 1.27 (ref 0.85–1.13)
MAGNESIUM SERPL-MCNC: 2.1 MG/DL (ref 1.6–2.4)
MCH RBC QN AUTO: 30.6 PG (ref 26–33)
MCHC RBC AUTO-ENTMCNC: 32.1 GM/DL (ref 32.2–35.5)
MCV RBC AUTO: 95.1 FL (ref 81–99)
ORGANISM: ABNORMAL
PCO2 TEMP ADJ BLDMV: 49 MMHG (ref 41–51)
PH BLDMV: 7.34 [PH] (ref 7.31–7.41)
PHOSPHATE SERPL-MCNC: 3.3 MG/DL (ref 2.4–4.7)
PLATELET # BLD AUTO: 102 THOU/MM3 (ref 130–400)
PMV BLD AUTO: 10.1 FL (ref 9.4–12.4)
PO2 BLDMV: 38 MMHG (ref 25–40)
POTASSIUM SERPL-SCNC: 4.2 MEQ/L (ref 3.5–5.2)
PROT SERPL-MCNC: 4.3 G/DL (ref 6.1–8)
RBC # BLD AUTO: 2.65 MILL/MM3 (ref 4.2–5.4)
SAO2 % BLDMV: 68 %
SITE: NORMAL
SODIUM SERPL-SCNC: 138 MEQ/L (ref 135–145)
WBC # BLD AUTO: 9.3 THOU/MM3 (ref 4.8–10.8)

## 2024-01-24 PROCEDURE — 2500000003 HC RX 250 WO HCPCS: Performed by: INTERNAL MEDICINE

## 2024-01-24 PROCEDURE — 6360000002 HC RX W HCPCS: Performed by: INTERNAL MEDICINE

## 2024-01-24 PROCEDURE — 82803 BLOOD GASES ANY COMBINATION: CPT

## 2024-01-24 PROCEDURE — 2580000003 HC RX 258

## 2024-01-24 PROCEDURE — 6370000000 HC RX 637 (ALT 250 FOR IP)

## 2024-01-24 PROCEDURE — C9113 INJ PANTOPRAZOLE SODIUM, VIA: HCPCS | Performed by: INTERNAL MEDICINE

## 2024-01-24 PROCEDURE — 6360000002 HC RX W HCPCS

## 2024-01-24 PROCEDURE — 2580000003 HC RX 258: Performed by: INTERNAL MEDICINE

## 2024-01-24 PROCEDURE — 36415 COLL VENOUS BLD VENIPUNCTURE: CPT

## 2024-01-24 PROCEDURE — 82948 REAGENT STRIP/BLOOD GLUCOSE: CPT

## 2024-01-24 PROCEDURE — C9113 INJ PANTOPRAZOLE SODIUM, VIA: HCPCS

## 2024-01-24 PROCEDURE — 85027 COMPLETE CBC AUTOMATED: CPT

## 2024-01-24 PROCEDURE — 97163 PT EVAL HIGH COMPLEX 45 MIN: CPT

## 2024-01-24 PROCEDURE — 85014 HEMATOCRIT: CPT

## 2024-01-24 PROCEDURE — 97110 THERAPEUTIC EXERCISES: CPT

## 2024-01-24 PROCEDURE — 99291 CRITICAL CARE FIRST HOUR: CPT | Performed by: INTERNAL MEDICINE

## 2024-01-24 PROCEDURE — 84100 ASSAY OF PHOSPHORUS: CPT

## 2024-01-24 PROCEDURE — 97166 OT EVAL MOD COMPLEX 45 MIN: CPT

## 2024-01-24 PROCEDURE — 83735 ASSAY OF MAGNESIUM: CPT

## 2024-01-24 PROCEDURE — 2000000000 HC ICU R&B

## 2024-01-24 PROCEDURE — P9041 ALBUMIN (HUMAN),5%, 50ML: HCPCS | Performed by: NURSE PRACTITIONER

## 2024-01-24 PROCEDURE — 80053 COMPREHEN METABOLIC PANEL: CPT

## 2024-01-24 PROCEDURE — 6360000002 HC RX W HCPCS: Performed by: NURSE PRACTITIONER

## 2024-01-24 PROCEDURE — 97535 SELF CARE MNGMENT TRAINING: CPT

## 2024-01-24 PROCEDURE — 85610 PROTHROMBIN TIME: CPT

## 2024-01-24 PROCEDURE — 85018 HEMOGLOBIN: CPT

## 2024-01-24 RX ORDER — ALBUMIN, HUMAN INJ 5% 5 %
25 SOLUTION INTRAVENOUS ONCE
Status: COMPLETED | OUTPATIENT
Start: 2024-01-24 | End: 2024-01-24

## 2024-01-24 RX ORDER — INSULIN LISPRO 100 [IU]/ML
0-8 INJECTION, SOLUTION INTRAVENOUS; SUBCUTANEOUS
Status: DISCONTINUED | OUTPATIENT
Start: 2024-01-24 | End: 2024-01-30 | Stop reason: HOSPADM

## 2024-01-24 RX ADMIN — TIMOLOL MALEATE 1 DROP: 5 SOLUTION OPHTHALMIC at 09:58

## 2024-01-24 RX ADMIN — OCTREOTIDE ACETATE 50 MCG/HR: 500 INJECTION, SOLUTION INTRAVENOUS; SUBCUTANEOUS at 22:26

## 2024-01-24 RX ADMIN — PIPERACILLIN AND TAZOBACTAM 3375 MG: 3; .375 INJECTION, POWDER, FOR SOLUTION INTRAVENOUS at 20:51

## 2024-01-24 RX ADMIN — SODIUM CHLORIDE, PRESERVATIVE FREE 10 ML: 5 INJECTION INTRAVENOUS at 09:58

## 2024-01-24 RX ADMIN — PRAVASTATIN SODIUM 40 MG: 40 TABLET ORAL at 09:58

## 2024-01-24 RX ADMIN — PANTOPRAZOLE SODIUM 8 MG/HR: 40 INJECTION, POWDER, FOR SOLUTION INTRAVENOUS at 01:19

## 2024-01-24 RX ADMIN — PIPERACILLIN AND TAZOBACTAM 3375 MG: 3; .375 INJECTION, POWDER, FOR SOLUTION INTRAVENOUS at 12:18

## 2024-01-24 RX ADMIN — ALBUMIN (HUMAN) 25 G: 12.5 INJECTION, SOLUTION INTRAVENOUS at 20:38

## 2024-01-24 RX ADMIN — SODIUM CHLORIDE, PRESERVATIVE FREE 10 ML: 5 INJECTION INTRAVENOUS at 20:39

## 2024-01-24 RX ADMIN — Medication 6 MCG/MIN: at 17:36

## 2024-01-24 RX ADMIN — PANTOPRAZOLE SODIUM 8 MG/HR: 40 INJECTION, POWDER, FOR SOLUTION INTRAVENOUS at 12:59

## 2024-01-24 RX ADMIN — PANTOPRAZOLE SODIUM 8 MG/HR: 40 INJECTION, POWDER, FOR SOLUTION INTRAVENOUS at 23:00

## 2024-01-24 RX ADMIN — FENTANYL CITRATE 25 MCG: 50 INJECTION INTRAMUSCULAR; INTRAVENOUS at 21:42

## 2024-01-24 RX ADMIN — NITROGLYCERIN 0.5 INCH: 20 OINTMENT TOPICAL at 17:46

## 2024-01-24 RX ADMIN — FENTANYL CITRATE 25 MCG: 50 INJECTION INTRAMUSCULAR; INTRAVENOUS at 04:06

## 2024-01-24 RX ADMIN — NITROGLYCERIN 0.5 INCH: 20 OINTMENT TOPICAL at 13:00

## 2024-01-24 RX ADMIN — VASOPRESSIN 0.01 UNITS/MIN: 20 INJECTION INTRAVENOUS at 09:22

## 2024-01-24 RX ADMIN — NITROGLYCERIN 0.5 INCH: 20 OINTMENT TOPICAL at 06:28

## 2024-01-24 ASSESSMENT — PAIN DESCRIPTION - LOCATION
LOCATION: GENERALIZED
LOCATION: SHOULDER

## 2024-01-24 ASSESSMENT — PAIN SCALES - GENERAL
PAINLEVEL_OUTOF10: 3
PAINLEVEL_OUTOF10: 9
PAINLEVEL_OUTOF10: 7

## 2024-01-24 ASSESSMENT — PAIN DESCRIPTION - DESCRIPTORS: DESCRIPTORS: ACHING;BURNING;CRAMPING

## 2024-01-24 ASSESSMENT — PAIN DESCRIPTION - ORIENTATION
ORIENTATION: RIGHT;LEFT;LOWER
ORIENTATION: LEFT

## 2024-01-24 ASSESSMENT — PAIN - FUNCTIONAL ASSESSMENT: PAIN_FUNCTIONAL_ASSESSMENT: PREVENTS OR INTERFERES SOME ACTIVE ACTIVITIES AND ADLS

## 2024-01-25 ENCOUNTER — TELEPHONE (OUTPATIENT)
Dept: CARDIOLOGY CLINIC | Age: 89
End: 2024-01-25

## 2024-01-25 ENCOUNTER — PROCEDURE VISIT (OUTPATIENT)
Dept: CARDIOLOGY CLINIC | Age: 89
End: 2024-01-25

## 2024-01-25 DIAGNOSIS — Z95.810 ICD (IMPLANTABLE CARDIOVERTER-DEFIBRILLATOR) IN PLACE: Primary | ICD-10-CM

## 2024-01-25 LAB
ANION GAP SERPL CALC-SCNC: 6 MEQ/L (ref 8–16)
BASOPHILS ABSOLUTE: 0 THOU/MM3 (ref 0–0.1)
BASOPHILS NFR BLD AUTO: 0.3 %
BUN SERPL-MCNC: 31 MG/DL (ref 7–22)
CA-I BLD ISE-SCNC: 1.22 MMOL/L (ref 1.12–1.32)
CALCIUM SERPL-MCNC: 8.2 MG/DL (ref 8.5–10.5)
CHLORIDE SERPL-SCNC: 104 MEQ/L (ref 98–111)
CO2 SERPL-SCNC: 30 MEQ/L (ref 23–33)
CREAT SERPL-MCNC: 1.6 MG/DL (ref 0.4–1.2)
DEPRECATED RDW RBC AUTO: 56.1 FL (ref 35–45)
EOSINOPHIL NFR BLD AUTO: 3.4 %
EOSINOPHILS ABSOLUTE: 0.2 THOU/MM3 (ref 0–0.4)
ERYTHROCYTE [DISTWIDTH] IN BLOOD BY AUTOMATED COUNT: 16.3 % (ref 11.5–14.5)
GFR SERPL CREATININE-BSD FRML MDRD: 31 ML/MIN/1.73M2
GLUCOSE BLD STRIP.AUTO-MCNC: 106 MG/DL (ref 70–108)
GLUCOSE BLD STRIP.AUTO-MCNC: 123 MG/DL (ref 70–108)
GLUCOSE BLD STRIP.AUTO-MCNC: 96 MG/DL (ref 70–108)
GLUCOSE SERPL-MCNC: 93 MG/DL (ref 70–108)
HCT VFR BLD AUTO: 24.8 % (ref 37–47)
HCT VFR BLD AUTO: 25.4 % (ref 37–47)
HCT VFR BLD AUTO: 27.8 % (ref 37–47)
HGB BLD-MCNC: 8 GM/DL (ref 12–16)
HGB BLD-MCNC: 8.1 GM/DL (ref 12–16)
HGB BLD-MCNC: 8.8 GM/DL (ref 12–16)
IMM GRANULOCYTES # BLD AUTO: 0.06 THOU/MM3 (ref 0–0.07)
IMM GRANULOCYTES NFR BLD AUTO: 0.9 %
LYMPHOCYTES ABSOLUTE: 0.5 THOU/MM3 (ref 1–4.8)
LYMPHOCYTES NFR BLD AUTO: 7.8 %
MAGNESIUM SERPL-MCNC: 2 MG/DL (ref 1.6–2.4)
MCH RBC QN AUTO: 30.9 PG (ref 26–33)
MCHC RBC AUTO-ENTMCNC: 32.3 GM/DL (ref 32.2–35.5)
MCV RBC AUTO: 95.8 FL (ref 81–99)
MONOCYTES ABSOLUTE: 0.5 THOU/MM3 (ref 0.4–1.3)
MONOCYTES NFR BLD AUTO: 7.4 %
NEUTROPHILS NFR BLD AUTO: 80.2 %
NRBC BLD AUTO-RTO: 0 /100 WBC
PHOSPHATE SERPL-MCNC: 2.1 MG/DL (ref 2.4–4.7)
PLATELET # BLD AUTO: 94 THOU/MM3 (ref 130–400)
PMV BLD AUTO: 10.2 FL (ref 9.4–12.4)
POTASSIUM SERPL-SCNC: 4.1 MEQ/L (ref 3.5–5.2)
RBC # BLD AUTO: 2.59 MILL/MM3 (ref 4.2–5.4)
SEGMENTED NEUTROPHILS ABSOLUTE COUNT: 5.5 THOU/MM3 (ref 1.8–7.7)
SODIUM SERPL-SCNC: 140 MEQ/L (ref 135–145)
WBC # BLD AUTO: 6.8 THOU/MM3 (ref 4.8–10.8)

## 2024-01-25 PROCEDURE — 97110 THERAPEUTIC EXERCISES: CPT

## 2024-01-25 PROCEDURE — 6370000000 HC RX 637 (ALT 250 FOR IP)

## 2024-01-25 PROCEDURE — 85014 HEMATOCRIT: CPT

## 2024-01-25 PROCEDURE — 84100 ASSAY OF PHOSPHORUS: CPT

## 2024-01-25 PROCEDURE — 6360000002 HC RX W HCPCS: Performed by: NURSE PRACTITIONER

## 2024-01-25 PROCEDURE — 2580000003 HC RX 258

## 2024-01-25 PROCEDURE — 83735 ASSAY OF MAGNESIUM: CPT

## 2024-01-25 PROCEDURE — 82948 REAGENT STRIP/BLOOD GLUCOSE: CPT

## 2024-01-25 PROCEDURE — 36415 COLL VENOUS BLD VENIPUNCTURE: CPT

## 2024-01-25 PROCEDURE — C9113 INJ PANTOPRAZOLE SODIUM, VIA: HCPCS

## 2024-01-25 PROCEDURE — 97530 THERAPEUTIC ACTIVITIES: CPT

## 2024-01-25 PROCEDURE — 97116 GAIT TRAINING THERAPY: CPT

## 2024-01-25 PROCEDURE — 99291 CRITICAL CARE FIRST HOUR: CPT | Performed by: INTERNAL MEDICINE

## 2024-01-25 PROCEDURE — 85025 COMPLETE CBC W/AUTO DIFF WBC: CPT

## 2024-01-25 PROCEDURE — 6360000002 HC RX W HCPCS

## 2024-01-25 PROCEDURE — 97535 SELF CARE MNGMENT TRAINING: CPT

## 2024-01-25 PROCEDURE — 82330 ASSAY OF CALCIUM: CPT

## 2024-01-25 PROCEDURE — 85018 HEMOGLOBIN: CPT

## 2024-01-25 PROCEDURE — 2140000000 HC CCU INTERMEDIATE R&B

## 2024-01-25 PROCEDURE — 80048 BASIC METABOLIC PNL TOTAL CA: CPT

## 2024-01-25 RX ORDER — SODIUM CHLORIDE 0.9 % (FLUSH) 0.9 %
5-40 SYRINGE (ML) INJECTION PRN
Status: DISCONTINUED | OUTPATIENT
Start: 2024-01-25 | End: 2024-01-27 | Stop reason: SDUPTHER

## 2024-01-25 RX ORDER — POLYETHYLENE GLYCOL 3350 17 G/17G
238 POWDER, FOR SOLUTION ORAL ONCE
Status: COMPLETED | OUTPATIENT
Start: 2024-01-26 | End: 2024-01-26

## 2024-01-25 RX ORDER — SODIUM CHLORIDE 0.9 % (FLUSH) 0.9 %
5-40 SYRINGE (ML) INJECTION EVERY 12 HOURS SCHEDULED
Status: DISCONTINUED | OUTPATIENT
Start: 2024-01-25 | End: 2024-01-27 | Stop reason: SDUPTHER

## 2024-01-25 RX ORDER — SODIUM CHLORIDE 9 MG/ML
25 INJECTION, SOLUTION INTRAVENOUS PRN
Status: DISCONTINUED | OUTPATIENT
Start: 2024-01-25 | End: 2024-01-27 | Stop reason: SDUPTHER

## 2024-01-25 RX ADMIN — PIPERACILLIN AND TAZOBACTAM 3375 MG: 3; .375 INJECTION, POWDER, FOR SOLUTION INTRAVENOUS at 12:33

## 2024-01-25 RX ADMIN — NITROGLYCERIN 0.5 INCH: 20 OINTMENT TOPICAL at 18:01

## 2024-01-25 RX ADMIN — PIPERACILLIN AND TAZOBACTAM 3375 MG: 3; .375 INJECTION, POWDER, FOR SOLUTION INTRAVENOUS at 04:03

## 2024-01-25 RX ADMIN — SODIUM CHLORIDE, PRESERVATIVE FREE 10 ML: 5 INJECTION INTRAVENOUS at 09:37

## 2024-01-25 RX ADMIN — OCTREOTIDE ACETATE 50 MCG/HR: 500 INJECTION, SOLUTION INTRAVENOUS; SUBCUTANEOUS at 07:37

## 2024-01-25 RX ADMIN — TIMOLOL MALEATE 1 DROP: 5 SOLUTION OPHTHALMIC at 09:43

## 2024-01-25 RX ADMIN — PRAVASTATIN SODIUM 40 MG: 40 TABLET ORAL at 09:37

## 2024-01-25 RX ADMIN — PANTOPRAZOLE SODIUM 8 MG/HR: 40 INJECTION, POWDER, FOR SOLUTION INTRAVENOUS at 09:43

## 2024-01-25 RX ADMIN — FENTANYL CITRATE 25 MCG: 50 INJECTION INTRAMUSCULAR; INTRAVENOUS at 03:02

## 2024-01-25 RX ADMIN — NITROGLYCERIN 0.5 INCH: 20 OINTMENT TOPICAL at 03:01

## 2024-01-25 RX ADMIN — NITROGLYCERIN 0.5 INCH: 20 OINTMENT TOPICAL at 12:30

## 2024-01-25 RX ADMIN — OCTREOTIDE ACETATE 50 MCG/HR: 500 INJECTION, SOLUTION INTRAVENOUS; SUBCUTANEOUS at 17:48

## 2024-01-25 ASSESSMENT — PAIN DESCRIPTION - DESCRIPTORS
DESCRIPTORS: ACHING
DESCRIPTORS: ACHING

## 2024-01-25 ASSESSMENT — PAIN SCALES - GENERAL
PAINLEVEL_OUTOF10: 0
PAINLEVEL_OUTOF10: 4
PAINLEVEL_OUTOF10: 7

## 2024-01-25 ASSESSMENT — PAIN DESCRIPTION - ONSET: ONSET: ON-GOING

## 2024-01-25 ASSESSMENT — PAIN DESCRIPTION - LOCATION
LOCATION: GENERALIZED
LOCATION: GENERALIZED

## 2024-01-25 ASSESSMENT — PAIN DESCRIPTION - FREQUENCY: FREQUENCY: CONTINUOUS

## 2024-01-25 ASSESSMENT — PAIN - FUNCTIONAL ASSESSMENT: PAIN_FUNCTIONAL_ASSESSMENT: PREVENTS OR INTERFERES SOME ACTIVE ACTIVITIES AND ADLS

## 2024-01-25 NOTE — TELEPHONE ENCOUNTER
Missed office visit 1/25/24 due to being inpatient.     Call to ICU, asked to have pacer interrogation completed. Chanelle MASTERS will talk with the doctor.    Pt appt can be pushed out for 3months if we get a download.

## 2024-01-25 NOTE — PROGRESS NOTES
[facility] Medtronic Dual ICD   Pt of Jai    Pt is inpatient     Battery 7.1 years     Presenting rhythm AS VS with PACs    A Impedance 323  RV Impedance 399    RV shock 46    P wave sensing 0.5  R wave sensing 5.6    A Threshold 0.5 @ 0.40  A Amplitude 1.50 @ 0.40  RV Thresholds 0.75 @ 0.40  RV Amplitude 2.0 @ 0.40    A Paced 34.7%  V Paced 0.1%    Programmed Mode AAI <=> DDD     Afib [place] <0.1%    Episodes:   VT    Optivol WNL

## 2024-01-26 ENCOUNTER — ANESTHESIA EVENT (OUTPATIENT)
Dept: ENDOSCOPY | Age: 89
End: 2024-01-26
Payer: MEDICARE

## 2024-01-26 ENCOUNTER — ANESTHESIA (OUTPATIENT)
Dept: ENDOSCOPY | Age: 89
End: 2024-01-26
Payer: MEDICARE

## 2024-01-26 PROBLEM — E44.0 MODERATE MALNUTRITION (HCC): Status: ACTIVE | Noted: 2019-12-31

## 2024-01-26 LAB
ANION GAP SERPL CALC-SCNC: 8 MEQ/L (ref 8–16)
BUN SERPL-MCNC: 21 MG/DL (ref 7–22)
CALCIUM SERPL-MCNC: 8.7 MG/DL (ref 8.5–10.5)
CHLORIDE SERPL-SCNC: 101 MEQ/L (ref 98–111)
CO2 SERPL-SCNC: 28 MEQ/L (ref 23–33)
CREAT SERPL-MCNC: 1.4 MG/DL (ref 0.4–1.2)
DEPRECATED RDW RBC AUTO: 56.2 FL (ref 35–45)
ERYTHROCYTE [DISTWIDTH] IN BLOOD BY AUTOMATED COUNT: 16 % (ref 11.5–14.5)
GFR SERPL CREATININE-BSD FRML MDRD: 37 ML/MIN/1.73M2
GLUCOSE BLD STRIP.AUTO-MCNC: 156 MG/DL (ref 70–108)
GLUCOSE BLD STRIP.AUTO-MCNC: 92 MG/DL (ref 70–108)
GLUCOSE BLD STRIP.AUTO-MCNC: 93 MG/DL (ref 70–108)
GLUCOSE SERPL-MCNC: 88 MG/DL (ref 70–108)
HCT VFR BLD AUTO: 27.3 % (ref 37–47)
HCT VFR BLD AUTO: 28.4 % (ref 37–47)
HGB BLD-MCNC: 8.7 GM/DL (ref 12–16)
HGB BLD-MCNC: 8.9 GM/DL (ref 12–16)
MCH RBC QN AUTO: 30.5 PG (ref 26–33)
MCHC RBC AUTO-ENTMCNC: 31.3 GM/DL (ref 32.2–35.5)
MCV RBC AUTO: 97.3 FL (ref 81–99)
PLATELET # BLD AUTO: 99 THOU/MM3 (ref 130–400)
PMV BLD AUTO: 10.3 FL (ref 9.4–12.4)
POTASSIUM SERPL-SCNC: 4.3 MEQ/L (ref 3.5–5.2)
RBC # BLD AUTO: 2.92 MILL/MM3 (ref 4.2–5.4)
REASON FOR REJECTION: NORMAL
REJECTED TEST: NORMAL
SODIUM SERPL-SCNC: 137 MEQ/L (ref 135–145)
WBC # BLD AUTO: 6.2 THOU/MM3 (ref 4.8–10.8)

## 2024-01-26 PROCEDURE — 0DBL8ZZ EXCISION OF TRANSVERSE COLON, VIA NATURAL OR ARTIFICIAL OPENING ENDOSCOPIC: ICD-10-PCS | Performed by: INTERNAL MEDICINE

## 2024-01-26 PROCEDURE — 3700000000 HC ANESTHESIA ATTENDED CARE: Performed by: INTERNAL MEDICINE

## 2024-01-26 PROCEDURE — 99233 SBSQ HOSP IP/OBS HIGH 50: CPT | Performed by: HOSPITALIST

## 2024-01-26 PROCEDURE — 80048 BASIC METABOLIC PNL TOTAL CA: CPT

## 2024-01-26 PROCEDURE — 82948 REAGENT STRIP/BLOOD GLUCOSE: CPT

## 2024-01-26 PROCEDURE — 6370000000 HC RX 637 (ALT 250 FOR IP): Performed by: INTERNAL MEDICINE

## 2024-01-26 PROCEDURE — 85027 COMPLETE CBC AUTOMATED: CPT

## 2024-01-26 PROCEDURE — C9113 INJ PANTOPRAZOLE SODIUM, VIA: HCPCS | Performed by: INTERNAL MEDICINE

## 2024-01-26 PROCEDURE — 85014 HEMATOCRIT: CPT

## 2024-01-26 PROCEDURE — 85018 HEMOGLOBIN: CPT

## 2024-01-26 PROCEDURE — 2580000003 HC RX 258

## 2024-01-26 PROCEDURE — 6370000000 HC RX 637 (ALT 250 FOR IP)

## 2024-01-26 PROCEDURE — 7100000010 HC PHASE II RECOVERY - FIRST 15 MIN: Performed by: INTERNAL MEDICINE

## 2024-01-26 PROCEDURE — 6360000002 HC RX W HCPCS: Performed by: INTERNAL MEDICINE

## 2024-01-26 PROCEDURE — 6360000002 HC RX W HCPCS

## 2024-01-26 PROCEDURE — 3700000001 HC ADD 15 MINUTES (ANESTHESIA): Performed by: INTERNAL MEDICINE

## 2024-01-26 PROCEDURE — 3609010600 HC COLONOSCOPY POLYPECTOMY SNARE/COLD BIOPSY: Performed by: INTERNAL MEDICINE

## 2024-01-26 PROCEDURE — 2709999900 HC NON-CHARGEABLE SUPPLY: Performed by: INTERNAL MEDICINE

## 2024-01-26 PROCEDURE — 7100000011 HC PHASE II RECOVERY - ADDTL 15 MIN: Performed by: INTERNAL MEDICINE

## 2024-01-26 PROCEDURE — 2580000003 HC RX 258: Performed by: NURSE ANESTHETIST, CERTIFIED REGISTERED

## 2024-01-26 PROCEDURE — 2580000003 HC RX 258: Performed by: INTERNAL MEDICINE

## 2024-01-26 PROCEDURE — 2140000000 HC CCU INTERMEDIATE R&B

## 2024-01-26 PROCEDURE — 6360000002 HC RX W HCPCS: Performed by: NURSE ANESTHETIST, CERTIFIED REGISTERED

## 2024-01-26 PROCEDURE — 88305 TISSUE EXAM BY PATHOLOGIST: CPT

## 2024-01-26 PROCEDURE — C9113 INJ PANTOPRAZOLE SODIUM, VIA: HCPCS

## 2024-01-26 PROCEDURE — 2500000003 HC RX 250 WO HCPCS: Performed by: NURSE ANESTHETIST, CERTIFIED REGISTERED

## 2024-01-26 PROCEDURE — 36415 COLL VENOUS BLD VENIPUNCTURE: CPT

## 2024-01-26 RX ORDER — SODIUM CHLORIDE 9 MG/ML
INJECTION, SOLUTION INTRAVENOUS CONTINUOUS PRN
Status: DISCONTINUED | OUTPATIENT
Start: 2024-01-26 | End: 2024-01-26 | Stop reason: SDUPTHER

## 2024-01-26 RX ORDER — PROPOFOL 10 MG/ML
INJECTION, EMULSION INTRAVENOUS PRN
Status: DISCONTINUED | OUTPATIENT
Start: 2024-01-26 | End: 2024-01-26 | Stop reason: SDUPTHER

## 2024-01-26 RX ORDER — PANTOPRAZOLE SODIUM 40 MG/10ML
40 INJECTION, POWDER, LYOPHILIZED, FOR SOLUTION INTRAVENOUS 2 TIMES DAILY
Status: DISCONTINUED | OUTPATIENT
Start: 2024-01-26 | End: 2024-01-27

## 2024-01-26 RX ORDER — LIDOCAINE HYDROCHLORIDE 20 MG/ML
INJECTION, SOLUTION EPIDURAL; INFILTRATION; INTRACAUDAL; PERINEURAL PRN
Status: DISCONTINUED | OUTPATIENT
Start: 2024-01-26 | End: 2024-01-26 | Stop reason: SDUPTHER

## 2024-01-26 RX ADMIN — LIDOCAINE HYDROCHLORIDE 50 MG: 20 INJECTION, SOLUTION EPIDURAL; INFILTRATION; INTRACAUDAL; PERINEURAL at 13:24

## 2024-01-26 RX ADMIN — PANTOPRAZOLE SODIUM 40 MG: 40 INJECTION, POWDER, FOR SOLUTION INTRAVENOUS at 14:55

## 2024-01-26 RX ADMIN — SODIUM CHLORIDE: 9 INJECTION, SOLUTION INTRAVENOUS at 13:22

## 2024-01-26 RX ADMIN — PROPOFOL 50 MG: 10 INJECTION, EMULSION INTRAVENOUS at 13:24

## 2024-01-26 RX ADMIN — PANTOPRAZOLE SODIUM 8 MG/HR: 40 INJECTION, POWDER, FOR SOLUTION INTRAVENOUS at 10:06

## 2024-01-26 RX ADMIN — NITROGLYCERIN 0.5 INCH: 20 OINTMENT TOPICAL at 10:06

## 2024-01-26 RX ADMIN — PHENYLEPHRINE HYDROCHLORIDE 100 MCG: 10 INJECTION INTRAVENOUS at 13:34

## 2024-01-26 RX ADMIN — PRAVASTATIN SODIUM 40 MG: 40 TABLET ORAL at 08:11

## 2024-01-26 RX ADMIN — PHENYLEPHRINE HYDROCHLORIDE 100 MCG: 10 INJECTION INTRAVENOUS at 13:36

## 2024-01-26 RX ADMIN — SODIUM CHLORIDE, PRESERVATIVE FREE 10 ML: 5 INJECTION INTRAVENOUS at 08:12

## 2024-01-26 RX ADMIN — PHENYLEPHRINE HYDROCHLORIDE 200 MCG: 10 INJECTION INTRAVENOUS at 13:37

## 2024-01-26 RX ADMIN — NITROGLYCERIN 0.5 INCH: 20 OINTMENT TOPICAL at 15:55

## 2024-01-26 RX ADMIN — POLYETHYLENE GLYCOL 3350 238 G: 17 POWDER, FOR SOLUTION ORAL at 01:38

## 2024-01-26 RX ADMIN — PROPOFOL 25 MG: 10 INJECTION, EMULSION INTRAVENOUS at 13:47

## 2024-01-26 RX ADMIN — TIMOLOL MALEATE 1 DROP: 5 SOLUTION OPHTHALMIC at 10:08

## 2024-01-26 RX ADMIN — NITROGLYCERIN 0.5 INCH: 20 OINTMENT TOPICAL at 03:50

## 2024-01-26 RX ADMIN — SODIUM CHLORIDE, PRESERVATIVE FREE 20 ML: 5 INJECTION INTRAVENOUS at 21:02

## 2024-01-26 RX ADMIN — PANTOPRAZOLE SODIUM 8 MG/HR: 40 INJECTION, POWDER, FOR SOLUTION INTRAVENOUS at 04:37

## 2024-01-26 RX ADMIN — PANTOPRAZOLE SODIUM 40 MG: 40 INJECTION, POWDER, FOR SOLUTION INTRAVENOUS at 21:02

## 2024-01-26 ASSESSMENT — PAIN DESCRIPTION - LOCATION: LOCATION: SHOULDER

## 2024-01-26 ASSESSMENT — COPD QUESTIONNAIRES: CAT_SEVERITY: MILD

## 2024-01-26 ASSESSMENT — PAIN - FUNCTIONAL ASSESSMENT
PAIN_FUNCTIONAL_ASSESSMENT: 0-10
PAIN_FUNCTIONAL_ASSESSMENT: NONE - DENIES PAIN
PAIN_FUNCTIONAL_ASSESSMENT: NONE - DENIES PAIN

## 2024-01-26 ASSESSMENT — PAIN DESCRIPTION - DESCRIPTORS
DESCRIPTORS: ACHING
DESCRIPTORS: ACHING

## 2024-01-26 ASSESSMENT — PAIN SCALES - GENERAL: PAINLEVEL_OUTOF10: 5

## 2024-01-26 ASSESSMENT — PAIN DESCRIPTION - ORIENTATION: ORIENTATION: LEFT

## 2024-01-26 NOTE — BRIEF OP NOTE
Brief Postoperative Note      Patient: Karen Cruz  YOB: 1935  MRN: [id number]    Date of Procedure: 1/26/2024    Pre-Op Diagnosis Codes:     * Anemia, unspecified type [D64.9]    Post-Op Diagnosis: Same       Procedure(s):  COLONOSCOPY POLYPECTOMY SNARE/COLD BIOPSY    Surgeon(s):  Slava Ku MD    Assistant:  * No surgical staff found *    Anesthesia: Monitor Anesthesia Care    Estimated Blood Loss (mL): Minimal    Complications: None    Specimens:   ID Type Source Tests Collected by Time Destination   A : Transverse colon polyp Tissue Colon SURGICAL PATHOLOGY Slava Ku MD 1/26/2024 1340        Implants:  * No implants in log *      Drains:   [REMOVED] Urinary Catheter 01/22/24 (Removed)   Catheter Indications Need for fluid volume management of the critically ill patient in a critical care setting 01/25/24 0800   Site Assessment [place] 01/25/24 0800   Urine Color Yellow 01/25/24 0800   Urine Appearance Sediment 01/25/24 0800   Collection Container Standard 01/25/24 0800   Securement Method Securing device (Describe) 01/25/24 0800   Catheter Care  Perineal wipes 01/24/24 2200   Catheter Best Practices  Drainage tube clipped to bed;Catheter secured to thigh;Tamper seal intact;Bag below bladder;Bag not on floor;Lack of dependent loop in tubing;Drainage bag less than half full 01/25/24 0800   Status Draining 01/25/24 0800   Output (mL) 375 mL 01/25/24 1050       Findings: above      Electronically signed by Slava uK MD on 1/26/2024 at 2:01 PM

## 2024-01-26 NOTE — ANESTHESIA PRE PROCEDURE
(MAGNESIUM-OXIDE) 250 MG TABS tablet TAKE 2 TABLETS BY MOUTH TWICE DAILY 5/16/22   Malika Cabrera APRN - CNP   Blood Pressure KIT Check blood pressure daily, and if symptoms of lightheadedness.  Call physician if BP <80/40. 12/31/19   Florin Lara DO   melatonin 3 MG TABS tablet Take 2 tablets by mouth nightly as needed (sleep) 2/26/19   Steve Hargrove PA-C   timolol (TIMOPTIC) 0.5 % ophthalmic solution Place 1 drop into both eyes daily    Provider, MD Maximiliano       Current medications:    Current Facility-Administered Medications   Medication Dose Route Frequency Provider Last Rate Last Admin    pantoprazole (PROTONIX) injection 40 mg  40 mg IntraVENous BID Malcom Allen MD        sodium chloride flush 0.9 % injection 5-40 mL  5-40 mL IntraVENous 2 times per day Noemi Allen APRN - CNP        sodium chloride flush 0.9 % injection 5-40 mL  5-40 mL IntraVENous PRN Noemi Allen APRN - CNP        0.9 % sodium chloride infusion  25 mL IntraVENous PRN Noemi Allen APRN - CNP        insulin lispro (HUMALOG) injection vial 0-8 Units  0-8 Units SubCUTAneous 4x Daily AC & HS Gagandeep Herrera MD        nitroglycerin (NITRO-BID) 2 % ointment 0.5 inch  0.5 inch Topical 4 times per day Edin Richey DO   0.5 inch at 01/26/24 1006    lidocaine 4 % external patch 1 patch  1 patch TransDERmal Daily Edin Richey DO   1 patch at 01/26/24 0811    0.9 % sodium chloride infusion   IntraVENous PRN Izabel Osuna APRN - CNP        [Held by provider] diclofenac sodium (VOLTAREN) 1 % gel 4 g  4 g Topical BID Ernesto Vergara MD   4 g at 01/22/24 0957    0.9 % sodium chloride infusion   IntraVENous PRN Alin Keating MD        sodium chloride flush 0.9 % injection 5-40 mL  5-40 mL IntraVENous 2 times per day Ernesto Vergara MD   10 mL at 01/26/24 0812    sodium chloride flush 0.9 % injection 5-40 mL  5-40 mL IntraVENous PRN Ernesto Vergara MD        0.9 % sodium chloride infusion   IntraVENous PRN Ernesto Vergara MD

## 2024-01-26 NOTE — DISCHARGE INSTR - COC
Continuity of Care Form    Patient Name: Karen Cruz   :  1935  MRN:  [id number]    Admit date:  2024  Discharge date:  24    Code Status Order: Limited   Advance Directives:   Advance Care Flowsheet Documentation       Date/Time Healthcare Directive Type of Healthcare Directive Copy in Chart Healthcare Agent Appointed Healthcare Agent's Name Healthcare Agent's Phone Number    24 1310 Yes, patient has an advance directive for healthcare treatment -- -- -- -- --            Admitting Physician:  Gagandeep Herrera MD  PCP: Srini Garces    Discharging Nurse: Alyssa Rojas  Discharging Hospital Unit/Room#: [facility] ENDO POOL RM/NONE  Discharging Unit Phone Number: [phone]    Emergency Contact:   Extended Emergency Contact Information  Primary Emergency Contact: Mohinder Cruz   [facility]  Home Phone: [phone]  Mobile Phone: [phone]  Relation: Child  Secondary Emergency Contact: Raeann Merritt   [facility]  Home Phone: [phone]  Mobile Phone: [phone]  Relation: Child    Past Surgical History:  Past Surgical History:   Procedure Laterality Date    ABDOMINAL AORTIC ANEURYSM REPAIR, ENDOVASCULAR N/A 2/15/2019    ABDOMINAL AORTIC ANEURYSM REPAIR ENDOVASCULAR, DECLOTTING RIGHT FEM TIB BYPASS GRAFT performed by Wilver Hall MD at [facility] OR    APPENDECTOMY      BREAST SURGERY Right     lumpectomy    CHOLECYSTECTOMY      30 years ago    COLONOSCOPY  2018    Dr ku    COLONOSCOPY N/A 2019    COLONOSCOPY DIAGNOSTIC performed by Brigida Vergara MD at [facility] Endoscopy    COLONOSCOPY N/A 2020    COLONOSCOPY CONTROL HEMORRHAGE performed by Sasha Martinez MD at [facility] Endoscopy    COLONOSCOPY Left 3/2/2021    COLORECTAL CANCER SCREENING, NOT HIGH RISK performed by Jin Hdz MD at [facility] Endoscopy    COLONOSCOPY  2023    COLONOSCOPY POLYPECTOMY SNARE/COLD BIOPSY performed by Slava Ku MD at [facility] Endoscopy    COSMETIC

## 2024-01-26 NOTE — ANESTHESIA POSTPROCEDURE EVALUATION
Department of Anesthesiology  Postprocedure Note    Patient: Karen Cruz  MRN: [id number]  YOB: 1935  Date of evaluation: 1/26/2024    Procedure Summary       Date: 01/26/24 Room / Location: [place] [address] / [facility]    Anesthesia Start: 1322 Anesthesia Stop: 1401    Procedure: COLONOSCOPY POLYPECTOMY SNARE/COLD BIOPSY Diagnosis:       Anemia, unspecified type      (Anemia, unspecified type [D64.9])    Surgeons: Slava Ku MD Responsible Provider: Luis Rasmussen DO    Anesthesia Type: MAC ASA Status: 4            Anesthesia Type: MAC    Christiane Phase I: Christiane Score: 10    Christiane Phase II:      Anesthesia Post Evaluation    Patient location during evaluation: bedside  Patient participation: complete - patient participated  Level of consciousness: sleepy but conscious  Airway patency: patent  Nausea & Vomiting: no nausea and no vomiting  Cardiovascular status: hemodynamically stable  Respiratory status: acceptable and spontaneous ventilation  Hydration status: stable  Pain management: adequate        No notable events documented.

## 2024-01-27 LAB
ANION GAP SERPL CALC-SCNC: 10 MEQ/L (ref 8–16)
BUN SERPL-MCNC: 14 MG/DL (ref 7–22)
CALCIUM SERPL-MCNC: 8.9 MG/DL (ref 8.5–10.5)
CHLORIDE SERPL-SCNC: 103 MEQ/L (ref 98–111)
CO2 SERPL-SCNC: 26 MEQ/L (ref 23–33)
CREAT SERPL-MCNC: 1.2 MG/DL (ref 0.4–1.2)
DEPRECATED RDW RBC AUTO: 55.8 FL (ref 35–45)
ERYTHROCYTE [DISTWIDTH] IN BLOOD BY AUTOMATED COUNT: 16.2 % (ref 11.5–14.5)
FERRITIN SERPL IA-MCNC: 108 NG/ML (ref 10–291)
GFR SERPL CREATININE-BSD FRML MDRD: 44 ML/MIN/1.73M2
GLUCOSE BLD STRIP.AUTO-MCNC: 120 MG/DL (ref 70–108)
GLUCOSE BLD STRIP.AUTO-MCNC: 144 MG/DL (ref 70–108)
GLUCOSE BLD STRIP.AUTO-MCNC: 149 MG/DL (ref 70–108)
GLUCOSE BLD STRIP.AUTO-MCNC: 93 MG/DL (ref 70–108)
GLUCOSE SERPL-MCNC: 94 MG/DL (ref 70–108)
HCT VFR BLD AUTO: 29.8 % (ref 37–47)
HGB BLD-MCNC: 9.5 GM/DL (ref 12–16)
HGB RETIC QN AUTO: 32.3 PG (ref 28.2–35.7)
IMM RETICS NFR: 19.4 % (ref 3–15.9)
IRON SATN MFR SERPL: 17 % (ref 20–50)
IRON SERPL-MCNC: 31 UG/DL (ref 50–170)
MCH RBC QN AUTO: 30.5 PG (ref 26–33)
MCHC RBC AUTO-ENTMCNC: 31.9 GM/DL (ref 32.2–35.5)
MCV RBC AUTO: 95.8 FL (ref 81–99)
PLATELET # BLD AUTO: 122 THOU/MM3 (ref 130–400)
PMV BLD AUTO: 10.3 FL (ref 9.4–12.4)
POTASSIUM SERPL-SCNC: 4.6 MEQ/L (ref 3.5–5.2)
RBC # BLD AUTO: 3.11 MILL/MM3 (ref 4.2–5.4)
RETICS # AUTO: 123 THOU/MM3 (ref 20–115)
RETICS/RBC NFR AUTO: 3.9 % (ref 0.5–2)
SODIUM SERPL-SCNC: 139 MEQ/L (ref 135–145)
TIBC SERPL-MCNC: 187 UG/DL (ref 171–450)
WBC # BLD AUTO: 5.9 THOU/MM3 (ref 4.8–10.8)

## 2024-01-27 PROCEDURE — 80048 BASIC METABOLIC PNL TOTAL CA: CPT

## 2024-01-27 PROCEDURE — 36415 COLL VENOUS BLD VENIPUNCTURE: CPT

## 2024-01-27 PROCEDURE — C9113 INJ PANTOPRAZOLE SODIUM, VIA: HCPCS | Performed by: INTERNAL MEDICINE

## 2024-01-27 PROCEDURE — 6370000000 HC RX 637 (ALT 250 FOR IP): Performed by: INTERNAL MEDICINE

## 2024-01-27 PROCEDURE — 6370000000 HC RX 637 (ALT 250 FOR IP)

## 2024-01-27 PROCEDURE — 83540 ASSAY OF IRON: CPT

## 2024-01-27 PROCEDURE — 99233 SBSQ HOSP IP/OBS HIGH 50: CPT | Performed by: HOSPITALIST

## 2024-01-27 PROCEDURE — 2580000003 HC RX 258: Performed by: INTERNAL MEDICINE

## 2024-01-27 PROCEDURE — 2140000000 HC CCU INTERMEDIATE R&B

## 2024-01-27 PROCEDURE — 82728 ASSAY OF FERRITIN: CPT

## 2024-01-27 PROCEDURE — 6360000002 HC RX W HCPCS: Performed by: INTERNAL MEDICINE

## 2024-01-27 PROCEDURE — 85027 COMPLETE CBC AUTOMATED: CPT

## 2024-01-27 PROCEDURE — 83550 IRON BINDING TEST: CPT

## 2024-01-27 PROCEDURE — 85046 RETICYTE/HGB CONCENTRATE: CPT

## 2024-01-27 PROCEDURE — 82948 REAGENT STRIP/BLOOD GLUCOSE: CPT

## 2024-01-27 RX ORDER — PANTOPRAZOLE SODIUM 40 MG/1
40 TABLET, DELAYED RELEASE ORAL
Status: DISCONTINUED | OUTPATIENT
Start: 2024-01-27 | End: 2024-01-30 | Stop reason: HOSPADM

## 2024-01-27 RX ADMIN — NITROGLYCERIN 0.5 INCH: 20 OINTMENT TOPICAL at 06:04

## 2024-01-27 RX ADMIN — NITROGLYCERIN 0.5 INCH: 20 OINTMENT TOPICAL at 00:06

## 2024-01-27 RX ADMIN — PANTOPRAZOLE SODIUM 40 MG: 40 INJECTION, POWDER, FOR SOLUTION INTRAVENOUS at 08:59

## 2024-01-27 RX ADMIN — PANTOPRAZOLE SODIUM 40 MG: 40 TABLET, DELAYED RELEASE ORAL at 17:16

## 2024-01-27 RX ADMIN — NITROGLYCERIN 0.5 INCH: 20 OINTMENT TOPICAL at 17:16

## 2024-01-27 RX ADMIN — NITROGLYCERIN 0.5 INCH: 20 OINTMENT TOPICAL at 08:57

## 2024-01-27 RX ADMIN — TIMOLOL MALEATE 1 DROP: 5 SOLUTION OPHTHALMIC at 08:59

## 2024-01-27 RX ADMIN — ONDANSETRON 4 MG: 2 INJECTION INTRAMUSCULAR; INTRAVENOUS at 09:18

## 2024-01-27 RX ADMIN — SODIUM CHLORIDE, PRESERVATIVE FREE 10 ML: 5 INJECTION INTRAVENOUS at 19:45

## 2024-01-27 RX ADMIN — PRAVASTATIN SODIUM 40 MG: 40 TABLET ORAL at 08:59

## 2024-01-27 RX ADMIN — SODIUM CHLORIDE, PRESERVATIVE FREE 10 ML: 5 INJECTION INTRAVENOUS at 08:59

## 2024-01-27 NOTE — OP NOTE
[address] [address]                                OPERATIVE REPORT    PATIENT NAME: NADIR DOMINGUEZ                     :        1935  MED REC NO:   [id number]                           ROOM:       0030  ACCOUNT NO:   [id number]                           ADMIT DATE: 2024  PROVIDER:     Slava Ku M.D.    DATE OF PROCEDURE:  2024    COLONOSCOPY    SURGEON:  Slava Ku MD    INDICATION:  Rectal bleeding.    ANESTHESIA:  IV Diprivan per Anesthesia.    DESCRIPTION OF PROCEDURE:  Prior to procedure, risks, benefits,  complications (including but not limited to the following bleeding,  infection, perforation, emergency surgery, stroke, heart attack, death,  possible anesthetic risks, and the fact that the procedure is not 100%  accurate or successful), indications, and alternatives of colonoscopy  were explained to the patient.  The patient understood and agreed to the  procedure.  All questions were answered.    With the patient in the left lateral decubitus position, digital rectal  exam was done which was normal.  Colonoscope was introduced into the  rectum.  Some retained liquid stool.  Colonoscope was advanced into the  rectosigmoid colon.  Diverticula were present.  Colonoscope was advanced  to the descending colon and transverse colon.  In the transverse colon,  a 4-mm polyp was noted that was removed by cold snare.  Colonoscope was  traced to the transverse colon and the ascending colon anastomosis.   This was patent.  There was some residual black material present but not  tissue.  Colonoscope was retraced back from the anastomosis in the  transverse colon.  A 3-mm polyp was noted in the transverse colon that  was removed in piecemeal fashion by cold biopsy.  Colonoscope was traced  back to descending colon, sigmoid colon, rectum.  Retroflexed.  Some  prominent veins were noted in the rectal 
Operative Note      Patient: Karen Cruz  YOB: 1935  MRN: [id number]    Date of Procedure: 1/26/2024    Pre-Op Diagnosis Codes:     * Anemia, unspecified type [D64.9]    Post-Op Diagnosis: Same:  some retained liquid stool in colon;  small polyps transverse colon.  Anastomosis right colon.   Some prominent rectal veins  (but no hemorrhoids near the anal verge).   Will resume diet.    Bleeding either diverticular vs prominent rectal veins        Procedure(s):  COLONOSCOPY POLYPECTOMY SNARE/COLD BIOPSY    Surgeon(s):  Slava Ku MD    Assistant:   * No surgical staff found *    Anesthesia: Monitor Anesthesia Care    Estimated Blood Loss (mL): Minimal    Complications: None    Specimens:   ID Type Source Tests Collected by Time Destination   A : Transverse colon polyp Tissue Colon SURGICAL PATHOLOGY Slava Ku MD 1/26/2024 1340        Implants:  * No implants in log *      Drains:   [REMOVED] Urinary Catheter 01/22/24 (Removed)   Catheter Indications Need for fluid volume management of the critically ill patient in a critical care setting 01/25/24 0800   Site Assessment [place] 01/25/24 0800   Urine Color Yellow 01/25/24 0800   Urine Appearance Sediment 01/25/24 0800   Collection Container Standard 01/25/24 0800   Securement Method Securing device (Describe) 01/25/24 0800   Catheter Care  Perineal wipes 01/24/24 2200   Catheter Best Practices  Drainage tube clipped to bed;Catheter secured to thigh;Tamper seal intact;Bag below bladder;Bag not on floor;Lack of dependent loop in tubing;Drainage bag less than half full 01/25/24 0800   Status Draining 01/25/24 0800   Output (mL) 375 mL 01/25/24 1050       Findings: above        Detailed Description of Procedure:   dictated    Electronically signed by Slava Ku MD on 1/26/2024 at 2:01 PM    
esophageal mucosa. Cold biopsies were taken and placed in Jar 2.     Stomach: There was mild diffuse gastritis. Cold biopsies were taken and placed in Jar 1.    Multiple 2-4mm gastric polyps were noted in the gastric body.     There were multiple areas of friable tissue with bleeding noted in the gastric body. Bleeding was controlled with APC on gastric settings.     Duodenum: There was duodenitis in the duodenal bulb.    The 2nd portions of the duodenum appeared normal with normal villous pattern      Recommendations:   - Await pathology.  - Continue current medications, PPI and octreotide ggt  - Patient will be transferred to ICU for ongoing care   - Follow clinical symptoms and laboratory studies for evidence of overt GI bleeding  - Patient would benefit from colonoscopy when medically appropriate    Electronically signed by Brigida Vergara MD on 1/22/2024 at 2:13 PM    Brigida Vergara MD  [employer]

## 2024-01-28 LAB
ANION GAP SERPL CALC-SCNC: 8 MEQ/L (ref 8–16)
BACTERIA BLD AEROBE CULT: NORMAL
BACTERIA BLD AEROBE CULT: NORMAL
BUN SERPL-MCNC: 12 MG/DL (ref 7–22)
CALCIUM SERPL-MCNC: 8.7 MG/DL (ref 8.5–10.5)
CHLORIDE SERPL-SCNC: 106 MEQ/L (ref 98–111)
CO2 SERPL-SCNC: 28 MEQ/L (ref 23–33)
CREAT SERPL-MCNC: 1.2 MG/DL (ref 0.4–1.2)
DEPRECATED RDW RBC AUTO: 57.2 FL (ref 35–45)
ERYTHROCYTE [DISTWIDTH] IN BLOOD BY AUTOMATED COUNT: 16.1 % (ref 11.5–14.5)
GFR SERPL CREATININE-BSD FRML MDRD: 44 ML/MIN/1.73M2
GLUCOSE BLD STRIP.AUTO-MCNC: 100 MG/DL (ref 70–108)
GLUCOSE BLD STRIP.AUTO-MCNC: 124 MG/DL (ref 70–108)
GLUCOSE BLD STRIP.AUTO-MCNC: 131 MG/DL (ref 70–108)
GLUCOSE BLD STRIP.AUTO-MCNC: 96 MG/DL (ref 70–108)
GLUCOSE SERPL-MCNC: 87 MG/DL (ref 70–108)
HCT VFR BLD AUTO: 27.4 % (ref 37–47)
HCT VFR BLD AUTO: 28.4 % (ref 37–47)
HGB BLD-MCNC: 8.6 GM/DL (ref 12–16)
HGB BLD-MCNC: 8.9 GM/DL (ref 12–16)
LACTATE SERPL-SCNC: 1.6 MMOL/L (ref 0.5–2)
MAGNESIUM SERPL-MCNC: 1.6 MG/DL (ref 1.6–2.4)
MCH RBC QN AUTO: 30.7 PG (ref 26–33)
MCHC RBC AUTO-ENTMCNC: 31.4 GM/DL (ref 32.2–35.5)
MCV RBC AUTO: 97.9 FL (ref 81–99)
PLATELET # BLD AUTO: 125 THOU/MM3 (ref 130–400)
PMV BLD AUTO: 9.9 FL (ref 9.4–12.4)
POTASSIUM SERPL-SCNC: 4.3 MEQ/L (ref 3.5–5.2)
RBC # BLD AUTO: 2.8 MILL/MM3 (ref 4.2–5.4)
SODIUM SERPL-SCNC: 142 MEQ/L (ref 135–145)
WBC # BLD AUTO: 4.9 THOU/MM3 (ref 4.8–10.8)

## 2024-01-28 PROCEDURE — 85018 HEMOGLOBIN: CPT

## 2024-01-28 PROCEDURE — 83735 ASSAY OF MAGNESIUM: CPT

## 2024-01-28 PROCEDURE — 80048 BASIC METABOLIC PNL TOTAL CA: CPT

## 2024-01-28 PROCEDURE — 85027 COMPLETE CBC AUTOMATED: CPT

## 2024-01-28 PROCEDURE — 82948 REAGENT STRIP/BLOOD GLUCOSE: CPT

## 2024-01-28 PROCEDURE — 6360000002 HC RX W HCPCS

## 2024-01-28 PROCEDURE — 85014 HEMATOCRIT: CPT

## 2024-01-28 PROCEDURE — 6370000000 HC RX 637 (ALT 250 FOR IP): Performed by: INTERNAL MEDICINE

## 2024-01-28 PROCEDURE — 2580000003 HC RX 258: Performed by: INTERNAL MEDICINE

## 2024-01-28 PROCEDURE — 2580000003 HC RX 258

## 2024-01-28 PROCEDURE — 83605 ASSAY OF LACTIC ACID: CPT

## 2024-01-28 PROCEDURE — 99233 SBSQ HOSP IP/OBS HIGH 50: CPT | Performed by: HOSPITALIST

## 2024-01-28 PROCEDURE — 6370000000 HC RX 637 (ALT 250 FOR IP)

## 2024-01-28 PROCEDURE — 2140000000 HC CCU INTERMEDIATE R&B

## 2024-01-28 PROCEDURE — 36415 COLL VENOUS BLD VENIPUNCTURE: CPT

## 2024-01-28 RX ORDER — 0.9 % SODIUM CHLORIDE 0.9 %
500 INTRAVENOUS SOLUTION INTRAVENOUS ONCE
Status: COMPLETED | OUTPATIENT
Start: 2024-01-28 | End: 2024-01-28

## 2024-01-28 RX ORDER — MAGNESIUM SULFATE IN WATER 40 MG/ML
2000 INJECTION, SOLUTION INTRAVENOUS ONCE
Status: COMPLETED | OUTPATIENT
Start: 2024-01-28 | End: 2024-01-28

## 2024-01-28 RX ADMIN — NITROGLYCERIN 0.5 INCH: 20 OINTMENT TOPICAL at 22:14

## 2024-01-28 RX ADMIN — PANTOPRAZOLE SODIUM 40 MG: 40 TABLET, DELAYED RELEASE ORAL at 16:38

## 2024-01-28 RX ADMIN — SODIUM CHLORIDE, PRESERVATIVE FREE 10 ML: 5 INJECTION INTRAVENOUS at 22:21

## 2024-01-28 RX ADMIN — NITROGLYCERIN 0.5 INCH: 20 OINTMENT TOPICAL at 10:05

## 2024-01-28 RX ADMIN — MAGNESIUM SULFATE HEPTAHYDRATE 2000 MG: 40 INJECTION, SOLUTION INTRAVENOUS at 10:04

## 2024-01-28 RX ADMIN — SODIUM CHLORIDE 500 ML: 9 INJECTION, SOLUTION INTRAVENOUS at 11:14

## 2024-01-28 RX ADMIN — SODIUM CHLORIDE, PRESERVATIVE FREE 10 ML: 5 INJECTION INTRAVENOUS at 08:18

## 2024-01-28 RX ADMIN — PANTOPRAZOLE SODIUM 40 MG: 40 TABLET, DELAYED RELEASE ORAL at 05:57

## 2024-01-28 RX ADMIN — TIMOLOL MALEATE 1 DROP: 5 SOLUTION OPHTHALMIC at 08:18

## 2024-01-28 RX ADMIN — NITROGLYCERIN 0.5 INCH: 20 OINTMENT TOPICAL at 00:16

## 2024-01-28 RX ADMIN — PRAVASTATIN SODIUM 40 MG: 40 TABLET ORAL at 08:19

## 2024-01-28 RX ADMIN — NITROGLYCERIN 0.5 INCH: 20 OINTMENT TOPICAL at 16:38

## 2024-01-28 RX ADMIN — NITROGLYCERIN 0.5 INCH: 20 OINTMENT TOPICAL at 05:56

## 2024-01-28 ASSESSMENT — PAIN SCALES - GENERAL: PAINLEVEL_OUTOF10: 0

## 2024-01-29 LAB
GLUCOSE BLD STRIP.AUTO-MCNC: 130 MG/DL (ref 70–108)
GLUCOSE BLD STRIP.AUTO-MCNC: 187 MG/DL (ref 70–108)
GLUCOSE BLD STRIP.AUTO-MCNC: 81 MG/DL (ref 70–108)
GLUCOSE BLD STRIP.AUTO-MCNC: 91 MG/DL (ref 70–108)

## 2024-01-29 PROCEDURE — 82948 REAGENT STRIP/BLOOD GLUCOSE: CPT

## 2024-01-29 PROCEDURE — 6370000000 HC RX 637 (ALT 250 FOR IP): Performed by: INTERNAL MEDICINE

## 2024-01-29 PROCEDURE — 2580000003 HC RX 258: Performed by: INTERNAL MEDICINE

## 2024-01-29 PROCEDURE — 99233 SBSQ HOSP IP/OBS HIGH 50: CPT | Performed by: HOSPITALIST

## 2024-01-29 PROCEDURE — 2140000000 HC CCU INTERMEDIATE R&B

## 2024-01-29 PROCEDURE — 6370000000 HC RX 637 (ALT 250 FOR IP)

## 2024-01-29 RX ADMIN — PANTOPRAZOLE SODIUM 40 MG: 40 TABLET, DELAYED RELEASE ORAL at 04:26

## 2024-01-29 RX ADMIN — CLOPIDOGREL BISULFATE 75 MG: 75 TABLET ORAL at 08:57

## 2024-01-29 RX ADMIN — SODIUM CHLORIDE, PRESERVATIVE FREE 10 ML: 5 INJECTION INTRAVENOUS at 08:58

## 2024-01-29 RX ADMIN — PRAVASTATIN SODIUM 40 MG: 40 TABLET ORAL at 08:57

## 2024-01-29 RX ADMIN — SODIUM CHLORIDE, PRESERVATIVE FREE 10 ML: 5 INJECTION INTRAVENOUS at 19:50

## 2024-01-29 RX ADMIN — NITROGLYCERIN 0.5 INCH: 20 OINTMENT TOPICAL at 04:25

## 2024-01-29 RX ADMIN — TIMOLOL MALEATE 1 DROP: 5 SOLUTION OPHTHALMIC at 08:56

## 2024-01-29 RX ADMIN — NITROGLYCERIN 0.5 INCH: 20 OINTMENT TOPICAL at 17:14

## 2024-01-29 RX ADMIN — PANTOPRAZOLE SODIUM 40 MG: 40 TABLET, DELAYED RELEASE ORAL at 17:14

## 2024-01-29 RX ADMIN — NITROGLYCERIN 0.5 INCH: 20 OINTMENT TOPICAL at 19:50

## 2024-01-29 NOTE — PLAN OF CARE
Problem: Discharge Planning  Goal: Discharge to home or other facility with appropriate resources  1/22/2024 1422 by Elodia New, RN  Outcome: Progressing  Flowsheets (Taken 1/22/2024 0800)  Discharge to home or other facility with appropriate resources: Identify barriers to discharge with patient and caregiver  Note: Patient from home per self, assess discharge needs.      Problem: Safety - Adult  Goal: Free from fall injury  1/22/2024 1422 by Elodia New, RN  Outcome: Progressing  Flowsheets (Taken 1/21/2024 0245 by Marybeth Lim, RN)  Free From Fall Injury: Instruct family/caregiver on patient safety  Note: Continue fall precautions, up with assist and walker to commode.      Problem: ABCDS Injury Assessment  Goal: Absence of physical injury  1/22/2024 1422 by Elodia New RN  Outcome: Progressing     Problem: Skin/Tissue Integrity  Goal: Absence of new skin breakdown  Description: 1.  Monitor for areas of redness and/or skin breakdown  2.  Assess vascular access sites hourly  3.  Every 4-6 hours minimum:  Change oxygen saturation probe site  4.  Every 4-6 hours:  If on nasal continuous positive airway pressure, respiratory therapy assess nares and determine need for appliance change or resting period.  1/22/2024 1422 by Elodia New RN  Outcome: Progressing     Problem: Chronic Conditions and Co-morbidities  Goal: Patient's chronic conditions and co-morbidity symptoms are monitored and maintained or improved  1/22/2024 1422 by Elodia New, RN  Outcome: Progressing     Problem: Hematologic - Adult  Goal: Maintains hematologic stability  1/22/2024 1422 by Elodia New, RN  Outcome: Progressing  Flowsheets (Taken 1/22/2024 0800)  Maintains hematologic stability: Assess for signs and symptoms of bleeding or hemorrhage  Note: Patient continues to have bloody stools, continue strict I&O, monitor stools. EGD today. Nuclear med bleeding scan today.      Problem: Cardiovascular - 
  Problem: Discharge Planning  Goal: Discharge to home or other facility with appropriate resources  1/22/2024 2213 by Luzmaria Brown RN  Outcome: Progressing  Flowsheets (Taken 1/22/2024 1945)  Discharge to home or other facility with appropriate resources: Identify barriers to discharge with patient and caregiver  1/22/2024 1422 by Elodia New RN  Outcome: Progressing  Flowsheets (Taken 1/22/2024 0800)  Discharge to home or other facility with appropriate resources: Identify barriers to discharge with patient and caregiver  Note: Patient from home per self, assess discharge needs.   1/22/2024 1421 by Elodia New RN  Outcome: Progressing  Flowsheets (Taken 1/22/2024 0800)  Discharge to home or other facility with appropriate resources: Identify barriers to discharge with patient and caregiver     Problem: Safety - Adult  Goal: Free from fall injury  1/22/2024 2213 by Luzmaria Brown RN  Outcome: Progressing  Flowsheets (Taken 1/21/2024 0245 by Marybeth Lim, RN)  Free From Fall Injury: Instruct family/caregiver on patient safety  1/22/2024 1422 by Elodia New RN  Outcome: Progressing  Flowsheets (Taken 1/21/2024 0245 by Marybeth Lim, RN)  Free From Fall Injury: Instruct family/caregiver on patient safety  Note: Continue fall precautions, up with assist and walker to commode.   1/22/2024 1421 by Elodia New RN  Outcome: Progressing     Problem: ABCDS Injury Assessment  Goal: Absence of physical injury  1/22/2024 2213 by Luzmaria Brown RN  Outcome: Progressing  Flowsheets (Taken 1/22/2024 0303 by Lambert Cassidy, RN)  Absence of Physical Injury: Implement safety measures based on patient assessment  1/22/2024 1422 by Elodia New, RN  Outcome: Progressing  1/22/2024 1421 by Elodia New RN  Outcome: Progressing     Problem: Skin/Tissue Integrity  Goal: Absence of new skin breakdown  Description: 1.  Monitor for areas of redness and/or skin breakdown  2.  Assess vascular access sites 
  Problem: Discharge Planning  Goal: Discharge to home or other facility with appropriate resources  1/26/2024 1031 by Carol Prieto RN  Outcome: Progressing  Flowsheets (Taken 1/26/2024 0454 by Marybeth Lim, RN)  Discharge to home or other facility with appropriate resources: Identify barriers to discharge with patient and caregiver  Note: Need colonoscopy first      Problem: Safety - Adult  Goal: Free from fall injury  1/26/2024 1031 by Carol Prieto RN  Outcome: Progressing  Flowsheets (Taken 1/26/2024 1030)  Free From Fall Injury: Instruct family/caregiver on patient safety  Note: Bed locked & in low position, call light in reach, side-rails up x2, bed/chair alarm utilized, non-slip socks on when ambulating, reminded patient to use call light to call for assistance.      Problem: Skin/Tissue Integrity  Goal: Absence of new skin breakdown  Description: 1.  Monitor for areas of redness and/or skin breakdown  2.  Assess vascular access sites hourly  3.  Every 4-6 hours minimum:  Change oxygen saturation probe site  4.  Every 4-6 hours:  If on nasal continuous positive airway pressure, respiratory therapy assess nares and determine need for appliance change or resting period.  1/26/2024 1031 by Carol Prieto RN  Outcome: Progressing     Problem: Chronic Conditions and Co-morbidities  Goal: Patient's chronic conditions and co-morbidity symptoms are monitored and maintained or improved  1/26/2024 1031 by Carol Prieto RN  Outcome: Progressing  Flowsheets (Taken 1/26/2024 0454 by Marybeth Lim, RN)  Care Plan - Patient's Chronic Conditions and Co-Morbidity Symptoms are Monitored and Maintained or Improved: Monitor and assess patient's chronic conditions and comorbid symptoms for stability, deterioration, or improvement     Problem: Cardiovascular - Adult  Goal: Maintains optimal cardiac output and hemodynamic stability  1/26/2024 1031 by Carol Prieto RN  Outcome: Progressing  Flowsheets (Taken 
  Problem: Discharge Planning  Goal: Discharge to home or other facility with appropriate resources  Outcome: Progressing  Flowsheets  Taken 1/21/2024 0245  Discharge to home or other facility with appropriate resources: Identify barriers to discharge with patient and caregiver  Taken 1/20/2024 2301  Discharge to home or other facility with appropriate resources: Identify barriers to discharge with patient and caregiver     Problem: Safety - Adult  Goal: Free from fall injury  Outcome: Progressing  Flowsheets (Taken 1/21/2024 0245)  Free From Fall Injury: Instruct family/caregiver on patient safety     Problem: ABCDS Injury Assessment  Goal: Absence of physical injury  Outcome: Progressing  Flowsheets (Taken 1/21/2024 0245)  Absence of Physical Injury: Implement safety measures based on patient assessment     Problem: Skin/Tissue Integrity  Goal: Absence of new skin breakdown  Description: 1.  Monitor for areas of redness and/or skin breakdown  2.  Assess vascular access sites hourly  3.  Every 4-6 hours minimum:  Change oxygen saturation probe site  4.  Every 4-6 hours:  If on nasal continuous positive airway pressure, respiratory therapy assess nares and determine need for appliance change or resting period.  Outcome: Progressing   Care plan reviewed with patient.  Patient verbalizes understanding of the care plan and contributed to goal setting.    
  Problem: Discharge Planning  Goal: Discharge to home or other facility with appropriate resources  Outcome: Progressing  Flowsheets  Taken 1/26/2024 0454 by Marybeth Lim RN  Discharge to home or other facility with appropriate resources: Identify barriers to discharge with patient and caregiver  Taken 1/25/2024 1600 by Wilver Toribio RN  Discharge to home or other facility with appropriate resources: Identify barriers to discharge with patient and caregiver     Problem: Safety - Adult  Goal: Free from fall injury  Outcome: Progressing  Flowsheets (Taken 1/26/2024 0454)  Free From Fall Injury: Instruct family/caregiver on patient safety     Problem: ABCDS Injury Assessment  Goal: Absence of physical injury  Outcome: Progressing  Flowsheets (Taken 1/26/2024 0454)  Absence of Physical Injury: Implement safety measures based on patient assessment     Problem: Skin/Tissue Integrity  Goal: Absence of new skin breakdown  Description: 1.  Monitor for areas of redness and/or skin breakdown  2.  Assess vascular access sites hourly  3.  Every 4-6 hours minimum:  Change oxygen saturation probe site  4.  Every 4-6 hours:  If on nasal continuous positive airway pressure, respiratory therapy assess nares and determine need for appliance change or resting period.  Outcome: Progressing     Problem: Chronic Conditions and Co-morbidities  Goal: Patient's chronic conditions and co-morbidity symptoms are monitored and maintained or improved  Outcome: Progressing  Flowsheets  Taken 1/26/2024 0454 by Marybeth Lim RN  Care Plan - Patient's Chronic Conditions and Co-Morbidity Symptoms are Monitored and Maintained or Improved: Monitor and assess patient's chronic conditions and comorbid symptoms for stability, deterioration, or improvement  Taken 1/25/2024 1600 by Wilver Toribio RN  Care Plan - Patient's Chronic Conditions and Co-Morbidity Symptoms are Monitored and Maintained or Improved:   Monitor and assess patient's chronic 
  Problem: Discharge Planning  Goal: Discharge to home or other facility with appropriate resources  Outcome: Progressing  Flowsheets (Taken 1/22/2024 0303)  Discharge to home or other facility with appropriate resources:   Identify barriers to discharge with patient and caregiver   Identify discharge learning needs (meds, wound care, etc)     Problem: Safety - Adult  Goal: Free from fall injury  Outcome: Progressing  Note: Bed locked & in low position, call light in reach, side-rails up x2, bed/chair alarm utilized, non-slip socks on when ambulating, reminded patient to use call light to call for assistance.      Problem: ABCDS Injury Assessment  Goal: Absence of physical injury  Outcome: Progressing  Flowsheets (Taken 1/22/2024 0303)  Absence of Physical Injury: Implement safety measures based on patient assessment     Problem: Skin/Tissue Integrity  Goal: Absence of new skin breakdown  Description: 1.  Monitor for areas of redness and/or skin breakdown  2.  Assess vascular access sites hourly  3.  Every 4-6 hours minimum:  Change oxygen saturation probe site  4.  Every 4-6 hours:  If on nasal continuous positive airway pressure, respiratory therapy assess nares and determine need for appliance change or resting period.  Outcome: Progressing  Note: Ongoing assessment & interventions provided throughout shift.  Skin assessments provided.  Encouraging/assisting patient to turn as needed.      Problem: Chronic Conditions and Co-morbidities  Goal: Patient's chronic conditions and co-morbidity symptoms are monitored and maintained or improved  Outcome: Progressing  Flowsheets (Taken 1/22/2024 0303)  Care Plan - Patient's Chronic Conditions and Co-Morbidity Symptoms are Monitored and Maintained or Improved:   Monitor and assess patient's chronic conditions and comorbid symptoms for stability, deterioration, or improvement   Collaborate with multidisciplinary team to address chronic and comorbid conditions and prevent 
  Problem: Discharge Planning  Goal: Discharge to home or other facility with appropriate resources  Outcome: Progressing  Flowsheets (Taken 1/27/2024 1043)  Discharge to home or other facility with appropriate resources: Identify barriers to discharge with patient and caregiver  Note: D/c to [facility]      Problem: Safety - Adult  Goal: Free from fall injury  Outcome: Progressing  Flowsheets  Taken 1/28/2024 0937  Free From Fall Injury: Instruct family/caregiver on patient safety  Taken 1/28/2024 0816  Free From Fall Injury: Instruct family/caregiver on patient safety  Note: Bed locked & in low position, call light in reach, side-rails up x2, bed/chair alarm utilized, non-slip socks on when ambulating, reminded patient to use call light to call for assistance.      Problem: Skin/Tissue Integrity  Goal: Absence of new skin breakdown  Description: 1.  Monitor for areas of redness and/or skin breakdown  2.  Assess vascular access sites hourly  3.  Every 4-6 hours minimum:  Change oxygen saturation probe site  4.  Every 4-6 hours:  If on nasal continuous positive airway pressure, respiratory therapy assess nares and determine need for appliance change or resting period.  Outcome: Progressing  Note: Turns self      Problem: Hematologic - Adult  Goal: Maintains hematologic stability  Outcome: Progressing  Flowsheets (Taken 1/27/2024 1043)  Maintains hematologic stability: Assess for signs and symptoms of bleeding or hemorrhage     Problem: Cardiovascular - Adult  Goal: Maintains optimal cardiac output and hemodynamic stability  Recent Flowsheet Documentation  Taken 1/28/2024 0816 by Carol Prieto, RN  Maintains optimal cardiac output and hemodynamic stability: Monitor blood pressure and heart rate     Problem: Cardiovascular - Adult  Goal: Absence of cardiac dysrhythmias or at baseline  Recent Flowsheet Documentation  Taken 1/28/2024 0816 by Carol Prieto, RN  Absence of cardiac dysrhythmias or at baseline: 
  Problem: Discharge Planning  Goal: Discharge to home or other facility with appropriate resources  Outcome: Progressing  Flowsheets (Taken 1/27/2024 1043)  Discharge to home or other facility with appropriate resources: Identify barriers to discharge with patient and caregiver  Note: Will d/c to the [facility]      Problem: Safety - Adult  Goal: Free from fall injury  Outcome: Progressing  Flowsheets (Taken 1/27/2024 0845)  Free From Fall Injury: Instruct family/caregiver on patient safety  Note: Bed locked & in low position, call light in reach, side-rails up x2, bed/chair alarm utilized, non-slip socks on when ambulating, reminded patient to use call light to call for assistance.      Problem: Skin/Tissue Integrity  Goal: Absence of new skin breakdown  Description: 1.  Monitor for areas of redness and/or skin breakdown  2.  Assess vascular access sites hourly  3.  Every 4-6 hours minimum:  Change oxygen saturation probe site  4.  Every 4-6 hours:  If on nasal continuous positive airway pressure, respiratory therapy assess nares and determine need for appliance change or resting period.  Outcome: Progressing     Problem: Hematologic - Adult  Goal: Maintains hematologic stability  Outcome: Progressing  Flowsheets (Taken 1/27/2024 1043)  Maintains hematologic stability: Assess for signs and symptoms of bleeding or hemorrhage     Problem: Cardiovascular - Adult  Goal: Maintains optimal cardiac output and hemodynamic stability  Outcome: Progressing  Flowsheets (Taken 1/27/2024 1043)  Maintains optimal cardiac output and hemodynamic stability: Monitor blood pressure and heart rate     Problem: Gastrointestinal - Adult  Goal: Minimal or absence of nausea and vomiting  Outcome: Progressing  Flowsheets (Taken 1/27/2024 1043)  Minimal or absence of nausea and vomiting: Administer IV fluids as ordered to ensure adequate hydration     Problem: ABCDS Injury Assessment  Goal: Absence of physical injury  Recent Flowsheet 
  Problem: Discharge Planning  Goal: Discharge to home or other facility with appropriate resources  Outcome: Progressing  Flowsheets (Taken 1/29/2024 1041)  Discharge to home or other facility with appropriate resources:   Identify barriers to discharge with patient and caregiver   Arrange for needed discharge resources and transportation as appropriate     Problem: Safety - Adult  Goal: Free from fall injury  Outcome: Progressing  Flowsheets (Taken 1/29/2024 1041)  Free From Fall Injury: Instruct family/caregiver on patient safety     Problem: ABCDS Injury Assessment  Goal: Absence of physical injury  Outcome: Progressing  Flowsheets (Taken 1/29/2024 1041)  Absence of Physical Injury: Implement safety measures based on patient assessment     Problem: Hematologic - Adult  Goal: Maintains hematologic stability  Outcome: Progressing  Flowsheets (Taken 1/29/2024 1041)  Maintains hematologic stability:   Assess for signs and symptoms of bleeding or hemorrhage   Monitor labs for bleeding or clotting disorders   Administer blood products/factors as ordered     Problem: Cardiovascular - Adult  Goal: Maintains optimal cardiac output and hemodynamic stability  Outcome: Progressing  Flowsheets (Taken 1/29/2024 1041)  Maintains optimal cardiac output and hemodynamic stability:   Monitor blood pressure and heart rate   Assess for signs of decreased cardiac output  Goal: Absence of cardiac dysrhythmias or at baseline  Outcome: Progressing  Flowsheets (Taken 1/29/2024 1041)  Absence of cardiac dysrhythmias or at baseline: Monitor cardiac rate and rhythm     Problem: Pain  Goal: Verbalizes/displays adequate comfort level or baseline comfort level  Outcome: Progressing  Flowsheets (Taken 1/29/2024 1041)  Verbalizes/displays adequate comfort level or baseline comfort level:   Encourage patient to monitor pain and request assistance   Assess pain using appropriate pain scale   Administer analgesics based on type and severity of pain 
Patient is 88-year-old female [facility] notable for atrial fibrillation, AAA, achalasia, previous GI hemorrhage, CHF, PAD who presented to [facility] 1/20 due to complaint of bright red blood per rectum.  Followed by black stools.  Patient has had multiple stents in her lower extremities as well as abdominal arteries.  Patient is on OAC and Plavix due to history of A-fib and PAD.  Underwent EGD 1/22.  She was intubated and then transferred to the ICU eventually extubated.  Patient denies any fevers, chills, nausea, vomiting, chest pain, shortness of breath.  Had a low-grade fever on 1/22/2024 of 100.6 no fevers following, no leukocytosis at this time however did have a WBC of 30 at 1 point..  I do not suspect that there is an underlying infectious process at this time.  I will discontinue antibiotics at this time.  Will continue to hold antihypertensives at this time.  Per GI continue to monitor H&H, transfuse as needed.  Continue PPI drip, continue octreotide drip.  Plan for colonoscopy tomorrow with Dr. Ku 12:30 PM.  The decision has been made in collaboration with cardiology to resume Plavix when stable and permanently stop OAC.  This may result in the patient needing a lower extremity amputation eventually.  
Patient's hospital course was reviewed at length with the patient. She expresses concern about her ability to tolerate anticoagulation as well as the possibility of amputation for her lower extremity. The patient was educated on the nuances of anticoagulation and antiplatelet therapy as well as goals of care in the context of her gastrointestinal as well as her cardiovascular care. The differences between current goals of stabilization with regards to hemodynamic stability versus long term care goals were discussed at length. The risks/benefits/alternatives of arterial line placement as well as central line placement were discussed at length with the patient with primary RN present. The patient verbalized understanding and consents to placement. Forms filled. A timeout was performed prior to procedure. Attempt was made to obtain right radial access after the patient was prepped. Access was obtained but could not be maintained secondary to known history of vascular disease. A second attempt to regain access was unsuccessful. The entirety of the attempts was proctored by [employer]. Dr. Ernesto Vergara arrived for assistance with arterial line placement; additional attempts to obtain vascular access were deferred to [employer] [facility] as well as Dr. Ernesto Vergara. No additional attempts were made by the resident physician to establish arterial access. Please refer to Dr. Ernesto Vergara's procedure note for further elaboration of the procedure details.     Electronically signed by Galo Rodriguez MD on 1/23/24 at 4:59 AM EST   
Resuscitation/Code Status Note on Karen Cruz (YOB: 1935)    At 2310 on January 20, 2024, resuscitation/code status decision was based on a thorough discussion with the patient.  The code status was made Limited Limited Code details: Intubation/Re-intubation No; Defibrillation/Cardioversion No; Chest Compressions No; Resuscitative Medications Yes; Other No Comment.    Will consult palliative care for GoC discussion.    Electronically signed by Ernesto Vergara MD on 1/20/24 at 11:11 PM EST    
conditions and prevent exacerbation or deterioration   Update acute care plan with appropriate goals if chronic or comorbid symptoms are exacerbated and prevent overall improvement and discharge     Problem: Hematologic - Adult  Goal: Maintains hematologic stability  Outcome: Progressing  Flowsheets (Taken 1/23/2024 1556)  Maintains hematologic stability:   Assess for signs and symptoms of bleeding or hemorrhage   Monitor labs for bleeding or clotting disorders   Administer blood products/factors as ordered     Problem: Cardiovascular - Adult  Goal: Maintains optimal cardiac output and hemodynamic stability  Outcome: Progressing  Flowsheets (Taken 1/23/2024 1556)  Maintains optimal cardiac output and hemodynamic stability:   Monitor blood pressure and heart rate   Monitor urine output and notify Licensed Independent Practitioner for values outside of normal range   Assess for signs of decreased cardiac output   Administer fluid and/or volume expanders as ordered     Problem: Cardiovascular - Adult  Goal: Absence of cardiac dysrhythmias or at baseline  Outcome: Progressing  Flowsheets (Taken 1/23/2024 1556)  Absence of cardiac dysrhythmias or at baseline:   Monitor cardiac rate and rhythm   Assess for signs of decreased cardiac output   Administer antiarrhythmia medication and electrolyte replacement as ordered     Problem: Skin/Tissue Integrity - Adult  Goal: Skin integrity remains intact  Outcome: Progressing  Flowsheets (Taken 1/23/2024 1556)  Skin Integrity Remains Intact:   Monitor for areas of redness and/or skin breakdown   Assess vascular access sites hourly     Problem: Gastrointestinal - Adult  Goal: Minimal or absence of nausea and vomiting  Outcome: Progressing  Flowsheets (Taken 1/23/2024 1556)  Minimal or absence of nausea and vomiting:   Administer IV fluids as ordered to ensure adequate hydration   Maintain NPO status until nausea and vomiting are resolved   Administer ordered antiemetic medications 
with multidisciplinary team to address chronic and comorbid conditions and prevent exacerbation or deterioration   Update acute care plan with appropriate goals if chronic or comorbid symptoms are exacerbated and prevent overall improvement and discharge     Problem: Hematologic - Adult  Goal: Maintains hematologic stability  Outcome: Progressing  Flowsheets (Taken 1/23/2024 1556 by Man Cerrato, RN)  Maintains hematologic stability:   Assess for signs and symptoms of bleeding or hemorrhage   Monitor labs for bleeding or clotting disorders   Administer blood products/factors as ordered     Problem: Cardiovascular - Adult  Goal: Maintains optimal cardiac output and hemodynamic stability  Outcome: Progressing  Flowsheets (Taken 1/24/2024 2000)  Maintains optimal cardiac output and hemodynamic stability:   Monitor blood pressure and heart rate   Monitor urine output and notify Licensed Independent Practitioner for values outside of normal range  Goal: Absence of cardiac dysrhythmias or at baseline  Outcome: Progressing  Flowsheets (Taken 1/23/2024 1556 by Man Cerrato, RN)  Absence of cardiac dysrhythmias or at baseline:   Monitor cardiac rate and rhythm   Assess for signs of decreased cardiac output   Administer antiarrhythmia medication and electrolyte replacement as ordered     Problem: Skin/Tissue Integrity - Adult  Goal: Skin integrity remains intact  Outcome: Progressing  Flowsheets (Taken 1/23/2024 1552 by Man Cerrato, RN)  Skin Integrity Remains Intact:   Monitor for areas of redness and/or skin breakdown   Assess vascular access sites hourly     Problem: Gastrointestinal - Adult  Goal: Minimal or absence of nausea and vomiting  Outcome: Progressing  Flowsheets (Taken 1/24/2024 2000)  Minimal or absence of nausea and vomiting: Administer IV fluids as ordered to ensure adequate hydration  Goal: Maintains or returns to baseline bowel function  Outcome: Progressing  Flowsheets (Taken 1/24/2024

## 2024-01-29 NOTE — PALLIATIVE CARE
Follow Up / Progress Note        Patient:   Karen Cruz  YOB: 1935  Age:  88 y.o.  Room:  [address] [place] [address]  MRN:  [id number]         Family/Patient Discussion:  Patient up in the chair with eyes closed.  This RN did not disturb.  No family at the bedside.        Plan/Follow-Up:  Discussed case with Milly MASTERS and no needs noted at this time.  Palliative care will remain available if needs arise and staff may call prn.         Electronically signed by Alexus Palma RN on 1/29/2024 at 11:06 AM             Palliative Care Office: [phone]

## 2024-01-30 VITALS
OXYGEN SATURATION: 94 % | HEART RATE: 84 BPM | RESPIRATION RATE: 16 BRPM | SYSTOLIC BLOOD PRESSURE: 95 MMHG | TEMPERATURE: 98.2 F | DIASTOLIC BLOOD PRESSURE: 61 MMHG | BODY MASS INDEX: 26.15 KG/M2 | HEIGHT: 65 IN | WEIGHT: 156.97 LBS

## 2024-01-30 LAB
ANION GAP SERPL CALC-SCNC: 7 MEQ/L (ref 8–16)
BUN SERPL-MCNC: 14 MG/DL (ref 7–22)
CALCIUM SERPL-MCNC: 8.4 MG/DL (ref 8.5–10.5)
CHLORIDE SERPL-SCNC: 104 MEQ/L (ref 98–111)
CO2 SERPL-SCNC: 28 MEQ/L (ref 23–33)
CREAT SERPL-MCNC: 1.1 MG/DL (ref 0.4–1.2)
DEPRECATED RDW RBC AUTO: 57.6 FL (ref 35–45)
ERYTHROCYTE [DISTWIDTH] IN BLOOD BY AUTOMATED COUNT: 16.4 % (ref 11.5–14.5)
GFR SERPL CREATININE-BSD FRML MDRD: 49 ML/MIN/1.73M2
GLUCOSE BLD STRIP.AUTO-MCNC: 108 MG/DL (ref 70–108)
GLUCOSE BLD STRIP.AUTO-MCNC: 114 MG/DL (ref 70–108)
GLUCOSE BLD STRIP.AUTO-MCNC: 89 MG/DL (ref 70–108)
GLUCOSE SERPL-MCNC: 80 MG/DL (ref 70–108)
HCT VFR BLD AUTO: 27.4 % (ref 37–47)
HGB BLD-MCNC: 8.6 GM/DL (ref 12–16)
MCH RBC QN AUTO: 30.7 PG (ref 26–33)
MCHC RBC AUTO-ENTMCNC: 31.4 GM/DL (ref 32.2–35.5)
MCV RBC AUTO: 97.9 FL (ref 81–99)
PLATELET # BLD AUTO: 120 THOU/MM3 (ref 130–400)
PMV BLD AUTO: 10.2 FL (ref 9.4–12.4)
POTASSIUM SERPL-SCNC: 3.9 MEQ/L (ref 3.5–5.2)
RBC # BLD AUTO: 2.8 MILL/MM3 (ref 4.2–5.4)
SODIUM SERPL-SCNC: 139 MEQ/L (ref 135–145)
WBC # BLD AUTO: 5.1 THOU/MM3 (ref 4.8–10.8)

## 2024-01-30 PROCEDURE — 6370000000 HC RX 637 (ALT 250 FOR IP): Performed by: INTERNAL MEDICINE

## 2024-01-30 PROCEDURE — 36415 COLL VENOUS BLD VENIPUNCTURE: CPT

## 2024-01-30 PROCEDURE — 99239 HOSP IP/OBS DSCHRG MGMT >30: CPT | Performed by: HOSPITALIST

## 2024-01-30 PROCEDURE — 6370000000 HC RX 637 (ALT 250 FOR IP)

## 2024-01-30 PROCEDURE — 97116 GAIT TRAINING THERAPY: CPT

## 2024-01-30 PROCEDURE — 2580000003 HC RX 258: Performed by: INTERNAL MEDICINE

## 2024-01-30 PROCEDURE — 97110 THERAPEUTIC EXERCISES: CPT

## 2024-01-30 PROCEDURE — 80048 BASIC METABOLIC PNL TOTAL CA: CPT

## 2024-01-30 PROCEDURE — 82948 REAGENT STRIP/BLOOD GLUCOSE: CPT

## 2024-01-30 PROCEDURE — 85027 COMPLETE CBC AUTOMATED: CPT

## 2024-01-30 RX ORDER — AMIODARONE HYDROCHLORIDE 200 MG/1
200 TABLET ORAL DAILY
DISCHARGE
Start: 2024-01-30 | End: 2024-02-29

## 2024-01-30 RX ORDER — FERROUS SULFATE 325(65) MG
325 TABLET ORAL
Qty: 180 TABLET | Refills: 1 | DISCHARGE
Start: 2024-01-30

## 2024-01-30 RX ORDER — METOPROLOL SUCCINATE 25 MG/1
12.5 TABLET, EXTENDED RELEASE ORAL DAILY
Qty: 30 TABLET | Refills: 3 | DISCHARGE
Start: 2024-01-31

## 2024-01-30 RX ADMIN — NITROGLYCERIN 0.5 INCH: 20 OINTMENT TOPICAL at 16:06

## 2024-01-30 RX ADMIN — PANTOPRAZOLE SODIUM 40 MG: 40 TABLET, DELAYED RELEASE ORAL at 08:12

## 2024-01-30 RX ADMIN — METOPROLOL SUCCINATE 12.5 MG: 25 TABLET, EXTENDED RELEASE ORAL at 08:12

## 2024-01-30 RX ADMIN — PANTOPRAZOLE SODIUM 40 MG: 40 TABLET, DELAYED RELEASE ORAL at 16:05

## 2024-01-30 RX ADMIN — TIMOLOL MALEATE 1 DROP: 5 SOLUTION OPHTHALMIC at 08:12

## 2024-01-30 RX ADMIN — SODIUM CHLORIDE, PRESERVATIVE FREE 10 ML: 5 INJECTION INTRAVENOUS at 08:12

## 2024-01-30 RX ADMIN — NITROGLYCERIN 0.5 INCH: 20 OINTMENT TOPICAL at 08:24

## 2024-01-30 RX ADMIN — PRAVASTATIN SODIUM 40 MG: 40 TABLET ORAL at 08:12

## 2024-01-30 RX ADMIN — CLOPIDOGREL BISULFATE 75 MG: 75 TABLET ORAL at 08:12

## 2024-01-30 RX ADMIN — NITROGLYCERIN 0.5 INCH: 20 OINTMENT TOPICAL at 04:05

## 2024-01-30 NOTE — DISCHARGE SUMMARY
Resident Discharge Summary (Hospitalist)      Patient Identification:   Karen Cruz   : 1935  MRN: [id number]   Account: [id number]   Patient's PCP: Srini Garces    Admit Date: 2024   Discharge Date: 2024  \    Admitting Physician: Gagandeep Herrera MD  Discharge Physician: Praneeth Jones DO       Discharge Diagnoses:  GI bleed, resolved: Patient had EGD done 2023 which demonstrated dilated esophagus with fluid, distal esophagitis, gastritis with elongated stomach.  Colonoscopy done 2024 shows some prominent rectal veins but no hemorrhoids near the anal verge.  Bleeding either diverticular versus prominent rectal veins.  Hemoglobin stable at this time around 8-9.  Did have black bowel movement 2024  Continue Protonix 40 mg twice daily  Follow-up with GI outpatient for possible repeat colonoscopy  Acute blood loss anemia: Secondary to above.  Hgb stable at this time around 8-9.  Patient is status post 2 units PRBC.  Will continue to monitor at this time.  a.  Started on iron 325 mg every other day  CKD 3b: Creatinine 1.2 at this time on a baseline of 1.4-1.6.  Strict intake and output  Avoid nephrotoxic agents  Renally dose medications  Paroxysmal atrial fibrillation: [id number] score 6, has bleed 7.  Home medications include metoprolol succinate 12.5 mg daily, amiodarone 200 mg, Eliquis 5 mg twice daily.  TSH  4.5.  Follow-up with cardiology outpatient for possible Watchman procedure.  Per GI and cardiology: Discontinue Eliquis on discharge.  Chronic HFimpEF: EF 50% on  status post AICD plus PPM.  GDMT includes Toprol XL 12.5 mg daily, also on Bumex at home.  Follow-up with heart failure clinic  Severe PAD: CT with runoff 10/10/2023 shows 60 to 70% stenosis of the CFA, 30% stenosis of the proximal left EIA, 90% stenosis of proximal left SFA occlusion.  S/p recent angioplasty of right PTA, right ARACELIS, right popliteal artery due to AL I by Dr. Vergara 2023.  Takes

## 2024-01-30 NOTE — CARE COORDINATION
01/21/24 1048   Service Assessment   Patient Orientation Alert and Oriented   Cognition Alert   History Provided By Medical Record;Patient   Primary Caregiver Self   Accompanied By/Relationship none   Support Systems Family Members;Children   Patient's Healthcare Decision Maker is: Named in Scanned ACP Document   PCP Verified by CM Yes   Last Visit to PCP Within last 3 months   Prior Functional Level Independent in ADLs/IADLs   Current Functional Level Independent in ADLs/IADLs   Can patient return to prior living arrangement Yes   Ability to make needs known: Good   Family able to assist with home care needs: No   Would you like for me to discuss the discharge plan with any other family members/significant others, and if so, who? No   Financial Resources Medicare   Community Resources None   CM/SW Referral ADLs/IADLs   Social/Functional History   Lives With Alone   Type of Home Condo  ([facility])   Home Layout One level   Home Equipment Walker, rolling   Active  No   Patient's  Info son Mohinder   Discharge Planning   Type of Residence House;Apartment   Living Arrangements Alone   Current Services Prior To Admission Durable Medical Equipment   Current DME Prior to Arrival Walker   Potential Assistance Needed N/A   DME Ordered? Bedside commode;Walker   Potential Assistance Purchasing Medications No   Type of Home Care Services None   Patient expects to be discharged to: [place]   Follow Up Appointment: Best Day/Time  Monday AM   One/Two Story Residence One story   History of falls? 1   Services At/After Discharge   Transition of Care Consult (CM Consult) N/A   Services At/After Discharge None   [place] Resource Information Provided? No   Mode of Transport at Discharge Other (see comment)  (family)   Confirm Follow Up Transport Family   Condition of Participation: Discharge Planning   The Plan for Transition of Care is related to the following treatment goals: GIB Treatment   Freedom of Choice list was 
01/26/24 7:03 AM    Pt transferred to 3B30. Handoff report given iLsa, 3B CM  
1/22/24, 1:27 PM EST    DISCHARGE ON GOING EVALUATION    Karen ABURTO [facility] day: 2  Location: [facility] ENDO POOL RM/NONE Reason for admit: GIB (gastrointestinal bleeding) [K92.2]  Gastrointestinal hemorrhage, unspecified gastrointestinal hemorrhage type [K92.2]   Procedure:   1/21 CTA abdomen:   1. Dilated and fluid-filled esophagus with a few small globular and linear areas of contrast density material in the most cephalad partially visualized portion of the esophagus. Findings are presumably the source of GI bleeding. No evidence of lower GI bleeding  1/21 NM GI bleeding scan: No scintigraphic evidence of active gastrointestinal bleeding.   1/22 EGD  Barriers to Discharge: Hospitalist and GI following. Rectal bleeding. Hypotension. Fluid bolus given. Received 2 units PRBCs. Plan emergent EDG today. IVF. Octreotide gtt. Protonix gtt.   PCP: Srini Garces  Readmission Risk Score: 19.4%  Patient Goals/Plan/Treatment Preferences: Plans to return home to the [facility]. Has dme. Denied need for HH .               
1/23/24, 1:50 PM EST    DISCHARGE ON GOING EVALUATION    Karen ABURTO [facility] day: 3  Location: -04/004-A Reason for admit: GIB (gastrointestinal bleeding) [K92.2]  Gastrointestinal hemorrhage, unspecified gastrointestinal hemorrhage type [K92.2]   Procedure:   1/21 CTA abdomen: Dilated and fluid-filled esophagus with a few small globular and linear areas of contrast density material in the most cephalad partially visualized portion of the esophagus. Findings are presumably the source of GI bleeding. No evidence of lower GI bleeding  1/21 NM GI bleeding scan: No scintigraphic evidence of active gastrointestinal bleeding.   1/22 EGD: Sloughing of esophageal mucosa, cold biopsies taken; Mild diffuse gastritis with cold biopsies taken; multiple gastric polyps in gastric body; Areas of friable tissue w/bleeding in gastric body - bleeding controlled w/APC; duodenitis in duodenal bulb  1/22 Respiratory Code in EGD; intubated  1/22 Extubated in ICU  1/23 CVC Right subclavian  1/23 XR Left shoulder:  Questionable loose bodies versus calcified lymph nodes; Moderately severe degenerative changes left shoulder. No acute findings  1/23 CT Abd/pelvis:  Colonic diverticulosis without diverticulitis is visualized. No bowel obstruction, fluid collection, or free air is observed; A small left pleural effusion and consolidation in the left lower lobe are incompletely visualized at the limited images through the lung bases and can indicate pneumonia in the appropriate clinical setting. Follow-up to ensure resolution is advised.  1/23 CXR: Increased left infrahilar atelectasis with subtle infiltrate not excluded from the left medial base    Barriers to Discharge: Pt had EGD yesterday with APC to gastric bleed - see note above. Aspirated at end of procedure and was respiratory code, intubated. Transferred to ICU. Extubated yesterday. Had one large bloody stool after EGD, none since. CVC and gregorio placed. Hypotensive and 
1/24/24, 3:12 PM EST    DISCHARGE ON GOING EVALUATION    Karen ABURTO [facility] day: 4  Location: -04/004-A Reason for admit: GIB (gastrointestinal bleeding) [K92.2]  Gastrointestinal hemorrhage, unspecified gastrointestinal hemorrhage type [K92.2]   Procedure:   1/21 CTA abdomen: Dilated and fluid-filled esophagus with a few small globular and linear areas of contrast density material in the most cephalad partially visualized portion of the esophagus. Findings are presumably the source of GI bleeding. No evidence of lower GI bleeding  1/21 NM GI bleeding scan: No scintigraphic evidence of active gastrointestinal bleeding.   1/22 EGD: Sloughing of esophageal mucosa, cold biopsies taken; Mild diffuse gastritis with cold biopsies taken; multiple gastric polyps in gastric body; Areas of friable tissue w/bleeding in gastric body - bleeding controlled w/APC; duodenitis in duodenal bulb  1/22 Respiratory Code in EGD; intubated  1/22 Extubated in ICU  1/23 CVC Right subclavian  1/23 XR Left shoulder:  Questionable loose bodies versus calcified lymph nodes; Moderately severe degenerative changes left shoulder. No acute findings  1/23 CT Abd/pelvis:  Colonic diverticulosis without diverticulitis is visualized. No bowel obstruction, fluid collection, or free air is observed; A small left pleural effusion and consolidation in the left lower lobe are incompletely visualized at the limited images through the lung bases and can indicate pneumonia in the appropriate clinical setting. Follow-up to ensure resolution is advised.    Barriers to Discharge: Will need colonoscopy when medically stable. Received 1 PRBC overnight. Vasopressin drip weaned off today. Continues on clear liquid diet.     Afebrile. NSR. Sats 96% on 2L O2. Ox4. Follows commands. PT/OT. Intensivist and GI following. Palliative Care following. Limited Code - no x3, meds only. Telemetry, gregorio, CVC, razo, SCDs. Levo @ 8 mcg/min, protonix @ 8 mg/hr, SSI, 
1/25/24, 3:14 PM EST    DISCHARGE ON GOING EVALUATION    Karen ABURTO [facility] day: 5  Location: -04/004-A Reason for admit: GIB (gastrointestinal bleeding) [K92.2]  Gastrointestinal hemorrhage, unspecified gastrointestinal hemorrhage type [K92.2]   Procedure:   1/21 CTA abdomen: Dilated and fluid-filled esophagus with a few small globular and linear areas of contrast density material in the most cephalad partially visualized portion of the esophagus. Findings are presumably the source of GI bleeding. No evidence of lower GI bleeding  1/21 NM GI bleeding scan: No scintigraphic evidence of active gastrointestinal bleeding.   1/22 EGD: Sloughing of esophageal mucosa, cold biopsies taken; Mild diffuse gastritis with cold biopsies taken; multiple gastric polyps in gastric body; Areas of friable tissue w/bleeding in gastric body - bleeding controlled w/APC; duodenitis in duodenal bulb  1/22 Respiratory Code in EGD; intubated  1/22 Extubated in ICU  1/23 CVC Right subclavian  1/23 XR Left shoulder:  Questionable loose bodies versus calcified lymph nodes; Moderately severe degenerative changes left shoulder. No acute findings  1/23 CT Abd/pelvis:  Colonic diverticulosis without diverticulitis is visualized. No bowel obstruction, fluid collection, or free air is observed; A small left pleural effusion and consolidation in the left lower lobe are incompletely visualized at the limited images through the lung bases and can indicate pneumonia in the appropriate clinical setting. Follow-up to ensure resolution is advised.    Barriers to Discharge: Plan for colonoscopy tomorrow with Dr. Ku at 1230. Plan per GI in collaboration w/Cardiology to resume plavix when stable and permanently stop OAC. Weaned off levophed today. Transferring to stepdown today.    Afebrile. NSR. Sats 91% on 1L O2. Ox4. Follows commands. PT/OT. Intensivist and GI following. Palliative Care following. Limited Code - no x3, meds only. 
1/25/24, 3:28 PM EST    DISCHARGE PLANNING EVALUATION     SW spoke with [facility] for potential referral. Bed will not be available until Saturday, PRECERT is needed if [facility] accept.   
1/26/24, 1:56 PM EST    DISCHARGE ON GOING EVALUATION    Karen ABURTO [facility] day: 6  Location: 3B-30 Reason for admit: GIB (gastrointestinal bleeding) [K92.2]  Gastrointestinal hemorrhage, unspecified gastrointestinal hemorrhage type [K92.2]   Procedure:   1/21 CTA abdomen: Dilated and fluid-filled esophagus with a few small globular and linear areas of contrast density material in the most cephalad partially visualized portion of the esophagus. Findings are presumably the source of GI bleeding. No evidence of lower GI bleeding.  1/21 NM GI bleeding scan: No scintigraphic evidence of active gastrointestinal bleeding.   1/22 EGD: Sloughing of esophageal mucosa, cold biopsies taken; Mild diffuse gastritis with cold biopsies taken; multiple gastric polyps in gastric body; Areas of friable tissue w/bleeding in gastric body - bleeding controlled w/APC; duodenitis in duodenal bulb  1/22 Respiratory Code in EGD; intubated  1/22 Extubated in ICU  1/23 CVC Right subclavian  1/23 XR Left shoulder:  Questionable loose bodies versus calcified lymph nodes; Moderately severe degenerative changes left shoulder. No acute findings  1/23 CT Abd/pelvis:  Colonic diverticulosis without diverticulitis is visualized. No bowel obstruction, fluid collection, or free air is observed; A small left pleural effusion and consolidation in the left lower lobe are incompletely visualized at the limited images through the lung bases and can indicate pneumonia in the appropriate clinical setting.  1/26 Colonoscopy with Dr. Ku: results pending.     Barriers to Discharge: Transferred from ICU. Hospitalist and GI following. Colonoscopy today. PT/OT. BUN 21, creatinine 1.4.  Platelets 99. Hgb 8.9. Protonix iv bid.     PCP: Srini Garces  Readmission Risk Score: 21.3%  Patient Goals/Plan/Treatment Preferences: From the [facility]. Planning [facility] at discharge, precert has been approved. SW following. 
1/26/24, 7:18 AM EST    DISCHARGE BARRIERS        Patient transferred to 3B 30. Report given to unit SW, Lorena MAY, regarding discharge plan for this patient.   
1/26/24, 9:03 AM EST    DISCHARGE PLANNING EVALUATION    Spoke with Adia at [facility].  She reports that they started patients precert today.      Update 11:44 am: Spoke with Adia at [facility].  Karen's precert has been approved.  It is good through 1/30/2024.  Spoke with Karen and she is agreeable to go to [facility] at discharge.  She told SW that her son will be able to transport.   
1/30/24, 10:46 AM EST    Patient goals/plan/ treatment preferences discussed by  and .  Patient goals/plan/ treatment preferences reviewed with patient/ family.  Patient/ family verbalize understanding of discharge plan and are in agreement with goal/plan/treatment preferences.  Understanding was demonstrated using the teach back method.  AVS provided by RN at time of discharge, which includes all necessary medical information pertaining to the patients current course of illness, treatment, post-discharge goals of care, and treatment preferences.     Services At/After Discharge: Skilled Nursing Facility (SNF), Nursing service, OT, and PT       IMM Letter  IMM Letter given to Patient/Family/Significant other/Guardian/POA/by:: Lisa MASTERS   IMM Letter date given:: 01/30/24  IMM Letter time given:: 1023     Karen will be discharged to [facility] today.  Her family will be here about 7:30 pm to pick her up. She does not want to pay for an ambulette to get her their sooner.  Called and spoke with Chato in admissions for [facility] and they are OK with her coming that late in the day. She will be covered by her [facility] Medicare benefit.    
DISCHARGE PLANNING EVALUATION  1/25/24, 1:32 PM EST    Reason for Referral: Therapy recommending SNF.  Mental Status: Alert and oriented.  Decision Making: makes own decisions.   Family/Social/Home Environment: Pt states she resides at the [facility] on the fourth floor. Pt states they do have an elevator. Pt states she is able to live independently, does her own sink bath, cooks, and cleans. Pt states she has 2 children that live local, her son provides transportation to doctors appointments and does the grocery shopping and laundry. Pt states her 2 children call her everyday and her neighbors check in on her frequently.   Current Services including food security, transportation and housekeeping: see note above.  Current Equipment:Pt states she uses a walker in her home, has Life Line and a high rise toilet seat.   Payment Source: [facility] Medicare  Concerns or Barriers to Discharge: None reported.   Post-acute (PAC) provider list was provided to patient. Patient was informed of their freedom to choose PAC provider. Discussed and offered to show the patient the relevant PAC Providers quality and resource use measures on Medicare Compare web site via computer based on patient's goals of care and treatment preferences. Questions regarding selection process were answered.      Teach Back Method used with pt regarding care plan and discharge planning.  Patient verbalized understanding of the plan of care and contribute to goal setting.       Patient goals, treatment preferences and discharge plan: Pt states she prefers to return home at discharge and is open to HH with therapy. Back up plan is IPR or [facility].   SW spoke with therapy, pt does not meet criteria for IPR.    Electronically signed by GIRISH Blum on 1/25/2024 at 1:32 PM          
Srini TAPIA  Readmission Risk Score: 19%  Patient Goals/Plan/Treatment Preferences:  From the [facility]. Planning [facility] at discharge, alex has been approved. SW following.

## 2024-01-30 NOTE — PROGRESS NOTES
Internal Medicine Resident  Progress Note      Patient:  Karen Cruz    Unit/Bed:[facility] ENDO POOL RM/NONE  YOB: 1935  MRN: [id number]   Acct: [id number]   PCP: Srini Garces  Date of Admission: 1/20/2024  Date of Service: Pt seen/examined on 01/26/24      Assessment/Plan:  GI bleed: Patient had EGD done 7/19/2023 which demonstrated dilated esophagus with fluid, distal esophagitis, gastritis with elongated stomach.  Colonoscopy done 1/26/2024 shows some prominent rectal veins but no hemorrhoids near the anal verge.  Bleeding either diverticular versus prominent rectal veins.  Hemoglobin stable at this time around 8-9.  Did have black bowel movement 1/25/2024  GI following  Protonix drip changed to 40 mg Protonix twice daily  Hemoglobin stable at this time, will get daily CBC  Transfuse if Hgb less than 7 or signs and symptoms of acute anemia.  Octreotide drip stopped as patient does not have variceal bleed.  Acute blood loss anemia: Secondary to above.  Hgb stable at this time around 8-9.  Patient is status post 2 units PRBC.  Will continue to monitor at this time.  CKD 3b: Creatinine 1.4 at this time on a baseline of 1.4-1.6.  Strict intake and output  Avoid nephrotoxic agents  Renally dose medications  Paroxysmal atrial fibrillation: [id number] score 6, has bleed 7.  Home medications include metoprolol succinate 12.5 mg daily, amiodarone 200 mg, Eliquis 5 mg twice daily.  TSH 12/23 4.5.  Maintain potassium greater than 4  Maintain magnesium greater than 2  Metoprolol held in the setting of acute hypotension.  Will need outpatient follow-up with cardiology for discussion of possible Watchman procedure.  Per GI: Will discontinue Eliquis on discharge.  Chronic HFimpEF: EF 50% on 5/23 status post AICD plus PPM.  GDMT includes Toprol XL 12.5 mg daily, also on Bumex at home.  Strict intake and output  Low-sodium diet  Bumex held in the setting of hypotension will resume when 
        Hospitalist Progress Note    Patient:  Karen Cruz    YOB: 1935  Unit/Bed:3B-34/034-A  Date of Admission: 1/20/2024      Assessment/Plan:    Non-variceal upper vs Lower GIB: extensive GI Hx. h/o large HH, internal hemorrhoids s/p hemorrhoidectomy x2 (last one 6/7/23), cecal mass removal w/ ileocolic resection and primary ileocolic anastomosis 2/25/20. Most recent EGD 7/19/23 demonstrated dilated esophagus, and no evidence of bleeding.   GI following-suspect diverticular bleeding, no plan for urgent endoscopy at this time  Hold anticoagulation/antiplatelets, h&H every 6 hours    Acute blood loss anemia: Likely due to #1.  s/p 2 units PRBCs (1 unit transfused 1/20/2024, 1 unit transferred 1/22/24). H&H every 6 hrs. transfuse for hemoglobin less than 7 or hemodynamic instability.    Chronic HFpEF, NICM s/p AICD/PPM: Echo 5/31/23 shows EF 50%. Does not appear acutely decompensated. Continue home medications. Strict I&Os. Daily weights. Fluid/salt restriction.   Referral to HF outpatient clinic on discharge    PAF, likely secondary hypercoagulable state: EKG on admission showed sinus arrhythmia. [id number] 6.  Apixaban held.  Continue metoprolol and amiodarone.    CKD stage III: Cr 1.7 on admission. Baseline creatinine is about 1.5-1.7. Continue home medications.  Avoid nephrotoxic agents as possible. Monitor with daily labs.    Severe multifocal PAD: stable.  Noted on CTA AP w/ runoffs 10/10/23.  S/p recent angioplasty of right PTA R ARACELIS and right popliteal artery d/t ALI by Dr. Vergara on 5/31/23    Essential hypertension: Stable, continue to monitor    Nonobstructive CAD: [facility] 12/28/2019 with nonobstructive CAD.  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 
  CRITICAL CARE PROGRESS NOTE      Patient:  Karen Cruz    Unit/Bed:4D-04/004-A  YOB: 1935  MRN: [id number]   PCP: Srini Garces  Date of Admission: 1/20/2024  Chief Complaint:- bright red blood per rectum     Assessment and Plan:    Non-Variceal Upper GI Bleed: History of internal hemorrhoids s/p hemorrhoidectomy 6/7/2023, cecal mass removal with ilio colic resection and primary ileocolic anastomoses 2/25/2020, many other abdominal surgeries.  EGD 7/19/2023 demonstrated dilated esophagus filled with fluid, distal esophagitis, gastritis with elongated stomach.  Colonoscopy 2/22/2020 with cecal mass and extensive diverticulosis involving the sigmoid colon.  EGD 1/22/2024 demonstrated esophagus filled with fluid and food.  Sloughing of esophageal mucosa, mild diffuse gastritis, multiple 2 to 4 mm gastric polyps, multiple areas of friable tissue with bleeding in gastric body.  Bleeding controlled with APC on gastric settings.  Duodenitis in duodenal bulb.  Bright red blood per rectum, patient would benefit from colonoscopy as well.  GI following  Received 1 unit PRBC overnight 1/24  Continue Protonix drip  Octreotide drip discontinued 1/23  Clear liquid diet  Holding OAC and Plavix, will resume Plavix when medically stable.   Acute blood loss anemia: 2/2 above.  Hemoglobin 8.4 s/p 2 unit PRBC.  H&H every 8 hours.  Transfuse PRBC if symptomatic or hemodynamically unstable.  Monitor for fluid overload in setting of heart failure.1/24 hemoglobin 8.2  Septic shock, improving: In the setting of aspiration, and GI bleed. Patient on pressors x 2.  CT abdomen pelvis negative for any acute findings.  Wean pressors as able.  Aspiration Event: Pt aspirated during EGD. WBC now 30.  CXR Increased left infrahilar atelectasis with subtle infiltrate. Respiratory culture ordered.  Patient on Zosyn prophylactically.  ALEJANDRO on CKD GB 3, improving: In the setting of septic shock. Serum creatinine on admission 1.7.  
  CRITICAL CARE PROGRESS NOTE      Patient:  Karen Cruz    Unit/Bed:4D-04/004-A  YOB: 1935  MRN: [id number]   PCP: Srini Garces  Date of Admission: 1/20/2024  Chief Complaint:- bright red blood per rectum     Assessment and Plan:    Non-Variceal Upper GI Bleed versus diverticular bleed: History of internal hemorrhoids s/p hemorrhoidectomy 6/7/2023, cecal mass removal with ilio colic resection and primary ileocolic anastomoses 2/25/2020, many other abdominal surgeries.  EGD 7/19/2023 demonstrated dilated esophagus filled with fluid, distal esophagitis, gastritis with elongated stomach.  Colonoscopy 2/22/2020 with cecal mass and extensive diverticulosis involving the sigmoid colon.  EGD 1/22/2024 demonstrated esophagus filled with fluid and food.  Sloughing of esophageal mucosa, mild diffuse gastritis, multiple 2 to 4 mm gastric polyps, multiple areas of friable tissue with bleeding in gastric body.  Bleeding controlled with APC on gastric settings.  Duodenitis in duodenal bulb.  Bright red blood per rectum, patient would benefit from colonoscopy as well.  GI following  Continue Protonix drip  Continue octreotide drip  CT abd/pelvis without contrast  N.p.o.  Holding OAC and Plavix  Acute blood loss anemia: 2/2 above.  Hemoglobin 8.4 s/p 2 unit PRBC.  H&H every 8 hours.  Transfuse PRBC if symptomatic or hemodynamically unstable.  Monitor for fluid overload in setting of heart failure.  Septic shock: In the setting of aspiration, and GI bleed.  Concern for possible intestinal perforation.  Patient on pressors x 2.  On bicarb drip.  CT abdomen pelvis without contrast.  Wean pressors as able.  HAGMA: In the setting of sepsis on pressors x 2.  Bicarb 12, pH on VBG 7.16.  Lactic acid pending.  Patient on bicarb drip 100 cc an hour.  BMP every Q6h.   Aspiration Event: Pt aspirated during EGD. WBC now 30.  CXR Increased left infrahilar atelectasis with subtle infiltrate. Respiratory culture ordered.  
  Pharmacy Note - Extended Infusion Beta-Lactam Dose Adjustment    Piperacillin/Tazobactam 3375 mg q12h extended infusion ordered for the treatment of Sepsis of unknown etiology. Per [facility] Extended Infusion Beta-Lactam Policy, this will be changed to 3375 mg q8h extended infusion     Estimated Creatinine Clearance: Estimated Creatinine Clearance: 20 mL/min (A) (based on SCr of 2.1 mg/dL (H)).    Dialysis Status, ALEJANDRO, CKD: ALEJANDRO - improving    BMI: Body mass index is 28.29 kg/m².    Rationale for Adjustment: Dose adjusted per [facility] Extended Infusion Policy based on renal function and indication. The above medication is renally eliminated and demonstrates time-dependent effects on bacterial eradication. Extended-infusion dosing strategy aims to enhance microbiologic and clinical efficacy.     Pharmacy will monitor renal function daily and adjust dose as necessary.      Please call with any questions.    Thank you,  Jose Shepherd, PharmD  1/24/2024 10:00 AM      
 [facility]  INPATIENT PHYSICAL THERAPY  DAILY NOTE  [facility] ICU 4D - 4D-04/004-A    Time In: 1339  Time Out: 1433  Timed Code Treatment Minutes: 54 Minutes  Minutes: 54          Date: 2024  Patient Name: Karen Cruz,  Gender:  female        MRN: [id number]  : 1935  (88 y.o.)     Referring Practitioner: Edin Richey DO  Diagnosis: GIB (gastrointestinal bleeding)  Additional Pertinent Hx: Patient is an 88-year-old female with PMH notable for atrial fibrillation, AAA, achalasia, anemia, arthritis, CHF, CKD, colon cancer, GI hemorrhage, GERD, hiatal hernia, HLD, PAD who presented to [facility] on  due to complaint of bright red blood per rectum.  Patient states that the morning of  she had a small amount of bright red blood during a bowel movement.  She states that later in the afternoon between 12 PM and 4 PM she had increasing amounts of blood per rectum with blood clots.  Patient reports taking OAC and Plavix due to history of atrial fibrillation and PAD.  Patient states that she has had multiple stents in her lower extremities as well as abdominal arteries.  She also endorses significant GI history which includes large hiatal hernia, internal hemorrhoids s/p hemorrhoidectomy, cecal mass with removal and ileal colic resection as well as EGDs for gastritis and GI bleeds.  On  patient underwent diagnostic endoscopic evaluation of GI bleeding during the EGD patient aspirated and had to be intubated and transferred to the ICU for further care.  Was transferred to ICU patient able to be extubated successfully.     Prior Level of Function:  Lives With: Alone  Type of Home: [employer] ([place])  Home Layout: One level (4th floor- elevator access)  Home Access: Level entry  Home Equipment: Walker, rolling, Sock aid, Reacher   Bathroom Shower/Tub: Walk-in shower (has been sponge bathing)  Bathroom Toilet: Handicap height  Bathroom Equipment: Shower chair, Grab bars around toilet    ADL 
CLINICAL PHARMACY: DISCHARGE MED RECONCILIATION/REVIEW    [facility] Select Patient?: No  Total # of Interventions Recommended: 1 -   - Discontinued Medication #: 1  Total # Interventions Accepted: 1  Intervention Severity:   - Level 1 Intervention Present?: No   - Level 2 #: 0   - Level 3 #: 1   Time Spent (min): 15    Additional Documentation:   
Called Dr. Ku regarding patients intolerance to breakfast and lunch. Believes she still has food at the bottom of her esophagus and encouraged for the patient to be sitting up.   
[facility]   PROGRESS NOTE      Patient: Karen Cruz  Room #: 4D-04/004-A            YOB: 1935  Age: 88 y.o.  Gender: female            Admit Date & Time: 1/20/2024  5:18 PM    Assessment:    The patient declined a visit at this time.    Interventions:  The patient was provided information about Spiritual Care being available.     Outcomes:  The  politely wished the patient a positive day.     Plan:  1.Spiritual care will continue to follow the patient according to [facility] spiritual care SOP.       Electronically signed by Chaplain Kareem, on 1/24/2024 at 11:22 AM.  Spiritual Care Department  [facility]  [phone]     01/24/24 1121   Encounter Summary   Encounter Overview/Reason  Follow-up   Service Provided For: Patient;Family;Patient and family together   Referral/Consult From: [facility]   Support System Family members   Last Encounter  01/24/24   Complexity of Encounter Low   Begin Time 1010   End Time  1015   Total Time Calculated 5 min   Spiritual/Emotional needs   Type Spiritual Support   Assessment/Intervention/Outcome   Assessment Coping   Intervention Empowerment   Outcome Refused/Declined       
Comprehensive Nutrition Assessment    Type and Reason for Visit:  Initial, NPO/Clear Liquid (6 Days)    Nutrition Recommendations/Plan:   Continue NPO   Advance diet per GI s/p colonoscopy   Start ONS as able once PO diet resumed and full liquids or greater: Ensure Compact (TID)  Recommend MVI  Will monitor need for additional nutrition interventions.      Malnutrition Assessment:  Malnutrition Status:  Moderate malnutrition (01/26/24 1044)    Context:  Chronic Illness     Findings of the 6 clinical characteristics of malnutrition:  Energy Intake:  75% or less estimated energy requirements for 1 month or longer (NPO/Clear liquids 6 days, poor PO intake reported PTA)  Weight Loss:   (-9.1% 5 months and -16.7% 7 months; difficult to assess r/t weight fluctuations this admission, hx/o CHF, and current fluid status)     Body Fat Loss:  Mild body fat loss (note: some losses r/t age related losses) Orbital   Muscle Mass Loss:  Mild muscle mass loss (note: some losses r/t age related losses) Temples (temporalis), Clavicles (pectoralis & deltoids)  Fluid Accumulation:  Mild Extremities   Strength:  Not Performed    Nutrition Assessment:     Pt. moderately malnourished AEB criteria as listed above.  At risk for further nutrition compromise r/t non-variceal upper GI bleed - extensive bowel history: hx/o internal hemorrhoids s/p hemorrhoidectomy 6/7/2023, cecal mass removal with ilio colic resection and primary ileocolic anastomoses 2/25/20, G tube placement and closure of abdomen 10/8/20, abdominal aortic aneurysm repair 2/15/19, acute blood loss anemia 2/2 upper GI bleed, septic shock - improved, aspiration event during EGD, CKD BG III - in setting of septic shock, PAF, advanced age, and underlying medical condition (PMHx: CHF, CKD, colon cancer, HTN, HLD, obesity, osteoporosis, SBO).        Nutrition Related Findings:    Pt. Report/Treatments/Miscellaneous: Pt seen, reports that she feels hungry currently and is eager 
EGD completed. Photos taken. 2 specimens taken and 2 jars labeled and sent to lab for processing. APC used on stomach and esophageal settings. At end of procedure patient began to desaturate. Code Blue called. Patient intubated by CRNA and code team arrived. Patient manually oxygenated via ambu bag. Patient responded to manual ventilation. Patient transferred to ICU bed 4.    Scope #  used.  
Gastroenterology Progress Note:     Patient Name:  Karen Cruz   MRN: [id number]  [id number]  YOB: 1935  Admit Date: 1/20/2024  5:18 PM  Primary Care Physician: Srini Garces   3B-30/030-A     Patient seen and examined.  24 hours events and chart reviewed.    Subjective: Patient sitting up in the chair. Denies abd pain, n/v. She is tolerating a diet. No melena or hematochezia. Hgb 8.9    Objective:  BP (!) 103/49   Pulse 68   Temp 97.9 °F (36.6 °C) (Oral)   Resp 18   Ht 1.651 m (5' 5\")   Wt 67.2 kg (148 lb 2.4 oz)   SpO2 97%   BMI 24.65 kg/m²     Physical Exam:    General:  Chronically ill appearing female  HEENT: Atraumatic, normocephalic. Moist oral mucous membranes.  Neck: Supple without adenopathy, JVD, thyromegaly or masses. Trachea midline.  CV: Heart RRR, no murmurs, rubs, gallops.  Resp: Even, easy without cough or accessory use. Lungs clear to ascultation bilaterally.   Abd: Round, soft, non-tender. No hepatosplenomegaly or mass present. Active bowel sounds heard. No distention noted.   Ext:  Without cyanosis, clubbing, edema.   Skin: Pink, warm, dry  Neuro:  Alert, oriented x 3 with no obvious deficits.       Rectal: deferred    Labs:   CBC:   Lab Results   Component Value Date/Time    WBC 4.9 01/28/2024 07:32 AM    HGB 8.9 01/28/2024 11:11 AM    HCT 28.4 01/28/2024 11:11 AM    MCV 97.9 01/28/2024 07:32 AM     01/28/2024 07:32 AM     BMP:   Lab Results   Component Value Date/Time     01/28/2024 07:32 AM    K 4.3 01/28/2024 07:32 AM    K 4.1 08/12/2023 06:19 AM     01/28/2024 07:32 AM    CO2 28 01/28/2024 07:32 AM    PHOS 2.1 01/25/2024 03:10 AM    BUN 12 01/28/2024 07:32 AM    CREATININE 1.2 01/28/2024 07:32 AM    CALCIUM 8.7 01/28/2024 07:32 AM     PT/INR:   Lab Results   Component Value Date/Time    INR 1.27 01/24/2024 04:00 AM     Lipids:   Lab Results   Component Value Date/Time    ALKPHOS 74 01/24/2024 04:00 AM    ALT 7 01/24/2024 04:00 AM    AST 14 
Gastroenterology Progress Note:     Patient Name:  Karen Cruz   MRN: [id number]  [id number]  YOB: 1935  Admit Date: 1/20/2024  5:18 PM  Primary Care Physician: Srini Garces   3B-30/030-A     Patient seen and examined.  24 hours events and chart reviewed.    Subjective:   Patient is sitting up in chair. She is lethargic and answers questions with eyes closed. She states that she is just tired. She did not tolerate her breakfast and lunch yesterday but tolerated breakfast this am. No nausea or vomiting. RN check pt d/t lethargic. BP 83/50. Pt alert and is answering questions approprietly. She will be starting Plavix tomorrow.     Objective:  /89   Pulse 83   Temp 98.3 °F (36.8 °C) (Oral)   Resp 18   Ht 1.651 m (5' 5\")   Wt 72.1 kg (158 lb 14.4 oz)   SpO2 92% Comment: was 89, applied 2 L  BMI 26.44 kg/m²     Physical Exam:    General:  Nourished in no distress  HEENT: Atraumatic, normocephalic. Moist oral mucous membranes.  Neck: Supple without adenopathy, JVD, thyromegaly or masses. Trachea midline.  CV: Heart RRR, no murmurs, rubs, gallops.  Resp: Even, easy without cough or accessory use. Lungs clear to ascultation bilaterally.   Abd: Round, soft, non-tender. No hepatosplenomegaly or mass present. Active bowel sounds heard. No distention noted.   Ext:  Without cyanosis and clubbing. Edema noted in bilat hands.   Skin: Pink, warm, dry  Neuro:  Alert, oriented x 3 with no obvious deficits.       Rectal: deferred    Labs:   CBC:   Lab Results   Component Value Date/Time    WBC 4.9 01/28/2024 07:32 AM    HGB 8.9 01/28/2024 11:11 AM    HCT 28.4 01/28/2024 11:11 AM    MCV 97.9 01/28/2024 07:32 AM     01/28/2024 07:32 AM     BMP:   Lab Results   Component Value Date/Time     01/28/2024 07:32 AM    K 4.3 01/28/2024 07:32 AM    K 4.1 08/12/2023 06:19 AM     01/28/2024 07:32 AM    CO2 28 01/28/2024 07:32 AM    PHOS 2.1 01/25/2024 03:10 AM    BUN 12 01/28/2024 07:32 AM    
Gastroenterology Progress Note:     Patient Name:  Karen Cruz   MRN: [id number]  [id number]  YOB: 1935  Admit Date: 1/20/2024  5:18 PM  Primary Care Physician: Srini Garces   4D-04/004-A     Patient seen and examined.  24 hours events and chart reviewed.    Subjective: Patient underwent EGD yesterday with concern for aspiration near the end of the procedure requiring intubation. Patient was transferred to the ICU post procedure for further care. Patient has since been extubated. She is resting in bed and denies abd pain, n/v. No BM overnight or yet today. She is asking to eat breakfast. Hgb 9.9. Patient remains on pressor support. EGD results reviewed.    Objective:  BP (!) 129/36   Pulse 82   Temp 100 °F (37.8 °C) (Bladder)   Resp 18   Ht 1.651 m (5' 5\")   Wt 74 kg (163 lb 2.3 oz)   SpO2 97%   BMI 27.15 kg/m²     Physical Exam:    General:  Chronically ill appearing female  HEENT: Atraumatic, normocephalic. Dry oral mucous membranes.  Neck: Supple without adenopathy, JVD, thyromegaly or masses. Trachea midline.  CV: Heart RRR, no murmurs, rubs, gallops.  Resp: Even, easy without cough or accessory use. Lungs clear to ascultation bilaterally.   Abd: Round, soft, non-tender. No hepatosplenomegaly or mass present. Active bowel sounds heard. No distention noted.   Ext:  Without cyanosis, clubbing, edema.   Skin: Pink, warm, dry  Neuro:  Alert, oriented x 3 with no obvious deficits.       Rectal: deferred    Labs:   CBC:   Lab Results   Component Value Date/Time    WBC 30.0 01/23/2024 04:45 AM    HGB 9.9 01/23/2024 04:45 AM    HCT 31.8 01/23/2024 04:45 AM    .3 01/23/2024 04:45 AM     01/23/2024 04:45 AM     BMP:   Lab Results   Component Value Date/Time     01/23/2024 04:45 AM    K 5.2 01/23/2024 04:45 AM    K 4.1 08/12/2023 06:19 AM     01/23/2024 04:45 AM    CO2 12 01/23/2024 04:45 AM    PHOS 5.6 01/23/2024 04:45 AM    BUN 47 01/23/2024 04:45 AM    CREATININE 2.4 
Gastroenterology Progress Note:     Patient Name:  Karen Cruz   MRN: [id number]  [id number]  YOB: 1935  Admit Date: 1/20/2024  5:18 PM  Primary Care Physician: Srini Garces   4D-04/004-A     Patient seen and examined.  24 hours events and chart reviewed.    Subjective:   Patient resting in chair getting ready for PT session. IV pressors were stopped overnight. Patient remains on IV ATB, Protonix, and Octreotide. Patient states she will be moving to stepdown today.     Objective:  /61   Pulse 79   Temp 97.7 °F (36.5 °C) (Oral)   Resp 24   Ht 1.651 m (5' 5\")   Wt 77.1 kg (169 lb 15.6 oz)   SpO2 91%   BMI 28.29 kg/m²     Physical Exam:    General:  Nourished in no distress  HEENT: Atraumatic, normocephalic. Moist oral mucous membranes.  Neck: Supple without adenopathy, JVD, thyromegaly or masses. Trachea midline.  CV: Heart RRR, no murmurs, rubs, gallops.  Resp: Even, easy without cough or accessory use. Lungs clear to ascultation bilaterally.   Abd: Round, soft, non-tender. No hepatosplenomegaly or mass present. Active bowel sounds heard. No distention noted.   Ext:  Without cyanosis, clubbing, edema.   Skin: Pink, warm, dry  Neuro:  Alert, oriented x 3 with no obvious deficits.       Rectal: deferred    Labs:   CBC:   Lab Results   Component Value Date/Time    WBC 6.8 01/25/2024 03:10 AM    HGB 8.1 01/25/2024 03:10 AM    HGB 8.0 01/25/2024 03:10 AM    HCT 25.4 01/25/2024 03:10 AM    HCT 24.8 01/25/2024 03:10 AM    MCV 95.8 01/25/2024 03:10 AM    PLT 94 01/25/2024 03:10 AM     BMP:   Lab Results   Component Value Date/Time     01/25/2024 03:10 AM    K 4.1 01/25/2024 03:10 AM    K 4.1 08/12/2023 06:19 AM     01/25/2024 03:10 AM    CO2 30 01/25/2024 03:10 AM    PHOS 2.1 01/25/2024 03:10 AM    BUN 31 01/25/2024 03:10 AM    CREATININE 1.6 01/25/2024 03:10 AM    CALCIUM 8.2 01/25/2024 03:10 AM     PT/INR:   Lab Results   Component Value Date/Time    INR 1.27 01/24/2024 04:00 
Gastroenterology Progress Note:     Patient Name:  Karen Cruz   MRN: [id number]  [id number]  YOB: 1935  Admit Date: 1/20/2024  5:18 PM  Primary Care Physician: Srini Garces   4D-04/004-A     Patient seen and examined.  24 hours events and chart reviewed.    Subjective: Patient resting in bed, visitor present. Denies abdominal pain, n/v. Per RN, patient had a black stool this morning. Hgb 8.1 Patient remains on pressor support x 2, however RN endorses one being at a very low dose. Octreotide stopped by primary team 01/23/24.    Objective:  BP (!) 108/48   Pulse 65   Temp 97.2 °F (36.2 °C) (Bladder)   Resp 15   Ht 1.651 m (5' 5\")   Wt 77.1 kg (169 lb 15.6 oz)   SpO2 92%   BMI 28.29 kg/m²     Physical Exam:    General:  Chronically ill appearing female  HEENT: Atraumatic, normocephalic. Moist oral mucous membranes.  Neck: Supple without adenopathy, JVD, thyromegaly or masses. Trachea midline.  CV: Heart RRR, no murmurs, rubs, gallops.  Resp: Even, easy without cough or accessory use. Lungs clear to ascultation bilaterally.   Abd: Round, soft, non-tender. No hepatosplenomegaly or mass present. Active bowel sounds heard. No distention noted.   Ext:  Without cyanosis, clubbing, edema.   Skin: Pink, warm, dry  Neuro:  Alert, oriented x 3 with no obvious deficits.       Rectal: deferred    Labs:   CBC:   Lab Results   Component Value Date/Time    WBC 9.3 01/24/2024 06:25 AM    HGB 8.1 01/24/2024 06:25 AM    HCT 25.2 01/24/2024 06:25 AM    MCV 95.1 01/24/2024 06:25 AM     01/24/2024 06:25 AM     BMP:   Lab Results   Component Value Date/Time     01/24/2024 04:00 AM    K 4.2 01/24/2024 04:00 AM    K 4.1 08/12/2023 06:19 AM     01/24/2024 04:00 AM    CO2 24 01/24/2024 04:00 AM    PHOS 3.3 01/24/2024 04:00 AM    BUN 44 01/24/2024 04:00 AM    CREATININE 2.1 01/24/2024 04:00 AM    CALCIUM 7.9 01/24/2024 04:00 AM     PT/INR:   Lab Results   Component Value Date/Time    INR 1.27 
I WAS CALLED TO ENDO UNIT.  I RUSHED TO ENDO UNIT.  PT. ASPIRATED TOWARDS THE CONCLUSION OF PROCEDURE.  ASSISTED INTUBATION SECURING ETT.  PLAN TO ADMIT TO ICU .  TALKED WITH DR LUNA.REGARDING BRONCHOSCOPY .  SUPPORTIVE MANAGEMENT.    
[facility]  STR ICU 4D  Occupational Therapy  Daily Note  Time:    Time In: 1200  Time Out: 1218  Timed Code Treatment Minutes: 18 Minutes  Minutes: 18          Date: 2024  Patient Name: Karen Cruz,   Gender: female      Room: 4D-004-A  MRN: [id number]  : 1935  (88 y.o.)  Referring Practitioner: Dr. ABEBA Richey  Diagnosis: GI Bleed  Additional Pertinent Hx: Patient is an 88-year-old female with PMH notable for atrial fibrillation, AAA, achalasia, anemia, arthritis, CHF, CKD, colon cancer, GI hemorrhage, GERD, hiatal hernia, HLD, PAD who presented to [facility] on  due to complaint of bright red blood per rectum.  Patient states that the morning of  she had a small amount of bright red blood during a bowel movement.  She states that later in the afternoon between 12 PM and 4 PM she had increasing amounts of blood per rectum with blood clots.  Patient reports taking OAC and Plavix due to history of atrial fibrillation and PAD.  Patient states that she has had multiple stents in her lower extremities as well as abdominal arteries.  She also endorses significant GI history which includes large hiatal hernia, internal hemorrhoids s/p hemorrhoidectomy, cecal mass with removal and ileal colic resection as well as EGDs for gastritis and GI bleeds.  On  patient underwent diagnostic endoscopic evaluation of GI bleeding during the EGD patient aspirated and had to be intubated and transferred to the ICU for further care.  Was transferred to ICU patient able to be extubated successfully.    Restrictions/Precautions:  Restrictions/Precautions: Fall Risk      SUBJECTIVE: cooperative with getting OOB    PAIN: chronic pain-no number given/10: in BUE & RLE    Vitals: Blood Pressure: 115/61  Oxygen: 93-96%  Heart Rate: 72    COGNITION: WFL    ADL:   Grooming: with set-up.  For combing hair  Lower Extremity Dressing: Maximum Assistance.  For threading BLE in undergarment then pulling up in 
[facility]  PHYSICAL THERAPY MISSED TREATMENT NOTE  STRZ CCU-STEPDOWN 3B    Date: 2024  Patient Name: Karen Cruz        MRN: [id number]   : 1935  (88 y.o.)  Gender: female   Referring Practitioner: Edin Richey DO  Diagnosis: GIB (gastrointestinal bleeding)         REASON FOR MISSED TREATMENT:  Missed Treat.  Pt declines PT today, was up all night for colonoscopy prep, to have at 1230.  Will hold.  
Patient arrived per cart to 3B. Heart monitor applied and vitals taken.  Admission paperwork completed.  Explained to patient that [facility] is not responsible for any lost or stolen items.  Patient verbalized understanding. Oriented to room and use of call light and bed controls.  Patient denies pain or needs. No signs of distress noted.  Bed locked & in low position, side-rails up x2.  Call light in reach.  Reminded patient to call nurse if any needs arise.     2 person skin assessment performed by this nurse and Sandra RN.    Explained patients right to have family, representative or physician notified of their admission.  Patient has Declined for physician to be notified.  Patient has Declined for family/representative to be notified.        
Patient arrived to unit from [facility] via cot. Patient transferred to ICU bed and placed on continuous ICU bedside monitor. Patient admitted for GIB (gastrointestinal bleeding) [K92.2]  Gastrointestinal hemorrhage, unspecified gastrointestinal hemorrhage type [K92.2]. Vitals obtained. See flowsheets. Patient's IV access includes 22g right wrist, 22g left wrist. Current infusions and rates of infusion include octreotide at 10, 0.9 @ 100ml/hr. Assessment completed by Shant MASTERS. Two nurse skin assessment completed by GUERRERO Bran and GUERRERO Rodriguez. See flowsheets for assessment details. Policies and procedures of ICU unable to be explained to patient at this time. Family member(s)/representative(s) present at time of admission include none. Patient rights explained to family member(s)/representatives and patient, as able. Patient/patient's family member(s)/representative(s) N/A to have physician notified of their admission. All questions posed by patient's family member(s)/representative(s) and patient answered at this time.     
Patient discharged to [facility]. Transported per her son. AVS and med changes reviewed with patients son. Patient discharged with all belongings. Blue packet containing AVS and last dose MAR sent with patient. [facility] called and notified patient was just picked up.    
Patient had continuous bloody bowel movements last nights. Patient was changed 6 times with saturated briefs/chucks. Reached out to GI with orders of CT of the abdomen and protonix/octreotide drips. Informed hospitalist Hank Lin regarding the continuous bloody stools, hypotension and hemoglobin dropping down with orders to start blood and IV bolus.     Patients blood pressure  is 98/60 after the blood transfusion  
Patient will be discharging to [facility] today. Patient's son not available to pick her up until after work around 7:30 PM tonight. [facility] aware and can still accept.   
Pharmacy Medication History Note      List of current medications patient is taking is complete.    Source of information: dispense hx    Changes made to medication list:  Medications removed (include reason, ex. therapy complete or physician discontinued):  Apixaban 5 mg - switched to Xarelto on 9/23/22 due to PVD    Medications added/doses adjusted:  none    Other notes (ex. Recent course of antibiotics, Coumadin dosing):  Patient states that she cannot remember what medications she is currently taking. Will use past notes and dispense history to determine current medication list.  Taking Bumex on Monday, Wednesday, and Friday. Follows with heart failure clinic.  On chronic Bactrim therapy due to history of MRSA UTI  Filled amiodarone instead of dronedarone due to cost on 1/3/24  Per 1/20/24 note, pt taking meoprolol succinate 12.5 mg daily  Denies use of other OTC or herbal medications.      Allergies reviewed      Electronically signed by Arlen Lloyd on 1/22/2024 at 12:17 PM   
Pt continuing to require an increasing dose of levophed IV gtt. Dr Rodriguez to bedside to discuss arterial line and central line placement.  This RN present for informed consent conversation.  Pt verbalized that all questions have been answered and is agreeable to line placement.    0145 Dr. Rodriguez and ABEBA Osuna CNP at bedside to place right radial arterial line.  0147 Time out preformed, consent verified, vitals obtained- see flowsheets.  0200 Unable to place right radial arterial line. Site changed to left radial.   0238 Unable to place left radial arterial line.  Dr. NERISSA Vergara at bedside to place left radial arterial line.   0255 Left radial arterial line placed by Dr. YOVANNY Vergara.   0315 ABEBA Osuna CNP at bedside to place right subclavian central line. Timeout preformed.  Consent verified.  0330 Right CVC placed.  Chest Xray ordered.  0335 Xray at bedside.  0339 Ok to use Right CVC per ABEBA Osuna CNP.   
Pt declined prayer and does not want the spiritual visit.    01/25/24 1347   Encounter Summary   Service Provided For: Patient   Referral/Consult From: Rounding   Last Encounter  01/25/24   Complexity of Encounter Low   Begin Time 1020   End Time  1025   Total Time Calculated 5 min   Spiritual/Emotional needs   Type Spiritual Support        
Pt in phase 2 of recovery. Denies pain, fully awake.   Dr. Ku in to speak with patient at bedside regarding procedure findings.   Report called to 3B nurse Medina  
Pt is an 88y.o. female in Endo Room 11 for procedure.  was called to room due to Code Blue alert-which was quickly resolved.  provided presence and silent prayer in the hallway outside the room, for patient and multiple rapid responders present.     01/22/24 1557   Encounter Summary   Encounter Overview/Reason  Crisis   Service Provided For: Patient not available   Referral/Consult From: Nursing Supervisor/Manager   Support System Family members   Last Encounter  01/22/24   Complexity of Encounter Moderate   Begin Time 1258   End Time  1308   Total Time Calculated 10 min   Crisis   Type Code Blue   Assessment/Intervention/Outcome   Assessment Unable to assess   Intervention Sustaining Presence/Ministry of presence;Prayer (assurance of)/[place];Explored/Affirmed feelings, thoughts, concerns;Active listening   Outcome Did not respond       
Removed Central Line in the Right Subclavian; noted distal tip intact with removal. Gauze; 4 x 4 drsg; sterile op site covering site. Site was dry and intact; no bleeding or oozing noted. No swelling or hematoma noted. Pt instructed to lay 15 degrees in bed for 1 hour and verbalized understanding. Report given to 3b RN  
Report called to Vicky at [facility] in [place] and updated that patient's family is expected to  the patient around 7 PM. AVS and last dose MAR faxed to facility.   
Scope # .  Colonoscopy completed, tolerated well. 2 polyps removed with cold snare, 1 jars labeled and sent to lab for processing. Photos taken.     
Up in chair- watching TV.  Denies any pain at this time.  1550- Transferred per wheelchair to -30.  Pt belongings sent with her including cell phone,  and glasses.  
[facility]  OCCUPATIONAL THERAPY MISSED TREATMENT NOTE  STRZ CCU-STEPDOWN 3B  3B-30/030-A      Date: 2024  Patient Name: Karen Cruz        CSN: [id number]   : 1935  (88 y.o.)  Gender: female   Referring Practitioner: Dr. ABEBA Richey  Diagnosis: GI Bleed         REASON FOR MISSED TREATMENT:  Missed Treat.  Pt declines PT today, was up all night for colonoscopy prep, to have at 1230.  Will hold.                
71    Social/Functional History:  Lives With: Alone  Type [facility]  Home Layout: One level (4th floor- elevator access)  Home Access: Level entry  Home Equipment: Walker, rolling, Sock aid, Reacher   Bathroom Shower/Tub: Walk-in shower (has been sponge bathing)  Bathroom Toilet: Handicap height  Bathroom Equipment: Shower chair, Grab bars around toilet       ADL Assistance: Independent  Homemaking Assistance: Needs assistance (son does laundry, shopping, take trash out)  Ambulation Assistance: Independent  Transfer Assistance: Independent    Active : No  Patient's  Info: bebeto Vines     Additional Comments: Pt amb with RW at home    VISION:WFL; has reading glasses    HEARING:  WFL    COGNITION: Decreased Safety Awareness    RANGE OF MOTION:  R shoulder flexion to 80 degrees, distal WFL; LUE NT d/t art line in place    STRENGTH:  BUE NT d/t multiple lines    SENSATION:   WFL    ADL:   Footwear Management: Dependent.  For adjusting slipper socks  Toileting: Dependent.  For cleaning bottom after incontinent of BM .Completed while rolling Pt side to side; declined trying to complete in standing    BALANCE:  Sitting Balance:  Stand By Assistance. EOB   Standing Balance: Contact Guard Assistance. Static; min A for dynamic standing    BED MOBILITY:  Sit to Supine: Maximum Assistance, X 1      TRANSFERS:  Sit to Stand:  Minimal Assistance. From recliner  Stand to Sit: Contact Guard Assistance. To EOB    FUNCTIONAL MOBILITY:  Assistive Device: None  Assist Level:  Minimal Assistance.   Distance:  3 steps to EOB from recliner  No AD used d/t art line in E     AM-[facility] Inpatient Daily Activity Raw Score: 11  AM-PAC Inpatient ADL T-Scale Score : 29.04  ADL Inpatient CMS 0-100% Score: 70.42    Activity Tolerance:  Patient tolerance of  treatment: Fair treatment tolerance       Modified Clem Scale:  Not Applicable    Assessment:  Assessment: Pt demo decreased ADL & functional mobility indep over 
CO2 26 01/27/2024 08:40 AM    PHOS 2.1 01/25/2024 03:10 AM    BUN 14 01/27/2024 08:40 AM    CREATININE 1.2 01/27/2024 08:40 AM    CALCIUM 8.9 01/27/2024 08:40 AM     PT/INR:   Lab Results   Component Value Date/Time    INR 1.27 01/24/2024 04:00 AM     Lipids:   Lab Results   Component Value Date/Time    ALKPHOS 74 01/24/2024 04:00 AM    ALT 7 01/24/2024 04:00 AM    AST 14 01/24/2024 04:00 AM    BILITOT 0.6 01/24/2024 04:00 AM    BILIDIR 0.1 12/14/2023 01:47 PM    LABALBU 3.0 01/24/2024 04:00 AM    LIPASE 9.6 10/03/2020 06:06 AM     Significant Diagnostic Studies:   None     Current Meds:  Scheduled Meds:   pantoprazole  40 mg IntraVENous BID    sodium chloride flush  5-40 mL IntraVENous 2 times per day    insulin lispro  0-8 Units SubCUTAneous 4x Daily AC & HS    nitroglycerin  0.5 inch Topical 4 times per day    lidocaine  1 patch TransDERmal Daily    [Held by provider] diclofenac sodium  4 g Topical BID    sodium chloride flush  5-40 mL IntraVENous 2 times per day    [Held by provider] apixaban  5 mg Oral BID    [Held by provider] bumetanide  0.5 mg Oral Once per day on Mon Wed Fri    [Held by provider] clopidogrel  75 mg Oral Daily    [Held by provider] magnesium oxide  500 mg Oral BID    [Held by provider] metoprolol succinate  12.5 mg Oral Daily    pravastatin  40 mg Oral Daily    timolol  1 drop Both Eyes Daily     Continuous Infusions:   sodium chloride      sodium chloride      sodium chloride      sodium chloride      dextrose         Assessment:    87 yo F admitted 01/20/24 for bloody stools. Patient has a history of recurrent GI bleeding with multiple admissions & endoscopies. Last EGD & colonoscopy 01/21/23 for GIB. Hx of afib, on Xarelto. Hx of PAD s/p multiple procedures & stents, on Plavix PTA. Bleeding scan 01/22/24 negative. EGD 01/22/24 demonstrated esophagus was fluid & food filled, there was sloughing of the esophageal mucosa, mild diffuse gastritis, multiple 2-4 mm gastric polyps were noted in 
Results   Component Value Date/Time     01/22/2024 04:12 AM    K 5.3 01/22/2024 04:12 AM    K 4.1 08/12/2023 06:19 AM     01/22/2024 04:12 AM    CO2 19 01/22/2024 04:12 AM    PHOS 3.1 07/21/2023 06:06 AM    BUN 38 01/22/2024 04:12 AM    CREATININE 1.6 01/22/2024 04:12 AM    CALCIUM 8.1 01/22/2024 04:12 AM     PT/INR:   Lab Results   Component Value Date/Time    INR 1.56 01/22/2024 04:12 AM     Lipids:   Lab Results   Component Value Date/Time    ALKPHOS 130 01/20/2024 05:35 PM    ALT 19 01/20/2024 05:35 PM    AST 20 01/20/2024 05:35 PM    BILITOT 0.3 01/20/2024 05:35 PM    BILIDIR 0.1 12/14/2023 01:47 PM    LABALBU 3.9 01/20/2024 05:35 PM    LIPASE 9.6 10/03/2020 06:06 AM     Significant Diagnostic Studies:   NM GI blood loss scan 01/22/24  IMPRESSION:  No scintigraphic evidence of active gastrointestinal bleeding.     Current Meds:  Scheduled Meds:   diclofenac sodium  4 g Topical BID    sodium chloride flush  5-40 mL IntraVENous 2 times per day    insulin lispro  0-4 Units SubCUTAneous TID WC    insulin lispro  0-4 Units SubCUTAneous Nightly    [Held by provider] apixaban  5 mg Oral BID    [Held by provider] bumetanide  0.5 mg Oral Once per day on Mon Wed Fri    [Held by provider] clopidogrel  75 mg Oral Daily    magnesium oxide  500 mg Oral BID    metoprolol succinate  12.5 mg Oral Daily    pravastatin  40 mg Oral Daily    timolol  1 drop Both Eyes Daily     Continuous Infusions:   sodium chloride      sodium chloride 100 mL/hr at 01/22/24 0522    octreotide (SANDOSTATIN) 500 mcg in sodium chloride 0.9 % 100 mL infusion 50 mcg/hr (01/22/24 0955)    pantoprazole 8 mg/hr (01/22/24 0742)    sodium chloride      sodium chloride      dextrose         Assessment:  89 yo F admitted 01/20/24 for bloody stools. Patient has a history of recurrent GI bleeding with multiple admissions & endoscopies. Last EGD & colonoscopy 01/21/23 for GIB. Hx of afib, on Xarelto. Hx of PAD s/p multiple procedures & stents, on 
appropriate.  Severe PAD: CT with runoff 10/10/2023 shows 60 to 70% stenosis of the CFA, 30% stenosis of the proximal left EIA, 90% stenosis of proximal left SFA occlusion.  S/p recent angioplasty of right PTA, right ARACELIS, right popliteal artery due to AL I by Dr. Vergara 5/2023.  Takes Plavix at home.  Resume Plavix when okay with GI  Chronic left shoulder pain: Patient endorses left shoulder pain that she states is due to arthritis.  X-ray demonstrates degenerative changes.  Lidocaine patch for shoulder pain  If patient persistently has pain we will add Voltaren gel  Nonobstructive CAD: Left heart cath 12/28/2019 with nonobstructive CAD.  4 cm aneurysm of AAA graft: Noted on CT with runoff 10/10/2023, follow-up with PCP and cardiology outpatient.      Expected discharge date: TBD however pre-CERT is good till 1/30    Disposition:   [] Home  [] Inpatient Rehab  [] Psychiatric Unit  [x] SNF  [] Long Term Care Facility  [] Other-    ===================================================================    Chief Complaint: Black stools     Hospital Course:    Patient is an 88-year-old female with PMH notable for atrial fibrillation, AAA, achalasia, anemia, arthritis, CHF, CKD, colon cancer, GI hemorrhage, GERD, hiatal hernia, HLD, PAD who presented to [facility] on 1/20 due to complaint of bright red blood per rectum.  Patient states that the morning of 1/20 she had a small amount of bright red blood during a bowel movement.  She states that later in the afternoon between 12 PM and 4 PM she had increasing amounts of blood per rectum with blood clots.  Patient reports taking OAC and Plavix due to history of atrial fibrillation and PAD.  Patient states that she has had multiple stents in her lower extremities as well as abdominal arteries.  She also endorses significant GI history which includes large hiatal hernia, internal hemorrhoids s/p hemorrhoidectomy, cecal mass with removal and ileal colic resection as well as EGDs for 
daily, also on Bumex at home.  Strict intake and output  Low-sodium diet  Bumex held in the setting of hypotension will resume when appropriate.  Severe PAD: CT with runoff 10/10/2023 shows 60 to 70% stenosis of the CFA, 30% stenosis of the proximal left EIA, 90% stenosis of proximal left SFA occlusion.  S/p recent angioplasty of right PTA, right ARACELIS, right popliteal artery due to AL I by Dr. Vergara 5/2023.  Takes Plavix at home.  Resume Plavix when okay with GI  Chronic left shoulder pain: Patient endorses left shoulder pain that she states is due to arthritis.  X-ray demonstrates degenerative changes.  Lidocaine patch for shoulder pain  If patient persistently has pain we will add Voltaren gel  Nonobstructive CAD: Left heart cath 12/28/2019 with nonobstructive CAD.  4 cm aneurysm of AAA graft: Noted on CT with runoff 10/10/2023, follow-up with PCP and cardiology outpatient.      Expected discharge date: 1/30/2024    Disposition:   [] Home  [] Inpatient Rehab  [] Psychiatric Unit  [x] SNF  [] Long Term Care Facility  [] Other-    ===================================================================    Chief Complaint: Black stools     Hospital Course:    Patient is an 88-year-old female with PMH notable for atrial fibrillation, AAA, achalasia, anemia, arthritis, CHF, CKD, colon cancer, GI hemorrhage, GERD, hiatal hernia, HLD, PAD who presented to [facility] on 1/20 due to complaint of bright red blood per rectum.  Patient states that the morning of 1/20 she had a small amount of bright red blood during a bowel movement.  She states that later in the afternoon between 12 PM and 4 PM she had increasing amounts of blood per rectum with blood clots.  Patient reports taking OAC and Plavix due to history of atrial fibrillation and PAD.  Patient states that she has had multiple stents in her lower extremities as well as abdominal arteries.  She also endorses significant GI history which includes large hiatal hernia, internal 
the setting of septic shock. Serum creatinine on admission 1.7.  Baseline appears to be 1.4-1.6.  Patient follows with Dr. Godoy and nephrology.  1/23 creat today 2.4. Strict I's and O's.  Avoid nephrotoxic substances.  Renally dose medications.  BMP daily.   cc an hour.  Paroxysmal A-fib: [id number] 6, has bled 7.  Patient takes metoprolol succinate 12.5 mg daily, amiodarone 200 mg, Eliquis 5 mg twice daily at home.  TSH 12/23 4.5.  Telemetry monitoring.  Toprol XL held in setting of hypotension, resume when able.  Eliquis held in setting of GI bleed.  Consider Watchman device given recurrent GI bleed.  Chronic HFimpEF: EF 50% 5/23 NYHA class III 2/2 NICM s/p DC AICD plus PPM.  GDMT includes Toprol-XL 12.5 mg daily, patient also on Bumex at home.  Patient given Bumex 1 mg  Strict I's and O's  Low-sodium diet  Monitor for fluid overload in setting of multiple PRBC transfusion  Severe PAD: CT with runoff 10/10/2023 60-70 send stenosis of CFA, 30% stenosis of proximal L EIA, 90% stenosis of proximal left SFA occlusion-negative for SFA and popliteal arteries.  S/p recent angioplasty of R PTA, R ARACELIS, R popliteal artery due to ALI by Dr. Vergara 5/2023.  Patient takes Plavix.  Currently held at this time in setting of GI bleed.  Will start heparin drip when patient is 48 hours out from bleed.  Chronic Left Shoulder Pain: PT endorses left shoulder pain that she states is due to arthritis. States she has not had it imaged. Endorses history of prior falls.  X-ray demonstrates degenerative changes.  Lidocaine patch for pain.  Nonobstructive CAD: [facility] 12/28/2019 with nonobstructive CAD.  BPPV: Not on prescription medications at home.  Not currently symptomatic.  HTN: Patient takes Toprol 12.5 mg daily, held in the setting of septic shock.  4 cm aneurysm of AAA graft:  noted on CTA with runoff 10/10/2023 as above  History of colorectal cancer s/p resection of cecal mass: Noted.  Mild hyperglycemia:   A1c 4.8 1/20/2024.  
01/22/24  Acute on chronic anemia- s/p 2 units PRBC  Aspiration event during EGD 01/22/24- extubated on RA  Friable gastric tissue with bleeding requiring APC on EGD 01/22/24  Acute hypotension- discontinued   Afib- on Xarelto PTA  Severe PAD s/p multiple procedures & stents- on Plavix PTA  Hx of recurrent GIB  GERD- on PPI PTA  Hx of large internal hemorrhoids s/p hemorrhoidectomy x 2- 06/2023, one small hemorrhoid remaining  CKD  CHF, not exacerbated  Hx of HTN  Chronic dysphagia & achalasia,  Hx of colon CA s/p right hemicolectomy  Hx of severe diverticular disease s/p partial colectomy  Hx of colonic anastomosis bleed        Plan:    Monitor H & H, transfuse prn  Nursing to monitor stool output & document  Continue PPI gtt  Continue Octreotide gtt   Hold Xarelto & Plavix  Dysphagia diet, soft and bite sized   Resume Plavix on Monday, 1/29/2024  Will follow         Case reviewed and impression/plan reviewed in collaboration with Dr. Ku     Electronically signed by ROMAN Dodge - CNP on 1/28/2024 at 7:57 AM    Car reviews      
Tolerance:  Patient tolerance of  treatment: good. Pt demonstrates impairments with strength, balance, endurance, activity tolerance, independence, requires hands on assistance for safety with mobility and would benefit from continued skilled PT improve these impairments, to maximize independence, pt will greatly benefit from SNF stay.       Equipment Recommendations:Equipment Needed: No  Discharge Recommendations: Subacte/Skilled Nursing Facility  Plan: Current Treatment Recommendations: Strengthening, Balance training, Gait training, Functional mobility training, Therapeutic activities, Transfer training, Neuromuscular re-education, Patient/Caregiver education & training, Endurance training  General Plan:  (5x GM)    Education  Education:  Learners: Patient  Patient Education: Plan of Care, Bed Mobility, Transfers, Gait, Verbal Exercise Instruction, walker safety    Goals:  Patient Goals : to get better  Short Term Goals  Time Frame for Short Term Goals: by discharge  Short Term Goal 1: Pt to transfer supine <--> sit mod I to enable pt to get in/out of bed.  Short Term Goal 2: Pt to transfer sit <--> stand mod I for increased functional mobility.  Short Term Goal 3: Pt to ambulate >200 feet with RW SBA for community re-entry.  Long Term Goals  Time Frame for Long Term Goals : NA due to short length of stay.    Following session, patient left in safe position with all fall risk precautions in place.    
mcg/min (01/24/24 3053)    pantoprazole 8 mg/hr (01/24/24 0621)    sodium chloride      sodium chloride      dextrose         PHYSICAL EXAMINATION:  T:  97.2.  P:  71. RR:  18. B/P:  91/57.  O2 Sat:  96 on 2L NC.  I/O:  +59 IV fluids  Body mass index is 28.29 kg/m².   GCS:   15  General:   Pleasant. Pale appearing.    HEENT:  normocephalic and atraumatic.  No scleral icterus. PERR. Mild tenderness to palpation beneath the mandible bilaterally.   Neck: supple.  No Thyromegaly.  Lungs: clear to auscultation.  No retractions  Cardiac: RRR.  No JVD.  Abdomen: soft.  Mild tenderness to deep palpation in LLQ. No rebound. BS+  Extremities:  No clubbing, cyanosis, or edema x 4.    Vasculature: capillary refill < 3 seconds. Unable to palpate dorsalis pedis pulses.  Skin:  warm and dry.  Psych:  Alert and oriented x3.  Affect appropriate  Lymph:  No supraclavicular adenopathy.  Neurologic:  No focal deficit. No seizures.    Data: (All radiographs, tracings, PFTs, and imaging are personally viewed and interpreted unless otherwise noted).   Sodium  WBC  Telemetry shows    Electronically signed by Dat Henao  CRITICAL CARE SPECIALIST  
remained stable.  Telemetry reviewed, no significant arrhythmia noted.  Gastroenterology following.  PT OT, awaiting SNF placement.      ROS (12 point review of systems completed.  Pertinent positives noted. Otherwise ROS is negative).    Medications:  Reviewed    Infusion Medications    sodium chloride      dextrose       Scheduled Medications    pantoprazole  40 mg Oral BID AC    insulin lispro  0-8 Units SubCUTAneous 4x Daily AC & HS    nitroglycerin  0.5 inch Topical 4 times per day    lidocaine  1 patch TransDERmal Daily    [Held by provider] diclofenac sodium  4 g Topical BID    sodium chloride flush  5-40 mL IntraVENous 2 times per day    [Held by provider] apixaban  5 mg Oral BID    [Held by provider] bumetanide  0.5 mg Oral Once per day on Mon Wed Fri    [Held by provider] clopidogrel  75 mg Oral Daily    [Held by provider] magnesium oxide  500 mg Oral BID    metoprolol succinate  12.5 mg Oral Daily    pravastatin  40 mg Oral Daily    timolol  1 drop Both Eyes Daily     PRN Meds: sodium chloride flush, sodium chloride, ondansetron **OR** ondansetron, acetaminophen **OR** acetaminophen, glucose, dextrose bolus **OR** dextrose bolus, glucagon (rDNA), dextrose, melatonin      Intake/Output Summary (Last 24 hours) at 1/28/2024 1633  Last data filed at 1/28/2024 1414  Gross per 24 hour   Intake 1719.03 ml   Output 450 ml   Net 1269.03 ml       Diet:  ADULT DIET; Dysphagia - Soft and Bite Sized    Exam:  BP (!) 112/56   Pulse 60   Temp 97.8 °F (36.6 °C) (Oral)   Resp 18   Ht 1.651 m (5' 5\")   Wt 72.1 kg (158 lb 14.4 oz)   SpO2 97%   BMI 26.44 kg/m²     General appearance: No apparent distress, appears stated age and cooperative.  HEENT: Pupils equal, round, and reactive to light. Conjunctivae/corneas clear.  Neck: Supple, with full range of motion. No jugular venous distention. Trachea midline.  Respiratory:  Normal respiratory effort. Clear to auscultation, bilaterally without 
training, Neuromuscular re-education, Patient/Caregiver education & training, Endurance training  General Plan:  (5x GM)    Goals:  Patient Goals : to get better  Short Term Goals  Time Frame for Short Term Goals: by discharge  Short Term Goal 1: Pt to transfer supine <--> sit mod I to enable pt to get in/out of bed.  Short Term Goal 2: Pt to transfer sit <--> stand mod I for increased functional mobility.  Short Term Goal 3: Pt to ambulate >200 feet with RW SBA for community re-entry.  Long Term Goals  Time Frame for Long Term Goals : NA due to short length of stay.    Following session, patient left in safe position with all fall risk precautions in place.

## 2024-03-12 ENCOUNTER — OFFICE VISIT (OUTPATIENT)
Age: 89
End: 2024-03-12
Payer: MEDICARE

## 2024-03-12 VITALS
HEART RATE: 74 BPM | WEIGHT: 157.4 LBS | BODY MASS INDEX: 26.23 KG/M2 | DIASTOLIC BLOOD PRESSURE: 82 MMHG | SYSTOLIC BLOOD PRESSURE: 142 MMHG | HEIGHT: 65 IN

## 2024-03-12 DIAGNOSIS — R94.6 ABNORMAL THYROID FUNCTION TEST: Primary | ICD-10-CM

## 2024-03-12 PROCEDURE — G8417 CALC BMI ABV UP PARAM F/U: HCPCS | Performed by: INTERNAL MEDICINE

## 2024-03-12 PROCEDURE — 1090F PRES/ABSN URINE INCON ASSESS: CPT | Performed by: INTERNAL MEDICINE

## 2024-03-12 PROCEDURE — G8427 DOCREV CUR MEDS BY ELIG CLIN: HCPCS | Performed by: INTERNAL MEDICINE

## 2024-03-12 PROCEDURE — 1123F ACP DISCUSS/DSCN MKR DOCD: CPT | Performed by: INTERNAL MEDICINE

## 2024-03-12 PROCEDURE — G8484 FLU IMMUNIZE NO ADMIN: HCPCS | Performed by: INTERNAL MEDICINE

## 2024-03-12 PROCEDURE — 1036F TOBACCO NON-USER: CPT | Performed by: INTERNAL MEDICINE

## 2024-03-12 PROCEDURE — 99204 OFFICE O/P NEW MOD 45 MIN: CPT | Performed by: INTERNAL MEDICINE

## 2024-03-12 NOTE — PROGRESS NOTES
normal, atraumatic, no cyanosis or edema and Homans sign is negative, no sign of DVT  Pulses:2+ and symmetric  Skin:warm and dry, no hyperpigmentation, vitiligo, or suspicious lesions  Neuro:normal without focal findings, mental status, speech normal, alert and oriented x3, BRIAN, cranial nerves 2-12 intact, muscle tone and strength normal and symmetric.  Lymph nodes: There is no cervical, supraclavicular or submental adenopathy.  Musculoskeletal:  No joint swelling or deformity.  Psychiatry: Alert and oriented ×3.  Making good eye contact.  Affect is normal.        Assessment/Plan:    1. Abnormal thyroid function test: Consistent with very mild subclinical hypothyroidism.  Will check labs to clarify.  Of note, this patient is on amiodarone which may very well be associated with the abnormal findings.     Orders Placed This Encounter   Procedures    TSH     Standing Status:   Future     Standing Expiration Date:   6/12/2024    T4, Free     Standing Status:   Future     Standing Expiration Date:   6/12/2024    Thyroid Peroxidase Antibody     Standing Status:   Future     Standing Expiration Date:   6/12/2024    Anti-Thyroglobulin Antibody     Standing Status:   Future     Standing Expiration Date:   6/12/2024           The risks and benefits of my recommendations, as well as other treatment options were discussed with the patient and son today. Questions were answered.  Follow up: 3 months and as needed.         Electronically signed by Aiden Jacques MD 3/12/2024 3:22 PM     **This report has been created using voice recognition software. It may   contain minor errors which are inherent in voice recognition technology.**

## 2024-03-14 LAB
T4 FREE: 0.92 NG/DL (ref 0.61–1.12)
TSH SERPL DL<=0.05 MIU/L-ACNC: 4.08 MCIU/ML (ref 0.49–4.67)

## 2024-03-15 LAB
THYROGLOBULIN AB: <1.8 IU/ML
THYROID PEROXIDASE ANTIBODY: 1.2 IU/ML

## 2024-03-24 NOTE — PROGRESS NOTES
[facility] [place] [facility]  [facility] CARDIOLOGY  [address]  [address]  [facility] [address]  Dept: [phone]  Dept Fax: [phone]  Loc: [phone]    Visit Date: 3/26/2024    Ms. Cruz is a 88 y.o. female  who presented for:  Chief Complaint   Patient presents with    1 Year Follow Up       Cardiologist:  Dr. Vergara         Today's Visit   HPI: Ms. Cruz is an 88 y.o female with complex medical history significant for AAA s/p EVAR with subsequent stenting and balloon angioplasty, PAD s/p multiple stents of right lower circulation with recurrent occlusions; HFrEF, PPM/AICD; PAF, CKD, HTN, HLD, complicated UTI, MRSA bacteremia on chronic bactrim, and recent GIB during which Eliquis was stopped with mutual discussion between GI and cardiology as she is high risk of CLI and potential loss of lower extremity 2/2 severe PAD. Of note- has been referred to vascular surgery in December 2/2 recurrent occlusions- decision to continue conservative treatment at that time as no tissue loss/wounds.       Today reports feeling \"as good as possible\". Is accompanied by son. Denies chest pain, palpitations, dizziness/syncope. PARK at baseline- denies orthopnea. Somewhat poor historian-denies LE edema, but does have +2 edema on exam. Denies wounds to LE extremities, but when asked about new circular ulceration on right great toe- she states believes she stubbed it a few weeks ago. Does have intermittent \"aching\" of BLE, right worse than left usually. Endorses toe nail growth- is having them cut by podiatry now. Compliant with plavix.           Subjective     Review of Systems   Constitutional: Negative for chills and fever  HENT: Negative for congestion, sinus pressure, sneezing and sore throat.    Eyes: Negative for pain, discharge, redness and itching.   Respiratory: Negative for  cough, or sputum production; +PARK with mild exertion  Cardiac: Negative for chest pain, pressure, or

## 2024-03-26 ENCOUNTER — OFFICE VISIT (OUTPATIENT)
Dept: CARDIOLOGY CLINIC | Age: 89
End: 2024-03-26
Payer: MEDICARE

## 2024-03-26 VITALS
BODY MASS INDEX: 26.16 KG/M2 | WEIGHT: 157 LBS | DIASTOLIC BLOOD PRESSURE: 92 MMHG | HEART RATE: 70 BPM | HEIGHT: 65 IN | SYSTOLIC BLOOD PRESSURE: 155 MMHG

## 2024-03-26 DIAGNOSIS — I50.42 CHF (CONGESTIVE HEART FAILURE), NYHA CLASS II, CHRONIC, COMBINED (HCC): Primary | ICD-10-CM

## 2024-03-26 DIAGNOSIS — M79.89 LEG SWELLING: ICD-10-CM

## 2024-03-26 DIAGNOSIS — I73.9 PAD (PERIPHERAL ARTERY DISEASE) (HCC): ICD-10-CM

## 2024-03-26 DIAGNOSIS — I25.10 CORONARY ARTERY DISEASE INVOLVING NATIVE CORONARY ARTERY OF NATIVE HEART WITHOUT ANGINA PECTORIS: ICD-10-CM

## 2024-03-26 DIAGNOSIS — I48.0 PAROXYSMAL ATRIAL FIBRILLATION (HCC): ICD-10-CM

## 2024-03-26 PROCEDURE — G8484 FLU IMMUNIZE NO ADMIN: HCPCS | Performed by: REGISTERED NURSE

## 2024-03-26 PROCEDURE — 99214 OFFICE O/P EST MOD 30 MIN: CPT | Performed by: REGISTERED NURSE

## 2024-03-26 PROCEDURE — 1036F TOBACCO NON-USER: CPT | Performed by: REGISTERED NURSE

## 2024-03-26 PROCEDURE — G8417 CALC BMI ABV UP PARAM F/U: HCPCS | Performed by: REGISTERED NURSE

## 2024-03-26 PROCEDURE — 1090F PRES/ABSN URINE INCON ASSESS: CPT | Performed by: REGISTERED NURSE

## 2024-03-26 PROCEDURE — G8427 DOCREV CUR MEDS BY ELIG CLIN: HCPCS | Performed by: REGISTERED NURSE

## 2024-03-26 PROCEDURE — 1123F ACP DISCUSS/DSCN MKR DOCD: CPT | Performed by: REGISTERED NURSE

## 2024-03-26 RX ORDER — BUMETANIDE 0.5 MG/1
TABLET ORAL
Qty: 14 TABLET | Refills: 2
Start: 2024-03-26

## 2024-03-26 NOTE — PATIENT INSTRUCTIONS
Take 0.5 mg of your bumex daily for next three days. Call next week with update on how your leg swelling is doing.    Have your lab work completed next week.     Continue other current medications as prescribed.    Continue the following:  Daily weights  Fluid restriction of 2 Liters per day  Limit sodium in diet to around 0677-9424 mg/day  Monitor BP  Activity as tolerated   Monitor your legs/feet for wounds      Follow-up with your PCP as scheduled.    Follow-up with Dr. Vergara  in 3 months  as scheduled or sooner if need.

## 2024-04-05 RX ORDER — PRAVASTATIN SODIUM 40 MG
40 TABLET ORAL DAILY
Qty: 90 TABLET | Refills: 1 | Status: SHIPPED | OUTPATIENT
Start: 2024-04-05

## 2024-04-24 ENCOUNTER — TELEPHONE (OUTPATIENT)
Dept: CARDIOLOGY CLINIC | Age: 89
End: 2024-04-24

## 2024-04-24 NOTE — TELEPHONE ENCOUNTER
Cancelled appt 5/1/24 . Pt can not make this appt.   Pt is now at home. Would like CL monitor now. Monitor ordered.

## 2024-04-30 ENCOUNTER — PROCEDURE VISIT (OUTPATIENT)
Dept: CARDIOLOGY CLINIC | Age: 89
End: 2024-04-30
Payer: MEDICARE

## 2024-04-30 DIAGNOSIS — Z95.810 ICD (IMPLANTABLE CARDIOVERTER-DEFIBRILLATOR) IN PLACE: Primary | ICD-10-CM

## 2024-04-30 PROCEDURE — 93296 REM INTERROG EVL PM/IDS: CPT | Performed by: INTERNAL MEDICINE

## 2024-04-30 PROCEDURE — 93295 DEV INTERROG REMOTE 1/2/MLT: CPT | Performed by: INTERNAL MEDICINE

## 2024-04-30 NOTE — PROGRESS NOTES
[facility] Medtronic dual ICD   Optivol WNL    ..Battery longevity:  6.8 years on device   Presenting rhythm  AS VS     1/11/24  7 beats VT   12/1/24 7 beats VT   11./15/23 10 beats VT     Atrial impedance 399  RV impedance 437  Shock 54    P wave sensing 0.8  R wave sensing 8.9    31.2 % atrial paced  0.1 % RV paced     Atrial threshold 0.375 V  at 0.4ms  RV threshold 0.625 V at 0.4ms  Mode switches   <0.1  6 events all under 1 minute

## 2024-05-03 RX ORDER — BUMETANIDE 0.5 MG/1
0.5 TABLET ORAL DAILY PRN
Qty: 14 TABLET | Refills: 2 | OUTPATIENT
Start: 2024-05-03

## 2024-05-22 NOTE — H&P
CRITICAL CARE PROGRESS NOTE      Patient:  Valentina Arevalo    Unit/Bed:16/016A  YOB: 1935  MRN: [id number]   PCP: Juan M Holguin  Date of Admission: 9/15/2022  Chief Complaint:- fatigue    Assessment and Plan:    Lactic acidosis:  +SIRS, concerns of sepsis. Zosyn and Vancomycin was given while in the ER. 30 ml/kg 0.9NS fluid bolus was completed. Complicated UTI:  history of recurrent UTI. Nitrate positive likely ECOLI. Zosyn will be continued. RLE redness:  \"angry\" RLE extending midcalf, warm to tough. Patient identifies as such since discharge. Home medication includes Xarelto. 9/15/2022 CTA runoff -mild disease within the anterior tibial artery. This is occluded at the ankle. The right posterior tibial artery and peroneal artery appear to be occluded mid calf. ?cellulitis. Zosyn will be continued. Pseudoaneurysm RFA:  9/15/2022 present on CTA ABD Aorta. Recent history of right groin access. No redness or drainage noted at site. Positive for hypotension and elevated lactic acid? contributing. Blood cultures are pending. Patient did receive Zosyn and Vancomycin. Update to Dr. Deepti Dougherty. Arterial duplex pending. 3 vessel occlusion at right ankle:  9/15/2022 CTA ABD Aorta with Run off. Positive for movement and sensation. Pulses are present. ASA/PLAVIX/Xarelto continued. ? Cholesterol emboli may cause fever and hypotension. Arterial duplex and pending. Vergara to evaluate. Acute on chronic kidney injury:  Urine positive for trace blood , history of recent dye load. Entreso was placed on hold. Noted history of HFrEF, she did receive 3L 0.9NS while in the ER, currently hypotensive on Levophed. Urine Elytes are pending. Dose medications to GFR no NSAIDS to be given. May need Nephrology input. Atrial Fibrillation:  history, Xarelto. Toprol will be continued with parameters to hold. HFrEF:  history of AICD, stable. Dilated Esophagus:  noted on Xray Food filled.  History of esophogeal stricture in Spoke to patient, two ID confirmed.   Patient for lab visit new order lab results for 3 months recheck.    the past. GI has been consulted. Impaired glucose tolerance: HgbA1c 5.5 2021, level to be repeated, monitor with ACCU and SSI. INITIAL H AND P AND ICU COURSE:  Kaye Carcamo is a 82year old  female admitted to [facility] ICU 9/15/2022 with chief complaint of fatigue. Patient has a past medical history significant for current everyday smoker, LHC 2019-nonobstructive coronary artery disease, HFrEF-ECHO 2020 LVEF 25-30%, severe global hypokinesis, atrial fibrillation, GERD, esophageal stricture, essential hypertension, dyslipidemia, AICD, BPPV, colon cancer-right hemicolectomy-2020, PAD, s/p fem-tibial bypass, iliac arterial stent with chronic occlusion of fem distal bypass graft-2019, Achalasia. Recent 9/6/2022 Angiogram-RLE-Complete reconstitution of the Right  EVAR limb, R CFA, R SFA, R POP, and R AT with improved flow into the PT by means of stenting and PTA. Preston Cummings presented to [facility] ER 9/15/2022 via EMS with chief complaint of fatigue and weakness. She states she could not get out of bed. This did not improve through out the day. EMS noted hypotension in the 90Z systolic. Lab work revealed Lactic acid 5.3. She did receive 3L of 0.9NS, Vancomycin and Zosyn. CTA Abdominal Aorta and Run off was completed. Request of Dr. Vergara/Cardiology patient was admitted to [facility] ICU for further management and care. Past Medical History:  see HPI. Family History: Mother-heart disease, CVA, Father-lung cancer, sister-leukemia. Social History: Current everyday smoker, denies any alcohol or illicit drug use. Patient is a  and lives home alone.     ROS   GENERAL: Positive for fatigue weakness, poor appetite   SKN: Positive for RLE redness  HEAD: No headaches or recent injury  EYES: No acute changes in vision, no diplopia or blurred vision, no drainage  EARS: No hearing loss, no tinnitus,no drainage  NOSE/THROAT: No rhinorrhea or pharyngitis, no nasal drainage  NECK: No lumps or unusual neck stiffness  PULMONARY: No dyspnea, orthopnea or paroxysmal nocturnal dyspnea, stridor, wheezing or cough  CARDIAC: Positive for hypotension  GI: No history melena or hematochezia, no diarrhea or constipation  PERIPHERAL VASCULAR: No intermittent claudication or unusual leg cramps  MUSCULOSKELETAL: Occasional arthralgias, myalgias  NEUROLOGICAL: No  Seizures or Syncope  HEMATOLOGIC:  No anemia, unusual bruising or bleeding  PSYCH: No homicidal or suicidal ideations    Scheduled Meds:   vancomycin  15 mg/kg IntraVENous Once     Continuous Infusions:    PHYSICAL EXAMINATION:  T:  97.7.  P:  110. RR:  20. B/P:  126/54. FiO2:  3L/NC. O2 Sat:  98.  I/O:  pending  Body mass index is 27.96 kg/m². GCS:   15  General:   pale warm and dry  HEENT:  normocephalic and atraumatic. No scleral icterus. PERR  Neck: supple. No Thyromegaly. Lungs: clear to auscultation, coarse right lower base. No retractions  Cardiac: RRR. No JVD. Abdomen: soft. Nontender, bowel sounds present  Extremities:  Right lower leg mild edema reddened and warm midcalf to foot. Positive for movement and sensation. Left lower leg warm and dry  Right groin puncture site intact. Left groin positive for ecchymosis and tender to touch. Pulses are per doppler. Vasculature: capillary refill < 3 seconds. Palpable dorsalis pedis pulses. Skin:  warm and dry. Psych:  Alert and oriented x3. Affect appropriate  Lymph:  No supraclavicular adenopathy. Neurologic:  No focal deficit. No seizures. Data: (All radiographs, tracings, PFTs, and imaging are personally viewed and interpreted unless otherwise noted).    9/15/2022 1900 sodium 130 potassium 0.8 chlorides 102 CO2 24  BUN 33 creatinine 1.5  Lactic acid 4.3-5.3  Glucose 196  Calcium 8.9  Procalcitonin 17.11  P BNP 6077  Troponin less than 0.010  Albumin 3.1 alk phos 95 ALT 60 AST 36 total bili 0.7  White count 10.7 hemoglobin 12.1 hematocrit 37.3 platelet count 290  9/39/5567 2217 urine trace blood positive for nitrates small amount of leukocytes 10-15 WBCs many bacteria   9/15/2022 CTA abdominal aorta with bilateral runoff large hiatal hernia normal lung bases normal liver no masses normal spleen normal kidneys no bowel obstruction no pancreatitis. Pseudoaneurysm right common femoral artery measuring 1.1 x 1.1 cm , occluded right femoral anterior tibial artery bypass. SFA patent stent throughout the right common femoral artery. Popliteal artery mild to moderate multifocal disease. Left CFA moderate multifocal disease left common femoral artery. SFA 80% focal stenosis of the origin of the left superficial femoral artery. A focal advanced disease throughout the left superficial femoral artery. Popliteal mild multifocal disease left popliteal artery. Also the trifurcation multifocal disease within the vessels of the trifurcation. These appear patent to the left ankle. Aortic endograft present. No definite leak. 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 EKG normal sinus rhythm heart rate 98   positive for PVCs, left axis deviation. Cardiac telemetry normal sinus rhythm      Seen with multidisciplinary ICU team yes. Meets Continued ICU Level Care Criteria:    [x] Yes   [] No - Transfer Planned to listed location:  [] HOSPITALIST CONTACTED-      Case and plan discussed with Dr. Sandy Mejia.         Electronically signed by ROMAN Coronel - CNP  CRITICAL CARE SPECIALIST

## 2024-05-29 NOTE — FLOWSHEET NOTE
09/10/22 1049   Safe Environment   Safety Measures Other (comment)  (Virtual Safety Round Complete)   Virtual nurse rounds, pt did not respond to audio, so camera turned on to visually check safety of patient.  pt sleeping, resp unlabored Spoke with Prabhu Bennett at [facility] Case Management. They are requesting a completed N8 order form. Provided fax number. Will complete order form and send back.

## 2024-06-04 ENCOUNTER — PROCEDURE VISIT (OUTPATIENT)
Dept: CARDIOLOGY CLINIC | Age: 89
End: 2024-06-04

## 2024-06-04 DIAGNOSIS — I50.42 CHF (CONGESTIVE HEART FAILURE), NYHA CLASS II, CHRONIC, COMBINED (HCC): Primary | ICD-10-CM

## 2024-06-04 NOTE — PROGRESS NOTES
[facility] Medtronic Dual ICD Optivol  Pt of Vergara    Battery 6.7 years    Optivol WNL    Episodes none    AF burden <0.1%

## 2024-06-18 ENCOUNTER — OFFICE VISIT (OUTPATIENT)
Age: 89
End: 2024-06-18
Payer: MEDICARE

## 2024-06-18 VITALS
HEIGHT: 65 IN | WEIGHT: 163.5 LBS | DIASTOLIC BLOOD PRESSURE: 68 MMHG | SYSTOLIC BLOOD PRESSURE: 112 MMHG | HEART RATE: 82 BPM | BODY MASS INDEX: 27.24 KG/M2

## 2024-06-18 DIAGNOSIS — R94.6 ABNORMAL THYROID FUNCTION TEST: Primary | ICD-10-CM

## 2024-06-18 PROCEDURE — 1036F TOBACCO NON-USER: CPT | Performed by: INTERNAL MEDICINE

## 2024-06-18 PROCEDURE — 1090F PRES/ABSN URINE INCON ASSESS: CPT | Performed by: INTERNAL MEDICINE

## 2024-06-18 PROCEDURE — G8427 DOCREV CUR MEDS BY ELIG CLIN: HCPCS | Performed by: INTERNAL MEDICINE

## 2024-06-18 PROCEDURE — G8417 CALC BMI ABV UP PARAM F/U: HCPCS | Performed by: INTERNAL MEDICINE

## 2024-06-18 PROCEDURE — 1123F ACP DISCUSS/DSCN MKR DOCD: CPT | Performed by: INTERNAL MEDICINE

## 2024-06-18 PROCEDURE — 99213 OFFICE O/P EST LOW 20 MIN: CPT | Performed by: INTERNAL MEDICINE

## 2024-06-18 NOTE — PROGRESS NOTES
[facility] [place] [facility]  [facility] ENDOCRINOLOGY  [address]  [place] [address]  Dept: [phone]  Loc: [phone]     Visit Date: 6/18/2024    Karen Cruz is a 88 y.o. female who presents today for:  Chief Complaint   Patient presents with    Follow-up              Subjective:      HPI     Karen Cruz is a 88 y.o. , female who comes for f/u for abnormal thyroid levels.  Srini Garces MD  Karen Cruz was diagnosed with abnormal thyroid levels 6 month(s) ago.  Specifically, TSH was slightly elevated at 4.757 on 12/14/2023.  This is consistent with subclinical hypothyroidism.  This patient is still taking amiodarone which can cause thyroid disorder.  We repeated labs at the last visit and the TSH had normalized.  The patient does not have any signs or symptoms of hypothyroidism. The patient is not aware of anyone in the family with thyroid disease.     Past Medical History:   Diagnosis Date    A-fib ([facility])     AAA (abdominal aortic aneurysm) ([facility])     Achalasia     Anemia     Arthritis     Blood circulation, collateral     BPPV (benign paroxysmal positional vertigo) 6/6/16    CHF (congestive heart failure) ([facility])     Chronic kidney disease     sees Dr Muñoz    Colon cancer ([facility])     Gastrointestinal hemorrhage     GERD (gastroesophageal reflux disease)     ? esophageal stricture    Hiatal hernia     History of blood transfusion     HTN (hypertension)     Hx of blood clots 2018    R leg-sees Magaly    Hyperlipidemia     Medtronic dual ICD  8/26/2020    Neuromuscular disorder ([facility])     Obesity     Osteopenia     Osteoporosis     PAC (premature atrial contraction)     on betablocker    Paralysis ([facility])     baby    Pedal edema     denied any hx of hypertension    Pneumonia     PVC (premature ventricular contraction)     sees Dr Vergara    PVD (peripheral vascular disease) ([facility])     Small bowel obstruction ([facility])     Tinnitus     UTI (urinary tract infection)

## 2024-06-28 ENCOUNTER — TELEPHONE (OUTPATIENT)
Dept: CARDIOLOGY CLINIC | Age: 89
End: 2024-06-28

## 2024-06-28 NOTE — TELEPHONE ENCOUNTER
Patient see's Dr Vergara on: 7/8/24    Referring Provider: Marilynn Lainez CNP    Primary Cardiologist: Jamie Vergara MD    *LIMITED CODE STATUS*    WSDF send to:     History: AAA s/p EVAR eith subsequent stenting and balloon angioplasty, CHF, CKD, GERD, colon CA, HTN, HLD, Medtronic Dual ICD, neuromuscular disorder, PVD, UTI, hx blood clots (2018), PAD multiple stents of right lower circulation with recurrent occlusions, MRSA bacteremia on chronic bactrim    Echo: 5/31/23      Labs:1/2024            OAC vs antiplatelet inhibitor:    Do you have trouble swallowing?    Any issues with anesthesia or sedation?    Karen Cruz is being referred for Left Atrial Appendage Closure with WATCHMAN device for management of stroke risk resulting from non-valvular atrial fibrillation. Based on their past history, it has been determined that they are poor candidates for long-term oral-anticoagulation, however may be tolerant of short term treatment with anti-thrombotic therapy as necessary.      Patient is high risk for stroke or systemic thromboembolism. Patient [id number]  is calculated:      Risk   Factors  Component Value   C CHF Yes 1   H HTN Yes 1   A2 Age >= 75 Yes,  (88 y.o.) 2   D DM No 0   S2 Prior Stroke/TIA No 0   V Vascular Disease Yes 1   A Age 65-74 No,  (88 y.o.) 0   Sc Sex female 1    [id number]  Score  6       Specifically regarding risk of anticoagulation they have demonstrated:  History of bleeding (eg. Intracerebral, subdural, GI, retro-peritoneal)  Intolerance oral anticoagulation  Increased bleeding risk (e.g. Thrombocytopenia, anemia)  High risk of recurrent falls  Occupation related high bleeding risk  Need for prolonged dual anti platelet therapy  Severe renal failure  Poor compliance with anticoagulant therapy

## 2024-07-08 ENCOUNTER — OFFICE VISIT (OUTPATIENT)
Dept: CARDIOLOGY CLINIC | Age: 89
End: 2024-07-08
Payer: MEDICARE

## 2024-07-08 VITALS
DIASTOLIC BLOOD PRESSURE: 72 MMHG | HEART RATE: 84 BPM | WEIGHT: 163.8 LBS | SYSTOLIC BLOOD PRESSURE: 112 MMHG | HEIGHT: 65 IN | BODY MASS INDEX: 27.29 KG/M2

## 2024-07-08 DIAGNOSIS — I73.9 PAD (PERIPHERAL ARTERY DISEASE) (HCC): ICD-10-CM

## 2024-07-08 DIAGNOSIS — I48.0 PAROXYSMAL ATRIAL FIBRILLATION (HCC): Primary | ICD-10-CM

## 2024-07-08 PROCEDURE — 1123F ACP DISCUSS/DSCN MKR DOCD: CPT | Performed by: INTERNAL MEDICINE

## 2024-07-08 PROCEDURE — G8428 CUR MEDS NOT DOCUMENT: HCPCS | Performed by: INTERNAL MEDICINE

## 2024-07-08 PROCEDURE — G8417 CALC BMI ABV UP PARAM F/U: HCPCS | Performed by: INTERNAL MEDICINE

## 2024-07-08 PROCEDURE — 1090F PRES/ABSN URINE INCON ASSESS: CPT | Performed by: INTERNAL MEDICINE

## 2024-07-08 PROCEDURE — 1036F TOBACCO NON-USER: CPT | Performed by: INTERNAL MEDICINE

## 2024-07-08 PROCEDURE — 99214 OFFICE O/P EST MOD 30 MIN: CPT | Performed by: INTERNAL MEDICINE

## 2024-07-08 RX ORDER — RIVAROXABAN 2.5 MG/1
2.5 TABLET, FILM COATED ORAL 2 TIMES DAILY
Qty: 90 TABLET | Refills: 3 | Status: SHIPPED | OUTPATIENT
Start: 2024-07-08

## 2024-07-08 NOTE — PROGRESS NOTES
[facility] [place] [facility]  [facility] CARDIOLOGY  [address]  [address]  [place] [facility] [address]  Dept: [phone]  Dept Fax: [phone]  Loc: [phone]    Visit Date: 7/8/2024    Ms. Cruz is a 88 y.o. female  who presented for:  Chief Complaint   Patient presents with    Follow-up    Congestive Heart Failure    Atrial Fibrillation       HPI:   HPI   88 y.o female with complex medical history significant for AAA s/p EVAR with subsequent stenting and balloon angioplasty, PAD s/p multiple stents of right lower circulation with recurrent occlusions; HFrEF, PPM/AICD; PAF, CKD, HTN, HLD, complicated UTI, MRSA bacteremia on chronic bactrim, and recent GIB during which Eliquis was stopped with mutual discussion between GI and cardiology as she is high risk of CLI and potential loss of lower extremity 2/2 severe PAD. Of note- has been referred to vascular surgery in December 2/2 recurrent occlusions- decision to continue conservative treatment at that time as no tissue loss/wounds.   She presents to discuss WATCHMAN. She needs OAC for her PAD.  She was on Eliquis previously.        ECG (if obtained today):  None.       Current Outpatient Medications:     bumetanide (BUMEX) 0.5 MG tablet, Take 1 tablet by mouth daily as needed (shortness of breath or swelling), Disp: 14 tablet, Rfl: 2    pravastatin (PRAVACHOL) 40 MG tablet, TAKE 1 TABLET BY MOUTH DAILY, Disp: 90 tablet, Rfl: 1    ferrous sulfate (IRON 325) 325 (65 Fe) MG tablet, Take 1 tablet by mouth every 48 hours With breakfast, Disp: 180 tablet, Rfl: 1    metoprolol succinate (TOPROL XL) 25 MG extended release tablet, Take 0.5 tablets by mouth daily Indications: Hold for heart rate less than 55 bpm and SBP less than 100 mmHg., Disp: 30 tablet, Rfl: 3    nitroglycerin (NITRO-BID) 2 % ointment, Place 0.5 inches onto the skin 3 times daily, Disp: 1 each, Rfl: 3    amiodarone (CORDARONE) 200 MG tablet, Take 1 tablet by mouth daily, Disp:

## 2024-07-08 NOTE — PATIENT INSTRUCTIONS
Your nurses today were GUERRERO Elder and MACRINA Branch  Your provider today was Dr. Vergara  Phone number: [phone]      You may receive a survey regarding the care you received during your visit.  Your input is valuable to us.  We encourage you to complete and return your survey.  We hope you will choose us in the future for your healthcare needs.

## 2024-07-09 ENCOUNTER — PROCEDURE VISIT (OUTPATIENT)
Dept: CARDIOLOGY CLINIC | Age: 89
End: 2024-07-09
Payer: MEDICARE

## 2024-07-09 DIAGNOSIS — I50.42 CHF (CONGESTIVE HEART FAILURE), NYHA CLASS II, CHRONIC, COMBINED (HCC): Primary | ICD-10-CM

## 2024-07-09 PROCEDURE — 93297 REM INTERROG DEV EVAL ICPMS: CPT | Performed by: INTERNAL MEDICINE

## 2024-07-09 NOTE — PROGRESS NOTES
Dr velarde pt   Medtronic carelink optivol remote   Battery 6.6 yrs remaining    Optivol wnl      6/25/24 1 episode of ns vt rate 204 bpm for :03 seconds

## 2024-08-13 ENCOUNTER — PROCEDURE VISIT (OUTPATIENT)
Dept: CARDIOLOGY CLINIC | Age: 89
End: 2024-08-13
Payer: MEDICARE

## 2024-08-13 DIAGNOSIS — Z95.810 ICD (IMPLANTABLE CARDIOVERTER-DEFIBRILLATOR) IN PLACE: Primary | ICD-10-CM

## 2024-08-13 PROCEDURE — 93296 REM INTERROG EVL PM/IDS: CPT | Performed by: INTERNAL MEDICINE

## 2024-08-13 PROCEDURE — 93295 DEV INTERROG REMOTE 1/2/MLT: CPT | Performed by: INTERNAL MEDICINE

## 2024-08-13 NOTE — PROGRESS NOTES
[facility] Med[employer]onic Dual ICD   Pt of Vergara    Battery 6.4 years     Presenting rhythm AS AP/VS    A Impedance 399  RV Impedance 437    RV shock 57      P wave sensing 1  R wave sensing 8.3    A Threshold 0.375 @ 0.4  A Amplitude 1.5 @ 0.4  RV Thresholds 0.625 @ 0.4  RV Amplitude 2 @ 0.4    A Paced 30.7%  V Paced 0.1%    Programmed Mode AAI <=> DDD      Afib [place] <0.1%    Episodes: NSVT                  Optivol WNL

## 2024-08-23 RX ORDER — RIVAROXABAN 2.5 MG/1
2.5 TABLET, FILM COATED ORAL 2 TIMES DAILY
Qty: 90 TABLET | Refills: 3 | Status: SHIPPED | OUTPATIENT
Start: 2024-08-23

## 2024-09-17 PROCEDURE — 93297 REM INTERROG DEV EVAL ICPMS: CPT | Performed by: INTERNAL MEDICINE

## 2024-09-18 RX ORDER — BUMETANIDE 0.5 MG/1
0.5 TABLET ORAL DAILY PRN
Qty: 14 TABLET | Refills: 2 | Status: SHIPPED | OUTPATIENT
Start: 2024-09-18

## 2024-10-18 PROCEDURE — 93297 REM INTERROG DEV EVAL ICPMS: CPT | Performed by: INTERNAL MEDICINE

## 2024-11-25 NOTE — TELEPHONE ENCOUNTER
Karen Cruz called requesting a refill on the following medications:  Requested Prescriptions     Pending Prescriptions Disp Refills    pravastatin (PRAVACHOL) 40 MG tablet 90 tablet 1     Sig: Take 1 tablet by mouth daily    clopidogrel (PLAVIX) 75 MG tablet 90 tablet 3     Sig: Take 1 tablet by mouth daily     Pharmacy verified:[facility]   .pv      Date of last visit: 7/8/24  Date of next visit (if applicable): 1/16/2025

## 2024-11-26 RX ORDER — CLOPIDOGREL BISULFATE 75 MG/1
75 TABLET ORAL DAILY
Qty: 90 TABLET | Refills: 0 | Status: SHIPPED | OUTPATIENT
Start: 2024-11-26

## 2024-11-26 RX ORDER — PRAVASTATIN SODIUM 40 MG
40 TABLET ORAL DAILY
Qty: 90 TABLET | Refills: 0 | Status: SHIPPED | OUTPATIENT
Start: 2024-11-26

## 2024-12-03 RX ORDER — CLOPIDOGREL BISULFATE 75 MG/1
75 TABLET ORAL DAILY
Qty: 90 TABLET | Refills: 0 | Status: SHIPPED | OUTPATIENT
Start: 2024-12-03

## 2024-12-03 RX ORDER — PRAVASTATIN SODIUM 40 MG
40 TABLET ORAL DAILY
Qty: 90 TABLET | Refills: 0 | Status: SHIPPED | OUTPATIENT
Start: 2024-12-03

## 2025-01-06 ENCOUNTER — TELEPHONE (OUTPATIENT)
Dept: CARDIOLOGY CLINIC | Age: 89
End: 2025-01-06

## 2025-01-06 NOTE — TELEPHONE ENCOUNTER
Patient has an appt with Dr. Vergara on 01/16/2025 and she is requesting to r/s this appt.  DOLV 07/08/2024

## 2025-02-11 ENCOUNTER — OFFICE VISIT (OUTPATIENT)
Dept: CARDIOLOGY CLINIC | Age: 89
End: 2025-02-11
Payer: MEDICARE

## 2025-02-11 ENCOUNTER — NURSE ONLY (OUTPATIENT)
Dept: CARDIOLOGY CLINIC | Age: 89
End: 2025-02-11

## 2025-02-11 VITALS
SYSTOLIC BLOOD PRESSURE: 103 MMHG | HEART RATE: 94 BPM | BODY MASS INDEX: 27.26 KG/M2 | DIASTOLIC BLOOD PRESSURE: 65 MMHG | HEIGHT: 65 IN

## 2025-02-11 DIAGNOSIS — I48.0 PAROXYSMAL ATRIAL FIBRILLATION (HCC): ICD-10-CM

## 2025-02-11 DIAGNOSIS — I50.42 CHF (CONGESTIVE HEART FAILURE), NYHA CLASS II, CHRONIC, COMBINED (HCC): Primary | ICD-10-CM

## 2025-02-11 DIAGNOSIS — Z95.810 ICD (IMPLANTABLE CARDIOVERTER-DEFIBRILLATOR) IN PLACE: ICD-10-CM

## 2025-02-11 DIAGNOSIS — I73.9 PAD (PERIPHERAL ARTERY DISEASE) (HCC): ICD-10-CM

## 2025-02-11 DIAGNOSIS — Z95.810 ICD (IMPLANTABLE CARDIOVERTER-DEFIBRILLATOR) IN PLACE: Primary | ICD-10-CM

## 2025-02-11 PROCEDURE — 1036F TOBACCO NON-USER: CPT | Performed by: PHYSICIAN ASSISTANT

## 2025-02-11 PROCEDURE — 1159F MED LIST DOCD IN RCRD: CPT | Performed by: PHYSICIAN ASSISTANT

## 2025-02-11 PROCEDURE — 93000 ELECTROCARDIOGRAM COMPLETE: CPT | Performed by: PHYSICIAN ASSISTANT

## 2025-02-11 PROCEDURE — 1123F ACP DISCUSS/DSCN MKR DOCD: CPT | Performed by: PHYSICIAN ASSISTANT

## 2025-02-11 PROCEDURE — 1090F PRES/ABSN URINE INCON ASSESS: CPT | Performed by: PHYSICIAN ASSISTANT

## 2025-02-11 PROCEDURE — G8417 CALC BMI ABV UP PARAM F/U: HCPCS | Performed by: PHYSICIAN ASSISTANT

## 2025-02-11 PROCEDURE — G8427 DOCREV CUR MEDS BY ELIG CLIN: HCPCS | Performed by: PHYSICIAN ASSISTANT

## 2025-02-11 PROCEDURE — 99214 OFFICE O/P EST MOD 30 MIN: CPT | Performed by: PHYSICIAN ASSISTANT

## 2025-02-11 NOTE — PROGRESS NOTES
Pt did not have medication list- verbalized that it is correct  
catheter was advanced  retrograde with a knuckle on the 0.018 guidewire and I was able to cross  into the occlusion and then ultimately into the stent and then continued  on up to the common femoral artery and then finally into the iliac limb  of the EVAR.  Once I had the wire position of the aorta, I then took a 5  mm x 100 [place] balloon and predilated the entire leg to make a channel  for antegrade flow.  The inflation was up to 20 atmospheres.  Once we  had confirmed that the channel was made, I did use a 3 mm balloon  angioplasty at the level of the posterior tibial artery.  I did multiple  inflations to obtain hemostasis at the level of posterior tibial artery.  At that point, a 50 cm Uni-Fuse catheter was inserted over the 0.018  guidewire with the tip at the level of the right external iliac artery.   4 mg of intra-arterial t-PA was injected through the Uni-Fuse catheter.   I put the central occlusion wire into the Uni-Fuse.  We then inserted a  drip at 1 mg per hour of intra-arterial t-PA.  500 units of heparin was  being run through the side the sidearm of the sheath.  Left femoral  artery sheath was also left in place and 500 units was also left in  place _to run through the sidearm of the sheath.  All sheaths were  secured in place and the patient was moved to the ICU for further  management and monitoring overnight.    IMMEDIATE COMPLICATIONS:  None.    MEDICATIONS:  See EMR.    ACCESS:  See above.    ESTIMATED BLOOD LOSS:  50 to 100 mL.    SUMMARY:  Acute limb ischemia of the completely thrombosed right lower  extremity requiring retrograde wiring, 5.0 mm PTA of SFA-pop and 3.0 mm PTA of PT, with reconstitution of antegrade  flow with insertion of Uni-Fuse intraarterial t-PA catheter.    PLAN:  1.  ICU care.  2.  IV fluids.  3.  ACT/aPTT checks.  4.  T-PA labs per protocol.  5.  If fibrinogen drops less than 200, we will have to stop the t-PA.  6.  Monitor for bleeding at the right posterior tibial

## 2025-02-12 ENCOUNTER — TELEPHONE (OUTPATIENT)
Dept: CARDIOLOGY CLINIC | Age: 89
End: 2025-02-12

## 2025-02-12 NOTE — TELEPHONE ENCOUNTER
----- Message from Mohinder Leyva PA-C sent at 2/12/2025 12:39 PM EST -----  Regarding: OAC  Yesterday during her appointment we discussed incrementally increasing her Xarelto from her current 2.5 mg twice a day dose to possibly 5 mg twice a day.  I discussed with the Dr. Vergara and he states that this is not a standard dosage.  He recommends that we continue the 2.5 mg twice a day and consider increasing it to 15 mg daily in the future if patient is comfortable with her concerns about GI bleed.

## 2025-04-09 RX ORDER — CLOPIDOGREL BISULFATE 75 MG/1
75 TABLET ORAL DAILY
Qty: 90 TABLET | Refills: 1 | Status: SHIPPED | OUTPATIENT
Start: 2025-04-09

## 2025-04-09 RX ORDER — PRAVASTATIN SODIUM 40 MG
40 TABLET ORAL DAILY
Qty: 90 TABLET | Refills: 1 | Status: SHIPPED | OUTPATIENT
Start: 2025-04-09

## 2025-07-31 RX ORDER — RIVAROXABAN 2.5 MG/1
2.5 TABLET, FILM COATED ORAL 2 TIMES DAILY
Qty: 180 TABLET | Refills: 0 | Status: SHIPPED | OUTPATIENT
Start: 2025-07-31

## 2025-08-12 ENCOUNTER — OFFICE VISIT (OUTPATIENT)
Dept: CARDIOLOGY CLINIC | Age: 89
End: 2025-08-12
Payer: MEDICARE

## 2025-08-12 VITALS
HEIGHT: 65 IN | HEART RATE: 76 BPM | DIASTOLIC BLOOD PRESSURE: 70 MMHG | SYSTOLIC BLOOD PRESSURE: 110 MMHG | BODY MASS INDEX: 27.26 KG/M2

## 2025-08-12 DIAGNOSIS — I73.9 PAD (PERIPHERAL ARTERY DISEASE): ICD-10-CM

## 2025-08-12 DIAGNOSIS — I48.0 PAROXYSMAL ATRIAL FIBRILLATION (HCC): ICD-10-CM

## 2025-08-12 DIAGNOSIS — I10 ESSENTIAL HYPERTENSION: ICD-10-CM

## 2025-08-12 DIAGNOSIS — E78.00 PURE HYPERCHOLESTEROLEMIA: ICD-10-CM

## 2025-08-12 DIAGNOSIS — Z95.810 ICD (IMPLANTABLE CARDIOVERTER-DEFIBRILLATOR) IN PLACE: Primary | ICD-10-CM

## 2025-08-12 PROCEDURE — G8427 DOCREV CUR MEDS BY ELIG CLIN: HCPCS | Performed by: PHYSICIAN ASSISTANT

## 2025-08-12 PROCEDURE — G8417 CALC BMI ABV UP PARAM F/U: HCPCS | Performed by: PHYSICIAN ASSISTANT

## 2025-08-12 PROCEDURE — 1036F TOBACCO NON-USER: CPT | Performed by: PHYSICIAN ASSISTANT

## 2025-08-12 PROCEDURE — 99214 OFFICE O/P EST MOD 30 MIN: CPT | Performed by: PHYSICIAN ASSISTANT

## 2025-08-12 PROCEDURE — 1090F PRES/ABSN URINE INCON ASSESS: CPT | Performed by: PHYSICIAN ASSISTANT

## 2025-08-12 PROCEDURE — 1159F MED LIST DOCD IN RCRD: CPT | Performed by: PHYSICIAN ASSISTANT

## 2025-08-12 PROCEDURE — 1123F ACP DISCUSS/DSCN MKR DOCD: CPT | Performed by: PHYSICIAN ASSISTANT

## (undated) DEVICE — BLADE OPHTH GRN ROUNDED TIP 1 SIDE SHRP GRINDLESS MINI-BLDE

## (undated) DEVICE — NON COATED ELECTROSURGICAL NEEDLE ELECTRODE, 2.75 INCH (7 CM): Brand: MEGADYNE

## (undated) DEVICE — CATHETER ETER IV 22GA L1IN POLYUR STR RADPQ INTROCAN SFTY

## (undated) DEVICE — LINER SUCT CANSTR 1500CC SEMI RIG W/ POR HYDROPHOBIC SHUT

## (undated) DEVICE — GLOVE ORANGE PI 8 1/2   MSG9085

## (undated) DEVICE — INTENDED FOR TISSUE SEPARATION, AND OTHER PROCEDURES THAT REQUIRE A SHARP SURGICAL BLADE TO PUNCTURE OR CUT.: Brand: BARD-PARKER ® CARBON RIB-BACK BLADES

## (undated) DEVICE — LOOP VES W25MM THK1MM MAXI RED SIL FLD REPELLENT 100 PER

## (undated) DEVICE — KIT INF CTRL 2OZ LUB TBNG L12FT DBL END BRSH SYR OP4

## (undated) DEVICE — 2000CC GUARDIAN II: Brand: GUARDIAN

## (undated) DEVICE — DISSECTOR LAP DIA5MM BLNT TIP ENDOPATH

## (undated) DEVICE — Device

## (undated) DEVICE — BLADE OPHTH D5MM 15DEG GRN W/ RND KNURLED HNDL MICRO-SHARP

## (undated) DEVICE — SOLUTION IV 1000ML 0.45% SOD CHL PH 5 INJ USP VIAFLX PLAS

## (undated) DEVICE — CYSTO PACK: Brand: MEDLINE INDUSTRIES, INC.

## (undated) DEVICE — COVER ARMBRD W13XL28.5IN IMPERV BLU FOR OP RM

## (undated) DEVICE — SUTURE PROL SZ 6-0 L18IN NONABSORBABLE BLU L9.3MM BV-1 3/8 8806H

## (undated) DEVICE — SOLUTION IV 1000ML LAC RINGERS PH 6.5 INJ USP VIAFLX PLAS

## (undated) DEVICE — DRAPE,ROBOTICS,STERILE: Brand: MEDLINE

## (undated) DEVICE — GOWN,SIRUS,NONRNF,SETINSLV,XL,20/CS: Brand: MEDLINE

## (undated) DEVICE — ENDO KIT: Brand: MEDLINE INDUSTRIES, INC.

## (undated) DEVICE — Z DISCONTINUED APPLICATOR SURG PREP 0.35OZ 2% CHG 70% ISO ALC W/ HI LT

## (undated) DEVICE — GLOVE ORANGE PI 8   MSG9080

## (undated) DEVICE — TUBING, SUCTION, 1/4" X 20', STRAIGHT: Brand: MEDLINE INDUSTRIES, INC.

## (undated) DEVICE — DRESSING FOAM W6XL6.75IN SHAL GRANULATING WND SKIN TEAR

## (undated) DEVICE — DECANTER FLD 9IN ST BG FOR ASEP TRNSF OF FLD

## (undated) DEVICE — GAUZE,SPONGE,8"X4",12PLY,XRAY,STRL,LF: Brand: MEDLINE

## (undated) DEVICE — SUTURE ABSORBABLE BRAIDED 2-0 CT-1 27 IN UD VICRYL J259H

## (undated) DEVICE — 3M™ IOBAN™ 2 ANTIMICROBIAL INCISE DRAPE 6635: Brand: IOBAN™ 2

## (undated) DEVICE — SUTURE NONABSORBABLE MONOFILAMENT 5-0 C-1 1X24 IN PROLENE 8725H

## (undated) DEVICE — CHLORAPREP 26ML ORANGE

## (undated) DEVICE — SET CATH 20GA L1.75IN RAD ART POLYUR RADPQ W/ INTEGR

## (undated) DEVICE — DRESSING GERM DIA1IN CNTR H DIA7MM BLU CHG ANTIMIC PROTCT

## (undated) DEVICE — SOLUTION IV IRRIG WATER 1000ML POUR BRL 2F7114

## (undated) DEVICE — Z DUP USE 2257490 ADHESIVE SKIN CLSRE 036ML TPCL 2CTL CNCRLTE HIGH VSCSTY DRMB

## (undated) DEVICE — FOGARTY ARTERIAL EMBOLECTOMY CATHETER 4F 80CM: Brand: FOGARTY

## (undated) DEVICE — RESERVOIR BLD COLLCTN BTM OUTLETXTRAXRES

## (undated) DEVICE — SPONGE DRN W4XL4IN RAYON/POLYESTER 6 PLY NONWOVEN PRECUT

## (undated) DEVICE — FOGARTY ARTERIAL EMBOLECTOMY CATHETER 3F 80CM: Brand: FOGARTY

## (undated) DEVICE — DUAL CUT SAGITTAL BLADE

## (undated) DEVICE — AGENT HEMSTAT W2XL14IN OXIDIZED REGENERATED CELOS ABSRB FOR

## (undated) DEVICE — SPONGE GZ W4XL4IN COT 12 PLY TYP VII WVN C FLD DSGN

## (undated) DEVICE — SHEARS ENDOSCP L9CM CRV HARM FOCS +

## (undated) DEVICE — 4-PORT MANIFOLD: Brand: NEPTUNE 2

## (undated) DEVICE — 3M™ IOBAN™ 2 ANTIMICROBIAL INCISE DRAPE 6650EZ: Brand: IOBAN™ 2

## (undated) DEVICE — DRAIN CHN 19FR L0.25IN DIA6.3MM SIL RND HUBLESS FULL FLUT

## (undated) DEVICE — TUBING FLTR PLUME AWAY EVAC W/ SUCT DEV DISP PUREVIEW

## (undated) DEVICE — AGENT HEMSTAT W2XL4IN OXIDIZED REGENERATED CELOS ABSRB SFT

## (undated) DEVICE — SYRINGE IRRIG 60ML SFT PLIABLE BLB EZ TO GRP 1 HND USE W/

## (undated) DEVICE — SUTURE PROL SZ 3-0 L36IN NONABSORBABLE BLU L26MM SH 1/2 CIR 8522H

## (undated) DEVICE — STAPLER INT L55MM CUT LN L53MM STPL LN L57MM BLU B FRM 8

## (undated) DEVICE — GOWN,SIRUS,NON REINFRCD,LARGE,SET IN SL: Brand: MEDLINE

## (undated) DEVICE — CHLORAPREP 26ML CLEAR

## (undated) DEVICE — GLOVE SURG SZ 8 L12IN THK75MIL DK GRN LTX FREE

## (undated) DEVICE — BLANKET WRM W29.9XL79.1IN UP BODY FORC AIR MISTRAL-AIR

## (undated) DEVICE — SOLUTION IV 500ML 0.9% SOD CHL PH 5 INJ USP VIAFLX PLAS

## (undated) DEVICE — ULTRACLEAN ACCESSORY ELECTRODE 4" (10.16 CM) COATED BLADE WITH EXTENDED INSULATION: Brand: ULTRACLEAN

## (undated) DEVICE — SUTURE MCRYL SZ 2-0 L27IN ABSRB UD SH L26MM TAPERPOINT NDL Y417H

## (undated) DEVICE — SYSTEM SKIN CLSR 22CM DERMBND PRINEO

## (undated) DEVICE — TOWEL,OR,DSP,ST,WHITE,DLX,XR,4/PK,20PK/C: Brand: MEDLINE

## (undated) DEVICE — 500ML,PRESSURE INFUSER W/STOPCOCK: Brand: MEDLINE

## (undated) DEVICE — KIT ART LN 20GA L12CM FEP RADPQ 0.025X13.75IN SPR GWIRE

## (undated) DEVICE — APPLICATOR PREP 26ML 0.7% IOD POVACRYLEX 74% ISO ALC ST

## (undated) DEVICE — 35 ML SYRINGE LUER-LOCK TIP: Brand: MONOJECT

## (undated) DEVICE — PREP SOL PVP IODINE 4%  4 OZ/BTL

## (undated) DEVICE — DRAPE,HIP,W/POUCHES,STERILE: Brand: MEDLINE

## (undated) DEVICE — DRAPE C ARM W36XL30IN RECTANG BND BG AND TAPE

## (undated) DEVICE — SOLUTION ANTIFOG VIS SYS CLEARIFY LAPSCP

## (undated) DEVICE — SUTURE PROL SZ 5-0 L24IN NONABSORBABLE BLU L9.3MM BV-1 3/8 9702H

## (undated) DEVICE — SUTURE PERMA-HAND SZ 3-0 L30IN NONABSORBABLE BLK L60MM KS 622H

## (undated) DEVICE — FOGARTY ARTERIAL EMBOLECTOMY CATHETER 2F 60CM: Brand: FOGARTY

## (undated) DEVICE — STRIP,CLOSURE,WOUND,MEDI-STRIP,1/2X4: Brand: MEDLINE

## (undated) DEVICE — TUBING IV STOPCOCK 48 CM 3 W

## (undated) DEVICE — GLOVE SURG SZ 65 THK91MIL LTX FREE SYN POLYISOPRENE

## (undated) DEVICE — CANNULA SEAL

## (undated) DEVICE — SUTURE MCRYL SZ 4-0 L27IN ABSRB UD L19MM PS-2 1/2 CIR PRIM Y426H

## (undated) DEVICE — SUTURE ETHLN SZ 2-0 L30IN NONABSORBABLE BLK L36MM FSLX 3/8 1674H

## (undated) DEVICE — TAPE,CLOTH/SILK,CURAD,3"X10YD,LF,40/CS: Brand: CURAD

## (undated) DEVICE — Device: Brand: RETRACT-O-TAPE 18G X 30.5CM BLUNT NEEDLE

## (undated) DEVICE — E-Z CLEAN, NON-STICK, PTFE COATED, ELECTROSURGICAL BLADE ELECTRODE, MODIFIED EXTENDED INSULATION, 2.5 INCH (6.35 CM): Brand: MEGADYNE

## (undated) DEVICE — DRESSING TRNSPAR W5XL4.5IN FLM SHT SEMIPERMEABLE WIND

## (undated) DEVICE — AEGIS 1" DISK 4MM HOLE, PEEL OPEN: Brand: MEDLINE

## (undated) DEVICE — 3M™ IOBAN™ 2 ANTIMICROBIAL INCISE DRAPE 6651EZ: Brand: IOBAN™ 2

## (undated) DEVICE — APPLIER CLP L9.375IN APER 2.1MM CLS L3.8MM 20 SM TI CLP

## (undated) DEVICE — BOOT,SUTURE,STANDARD,YELLOW-IN-BLUE: Brand: MEDLINE

## (undated) DEVICE — PRESSURE MONITORING SET: Brand: TRUWAVE

## (undated) DEVICE — KIT CATHETER 18GA L445CM NDL 20GA 0025IN RAD ART W WNG CLP

## (undated) DEVICE — SUTURE VCRL SZ 4-0 L27IN ABSRB UD L19MM FS-2 3/8 CIR REV J422H

## (undated) DEVICE — DRAIN WND SIL FLAT RADIOPAQUE 10MM FULL FLUTED

## (undated) DEVICE — TOTAL TRAY, DB, 100% SILI FOLEY, 16FR 10: Brand: MEDLINE

## (undated) DEVICE — YANKAUER,BULB TIP,W/O VENT,RIGID,STERILE: Brand: MEDLINE

## (undated) DEVICE — PATIENT RETURN ELECTRODE, SINGLE-USE, CONTACT QUALITY MONITORING, ADULT, WITH 9FT CORD, FOR PATIENTS WEIGING OVER 33LBS. (15KG): Brand: MEGADYNE

## (undated) DEVICE — FOGARTY - HYDRAGRIP SURGICAL - CLAMP INSERTS: Brand: FOGARTY SOFTJAW

## (undated) DEVICE — SOLUTION IV 1000ML 0.9% SOD CHL PH 5 INJ USP VIAFLX PLAS

## (undated) DEVICE — SUTURE VCRL SZ 3-0 L27IN ABSRB UD L26MM SH 1/2 CIR J416H

## (undated) DEVICE — CONMED SCOPE SAVER BITE BLOCK, 20X27 MM: Brand: SCOPE SAVER

## (undated) DEVICE — SPONGE LAP W18XL18IN WHT COT 4 PLY FLD STRUNG RADPQ DISP ST

## (undated) DEVICE — GLOVE ORANGE PI 7   MSG9070

## (undated) DEVICE — SUTURE PROL SZ 4-0 L30IN NONABSORBABLE BLU SH-1 L22MM 1/2 8526H

## (undated) DEVICE — YANKAUER,POOLE TIP,STERILE,50/CS: Brand: MEDLINE

## (undated) DEVICE — PACK PROC LAP II AURORA

## (undated) DEVICE — BLADE ES L6IN ELASTOMERIC COAT EXT DURABLE BEND UPTO 90DEG

## (undated) DEVICE — SYSTEM VES HARV ENDOSCP VASOVIEW HEMOPRO

## (undated) DEVICE — SET ADMIN 25ML L117IN PMP MOD CK VLV RLER CLMP 2 SMRTSITE

## (undated) DEVICE — SKIN AFFIX SURG ADHESIVE 72/CS 0.55ML: Brand: MEDLINE

## (undated) DEVICE — GLOVE SURG SZ 75 L12IN FNGR THK94MIL TRNSLUC YEL LTX

## (undated) DEVICE — TROCAR ENDOSCP BLDELSS 12X100 MM W/ HNDL STBL SL OPT TIP

## (undated) DEVICE — TUBING PRSS 36 M F

## (undated) DEVICE — MARKER,SKIN,WI/RULER AND LABELS: Brand: MEDLINE

## (undated) DEVICE — SUTURE SURGLON SZ 4-0 L18IN NONABSORBABLE BLK NO NDL TIE 8886191831

## (undated) DEVICE — PRESSURE MONITORING SET: Brand: TRUWAVE, VAMP PLUS

## (undated) DEVICE — 3M™ STERI-STRIP™ COMPOUND BENZOIN TINCTURE 40 BAGS/CARTON 4 CARTONS/CASE C1544: Brand: 3M™ STERI-STRIP™

## (undated) DEVICE — SUTURE VIC + LEN CP1 0 70CM VCP267H

## (undated) DEVICE — BANDAGE ADH W1XL3IN NAT FAB WVN FLX DURABLE N ADH PD SEAL

## (undated) DEVICE — Z CONVERTED USE 2715898 SWABSTICK MEDICATED W1.75XL6.5IN 1.6ML 3.15% CHG 70% ISO

## (undated) DEVICE — DRESSING ANITMICROBIAL FOAM OPTIFOAM POSTOP STRP 35 X 10 IN

## (undated) DEVICE — GLOVE ORANGE PI 7 1/2   MSG9075

## (undated) DEVICE — CANNULA INJ L2.5IN BLNT TIP 3MM CLR BODY W/ 1 W VLV DLP

## (undated) DEVICE — IV START KIT: Brand: MEDLINE INDUSTRIES, INC.

## (undated) DEVICE — BLANKET WRM W40.2XL55.9IN IORT LO BODY + MISTRAL AIR

## (undated) DEVICE — VESSEL SEALER EXTEND: Brand: ENDOWRIST

## (undated) DEVICE — SUTURE PROL SZ 4-0 L36IN NONABSORBABLE BLU L26MM SH 1/2 CIR 8521H

## (undated) DEVICE — CLIP INT L235CM WRK CHN DIA2.8MM OPN 11MM BRAID CATH ROT BX/10

## (undated) DEVICE — SET LNR RED GRN W/ BASE CLEANASCOPE

## (undated) DEVICE — DRESSING TRNSPAR W2XL2.75IN FLM SHT SEMIPERMEABLE WIND

## (undated) DEVICE — APPLIER LIG CLP M L11IN TI STR RNG HNDL FOR 20 CLP DISP

## (undated) DEVICE — BLANKET THER AD W24XL60IN FAB COVERING SUP SFT ULT THN LTWT

## (undated) DEVICE — BLADE LARYNSCP SZ 3 ENH DIR INTUB GLIDESCOPE MCGRATH MAC

## (undated) DEVICE — BANDAGE ADH W2XL4IN NITRL FAB STRP CURAD

## (undated) DEVICE — C-ARM: Brand: UNBRANDED

## (undated) DEVICE — ADHESIVE SKIN CLOSURE WND 8.661X1.5 IN 22 CM LIQUIBAND SECUR

## (undated) DEVICE — SUTURE VCRL SZ 0 L18IN ABSRB VLT SUTUPAK PRECUT W/O NDL J106T

## (undated) DEVICE — E-Z CLEAN, NON-STICK, PTFE COATED, ELECTROSURGICAL BLADE ELECTRODE, MODIFIED EXTENDED INSULATION, 4 INCH (10.2 CM): Brand: MEGADYNE

## (undated) DEVICE — 3M™ IOBAN™ 2 ANTIMICROBIAL INCISE DRAPE 6661EZ: Brand: IOBAN™ 2

## (undated) DEVICE — SET TRNQT STD 12FR TB LEN 7 IN 2 RED 2 BLU 2 CLR 16FR 2 L

## (undated) DEVICE — FOGARTY ARTERIAL EMBOLECTOMY CATHETER 3F 40CM: Brand: FOGARTY

## (undated) DEVICE — SUTURE STRATAFIX SPRL SZ 2-0 L9IN ABSRB VLT MH L36MM 1/2 SXPD2B408

## (undated) DEVICE — TUBING, SUCTION, 1/4" X 6', STRAIGHT: Brand: MEDLINE

## (undated) DEVICE — PACK,UNIVERSAL,NO GOWNS: Brand: MEDLINE

## (undated) DEVICE — TOWEL,OR,DSP,ST,BLUE,DLX,4/PK,20PK/CS: Brand: MEDLINE

## (undated) DEVICE — SET CATH 20GA L1.5IN RAD ART POLYUR RADPQ W/ INTEGR 0.018IN

## (undated) DEVICE — STD, INTRO-FLEX INTRODUCER 9F: Brand: MEDLINE

## (undated) DEVICE — SUTURE V-LOC 180 SZ 2-0 L9IN ABSRB VLT GS-21 L37MM 1/2 CIR VLOCM0345

## (undated) DEVICE — TRAP,MUCUS SPECIMEN, 80CC: Brand: MEDLINE

## (undated) DEVICE — CATH THERMDIL VIP 7.5FR 110CM

## (undated) DEVICE — TROCAR ENDOSCP SHFT L100MM DIA5MM DIL TIP ENDOPATH XCEL

## (undated) DEVICE — PACK PROCEDURE SURG SET UP SRMC

## (undated) DEVICE — SUTURE PROL SZ 5-0 L36IN NONABSORBABLE BLU L17MM RB-1 1/2 8556H

## (undated) DEVICE — COLUMN DRAPE

## (undated) DEVICE — JELLY,LUBE,STERILE,FLIP TOP,TUBE,2-OZ: Brand: MEDLINE

## (undated) DEVICE — E-Z CLEAN, NON-STICK, PTFE COATED, ELECTROSURGICAL BLADE ELECTRODE, 6.5 INCH (16.5 CM): Brand: MEGADYNE

## (undated) DEVICE — SUTURE ABSORBABLE MONOFILAMENT 1-0 OS8 14 IN STRATAFIX SPRL SXPD2B202

## (undated) DEVICE — NEEDLE SCLERO 23GA L4MM CATH L240CM CNTRST SHTH DIA1.8MM

## (undated) DEVICE — STAPLER INT L60MM DIA12MM STD TISS TI LNAR CUT LN 6 ROW

## (undated) DEVICE — SPONGE,PEANUT,XRAY,ST,SM,3/8",5/CARD: Brand: MEDLINE INDUSTRIES, INC.

## (undated) DEVICE — FORCEP RAD JAW W/NEEDLE 160CM

## (undated) DEVICE — Z DUPLICATE USE 2716240 STAPLER INT CUT LN L40MM STPL L51MM GRN CRV HD B FRM

## (undated) DEVICE — CORE TRUMPET FOR SINGLE SOLUTION BAG: Brand: CORE DYNAMICS

## (undated) DEVICE — ARM DRAPE

## (undated) DEVICE — SYRINGE MED 50ML LUERLOCK TIP

## (undated) DEVICE — Z DUP USE 2641840 CLIP INT L POLYMER LOK LIG HEM O LOK

## (undated) DEVICE — ELECTRO LUBE IS A SINGLE PATIENT USE DEVICE THAT IS INTENDED TO BE USED ON ELECTROSURGICAL ELECTRODES TO REDUCE STICKING.: Brand: KEY SURGICAL ELECTRO LUBE

## (undated) DEVICE — BASIC SINGLE BASIN BTC-LF: Brand: MEDLINE INDUSTRIES, INC.

## (undated) DEVICE — SPLINT ARMBRD W3XL10.5IN POLYFOAM DLX A LN

## (undated) DEVICE — 3M™ WARMING BLANKET, UPPER BODY, 10 PER CASE, 42268: Brand: BAIR HUGGER™

## (undated) DEVICE — ROYAL SILK SURGICAL GOWN, XXL: Brand: CONVERTORS

## (undated) DEVICE — STOCKINETTE ORTH W9XL36IN COT 2 PLY HLLW FOR HANDLING LMB

## (undated) DEVICE — RESERVOIR,SUCTION,100CC,SILICONE: Brand: MEDLINE

## (undated) DEVICE — SUTURE STRATAFIX SZ 3-0 30CM NONABSORB UD 26MM FS 3/8 SXMP2B412

## (undated) DEVICE — TIP COVER ACCESSORY

## (undated) DEVICE — POSITIONER HD W8XH4XL8.5IN RASPBERRY FOAM SLT

## (undated) DEVICE — SUTURE N ABSRB MFIL TAPR PNT PREM NDL EXP PTFE CV SUT DBL 7M10

## (undated) DEVICE — COVER US PRB W5XL96IN LTX W/ GEL

## (undated) DEVICE — [HIGH FLOW INSUFFLATOR,  DO NOT USE IF PACKAGE IS DAMAGED,  KEEP DRY,  KEEP AWAY FROM SUNLIGHT,  PROTECT FROM HEAT AND RADIOACTIVE SOURCES.]: Brand: PNEUMOSURE

## (undated) DEVICE — TIP APPL L35CM RIG FOR SEAL EVICEL

## (undated) DEVICE — SUTURE PROL SZ 6-0 L24IN NONABSORBABLE BLU L13MM C-1 3/8 8726H

## (undated) DEVICE — 450 ML BOTTLE OF 0.05% CHLORHEXIDINE GLUCONATE IN 99.95% STERILE WATER FOR IRRIGATION, USP AND APPLICATOR.: Brand: IRRISEPT ANTIMICROBIAL WOUND LAVAGE

## (undated) DEVICE — SET AUTOTRNS C175ML BOWL BTM OUTLT RESERVOIRXTRA

## (undated) DEVICE — RELOAD STPL L55MM OPN H3.8MM CLS H1.5MM WIRE DIA0.2MM REG

## (undated) DEVICE — PADDING,UNDERCAST,COTTON, 4"X4YD STERILE: Brand: MEDLINE

## (undated) DEVICE — SUTURE ETHBND EXCEL SZ 5 L30IN NONABSORBABLE GRN L48MM V-40 MB46G

## (undated) DEVICE — COVER EQUIP STEEP TREND EZ CLN UP WTRPRF DISP FOR STD SZ

## (undated) DEVICE — 1.5MM HYDRO LEMAITRE VALVULOTOME (98 CM): Brand: HYDRO LEMAITRE VALVULOTOME

## (undated) DEVICE — TTL1LYR 16FR10ML 100%SIL TMPST TR: Brand: MEDLINE

## (undated) DEVICE — CONTAINER,SPECIMEN,PNEU TUBE,4OZ,OR STRL: Brand: MEDLINE

## (undated) DEVICE — BLADELESS OBTURATOR: Brand: WECK VISTA

## (undated) DEVICE — STAPLER INT L28CM DIA29MM CLS STPL H10-2.5MM OPN LEG L5.5MM

## (undated) DEVICE — BANDAGE,GAUZE,4.5"X4.1YD,STERILE,LF: Brand: MEDLINE

## (undated) DEVICE — DRAIN SURG W10MMXL20CM SIL FULL PERF HUBLESS FLAT RADPQ

## (undated) DEVICE — GEL ULTRASOUND 20GM OVERWRAPPED NON STEN

## (undated) DEVICE — HYPODERMIC SAFETY NEEDLE: Brand: MAGELLAN

## (undated) DEVICE — STAPLER INT 75MM CUT LN L73MM STPL LN L77MM LNAR B-FORM

## (undated) DEVICE — PAD GEN USE BORDERED ADH 14IN 2IN AND 12IN 4IN GZ UNIV ST

## (undated) DEVICE — PACK-MAJOR

## (undated) DEVICE — SUTURE 1 36 VIO MONO PDS PDP371T

## (undated) DEVICE — FOGARTY SPRING CLIPS 6MM: Brand: FOGARTY SOFTJAW

## (undated) DEVICE — SOLUTION IV IRRIG POUR BRL 0.9% SODIUM CHL 2F7124

## (undated) DEVICE — SOLUTION SURG PREP POV IOD 7.5% 4 OZ

## (undated) DEVICE — LINE ASPIR 1/4 ANTICOAG

## (undated) DEVICE — CANNULA IV 18GA L15IN BLNT FILL LUERLOCK HUB MJCT

## (undated) DEVICE — BLADE CLIPPER GEN PURP NS

## (undated) DEVICE — SUTURE PROL SZ 7-0 L24IN NONABSORBABLE BLU L9.3MM CC 3/8 8704H

## (undated) DEVICE — DRESSING TRNSPAR W8XL12IN FLM SURESITE 123

## (undated) DEVICE — STAPLER INT L60MM REG TISS BLU B FRM 8 FIRING 2 ROW AUTO

## (undated) DEVICE — GLOVE ORTHO 8   MSG9480

## (undated) DEVICE — SUTURE SZ 0 27IN 5/8 CIR UR-6  TAPER PT VIOLET ABSRB VICRYL J603H

## (undated) DEVICE — APPLIER CLP L L13IN TI MULT RNG HNDL 20 CLP STR LIGACLP

## (undated) DEVICE — DEVICE CLOSURE SKIN SURG

## (undated) DEVICE — Z DISCONTINUED NO SUB IDED TAPE UMB W1/8XL36IN WHT COT STRND NONRADIOPAQUE

## (undated) DEVICE — SUTURE GOR TX SZ 5-0 L30IN NONABSORBABLE L13MM THC-13 1/2 6M12A

## (undated) DEVICE — SUTURE VCRL SZ 0 L27IN ABSRB UD L36MM CT-1 1/2 CIR J260H

## (undated) DEVICE — LOOP VES W13MM THK09MM MINI RED SIL FLD REPELLENT

## (undated) DEVICE — ELECTROSURGICAL PENCIL BUTTON SWITCH E-Z CLEAN COATED BLADE ELECTRODE 10 FT (3 M) CORD HOLSTER: Brand: MEGADYNE